# Patient Record
Sex: FEMALE | Race: WHITE | Employment: OTHER
[De-identification: names, ages, dates, MRNs, and addresses within clinical notes are randomized per-mention and may not be internally consistent; named-entity substitution may affect disease eponyms.]

---

## 2017-01-03 ENCOUNTER — CARE COORDINATION (OUTPATIENT)
Dept: CARE COORDINATION | Facility: CLINIC | Age: 47
End: 2017-01-03

## 2017-01-04 ENCOUNTER — TELEPHONE (OUTPATIENT)
Dept: FAMILY MEDICINE CLINIC | Facility: CLINIC | Age: 47
End: 2017-01-04

## 2017-01-05 ENCOUNTER — OFFICE VISIT (OUTPATIENT)
Dept: FAMILY MEDICINE CLINIC | Facility: CLINIC | Age: 47
End: 2017-01-05

## 2017-01-05 VITALS
DIASTOLIC BLOOD PRESSURE: 70 MMHG | RESPIRATION RATE: 18 BRPM | BODY MASS INDEX: 22.72 KG/M2 | WEIGHT: 105 LBS | SYSTOLIC BLOOD PRESSURE: 130 MMHG | HEART RATE: 80 BPM

## 2017-01-05 DIAGNOSIS — I10 ESSENTIAL HYPERTENSION: Primary | ICD-10-CM

## 2017-01-05 DIAGNOSIS — E78.5 HYPERLIPIDEMIA, UNSPECIFIED HYPERLIPIDEMIA TYPE: ICD-10-CM

## 2017-01-05 DIAGNOSIS — M79.7 FIBROMYALGIA: ICD-10-CM

## 2017-01-05 PROBLEM — F14.20 COCAINE ADDICTION (HCC): Status: ACTIVE | Noted: 2017-01-05

## 2017-01-05 PROCEDURE — 99214 OFFICE O/P EST MOD 30 MIN: CPT | Performed by: FAMILY MEDICINE

## 2017-01-05 RX ORDER — TIZANIDINE 4 MG/1
TABLET ORAL
Qty: 60 TABLET | Refills: 0 | Status: SHIPPED | OUTPATIENT
Start: 2017-01-05 | End: 2017-02-06 | Stop reason: SDUPTHER

## 2017-01-05 RX ORDER — ATENOLOL 25 MG/1
25 TABLET ORAL DAILY
Qty: 90 TABLET | Refills: 1 | Status: SHIPPED | OUTPATIENT
Start: 2017-01-05 | End: 2017-02-06 | Stop reason: ALTCHOICE

## 2017-01-05 RX ORDER — CLONIDINE HYDROCHLORIDE 0.1 MG/1
TABLET ORAL
Qty: 60 TABLET | Refills: 5 | Status: SHIPPED | OUTPATIENT
Start: 2017-01-05 | End: 2017-02-06 | Stop reason: ALTCHOICE

## 2017-01-05 ASSESSMENT — ENCOUNTER SYMPTOMS
BLOOD IN STOOL: 0
CHEST TIGHTNESS: 0
SHORTNESS OF BREATH: 0
ABDOMINAL PAIN: 0

## 2017-01-06 ENCOUNTER — TELEPHONE (OUTPATIENT)
Dept: GASTROENTEROLOGY | Facility: CLINIC | Age: 47
End: 2017-01-06

## 2017-01-12 ENCOUNTER — TELEPHONE (OUTPATIENT)
Dept: GASTROENTEROLOGY | Facility: CLINIC | Age: 47
End: 2017-01-12

## 2017-01-19 ENCOUNTER — TELEPHONE (OUTPATIENT)
Dept: GASTROENTEROLOGY | Facility: CLINIC | Age: 47
End: 2017-01-19

## 2017-02-06 RX ORDER — TIZANIDINE 4 MG/1
TABLET ORAL
Qty: 120 TABLET | Refills: 0 | Status: SHIPPED | OUTPATIENT
Start: 2017-02-06 | End: 2020-02-14

## 2017-02-06 RX ORDER — ESCITALOPRAM OXALATE 20 MG/1
20 TABLET ORAL DAILY
Qty: 90 TABLET | Refills: 0 | Status: SHIPPED | OUTPATIENT
Start: 2017-02-06 | End: 2018-12-26

## 2017-02-06 RX ORDER — TOPIRAMATE 25 MG/1
25 TABLET ORAL 2 TIMES DAILY
COMMUNITY
End: 2018-12-26

## 2017-02-06 RX ORDER — ARIPIPRAZOLE 5 MG/1
5 TABLET ORAL DAILY
Status: ON HOLD | COMMUNITY

## 2017-02-27 ENCOUNTER — TELEPHONE (OUTPATIENT)
Dept: FAMILY MEDICINE CLINIC | Facility: CLINIC | Age: 47
End: 2017-02-27

## 2017-02-28 RX ORDER — AMLODIPINE BESYLATE 10 MG/1
10 TABLET ORAL DAILY
Qty: 30 TABLET | Refills: 0 | Status: SHIPPED | OUTPATIENT
Start: 2017-02-28 | End: 2018-12-26

## 2017-02-28 RX ORDER — TRAZODONE HYDROCHLORIDE 50 MG/1
50 TABLET ORAL NIGHTLY PRN
Qty: 30 TABLET | Refills: 0 | Status: SHIPPED | OUTPATIENT
Start: 2017-02-28 | End: 2019-04-08 | Stop reason: SDUPTHER

## 2017-02-28 RX ORDER — DIVALPROEX SODIUM 500 MG/1
1000 TABLET, DELAYED RELEASE ORAL 2 TIMES DAILY
Qty: 120 TABLET | Refills: 0 | Status: ON HOLD | OUTPATIENT
Start: 2017-02-28 | End: 2022-10-11

## 2017-05-17 ENCOUNTER — TELEPHONE (OUTPATIENT)
Dept: UROLOGY | Age: 47
End: 2017-05-17

## 2018-12-26 ENCOUNTER — OFFICE VISIT (OUTPATIENT)
Dept: FAMILY MEDICINE CLINIC | Age: 48
End: 2018-12-26
Payer: MEDICARE

## 2018-12-26 ENCOUNTER — HOSPITAL ENCOUNTER (OUTPATIENT)
Age: 48
Setting detail: SPECIMEN
Discharge: HOME | End: 2018-12-26
Payer: MEDICARE

## 2018-12-26 VITALS
DIASTOLIC BLOOD PRESSURE: 68 MMHG | TEMPERATURE: 97.4 F | SYSTOLIC BLOOD PRESSURE: 110 MMHG | HEIGHT: 58 IN | RESPIRATION RATE: 16 BRPM | HEART RATE: 60 BPM | WEIGHT: 95 LBS | BODY MASS INDEX: 19.94 KG/M2

## 2018-12-26 DIAGNOSIS — F14.21 COCAINE DEPENDENCE IN REMISSION (HCC): ICD-10-CM

## 2018-12-26 DIAGNOSIS — N39.0 RECURRENT UTI: ICD-10-CM

## 2018-12-26 DIAGNOSIS — G40.909 SEIZURE DISORDER (HCC): ICD-10-CM

## 2018-12-26 DIAGNOSIS — M79.7 FIBROMYALGIA: ICD-10-CM

## 2018-12-26 DIAGNOSIS — M25.512 CHRONIC LEFT SHOULDER PAIN: ICD-10-CM

## 2018-12-26 DIAGNOSIS — G89.29 CHRONIC LEFT SHOULDER PAIN: ICD-10-CM

## 2018-12-26 DIAGNOSIS — F41.9 ANXIETY: ICD-10-CM

## 2018-12-26 DIAGNOSIS — L02.91 ABSCESS: Primary | ICD-10-CM

## 2018-12-26 DIAGNOSIS — F31.60 BIPOLAR DISORDER, MIXED (HCC): ICD-10-CM

## 2018-12-26 DIAGNOSIS — A49.02 MRSA INFECTION: ICD-10-CM

## 2018-12-26 LAB
APPEARANCE FLUID: CLEAR
BILIRUBIN, POC: NEGATIVE
BLOOD URINE, POC: NEGATIVE
CLARITY, POC: CLEAR
COLOR, POC: YELLOW
GLUCOSE URINE, POC: NEGATIVE
KETONES, POC: NEGATIVE
LEUKOCYTE EST, POC: NEGATIVE
NITRITE, POC: NEGATIVE
PH, POC: 6
PROTEIN, POC: NEGATIVE
SPECIFIC GRAVITY, POC: 1.02
UROBILINOGEN, POC: NEGATIVE

## 2018-12-26 PROCEDURE — G0444 DEPRESSION SCREEN ANNUAL: HCPCS | Performed by: NURSE PRACTITIONER

## 2018-12-26 PROCEDURE — 81002 URINALYSIS NONAUTO W/O SCOPE: CPT | Performed by: NURSE PRACTITIONER

## 2018-12-26 PROCEDURE — G8420 CALC BMI NORM PARAMETERS: HCPCS | Performed by: NURSE PRACTITIONER

## 2018-12-26 PROCEDURE — 4004F PT TOBACCO SCREEN RCVD TLK: CPT | Performed by: NURSE PRACTITIONER

## 2018-12-26 PROCEDURE — G8484 FLU IMMUNIZE NO ADMIN: HCPCS | Performed by: NURSE PRACTITIONER

## 2018-12-26 PROCEDURE — 3017F COLORECTAL CA SCREEN DOC REV: CPT | Performed by: NURSE PRACTITIONER

## 2018-12-26 PROCEDURE — G8427 DOCREV CUR MEDS BY ELIG CLIN: HCPCS | Performed by: NURSE PRACTITIONER

## 2018-12-26 PROCEDURE — 99215 OFFICE O/P EST HI 40 MIN: CPT | Performed by: NURSE PRACTITIONER

## 2018-12-26 RX ORDER — TERBINAFINE HYDROCHLORIDE 250 MG/1
250 TABLET ORAL DAILY
COMMUNITY
End: 2020-02-14

## 2018-12-26 RX ORDER — CLONAZEPAM 0.5 MG/1
0.5 TABLET ORAL 2 TIMES DAILY PRN
COMMUNITY
End: 2020-02-14

## 2018-12-26 RX ORDER — AMLODIPINE BESYLATE 5 MG/1
10 TABLET ORAL DAILY
COMMUNITY
End: 2020-03-17

## 2018-12-26 RX ORDER — DULOXETIN HYDROCHLORIDE 30 MG/1
30 CAPSULE, DELAYED RELEASE ORAL DAILY
COMMUNITY
End: 2019-04-08 | Stop reason: SDUPTHER

## 2018-12-26 RX ORDER — TOPIRAMATE 50 MG/1
100 TABLET, FILM COATED ORAL 2 TIMES DAILY
Status: ON HOLD | COMMUNITY
End: 2022-01-31 | Stop reason: HOSPADM

## 2018-12-26 ASSESSMENT — PATIENT HEALTH QUESTIONNAIRE - PHQ9
9. THOUGHTS THAT YOU WOULD BE BETTER OFF DEAD, OR OF HURTING YOURSELF: 1
6. FEELING BAD ABOUT YOURSELF - OR THAT YOU ARE A FAILURE OR HAVE LET YOURSELF OR YOUR FAMILY DOWN: 3
SUM OF ALL RESPONSES TO PHQ QUESTIONS 1-9: 21
SUM OF ALL RESPONSES TO PHQ QUESTIONS 1-9: 21
2. FEELING DOWN, DEPRESSED OR HOPELESS: 3
10. IF YOU CHECKED OFF ANY PROBLEMS, HOW DIFFICULT HAVE THESE PROBLEMS MADE IT FOR YOU TO DO YOUR WORK, TAKE CARE OF THINGS AT HOME, OR GET ALONG WITH OTHER PEOPLE: 2
3. TROUBLE FALLING OR STAYING ASLEEP: 3
1. LITTLE INTEREST OR PLEASURE IN DOING THINGS: 3
7. TROUBLE CONCENTRATING ON THINGS, SUCH AS READING THE NEWSPAPER OR WATCHING TELEVISION: 3
SUM OF ALL RESPONSES TO PHQ9 QUESTIONS 1 & 2: 6
4. FEELING TIRED OR HAVING LITTLE ENERGY: 3
5. POOR APPETITE OR OVEREATING: 0
8. MOVING OR SPEAKING SO SLOWLY THAT OTHER PEOPLE COULD HAVE NOTICED. OR THE OPPOSITE, BEING SO FIGETY OR RESTLESS THAT YOU HAVE BEEN MOVING AROUND A LOT MORE THAN USUAL: 2

## 2018-12-26 ASSESSMENT — ENCOUNTER SYMPTOMS
CHEST TIGHTNESS: 0
ABDOMINAL PAIN: 0
SHORTNESS OF BREATH: 0
BLOOD IN STOOL: 0

## 2018-12-27 LAB
CULTURE: NORMAL
Lab: NORMAL
SPECIMEN DESCRIPTION: NORMAL
STATUS: NORMAL

## 2019-01-03 ENCOUNTER — TELEPHONE (OUTPATIENT)
Dept: FAMILY MEDICINE CLINIC | Age: 49
End: 2019-01-03

## 2019-01-03 RX ORDER — IBUPROFEN 800 MG/1
800 TABLET ORAL EVERY 8 HOURS PRN
Qty: 60 TABLET | Refills: 0 | Status: SHIPPED | OUTPATIENT
Start: 2019-01-03 | End: 2021-09-07 | Stop reason: ALTCHOICE

## 2019-01-07 ENCOUNTER — TELEPHONE (OUTPATIENT)
Dept: FAMILY MEDICINE CLINIC | Age: 49
End: 2019-01-07

## 2019-01-07 RX ORDER — VARENICLINE TARTRATE 25 MG
KIT ORAL
Qty: 53 EACH | Refills: 0 | Status: SHIPPED | OUTPATIENT
Start: 2019-01-07 | End: 2020-02-14

## 2019-04-08 ENCOUNTER — OFFICE VISIT (OUTPATIENT)
Dept: FAMILY MEDICINE CLINIC | Age: 49
End: 2019-04-08
Payer: COMMERCIAL

## 2019-04-08 VITALS
HEART RATE: 60 BPM | TEMPERATURE: 97.9 F | SYSTOLIC BLOOD PRESSURE: 90 MMHG | DIASTOLIC BLOOD PRESSURE: 60 MMHG | RESPIRATION RATE: 14 BRPM | WEIGHT: 106 LBS | BODY MASS INDEX: 22.15 KG/M2

## 2019-04-08 DIAGNOSIS — G40.909 SEIZURE DISORDER (HCC): Primary | ICD-10-CM

## 2019-04-08 DIAGNOSIS — F14.29 COCAINE DEPENDENCE WITH COCAINE-INDUCED DISORDER (HCC): ICD-10-CM

## 2019-04-08 DIAGNOSIS — F31.60 BIPOLAR DISORDER, MIXED (HCC): ICD-10-CM

## 2019-04-08 DIAGNOSIS — G89.4 CHRONIC PAIN SYNDROME: ICD-10-CM

## 2019-04-08 DIAGNOSIS — G47.00 INSOMNIA, UNSPECIFIED TYPE: ICD-10-CM

## 2019-04-08 DIAGNOSIS — Z12.39 SCREENING FOR BREAST CANCER: ICD-10-CM

## 2019-04-08 PROCEDURE — 99214 OFFICE O/P EST MOD 30 MIN: CPT | Performed by: NURSE PRACTITIONER

## 2019-04-08 RX ORDER — ALBUTEROL SULFATE 90 UG/1
2 AEROSOL, METERED RESPIRATORY (INHALATION) EVERY 6 HOURS PRN
Qty: 1 INHALER | Refills: 3 | Status: ON HOLD | OUTPATIENT
Start: 2019-04-08 | End: 2022-10-11

## 2019-04-08 RX ORDER — DULOXETIN HYDROCHLORIDE 30 MG/1
30 CAPSULE, DELAYED RELEASE ORAL DAILY
Qty: 30 CAPSULE | Refills: 3 | Status: ON HOLD | OUTPATIENT
Start: 2019-04-08

## 2019-04-08 RX ORDER — HYDROCODONE BITARTRATE AND ACETAMINOPHEN 5; 325 MG/1; MG/1
1 TABLET ORAL EVERY 6 HOURS PRN
COMMUNITY
End: 2019-04-08 | Stop reason: ALTCHOICE

## 2019-04-08 RX ORDER — TRAZODONE HYDROCHLORIDE 50 MG/1
50 TABLET ORAL NIGHTLY PRN
Qty: 30 TABLET | Refills: 3 | Status: SHIPPED | OUTPATIENT
Start: 2019-04-08 | End: 2022-08-09

## 2019-04-08 ASSESSMENT — ENCOUNTER SYMPTOMS
NAUSEA: 0
ABDOMINAL PAIN: 0
WHEEZING: 0
VOMITING: 0
SHORTNESS OF BREATH: 0

## 2019-04-08 NOTE — PROGRESS NOTES
but hasn't seen them for a while. States she has pain everywhere secondary to fibromyalgia. Was prescribed cymbalta but hasn't filled secondary to the cost.   Hx of cocaine addiction and has been free for 2 years. Review of Systems   Constitutional: Negative for chills and fever. Eyes: Negative for visual disturbance. Respiratory: Negative for shortness of breath and wheezing. Cardiovascular: Negative for chest pain and leg swelling. Gastrointestinal: Negative for abdominal pain, nausea and vomiting. Musculoskeletal: Positive for myalgias. Neurological: Positive for seizures. Negative for dizziness, weakness and numbness. Psychiatric/Behavioral: Positive for dysphoric mood and sleep disturbance. Negative for suicidal ideas. Objective:   Physical Exam   Constitutional: She appears well-nourished. No distress. HENT:   Nose: Nose normal.   Mouth/Throat: Oropharynx is clear and moist.   Eyes: Conjunctivae are normal.   Neck: Neck supple. Cardiovascular: Normal rate, regular rhythm and normal heart sounds. Pulmonary/Chest: Effort normal and breath sounds normal. No respiratory distress. Abdominal: Soft. There is no tenderness. Musculoskeletal: Normal range of motion. Lymphadenopathy:     She has no cervical adenopathy. Neurological: She is alert. No cranial nerve deficit. Skin: Skin is warm and dry. No rash noted. Psychiatric: She has a normal mood and affect. Her behavior is normal.   Nursing note and vitals reviewed. Assessment:      1. Seizure disorder ([address]    2. Insomnia, unspecified type    3. Chronic pain syndrome    4. Bipolar disorder, mixed ([address]    5. Cocaine dependence with cocaine-induced disorder ([address]    6.  Screening for breast cancer            Plan:      BP Readings from Last 3 Encounters:   04/08/19 90/60   12/26/18 110/68   01/05/17 130/70     BP 90/60 (Site: Left Upper Arm, Position: Sitting, Cuff Size: Medium Adult)   Pulse 60   Temp 97.9 °F (36.6 °C) (Oral)   Resp 14   Wt 106 lb (48.1 kg)   BMI 22.15 kg/m²   Lab Results   Component Value Date    WBC 6.7 12/29/2016    HGB 11.0 (L) 12/29/2016    HCT 34.7 (L) 12/29/2016     12/29/2016    CHOL 183 07/06/2016    TRIG 74 07/06/2016    HDL 65 07/06/2016    ALT 6 12/29/2016    AST 11 12/29/2016     12/29/2016    K 3.6 (L) 12/29/2016     12/29/2016    CREATININE 0.48 (L) 12/29/2016    BUN 5 (L) 12/29/2016    CO2 25 12/29/2016    TSH 0.63 07/22/2016    INR 1.0 07/22/2016    LABA1C 5.3 10/12/2016     Lab Results   Component Value Date    CALCIUM 9.0 12/29/2016    PHOS 2.7 09/12/2014     Lab Results   Component Value Date    LDLCALC 103 07/06/2016         1. Seizure disorder ([facility] [address]  - stable. Cont current therapy  - follow up with Dr. Elena Cast as scheduled     2. Insomnia, unspecified type  - resume trazodone   - traZODone (DESYREL) 50 MG tablet; Take 1 tablet by mouth nightly as needed for Sleep  Dispense: 30 tablet; Refill: 3  3. Chronic pain syndrome  - resume cymbalta   - DULoxetine (CYMBALTA) 30 MG extended release capsule; Take 1 capsule by mouth daily  Dispense: 30 capsule; Refill: 3    4. Bipolar disorder, mixed ([facility] [address]  - cont current therapy  - follow up with Meri as scheduled     5. Cocaine dependence with cocaine-induced disorder (HCC)  - Urine Drug Screen; Future    6. Screening for breast cancer  - ALBERT DIGITAL SCREEN W OR WO CAD BILATERAL;  Future        Requested Prescriptions     Signed Prescriptions Disp Refills    traZODone (DESYREL) 50 MG tablet 30 tablet 3     Sig: Take 1 tablet by mouth nightly as needed for Sleep    DULoxetine (CYMBALTA) 30 MG extended release capsule 30 capsule 3     Sig: Take 1 capsule by mouth daily    albuterol sulfate HFA (PROVENTIL HFA) 108 (90 Base) MCG/ACT inhaler 1 Inhaler 3     Sig: Inhale 2 puffs into the lungs every 6 hours as needed for Wheezing       Medications Discontinued During This Encounter   Medication Reason    traZODone (DESYREL) 50 MG tablet REORDER    DULoxetine (CYMBALTA) 30 MG extended release capsule REORDER    HYDROcodone-acetaminophen (NORCO) 5-325 MG per tablet Therapy completed         Discussed use, benefit, and side effects of prescribed medications. Barriers to medication compliance addressed. All patient questions answered. Pt voiced understanding. Return in about 4 months (around 8/8/2019) for follow up seizures, insomia .             Rita Greer, KELLY - CNP

## 2019-04-09 ENCOUNTER — TELEPHONE (OUTPATIENT)
Dept: GASTROENTEROLOGY | Age: 49
End: 2019-04-09

## 2019-04-16 ENCOUNTER — OFFICE VISIT (OUTPATIENT)
Dept: ORTHOPEDIC SURGERY | Age: 49
End: 2019-04-16
Payer: COMMERCIAL

## 2019-04-16 DIAGNOSIS — M54.2 NECK PAIN: ICD-10-CM

## 2019-04-16 DIAGNOSIS — M25.512 ACUTE PAIN OF LEFT SHOULDER: Primary | ICD-10-CM

## 2019-04-16 DIAGNOSIS — M19.019 SHOULDER ARTHRITIS: ICD-10-CM

## 2019-04-16 PROCEDURE — 20610 DRAIN/INJ JOINT/BURSA W/O US: CPT | Performed by: ORTHOPAEDIC SURGERY

## 2019-04-16 PROCEDURE — 99203 OFFICE O/P NEW LOW 30 MIN: CPT | Performed by: ORTHOPAEDIC SURGERY

## 2019-04-16 RX ORDER — LIDOCAINE HYDROCHLORIDE 10 MG/ML
2 INJECTION, SOLUTION EPIDURAL; INFILTRATION; INTRACAUDAL; PERINEURAL ONCE
Status: COMPLETED | OUTPATIENT
Start: 2019-04-16 | End: 2019-04-16

## 2019-04-16 RX ORDER — BUPIVACAINE HYDROCHLORIDE 5 MG/ML
2 INJECTION, SOLUTION PERINEURAL ONCE
Status: COMPLETED | OUTPATIENT
Start: 2019-04-16 | End: 2019-04-16

## 2019-04-16 RX ORDER — BETAMETHASONE SODIUM PHOSPHATE AND BETAMETHASONE ACETATE 3; 3 MG/ML; MG/ML
12 INJECTION, SUSPENSION INTRA-ARTICULAR; INTRALESIONAL; INTRAMUSCULAR; SOFT TISSUE ONCE
Status: COMPLETED | OUTPATIENT
Start: 2019-04-16 | End: 2019-04-16

## 2019-04-16 RX ADMIN — BETAMETHASONE SODIUM PHOSPHATE AND BETAMETHASONE ACETATE 12 MG: 3; 3 INJECTION, SUSPENSION INTRA-ARTICULAR; INTRALESIONAL; INTRAMUSCULAR; SOFT TISSUE at 14:35

## 2019-04-16 RX ADMIN — LIDOCAINE HYDROCHLORIDE 2 ML: 10 INJECTION, SOLUTION EPIDURAL; INFILTRATION; INTRACAUDAL; PERINEURAL at 14:36

## 2019-04-16 RX ADMIN — BUPIVACAINE HYDROCHLORIDE 10 MG: 5 INJECTION, SOLUTION PERINEURAL at 14:36

## 2019-04-22 ENCOUNTER — OFFICE VISIT (OUTPATIENT)
Dept: GASTROENTEROLOGY | Age: 49
End: 2019-04-22
Payer: COMMERCIAL

## 2019-04-22 VITALS
BODY MASS INDEX: 21.32 KG/M2 | HEART RATE: 88 BPM | WEIGHT: 102 LBS | SYSTOLIC BLOOD PRESSURE: 118 MMHG | DIASTOLIC BLOOD PRESSURE: 89 MMHG

## 2019-04-22 DIAGNOSIS — R74.8 ABNORMAL SERUM LEVEL OF LIPASE: Primary | ICD-10-CM

## 2019-04-22 DIAGNOSIS — K92.1 BLOOD IN STOOL: ICD-10-CM

## 2019-04-22 DIAGNOSIS — R10.13 ABDOMINAL PAIN, EPIGASTRIC: ICD-10-CM

## 2019-04-22 PROCEDURE — 99215 OFFICE O/P EST HI 40 MIN: CPT | Performed by: INTERNAL MEDICINE

## 2019-04-22 ASSESSMENT — ENCOUNTER SYMPTOMS
RECTAL PAIN: 1
TROUBLE SWALLOWING: 1
ABDOMINAL PAIN: 1
BLOOD IN STOOL: 1
VOMITING: 1
ABDOMINAL DISTENTION: 1
EYE PAIN: 0
CONSTIPATION: 1
CHEST TIGHTNESS: 1
SHORTNESS OF BREATH: 1
NAUSEA: 1
WHEEZING: 1
ANAL BLEEDING: 1
BACK PAIN: 1
DIARRHEA: 1
SINUS PAIN: 0
EYE REDNESS: 0
SINUS PRESSURE: 0

## 2019-04-22 NOTE — PROGRESS NOTES
Subjective:      Patient ID: Brennan Morrell is a 46 y.o. female. HPI    Dr. Charmaine Lr MD our mutual patient Brennan Morrell was seen  for No diagnosis found. .      Patient seen with the symptoms of upper abdominal pain. The pain is across the upper abdomen, crampy, sharp pain. Patient had constant pain. At that time she did have nausea and emesis. Given the intensity of the pain, patient visited emergency department. She had a CT scan done which revealed minimal bile duct dilation, slightly distended small bowel loops in the left lower quadrant. Her labs were normal except lipase was elevated up to about 268. Liver tests were within normal limits. Patient was managed conservatively. At present she feels better. Still has mild discomfort in the upper abdomen. No nausea. Tolerating diet well. She does have chronic back pain. No prior history of pancreatitis. No history of ulcer disease. She had cholecystectomy done 30 years ago. In 2006 patient had Go-en-Y gastric bypass surgery for obesity. She has abdominal bloating. Has intermittent loose bowels with bloody mucus. On a rare occasion she does have constipation. She has a fair appetite. No dysphagia. No prior history of documented ulcer disease. Denies taking NSAIDs. No history of anticoagulation therapy. Past Medical, Family, and Social History reviewed and does contribute to the patient presenting condition. patient\"s PMH/PSH,SH,PSYCH hx, MEDs, ALLERGIES, and ROS was all reviewed and updated ion the appropriate sections        Review of Systems   Constitutional: Positive for fatigue. Negative for appetite change and unexpected weight change. HENT: Positive for trouble swallowing. Negative for sinus pressure and sinus pain. Eyes: Positive for visual disturbance (glasses). Negative for pain (dry/scratchy eyes) and redness. Respiratory: Positive for chest tightness (COPD), shortness of breath and wheezing. Cardiovascular: Positive for palpitations and leg swelling. Negative for chest pain. Gastrointestinal: Positive for abdominal distention, abdominal pain, anal bleeding, blood in stool, constipation, diarrhea, nausea, rectal pain and vomiting. Endocrine: Negative for cold intolerance and heat intolerance. Genitourinary: Positive for difficulty urinating and urgency. Negative for frequency. Musculoskeletal: Positive for arthralgias, back pain and neck pain. Skin: Negative. Allergic/Immunologic: Negative for environmental allergies and food allergies. Neurological: Positive for dizziness, weakness and headaches (migraines). Hematological: Bruises/bleeds easily. Psychiatric/Behavioral: Negative for agitation and sleep disturbance. The patient is not nervous/anxious. Objective:   Physical Exam   Constitutional: She is oriented to person, place, and time. She appears well-developed and well-nourished. HENT:   Head: Normocephalic and atraumatic. No oral lesions   Eyes: Pupils are equal, round, and reactive to light. Conjunctivae are normal. No scleral icterus. Neck: Normal range of motion. Neck supple. No hepatojugular reflux and no JVD present. No tracheal deviation present. No thyromegaly present. Cardiovascular: Normal rate, regular rhythm, normal heart sounds and intact distal pulses. Patient is thinly built. Pulmonary/Chest: Effort normal and breath sounds normal. No respiratory distress. She has no wheezes. She has no rales. Abdominal: Soft. Bowel sounds are normal. She exhibits no distension, no ascites and no mass. There is no hepatomegaly. There is no tenderness. There is no rebound. No hernia. Musculoskeletal: She exhibits no edema or tenderness. No joint swelling   Lymphadenopathy:     She has no cervical adenopathy. Neurological: She is alert and oriented to person, place, and time. No cranial nerve deficit. Skin: Skin is warm.  No bruising, no ecchymosis and no rash noted. No erythema. Psychiatric: Thought content normal.   Nursing note and vitals reviewed. Assessment:       Diagnosis Orders   1. Abnormal serum level of lipase  Lipase    Hepatic Function Panel   2. Abdominal pain, epigastric  EGD   3. Blood in stool  COLONOSCOPY W/ OR W/O BIOPSY           Plan: At present patient appears stable. Etiology of her markedly elevated lipase levels not clear. Also biliary ducts appears to be mildly dilated. She has Go-en-Y gastric bypass surgery. She has upper abdominal pain. Need to rule out ulcer disease etc.  Also given that she is having intermittent bloody mucus with bowel movements need to evaluate colonic pathology. The Endoscopic procedure was explained to the patient in detail  The prep and NPO were explained  All the Risks, Benefits, and Alternatives were explained  Risk of Bleeding, Perforation and Cardio Respiratory risks were explained  her questions were answered  The procedure has been scheduled with the  in the office  Patient was asked to give us a call for any questions  The patient has verbalized understanding and agreement to this plan.

## 2019-04-26 RX ORDER — POLYETHYLENE GLYCOL 3350 17 G/17G
POWDER, FOR SOLUTION ORAL
Qty: 255 G | Refills: 0 | Status: SHIPPED | OUTPATIENT
Start: 2019-04-26 | End: 2019-06-20 | Stop reason: ALTCHOICE

## 2019-05-14 ENCOUNTER — OFFICE VISIT (OUTPATIENT)
Dept: ORTHOPEDIC SURGERY | Age: 49
End: 2019-05-14
Payer: COMMERCIAL

## 2019-05-14 VITALS — HEIGHT: 59 IN | WEIGHT: 102 LBS | BODY MASS INDEX: 20.56 KG/M2

## 2019-05-14 DIAGNOSIS — M19.012 OSTEOARTHRITIS OF LEFT GLENOHUMERAL JOINT: Primary | ICD-10-CM

## 2019-05-14 PROCEDURE — 99214 OFFICE O/P EST MOD 30 MIN: CPT | Performed by: ORTHOPAEDIC SURGERY

## 2019-05-14 NOTE — PROGRESS NOTES
Orthopedic Shoulder Encounter Note     Chief complaint: Left shoulder pain    HPI: Pan Fall is a 46 y.o.  right-hand dominant female who presents for evaluation of her left shoulder. She indicates that she's had pain on and off in the shoulder for the past 2-3 years but over the course of the past 4-5 months she has had progressively worsening pain and it is becoming constant. The pain diffusely involves the shoulder and she states that she has significant pain attempting to lift her arm and she can't lift anything up with this arm. She has painful popping and grinding sensations in the shoulder with movement. It is primarily localized to the lateral and anterior aspect of the shoulder and occasionally radiates up her neck and down her arm. She does report some weakness and stiffness as well as positional nighttime pain. Of note she has a history of epilepsy and states that the last time she had a seizure was in December 2018. Previous treatment:    NSAIDs: States that she is unable to take due to history of gastric bypass surgery    Physical Therapy:  She indicates that she is unable to do therapy due to not being able to drive    Injections:  left shoulder subacromial cortisone injection on 4/16/19 by Dr. MCALLISTER [facility] this provided pain relief for 1-2 days. Surgeries:  history of bilateral shoulder arthroscopic impingement surgeries performed 15-20 years ago here in town. She cannot recall who performed her surgeries.     Review of Systems:     Constitution: no fever or chills   Pain level: 10/10  Musculoskeletal: As noted in the HPI   Neurologic: no neurologic symptoms    Past Medical History  Jaime Jasmine  has a past medical history of Anemia, Arthritis, Asthma, Bipolar disorder, mixed ([address] Cocaine abuse ([address] COPD (chronic obstructive pulmonary disease) ([facility] [address] Degeneration of cervical intervertebral disc, Depression with anxiety, Depression with anxiety, Fibromyalgia, GERD (gastroesophageal reflux disease), History of migraine headaches, History of renal calculi, History of seizure disorder, Hoarseness of voice, HTN (hypertension), Hyperlipidemia, IGT (impaired glucose tolerance), Kidney stones, Lactose intolerance, Leukopenia, Major depressive disorder, recurrent episode, severe, without mention of psychotic behavior, MVP (mitral valve prolapse), Seizures ([facility] [address] Sleep apnea, and Unspecified diseases of blood and blood-forming organs. Past Surgical History  Caitlyn Winter  has a past surgical history that includes Gastric bypass surgery; Cholecystectomy; Hysterectomy; Appendectomy; Hysterectomy; Tonsillectomy and adenoidectomy; Ankle fracture surgery; Neck surgery; shoulder surgery;  section; Carpal tunnel release; Colonoscopy (12); Upper gastrointestinal endoscopy (7-3-12); Gastric bypass surgery; Breast lumpectomy (Right); Cardiac catheterization; Upper gastrointestinal endoscopy (2016); and Colonoscopy (2016). Current Medications  Current Outpatient Medications   Medication Sig Dispense Refill    polyethylene glycol (MIRALAX) powder Use as directed by following your bowel prep instructions given by physician office 255 g 0    bisacodyl (BISACODYL) 5 MG EC tablet TAKE 4 TABS THE DAY BEFORE COLONOSCOPY AS DIRECTED ON BOWEL PREP INSTRUCTIONS GIVEN BY PHYSICIAN OFFICE 4 tablet 0    traZODone (DESYREL) 50 MG tablet Take 1 tablet by mouth nightly as needed for Sleep 30 tablet 3    DULoxetine (CYMBALTA) 30 MG extended release capsule Take 1 capsule by mouth daily 30 capsule 3    albuterol sulfate HFA (PROVENTIL HFA) 108 (90 Base) MCG/ACT inhaler Inhale 2 puffs into the lungs every 6 hours as needed for Wheezing 1 Inhaler 3    varenicline (CHANTIX STARTING MONTH NATHANIEL) 0.5 MG X 11 & 1 MG X 42 tablet Take by mouth.  53 each 0    ibuprofen (ADVIL;MOTRIN) 800 MG tablet Take 1 tablet by mouth every 8 hours as needed for Pain 60 tablet 0    terbinafine (LAMISIL) 250 MG tablet Take 250 mg by mouth daily      clonazePAM (KLONOPIN) 0.5 MG tablet Take 0.5 mg by mouth 2 times daily as needed. Yesica Stuart topiramate (TOPAMAX) 50 MG tablet Take 50 mg by mouth 2 times daily      aspirin 325 MG EC tablet Take 325 mg by mouth daily      amLODIPine (NORVASC) 5 MG tablet Take 5 mg by mouth daily      divalproex (DEPAKOTE) 500 MG DR tablet Take 2 tablets by mouth 2 times daily Indications: Level 41 as of 7/22/16 (Pt takes two 500 mg DR tabs = 1,000mg PO BID) 120 tablet 0    ARIPiprazole (ABILIFY) 5 MG tablet Take 5 mg by mouth daily      tiZANidine (ZANAFLEX) 4 MG tablet TAKE 1 TABLET BY MOUTH THREE TIMES DAILY AS NEEDED FOR MUSCLE SPAMS 120 tablet 0    Multiple Vitamin (MULTIVITAMIN PO) Take 1 tablet by mouth daily       nitroGLYCERIN (NITROSTAT) 0.4 MG SL tablet Place 0.4 mg under the tongue every 5 minutes as needed. No current facility-administered medications for this visit. Allergies  Allergies have been reviewed. Olamide Moreno is allergic to bactrim and codeine. Social History  Olamide Moreno  reports that she has been smoking cigarettes. She has a 16.50 pack-year smoking history. She has never used smokeless tobacco. She reports that she has current or past drug history. Drug: Cocaine. She reports that she does not drink alcohol. Family History  Anu's family history includes Alcohol Abuse in her brother; COPD in her father; Cancer in an other family member; Coronary Art Dis in her father; Depression in her brother; Diabetes in her mother.      Physical Exam:     Ht 4' 11\" (1.499 m)   Wt 102 lb (46.3 kg)   BMI 20.60 kg/m²    General Appearance: alert, well appearing, and in no distress  Mental Status: alert, oriented to person, place, and time  Gait: normal    Shoulder:    Skin: warm and dry, no rash or erythema; no swelling or obvious muscular atrophy  Vasculature: 2+ radial pulses bilaterally  Neuro: Sensation grossly intact to light touch diffusely  Tenderness:  tender to palpation over the

## 2019-05-15 ENCOUNTER — TELEPHONE (OUTPATIENT)
Dept: GASTROENTEROLOGY | Age: 49
End: 2019-05-15

## 2019-05-15 DIAGNOSIS — Z12.39 SCREENING FOR BREAST CANCER: ICD-10-CM

## 2019-05-15 NOTE — TELEPHONE ENCOUNTER
This writer attempted to return patient's call regarding labs. Message left with contact information, or she could speak with her pcp, if he is available at this time, as Dr. Ying Méndez is not.

## 2019-05-17 ENCOUNTER — TELEPHONE (OUTPATIENT)
Dept: GASTROENTEROLOGY | Age: 49
End: 2019-05-17

## 2019-05-17 NOTE — TELEPHONE ENCOUNTER
received a voicemail from the patient stating that she needs to know if Dr. Pedro Pablo Verdin had reviewed her labs as she is scheduled for an EGD on Monday, but he wanted to recheck her lipase before she proceeded with the procedure.  discussed the patients lab results with Dr. Pedro Pablo Verdin who states the patients lipase has returned to normal levels so she is okay to have her procedure done.  contacted the patient back and informed her that she is alright to have the EGD done. Patient verbalized understanding and thanked zenyr for calling back.

## 2019-05-22 DIAGNOSIS — M19.012 OSTEOARTHRITIS OF LEFT GLENOHUMERAL JOINT: Primary | ICD-10-CM

## 2019-06-10 ENCOUNTER — TELEPHONE (OUTPATIENT)
Dept: FAMILY MEDICINE CLINIC | Age: 49
End: 2019-06-10

## 2019-06-10 RX ORDER — FLUCONAZOLE 150 MG/1
150 TABLET ORAL ONCE
Qty: 1 TABLET | Refills: 0 | Status: SHIPPED | OUTPATIENT
Start: 2019-06-10 | End: 2019-06-10

## 2019-06-10 NOTE — TELEPHONE ENCOUNTER
patient stated that she has been on a lot of antibitics due to having surgery and is requesting diflucan be sent to [facility]

## 2019-06-19 ENCOUNTER — TELEPHONE (OUTPATIENT)
Dept: GASTROENTEROLOGY | Age: 49
End: 2019-06-19

## 2019-06-19 NOTE — TELEPHONE ENCOUNTER
Writer octaviom to verify appt and to also ask if the patient would like seen sooner at 1:15 or 2:45

## 2019-06-20 ENCOUNTER — OFFICE VISIT (OUTPATIENT)
Dept: GASTROENTEROLOGY | Age: 49
End: 2019-06-20
Payer: COMMERCIAL

## 2019-06-20 VITALS
SYSTOLIC BLOOD PRESSURE: 173 MMHG | HEART RATE: 83 BPM | DIASTOLIC BLOOD PRESSURE: 104 MMHG | WEIGHT: 107.4 LBS | BODY MASS INDEX: 21.69 KG/M2

## 2019-06-20 DIAGNOSIS — R74.8 ABNORMAL SERUM LEVEL OF LIPASE: ICD-10-CM

## 2019-06-20 DIAGNOSIS — R10.13 ABDOMINAL PAIN, EPIGASTRIC: ICD-10-CM

## 2019-06-20 DIAGNOSIS — K92.1 BLOOD IN STOOL: Primary | ICD-10-CM

## 2019-06-20 PROCEDURE — 99213 OFFICE O/P EST LOW 20 MIN: CPT | Performed by: INTERNAL MEDICINE

## 2019-06-20 ASSESSMENT — ENCOUNTER SYMPTOMS
NAUSEA: 0
VOMITING: 0
COUGH: 1
ABDOMINAL PAIN: 0
ANAL BLEEDING: 0
BLOOD IN STOOL: 1
CHOKING: 0
VOICE CHANGE: 0
SINUS PRESSURE: 0
ALLERGIC/IMMUNOLOGIC NEGATIVE: 1
DIARRHEA: 0
ABDOMINAL DISTENTION: 0
RECTAL PAIN: 0
BACK PAIN: 1
CONSTIPATION: 1
SORE THROAT: 0
WHEEZING: 0
TROUBLE SWALLOWING: 1

## 2019-06-20 NOTE — PROGRESS NOTES
Subjective:      Patient ID: Shaggy Delong is a 46 y.o. female. Dr. Diane Nelson MD our mutual patient Shaggy Delong was seen  for   1. Blood in stool    2. Abnormal serum level of lipase    3. Abdominal pain, epigastric     . Patient seen for follow-up of abdominal pain and intermittent hematochezia. Recently patient had a EGD done and was found to have small gastric pouch. No esophagitis or gastritis seen. Colonoscopy revealed significant spasms in the sigmoid colon. Also there was a questionable nonspecific inflammation seen in the rectosigmoid area and felt to be nonspecific, may be related to colon preparation. Random biopsies from the colon did not reveal microscopic colitis. On further discussion patient states that she has mild constipation. Does not strain her bowel movements. Has intermittent hematochezia, small amount, usually on the toilet tissue. Past Medical, Family, and Social History reviewed and does contribute to the patient presenting condition. patient\"s PMH/PSH,SH,PSYCH hx, MEDs, ALLERGIES, and ROS was all reviewed and updated ion the appropriate sections      HPI    Review of Systems   Constitutional: Positive for fatigue. Negative for appetite change and unexpected weight change. HENT: Positive for trouble swallowing. Negative for dental problem, postnasal drip, sinus pressure, sore throat and voice change. Eyes: Positive for visual disturbance (glasses). Respiratory: Positive for cough. Negative for choking and wheezing. Cardiovascular: Negative. Negative for chest pain, palpitations and leg swelling. Gastrointestinal: Positive for blood in stool and constipation. Negative for abdominal distention, abdominal pain, anal bleeding, diarrhea, nausea, rectal pain and vomiting. Genitourinary: Negative. Negative for difficulty urinating. Musculoskeletal: Positive for arthralgias and back pain. Negative for gait problem and myalgias. Allergic/Immunologic: Negative. Negative for environmental allergies and food allergies. Neurological: Negative. Negative for dizziness, weakness, light-headedness, numbness and headaches. Hematological: Bruises/bleeds easily. Psychiatric/Behavioral: Positive for sleep disturbance. The patient is nervous/anxious. Objective:   Physical Exam   Constitutional: She is oriented to person, place, and time. She appears well-developed and well-nourished. HENT:   Head: Normocephalic and atraumatic. No oral lesions   Eyes: Pupils are equal, round, and reactive to light. Conjunctivae are normal. No scleral icterus. Neck: Normal range of motion. Neck supple. No hepatojugular reflux and no JVD present. No tracheal deviation present. No thyromegaly present. Cardiovascular: Normal rate, regular rhythm, normal heart sounds and intact distal pulses. Pulmonary/Chest: Effort normal and breath sounds normal. No respiratory distress. She has no wheezes. She has no rales. Abdominal: Soft. Bowel sounds are normal. She exhibits no distension, no ascites and no mass. There is no hepatomegaly. There is no tenderness. There is no rebound. No hernia. Genitourinary: Rectal exam shows guaiac negative stool. Musculoskeletal: She exhibits no edema or tenderness. No joint swelling   Lymphadenopathy:     She has no cervical adenopathy. Neurological: She is alert and oriented to person, place, and time. No cranial nerve deficit. Skin: Skin is warm. No bruising, no ecchymosis and no rash noted. No erythema. Psychiatric: Thought content normal.   Nursing note and vitals reviewed. Assessment:       Diagnosis Orders   1. Blood in stool     2. Abnormal serum level of lipase     3. Abdominal pain, epigastric             Plan: At present her examination nonspecific. Her stool is negative for occult blood.     Discussed with the patient regarding EGD, colonoscopy procedure findings, histology results from random biopsies from the colon. Patient is reassured. Hematochezia appears to be an anorectal source. Advised precautions to avoid constipation. Also advised intermittent sitz baths. If the symptoms persist to contact me. Otherwise advised to see family physician for follow-up. Also advised to frequent small feedings. I have reviewed and agree with the ROS entered by the MA.

## 2019-07-08 LAB
6-ACETYLMORPHINE, UR: NORMAL
AMPHETAMINE SCREEN, URINE: NEGATIVE
BARBITURATE SCREEN, URINE: NEGATIVE
BENZODIAZEPINE SCREEN, URINE: NEGATIVE
CANNABINOID SCREEN URINE: NEGATIVE
COCAINE METABOLITE, URINE: NEGATIVE
CREATININE URINE: NORMAL MG/DL
EDDP, URINE: NORMAL
ETHANOL URINE: NORMAL
MDMA URINE: NORMAL
METHADONE SCREEN, URINE: NEGATIVE
METHAMPHETAMINE, URINE: NORMAL
OPIATES, URINE: NEGATIVE
OXYCODONE: NEGATIVE
PCP: NORMAL
PH, URINE: NORMAL
PHENCYCLIDINE, URINE: NEGATIVE
PROPOXYPHENE, URINE: NORMAL
TRICYCLIC ANTIDEPRESSANTS, UR: NORMAL

## 2019-07-10 DIAGNOSIS — F14.29 COCAINE DEPENDENCE WITH COCAINE-INDUCED DISORDER (HCC): ICD-10-CM

## 2019-11-05 ENCOUNTER — TELEPHONE (OUTPATIENT)
Dept: FAMILY MEDICINE CLINIC | Age: 49
End: 2019-11-05

## 2019-11-05 DIAGNOSIS — R30.0 DYSURIA: Primary | ICD-10-CM

## 2019-11-05 RX ORDER — NITROFURANTOIN 25; 75 MG/1; MG/1
100 CAPSULE ORAL 2 TIMES DAILY
Qty: 14 CAPSULE | Refills: 0 | Status: SHIPPED | OUTPATIENT
Start: 2019-11-05 | End: 2019-11-12

## 2020-02-13 PROBLEM — G43.111 INTRACTABLE MIGRAINE WITH AURA WITH STATUS MIGRAINOSUS: Status: ACTIVE | Noted: 2019-03-20

## 2020-02-13 PROBLEM — N20.0 KIDNEY STONES: Status: ACTIVE | Noted: 2020-02-13

## 2020-02-13 PROBLEM — S39.92XA INJURY OF BACK: Status: ACTIVE | Noted: 2020-02-13

## 2020-02-13 PROBLEM — R94.31 ABNORMAL EKG: Status: ACTIVE | Noted: 2019-10-22

## 2020-02-13 PROBLEM — T07.XXXA FRACTURES: Status: ACTIVE | Noted: 2020-02-13

## 2020-02-13 PROBLEM — I65.29 CAROTID ARTERY STENOSIS: Status: ACTIVE | Noted: 2020-02-13

## 2020-02-13 PROBLEM — Z72.0 TOBACCO ABUSE: Status: ACTIVE | Noted: 2019-04-09

## 2020-02-13 PROBLEM — Z87.898 HISTORY OF CRACK COCAINE USE: Status: ACTIVE | Noted: 2019-10-22

## 2020-02-13 PROBLEM — Z87.440 HISTORY OF UTI: Status: ACTIVE | Noted: 2020-02-13

## 2020-02-13 PROBLEM — F41.9 ANXIETY: Status: ACTIVE | Noted: 2020-02-13

## 2020-02-13 PROBLEM — I07.1 MILD TRICUSPID REGURGITATION: Status: ACTIVE | Noted: 2019-04-09

## 2020-02-13 PROBLEM — G43.719 INTRACTABLE CHRONIC MIGRAINE WITHOUT AURA AND WITHOUT STATUS MIGRAINOSUS: Status: ACTIVE | Noted: 2019-06-26

## 2020-02-13 PROBLEM — F43.9 STRESS AT HOME: Status: ACTIVE | Noted: 2019-03-20

## 2020-02-13 PROBLEM — R06.09 DOE (DYSPNEA ON EXERTION): Status: ACTIVE | Noted: 2020-02-13

## 2020-02-13 PROBLEM — R42 DIZZINESS: Status: ACTIVE | Noted: 2019-06-26

## 2020-02-13 PROBLEM — H54.7 VISUAL IMPAIRMENT: Status: ACTIVE | Noted: 2020-02-13

## 2020-02-13 PROBLEM — Z98.84 H/O GASTRIC BYPASS: Status: ACTIVE | Noted: 2020-02-13

## 2020-02-13 PROBLEM — R53.83 FATIGUE: Status: ACTIVE | Noted: 2019-03-20

## 2020-02-13 PROBLEM — I27.20 MILD PULMONARY HYPERTENSION (HCC): Status: ACTIVE | Noted: 2019-04-09

## 2020-02-13 PROBLEM — R41.3 MEMORY LOSS: Status: ACTIVE | Noted: 2020-02-13

## 2020-02-13 PROBLEM — R56.9 SEIZURE-LIKE ACTIVITY (HCC): Status: ACTIVE | Noted: 2019-06-26

## 2020-02-13 PROBLEM — D64.9 ANEMIA: Status: ACTIVE | Noted: 2020-02-13

## 2020-02-13 PROBLEM — S03.00XA TMJ (DISLOCATION OF TEMPOROMANDIBULAR JOINT): Status: ACTIVE | Noted: 2019-10-24

## 2020-02-13 PROBLEM — M19.012 PRIMARY OSTEOARTHRITIS OF LEFT SHOULDER: Status: ACTIVE | Noted: 2019-05-23

## 2020-03-04 ENCOUNTER — CARE COORDINATION (OUTPATIENT)
Dept: CARE COORDINATION | Age: 50
End: 2020-03-04

## 2021-02-07 DIAGNOSIS — R10.9 RIGHT FLANK PAIN: ICD-10-CM

## 2021-03-15 ENCOUNTER — IMMUNIZATION (OUTPATIENT)
Dept: PRIMARY CARE CLINIC | Age: 51
End: 2021-03-15
Payer: MEDICARE

## 2021-03-15 PROCEDURE — 0001A COVID-19, PFIZER VACCINE 30MCG/0.3ML DOSE: CPT | Performed by: INTERNAL MEDICINE

## 2021-03-15 PROCEDURE — 91300 COVID-19, PFIZER VACCINE 30MCG/0.3ML DOSE: CPT | Performed by: INTERNAL MEDICINE

## 2021-04-05 ENCOUNTER — IMMUNIZATION (OUTPATIENT)
Dept: PRIMARY CARE CLINIC | Age: 51
End: 2021-04-05
Payer: MEDICARE

## 2021-04-05 PROCEDURE — 91300 COVID-19, PFIZER VACCINE 30MCG/0.3ML DOSE: CPT | Performed by: INTERNAL MEDICINE

## 2021-04-05 PROCEDURE — 0002A COVID-19, PFIZER VACCINE 30MCG/0.3ML DOSE: CPT | Performed by: INTERNAL MEDICINE

## 2022-01-28 ENCOUNTER — ANESTHESIA EVENT (OUTPATIENT)
Dept: OPERATING ROOM | Age: 52
DRG: 337 | End: 2022-01-28
Payer: COMMERCIAL

## 2022-01-28 ENCOUNTER — HOSPITAL ENCOUNTER (INPATIENT)
Age: 52
LOS: 4 days | Discharge: HOME | DRG: 337 | End: 2022-02-02
Attending: EMERGENCY MEDICINE | Admitting: SURGERY
Payer: COMMERCIAL

## 2022-01-28 ENCOUNTER — HOSPITAL ENCOUNTER (EMERGENCY)
Age: 52
Discharge: ANOTHER HOSPITAL | End: 2022-01-28
Attending: EMERGENCY MEDICINE
Payer: COMMERCIAL

## 2022-01-28 ENCOUNTER — APPOINTMENT (OUTPATIENT)
Dept: CT IMAGING | Age: 52
End: 2022-01-28
Payer: COMMERCIAL

## 2022-01-28 VITALS
DIASTOLIC BLOOD PRESSURE: 69 MMHG | OXYGEN SATURATION: 97 % | TEMPERATURE: 98.4 F | HEART RATE: 64 BPM | SYSTOLIC BLOOD PRESSURE: 131 MMHG | RESPIRATION RATE: 15 BRPM

## 2022-01-28 DIAGNOSIS — K45.8 INTERNAL HERNIA: Primary | ICD-10-CM

## 2022-01-28 DIAGNOSIS — R10.9 ABDOMINAL PAIN, UNSPECIFIED ABDOMINAL LOCATION: Primary | ICD-10-CM

## 2022-01-28 LAB
ABSOLUTE EOS #: 0 K/UL (ref 0–0.4)
ABSOLUTE IMMATURE GRANULOCYTE: ABNORMAL K/UL (ref 0–0.3)
ABSOLUTE LYMPH #: 0.85 K/UL (ref 1–4.8)
ABSOLUTE MONO #: 0.51 K/UL (ref 0.1–1.3)
ALBUMIN SERPL-MCNC: 3.6 G/DL (ref 3.5–5.2)
ALBUMIN/GLOBULIN RATIO: ABNORMAL (ref 1–2.5)
ALP BLD-CCNC: 90 U/L (ref 35–104)
ALT SERPL-CCNC: 11 U/L (ref 5–33)
ANION GAP SERPL CALCULATED.3IONS-SCNC: 10 MMOL/L (ref 9–17)
AST SERPL-CCNC: 26 U/L
BASOPHILS # BLD: 1 % (ref 0–2)
BASOPHILS ABSOLUTE: 0.09 K/UL (ref 0–0.2)
BILIRUB SERPL-MCNC: 0.23 MG/DL (ref 0.3–1.2)
BUN BLDV-MCNC: 16 MG/DL (ref 6–20)
BUN/CREAT BLD: ABNORMAL (ref 9–20)
CALCIUM SERPL-MCNC: 8.1 MG/DL (ref 8.6–10.4)
CHLORIDE BLD-SCNC: 107 MMOL/L (ref 98–107)
CO2: 23 MMOL/L (ref 20–31)
CREAT SERPL-MCNC: 0.7 MG/DL (ref 0.5–0.9)
DIFFERENTIAL TYPE: ABNORMAL
EOSINOPHILS RELATIVE PERCENT: 0 % (ref 0–4)
GFR AFRICAN AMERICAN: >60 ML/MIN
GFR NON-AFRICAN AMERICAN: >60 ML/MIN
GFR SERPL CREATININE-BSD FRML MDRD: ABNORMAL ML/MIN/{1.73_M2}
GFR SERPL CREATININE-BSD FRML MDRD: ABNORMAL ML/MIN/{1.73_M2}
GLUCOSE BLD-MCNC: 96 MG/DL (ref 70–99)
HCG QUALITATIVE: NEGATIVE
HCT VFR BLD CALC: 30.8 % (ref 36–46)
HEMOGLOBIN: 9.6 G/DL (ref 12–16)
IMMATURE GRANULOCYTES: ABNORMAL %
LACTIC ACID, SEPSIS WHOLE BLOOD: NORMAL MMOL/L (ref 0.5–1.9)
LACTIC ACID, SEPSIS: 0.6 MMOL/L (ref 0.5–1.9)
LIPASE: 21 U/L (ref 13–60)
LYMPHOCYTES # BLD: 10 % (ref 24–44)
MCH RBC QN AUTO: 22.5 PG (ref 26–34)
MCHC RBC AUTO-ENTMCNC: 31 G/DL (ref 31–37)
MCV RBC AUTO: 72.7 FL (ref 80–100)
MONOCYTES # BLD: 6 % (ref 1–7)
MORPHOLOGY: ABNORMAL
MORPHOLOGY: ABNORMAL
NRBC AUTOMATED: ABNORMAL PER 100 WBC
PDW BLD-RTO: 18 % (ref 11.5–14.9)
PLATELET # BLD: 200 K/UL (ref 150–450)
PLATELET ESTIMATE: ABNORMAL
PMV BLD AUTO: 8.7 FL (ref 6–12)
POTASSIUM SERPL-SCNC: 3.3 MMOL/L (ref 3.7–5.3)
RBC # BLD: 4.24 M/UL (ref 4–5.2)
RBC # BLD: ABNORMAL 10*6/UL
SARS-COV-2, RAPID: NOT DETECTED
SEG NEUTROPHILS: 83 % (ref 36–66)
SEGMENTED NEUTROPHILS ABSOLUTE COUNT: 7.05 K/UL (ref 1.3–9.1)
SODIUM BLD-SCNC: 140 MMOL/L (ref 135–144)
SPECIMEN DESCRIPTION: NORMAL
TOTAL PROTEIN: 5.9 G/DL (ref 6.4–8.3)
WBC # BLD: 8.5 K/UL (ref 3.5–11)
WBC # BLD: ABNORMAL 10*3/UL

## 2022-01-28 PROCEDURE — 36415 COLL VENOUS BLD VENIPUNCTURE: CPT

## 2022-01-28 PROCEDURE — 6360000004 HC RX CONTRAST MEDICATION: Performed by: EMERGENCY MEDICINE

## 2022-01-28 PROCEDURE — 99285 EMERGENCY DEPT VISIT HI MDM: CPT

## 2022-01-28 PROCEDURE — 2580000003 HC RX 258: Performed by: EMERGENCY MEDICINE

## 2022-01-28 PROCEDURE — 93005 ELECTROCARDIOGRAM TRACING: CPT | Performed by: EMERGENCY MEDICINE

## 2022-01-28 PROCEDURE — 84703 CHORIONIC GONADOTROPIN ASSAY: CPT

## 2022-01-28 PROCEDURE — 83690 ASSAY OF LIPASE: CPT

## 2022-01-28 PROCEDURE — 74177 CT ABD & PELVIS W/CONTRAST: CPT

## 2022-01-28 PROCEDURE — 6360000002 HC RX W HCPCS: Performed by: EMERGENCY MEDICINE

## 2022-01-28 PROCEDURE — 96372 THER/PROPH/DIAG INJ SC/IM: CPT

## 2022-01-28 PROCEDURE — 83605 ASSAY OF LACTIC ACID: CPT

## 2022-01-28 PROCEDURE — 85025 COMPLETE CBC W/AUTO DIFF WBC: CPT

## 2022-01-28 PROCEDURE — 87635 SARS-COV-2 COVID-19 AMP PRB: CPT

## 2022-01-28 PROCEDURE — 80053 COMPREHEN METABOLIC PANEL: CPT

## 2022-01-28 RX ORDER — DIPHENHYDRAMINE HYDROCHLORIDE 50 MG/ML
25 INJECTION INTRAMUSCULAR; INTRAVENOUS ONCE
Status: COMPLETED | OUTPATIENT
Start: 2022-01-28 | End: 2022-01-28

## 2022-01-28 RX ORDER — 0.9 % SODIUM CHLORIDE 0.9 %
80 INTRAVENOUS SOLUTION INTRAVENOUS ONCE
Status: COMPLETED | OUTPATIENT
Start: 2022-01-28 | End: 2022-01-28

## 2022-01-28 RX ORDER — 0.9 % SODIUM CHLORIDE 0.9 %
1000 INTRAVENOUS SOLUTION INTRAVENOUS ONCE
Status: COMPLETED | OUTPATIENT
Start: 2022-01-28 | End: 2022-01-28

## 2022-01-28 RX ORDER — SODIUM CHLORIDE 0.9 % (FLUSH) 0.9 %
10 SYRINGE (ML) INJECTION PRN
Status: DISCONTINUED | OUTPATIENT
Start: 2022-01-28 | End: 2022-01-28 | Stop reason: HOSPADM

## 2022-01-28 RX ORDER — HALOPERIDOL 5 MG/ML
5 INJECTION INTRAMUSCULAR ONCE
Status: COMPLETED | OUTPATIENT
Start: 2022-01-28 | End: 2022-01-28

## 2022-01-28 RX ADMIN — DIPHENHYDRAMINE HYDROCHLORIDE 25 MG: 50 INJECTION, SOLUTION INTRAMUSCULAR; INTRAVENOUS at 14:39

## 2022-01-28 RX ADMIN — IOPAMIDOL 75 ML: 755 INJECTION, SOLUTION INTRAVENOUS at 19:06

## 2022-01-28 RX ADMIN — HALOPERIDOL 5 MG: 5 INJECTION INTRAMUSCULAR at 14:39

## 2022-01-28 RX ADMIN — SODIUM CHLORIDE 80 ML: 9 INJECTION, SOLUTION INTRAVENOUS at 19:10

## 2022-01-28 RX ADMIN — SODIUM CHLORIDE, PRESERVATIVE FREE 10 ML: 5 INJECTION INTRAVENOUS at 19:06

## 2022-01-28 RX ADMIN — SODIUM CHLORIDE 1000 ML: 9 INJECTION, SOLUTION INTRAVENOUS at 15:07

## 2022-01-28 ASSESSMENT — ENCOUNTER SYMPTOMS
CHEST TIGHTNESS: 0
ABDOMINAL PAIN: 1
CONSTIPATION: 0
NAUSEA: 0
SORE THROAT: 0
SHORTNESS OF BREATH: 0
EYE REDNESS: 0
DIARRHEA: 0
EYE PAIN: 0
BACK PAIN: 0
COUGH: 0
VOMITING: 0

## 2022-01-28 NOTE — ED NOTES
Patient to emergency department with complaints of diffuse abdominal pain. Pt arrives with family laying sideways in the wheelchair. Pt states she had an xray 3 weeks ago and it showed stool in the colon. Pt states she now is having a clean/mucus stool. Pt appears over reactive and hyper sensitive to surroundings.  Responding approprietly once pts attention was gained     Russell Ruiz RN  01/28/22 [id number]

## 2022-01-28 NOTE — ED PROVIDER NOTES
[address]  eMERGENCY dEPARTMENT eNCOUnter    Pt Name: Jose J Howell  MRN: [id number]  [place] 1970  Date of evaluation: 1/28/22  CHIEF COMPLAINT       Chief Complaint   Patient presents with    Abdominal Pain     HISTORY OF PRESENT ILLNESS   HPI  Patient presenting with acute abdominal pain which she says started immediately before arrival to the hospital. She is very agitated on arrival and has difficulty providing detailed history. She says her whole belly hurts. Nausea but no vomiting. No fever. States has normal UOP today. Has had BMs which are only \"clear mucus\" for the last 2 weeks. She does endorse crack cocaine use. Reports she has had a gastric bypass surgery and hysterectomy, cannot tell me when or where the surgeries were performed. REVIEW OF SYSTEMS     Review of Systems   Constitutional: Negative for chills and fever. HENT: Negative for congestion, ear pain and sore throat. Eyes: Negative for pain, redness and visual disturbance. Respiratory: Negative for cough, chest tightness and shortness of breath. Cardiovascular: Negative for chest pain and palpitations. Gastrointestinal: Positive for abdominal pain. Negative for constipation, diarrhea, nausea and vomiting. Genitourinary: Negative for dysuria and vaginal discharge. Musculoskeletal: Negative for back pain and neck pain. Skin: Negative for rash and wound. Neurological: Negative for seizures, syncope and headaches.      PASTMEDICAL HISTORY     Past Medical History:   Diagnosis Date    Anemia     Arthritis     Asthma     Bipolar disorder, mixed ([address]     Carotid artery stenosis 2/13/2020    Cocaine abuse ([facility] [address] 11/4/2014    COPD (chronic obstructive pulmonary disease) (HCC)     Degeneration of cervical intervertebral disc     Depression with anxiety     Depression with anxiety     Fibromyalgia 1/5/2017    GERD (gastroesophageal reflux disease)     History of migraine headaches 6/10/2014    History of renal calculi 6/10/2014    History of seizure disorder 6/10/2014    Hoarseness of voice     HTN (hypertension) 6/10/2014    Hyperlipidemia 6/10/2014    IGT (impaired glucose tolerance) 6/10/2014    Kidney stones     Lactose intolerance 6/10/2014    Leukopenia 6/10/2014    Major depressive disorder, recurrent episode, severe, without mention of psychotic behavior 2014    MVP (mitral valve prolapse)     Seizures (HCC)     Sleep apnea 6/10/2014    Unspecified diseases of blood and blood-forming organs      SURGICAL HISTORY       Past Surgical History:   Procedure Laterality Date    ANKLE FRACTURE SURGERY      recontruction surgery    APPENDECTOMY      BREAST LUMPECTOMY Right     CARDIAC CATHETERIZATION      CARPAL TUNNEL RELEASE      x2     SECTION      CHOLECYSTECTOMY      COLONOSCOPY  12    COLONOSCOPY  2016    severe spasms    COLONOSCOPY  2019    DR GOLDY MCINTOSH    GASTRIC BYPASS SURGERY      GASTRIC BYPASS SURGERY      HYSTERECTOMY      HYSTERECTOMY      NECK SURGERY      OTHER SURGICAL HISTORY      APPLICATION OF ARCH BARS,SIMPLE EXTRACTION OF #15 AND RT TMJ JOINT REPLACEMENT    SHOULDER ARTHROSCOPY Left 2019    DR ROBERT GREEN    SHOULDER SURGERY      TONSILLECTOMY AND ADENOIDECTOMY      UPPER GASTROINTESTINAL ENDOSCOPY  7-3-12    egd    UPPER GASTROINTESTINAL ENDOSCOPY  2016    status post gastrectomy with a small remnant of gastric pouch.     UPPER GASTROINTESTINAL ENDOSCOPY  2019    DR Alice Che     CURRENT MEDICATIONS       Discharge Medication List as of 2022 10:05 PM      CONTINUE these medications which have NOT CHANGED    Details   tiZANidine (ZANAFLEX) 4 MG tablet Take 1 tablet by mouth every 8 hours as needed (back pain), Disp-30 tablet, R-0Maximum Refills ReachedNormal      atenolol (TENORMIN) 25 MG tablet TAKE 1 TABLET BY MOUTH DAILY, Disp-28 tablet, R-12Refill Too SoonNormal      FEROSUL 325 (65 Fe) MG tablet TAKE 1 TABLET BY MOUTH 2 TIMES DAILY, Disp-56 tablet, R-11Prescription ExpiredNormal      gabapentin (NEURONTIN) 300 MG capsule Take 1 capsule by mouth 3 times daily for 30 days. Intended supply: 30 days, Disp-90 capsule, R-11Normal      buPROPion (WELLBUTRIN XL) 150 MG extended release tablet TAKE 1 TABLET BY MOUTH EVERY MORNING, Disp-28 tablet, R-12For  Dispill bubble packNormal      hydrOXYzine (VISTARIL) 25 MG capsule Historical Med      silver sulfADIAZINE (SILVADENE) 1 % cream Apply topically daily. , Disp-50 g, R-2, Normal      !! topiramate (TOPAMAX) 100 MG tablet Take 100 mg by mouth 2 times dailyHistorical Med      atorvastatin (LIPITOR) 40 MG tablet Take 40 mg by mouth dailyHistorical Med      metoprolol tartrate (LOPRESSOR) 25 MG tablet Take 25 mg by mouth dailyHistorical Med      traZODone (DESYREL) 50 MG tablet Take 1 tablet by mouth nightly as needed for Sleep, Disp-30 tablet, R-3Normal      DULoxetine (CYMBALTA) 30 MG extended release capsule Take 1 capsule by mouth daily, Disp-30 capsule, R-3Normal      albuterol sulfate HFA (PROVENTIL HFA) 108 (90 Base) MCG/ACT inhaler Inhale 2 puffs into the lungs every 6 hours as needed for Wheezing, Disp-1 Inhaler, R-3Normal      !! topiramate (TOPAMAX) 50 MG tablet Take 100 mg by mouth 2 times daily Historical Med      divalproex (DEPAKOTE) 500 MG DR tablet Take 2 tablets by mouth 2 times daily Indications: Level 41 as of 16 (Pt takes two 500 mg DR tabs = 1,000mg PO BID), Disp-120 tablet, R-0Normal      ARIPiprazole (ABILIFY) 5 MG tablet Take 5 mg by mouth daily       ! ! - Potential duplicate medications found. Please discuss with provider. ALLERGIES     is allergic to aspirin, bactrim, and codeine. FAMILY HISTORY     She indicated that her mother is . She indicated that her father is alive. She indicated that her brother is alive. She indicated that her maternal grandmother is alive.  She indicated that her maternal grandfather is . She indicated that her paternal grandmother is . She indicated that her paternal grandfather is . She indicated that the status of her other is unknown. SOCIALHISTORY      reports that she has been smoking cigarettes. She has a 16.50 pack-year smoking history. She has never used smokeless tobacco. She reports current alcohol use. She reports previous drug use. PHYSICAL EXAM     INITIAL VITALS: /69   Pulse 64   Temp 98.4 °F (36.9 °C)   Resp 15   SpO2 97%    Physical Exam  Vitals and nursing note reviewed. Constitutional:       Appearance: She is well-developed. Comments: Patient yelling loudly and rolling around the bed in her underwear. HENT:      Head: Normocephalic and atraumatic. Right Ear: External ear normal.      Left Ear: External ear normal.   Eyes:      General:         Left eye: No discharge. Conjunctiva/sclera: Conjunctivae normal.      Pupils: Pupils are equal, round, and reactive to light. Neck:      Vascular: No JVD. Trachea: No tracheal deviation. Cardiovascular:      Rate and Rhythm: Normal rate and regular rhythm. Heart sounds: Normal heart sounds. Pulmonary:      Effort: Pulmonary effort is normal. No respiratory distress. Breath sounds: Normal breath sounds. No stridor. Abdominal:      Comments: Abdomen soft, patient screaming with palpation. Musculoskeletal:         General: No tenderness or deformity. Normal range of motion. Cervical back: Normal range of motion and neck supple. Skin:     General: Skin is warm and dry. Neurological:      Mental Status: She is alert and oriented to person, place, and time. Cranial Nerves: No cranial nerve deficit. Coordination: Coordination normal.         MEDICAL DECISION MAKING:   Labs reviewed and unremarkable. Lactate acid wnl. CT abdomen concerning for internal hernia.  Patient re-examined, sleeping soundly after haldol, she awakens to loud verbal stimuli, her abodmen is still soft but tender across lower abdomen. Vitals remain stable. Discussed with Dr Lia Apgar on call for gen surgery, given her history of gastric bypass he recommends transfer to [facility]. Accepted for ED to ED transfer to [facility] by Dr Lyric Nielsen. Discussed with Dr Mehdi Millan on call for bariatric surgery. Transferred in stable condition. Procedures    DIAGNOSTIC RESULTS   EKG: All EKG's are interpreted by the Emergency Department Physician who either signs or Co-signs this chart inthe absence of a cardiologist.      RADIOLOGY:All plain film, CT, MRI, and formal ultrasound images (except ED bedside ultrasound) are read by the radiologist, see reports below, unless otherwise noted in MDM or here. CT ABDOMEN PELVIS W IV CONTRAST Additional Contrast? None   Final Result   Mesenteric congestion with findings suspicious for displaced bowel loops   within the right lower quadrant. Slight twisting of mesenteric vessels   identified as well. .  Findings worrisome for internal hernia. Surgical   consultation recommended. LABS: All lab results were reviewed by myself, and all abnormals are listed below.   Labs Reviewed   CBC WITH AUTO DIFFERENTIAL - Abnormal; Notable for the following components:       Result Value    Hemoglobin 9.6 (*)     Hematocrit 30.8 (*)     MCV 72.7 (*)     MCH 22.5 (*)     RDW 18.0 (*)     Seg Neutrophils 83 (*)     Lymphocytes 10 (*)     Absolute Lymph # 0.85 (*)     All other components within normal limits   COMPREHENSIVE METABOLIC PANEL - Abnormal; Notable for the following components:    Calcium 8.1 (*)     Potassium 3.3 (*)     Total Bilirubin 0.23 (*)     Total Protein 5.9 (*)     All other components within normal limits   HCG, SERUM, QUALITATIVE   LACTATE, SEPSIS   LIPASE   URINALYSIS     EMERGENCY DEPARTMENT COURSE:   Vitals:    Vitals:    22 1836 22 1951 22 2046 22 2153   BP: (!) 141/80 136/78 137/81 131/69   Pulse: 85 69 70 64   Resp: 15 16 13 15   Temp:       SpO2: 97% 96% 97% 97%       The patient was given the following medications while in the emergency department:  Orders Placed This Encounter   Medications    haloperidol lactate (HALDOL) injection 5 mg    0.9 % sodium chloride bolus    diphenhydrAMINE (BENADRYL) injection 25 mg    0.9 % sodium chloride bolus    sodium chloride flush 0.9 % injection 10 mL    iopamidol (ISOVUE-370) 76 % injection 75 mL     CONSULTS:  IP CONSULT TO GENERAL SURGERY    FINAL IMPRESSION      1. Abdominal pain, unspecified abdominal location          DISPOSITION/PLAN   DISPOSITION Decision To Transfer 01/28/2022 08:26:21 PM      PATIENT REFERRED TO:  No follow-up provider specified.   DISCHARGE MEDICATIONS:  Discharge Medication List as of 1/28/2022 10:05 PM        Girma Garcia MD  AttendingEmergency Physician                       Anabela Barkley MD  01/28/22 6240

## 2022-01-29 ENCOUNTER — ANESTHESIA (OUTPATIENT)
Dept: OPERATING ROOM | Age: 52
DRG: 337 | End: 2022-01-29
Payer: COMMERCIAL

## 2022-01-29 VITALS — SYSTOLIC BLOOD PRESSURE: 199 MMHG | TEMPERATURE: 97.3 F | OXYGEN SATURATION: 94 % | DIASTOLIC BLOOD PRESSURE: 90 MMHG

## 2022-01-29 PROBLEM — K45.8 INTERNAL HERNIA: Status: ACTIVE | Noted: 2022-01-29

## 2022-01-29 LAB
ABSOLUTE EOS #: <0.03 K/UL (ref 0–0.44)
ABSOLUTE IMMATURE GRANULOCYTE: 0.14 K/UL (ref 0–0.3)
ABSOLUTE LYMPH #: 0.89 K/UL (ref 1.1–3.7)
ABSOLUTE MONO #: 0.75 K/UL (ref 0.1–1.2)
AMPHETAMINE SCREEN URINE: POSITIVE
ANION GAP SERPL CALCULATED.3IONS-SCNC: 13 MMOL/L (ref 9–17)
BARBITURATE SCREEN URINE: NEGATIVE
BASOPHILS # BLD: 0 % (ref 0–2)
BASOPHILS ABSOLUTE: <0.03 K/UL (ref 0–0.2)
BENZODIAZEPINE SCREEN, URINE: NEGATIVE
BUN BLDV-MCNC: 11 MG/DL (ref 6–20)
BUN/CREAT BLD: ABNORMAL (ref 9–20)
BUPRENORPHINE URINE: ABNORMAL
CALCIUM SERPL-MCNC: 8.5 MG/DL (ref 8.6–10.4)
CANNABINOID SCREEN URINE: NEGATIVE
CHLORIDE BLD-SCNC: 103 MMOL/L (ref 98–107)
CO2: 20 MMOL/L (ref 20–31)
COCAINE METABOLITE, URINE: POSITIVE
CREAT SERPL-MCNC: 0.63 MG/DL (ref 0.5–0.9)
DIFFERENTIAL TYPE: ABNORMAL
EKG ATRIAL RATE: 69 BPM
EKG P AXIS: 51 DEGREES
EKG P-R INTERVAL: 152 MS
EKG Q-T INTERVAL: 486 MS
EKG QRS DURATION: 100 MS
EKG QTC CALCULATION (BAZETT): 520 MS
EKG R AXIS: 66 DEGREES
EKG T AXIS: 50 DEGREES
EKG VENTRICULAR RATE: 69 BPM
EOSINOPHILS RELATIVE PERCENT: 0 % (ref 1–4)
FERRITIN: 10 UG/L (ref 13–150)
FOLATE: 16.5 NG/ML
GFR AFRICAN AMERICAN: >60 ML/MIN
GFR NON-AFRICAN AMERICAN: >60 ML/MIN
GFR SERPL CREATININE-BSD FRML MDRD: ABNORMAL ML/MIN/{1.73_M2}
GFR SERPL CREATININE-BSD FRML MDRD: ABNORMAL ML/MIN/{1.73_M2}
GLUCOSE BLD-MCNC: 185 MG/DL (ref 70–99)
HCT VFR BLD CALC: 35.8 % (ref 36.3–47.1)
HEMOGLOBIN: 10.6 G/DL (ref 11.9–15.1)
IMMATURE GRANULOCYTES: 1 %
IRON SATURATION: 5 % (ref 20–55)
IRON: 16 UG/DL (ref 37–145)
LYMPHOCYTES # BLD: 5 % (ref 24–43)
MAGNESIUM: 1.8 MG/DL (ref 1.6–2.6)
MCH RBC QN AUTO: 22.7 PG (ref 25.2–33.5)
MCHC RBC AUTO-ENTMCNC: 29.6 G/DL (ref 28.4–34.8)
MCV RBC AUTO: 76.8 FL (ref 82.6–102.9)
MDMA URINE: ABNORMAL
METHADONE SCREEN, URINE: NEGATIVE
METHAMPHETAMINE, URINE: ABNORMAL
MONOCYTES # BLD: 4 % (ref 3–12)
NRBC AUTOMATED: 0 PER 100 WBC
OPIATES, URINE: NEGATIVE
OXYCODONE SCREEN URINE: NEGATIVE
PDW BLD-RTO: 17.5 % (ref 11.8–14.4)
PHENCYCLIDINE, URINE: NEGATIVE
PHOSPHORUS: 4.1 MG/DL (ref 2.6–4.5)
PLATELET # BLD: 240 K/UL (ref 138–453)
PLATELET ESTIMATE: ABNORMAL
PMV BLD AUTO: 12 FL (ref 8.1–13.5)
POTASSIUM SERPL-SCNC: 3.7 MMOL/L (ref 3.7–5.3)
PROPOXYPHENE, URINE: ABNORMAL
RBC # BLD: 4.66 M/UL (ref 3.95–5.11)
RBC # BLD: ABNORMAL 10*6/UL
SEG NEUTROPHILS: 90 % (ref 36–65)
SEGMENTED NEUTROPHILS ABSOLUTE COUNT: 15.98 K/UL (ref 1.5–8.1)
SODIUM BLD-SCNC: 136 MMOL/L (ref 135–144)
TEST INFORMATION: ABNORMAL
TOTAL IRON BINDING CAPACITY: 352 UG/DL (ref 250–450)
TRANSFERRIN: 328 MG/DL (ref 200–360)
TRICYCLIC ANTIDEPRESSANTS, UR: ABNORMAL
TSH SERPL DL<=0.05 MIU/L-ACNC: 1.37 MIU/L (ref 0.3–5)
UNSATURATED IRON BINDING CAPACITY: 336 UG/DL (ref 112–347)
VITAMIN B-12: 311 PG/ML (ref 232–1245)
WBC # BLD: 17.8 K/UL (ref 3.5–11.3)
WBC # BLD: ABNORMAL 10*3/UL

## 2022-01-29 PROCEDURE — 6370000000 HC RX 637 (ALT 250 FOR IP): Performed by: STUDENT IN AN ORGANIZED HEALTH CARE EDUCATION/TRAINING PROGRAM

## 2022-01-29 PROCEDURE — 0WJG0ZZ INSPECTION OF PERITONEAL CAVITY, OPEN APPROACH: ICD-10-PCS | Performed by: SURGERY

## 2022-01-29 PROCEDURE — 51701 INSERT BLADDER CATHETER: CPT

## 2022-01-29 PROCEDURE — 36415 COLL VENOUS BLD VENIPUNCTURE: CPT

## 2022-01-29 PROCEDURE — 82728 ASSAY OF FERRITIN: CPT

## 2022-01-29 PROCEDURE — 3600000004 HC SURGERY LEVEL 4 BASE: Performed by: SURGERY

## 2022-01-29 PROCEDURE — A4217 STERILE WATER/SALINE, 500 ML: HCPCS | Performed by: SURGERY

## 2022-01-29 PROCEDURE — 3700000000 HC ANESTHESIA ATTENDED CARE: Performed by: SURGERY

## 2022-01-29 PROCEDURE — 82607 VITAMIN B-12: CPT

## 2022-01-29 PROCEDURE — 2500000003 HC RX 250 WO HCPCS: Performed by: SURGERY

## 2022-01-29 PROCEDURE — 2580000003 HC RX 258: Performed by: NURSE ANESTHETIST, CERTIFIED REGISTERED

## 2022-01-29 PROCEDURE — 84443 ASSAY THYROID STIM HORMONE: CPT

## 2022-01-29 PROCEDURE — 83036 HEMOGLOBIN GLYCOSYLATED A1C: CPT

## 2022-01-29 PROCEDURE — 94761 N-INVAS EAR/PLS OXIMETRY MLT: CPT

## 2022-01-29 PROCEDURE — 80307 DRUG TEST PRSMV CHEM ANLYZR: CPT

## 2022-01-29 PROCEDURE — 2700000000 HC OXYGEN THERAPY PER DAY

## 2022-01-29 PROCEDURE — 0DNU4ZZ RELEASE OMENTUM, PERCUTANEOUS ENDOSCOPIC APPROACH: ICD-10-PCS | Performed by: SURGERY

## 2022-01-29 PROCEDURE — 2500000003 HC RX 250 WO HCPCS: Performed by: NURSE ANESTHETIST, CERTIFIED REGISTERED

## 2022-01-29 PROCEDURE — 80048 BASIC METABOLIC PNL TOTAL CA: CPT

## 2022-01-29 PROCEDURE — 84466 ASSAY OF TRANSFERRIN: CPT

## 2022-01-29 PROCEDURE — 99222 1ST HOSP IP/OBS MODERATE 55: CPT | Performed by: INTERNAL MEDICINE

## 2022-01-29 PROCEDURE — 85025 COMPLETE CBC W/AUTO DIFF WBC: CPT

## 2022-01-29 PROCEDURE — 1200000000 HC SEMI PRIVATE

## 2022-01-29 PROCEDURE — 83550 IRON BINDING TEST: CPT

## 2022-01-29 PROCEDURE — 6360000002 HC RX W HCPCS: Performed by: STUDENT IN AN ORGANIZED HEALTH CARE EDUCATION/TRAINING PROGRAM

## 2022-01-29 PROCEDURE — 2580000003 HC RX 258: Performed by: SURGERY

## 2022-01-29 PROCEDURE — 2709999900 HC NON-CHARGEABLE SUPPLY: Performed by: SURGERY

## 2022-01-29 PROCEDURE — 3600000014 HC SURGERY LEVEL 4 ADDTL 15MIN: Performed by: SURGERY

## 2022-01-29 PROCEDURE — 83735 ASSAY OF MAGNESIUM: CPT

## 2022-01-29 PROCEDURE — 51798 US URINE CAPACITY MEASURE: CPT

## 2022-01-29 PROCEDURE — 6360000002 HC RX W HCPCS: Performed by: ANESTHESIOLOGY

## 2022-01-29 PROCEDURE — 3700000001 HC ADD 15 MINUTES (ANESTHESIA): Performed by: SURGERY

## 2022-01-29 PROCEDURE — 82746 ASSAY OF FOLIC ACID SERUM: CPT

## 2022-01-29 PROCEDURE — 7100000001 HC PACU RECOVERY - ADDTL 15 MIN: Performed by: SURGERY

## 2022-01-29 PROCEDURE — 84100 ASSAY OF PHOSPHORUS: CPT

## 2022-01-29 PROCEDURE — 6360000002 HC RX W HCPCS: Performed by: NURSE ANESTHETIST, CERTIFIED REGISTERED

## 2022-01-29 PROCEDURE — 83540 ASSAY OF IRON: CPT

## 2022-01-29 PROCEDURE — 2580000003 HC RX 258: Performed by: STUDENT IN AN ORGANIZED HEALTH CARE EDUCATION/TRAINING PROGRAM

## 2022-01-29 PROCEDURE — 7100000000 HC PACU RECOVERY - FIRST 15 MIN: Performed by: SURGERY

## 2022-01-29 PROCEDURE — 2500000003 HC RX 250 WO HCPCS

## 2022-01-29 PROCEDURE — 0DN84ZZ RELEASE SMALL INTESTINE, PERCUTANEOUS ENDOSCOPIC APPROACH: ICD-10-PCS | Performed by: SURGERY

## 2022-01-29 PROCEDURE — 93010 ELECTROCARDIOGRAM REPORT: CPT | Performed by: INTERNAL MEDICINE

## 2022-01-29 RX ORDER — FENTANYL CITRATE 50 UG/ML
25 INJECTION, SOLUTION INTRAMUSCULAR; INTRAVENOUS EVERY 5 MIN PRN
Status: DISCONTINUED | OUTPATIENT
Start: 2022-01-29 | End: 2022-01-29 | Stop reason: HOSPADM

## 2022-01-29 RX ORDER — MAGNESIUM SULFATE 1 G/100ML
1000 INJECTION INTRAVENOUS ONCE
Status: COMPLETED | OUTPATIENT
Start: 2022-01-29 | End: 2022-01-29

## 2022-01-29 RX ORDER — LIDOCAINE HYDROCHLORIDE 10 MG/ML
INJECTION, SOLUTION EPIDURAL; INFILTRATION; INTRACAUDAL; PERINEURAL PRN
Status: DISCONTINUED | OUTPATIENT
Start: 2022-01-29 | End: 2022-01-29 | Stop reason: SDUPTHER

## 2022-01-29 RX ORDER — POLYETHYLENE GLYCOL 3350 17 G/17G
17 POWDER, FOR SOLUTION ORAL DAILY
Status: DISCONTINUED | OUTPATIENT
Start: 2022-01-29 | End: 2022-02-02 | Stop reason: HOSPADM

## 2022-01-29 RX ORDER — TOPIRAMATE 100 MG/1
100 TABLET, FILM COATED ORAL 2 TIMES DAILY
Status: DISCONTINUED | OUTPATIENT
Start: 2022-01-29 | End: 2022-02-02 | Stop reason: HOSPADM

## 2022-01-29 RX ORDER — PHENYLEPHRINE HCL IN 0.9% NACL 1 MG/10 ML
SYRINGE (ML) INTRAVENOUS PRN
Status: DISCONTINUED | OUTPATIENT
Start: 2022-01-29 | End: 2022-01-29 | Stop reason: SDUPTHER

## 2022-01-29 RX ORDER — ONDANSETRON 2 MG/ML
INJECTION INTRAMUSCULAR; INTRAVENOUS PRN
Status: DISCONTINUED | OUTPATIENT
Start: 2022-01-29 | End: 2022-01-29 | Stop reason: SDUPTHER

## 2022-01-29 RX ORDER — SENNA PLUS 8.6 MG/1
1 TABLET ORAL DAILY PRN
Status: DISCONTINUED | OUTPATIENT
Start: 2022-01-29 | End: 2022-02-02 | Stop reason: HOSPADM

## 2022-01-29 RX ORDER — SODIUM CHLORIDE 0.9 % (FLUSH) 0.9 %
10 SYRINGE (ML) INJECTION PRN
Status: DISCONTINUED | OUTPATIENT
Start: 2022-01-29 | End: 2022-02-02 | Stop reason: HOSPADM

## 2022-01-29 RX ORDER — SODIUM CHLORIDE 0.9 % (FLUSH) 0.9 %
10 SYRINGE (ML) INJECTION EVERY 12 HOURS SCHEDULED
Status: DISCONTINUED | OUTPATIENT
Start: 2022-01-29 | End: 2022-02-02 | Stop reason: HOSPADM

## 2022-01-29 RX ORDER — GLYCOPYRROLATE 1 MG/5 ML
SYRINGE (ML) INTRAVENOUS PRN
Status: DISCONTINUED | OUTPATIENT
Start: 2022-01-29 | End: 2022-01-29 | Stop reason: SDUPTHER

## 2022-01-29 RX ORDER — LABETALOL HYDROCHLORIDE 5 MG/ML
INJECTION, SOLUTION INTRAVENOUS
Status: COMPLETED
Start: 2022-01-29 | End: 2022-01-29

## 2022-01-29 RX ORDER — LABETALOL HYDROCHLORIDE 5 MG/ML
10 INJECTION, SOLUTION INTRAVENOUS ONCE
Status: COMPLETED | OUTPATIENT
Start: 2022-01-29 | End: 2022-01-29

## 2022-01-29 RX ORDER — METHOCARBAMOL 750 MG/1
750 TABLET, FILM COATED ORAL 4 TIMES DAILY
Status: DISCONTINUED | OUTPATIENT
Start: 2022-01-29 | End: 2022-02-02 | Stop reason: HOSPADM

## 2022-01-29 RX ORDER — ONDANSETRON 2 MG/ML
4 INJECTION INTRAMUSCULAR; INTRAVENOUS EVERY 6 HOURS PRN
Status: DISCONTINUED | OUTPATIENT
Start: 2022-01-29 | End: 2022-02-02 | Stop reason: HOSPADM

## 2022-01-29 RX ORDER — POTASSIUM CHLORIDE 7.45 MG/ML
40 INJECTION INTRAVENOUS ONCE
Status: COMPLETED | OUTPATIENT
Start: 2022-01-29 | End: 2022-01-29

## 2022-01-29 RX ORDER — NEOSTIGMINE METHYLSULFATE 5 MG/5 ML
SYRINGE (ML) INTRAVENOUS PRN
Status: DISCONTINUED | OUTPATIENT
Start: 2022-01-29 | End: 2022-01-29 | Stop reason: SDUPTHER

## 2022-01-29 RX ORDER — AMLODIPINE BESYLATE 10 MG/1
10 TABLET ORAL DAILY
Status: DISCONTINUED | OUTPATIENT
Start: 2022-01-29 | End: 2022-02-02 | Stop reason: HOSPADM

## 2022-01-29 RX ORDER — MAGNESIUM HYDROXIDE 1200 MG/15ML
LIQUID ORAL CONTINUOUS PRN
Status: COMPLETED | OUTPATIENT
Start: 2022-01-29 | End: 2022-01-29

## 2022-01-29 RX ORDER — GABAPENTIN 300 MG/1
300 CAPSULE ORAL 3 TIMES DAILY
Status: DISCONTINUED | OUTPATIENT
Start: 2022-01-29 | End: 2022-02-02 | Stop reason: HOSPADM

## 2022-01-29 RX ORDER — ONDANSETRON 4 MG/1
4 TABLET, ORALLY DISINTEGRATING ORAL EVERY 8 HOURS PRN
Status: DISCONTINUED | OUTPATIENT
Start: 2022-01-29 | End: 2022-02-02 | Stop reason: HOSPADM

## 2022-01-29 RX ORDER — ATENOLOL 25 MG/1
25 TABLET ORAL DAILY
Status: DISCONTINUED | OUTPATIENT
Start: 2022-01-29 | End: 2022-01-30

## 2022-01-29 RX ORDER — TIZANIDINE 4 MG/1
4 TABLET ORAL EVERY 8 HOURS PRN
Status: DISCONTINUED | OUTPATIENT
Start: 2022-01-29 | End: 2022-01-29

## 2022-01-29 RX ORDER — OXYCODONE HYDROCHLORIDE 5 MG/1
5 TABLET ORAL EVERY 6 HOURS PRN
Status: DISCONTINUED | OUTPATIENT
Start: 2022-01-29 | End: 2022-01-31

## 2022-01-29 RX ORDER — DEXAMETHASONE SODIUM PHOSPHATE 10 MG/ML
INJECTION INTRAMUSCULAR; INTRAVENOUS PRN
Status: DISCONTINUED | OUTPATIENT
Start: 2022-01-29 | End: 2022-01-29 | Stop reason: SDUPTHER

## 2022-01-29 RX ORDER — PROPOFOL 10 MG/ML
INJECTION, EMULSION INTRAVENOUS PRN
Status: DISCONTINUED | OUTPATIENT
Start: 2022-01-29 | End: 2022-01-29 | Stop reason: SDUPTHER

## 2022-01-29 RX ORDER — MAGNESIUM SULFATE IN WATER 40 MG/ML
2000 INJECTION, SOLUTION INTRAVENOUS PRN
Status: DISCONTINUED | OUTPATIENT
Start: 2022-01-29 | End: 2022-02-02 | Stop reason: HOSPADM

## 2022-01-29 RX ORDER — ACETAMINOPHEN 500 MG
1000 TABLET ORAL EVERY 8 HOURS SCHEDULED
Status: DISCONTINUED | OUTPATIENT
Start: 2022-01-29 | End: 2022-02-02 | Stop reason: HOSPADM

## 2022-01-29 RX ORDER — ROCURONIUM BROMIDE 10 MG/ML
INJECTION, SOLUTION INTRAVENOUS PRN
Status: DISCONTINUED | OUTPATIENT
Start: 2022-01-29 | End: 2022-01-29 | Stop reason: SDUPTHER

## 2022-01-29 RX ORDER — FENTANYL CITRATE 50 UG/ML
50 INJECTION, SOLUTION INTRAMUSCULAR; INTRAVENOUS EVERY 5 MIN PRN
Status: DISCONTINUED | OUTPATIENT
Start: 2022-01-29 | End: 2022-01-29 | Stop reason: HOSPADM

## 2022-01-29 RX ORDER — MEPERIDINE HYDROCHLORIDE 50 MG/ML
12.5 INJECTION INTRAMUSCULAR; INTRAVENOUS; SUBCUTANEOUS EVERY 5 MIN PRN
Status: DISCONTINUED | OUTPATIENT
Start: 2022-01-29 | End: 2022-01-29 | Stop reason: HOSPADM

## 2022-01-29 RX ORDER — CEFAZOLIN SODIUM 1 G/3ML
INJECTION, POWDER, FOR SOLUTION INTRAMUSCULAR; INTRAVENOUS PRN
Status: DISCONTINUED | OUTPATIENT
Start: 2022-01-29 | End: 2022-01-29 | Stop reason: SDUPTHER

## 2022-01-29 RX ORDER — METHOCARBAMOL 500 MG/1
500 TABLET, FILM COATED ORAL 4 TIMES DAILY
Status: DISCONTINUED | OUTPATIENT
Start: 2022-01-29 | End: 2022-01-29

## 2022-01-29 RX ORDER — TRAZODONE HYDROCHLORIDE 50 MG/1
50 TABLET ORAL NIGHTLY PRN
Status: DISCONTINUED | OUTPATIENT
Start: 2022-01-29 | End: 2022-02-02 | Stop reason: HOSPADM

## 2022-01-29 RX ORDER — IPRATROPIUM BROMIDE AND ALBUTEROL SULFATE 2.5; .5 MG/3ML; MG/3ML
1 SOLUTION RESPIRATORY (INHALATION) EVERY 4 HOURS PRN
Status: DISCONTINUED | OUTPATIENT
Start: 2022-01-29 | End: 2022-02-02 | Stop reason: HOSPADM

## 2022-01-29 RX ORDER — ARIPIPRAZOLE 5 MG/1
5 TABLET ORAL DAILY
Status: DISCONTINUED | OUTPATIENT
Start: 2022-01-29 | End: 2022-02-02 | Stop reason: HOSPADM

## 2022-01-29 RX ORDER — HYDRALAZINE HYDROCHLORIDE 20 MG/ML
10 INJECTION INTRAMUSCULAR; INTRAVENOUS EVERY 4 HOURS PRN
Status: DISCONTINUED | OUTPATIENT
Start: 2022-01-29 | End: 2022-02-02 | Stop reason: HOSPADM

## 2022-01-29 RX ORDER — BUPIVACAINE HYDROCHLORIDE 5 MG/ML
INJECTION, SOLUTION PERINEURAL PRN
Status: DISCONTINUED | OUTPATIENT
Start: 2022-01-29 | End: 2022-01-29 | Stop reason: ALTCHOICE

## 2022-01-29 RX ORDER — FENTANYL CITRATE 50 UG/ML
50 INJECTION, SOLUTION INTRAMUSCULAR; INTRAVENOUS
Status: DISCONTINUED | OUTPATIENT
Start: 2022-01-29 | End: 2022-01-31

## 2022-01-29 RX ORDER — DULOXETIN HYDROCHLORIDE 30 MG/1
60 CAPSULE, DELAYED RELEASE ORAL DAILY
Status: DISCONTINUED | OUTPATIENT
Start: 2022-01-29 | End: 2022-02-02 | Stop reason: HOSPADM

## 2022-01-29 RX ORDER — SODIUM CHLORIDE, SODIUM LACTATE, POTASSIUM CHLORIDE, CALCIUM CHLORIDE 600; 310; 30; 20 MG/100ML; MG/100ML; MG/100ML; MG/100ML
INJECTION, SOLUTION INTRAVENOUS CONTINUOUS PRN
Status: DISCONTINUED | OUTPATIENT
Start: 2022-01-29 | End: 2022-01-29 | Stop reason: SDUPTHER

## 2022-01-29 RX ORDER — BUPROPION HYDROCHLORIDE 150 MG/1
150 TABLET ORAL EVERY MORNING
Status: DISCONTINUED | OUTPATIENT
Start: 2022-01-29 | End: 2022-02-02 | Stop reason: HOSPADM

## 2022-01-29 RX ORDER — POTASSIUM CHLORIDE 7.45 MG/ML
10 INJECTION INTRAVENOUS PRN
Status: DISCONTINUED | OUTPATIENT
Start: 2022-01-29 | End: 2022-02-02 | Stop reason: HOSPADM

## 2022-01-29 RX ORDER — DEXTROSE AND SODIUM CHLORIDE 5; .45 G/100ML; G/100ML
INJECTION, SOLUTION INTRAVENOUS CONTINUOUS
Status: DISCONTINUED | OUTPATIENT
Start: 2022-01-29 | End: 2022-01-30

## 2022-01-29 RX ORDER — SODIUM CHLORIDE 9 MG/ML
25 INJECTION, SOLUTION INTRAVENOUS PRN
Status: DISCONTINUED | OUTPATIENT
Start: 2022-01-29 | End: 2022-02-02 | Stop reason: HOSPADM

## 2022-01-29 RX ORDER — FENTANYL CITRATE 50 UG/ML
INJECTION, SOLUTION INTRAMUSCULAR; INTRAVENOUS PRN
Status: DISCONTINUED | OUTPATIENT
Start: 2022-01-29 | End: 2022-01-29 | Stop reason: SDUPTHER

## 2022-01-29 RX ORDER — LABETALOL HYDROCHLORIDE 5 MG/ML
INJECTION, SOLUTION INTRAVENOUS PRN
Status: DISCONTINUED | OUTPATIENT
Start: 2022-01-29 | End: 2022-01-29 | Stop reason: SDUPTHER

## 2022-01-29 RX ADMIN — ROCURONIUM BROMIDE 10 MG: 10 INJECTION INTRAVENOUS at 01:22

## 2022-01-29 RX ADMIN — Medication 20 MG: at 02:31

## 2022-01-29 RX ADMIN — BUPROPION HYDROCHLORIDE 150 MG: 150 TABLET, EXTENDED RELEASE ORAL at 11:19

## 2022-01-29 RX ADMIN — Medication 200 MCG: at 00:53

## 2022-01-29 RX ADMIN — ARIPIPRAZOLE 5 MG: 5 TABLET ORAL at 11:19

## 2022-01-29 RX ADMIN — FENTANYL CITRATE 100 MCG: 50 INJECTION, SOLUTION INTRAMUSCULAR; INTRAVENOUS at 00:36

## 2022-01-29 RX ADMIN — METHOCARBAMOL TABLETS 750 MG: 750 TABLET, COATED ORAL at 20:54

## 2022-01-29 RX ADMIN — FENTANYL CITRATE 50 MCG: 50 INJECTION, SOLUTION INTRAMUSCULAR; INTRAVENOUS at 07:28

## 2022-01-29 RX ADMIN — LIDOCAINE HYDROCHLORIDE 5 MG: 10 INJECTION, SOLUTION EPIDURAL; INFILTRATION; INTRACAUDAL; PERINEURAL at 00:36

## 2022-01-29 RX ADMIN — ATENOLOL 25 MG: 25 TABLET ORAL at 09:44

## 2022-01-29 RX ADMIN — Medication 50 MCG: at 01:34

## 2022-01-29 RX ADMIN — Medication 0.6 MG: at 01:58

## 2022-01-29 RX ADMIN — AMLODIPINE BESYLATE 10 MG: 10 TABLET ORAL at 13:07

## 2022-01-29 RX ADMIN — MAGNESIUM SULFATE HEPTAHYDRATE 1000 MG: 1 INJECTION, SOLUTION INTRAVENOUS at 13:42

## 2022-01-29 RX ADMIN — SODIUM CHLORIDE, POTASSIUM CHLORIDE, SODIUM LACTATE AND CALCIUM CHLORIDE: 600; 310; 30; 20 INJECTION, SOLUTION INTRAVENOUS at 00:30

## 2022-01-29 RX ADMIN — MAGNESIUM SULFATE HEPTAHYDRATE 1000 MG: 1 INJECTION, SOLUTION INTRAVENOUS at 15:06

## 2022-01-29 RX ADMIN — HYDRALAZINE HYDROCHLORIDE 10 MG: 20 INJECTION INTRAMUSCULAR; INTRAVENOUS at 13:07

## 2022-01-29 RX ADMIN — GABAPENTIN 300 MG: 300 CAPSULE ORAL at 20:54

## 2022-01-29 RX ADMIN — FENTANYL CITRATE 100 MCG: 50 INJECTION, SOLUTION INTRAMUSCULAR; INTRAVENOUS at 02:03

## 2022-01-29 RX ADMIN — ENOXAPARIN SODIUM 30 MG: 100 INJECTION SUBCUTANEOUS at 09:43

## 2022-01-29 RX ADMIN — ENOXAPARIN SODIUM 30 MG: 100 INJECTION SUBCUTANEOUS at 20:52

## 2022-01-29 RX ADMIN — LABETALOL HYDROCHLORIDE 10 MG: 5 INJECTION, SOLUTION INTRAVENOUS at 03:00

## 2022-01-29 RX ADMIN — ONDANSETRON 4 MG: 2 INJECTION INTRAMUSCULAR; INTRAVENOUS at 01:48

## 2022-01-29 RX ADMIN — DULOXETINE HYDROCHLORIDE 60 MG: 30 CAPSULE, DELAYED RELEASE ORAL at 11:19

## 2022-01-29 RX ADMIN — Medication 10 MG: at 03:00

## 2022-01-29 RX ADMIN — TOPIRAMATE 100 MG: 100 TABLET, FILM COATED ORAL at 13:07

## 2022-01-29 RX ADMIN — POLYETHYLENE GLYCOL 3350 17 G: 17 POWDER, FOR SOLUTION ORAL at 20:54

## 2022-01-29 RX ADMIN — POTASSIUM CHLORIDE 10 MEQ: 7.46 INJECTION, SOLUTION INTRAVENOUS at 14:45

## 2022-01-29 RX ADMIN — ACETAMINOPHEN 1000 MG: 500 TABLET ORAL at 21:05

## 2022-01-29 RX ADMIN — FENTANYL CITRATE 50 MCG: 50 INJECTION INTRAMUSCULAR; INTRAVENOUS at 02:48

## 2022-01-29 RX ADMIN — Medication 50 MCG: at 01:37

## 2022-01-29 RX ADMIN — FENTANYL CITRATE 100 MCG: 50 INJECTION, SOLUTION INTRAMUSCULAR; INTRAVENOUS at 02:24

## 2022-01-29 RX ADMIN — DEXAMETHASONE SODIUM PHOSPHATE 10 MG: 10 INJECTION INTRAMUSCULAR; INTRAVENOUS at 00:50

## 2022-01-29 RX ADMIN — Medication 100 MCG: at 01:43

## 2022-01-29 RX ADMIN — PROPOFOL 150 MG: 10 INJECTION, EMULSION INTRAVENOUS at 00:36

## 2022-01-29 RX ADMIN — ACETAMINOPHEN 1000 MG: 500 TABLET ORAL at 14:45

## 2022-01-29 RX ADMIN — TOPIRAMATE 100 MG: 100 TABLET, FILM COATED ORAL at 20:54

## 2022-01-29 RX ADMIN — FENTANYL CITRATE 50 MCG: 50 INJECTION INTRAMUSCULAR; INTRAVENOUS at 02:43

## 2022-01-29 RX ADMIN — Medication 3 MG: at 01:58

## 2022-01-29 RX ADMIN — CEFAZOLIN 2000 MG: 330 INJECTION, POWDER, FOR SOLUTION INTRAMUSCULAR; INTRAVENOUS at 00:46

## 2022-01-29 RX ADMIN — DEXTROSE AND SODIUM CHLORIDE: 5; 450 INJECTION, SOLUTION INTRAVENOUS at 03:49

## 2022-01-29 RX ADMIN — ROCURONIUM BROMIDE 40 MG: 10 INJECTION INTRAVENOUS at 00:36

## 2022-01-29 ASSESSMENT — PULMONARY FUNCTION TESTS
PIF_VALUE: 7
PIF_VALUE: 13
PIF_VALUE: 0
PIF_VALUE: 13
PIF_VALUE: 17
PIF_VALUE: 17
PIF_VALUE: 1
PIF_VALUE: 15
PIF_VALUE: 13
PIF_VALUE: 23
PIF_VALUE: 28
PIF_VALUE: 29
PIF_VALUE: 16
PIF_VALUE: 27
PIF_VALUE: 29
PIF_VALUE: 6
PIF_VALUE: 16
PIF_VALUE: 13
PIF_VALUE: 17
PIF_VALUE: 17
PIF_VALUE: 36
PIF_VALUE: 28
PIF_VALUE: 16
PIF_VALUE: 21
PIF_VALUE: 24
PIF_VALUE: 28
PIF_VALUE: 13
PIF_VALUE: 15
PIF_VALUE: 27
PIF_VALUE: 17
PIF_VALUE: 16
PIF_VALUE: 16
PIF_VALUE: 28
PIF_VALUE: 26
PIF_VALUE: 17
PIF_VALUE: 12
PIF_VALUE: 28
PIF_VALUE: 16
PIF_VALUE: 17
PIF_VALUE: 17
PIF_VALUE: 30
PIF_VALUE: 17
PIF_VALUE: 13
PIF_VALUE: 15
PIF_VALUE: 26
PIF_VALUE: 23
PIF_VALUE: 26
PIF_VALUE: 26
PIF_VALUE: 24
PIF_VALUE: 28
PIF_VALUE: 18
PIF_VALUE: 15
PIF_VALUE: 29
PIF_VALUE: -8
PIF_VALUE: 15
PIF_VALUE: 23
PIF_VALUE: 28
PIF_VALUE: 13
PIF_VALUE: 25
PIF_VALUE: 12
PIF_VALUE: 29
PIF_VALUE: 27
PIF_VALUE: 29
PIF_VALUE: 16
PIF_VALUE: 13
PIF_VALUE: 16
PIF_VALUE: 17
PIF_VALUE: 1
PIF_VALUE: 28
PIF_VALUE: 16
PIF_VALUE: 29
PIF_VALUE: 15
PIF_VALUE: 29
PIF_VALUE: 16
PIF_VALUE: 17
PIF_VALUE: 16
PIF_VALUE: 16
PIF_VALUE: 23
PIF_VALUE: 17
PIF_VALUE: 27
PIF_VALUE: 6
PIF_VALUE: 11
PIF_VALUE: 14
PIF_VALUE: 29
PIF_VALUE: 12
PIF_VALUE: 13
PIF_VALUE: 21
PIF_VALUE: 13
PIF_VALUE: 13
PIF_VALUE: 24
PIF_VALUE: 13
PIF_VALUE: 18
PIF_VALUE: 16
PIF_VALUE: 16
PIF_VALUE: 27
PIF_VALUE: 17

## 2022-01-29 ASSESSMENT — PAIN SCALES - WONG BAKER
WONGBAKER_NUMERICALRESPONSE: 0
WONGBAKER_NUMERICALRESPONSE: 2
WONGBAKER_NUMERICALRESPONSE: 0
WONGBAKER_NUMERICALRESPONSE: 0
WONGBAKER_NUMERICALRESPONSE: 2
WONGBAKER_NUMERICALRESPONSE: 2

## 2022-01-29 ASSESSMENT — ENCOUNTER SYMPTOMS
NAUSEA: 1
BACK PAIN: 0
APNEA: 0
NAUSEA: 0
DIARRHEA: 0
SORE THROAT: 0
SHORTNESS OF BREATH: 0
BLOOD IN STOOL: 0
SINUS PRESSURE: 0
DIARRHEA: 1
TROUBLE SWALLOWING: 0
CONSTIPATION: 0
SHORTNESS OF BREATH: 1
VOMITING: 0
COLOR CHANGE: 0
CHEST TIGHTNESS: 0
COUGH: 0
ABDOMINAL DISTENTION: 0
WHEEZING: 0
SINUS PAIN: 0
ABDOMINAL PAIN: 1

## 2022-01-29 ASSESSMENT — PAIN SCALES - GENERAL
PAINLEVEL_OUTOF10: 9
PAINLEVEL_OUTOF10: 8
PAINLEVEL_OUTOF10: 10
PAINLEVEL_OUTOF10: 7
PAINLEVEL_OUTOF10: 10
PAINLEVEL_OUTOF10: 9
PAINLEVEL_OUTOF10: 5

## 2022-01-29 ASSESSMENT — PAIN DESCRIPTION - PROGRESSION
CLINICAL_PROGRESSION: NOT CHANGED
CLINICAL_PROGRESSION: GRADUALLY WORSENING

## 2022-01-29 ASSESSMENT — PAIN DESCRIPTION - LOCATION
LOCATION: ABDOMEN

## 2022-01-29 ASSESSMENT — COPD QUESTIONNAIRES: CAT_SEVERITY: NO INTERVAL CHANGE

## 2022-01-29 ASSESSMENT — PAIN DESCRIPTION - ORIENTATION
ORIENTATION: MID

## 2022-01-29 ASSESSMENT — PAIN - FUNCTIONAL ASSESSMENT
PAIN_FUNCTIONAL_ASSESSMENT: INTOLERABLE, UNABLE TO DO ANY ACTIVE OR PASSIVE ACTIVITIES
PAIN_FUNCTIONAL_ASSESSMENT: PREVENTS OR INTERFERES SOME ACTIVE ACTIVITIES AND ADLS

## 2022-01-29 ASSESSMENT — PAIN DESCRIPTION - PAIN TYPE
TYPE: SURGICAL PAIN

## 2022-01-29 ASSESSMENT — PAIN DESCRIPTION - FREQUENCY
FREQUENCY: CONTINUOUS

## 2022-01-29 ASSESSMENT — PAIN DESCRIPTION - ONSET
ONSET: ON-GOING
ONSET: ON-GOING

## 2022-01-29 ASSESSMENT — PAIN DESCRIPTION - DESCRIPTORS
DESCRIPTORS: ACHING;DULL
DESCRIPTORS: SHARP;SHOOTING
DESCRIPTORS: SHARP

## 2022-01-29 NOTE — PROGRESS NOTES
Bariatric Surgery Progress Note    PATIENT NAME: Surjit Colon DATE: 1/29/2022, 6:40 AM  Chief Complaint   Patient presents with    Abdominal Pain     SUBJECTIVE:    Pt seen and examined. No acute issues since out of OR. Denies any f/c, n/v, sob or chest pain. No flatus yet. +Abd pain but pt has been able to sleep a little.      OBJECTIVE:   Vitals:  BP (!) 186/85   Pulse 60   Temp 98.9 °F (37.2 °C) (Oral)   Resp 14   Ht 4' 11\" (1.499 m)   Wt 119 lb 11.2 oz (54.3 kg)   SpO2 100%   BMI 24.18 kg/m²      INTAKE/OUTPUT:      Intake/Output Summary (Last 24 hours) at 1/29/2022 0640  Last data filed at 1/29/2022 0216  Gross per 24 hour   Intake 1000 ml   Output 220 ml   Net 780 ml               General: AOx3, NAD  Lungs: Symmetrical chest rise bilaterally, no audible wheezes or rhonchi  Heart: S1S2  Abdomen: Soft, ND, appropriately tender, non peritoneal, no rebound, midline incision with mild strike through otherwise c/d/i    Extremity: moves all extremities x4, No edema    Data Review:  CBC:   Recent Labs     01/28/22 1811   WBC 8.5   HGB 9.6*        BMP:    Recent Labs     01/28/22 1811      K 3.3*      CO2 23   BUN 16   CREATININE 0.70   GLUCOSE 96     Hepatic:   Recent Labs     01/28/22 1811   AST 26   ALT 11   ALKPHOS 90   BILITOT 0.23*     Coagulation:   Recent Labs     01/28/22 1811   PROT 5.9*       ASSESSMENT   Patient Active Problem List   Diagnosis    GERD (gastroesophageal reflux disease)    Hoarseness of voice    MVP (mitral valve prolapse)    Depression with anxiety    Lymphocytic colitis    History of migraine headaches    Hyperlipidemia    Sleep apnea    IGT (impaired glucose tolerance)    History of renal calculi    Lactose intolerance    History of seizure disorder    Leukopenia    Bipolar disorder, mixed (HCC)    Illicit drug use    Postlaminectomy syndrome, cervical region    Degeneration of cervical intervertebral disc    Encounter for long-term (current) use of other medications    Fracture of mandible ([facility])    Chest pain    E-coli UTI    Bradycardia    Cellulitis    Hypokalemia    Essential hypertension    Elevated lipase    Right lower quadrant abdominal pain    Nausea    Diarrhea    Seizure disorder (HCC)    Polysubstance abuse (HCC)    Fibromyalgia    Cocaine addiction ([facility])    Shoulder arthritis    Osteoarthritis of left glenohumeral joint    Abnormal EKG    Anemia    Anxiety    Carotid artery stenosis    COPD (chronic obstructive pulmonary disease) (HCC)    Dental disease    HUTCHINSON (dyspnea on exertion)    Fatigue    Fractures    H/O gastric bypass    History of crack cocaine use    History of UTI    Injury of back    Intractable chronic migraine without aura and without status migrainosus    Intractable migraine with aura with status migrainosus    Kidney stones    Dizziness    Memory loss    Mild pulmonary hypertension ([facility])    Mild tricuspid regurgitation    Primary osteoarthritis of left shoulder    Seizure-like activity ([facility])    Stress at home    TMJ (dislocation of temporomandibular joint)    Tobacco abuse    Visual impairment    Internal hernia     47 yo F s/p diagnostic lap converted to ex lap for ANNIE and reduction of an internal hernia     PLAN  1. Cont supportive care   2. Will discuss starting clears, cont abd binder, ok to shower   3. Encourage ambulation, SCDs and chemical DVT Ppx  4.  Cont aggressive IS use and deep breathing     Thank you,    Electronically signed by Jae Bacon DO  on 1/29/2022 at 6:40 AM

## 2022-01-29 NOTE — ED NOTES
Pt responds to assertive verbal stimulus.   Pt's  allowed to come back to pt room     Denise Brady RN  01/28/22 6423

## 2022-01-29 NOTE — ED NOTES
45 yo F, presenting with abdominal pain. Remote history of gastric bypass surgery, unable to determine when or who performed and patient is limited in history due to intoxication. Multiple bowel loops rotated that appears consistent with an internal hernia. White count negative lactic normal. Bariatrics being notified at this time. Ground.     Accepted by Abram Rodriguez RN  01/29/22 0025

## 2022-01-29 NOTE — ED PROVIDER NOTES
[address]  ED  eMERGENCY dEPARTMENT eNCOUnter   Attending Attestation     Pt Name: Juan Willard  MRN: [id number]  [place] 1970  Date of evaluation: 1/28/22       Juan Willard is a 46 y.o. female who presents with Abdominal Pain      History: Patient presents with abdominal pain, concern for internal hernia. Patient has history of gastric bypass. Patient sent here for surgical evaluation. Exam: Heart rate and rhythm are mildly bradycardic. Patient is awake and alert to voice. Patient is somnolent. Abdomen is soft nontender on my exam.      Plan for surgical consultation. We will follow the patient closely. Patient becomes more somnolent will act appropriately including intubation if necessary and [facility] suggests that route. I performed a history and physical examination of the patient and discussed management with the resident. I reviewed the residents note and agree with the documented findings and plan of care. Any areas of disagreement are noted on the chart. I was personally present for the key portions of any procedures. I have documented in the chart those procedures where I was not present during the key portions. I have personally reviewed all images and agree with the resident's interpretation. I have reviewed the emergency nurses triage note. I agree with the chief complaint, past medical history, past surgical history, allergies, medications, social and family history as documented unless otherwise noted below. Documentation of the HPI, Physical Exam and Medical Decision Making performed by medical students or scribes is based on my personal performance of the HPI, PE and MDM. For Phys Assistant/ Nurse Practitioner cases/documentation I have had a face to face evaluation of this patient and have completed at least one if not all key elements of the E/M (history, physical exam, and MDM). Additional findings are as noted.     For APC cases I have personally evaluated and

## 2022-01-29 NOTE — CONSULTS
[facility]     Department of Internal Medicine - Staff Internal Medicine Teaching Service          ADMISSION NOTE/HISTORY AND PHYSICAL EXAMINATION   Date: 1/29/2022  Patient Name: Juan Willard  Date of admission: 1/28/2022 10:30 PM  YOB: 1970  PCP: Lucia Lopez MD  History Obtained From:  patient, electronic medical record    Consult Reason:     Consult Reason: Blood pressure control and management of other comorbidities    HISTORY OF PRESENTING ILLNESS     The patient is a pleasant 46 y.o. female is transferred from [facility] presented with acute abdomen. Had history of gastric bypass, cholecystectomy, hysterectomy and appendectomy. CT abdomen showed findings concerning for internal hernia. Patient had open exploratory laparotomy with lysis of adhesion and reduction of internal hernia. Patient is postop day 1. PMH of COPD/asthma, hyperlipidemia, hypertension, polysubstance abuse, major depression/bipolar/fibromyalgia and seizure disorder. Hemoglobin A1c: 5.3 10/2016, creatinine <1 baseline, echo LVEF >55% 2/2016. Internal medicine is consulted for medical management of hypertension and other comorbidities. On evaluation patient is afebrile, AAOx4, HR 64 bpm, /82, SPO2 95% on room air. Chest is clear bilateral to auscultation. Abdomen is soft, nondistended. No pedal edema. Pertinent labs are , U tox positive for amphetamine, cocaine. WBC 17.8, Hb 10.6, serum ferritin 10, iron 16, iron saturation 5. Review of Systems   Respiratory: Negative for apnea, cough, chest tightness, shortness of breath and wheezing. Cardiovascular: Negative for chest pain, palpitations and leg swelling. Gastrointestinal: Positive for abdominal pain. Negative for abdominal distention, blood in stool, diarrhea, nausea and vomiting. Genitourinary: Negative for difficulty urinating, dysuria, flank pain, frequency, pelvic pain and urgency. Musculoskeletal: Negative for back pain, joint swelling and neck pain. Skin: Negative for color change, pallor and rash. Neurological: Negative for dizziness, tremors, seizures, weakness and numbness. Psychiatric/Behavioral: Negative for agitation, confusion and hallucinations. The patient is not hyperactive.       PAST MEDICAL HISTORY     Past Medical History:   Diagnosis Date    Anemia     Arthritis     Asthma     Bipolar disorder, mixed [facility] [address]     Carotid artery stenosis 2020    Cocaine abuse ([facility] [address] 2014    COPD (chronic obstructive pulmonary disease) (HCC)     Degeneration of cervical intervertebral disc     Depression with anxiety     Depression with anxiety     Fibromyalgia 2017    GERD (gastroesophageal reflux disease)     History of migraine headaches 6/10/2014    History of renal calculi 6/10/2014    History of seizure disorder 6/10/2014    Hoarseness of voice     HTN (hypertension) 6/10/2014    Hyperlipidemia 6/10/2014    IGT (impaired glucose tolerance) 6/10/2014    Kidney stones     Lactose intolerance 6/10/2014    Leukopenia 6/10/2014    Major depressive disorder, recurrent episode, severe, without mention of psychotic behavior 2014    MVP (mitral valve prolapse)     Seizures ([facility]     Sleep apnea 6/10/2014    Unspecified diseases of blood and blood-forming organs        PAST SURGICAL HISTORY     Past Surgical History:   Procedure Laterality Date    ANKLE FRACTURE SURGERY      recontruction surgery    APPENDECTOMY      BREAST LUMPECTOMY Right     CARDIAC CATHETERIZATION      CARPAL TUNNEL RELEASE      x2     SECTION      CHOLECYSTECTOMY      COLONOSCOPY  12    COLONOSCOPY  2016    severe spasms    COLONOSCOPY  2019    DR GOLDY MCINTOSH    GASTRIC BYPASS SURGERY      GASTRIC BYPASS SURGERY      HYSTERECTOMY      HYSTERECTOMY      NECK SURGERY      OTHER SURGICAL HISTORY      APPLICATION OF ARCH BARS,SIMPLE EXTRACTION OF #15 AND RT TMJ JOINT REPLACEMENT    SHOULDER ARTHROSCOPY Left 06/05/2019    DR ROBERT GREEN    SHOULDER SURGERY      TONSILLECTOMY AND ADENOIDECTOMY      UPPER GASTROINTESTINAL ENDOSCOPY  7-3-12    egd    UPPER GASTROINTESTINAL ENDOSCOPY  12/19/2016    status post gastrectomy with a small remnant of gastric pouch.  UPPER GASTROINTESTINAL ENDOSCOPY  05/2019    DR GOLDY MCINTOSH       ALLERGIES     Aspirin, Bactrim, and Codeine    MEDICATIONS PRIOR TO ADMISSION     Prior to Admission medications    Medication Sig Start Date End Date Taking? Authorizing Provider   tiZANidine (ZANAFLEX) 4 MG tablet Take 1 tablet by mouth every 8 hours as needed (back pain) 9/7/21   Emma Hatch MD   atenolol (TENORMIN) 25 MG tablet TAKE 1 TABLET BY MOUTH DAILY 7/16/21   Emma Hatch MD   FEROSUL 325 (65 Fe) MG tablet TAKE 1 TABLET BY MOUTH 2 TIMES DAILY 6/18/21   Emma Hatch MD   gabapentin (NEURONTIN) 300 MG capsule Take 1 capsule by mouth 3 times daily for 30 days. Intended supply: 30 days 4/29/21 5/29/21  Emma Hatch MD   buPROPion (WELLBUTRIN XL) 150 MG extended release tablet TAKE 1 TABLET BY MOUTH EVERY MORNING 12/22/20   Emma Hatch MD   hydrOXYzine (VISTARIL) 25 MG capsule  9/15/20   Historical Provider, MD   silver sulfADIAZINE (SILVADENE) 1 % cream Apply topically daily.   Patient not taking: Reported on 9/7/2021 9/21/20   Emma Hatch MD   topiramate (TOPAMAX) 100 MG tablet Take 100 mg by mouth 2 times daily 6/3/20   Historical Provider, MD   atorvastatin (LIPITOR) 40 MG tablet Take 40 mg by mouth daily    Historical Provider, MD   metoprolol tartrate (LOPRESSOR) 25 MG tablet Take 25 mg by mouth daily    Historical Provider, MD   traZODone (DESYREL) 50 MG tablet Take 1 tablet by mouth nightly as needed for Sleep 4/8/19   KELLY Bernstein CNP   DULoxetine (CYMBALTA) 30 MG extended release capsule Take 1 capsule by mouth daily  Patient taking differently: Take 60 mg by mouth daily  19   Seltherese Cedilloa, APRN - CNP   albuterol sulfate HFA (PROVENTIL HFA) 108 (90 Base) MCG/ACT inhaler Inhale 2 puffs into the lungs every 6 hours as needed for Wheezing 19   Edaeta Rotunda APRN - CNP   topiramate (TOPAMAX) 50 MG tablet Take 100 mg by mouth 2 times daily     Historical Provider, MD   divalproex (DEPAKOTE) 500 MG DR tablet Take 2 tablets by mouth 2 times daily Indications: Level 41 as of 16 (Pt takes two 500 mg DR tabs = 1,000mg PO BID) 17   Riddhi Weathers MD   ARIPiprazole (ABILIFY) 5 MG tablet Take 5 mg by mouth daily    Historical Provider, MD       SOCIAL HISTORY     Tobacco: Current day smoker 1/2 PPD for 30+ years. Alcohol: Yes alcohol use socially  Illicits: Amphetamine, cocaine abuser  Occupation:     FAMILY HISTORY     Family History   Problem Relation Age of Onset    Diabetes Mother     Cancer Mother     Coronary Art Dis Father     COPD Father     Depression Brother     Alcohol Abuse Brother     Cancer Other         lung and skin    Diabetes Maternal Grandmother     Cancer Paternal Grandmother        PHYSICAL EXAM     Vitals: BP (!) 174/89   Pulse 60   Temp 97.8 °F (36.6 °C) (Oral)   Resp 14   Ht 4' 11\" (1.499 m)   Wt 119 lb 11.2 oz (54.3 kg)   SpO2 96%   BMI 24.18 kg/m²   Tmax: Temp (24hrs), Av.6 °F (36.4 °C), Min:97.2 °F (36.2 °C), Max:98.9 °F (37.2 °C)    Last Body weight:   Wt Readings from Last 3 Encounters:   22 119 lb 11.2 oz (54.3 kg)   21 106 lb 4 oz (48.2 kg)   21 113 lb 3 oz (51.3 kg)     Body Mass Index : Body mass index is 24.18 kg/m². PHYSICAL EXAMINATION:  Constitutional: This is a well developed, well nourished, 18.5-24.9 - Normal 46y.o. year old female who is alert, oriented, cooperative and in no apparent distress. Head:normocephalic and atraumatic. EENT:  PERRLA. No conjunctival injections. Septum was midline,  Neck: Supple without thyromegaly. No elevated JVP.  Trachea was midline. Respiratory: Chest was symmetrical without dullness to percussion. Breath sounds bilaterally were clear to auscultation. There were no wheezes, rhonchi or rales. There is no intercostal retraction or use of accessory muscles. No egophony noted. Cardiovascular: Regular without murmur, clicks, gallops or rubs. Abdomen: Slightly rounded and soft without organomegaly. POD#1. Wound is healing well. No discharge. Bowel sounds audible. Musculoskeletal: Normal curvature of the spine. No gross muscle weakness. Extremities:  No lower extremity edema, ulcerations, tenderness, varicosities or erythema. Muscle size, tone and strength are normal.  No involuntary movements are noted. Skin:  Warm and dry. Good color, turgor and pigmentation. No lesions or scars.   No cyanosis or clubbing  Neurological/Psychiatric: The patient's general behavior, level of consciousness, thought content and emotional status is normal.          INVESTIGATIONS     Laboratory Testing:     Recent Results (from the past 24 hour(s))   EKG 12 Lead    Collection Time: 01/28/22  2:51 PM   Result Value Ref Range    Ventricular Rate 69 BPM    Atrial Rate 69 BPM    P-R Interval 152 ms    QRS Duration 100 ms    Q-T Interval 486 ms    QTc Calculation (Bazett) 520 ms    P Axis 51 degrees    R Axis 66 degrees    T Axis 50 degrees   CBC Auto Differential    Collection Time: 01/28/22  6:11 PM   Result Value Ref Range    WBC 8.5 3.5 - 11.0 k/uL    RBC 4.24 4.0 - 5.2 m/uL    Hemoglobin 9.6 (L) 12.0 - 16.0 g/dL    Hematocrit 30.8 (L) 36 - 46 %    MCV 72.7 (L) 80 - 100 fL    MCH 22.5 (L) 26 - 34 pg    MCHC 31.0 31 - 37 g/dL    RDW 18.0 (H) 11.5 - 14.9 %    Platelets 534 870 - 278 k/uL    MPV 8.7 6.0 - 12.0 fL    NRBC Automated NOT REPORTED per 100 WBC    Differential Type NOT REPORTED     Immature Granulocytes NOT REPORTED 0 %    Absolute Immature Granulocyte NOT REPORTED 0.00 - 0.30 k/uL    WBC Morphology NOT REPORTED     RBC Morphology NOT REPORTED     Platelet Estimate NOT REPORTED     Seg Neutrophils 83 (H) 36 - 66 %    Lymphocytes 10 (L) 24 - 44 %    Monocytes 6 1 - 7 %    Eosinophils % 0 0 - 4 %    Basophils 1 0 - 2 %    Segs Absolute 7.05 1.3 - 9.1 k/uL    Absolute Lymph # 0.85 (L) 1.0 - 4.8 k/uL    Absolute Mono # 0.51 0.1 - 1.3 k/uL    Absolute Eos # 0.00 0.0 - 0.4 k/uL    Basophils Absolute 0.09 0.0 - 0.2 k/uL    Morphology ANISOCYTOSIS PRESENT     Morphology HYPOCHROMIA PRESENT    Comprehensive Metabolic Panel    Collection Time: 01/28/22  6:11 PM   Result Value Ref Range    Glucose 96 70 - 99 mg/dL    BUN 16 6 - 20 mg/dL    CREATININE 0.70 0.50 - 0.90 mg/dL    Bun/Cre Ratio NOT REPORTED 9 - 20    Calcium 8.1 (L) 8.6 - 10.4 mg/dL    Sodium 140 135 - 144 mmol/L    Potassium 3.3 (L) 3.7 - 5.3 mmol/L    Chloride 107 98 - 107 mmol/L    CO2 23 20 - 31 mmol/L    Anion Gap 10 9 - 17 mmol/L    Alkaline Phosphatase 90 35 - 104 U/L    ALT 11 5 - 33 U/L    AST 26 <32 U/L    Total Bilirubin 0.23 (L) 0.3 - 1.2 mg/dL    Total Protein 5.9 (L) 6.4 - 8.3 g/dL    Albumin 3.6 3.5 - 5.2 g/dL    Albumin/Globulin Ratio NOT REPORTED 1.0 - 2.5    GFR Non-African American >60 >60 mL/min    GFR African American >60 >60 mL/min    GFR Comment          GFR Staging NOT REPORTED    HCG Qualitative, Serum    Collection Time: 01/28/22  6:11 PM   Result Value Ref Range    hCG Qual NEGATIVE NEGATIVE   Lactate, Sepsis    Collection Time: 01/28/22  6:11 PM   Result Value Ref Range    Lactic Acid, Sepsis 0.6 0.5 - 1.9 mmol/L    Lactic Acid, Sepsis, Whole Blood NOT REPORTED 0.5 - 1.9 mmol/L   Lipase    Collection Time: 01/28/22  6:11 PM   Result Value Ref Range    Lipase 21 13 - 60 U/L   COVID-19, Rapid    Collection Time: 01/28/22 11:31 PM    Specimen: Nasopharyngeal Swab   Result Value Ref Range    Specimen Description . NASOPHARYNGEAL SWAB     SARS-CoV-2, Rapid Not Detected Not Detected       Imaging:   CT ABDOMEN PELVIS W IV CONTRAST Additional Contrast? None    Result Date: 1/28/2022  Mesenteric congestion with findings suspicious for displaced bowel loops within the right lower quadrant. Slight twisting of mesenteric vessels identified as well. .  Findings worrisome for internal hernia. Surgical consultation recommended. ASSESSMENT & PLAN     Principal Problem:    Primary hypertension  Active Problems:    GERD (gastroesophageal reflux disease)    Depression with anxiety    Hyperlipidemia    Internal hernia  Resolved Problems:    * No resolved hospital problems. *    1. Internal hernia. S/p exploratory laparotomy. POD 1. General surgery following  2. Hypertension. /81. Continue on amlodipine 10 daily, atenolol 25 daily, hydralazine 10 q4 prn. Monitor blood pressure  3. Seizure disorder. Continue on Topamax 100 twice daily  4. Migraine. Continue on Topamax  5. Depression/ fibromyalgia/bipolar disorder. Continue on Abilify, bupropion, Cymbalta, trazodone. 6. COPD/asthma. Currently not in exacerbation. Continue on DuoNeb nebulization. Monitor SPO2    GI prophylaxis: Lovenox  DVT prophylaxis: None    PT/OT/SW: Following  Discharge planning: CM to assist with    Tracy Martinez MD  Internal Medicine Resident, PGY-1  [facility]; [place], [place]  1/29/2022, 11:53 AM      Attestation and add on       I have discussed the care of Washington Benítez , including pertinent history and exam findings,      1/29/22    with the resident. I have seen and examined the patient and the key elements of all parts of the encounter have been performed by me . I agree with the assessment, plan and orders as documented by the resident. Principal Problem:    Primary hypertension  Active Problems:    GERD (gastroesophageal reflux disease)    Depression with anxiety    Hyperlipidemia    Internal hernia  Resolved Problems:    * No resolved hospital problems. *         ---- ;     MD KUMAR Melara [address], [address] [place] [address].    Phone [phone]   Fax: [phone]  Answering Service: [phone]

## 2022-01-29 NOTE — ED PROVIDER NOTES
FACULTY SIGN-OUT  ADDENDUM     Care of this patient was assumed from previous attending physician. The patient's initial evaluation and plan have been discussed with the prior provider who initially evaluated the patient. Attestation  I was available and discussed any additional care issues that arose and coordinated the management plans with the resident(s) caring for the patient during my duty period. Any areas of disagreement with resident's documentation of care or procedures are noted on the chart. I was personally present for the key portions of any/all procedures, during my duty period. I have documented in the chart those procedures where I was not present during the key portions. ED COURSE      The patient was given the following medications:  No orders of the defined types were placed in this encounter. RECENT VITALS:   Temp: 98.8 °F (37.1 °C), Pulse: 57, Resp: 16, BP: (!) 152/81    MEDICAL DECISION MAKING        Rina Gutierrez is a 46 y.o. female who presents to the Emergency Department with complaints of abd pain. Hx gastric bypass. Bariatric surgery consult and admit.       Abby Richards MD  Attending Emergency Physician    (Please note that portions of this note were completed with a voice recognition program.  Efforts were made to edit the dictations but occasionally words are mis-transcribed.)          Abby Richards MD  01/28/22 0069

## 2022-01-29 NOTE — CARE COORDINATION
Case Management Initial Discharge Plan  Filomena Patel,             Met with:father to discuss discharge plans. Information verified: address, contacts, phone number, , insurance Yes  Insurance Provider: [facility]    Emergency Contact/Next of Kin name & number: Adam Wooten [phone]  Who are involved in patient's support system? family    PCP: Johnny Alexis MD  Date of last visit: 1 month ago      Discharge Planning    Living Arrangements:  CAREN Bolden has 1 stories  3 stairs to climb to get into front door, 0 stairs to climb to reach second floor  Location of bedroom/bathroom in home Main floor    Patient able to perform ADL's:Independent    Current Services (outpatient & in home) N/A  DME equipment: N/A  DME provider:     Is patient receiving oral anticoagulation therapy? No    If indicated:   Physician managing anticoagulation treatment: N/A  Where does patient obtain lab work for ATC treatment? Potential Assistance Needed:       Patient agreeable to home care: No  [place] of choice provided:  n/a    Prior SNF/Rehab Placement and Facility: N/A  Agreeable to SNF/Rehab: No  [place] of choice provided: n/a     Evaluation: no    Expected Discharge date:       Patient expects to be discharged to: If home: is the family and/or caregiver wiling & able to provide support at home? yes  Who will be providing this support? family    Follow Up Appointment: Best Day/ Time:      Transportation provider: Father  Transportation arrangements needed for discharge: No Father will transport home    Readmission Risk              Risk of Unplanned Readmission:  27             Does patient have a readmission risk score greater than 14?: Yes  If yes, follow-up appointment must be made within 7 days of discharge.      Goals of Care:       Educated Pt and father on transitional options, provided freedom of choice and are agreeable with plan      Discharge Plan: Pt will discharge to her father's home at [place], [place], [address] [place] [address] until she is feeling better. Pt denies the need for home care.           Electronically signed by JOSE Vasques on 1/29/22 at 1:20 PM EST

## 2022-01-29 NOTE — PROGRESS NOTES
Maris Carl DO    notified, of blood pressure and heart rate. 176/92, 58 HR. No new orders at this time. Will continue to monitor.

## 2022-01-29 NOTE — ED PROVIDER NOTES
[facility]  ED  Emergency Department Encounter  Emergency Medicine Resident     Pt Name: Washington Benítez  MRN: [id number]  [place] 1970  Date of evaluation: 1/28/22  PCP:  Duc Ewing MD    CHIEF COMPLAINT       Chief Complaint   Patient presents with    Abdominal Pain       HISTORY OFPRESENT ILLNESS  (Location/Symptom, Timing/Onset, Context/Setting, Quality, Duration, Modifying Factors,Severity.)      Washington Benítez is a 46 y. o.yo female who presents as a transfer from [facility]. Patient presented to the emergency department with acute abdominal pain which apparently started before arrival.  Patient was very agitated and there was difficulty with her providing history. Patient states she was nauseated no vomiting. Patient did admit to crack cocaine use that day. Patient states she had a history of gastric bypass surgery at [facility] performed by Dr. Rosalba Cardona. Work-up at [facility] was concerning for internal hernia. Labs were unremarkable including a lactic acid. Patient was given Haldol and Benadryl at outside facility. They discussed with surgery at [facility] and the decision was made to transfer patient here due to a history of gastric bypass. Discussion was had with Dr. Ilene Phillips prior to transfer. On arrival here patient is drowsy, she does awaken to deep stimulation, answers to questions yes no and moans.     PAST MEDICAL / SURGICAL / SOCIAL / FAMILY HISTORY      has a past medical history of Anemia, Arthritis, Asthma, Bipolar disorder, mixed ([address] Carotid artery stenosis, Cocaine abuse ([address] COPD (chronic obstructive pulmonary disease) ([address] Degeneration of cervical intervertebral disc, Depression with anxiety, Depression with anxiety, Fibromyalgia, GERD (gastroesophageal reflux disease), History of migraine headaches, History of renal calculi, History of seizure disorder, Hoarseness of voice, HTN (hypertension), Hyperlipidemia, IGT (impaired glucose tolerance), Kidney stones, Lactose intolerance, Leukopenia, Major depressive disorder, recurrent episode, severe, without mention of psychotic behavior, MVP (mitral valve prolapse), Seizures ([facility] Sleep apnea, and Unspecified diseases of blood and blood-forming organs. has a past surgical history that includes Gastric bypass surgery; Cholecystectomy; Hysterectomy; Appendectomy; Hysterectomy; Tonsillectomy and adenoidectomy; Ankle fracture surgery; Neck surgery; shoulder surgery;  section; Carpal tunnel release; Colonoscopy (12); Upper gastrointestinal endoscopy (7-3-12); Gastric bypass surgery; Breast lumpectomy (Right); Cardiac catheterization; Upper gastrointestinal endoscopy (2016); Colonoscopy (2016); Upper gastrointestinal endoscopy (2019); Colonoscopy (2019); Shoulder arthroscopy (Left, 2019); and other surgical history (10/24/2019). Social History     Socioeconomic History    Marital status:      Spouse name: Not on file    Number of children: Not on file    Years of education: Not on file    Highest education level: Not on file   Occupational History    Occupation: disability   Tobacco Use    Smoking status: Current Every Day Smoker     Packs/day: 0.50     Years: 33.00     Pack years: 16.50     Types: Cigarettes    Smokeless tobacco: Never Used    Tobacco comment: quit   started again      Substance and Sexual Activity    Alcohol use: Yes     Comment: occasional    Drug use: Not Currently    Sexual activity: Yes   Other Topics Concern    Not on file   Social History Narrative    Not on file     Social Determinants of Health     Financial Resource Strain:     Difficulty of Paying Living Expenses: Not on file   Food Insecurity:     Worried About Running Out of Food in the Last Year: Not on file    Lola of Food in the Last Year: Not on file   Transportation Needs:     Lack of Transportation (Medical):  Not on file    Lack of Transportation (Non-Medical): Not on file   Physical Activity:     Days of Exercise per Week: Not on file    Minutes of Exercise per Session: Not on file   Stress:     Feeling of Stress : Not on file   Social Connections:     Frequency of Communication with Friends and Family: Not on file    Frequency of Social Gatherings with Friends and Family: Not on file    Attends Evangelical Services: Not on file    Active Member of [address] [place] [address] [employer] or Organizations: Not on file    Attends Club or Organization Meetings: Not on file    Marital Status: Not on file   Intimate Partner Violence:     Fear of Current or Ex-Partner: Not on file    Emotionally Abused: Not on file    Physically Abused: Not on file    Sexually Abused: Not on file   Housing Stability:     Unable to Pay for Housing in the Last Year: Not on file    Number of [place] in the Last Year: Not on file    Unstable Housing in the Last Year: Not on file       Family History   Problem Relation Age of Onset    Diabetes Mother     Cancer Mother     Coronary Art Dis Father     COPD Father     Depression Brother     Alcohol Abuse Brother     Cancer Other         lung and skin    Diabetes Maternal Grandmother     Cancer Paternal Grandmother         Allergies:  Aspirin, Bactrim, and Codeine    Home Medications:  Prior to Admission medications    Medication Sig Start Date End Date Taking? Authorizing Provider   tiZANidine (ZANAFLEX) 4 MG tablet Take 1 tablet by mouth every 8 hours as needed (back pain) 9/7/21   Araceli Kennedy MD   atenolol (TENORMIN) 25 MG tablet TAKE 1 TABLET BY MOUTH DAILY 7/16/21   Araceli Kennedy MD   FEROSUL 325 (65 Fe) MG tablet TAKE 1 TABLET BY MOUTH 2 TIMES DAILY 6/18/21   Araceli Kennedy MD   gabapentin (NEURONTIN) 300 MG capsule Take 1 capsule by mouth 3 times daily for 30 days.  Intended supply: 30 days 4/29/21 5/29/21  Araceli Kennedy MD   buPROPion (WELLBUTRIN XL) 150 MG extended release tablet TAKE 1 TABLET BY MOUTH EVERY MORNING 12/22/20   Johnny Alexis MD   hydrOXYzine (VISTARIL) 25 MG capsule  9/15/20   Historical Provider, MD   silver sulfADIAZINE (SILVADENE) 1 % cream Apply topically daily. Patient not taking: Reported on 9/7/2021 9/21/20   Johnny Alexis MD   topiramate (TOPAMAX) 100 MG tablet Take 100 mg by mouth 2 times daily 6/3/20   Historical Provider, MD   atorvastatin (LIPITOR) 40 MG tablet Take 40 mg by mouth daily    Historical Provider, MD   metoprolol tartrate (LOPRESSOR) 25 MG tablet Take 25 mg by mouth daily    Historical Provider, MD   traZODone (DESYREL) 50 MG tablet Take 1 tablet by mouth nightly as needed for Sleep 4/8/19   Rosevelt Cowden, APRN - CNP   DULoxetine (CYMBALTA) 30 MG extended release capsule Take 1 capsule by mouth daily  Patient taking differently: Take 60 mg by mouth daily  4/8/19   Rosevelt Cowden, APRN - CNP   albuterol sulfate HFA (PROVENTIL HFA) 108 (90 Base) MCG/ACT inhaler Inhale 2 puffs into the lungs every 6 hours as needed for Wheezing 4/8/19   Rosevelt Cowden, APRN - CNP   topiramate (TOPAMAX) 50 MG tablet Take 100 mg by mouth 2 times daily     Historical Provider, MD   divalproex (DEPAKOTE) 500 MG DR tablet Take 2 tablets by mouth 2 times daily Indications: Level 41 as of 7/22/16 (Pt takes two 500 mg DR tabs = 1,000mg PO BID) 2/28/17   Terry Robin MD   ARIPiprazole (ABILIFY) 5 MG tablet Take 5 mg by mouth daily    Historical Provider, MD       REVIEW OFSYSTEMS    (2-9 systems for level 4, 10 or more for level 5)      Review of Systems   Constitutional: Negative for chills, diaphoresis, fatigue and fever. HENT: Negative for congestion, sinus pressure, sinus pain, sore throat and trouble swallowing. Respiratory: Negative for cough, chest tightness and shortness of breath. Cardiovascular: Negative for chest pain, palpitations and leg swelling. Gastrointestinal: Positive for abdominal pain, diarrhea and nausea. Negative for constipation and vomiting. Genitourinary: Negative for difficulty urinating, dysuria, flank pain and urgency. Musculoskeletal: Negative for back pain, neck pain and neck stiffness. Skin: Negative for color change, pallor and rash. Neurological: Negative for dizziness, tremors, speech difficulty, weakness, light-headedness and headaches. PHYSICAL EXAM   (up to 7 for level 4, 8 or more forlevel 5)      INITIAL VITALS:   ED Triage Vitals [01/28/22 2235]   BP Temp Temp Source Pulse Resp SpO2 Height Weight   (!) 152/81 98.8 °F (37.1 °C) Oral 57 16 97 % 4' 11\" (1.499 m) 106 lb (48.1 kg)       Physical Exam  Constitutional:       General: She is not in acute distress. Appearance: She is not ill-appearing. HENT:      Head: Normocephalic and atraumatic. Right Ear: External ear normal.      Left Ear: External ear normal.      Nose: Nose normal. No rhinorrhea. Eyes:      Extraocular Movements: Extraocular movements intact. Pupils: Pupils are equal, round, and reactive to light. Cardiovascular:      Rate and Rhythm: Normal rate and regular rhythm. Pulses: Normal pulses. Heart sounds: No murmur heard. Pulmonary:      Effort: Pulmonary effort is normal.      Breath sounds: Normal breath sounds. Abdominal:      General: Abdomen is flat. Bowel sounds are normal. There is no distension. Palpations: Abdomen is soft. There is no fluid wave. Tenderness: There is generalized abdominal tenderness. Musculoskeletal:         General: No swelling. Normal range of motion. Cervical back: Normal range of motion and neck supple. Skin:     General: Skin is warm and dry. Neurological:      General: No focal deficit present.       Comments: Drowsy, awakens to deep stimulation         DIFFERENTIAL  DIAGNOSIS     PLAN (LABS / IMAGING / EKG):  Orders Placed This Encounter   Procedures    COVID-19, Rapid    Inpatient consult to General Surgery       MEDICATIONS ORDERED:  No orders of the defined types were placed in this encounter. DDX: Internal hernia, abdominal pain    Initial MDM/Plan: 46 y.o. female who presents with concerns for internal hernia. Patient was a transfer from [facility]. Labs within normal limits bariatric surgery already consulted. No need for repeat labs at this time. Patient is drowsy as she received Haldol and Benadryl at outside facility. DIAGNOSTIC RESULTS / EMERGENCYDEPARTMENT COURSE / MDM     LABS:  Labs Reviewed   COVID-19, RAPID         RADIOLOGY:  CT ABDOMEN PELVIS W IV CONTRAST Additional Contrast? None    Result Date: 1/28/2022  EXAMINATION: CT OF THE ABDOMEN AND PELVIS WITH CONTRAST 1/28/2022 7:01 pm TECHNIQUE: CT of the abdomen and pelvis was performed with the administration of intravenous contrast. Multiplanar reformatted images are provided for review. Dose modulation, iterative reconstruction, and/or weight based adjustment of the mA/kV was utilized to reduce the radiation dose to as low as reasonably achievable. COMPARISON: CT abdomen pelvis 1 11/01/2016 HISTORY: ORDERING SYSTEM PROVIDED HISTORY: abdominal pain TECHNOLOGIST PROVIDED HISTORY: abdominal pain Decision Support Exception - unselect if not a suspected or confirmed emergency medical condition->Emergency Medical Condition (MA) Reason for Exam: abdominal pain Relevant Medical/Surgical History: surgeries-appendectomy, cholecystectomy, hysterectomy, gastric bypass FINDINGS: Lower Chest: Lung bases are grossly clear. Heart is mildly prominent size. Organs: Liver, spleen, adrenal glands, kidneys, pancreas demonstrate acute abnormality. GI/Bowel: Posterior changes status post gastric bypass surgery identified. There is mesenteric congestion. There is slight twisting of the mesentery identified worrisome for internal hernia. .  There are displaced loops small bowel identified within left lower quadrant with slight medialized portions loops of colon noted within lower quadrant. Pelvis: Bladder unremarkable. Hysterectomy. No suspicious adnexal mass. Peritoneum/Retroperitoneum: Mesenteric congestion identified new from prior exam.  Aortic vascular calcifications. Bones/Soft Tissues: No suspicious osseous lesion. Degenerate changes of the lumbar spine and hips. Mesenteric congestion with findings suspicious for displaced bowel loops within the right lower quadrant. Slight twisting of mesenteric vessels identified as well. .  Findings worrisome for internal hernia. Surgical consultation recommended. EKG      All EKG's are interpreted by the Emergency Department Physicianwho either signs or Co-signs this chart in the absence of a cardiologist.    EMERGENCY DEPARTMENT COURSE:  ED Course as of 01/29/22 0030   Fri Jan 28, 2022   2242 Patient seen on arrival.  Patient reportedly received Haldol and Benadryl at 2 PM today due to pain at outside facility. Patient has very drowsy she does moan and will say yes no to questions otherwise difficult to examine [CD]   2250 Dr. Jarad Lewis with bariatrics called requesting that his residency was patient. General surgery consult has been placed at that time. Plan for admission [CD]   Sat Jan 29, 2022   0029 Apparently patient was taken to the OR with bariatric surgery. [CD]      ED Course User Index  [CD] Ye Packer DO          PROCEDURES:  None    CONSULTS:  IP CONSULT TO GENERAL SURGERY    CRITICAL CARE:  Please see attending documentation    FINAL IMPRESSION      1. Internal hernia          DISPOSITION / PLAN     DISPOSITION Decision To Admit 01/28/2022 11:33:25 PM      PATIENT REFERRED TO:  No follow-up provider specified.     DISCHARGE MEDICATIONS:  New Prescriptions    No medications on file       Ye Packer DO  Emergency Medicine Resident    (Please note that portions of this note were completed with a voice recognition program.Efforts were made to edit the dictations but occasionally words are mis-transcribed.)        Ye Packer DO  Resident  01/29/22 0030

## 2022-01-29 NOTE — ED NOTES
Pt  and consulted physician at bedside. COVID swab obtained and sent to lab. Pt responded well to light tactile stimulus.      Jennifer Dillon RN  01/28/22 4197

## 2022-01-29 NOTE — ANESTHESIA POSTPROCEDURE EVALUATION
Department of Anesthesiology  Postprocedure Note    Patient: Washington Benítez  MRN: [id number]  YOB: 1970  Date of evaluation: 1/29/2022  Time:  2:34 AM     Procedure Summary     Date: 01/29/22 Room / Location: [address]    Anesthesia Start: 0030 Anesthesia Stop: 1363    Procedure: LAPAROSCOPY EXPLORATORY CONVERTED TO OPEN EXPLORATORY LAPAROTOMY, LYSIS OF ADHESIONS, REDUCTION OF INTERNAL HERNIA (N/A ) Diagnosis: (INTERNAL HERNIA - BOWEL)    Surgeons: Lavern Salazar DO Responsible Provider: Meghan Downey MD    Anesthesia Type: general ASA Status: 3 - Emergent          Anesthesia Type: general    Abdi Phase I:      Abdi Phase II:      Last vitals: Reviewed and per EMR flowsheets.        Anesthesia Post Evaluation    Patient location during evaluation: PACU  Patient participation: complete - patient participated  Level of consciousness: awake and alert  Airway patency: patent  Nausea & Vomiting: no nausea and no vomiting  Complications: no  Cardiovascular status: hemodynamically stable  Respiratory status: acceptable  Hydration status: stable

## 2022-01-29 NOTE — CONSULTS
GENERAL SURGERY  CONSULT NOTE    PATIENT: Israel Denise           : 1970  MRN: [id number]  ADMISSION DATE: 2022 10:30 PM   TODAY'S DATE: 22   LOS: 0     ATTENDING PHYSICIAN: Jeanette Huber MD  CONSULTING PHYSICIAN: Piotr Gomez DO    REASON FOR CONSULT:  Internal hernia    HISTORY OF PRESENT ILLNESS:  Patient is a 24-year-old female who presented to [facility] today with severe abdominal pain. She has a history of gastric bypass performed by Dr. Bernestine Mohs in . Patient is accompanied in the emergency department by her father. He states that over the past few days, she has had some intermittent abdominal pain but today after lunch, it became severe. Per chart review, she also has a history of crack cocaine use and epilepsy noncompliant on her Depakote. She also has a history of depression, asthma, COPD, fibromyalgia, hypertension, and sleep apnea. She has a surgical history of gastric bypass, cholecystectomy, hysterectomy, and appendectomy. When she arrived at [facility], she was screaming in pain and received Haldol and Benadryl. She is somnolent on my evaluation. She is afebrile, hemodynamically normal, and normal sinus rhythm. At [facility], she had a CT with IV contrast performed that showed mesenteric congestion with findings suspicious for displaced bowel loops within the right lower quadrant and slight twisting of mesenteric vessels concerning for internal hernia. White count 8.5. Lactate was 0.6.  K was 3.3.     PAST MEDICAL HISTORY:        Diagnosis Date    Anemia     Arthritis     Asthma     Bipolar disorder, mixed ([address]     Carotid artery stenosis 2020    Cocaine abuse ([facility] 2014    COPD (chronic obstructive pulmonary disease) ([facility])     Degeneration of cervical intervertebral disc     Depression with anxiety     Depression with anxiety     Fibromyalgia 2017    GERD (gastroesophageal reflux disease)     History of migraine headaches 6/10/2014    History of renal calculi 6/10/2014    History of seizure disorder 6/10/2014    Hoarseness of voice     HTN (hypertension) 6/10/2014    Hyperlipidemia 6/10/2014    IGT (impaired glucose tolerance) 6/10/2014    Kidney stones     Lactose intolerance 6/10/2014    Leukopenia 6/10/2014    Major depressive disorder, recurrent episode, severe, without mention of psychotic behavior 2014    MVP (mitral valve prolapse)     Seizures (HCC)     Sleep apnea 6/10/2014    Unspecified diseases of blood and blood-forming organs        PAST SURGICAL HISTORY:        Procedure Laterality Date    ANKLE FRACTURE SURGERY      recontruction surgery    APPENDECTOMY      BREAST LUMPECTOMY Right     CARDIAC CATHETERIZATION      CARPAL TUNNEL RELEASE      x2     SECTION      CHOLECYSTECTOMY      COLONOSCOPY  12    COLONOSCOPY  2016    severe spasms    COLONOSCOPY  2019    DR GOLDY MCINTOSH    GASTRIC BYPASS SURGERY      GASTRIC BYPASS SURGERY      HYSTERECTOMY      HYSTERECTOMY      NECK SURGERY      OTHER SURGICAL HISTORY      APPLICATION OF ARCH BARS,SIMPLE EXTRACTION OF #15 AND RT TMJ JOINT REPLACEMENT    SHOULDER ARTHROSCOPY Left 2019    DR ROBERT GREEN    SHOULDER SURGERY      TONSILLECTOMY AND ADENOIDECTOMY      UPPER GASTROINTESTINAL ENDOSCOPY  7-3-12    egd    UPPER GASTROINTESTINAL ENDOSCOPY  2016    status post gastrectomy with a small remnant of gastric pouch.     UPPER GASTROINTESTINAL ENDOSCOPY  2019    DR Doll Malian       ALLERGIES:    Aspirin, Bactrim, and Codeine    SOCIAL HISTORY   Social History     Tobacco Use    Smoking status: Current Every Day Smoker     Packs/day: 0.50     Years: 33.00     Pack years: 16.50     Types: Cigarettes    Smokeless tobacco: Never Used    Tobacco comment: quit   started again      Substance Use Topics    Alcohol use: Yes     Comment: occasional    Drug use: Not Currently        FAMILY HISTORY:       Problem Relation Age of Onset    Diabetes Mother     Cancer Mother     Coronary Art Dis Father     COPD Father     Depression Brother     Alcohol Abuse Brother     Cancer Other         lung and skin    Diabetes Maternal Grandmother     Cancer Paternal Grandmother        MEDICATIONS PRIOR TO ADMISSION:   Not in a hospital admission. REVIEW OF SYSTEMS:    CONSTITUTIONAL: Denies weight loss, fever and chills  HEENT: Denies changes in vision and hearing  RESP: Denies SOB and cough  CV: Denies palpitations and CP  GI: + abdominal pain, +nausea, denies vomiting or diarrhea  : Denies dysuria and urinary frequency  MSK: Denies myalgia and joint pain  SKIN: Denies rash and pruritus  NEURO: Denies headache and syncope  PSYCH: Denies recent changes in mood. Denies anxiety and depression    PHYSICAL EXAM:    Temp  Av.6 °F (37 °C)  Min: 98.4 °F (36.9 °C)  Max: 98.8 °F (13.5 °C)  Systolic (67TAC), MABEL:508 , Min:131 , TSQ:858   Diastolic (55DRP), NQO:86, Min:69, Max:95  Pulse  Av.9  Min: 53  Max: 85  Resp  Av  Min: 13  Max: 18  SpO2: 97 % SpO2  Av.4 %  Min: 88 %  Max: 100 %     General: No acute distress. A&Ox3. HEENT:  NC/AT. Conjunctiva moist without icterus. Ears are symmetric. Nares are patent. Oral mucus membranes are moist.  Neck:  Supple. No LAD. Cardiovascular:  Regular rate and rhythm. Warm, well perfused. Respiratory:  Equal chest rise bilaterally. No wheezes, stridor, or increased work of breathing. Abdomen:  Soft, tender to deep palpation, nondistended. Neuro: Motor and sensory grossly intact. Extremities:  Warm, dry, and well perfused. Limbs without apparent deformity. Skin:  No rashes or lesions.     LABS:  Recent Labs     22  181   WBC 8.5   HGB 9.6*        Recent Labs     22  181      K 3.3*      CO2 23   BUN 16   CREATININE 0.70   GLUCOSE 96     Recent Labs     22  181   AST 26   ALT 11   ALKPHOS 90   BILITOT 0.23* Recent Labs     01/28/22 1811   PROT 5.9*       IMAGING:  CT ABDOMEN PELVIS W IV CONTRAST Additional Contrast? None    Result Date: 1/28/2022  Mesenteric congestion with findings suspicious for displaced bowel loops within the right lower quadrant. Slight twisting of mesenteric vessels identified as well. .  Findings worrisome for internal hernia. Surgical consultation recommended.        ASSESSMENT   Patient Active Problem List   Diagnosis    GERD (gastroesophageal reflux disease)    Hoarseness of voice    MVP (mitral valve prolapse)    Depression with anxiety    Lymphocytic colitis    History of migraine headaches    Hyperlipidemia    Sleep apnea    IGT (impaired glucose tolerance)    History of renal calculi    Lactose intolerance    History of seizure disorder    Leukopenia    Bipolar disorder, mixed ([address]    Illicit drug use    Postlaminectomy syndrome, cervical region    Degeneration of cervical intervertebral disc    Encounter for long-term (current) use of other medications    Fracture of mandible ([address]    Chest pain    E-coli UTI    Bradycardia    Cellulitis    Hypokalemia    Essential hypertension    Elevated lipase    Right lower quadrant abdominal pain    Nausea    Diarrhea    Seizure disorder (HCC)    Polysubstance abuse ([address]    Fibromyalgia    Cocaine addiction ([address]    Shoulder arthritis    Osteoarthritis of left glenohumeral joint    Abnormal EKG    Anemia    Anxiety    Carotid artery stenosis    COPD (chronic obstructive pulmonary disease) ([address]    Dental disease    HUTCHINSON (dyspnea on exertion)    Fatigue    Fractures    H/O gastric bypass    History of crack cocaine use    History of UTI    Injury of back    Intractable chronic migraine without aura and without status migrainosus    Intractable migraine with aura with status migrainosus    Kidney stones    Dizziness    Memory loss    Mild pulmonary hypertension (HCC)    Mild tricuspid regurgitation    Primary osteoarthritis of left shoulder    Seizure-like activity (HCC)    Stress at home    TMJ (dislocation of temporomandibular joint)    Tobacco abuse    Visual impairment       46year old female with history of gastric bypass presenting with internal hernia. PLAN  -Patient seen and evaluated. History, physical exam, laboratory and radiographic findings reviewed and discussed with on-call attending.  -To OR for diagnostic laparoscopy, possible bowel resection, possible open exploratory laparotomy.  -Risks, benefits, and alternatives discussed with patient and father at bedside and they agree with treatment plan.     Chace Luu MD  General Surgery PGY4  Available via [email or web address]

## 2022-01-29 NOTE — ED TRIAGE NOTES
Pt transferred from [facility] to [facility] with concern on CT scan for issues and had arrived due to abdominal pain.   Pt stated that Dr Luis Mccrary did her gastric bypass at [facility]

## 2022-01-29 NOTE — ANESTHESIA PRE PROCEDURE
Department of Anesthesiology  Preprocedure Note       Name:  Keila Dick   Age:  46 y.o.  :  1970                                          MRN:  [id number]         Date:  2022      Surgeon: Rema Rice):  Marry Lundborg, DO    Procedure: Procedure(s):  LAPAROSCOPY EXPLORATORY, REPAIR OF INTERNAL HERNIA, POSSIBLE BOWEL RESECTION, POSSIBLE OPEN    Medications prior to admission:   Prior to Admission medications    Medication Sig Start Date End Date Taking? Authorizing Provider   tiZANidine (ZANAFLEX) 4 MG tablet Take 1 tablet by mouth every 8 hours as needed (back pain) 21   Araceli Kennedy MD   atenolol (TENORMIN) 25 MG tablet TAKE 1 TABLET BY MOUTH DAILY 21   Araceli Kennedy MD   FEROSUL 325 (65 Fe) MG tablet TAKE 1 TABLET BY MOUTH 2 TIMES DAILY 21   Araceli Kennedy MD   gabapentin (NEURONTIN) 300 MG capsule Take 1 capsule by mouth 3 times daily for 30 days. Intended supply: 30 days 21  Araceli Kennedy MD   buPROPion (WELLBUTRIN XL) 150 MG extended release tablet TAKE 1 TABLET BY MOUTH EVERY MORNING 20   Araceli Kennedy MD   hydrOXYzine (VISTARIL) 25 MG capsule  9/15/20   Historical Provider, MD   silver sulfADIAZINE (SILVADENE) 1 % cream Apply topically daily.   Patient not taking: Reported on 2021   Araceli Kennedy MD   topiramate (TOPAMAX) 100 MG tablet Take 100 mg by mouth 2 times daily 6/3/20   Historical Provider, MD   atorvastatin (LIPITOR) 40 MG tablet Take 40 mg by mouth daily    Historical Provider, MD   metoprolol tartrate (LOPRESSOR) 25 MG tablet Take 25 mg by mouth daily    Historical Provider, MD   traZODone (DESYREL) 50 MG tablet Take 1 tablet by mouth nightly as needed for Sleep 19   KELLY Caballero CNP   DULoxetine (CYMBALTA) 30 MG extended release capsule Take 1 capsule by mouth daily  Patient taking differently: Take 60 mg by mouth daily  19 KELLY Crow CNP   albuterol sulfate HFA (PROVENTIL HFA) 108 (90 Base) MCG/ACT inhaler Inhale 2 puffs into the lungs every 6 hours as needed for Wheezing 4/8/19   KELLY Martinez - CNP   topiramate (TOPAMAX) 50 MG tablet Take 100 mg by mouth 2 times daily     Historical Provider, MD   divalproex (DEPAKOTE) 500 MG DR tablet Take 2 tablets by mouth 2 times daily Indications: Level 41 as of 7/22/16 (Pt takes two 500 mg DR tabs = 1,000mg PO BID) 2/28/17   Eilene Nissen, MD   ARIPiprazole (ABILIFY) 5 MG tablet Take 5 mg by mouth daily    Historical Provider, MD       Current medications:    No current facility-administered medications for this encounter. Current Outpatient Medications   Medication Sig Dispense Refill    tiZANidine (ZANAFLEX) 4 MG tablet Take 1 tablet by mouth every 8 hours as needed (back pain) 30 tablet 0    atenolol (TENORMIN) 25 MG tablet TAKE 1 TABLET BY MOUTH DAILY 28 tablet 12    FEROSUL 325 (65 Fe) MG tablet TAKE 1 TABLET BY MOUTH 2 TIMES DAILY 56 tablet 11    gabapentin (NEURONTIN) 300 MG capsule Take 1 capsule by mouth 3 times daily for 30 days. Intended supply: 30 days 90 capsule 11    buPROPion (WELLBUTRIN XL) 150 MG extended release tablet TAKE 1 TABLET BY MOUTH EVERY MORNING 28 tablet 12    hydrOXYzine (VISTARIL) 25 MG capsule       silver sulfADIAZINE (SILVADENE) 1 % cream Apply topically daily.  (Patient not taking: Reported on 9/7/2021) 50 g 2    topiramate (TOPAMAX) 100 MG tablet Take 100 mg by mouth 2 times daily      atorvastatin (LIPITOR) 40 MG tablet Take 40 mg by mouth daily      metoprolol tartrate (LOPRESSOR) 25 MG tablet Take 25 mg by mouth daily      traZODone (DESYREL) 50 MG tablet Take 1 tablet by mouth nightly as needed for Sleep 30 tablet 3    DULoxetine (CYMBALTA) 30 MG extended release capsule Take 1 capsule by mouth daily (Patient taking differently: Take 60 mg by mouth daily ) 30 capsule 3    albuterol sulfate HFA (PROVENTIL HFA) 108 (90 Base) MCG/ACT inhaler Inhale 2 puffs into the lungs every 6 hours as needed for Wheezing 1 Inhaler 3    topiramate (TOPAMAX) 50 MG tablet Take 100 mg by mouth 2 times daily       divalproex (DEPAKOTE) 500 MG DR tablet Take 2 tablets by mouth 2 times daily Indications: Level 41 as of 7/22/16 (Pt takes two 500 mg DR tabs = 1,000mg PO BID) 120 tablet 0    ARIPiprazole (ABILIFY) 5 MG tablet Take 5 mg by mouth daily         Allergies:     Allergies   Allergen Reactions    Aspirin      Gastric bypass    Bactrim Other (See Comments)     Severe chills    Codeine Other (See Comments)     Other reaction(s): pain  Caused chest pain ( was in the Tylenol 3# )  Chest pain       Problem List:    Patient Active Problem List   Diagnosis Code    GERD (gastroesophageal reflux disease) K21.9    Hoarseness of voice R49.0    MVP (mitral valve prolapse) I34.1    Depression with anxiety F41.8    Lymphocytic colitis K52.832    History of migraine headaches Z86.69    Hyperlipidemia E78.5    Sleep apnea G47.30    IGT (impaired glucose tolerance) R73.02    History of renal calculi Z87.442    Lactose intolerance E73.9    History of seizure disorder Z86.69    Leukopenia D72.819    Bipolar disorder, mixed ([facility]) Q41.41    Illicit drug use S68.03    Postlaminectomy syndrome, cervical region M96.1    Degeneration of cervical intervertebral disc M50.30    Encounter for long-term (current) use of other medications Z79.899    Fracture of mandible ([facility] S02.609A    Chest pain R07.9    E-coli UTI N39.0, B96.20    Bradycardia R00.1    Cellulitis L03.90    Hypokalemia E87.6    Essential hypertension I10    Elevated lipase R74.8    Right lower quadrant abdominal pain R10.31    Nausea R11.0    Diarrhea R19.7    Seizure disorder ([facility]) G40.909    Polysubstance abuse ([facility]) F19.10    Fibromyalgia M79.7    Cocaine addiction ([facility]) F14.20    Shoulder arthritis M19.019    Osteoarthritis of left glenohumeral joint M19.012    Abnormal EKG R94.31    Anemia D64.9    Anxiety F41.9    Carotid artery stenosis I65.29    COPD (chronic obstructive pulmonary disease) ([facility]) J44.9    Dental disease K08.9    HUTCHINSON (dyspnea on exertion) R06.00    Fatigue R53.83    Fractures T07. Deandremarlen Haley H/O gastric bypass Z98.84    History of crack cocaine use Z87.898    History of UTI Z87.440    Injury of back S39. 92XA    Intractable chronic migraine without aura and without status migrainosus G43.719    Intractable migraine with aura with status migrainosus G43. 111    Kidney stones N20.0    Dizziness R42    Memory loss R41.3    Mild pulmonary hypertension ([facility]) I27.20    Mild tricuspid regurgitation I07.1    Primary osteoarthritis of left shoulder M19.012    Seizure-like activity ([facility]) R56.9    Stress at home F43.9    TMJ (dislocation of temporomandibular joint) S03. 00XA    Tobacco abuse Z72.0    Visual impairment H54.7       Past Medical History:        Diagnosis Date    Anemia     Arthritis     Asthma     Bipolar disorder, mixed ([address]     Carotid artery stenosis 2/13/2020    Cocaine abuse ([facility] [address] 11/4/2014    COPD (chronic obstructive pulmonary disease) ([facility])     Degeneration of cervical intervertebral disc     Depression with anxiety     Depression with anxiety     Fibromyalgia 1/5/2017    GERD (gastroesophageal reflux disease)     History of migraine headaches 6/10/2014    History of renal calculi 6/10/2014    History of seizure disorder 6/10/2014    Hoarseness of voice     HTN (hypertension) 6/10/2014    Hyperlipidemia 6/10/2014    IGT (impaired glucose tolerance) 6/10/2014    Kidney stones     Lactose intolerance 6/10/2014    Leukopenia 6/10/2014    Major depressive disorder, recurrent episode, severe, without mention of psychotic behavior 11/6/2014    MVP (mitral valve prolapse)     Seizures ([address]     Sleep apnea 6/10/2014    Unspecified diseases of blood and blood-forming organs        Past Surgical History:        Procedure Laterality Date    ANKLE FRACTURE SURGERY recontruction surgery    APPENDECTOMY      BREAST LUMPECTOMY Right     CARDIAC CATHETERIZATION      CARPAL TUNNEL RELEASE      x2     SECTION      CHOLECYSTECTOMY      COLONOSCOPY  12    COLONOSCOPY  2016    severe spasms    COLONOSCOPY  2019    DR GOLDY MCINTOSH    GASTRIC BYPASS SURGERY      GASTRIC BYPASS SURGERY      HYSTERECTOMY      HYSTERECTOMY      NECK SURGERY      OTHER SURGICAL HISTORY  49/10/2790    APPLICATION OF ARCH BARS,SIMPLE EXTRACTION OF #15 AND RT TMJ JOINT REPLACEMENT    SHOULDER ARTHROSCOPY Left 2019    DR ROBERT GREEN    SHOULDER SURGERY      TONSILLECTOMY AND ADENOIDECTOMY      UPPER GASTROINTESTINAL ENDOSCOPY  7-3-12    egd    UPPER GASTROINTESTINAL ENDOSCOPY  2016    status post gastrectomy with a small remnant of gastric pouch.     UPPER GASTROINTESTINAL ENDOSCOPY  2019    DR Kate Tobin       Social History:    Social History     Tobacco Use    Smoking status: Current Every Day Smoker     Packs/day: 0.50     Years: 33.00     Pack years: 16.50     Types: Cigarettes    Smokeless tobacco: Never Used    Tobacco comment: quit   started again      Substance Use Topics    Alcohol use: Yes     Comment: occasional                                Ready to quit: Not Answered  Counseling given: Not Answered  Comment: quit   started again         Vital Signs (Current):   Vitals:    22 2235   BP: (!) 152/81   Pulse: 57   Resp: 16   Temp: 98.8 °F (37.1 °C)   TempSrc: Oral   SpO2: 97%   Weight: 106 lb (48.1 kg)   Height: 4' 11\" (1.499 m)                                              BP Readings from Last 3 Encounters:   22 (!) 152/81   22 131/69   21 138/82       NPO Status:                                                                                 BMI:   Wt Readings from Last 3 Encounters:   22 106 lb (48.1 kg)   21 106 lb 4 oz (48.2 kg)   21 113 lb 3 oz (51.3 kg)     Body mass index is 21.41 kg/m². CBC:   Lab Results   Component Value Date    WBC 8.5 01/28/2022    RBC 4.24 01/28/2022    HGB 9.6 01/28/2022    HCT 30.8 01/28/2022    MCV 72.7 01/28/2022    RDW 18.0 01/28/2022     01/28/2022       CMP:   Lab Results   Component Value Date     01/28/2022    K 3.3 01/28/2022     01/28/2022    CO2 23 01/28/2022    BUN 16 01/28/2022    CREATININE 0.70 01/28/2022    GFRAA >60 01/28/2022    LABGLOM >60 01/28/2022    GLUCOSE 96 01/28/2022    PROT 5.9 01/28/2022    CALCIUM 8.1 01/28/2022    BILITOT 0.23 01/28/2022    ALKPHOS 90 01/28/2022    AST 26 01/28/2022    ALT 11 01/28/2022       POC Tests: No results for input(s): POCGLU, POCNA, POCK, POCCL, POCBUN, POCHEMO, POCHCT in the last 72 hours.     Coags:   Lab Results   Component Value Date    PROTIME 10.3 07/22/2016    INR 1.0 07/22/2016    APTT 25.6 07/22/2016       HCG (If Applicable):   Lab Results   Component Value Date    PREGTESTUR NEGATIVE 12/22/2012        ABGs:   Lab Results   Component Value Date    PHART 7.414 12/28/2016    PO2ART 104.0 12/28/2016    XVJ4REM 40.1 12/28/2016    SHJ0HVA 25.6 12/28/2016    X5MHWPAN 96.4 12/28/2016        Type & Screen (If Applicable):  No results found for: LABABO, LABRH    Drug/Infectious Status (If Applicable):  No results found for: HIV, HEPCAB    COVID-19 Screening (If Applicable):   Lab Results   Component Value Date    COVID19 Not Detected 01/28/2022           Anesthesia Evaluation  Patient summary reviewed no history of anesthetic complications:   Airway: Mallampati: II  TM distance: >3 FB   Neck ROM: full  Mouth opening: > = 3 FB Dental:          Pulmonary:normal exam    (+) COPD: no interval change,  shortness of breath: no interval change,  sleep apnea:  asthma: seasonal asthma,                            Cardiovascular:    (+) hypertension: no interval change,       ECG reviewed  Rhythm: regular  Rate: normal                    Neuro/Psych:   (+) neuromuscular disease:, headaches: migraine headaches, psychiatric history:depression/anxiety             GI/Hepatic/Renal:   (+) GERD:,           Endo/Other: Negative Endo/Other ROS   (+) blood dyscrasia: anemia:., .                 Abdominal:             Vascular: negative vascular ROS. Other Findings:             Anesthesia Plan      general     ASA 3 - emergent       Induction: intravenous. Anesthetic plan and risks discussed with patient. Use of blood products discussed with patient whom consented to blood products. Plan discussed with CRNA.                   Matt Grajeda MD   1/29/2022

## 2022-01-29 NOTE — BRIEF OP NOTE
Brief Postoperative Note      Patient: Rhina Yu  YOB: 1970  MRN: [id number]    Date of Procedure: 1/29/2022    Pre-Op Diagnosis: INTERNAL HERNIA - BOWEL    Post-Op Diagnosis: Same       Procedure(s):  LAPAROSCOPY EXPLORATORY CONVERTED TO OPEN EXPLORATORY LAPAROTOMY, LYSIS OF ADHESIONS, REDUCTION OF INTERNAL HERNIA    Surgeon(s):  Tan Knight DO    Assistant:  * No surgical staff found *    Anesthesia: General    Estimated Blood Loss (mL): Minimal    Complications: None    Specimens:   * No specimens in log *    Implants:  * No implants in log *      Drains: * No LDAs found *    Findings: see note    Electronically signed by Tan Knight DO on 1/29/2022 at 2:07 AM

## 2022-01-30 LAB
ANION GAP SERPL CALCULATED.3IONS-SCNC: 10 MMOL/L (ref 9–17)
BUN BLDV-MCNC: 7 MG/DL (ref 6–20)
BUN/CREAT BLD: ABNORMAL (ref 9–20)
CALCIUM SERPL-MCNC: 8.5 MG/DL (ref 8.6–10.4)
CHLORIDE BLD-SCNC: 110 MMOL/L (ref 98–107)
CHOLESTEROL/HDL RATIO: 4
CHOLESTEROL: 156 MG/DL
CO2: 19 MMOL/L (ref 20–31)
CREAT SERPL-MCNC: 0.47 MG/DL (ref 0.5–0.9)
GFR AFRICAN AMERICAN: >60 ML/MIN
GFR NON-AFRICAN AMERICAN: >60 ML/MIN
GFR SERPL CREATININE-BSD FRML MDRD: ABNORMAL ML/MIN/{1.73_M2}
GFR SERPL CREATININE-BSD FRML MDRD: ABNORMAL ML/MIN/{1.73_M2}
GLUCOSE BLD-MCNC: 114 MG/DL (ref 70–99)
HCT VFR BLD CALC: 35 % (ref 36.3–47.1)
HDLC SERPL-MCNC: 39 MG/DL
HEMOGLOBIN: 10.4 G/DL (ref 11.9–15.1)
LDL CHOLESTEROL: 101 MG/DL (ref 0–130)
MAGNESIUM: 2.3 MG/DL (ref 1.6–2.6)
MCH RBC QN AUTO: 23 PG (ref 25.2–33.5)
MCHC RBC AUTO-ENTMCNC: 29.7 G/DL (ref 28.4–34.8)
MCV RBC AUTO: 77.3 FL (ref 82.6–102.9)
NRBC AUTOMATED: 0 PER 100 WBC
PDW BLD-RTO: 18.1 % (ref 11.8–14.4)
PLATELET # BLD: 238 K/UL (ref 138–453)
PMV BLD AUTO: 11.4 FL (ref 8.1–13.5)
POTASSIUM SERPL-SCNC: 3.3 MMOL/L (ref 3.7–5.3)
RBC # BLD: 4.53 M/UL (ref 3.95–5.11)
SODIUM BLD-SCNC: 139 MMOL/L (ref 135–144)
TRIGL SERPL-MCNC: 78 MG/DL
VLDLC SERPL CALC-MCNC: ABNORMAL MG/DL (ref 1–30)
WBC # BLD: 8.9 K/UL (ref 3.5–11.3)

## 2022-01-30 PROCEDURE — 6360000002 HC RX W HCPCS: Performed by: STUDENT IN AN ORGANIZED HEALTH CARE EDUCATION/TRAINING PROGRAM

## 2022-01-30 PROCEDURE — 6370000000 HC RX 637 (ALT 250 FOR IP): Performed by: STUDENT IN AN ORGANIZED HEALTH CARE EDUCATION/TRAINING PROGRAM

## 2022-01-30 PROCEDURE — 83735 ASSAY OF MAGNESIUM: CPT

## 2022-01-30 PROCEDURE — 85027 COMPLETE CBC AUTOMATED: CPT

## 2022-01-30 PROCEDURE — 99232 SBSQ HOSP IP/OBS MODERATE 35: CPT | Performed by: INTERNAL MEDICINE

## 2022-01-30 PROCEDURE — 2580000003 HC RX 258: Performed by: STUDENT IN AN ORGANIZED HEALTH CARE EDUCATION/TRAINING PROGRAM

## 2022-01-30 PROCEDURE — 36415 COLL VENOUS BLD VENIPUNCTURE: CPT

## 2022-01-30 PROCEDURE — 51798 US URINE CAPACITY MEASURE: CPT

## 2022-01-30 PROCEDURE — 6370000000 HC RX 637 (ALT 250 FOR IP)

## 2022-01-30 PROCEDURE — 80048 BASIC METABOLIC PNL TOTAL CA: CPT

## 2022-01-30 PROCEDURE — 1200000000 HC SEMI PRIVATE

## 2022-01-30 PROCEDURE — 80061 LIPID PANEL: CPT

## 2022-01-30 RX ORDER — LABETALOL HYDROCHLORIDE 5 MG/ML
20 INJECTION, SOLUTION INTRAVENOUS
Status: DISCONTINUED | OUTPATIENT
Start: 2022-01-30 | End: 2022-01-30

## 2022-01-30 RX ORDER — LISINOPRIL 10 MG/1
10 TABLET ORAL DAILY
Status: DISCONTINUED | OUTPATIENT
Start: 2022-01-30 | End: 2022-01-30

## 2022-01-30 RX ORDER — CARVEDILOL 12.5 MG/1
12.5 TABLET ORAL 2 TIMES DAILY WITH MEALS
Status: DISCONTINUED | OUTPATIENT
Start: 2022-01-30 | End: 2022-01-31

## 2022-01-30 RX ORDER — LISINOPRIL 20 MG/1
20 TABLET ORAL DAILY
Status: DISCONTINUED | OUTPATIENT
Start: 2022-01-30 | End: 2022-02-02 | Stop reason: HOSPADM

## 2022-01-30 RX ORDER — POTASSIUM CHLORIDE 20 MEQ/1
20 TABLET, EXTENDED RELEASE ORAL ONCE
Status: COMPLETED | OUTPATIENT
Start: 2022-01-30 | End: 2022-01-30

## 2022-01-30 RX ADMIN — POLYETHYLENE GLYCOL 3350 17 G: 17 POWDER, FOR SOLUTION ORAL at 08:51

## 2022-01-30 RX ADMIN — ENOXAPARIN SODIUM 30 MG: 100 INJECTION SUBCUTANEOUS at 08:51

## 2022-01-30 RX ADMIN — ACETAMINOPHEN 1000 MG: 500 TABLET ORAL at 05:47

## 2022-01-30 RX ADMIN — AMLODIPINE BESYLATE 10 MG: 10 TABLET ORAL at 08:50

## 2022-01-30 RX ADMIN — CARVEDILOL 12.5 MG: 12.5 TABLET, FILM COATED ORAL at 08:50

## 2022-01-30 RX ADMIN — CARVEDILOL 12.5 MG: 12.5 TABLET, FILM COATED ORAL at 16:31

## 2022-01-30 RX ADMIN — TOPIRAMATE 100 MG: 100 TABLET, FILM COATED ORAL at 08:50

## 2022-01-30 RX ADMIN — ACETAMINOPHEN 1000 MG: 500 TABLET ORAL at 21:43

## 2022-01-30 RX ADMIN — ENOXAPARIN SODIUM 30 MG: 100 INJECTION SUBCUTANEOUS at 21:42

## 2022-01-30 RX ADMIN — LISINOPRIL 20 MG: 20 TABLET ORAL at 08:50

## 2022-01-30 RX ADMIN — TOPIRAMATE 100 MG: 100 TABLET, FILM COATED ORAL at 21:42

## 2022-01-30 RX ADMIN — POTASSIUM CHLORIDE 10 MEQ: 7.46 INJECTION, SOLUTION INTRAVENOUS at 08:50

## 2022-01-30 RX ADMIN — GABAPENTIN 300 MG: 300 CAPSULE ORAL at 08:51

## 2022-01-30 RX ADMIN — DEXTROSE AND SODIUM CHLORIDE: 5; 450 INJECTION, SOLUTION INTRAVENOUS at 03:33

## 2022-01-30 RX ADMIN — HYDRALAZINE HYDROCHLORIDE 10 MG: 20 INJECTION INTRAMUSCULAR; INTRAVENOUS at 06:21

## 2022-01-30 RX ADMIN — GABAPENTIN 300 MG: 300 CAPSULE ORAL at 21:42

## 2022-01-30 RX ADMIN — BUPROPION HYDROCHLORIDE 150 MG: 150 TABLET, EXTENDED RELEASE ORAL at 08:50

## 2022-01-30 RX ADMIN — METHOCARBAMOL TABLETS 750 MG: 750 TABLET, COATED ORAL at 21:42

## 2022-01-30 RX ADMIN — METHOCARBAMOL TABLETS 750 MG: 750 TABLET, COATED ORAL at 16:31

## 2022-01-30 RX ADMIN — OXYCODONE HYDROCHLORIDE 5 MG: 5 TABLET ORAL at 06:24

## 2022-01-30 RX ADMIN — DULOXETINE HYDROCHLORIDE 60 MG: 30 CAPSULE, DELAYED RELEASE ORAL at 08:51

## 2022-01-30 RX ADMIN — POTASSIUM CHLORIDE 20 MEQ: 1500 TABLET, EXTENDED RELEASE ORAL at 10:57

## 2022-01-30 RX ADMIN — ARIPIPRAZOLE 5 MG: 5 TABLET ORAL at 08:51

## 2022-01-30 RX ADMIN — SODIUM CHLORIDE, PRESERVATIVE FREE 10 ML: 5 INJECTION INTRAVENOUS at 21:50

## 2022-01-30 RX ADMIN — POTASSIUM BICARBONATE 40 MEQ: 782 TABLET, EFFERVESCENT ORAL at 09:59

## 2022-01-30 ASSESSMENT — PAIN DESCRIPTION - DESCRIPTORS
DESCRIPTORS: ACHING;DULL
DESCRIPTORS: SHARP;SHOOTING

## 2022-01-30 ASSESSMENT — PAIN DESCRIPTION - LOCATION
LOCATION: ABDOMEN

## 2022-01-30 ASSESSMENT — PAIN DESCRIPTION - PROGRESSION
CLINICAL_PROGRESSION: GRADUALLY WORSENING
CLINICAL_PROGRESSION: GRADUALLY WORSENING

## 2022-01-30 ASSESSMENT — PAIN DESCRIPTION - ONSET
ONSET: ON-GOING
ONSET: ON-GOING

## 2022-01-30 ASSESSMENT — PAIN SCALES - GENERAL
PAINLEVEL_OUTOF10: 8
PAINLEVEL_OUTOF10: 0
PAINLEVEL_OUTOF10: 8
PAINLEVEL_OUTOF10: 6
PAINLEVEL_OUTOF10: 4

## 2022-01-30 ASSESSMENT — PAIN SCALES - WONG BAKER
WONGBAKER_NUMERICALRESPONSE: 2
WONGBAKER_NUMERICALRESPONSE: 2
WONGBAKER_NUMERICALRESPONSE: 0

## 2022-01-30 ASSESSMENT — PAIN - FUNCTIONAL ASSESSMENT
PAIN_FUNCTIONAL_ASSESSMENT: PREVENTS OR INTERFERES SOME ACTIVE ACTIVITIES AND ADLS
PAIN_FUNCTIONAL_ASSESSMENT: PREVENTS OR INTERFERES SOME ACTIVE ACTIVITIES AND ADLS

## 2022-01-30 ASSESSMENT — PAIN DESCRIPTION - FREQUENCY
FREQUENCY: CONTINUOUS
FREQUENCY: CONTINUOUS

## 2022-01-30 ASSESSMENT — PAIN DESCRIPTION - PAIN TYPE
TYPE: ACUTE PAIN;SURGICAL PAIN
TYPE: SURGICAL PAIN
TYPE: ACUTE PAIN;SURGICAL PAIN

## 2022-01-30 ASSESSMENT — PAIN DESCRIPTION - ORIENTATION
ORIENTATION: MID
ORIENTATION: MID

## 2022-01-30 NOTE — PROGRESS NOTES
Paged overnight to evaluate patient as she was continuing to be lethargic and falling asleep during conversations with RN. Patient noted to have mild right sided facial droop during evaluation, but no signs of unilateral weakness on stroke exam. Patient states she had TMJ surgery and the right side of her mouth doesn't move well. Patient oriented x4 and able to participate in all aspects of exam. Patient states she has never had a stroke in the past and only complains of midline abdominal pain consistent with operative site. Patient noted to have cocaine and amphetamine metabolites on UDS, states she is a near daily cocaine user, and has experienced withdrawal in the past.     Patient also noted to be retaining urine on PVR checks. Patient able to be assisted to toilet but voiding between 175-225ml at a time, noted to have >600ml on PVR, plan for straight cath tonight with continued attempts in the morning to obtain better bladder emptying on voiding.

## 2022-01-30 NOTE — PROGRESS NOTES
[facility]  Internal Medicine Teaching Residency Program  Inpatient Daily Progress Note  ______________________________________________________________________________    Patient: Denver Benz  YOB: 1970   JOM:[id number]    Acct: [de-identified]     Room: [address] [place] [address]  Admit date: 2022  Today's date: 22  Number of days in the hospital: 1    SUBJECTIVE   Admitting Diagnosis: Primary hypertension  CC: Abdominal pain  Pt examined at bedside. Chart & results reviewed. Afebrile  Saturating on room air  Alert and oriented  No focal neurologic deficit  BP on the higher side 174/81  Urine drug screen positive for amphetamine and cocaine    ROS:  Constitutional:  negative for chills, fevers, sweats  Respiratory:  negative for cough, dyspnea on exertion, hemoptysis, shortness of breath, wheezing  Cardiovascular:  negative for chest pain, chest pressure/discomfort, lower extremity edema, palpitations  Gastrointestinal: Positive for abdominal pain, constipation, negative for diarrhea, nausea, vomiting  Neurological:  negative for dizziness, headache  BRIEF HISTORY     73-year-old female presented with acute abdomen, found internal hernia. Underwent exploratory laparotomy, postop day 2. Internal medicine is consulted for medical management. Has past history of hyperlipidemia, hypertension, polysubstance abuse, COPD/asthma, major depression/bipolar/fibromyalgia and seizure disorder. U tox positive for amphetamine and cocaine. WBC 17.8. We will start the patient on amlodipine, hydralazine as needed, lisinopril. Continue on Topamax and Abilify Cymbalta and trazodone. Continue on DuoNeb. We will monitor BP and mental status.     OBJECTIVE     Vital Signs:  BP (!) 160/90   Pulse 64   Temp 97.9 °F (36.6 °C) (Oral)   Resp 16   Ht 4' 11\" (1.499 m)   Wt 125 lb 6.4 oz (56.9 kg)   SpO2 95%   BMI 25.33 kg/m²     Temp (24hrs), Av °F (36.7 °C), Min:97.7 °F (36.5 °C), Max:98.9 °F (37.2 °C)    In: 2190   Out: 3343 [Urine:2648]    Physical Exam:  Constitutional: alert, oriented, cooperative and in no apparent distress. Head:normocephalic and atraumatic. Respiratory: Chest was symmetrical without dullness to percussion. Breath sounds bilaterally were clear to auscultation. There were no wheezes, rhonchi or rales. There is no intercostal retraction or use of accessory muscles. No egophony noted. Cardiovascular: Regular without murmur, clicks, gallops or rubs. Abdomen: Slightly rounded and soft without organomegaly. Postop day 2. Wound is healing well, no discharge/purulence. musculoskeletal: Normal curvature of the spine. No gross muscle weakness. Extremities:  No lower extremity edema, ulcerations, tenderness, varicosities or erythema. Muscle size, tone and strength are normal.  No involuntary movements are noted. Skin:  Warm and dry. Good color, turgor and pigmentation. No lesions or scars.   No cyanosis or clubbing  Neurological/Psychiatric: The patient's general behavior, level of consciousness, thought content and emotional status is normal.        Medications:  Scheduled Medications:    sodium chloride flush  10 mL IntraVENous 2 times per day    methocarbamol  750 mg Oral 4x Daily    enoxaparin  30 mg SubCUTAneous BID    acetaminophen  1,000 mg Oral 3 times per day    gabapentin  300 mg Oral TID    polyethylene glycol  17 g Oral Daily    ARIPiprazole  5 mg Oral Daily    atenolol  25 mg Oral Daily    buPROPion  150 mg Oral QAM    DULoxetine  60 mg Oral Daily    topiramate  100 mg Oral BID    amLODIPine  10 mg Oral Daily     Continuous Infusions:    dextrose 5 % and 0.45 % NaCl 125 mL/hr at 01/30/22 0333    sodium chloride       PRN Medicationssodium chloride flush, 10 mL, PRN  sodium chloride, 25 mL, PRN  ondansetron, 4 mg, Q8H PRN   Or  ondansetron, 4 mg, Q6H PRN  potassium chloride, 10 mEq, PRN  magnesium sulfate, 2,000 mg, PRN  fentanNYL, 50 mcg, Q2H PRN  oxyCODONE, 5 mg, Q6H PRN  senna, 1 tablet, Daily PRN  traZODone, 50 mg, Nightly PRN  hydrALAZINE, 10 mg, Q4H PRN  ipratropium-albuterol, 1 ampule, Q4H PRN        Diagnostic Labs:  CBC:   Recent Labs     01/28/22  1811 01/29/22  1238   WBC 8.5 17.8*   RBC 4.24 4.66   HGB 9.6* 10.6*   HCT 30.8* 35.8*   MCV 72.7* 76.8*   RDW 18.0* 17.5*    240     BMP:   Recent Labs     01/28/22 1811 01/29/22  1238    136   K 3.3* 3.7    103   CO2 23 20   PHOS  --  4.1   BUN 16 11   CREATININE 0.70 0.63     BNP: No results for input(s): BNP in the last 72 hours. PT/INR: No results for input(s): PROTIME, INR in the last 72 hours. APTT: No results for input(s): APTT in the last 72 hours. CARDIAC ENZYMES: No results for input(s): CKMB, CKMBINDEX, TROPONINI in the last 72 hours. Invalid input(s): CKTOTAL;3  FASTING LIPID PANEL:  Lab Results   Component Value Date    CHOL 218 09/28/2020    HDL 42 09/28/2020    TRIG 95 09/28/2020     LIVER PROFILE:   Recent Labs     01/28/22 1811   AST 26   ALT 11   BILITOT 0.23*   ALKPHOS 90      MICROBIOLOGY:   Lab Results   Component Value Date/Time    CULTURE NO SIGNIFICANT GROWTH 12/26/2018 07:03 PM       Imaging:    CT ABDOMEN PELVIS W IV CONTRAST Additional Contrast? None    Result Date: 1/28/2022  Mesenteric congestion with findings suspicious for displaced bowel loops within the right lower quadrant. Slight twisting of mesenteric vessels identified as well. .  Findings worrisome for internal hernia. Surgical consultation recommended. ASSESSMENT & PLAN   Principal Problem:    Primary hypertension  Active Problems:    GERD (gastroesophageal reflux disease)    Depression with anxiety    Hyperlipidemia    Internal hernia  Resolved Problems:    * No resolved hospital problems. *      1. Internal hernia. S/p exploratory laparotomy. POD 2. General surgery following  2. Hypertension. /75.   Continue on amlodipine 10 daily, hydralazine 10 q4 prn, lisinopril. Monitor blood pressure  3. Seizure disorder. Continue on Topamax 100 twice daily  4. Migraine. Continue on Topamax  5. Depression/ fibromyalgia/bipolar disorder. Continue on Abilify, bupropion, Cymbalta, trazodone. 6. COPD/asthma. Currently not in exacerbation. Continue on DuoNeb nebulization. Monitor SPO2  7. Hypokalemia. Replace potassium     GI prophylaxis: Lovenox  DVT prophylaxis: None     PT/OT/SW: Following  Discharge planning: CM to assist with    Caitlin Winter MD  Internal Medicine Resident, PGY-1  [facility]; [place], [place]  1/30/2022, 5:25 AM      Attestation and add on       I have discussed the care of Denver Benz , including pertinent history and exam findings,      1/30/22    with the resident. I have seen and examined the patient and the key elements of all parts of the encounter have been performed by me . I agree with the assessment, plan and orders as documented by the resident. Principal Problem:    Primary hypertension  Active Problems:    GERD (gastroesophageal reflux disease)    Depression with anxiety    Hyperlipidemia    Internal hernia  Resolved Problems:    * No resolved hospital problems. *         ---- ;     Monserrat Pompa MD      [address], [address] [place] [address].    Phone [phone]   Fax: [phone]  Answering Service: [phone]

## 2022-01-31 LAB
ANION GAP SERPL CALCULATED.3IONS-SCNC: 11 MMOL/L (ref 9–17)
BUN BLDV-MCNC: 10 MG/DL (ref 6–20)
BUN/CREAT BLD: ABNORMAL (ref 9–20)
CALCIUM SERPL-MCNC: 9.1 MG/DL (ref 8.6–10.4)
CHLORIDE BLD-SCNC: 109 MMOL/L (ref 98–107)
CO2: 17 MMOL/L (ref 20–31)
CREAT SERPL-MCNC: 0.52 MG/DL (ref 0.5–0.9)
ESTIMATED AVERAGE GLUCOSE: 128 MG/DL
GFR AFRICAN AMERICAN: >60 ML/MIN
GFR NON-AFRICAN AMERICAN: >60 ML/MIN
GFR SERPL CREATININE-BSD FRML MDRD: ABNORMAL ML/MIN/{1.73_M2}
GFR SERPL CREATININE-BSD FRML MDRD: ABNORMAL ML/MIN/{1.73_M2}
GLUCOSE BLD-MCNC: 103 MG/DL (ref 70–99)
HBA1C MFR BLD: 6.1 % (ref 4–6)
POTASSIUM SERPL-SCNC: 4.2 MMOL/L (ref 3.7–5.3)
SODIUM BLD-SCNC: 137 MMOL/L (ref 135–144)

## 2022-01-31 PROCEDURE — 6370000000 HC RX 637 (ALT 250 FOR IP): Performed by: STUDENT IN AN ORGANIZED HEALTH CARE EDUCATION/TRAINING PROGRAM

## 2022-01-31 PROCEDURE — 80048 BASIC METABOLIC PNL TOTAL CA: CPT

## 2022-01-31 PROCEDURE — 2580000003 HC RX 258: Performed by: STUDENT IN AN ORGANIZED HEALTH CARE EDUCATION/TRAINING PROGRAM

## 2022-01-31 PROCEDURE — 6360000002 HC RX W HCPCS: Performed by: STUDENT IN AN ORGANIZED HEALTH CARE EDUCATION/TRAINING PROGRAM

## 2022-01-31 PROCEDURE — 99232 SBSQ HOSP IP/OBS MODERATE 35: CPT | Performed by: INTERNAL MEDICINE

## 2022-01-31 PROCEDURE — 1200000000 HC SEMI PRIVATE

## 2022-01-31 PROCEDURE — 36415 COLL VENOUS BLD VENIPUNCTURE: CPT

## 2022-01-31 PROCEDURE — 6370000000 HC RX 637 (ALT 250 FOR IP)

## 2022-01-31 RX ORDER — OXYCODONE HYDROCHLORIDE 5 MG/1
5 TABLET ORAL EVERY 12 HOURS PRN
Status: DISCONTINUED | OUTPATIENT
Start: 2022-01-31 | End: 2022-02-02 | Stop reason: HOSPADM

## 2022-01-31 RX ORDER — CARVEDILOL 25 MG/1
25 TABLET ORAL 2 TIMES DAILY WITH MEALS
Status: DISCONTINUED | OUTPATIENT
Start: 2022-01-31 | End: 2022-02-02 | Stop reason: HOSPADM

## 2022-01-31 RX ORDER — CARVEDILOL 25 MG/1
25 TABLET ORAL 2 TIMES DAILY WITH MEALS
Qty: 60 TABLET | Refills: 0 | Status: SHIPPED | OUTPATIENT
Start: 2022-02-01 | End: 2022-08-09 | Stop reason: SDUPTHER

## 2022-01-31 RX ORDER — OXYCODONE HYDROCHLORIDE 5 MG/1
5 TABLET ORAL EVERY 12 HOURS PRN
Qty: 12 TABLET | Refills: 0 | Status: SHIPPED | OUTPATIENT
Start: 2022-01-31 | End: 2022-02-03

## 2022-01-31 RX ORDER — POLYETHYLENE GLYCOL 3350 17 G/17G
17 POWDER, FOR SOLUTION ORAL DAILY
Qty: 527 G | Refills: 1 | Status: SHIPPED | OUTPATIENT
Start: 2022-02-01 | End: 2022-03-03

## 2022-01-31 RX ORDER — AMLODIPINE BESYLATE 10 MG/1
10 TABLET ORAL DAILY
Qty: 30 TABLET | Refills: 0 | Status: ON HOLD | OUTPATIENT
Start: 2022-02-01 | End: 2022-10-11

## 2022-01-31 RX ORDER — LISINOPRIL 20 MG/1
20 TABLET ORAL DAILY
Qty: 30 TABLET | Refills: 0 | Status: SHIPPED | OUTPATIENT
Start: 2022-02-01 | End: 2022-08-09 | Stop reason: SDUPTHER

## 2022-01-31 RX ORDER — METHOCARBAMOL 750 MG/1
750 TABLET, FILM COATED ORAL 4 TIMES DAILY
Qty: 40 TABLET | Refills: 0 | Status: SHIPPED | OUTPATIENT
Start: 2022-01-31 | End: 2022-02-10

## 2022-01-31 RX ADMIN — AMLODIPINE BESYLATE 10 MG: 10 TABLET ORAL at 08:18

## 2022-01-31 RX ADMIN — CARVEDILOL 25 MG: 25 TABLET, FILM COATED ORAL at 08:18

## 2022-01-31 RX ADMIN — GABAPENTIN 300 MG: 300 CAPSULE ORAL at 08:18

## 2022-01-31 RX ADMIN — ARIPIPRAZOLE 5 MG: 5 TABLET ORAL at 08:18

## 2022-01-31 RX ADMIN — ACETAMINOPHEN 1000 MG: 500 TABLET ORAL at 21:05

## 2022-01-31 RX ADMIN — ENOXAPARIN SODIUM 30 MG: 100 INJECTION SUBCUTANEOUS at 08:19

## 2022-01-31 RX ADMIN — ACETAMINOPHEN 1000 MG: 500 TABLET ORAL at 05:52

## 2022-01-31 RX ADMIN — METHOCARBAMOL TABLETS 750 MG: 750 TABLET, COATED ORAL at 08:18

## 2022-01-31 RX ADMIN — METHOCARBAMOL TABLETS 750 MG: 750 TABLET, COATED ORAL at 21:04

## 2022-01-31 RX ADMIN — SODIUM CHLORIDE, PRESERVATIVE FREE 10 ML: 5 INJECTION INTRAVENOUS at 08:19

## 2022-01-31 RX ADMIN — GABAPENTIN 300 MG: 300 CAPSULE ORAL at 21:04

## 2022-01-31 RX ADMIN — ACETAMINOPHEN 1000 MG: 500 TABLET ORAL at 14:26

## 2022-01-31 RX ADMIN — ENOXAPARIN SODIUM 30 MG: 100 INJECTION SUBCUTANEOUS at 21:04

## 2022-01-31 RX ADMIN — LISINOPRIL 20 MG: 20 TABLET ORAL at 08:19

## 2022-01-31 RX ADMIN — OXYCODONE HYDROCHLORIDE 5 MG: 5 TABLET ORAL at 04:53

## 2022-01-31 RX ADMIN — POLYETHYLENE GLYCOL 3350 17 G: 17 POWDER, FOR SOLUTION ORAL at 08:18

## 2022-01-31 RX ADMIN — TOPIRAMATE 100 MG: 100 TABLET, FILM COATED ORAL at 08:18

## 2022-01-31 RX ADMIN — DULOXETINE HYDROCHLORIDE 60 MG: 30 CAPSULE, DELAYED RELEASE ORAL at 08:18

## 2022-01-31 RX ADMIN — IRON SUCROSE 200 MG: 20 INJECTION, SOLUTION INTRAVENOUS at 10:39

## 2022-01-31 RX ADMIN — TOPIRAMATE 100 MG: 100 TABLET, FILM COATED ORAL at 21:04

## 2022-01-31 RX ADMIN — CARVEDILOL 25 MG: 25 TABLET, FILM COATED ORAL at 17:18

## 2022-01-31 RX ADMIN — SODIUM CHLORIDE, PRESERVATIVE FREE 10 ML: 5 INJECTION INTRAVENOUS at 20:26

## 2022-01-31 RX ADMIN — POTASSIUM BICARBONATE 40 MEQ: 782 TABLET, EFFERVESCENT ORAL at 08:18

## 2022-01-31 RX ADMIN — HYDRALAZINE HYDROCHLORIDE 10 MG: 20 INJECTION INTRAMUSCULAR; INTRAVENOUS at 04:47

## 2022-01-31 RX ADMIN — BUPROPION HYDROCHLORIDE 150 MG: 150 TABLET, EXTENDED RELEASE ORAL at 08:18

## 2022-01-31 ASSESSMENT — PAIN SCALES - GENERAL
PAINLEVEL_OUTOF10: 6
PAINLEVEL_OUTOF10: 2
PAINLEVEL_OUTOF10: 4
PAINLEVEL_OUTOF10: 7
PAINLEVEL_OUTOF10: 6
PAINLEVEL_OUTOF10: 8

## 2022-01-31 ASSESSMENT — PAIN DESCRIPTION - ORIENTATION
ORIENTATION: MID
ORIENTATION: MID

## 2022-01-31 ASSESSMENT — PAIN - FUNCTIONAL ASSESSMENT
PAIN_FUNCTIONAL_ASSESSMENT: ACTIVITIES ARE NOT PREVENTED
PAIN_FUNCTIONAL_ASSESSMENT: PREVENTS OR INTERFERES SOME ACTIVE ACTIVITIES AND ADLS

## 2022-01-31 ASSESSMENT — PAIN DESCRIPTION - PROGRESSION
CLINICAL_PROGRESSION: GRADUALLY WORSENING
CLINICAL_PROGRESSION: GRADUALLY IMPROVING

## 2022-01-31 ASSESSMENT — PAIN DESCRIPTION - PAIN TYPE
TYPE: ACUTE PAIN;SURGICAL PAIN

## 2022-01-31 ASSESSMENT — PAIN DESCRIPTION - DESCRIPTORS
DESCRIPTORS: SHARP;STABBING
DESCRIPTORS: ACHING

## 2022-01-31 ASSESSMENT — PAIN DESCRIPTION - ONSET
ONSET: ON-GOING
ONSET: ON-GOING

## 2022-01-31 ASSESSMENT — PAIN DESCRIPTION - FREQUENCY
FREQUENCY: CONTINUOUS
FREQUENCY: CONTINUOUS

## 2022-01-31 ASSESSMENT — PAIN DESCRIPTION - LOCATION
LOCATION: ABDOMEN

## 2022-01-31 ASSESSMENT — PAIN SCALES - WONG BAKER
WONGBAKER_NUMERICALRESPONSE: 0
WONGBAKER_NUMERICALRESPONSE: 0
WONGBAKER_NUMERICALRESPONSE: 2

## 2022-01-31 NOTE — PROGRESS NOTES
Bariatric Surgery:  Daily Progress Note          PATIENT NAME: Rhina Yu     TODAY'S DATE: 1/31/2022, 8:03 AM    SUBJECTIVE:     Pt seen and examined at bedside. Received PRN hydralazine for SBP 170s. Otherwise afebrile and normal vitals. Tolerating liquids. Passing some flatus but no BM yet. Denies n/v. Sleepy but reportedly at baseline per family. OBJECTIVE:   VITALS:  BP (!) 151/96   Pulse 71   Temp 98.4 °F (36.9 °C) (Oral)   Resp 14   Ht 4' 11\" (1.499 m)   Wt 113 lb 11.2 oz (51.6 kg)   SpO2 96%   BMI 22.96 kg/m²      INTAKE/OUTPUT:      Intake/Output Summary (Last 24 hours) at 1/31/2022 8274  Last data filed at 1/31/2022 8645  Gross per 24 hour   Intake 480 ml   Output 2525 ml   Net -2045 ml       PHYSICAL EXAM:  General Appearance: in no acute distress  HEENT:  Normocephalic, atraumatic, mucus membranes moist   Heart: Regular rate and rhythm  Lungs: normal effort with symmetric rise and fall of chest wall  Abdomen: softly distended, minimally tender to palpation without guarding or rebound tenderness; incisions clean, dry and intact with staples  Extremities: No cyanosis, pitting edema, rashes noted. Skin: Skin color, texture, turgor normal. No rashes or lesions.       Data:  CBC:   Recent Labs     01/28/22  1811 01/29/22  1238 01/30/22  0652   WBC 8.5 17.8* 8.9   HGB 9.6* 10.6* 10.4*    240 238     Chemistry:   Recent Labs     01/28/22  1811 01/29/22  1238 01/30/22  0652    136 139   K 3.3* 3.7 3.3*    103 110*   CO2 23 20 19*   GLUCOSE 96 185* 114*   BUN 16 11 7   CREATININE 0.70 0.63 0.47*   MG  --  1.8 2.3   ANIONGAP 10 13 10   LABGLOM >60 >60 >60   GFRAA >60 >60 >60   CALCIUM 8.1* 8.5* 8.5*   PHOS  --  4.1  --      Hepatic:   Recent Labs     01/28/22  1811   AST 26   ALT 11   ALKPHOS 90   BILITOT 0.23*     Coagulation:   Recent Labs     01/28/22  1811   PROT 5.9*         Radiology Review:    No new imaging to review    ASSESSMENT:  Active Hospital Problems Diagnosis Date Noted    Internal hernia [K45.8] 01/29/2022    Primary hypertension [I10] 02/29/2016    Hyperlipidemia [E78.5] 06/10/2014    GERD (gastroesophageal reflux disease) [K21.9]     Depression with anxiety [F41.8]        1. 46 y.o. female POD# 2 s/p exploratory laparoscopy converted to open laparotomy, lysis of adhesions, reduction of internal hernia      Plan:  1. Low fiber diet  2. Tylenol, robaxin; conrad q12hr prn; d/c fentanyl  3. Miralax daily  4. Monitor I/Os  5. DVT prophylaxis: lovenox BID  6. HTN  1. Appreciate medicine recs for blood pressure control  2. Currently on amlodipine 10mg daily, Coreg 25mg BID, lisinopril 20mg daily  7. Activity:  Continue to encourage ambulation/activity w/ PT/OT  8. Continue to encourage incentive spirometry  9. Home meds restarted  10.  Dispo: advancement of diet, await bowel function    Electronically signed by Jona Valenzuela DO  on 1/31/2022 at 8:03 AM

## 2022-01-31 NOTE — PROGRESS NOTES
[facility]  Internal Medicine Teaching Residency Program  Inpatient Daily Progress Note  ______________________________________________________________________________    Patient: Madeleine Watkins  YOB: 1970   [facility]:[id number]    Acct: [de-identified]     Room: [address] [place] [address]  Admit date: 2022  Today's date: 22  Number of days in the hospital: 2    SUBJECTIVE   Admitting Diagnosis: Primary hypertension  CC: Abdominal pain  Pt examined at bedside. Chart & results reviewed. Afebrile  Saturating on room air  Alert and oriented  No focal neurologic deficit  /86, continue to monitor  Urine drug screen positive for amphetamine and cocaine    ROS:  Constitutional:  negative for chills, fevers, sweats  Respiratory:  negative for cough, dyspnea on exertion, hemoptysis, shortness of breath, wheezing  Cardiovascular:  negative for chest pain, chest pressure/discomfort, lower extremity edema, palpitations  Gastrointestinal: Positive for abdominal pain, constipation, negative for diarrhea, nausea, vomiting  Neurological:  negative for dizziness, headache  BRIEF HISTORY     78-year-old female presented with acute abdomen, found internal hernia. Underwent exploratory laparotomy, postop day 2. Internal medicine is consulted for medical management. Has past history of hyperlipidemia, hypertension, polysubstance abuse, COPD/asthma, major depression/bipolar/fibromyalgia and seizure disorder. U tox positive for amphetamine and cocaine. WBC 17.8. We will start the patient on amlodipine, hydralazine as needed, lisinopril. Continue on Topamax and Abilify Cymbalta and trazodone. Continue on DuoNeb. We will monitor BP and mental status.     OBJECTIVE     Vital Signs:  BP (!) 155/84   Pulse 76   Temp 97.4 °F (36.3 °C) (Oral)   Resp 16   Ht 4' 11\" (1.499 m)   Wt 113 lb 11.2 oz (51.6 kg)   SpO2 97%   BMI 22.96 kg/m²     Temp (24hrs), Av.8 °F (36.6 °C), Min:97.4 °F (36.3 °C), Max:98.4 °F (36.9 °C)    In: 480   Out: 2525 [Urine:2525]    Physical Exam:  Constitutional: alert, oriented, cooperative and in no apparent distress. Head:normocephalic and atraumatic. Respiratory: Chest was symmetrical without dullness to percussion. Breath sounds bilaterally were clear to auscultation. There were no wheezes, rhonchi or rales. There is no intercostal retraction or use of accessory muscles. No egophony noted. Cardiovascular: Regular without murmur, clicks, gallops or rubs. Abdomen: Slightly rounded and soft without organomegaly. Postop day 2. Wound is healing well, no discharge/purulence. musculoskeletal: Normal curvature of the spine. No gross muscle weakness. Extremities:  No lower extremity edema, ulcerations, tenderness, varicosities or erythema. Muscle size, tone and strength are normal.  No involuntary movements are noted. Skin:  Warm and dry. Good color, turgor and pigmentation. No lesions or scars.   No cyanosis or clubbing  Neurological/Psychiatric: The patient's general behavior, level of consciousness, thought content and emotional status is normal.        Medications:  Scheduled Medications:    lisinopril  20 mg Oral Daily    carvedilol  12.5 mg Oral BID WC    potassium bicarb-citric acid  40 mEq Oral Daily    sodium chloride flush  10 mL IntraVENous 2 times per day    methocarbamol  750 mg Oral 4x Daily    enoxaparin  30 mg SubCUTAneous BID    acetaminophen  1,000 mg Oral 3 times per day    gabapentin  300 mg Oral TID    polyethylene glycol  17 g Oral Daily    ARIPiprazole  5 mg Oral Daily    buPROPion  150 mg Oral QAM    DULoxetine  60 mg Oral Daily    topiramate  100 mg Oral BID    amLODIPine  10 mg Oral Daily     Continuous Infusions:    sodium chloride       PRN Medicationssodium chloride flush, 10 mL, PRN  sodium chloride, 25 mL, PRN  ondansetron, 4 mg, Q8H PRN   Or  ondansetron, 4 mg, Q6H PRN  potassium chloride, 10 mEq, PRN  magnesium sulfate, 2,000 mg, PRN  fentanNYL, 50 mcg, Q2H PRN  oxyCODONE, 5 mg, Q6H PRN  senna, 1 tablet, Daily PRN  traZODone, 50 mg, Nightly PRN  hydrALAZINE, 10 mg, Q4H PRN  ipratropium-albuterol, 1 ampule, Q4H PRN        Diagnostic Labs:  CBC:   Recent Labs     01/28/22  1811 01/29/22  1238 01/30/22  0652   WBC 8.5 17.8* 8.9   RBC 4.24 4.66 4.53   HGB 9.6* 10.6* 10.4*   HCT 30.8* 35.8* 35.0*   MCV 72.7* 76.8* 77.3*   RDW 18.0* 17.5* 18.1*    240 238     BMP:   Recent Labs     01/28/22 1811 01/29/22  1238 01/30/22  0652    136 139   K 3.3* 3.7 3.3*    103 110*   CO2 23 20 19*   PHOS  --  4.1  --    BUN 16 11 7   CREATININE 0.70 0.63 0.47*     BNP: No results for input(s): BNP in the last 72 hours. PT/INR: No results for input(s): PROTIME, INR in the last 72 hours. APTT: No results for input(s): APTT in the last 72 hours. CARDIAC ENZYMES: No results for input(s): CKMB, CKMBINDEX, TROPONINI in the last 72 hours. Invalid input(s): CKTOTAL;3  FASTING LIPID PANEL:  Lab Results   Component Value Date    CHOL 156 01/30/2022    HDL 39 (L) 01/30/2022    TRIG 78 01/30/2022     LIVER PROFILE:   Recent Labs     01/28/22 1811   AST 26   ALT 11   BILITOT 0.23*   ALKPHOS 90      MICROBIOLOGY:   Lab Results   Component Value Date/Time    CULTURE NO SIGNIFICANT GROWTH 12/26/2018 07:03 PM       Imaging:    CT ABDOMEN PELVIS W IV CONTRAST Additional Contrast? None    Result Date: 1/28/2022  Mesenteric congestion with findings suspicious for displaced bowel loops within the right lower quadrant. Slight twisting of mesenteric vessels identified as well. .  Findings worrisome for internal hernia. Surgical consultation recommended. ASSESSMENT & PLAN   Principal Problem:    Primary hypertension  Active Problems:    GERD (gastroesophageal reflux disease)    Depression with anxiety    Hyperlipidemia    Internal hernia  Resolved Problems:    * No resolved hospital problems. *    1. Internal hernia. S/p exploratory laparotomy. POD 3. General surgery following  2. Hypertension. Continue on amlodipine 10 daily, hydralazine 10 q4 prn, lisinopril. Monitor blood pressure  3. Seizure disorder. Continue on Topamax 100 twice daily  4. Migraine. Continue on Topamax  5. Depression/ fibromyalgia/bipolar disorder. Continue on Abilify, bupropion, Cymbalta, trazodone. 6. COPD/asthma. Currently not in exacerbation. Continue on DuoNeb nebulization. Monitor SPO2  7. Hypokalemia. Replace potassium     GI prophylaxis: Lovenox  DVT prophylaxis: None     PT/OT/SW: Following  Discharge planning: CM to assist with    Kevan Edwards MD  Internal Medicine Resident, PGY-1  [facility]; [place], [place]  1/31/2022, 7:18 AM      Attending Physician Statement  I have discussed the care of Joseph Cameron and I have examined the patient myselft and taken ros and hpi , including pertinent history and exam findings,  with the resident. I have reviewed the key elements of all parts of the encounter with the resident. I agree with the assessment, plan and orders as documented by the resident.       Electronically signed by Mich Benitez MD

## 2022-02-01 ENCOUNTER — APPOINTMENT (OUTPATIENT)
Dept: GENERAL RADIOLOGY | Age: 52
DRG: 337 | End: 2022-02-01
Payer: COMMERCIAL

## 2022-02-01 PROBLEM — E61.1 IRON DEFICIENCY: Status: ACTIVE | Noted: 2022-02-01

## 2022-02-01 PROBLEM — E55.9 VITAMIN D DEFICIENCY: Status: ACTIVE | Noted: 2022-02-01

## 2022-02-01 LAB — VITAMIN D 25-HYDROXY: <5 NG/ML (ref 30–100)

## 2022-02-01 PROCEDURE — 6370000000 HC RX 637 (ALT 250 FOR IP)

## 2022-02-01 PROCEDURE — 6360000002 HC RX W HCPCS: Performed by: STUDENT IN AN ORGANIZED HEALTH CARE EDUCATION/TRAINING PROGRAM

## 2022-02-01 PROCEDURE — 99232 SBSQ HOSP IP/OBS MODERATE 35: CPT | Performed by: INTERNAL MEDICINE

## 2022-02-01 PROCEDURE — 6370000000 HC RX 637 (ALT 250 FOR IP): Performed by: STUDENT IN AN ORGANIZED HEALTH CARE EDUCATION/TRAINING PROGRAM

## 2022-02-01 PROCEDURE — 74018 RADEX ABDOMEN 1 VIEW: CPT

## 2022-02-01 PROCEDURE — 82306 VITAMIN D 25 HYDROXY: CPT

## 2022-02-01 PROCEDURE — 1200000000 HC SEMI PRIVATE

## 2022-02-01 PROCEDURE — 2580000003 HC RX 258: Performed by: STUDENT IN AN ORGANIZED HEALTH CARE EDUCATION/TRAINING PROGRAM

## 2022-02-01 PROCEDURE — 6370000000 HC RX 637 (ALT 250 FOR IP): Performed by: INTERNAL MEDICINE

## 2022-02-01 PROCEDURE — 36415 COLL VENOUS BLD VENIPUNCTURE: CPT

## 2022-02-01 RX ORDER — ERGOCALCIFEROL 1.25 MG/1
50000 CAPSULE ORAL WEEKLY
Status: DISCONTINUED | OUTPATIENT
Start: 2022-02-01 | End: 2022-02-02 | Stop reason: HOSPADM

## 2022-02-01 RX ADMIN — METHOCARBAMOL TABLETS 750 MG: 750 TABLET, COATED ORAL at 18:56

## 2022-02-01 RX ADMIN — TOPIRAMATE 100 MG: 100 TABLET, FILM COATED ORAL at 11:45

## 2022-02-01 RX ADMIN — ACETAMINOPHEN 1000 MG: 500 TABLET ORAL at 21:23

## 2022-02-01 RX ADMIN — ENOXAPARIN SODIUM 30 MG: 100 INJECTION SUBCUTANEOUS at 11:46

## 2022-02-01 RX ADMIN — LISINOPRIL 20 MG: 20 TABLET ORAL at 11:46

## 2022-02-01 RX ADMIN — IRON SUCROSE 200 MG: 20 INJECTION, SOLUTION INTRAVENOUS at 11:45

## 2022-02-01 RX ADMIN — METHOCARBAMOL TABLETS 750 MG: 750 TABLET, COATED ORAL at 11:48

## 2022-02-01 RX ADMIN — SODIUM CHLORIDE, PRESERVATIVE FREE 10 ML: 5 INJECTION INTRAVENOUS at 21:26

## 2022-02-01 RX ADMIN — BUPROPION HYDROCHLORIDE 150 MG: 150 TABLET, EXTENDED RELEASE ORAL at 11:45

## 2022-02-01 RX ADMIN — ERGOCALCIFEROL 50000 UNITS: 1.25 CAPSULE ORAL at 18:56

## 2022-02-01 RX ADMIN — ENOXAPARIN SODIUM 30 MG: 100 INJECTION SUBCUTANEOUS at 21:24

## 2022-02-01 RX ADMIN — METHOCARBAMOL TABLETS 750 MG: 750 TABLET, COATED ORAL at 21:24

## 2022-02-01 RX ADMIN — CARVEDILOL 25 MG: 25 TABLET, FILM COATED ORAL at 18:55

## 2022-02-01 RX ADMIN — MAGNESIUM HYDROXIDE 30 ML: 400 SUSPENSION ORAL at 11:50

## 2022-02-01 RX ADMIN — ACETAMINOPHEN 1000 MG: 500 TABLET ORAL at 05:53

## 2022-02-01 RX ADMIN — GABAPENTIN 300 MG: 300 CAPSULE ORAL at 21:24

## 2022-02-01 RX ADMIN — CARVEDILOL 25 MG: 25 TABLET, FILM COATED ORAL at 11:44

## 2022-02-01 RX ADMIN — POLYETHYLENE GLYCOL 3350 17 G: 17 POWDER, FOR SOLUTION ORAL at 11:44

## 2022-02-01 RX ADMIN — ARIPIPRAZOLE 5 MG: 5 TABLET ORAL at 11:46

## 2022-02-01 RX ADMIN — DULOXETINE HYDROCHLORIDE 60 MG: 30 CAPSULE, DELAYED RELEASE ORAL at 11:45

## 2022-02-01 RX ADMIN — AMLODIPINE BESYLATE 10 MG: 10 TABLET ORAL at 11:45

## 2022-02-01 RX ADMIN — SENNOSIDES 8.6 MG: 8.6 TABLET, COATED ORAL at 11:44

## 2022-02-01 RX ADMIN — TOPIRAMATE 100 MG: 100 TABLET, FILM COATED ORAL at 21:24

## 2022-02-01 RX ADMIN — GABAPENTIN 300 MG: 300 CAPSULE ORAL at 11:45

## 2022-02-01 ASSESSMENT — PAIN SCALES - GENERAL
PAINLEVEL_OUTOF10: 7

## 2022-02-01 NOTE — OP NOTE
Operative Note      Patient: Bam Siu  YOB: 1970  MRN: [id number]    Date of Procedure: 1/29/2022    Pre-Op Diagnosis: INTERNAL HERNIA history of gastric bypass    Post-Op Diagnosis: Same and adhesions       Procedure(s):  LAPAROSCOPY EXPLORATORY CONVERTED TO OPEN EXPLORATORY LAPAROTOMY, LYSIS OF ADHESIONS, REDUCTION OF INTERNAL HERNIA    Surgeon(s):  Antelmo García DO    Assistant:   * No surgical staff found *    Anesthesia: General    Estimated Blood Loss (mL): Minimal    Complications: None    Specimens:   * No specimens in log *    Implants:  * No implants in log *      Drains: * No LDAs found *    Findings:   Adhesive band in left abdomen from the omentum  No defect at alexander's space or jejunojejunostomy  Apparent antecolic antegastric bypass    Detailed Description of Procedure:     Emergent consent obtained from the patient's father. Patient had been sed[facility] in the [facility] ER. Risks including but not limited to bleeding, infection, further surgery, damage to surrounding structure, bowel resection, need for revision of gastric bypass. Given findings of prior gastric bypass and possible internal hernia the patient's father consented to the procedure. The patient taken to the operative suite and prepared and draped in normal sterile fashion. Time out called and confirmed. Spence placed, SCD's placed and Ancef given. Incision made at Winter's point and using an optical trocar the peritoneal cavity was accessed. Pneumoperitoneum established without event, underlying structures without evidence of injury. A supraumbilical 12 mm port, and two lateral right upper and lower 5 mm ports placed under visualization. The common channel was white and there was chylous ascites throughout the left upper quadrant suggestive of internal hernia. The aretha limb ran down to the jejunojejunostomy (Ferguson Patella). There was no apparent Alexander's defect.   The biliopancreatic limb ran back to the ligament of treitz. The common channel was wrapped around itself and an adhesive band in the left upper quadrant. Once we took down the band there was some release of the common channel. I could then run this down to the ileocecal valve. The limb white with signs of chronic lymphatic obstruction, but no sign of ischemia. I could not be sure we had relieved all obstruction, so I made a small midline laparotomy. I ran the aretha limb from the gastrojejunostomy down to the [address] [facility]. The biliopancreatic limb down to the JJ. Then the common channel to the cecum. There was no mesenteric defect. The mesentery as a whole was extremely long. The cecum and transverse colon ran down to the sigmoid. No apparent mesenteric defects. Given obstruction resolved and anatomy appearing to be antecolic, antegastric we then closed. The fascia closed with two 0 PDS sutures. The fascia at the left upper quadrant port site closed with an 0 vicryl sutures under laparoscopic visualization after reinsufflation. Midline fascia closed and visualized under laparoscopic visualization. The 5 mm ports removed and pneumoperitoneum released. Incisions closed with staples. Sterile bandages applied. Sponge, instrument and needle counts reported to me as correct. The patient tolerated the procedure well. Father updated.       Electronically signed by Jose Steele DO on 2/1/2022 at 7:10 AM

## 2022-02-01 NOTE — PROGRESS NOTES
Bariatric Surgery:  Daily Progress Note          PATIENT NAME: Elba Ervin     TODAY'S DATE: 2/1/2022, 6:09 AM    SUBJECTIVE:     Pt seen and examined at bedside, no overnight events. Patient is blood pressure now well controlled on current therapy. Tolerating low fiber diet. Passing some flatus but no BM yet. Denies n/v. I-S 1500. Afebrile, VSS    OBJECTIVE:   VITALS:  /82   Pulse 65   Temp 99.4 °F (37.4 °C) (Oral)   Resp 16   Ht 4' 11\" (1.499 m)   Wt 113 lb 11.2 oz (51.6 kg)   SpO2 93%   BMI 22.96 kg/m²      INTAKE/OUTPUT:      Intake/Output Summary (Last 24 hours) at 2/1/2022 1959  Last data filed at 2/1/2022 0303  Gross per 24 hour   Intake --   Output 1075 ml   Net -1075 ml       PHYSICAL EXAM:  General Appearance: in no acute distress  HEENT:  Normocephalic, atraumatic, mucus membranes moist   Heart: Regular rate and rhythm  Lungs: normal effort with symmetric rise and fall of chest wall, unlabored breathing on RA  Abdomen: softly distended, minimally tender to palpation without guarding or rebound tenderness; incisions clean, dry and intact with staples, without drainage, discharge, or erythema  Extremities: No cyanosis, pitting edema, rashes noted. Skin: Skin color, texture, turgor normal. No rashes or lesions. Data:  CBC:   Recent Labs     01/29/22  1238 01/30/22  0652   WBC 17.8* 8.9   HGB 10.6* 10.4*    238     Chemistry:   Recent Labs     01/29/22  1238 01/30/22  0652 01/31/22  0848    139 137   K 3.7 3.3* 4.2    110* 109*   CO2 20 19* 17*   GLUCOSE 185* 114* 103*   BUN 11 7 10   CREATININE 0.63 0.47* 0.52   MG 1.8 2.3  --    ANIONGAP 13 10 11   LABGLOM >60 >60 >60   GFRAA >60 >60 >60   CALCIUM 8.5* 8.5* 9.1   PHOS 4.1  --   --      Hepatic:   No results for input(s): AST, ALT, ALB, ALKPHOS, BILITOT, BILIDIR in the last 72 hours. Coagulation:   No results for input(s): APTT, PROT, INR in the last 72 hours.       Radiology Review:    No results found.      ASSESSMENT:  Active Hospital Problems    Diagnosis Date Noted    Internal hernia [K45.8] 01/29/2022    Primary hypertension [I10] 02/29/2016    Hyperlipidemia [E78.5] 06/10/2014    GERD (gastroesophageal reflux disease) [K21.9]     Depression with anxiety [F41.8]        1. 46 y.o. female POD# 3 s/p exploratory laparoscopy converted to open laparotomy, lysis of adhesions, reduction of internal hernia      Plan:  1. Low fiber diet, tolerating  2. Tylenol, robaxin, Neurontin, conrad q12hr prn  3. Miralax daily  4. Monitor I/Os  5. DVT prophylaxis: lovenox BID  6. HTN  1. [facility] medicine recs for blood pressure control. Well-controlled over the last day. 2. Currently controlled on amlodipine 10mg daily, Coreg 25mg BID, lisinopril 20mg daily  7. Activity:  Continue to encourage ambulation/activity w/ PT/OT  8. Continue to encourage incentive spirometry, 1500 today  9. Home meds restarted  10.  Dispo: await bowel function      Electronically signed by Home Sebastian DO  on 2/1/2022 at 6:09 AM

## 2022-02-02 ENCOUNTER — APPOINTMENT (OUTPATIENT)
Dept: GENERAL RADIOLOGY | Age: 52
DRG: 337 | End: 2022-02-02
Payer: COMMERCIAL

## 2022-02-02 VITALS
BODY MASS INDEX: 22.92 KG/M2 | HEIGHT: 59 IN | WEIGHT: 113.7 LBS | RESPIRATION RATE: 16 BRPM | DIASTOLIC BLOOD PRESSURE: 63 MMHG | HEART RATE: 63 BPM | TEMPERATURE: 98.3 F | SYSTOLIC BLOOD PRESSURE: 112 MMHG | OXYGEN SATURATION: 100 %

## 2022-02-02 PROCEDURE — 6370000000 HC RX 637 (ALT 250 FOR IP): Performed by: STUDENT IN AN ORGANIZED HEALTH CARE EDUCATION/TRAINING PROGRAM

## 2022-02-02 PROCEDURE — 94664 DEMO&/EVAL PT USE INHALER: CPT

## 2022-02-02 PROCEDURE — 99223 1ST HOSP IP/OBS HIGH 75: CPT | Performed by: SURGERY

## 2022-02-02 PROCEDURE — 94761 N-INVAS EAR/PLS OXIMETRY MLT: CPT

## 2022-02-02 PROCEDURE — 6360000002 HC RX W HCPCS: Performed by: STUDENT IN AN ORGANIZED HEALTH CARE EDUCATION/TRAINING PROGRAM

## 2022-02-02 PROCEDURE — 44180 LAP ENTEROLYSIS: CPT | Performed by: SURGERY

## 2022-02-02 PROCEDURE — 2580000003 HC RX 258: Performed by: STUDENT IN AN ORGANIZED HEALTH CARE EDUCATION/TRAINING PROGRAM

## 2022-02-02 PROCEDURE — 6370000000 HC RX 637 (ALT 250 FOR IP)

## 2022-02-02 PROCEDURE — 6360000004 HC RX CONTRAST MEDICATION: Performed by: SURGERY

## 2022-02-02 PROCEDURE — 74250 X-RAY XM SM INT 1CNTRST STD: CPT

## 2022-02-02 RX ADMIN — IOHEXOL 300 ML: 240 INJECTION, SOLUTION INTRATHECAL; INTRAVASCULAR; INTRAVENOUS; ORAL at 10:44

## 2022-02-02 RX ADMIN — DULOXETINE HYDROCHLORIDE 60 MG: 30 CAPSULE, DELAYED RELEASE ORAL at 08:35

## 2022-02-02 RX ADMIN — AMLODIPINE BESYLATE 10 MG: 10 TABLET ORAL at 08:35

## 2022-02-02 RX ADMIN — GABAPENTIN 300 MG: 300 CAPSULE ORAL at 08:35

## 2022-02-02 RX ADMIN — SENNOSIDES 8.6 MG: 8.6 TABLET, COATED ORAL at 08:35

## 2022-02-02 RX ADMIN — CARVEDILOL 25 MG: 25 TABLET, FILM COATED ORAL at 08:35

## 2022-02-02 RX ADMIN — METHOCARBAMOL TABLETS 750 MG: 750 TABLET, COATED ORAL at 08:35

## 2022-02-02 RX ADMIN — LISINOPRIL 20 MG: 20 TABLET ORAL at 08:35

## 2022-02-02 RX ADMIN — IRON SUCROSE 200 MG: 20 INJECTION, SOLUTION INTRAVENOUS at 08:36

## 2022-02-02 RX ADMIN — TOPIRAMATE 100 MG: 100 TABLET, FILM COATED ORAL at 08:35

## 2022-02-02 RX ADMIN — POLYETHYLENE GLYCOL 3350 17 G: 17 POWDER, FOR SOLUTION ORAL at 08:35

## 2022-02-02 RX ADMIN — BUPROPION HYDROCHLORIDE 150 MG: 150 TABLET, EXTENDED RELEASE ORAL at 08:34

## 2022-02-02 RX ADMIN — SODIUM CHLORIDE, PRESERVATIVE FREE 10 ML: 5 INJECTION INTRAVENOUS at 08:57

## 2022-02-02 RX ADMIN — ENOXAPARIN SODIUM 30 MG: 100 INJECTION SUBCUTANEOUS at 08:58

## 2022-02-02 RX ADMIN — ARIPIPRAZOLE 5 MG: 5 TABLET ORAL at 08:35

## 2022-02-02 RX ADMIN — MAGNESIUM HYDROXIDE 30 ML: 400 SUSPENSION ORAL at 08:57

## 2022-02-02 RX ADMIN — ACETAMINOPHEN 1000 MG: 500 TABLET ORAL at 05:46

## 2022-02-02 ASSESSMENT — PAIN SCALES - GENERAL
PAINLEVEL_OUTOF10: 8
PAINLEVEL_OUTOF10: 7

## 2022-02-02 NOTE — PROGRESS NOTES
Bariatric Surgery:  Daily Progress Note          PATIENT NAME: Macarena Bhakta   TODAY'S DATE: 2/2/2022, 6:42 AM    SUBJECTIVE:     Pt seen and examined at bedside, no overnight events. Tolerating low fiber diet. Passing some flatus but no BM yet. Denies n/v. Afebrile, VSS    OBJECTIVE:   VITALS:  /73   Pulse 84   Temp 98.5 °F (36.9 °C) (Oral)   Resp 18   Ht 4' 11\" (1.499 m)   Wt 113 lb 11.2 oz (51.6 kg)   SpO2 100%   BMI 22.96 kg/m²      INTAKE/OUTPUT:      Intake/Output Summary (Last 24 hours) at 2/2/2022 4514  Last data filed at 2/1/2022 1037  Gross per 24 hour   Intake --   Output 350 ml   Net -350 ml     PHYSICAL EXAM:  General Appearance: in no acute distress  HEENT:  Normocephalic, atraumatic, mucus membranes moist   Heart: Regular rate and rhythm  Lungs: normal effort with symmetric rise and fall of chest wall, unlabored breathing on RA  Abdomen: softly distended, minimally tender to palpation without guarding or rebound tenderness; incisions clean, dry and intact with staples, without drainage, discharge, or erythema  Extremities: No cyanosis, pitting edema, rashes noted. Skin: Skin color, texture, turgor normal. No rashes or lesions. Data:  CBC:   Recent Labs     01/30/22  0652   WBC 8.9   HGB 10.4*        Chemistry:   Recent Labs     01/30/22  0652 01/31/22  0848    137   K 3.3* 4.2   * 109*   CO2 19* 17*   GLUCOSE 114* 103*   BUN 7 10   CREATININE 0.47* 0.52   MG 2.3  --    ANIONGAP 10 11   LABGLOM >60 >60   GFRAA >60 >60   CALCIUM 8.5* 9.1     Hepatic:   No results for input(s): AST, ALT, ALB, ALKPHOS, BILITOT, BILIDIR in the last 72 hours. Coagulation:   No results for input(s): APTT, PROT, INR in the last 72 hours. Radiology Review:    No results found.     ASSESSMENT:  Active Hospital Problems    Diagnosis Date Noted    Vitamin D deficiency [E55.9] 02/01/2022    Iron deficiency [E61.1] 02/01/2022    Internal hernia [K45.8] 01/29/2022    Primary hypertension [I10] 02/29/2016    Hyperlipidemia [E78.5] 06/10/2014    GERD (gastroesophageal reflux disease) [K21.9]     Depression with anxiety [F41.8]      1. 46 y.o. female  S/p 1/29/22 exploratory laparoscopy converted to open laparotomy, lysis of adhesions, reduction of internal hernia    Plan:  1. Low fiber diet, tolerating, Miralax daily  2. Tylenol, robaxin, Neurontin, conrad q12hr prn  3. Monitor I/Os  4. DVT prophylaxis: lovenox BID  5. HTN  1. Appreciate medicine recs for blood pressure control. 2. Currently controlled on amlodipine 10mg daily, Coreg 25mg BID, lisinopril 20mg daily  6. Activity:  Continue to encourage ambulation/activity w/ PT/OT  7. Continue to encourage incentive spirometry  8. Home meds restarted  9.  Dispo: await bowel function, then plan for d/c     Electronically signed by Balbina King DO  on 2/2/2022 at 6:42 AM

## 2022-02-02 NOTE — PROGRESS NOTES
Notified Dr. Rosendo Panchal- Patient had bowel movement after coming back from small bowel follow through- 200 mL brown/ green liquid. Writer will continue to monitor.

## 2022-02-02 NOTE — CARE COORDINATION
Discharge [facility] Case Management Department  Written by: Farooq Wylie RN    Patient Name: Washington Benítez  Attending Provider: No att. providers found  Admit Date: 2022 10:30 PM  MRN: [id number]  Account: [de-identified]                     : 1970  Discharge Date: 2022      Disposition: home    Farooq Wylie RN

## 2022-02-03 ENCOUNTER — CARE COORDINATION (OUTPATIENT)
Dept: CASE MANAGEMENT | Age: 52
End: 2022-02-03

## 2022-02-03 NOTE — CARE COORDINATION
[facility] Transitions Initial Follow Up Call    Call within 2 business days of discharge: Yes    Patient: Ephraim Lopes Patient : 1970   MRN: [id number]  Reason for Admission: nternal hernia  Discharge Date: 22 RARS: Readmission Risk Score: 11.9 ( )      Last Discharge [facility]       Complaint Diagnosis Description Type Department Provider    22 Abdominal Pain Internal hernia ED to Hosp-Admission (Discharged) (ADMIT) [facility]  Jie Thompson DO; Shannan Ayala... 22 Abdominal Pain Abdominal pain, unspecified abdominal location ED (TRANSFER) [facility] ED Kevin Rahman MD      First attempt at initial 24 hour CTN call     Spoke with:  Called to speak with patient for initial  transition of care. Left HIPPA compliant voice message with contact information [phone] for a call  Back with an update.       Facility:[facility]    Non-face-to-face services provided:  Obtained and reviewed discharge summary and/or continuity of care documents    Care Transitions 24 Hour Call    Care Transitions Interventions         Follow Up  Future Appointments   Date Time Provider Mor Murphy   2/10/2022  1:15 PM Jie Thompson DO bariatric mckeon Beena Alexandra LPN

## 2022-02-03 NOTE — DISCHARGE SUMMARY
Surgery Discharge Summary     Patient Identification  Madeleine Watkins is a 46 y.o. female.   :  1970  Admit Date:  2022    Discharge date:   2022  1:04 PM                                   Disposition: home    Discharge Diagnoses:   Patient Active Problem List   Diagnosis    GERD (gastroesophageal reflux disease)    Hoarseness of voice    MVP (mitral valve prolapse)    Depression with anxiety    Lymphocytic colitis    History of migraine headaches    Hyperlipidemia    Sleep apnea    IGT (impaired glucose tolerance)    History of renal calculi    Lactose intolerance    History of seizure disorder    Leukopenia    Bipolar disorder, mixed ([address]    Illicit drug use    Postlaminectomy syndrome, cervical region    Degeneration of cervical intervertebral disc    Encounter for long-term (current) use of other medications    Fracture of mandible ([address]    Chest pain    E-coli UTI    Bradycardia    Cellulitis    Hypokalemia    Primary hypertension    Elevated lipase    Right lower quadrant abdominal pain    Nausea    Diarrhea    Seizure disorder (HCC)    Polysubstance abuse ([address]    Fibromyalgia    Cocaine addiction ([address]    Shoulder arthritis    Osteoarthritis of left glenohumeral joint    Abnormal EKG    Anemia    Anxiety    Carotid artery stenosis    COPD (chronic obstructive pulmonary disease) ([address]    Dental disease    HUTCHINSON (dyspnea on exertion)    Fatigue    Fractures    H/O gastric bypass    History of crack cocaine use    History of UTI    Injury of back    Intractable chronic migraine without aura and without status migrainosus    Intractable migraine with aura with status migrainosus    Kidney stones    Dizziness    Memory loss    Mild pulmonary hypertension (HCC)    Mild tricuspid regurgitation    Primary osteoarthritis of left shoulder    Seizure-like activity ([address]    Stress at home    TMJ (dislocation of temporomandibular joint)    Tobacco abuse    Visual impairment    Internal hernia    Vitamin D deficiency    Iron deficiency       Condition on discharge: stable    Consults:      Surgery: LAPAROSCOPY EXPLORATORY CONVERTED TO OPEN EXPLORATORY LAPAROTOMY, LYSIS OF ADHESIONS, REDUCTION OF INTERNAL HERNIA    Patient Instructions: Activity: no heavy lifting, pushing, pulling for 6 weeks, no driving for 2 weeks or while on analgesics  Diet: As tolerated  Follow-up with Dr Han Cordero in 7-10 days. See pre-printed instructions in chart and given to patient upon discharge. Discharge Medications:        Medication List      START taking these medications    amLODIPine 10 MG tablet  Commonly known as: NORVASC  Take 1 tablet by mouth daily     carvedilol 25 MG tablet  Commonly known as: COREG  Take 1 tablet by mouth 2 times daily (with meals)     lisinopril 20 MG tablet  Commonly known as: PRINIVIL;ZESTRIL  Take 1 tablet by mouth daily     methocarbamol 750 MG tablet  Commonly known as: ROBAXIN  Take 1 tablet by mouth 4 times daily for 10 days     oxyCODONE 5 MG immediate release tablet  Commonly known as: ROXICODONE  Take 1 tablet by mouth every 12 hours as needed for Pain for up to 3 days. polyethylene glycol 17 g packet  Commonly known as: GLYCOLAX  Take 17 g by mouth daily        CHANGE how you take these medications    DULoxetine 30 MG extended release capsule  Commonly known as: CYMBALTA  Take 1 capsule by mouth daily  What changed: how much to take     topiramate 100 MG tablet  Commonly known as: TOPAMAX  What changed: Another medication with the same name was removed. Continue taking this medication, and follow the directions you see here.         CONTINUE taking these medications    albuterol sulfate  (90 Base) MCG/ACT inhaler  Commonly known as: Proventil HFA  Inhale 2 puffs into the lungs every 6 hours as needed for Wheezing     ARIPiprazole 5 MG tablet  Commonly known as: ABILIFY     atorvastatin 40 MG tablet  Commonly known as: LIPITOR     buPROPion 150 MG extended release tablet  Commonly known as: WELLBUTRIN XL  TAKE 1 TABLET BY MOUTH EVERY MORNING     divalproex 500 MG DR tablet  Commonly known as: DEPAKOTE  Take 2 tablets by mouth 2 times daily Indications: Level 41 as of 7/22/16 (Pt takes two 500 mg DR tabs = 1,000mg PO BID)     FeroSul 325 (65 Fe) MG tablet  Generic drug: ferrous sulfate  TAKE 1 TABLET BY MOUTH 2 TIMES DAILY     gabapentin 300 MG capsule  Commonly known as: NEURONTIN  Take 1 capsule by mouth 3 times daily for 30 days. Intended supply: 30 days     hydrOXYzine 25 MG capsule  Commonly known as: VISTARIL     traZODone 50 MG tablet  Commonly known as: DESYREL  Take 1 tablet by mouth nightly as needed for Sleep        STOP taking these medications    atenolol 25 MG tablet  Commonly known as: TENORMIN     metoprolol tartrate 25 MG tablet  Commonly known as: LOPRESSOR     silver sulfADIAZINE 1 % cream  Commonly known as: SILVADENE     tiZANidine 4 MG tablet  Commonly known as: May Levels           Where to Get Your Medications      These medications were sent to [address]    Phone: [phone]   · amLODIPine 10 MG tablet  · carvedilol 25 MG tablet  · lisinopril 20 MG tablet  · methocarbamol 750 MG tablet  · oxyCODONE 5 MG immediate release tablet  · polyethylene glycol 17 g packet          HPI and Hospital Course:   46 y.o. female presented on 1/28/2022 with abd pain and CT concerning for internal hernia. Underwent on 1/29 a LAPAROSCOPY EXPLORATORY CONVERTED TO OPEN EXPLORATORY LAPAROTOMY, LYSIS OF ADHESIONS, REDUCTION OF INTERNAL HERNIA     Hospital course was unremarkable. On day of discharge pt was tolerating regular diet, pain controlled with oral medications and ambulating without difficulty.       Electronically signed by Jay Eng DO on 2/3/2022 at 8:45 AM

## 2022-02-04 ENCOUNTER — CARE COORDINATION (OUTPATIENT)
Dept: CASE MANAGEMENT | Age: 52
End: 2022-02-04

## 2022-02-04 DIAGNOSIS — K45.8 INTERNAL HERNIA: Primary | ICD-10-CM

## 2022-02-04 PROCEDURE — 1111F DSCHRG MED/CURRENT MED MERGE: CPT | Performed by: INTERNAL MEDICINE

## 2022-02-04 NOTE — CARE COORDINATION
[facility] Transitions Initial Follow Up Call    Call within 2 business days of discharge: Yes    Patient: Jose Watson Patient : 1970   MRN: [id number]  Reason for Admission: internal hernia  Discharge Date: 22 RARS: Readmission Risk Score: 11.9 ( )      Last Discharge [facility]       Complaint Diagnosis Description Type Department Provider    22 Abdominal Pain Internal hernia ED to Hosp-Admission (Discharged) (ADMIT) [facility] Tacho Canchola DO; Tanya Key... 22 Abdominal Pain Abdominal pain, unspecified abdominal location ED (TRANSFER) [facility] ED Zack Sheppard MD           Spoke with: Supa Odom she states she is doing \"pretty good\" she is very sore, incisions are clean dry and intact no swelling some bruising noted. Denies chest pain, SOB, dizziness, nausea. Is eating and drinking well has some incontinence of both bowel and bladder. Medications reviewed and taking as directed. 1111 f complete. Denies concerns    Facility: [facility]    Non-face-to-face services provided:  Obtained and reviewed discharge summary and/or continuity of care documents  Transitions of Care Initial Call    Was this an external facility discharge? No     Challenges to be reviewed by the provider   Additional needs identified to be addressed with provider: No  none             Method of communication with provider : none      Advance Care Planning:   Does patient have an Advance Directive: reviewed and current, reviewed and needs to be updated, not on file; education provided, decision maker updated and referral to internal ACP facilitator. Was this a readmission? No  Patient stated reason for admission: abdominal pain  Patients top risk factors for readmission: ineffective coping  lack of knowledge about disease  medication management    Care Transition Nurse (CTN) contacted the patient by telephone to perform post hospital discharge assessment. Verified name and  with patient as identifiers. Provided introduction to self, and explanation of the CTN role. CTN reviewed discharge instructions, medical action plan and red flags with patient who verbalized understanding. Patient given an opportunity to ask questions and does not have any further questions or concerns at this time. Were discharge instructions available to patient? Yes. Reviewed appropriate site of care based on symptoms and resources available to patient including: PCP and Specialist. The patient agrees to contact the PCP office for questions related to their healthcare. Medication reconciliation was performed with patient, who verbalizes understanding of administration of home medications. Advised obtaining a 90-day supply of all daily and as-needed medications. Covid Risk Education     Educated patient about risk for severe COVID-19 due to risk factors according to CDC guidelines. LPN CC reviewed discharge instructions, medical action plan and red flag symptoms with the patient who verbalized understanding. Discussed COVID vaccination status: Yes. Education provided on COVID-19 vaccination as appropriate. Discussed exposure protocols and quarantine with CDC Guidelines. Patient was given an opportunity to verbalize any questions and concerns and agrees to contact LPN CC or health care provider for questions related to their healthcare. Reviewed and educated patient on any new and changed medications related to discharge diagnosis. Was patient discharged with a pulse oximeter? No Discussed and confirmed pulse oximeter discharge instructions and when to notify provider or seek emergency care. LPN CC provided contact information. Plan for follow-up call in 5-7 days based on severity of symptoms and risk factors.   Plan for next call: symptom management-incision care pain      Care Transitions 24 Hour Call    Do you have any ongoing symptoms?: Yes  Patient-reported symptoms: Abdominal Pain  Interventions for patient-reported symptoms: Other  Do you have a copy of your discharge instructions?: Yes  Do you have all of your prescriptions and are they filled?: Yes  Have you been contacted by a [address]?: No  Have you scheduled your follow up appointment?: Yes  How are you going to get to your appointment?: Car - family or friend to transport  Were you discharged with any Home Care or Post Acute Services: No  Do you feel like you have everything you need to keep you well at home?: Yes  Care Transitions Interventions         Follow Up  Future Appointments   Date Time Provider Mor Murphy   2/10/2022  1:15 PM Tacho Canchola DO bariatric mckeon Bong Teresa LPN

## 2022-02-07 NOTE — PROGRESS NOTES
Physician Progress Note      Ashish ABDUL #:                  [id number]  :                       1970  ADMIT DATE:       2022 10:30 PM  [address] DATE:        2022 1:04 PM  RESPONDING  PROVIDER #:        Suraj RUCKER DO          QUERY TEXT:    Pt admitted with hernia. Pt noted to have lysis of adhesions. If possible,   please document the relationship, if any, between hernia obstruction and   adhesions. The medical record reflects the following:  Risk Factors: Hx gastric bypass at [facility]. Tx from [facility] with UDS +   amphetamines and cocaine  Clinical Indicators: CT abdomen showing mesenteric congestion suspicious   displaced vowel loops worrisome for internal hernia. Per Op Note: The common   channel was white and there was chylous ascites throughout the left upper   quadrant suggestive of internal hernia. The aretha limb ran down to the   jejunojejunostomy (Ladarius Adame). There was no apparent Whitley's defect. Treatment: Exp lap converted to open, lysis of adhesions, and reduction of   internal hernia    Thank-you,  Luis Marina RN, [email or web address]  Options provided:  -- Hernia obstruction  due to adhesions  -- Hernia obstruction unrelated to adhesions, .  -- Other - I will add my own diagnosis  -- Disagree - Not applicable / Not valid  -- Disagree - Clinically unable to determine / Unknown  -- Refer to Clinical Documentation Reviewer    PROVIDER RESPONSE TEXT:    This patient has a hernia obstruction due to adhesions.     Query created by: Matt Leonard on 2022 11:40 AM      Electronically signed by:  Kelsie Fletcher DO 2022 7:07 AM

## 2022-02-09 ENCOUNTER — TELEPHONE (OUTPATIENT)
Dept: BARIATRICS/WEIGHT MGMT | Age: 52
End: 2022-02-09

## 2022-02-09 NOTE — TELEPHONE ENCOUNTER
Patient has appointment on 2/10/22       Next Visit Date:  2/10/2022    Patient Surgery Date  1/29/22    Type of  Surgery  LAPAROSCOPY EXPLORATORY CONVERTED TO OPEN EXPLORATORY LAPAROTOMY, LYSIS OF ADHESIONS, REDUCTION OF INTERNAL HERNIA       Patient calls complaining of  Redness at surgical site slightly open    Onset: 2 day(s) ago  Timing: constant, intermittent  Severity: Mild    Progression: stable    Associated Symptoms: no other symptoms - patient states she has had to do CPR on 3 people? Any allergy  To medications    [facility] [address] [facility]    Patient advised:   If  Symptoms worsen seek treatment at  Emergency Room. You will receive a call back from the office within 24-48 hours.     Is it ok to leave a message if we call back yes

## 2022-02-10 ENCOUNTER — CARE COORDINATION (OUTPATIENT)
Dept: CASE MANAGEMENT | Age: 52
End: 2022-02-10

## 2022-02-10 ENCOUNTER — OFFICE VISIT (OUTPATIENT)
Dept: BARIATRICS/WEIGHT MGMT | Age: 52
End: 2022-02-10

## 2022-02-10 VITALS
WEIGHT: 113 LBS | BODY MASS INDEX: 22.78 KG/M2 | HEART RATE: 90 BPM | SYSTOLIC BLOOD PRESSURE: 130 MMHG | RESPIRATION RATE: 20 BRPM | HEIGHT: 59 IN | DIASTOLIC BLOOD PRESSURE: 84 MMHG | TEMPERATURE: 97.2 F

## 2022-02-10 DIAGNOSIS — K45.8 INTERNAL HERNIA: Primary | ICD-10-CM

## 2022-02-10 PROCEDURE — 99024 POSTOP FOLLOW-UP VISIT: CPT | Performed by: SURGERY

## 2022-02-10 RX ORDER — SUCRALFATE 1 G/1
1 TABLET ORAL 4 TIMES DAILY
Qty: 120 TABLET | Refills: 3 | Status: SHIPPED | OUTPATIENT
Start: 2022-02-10 | End: 2022-08-09

## 2022-02-10 RX ORDER — CYCLOBENZAPRINE HCL 10 MG
10 TABLET ORAL 3 TIMES DAILY PRN
Qty: 21 TABLET | Refills: 0 | Status: SHIPPED | OUTPATIENT
Start: 2022-02-10 | End: 2022-02-20

## 2022-02-10 RX ORDER — PANTOPRAZOLE SODIUM 40 MG/1
40 TABLET, DELAYED RELEASE ORAL DAILY
Qty: 30 TABLET | Refills: 3 | Status: SHIPPED | OUTPATIENT
Start: 2022-02-10 | End: 2022-08-09

## 2022-02-10 NOTE — CARE COORDINATION
[facility] Transitions Follow Up Call    2/10/2022    Patient: José Manuel Stone  Patient : 1970   MRN: [id number]  Reason for Admission: internal hernia  Discharge Date: 22 RARS: Readmission Risk Score: 11.9 ( )         Spoke with: Spoke briefly to Anantleyda Heard she stated she feels fine she is at the dr getting her staples out she asked CTN to call her back next week. Care Transitions Subsequent and Final Call    Subsequent and Final Calls  Care Transitions Interventions  Other Interventions:            Follow Up  Future Appointments   Date Time Provider Mor Murphy   2/15/2022  3:10 PM Billy Walsh MD  Yuri Porras [place], [place]

## 2022-02-13 NOTE — PROGRESS NOTES
[address] [facility] MIN INVASIVE BARIATRIC SURG  [address] [place] [address] [place]  [address]  [place] [id number]  Dept: [phone]    2/10/2022    CC: 1 week post internal hernia repair    History:  46year old female 1 week post internal hernia repair. Tolerating diet. No nausea, vomiting, fevers/chills. Had a BM.   Pain resolved    Review of Systems:  General  Negative for: [] Weight Change   [x] Fatigue   [x] Fevers & Chills    [] Appetite change [] Other:    Positive for: [x] Weight Change   [] Fatigue   [] Fevers & Chills    [] Appetite change [] Other:   Cardiac  Negative for: [x] Chest Pain   [x] Difficulty Breathing   [] Leg Cramps [x] Edema of Lower Extremeties    [] Left   [] Right      Positive for: [] Chest Pain   [] Difficulty Breathing   [] Leg Cramps [] Edema of Lower Extremeties    [] Left   [] Right   Pulmonary  Negative for: [x] Shortness of Breath [] Wheeze [x] Cough  [] Calf Pain     Positive for: [] Shortness of Breath [] Wheeze [] Cough  [] Calf Pain   Gastro-Intestinal Negative for: [] Heartburn   [] Reflux   [] Dysphagia   [x] Melena   [x] BRBPR  [x] Vomiting   [x] Abdominal Pain   [x] Diarrhea     [x] Constipation  [] Other:     Positive for: [] Heartburn   [] Reflux   [] Dysphagia   [] Melena   [] BRBPR  [] Vomiting   [] Abdominal Pain   [] Diarrhea     [] Constipation  [] Other:    Muskuloskeletal Negative for: [] Joint pain   [] Back pain   [] Knee Pain   [x] Muscle weakness [x] Hernia   [] Edema [] Other:     Positive for: [] Joint pain   [] Back pain   [] Knee Pain   [] Muscle weakness [] Hernia   [] Edema [] Other:    Neurologic Negative for: [x] Syncope   [x] Insomnia   [] Being treated for depression  [] Other:     Positive for: [] Syncope   [] Insomnia   [] Being treated for depression  [] Other:    Skin Negative for: [] Wound:   [] Open   [] Draining   [] Incisional     [x] Rash   [x] Hair Loss  [] Other:     Positive for: [] Wound:   [] Open   [] Draining    [] Incisional  [] Rash   [] Hair Loss  [] Other:        Physical Exam:  /84 (Site: Right Upper Arm, Position: Sitting, Cuff Size: Medium Adult)   Pulse 90   Temp 97.2 °F (36.2 °C) (Temporal)   Resp 20   Ht 4' 11\" (1.499 m)   Wt 113 lb (51.3 kg)   BMI 22.82 kg/m²     Constitutional:  Vital signs are normal. The patient appears well-developed and well-nourished. HEENT:   Head: Normocephalic. Atraumatic  Eyes: pupils are equal and reactive. No scleral icterus is present. Neck: No mass and no thyromegaly present. Cardiovascular: Normal rate, regular rhythm, S1 normal and S2 normal.  Radial pulses present   Pulmonary/Chest: Effort normal and breath sounds normal. No retractions  Abdominal: Soft. Normal appearance. There is no organomegaly. No tenderness. There is no rigidity, no rebound, no guarding and no Tukr's sign. Musculoskeletal:        Right lower leg: Normal. No tenderness and no edema. Left lower leg: Normal. No tenderness and no edema. Incisions CDI  Neurological: The patient is alert and oriented. Moving all 4 extremities, sensation grossly intact bilateral  Skin: Skin is warm, dry and intact. Psychiatric: The patient has a normal mood and affect.  Speech is normal and behavior is normal. Judgment and thought content normal. Cognition and memory are normal.     Assessment:  1 week post repair of internal hernia  No signs of infection, redness patient was concerned about appears to be more allergic related    Plan:  No lifting over 20 lbs for 1 month  Regular diet  Pathology reviewed with patient  Ambulation  Follow up PRN  No signs of infection, seems to have allergic rash across abdomen, will add Cortisone cream  No nicotine, discussed with patient  Every other staple removed

## 2022-02-16 ENCOUNTER — CARE COORDINATION (OUTPATIENT)
Dept: CASE MANAGEMENT | Age: 52
End: 2022-02-16

## 2022-02-16 NOTE — CARE COORDINATION
[facility] Transitions Follow Up Call    2022    Patient: Madeleine Watkins  Patient : 1970   MRN: [id number]  Reason for Admission:  internal hernia  Discharge Date: 22 RARS: Readmission Risk Score: 11.9 ( )         Spoke with: Called to speak with patient for update with transition of care. Left HIPPA compliant voice message with contact information [phone] for a call  Back with an update. Care Transitions Subsequent and Final Call    Subsequent and Final Calls  Care Transitions Interventions  Other Interventions:            Follow Up  Future Appointments   Date Time Provider Mor Murphy   2022  4:00 PM DO noe Carmona, LPN

## 2022-02-18 ENCOUNTER — TELEPHONE (OUTPATIENT)
Dept: BARIATRICS/WEIGHT MGMT | Age: 52
End: 2022-02-18

## 2022-02-18 NOTE — TELEPHONE ENCOUNTER
Next Visit Date:  Visit date not found    Patient Surgery Date  1-29-22    Type of  Surgery  Exploratory lap     Patient no showed appointment today and stated she took out the staples herself.

## 2022-02-20 NOTE — TELEPHONE ENCOUNTER
Noted    Needs to follow up with [facility] for long term care as they completed her surgery

## 2022-02-23 ENCOUNTER — CARE COORDINATION (OUTPATIENT)
Dept: CASE MANAGEMENT | Age: 52
End: 2022-02-23

## 2022-02-23 NOTE — CARE COORDINATION
[facility] Transitions Follow Up Call    2022    Patient: Delano Jones  Patient : 1970   MRN: [id number]  Reason for Admission:  internal hernia  Discharge Date: 22 RARS: Readmission Risk Score: 11.9 ( )         Spoke with: Called to speak with patient for update with transition of care. Left HIPPA compliant voice message with contact information [phone] for a call  Back with an update. Care Transitions Subsequent and Final Call    Subsequent and Final Calls  Care Transitions Interventions  Other Interventions: Follow Up  No future appointments.     Bozena Savage LPN

## 2022-03-18 ENCOUNTER — TELEPHONE (OUTPATIENT)
Dept: BARIATRICS/WEIGHT MGMT | Age: 52
End: 2022-03-18

## 2022-03-18 NOTE — TELEPHONE ENCOUNTER
Next Visit Date:  Visit date not found    Patient Surgery Date: 1/29/22     Type of  Surgery : Exploratory lap     Patient calls complaining of  Pain in the right side of abdomen. Onset: 1 week(s) ago  Timing: constant, intermittent  Severity: Mild    Progression: pain comes and goes    Associated Symptoms: none      Patient advised:   If  Symptoms worsen seek treatment at  Emergency Room. You will receive a call back from the office within 24-48 hours.

## 2022-03-21 NOTE — TELEPHONE ENCOUNTER
Per Dr Kenny Stoddard request, advised patient to go to [facility]'s emergency room

## 2022-03-21 NOTE — TELEPHONE ENCOUNTER
Not familiar with this patient. Chart review performed. Hx bariatric surgery with Dr. Karime Merida at [facility]. Presented to [place] [facility] for abdominal pain and transferred to [address]. Had LAPAROSCOPY EXPLORATORY CONVERTED TO OPEN EXPLORATORY LAPAROTOMY, LYSIS OF ADHESIONS, REDUCTION OF INTERNAL HERNIA done 1/29/22 by Dr Tommy Jett. Last seen by surgeon here in clinic for post op visit 2/13/22 and doing well. Was no show for appt 2/18/22 and apparently removed her own sutures. Subsequent note per surgeon, advises f/u with [facility] for long term bariatric care since she had surgery there. Advise patient to go to ER if pain severe, otherwise call her PCP or bariatric surgeon at [facility] for problem-focused appt. Defer to Dr Tommy Jett for any other recommendations.

## 2022-03-29 ENCOUNTER — APPOINTMENT (OUTPATIENT)
Dept: CT IMAGING | Age: 52
End: 2022-03-29
Payer: COMMERCIAL

## 2022-03-29 ENCOUNTER — HOSPITAL ENCOUNTER (EMERGENCY)
Age: 52
Discharge: HOME | End: 2022-03-29
Attending: EMERGENCY MEDICINE
Payer: COMMERCIAL

## 2022-03-29 VITALS
HEIGHT: 59 IN | OXYGEN SATURATION: 96 % | TEMPERATURE: 98.3 F | RESPIRATION RATE: 17 BRPM | SYSTOLIC BLOOD PRESSURE: 143 MMHG | DIASTOLIC BLOOD PRESSURE: 85 MMHG | HEART RATE: 71 BPM | WEIGHT: 105 LBS | BODY MASS INDEX: 21.17 KG/M2

## 2022-03-29 DIAGNOSIS — Z79.899 SEIZURE SECONDARY TO SUBTHERAPEUTIC ANTICONVULSANT MEDICATION (HCC): Primary | ICD-10-CM

## 2022-03-29 DIAGNOSIS — R56.9 SEIZURE SECONDARY TO SUBTHERAPEUTIC ANTICONVULSANT MEDICATION (HCC): Primary | ICD-10-CM

## 2022-03-29 LAB
ABSOLUTE EOS #: 0.1 K/UL (ref 0–0.4)
ABSOLUTE LYMPH #: 1.08 K/UL (ref 1–4.8)
ABSOLUTE MONO #: 0.59 K/UL (ref 0.1–1.3)
ANION GAP SERPL CALCULATED.3IONS-SCNC: 12 MMOL/L (ref 9–17)
BASOPHILS # BLD: 0 % (ref 0–2)
BASOPHILS ABSOLUTE: 0 K/UL (ref 0–0.2)
BUN BLDV-MCNC: 16 MG/DL (ref 6–20)
CALCIUM SERPL-MCNC: 9 MG/DL (ref 8.6–10.4)
CHLORIDE BLD-SCNC: 101 MMOL/L (ref 98–107)
CO2: 23 MMOL/L (ref 20–31)
CREAT SERPL-MCNC: 0.67 MG/DL (ref 0.5–0.9)
EOSINOPHILS RELATIVE PERCENT: 1 % (ref 0–4)
GFR AFRICAN AMERICAN: >60 ML/MIN
GFR NON-AFRICAN AMERICAN: >60 ML/MIN
GFR SERPL CREATININE-BSD FRML MDRD: ABNORMAL ML/MIN/{1.73_M2}
GLUCOSE BLD-MCNC: 77 MG/DL (ref 70–99)
HCT VFR BLD CALC: 41.8 % (ref 36–46)
HEMOGLOBIN: 13.8 G/DL (ref 12–16)
LYMPHOCYTES # BLD: 11 % (ref 24–44)
MAGNESIUM: 2 MG/DL (ref 1.6–2.6)
MCH RBC QN AUTO: 26.5 PG (ref 26–34)
MCHC RBC AUTO-ENTMCNC: 33.1 G/DL (ref 31–37)
MCV RBC AUTO: 80 FL (ref 80–100)
MONOCYTES # BLD: 6 % (ref 1–7)
MORPHOLOGY: ABNORMAL
PDW BLD-RTO: 22.1 % (ref 11.5–14.9)
PLATELET # BLD: 183 K/UL (ref 150–450)
PMV BLD AUTO: 9.1 FL (ref 6–12)
POTASSIUM SERPL-SCNC: 3.4 MMOL/L (ref 3.7–5.3)
RBC # BLD: 5.22 M/UL (ref 4–5.2)
SEG NEUTROPHILS: 82 % (ref 36–66)
SEGMENTED NEUTROPHILS ABSOLUTE COUNT: 8.03 K/UL (ref 1.3–9.1)
SODIUM BLD-SCNC: 136 MMOL/L (ref 135–144)
TROPONIN, HIGH SENSITIVITY: 7 NG/L (ref 0–14)
VALPROIC ACID LEVEL: <3 UG/ML (ref 50–125)
VALPROIC DATE LAST DOSE: ABNORMAL
VALPROIC DOSE AMOUNT: ABNORMAL
VALPROIC TIME LAST DOSE: ABNORMAL
WBC # BLD: 9.8 K/UL (ref 3.5–11)

## 2022-03-29 PROCEDURE — 72125 CT NECK SPINE W/O DYE: CPT

## 2022-03-29 PROCEDURE — 99285 EMERGENCY DEPT VISIT HI MDM: CPT

## 2022-03-29 PROCEDURE — 36415 COLL VENOUS BLD VENIPUNCTURE: CPT

## 2022-03-29 PROCEDURE — 6370000000 HC RX 637 (ALT 250 FOR IP): Performed by: STUDENT IN AN ORGANIZED HEALTH CARE EDUCATION/TRAINING PROGRAM

## 2022-03-29 PROCEDURE — 84484 ASSAY OF TROPONIN QUANT: CPT

## 2022-03-29 PROCEDURE — 83735 ASSAY OF MAGNESIUM: CPT

## 2022-03-29 PROCEDURE — 6370000000 HC RX 637 (ALT 250 FOR IP): Performed by: EMERGENCY MEDICINE

## 2022-03-29 PROCEDURE — 80164 ASSAY DIPROPYLACETIC ACD TOT: CPT

## 2022-03-29 PROCEDURE — 93005 ELECTROCARDIOGRAM TRACING: CPT | Performed by: STUDENT IN AN ORGANIZED HEALTH CARE EDUCATION/TRAINING PROGRAM

## 2022-03-29 PROCEDURE — 85025 COMPLETE CBC W/AUTO DIFF WBC: CPT

## 2022-03-29 PROCEDURE — 80048 BASIC METABOLIC PNL TOTAL CA: CPT

## 2022-03-29 PROCEDURE — 70450 CT HEAD/BRAIN W/O DYE: CPT

## 2022-03-29 RX ORDER — DIVALPROEX SODIUM 250 MG/1
500 TABLET, DELAYED RELEASE ORAL ONCE
Status: COMPLETED | OUTPATIENT
Start: 2022-03-29 | End: 2022-03-29

## 2022-03-29 RX ORDER — POTASSIUM CHLORIDE 20 MEQ/1
40 TABLET, EXTENDED RELEASE ORAL ONCE
Status: COMPLETED | OUTPATIENT
Start: 2022-03-29 | End: 2022-03-29

## 2022-03-29 RX ORDER — ACETAMINOPHEN 325 MG/1
650 TABLET ORAL ONCE
Status: COMPLETED | OUTPATIENT
Start: 2022-03-29 | End: 2022-03-29

## 2022-03-29 RX ORDER — CARVEDILOL 12.5 MG/1
25 TABLET ORAL ONCE
Status: COMPLETED | OUTPATIENT
Start: 2022-03-29 | End: 2022-03-29

## 2022-03-29 RX ADMIN — POTASSIUM CHLORIDE 40 MEQ: 20 TABLET, EXTENDED RELEASE ORAL at 22:36

## 2022-03-29 RX ADMIN — DIVALPROEX SODIUM 500 MG: 250 TABLET, DELAYED RELEASE ORAL at 21:43

## 2022-03-29 RX ADMIN — CARVEDILOL 25 MG: 12.5 TABLET, FILM COATED ORAL at 22:32

## 2022-03-29 RX ADMIN — ACETAMINOPHEN 650 MG: 325 TABLET ORAL at 21:43

## 2022-03-29 ASSESSMENT — PAIN DESCRIPTION - LOCATION: LOCATION: HEAD

## 2022-03-29 ASSESSMENT — ENCOUNTER SYMPTOMS
SHORTNESS OF BREATH: 0
NAUSEA: 0
BACK PAIN: 0
COLOR CHANGE: 0
CHEST TIGHTNESS: 0
VOMITING: 0
SINUS PRESSURE: 0
COUGH: 0
TROUBLE SWALLOWING: 0
CONSTIPATION: 0
DIARRHEA: 0
PHOTOPHOBIA: 0
SINUS PAIN: 0
ABDOMINAL PAIN: 0
SORE THROAT: 0
EYE REDNESS: 0

## 2022-03-29 ASSESSMENT — PAIN DESCRIPTION - PAIN TYPE: TYPE: ACUTE PAIN

## 2022-03-29 ASSESSMENT — PAIN SCALES - GENERAL: PAINLEVEL_OUTOF10: 9

## 2022-03-29 ASSESSMENT — PAIN DESCRIPTION - ORIENTATION: ORIENTATION: POSTERIOR

## 2022-03-30 LAB
EKG ATRIAL RATE: 70 BPM
EKG P AXIS: 39 DEGREES
EKG P-R INTERVAL: 140 MS
EKG Q-T INTERVAL: 460 MS
EKG QRS DURATION: 94 MS
EKG QTC CALCULATION (BAZETT): 496 MS
EKG R AXIS: 59 DEGREES
EKG T AXIS: 56 DEGREES
EKG VENTRICULAR RATE: 70 BPM

## 2022-03-30 PROCEDURE — 93010 ELECTROCARDIOGRAM REPORT: CPT | Performed by: INTERNAL MEDICINE

## 2022-03-30 NOTE — ED PROVIDER NOTES
[facility] ED  Emergency Department Encounter  Emergency Medicine Resident     Pt Name: Macarena Bhakta  MRN: [id number]  [place] 1970  Date of evaluation: 3/29/22  PCP:  Tai St MD    CHIEF COMPLAINT       Chief Complaint   Patient presents with    Seizures       H[facility]ORY [facility]  (Location/Symptom, Timing/Onset, Context/Setting, Quality, Duration, Modifying Factors,Severity.)      Macarena Bhakta is a 46 y. o.yo female who presents with EMS after witnessed seizure. Patient is supposed to be on Depakote for known seizure disorder and states that she not been taking her Depakote for the past few weeks. Patient states that she admits to cocaine use 2 days ago. Patient states she was in line of her shoe store which is the last thing she remembers. Per EMS there was a witnessed seizure that lasted less than 1 minute, patient did fall to the ground and hit her head. Patient remembers being picked up by EMS and is able to tell me that she is at [facility]. Patient denies any drug or alcohol use today, states she last used anything 2 days ago. Patient states that she has been not taking her medications due to lack of follow-up. She also admits to not taking her blood pressure medication.     PAST MEDICAL / SURGICAL / SOCIAL / FAMILY HISTORY      has a past medical history of Anemia, Arthritis, Asthma, Bipolar disorder, mixed ([address] Carotid artery stenosis, Cocaine abuse ([address] COPD (chronic obstructive pulmonary disease) ([address] Degeneration of cervical intervertebral disc, Depression with anxiety, Depression with anxiety, Fibromyalgia, GERD (gastroesophageal reflux disease), History of migraine headaches, History of renal calculi, History of seizure disorder, Hoarseness of voice, HTN (hypertension), Hyperlipidemia, IGT (impaired glucose tolerance), Kidney stones, Lactose intolerance, Leukopenia, Major depressive disorder, recurrent episode, severe, without mention of psychotic behavior, MVP (mitral valve prolapse), Seizures ([facility] [address] Sleep apnea, and Unspecified diseases of blood and blood-forming organs. has a past surgical history that includes Gastric bypass surgery; Cholecystectomy; Hysterectomy; Appendectomy; Hysterectomy; Tonsillectomy and adenoidectomy; Ankle fracture surgery; Neck surgery; shoulder surgery;  section; Carpal tunnel release; Colonoscopy (12); Upper gastrointestinal endoscopy (7-3-12); Gastric bypass surgery; Breast lumpectomy (Right); Cardiac catheterization; Upper gastrointestinal endoscopy (2016); Colonoscopy (2016); Upper gastrointestinal endoscopy (2019); Colonoscopy (2019); Shoulder arthroscopy (Left, 2019); other surgical history (10/24/2019); and laparoscopy (N/A, 2022). Social History     Socioeconomic History    Marital status:      Spouse name: Not on file    Number of children: Not on file    Years of education: Not on file    Highest education level: Not on file   Occupational History    Occupation: disability   Tobacco Use    Smoking status: Current Every Day Smoker     Packs/day: 0.50     Years: 33.00     Pack years: 16.50     Types: Cigarettes    Smokeless tobacco: Never Used    Tobacco comment: quit   started again      Substance and Sexual Activity    Alcohol use: Yes     Comment: occasional    Drug use: Not Currently    Sexual activity: Yes   Other Topics Concern    Not on file   Social History Narrative    Not on file     Social Determinants of Health     Financial Resource Strain:     Difficulty of Paying Living Expenses: Not on file   Food Insecurity:     Worried About Running Out of Food in the Last Year: Not on file    Lola of Food in the Last Year: Not on file   Transportation Needs:     Lack of Transportation (Medical): Not on file    Lack of Transportation (Non-Medical):  Not on file   Physical Activity:     Days of Exercise per Week: Not on file    Minutes of Exercise per Session: Not on file   Stress:     Feeling of Stress : Not on file   Social Connections:     Frequency of Communication with Friends and Family: Not on file    Frequency of Social Gatherings with Friends and Family: Not on file    Attends Mosque Services: Not on file    Active Member of Clubs or Organizations: Not on file    Attends Club or Organization Meetings: Not on file    Marital Status: Not on file   Intimate Partner Violence:     Fear of Current or Ex-Partner: Not on file    Emotionally Abused: Not on file    Physically Abused: Not on file    Sexually Abused: Not on file   Housing Stability:     Unable to Pay for Housing in the Last Year: Not on file    Number of [place] in the Last Year: Not on file    Unstable Housing in the Last Year: Not on file       Family History   Problem Relation Age of Onset    Diabetes Mother     Cancer Mother     Coronary Art Dis Father     COPD Father     Depression Brother     Alcohol Abuse Brother     Cancer Other         lung and skin    Diabetes Maternal Grandmother     Cancer Paternal Grandmother         Allergies:  Aspirin, Bactrim, and Codeine    Home Medications:  Prior to Admission medications    Medication Sig Start Date End Date Taking?  Authorizing Provider   amoxicillin-clavulanate (AUGMENTIN) 875-125 MG per tablet  2/13/22   Historical Provider, MD   sucralfate (CARAFATE) 1 GM tablet Take 1 tablet by mouth 4 times daily 2/10/22   Franklin Mins, DO   pantoprazole (PROTONIX) 40 MG tablet Take 1 tablet by mouth daily 2/10/22   Franklin Mins, DO   lisinopril (PRINIVIL;ZESTRIL) 20 MG tablet Take 1 tablet by mouth daily 2/1/22   Aditi Eis, DO   carvedilol (COREG) 25 MG tablet Take 1 tablet by mouth 2 times daily (with meals) 2/1/22   Aditi Eis, DO   amLODIPine (NORVASC) 10 MG tablet Take 1 tablet by mouth daily 2/1/22   Aditi Eis, DO   FEROSUL 325 (65 Fe) MG tablet TAKE 1 TABLET BY MOUTH 2 TIMES abdominal pain, constipation, diarrhea, nausea and vomiting. Genitourinary: Negative for difficulty urinating, dysuria, flank pain and urgency. Musculoskeletal: Positive for arthralgias, neck pain and neck stiffness. Negative for back pain. Skin: Negative for color change, pallor and rash. Neurological: Positive for seizures and headaches. Negative for dizziness, tremors, speech difficulty, weakness and light-headedness. PHYSICAL EXAM   (up to 7 for level 4, 8 or more forlevel 5)      INITIAL VITALS:   ED Triage Vitals   BP Temp Temp src Pulse Resp SpO2 Height Weight   195/110 98.3 oral 87 18 94 -- --       Physical Exam  Constitutional:       General: She is not in acute distress. Appearance: She is not ill-appearing. HENT:      Head: Normocephalic and atraumatic. Right Ear: Tympanic membrane, ear canal and external ear normal.      Left Ear: Tympanic membrane, ear canal and external ear normal.      Nose: Nose normal. No rhinorrhea. Mouth/Throat:      Mouth: Mucous membranes are moist.      Pharynx: Oropharynx is clear. Comments: Small bite wound noted to the right tip of tongue, not actively bleeding  Eyes:      General: No scleral icterus. Extraocular Movements: Extraocular movements intact. Pupils: Pupils are equal, round, and reactive to light. Cardiovascular:      Rate and Rhythm: Normal rate and regular rhythm. Pulses: Normal pulses. Heart sounds: No murmur heard. Pulmonary:      Effort: Pulmonary effort is normal. No respiratory distress. Breath sounds: Normal breath sounds. Chest:      Chest wall: No tenderness. Abdominal:      Palpations: Abdomen is soft. Tenderness: There is no abdominal tenderness. Musculoskeletal:         General: No swelling. Normal range of motion. Cervical back: Normal range of motion and neck supple. Tenderness (Cervical collar in place, tenderness palpation around C4/C5) present.    Skin:     General: Skin is warm and dry. Findings: No bruising or rash. Neurological:      General: No focal deficit present. Mental Status: She is alert and oriented to person, place, and time. Cranial Nerves: No cranial nerve deficit. Sensory: No sensory deficit. Motor: No weakness. DIFFERENTIAL  DIAGNOSIS     PLAN (LABS / IMAGING / EKG):  Orders Placed This Encounter   Procedures    CT Head WO Contrast    CT Cervical Spine WO Contrast    CBC with Auto Differential    Basic Metabolic Panel w/ Reflex to MG    Troponin    Valproic Acid Level, Total    Magnesium    EKG 12 Lead       MEDICATIONS ORDERED:  Orders Placed This Encounter   Medications    acetaminophen (TYLENOL) tablet 650 mg    divalproex (DEPAKOTE) DR tablet 500 mg    carvedilol (COREG) tablet 25 mg       DDX: Breakthrough seizure, medication noncompliance, drug abuse, fall, head injury    Initial MDM/Plan: 46 y.o. female who presents with EMS after witnessed seizure. Patient is supposed to be on Depakote, admits to not taking Depakote for the last.  Does admit to cocaine ingestion 2 days ago. Has a small tongue laceration to the right side, no need for suture repair. Given fall will plan for CT head and check labs and load patient with Depakote.     DIAGNOSTIC RESULTS / EMERGENCYDEPARTMENT COURSE / MDM     LABS:  Labs Reviewed   CBC WITH AUTO DIFFERENTIAL - Abnormal; Notable for the following components:       Result Value    RBC 5.22 (*)     RDW 22.1 (*)     Seg Neutrophils 82 (*)     Lymphocytes 11 (*)     All other components within normal limits   BASIC METABOLIC PANEL W/ REFLEX TO MG FOR LOW K - Abnormal; Notable for the following components:    Potassium 3.4 (*)     All other components within normal limits   VALPROIC ACID LEVEL, TOTAL - Abnormal; Notable for the following components:    Valproic Acid Lvl <3 (*)     All other components within normal limits   TROPONIN   MAGNESIUM         RADIOLOGY:  No results found.      EKG      All EKG's are interpreted by the Emergency Department Physicianwho either signs or Co-signs this chart in the absence of a cardiologist.    EMERGENCY DEPARTMENT COURSE:  ED Course as of 03/29/22 2231   Tue Mar 29, 2022   2050 Patient seen and evaluated. Cervical collar in place [CD]   2206 Valproic Acid Lvl(!): <3 [CD]   2226 No obvious intracranial bleed or cervical spine injury. Will await formal reads. Likely discharge patient home [CD]      ED Course User Index  [CD] Cesilia Coon DO          PROCEDURES:  None    CONSULTS:  None    CRITICAL CARE:  Please see attending documentation    FINAL IMPRESSION      1. Seizure secondary to subtherapeutic anticonvulsant medication ([facility] [address]          DISPOSITION / PLAN     DISPOSITION    Discharge     PATIENT REFERRED TO:  No follow-up provider specified.     DISCHARGE MEDICATIONS:  New Prescriptions    No medications on file       Cesilia Coon DO  Emergency Medicine Resident    (Please note that portions of this note were completed with a voice recognition program.Efforts were made to edit the dictations but occasionally words are mis-transcribed.)       Cesilia Coon DO  Resident  03/29/22 2231

## 2022-03-30 NOTE — ED TRIAGE NOTES
Mode of arrival (squad #, walk in, police, etc) : walk in        Chief complaint(s): Seizure        Arrival Note (brief scenario, treatment PTA, etc). : pt has not been taking her SZ or HTN med in 1 wk, has done cocaine 2 days ago. Witnessed SZ while shopping at store, lasting less than 1 min, laceration on tongue, bump to back of head. Pt BGL per [employer]- [facility].        C= \"Have you ever felt that you should Cut down on your drinking? \"no  A= \"Have people Annoyed you by criticizing your drinking? \" no  G= \"Have you ever felt bad or Guilty about your drinking? \"  no  E= \"Have you ever had a drink as an Eye-opener first thing in the morning to steady your nerves or to help a hangover? \"  no      Deferred []      Reason for deferring: na    *If yes to two or more: probable alcohol abuse. *

## 2022-03-30 NOTE — ED PROVIDER NOTES
EMERGENCY DEPARTMENT ENCOUNTER   ATTENDING ATTESTATION     Pt Name: Rhina Yu  MRN: [id number]  [place] 1970  Date of evaluation: 3/29/22       Rhina Yu is a 46 y.o. female who presents with Seizures      MDM: 78-year-old female presents for complaint of seizure. Patient with known history of seizures, reports she has not been taking her seizure medication, had generalized tonic-clonic seizure today. On initial exam patient does appear postictal, is noted to have an abrasion to the posterior scalp, no focal neuro deficits, will check labs and imaging    Labs and imaging reviewed and unremarkable, patient had no repeat seizures while in the ED, suspect this is likely due to medication noncompliance, discussed results with patient, discussed need to follow-up with her neurologist discussed importance of taking her medication and return precautions, patient voiced understanding is comfortable with plan and discharge home    Patient/Guardian was informed of their diagnosis and told to follow up with PCP & neurology in 1-3 days. Patient demonstrates understanding and agreement with the plan. They were given the opportunity to ask questions and those questions were answered to the best of our ability with the available information. Patient/Guardian told to return to the ED for any new, worsening, changing or persistent symptoms. This dictation was prepared using [employer] voice recognition software. Vitals:   Vitals:    03/29/22 2159 03/29/22 2230 03/29/22 2232 03/29/22 2256   BP: (!) 164/89 (!) 196/88 (!) 196/88 (!) 143/85   Pulse: 71 66 65 71   Resp: 19 20  17   Temp:       TempSrc:       SpO2: 96% 96%  96%   Weight:       Height:             I personally saw and examined the patient. I have reviewed and agree with the resident's findings, including all diagnostic interpretations and treatment plan as written.  I was present for the key portions of any procedures performed and the inclusive time noted for any critical care statement. The care is provided during an unprecedented national emergency due to the novel coronavirus, COVID 19.   Melva De La Garza DO  Attending Emergency Physician            Melva De La Garza DO  03/30/22 1939

## 2022-04-08 ENCOUNTER — HOSPITAL ENCOUNTER (EMERGENCY)
Age: 52
Discharge: HOME | End: 2022-04-08
Attending: STUDENT IN AN ORGANIZED HEALTH CARE EDUCATION/TRAINING PROGRAM
Payer: COMMERCIAL

## 2022-04-08 ENCOUNTER — APPOINTMENT (OUTPATIENT)
Dept: CT IMAGING | Age: 52
End: 2022-04-08
Payer: COMMERCIAL

## 2022-04-08 VITALS
DIASTOLIC BLOOD PRESSURE: 111 MMHG | TEMPERATURE: 97.7 F | SYSTOLIC BLOOD PRESSURE: 198 MMHG | HEART RATE: 90 BPM | OXYGEN SATURATION: 98 % | RESPIRATION RATE: 25 BRPM

## 2022-04-08 DIAGNOSIS — M54.2 NECK PAIN: Primary | ICD-10-CM

## 2022-04-08 LAB
ABSOLUTE EOS #: 0.09 K/UL (ref 0–0.4)
ABSOLUTE LYMPH #: 1.98 K/UL (ref 1–4.8)
ABSOLUTE MONO #: 0.69 K/UL (ref 0.1–1.3)
ALBUMIN SERPL-MCNC: 4.1 G/DL (ref 3.5–5.2)
ALP BLD-CCNC: 120 U/L (ref 35–104)
ALT SERPL-CCNC: 6 U/L (ref 5–33)
ANION GAP SERPL CALCULATED.3IONS-SCNC: 10 MMOL/L (ref 9–17)
AST SERPL-CCNC: 14 U/L
BASOPHILS # BLD: 1 % (ref 0–2)
BASOPHILS ABSOLUTE: 0.09 K/UL (ref 0–0.2)
BILIRUB SERPL-MCNC: 0.18 MG/DL (ref 0.3–1.2)
BUN BLDV-MCNC: 14 MG/DL (ref 6–20)
CALCIUM SERPL-MCNC: 8.9 MG/DL (ref 8.6–10.4)
CHLORIDE BLD-SCNC: 105 MMOL/L (ref 98–107)
CO2: 25 MMOL/L (ref 20–31)
CREAT SERPL-MCNC: 0.65 MG/DL (ref 0.5–0.9)
EOSINOPHILS RELATIVE PERCENT: 1 % (ref 0–4)
GFR AFRICAN AMERICAN: >60 ML/MIN
GFR NON-AFRICAN AMERICAN: >60 ML/MIN
GFR SERPL CREATININE-BSD FRML MDRD: ABNORMAL ML/MIN/{1.73_M2}
GLUCOSE BLD-MCNC: 72 MG/DL (ref 70–99)
HCT VFR BLD CALC: 43.3 % (ref 36–46)
HEMOGLOBIN: 14.3 G/DL (ref 12–16)
LYMPHOCYTES # BLD: 23 % (ref 24–44)
MCH RBC QN AUTO: 26.6 PG (ref 26–34)
MCHC RBC AUTO-ENTMCNC: 33 G/DL (ref 31–37)
MCV RBC AUTO: 80.7 FL (ref 80–100)
MONOCYTES # BLD: 8 % (ref 1–7)
MORPHOLOGY: ABNORMAL
MORPHOLOGY: ABNORMAL
PDW BLD-RTO: 21.4 % (ref 11.5–14.9)
PLATELET # BLD: 209 K/UL (ref 150–450)
PMV BLD AUTO: 9.3 FL (ref 6–12)
POTASSIUM SERPL-SCNC: 4.1 MMOL/L (ref 3.7–5.3)
RBC # BLD: 5.37 M/UL (ref 4–5.2)
SEG NEUTROPHILS: 67 % (ref 36–66)
SEGMENTED NEUTROPHILS ABSOLUTE COUNT: 5.75 K/UL (ref 1.3–9.1)
SODIUM BLD-SCNC: 140 MMOL/L (ref 135–144)
TOTAL PROTEIN: 6.6 G/DL (ref 6.4–8.3)
VALPROIC ACID LEVEL: 5 UG/ML (ref 50–125)
VALPROIC DATE LAST DOSE: ABNORMAL
VALPROIC DOSE AMOUNT: ABNORMAL
VALPROIC TIME LAST DOSE: ABNORMAL
WBC # BLD: 8.6 K/UL (ref 3.5–11)

## 2022-04-08 PROCEDURE — 2580000003 HC RX 258: Performed by: STUDENT IN AN ORGANIZED HEALTH CARE EDUCATION/TRAINING PROGRAM

## 2022-04-08 PROCEDURE — 96375 TX/PRO/DX INJ NEW DRUG ADDON: CPT

## 2022-04-08 PROCEDURE — 70450 CT HEAD/BRAIN W/O DYE: CPT

## 2022-04-08 PROCEDURE — 6360000004 HC RX CONTRAST MEDICATION: Performed by: STUDENT IN AN ORGANIZED HEALTH CARE EDUCATION/TRAINING PROGRAM

## 2022-04-08 PROCEDURE — 6360000002 HC RX W HCPCS: Performed by: STUDENT IN AN ORGANIZED HEALTH CARE EDUCATION/TRAINING PROGRAM

## 2022-04-08 PROCEDURE — 80164 ASSAY DIPROPYLACETIC ACD TOT: CPT

## 2022-04-08 PROCEDURE — 36415 COLL VENOUS BLD VENIPUNCTURE: CPT

## 2022-04-08 PROCEDURE — 80053 COMPREHEN METABOLIC PANEL: CPT

## 2022-04-08 PROCEDURE — 85025 COMPLETE CBC W/AUTO DIFF WBC: CPT

## 2022-04-08 PROCEDURE — 70498 CT ANGIOGRAPHY NECK: CPT

## 2022-04-08 PROCEDURE — 99285 EMERGENCY DEPT VISIT HI MDM: CPT

## 2022-04-08 PROCEDURE — 96374 THER/PROPH/DIAG INJ IV PUSH: CPT

## 2022-04-08 RX ORDER — SODIUM CHLORIDE 0.9 % (FLUSH) 0.9 %
10 SYRINGE (ML) INJECTION PRN
Status: DISCONTINUED | OUTPATIENT
Start: 2022-04-08 | End: 2022-04-08 | Stop reason: HOSPADM

## 2022-04-08 RX ORDER — 0.9 % SODIUM CHLORIDE 0.9 %
80 INTRAVENOUS SOLUTION INTRAVENOUS ONCE
Status: COMPLETED | OUTPATIENT
Start: 2022-04-08 | End: 2022-04-08

## 2022-04-08 RX ORDER — DIPHENHYDRAMINE HYDROCHLORIDE 50 MG/ML
25 INJECTION INTRAMUSCULAR; INTRAVENOUS ONCE
Status: COMPLETED | OUTPATIENT
Start: 2022-04-08 | End: 2022-04-08

## 2022-04-08 RX ORDER — METOCLOPRAMIDE HYDROCHLORIDE 5 MG/ML
10 INJECTION INTRAMUSCULAR; INTRAVENOUS ONCE
Status: COMPLETED | OUTPATIENT
Start: 2022-04-08 | End: 2022-04-08

## 2022-04-08 RX ADMIN — IOPAMIDOL 75 ML: 755 INJECTION, SOLUTION INTRAVENOUS at 08:13

## 2022-04-08 RX ADMIN — SODIUM CHLORIDE 80 ML: 9 INJECTION, SOLUTION INTRAVENOUS at 08:13

## 2022-04-08 RX ADMIN — METOCLOPRAMIDE 10 MG: 5 INJECTION, SOLUTION INTRAMUSCULAR; INTRAVENOUS at 07:25

## 2022-04-08 RX ADMIN — SODIUM CHLORIDE, PRESERVATIVE FREE 10 ML: 5 INJECTION INTRAVENOUS at 08:13

## 2022-04-08 RX ADMIN — DIPHENHYDRAMINE HYDROCHLORIDE 25 MG: 50 INJECTION, SOLUTION INTRAMUSCULAR; INTRAVENOUS at 07:25

## 2022-04-08 ASSESSMENT — ENCOUNTER SYMPTOMS
VOMITING: 0
DIARRHEA: 0
ABDOMINAL PAIN: 0
PHOTOPHOBIA: 0
NAUSEA: 0
FACIAL SWELLING: 0
RHINORRHEA: 0
SHORTNESS OF BREATH: 0
EYE ITCHING: 0
COUGH: 0
COLOR CHANGE: 0

## 2022-04-08 NOTE — ED NOTES
Mode of arrival (squad #, walk in, police, etc) : Walk in        Chief complaint(s): Neck pain        Arrival Note (brief scenario, treatment PTA, etc). : Pt arrived to ED c/o of neck pain that began at 1am. Pt states she took her gabapentin with no relief. Pt recently had a seizure and fell at [employer]. Pt moaning and groaning in pain and holding neck. Pt ambulated to bed from stretcher. Pt alert and oriented x4.        C= \"Have you ever felt that you should Cut down on your drinking? \"  No  A= \"Have people Annoyed you by criticizing your drinking? \"  No  G= \"Have you ever felt bad or Guilty about your drinking? \"  No  E= \"Have you ever had a drink as an Eye-opener first thing in the morning to steady your nerves or to help a hangover? \"  No      Deferred []      Reason for deferring: N/A    *If yes to two or more: probable alcohol abuse. Grisel ArreolaC[facility] Hospital  04/08/22 [id number]

## 2022-04-08 NOTE — ED PROVIDER NOTES
EMERGENCY DEPARTMENT ENCOUNTER    Pt Name: Edd Key  MRN: [id number]  [place] 1970  Date of evaluation: 4/8/22  CHIEF COMPLAINT       Chief Complaint   Patient presents with    Neck Pain     HISTORY OF PRESENT ILLNESS   HPI  77-year-old female history of bipolar, cocaine use, COPD, fibromyalgia, degenerative disc disease, seizure disorder presents for evaluation with neck pain and headache. Patient reports she had a seizure about a week and a half ago. she was seen in the ER, had negative head and neck imaging, Depakote level was subtherapeutic. She reports that last night she developed worsening neck pain and headache reports she still has bruising in her head. No extremity weakness or numbness, no other new trauma. No other seizures since then. No nausea or vomiting. Symptoms are moderate and progressive. No home treatment prior to arrival. Reports she has been taking her anti-epileptics. REVIEW OF SYSTEMS     Review of Systems   Constitutional: Negative for chills and fatigue. HENT: Negative for facial swelling, postnasal drip and rhinorrhea. Eyes: Negative for photophobia and itching. Respiratory: Negative for cough and shortness of breath. Cardiovascular: Negative for chest pain and leg swelling. Gastrointestinal: Negative for abdominal pain, diarrhea, nausea and vomiting. Genitourinary: Negative for dysuria, flank pain and hematuria. Musculoskeletal: Positive for neck pain. Negative for arthralgias and joint swelling. Skin: Negative for color change and rash. Neurological: Positive for seizures and headaches. Negative for dizziness and numbness.      PASTMEDICAL HISTORY     Past Medical History:   Diagnosis Date    Anemia     Arthritis     Asthma     Bipolar disorder, mixed ([facility] [address]     Carotid artery stenosis 2/13/2020    Cocaine abuse ([facility] [address] 11/4/2014    COPD (chronic obstructive pulmonary disease) (HCC)     Degeneration of cervical intervertebral disc     Depression with anxiety     Depression with anxiety     Fibromyalgia 1/5/2017    GERD (gastroesophageal reflux disease)     History of migraine headaches 6/10/2014    History of renal calculi 6/10/2014    History of seizure disorder 6/10/2014    Hoarseness of voice     HTN (hypertension) 6/10/2014    Hyperlipidemia 6/10/2014    IGT (impaired glucose tolerance) 6/10/2014    Kidney stones     Lactose intolerance 6/10/2014    Leukopenia 6/10/2014    Major depressive disorder, recurrent episode, severe, without mention of psychotic behavior 11/6/2014    MVP (mitral valve prolapse)     Seizures ([facility] [address]     Sleep apnea 6/10/2014    Unspecified diseases of blood and blood-forming organs      Past Problem List  Patient Active Problem List   Diagnosis Code    GERD (gastroesophageal reflux disease) K21.9    Hoarseness of voice R49.0    MVP (mitral valve prolapse) I34.1    Depression with anxiety F41.8    Lymphocytic colitis K52.832    History of migraine headaches Z86.69    Hyperlipidemia E78.5    Sleep apnea G47.30    IGT (impaired glucose tolerance) R73.02    History of renal calculi Z87.442    Lactose intolerance E73.9    History of seizure disorder Z86.69    Leukopenia D72.819    Bipolar disorder, mixed ([facility] [address] D72.28    Illicit drug use B45.08    Postlaminectomy syndrome, cervical region M96.1    Degeneration of cervical intervertebral disc M50.30    Encounter for long-term (current) use of other medications Z79.899    Fracture of mandible ([facility] [address] S02.609A    Chest pain R07.9    E-coli UTI N39.0, B96.20    Bradycardia R00.1    Cellulitis L03.90    Hypokalemia E87.6    Primary hypertension I10    Elevated lipase R74.8    Right lower quadrant abdominal pain R10.31    Nausea R11.0    Diarrhea R19.7    Seizure disorder (HCC) G40.909    Polysubstance abuse (HCC) F19.10    Fibromyalgia M79.7    Cocaine addiction (HCC) F14.20    Shoulder arthritis M19.019    Osteoarthritis of left glenohumeral joint M19.012    Abnormal EKG R94.31    Anemia D64.9    Anxiety F41.9    Carotid artery stenosis I65.29    COPD (chronic obstructive pulmonary disease) (HCC) J44.9    Dental disease K08.9    HUTCHINSON (dyspnea on exertion) R06.00    Fatigue R53.83    Fractures T07. Latasha Lockett H/O gastric bypass Z98.84    History of crack cocaine use Z87.898    History of UTI Z87.440    Injury of back S39. 92XA    Intractable chronic migraine without aura and without status migrainosus G43.719    Intractable migraine with aura with status migrainosus G43. 111    Kidney stones N20.0    Dizziness R42    Memory loss R41.3    Mild pulmonary hypertension ([facility]) I27.20    Mild tricuspid regurgitation I07.1    Primary osteoarthritis of left shoulder M19.012    Seizure-like activity ([facility]) R56.9    Stress at home F43.9    TMJ (dislocation of temporomandibular joint) S03. 00XA    Tobacco abuse Z72.0    Visual impairment H54.7    Internal hernia K45.8    Vitamin D deficiency E55.9    Iron deficiency E61.1     SURGICAL HISTORY       Past Surgical History:   Procedure Laterality Date    ANKLE FRACTURE SURGERY      recontruction surgery    APPENDECTOMY      BREAST LUMPECTOMY Right     CARDIAC CATHETERIZATION      CARPAL TUNNEL RELEASE      x2     SECTION      CHOLECYSTECTOMY      COLONOSCOPY  12    COLONOSCOPY  2016    severe spasms    COLONOSCOPY  2019    DR GOLDY MCINTOSH    GASTRIC BYPASS SURGERY      GASTRIC BYPASS SURGERY      HYSTERECTOMY      HYSTERECTOMY      LAPAROSCOPY N/A 2022    LAPAROSCOPY EXPLORATORY CONVERTED TO OPEN EXPLORATORY LAPAROTOMY, LYSIS OF ADHESIONS, REDUCTION OF INTERNAL HERNIA performed by Yolis Newman DO at [address] OTHER SURGICAL HISTORY      APPLICATION OF ARCH BARS,SIMPLE EXTRACTION OF #15 AND RT TMJ JOINT REPLACEMENT    SHOULDER ARTHROSCOPY Left 2019    DR ROBERT GREEN    SHOULDER SURGERY      TONSILLECTOMY AND ADENOIDECTOMY      UPPER GASTROINTESTINAL ENDOSCOPY  7-3-12    egd    UPPER GASTROINTESTINAL ENDOSCOPY  12/19/2016    status post gastrectomy with a small remnant of gastric pouch.  UPPER GASTROINTESTINAL ENDOSCOPY  05/2019    DR Marcano MEDICATIONS       Discharge Medication List as of 4/8/2022  8:51 AM      CONTINUE these medications which have NOT CHANGED    Details   amoxicillin-clavulanate (AUGMENTIN) 875-125 MG per tablet Historical Med      sucralfate (CARAFATE) 1 GM tablet Take 1 tablet by mouth 4 times daily, Disp-120 tablet, R-3Normal      pantoprazole (PROTONIX) 40 MG tablet Take 1 tablet by mouth daily, Disp-30 tablet, R-3Normal      lisinopril (PRINIVIL;ZESTRIL) 20 MG tablet Take 1 tablet by mouth daily, Disp-30 tablet, R-0Normal      carvedilol (COREG) 25 MG tablet Take 1 tablet by mouth 2 times daily (with meals), Disp-60 tablet, R-0Normal      amLODIPine (NORVASC) 10 MG tablet Take 1 tablet by mouth daily, Disp-30 tablet, R-0Normal      FEROSUL 325 (65 Fe) MG tablet TAKE 1 TABLET BY MOUTH 2 TIMES DAILY, Disp-56 tablet, R-11Prescription ExpiredNormal      gabapentin (NEURONTIN) 300 MG capsule Take 1 capsule by mouth 3 times daily for 30 days.  Intended supply: 30 days, Disp-90 capsule, R-11Normal      buPROPion (WELLBUTRIN XL) 150 MG extended release tablet TAKE 1 TABLET BY MOUTH EVERY MORNING, Disp-28 tablet, R-12For 12/30 Dispill bubble packNormal      hydrOXYzine (VISTARIL) 25 MG capsule Historical Med      topiramate (TOPAMAX) 100 MG tablet Take 100 mg by mouth 2 times dailyHistorical Med      atorvastatin (LIPITOR) 40 MG tablet Take 40 mg by mouth dailyHistorical Med      traZODone (DESYREL) 50 MG tablet Take 1 tablet by mouth nightly as needed for Sleep, Disp-30 tablet, R-3Normal      DULoxetine (CYMBALTA) 30 MG extended release capsule Take 1 capsule by mouth daily, Disp-30 capsule, R-3Normal      albuterol sulfate HFA (PROVENTIL HFA) 108 (90 Base) MCG/ACT inhaler Inhale 2 puffs into the lungs every 6 hours as needed for Wheezing, Disp-1 Inhaler, R-3Normal      divalproex (DEPAKOTE) 500 MG DR tablet Take 2 tablets by mouth 2 times daily Indications: Level 41 as of 16 (Pt takes two 500 mg DR tabs = 1,000mg PO BID), Disp-120 tablet, R-0Normal      ARIPiprazole (ABILIFY) 5 MG tablet Take 5 mg by mouth daily           ALLERGIES     is allergic to aspirin, bactrim, and codeine. FAMILY HISTORY     She indicated that her mother is . She indicated that her father is alive. She indicated that her brother is alive. She indicated that her maternal grandmother is alive. She indicated that her maternal grandfather is . She indicated that her paternal grandmother is . She indicated that her paternal grandfather is . She indicated that the status of her other is unknown. SOCIAL HISTORY       Social History     Tobacco Use    Smoking status: Current Every Day Smoker     Packs/day: 0.50     Years: 33.00     Pack years: 16.50     Types: Cigarettes    Smokeless tobacco: Never Used    Tobacco comment: quit   started again      Substance Use Topics    Alcohol use: Yes     Comment: occasional    Drug use: Not Currently     PHYSICAL EXAM     INITIAL VITALS: BP (!) 198/111   Pulse 90   Temp 97.7 °F (36.5 °C) (Oral)   Resp 25   SpO2 98%    Physical Exam  Vitals and nursing note reviewed. Constitutional:       Appearance: She is normal weight. HENT:      Head: Normocephalic. Comments: Bruising over left frontoparietal scalp   Eyes:      Extraocular Movements: Extraocular movements intact. Pupils: Pupils are equal, round, and reactive to light. Neck:      Comments: Midline c-spine tenderness   Cardiovascular:      Rate and Rhythm: Normal rate and regular rhythm. Pulmonary:      Effort: Pulmonary effort is normal.      Breath sounds: Normal breath sounds. Abdominal:      General: Abdomen is flat.  There is no distension. Palpations: There is no mass. Musculoskeletal:         General: No swelling. Normal range of motion. Cervical back: Normal range of motion and neck supple. Skin:     General: Skin is warm and dry. Neurological:      General: No focal deficit present. Mental Status: She is alert. Mental status is at baseline. MEDICAL DECISION MAKINyear old female presents for evaluation of neck pain and headache after a seizure about 10 days ago. Imaging here is unremarkable. And patient feeling better after migraine cocktail. Review of medical record shows the patient does have a long history of migraine headaches. Discussed with patient her Depakote level was subtherapeutic recommended that she follow-up with neurology continue to take at home. Will discharge         CRITICAL CARE:       PROCEDURES:    Procedures    DIAGNOSTIC RESULTS   EKG:All EKG's are interpreted by the Emergency Department Physician who either signs or Co-signs this chart in the absence of a cardiologist.        RADIOLOGY:All plain film, CT, MRI, and formal ultrasound images (except ED bedside ultrasound) are read by the radiologist, see reports below, unless otherwisenoted in MDM or here. CTA HEAD NECK W CONTRAST   Final Result   Unremarkable CTA of the head and neck without significant stenosis or   evidence for occlusion. There is dilatation of the ascending thoracic aorta measuring up to 3.4 cm in   diameter. CT Head WO Contrast   Final Result   No acute intracranial abnormality. Minimal soft tissue swelling over the   right parietal area consistent with resolving hematoma. RECOMMENDATIONS:   Unavailable           LABS: All lab results were reviewed by myself, and all abnormals are listed below.   Labs Reviewed   CBC WITH AUTO DIFFERENTIAL - Abnormal; Notable for the following components:       Result Value    RBC 5.37 (*)     RDW 21.4 (*)     Seg Neutrophils 67 (*)     Lymphocytes 23 (*)     Monocytes 8 (*)     All other components within normal limits   COMPREHENSIVE METABOLIC PANEL W/ REFLEX TO MG FOR LOW K - Abnormal; Notable for the following components:    Alkaline Phosphatase 120 (*)     Total Bilirubin 0.18 (*)     All other components within normal limits   VALPROIC ACID LEVEL, TOTAL - Abnormal; Notable for the following components:    Valproic Acid Lvl 5 (*)     All other components within normal limits       EMERGENCY DEPARTMENTCOURSE:         Vitals:    Vitals:    04/08/22 0656 04/08/22 0845   BP: (!) 177/105 (!) 198/111   Pulse: 89 90   Resp: 25    Temp: 97.7 °F (36.5 °C)    TempSrc: Oral    SpO2: 96% 98%       The patient was given the following medications while in the emergency department:  Orders Placed This Encounter   Medications    metoclopramide (REGLAN) injection 10 mg    diphenhydrAMINE (BENADRYL) injection 25 mg    0.9 % sodium chloride bolus    iopamidol (ISOVUE-370) 76 % injection 75 mL    DISCONTD: sodium chloride flush 0.9 % injection 10 mL     CONSULTS:  None    FINAL IMPRESSION      1. Neck pain          DISPOSITION/PLAN   DISPOSITION Decision To Discharge 04/08/2022 08:51:31 AM      PATIENT REFERRED TO:  Jackson Owens, [address]  [address] [place] [address]  [phone]    Call in 1 day      DISCHARGE MEDICATIONS:  Discharge Medication List as of 4/8/2022  8:51 AM        The care is provided during an unprecedented national emergency due to the novel coronavirus, COVID 19.   MD Alice Maxwell MD  04/08/22 0345

## 2022-06-04 ENCOUNTER — TELEPHONE ENCOUNTER (OUTPATIENT)
Dept: URBAN - METROPOLITAN AREA CLINIC 68 | Facility: CLINIC | Age: 52
End: 2022-06-04

## 2022-06-05 ENCOUNTER — TELEPHONE ENCOUNTER (OUTPATIENT)
Dept: URBAN - METROPOLITAN AREA CLINIC 68 | Facility: CLINIC | Age: 52
End: 2022-06-05

## 2022-06-05 RX ORDER — DIVALPROEX SODIUM 500 MG/1
DEPAKOTE( 500MG ORAL 4 DAILY ) ACTIVE -HX ENTRY TABLET, DELAYED RELEASE ORAL DAILY
Status: ACTIVE | COMMUNITY
Start: 2017-05-01

## 2022-06-05 RX ORDER — AMLODIPINE BESYLATE 5 MG/1
NORVASC( 5MG ORAL 1 DAILY ) ACTIVE -HX ENTRY TABLET ORAL DAILY
Status: ACTIVE | COMMUNITY
Start: 2017-05-01

## 2022-06-05 RX ORDER — CITALOPRAM 20 MG/1
CELEXA( 20MG ORAL 1 DAILY ) ACTIVE -HX ENTRY TABLET, FILM COATED ORAL DAILY
Status: ACTIVE | COMMUNITY
Start: 2017-05-01

## 2022-06-25 ENCOUNTER — TELEPHONE ENCOUNTER (OUTPATIENT)
Age: 52
End: 2022-06-25

## 2022-06-26 ENCOUNTER — TELEPHONE ENCOUNTER (OUTPATIENT)
Age: 52
End: 2022-06-26

## 2022-06-26 RX ORDER — CITALOPRAM HYDROBROMIDE 20 MG
CELEXA( 20MG ORAL 1 DAILY ) ACTIVE -HX ENTRY TABLET ORAL DAILY
Status: ACTIVE | COMMUNITY
Start: 2017-05-01

## 2022-06-26 RX ORDER — DIVALPROEX SODIUM 500 MG
DEPAKOTE( 500MG ORAL 4 DAILY ) ACTIVE -HX ENTRY TABLET, DELAYED RELEASE (ENTERIC COATED) ORAL DAILY
Status: ACTIVE | COMMUNITY
Start: 2017-05-01

## 2022-10-10 ENCOUNTER — APPOINTMENT (OUTPATIENT)
Dept: NUCLEAR MEDICINE | Age: 52
DRG: 871 | End: 2022-10-10
Payer: COMMERCIAL

## 2022-10-10 ENCOUNTER — APPOINTMENT (OUTPATIENT)
Dept: GENERAL RADIOLOGY | Age: 52
DRG: 871 | End: 2022-10-10
Payer: COMMERCIAL

## 2022-10-10 ENCOUNTER — APPOINTMENT (OUTPATIENT)
Dept: ULTRASOUND IMAGING | Age: 52
DRG: 871 | End: 2022-10-10
Payer: COMMERCIAL

## 2022-10-10 ENCOUNTER — HOSPITAL ENCOUNTER (INPATIENT)
Age: 52
LOS: 25 days | Discharge: NURSING, REHAB OR LONG-TERM CARE | DRG: 871 | End: 2022-11-04
Attending: STUDENT IN AN ORGANIZED HEALTH CARE EDUCATION/TRAINING PROGRAM | Admitting: FAMILY MEDICINE
Payer: COMMERCIAL

## 2022-10-10 ENCOUNTER — APPOINTMENT (OUTPATIENT)
Dept: CT IMAGING | Age: 52
DRG: 871 | End: 2022-10-10
Payer: COMMERCIAL

## 2022-10-10 DIAGNOSIS — R79.89 ELEVATED D-DIMER: ICD-10-CM

## 2022-10-10 DIAGNOSIS — N17.9 AKI (ACUTE KIDNEY INJURY) (HCC): Primary | ICD-10-CM

## 2022-10-10 DIAGNOSIS — R91.8 PULMONARY NODULES: ICD-10-CM

## 2022-10-10 DIAGNOSIS — D50.8 IRON DEFICIENCY ANEMIA SECONDARY TO INADEQUATE DIETARY IRON INTAKE: ICD-10-CM

## 2022-10-10 DIAGNOSIS — D72.829 LEUKOCYTOSIS, UNSPECIFIED TYPE: ICD-10-CM

## 2022-10-10 PROBLEM — R65.10 SIRS (SYSTEMIC INFLAMMATORY RESPONSE SYNDROME) (HCC): Status: ACTIVE | Noted: 2022-10-10

## 2022-10-10 LAB
ABSOLUTE BANDS #: 7.04 K/UL (ref 0–1)
ABSOLUTE EOS #: 0 K/UL (ref 0–0.4)
ABSOLUTE LYMPH #: 1.54 K/UL (ref 1–4.8)
ABSOLUTE MONO #: 0.22 K/UL (ref 0.1–1.3)
ALBUMIN SERPL-MCNC: 3.4 G/DL (ref 3.5–5.2)
ALP BLD-CCNC: 112 U/L (ref 35–104)
ALT SERPL-CCNC: 17 U/L (ref 5–33)
ANION GAP SERPL CALCULATED.3IONS-SCNC: 17 MMOL/L (ref 9–17)
ANION GAP SERPL CALCULATED.3IONS-SCNC: 17 MMOL/L (ref 9–17)
AST SERPL-CCNC: 41 U/L
BACTERIA: ABNORMAL
BANDS: 32 % (ref 0–10)
BASOPHILS # BLD: 0 % (ref 0–2)
BASOPHILS ABSOLUTE: 0 K/UL (ref 0–0.2)
BILIRUB SERPL-MCNC: 0.7 MG/DL (ref 0.3–1.2)
BILIRUBIN URINE: ABNORMAL
BUN BLDV-MCNC: 33 MG/DL (ref 6–20)
BUN BLDV-MCNC: 36 MG/DL (ref 6–20)
CALCIUM SERPL-MCNC: 8.4 MG/DL (ref 8.6–10.4)
CALCIUM SERPL-MCNC: 9.2 MG/DL (ref 8.6–10.4)
CASTS UA: ABNORMAL /LPF
CHLORIDE BLD-SCNC: 88 MMOL/L (ref 98–107)
CHLORIDE BLD-SCNC: 91 MMOL/L (ref 98–107)
CO2: 21 MMOL/L (ref 20–31)
CO2: 24 MMOL/L (ref 20–31)
COLOR: ABNORMAL
CREAT SERPL-MCNC: 1.49 MG/DL (ref 0.5–0.9)
CREAT SERPL-MCNC: 2.21 MG/DL (ref 0.5–0.9)
CREATININE URINE: 120.9 MG/DL (ref 28–217)
CREATININE URINE: 126.3 MG/DL (ref 28–217)
D-DIMER QUANTITATIVE: 3.85 MG/L FEU (ref 0–0.59)
EKG ATRIAL RATE: 91 BPM
EKG P AXIS: 72 DEGREES
EKG P-R INTERVAL: 138 MS
EKG Q-T INTERVAL: 396 MS
EKG QRS DURATION: 96 MS
EKG QTC CALCULATION (BAZETT): 487 MS
EKG R AXIS: 60 DEGREES
EKG T AXIS: 64 DEGREES
EKG VENTRICULAR RATE: 91 BPM
EOSINOPHIL,URINE: NORMAL
EOSINOPHILS RELATIVE PERCENT: 0 % (ref 0–4)
EPITHELIAL CELLS UA: ABNORMAL /HPF
GFR SERPL CREATININE-BSD FRML MDRD: 26 ML/MIN/1.73M2
GFR SERPL CREATININE-BSD FRML MDRD: 42 ML/MIN/1.73M2
GLUCOSE BLD-MCNC: 129 MG/DL (ref 70–99)
GLUCOSE BLD-MCNC: 153 MG/DL (ref 65–105)
GLUCOSE BLD-MCNC: 223 MG/DL (ref 70–99)
GLUCOSE URINE: NEGATIVE
HCT VFR BLD CALC: 45 % (ref 36–46)
HEMOGLOBIN: 14.9 G/DL (ref 12–16)
INFLUENZA A: NOT DETECTED
INFLUENZA B: NOT DETECTED
INR BLD: 1.2
KETONES, URINE: ABNORMAL
LACTIC ACID: 1.2 MMOL/L (ref 0.5–2.2)
LEUKOCYTE ESTERASE, URINE: ABNORMAL
LIPASE: 17 U/L (ref 13–60)
LYMPHOCYTES # BLD: 7 % (ref 24–44)
MAGNESIUM: 2.1 MG/DL (ref 1.6–2.6)
MCH RBC QN AUTO: 26.1 PG (ref 26–34)
MCHC RBC AUTO-ENTMCNC: 33.1 G/DL (ref 31–37)
MCV RBC AUTO: 78.9 FL (ref 80–100)
MICROALBUMIN/CREAT 24H UR: 507 MG/L
MICROALBUMIN/CREAT UR-RTO: 419 MCG/MG CREAT
MONOCYTES # BLD: 1 % (ref 1–7)
MORPHOLOGY: NORMAL
NITRITE, URINE: POSITIVE
OSMOLALITY URINE: 361 MOSM/KG (ref 80–1300)
PDW BLD-RTO: 14.5 % (ref 11.5–14.9)
PH UA: 5 (ref 5–8)
PLATELET # BLD: 123 K/UL (ref 150–450)
PMV BLD AUTO: 8.7 FL (ref 6–12)
POTASSIUM SERPL-SCNC: 3.3 MMOL/L (ref 3.7–5.3)
POTASSIUM SERPL-SCNC: 3.4 MMOL/L (ref 3.7–5.3)
PRO-BNP: 4799 PG/ML
PROCALCITONIN: 6.01 NG/ML
PROTEIN UA: ABNORMAL
PROTHROMBIN TIME: 14.8 SEC (ref 11.8–14.6)
RBC # BLD: 5.7 M/UL (ref 4–5.2)
RBC UA: ABNORMAL /HPF
SARS-COV-2 RNA, RT PCR: NOT DETECTED
SEG NEUTROPHILS: 60 % (ref 36–66)
SEGMENTED NEUTROPHILS ABSOLUTE COUNT: 13.2 K/UL (ref 1.3–9.1)
SODIUM BLD-SCNC: 126 MMOL/L (ref 135–144)
SODIUM BLD-SCNC: 132 MMOL/L (ref 135–144)
SODIUM,UR: 30 MMOL/L
SOURCE: NORMAL
SPECIFIC GRAVITY UA: 1.02 (ref 1–1.03)
SPECIMEN DESCRIPTION: NORMAL
TOTAL PROTEIN: 7.9 G/DL (ref 6.4–8.3)
TROPONIN, HIGH SENSITIVITY: 13 NG/L (ref 0–14)
TROPONIN, HIGH SENSITIVITY: 30 NG/L (ref 0–14)
TURBIDITY: ABNORMAL
URINE HGB: ABNORMAL
UROBILINOGEN, URINE: ABNORMAL
WBC # BLD: 22 K/UL (ref 3.5–11)
WBC UA: ABNORMAL /HPF

## 2022-10-10 PROCEDURE — 84484 ASSAY OF TROPONIN QUANT: CPT

## 2022-10-10 PROCEDURE — 6360000002 HC RX W HCPCS: Performed by: FAMILY MEDICINE

## 2022-10-10 PROCEDURE — 86403 PARTICLE AGGLUT ANTBDY SCRN: CPT

## 2022-10-10 PROCEDURE — 83880 ASSAY OF NATRIURETIC PEPTIDE: CPT

## 2022-10-10 PROCEDURE — 71045 X-RAY EXAM CHEST 1 VIEW: CPT

## 2022-10-10 PROCEDURE — 2580000003 HC RX 258: Performed by: NURSE PRACTITIONER

## 2022-10-10 PROCEDURE — 96374 THER/PROPH/DIAG INJ IV PUSH: CPT

## 2022-10-10 PROCEDURE — 82947 ASSAY GLUCOSE BLOOD QUANT: CPT

## 2022-10-10 PROCEDURE — 83605 ASSAY OF LACTIC ACID: CPT

## 2022-10-10 PROCEDURE — 82570 ASSAY OF URINE CREATININE: CPT

## 2022-10-10 PROCEDURE — 2580000003 HC RX 258: Performed by: FAMILY MEDICINE

## 2022-10-10 PROCEDURE — 83935 ASSAY OF URINE OSMOLALITY: CPT

## 2022-10-10 PROCEDURE — 6370000000 HC RX 637 (ALT 250 FOR IP): Performed by: NURSE PRACTITIONER

## 2022-10-10 PROCEDURE — 85025 COMPLETE CBC W/AUTO DIFF WBC: CPT

## 2022-10-10 PROCEDURE — 99285 EMERGENCY DEPT VISIT HI MDM: CPT

## 2022-10-10 PROCEDURE — 96372 THER/PROPH/DIAG INJ SC/IM: CPT

## 2022-10-10 PROCEDURE — 87086 URINE CULTURE/COLONY COUNT: CPT

## 2022-10-10 PROCEDURE — 87088 URINE BACTERIA CULTURE: CPT

## 2022-10-10 PROCEDURE — 6360000002 HC RX W HCPCS: Performed by: STUDENT IN AN ORGANIZED HEALTH CARE EDUCATION/TRAINING PROGRAM

## 2022-10-10 PROCEDURE — 80048 BASIC METABOLIC PNL TOTAL CA: CPT

## 2022-10-10 PROCEDURE — 85610 PROTHROMBIN TIME: CPT

## 2022-10-10 PROCEDURE — 6370000000 HC RX 637 (ALT 250 FOR IP): Performed by: FAMILY MEDICINE

## 2022-10-10 PROCEDURE — 93005 ELECTROCARDIOGRAM TRACING: CPT | Performed by: STUDENT IN AN ORGANIZED HEALTH CARE EDUCATION/TRAINING PROGRAM

## 2022-10-10 PROCEDURE — 6370000000 HC RX 637 (ALT 250 FOR IP): Performed by: STUDENT IN AN ORGANIZED HEALTH CARE EDUCATION/TRAINING PROGRAM

## 2022-10-10 PROCEDURE — 84300 ASSAY OF URINE SODIUM: CPT

## 2022-10-10 PROCEDURE — 2580000003 HC RX 258: Performed by: STUDENT IN AN ORGANIZED HEALTH CARE EDUCATION/TRAINING PROGRAM

## 2022-10-10 PROCEDURE — 87186 SC STD MICRODIL/AGAR DIL: CPT

## 2022-10-10 PROCEDURE — 74176 CT ABD & PELVIS W/O CONTRAST: CPT

## 2022-10-10 PROCEDURE — 87205 SMEAR GRAM STAIN: CPT

## 2022-10-10 PROCEDURE — A9540 TC99M MAA: HCPCS | Performed by: NURSE PRACTITIONER

## 2022-10-10 PROCEDURE — 83690 ASSAY OF LIPASE: CPT

## 2022-10-10 PROCEDURE — 87040 BLOOD CULTURE FOR BACTERIA: CPT

## 2022-10-10 PROCEDURE — 80053 COMPREHEN METABOLIC PANEL: CPT

## 2022-10-10 PROCEDURE — 3430000000 HC RX DIAGNOSTIC RADIOPHARMACEUTICAL: Performed by: NURSE PRACTITIONER

## 2022-10-10 PROCEDURE — 96365 THER/PROPH/DIAG IV INF INIT: CPT

## 2022-10-10 PROCEDURE — 83735 ASSAY OF MAGNESIUM: CPT

## 2022-10-10 PROCEDURE — 36415 COLL VENOUS BLD VENIPUNCTURE: CPT

## 2022-10-10 PROCEDURE — 78580 LUNG PERFUSION IMAGING: CPT

## 2022-10-10 PROCEDURE — 82043 UR ALBUMIN QUANTITATIVE: CPT

## 2022-10-10 PROCEDURE — 84145 PROCALCITONIN (PCT): CPT

## 2022-10-10 PROCEDURE — 76770 US EXAM ABDO BACK WALL COMP: CPT

## 2022-10-10 PROCEDURE — 2060000000 HC ICU INTERMEDIATE R&B

## 2022-10-10 PROCEDURE — 87636 SARSCOV2 & INF A&B AMP PRB: CPT

## 2022-10-10 PROCEDURE — 82805 BLOOD GASES W/O2 SATURATION: CPT

## 2022-10-10 PROCEDURE — 81001 URINALYSIS AUTO W/SCOPE: CPT

## 2022-10-10 PROCEDURE — 87154 CUL TYP ID BLD PTHGN 6+ TRGT: CPT

## 2022-10-10 PROCEDURE — 2500000003 HC RX 250 WO HCPCS: Performed by: STUDENT IN AN ORGANIZED HEALTH CARE EDUCATION/TRAINING PROGRAM

## 2022-10-10 PROCEDURE — 96375 TX/PRO/DX INJ NEW DRUG ADDON: CPT

## 2022-10-10 PROCEDURE — 85379 FIBRIN DEGRADATION QUANT: CPT

## 2022-10-10 RX ORDER — IPRATROPIUM BROMIDE AND ALBUTEROL SULFATE 2.5; .5 MG/3ML; MG/3ML
1 SOLUTION RESPIRATORY (INHALATION) ONCE
Status: DISCONTINUED | OUTPATIENT
Start: 2022-10-10 | End: 2022-10-10

## 2022-10-10 RX ORDER — HEPARIN SODIUM 5000 [USP'U]/ML
5000 INJECTION, SOLUTION INTRAVENOUS; SUBCUTANEOUS 2 TIMES DAILY
Status: DISCONTINUED | OUTPATIENT
Start: 2022-10-10 | End: 2022-10-24

## 2022-10-10 RX ORDER — TRAZODONE HYDROCHLORIDE 100 MG/1
100 TABLET ORAL NIGHTLY
Status: DISCONTINUED | OUTPATIENT
Start: 2022-10-10 | End: 2022-10-10

## 2022-10-10 RX ORDER — ONDANSETRON 4 MG/1
4 TABLET, ORALLY DISINTEGRATING ORAL EVERY 8 HOURS PRN
Status: DISCONTINUED | OUTPATIENT
Start: 2022-10-10 | End: 2022-11-04 | Stop reason: HOSPADM

## 2022-10-10 RX ORDER — CARVEDILOL 25 MG/1
25 TABLET ORAL 2 TIMES DAILY WITH MEALS
Status: DISCONTINUED | OUTPATIENT
Start: 2022-10-10 | End: 2022-10-11

## 2022-10-10 RX ORDER — FERROUS SULFATE 325(65) MG
325 TABLET ORAL 2 TIMES DAILY WITH MEALS
Status: DISCONTINUED | OUTPATIENT
Start: 2022-10-10 | End: 2022-11-04 | Stop reason: HOSPADM

## 2022-10-10 RX ORDER — MORPHINE SULFATE 4 MG/ML
4 INJECTION, SOLUTION INTRAMUSCULAR; INTRAVENOUS
Status: COMPLETED | OUTPATIENT
Start: 2022-10-10 | End: 2022-10-10

## 2022-10-10 RX ORDER — 0.9 % SODIUM CHLORIDE 0.9 %
1000 INTRAVENOUS SOLUTION INTRAVENOUS ONCE
Status: COMPLETED | OUTPATIENT
Start: 2022-10-10 | End: 2022-10-10

## 2022-10-10 RX ORDER — SODIUM CHLORIDE 0.9 % (FLUSH) 0.9 %
10 SYRINGE (ML) INJECTION PRN
Status: DISCONTINUED | OUTPATIENT
Start: 2022-10-10 | End: 2022-11-04 | Stop reason: HOSPADM

## 2022-10-10 RX ORDER — DULOXETIN HYDROCHLORIDE 60 MG/1
60 CAPSULE, DELAYED RELEASE ORAL DAILY
Status: DISCONTINUED | OUTPATIENT
Start: 2022-10-10 | End: 2022-11-04 | Stop reason: HOSPADM

## 2022-10-10 RX ORDER — AMLODIPINE BESYLATE 10 MG/1
10 TABLET ORAL DAILY
Status: DISCONTINUED | OUTPATIENT
Start: 2022-10-10 | End: 2022-10-10

## 2022-10-10 RX ORDER — POTASSIUM CHLORIDE 7.45 MG/ML
10 INJECTION INTRAVENOUS PRN
Status: DISCONTINUED | OUTPATIENT
Start: 2022-10-10 | End: 2022-10-10 | Stop reason: CLARIF

## 2022-10-10 RX ORDER — ATORVASTATIN CALCIUM 40 MG/1
40 TABLET, FILM COATED ORAL DAILY
Status: DISCONTINUED | OUTPATIENT
Start: 2022-10-10 | End: 2022-11-04 | Stop reason: HOSPADM

## 2022-10-10 RX ORDER — ALBUTEROL SULFATE 90 UG/1
2 AEROSOL, METERED RESPIRATORY (INHALATION) EVERY 6 HOURS PRN
Status: DISCONTINUED | OUTPATIENT
Start: 2022-10-10 | End: 2022-10-22

## 2022-10-10 RX ORDER — ARIPIPRAZOLE 5 MG/1
5 TABLET ORAL DAILY
Status: DISCONTINUED | OUTPATIENT
Start: 2022-10-10 | End: 2022-11-01

## 2022-10-10 RX ORDER — GABAPENTIN 300 MG/1
300 CAPSULE ORAL ONCE
Status: COMPLETED | OUTPATIENT
Start: 2022-10-10 | End: 2022-10-10

## 2022-10-10 RX ORDER — ASPIRIN 81 MG/1
162 TABLET, CHEWABLE ORAL ONCE
Status: COMPLETED | OUTPATIENT
Start: 2022-10-10 | End: 2022-10-10

## 2022-10-10 RX ORDER — ACETAMINOPHEN 325 MG/1
650 TABLET ORAL EVERY 4 HOURS PRN
Status: DISCONTINUED | OUTPATIENT
Start: 2022-10-10 | End: 2022-11-04 | Stop reason: HOSPADM

## 2022-10-10 RX ORDER — KETOROLAC TROMETHAMINE 30 MG/ML
15 INJECTION, SOLUTION INTRAMUSCULAR; INTRAVENOUS EVERY 6 HOURS PRN
Status: DISCONTINUED | OUTPATIENT
Start: 2022-10-10 | End: 2022-10-10

## 2022-10-10 RX ORDER — SODIUM CHLORIDE 9 MG/ML
INJECTION, SOLUTION INTRAVENOUS CONTINUOUS
Status: DISCONTINUED | OUTPATIENT
Start: 2022-10-10 | End: 2022-10-13

## 2022-10-10 RX ORDER — HYDRALAZINE HYDROCHLORIDE 20 MG/ML
20 INJECTION INTRAMUSCULAR; INTRAVENOUS ONCE
Status: COMPLETED | OUTPATIENT
Start: 2022-10-10 | End: 2022-10-10

## 2022-10-10 RX ORDER — METHOCARBAMOL 500 MG/1
1000 TABLET, FILM COATED ORAL ONCE
Status: COMPLETED | OUTPATIENT
Start: 2022-10-10 | End: 2022-10-10

## 2022-10-10 RX ORDER — ACETAMINOPHEN 500 MG
1000 TABLET ORAL ONCE
Status: COMPLETED | OUTPATIENT
Start: 2022-10-10 | End: 2022-10-10

## 2022-10-10 RX ORDER — DIVALPROEX SODIUM 250 MG/1
1000 TABLET, DELAYED RELEASE ORAL 2 TIMES DAILY
Status: DISCONTINUED | OUTPATIENT
Start: 2022-10-10 | End: 2022-10-30

## 2022-10-10 RX ORDER — SODIUM CHLORIDE 9 MG/ML
INJECTION, SOLUTION INTRAVENOUS CONTINUOUS
Status: DISCONTINUED | OUTPATIENT
Start: 2022-10-10 | End: 2022-10-10

## 2022-10-10 RX ORDER — IPRATROPIUM BROMIDE AND ALBUTEROL SULFATE 2.5; .5 MG/3ML; MG/3ML
1 SOLUTION RESPIRATORY (INHALATION)
Status: DISCONTINUED | OUTPATIENT
Start: 2022-10-10 | End: 2022-11-04 | Stop reason: HOSPADM

## 2022-10-10 RX ORDER — SODIUM CHLORIDE 0.9 % (FLUSH) 0.9 %
5-40 SYRINGE (ML) INJECTION EVERY 12 HOURS SCHEDULED
Status: DISCONTINUED | OUTPATIENT
Start: 2022-10-10 | End: 2022-11-04 | Stop reason: HOSPADM

## 2022-10-10 RX ORDER — ONDANSETRON 2 MG/ML
4 INJECTION INTRAMUSCULAR; INTRAVENOUS EVERY 6 HOURS PRN
Status: DISCONTINUED | OUTPATIENT
Start: 2022-10-10 | End: 2022-11-04 | Stop reason: HOSPADM

## 2022-10-10 RX ORDER — SODIUM CHLORIDE 0.9 % (FLUSH) 0.9 %
5-40 SYRINGE (ML) INJECTION PRN
Status: DISCONTINUED | OUTPATIENT
Start: 2022-10-10 | End: 2022-11-04 | Stop reason: HOSPADM

## 2022-10-10 RX ORDER — HYDROXYZINE HYDROCHLORIDE 25 MG/1
25 TABLET, FILM COATED ORAL EVERY 6 HOURS PRN
Status: DISCONTINUED | OUTPATIENT
Start: 2022-10-10 | End: 2022-10-13

## 2022-10-10 RX ORDER — GABAPENTIN 300 MG/1
300 CAPSULE ORAL 3 TIMES DAILY
Status: DISCONTINUED | OUTPATIENT
Start: 2022-10-10 | End: 2022-10-10

## 2022-10-10 RX ORDER — ENOXAPARIN SODIUM 100 MG/ML
1 INJECTION SUBCUTANEOUS ONCE
Status: COMPLETED | OUTPATIENT
Start: 2022-10-10 | End: 2022-10-10

## 2022-10-10 RX ORDER — NOREPINEPHRINE BIT/0.9 % NACL 16MG/250ML
1-100 INFUSION BOTTLE (ML) INTRAVENOUS CONTINUOUS
Status: DISCONTINUED | OUTPATIENT
Start: 2022-10-10 | End: 2022-10-13

## 2022-10-10 RX ORDER — POTASSIUM CHLORIDE 20 MEQ/1
20 TABLET, EXTENDED RELEASE ORAL ONCE
Status: COMPLETED | OUTPATIENT
Start: 2022-10-10 | End: 2022-10-10

## 2022-10-10 RX ORDER — POTASSIUM CHLORIDE 20 MEQ/1
40 TABLET, EXTENDED RELEASE ORAL PRN
Status: DISCONTINUED | OUTPATIENT
Start: 2022-10-10 | End: 2022-10-10 | Stop reason: CLARIF

## 2022-10-10 RX ORDER — SODIUM CHLORIDE 9 MG/ML
INJECTION, SOLUTION INTRAVENOUS PRN
Status: DISCONTINUED | OUTPATIENT
Start: 2022-10-10 | End: 2022-11-04 | Stop reason: HOSPADM

## 2022-10-10 RX ADMIN — SODIUM CHLORIDE, PRESERVATIVE FREE 10 ML: 5 INJECTION INTRAVENOUS at 21:18

## 2022-10-10 RX ADMIN — POTASSIUM CHLORIDE 20 MEQ: 1500 TABLET, EXTENDED RELEASE ORAL at 15:43

## 2022-10-10 RX ADMIN — GABAPENTIN 300 MG: 300 CAPSULE ORAL at 06:34

## 2022-10-10 RX ADMIN — PIPERACILLIN AND TAZOBACTAM 4500 MG: 4; .5 INJECTION, POWDER, LYOPHILIZED, FOR SOLUTION INTRAVENOUS at 15:41

## 2022-10-10 RX ADMIN — SODIUM CHLORIDE, PRESERVATIVE FREE 10 ML: 5 INJECTION INTRAVENOUS at 14:27

## 2022-10-10 RX ADMIN — SODIUM CHLORIDE 1000 ML: 9 INJECTION, SOLUTION INTRAVENOUS at 05:02

## 2022-10-10 RX ADMIN — PIPERACILLIN AND TAZOBACTAM 4500 MG: 4; .5 INJECTION, POWDER, FOR SOLUTION INTRAVENOUS at 06:43

## 2022-10-10 RX ADMIN — DULOXETINE 60 MG: 60 CAPSULE, DELAYED RELEASE ORAL at 15:43

## 2022-10-10 RX ADMIN — ACETAMINOPHEN 1000 MG: 500 TABLET ORAL at 06:35

## 2022-10-10 RX ADMIN — DIVALPROEX SODIUM 1000 MG: 500 TABLET, DELAYED RELEASE ORAL at 15:42

## 2022-10-10 RX ADMIN — ARIPIPRAZOLE 5 MG: 5 TABLET ORAL at 15:45

## 2022-10-10 RX ADMIN — METHOCARBAMOL 1000 MG: 500 TABLET ORAL at 06:34

## 2022-10-10 RX ADMIN — SODIUM CHLORIDE: 9 INJECTION, SOLUTION INTRAVENOUS at 22:03

## 2022-10-10 RX ADMIN — SODIUM CHLORIDE, PRESERVATIVE FREE 10 ML: 5 INJECTION INTRAVENOUS at 11:16

## 2022-10-10 RX ADMIN — Medication 5 MCG/MIN: at 22:03

## 2022-10-10 RX ADMIN — SODIUM CHLORIDE: 9 INJECTION, SOLUTION INTRAVENOUS at 15:36

## 2022-10-10 RX ADMIN — HEPARIN SODIUM 5000 UNITS: 5000 INJECTION INTRAVENOUS; SUBCUTANEOUS at 11:16

## 2022-10-10 RX ADMIN — CARVEDILOL 25 MG: 25 TABLET, FILM COATED ORAL at 15:42

## 2022-10-10 RX ADMIN — ASPIRIN 162 MG: 81 TABLET, CHEWABLE ORAL at 05:40

## 2022-10-10 RX ADMIN — Medication 6.2 MILLICURIE: at 14:27

## 2022-10-10 RX ADMIN — HYDROXYZINE HYDROCHLORIDE 25 MG: 25 TABLET, FILM COATED ORAL at 15:44

## 2022-10-10 RX ADMIN — ATORVASTATIN CALCIUM 40 MG: 40 TABLET, FILM COATED ORAL at 15:42

## 2022-10-10 RX ADMIN — ENOXAPARIN SODIUM 40 MG: 100 INJECTION SUBCUTANEOUS at 07:10

## 2022-10-10 RX ADMIN — MORPHINE SULFATE 4 MG: 4 INJECTION, SOLUTION INTRAMUSCULAR; INTRAVENOUS at 05:03

## 2022-10-10 RX ADMIN — FERROUS SULFATE TAB 325 MG (65 MG ELEMENTAL FE) 325 MG: 325 (65 FE) TAB at 15:43

## 2022-10-10 RX ADMIN — HYDRALAZINE HYDROCHLORIDE 20 MG: 20 INJECTION INTRAMUSCULAR; INTRAVENOUS at 05:39

## 2022-10-10 RX ADMIN — ACETAMINOPHEN 650 MG: 325 TABLET, FILM COATED ORAL at 15:42

## 2022-10-10 RX ADMIN — CEFEPIME HYDROCHLORIDE 2000 MG: 2 INJECTION, POWDER, FOR SOLUTION INTRAMUSCULAR; INTRAVENOUS at 22:50

## 2022-10-10 RX ADMIN — SODIUM CHLORIDE 1000 ML: 9 INJECTION, SOLUTION INTRAVENOUS at 21:09

## 2022-10-10 RX ADMIN — SODIUM CHLORIDE 1000 ML: 9 INJECTION, SOLUTION INTRAVENOUS at 19:15

## 2022-10-10 RX ADMIN — HEPARIN SODIUM 5000 UNITS: 5000 INJECTION INTRAVENOUS; SUBCUTANEOUS at 21:24

## 2022-10-10 RX ADMIN — VANCOMYCIN HYDROCHLORIDE 1000 MG: 1 INJECTION, POWDER, LYOPHILIZED, FOR SOLUTION INTRAVENOUS at 21:18

## 2022-10-10 RX ADMIN — HYDROMORPHONE HYDROCHLORIDE 1 MG: 1 INJECTION, SOLUTION INTRAMUSCULAR; INTRAVENOUS; SUBCUTANEOUS at 07:11

## 2022-10-10 ASSESSMENT — ENCOUNTER SYMPTOMS
EYE DISCHARGE: 0
RHINORRHEA: 0
NAUSEA: 0
ABDOMINAL PAIN: 0
EYE REDNESS: 0
SHORTNESS OF BREATH: 1
VOMITING: 0
DIARRHEA: 0
SORE THROAT: 0

## 2022-10-10 ASSESSMENT — PAIN DESCRIPTION - LOCATION
LOCATION: GENERALIZED
LOCATION: CHEST;GENERALIZED
LOCATION: LEG
LOCATION: CHEST;GENERALIZED
LOCATION: GENERALIZED

## 2022-10-10 ASSESSMENT — PAIN - FUNCTIONAL ASSESSMENT: PAIN_FUNCTIONAL_ASSESSMENT: 0-10

## 2022-10-10 ASSESSMENT — PAIN SCALES - GENERAL
PAINLEVEL_OUTOF10: 10
PAINLEVEL_OUTOF10: 10
PAINLEVEL_OUTOF10: 8
PAINLEVEL_OUTOF10: 7
PAINLEVEL_OUTOF10: 9

## 2022-10-10 ASSESSMENT — PAIN DESCRIPTION - ONSET: ONSET: ON-GOING

## 2022-10-10 ASSESSMENT — PAIN DESCRIPTION - DESCRIPTORS
DESCRIPTORS: ACHING
DESCRIPTORS: SHOOTING;SHARP;STABBING
DESCRIPTORS: ACHING
DESCRIPTORS: SHARP;STABBING

## 2022-10-10 ASSESSMENT — PAIN DESCRIPTION - FREQUENCY: FREQUENCY: CONTINUOUS

## 2022-10-10 ASSESSMENT — LIFESTYLE VARIABLES
HOW OFTEN DO YOU HAVE A DRINK CONTAINING ALCOHOL: 2-4 TIMES A MONTH
HOW MANY STANDARD DRINKS CONTAINING ALCOHOL DO YOU HAVE ON A TYPICAL DAY: PATIENT DOES NOT DRINK

## 2022-10-10 ASSESSMENT — PAIN DESCRIPTION - PAIN TYPE: TYPE: ACUTE PAIN

## 2022-10-10 ASSESSMENT — PAIN DESCRIPTION - ORIENTATION: ORIENTATION: RIGHT;LEFT

## 2022-10-10 NOTE — PROGRESS NOTES
Patient remains extremely lethargi. When ambulating with 1 assist she is very unsteady on feet. In bed the bed alarm is on so that she isn't able to get up without staff being aware.

## 2022-10-10 NOTE — PROGRESS NOTES
Patient was with nurse; got medical update from staff;     10/10/22 1955   Encounter Summary   Encounter Overview/Reason  Crisis   Crisis   Type Rapid Response

## 2022-10-10 NOTE — CARE COORDINATION
CASE MANAGEMENT NOTE:    Admission Date:  10/10/2022 Tucker Olivera is a 46 y.o.  female    Admitted for : Pulmonary nodules [R91.8]  SIRS (systemic inflammatory response syndrome) ([facility] [address] [R65.10]  NICKI (acute kidney injury) ([facility] [address] [N17.9]  Elevated d-dimer [R79.89]  Leukocytosis, unspecified type [D72.829]    Met with:  Patient and Family    PCP:  Tanvi Ag                                Insurance:  Radha Whitehead       Is patient alert and oriented at time of discussion:  Yes    Current Residence/ Living Arrangements:  independently at home with Dtr Santo Orozco and CHELSI . Santo Orozco has been in [facility] for the last 45 days. Current Services PTA:  No    Does patient go to outpatient dialysis: No  If yes, location and chair time: NA  Who is their nephrologist? NA    Is patient agreeable to VNS: No    Freedom of choice provided:  No    List of [address] provided: No    VNS chosen:  No    DME:  none    Home Oxygen: No    Nebulizer: Yes    CPAP/BIPAP: No    Supplier: N/A    Potential Assistance Needed: No    SNF needed: No    Freedom of choice and list provided: No    Pharmacy:  [facility]       Is patient currently receiving oral anticoagulation therapy? No    Is the Patient an [facility]HAUD [facility] with Readmission Risk Score greater than 14%? No  If yes, pt needs a follow up appointment made within 7 days. Family Members/Caregivers that pt would like involved in their care:    Yes    If yes, list name here:  Father Guerrero    Transportation Provider:  Family             Discharge Plan:  10/10/22 Radha Whitehead From home in apartment with dtr and CHELSI (dtr has been in [facility] for 45 days)DME: nebulizer VNS: none Pt was COVID + one week ago at urgent care. New consult for pulm, cardio. NM lung scan, renal ultrasound, CT abd/pelvis and echo, IV zosyn. WBC 22.0 BUN 33 Creat 2.21 BNP 4799, trop 30/13. Pt admits to recent crack cocaine use 3 days ago. LSW notified for drug counseling and resources. Following for needs//JF                 Electronically signed by: Charlee Estrada RN on 10/10/2022 at 3:29 PM

## 2022-10-10 NOTE — ED NOTES
Urine specimen obtained via straight catheterization. Pt appeared tolerate well. Specimen sent.      Enrico Baron RN  10/10/22 8388

## 2022-10-10 NOTE — CONSULTS
Consult Note    Reason for Consult: NICKI    Requesting Physician:  Debra Mcgee MD    HISTORY OF PRESENT ILLNESS:    The patient is a 46 y.o. female who presents with weakness, generalized pain, SOB and abdominal pain. She tested positive for COVID 19 on 9/30. She states she slept 2 days straight last week. C/o fever, chills and foul smelling urine with right flank pain. She also admits to doing crack cocaine at least twice weekly and when ever she can afford it more frequently. She states she uses cocaine to help with pain. She drinks homemade wine on occasion and smokes cigarettes, but states recently she decreased tobacco use. Creatinine baseline 0.6 from earlier this year. Creatinine 2.1 on admission. With sodium 132, potassium 3.3, bicarb 24, chloride 91, BUN 33, magnesium 2.1, procalcitonin 6.01, proBNP 4799. High sensitivity troponin was initially 30 and 13 on redraw. Glucose 129, albumin 3.44. Given normal saline bolus. X-ray showed multiple nodular opacities projecting over the right lung measuring up to 1.5 cm further evaluation with CT chest recommended. No acute focal airspace consolidation or pleural effusions. Blood pressure trending 110-190s/60-120s. Medications include Coreg, lisinopril, trazodone, gabapentin, Norvasc, Vistaril. She is very drowsy, slurring words. She has hx of HTN. She states she only takes BP meds if she remembers, which is not often. Hx of HTN since age 23s. Ordered urine sodium, urine creatinine, urine eosinophils, urine osmolality. Follow-up urine culture. On iv Zosyn per primary. Strict I&O discussed with RN. She received 1 dose of Lovenox and is now on heparin 5000 units twice a day. In the ER, she received Dilaudid and morphine and dose of Neurontin. She is very drowsy. Hold Neurontin and trazodone for now. Blood pressure is improving. Holding lisinopril for now .  Will also hold norvasc, as pt does not take medications regularly and need to avoid hypotension. Continue Coreg. On regular diet. Will follow up urine studies and make further recommendations regarding fluid restriction. K 3.3.-replacement ordered. Mg level ok. Review Of Systems:   Constitutional: No fever, +chills, lethargy, weakness  HEENT:  No headache, nasal discharge or sore throat. Cardiac:  No chest pain, +dyspnea, no orthopnea or PND. Chest:              No cough, phlegm or wheezing. Abdomen:  No abdominal pain, +nausea, no vomiting or diarrhea. Neuro:              No gross focal weakness, numbness, abnormal movements or seizure like activity. Skin:   Scattered wounds  :   No hematuria. +frequency, foul smelling urine, right flank pain. Extremities:  No swelling or joint pains. Endocrine: No polyuria, polydypsia, or thyroid problems. Hematology:    No bleeding disorders, bruising or anemia. All other ROS is negative.     Past Medical History:   Diagnosis Date    Anemia     Arthritis     Asthma     Bipolar disorder, mixed ([address]     Carotid artery stenosis 2/13/2020    Cocaine abuse ([address] 11/4/2014    COPD (chronic obstructive pulmonary disease) (HCC)     Degeneration of cervical intervertebral disc     Depression with anxiety     Depression with anxiety     Fibromyalgia 1/5/2017    GERD (gastroesophageal reflux disease)     History of migraine headaches 6/10/2014    History of renal calculi 6/10/2014    History of seizure disorder 6/10/2014    Hoarseness of voice     HTN (hypertension) 6/10/2014    Hyperlipidemia 6/10/2014    IGT (impaired glucose tolerance) 6/10/2014    Kidney stones     Lactose intolerance 6/10/2014    Leukopenia 6/10/2014    Major depressive disorder, recurrent episode, severe, without mention of psychotic behavior 11/6/2014    MVP (mitral valve prolapse)     Seizures ([address]     Sleep apnea 6/10/2014    Unspecified diseases of blood and blood-forming organs        Past Surgical History:   Procedure Laterality Date    ANKLE FRACTURE SURGERY recontruction surgery    APPENDECTOMY      BREAST LUMPECTOMY Right     CARDIAC CATHETERIZATION      CARPAL TUNNEL RELEASE      x2     SECTION      CHOLECYSTECTOMY      COLONOSCOPY  12    COLONOSCOPY  2016    severe spasms    COLONOSCOPY  2019    DR GOLDY MCINTOSH    GASTRIC BYPASS SURGERY      GASTRIC BYPASS SURGERY      HYSTERECTOMY (CERVIX STATUS UNKNOWN)      HYSTERECTOMY (CERVIX STATUS UNKNOWN)      LAPAROSCOPY N/A 2022    LAPAROSCOPY EXPLORATORY CONVERTED TO OPEN EXPLORATORY LAPAROTOMY, LYSIS OF ADHESIONS, REDUCTION OF INTERNAL HERNIA performed by Jayro Cao DO at [address] [place] [address]      APPLICATION OF ARCH BARS,SIMPLE EXTRACTION OF #15 AND RT TMJ JOINT REPLACEMENT    SHOULDER ARTHROSCOPY Left 2019    DR ROBERT GREEN    SHOULDER SURGERY      TONSILLECTOMY AND ADENOIDECTOMY      UPPER GASTROINTESTINAL ENDOSCOPY  7-3-12    egd    UPPER GASTROINTESTINAL ENDOSCOPY  2016    status post gastrectomy with a small remnant of gastric pouch. UPPER GASTROINTESTINAL ENDOSCOPY  2019    DR Ramirez       Prior to Admission medications    Medication Sig Start Date End Date Taking?  Authorizing Provider   carvedilol (COREG) 25 MG tablet TAKE 1 TABLET BY MOUTH IN THE MORNING AND 1 IN THE EVENING WITH MEALS 22   Myla Casper MD   traZODone (DESYREL) 100 MG tablet TAKE 1 TABLET BY MOUTH AT BEDTIME 22   Historical Provider, MD   gabapentin (NEURONTIN) 300 MG capsule TAKE 1 CAPSULE BY MOUTH THREE TIMES DAILY 22  Myla Casper MD   amLODIPine (NORVASC) 10 MG tablet Take 1 tablet by mouth daily  Patient taking differently: Take 10 mg by mouth daily Takes when she remebers 22   Anisa Fish DO   FEROSUL 325 (65 Fe) MG tablet TAKE 1 TABLET BY MOUTH 2 TIMES DAILY  Patient not taking: No sig reported 21   Myla Casper MD   buPROPion (WELLBUTRIN XL) 150 MG extended release tablet TAKE 1 TABLET BY MOUTH EVERY MORNING 12/22/20   Lizbet Marquez MD   hydrOXYzine (VISTARIL) 25 MG capsule  9/15/20   Historical Provider, MD   atorvastatin (LIPITOR) 40 MG tablet Take 40 mg by mouth daily  Patient not taking: No sig reported    Historical Provider, MD   DULoxetine (CYMBALTA) 30 MG extended release capsule Take 1 capsule by mouth daily  Patient taking differently: Take 60 mg by mouth in the morning.  4/8/19   KELLY Calderon CNP   albuterol sulfate HFA (PROVENTIL HFA) 108 (90 Base) MCG/ACT inhaler Inhale 2 puffs into the lungs every 6 hours as needed for Wheezing 4/8/19   KELLY Calderon CNP   divalproex (DEPAKOTE) 500 MG DR tablet Take 2 tablets by mouth 2 times daily Indications: Level 41 as of 7/22/16 (Pt takes two 500 mg DR tabs = 1,000mg PO BID) 2/28/17   Debi Salvador MD   ARIPiprazole (ABILIFY) 5 MG tablet Take 5 mg by mouth daily    Historical Provider, MD       Scheduled Meds:   sodium chloride flush  5-40 mL IntraVENous 2 times per day    heparin (porcine)  5,000 Units SubCUTAneous BID    piperacillin-tazobactam  3,375 mg IntraVENous Q8H    ARIPiprazole  5 mg Oral Daily    atorvastatin  40 mg Oral Daily    carvedilol  25 mg Oral BID WC    divalproex  1,000 mg Oral BID    DULoxetine  60 mg Oral Daily    ferrous sulfate  325 mg Oral BID WC    gabapentin  300 mg Oral TID    traZODone  100 mg Oral Nightly    amLODIPine  10 mg Oral Daily     Continuous Infusions:   sodium chloride      sodium chloride       PRN Meds:sodium chloride flush, sodium chloride, acetaminophen, ondansetron **OR** ondansetron, potassium chloride **OR** potassium alternative oral replacement **OR** potassium chloride, albuterol sulfate HFA, hydrOXYzine pamoate    Allergies   Allergen Reactions    Aspirin      Gastric bypass    Bactrim Other (See Comments)     Severe chills    Codeine Other (See Comments)     Other reaction(s): pain  Caused chest pain ( was in the Tylenol 3# )  Chest pain       Social History Socioeconomic History    Marital status:      Spouse name: Not on file    Number of children: Not on file    Years of education: Not on file    Highest education level: Not on file   Occupational History    Occupation: disability   Tobacco Use    Smoking status: Every Day     Packs/day: 0.50     Years: 33.00     Pack years: 16.50     Types: Cigarettes    Smokeless tobacco: Never    Tobacco comments:     quit  2000 started again  1/13    Substance and Sexual Activity    Alcohol use: Yes     Comment: occasional    Drug use: Not Currently    Sexual activity: Yes   Other Topics Concern    Not on file   Social History Narrative    Not on file     Social Determinants of Health     Financial Resource Strain: Not on file   Food Insecurity: Not on file   Transportation Needs: Not on file   Physical Activity: Not on file   Stress: Not on file   Social Connections: Not on file   Intimate Partner Violence: Not on file   Housing Stability: Not on file       Family History   Problem Relation Age of Onset    Diabetes Mother     Cancer Mother     Coronary Art Dis Father     COPD Father     Depression Brother     Alcohol Abuse Brother     Cancer Other         lung and skin    Diabetes Maternal Grandmother     Cancer Paternal Grandmother          Physical Exam:  Vitals:    10/10/22 0700 10/10/22 0800 10/10/22 0945 10/10/22 1215   BP: 119/76 123/69 (!) 142/96 (!) 147/92   Pulse: 95 92 94 87   Resp: 19 15 18 18   Temp:   97.6 °F (36.4 °C)    TempSrc:   Oral    SpO2: 96% 92% 98% 98%   Weight: 89 lb (40.4 kg)      Height: 4' 11\" (1.499 m)        No intake/output data recorded. General:  Drowsy, not in distress. Appears to be older than stated age. HEENT: Atraumatic, normocephalic. Anicteric sclera. Pink and moist oral mucosa. Neck supple. No JVD. Chest: Bilateral air entry, clear to auscultation, no wheezing, rhonchi or rales. Cardiovascular: S1S2, no murmur, rub or gallop. No lower extremity edema.     Abdomen: Soft, Results   Component Value Date    CHOL 156 01/30/2022    HDL 39 (L) 01/30/2022     INR:   Lab Results   Component Value Date    INR 1.2 10/10/2022    INR 1.0 07/22/2016    INR 0.9 05/30/2016     PTH: No results found for: PTH  Phosphorus:    Lab Results   Component Value Date/Time    PHOS 4.1 01/29/2022 12:38 PM     Ionized Calcium: No results found for: IONCA  Magnesium:   Lab Results   Component Value Date/Time    MG 2.1 10/10/2022 04:55 AM     Albumin:   Lab Results   Component Value Date/Time    LABALBU 3.4 10/10/2022 04:55 AM     Last 3 CK, CKMB, Troponin: @LABRCNT(CKTOTAL:3,CKMB:3,TROPONINI:3)       URINE:)No results found for: Corean Hammer    Radiology:   Reviewed. Assessment/Plan: To follow. Avoid nephrotoxic drugs and IV contrast exposure. Thank you for the consultation. Please do not hesitate to contact us for any further questions/concerns. We will continue to follow along with you. Pt seen in collaboration with Dr. Gunner Arriaga. Electronically signed by KELLY Isaac CNP  on 10/10/2022 at 1:13 PM     Impression:  NICKI, appears to be hemodynamically related. Hyponatremia. Hypokalemia. Hypertension. UTI. History of cocaine use. Plan:  Continue IVF as ordered. On iv Zosyn. Potassium repleted. Agree with holding Lisinopril. On Coreg. Monitor BP.   Recheck chemistries later today and daily in AM.

## 2022-10-10 NOTE — PROGRESS NOTES
Dr Alessandro Murrieta Notified of results of Abd/Pelvis CT scan. He said to contact Dr. Tanvi Ag with results which I did, I also gave her VQ scan results.

## 2022-10-10 NOTE — CONSULTS
[address]                 [address] [facility], [address]                                  CONSULTATION    PATIENT NAME: Pastor Birmingham                    :        1970  MED REC NO:   [id number]                              ROOM:       2087  ACCOUNT NO:   [de-identified]                           ADMIT DATE: 10/10/2022  PROVIDER:     Eleni Rivera    CONSULT DATE:  10/10/2022    REASON FOR CONSULTATION:  Shortness of breath, wheezing, and body aches. HISTORY OF PRESENT ILLNESS:  The patient is a 57-year-old female. She  has a diagnosis of COPD. She had told me that she follows with Dr. Vaughn Farias. She is not on oxygen. She is a smoker but has not smoked  for apparently about 10 days. For the past three days, she was having problems with generalized body  aches, feeling poorly. She felt she was hot and cold. She mentions  some shortness of breath. She was seen in the ED. The evaluation  included BUN and creatinine of 33 over 2.21. Albumin of 3.4. White  count 22.0 with 32% bands. Her urinalysis was positive for moderate  bacteria, small leukocyte esterase, and positive nitrites. The patient  underwent blood cultures x2 negative to date. The patient is now on  Zosyn. Procalcitonin level at 6.01.  D-dimer elevated 3.85. VQ scan  has been ordered, results pending at this time. Chest x-ray reveals,  may be, some nodules on the right lung. No gross consolidation. Author Tolentino PAST MEDICAL HISTORY:   Notable for history of major depressive  disorder, history of hypertension, history of seizure disorder, history  of migraine headaches, depression, anxiety, and diagnosis of COPD. ALLERGIES:  She has allergies to three:  ASPIRIN, reportedly unclear  what happened. BACTRIM, severe chills. CODEINE, not specified. MEDICATIONS:  Proventil HFA as listed. Rest of the medications, it is  unclear what else she takes.     SOCIAL HISTORY:  She is a smoker but she has not had a cigarette since  10 days. REVIEW OF SYSTEMS:  As per HPI. Otherwise, systems reviewed negative. PHYSICAL EXAMINATION:  GENERAL:  The patient has a 49-year-old female, resting quietly. VITAL SIGNS:  Blood pressure 147/90. Pulse 87. Respirations 18. Temperature 98.6. O2 saturation 98% on room air. HEENT:  No supraclavicular lymph node enlargement. LUNGS:  Coarse. No crackles, rales, or wheezes. CARDIOVASCULAR:  Regular rhythm. ABDOMEN:  Soft. EXTREMITIES:  No edema. IMPRESSION:  1. Suspect COPD exacerbation. 2.  Lung nodules seen on right  3. Urinary tract infection. 4.  Elevated procalcitonin level, may be, because of acute kidney injury  plus infection. 5.  Acute kidney injury. This is actually improving. Initial  creatinine was 2.21 and now 1.49.  6.  Leukocytosis with bandemia. PLAN:  1. Continue with Zosyn. 2.  Await cultures. 3.  Continue with breathing treatments, which I did order. 4.  Await VQ scan results. 5.  DVT prophylaxis. 6.  We will follow the patient in-house. Thank you Dr. Opal Brar for the consult.         Roya Womack    D: 10/10/2022 16:58:52       T: 10/10/2022 17:04:10     [id number]  Job#: [id number]     Doc#: [id number]    CC:

## 2022-10-10 NOTE — PROGRESS NOTES
Patient was eating with her eyes closed as if she was still asleep. No difficulty swallowing the food.  Pt's father was at bedside

## 2022-10-10 NOTE — PROGRESS NOTES
Patient to 2087 per w/c from ED. Pt extremely drowsy, speech slurred, difficulty to keep patient alert. Alert to self, place, and was able to say who the president was. Patient stated she does crack cocaine as often as she can \"When I have the money. \" Admitted to doing it with her daughter and son-in-law or (daughters boyfriend)who she lives with. Patient extremely unsteady on feet.  Bed alarm on

## 2022-10-10 NOTE — ED PROVIDER NOTES
EMERGENCY DEPARTMENT ENCOUNTER    Pt Name: Kin Gamble  MRN: [id number]  [place] 1970  Date of evaluation: 10/10/22  CHIEF COMPLAINT       Chief Complaint   Patient presents with    Shortness of Breath    Positive For Covid-19     HISTORY OF PRESENT ILLNESS   64-year-old woman history of hypertension, COPD, fibromyalgia and chronic pain presenting with shortness of breath    Seems to be that her main complaint is pain    She states she has pain all over so she cannot breathe    Everywhere on her body. 10 out of 10. However she does states she tested positive for COVID about a week ago and has been very weak and sleeping frequently since    No nausea vomiting diarrhea    Eating and drinking                REVIEW OF SYSTEMS     Review of Systems   Constitutional:  Negative for chills and fever. HENT:  Negative for rhinorrhea and sore throat. Eyes:  Negative for discharge and redness. Respiratory:  Positive for shortness of breath. Cardiovascular:  Negative for chest pain. Gastrointestinal:  Negative for abdominal pain, diarrhea, nausea and vomiting. Genitourinary:  Negative for dysuria, frequency and urgency. Musculoskeletal:  Negative for arthralgias and myalgias. Skin:  Negative for rash. Neurological:  Negative for weakness and numbness. Psychiatric/Behavioral:  Negative for suicidal ideas. All other systems reviewed and are negative.   PASTMEDICAL HISTORY     Past Medical History:   Diagnosis Date    Anemia     Arthritis     Asthma     Bipolar disorder, mixed ([address]     Carotid artery stenosis 2/13/2020    Cocaine abuse ([address] 11/4/2014    COPD (chronic obstructive pulmonary disease) (HCC)     Degeneration of cervical intervertebral disc     Depression with anxiety     Depression with anxiety     Fibromyalgia 1/5/2017    GERD (gastroesophageal reflux disease)     History of migraine headaches 6/10/2014    History of renal calculi 6/10/2014    History of seizure disorder 6/10/2014 Hoarseness of voice     HTN (hypertension) 6/10/2014    Hyperlipidemia 6/10/2014    IGT (impaired glucose tolerance) 6/10/2014    Kidney stones     Lactose intolerance 6/10/2014    Leukopenia 6/10/2014    Major depressive disorder, recurrent episode, severe, without mention of psychotic behavior 11/6/2014    MVP (mitral valve prolapse)     Seizures ([address]     Sleep apnea 6/10/2014    Unspecified diseases of blood and blood-forming organs      Past Problem List  Patient Active Problem List   Diagnosis Code    GERD (gastroesophageal reflux disease) K21.9    Hoarseness of voice R49.0    MVP (mitral valve prolapse) I34.1    Depression with anxiety F41.8    Lymphocytic colitis K52.832    History of migraine headaches Z86.69    Hyperlipidemia E78.5    Sleep apnea G47.30    IGT (impaired glucose tolerance) R73.02    History of renal calculi Z87.442    Lactose intolerance E73.9    History of seizure disorder Z86.69    Leukopenia D72.819    Bipolar disorder, mixed ([address] G75.90    Illicit drug use H95.59    Postlaminectomy syndrome, cervical region M96.1    Degeneration of cervical intervertebral disc M50.30    Encounter for long-term (current) use of other medications Z79.899    Fracture of mandible ([address] S02.609A    Chest pain R07.9    E-coli UTI N39.0, B96.20    Bradycardia R00.1    Cellulitis L03.90    Hypokalemia E87.6    Primary hypertension I10    Elevated lipase R74.8    Right lower quadrant abdominal pain R10.31    Nausea R11.0    Diarrhea R19.7    Seizure disorder ([address] G40.909    Polysubstance abuse ([facility]) F19.10    Fibromyalgia M79.7    Cocaine addiction ([address] F14.20    Shoulder arthritis M19.019    Osteoarthritis of left glenohumeral joint M19.012    Abnormal EKG R94.31    Anemia D64.9    Anxiety F41.9    Carotid artery stenosis I65.29    COPD (chronic obstructive pulmonary disease) ([facility]) J44.9    Dental disease K08.9    HUTCHINSON (dyspnea on exertion) R06.09    Fatigue R53.83    Fractures T07. J Carlos Apo    H/O gastric bypass Z98.84    History of crack cocaine use Z87.898    History of UTI Z87.440    Injury of back S39. 92XA    Intractable chronic migraine without aura and without status migrainosus G43.719    Intractable migraine with aura with status migrainosus G43. 111    Kidney stones N20.0    Dizziness R42    Memory loss R41.3    Mild pulmonary hypertension (HCC) I27.20    Mild tricuspid regurgitation I07.1    Primary osteoarthritis of left shoulder M19.012    Seizure-like activity (HCC) R56.9    Stress at home F43.9    TMJ (dislocation of temporomandibular joint) S03. 00XA    Tobacco abuse Z72.0    Visual impairment H54.7    Internal hernia K45.8    Vitamin D deficiency E55.9    Iron deficiency E61.1    SIRS (systemic inflammatory response syndrome) ([address] R65.10     SURGICAL HISTORY       Past Surgical History:   Procedure Laterality Date    ANKLE FRACTURE SURGERY      recontruction surgery    APPENDECTOMY      BREAST LUMPECTOMY Right     CARDIAC CATHETERIZATION      CARPAL TUNNEL RELEASE      x2     SECTION      CHOLECYSTECTOMY      COLONOSCOPY  12    COLONOSCOPY  2016    severe spasms    COLONOSCOPY  2019    DR GOLDY MCINTOSH    GASTRIC BYPASS SURGERY      GASTRIC BYPASS SURGERY      HYSTERECTOMY (CERVIX STATUS UNKNOWN)      HYSTERECTOMY (CERVIX STATUS UNKNOWN)      LAPAROSCOPY N/A 2022    LAPAROSCOPY EXPLORATORY CONVERTED TO OPEN EXPLORATORY LAPAROTOMY, LYSIS OF ADHESIONS, REDUCTION OF INTERNAL HERNIA performed by Mag Warren DO at [address] [place] [address]  [phone]    APPLICATION OF ARCH BARS,SIMPLE EXTRACTION OF #15 AND RT TMJ JOINT REPLACEMENT    SHOULDER ARTHROSCOPY Left 2019    DR ROBERT GREEN    SHOULDER SURGERY      TONSILLECTOMY AND ADENOIDECTOMY      UPPER GASTROINTESTINAL ENDOSCOPY  7-3-12    egd    UPPER GASTROINTESTINAL ENDOSCOPY  2016    status post gastrectomy with a small remnant of gastric pouch.     UPPER GASTROINTESTINAL ENDOSCOPY 2019    DR Ed Cheney     CURRENT MEDICATIONS       Previous Medications    ALBUTEROL SULFATE HFA (PROVENTIL HFA) 108 (90 BASE) MCG/ACT INHALER    Inhale 2 puffs into the lungs every 6 hours as needed for Wheezing    AMLODIPINE (NORVASC) 10 MG TABLET    Take 1 tablet by mouth daily    ARIPIPRAZOLE (ABILIFY) 5 MG TABLET    Take 5 mg by mouth daily    ATORVASTATIN (LIPITOR) 40 MG TABLET    Take 40 mg by mouth daily    BUPROPION (WELLBUTRIN XL) 150 MG EXTENDED RELEASE TABLET    TAKE 1 TABLET BY MOUTH EVERY MORNING    CARVEDILOL (COREG) 25 MG TABLET    TAKE 1 TABLET BY MOUTH IN THE MORNING AND 1 IN THE EVENING WITH MEALS    DIVALPROEX (DEPAKOTE) 500 MG DR TABLET    Take 2 tablets by mouth 2 times daily Indications: Level 41 as of 16 (Pt takes two 500 mg DR tabs = 1,000mg PO BID)    DULOXETINE (CYMBALTA) 30 MG EXTENDED RELEASE CAPSULE    Take 1 capsule by mouth daily    FEROSUL 325 (65 FE) MG TABLET    TAKE 1 TABLET BY MOUTH 2 TIMES DAILY    GABAPENTIN (NEURONTIN) 300 MG CAPSULE    TAKE 1 CAPSULE BY MOUTH THREE TIMES DAILY    HYDROXYZINE (VISTARIL) 25 MG CAPSULE        LISINOPRIL (PRINIVIL;ZESTRIL) 20 MG TABLET    TAKE 1 TABLET BY MOUTH IN THE MORNING    TRAZODONE (DESYREL) 100 MG TABLET    TAKE 1 TABLET BY MOUTH AT BEDTIME     ALLERGIES     is allergic to aspirin, bactrim, and codeine. FAMILY HISTORY     She indicated that her mother is . She indicated that her father is alive. She indicated that her brother is alive. She indicated that her maternal grandmother is alive. She indicated that her maternal grandfather is . She indicated that her paternal grandmother is . She indicated that her paternal grandfather is . She indicated that the status of her other is unknown.      SOCIAL HISTORY       Social History     Tobacco Use    Smoking status: Every Day     Packs/day: 0.50     Years: 33.00     Pack years: 16.50     Types: Cigarettes    Smokeless tobacco: Never    Tobacco comments: quit   started again      Substance Use Topics    Alcohol use: Yes     Comment: occasional    Drug use: Not Currently     PHYSICAL EXAM     INITIAL VITALS: /76   Pulse 95   Temp 98.6 °F (37 °C) (Axillary)   Resp 19   Ht 4' 11\" (1.499 m)   Wt 89 lb (40.4 kg)   SpO2 96%   BMI 17.98 kg/m²    Physical Exam  Vitals and nursing note reviewed. Constitutional:       Appearance: Normal appearance. HENT:      Head: Normocephalic and atraumatic. Nose: Nose normal.      Mouth/Throat:      Mouth: Mucous membranes are moist.   Eyes:      Conjunctiva/sclera: Conjunctivae normal.      Pupils: Pupils are equal, round, and reactive to light. Cardiovascular:      Rate and Rhythm: Normal rate and regular rhythm. Pulses: Normal pulses. Heart sounds: Normal heart sounds. Pulmonary:      Effort: Pulmonary effort is normal.      Breath sounds: Normal breath sounds. Abdominal:      Palpations: Abdomen is soft. Tenderness: There is no abdominal tenderness. Musculoskeletal:         General: No swelling or deformity. Cervical back: Normal range of motion. Skin:     General: Skin is warm. Findings: No rash. Neurological:      General: No focal deficit present. Mental Status: She is alert and oriented to person, place, and time.    Psychiatric:         Mood and Affect: Mood normal.       MEDICAL DECISION MAKIN-year-old woman presenting with shortness of breath and pain all over    In the setting of a positive COVID test at another facility about a week ago    Differential COVID, flu, pneumonia, PE, fibromyalgia    Will obtain labs CT provide pain medication and reassess  ED Course as of 10/10/22 0736   Mon Oct 10, 2022   0602 CBC with Auto Differential(!):    WBC 22.0(!)   RBC 5.70(!)   Hemoglobin Quant 14.9   Hematocrit 45.0   MCV 78.9(!)   MCH 26.1   MCHC 33.1   RDW 14.5   Platelet Count 703(!)   MPV 8.7   Seg Neutrophils PENDING   Lymphocytes PENDING Monocytes PENDING   Eosinophils % PENDING   Basophils PENDING   Segs Absolute PENDING   Absolute Lymph # PENDING   Absolute Mono # PENDING   Absolute Eos # PENDING   Basophils Absolute PENDING  Leukocytosis to 22 otherwise unremarkable [THERESA]   0603 CMP(!):    Glucose, Random 129(!)   BUN,BUNPL 33(!)   Creatinine 2.21(!)   Est, Glom Filt Rate 26(!)   CALCIUM, SERUM, 931240 9.2   Sodium 132(!)   Potassium 3.3(!)   Chloride 91(!)   CO2 24   Anion Gap 17   Alk Phos 112(!)   ALT 17   AST 41(!)   Bilirubin 0.7   Total Protein 7.9   Albumin 3.4(!)  Acute kidney injury with creatinine 2.21 otherwise largely unremarkable [THERESA]   0603 Troponin(!):    Troponin, High Sensitivity 30(!)  Mild elevation we will trend no EKG changes [THERESA]   0603 Brain Natriuretic Peptide(!):    Pro-BNP 4,799(!)  Elevated [THERESA]   0603 COVID-19 & Influenza Combo:    Specimen Description . NASOPHARYNGEAL SWAB   SOURCE, [id number] . NASOPHARYNGEAL SWAB   SARS-CoV-2 RNA, RT PCR Not Detected   INFLUENZA A Not Detected   INFLUENZA B Not Detected  Normal [THERESA]   0603 D-Dimer, Quantitative(!):    D-Dimer, Quant 3.85(!)  Elevated cannot obtain CT pulmonary embolism due to low GFR we will give Lovenox and get V/Q study and patient [THERESA]   0603 Lactic Acid:    Lactic Acid 1.2  Negative no evidence of severe sepsis or septic shock [THERESA]   0709 Troponin:    Troponin, High Sensitivity 13  Negative [THERESA]   0717 XR CHEST PORTABLE  Right nodular lung opacities [THERESA]   0734 Discussed with Dr. Tanja Catalan who is on-call for Dr. Ivan Painting given report of the work-up and current treatment plan and accepts admission to [facility]    He will discuss with Dr. Ivan Painting    I placed admission orders under Dr. Vesna Wen name [THERESA]      ED Course User Index  [THERESA] Flori Lockhart MD       CRITICAL CARE:       PROCEDURES:    EKG 12 Lead    Date/Time: 10/10/2022 4:46 AM  Performed by: Flori Lockhart MD  Authorized by: Flori Lockhart MD     ECG reviewed by ED Physician in the absence of a cardiologist: yes    Rate:     ECG rate:  91    ECG rate assessment: normal    Rhythm:     Rhythm: sinus rhythm    Ectopy:     Ectopy: none    QRS:     QRS axis:  Normal    QRS intervals:  Normal    QRS conduction: normal    ST segments:     ST segments:  Normal  T waves:     T waves: normal    Q waves:     Abnormal Q-waves: not present      DIAGNOSTIC RESULTS   EKG:All EKG's are interpreted by the Emergency Department Physician who either signs or Co-signs this chart in the absence of a cardiologist.        RADIOLOGY:All plain film, CT, MRI, and formal ultrasound images (except ED bedside ultrasound) are read by the radiologist, see reports below, unless otherwisenoted in MDM or here. XR CHEST PORTABLE   Final Result   1. Multiple nodular opacities projecting over the right lung measuring up to   1.5 cm. Further evaluation with CT chest is recommended. 2. No acute focal airspace consolidation or pleural effusions. CT CHEST PULMONARY EMBOLISM W CONTRAST    (Results Pending)     LABS: All lab results were reviewed by myself, and all abnormals are listed below.   Labs Reviewed   CBC WITH AUTO DIFFERENTIAL - Abnormal; Notable for the following components:       Result Value    WBC 22.0 (*)     RBC 5.70 (*)     MCV 78.9 (*)     Platelets 699 (*)     Lymphocytes 7 (*)     Bands 32 (*)     Segs Absolute 13.20 (*)     Absolute Bands # 7.04 (*)     All other components within normal limits   COMPREHENSIVE METABOLIC PANEL - Abnormal; Notable for the following components:    Glucose 129 (*)     BUN 33 (*)     Creatinine 2.21 (*)     Est, Glom Filt Rate 26 (*)     Sodium 132 (*)     Potassium 3.3 (*)     Chloride 91 (*)     Alkaline Phosphatase 112 (*)     AST 41 (*)     Albumin 3.4 (*)     All other components within normal limits   PROTIME-INR - Abnormal; Notable for the following components:    Protime 14.8 (*)     All other components within normal limits   TROPONIN - Abnormal; Notable for the following components:    Troponin, High Sensitivity 30 (*)     All other components within normal limits   BRAIN NATRIURETIC PEPTIDE - Abnormal; Notable for the following components:    Pro-BNP 4,799 (*)     All other components within normal limits   D-DIMER, QUANTITATIVE - Abnormal; Notable for the following components:    D-Dimer, Quant 3.85 (*)     All other components within normal limits   PROCALCITONIN - Abnormal; Notable for the following components:    Procalcitonin 6.01 (*)     All other components within normal limits   COVID-19 & INFLUENZA COMBO   CULTURE, BLOOD 1   CULTURE, BLOOD 2   LIPASE   MAGNESIUM   LACTIC ACID   TROPONIN   URINALYSIS WITH REFLEX TO CULTURE       EMERGENCY DEPARTMENTCOURSE:     46year-old presenting with generalized malaise not eating or drinking coughing short of breath    Work-up shows NICKI, leukocytosis unclear source of infection but with SIRS criteria, elevated D-dimer but unable to obtain CT PE due to the low GFR    Patient was given IV fluids, broad-spectrum antibiotics, therapeutic Lovenox    Admitted for further work-up including cultures, VQ scan    Vitals:    Vitals:    10/10/22 0500 10/10/22 0515 10/10/22 0647 10/10/22 0700   BP: (!) 177/123 (!) 192/110  119/76   Pulse: 91 93  95   Resp: 25 25  19   Temp:       TempSrc:       SpO2: 96% 100%  96%   Weight:   89 lb (40.4 kg) 89 lb (40.4 kg)   Height:    4' 11\" (1.499 m)       The patient was given the following medications while in the emergency department:  Orders Placed This Encounter   Medications    0.9 % sodium chloride bolus    ipratropium-albuterol (DUONEB) nebulizer solution 1 ampule     Order Specific Question:   Initiate RT Bronchodilator Protocol     Answer:   Yes - ED protocol    morphine sulfate (PF) injection 4 mg    aspirin chewable tablet 162 mg    hydrALAZINE (APRESOLINE) injection 20 mg    piperacillin-tazobactam (ZOSYN) 4,500 mg in dextrose 5 % 100 mL IVPB (mini-bag)     Order Specific Question:   Antimicrobial Indications     Answer:   Pneumonia (CAP)    acetaminophen (TYLENOL) tablet 1,000 mg    gabapentin (NEURONTIN) capsule 300 mg    methocarbamol (ROBAXIN) tablet 1,000 mg    HYDROmorphone (DILAUDID) injection 1 mg    enoxaparin (LOVENOX) injection 40 mg     Order Specific Question:   Indication of Use     Answer:   Treatment-DVT/PE    sodium chloride flush 0.9 % injection 5-40 mL    sodium chloride flush 0.9 % injection 5-40 mL    0.9 % sodium chloride infusion    heparin (porcine) injection 5,000 Units    acetaminophen (TYLENOL) tablet 650 mg    OR Linked Order Group     ondansetron (ZOFRAN-ODT) disintegrating tablet 4 mg     ondansetron (ZOFRAN) injection 4 mg     CONSULTS:  IP CONSULT TO INTERNAL MEDICINE    FINAL IMPRESSION      1. NICKI (acute kidney injury) ([facility]    2. Leukocytosis, unspecified type    3. Elevated d-dimer    4. Pulmonary nodules          DISPOSITION/PLAN   DISPOSITION Admitted 10/10/2022 07:33:43 AM      PATIENT REFERRED TO:  No follow-up provider specified. DISCHARGE MEDICATIONS:  New Prescriptions    No medications on file     The care is provided during an unprecedented national emergency due to the novel coronavirus, COVID 19.   Rafe Hatchet, MD Rafe Hatchet, MD  10/10/22 0892

## 2022-10-10 NOTE — PROGRESS NOTES
Pt arrived to ICU room 2011 after rapid response. Vitals obtained, pt on venturi mask stating 95%, 1L NS bolus started, pt difficult to arouse at this time.

## 2022-10-10 NOTE — PROGRESS NOTES
Yves LOMAX came to this RN and said patients SB/P was in the 60's and that patient wasn't very alert. This RN found patient in a respirations shallow, slow, pale, cold diaphoretic, sternal rub to get patient to respond. Speech very slurred. Patient would arouse for a few seconds then drift off again. B/P cuff changed to smaller cuff, B/P remained low, O2 Sat dropped to 77% on room air.

## 2022-10-11 ENCOUNTER — APPOINTMENT (OUTPATIENT)
Dept: GENERAL RADIOLOGY | Age: 52
DRG: 871 | End: 2022-10-11
Payer: COMMERCIAL

## 2022-10-11 ENCOUNTER — APPOINTMENT (OUTPATIENT)
Dept: VASCULAR LAB | Age: 52
DRG: 871 | End: 2022-10-11
Payer: COMMERCIAL

## 2022-10-11 ENCOUNTER — APPOINTMENT (OUTPATIENT)
Dept: NON INVASIVE DIAGNOSTICS | Age: 52
DRG: 871 | End: 2022-10-11
Payer: COMMERCIAL

## 2022-10-11 PROBLEM — E43 SEVERE MALNUTRITION (HCC): Chronic | Status: ACTIVE | Noted: 2022-10-11

## 2022-10-11 PROBLEM — N17.9 ACUTE RENAL INJURY DUE TO HYPOVOLEMIA (HCC): Status: ACTIVE | Noted: 2022-10-11

## 2022-10-11 PROBLEM — E86.1 ACUTE RENAL INJURY DUE TO HYPOVOLEMIA (HCC): Status: ACTIVE | Noted: 2022-10-11

## 2022-10-11 LAB
ABSOLUTE BANDS #: 4.88 K/UL (ref 0–1)
ABSOLUTE EOS #: 0 K/UL (ref 0–0.4)
ABSOLUTE LYMPH #: 0.42 K/UL (ref 1–4.8)
ABSOLUTE MONO #: 0.42 K/UL (ref 0.1–1.3)
ALLEN TEST: ABNORMAL
AMPHETAMINE SCREEN URINE: NEGATIVE
ANION GAP SERPL CALCULATED.3IONS-SCNC: 13 MMOL/L (ref 9–17)
ANION GAP SERPL CALCULATED.3IONS-SCNC: 14 MMOL/L (ref 9–17)
BANDS: 23 % (ref 0–10)
BARBITURATE SCREEN URINE: NEGATIVE
BASOPHILS # BLD: 0 % (ref 0–2)
BASOPHILS ABSOLUTE: 0 K/UL (ref 0–0.2)
BENZODIAZEPINE SCREEN, URINE: NEGATIVE
BUN BLDV-MCNC: 26 MG/DL (ref 6–20)
BUN BLDV-MCNC: 29 MG/DL (ref 6–20)
BUN BLDV-MCNC: 34 MG/DL (ref 6–20)
BUN BLDV-MCNC: 38 MG/DL (ref 6–20)
CALCIUM SERPL-MCNC: 7.2 MG/DL (ref 8.6–10.4)
CALCIUM SERPL-MCNC: 7.5 MG/DL (ref 8.6–10.4)
CALCIUM SERPL-MCNC: 8.4 MG/DL (ref 8.6–10.4)
CALCIUM SERPL-MCNC: 8.6 MG/DL (ref 8.6–10.4)
CANNABINOID SCREEN URINE: NEGATIVE
CARBOXYHEMOGLOBIN: <1 % (ref 0–5)
CHLORIDE BLD-SCNC: 103 MMOL/L (ref 98–107)
CHLORIDE BLD-SCNC: 103 MMOL/L (ref 98–107)
CHLORIDE BLD-SCNC: 105 MMOL/L (ref 98–107)
CHLORIDE BLD-SCNC: 99 MMOL/L (ref 98–107)
CO2: 17 MMOL/L (ref 20–31)
CO2: 18 MMOL/L (ref 20–31)
CO2: 18 MMOL/L (ref 20–31)
CO2: 19 MMOL/L (ref 20–31)
COCAINE METABOLITE, URINE: POSITIVE
CREAT SERPL-MCNC: 0.77 MG/DL (ref 0.5–0.9)
CREAT SERPL-MCNC: 0.95 MG/DL (ref 0.5–0.9)
CREAT SERPL-MCNC: 1.06 MG/DL (ref 0.5–0.9)
CREAT SERPL-MCNC: 1.53 MG/DL (ref 0.5–0.9)
CULTURE: ABNORMAL
EOSINOPHILS RELATIVE PERCENT: 0 % (ref 0–4)
FENTANYL URINE: NEGATIVE
FIO2: 50
GFR SERPL CREATININE-BSD FRML MDRD: 40 ML/MIN/1.73M2
GFR SERPL CREATININE-BSD FRML MDRD: >60 ML/MIN/1.73M2
GLUCOSE BLD-MCNC: 101 MG/DL (ref 70–99)
GLUCOSE BLD-MCNC: 115 MG/DL (ref 70–99)
GLUCOSE BLD-MCNC: 123 MG/DL (ref 70–99)
GLUCOSE BLD-MCNC: 87 MG/DL (ref 70–99)
HCO3 ARTERIAL: 21.7 MMOL/L (ref 22–26)
HCT VFR BLD CALC: 33.6 % (ref 36–46)
HEMOGLOBIN: 11.1 G/DL (ref 12–16)
LV EF: 58 %
LVEF MODALITY: NORMAL
LYMPHOCYTES # BLD: 2 % (ref 24–44)
MAGNESIUM: 1.8 MG/DL (ref 1.6–2.6)
MCH RBC QN AUTO: 26.1 PG (ref 26–34)
MCHC RBC AUTO-ENTMCNC: 33.1 G/DL (ref 31–37)
MCV RBC AUTO: 78.8 FL (ref 80–100)
METAMYELOCYTES ABSOLUTE COUNT: 0.42 K/UL
METAMYELOCYTES: 2 %
METHADONE SCREEN, URINE: NEGATIVE
METHEMOGLOBIN: 1.3 % (ref 0–1.9)
MONOCYTES # BLD: 2 % (ref 1–7)
MORPHOLOGY: ABNORMAL
NEGATIVE BASE EXCESS, ART: 3.3 MMOL/L (ref 0–2)
O2 DEVICE/FLOW/%: ABNORMAL
O2 SAT, ARTERIAL: 94.3 % (ref 95–98)
OPIATES, URINE: POSITIVE
OXYCODONE SCREEN URINE: NEGATIVE
PATHOLOGIST: NORMAL
PATIENT TEMP: 37
PCO2 ARTERIAL: 36.4 MMHG (ref 35–45)
PDW BLD-RTO: 15.2 % (ref 11.5–14.9)
PH ARTERIAL: 7.38 (ref 7.35–7.45)
PHENCYCLIDINE, URINE: NEGATIVE
PHOSPHORUS: 3.3 MG/DL (ref 2.6–4.5)
PLATELET # BLD: 91 K/UL (ref 150–450)
PMV BLD AUTO: 8.8 FL (ref 6–12)
PO2 ARTERIAL: 90.4 MMHG (ref 80–100)
POTASSIUM SERPL-SCNC: 3 MMOL/L (ref 3.7–5.3)
POTASSIUM SERPL-SCNC: 3.4 MMOL/L (ref 3.7–5.3)
POTASSIUM SERPL-SCNC: 3.4 MMOL/L (ref 3.7–5.3)
POTASSIUM SERPL-SCNC: 3.5 MMOL/L (ref 3.7–5.3)
PT. POSITION: ABNORMAL
RBC # BLD: 4.26 M/UL (ref 4–5.2)
SAMPLE SITE: ABNORMAL
SEG NEUTROPHILS: 71 % (ref 36–66)
SEGMENTED NEUTROPHILS ABSOLUTE COUNT: 15.06 K/UL (ref 1.3–9.1)
SERUM IFX INTERP: NORMAL
SODIUM BLD-SCNC: 132 MMOL/L (ref 135–144)
SODIUM BLD-SCNC: 134 MMOL/L (ref 135–144)
SODIUM BLD-SCNC: 135 MMOL/L (ref 135–144)
SODIUM BLD-SCNC: 136 MMOL/L (ref 135–144)
SPECIMEN DESCRIPTION: ABNORMAL
TEST INFORMATION: ABNORMAL
TEXT FOR RESPIRATORY: ABNORMAL
WBC # BLD: 21.2 K/UL (ref 3.5–11)

## 2022-10-11 PROCEDURE — 02HV33Z INSERTION OF INFUSION DEVICE INTO SUPERIOR VENA CAVA, PERCUTANEOUS APPROACH: ICD-10-PCS | Performed by: SURGERY

## 2022-10-11 PROCEDURE — 2580000003 HC RX 258: Performed by: STUDENT IN AN ORGANIZED HEALTH CARE EDUCATION/TRAINING PROGRAM

## 2022-10-11 PROCEDURE — 36600 WITHDRAWAL OF ARTERIAL BLOOD: CPT

## 2022-10-11 PROCEDURE — 6370000000 HC RX 637 (ALT 250 FOR IP): Performed by: INTERNAL MEDICINE

## 2022-10-11 PROCEDURE — 82306 VITAMIN D 25 HYDROXY: CPT

## 2022-10-11 PROCEDURE — 84100 ASSAY OF PHOSPHORUS: CPT

## 2022-10-11 PROCEDURE — 2580000003 HC RX 258: Performed by: INTERNAL MEDICINE

## 2022-10-11 PROCEDURE — 80048 BASIC METABOLIC PNL TOTAL CA: CPT

## 2022-10-11 PROCEDURE — 80307 DRUG TEST PRSMV CHEM ANLYZR: CPT

## 2022-10-11 PROCEDURE — 99223 1ST HOSP IP/OBS HIGH 75: CPT | Performed by: FAMILY MEDICINE

## 2022-10-11 PROCEDURE — 86334 IMMUNOFIX E-PHORESIS SERUM: CPT

## 2022-10-11 PROCEDURE — 93970 EXTREMITY STUDY: CPT

## 2022-10-11 PROCEDURE — 36415 COLL VENOUS BLD VENIPUNCTURE: CPT

## 2022-10-11 PROCEDURE — 6370000000 HC RX 637 (ALT 250 FOR IP): Performed by: FAMILY MEDICINE

## 2022-10-11 PROCEDURE — 83735 ASSAY OF MAGNESIUM: CPT

## 2022-10-11 PROCEDURE — 85025 COMPLETE CBC W/AUTO DIFF WBC: CPT

## 2022-10-11 PROCEDURE — 2060000000 HC ICU INTERMEDIATE R&B

## 2022-10-11 PROCEDURE — 94640 AIRWAY INHALATION TREATMENT: CPT

## 2022-10-11 PROCEDURE — 82805 BLOOD GASES W/O2 SATURATION: CPT

## 2022-10-11 PROCEDURE — 6360000002 HC RX W HCPCS: Performed by: FAMILY MEDICINE

## 2022-10-11 PROCEDURE — 93306 TTE W/DOPPLER COMPLETE: CPT

## 2022-10-11 PROCEDURE — 6360000002 HC RX W HCPCS: Performed by: INTERNAL MEDICINE

## 2022-10-11 PROCEDURE — 94761 N-INVAS EAR/PLS OXIMETRY MLT: CPT

## 2022-10-11 PROCEDURE — 2580000003 HC RX 258: Performed by: FAMILY MEDICINE

## 2022-10-11 PROCEDURE — 71045 X-RAY EXAM CHEST 1 VIEW: CPT

## 2022-10-11 PROCEDURE — 51798 US URINE CAPACITY MEASURE: CPT

## 2022-10-11 RX ORDER — LIDOCAINE HYDROCHLORIDE 10 MG/ML
INJECTION, SOLUTION INFILTRATION; PERINEURAL
Status: DISCONTINUED
Start: 2022-10-11 | End: 2022-10-11 | Stop reason: WASHOUT

## 2022-10-11 RX ORDER — POTASSIUM CHLORIDE 7.45 MG/ML
10 INJECTION INTRAVENOUS
Status: COMPLETED | OUTPATIENT
Start: 2022-10-11 | End: 2022-10-11

## 2022-10-11 RX ORDER — 0.9 % SODIUM CHLORIDE 0.9 %
1000 INTRAVENOUS SOLUTION INTRAVENOUS ONCE
Status: COMPLETED | OUTPATIENT
Start: 2022-10-11 | End: 2022-10-11

## 2022-10-11 RX ORDER — CARVEDILOL 3.12 MG/1
3.12 TABLET ORAL 2 TIMES DAILY WITH MEALS
Status: DISCONTINUED | OUTPATIENT
Start: 2022-10-11 | End: 2022-10-19

## 2022-10-11 RX ORDER — POTASSIUM CHLORIDE 20 MEQ/1
40 TABLET, EXTENDED RELEASE ORAL ONCE
Status: COMPLETED | OUTPATIENT
Start: 2022-10-12 | End: 2022-10-12

## 2022-10-11 RX ORDER — LISINOPRIL 20 MG/1
20 TABLET ORAL DAILY
Status: ON HOLD | COMMUNITY
End: 2022-11-04 | Stop reason: HOSPADM

## 2022-10-11 RX ADMIN — CEFEPIME 1000 MG: 1 INJECTION, POWDER, FOR SOLUTION INTRAMUSCULAR; INTRAVENOUS at 23:54

## 2022-10-11 RX ADMIN — IPRATROPIUM BROMIDE AND ALBUTEROL SULFATE 1 AMPULE: 2.5; .5 SOLUTION RESPIRATORY (INHALATION) at 15:19

## 2022-10-11 RX ADMIN — IPRATROPIUM BROMIDE AND ALBUTEROL SULFATE 1 AMPULE: 2.5; .5 SOLUTION RESPIRATORY (INHALATION) at 10:57

## 2022-10-11 RX ADMIN — POTASSIUM CHLORIDE 10 MEQ: 7.46 INJECTION, SOLUTION INTRAVENOUS at 12:55

## 2022-10-11 RX ADMIN — VANCOMYCIN HYDROCHLORIDE 750 MG: 750 INJECTION, POWDER, LYOPHILIZED, FOR SOLUTION INTRAVENOUS at 21:10

## 2022-10-11 RX ADMIN — CALCIUM GLUCONATE 2000 MG: 98 INJECTION, SOLUTION INTRAVENOUS at 12:57

## 2022-10-11 RX ADMIN — SODIUM CHLORIDE: 9 INJECTION, SOLUTION INTRAVENOUS at 06:35

## 2022-10-11 RX ADMIN — ATORVASTATIN CALCIUM 40 MG: 40 TABLET, FILM COATED ORAL at 21:14

## 2022-10-11 RX ADMIN — SODIUM CHLORIDE, PRESERVATIVE FREE 10 ML: 5 INJECTION INTRAVENOUS at 21:14

## 2022-10-11 RX ADMIN — HEPARIN SODIUM 5000 UNITS: 5000 INJECTION INTRAVENOUS; SUBCUTANEOUS at 09:09

## 2022-10-11 RX ADMIN — SODIUM CHLORIDE, PRESERVATIVE FREE 10 ML: 5 INJECTION INTRAVENOUS at 09:09

## 2022-10-11 RX ADMIN — SODIUM CHLORIDE 1000 ML: 9 INJECTION, SOLUTION INTRAVENOUS at 10:14

## 2022-10-11 RX ADMIN — IPRATROPIUM BROMIDE AND ALBUTEROL SULFATE 1 AMPULE: 2.5; .5 SOLUTION RESPIRATORY (INHALATION) at 07:23

## 2022-10-11 RX ADMIN — SODIUM CHLORIDE: 9 INJECTION, SOLUTION INTRAVENOUS at 11:31

## 2022-10-11 RX ADMIN — POTASSIUM CHLORIDE 10 MEQ: 7.46 INJECTION, SOLUTION INTRAVENOUS at 11:35

## 2022-10-11 RX ADMIN — CARVEDILOL 3.12 MG: 3.12 TABLET, FILM COATED ORAL at 16:32

## 2022-10-11 RX ADMIN — HEPARIN SODIUM 5000 UNITS: 5000 INJECTION INTRAVENOUS; SUBCUTANEOUS at 21:13

## 2022-10-11 RX ADMIN — FERROUS SULFATE TAB 325 MG (65 MG ELEMENTAL FE) 325 MG: 325 (65 FE) TAB at 16:32

## 2022-10-11 RX ADMIN — DIVALPROEX SODIUM 1000 MG: 500 TABLET, DELAYED RELEASE ORAL at 21:13

## 2022-10-11 RX ADMIN — IPRATROPIUM BROMIDE AND ALBUTEROL SULFATE 1 AMPULE: 2.5; .5 SOLUTION RESPIRATORY (INHALATION) at 19:35

## 2022-10-11 ASSESSMENT — PAIN SCALES - GENERAL
PAINLEVEL_OUTOF10: 0

## 2022-10-11 NOTE — PROGRESS NOTES
Consult Note    Reason for Consult: NICKI    Requesting Physician:  Corona Reid MD    INTERVAL HISTORY  Patient seen and examined, not interactive. Patient father at bedside  Creatinine improved to 1.06.  CO2 17.   Ca 7.5, Replaced with Ca gluconate 2g IV   K 3.4, replaced with KCL 20 meq IV  Albumin 3.4 on 10/10. Immunofixation pending. Off levophed. On 0.9NS at 50 ml/hr. 2D Echo shed EF 55-60%. No UOP documented, external catheter canister has dark concentrated urine about 1/4th full. HISTORY OF PRESENT ILLNESS:    The patient is a 46 y.o. female who presents with weakness, generalized pain, SOB and abdominal pain. She tested positive for COVID 19 on 9/30. She states she slept 2 days straight last week. C/o fever, chills and foul smelling urine with right flank pain. She also admits to doing crack cocaine at least twice weekly and when ever she can afford it more frequently. She states she uses cocaine to help with pain. She drinks homemade wine on occasion and smokes cigarettes, but states recently she decreased tobacco use. Creatinine baseline 0.6 from earlier this year. Creatinine 2.1 on admission. With sodium 132, potassium 3.3, bicarb 24, chloride 91, BUN 33, magnesium 2.1, procalcitonin 6.01, proBNP 4799. High sensitivity troponin was initially 30 and 13 on redraw. Glucose 129, albumin 3.44. Given normal saline bolus. X-ray showed multiple nodular opacities projecting over the right lung measuring up to 1.5 cm further evaluation with CT chest recommended. No acute focal airspace consolidation or pleural effusions. Blood pressure trending 110-190s/60-120s. Medications include Coreg, lisinopril, trazodone, gabapentin, Norvasc, Vistaril. She is very drowsy, slurring words. She has hx of HTN. She states she only takes BP meds if she remembers, which is not often. Hx of HTN since age 23s. Ordered urine sodium, urine creatinine, urine eosinophils, urine osmolality.   Follow-up urine culture. On iv Zosyn per primary. Strict I&O discussed with RN. She received 1 dose of Lovenox and is now on heparin 5000 units twice a day. In the ER, she received Dilaudid and morphine and dose of Neurontin. She is very drowsy. Hold Neurontin and trazodone for now. Blood pressure is improving. Holding lisinopril for now . Will also hold norvasc, as pt does not take medications regularly and need to avoid hypotension. Continue Coreg. On regular diet. Will follow up urine studies and make further recommendations regarding fluid restriction. K 3.3.-replacement ordered. Mg level ok.     Review Of Systems:   Unable to obtain, patient is lethargic and not interactive    Past Medical History:   Diagnosis Date    Anemia     Arthritis     Asthma     Bipolar disorder, mixed ([address]     Carotid artery stenosis 2020    Cocaine abuse ([address] 2014    COPD (chronic obstructive pulmonary disease) (HCC)     Degeneration of cervical intervertebral disc     Depression with anxiety     Depression with anxiety     Fibromyalgia 2017    GERD (gastroesophageal reflux disease)     History of migraine headaches 6/10/2014    History of renal calculi 6/10/2014    History of seizure disorder 6/10/2014    Hoarseness of voice     HTN (hypertension) 6/10/2014    Hyperlipidemia 6/10/2014    IGT (impaired glucose tolerance) 6/10/2014    Kidney stones     Lactose intolerance 6/10/2014    Leukopenia 6/10/2014    Major depressive disorder, recurrent episode, severe, without mention of psychotic behavior 2014    MVP (mitral valve prolapse)     Seizures ([address]     Sleep apnea 6/10/2014    Unspecified diseases of blood and blood-forming organs        Past Surgical History:   Procedure Laterality Date    ANKLE FRACTURE SURGERY      recontruction surgery    APPENDECTOMY      BREAST LUMPECTOMY Right     CARDIAC CATHETERIZATION      CARPAL TUNNEL RELEASE      x2     SECTION      CHOLECYSTECTOMY      COLONOSCOPY 07/03/12    COLONOSCOPY  12/19/2016    severe spasms    COLONOSCOPY  05/20/2019    DR GOLDY MCINTOSH    GASTRIC BYPASS SURGERY      GASTRIC BYPASS SURGERY      HYSTERECTOMY (CERVIX STATUS UNKNOWN)      HYSTERECTOMY (CERVIX STATUS UNKNOWN)      LAPAROSCOPY N/A 1/29/2022    LAPAROSCOPY EXPLORATORY CONVERTED TO OPEN EXPLORATORY LAPAROTOMY, LYSIS OF ADHESIONS, REDUCTION OF INTERNAL HERNIA performed by Jackie Barnard DO at [address] [place] [address]  94/51/2457    APPLICATION OF ARCH BARS,SIMPLE EXTRACTION OF #15 AND RT TMJ JOINT REPLACEMENT    SHOULDER ARTHROSCOPY Left 06/05/2019    DR ROBERT GREEN    SHOULDER SURGERY      TONSILLECTOMY AND ADENOIDECTOMY      UPPER GASTROINTESTINAL ENDOSCOPY  7-3-12    egd    UPPER GASTROINTESTINAL ENDOSCOPY  12/19/2016    status post gastrectomy with a small remnant of gastric pouch. UPPER GASTROINTESTINAL ENDOSCOPY  05/2019    DR Gabino Parrish       Prior to Admission medications    Medication Sig Start Date End Date Taking?  Authorizing Provider   carvedilol (COREG) 25 MG tablet TAKE 1 TABLET BY MOUTH IN THE MORNING AND 1 IN THE EVENING WITH MEALS 9/19/22   Shruti Briscoe MD   traZODone (DESYREL) 100 MG tablet TAKE 1 TABLET BY MOUTH AT BEDTIME 6/13/22   Historical Provider, MD   gabapentin (NEURONTIN) 300 MG capsule TAKE 1 CAPSULE BY MOUTH THREE TIMES DAILY 5/24/22 8/9/22  Shruti Briscoe MD   amLODIPine (NORVASC) 10 MG tablet Take 1 tablet by mouth daily  Patient taking differently: Take 10 mg by mouth daily Takes when she remebers 2/1/22   Delmy Noel DO   FEROSUL 325 (65 Fe) MG tablet TAKE 1 TABLET BY MOUTH 2 TIMES DAILY  Patient not taking: No sig reported 6/18/21   Shruti Briscoe MD   buPROPion (WELLBUTRIN XL) 150 MG extended release tablet TAKE 1 TABLET BY MOUTH EVERY MORNING 12/22/20   Shruti Briscoe MD   hydrOXYzine (VISTARIL) 25 MG capsule  9/15/20   Historical Provider, MD   atorvastatin (LIPITOR) 40 MG tablet Take 40 mg by mouth daily  Patient not taking: No sig reported    Historical Provider, MD   DULoxetine (CYMBALTA) 30 MG extended release capsule Take 1 capsule by mouth daily  Patient taking differently: Take 60 mg by mouth in the morning.  4/8/19   KELLY Dale CNP   albuterol sulfate HFA (PROVENTIL HFA) 108 (90 Base) MCG/ACT inhaler Inhale 2 puffs into the lungs every 6 hours as needed for Wheezing 4/8/19   KELLY Dale CNP   divalproex (DEPAKOTE) 500 MG DR tablet Take 2 tablets by mouth 2 times daily Indications: Level 41 as of 7/22/16 (Pt takes two 500 mg DR tabs = 1,000mg PO BID) 2/28/17   Catie Bullock MD   ARIPiprazole (ABILIFY) 5 MG tablet Take 5 mg by mouth daily    Historical Provider, MD       Scheduled Meds:   calcium gluconate  2,000 mg IntraVENous Once    sodium chloride flush  5-40 mL IntraVENous 2 times per day    heparin (porcine)  5,000 Units SubCUTAneous BID    ARIPiprazole  5 mg Oral Daily    atorvastatin  40 mg Oral Daily    carvedilol  25 mg Oral BID WC    divalproex  1,000 mg Oral BID    DULoxetine  60 mg Oral Daily    ferrous sulfate  325 mg Oral BID     ipratropium-albuterol  1 ampule Inhalation Q4H WA    cefepime  1,000 mg IntraVENous Q24H    vancomycin (VANCOCIN) intermittent dosing (placeholder)   Other RX Placeholder    vancomycin  750 mg IntraVENous Q24H     Continuous Infusions:   sodium chloride      sodium chloride 100 mL/hr at 10/11/22 1131    norepinephrine Stopped (10/11/22 0801)     PRN Meds:sodium chloride flush, sodium chloride, acetaminophen, ondansetron **OR** ondansetron, albuterol sulfate HFA, hydrOXYzine HCl, sodium chloride flush, perflutren lipid microspheres    Allergies   Allergen Reactions    Aspirin      Gastric bypass    Bactrim Other (See Comments)     Severe chills    Codeine Other (See Comments)     Other reaction(s): pain  Caused chest pain ( was in the Tylenol 3# )  Chest pain       Social History     Socioeconomic History    Marital status:  Spouse name: Not on file    Number of children: Not on file    Years of education: Not on file    Highest education level: Not on file   Occupational History    Occupation: disability   Tobacco Use    Smoking status: Every Day     Packs/day: 0.50     Years: 33.00     Pack years: 16.50     Types: Cigarettes    Smokeless tobacco: Never    Tobacco comments:     quit  2000 started again  1/13    Substance and Sexual Activity    Alcohol use: Yes     Comment: occasional    Drug use: Not Currently    Sexual activity: Yes   Other Topics Concern    Not on file   Social History Narrative    Not on file     Social Determinants of Health     Financial Resource Strain: Not on file   Food Insecurity: Not on file   Transportation Needs: Not on file   Physical Activity: Not on file   Stress: Not on file   Social Connections: Not on file   Intimate Partner Violence: Not on file   Housing Stability: Not on file       Family History   Problem Relation Age of Onset    Diabetes Mother     Cancer Mother     Coronary Art Dis Father     COPD Father     Depression Brother     Alcohol Abuse Brother     Cancer Other         lung and skin    Diabetes Maternal Grandmother     Cancer Paternal Grandmother          Physical Exam:  Vitals:    10/11/22 1030 10/11/22 1058 10/11/22 1209 10/11/22 1245   BP: (!) 104/56   109/73   Pulse: 78 75  86   Resp: 24 22  23   Temp:    97.7 °F (36.5 °C)   TempSrc:    Oral   SpO2: 93% 94%  93%   Weight:       Height:   4' 11\" (1.499 m)      I/O last 3 completed shifts: In: 1480 [P.O.:480; IV Piggyback:1000]  Out: -     General:  lethargic, not in distress. Appears to be older than stated age. HEENT: Atraumatic, normocephalic. Anicteric sclera. Pink and moist oral mucosa. Neck supple. No JVD. Chest: Bilateral air entry, clear to auscultation, no wheezing, rhonchi or rales. Cardiovascular: S1S2, no murmur, rub or gallop. No lower extremity edema. Abdomen: soft Active bowel sounds.   Musculoskeletal:  No cyanosis or clubbing. Integumentary: Pink, warm and dry. +wounds to BLE. No s/s of infection.  Skin turgor normal.  CNS: ISAAK    Data:    CBC:   Lab Results   Component Value Date    WBC 21.2 (H) 10/11/2022    HGB 11.1 (L) 10/11/2022    HCT 33.6 (L) 10/11/2022    MCV 78.8 (L) 10/11/2022    PLT 91 (L) 10/11/2022     BMP:    Lab Results   Component Value Date     10/11/2022     (L) 10/11/2022     (L) 10/10/2022    K 3.4 (L) 10/11/2022    K 3.4 (L) 10/11/2022    K 3.4 (L) 10/10/2022     10/11/2022    CL 99 10/11/2022    CL 88 (L) 10/10/2022    CO2 17 (L) 10/11/2022    CO2 19 (L) 10/11/2022    CO2 21 10/10/2022    BUN 34 (H) 10/11/2022    BUN 38 (H) 10/11/2022    BUN 36 (H) 10/10/2022    CREATININE 1.06 (H) 10/11/2022    CREATININE 1.53 (H) 10/11/2022    CREATININE 1.49 (H) 10/10/2022    GLUCOSE 87 10/11/2022    GLUCOSE 115 (H) 10/11/2022    GLUCOSE 223 (H) 10/10/2022     CMP:   Lab Results   Component Value Date/Time     10/11/2022 11:25 AM    K 3.4 10/11/2022 11:25 AM     10/11/2022 11:25 AM    CO2 17 10/11/2022 11:25 AM    BUN 34 10/11/2022 11:25 AM    CREATININE 1.06 10/11/2022 11:25 AM    GLUCOSE 87 10/11/2022 11:25 AM    CALCIUM 7.2 10/11/2022 11:25 AM    PROT 7.9 10/10/2022 04:55 AM    LABALBU 3.4 10/10/2022 04:55 AM    BILITOT 0.7 10/10/2022 04:55 AM    ALKPHOS 112 10/10/2022 04:55 AM    AST 41 10/10/2022 04:55 AM    ALT 17 10/10/2022 04:55 AM      Hepatic:   Lab Results   Component Value Date    AST 41 (H) 10/10/2022    AST 14 04/08/2022    AST 26 01/28/2022    ALT 17 10/10/2022    ALT 6 04/08/2022    ALT 11 01/28/2022    BILITOT 0.7 10/10/2022    BILITOT 0.18 (L) 04/08/2022    BILITOT 0.23 (L) 01/28/2022    ALKPHOS 112 (H) 10/10/2022    ALKPHOS 120 (H) 04/08/2022    ALKPHOS 90 01/28/2022     BNP: No results found for: BNP  Lipids:   Lab Results   Component Value Date    CHOL 156 01/30/2022    HDL 39 (L) 01/30/2022     INR:   Lab Results   Component Value Date    INR 1.2 10/10/2022    INR 1.0 07/22/2016    INR 0.9 05/30/2016     PTH: No results found for: PTH  Phosphorus:    Lab Results   Component Value Date/Time    PHOS 3.3 10/11/2022 04:23 AM     Ionized Calcium: No results found for: IONCA  Magnesium:   Lab Results   Component Value Date/Time    MG 1.8 10/11/2022 04:23 AM     Albumin:   Lab Results   Component Value Date/Time    LABALBU 3.4 10/10/2022 04:55 AM     Last 3 CK, CKMB, Troponin: @LABRCNT(CKTOTAL:3,CKMB:3,TROPONINI:3)       URINE:)No results found for: Priya Sadie    Radiology:   Reviewed. Assessment and Plan to follow. Pt seen in collaboration with Dr. Blair Osuna. Electronically signed by KELLY Tai CNP  on 10/11/2022 at 2:40 PM     Impression:  NICKI, appears to be hemodynamically related, Cr is improving. Hyponatremia, noted to be on Depakote. Hypokalemia. Hypocalcemia. Hypertension. Gram positive cocci sepsis with septic embolization. UTI with E coli. History of cocaine use. Plan: Will decrease NS to 50 ml/hr. On iv Cefepime and Vancomycin. Agree with holding Lisinopril. Will decrease Coreg dose to 3.125 mg BID with holding parameters. Monitor BP. Potassium and calcium were repleted. Checking vitamin D level. Recheck chemistries every 6 hours. Spoke to her father. Electronically signed by Alberto Flowers MD on 10/11/2022 at 3:20 PM  [facility].   Ph: [phone]

## 2022-10-11 NOTE — OP NOTE
Operative Note      Patient: Joan Torre  YOB: 1970  MRN: [id number]                Preoperative diagnosis: COPD exacerbation. UTI. Acute kidney injury. Hypotension need for pressors. Need for central venous access. Postoperative diagnosis: Same    Procedure: Right internal jugular vein triple-lumen central line insertion under ultrasound guidance    Surgeon: Dr. Soren Kothari    Asst.: None    Anesthesia: Local    Preparation: Chloraprep    EBL: Less than 10 mL    Specimen: None    Procedure: Procedure was performed at the bedside in the ICU. Right side of the neck was prepped and draped in usual sterile fashion. Timeout was done. Right internal jugular vein was accessed under ultrasound guidance using Seldinger technique without any difficulty. Guidewire was introduced. Dilator was placed over the guidewire. Dilator was removed. Triple-lumen catheter was inserted. Guidewire was removed. Catheter was secured. Ports were aspirated and flushed using saline solution without any difficulty. Clean dressing was applied. Patient tolerated procedure well. -  Recommendations: Stat portable chest x-ray.

## 2022-10-11 NOTE — PROGRESS NOTES
Vancomycin Dosing by Pharmacy - Initial Note   TODAY'S DATE:  10/10/2022  Patient name, age:  Juliane Olmos, 46 y.o. Indication: Sepsis, MRSA confirmed  Additional antimicrobials:  cefepime    Allergies:  Aspirin, Bactrim, and Codeine   Actual Weight:    Wt Readings from Last 1 Encounters:   10/10/22 89 lb (40.4 kg)     Labs/Ancillary Data  Estimated Creatinine Clearance: 28 mL/min (A) (based on SCr of 1.49 mg/dL (H)). Recent Labs     10/10/22  0455 10/10/22  1547   CREATININE 2.21* 1.49*   BUN 33* 36*   WBC 22.0*  --      Procalcitonin   Date Value Ref Range Status   10/10/2022 6.01 (H) <0.09 ng/mL Final     Comment:           Suspected Sepsis:  <0.50 ng/mL     Low likelihood of sepsis. 0.50-2.00 ng/mL     Increased likelihood of sepsis. Antibiotics encouraged. >2.00 ng/mL     High risk of sepsis/shock. Antibiotics strongly encouraged. Suspected Lower Resp Tract Infections:  <0.24 ng/mL     Low likelihood of bacterial infection. >0.24 ng/mL     Increased likelihood of bacterial infection. Antibiotics encouraged. With successful antibiotic therapy, PCT levels should decrease rapidly. (Half-life of 24 to   36 hours.)        Procalcitonin values from samples collected within the first 6 hours of systemic infection   may still be low. Retesting may be indicated. Values from day 1 and day 4 can be entered into the Change in Procalcitonin Calculator   (www.[employer]-pct-calculator. com) to determine the patient's Mortality Risk Prognosis        In healthy neonates, plasma Procalcitonin (PCT) concentrations increase gradually after   birth, reaching peak values at about 24 hours of age then decrease to normal values below   0.5 ng/mL by 48-72 hours of age.          Intake/Output Summary (Last 24 hours) at 10/10/2022 2051  Last data filed at 10/10/2022 1322  Gross per 24 hour   Intake 1480 ml   Output --   Net 1480 ml     Temp: 98.6 F    Recent vancomycin administrations        No vancomycin IV orders with administrations found. Orders not given:            vancomycin 1000 mg IVPB in 250 mL D5W addavial    vancomycin (VANCOCIN) intermittent dosing (placeholder)    vancomycin (VANCOCIN) 750 mg in dextrose 5 % 250 mL IVPB                  Culture Date / Source  /  Results  See micro 2 of 2 blood cultures with MRSA    MRSA Nasal Swab: N/A. Non-respiratory infection. Zhane Downey PLAN   Initial loading dose of 25mg/kg (max of 2,500 mg) = 1000  mg  x 1, then  vancomycin 750 mg IV every 24 hours. Ensured BUN/sCr ordered at baseline and every 48 hours x at least 3 levels, then at least weekly. Vancomycin level ordered for 10/12 @ 0600. Vancomycin Target Concentration Parameters  Treatment  Population Target AUC/GABBI Target Trough   Invasive MRSA Infection (bacteremia, pneumonia, meningitis, endocarditis, osteomyelitis)  Sepsis (undifferentiated) 400-600 N/A   Infection due to non-MRSA pathogen  Empiric treatment of non-invasive MRSA infection  (SSTI, UTI) <500 10-15 mg/L   CrCl < 29 mL/min  Rapidly fluctuating serum creatinine   NICKI N/A < 15 mg/L     Renal replacement therapy is dosed by levels, per hospital protocol. Abbreviations  * Pauc: probability that AUC is >400 (efficacy); Pconc: probability that Ctrough is above 20 ?g/mL (toxicity); Tox: Probability of nephrotoxicity, based on Lodise et al. Clin Infect Dis 2009. Loading dose: 1000 mg at 21:00 10/10/2022. Regimen: 750 mg IV every 24 hours. Start time: 20:49 on 10/10/2022  Exposure target: AUC24 (range)400-600 mg/L.hr   AUC24,ss: 493 mg/L.hr  Probability of AUC24 > 400: 75 %  Ctrough,ss: 15.4 mg/L  Probability of Ctrough,ss > 20: 23 %  Probability of nephrotoxicity (Lodise DEVON 2009): 11 %    Thank you for the consult. Pharmacy will continue to follow. Avis Pott Rise Duverney. Ph.  10/10/2022  8:53 PM

## 2022-10-11 NOTE — PROGRESS NOTES
Vancomycin Dosing by Pharmacy - Daily Note   Vancomycin Therapy Day:  2  Indication: bacteremia    Allergies:  Aspirin, Bactrim, and Codeine   Actual Weight:    Wt Readings from Last 1 Encounters:   10/10/22 89 lb (40.4 kg)       Labs/Ancillary Data  Estimated Creatinine Clearance: 27 mL/min (A) (based on SCr of 1.53 mg/dL (H)). Recent Labs     10/10/22  0455 10/10/22  1547 10/11/22  0423   CREATININE 2.21* 1.49* 1.53*   BUN 33* 36* 38*   WBC 22.0*  --  21.2*     Procalcitonin   Date Value Ref Range Status   10/10/2022 6.01 (H) <0.09 ng/mL Final     Comment:           Suspected Sepsis:  <0.50 ng/mL     Low likelihood of sepsis. 0.50-2.00 ng/mL     Increased likelihood of sepsis. Antibiotics encouraged. >2.00 ng/mL     High risk of sepsis/shock. Antibiotics strongly encouraged. Suspected Lower Resp Tract Infections:  <0.24 ng/mL     Low likelihood of bacterial infection. >0.24 ng/mL     Increased likelihood of bacterial infection. Antibiotics encouraged. With successful antibiotic therapy, PCT levels should decrease rapidly. (Half-life of 24 to   36 hours.)        Procalcitonin values from samples collected within the first 6 hours of systemic infection   may still be low. Retesting may be indicated. Values from day 1 and day 4 can be entered into the Change in Procalcitonin Calculator   (www.[employer]Taps-pct-calculator. FirstString) to determine the patient's Mortality Risk Prognosis        In healthy neonates, plasma Procalcitonin (PCT) concentrations increase gradually after   birth, reaching peak values at about 24 hours of age then decrease to normal values below   0.5 ng/mL by 48-72 hours of age.          Intake/Output Summary (Last 24 hours) at 10/11/2022 4165  Last data filed at 10/10/2022 1322  Gross per 24 hour   Intake 1480 ml   Output --   Net 1480 ml     Temp: 97.5    Culture Date / Source  /  Results  See micro  Recent vancomycin administrations                     vancomycin 1000 mg IVPB in 250 mL D5W addavial (mg) 1,000 mg New Bag 10/10/22 2118                    Vancomycin Concentrations:   TROUGH:  No results for input(s): VANCOTROUGH in the last 72 hours. RANDOM:  No results for input(s): VANCORANDOM in the last 72 hours. MRSA Nasal Swab: N/A. Non-respiratory infection. Alan Steel PLAN     Continue current dose of 750 mg q24h IV  Ensured BUN/sCr ordered at baseline and every 48 hours x at least 3 levels, then at least weekly. Repeat vancomycin concentration ordered for 10/12 @ 0600   Pharmacy will continue to monitor patient and adjust therapy as indicated      Vancomycin Target Concentration Parameters  Treatment  Population Target AUC/GABBI Target Trough   Invasive MRSA Infection (bacteremia, pneumonia, meningitis, endocarditis, osteomyelitis)  Sepsis (undifferentiated) 400-600 N/A   Infection due to non-MRSA pathogen  Empiric treatment of non-invasive MRSA infection  (SSTI, UTI) <500 10-15 mg/L   CrCl < 29 mL/min  Rapidly fluctuating serum creatinine   NICKI N/A < 15 mg/L     Renal replacement therapy is dosed by levels, per hospital protocol. Abbreviations  * Pauc: probability that AUC is >400 (efficacy); Pconc: probability that Ctrough is above 20 ?g/mL (toxicity); Tox: Probability of nephrotoxicity, based on Lodise et al. Clin Infect Dis 2009. Loading dose: 1000 mg at 21:00 10/10/2022. Regimen: 750 mg IV every 24 hours. Start time: 09:18 on 10/11/2022  Exposure target: AUC24 (range)400-600 mg/L.hr   AUC24,ss: 511 mg/L.hr  Probability of AUC24 > 400: 78 %  Ctrough,ss: 16.1 mg/L  Probability of Ctrough,ss > 20: 27 %  Probability of nephrotoxicity (Lodise DEVON 2009): 11 %    Thank you for the consult. Pharmacy will continue to follow.     Zohreh KnightD, BCSCP  10/11/2022   6:53 AM

## 2022-10-11 NOTE — PROGRESS NOTES
Pulmonary Progress Note  [facility]      Patient - Fredrick Stiles,  Age - 46 y.o.    - 1970      Room Number -    MRN -  [id number]   [facility]  Date of Admission -  10/10/2022  4:37 AM    Dionicia Phoenix, MD  Primary Care Physician - Raman Sol MD     SUBJECTIVE   Patient able to speak without difficulty. Patient was undergoing a 2D echo. Her breathing appears to be stable on no oxygen. Systolic blood pressure in the 90s. She was on Levophed earlier today but that was discontinued. The order is still present. OBJECTIVE   VITALS    height is 4' 11\" (1.499 m) and weight is 89 lb (40.4 kg). Her oral temperature is 97.7 °F (36.5 °C). Her blood pressure is 115/66 and her pulse is 74. Her respiration is 25 and oxygen saturation is 94%. Body mass index is 17.98 kg/m². Temperature Range: Temp: 97.7 °F (36.5 °C) Temp  Av.2 °F (36.2 °C)  Min: 96.7 °F (35.9 °C)  Max: 97.7 °F (36.5 °C)  BP Range:  Systolic (34RSK), UPT:57 , Min:63 , HGV:824     Diastolic (93FAD), AQO:75, Min:44, Max:92    Pulse Range: Pulse  Av.6  Min: 60  Max: 87  Respiration Range: Resp  Av.1  Min: 12  Max: 28  Current Pulse Ox[de-identified]  SpO2: 94 %  24HR Pulse Ox Range:  SpO2  Av.8 %  Min: 81 %  Max: 100 %  Oxygen Amount and Delivery: O2 Flow Rate (L/min): 6 L/min    Wt Readings from Last 3 Encounters:   10/10/22 89 lb (40.4 kg)   22 96 lb 8 oz (43.8 kg)   22 105 lb (47.6 kg)       I/O (24 Hours)    Intake/Output Summary (Last 24 hours) at 10/11/2022 0949  Last data filed at 10/10/2022 1322  Gross per 24 hour   Intake 480 ml   Output --   Net 480 ml       EXAM     General Appearance  Awake, alert, oriented, in no acute distress  HEENT - normocephalic, atraumatic. Neck - Supple,  trachea midline   Lungs -clear anterior laterally  Heart Exam:PMI normal. No lifts, heaves, or thrills. RRR.  No murmurs, clicks, gallops, or rubs  Abdomen Exam: Abdomen soft, non-tender.  BS normal.  Extremity Exam: No signs of cyanosis    MEDS      sodium chloride  1,000 mL IntraVENous Once    sodium chloride flush  5-40 mL IntraVENous 2 times per day    heparin (porcine)  5,000 Units SubCUTAneous BID    ARIPiprazole  5 mg Oral Daily    atorvastatin  40 mg Oral Daily    carvedilol  25 mg Oral BID WC    divalproex  1,000 mg Oral BID    DULoxetine  60 mg Oral Daily    ferrous sulfate  325 mg Oral BID WC    ipratropium-albuterol  1 ampule Inhalation Q4H WA    cefepime  1,000 mg IntraVENous Q24H    vancomycin (VANCOCIN) intermittent dosing (placeholder)   Other RX Placeholder    vancomycin  750 mg IntraVENous Q24H      sodium chloride      sodium chloride 100 mL/hr at 10/11/22 0635    norepinephrine Stopped (10/11/22 0801)     sodium chloride flush, sodium chloride, acetaminophen, ondansetron **OR** ondansetron, albuterol sulfate HFA, hydrOXYzine HCl, sodium chloride flush, perflutren lipid microspheres    LABS   CBC   Recent Labs     10/11/22  0423   WBC 21.2*   HGB 11.1*   HCT 33.6*   MCV 78.8*   PLT 91*     BMP:   Lab Results   Component Value Date/Time     10/11/2022 04:23 AM    K 3.4 10/11/2022 04:23 AM    CL 99 10/11/2022 04:23 AM    CO2 19 10/11/2022 04:23 AM    BUN 38 10/11/2022 04:23 AM    LABALBU 3.4 10/10/2022 04:55 AM    CREATININE 1.53 10/11/2022 04:23 AM    CALCIUM 7.5 10/11/2022 04:23 AM    GFRAA >60 04/08/2022 07:17 AM    LABGLOM 40 10/11/2022 04:23 AM     ABGs:  Lab Results   Component Value Date/Time    PHART 7.385 10/10/2022 07:00 PM    PO2ART 90.4 10/10/2022 07:00 PM    ASE4BQP 36.4 10/10/2022 07:00 PM      Lab Results   Component Value Date/Time    MODE PRVC 12/28/2016 04:36 AM     Ionized Calcium:  No results found for: IONCA  Magnesium:    Lab Results   Component Value Date/Time    MG 1.8 10/11/2022 04:23 AM     Phosphorus:    Lab Results   Component Value Date/Time    PHOS 3.3 10/11/2022 04:23 AM        LIVER PROFILE Recent Labs     10/10/22  0455   AST 41*   ALT 17   LIPASE 17   BILITOT 0.7   ALKPHOS 112*     INR   Recent Labs     10/10/22  0455   INR 1.2     PTT   Lab Results   Component Value Date    APTT 25.6 07/22/2016         RADIOLOGY     (See actual reports for details)    ASSESSMENT/PLAN     Patient Active Problem List   Diagnosis    GERD (gastroesophageal reflux disease)    Hoarseness of voice    MVP (mitral valve prolapse)    Depression with anxiety    Lymphocytic colitis    History of migraine headaches    Hyperlipidemia    Sleep apnea    IGT (impaired glucose tolerance)    History of renal calculi    Lactose intolerance    History of seizure disorder    Leukopenia    Bipolar disorder, mixed ([address]    Illicit drug use    Postlaminectomy syndrome, cervical region    Degeneration of cervical intervertebral disc    Encounter for long-term (current) use of other medications    Bradycardia    Cellulitis    Hypokalemia    Primary hypertension    Elevated lipase    Right lower quadrant abdominal pain    Nausea    Diarrhea    Seizure disorder (HCC)    Polysubstance abuse (HCC)    Fibromyalgia    Cocaine addiction (HCC)    Shoulder arthritis    Osteoarthritis of left glenohumeral joint    Abnormal EKG    Anemia    Anxiety    Carotid artery stenosis    COPD (chronic obstructive pulmonary disease) ([facility] [address]    Dental disease    HUTCHINSON (dyspnea on exertion)    Fatigue    Fractures    H/O gastric bypass    History of crack cocaine use    History of UTI    Injury of back    Intractable chronic migraine without aura and without status migrainosus    Intractable migraine with aura with status migrainosus    Kidney stones    Dizziness    Memory loss    Mild pulmonary hypertension (HCC)    Mild tricuspid regurgitation    Primary osteoarthritis of left shoulder    Seizure-like activity (HCC)    Stress at home    TMJ (dislocation of temporomandibular joint)    Tobacco abuse    Visual impairment    Internal hernia    Vitamin D deficiency Iron deficiency    SIRS (systemic inflammatory response syndrome) (HCC)    NICKI (acute kidney injury) ([facility] [address]     #1 shock likely septic  #2 MRSA bacteremia with septic emboli  #3 urinary tract infection E. coli  #4 confusion, patient able to answer questions however  #5 COPD  #6 acute kidney injury likely secondary to dehydration from infection  #7 history of cocaine use. #8 elevated procalcitonin level likely from the bacteremia  Continue with breathing treatments   vancomycin pharmacy to dose  On cefepime for the urinary tract infection  2D echo results pending  On DVT prophylaxis  Elevated D-dimer, will check Doppler ultrasounds of lower extremities  Cardiology evaluation underway    [facility] (Parent)   [phone], patient's daughter updated at bedside. He voiced understanding of the patient's clinical status and appreciate the update. Fluid bolus to improve blood pressure, still needing Levophed.     Cc time 30 min     Electronically signed by Natali Monsivais MD on 10/11/2022 at 9:49 AM

## 2022-10-11 NOTE — PROGRESS NOTES
Dr Rajan Esqueda consulted for line placement. Dr Tolentino Number to place the line in the morning.

## 2022-10-11 NOTE — PROGRESS NOTES
Pharmacy Medication History Note      List of current medications patient is taking is complete. Source of information: Lissy LOZOYA [facility] [place]    Changes made to medication list:  Medications removed (include reason, ex. therapy complete or physician discontinued, noncompliance):  Divalproex DR 500mg   Amlodipine 10mg   Albuterol HFA 108mcg/act  Atorvastatin 40mg   Bupropion 150mg   Ferosul 325mg    Medications added/doses adjusted: Added Lisinopril 20mg back to medication list per [facility] records         Current Home Medication List at Time of Admission:  Prior to Admission medications    Medication Sig Start Date End Date Taking? Authorizing Provider   lisinopril (PRINIVIL;ZESTRIL) 20 MG tablet Take 20 mg by mouth daily   Yes Historical Provider, MD   carvedilol (COREG) 25 MG tablet TAKE 1 TABLET BY MOUTH IN THE MORNING AND 1 IN THE EVENING WITH MEALS 9/19/22   Rg Cunningham MD   traZODone (DESYREL) 100 MG tablet TAKE 1 TABLET BY MOUTH AT BEDTIME 6/13/22   Historical Provider, MD   gabapentin (NEURONTIN) 300 MG capsule TAKE 1 CAPSULE BY MOUTH THREE TIMES DAILY 5/24/22 8/9/22  Rg Cunningham MD   hydrOXYzine (VISTARIL) 25 MG capsule  9/15/20   Historical Provider, MD   DULoxetine (CYMBALTA) 30 MG extended release capsule Take 1 capsule by mouth daily  Patient taking differently: Take 60 mg by mouth in the morning. 4/8/19   KELLY Yi - CNP   ARIPiprazole (ABILIFY) 5 MG tablet Take 5 mg by mouth daily    Historical Provider, MD         Please let me know if you have any questions about this encounter. Thank you!     Electronically signed by Татьяна Chong on 10/11/2022 at 3:40 PM

## 2022-10-11 NOTE — H&P
CARPAL TUNNEL RELEASE      x2     SECTION      CHOLECYSTECTOMY      COLONOSCOPY  12    COLONOSCOPY  2016    severe spasms    COLONOSCOPY  2019    DR GOLDY MCINTOSH    GASTRIC BYPASS SURGERY      GASTRIC BYPASS SURGERY      HYSTERECTOMY (CERVIX STATUS UNKNOWN)      HYSTERECTOMY (CERVIX STATUS UNKNOWN)      LAPAROSCOPY N/A 2022    LAPAROSCOPY EXPLORATORY CONVERTED TO OPEN EXPLORATORY LAPAROTOMY, LYSIS OF ADHESIONS, REDUCTION OF INTERNAL HERNIA [facility]      APPLICATION OF ARCH BARS,SIMPLE EXTRACTION OF #15 AND RT TMJ JOINT REPLACEMENT    SHOULDER ARTHROSCOPY Left 2019    DR ROBERT GREEN    SHOULDER SURGERY      TONSILLECTOMY AND ADENOIDECTOMY      UPPER GASTROINTESTINAL ENDOSCOPY  7-3-12    egd    UPPER GASTROINTESTINAL ENDOSCOPY  2016    status post gastrectomy with a small remnant of gastric pouch. UPPER GASTROINTESTINAL ENDOSCOPY  2019    DR Gabino Parrish       Medications Prior to Admission:    Prior to Admission medications    Medication Sig Start Date End Date Taking?  Authorizing Provider   carvedilol (COREG) 25 MG tablet TAKE 1 TABLET BY MOUTH IN THE MORNING AND 1 IN THE EVENING WITH MEALS 22   Shruti Briscoe MD   traZODone (DESYREL) 100 MG tablet TAKE 1 TABLET BY MOUTH AT BEDTIME 22   Historical Provider, MD   gabapentin (NEURONTIN) 300 MG capsule TAKE 1 CAPSULE BY MOUTH THREE TIMES DAILY 22  Shruti Briscoe MD   amLODIPine (NORVASC) 10 MG tablet Take 1 tablet by mouth daily  Patient taking differently: Take 10 mg by mouth daily Takes when she remebers 22   Delmy Noel DO   FEROSUL 325 (65 Fe) MG tablet TAKE 1 TABLET BY MOUTH 2 TIMES DAILY  Patient not taking: No sig reported 21   Shruti Briscoe MD   buPROPion (WELLBUTRIN XL) 150 MG extended release tablet TAKE 1 TABLET BY MOUTH EVERY MORNING 20   Shruti Briscoe MD or guarding and positive bowel sounds all four quadrants. CXR:  I have reviewed the CXR with the following interpretation: 10/11  1. Interval placement of right IJ central line terminating at cavoatrial   junction. No pneumothorax. 2. Patchy peripheral somewhat nodular appearing airspace opacities. This can   be further evaluated with CT. 3. Minor subsegmental atelectasis left lung base. V/Q scan:   Indeterminate due to perfusion defects that likely correspond to the nodular   opacities seen at the lung bases on the CT scan 10/10/2022. CT abd/pelvis without contrast:   Lack of contrast administration significantly decreases sensitivity of the   study for evaluation of parenchymal lesions and  vascular pathologies. Extensive pulmonary nodules with cavitation and focal consolidative changes   throughout the visualized bilateral lower lobes are highly concerning for   septic emboli within the lungs. For further evaluation contrast enhanced   chest CT can be obtained. There is fluid density adjacent to the inferior pole of right kidney, likely   extrarenal pelvis. For further evaluation post-contrast images of the   abdomen is recommended. Status post gastric bypass. Bilateral 2-4 mm nonobstructing kidney stones are visualized. .     Renal US: Limited, unremarkable renal ultrasound. No hydronephrosis.     EKG:  I have reviewed the EKG with the following interpretation: NSR    CBC   Recent Labs     10/10/22  0455 10/11/22  0423   WBC 22.0* 21.2*   HGB 14.9 11.1*   HCT 45.0 33.6*   * 91*      RENAL  Recent Labs     10/10/22  0455 10/10/22  1547 10/11/22  0423   * 126* 132*   K 3.3* 3.4* 3.4*   CL 91* 88* 99   CO2 24 21 19*   PHOS  --   --  3.3   BUN 33* 36* 38*   CREATININE 2.21* 1.49* 1.53*     LFT'S  Recent Labs     10/10/22  0455   AST 41*   ALT 17   BILITOT 0.7   ALKPHOS 112*     COAG  Recent Labs     10/10/22  0455   INR 1.2     CARDIAC ENZYMES  No results for input(s): CKTOTAL, CKMB, CKMBINDEX, TROPONINI in the last 72 hours. U/A:    Lab Results   Component Value Date/Time    NITRITE neg 08/09/2022 01:35 PM    COLORU Dark Yellow 10/10/2022 08:26 AM    WBCUA 10 TO 20 10/10/2022 08:26 AM    RBCUA 0 TO 2 10/10/2022 08:26 AM    MUCUS 1+ 02/05/2016 05:24 AM    BACTERIA MODERATE 10/10/2022 08:26 AM    CLARITYU clear 08/09/2022 01:35 PM    SPECGRAV 1.020 10/10/2022 08:26 AM    LEUKOCYTESUR SMALL 10/10/2022 08:26 AM    BLOODU neg 08/09/2022 01:35 PM    GLUCOSEU NEGATIVE 10/10/2022 08:26 AM    AMORPHOUS NOT REPORTED 02/05/2016 05:24 AM       ABG    Lab Results   Component Value Date/Time    IQW8TFZ 21.7 10/10/2022 07:00 PM    E2KLEZGU 94.3 10/10/2022 07:00 PM    PHART 7.385 10/10/2022 07:00 PM    MTL5WUK 36.4 10/10/2022 07:00 PM    PO2ART 90.4 10/10/2022 07:00 PM           Active Hospital Problems    Diagnosis Date Noted    Acute renal injury due to hypovolemia ([facility] [address] [N17.9, E86.1] 10/11/2022     Priority: Medium    SIRS (systemic inflammatory response syndrome) ([facility] [address] [R65.10] 10/10/2022     Priority: Medium    H/O gastric bypass [Z98.84] 02/13/2020    COPD (chronic obstructive pulmonary disease) ([facility] [address] [J44.9] 10/25/2016    Bipolar disorder, mixed ([facility] [address] [F31.60] 11/06/2014         ASSESSMENT/PLAN:    SIRS with nodules/infection in lungs - abx and management per pulmonology. BPs improved. Place chamberlain for accurate I/O in critically ill patient with urinary retention. AE COPD    NICKI d/t hypovolemia - improving. Management per nephrology    Substance abuse history - urine tox ordered, not done d/t poor urine output        DVT Prophylaxis: heparin  Diet: ADULT DIET;  Regular; 1000 ml  Code Status: Full Code      Dispo - admitted      Ivy Lynch MD, FAAFP  10/11/2022, 8:34 AM

## 2022-10-11 NOTE — PROGRESS NOTES
RN spoke to Dr. Luis Renner in regards to patients electrolytes.      Orders received for:    20 meq IV pot once   2 gram Calcium gluconate IV once   Add vitamin D 25 to morning labs   Modify fluid restriction to 1500 ml      Call back with most recent BMP results

## 2022-10-11 NOTE — CARE COORDINATION
ONGOING DISCHARGE PLAN:    Patient is drowsy , Father Guerrero at bedside. Pt was a rapid response overnight and transferred to ICU. Spoke with patient and father yesterday regarding discharge plan and patient confirms that plan is still to return home to her house and father Guerrero stated patient could come to his house if needed. Pt declined VNS , but will reevaluate once more awake. Possible septic emboli, to get echo and ADRIANNE if echo is negative. IV cefepime and vanco     , K+3.4 BUN 38, creat 1.53 Ca 7.5 WBC 21.2 HGB 11.1 HCT 33.6    Will continue to follow for additional discharge needs.     Electronically signed by Rashid Mendiola RN on 10/11/2022 at 10:36 AM

## 2022-10-11 NOTE — CARE COORDINATION
SW attempted to provide patient with alcohol/drug resources but patient was lethargic. Father present at bedside. SW attempted to wake patient up to engage in conversation, but patient was lethargic. SW will attempt again.  Electronically signed by JOSE Celestin on 10/11/2022 at 2:28 PM

## 2022-10-11 NOTE — CONSULTS
history (10/24/2019); and laparoscopy (N/A, 1/29/2022). Medications  Prior to Admission medications    Medication Sig Start Date End Date Taking? Authorizing Provider   carvedilol (COREG) 25 MG tablet TAKE 1 TABLET BY MOUTH IN THE MORNING AND 1 IN THE EVENING WITH MEALS 9/19/22   Tiffany Torres MD   traZODone (DESYREL) 100 MG tablet TAKE 1 TABLET BY MOUTH AT BEDTIME 6/13/22   Historical Provider, MD   gabapentin (NEURONTIN) 300 MG capsule TAKE 1 CAPSULE BY MOUTH THREE TIMES DAILY 5/24/22 8/9/22  Tiffany Torres MD   amLODIPine (NORVASC) 10 MG tablet Take 1 tablet by mouth daily  Patient taking differently: Take 10 mg by mouth daily Takes when she remebers 2/1/22   Elizabeth Mcfarland DO   FEROSUL 325 (65 Fe) MG tablet TAKE 1 TABLET BY MOUTH 2 TIMES DAILY  Patient not taking: No sig reported 6/18/21   Tiffany Torres MD   buPROPion (WELLBUTRIN XL) 150 MG extended release tablet TAKE 1 TABLET BY MOUTH EVERY MORNING 12/22/20   Tiffany Torres MD   hydrOXYzine (VISTARIL) 25 MG capsule  9/15/20   Historical Provider, MD   atorvastatin (LIPITOR) 40 MG tablet Take 40 mg by mouth daily  Patient not taking: No sig reported    Historical Provider, MD   DULoxetine (CYMBALTA) 30 MG extended release capsule Take 1 capsule by mouth daily  Patient taking differently: Take 60 mg by mouth in the morning. 4/8/19   KELLY Barron CNP   albuterol sulfate HFA (PROVENTIL HFA) 108 (90 Base) MCG/ACT inhaler Inhale 2 puffs into the lungs every 6 hours as needed for Wheezing 4/8/19   KELLY Barron CNP   divalproex (DEPAKOTE) 500 MG DR tablet Take 2 tablets by mouth 2 times daily Indications: Level 41 as of 7/22/16 (Pt takes two 500 mg DR tabs = 1,000mg PO BID) 2/28/17   Demario Godoy MD   ARIPiprazole (ABILIFY) 5 MG tablet Take 5 mg by mouth daily    Historical Provider, MD     Allergies  is allergic to aspirin, bactrim, and codeine.     Family History  family history includes Alcohol Abuse in her brother; COPD in her father; Cancer in her mother, paternal grandmother, and another family member; Coronary Art Dis in her father; Depression in her brother; Diabetes in her maternal grandmother and mother. Social History   reports that she has been smoking cigarettes. She has a 16.50 pack-year smoking history. She has never used smokeless tobacco. She reports current alcohol use. She reports that she does not currently use drugs. Review of Systems:   Patient is lethargic unable to obtain detailed review of system. Please refer to chart. OBJECTIVE:   VITALS:  height is 4' 11\" (1.499 m) and weight is 89 lb (40.4 kg). Her axillary temperature is 97.5 °F (36.4 °C). Her blood pressure is 121/65 and her pulse is 73. Her respiration is 20 and oxygen saturation is 94%. CONSTITUTIONAL: Lethargic. Resting otherwise comfortably. SKIN: Skin color, texture, turgor normal. No rashes or lesions. LYMPH: no cervical nodes, no inguinal nodes  HEENT: Head is normocephalic, atraumatic. EOMI, PERRLA  NECK: Supple, symmetrical, trachea midline, no adenopathy, thyroid symmetric, not enlarged and no tenderness, skin normal and neck scars present  CHEST/LUNGS: chest symmetric with normal A/P diameter, normal respiratory rate and rhythm, lungs clear to auscultation without wheezes, rales or rhonchi. No accessory muscle use. Scars None   CARDIOVASCULAR: Heart regular rate and rhythm Normal S1 and S2. . Carotid and femoral pulses 2+/4 and equal bilaterally  ABDOMEN: Soft nondistended nontender   RECTAL: deferred, not clinically indicated  NEUROLOGIC: Deferred  EXTREMITIES: no cyanosis, no clubbing, and no edema    LABS:   CBC with Differential:    Lab Results   Component Value Date/Time    WBC 21.2 10/11/2022 04:23 AM    RBC 4.26 10/11/2022 04:23 AM    HGB 11.1 10/11/2022 04:23 AM    HCT 33.6 10/11/2022 04:23 AM    PLT 91 10/11/2022 04:23 AM    MCV 78.8 10/11/2022 04:23 AM    MCH 26.1 10/11/2022 04:23 AM    MCHC 33.1 10/11/2022 04:23 AM    RDW 15.2 10/11/2022 04:23 AM    METASPCT 2 10/11/2022 04:23 AM    LYMPHOPCT 2 10/11/2022 04:23 AM    LYMPHOPCT 16.4 07/06/2016 12:00 AM    MONOPCT 2 10/11/2022 04:23 AM    MONOPCT 11.1 07/06/2016 12:00 AM    EOSPCT 0.2 07/06/2016 12:00 AM    BASOPCT 0 10/11/2022 04:23 AM    BASOPCT 0.2 07/06/2016 12:00 AM    MONOSABS 0.42 10/11/2022 04:23 AM    MONOSABS 0.6 07/06/2016 12:00 AM    LYMPHSABS 0.42 10/11/2022 04:23 AM    LYMPHSABS 1.0 07/06/2016 12:00 AM    EOSABS 0.00 10/11/2022 04:23 AM    EOSABS 0.0 07/06/2016 12:00 AM    BASOSABS 0.00 10/11/2022 04:23 AM    DIFFTYPE NOT REPORTED 01/29/2022 12:38 PM     BMP:    Lab Results   Component Value Date/Time     10/11/2022 04:23 AM    K 3.4 10/11/2022 04:23 AM    CL 99 10/11/2022 04:23 AM    CO2 19 10/11/2022 04:23 AM    BUN 38 10/11/2022 04:23 AM    LABALBU 3.4 10/10/2022 04:55 AM    CREATININE 1.53 10/11/2022 04:23 AM    CALCIUM 7.5 10/11/2022 04:23 AM    GFRAA >60 04/08/2022 07:17 AM    LABGLOM 40 10/11/2022 04:23 AM    GLUCOSE 115 10/11/2022 04:23 AM     Hepatic Function Panel:    Lab Results   Component Value Date/Time    ALKPHOS 112 10/10/2022 04:55 AM    ALT 17 10/10/2022 04:55 AM    AST 41 10/10/2022 04:55 AM    PROT 7.9 10/10/2022 04:55 AM    BILITOT 0.7 10/10/2022 04:55 AM    BILIDIR <0.08 12/29/2016 06:40 AM    IBILI CANNOT BE CALCULATED 12/29/2016 06:40 AM    LABALBU 3.4 10/10/2022 04:55 AM     Calcium:    Lab Results   Component Value Date/Time    CALCIUM 7.5 10/11/2022 04:23 AM     Magnesium:    Lab Results   Component Value Date/Time    MG 1.8 10/11/2022 04:23 AM     Phosphorus:    Lab Results   Component Value Date/Time    PHOS 3.3 10/11/2022 04:23 AM     PT/INR:    Lab Results   Component Value Date/Time    PROTIME 14.8 10/10/2022 04:55 AM    INR 1.2 10/10/2022 04:55 AM     ABG:    Lab Results   Component Value Date/Time    PHART 7.385 10/10/2022 07:00 PM    YUX2THP 36.4 10/10/2022 07:00 PM    PO2ART 90.4 10/10/2022 07:00 PM    CEW7FLF 21.7 10/10/2022 07:00 PM    O0PERVBS 94.3 10/10/2022 07:00 PM     Urine Culture:  No components found for: CURINE  Blood Culture:  No components found for: CBLOOD, CFUNGUSBL  Stool Culture:  No components found for: CSTOOL    RADIOLOGY:   I have personally reviewed the following films:  CT ABDOMEN PELVIS WO CONTRAST Additional Contrast? None    Result Date: 10/10/2022  EXAMINATION: CT OF THE ABDOMEN AND PELVIS WITHOUT CONTRAST 10/10/2022 2:37 pm TECHNIQUE: CT of the abdomen and pelvis was performed without the administration of intravenous contrast. Multiplanar reformatted images are provided for review. Automated exposure control, iterative reconstruction, and/or weight based adjustment of the mA/kV was utilized to reduce the radiation dose to as low as reasonably achievable. COMPARISON: 01/28/2022 HISTORY: ORDERING SYSTEM PROVIDED HISTORY: abdominal pain TECHNOLOGIST PROVIDED HISTORY: abdominal pain Is the patient pregnant?->No Reason for Exam: abd pain FINDINGS: LOWER CHEST: Extensive pulmonary in nodules with cavitation and focal consolidative changes throughout the visualized bilateral lower lobes are highly concerning for septic emboli within the lungs. KIDNEYS AND URINARY TRACT: Bilateral 2-4 mm nonobstructing kidney stones are visualized. . There is fluid density adjacent to the inferior pole of right kidney measuring 34 x 14 mm. This likely represents extrarenal pelvis. There is no evidence for hydronephrosis. The ureters are of normal course and caliber. ORGANS: Visualized portions of the liver, spleen, pancreas, gallbladder, and adrenal glands demonstrate no acute abnormality. GI/BOWEL: No bowel obstruction. No evidence of acute appendicitis. Status post gastric bypass. PELVIS: The bladder and pelvic organs are unremarkable. PERITONEUM/RETROPERITONEUM: No free air or free fluid is noted. No pathologically enlarged lymphadenopathy. The vasculature do not demonstrate acute abnormality.  BONES/SOFT TISSUES: The osseous structures demonstrate no acute abnormality. Lack of contrast administration significantly decreases sensitivity of the study for evaluation of parenchymal lesions and  vascular pathologies. Extensive pulmonary nodules with cavitation and focal consolidative changes throughout the visualized bilateral lower lobes are highly concerning for septic emboli within the lungs. For further evaluation contrast enhanced chest CT can be obtained. There is fluid density adjacent to the inferior pole of right kidney, likely extrarenal pelvis. For further evaluation post-contrast images of the abdomen is recommended. Status post gastric bypass. Bilateral 2-4 mm nonobstructing kidney stones are visualized. . The findings were sent to the Radiology Results [id number] at 5:11 pm on 10/10/2022 to be communicated to a licensed caregiver. NM LUNG SCAN PERFUSION ONLY    Result Date: 10/10/2022  EXAMINATION: NUCLEAR MEDICINE PERFUSION SCAN. 10/10/2022 TECHNIQUE: Ventilation not performed as part of COVID-19 safety precautions. 6.9 millicuries of Tc 73S MAA was administered intravenously prior to planar imaging of the lungs in multiple projections. COMPARISON: Chest radiograph 10/10/2022. CT scan of the abdomen pelvis 10/10/2022 HISTORY: ORDERING SYSTEM PROVIDED HISTORY: elevated D-Dimer TECHNOLOGIST PROVIDED HISTORY: elevated D-Dimer Is the patient pregnant?->No Reason for Exam: elevated D-Dimer Additional signs and symptoms: Covid positive, SOB, CP, COPD, D.dimer 3.85 FINDINGS: PERFUSION: There are small perfusion defects, particularly near the lung bases. CHEST RADIOGRAPH: Nodular opacities are seen in the lungs bilaterally. These are seen to better advantage at the lung bases on the CT scan, likely accounting for the perfusion defects. Indeterminate due to perfusion defects that likely correspond to the nodular opacities seen at the lung bases on the CT scan 10/10/2022.      US RENAL COMPLETE    Result Date: 10/10/2022  EXAMINATION: RETROPERITONEAL ULTRASOUND OF THE KIDNEYS AND URINARY BLADDER 10/10/2022 COMPARISON: CT abdomen pelvis from 10/10/2022 HISTORY: ORDERING SYSTEM PROVIDED HISTORY: increased creatinine TECHNOLOGIST PROVIDED HISTORY: increased creatinine 28-year-old female with increased creatinine FINDINGS: Kidneys: Exam is limited/suboptimal due to bowel gas. Right kidney measures 10.6 x 6.1 x 5.7 cm. Right renal cortical thickness measures 1.5 cm. Left kidney measures 8.9 x 4.5 x 4.7 cm. Left renal cortical thickness measures 1.8 cm. No hydronephrosis or perinephric fluid. No echogenic foci with posterior acoustic shadowing to suggest renal calculi. Gross preservation of the bilateral corticomedullary differentiation. Bladder: Not imaged. Limited, unremarkable renal ultrasound. No hydronephrosis. RECOMMENDATIONS: Unavailable     XR CHEST PORTABLE    Result Date: 10/10/2022  EXAMINATION: ONE XRAY VIEW OF THE CHEST 10/10/2022 6:14 am COMPARISON: 12/28/2016 HISTORY: ORDERING SYSTEM PROVIDED HISTORY: shortness of breath TECHNOLOGIST PROVIDED HISTORY: shortness of breath Reason for Exam: dyspnea, middle chest pains 28-year-old female with shortness of breath, dyspnea, mid chest pain FINDINGS: AP upright portable view of the chest. Left shoulder arthroplasty hardware. Trachea midline. No pneumothorax. No free air. Cardiac and mediastinal contours unchanged and within normal limits. Atherosclerotic calcification of the aortic knob. Multiple nodular opacities projecting over the right lung measuring up to 1.5 cm. No acute focal airspace consolidation or pleural effusions. Prior cholecystectomy. Spinal fusion hardware at the lower cervical spine. 1. Multiple nodular opacities projecting over the right lung measuring up to 1.5 cm. Further evaluation with CT chest is recommended. 2. No acute focal airspace consolidation or pleural effusions. IMPRESSION:   COPD exacerbation. UTI.   Acute kidney injury. Hypotension need for pressors. Need for central venous access. does not have any pertinent problems on file. PLAN:   I will proceed with central line placement at the bedside as requested. Thank you for this interesting consult and for allowing us to participate in the care of this patient. If you have any questions please don't hesitate to call.           Electronically signed by Tyree Santoro MD  on 10/11/2022 at 6:37 AM  Severino Son

## 2022-10-11 NOTE — PLAN OF CARE
Problem: Discharge Planning  Goal: Discharge to home or other facility with appropriate resources  Outcome: Progressing  Flowsheets  Taken 10/11/2022 1630  Discharge to home or other facility with appropriate resources: Refer to discharge planning if patient needs post-hospital services based on physician order or complex needs related to functional status, cognitive ability or social support system  Taken 10/11/2022 1245  Discharge to home or other facility with appropriate resources: Refer to discharge planning if patient needs post-hospital services based on physician order or complex needs related to functional status, cognitive ability or social support system  Taken 10/11/2022 0930  Discharge to home or other facility with appropriate resources: Refer to discharge planning if patient needs post-hospital services based on physician order or complex needs related to functional status, cognitive ability or social support system     Problem: Pain  Goal: Verbalizes/displays adequate comfort level or baseline comfort level  Outcome: Progressing  Flowsheets  Taken 10/11/2022 1630  Verbalizes/displays adequate comfort level or baseline comfort level:   Encourage patient to monitor pain and request assistance   Assess pain using appropriate pain scale   Administer analgesics based on type and severity of pain and evaluate response  Taken 10/11/2022 1245  Verbalizes/displays adequate comfort level or baseline comfort level:   Encourage patient to monitor pain and request assistance   Assess pain using appropriate pain scale  Taken 10/11/2022 0745  Verbalizes/displays adequate comfort level or baseline comfort level:   Assess pain using appropriate pain scale   Administer analgesics based on type and severity of pain and evaluate response   Encourage patient to monitor pain and request assistance

## 2022-10-11 NOTE — CONSULTS
[facility]    Consult                        Today's Date: 10/11/2022  Patient Name: Bassam Varela  Date of admission: 10/10/2022  4:37 AM  Patient's age: 46 y. o., 1970  Admission Dx: Pulmonary nodules [R91.8]  SIRS (systemic inflammatory response syndrome) (HCC) [R65.10]  NICKI (acute kidney injury) ([address] [N17.9]  Elevated d-dimer [R79.89]  Leukocytosis, unspecified type [C83.390]    Reason for Consult:  Septic embolic. Requesting Physician: Barbara Pepe MD    CHIEF COMPLAINT:  SOB. History Obtained From:  electronic medical record    HISTORY OF PRESENT ILLNESS:      The patient is a 46 y.o.  female who is admitted to the hospital for SOB. The patient is difficult historian. History taken from Dad, had recent Covid infection. Presented with lethargy, sleepy and breath. No chest pain no dizziness no lightheadedness no syncope. Past Medical History:   has a past medical history of Anemia, Arthritis, Asthma, Bipolar disorder, mixed ([address] Carotid artery stenosis, Cocaine abuse ([address] COPD (chronic obstructive pulmonary disease) ([address] Degeneration of cervical intervertebral disc, Depression with anxiety, Depression with anxiety, Fibromyalgia, GERD (gastroesophageal reflux disease), History of migraine headaches, History of renal calculi, History of seizure disorder, Hoarseness of voice, HTN (hypertension), Hyperlipidemia, IGT (impaired glucose tolerance), Kidney stones, Lactose intolerance, Leukopenia, Major depressive disorder, recurrent episode, severe, without mention of psychotic behavior, MVP (mitral valve prolapse), Seizures ([address] Sleep apnea, and Unspecified diseases of blood and blood-forming organs. Past Surgical History:   has a past surgical history that includes Gastric bypass surgery; Cholecystectomy; Hysterectomy; Appendectomy; Hysterectomy; Tonsillectomy and adenoidectomy; Ankle fracture surgery; Neck surgery; shoulder surgery;   section; Carpal tunnel release; Colonoscopy (07/03/12); Upper gastrointestinal endoscopy (7-3-12); Gastric bypass surgery; Breast lumpectomy (Right); Cardiac catheterization; Upper gastrointestinal endoscopy (12/19/2016); Colonoscopy (12/19/2016); Upper gastrointestinal endoscopy (05/2019); Colonoscopy (05/20/2019); Shoulder arthroscopy (Left, 06/05/2019); other surgical history (10/24/2019); and laparoscopy (N/A, 1/29/2022). Home Medications:    Prior to Admission medications    Medication Sig Start Date End Date Taking? Authorizing Provider   carvedilol (COREG) 25 MG tablet TAKE 1 TABLET BY MOUTH IN THE MORNING AND 1 IN THE EVENING WITH MEALS 9/19/22   Jennifer Leung MD   traZODone (DESYREL) 100 MG tablet TAKE 1 TABLET BY MOUTH AT BEDTIME 6/13/22   Historical Provider, MD   gabapentin (NEURONTIN) 300 MG capsule TAKE 1 CAPSULE BY MOUTH THREE TIMES DAILY 5/24/22 8/9/22  Jennifer Leung MD   amLODIPine (NORVASC) 10 MG tablet Take 1 tablet by mouth daily  Patient taking differently: Take 10 mg by mouth daily Takes when she remebers 2/1/22   DO MUKUND GrimesOSUL 325 (65 Fe) MG tablet TAKE 1 TABLET BY MOUTH 2 TIMES DAILY  Patient not taking: No sig reported 6/18/21   Jennifer Leung MD   buPROPion (WELLBUTRIN XL) 150 MG extended release tablet TAKE 1 TABLET BY MOUTH EVERY MORNING 12/22/20   Jennifer Leung MD   hydrOXYzine (VISTARIL) 25 MG capsule  9/15/20   Historical Provider, MD   atorvastatin (LIPITOR) 40 MG tablet Take 40 mg by mouth daily  Patient not taking: No sig reported    Historical Provider, MD   DULoxetine (CYMBALTA) 30 MG extended release capsule Take 1 capsule by mouth daily  Patient taking differently: Take 60 mg by mouth in the morning.  4/8/19   KELLY Watlon CNP   albuterol sulfate HFA (PROVENTIL HFA) 108 (90 Base) MCG/ACT inhaler Inhale 2 puffs into the lungs every 6 hours as needed for Wheezing 4/8/19   KELLY Walton CNP   divalproex (DEPAKOTE) 500 MG DR tablet Take 2 tablets by mouth 2 times daily Indications: Level 41 as of 7/22/16 (Pt takes two 500 mg DR tabs = 1,000mg PO BID) 2/28/17   Darin Malin MD   ARIPiprazole (ABILIFY) 5 MG tablet Take 5 mg by mouth daily    Historical Provider, MD       Allergies:  Aspirin, Bactrim, and Codeine    Social History:   reports that she has been smoking cigarettes. She has a 16.50 pack-year smoking history. She has never used smokeless tobacco. She reports current alcohol use. She reports that she does not currently use drugs. Family History: family history includes Alcohol Abuse in her brother; COPD in her father; Cancer in her mother, paternal grandmother, and another family member; Coronary Art Dis in her father; Depression in her brother; Diabetes in her maternal grandmother and mother. No h/o sudden cardiac death. No for premature CAD    REVIEW OF SYSTEMS:    Constitutional: there has been no unanticipated weight loss. There's been Yes change in energy level, Yes change in activity level. Eyes: No visual changes or diplopia. No scleral icterus. ENT: No Headaches, hearing loss or vertigo. No mouth sores or sore throat. Cardiovascular: No chest pain, Yes dyspnea on exertion, No palpitations or No loss of consciousness. No cough, hemoptysis, No pleuritic pain, or phlebitis. Respiratory: No cough or wheezing, no sputum production. No hematemesis. Gastrointestinal: No abdominal pain, appetite loss, blood in stools. No change in bowel or bladder habits. Genitourinary: No dysuria, trouble voiding, or hematuria. Musculoskeletal:  No gait disturbance, Yes weakness or joint complaints. Integumentary: No rash or pruritis. Neurological: No headache, diplopia, change in muscle strength, numbness or tingling. No change in gait, balance, coordination, mood, affect, memory, mentation, behavior. Psychiatric: No anxiety, or depression. Endocrine: No temperature intolerance. No excessive thirst, fluid intake, or urination.  No tremor. Hematologic/Lymphatic: No abnormal bruising or bleeding, blood clots or swollen lymph nodes. Allergic/Immunologic: No nasal congestion or hives. PHYSICAL EXAM:      /66   Pulse 74   Temp 97.7 °F (36.5 °C) (Oral)   Resp 25   Ht 4' 11\" (1.499 m)   Wt 89 lb (40.4 kg)   SpO2 94%   BMI 17.98 kg/m²    Constitutional and General Appearance: alert, cooperative, no distress, and appears stated age  HEENT: PERRL, no cervical lymphadenopathy. No masses palpable. Normal oral mucosa  Respiratory:  Normal excursion and expansion without use of accessory muscles  Resp Auscultation: Good respiratory effort. No for increased work of breathing. On auscultation: clear to auscultation bilaterally  Cardiovascular: The apical impulse is not displaced  Heart tones are crisp and normal. regular S1 and S2.  Jugular venous pulsation Normal  The carotid upstroke is normal in amplitude and contour without delay or bruit  Peripheral pulses are symmetrical and full  Abdomen:  No masses or tenderness  Bowel sounds present  Extremities:   No Cyanosis or Clubbing   Lower extremity edema: No  Skin: Warm and dry  Neurological:  Alert and oriented. Moves all extremities well  No abnormalities of mood, affect, memory, mentation, or behavior are noted    DATA:    Diagnostics:    EKG: Normal sinus rhythm EKG with nonspecific ST changes. CBC:   Recent Labs     10/10/22  0455 10/11/22  0423   WBC 22.0* 21.2*   HGB 14.9 11.1*   HCT 45.0 33.6*   * 91*     BMP:   Recent Labs     10/10/22  1547 10/11/22  0423   * 132*   K 3.4* 3.4*   CO2 21 19*   BUN 36* 38*   CREATININE 1.49* 1.53*   LABGLOM 42* 40*   GLUCOSE 223* 115*     BNP: No results for input(s): BNP in the last 72 hours. PT/INR:   Recent Labs     10/10/22  0455   PROTIME 14.8*   INR 1.2     APTT:No results for input(s): APTT in the last 72 hours. CARDIAC ENZYMES:No results for input(s): CKTOTAL, CKMB, CKMBINDEX, TROPONINI in the last 72 hours.   FASTING LIPID PANEL:  Lab Results   Component Value Date/Time    HDL 39 01/30/2022 06:52 AM    LDLCALC 157 09/28/2020 12:00 AM    TRIG 78 01/30/2022 06:52 AM     LIVER PROFILE:  Recent Labs     10/10/22  0455   AST 41*   ALT 17   LABALBU 3.4*       IMPRESSION/RECOMMENDATIONS:  1. Sepsis with septic shock: Continue antibiotic per primary service. IV hydration. Consider Levophed for hemodynamic support if needed. 2.  Acute kidney injury. Nephrology on board IV hydration. 3.  Elevated troponins. Most likely type II. Will observe for now. 4.  Possible septic emboli to the lungs. Several history of drug abuse. We will obtain an echocardiogram.  If the echo is negative she might benefit from ADRIANNE. Discussed with patient and Nurse.     Dana Mullins MD, MD  [facility]           [phone]

## 2022-10-11 NOTE — PLAN OF CARE
Problem: Discharge Planning  Goal: Discharge to home or other facility with appropriate resources  Outcome: Progressing  Flowsheets (Taken 10/11/2022 0254)  Discharge to home or other facility with appropriate resources: Identify barriers to discharge with patient and caregiver     Problem: Pain  Goal: Verbalizes/displays adequate comfort level or baseline comfort level  Outcome: Progressing  Flowsheets (Taken 10/11/2022 0254)  Verbalizes/displays adequate comfort level or baseline comfort level:   Encourage patient to monitor pain and request assistance   Assess pain using appropriate pain scale   Administer analgesics based on type and severity of pain and evaluate response   Consider cultural and social influences on pain and pain management   Implement non-pharmacological measures as appropriate and evaluate response   Notify Licensed Independent Practitioner if interventions unsuccessful or patient reports new pain     Problem: Skin/Tissue Integrity  Goal: Absence of new skin breakdown  Description: 1. Monitor for areas of redness and/or skin breakdown  2. Assess vascular access sites hourly  3. Every 4-6 hours minimum:  Change oxygen saturation probe site  4. Every 4-6 hours:  If on nasal continuous positive airway pressure, respiratory therapy assess nares and determine need for appliance change or resting period.   Outcome: Progressing

## 2022-10-11 NOTE — PROGRESS NOTES
Comprehensive Nutrition Assessment    Type and Reason for Visit:  Positive Nutrition Screen (weight loss, poor appetite poor intake)    Nutrition Recommendations/Plan:   Continue current diet  Start Ensure Enlive 3x/day     Malnutrition Assessment:  Malnutrition Status:  Severe malnutrition (10/11/22 1232)    Context:  Acute Illness     Findings of the 6 clinical characteristics of malnutrition:  Energy Intake:  50% or less of estimated energy requirements for 5 or more days  Weight Loss:  Greater than 7.5% over 3 months     Body Fat Loss: Moderate body fat loss Orbital, Triceps, Fat Overlying Ribs   Muscle Mass Loss: Moderate muscle mass loss Temples (temporalis), Clavicles (pectoralis & deltoids)  Fluid Accumulation:  No significant fluid accumulation     Strength:  Not Performed    Nutrition Assessment:    Patient admitted with weakness, generalized pain, SOB, NICKI, and abdominal pain. She tested positive for COVID 19 on 9/30. Has a history of Bipolar, COPD, gastric bipass, and polysubstance abuse. Noted low potassium, sodium, and calcium. Nephrology on board and monitoring. Received potassium and calcium replacement. Nephrology increased Fluid restriction from 1000 ml to 1500 ml. Per nephrology note NICKI likely related to hypovolemia. Appetite poor, noted 24 lbs (21%) weight loss in last 9 months. Will continue current diet and start high calorie high protein supplements with each meal.    Nutrition Related Findings:    No edema. Bowel sounds active Wound Type: Pressure Injury (refer to nursing flowsheet)       Current Nutrition Intake & Therapies:    Average Meal Intake: 0%  Average Supplements Intake: None Ordered  ADULT DIET; Regular; 1500 ml    Anthropometric Measures:  Height: 4' 11\" (149.9 cm)  Ideal Body Weight (IBW): 95 lbs (43 kg)    Admission Body Weight: 89 lb (40.4 kg)  Current Body Weight: 89 lb (40.4 kg), 93.7 % IBW.  Weight Source: Standing Scale  Current BMI (kg/m2): 18  Usual Body Weight: 113 lb (51.3 kg) (per past records)  % Weight Change (Calculated): -21.2                    BMI Categories: Underweight (BMI less than 18.5)    Estimated Daily Nutrient Needs:  Energy Requirements Based On: Kcal/kg  Weight Used for Energy Requirements: Admission  Energy (kcal/day): 1414 kcal based on 35 kcal/kg admission  Weight Used for Protein Requirements: Admission  Protein (g/day): 50 gm based on 1.2 gm/kg admission  Method Used for Fluid Requirements: 1 ml/kcal    Nutrition Diagnosis:   Severe malnutrition related to impaired respiratory function, psychological cause or life stress as evidenced by intake 0-25%, poor intake prior to admission, BMI, weight loss, severe loss of subcutaneous fat, severe muscle loss    Nutrition Interventions:   Food and/or Nutrient Delivery: Continue Current Diet, Start Oral Nutrition Supplement  Nutrition Education/Counseling: Education not indicated  Coordination of Nutrition Care: Continue to monitor while inpatient       Goals:     Goals: Meet at least 75% of estimated needs, PO intake 75% or greater       Nutrition Monitoring and Evaluation:   Behavioral-Environmental Outcomes: None Identified  Food/Nutrient Intake Outcomes: Food and Nutrient Intake, Supplement Intake  Physical Signs/Symptoms Outcomes: Biochemical Data, Skin, Weight, GI Status    Discharge Planning:     Too soon to determine       Some areas of assessment may be incomplete due to COVID-19 precautions    Sabra Knapp RD, LD  Office phone [phone]

## 2022-10-11 NOTE — PROGRESS NOTES
Patient was sleeping with dad Guerrero at bedside. He provided medical update and was obviously quite concerned for his daughter. He expressed relief at small signs of improvement today. Writer provided listening presence and a blessing. 10/11/22 1908   Encounter Summary   Encounter Overview/Reason  Spiritual/Emotional Needs   Service Provided For: Patient and family together   Referral/Consult From: [facility]   Last Encounter  10/11/22   Complexity of Encounter Moderate   Spiritual/Emotional needs   Type Spiritual Support;Emotional Distress   Assessment/Intervention/Outcome   Assessment Calm   Intervention Active listening;Discussed illness injury and its impact; Explored/Affirmed feelings, thoughts, concerns;Explored Coping Skills/Resources;Prayer (assurance of)/[place];Sustaining Presence/Ministry of presence   Outcome Engaged in conversation;Expressed feelings, needs, and concerns;Expressed Gratitude;Encouraged;Receptive

## 2022-10-12 ENCOUNTER — APPOINTMENT (OUTPATIENT)
Dept: GENERAL RADIOLOGY | Age: 52
DRG: 871 | End: 2022-10-12
Payer: COMMERCIAL

## 2022-10-12 ENCOUNTER — ANESTHESIA EVENT (OUTPATIENT)
Dept: OPERATING ROOM | Age: 52
End: 2022-10-12

## 2022-10-12 LAB
ABSOLUTE BANDS #: 2.36 K/UL (ref 0–1)
ABSOLUTE EOS #: 0 K/UL (ref 0–0.4)
ABSOLUTE LYMPH #: 0.94 K/UL (ref 1–4.8)
ABSOLUTE MONO #: 0.31 K/UL (ref 0.1–1.3)
ANION GAP SERPL CALCULATED.3IONS-SCNC: 11 MMOL/L (ref 9–17)
ANION GAP SERPL CALCULATED.3IONS-SCNC: 15 MMOL/L (ref 9–17)
BANDS: 15 % (ref 0–10)
BASOPHILS # BLD: 0 % (ref 0–2)
BASOPHILS ABSOLUTE: 0 K/UL (ref 0–0.2)
BUN BLDV-MCNC: 19 MG/DL (ref 6–20)
BUN BLDV-MCNC: 22 MG/DL (ref 6–20)
CALCIUM SERPL-MCNC: 8.4 MG/DL (ref 8.6–10.4)
CALCIUM SERPL-MCNC: 8.6 MG/DL (ref 8.6–10.4)
CHLORIDE BLD-SCNC: 104 MMOL/L (ref 98–107)
CHLORIDE BLD-SCNC: 105 MMOL/L (ref 98–107)
CO2: 16 MMOL/L (ref 20–31)
CO2: 19 MMOL/L (ref 20–31)
CREAT SERPL-MCNC: 0.6 MG/DL (ref 0.5–0.9)
CREAT SERPL-MCNC: 0.67 MG/DL (ref 0.5–0.9)
CULTURE: ABNORMAL
EOSINOPHILS RELATIVE PERCENT: 0 % (ref 0–4)
GFR SERPL CREATININE-BSD FRML MDRD: >60 ML/MIN/1.73M2
GFR SERPL CREATININE-BSD FRML MDRD: >60 ML/MIN/1.73M2
GLUCOSE BLD-MCNC: 81 MG/DL (ref 70–99)
GLUCOSE BLD-MCNC: 85 MG/DL (ref 70–99)
HCT VFR BLD CALC: 32.9 % (ref 36–46)
HEMOGLOBIN: 10.6 G/DL (ref 12–16)
LYMPHOCYTES # BLD: 6 % (ref 24–44)
MAGNESIUM: 2.1 MG/DL (ref 1.6–2.6)
MCH RBC QN AUTO: 25.4 PG (ref 26–34)
MCHC RBC AUTO-ENTMCNC: 32.1 G/DL (ref 31–37)
MCV RBC AUTO: 79.1 FL (ref 80–100)
MONOCYTES # BLD: 2 % (ref 1–7)
MORPHOLOGY: ABNORMAL
MYELOCYTES ABSOLUTE COUNT: 0.16 K/UL
MYELOCYTES: 1 %
PDW BLD-RTO: 15.2 % (ref 11.5–14.9)
PHOSPHORUS: 2 MG/DL (ref 2.6–4.5)
PLATELET # BLD: 103 K/UL (ref 150–450)
PMV BLD AUTO: 9.8 FL (ref 6–12)
POTASSIUM SERPL-SCNC: 3.5 MMOL/L (ref 3.7–5.3)
POTASSIUM SERPL-SCNC: 3.9 MMOL/L (ref 3.7–5.3)
RBC # BLD: 4.16 M/UL (ref 4–5.2)
REASON FOR REJECTION: NORMAL
SEG NEUTROPHILS: 76 % (ref 36–66)
SEGMENTED NEUTROPHILS ABSOLUTE COUNT: 11.93 K/UL (ref 1.3–9.1)
SODIUM BLD-SCNC: 135 MMOL/L (ref 135–144)
SODIUM BLD-SCNC: 135 MMOL/L (ref 135–144)
SPECIMEN DESCRIPTION: ABNORMAL
SPECIMEN DESCRIPTION: ABNORMAL
VANCOMYCIN RANDOM DATE LAST DOSE: NORMAL
VANCOMYCIN RANDOM DOSE AMOUNT: NORMAL
VANCOMYCIN RANDOM TIME LAST DOSE: 2110
VANCOMYCIN RANDOM: 13.4 UG/ML
VITAMIN D 25-HYDROXY: <6 NG/ML
VITAMIN D 25-HYDROXY: <6 NG/ML
WBC # BLD: 15.7 K/UL (ref 3.5–11)
ZZ NTE CLEAN UP: ORDERED TEST: NORMAL
ZZ NTE WITH NAME CLEAN UP: SPECIMEN SOURCE: NORMAL

## 2022-10-12 PROCEDURE — 2700000000 HC OXYGEN THERAPY PER DAY

## 2022-10-12 PROCEDURE — 6370000000 HC RX 637 (ALT 250 FOR IP): Performed by: INTERNAL MEDICINE

## 2022-10-12 PROCEDURE — 2580000003 HC RX 258: Performed by: FAMILY MEDICINE

## 2022-10-12 PROCEDURE — 99233 SBSQ HOSP IP/OBS HIGH 50: CPT | Performed by: FAMILY MEDICINE

## 2022-10-12 PROCEDURE — 74018 RADEX ABDOMEN 1 VIEW: CPT

## 2022-10-12 PROCEDURE — 2580000003 HC RX 258: Performed by: INTERNAL MEDICINE

## 2022-10-12 PROCEDURE — 80048 BASIC METABOLIC PNL TOTAL CA: CPT

## 2022-10-12 PROCEDURE — 83735 ASSAY OF MAGNESIUM: CPT

## 2022-10-12 PROCEDURE — 94761 N-INVAS EAR/PLS OXIMETRY MLT: CPT

## 2022-10-12 PROCEDURE — 2500000003 HC RX 250 WO HCPCS: Performed by: SPECIALIST

## 2022-10-12 PROCEDURE — 2580000003 HC RX 258: Performed by: SPECIALIST

## 2022-10-12 PROCEDURE — 80202 ASSAY OF VANCOMYCIN: CPT

## 2022-10-12 PROCEDURE — 6370000000 HC RX 637 (ALT 250 FOR IP): Performed by: FAMILY MEDICINE

## 2022-10-12 PROCEDURE — 6370000000 HC RX 637 (ALT 250 FOR IP): Performed by: NURSE PRACTITIONER

## 2022-10-12 PROCEDURE — 6370000000 HC RX 637 (ALT 250 FOR IP): Performed by: SPECIALIST

## 2022-10-12 PROCEDURE — 36415 COLL VENOUS BLD VENIPUNCTURE: CPT

## 2022-10-12 PROCEDURE — 72100 X-RAY EXAM L-S SPINE 2/3 VWS: CPT

## 2022-10-12 PROCEDURE — 94640 AIRWAY INHALATION TREATMENT: CPT

## 2022-10-12 PROCEDURE — 2060000000 HC ICU INTERMEDIATE R&B

## 2022-10-12 PROCEDURE — 82306 VITAMIN D 25 HYDROXY: CPT

## 2022-10-12 PROCEDURE — 85025 COMPLETE CBC W/AUTO DIFF WBC: CPT

## 2022-10-12 PROCEDURE — 84100 ASSAY OF PHOSPHORUS: CPT

## 2022-10-12 PROCEDURE — 6360000002 HC RX W HCPCS: Performed by: FAMILY MEDICINE

## 2022-10-12 RX ORDER — MORPHINE SULFATE 2 MG/ML
2 INJECTION, SOLUTION INTRAMUSCULAR; INTRAVENOUS
Status: DISCONTINUED | OUTPATIENT
Start: 2022-10-12 | End: 2022-10-13

## 2022-10-12 RX ORDER — VITAMIN B COMPLEX
2000 TABLET ORAL DAILY
Status: DISCONTINUED | OUTPATIENT
Start: 2022-10-12 | End: 2022-11-04 | Stop reason: HOSPADM

## 2022-10-12 RX ORDER — HYDRALAZINE HYDROCHLORIDE 20 MG/ML
10 INJECTION INTRAMUSCULAR; INTRAVENOUS EVERY 6 HOURS PRN
Status: DISCONTINUED | OUTPATIENT
Start: 2022-10-12 | End: 2022-11-04 | Stop reason: HOSPADM

## 2022-10-12 RX ADMIN — POTASSIUM CHLORIDE 40 MEQ: 1500 TABLET, EXTENDED RELEASE ORAL at 00:39

## 2022-10-12 RX ADMIN — HEPARIN SODIUM 5000 UNITS: 5000 INJECTION INTRAVENOUS; SUBCUTANEOUS at 20:38

## 2022-10-12 RX ADMIN — ARIPIPRAZOLE 5 MG: 5 TABLET ORAL at 10:21

## 2022-10-12 RX ADMIN — ACETAMINOPHEN 650 MG: 325 TABLET, FILM COATED ORAL at 04:12

## 2022-10-12 RX ADMIN — CEFEPIME HYDROCHLORIDE 2000 MG: 2 INJECTION, POWDER, FOR SOLUTION INTRAMUSCULAR; INTRAVENOUS at 10:23

## 2022-10-12 RX ADMIN — DULOXETINE 60 MG: 60 CAPSULE, DELAYED RELEASE ORAL at 08:31

## 2022-10-12 RX ADMIN — VANCOMYCIN HYDROCHLORIDE 1250 MG: 1.25 INJECTION, POWDER, LYOPHILIZED, FOR SOLUTION INTRAVENOUS at 08:34

## 2022-10-12 RX ADMIN — SODIUM CHLORIDE: 9 INJECTION, SOLUTION INTRAVENOUS at 04:16

## 2022-10-12 RX ADMIN — IPRATROPIUM BROMIDE AND ALBUTEROL SULFATE 1 AMPULE: 2.5; .5 SOLUTION RESPIRATORY (INHALATION) at 07:20

## 2022-10-12 RX ADMIN — DIVALPROEX SODIUM 1000 MG: 500 TABLET, DELAYED RELEASE ORAL at 20:38

## 2022-10-12 RX ADMIN — IPRATROPIUM BROMIDE AND ALBUTEROL SULFATE 1 AMPULE: 2.5; .5 SOLUTION RESPIRATORY (INHALATION) at 19:33

## 2022-10-12 RX ADMIN — CARVEDILOL 3.12 MG: 3.12 TABLET, FILM COATED ORAL at 08:31

## 2022-10-12 RX ADMIN — FERROUS SULFATE TAB 325 MG (65 MG ELEMENTAL FE) 325 MG: 325 (65 FE) TAB at 16:48

## 2022-10-12 RX ADMIN — CEFEPIME HYDROCHLORIDE 2000 MG: 2 INJECTION, POWDER, FOR SOLUTION INTRAMUSCULAR; INTRAVENOUS at 20:39

## 2022-10-12 RX ADMIN — POTASSIUM PHOSPHATE, MONOBASIC AND POTASSIUM PHOSPHATE, DIBASIC 10 MMOL: 224; 236 INJECTION, SOLUTION, CONCENTRATE INTRAVENOUS at 06:57

## 2022-10-12 RX ADMIN — IPRATROPIUM BROMIDE AND ALBUTEROL SULFATE 1 AMPULE: 2.5; .5 SOLUTION RESPIRATORY (INHALATION) at 15:03

## 2022-10-12 RX ADMIN — MORPHINE SULFATE 2 MG: 2 INJECTION, SOLUTION INTRAMUSCULAR; INTRAVENOUS at 15:00

## 2022-10-12 RX ADMIN — ATORVASTATIN CALCIUM 40 MG: 40 TABLET, FILM COATED ORAL at 20:38

## 2022-10-12 RX ADMIN — DIVALPROEX SODIUM 1000 MG: 500 TABLET, DELAYED RELEASE ORAL at 08:31

## 2022-10-12 RX ADMIN — FERROUS SULFATE TAB 325 MG (65 MG ELEMENTAL FE) 325 MG: 325 (65 FE) TAB at 08:31

## 2022-10-12 RX ADMIN — CARVEDILOL 3.12 MG: 3.12 TABLET, FILM COATED ORAL at 16:48

## 2022-10-12 RX ADMIN — IPRATROPIUM BROMIDE AND ALBUTEROL SULFATE 1 AMPULE: 2.5; .5 SOLUTION RESPIRATORY (INHALATION) at 10:54

## 2022-10-12 RX ADMIN — Medication 2000 UNITS: at 16:48

## 2022-10-12 RX ADMIN — SODIUM CHLORIDE, PRESERVATIVE FREE 10 ML: 5 INJECTION INTRAVENOUS at 20:39

## 2022-10-12 ASSESSMENT — PAIN - FUNCTIONAL ASSESSMENT: PAIN_FUNCTIONAL_ASSESSMENT: PREVENTS OR INTERFERES SOME ACTIVE ACTIVITIES AND ADLS

## 2022-10-12 ASSESSMENT — LIFESTYLE VARIABLES: SMOKING_STATUS: 1

## 2022-10-12 ASSESSMENT — PAIN DESCRIPTION - DESCRIPTORS: DESCRIPTORS: ACHING;NAGGING

## 2022-10-12 ASSESSMENT — PAIN SCALES - GENERAL
PAINLEVEL_OUTOF10: 6
PAINLEVEL_OUTOF10: 10
PAINLEVEL_OUTOF10: 0
PAINLEVEL_OUTOF10: 1
PAINLEVEL_OUTOF10: 0

## 2022-10-12 ASSESSMENT — PAIN DESCRIPTION - LOCATION
LOCATION: ABDOMEN;BACK
LOCATION: CHEST;BACK

## 2022-10-12 ASSESSMENT — ENCOUNTER SYMPTOMS: SHORTNESS OF BREATH: 1

## 2022-10-12 NOTE — PROGRESS NOTES
Vancomycin Dosing by Pharmacy - Daily Note   Vancomycin Therapy Day:  3  Indication: bacteremia    Allergies:  Aspirin, Bactrim, and Codeine   Actual Weight:    Wt Readings from Last 1 Encounters:   10/10/22 89 lb (40.4 kg)       Labs/Ancillary Data  Estimated Creatinine Clearance: 62 mL/min (based on SCr of 0.67 mg/dL). Recent Labs     10/10/22  0455 10/10/22  1547 10/11/22  0423 10/11/22  1125 10/11/22  1609 10/11/22  2150 10/12/22  0418   CREATININE 2.21*   < > 1.53*   < > 0.95* 0.77 0.67   BUN 33*   < > 38*   < > 29* 26* 22*   WBC 22.0*  --  21.2*  --   --   --  15.7*    < > = values in this interval not displayed. Procalcitonin   Date Value Ref Range Status   10/10/2022 6.01 (H) <0.09 ng/mL Final     Comment:           Suspected Sepsis:  <0.50 ng/mL     Low likelihood of sepsis. 0.50-2.00 ng/mL     Increased likelihood of sepsis. Antibiotics encouraged. >2.00 ng/mL     High risk of sepsis/shock. Antibiotics strongly encouraged. Suspected Lower Resp Tract Infections:  <0.24 ng/mL     Low likelihood of bacterial infection. >0.24 ng/mL     Increased likelihood of bacterial infection. Antibiotics encouraged. With successful antibiotic therapy, PCT levels should decrease rapidly. (Half-life of 24 to   36 hours.)        Procalcitonin values from samples collected within the first 6 hours of systemic infection   may still be low. Retesting may be indicated. Values from day 1 and day 4 can be entered into the Change in Procalcitonin Calculator   (www.[employer]-pct-[email or web address] com) to determine the patient's Mortality Risk Prognosis        In healthy neonates, plasma Procalcitonin (PCT) concentrations increase gradually after   birth, reaching peak values at about 24 hours of age then decrease to normal values below   0.5 ng/mL by 48-72 hours of age.          Intake/Output Summary (Last 24 hours) at 10/12/2022 0710  Last data filed at 10/11/2022 1803  Gross per 24 hour   Intake 3717.61 ml   Output 410 ml   Net 3307.61 ml     Temp: 98.1    Culture Date / Source  /  Results  See micro  Recent vancomycin administrations                     vancomycin (VANCOCIN) 750 mg in dextrose 5 % 250 mL IVPB (mg) 750 mg New Bag 10/11/22 2110    vancomycin 1000 mg IVPB in 250 mL D5W addavial (mg) 1,000 mg New Bag 10/10/22 2118                    Vancomycin Concentrations:   TROUGH:  No results for input(s): VANCOTROUGH in the last 72 hours. RANDOM:    Recent Labs     10/12/22  0418   VANCORANDOM 13.4       MRSA Nasal Swab: N/A. Non-respiratory infection. Loreatha Press PLAN     Increase dose to 1250 mg q24h IV  Ensured BUN/sCr ordered at baseline and every 48 hours x at least 3 levels, then at least weekly. Repeat vancomycin concentration ordered for 10/14 @ 0600   Pharmacy will continue to monitor patient and adjust therapy as indicated      Vancomycin Target Concentration Parameters  Treatment  Population Target AUC/GABBI Target Trough   Invasive MRSA Infection (bacteremia, pneumonia, meningitis, endocarditis, osteomyelitis)  Sepsis (undifferentiated) 400-600 N/A   Infection due to non-MRSA pathogen  Empiric treatment of non-invasive MRSA infection  (SSTI, UTI) <500 10-15 mg/L   CrCl < 29 mL/min  Rapidly fluctuating serum creatinine   NICIK N/A < 15 mg/L     Renal replacement therapy is dosed by levels, per hospital protocol. Abbreviations  * Pauc: probability that AUC is >400 (efficacy); Pconc: probability that Ctrough is above 20 ?g/mL (toxicity); Tox: Probability of nephrotoxicity, based on Lodise et al. Clin Infect Dis 2009. Regimen: 1250 mg IV every 24 hours. Exposure target: AUC24 (range)400-600 mg/L.hr   AUC24,ss: 453 mg/L.hr  Probability of AUC24 > 400: 72 %  Ctrough,ss: 11.2 mg/L  Probability of Ctrough,ss > 20: 4 %  Probability of nephrotoxicity (Lodise DEVON 2009): 7 %    Thank you for the consult. Pharmacy will continue to follow.     Barbara Abarca, PharmD, BCSCP  10/12/2022   7:12 AM

## 2022-10-12 NOTE — ANESTHESIA PRE PROCEDURE
Department of Anesthesiology  Preprocedure Note       Name:  Kin Gamble   Age:  46 y.o.  :  1970                                          MRN:  [id number]         Date:  10/12/2022      Surgeon: Stephanie Godoy): Alba Sears MD    Procedure: Procedure(s):  TRANSESOPHAGEAL ECHOCARDIOGRAM    Medications prior to admission:   Prior to Admission medications    Medication Sig Start Date End Date Taking? Authorizing Provider   lisinopril (PRINIVIL;ZESTRIL) 20 MG tablet Take 20 mg by mouth daily   Yes Historical Provider, MD   carvedilol (COREG) 25 MG tablet TAKE 1 TABLET BY MOUTH IN THE MORNING AND 1 IN THE EVENING WITH MEALS 22   Ivan To MD   traZODone (DESYREL) 100 MG tablet TAKE 1 TABLET BY MOUTH AT BEDTIME 22   Historical Provider, MD   gabapentin (NEURONTIN) 300 MG capsule TAKE 1 CAPSULE BY MOUTH THREE TIMES DAILY 22  Ivan To MD   hydrOXYzine (VISTARIL) 25 MG capsule  9/15/20   Historical Provider, MD   DULoxetine (CYMBALTA) 30 MG extended release capsule Take 1 capsule by mouth daily  Patient taking differently: Take 60 mg by mouth in the morning.  19   KELLY Glover - CNP   ARIPiprazole (ABILIFY) 5 MG tablet Take 5 mg by mouth daily    Historical Provider, MD       Current medications:    Current Facility-Administered Medications   Medication Dose Route Frequency Provider Last Rate Last Admin    vancomycin (VANCOCIN) 1,250 mg in dextrose 5 % 250 mL IVPB (ADDAVIAL)  1,250 mg IntraVENous Q24H Ivan To MD   Stopped at 10/12/22 1008    cefepime (MAXIPIME) 2,000 mg in sodium chloride 0.9 % 50 mL IVPB mini-bag  2,000 mg IntraVENous Q12H Ivan To MD   Stopped at 10/12/22 1423    Vitamin D (CHOLECALCIFEROL) tablet 2,000 Units  2,000 Units Oral Daily KELLY Sawyer - CNP        morphine (PF) injection 2 mg  2 mg IntraVENous Q2H PRN Ivan To MD   2 mg at 10/12/22 1500    hydrALAZINE (APRESOLINE) injection 10 mg  10 mg IntraVENous Q6H PRN Oz Mejias APRN - CNP        carvedilol (COREG) tablet 3.125 mg  3.125 mg Oral BID  Nura Headley MD   3.125 mg at 10/12/22 0831    sodium chloride flush 0.9 % injection 5-40 mL  5-40 mL IntraVENous 2 times per day Myla Casper MD   10 mL at 10/11/22 2114    sodium chloride flush 0.9 % injection 5-40 mL  5-40 mL IntraVENous PRN Myla Casper MD        0.9 % sodium chloride infusion   IntraVENous PRN Myla Casper MD        heparin (porcine) injection 5,000 Units  5,000 Units SubCUTAneous BID Myla Casper MD   5,000 Units at 10/11/22 2113    acetaminophen (TYLENOL) tablet 650 mg  650 mg Oral Q4H PRN Myla Casper MD   650 mg at 10/12/22 0412    ondansetron (ZOFRAN-ODT) disintegrating tablet 4 mg  4 mg Oral Q8H PRN Myla Casper MD        Or    ondansetron [facility]) injection 4 mg  4 mg IntraVENous Q6H PRN Myla Casper MD        albuterol sulfate HFA (PROVENTIL;VENTOLIN;PROAIR) 108 (90 Base) MCG/ACT inhaler 2 puff  2 puff Inhalation Q6H PRN Myla Casper MD        ARIPiprazole (ABILIFY) tablet 5 mg  5 mg Oral Daily Myla Casper MD   5 mg at 10/12/22 1021    atorvastatin (LIPITOR) tablet 40 mg  40 mg Oral Daily Myla Casper MD   40 mg at 10/11/22 2114    divalproex (DEPAKOTE) DR tablet 1,000 mg  1,000 mg Oral BID Myla Casper MD   1,000 mg at 10/12/22 0831    DULoxetine (CYMBALTA) extended release capsule 60 mg  60 mg Oral Daily Myla Casper MD   60 mg at 10/12/22 0831    ferrous sulfate (IRON 325) tablet 325 mg  325 mg Oral BID  Myla Casper MD   325 mg at 10/12/22 0831    hydrOXYzine HCl (ATARAX) tablet 25 mg  25 mg Oral Q6H PRN Myla Casper MD   25 mg at 10/10/22 1544    sodium chloride flush 0.9 % injection 10 mL  10 mL IntraVENous PRN KELLY Jalloh - CNP   10 mL at 10/10/22 1427    ipratropium-albuterol (DUONEB) nebulizer solution 1 ampule  1 ampule Inhalation Q4H [address]   1 ampule at 10/12/22 1503    perflutren lipid microspheres (DEFINITY) injection 1.65 mg  1.5 mL IntraVENous ONCE PRN Ariane Rodriges MD        vancomycin [facility]) intermittent dosing (placeholder)   Other RX Placeholder Helena Perez MD        0.9 % sodium chloride infusion   IntraVENous Continuous Kimberlee Hall MD 50 mL/hr at 10/12/22 0416 New Bag at 10/12/22 0416    norepinephrine (LEVOPHED) 16 mg in sodium chloride 0.9 % 250 mL infusion  1-100 mcg/min IntraVENous Continuous Nany Rolon MD   Stopped at 10/11/22 0801       Allergies:     Allergies   Allergen Reactions    Aspirin      Gastric bypass    Bactrim Other (See Comments)     Severe chills    Codeine Other (See Comments)     Other reaction(s): pain  Caused chest pain ( was in the Tylenol 3# )  Chest pain       Problem List:    Patient Active Problem List   Diagnosis Code    GERD (gastroesophageal reflux disease) K21.9    Hoarseness of voice R49.0    MVP (mitral valve prolapse) I34.1    Depression with anxiety F41.8    Lymphocytic colitis K52.832    History of migraine headaches Z86.69    Hyperlipidemia E78.5    Sleep apnea G47.30    IGT (impaired glucose tolerance) R73.02    History of renal calculi Z87.442    Lactose intolerance E73.9    History of seizure disorder Z86.69    Leukopenia D72.819    Bipolar disorder, mixed ([facility]) Q10.87    Illicit drug use A01.78    Postlaminectomy syndrome, cervical region M96.1    Degeneration of cervical intervertebral disc M50.30    Encounter for long-term (current) use of other medications Z79.899    Bradycardia R00.1    Cellulitis L03.90    Hypokalemia E87.6    Primary hypertension I10    Elevated lipase R74.8    Right lower quadrant abdominal pain R10.31    Nausea R11.0    Diarrhea R19.7    Seizure disorder ([facility]) G40.909    Polysubstance abuse ([facility]) F19.10    Fibromyalgia M79.7    Cocaine addiction ([facility]) F14.20    Shoulder arthritis M19.019    Osteoarthritis of left glenohumeral joint M19.012    Abnormal EKG R94.31    Anemia D64.9    Anxiety F41.9    Carotid artery stenosis I65.29    COPD (chronic obstructive pulmonary disease) ([facility]) J44.9    Dental disease K08.9    HUTCHINSON (dyspnea on exertion) R06.09    Fatigue R53.83    Fractures T07. Elizabeth Monks H/O gastric bypass Z98.84    History of crack cocaine use Z87.898    History of UTI Z87.440    Injury of back S39. 92XA    Intractable chronic migraine without aura and without status migrainosus G43.719    Intractable migraine with aura with status migrainosus G43. 111    Kidney stones N20.0    Dizziness R42    Memory loss R41.3    Mild pulmonary hypertension ([facility]) I27.20    Mild tricuspid regurgitation I07.1    Primary osteoarthritis of left shoulder M19.012    Seizure-like activity ([facility]) R56.9    Stress at home F43.9    TMJ (dislocation of temporomandibular joint) S03. 00XA    Tobacco abuse Z72.0    Visual impairment H54.7    Internal hernia K45.8    Vitamin D deficiency E55.9    Iron deficiency E61.1    SIRS (systemic inflammatory response syndrome) ([facility]) R65.10    NICKI (acute kidney injury) ([facility] [address] N17.9    Severe malnutrition ([facility]) E43       Past Medical History:        Diagnosis Date    Anemia     Arthritis     Asthma     Bipolar disorder, mixed ([facility] [address]     Carotid artery stenosis 2/13/2020    Cocaine abuse ([facility] [address] 11/4/2014    COPD (chronic obstructive pulmonary disease) ([facility])     Degeneration of cervical intervertebral disc     Depression with anxiety     Depression with anxiety     Fibromyalgia 1/5/2017    GERD (gastroesophageal reflux disease)     History of migraine headaches 6/10/2014    History of renal calculi 6/10/2014    History of seizure disorder 6/10/2014    Hoarseness of voice     HTN (hypertension) 6/10/2014    Hyperlipidemia 6/10/2014    IGT (impaired glucose tolerance) 6/10/2014    Kidney stones     Lactose intolerance 6/10/2014    Leukopenia 6/10/2014    Major depressive disorder, recurrent episode, severe, without mention of psychotic behavior 2014    MVP (mitral valve prolapse)     Seizures ([facility] [address]     Sleep apnea 6/10/2014    Unspecified diseases of blood and blood-forming organs        Past Surgical History:        Procedure Laterality Date    ANKLE FRACTURE SURGERY      recontruction surgery    APPENDECTOMY      BREAST LUMPECTOMY Right     CARDIAC CATHETERIZATION      CARPAL TUNNEL RELEASE      x2     SECTION      CHOLECYSTECTOMY      COLONOSCOPY  12    COLONOSCOPY  2016    severe spasms    COLONOSCOPY  2019    DR GOLDY MCINTOSH    GASTRIC BYPASS SURGERY      GASTRIC BYPASS SURGERY      HYSTERECTOMY (CERVIX STATUS UNKNOWN)      HYSTERECTOMY (CERVIX STATUS UNKNOWN)      LAPAROSCOPY N/A 2022    LAPAROSCOPY EXPLORATORY CONVERTED TO OPEN EXPLORATORY LAPAROTOMY, LYSIS OF ADHESIONS, REDUCTION OF INTERNAL HERNIA performed by Milena Vasquez DO at [facility] [address] OTHER SURGICAL HISTORY      APPLICATION OF ARCH BARS,SIMPLE EXTRACTION OF #15 AND RT TMJ JOINT REPLACEMENT    SH[facility]ER [facility]ROSCOPY Left 2019    [facility]    SHOULDER SURGERY      TONSILLECTOMY AND ADENOIDECTOMY      UPPER GASTROINTESTINAL ENDOSCOPY  7-3-12    egd    UPPER GASTROINTESTINAL ENDOSCOPY  2016    status post gastrectomy with a small remnant of gastric pouch.     UPPER GASTROINTESTINAL ENDOSCOPY  2019    DR Justino Nuñez       Social History:    Social History     Tobacco Use    Smoking status: Every Day     Packs/day: 0.50     Years: 33.00     Pack years: 16.50     Types: Cigarettes    Smokeless tobacco: Never    Tobacco comments:     quit   started again      Substance Use Topics    Alcohol use: Yes     Comment: occasional                                Ready to quit: Not Answered  Counseling given: Not Answered  Tobacco comments: quit   started again   36.4 10/10/2022 07:00 PM    [id number] 21.7 10/10/2022 07:00 PM    [id number] 94.3 10/10/2022 07:00 PM        Type & Screen (If Applicable):  No results found for: LABABO, LABRH    Drug/Infectious Status (If Applicable):  No results found for: HIV, HEPCAB    COVID-19 Screening (If Applicable):   Lab Results   Component Value Date/Time    COVID19 Not Detected 10/10/2022 04:55 AM           Anesthesia Evaluation  Patient summary reviewed and Nursing notes reviewed no history of anesthetic complications:   Airway: Mallampati: II  TM distance: >3 FB   Neck ROM: limited  Mouth opening: > = 3 FB   Dental:    (+) poor dentition and partials  Comment: Many missing teeth    Pulmonary:   (+) COPD:  shortness of breath: no interval change,  sleep apnea:  rhonchi asthma: current smoker                          ROS comment: Septic pulmonary emboli  Covid 19 positive 9/30/22   Cardiovascular:    (+) hypertension:, valvular problems/murmurs: MVP, hyperlipidemia        Rhythm: regular  Rate: normal                 ROS comment: Elevated troponins     Neuro/Psych:   (+) seizures:, neuromuscular disease:, headaches: migraine headaches, psychiatric history:depression/anxiety             GI/Hepatic/Renal:   (+) GERD:, renal disease (NICKI resolving):,          ROS comment: Hx gastric bypass  Malnutrition. Endo/Other:    (+) : arthritis:., electrolyte abnormalities (K 3.5), .                  ROS comment: Sepsis  Polysubstance abuse - positive cocaine, opiates Abdominal:             Vascular: Other Findings:           Anesthesia Plan      MAC     ASA 4     (Patient was obtunded, history obtained from patient's father and chart, will require consent from parent)  Induction: intravenous. MIPS: Prophylactic antiemetics administered. Anesthetic plan and risks discussed with patient and father. Plan discussed with CRNA.                     Adrián Martino MD   10/12/2022

## 2022-10-12 NOTE — PLAN OF CARE
Problem: Discharge Planning  Goal: Discharge to home or other facility with appropriate resources  10/12/2022 1056 by Dickson Tidwell RN  Outcome: Progressing  Flowsheets (Taken 10/12/2022 0800)  Discharge to home or other facility with appropriate resources: Identify barriers to discharge with patient and caregiver  10/12/2022 0435 by Pierre Sibley RN  Outcome: Progressing     Problem: Pain  Goal: Verbalizes/displays adequate comfort level or baseline comfort level  10/12/2022 1056 by Dickson Tidwell RN  Outcome: Progressing  10/12/2022 0435 by Pierre Sibley RN  Outcome: Progressing     Problem: Skin/Tissue Integrity  Goal: Absence of new skin breakdown  Description: 1. Monitor for areas of redness and/or skin breakdown  2. Assess vascular access sites hourly  3. Every 4-6 hours minimum:  Change oxygen saturation probe site  4. Every 4-6 hours:  If on nasal continuous positive airway pressure, respiratory therapy assess nares and determine need for appliance change or resting period.   10/12/2022 1056 by Dickson Tidwell RN  Outcome: Progressing  10/12/2022 0435 by Pierre Sibley RN  Outcome: Progressing     Problem: Safety - Adult  Goal: Free from fall injury  10/12/2022 1056 by Dickson Tidwell RN  Outcome: Progressing  10/12/2022 0435 by Pierre Sibley RN  Outcome: Progressing     Problem: ABCDS Injury Assessment  Goal: Absence of physical injury  10/12/2022 1056 by Dickson Tidwell RN  Outcome: Progressing  10/12/2022 0435 by Pierre Sibley RN  Outcome: Progressing     Problem: Nutrition Deficit:  Goal: Optimize nutritional status  10/12/2022 1056 by Dickson Tidwell RN  Outcome: Progressing  10/12/2022 0435 by Pierre Sibley RN  Outcome: Progressing

## 2022-10-12 NOTE — PROGRESS NOTES
ADRIANNE ordered for this patient. Scheduled with surgery for tomorrow 10/13/22 at approximately 1500. T[facility] office, ICU aware. Any questions, please call [phone].     Electronically signed by Martinez Mcclelland on 10/12/2022 at 3:09 PM

## 2022-10-12 NOTE — PROGRESS NOTES
Progress Note    Reason for Consult: NICKI    Requesting Physician:  Jimmy Looney MD    INTERVAL HISTORY:  Creatinine improved to 0.6  CO2 19.   K 3.5 replaced with KCL 40mEq po. Albumin 3.4 on 10/10. Phos 2-received kphos 10 mmol today. Immunofixation pending. Vit D level <6. Off levophed. On 0.9NS at 50 ml/hr. 2D Echo shed EF 55-60%. Urine output incomplete. She moves around in the bed when I speak to her, but does not answer any questions. HISTORY OF PRESENT ILLNESS:    The patient is a 46 y.o. female who presents with weakness, generalized pain, SOB and abdominal pain. She tested positive for COVID 19 on 9/30. She states she slept 2 days straight last week. C/o fever, chills and foul smelling urine with right flank pain. She also admits to doing crack cocaine at least twice weekly and when ever she can afford it more frequently. She states she uses cocaine to help with pain. She drinks homemade wine on occasion and smokes cigarettes, but states recently she decreased tobacco use. Creatinine baseline 0.6 from earlier this year. Creatinine 2.1 on admission. With sodium 132, potassium 3.3, bicarb 24, chloride 91, BUN 33, magnesium 2.1, procalcitonin 6.01, proBNP 4799. High sensitivity troponin was initially 30 and 13 on redraw. Glucose 129, albumin 3.44. Given normal saline bolus. X-ray showed multiple nodular opacities projecting over the right lung measuring up to 1.5 cm further evaluation with CT chest recommended. No acute focal airspace consolidation or pleural effusions. Blood pressure trending 110-190s/60-120s. Medications include Coreg, lisinopril, trazodone, gabapentin, Norvasc, Vistaril. She is very drowsy, slurring words. She has hx of HTN. She states she only takes BP meds if she remembers, which is not often. Hx of HTN since age 23s. Ordered urine sodium, urine creatinine, urine eosinophils, urine osmolality. Follow-up urine culture. On iv Zosyn per primary. Strict I&O discussed with RN. She received 1 dose of Lovenox and is now on heparin 5000 units twice a day. In the ER, she received Dilaudid and morphine and dose of Neurontin. She is very drowsy. Hold Neurontin and trazodone for now. Blood pressure is improving. Holding lisinopril for now . Will also hold norvasc, as pt does not take medications regularly and need to avoid hypotension. Continue Coreg. On regular diet. Will follow up urine studies and make further recommendations regarding fluid restriction. K 3.3.-replacement ordered. Mg level ok.     Review Of Systems:   Unable to obtain, patient is lethargic and not interactive    Past Medical History:   Diagnosis Date    Anemia     Arthritis     Asthma     Bipolar disorder, mixed ([address]     Carotid artery stenosis 2020    Cocaine abuse ([address] 2014    COPD (chronic obstructive pulmonary disease) (HCC)     Degeneration of cervical intervertebral disc     Depression with anxiety     Depression with anxiety     Fibromyalgia 2017    GERD (gastroesophageal reflux disease)     History of migraine headaches 6/10/2014    History of renal calculi 6/10/2014    History of seizure disorder 6/10/2014    Hoarseness of voice     HTN (hypertension) 6/10/2014    Hyperlipidemia 6/10/2014    IGT (impaired glucose tolerance) 6/10/2014    Kidney stones     Lactose intolerance 6/10/2014    Leukopenia 6/10/2014    Major depressive disorder, recurrent episode, severe, without mention of psychotic behavior 2014    MVP (mitral valve prolapse)     Seizures ([address]     Sleep apnea 6/10/2014    Unspecified diseases of blood and blood-forming organs        Past Surgical History:   Procedure Laterality Date    ANKLE FRACTURE SURGERY      recontruction surgery    APPENDECTOMY      BREAST LUMPECTOMY Right     CARDIAC CATHETERIZATION      CARPAL TUNNEL RELEASE      x2     SECTION      CHOLECYSTECTOMY      COLONOSCOPY  10    COLONOSCOPY  2016    severe spasms    COLONOSCOPY  05/20/2019    DR Adele Graham    GASTRIC BYPASS SURGERY      GASTRIC BYPASS SURGERY      HYSTERECTOMY (CERVIX STATUS UNKNOWN)      HYSTERECTOMY (CERVIX STATUS UNKNOWN)      LAPAROSCOPY N/A 1/29/2022    LAPAROSCOPY EXPLORATORY CONVERTED TO OPEN EXPLORATORY LAPAROTOMY, LYSIS OF ADHESIONS, REDUCTION OF INTERNAL HERNIA performed by Mil Eden DO at [address] [place] [address]  15/69/1888    APPLICATION OF ARCH BARS,SIMPLE EXTRACTION OF #15 AND RT TMJ JOINT REPLACEMENT    SHOULDER ARTHROSCOPY Left 06/05/2019    DR ROBERT GREEN    SHOULDER SURGERY      TONSILLECTOMY AND ADENOIDECTOMY      UPPER GASTROINTESTINAL ENDOSCOPY  7-3-12    egd    UPPER GASTROINTESTINAL ENDOSCOPY  12/19/2016    status post gastrectomy with a small remnant of gastric pouch. UPPER GASTROINTESTINAL ENDOSCOPY  05/2019    DR Adele Graham       Prior to Admission medications    Medication Sig Start Date End Date Taking? Authorizing Provider   lisinopril (PRINIVIL;ZESTRIL) 20 MG tablet Take 20 mg by mouth daily   Yes Historical Provider, MD   carvedilol (COREG) 25 MG tablet TAKE 1 TABLET BY MOUTH IN THE MORNING AND 1 IN THE EVENING WITH MEALS 9/19/22   Elena Deleon MD   traZODone (DESYREL) 100 MG tablet TAKE 1 TABLET BY MOUTH AT BEDTIME 6/13/22   Historical Provider, MD   gabapentin (NEURONTIN) 300 MG capsule TAKE 1 CAPSULE BY MOUTH THREE TIMES DAILY 5/24/22 8/9/22  Elena Deleon MD   hydrOXYzine (VISTARIL) 25 MG capsule  9/15/20   Historical Provider, MD   DULoxetine (CYMBALTA) 30 MG extended release capsule Take 1 capsule by mouth daily  Patient taking differently: Take 60 mg by mouth in the morning.  4/8/19   KELLY Doherty - CNP   ARIPiprazole (ABILIFY) 5 MG tablet Take 5 mg by mouth daily    Historical Provider, MD       Scheduled Meds:   vancomycin  1,250 mg IntraVENous Q24H    cefepime  2,000 mg IntraVENous Q12H    carvedilol  3.125 mg Oral BID WC    sodium chloride flush  5-40 mL IntraVENous 2 times per day    heparin (porcine)  5,000 Units SubCUTAneous BID    ARIPiprazole  5 mg Oral Daily    atorvastatin  40 mg Oral Daily    divalproex  1,000 mg Oral BID    DULoxetine  60 mg Oral Daily    ferrous sulfate  325 mg Oral BID WC    ipratropium-albuterol  1 ampule Inhalation Q4H WA    vancomycin (VANCOCIN) intermittent dosing (placeholder)   Other RX Placeholder     Continuous Infusions:   sodium chloride      sodium chloride 50 mL/hr at 10/12/22 0416    norepinephrine Stopped (10/11/22 0801)     PRN Meds:sodium chloride flush, sodium chloride, acetaminophen, ondansetron **OR** ondansetron, albuterol sulfate HFA, hydrOXYzine HCl, sodium chloride flush, perflutren lipid microspheres    Allergies   Allergen Reactions    Aspirin      Gastric bypass    Bactrim Other (See Comments)     Severe chills    Codeine Other (See Comments)     Other reaction(s): pain  Caused chest pain ( was in the Tylenol 3# )  Chest pain       Social History     Socioeconomic History    Marital status:      Spouse name: Not on file    Number of children: Not on file    Years of education: Not on file    Highest education level: Not on file   Occupational History    Occupation: disability   Tobacco Use    Smoking status: Every Day     Packs/day: 0.50     Years: 33.00     Pack years: 16.50     Types: Cigarettes    Smokeless tobacco: Never    Tobacco comments:     quit  2000 started again  1/13    Substance and Sexual Activity    Alcohol use: Yes     Comment: occasional    Drug use: Not Currently    Sexual activity: Yes   Other Topics Concern    Not on file   Social History Narrative    Not on file     Social Determinants of Health     Financial Resource Strain: Not on file   Food Insecurity: Not on file   Transportation Needs: Not on file   Physical Activity: Not on file   Stress: Not on file   Social Connections: Not on file   Intimate Partner Violence: Not on file   Housing Stability: Not on file       Family History   Problem Relation Age of Onset    Diabetes Mother     Cancer Mother     Coronary Art Dis Father     COPD Father     Depression Brother     Alcohol Abuse Brother     Cancer Other         lung and skin    Diabetes Maternal Grandmother     Cancer Paternal Grandmother          Physical Exam:  Vitals:    10/12/22 1000 10/12/22 1054 10/12/22 1100 10/12/22 1200   BP: 124/67  (!) 173/94 (!) 153/78   Pulse: 68 76 72 76   Resp: 22 27 25 26   Temp:    98 °F (36.7 °C)   TempSrc:    Oral   SpO2: 93% 93% 96% 93%   Weight:       Height:         I/O last 3 completed shifts: In: 3717.6 [I.V.:2296.4; IV Piggyback:1421.3]  Out: 410 [Urine:410]    General:  lethargic, not in distress. Appears to be older than stated age. HEENT: Atraumatic, normocephalic. Anicteric sclera. Pink and moist oral mucosa. Neck supple. No JVD. Chest: Bilateral air entry, clear to auscultation, no wheezing, rhonchi or rales. Cardiovascular: S1S2, no murmur, rub or gallop. No lower extremity edema. Abdomen: soft Active bowel sounds. Musculoskeletal:  No cyanosis or clubbing. Integumentary: Pink, warm and dry. +wounds to BLE. No s/s of infection.  Skin turgor normal.  CNS: ISAAK    Data:    CBC:   Lab Results   Component Value Date    WBC 15.7 (H) 10/12/2022    HGB 10.6 (L) 10/12/2022    HCT 32.9 (L) 10/12/2022    MCV 79.1 (L) 10/12/2022     (L) 10/12/2022     BMP:    Lab Results   Component Value Date     10/12/2022     10/12/2022     (L) 10/11/2022    K 3.5 (L) 10/12/2022    K 3.9 10/12/2022    K 3.0 (L) 10/11/2022     10/12/2022     10/12/2022     10/11/2022    CO2 19 (L) 10/12/2022    CO2 16 (L) 10/12/2022    CO2 18 (L) 10/11/2022    BUN 19 10/12/2022    BUN 22 (H) 10/12/2022    BUN 26 (H) 10/11/2022    CREATININE 0.60 10/12/2022    CREATININE 0.67 10/12/2022    CREATININE 0.77 10/11/2022    GLUCOSE 85 10/12/2022    GLUCOSE 81 10/12/2022    GLUCOSE 123 (H) 10/11/2022     CMP:   Lab Results Component Value Date/Time     10/12/2022 01:34 PM    K 3.5 10/12/2022 01:34 PM     10/12/2022 01:34 PM    CO2 19 10/12/2022 01:34 PM    BUN 19 10/12/2022 01:34 PM    CREATININE 0.60 10/12/2022 01:34 PM    GLUCOSE 85 10/12/2022 01:34 PM    CALCIUM 8.4 10/12/2022 01:34 PM    PROT 7.9 10/10/2022 04:55 AM    LABALBU 3.4 10/10/2022 04:55 AM    BILITOT 0.7 10/10/2022 04:55 AM    ALKPHOS 112 10/10/2022 04:55 AM    AST 41 10/10/2022 04:55 AM    ALT 17 10/10/2022 04:55 AM      Hepatic:   Lab Results   Component Value Date    AST 41 (H) 10/10/2022    AST 14 04/08/2022    AST 26 01/28/2022    ALT 17 10/10/2022    ALT 6 04/08/2022    ALT 11 01/28/2022    BILITOT 0.7 10/10/2022    BILITOT 0.18 (L) 04/08/2022    BILITOT 0.23 (L) 01/28/2022    ALKPHOS 112 (H) 10/10/2022    ALKPHOS 120 (H) 04/08/2022    ALKPHOS 90 01/28/2022     BNP: No results found for: BNP  Lipids:   Lab Results   Component Value Date    CHOL 156 01/30/2022    HDL 39 (L) 01/30/2022     INR:   Lab Results   Component Value Date    INR 1.2 10/10/2022    INR 1.0 07/22/2016    INR 0.9 05/30/2016     PTH: No results found for: PTH  Phosphorus:    Lab Results   Component Value Date/Time    PHOS 2.0 10/12/2022 04:18 AM     Ionized Calcium: No results found for: IONCA  Magnesium:   Lab Results   Component Value Date/Time    MG 2.1 10/12/2022 04:18 AM     Albumin:   Lab Results   Component Value Date/Time    LABALBU 3.4 10/10/2022 04:55 AM     Last 3 CK, CKMB, Troponin: @LABRCNT(CKTOTAL:3,CKMB:3,TROPONINI:3)       URINE:)No results found for: Lela Gore    Radiology:   Reviewed. Assessment and Plan to follow. Pt seen in collaboration with Dr. Ennis Essex.      Electronically signed by KELLY Perez CNP  on 10/12/2022 at 2:22 PM       Physician addendum:  I have personally examined the patient, reviewed documentation and labs  Creatinine and sodium levels improved to normal range  No new complaints  Had some pain medications earlier and was very sleepy  Has low oral intake    Physical Exam:  Blood pressure (!) 153/78, pulse 72, temperature 98 °F (36.7 °C), temperature source Oral, resp. rate 28, height 4' 11\" (1.499 m), weight 89 lb (40.4 kg), SpO2 92 %. In: 3717.6 [I.V.:2296.4]  Out: 410   In: 3717.6   Out: 410 [Urine:410]      General:  Awake, alert, not in distress. Appears to be stated age. HEENT: Atraumatic, normocephalic. Anicteric sclera. Pink and moist oral mucosa. Neck supple. No JVD. Chest: Bilateral air entry, clear to auscultation, no wheezing, rhonchi or rales. Cardiovascular: RRR, S1S2, no murmur, rub or gallop. No lower extremity edema. Abdomen: Soft, non tender to palpation. Musculoskeletal: No cyanosis or clubbing. Integumentary: Pink, warm and dry. Free from rash or lesions. CNS: Oriented to person, place and time. Speech clear. Face symmetrical. No tremor. Psych: Answering questions appropriately, appropriate mood and affect    Data:  CBC:   Lab Results   Component Value Date    WBC 15.7 (H) 10/12/2022    HGB 10.6 (L) 10/12/2022    HCT 32.9 (L) 10/12/2022    MCV 79.1 (L) 10/12/2022     (L) 10/12/2022     BMP:    Lab Results   Component Value Date     10/12/2022    K 3.5 (L) 10/12/2022     10/12/2022    CO2 19 (L) 10/12/2022    BUN 19 10/12/2022    CREATININE 0.60 10/12/2022    GLUCOSE 85 10/12/2022        Impression:  NICKI, appears to be hemodynamically related, Cr is improving. Hyponatremia, noted to be on Depakote. Vitamin D deficiency  Hypokalemia. Hypocalcemia. Hypertension. Gram positive cocci sepsis with septic embolization. UTI with E coli. History of cocaine use. Plan:  Continue NS to 50 ml/hr. On iv Cefepime and Vancomycin. Continue holding Lisinopril. Coreg dose was decreased to  3.125 mg BID with holding parameters. Monitor BP. Potassium and calcium were repleted.   Vitamin D level was below assay limits, will start vitamin D replacement   Labs daily  Spoke to her father. Follow up labs ordered for AM.     Please do not hesitate to call with questions.       Electronically signed by Yoko Velazquez MD  on 10/12/2022 at 4:10 PM

## 2022-10-12 NOTE — PROGRESS NOTES
Pulmonary Progress Note  [facility]      Patient - Mary Molina,  Age - 46 y.o.    - 1970      Room Number -    MRN -  [id number]   Acct # - [de-identified]  Date of Admission -  10/10/2022  4:37 AM    Shelby Padilla MD  Primary Care Physician - Lizbet Marquez MD     SUBJECTIVE   Patient appears to be about the same. No distress. Looks tired. Can  communicate    OBJECTIVE   VITALS    height is 4' 11\" (1.499 m) and weight is 89 lb (40.4 kg). Her oral temperature is 98 °F (36.7 °C). Her blood pressure is 153/78 (abnormal) and her pulse is 76. Her respiration is 26 and oxygen saturation is 93%. Body mass index is 17.98 kg/m². Temperature Range: Temp: 98 °F (36.7 °C) Temp  Av.1 °F (36.7 °C)  Min: 97.9 °F (36.6 °C)  Max: 98.3 °F (36.8 °C)  BP Range:  Systolic (21XXB), QZZ:769 , Min:100 , EHD:854     Diastolic (51FFP), YRR:07, Min:54, Max:94    Pulse Range: Pulse  Av.2  Min: 68  Max: 93  Respiration Range: Resp  Av.1  Min: 20  Max: 31  Current Pulse Ox[de-identified]  SpO2: 93 %  24HR Pulse Ox Range:  SpO2  Av.1 %  Min: 87 %  Max: 96 %  Oxygen Amount and Delivery: O2 Flow Rate (L/min): 2 L/min    Wt Readings from Last 3 Encounters:   10/10/22 89 lb (40.4 kg)   22 96 lb 8 oz (43.8 kg)   22 105 lb (47.6 kg)       I/O (24 Hours)    Intake/Output Summary (Last 24 hours) at 10/12/2022 1317  Last data filed at 10/11/2022 1803  Gross per 24 hour   Intake 3717.61 ml   Output 410 ml   Net 3307.61 ml       EXAM     General Appearance  Awake, alert, oriented, in no acute distress  HEENT - normocephalic, atraumatic. Neck - Supple,  trachea midline   Lungs -coarse breath sounds no crackles rales or wheezes  Heart Exam:PMI normal. No lifts, heaves, or thrills. RRR. No murmurs, clicks, gallops, or rubs  Abdomen Exam: Abdomen soft, non-tender  Extremity Exam: No signs of cyanosis.     MEDS      vancomycin  1,250 mg IntraVENous Q24H    cefepime  2,000 mg IntraVENous Q12H    carvedilol  3.125 mg Oral BID     sodium chloride flush  5-40 mL IntraVENous 2 times per day    heparin (porcine)  5,000 Units SubCUTAneous BID    ARIPiprazole  5 mg Oral Daily    atorvastatin  40 mg Oral Daily    divalproex  1,000 mg Oral BID    DULoxetine  60 mg Oral Daily    ferrous sulfate  325 mg Oral BID     ipratropium-albuterol  1 ampule Inhalation Q4H WA    vancomycin (VANCOCIN) intermittent dosing (placeholder)   Other RX Placeholder      sodium chloride      sodium chloride 50 mL/hr at 10/12/22 0416    norepinephrine Stopped (10/11/22 0801)     sodium chloride flush, sodium chloride, acetaminophen, ondansetron **OR** ondansetron, albuterol sulfate HFA, hydrOXYzine HCl, sodium chloride flush, perflutren lipid microspheres    LABS   CBC   Recent Labs     10/12/22  0418   WBC 15.7*   HGB 10.6*   HCT 32.9*   MCV 79.1*   *     BMP:   Lab Results   Component Value Date/Time     10/12/2022 04:18 AM    K 3.9 10/12/2022 04:18 AM     10/12/2022 04:18 AM    CO2 16 10/12/2022 04:18 AM    BUN 22 10/12/2022 04:18 AM    LABALBU 3.4 10/10/2022 04:55 AM    CREATININE 0.67 10/12/2022 04:18 AM    CALCIUM 8.6 10/12/2022 04:18 AM    GFRAA >60 04/08/2022 07:17 AM    LABGLOM >60 10/12/2022 04:18 AM     ABGs:  Lab Results   Component Value Date/Time    PHART 7.385 10/10/2022 07:00 PM    PO2ART 90.4 10/10/2022 07:00 PM    LHY3AJJ 36.4 10/10/2022 07:00 PM      Lab Results   Component Value Date/Time    MODE [facility] 12/28/2016 04:36 AM     Ionized Calcium:  No results found for: IONCA  Magnesium:    Lab Results   Component Value Date/Time    MG 2.1 10/12/2022 04:18 AM     Phosphorus:    Lab Results   Component Value Date/Time    PHOS 2.0 10/12/2022 04:18 AM        LIVER PROFILE   Recent Labs     10/10/22  0455   AST 41*   ALT 17   LIPASE 17   BILITOT 0.7   ALKPHOS 112*     INR   Recent Labs     10/10/22  0455   INR 1.2     PTT   Lab Results Component Value Date    APTT 25.6 07/22/2016         RADIOLOGY     (See actual reports for details)    ASSESSMENT/PLAN     Patient Active Problem List   Diagnosis    GERD (gastroesophageal reflux disease)    Hoarseness of voice    MVP (mitral valve prolapse)    Depression with anxiety    Lymphocytic colitis    History of migraine headaches    Hyperlipidemia    Sleep apnea    IGT (impaired glucose tolerance)    History of renal calculi    Lactose intolerance    History of seizure disorder    Leukopenia    Bipolar disorder, mixed ([address]    Illicit drug use    Postlaminectomy syndrome, cervical region    Degeneration of cervical intervertebral disc    Encounter for long-term (current) use of other medications    Bradycardia    Cellulitis    Hypokalemia    Primary hypertension    Elevated lipase    Right lower quadrant abdominal pain    Nausea    Diarrhea    Seizure disorder (HCC)    Polysubstance abuse (HCC)    Fibromyalgia    Cocaine addiction (HCC)    Shoulder arthritis    Osteoarthritis of left glenohumeral joint    Abnormal EKG    Anemia    Anxiety    Carotid artery stenosis    COPD (chronic obstructive pulmonary disease) ([address]    Dental disease    HUTCHINSON (dyspnea on exertion)    Fatigue    Fractures    H/O gastric bypass    History of crack cocaine use    History of UTI    Injury of back    Intractable chronic migraine without aura and without status migrainosus    Intractable migraine with aura with status migrainosus    Kidney stones    Dizziness    Memory loss    Mild pulmonary hypertension (HCC)    Mild tricuspid regurgitation    Primary osteoarthritis of left shoulder    Seizure-like activity (HCC)    Stress at home    TMJ (dislocation of temporomandibular joint)    Tobacco abuse    Visual impairment    Internal hernia    Vitamin D deficiency    Iron deficiency    SIRS (systemic inflammatory response syndrome) (HCC)    NICKI (acute kidney injury) ([address]    Severe malnutrition (HCC)     IMPRESSION:  1.   Suspect COPD exacerbation. 2 concern for septic emboli on CT scan  3.  E. coli urinary tract infection  4. Elevated procalcitonin level, may be, because of acute kidney injury  plus infection. 5.  Acute kidney injury. This is actually resolving. Initial  creatinine was 2.21 and now 0.67  6. Leukocytosis with bandemia. 7. MRSA bacteremia. 2 of 2 blood cultures October 10  8. Opiates and cocaine and tox cream.  Patient's creatinine now normal.  Gap still elevated at 15    On cefepime  On vancomycin. On DVT prophylaxis with Lovenox  2D echo performed yesterday. No mention of any valvular vegetations appreciated on the report. EF 55 to 60% with RVSP 19 mmHg    Updated patient's father to the best of my ability.   He voiced appreciation of the information    Electronically signed by Mary Meade MD on 10/12/2022 at 1:17 PM

## 2022-10-12 NOTE — PLAN OF CARE
Problem: Discharge Planning  Goal: Discharge to home or other facility with appropriate resources  10/12/2022 0435 by Nila Garcia RN  Outcome: Progressing  10/11/2022 1727 by Rogelio Sarabia RN  Outcome: Progressing  Flowsheets  Taken 10/11/2022 1630  Discharge to home or other facility with appropriate resources: Refer to discharge planning if patient needs post-hospital services based on physician order or complex needs related to functional status, cognitive ability or social support system  Taken 10/11/2022 1245  Discharge to home or other facility with appropriate resources: Refer to discharge planning if patient needs post-hospital services based on physician order or complex needs related to functional status, cognitive ability or social support system  Taken 10/11/2022 0930  Discharge to home or other facility with appropriate resources: Refer to discharge planning if patient needs post-hospital services based on physician order or complex needs related to functional status, cognitive ability or social support system     Problem: Pain  Goal: Verbalizes/displays adequate comfort level or baseline comfort level  10/12/2022 0435 by Nila Garcia RN  Outcome: Progressing  10/11/2022 1727 by Rogelio Sarabia RN  Outcome: Progressing  Flowsheets  Taken 10/11/2022 1630  Verbalizes/displays adequate comfort level or baseline comfort level:   Encourage patient to monitor pain and request assistance   Assess pain using appropriate pain scale   Administer analgesics based on type and severity of pain and evaluate response  Taken 10/11/2022 1245  Verbalizes/displays adequate comfort level or baseline comfort level:   Encourage patient to monitor pain and request assistance   Assess pain using appropriate pain scale  Taken 10/11/2022 0745  Verbalizes/displays adequate comfort level or baseline comfort level:   Assess pain using appropriate pain scale   Administer analgesics based on type and severity of pain and evaluate response   Encourage patient to monitor pain and request assistance     Problem: Skin/Tissue Integrity  Goal: Absence of new skin breakdown  Description: 1. Monitor for areas of redness and/or skin breakdown  2. Assess vascular access sites hourly  3. Every 4-6 hours minimum:  Change oxygen saturation probe site  4. Every 4-6 hours:  If on nasal continuous positive airway pressure, respiratory therapy assess nares and determine need for appliance change or resting period.   10/12/2022 0435 by Pierre Sibley RN  Outcome: Progressing  10/11/2022 1727 by Ramses Spring RN  Outcome: Progressing     Problem: Safety - Adult  Goal: Free from fall injury  10/12/2022 0435 by Pierre Sibley RN  Outcome: Progressing  10/11/2022 1727 by Ramses Spring RN  Outcome: Progressing

## 2022-10-12 NOTE — PROGRESS NOTES
SC visit with patient's father, Guerrero, while patient having medical testing; Guerrero talked about patient's medical issues and her drug addiction; discussed with Guerrero the support he has and the death of his wife six years ago; patient came back to her room after medical testing; prayer welcomed; listening presence and support;     10/12/22 1617   Encounter Summary   Encounter Overview/Reason  Spiritual/Emotional Needs   Service Provided For: Patient and family together   Referral/Consult From: [address] Parent   Last Encounter  10/12/22   Complexity of Encounter Moderate   Begin Time 1400   End Time  1430   Total Time Calculated 30 min   Spiritual/Emotional needs   Type Spiritual Support   Assessment/Intervention/Outcome   Assessment Coping;Powerlessness; Sad   Intervention Active listening;Discussed belief system/Mormon practices/cele;Discussed illness injury and its impact; Explored/Affirmed feelings, thoughts, concerns;Prayer (assurance of)/[place];Sustaining Presence/Ministry of presence   Outcome Comfort;Coping;Engaged in conversation;Expressed feelings, needs, and concerns;Expressed Gratitude;Receptive

## 2022-10-12 NOTE — PROGRESS NOTES
Physician Progress Note      PATIENT:               Raquel Graff  CSN #:                  [id number]  :                       1970  ADMIT DATE:       10/10/2022 4:37 AM  [address] DATE:  RESPONDING  PROVIDER #:        Bill Pak MD          QUERY TEXT:    Pt admitted for increase SOB and fatigue. Dietary consulted due to a positive   nutrition screen. Malnutrition assessment indicates findings of Severe   malnutrition. If possible, please document in progress notes and discharge   summary:    The medical record reflects the following:  -Risk Factors: history of Bipolar, COPD, gastric bypass, and polysubstance   abuse.  -Clinical Indicators: Per dietary consult on 10/11 \" Severe malnutrition   related to impaired respiratory function, psychological cause or life stress   as evidenced by intake 0-25%, poor intake prior to admission, BMI, weight   loss, severe loss of subcutaneous fat, severe muscle loss. \"  -Treatment: Dietary consult, Continue to monitor while inpatient, Start Oral   Nutrition Supplement  Options provided:  -- Severe malnutrition  -- Severe malnutrition ruled out  -- Other - I will add my own diagnosis  -- Disagree - Not applicable / Not valid  -- Disagree - Clinically unable to determine / Unknown  -- Refer to Clinical Documentation Reviewer    PROVIDER RESPONSE TEXT:    This patient has Severe malnutrition. Query created by: Rayo Juarez on 10/11/2022 2:18 PM      QUERY TEXT:    Pt admitted for worsening SOB and NICKI. Pulmonary consult obtained for   management. Noted documentation in progress note 10/11 of Septic Shock, MRSA   bacteremia with septic emboli and E. Coli UTI. Pt has leukocytosis of 22 w/ 32%   bands and vital signs show tachycardia, hypotension and hypothermia w/ body   temp documented at 96.7. After review, please document if Septic Shock has   been confirmed as POA or ruled out after study.     The medical record reflects the following:  - Risk Factors: Polysubstance abuse, Noncompliance, COPD, recent covid   infection  - Clinical Indicators: Presented w/ SOB, placed on BiPAP, IVFs for hypotension   and initiation of Norepi gtt. Onset of confusion and rambling speech. Concern   for Septic Emboli, Septic shock noted by pulmonary and cardiology consultant. Currently being treated for NICKI. WBC 22, Bands 32%, Procal 6.01, DDimer 3.85;   Blood Culture w/ gram positive cocci in clusters showing Staph Aureus growth;   E.Coli on Urine Culture; Temp 96.7; HR < 90; BP 63/48, 69/45, 75/49, 72/46;   GCS 13 and 14.  - Treatment: Vanco, IVFs, Levophed, Bipap, Pulmonary, Cardiology consults  Options provided:  -- Septic Shock present on admission  -- Septic shock has been ruled out  -- Other - I will add my own diagnosis  -- Disagree - Not applicable / Not valid  -- Disagree - Clinically unable to determine / Unknown  -- Refer to Clinical Documentation Reviewer    PROVIDER RESPONSE TEXT:    This patient has Septic Shock that was present at the time of admission. Query created by: Severiano Christianson on 10/11/2022 2:39 PM      QUERY TEXT:    Pt admitted for evaluation of worsening SOB and NICKI and found to have MRSA   bacteremia and E. Coli UTI. Per ER documentation, the patient was alert and   oriented on presentation. Since, the patient has become more confused,   disoriented, lethargic and not answering questions. GCS 13-14. If possible,   please document if this patients AMS can further be specified as any of the   following: The medical record reflects the following:  - Risk Factors: polysubstance drug abuse, noncompliance, copd, recent COVID 19   infection  - Clinical Indicators: Patient admitted for worsening SOB, CT scan suggestive   of septic emboli. Pt admits to polysubstance drug abuse. Overnight patient   became disoriented GCS of 13-15, RNs documents SpO2 desaturations into the 70s   with hypotension.  Pt had decreased responsiveness and shallow breathing,   transferred to the ICU and placed on BIPAP. Pt continues to be A/O x2. Hemodynamic support maintained with fluid resuscitation, Levophed and   treatment of bacteremia. Blood Cultures 2 of 2 positive for MRSA and Urine   culture positive for E.Coli.  - Treatment: Bipap/supplemental O2, IVFs, Levophed, Antibiotics, Pulmonary and   cardiology consults  Options provided:  -- Metabolic encephalopathy due to UTI, NICKI and Bacteremia  -- Toxic encephalopathy due to polysubstance drug abuse and impaired gas   exchange  -- Other - I will add my own diagnosis  -- Disagree - Not applicable / Not valid  -- Disagree - Clinically unable to determine / Unknown  -- Refer to Clinical Documentation Reviewer    PROVIDER RESPONSE TEXT:    This patient has metabolic encephalopathy due to UTI, NICKI and Bacteremia. Query created by: Jacobo Toney on 10/11/2022 3:18 PM      Electronically signed by:   Mayte Willett MD 10/12/2022 10:37 AM

## 2022-10-12 NOTE — PROGRESS NOTES
Progress Note  Date:10/12/2022       Room:  Patient Name:Anu Bourne     YOB: 1970     Age:51 y.o. Subjective    Subjective:  Symptoms:  Stable. (Remains lethargic, but more interactive than yesterday. Father present in room. ). Activity level: Impaired due to weakness. Pain:  She complains of pain that is moderate. (Abdominal pain). Review of Systems   Constitutional:  Negative for fever. Objective         Vitals Last 24 Hours:  TEMPERATURE:  Temp  Av °F (36.7 °C)  Min: 97.7 °F (36.5 °C)  Max: 98.3 °F (36.8 °C)  RESPIRATIONS RANGE: Resp  Av.8  Min: 20  Max: 31  PULSE OXIMETRY RANGE: SpO2  Av %  Min: 87 %  Max: 96 %  PULSE RANGE: Pulse  Av.7  Min: 75  Max: 93  BLOOD PRESSURE RANGE: Systolic (91BFR), MTL:296 , Min:100 , QTC:457   ; Diastolic (78KLB), SMA:62, Min:54, Max:94    I/O (24Hr): Intake/Output Summary (Last 24 hours) at 10/12/2022 1038  Last data filed at 10/11/2022 1803  Gross per 24 hour   Intake 3717.61 ml   Output 410 ml   Net 3307.61 ml     Objective:  General Appearance:  Comfortable. Vital signs: (most recent): Blood pressure 116/64, pulse 78, temperature 98.1 °F (36.7 °C), temperature source Oral, resp. rate 24, height 4' 11\" (1.499 m), weight 89 lb (40.4 kg), SpO2 90 %. Vital signs are normal.  No fever. Output: Producing urine and no stool output. Lungs: There are decreased breath sounds. Heart: Normal rate. Regular rhythm.   S1 normal and S2 normal.    Labs/Imaging/Diagnostics    Labs:  CBC:  Recent Labs     10/10/22  0455 10/11/22  0423 10/12/22  0418   WBC 22.0* 21.2* 15.7*   RBC 5.70* 4.26 4.16   HGB 14.9 11.1* 10.6*   HCT 45.0 33.6* 32.9*   MCV 78.9* 78.8* 79.1*   RDW 14.5 15.2* 15.2*   * 91* 103*     CHEMISTRIES:  Recent Labs     10/10/22  0455 10/10/22  1547 10/11/22  0423 10/11/22  1125 10/11/22  1609 10/11/22  2150 10/12/22  0418   *   < > 132*   < > 135 134* 135   K 3.3*   < > 3.4*   < > 3.5* 3. 0* 3.9   CL 91*   < > 99   < > 103 103 104   CO2 24   < > 19*   < > 18* 18* 16*   BUN 33*   < > 38*   < > 29* 26* 22*   CREATININE 2.21*   < > 1.53*   < > 0.95* 0.77 0.67   GLUCOSE 129*   < > 115*   < > 101* 123* 81   PHOS  --   --  3.3  --   --   --  2.0*   MG 2.1  --  1.8  --   --   --  2.1    < > = values in this interval not displayed. PT/INR:  Recent Labs     10/10/22  0455   PROTIME 14.8*   INR 1.2     APTT:No results for input(s): APTT in the last 72 hours. LIVER PROFILE:  Recent Labs     10/10/22  0455   AST 41*   ALT 17   BILITOT 0.7   ALKPHOS 112*       Imaging Last 24 Hours:  CT ABDOMEN PELVIS WO CONTRAST Additional Contrast? None    Result Date: 10/10/2022  EXAMINATION: CT OF THE ABDOMEN AND PELVIS WITHOUT CONTRAST 10/10/2022 2:37 pm TECHNIQUE: CT of the abdomen and pelvis was performed without the administration of intravenous contrast. Multiplanar reformatted images are provided for review. Automated exposure control, iterative reconstruction, and/or weight based adjustment of the mA/kV was utilized to reduce the radiation dose to as low as reasonably achievable. COMPARISON: 01/28/2022 HISTORY: ORDERING SYSTEM PROVIDED HISTORY: abdominal pain TECHNOLOGIST PROVIDED HISTORY: abdominal pain Is the patient pregnant?->No Reason for Exam: abd pain FINDINGS: LOWER CHEST: Extensive pulmonary in nodules with cavitation and focal consolidative changes throughout the visualized bilateral lower lobes are highly concerning for septic emboli within the lungs. KIDNEYS AND URINARY TRACT: Bilateral 2-4 mm nonobstructing kidney stones are visualized. . There is fluid density adjacent to the inferior pole of right kidney measuring 34 x 14 mm. This likely represents extrarenal pelvis. There is no evidence for hydronephrosis. The ureters are of normal course and caliber. ORGANS: Visualized portions of the liver, spleen, pancreas, gallbladder, and adrenal glands demonstrate no acute abnormality.  GI/BOWEL: No bowel obstruction. No evidence of acute appendicitis. Status post gastric bypass. PELVIS: The bladder and pelvic organs are unremarkable. PERITONEUM/RETROPERITONEUM: No free air or free fluid is noted. No pathologically enlarged lymphadenopathy. The vasculature do not demonstrate acute abnormality. BONES/SOFT TISSUES: The osseous structures demonstrate no acute abnormality. Lack of contrast administration significantly decreases sensitivity of the study for evaluation of parenchymal lesions and  vascular pathologies. Extensive pulmonary nodules with cavitation and focal consolidative changes throughout the visualized bilateral lower lobes are highly concerning for septic emboli within the lungs. For further evaluation contrast enhanced chest CT can be obtained. There is fluid density adjacent to the inferior pole of right kidney, likely extrarenal pelvis. For further evaluation post-contrast images of the abdomen is recommended. Status post gastric bypass. Bilateral 2-4 mm nonobstructing kidney stones are visualized. . The findings were sent to the Radiology Results [address] at 5:11 pm on 10/10/2022 to be communicated to a licensed caregiver. NM LUNG SCAN PERFUSION ONLY    Result Date: 10/10/2022  EXAMINATION: NUCLEAR MEDICINE PERFUSION SCAN. 10/10/2022 TECHNIQUE: Ventilation not performed as part of COVID-19 safety precautions. 6.9 millicuries of Tc 94Y MAA was administered intravenously prior to planar imaging of the lungs in multiple projections. COMPARISON: Chest radiograph 10/10/2022. CT scan of the abdomen pelvis 10/10/2022 HISTORY: ORDERING SYSTEM PROVIDED HISTORY: elevated D-Dimer TECHNOLOGIST PROVIDED HISTORY: elevated D-Dimer Is the patient pregnant?->No Reason for Exam: elevated D-Dimer Additional signs and symptoms: Covid positive, SOB, CP, COPD, D.dimer 3.85 FINDINGS: PERFUSION: There are small perfusion defects, particularly near the lung bases.  CHEST RADIOGRAPH: Nodular opacities are seen in the lungs bilaterally. These are seen to better advantage at the lung bases on the CT scan, likely accounting for the perfusion defects. Indeterminate due to perfusion defects that likely correspond to the nodular opacities seen at the lung bases on the CT scan 10/10/2022. US RENAL COMPLETE    Result Date: 10/10/2022  EXAMINATION: RETROPERITONEAL ULTRASOUND OF THE KIDNEYS AND URINARY BLADDER 10/10/2022 COMPARISON: CT abdomen pelvis from 10/10/2022 HISTORY: ORDERING SYSTEM PROVIDED HISTORY: increased creatinine TECHNOLOGIST PROVIDED HISTORY: increased creatinine 57-year-old female with increased creatinine FINDINGS: Kidneys: Exam is limited/suboptimal due to bowel gas. Right kidney measures 10.6 x 6.1 x 5.7 cm. Right renal cortical thickness measures 1.5 cm. Left kidney measures 8.9 x 4.5 x 4.7 cm. Left renal cortical thickness measures 1.8 cm. No hydronephrosis or perinephric fluid. No echogenic foci with posterior acoustic shadowing to suggest renal calculi. Gross preservation of the bilateral corticomedullary differentiation. Bladder: Not imaged. Limited, unremarkable renal ultrasound. No hydronephrosis. RECOMMENDATIONS: Unavailable     XR CHEST PORTABLE    Result Date: 10/11/2022  EXAMINATION: ONE XRAY VIEW OF THE CHEST 10/11/2022 6:35 am COMPARISON: 10/10/2022 HISTORY: ORDERING SYSTEM PROVIDED HISTORY: line placement TECHNOLOGIST PROVIDED HISTORY: line placement Reason for Exam: R sided IJ central line placement FINDINGS: Right IJ central line terminating at cavoatrial junction new from prior. Normal cardiomediastinal silhouette. Small patchy opacities left lung base reflecting subsegmental atelectasis. Scattered patchy opacities with a somewhat nodular appearance noted in the periphery of the lungs more pronounced on the right. This can be further assessed with CT. No change from prior. No pleural effusion or pneumothorax.   Left shoulder prosthesis and cervical spine fusion noted.     1. Interval placement of right IJ central line terminating at cavoatrial junction. No pneumothorax. 2. Patchy peripheral somewhat nodular appearing airspace opacities. This can be further evaluated with CT. 3. Minor subsegmental atelectasis left lung base. VL Lower Extremity Bilateral Venous Duplex    Result Date: 10/11/2022    [facility]  Vascular Lower Extremities DVT Study Procedure   Patient Name   Lavonia Gottron     Date of Study           10/11/2022                 DAINA RAY   Date of Birth  1970  Gender                  Female   Age            46 year(s)  Race                       Room Number    2011        Height:                 59.06 inch, 150 cm   Corporate ID # [id number]    Weight:                 89 pounds, 40.4 kg   Patient Acct # [de-identified]   BSA:        1.31 m^2    BMI:      17.94 kg/m^2   MR #           [id number]      Sonographer             Gabo Yoder RVT   Accession #    [id number]  Interpreting Physician  Demetrius Bueno   Referring                  Referring Physician     Subhash Barcenas  Nurse  Practitioner  Procedure Type of Study:   Veins: Lower Extremities DVT Study, Venous Scan Lower Bilateral.  Indications for Study:Elevated D-Dimer. Patient Status: In Patient. Technical Quality:Adequate visualization. Conclusions   Summary   Simultaneous real time imaging utilizing B-Mode, color doppler and  spectral waveform analysis was performed on the bilateral lower  extremities for venous examination of the deep and superficial systems. Findings are:   Right:  No evidence of deep or superficial venous thrombosis. Left:  No evidence of deep or superficial venous thrombosis.    Signature   ----------------------------------------------------------------  Electronically signed by Gabo Yoder RVT(Sonographer) on  10/11/2022 02:01 PM  ---------------------------------------------------------------- ----------------------------------------------------------------  Electronically signed by Noe Rock(Interpreting  physician) on 10/11/2022 04:12 PM  ----------------------------------------------------------------  Findings:   Right Impression:                    Left Impression:  The common femoral, femoral,         The common femoral, femoral,  popliteal and tibial veins           popliteal and tibial veins  demonstrate normal compressibility   demonstrate normal compressibility  and augmentation. and augmentation. Normal compressibility of the great  Normal compressibility of the great  saphenous vein. saphenous vein. Normal compressibility of the small  Normal compressibility of the small  saphenous vein. saphenous vein. Velocities are measured in cm/s ; Diameters are measured in cm Right Lower Extremities DVT Study Measurements Right 2D Measurements +------------------------------------+----------+---------------+----------+ ! Location                            ! Visualized! Compressibility! Thrombosis! +------------------------------------+----------+---------------+----------+ ! Common Femoral                      !Yes       ! Yes            ! None      ! +------------------------------------+----------+---------------+----------+ ! Prox Femoral                        !Yes       ! Yes            ! None      ! +------------------------------------+----------+---------------+----------+ ! Mid Femoral                         !Yes       ! Yes            ! None      ! +------------------------------------+----------+---------------+----------+ ! Dist Femoral                        !Yes       ! Yes            ! None      ! +------------------------------------+----------+---------------+----------+ ! Deep Femoral                        !Yes       ! Yes            ! None      ! +------------------------------------+----------+---------------+----------+ !Popliteal                           !Yes       ! Yes            ! None      ! +------------------------------------+----------+---------------+----------+ ! Sapheno Femoral Junction            ! Yes       ! Yes            ! None      ! +------------------------------------+----------+---------------+----------+ ! PTV                                 ! Yes       ! Yes            ! None      ! +------------------------------------+----------+---------------+----------+ ! Peroneal                            !Yes       ! Yes            ! None      ! +------------------------------------+----------+---------------+----------+ ! Gastroc                             ! Yes       ! Yes            ! None      ! +------------------------------------+----------+---------------+----------+ ! GSV Thigh                           ! Yes       ! Yes            ! None      ! +------------------------------------+----------+---------------+----------+ ! GSV Knee                            ! Yes       ! Yes            ! None      ! +------------------------------------+----------+---------------+----------+ ! GSV Ankle                           ! Yes       ! Yes            ! None      ! +------------------------------------+----------+---------------+----------+ ! SSV                                 ! Yes       ! Yes            ! None      ! +------------------------------------+----------+---------------+----------+ Left Lower Extremities DVT Study Measurements Left 2D Measurements +------------------------------------+----------+---------------+----------+ ! Location                            ! Visualized! Compressibility! Thrombosis! +------------------------------------+----------+---------------+----------+ ! Common Femoral                      !Yes       ! Yes            ! None      ! +------------------------------------+----------+---------------+----------+ ! Prox Femoral                        !Yes       ! Yes            ! None      ! +------------------------------------+----------+---------------+----------+ ! Mid Femoral                         !Yes       ! Yes            ! None      ! +------------------------------------+----------+---------------+----------+ ! Dist Femoral                        !Yes       ! Yes            ! None      ! +------------------------------------+----------+---------------+----------+ ! Deep Femoral                        !Yes       ! Yes            ! None      ! +------------------------------------+----------+---------------+----------+ ! Popliteal                           !Yes       ! Yes            ! None      ! +------------------------------------+----------+---------------+----------+ ! Sapheno Femoral Junction            ! Yes       ! Yes            ! None      ! +------------------------------------+----------+---------------+----------+ ! PTV                                 ! Yes       ! Yes            ! None      ! +------------------------------------+----------+---------------+----------+ ! Peroneal                            !Yes       ! Yes            ! None      ! +------------------------------------+----------+---------------+----------+ ! Gastroc                             ! Yes       ! Yes            ! None      ! +------------------------------------+----------+---------------+----------+ ! GSV Thigh                           ! Yes       ! Yes            ! None      ! +------------------------------------+----------+---------------+----------+ ! GSV Knee                            ! Yes       ! Yes            ! None      ! +------------------------------------+----------+---------------+----------+ ! GSV Ankle                           ! Yes       ! Yes            ! None      ! +------------------------------------+----------+---------------+----------+ ! SSV                                 ! Yes       ! Yes            ! None      ! +------------------------------------+----------+---------------+----------+    Assessment//Plan Hospital Problems             Last Modified POA    * (Principal) SIRS (systemic inflammatory response syndrome) ([address] 10/10/2022 Yes    NICKI (acute kidney injury) ([address] 10/11/2022 Yes    Severe malnutrition ([address] (Chronic) 10/11/2022 Yes    Bipolar disorder, mixed ([address] 10/11/2022 Yes    COPD (chronic obstructive pulmonary disease) ([address] 10/11/2022 Yes    H/O gastric bypass 10/11/2022 Yes    Overview Signed 2/13/2020  7:19 PM by Natalie Ochoa MD     enlarged  pancreas, here for surgical work-up for EGD          Assessment & Plan    Sepsis with septic shock with nodules/infection in lungs - abx and management per pulmonology. BPs improved. AE COPD     NICKI d/t hypovolemia - creatinine normal. Management per nephrology.      Polysubstance abuse with positive cocaine in tox screen    Severe malnutrition - chronic    Electronically signed by Natalie Ochoa MD on 10/12/22 at 10:38 AM EDT

## 2022-10-12 NOTE — CARE COORDINATION
ONGOING DISCHARGE PLAN:    Patient is more alert today but still drowsy. Father at bedside. Spoke with patient regarding discharge plan and patient confirms that plan is still to go home at discharge. Father stated that the patient could come to his house at discharge if needed. IV cefepime and  Vanco , IVF. Off levophed gtt. KUB and Lumbar spine xray ordered. WBC 15.7 HGB 10.6 HCT 32.9     Will continue to follow for additional discharge needs.     Electronically signed by Monico Hedrick RN on 10/12/2022 at 11:31 AM

## 2022-10-12 NOTE — PROGRESS NOTES
[facility]  Progress Note                   Date:   10/12/2022  Patient name: Kameron Chacon  Date of admission:  10/10/2022  4:37 AM  MRN:   [id number]  YOB: 1970  PCP: Tiffany Torres MD    Reason for Admission: Pulmonary nodules [R91.8]  SIRS (systemic inflammatory response syndrome) ([facility] [address] [R65.10]  NICKI (acute kidney injury) ([facility] [address] [N17.9]  Elevated d-dimer [R79.89]  Leukocytosis, unspecified type [D72.829]    Subjective:       Clinical Changes /Abnormalities:Patient seen and examined. Denies chest pain or shortness of breath. Tele/vitals/labs reviewed . Discussed case with RN. Review of Systems    Medications:   Scheduled Meds:   vancomycin  1,250 mg IntraVENous Q24H    cefepime  2,000 mg IntraVENous Q12H    carvedilol  3.125 mg Oral BID WC    sodium chloride flush  5-40 mL IntraVENous 2 times per day    heparin (porcine)  5,000 Units SubCUTAneous BID    ARIPiprazole  5 mg Oral Daily    atorvastatin  40 mg Oral Daily    divalproex  1,000 mg Oral BID    DULoxetine  60 mg Oral Daily    ferrous sulfate  325 mg Oral BID WC    ipratropium-albuterol  1 ampule Inhalation Q4H WA    vancomycin (VANCOCIN) intermittent dosing (placeholder)   Other RX Placeholder     Continuous Infusions:   sodium chloride      sodium chloride 50 mL/hr at 10/12/22 0416    norepinephrine Stopped (10/11/22 0801)     CBC:   Recent Labs     10/10/22  0455 10/11/22  0423 10/12/22  0418   WBC 22.0* 21.2* 15.7*   HGB 14.9 11.1* 10.6*   * 91* 103*     BMP:    Recent Labs     10/11/22  1609 10/11/22  2150 10/12/22  0418    134* 135   K 3.5* 3.0* 3.9    103 104   CO2 18* 18* 16*   BUN 29* 26* 22*   CREATININE 0.95* 0.77 0.67   GLUCOSE 101* 123* 81     Hepatic:  Recent Labs     10/10/22  0455   AST 41*   ALT 17   BILITOT 0.7   ALKPHOS 112*     Troponin:   Recent Labs     10/10/22  0455 10/10/22  0635   TROPHS 30* 13     BNP: No results for input(s): BNP in the last 72 hours.   Lipids: No results for input(s): CHOL, HDL in the last 72 hours. Invalid input(s): LDLCALCU  INR:   Recent Labs     10/10/22  0455   INR 1.2          DATA:    Diagnostics:    EKG: Normal sinus rhythm EKG with nonspecific ST changes. ECHO Summary  Global left ventricular systolic function is normal. Estimated ejection  fraction is 55-60%. No obvious wall motion abnormality seen. Left and right atrium is normal in size. No significant valvular regurgitation or stenosis seen. Estimated right ventricular systolic pressure is 19 mmHg. No pericardial effusion seen. Normal diastolic filling. Signature  ----------------------------------------------------------------------------   Electronically signed by Ronan Waldron(Sonographer) on 10/11/2022 09:58   AM  ----------------------------------------------------------------------------     ----------------------------------------------------------------------------   Electronically signed by Chang Houston(Interpreting physician) on   10/11/2022 10:47 AM  Objective:   Vitals: BP (!) 153/78   Pulse 76   Temp 98 °F (36.7 °C) (Oral)   Resp 26   Ht 4' 11\" (1.499 m)   Wt 89 lb (40.4 kg)   SpO2 93%   BMI 17.98 kg/m²   General appearance: alert and cooperative with exam  HEENT: Head: Normocephalic, no lesions, without obvious abnormality. Neck:no JVD, trachea midline, no adenopathy  Lungs: Clear to auscultation  Heart: Regular rate and rhythm, s1/s2 auscultated, no murmurs  Abdomen: soft, non-tender, bowel sounds active  Extremities: no edema  Neurologic: not done        Assessment / Acute Cardiac Problems:   Elevated troponins.   Most likely type II  Sepsis with septic shock  NICKI  Possible septic emboli to the lungs   history of drug abuse (positive cocaine)      Patient Active Problem List:     GERD (gastroesophageal reflux disease)     Hoarseness of voice     MVP (mitral valve prolapse)     Depression with anxiety     Lymphocytic colitis     History of migraine headaches     Hyperlipidemia     Sleep apnea     IGT (impaired glucose tolerance)     History of renal calculi     Lactose intolerance     History of seizure disorder     Leukopenia     Bipolar disorder, mixed (HCC)     Illicit drug use     Postlaminectomy syndrome, cervical region     Degeneration of cervical intervertebral disc     Encounter for long-term (current) use of other medications     Bradycardia     Cellulitis     Hypokalemia     Primary hypertension     Elevated lipase     Right lower quadrant abdominal pain     Nausea     Diarrhea     Seizure disorder (HCC)     Polysubstance abuse (HCC)     Fibromyalgia     Cocaine addiction ([facility])     Shoulder arthritis     Osteoarthritis of left glenohumeral joint     Abnormal EKG     Anemia     Anxiety     Carotid artery stenosis     COPD (chronic obstructive pulmonary disease) ([facility])     Dental disease     HUTCHINSON (dyspnea on exertion)     Fatigue     Fractures     H/O gastric bypass     History of crack cocaine use     History of UTI     Injury of back     Intractable chronic migraine without aura and without status migrainosus     Intractable migraine with aura with status migrainosus     Kidney stones     Dizziness     Memory loss     Mild pulmonary hypertension ([facility])     Mild tricuspid regurgitation     Primary osteoarthritis of left shoulder     Seizure-like activity ([facility])     Stress at home     TMJ (dislocation of temporomandibular joint)     Tobacco abuse     Visual impairment     Internal hernia     Vitamin D deficiency     Iron deficiency     SIRS (systemic inflammatory response syndrome) ([facility])     NICKI (acute kidney injury) ([address]     Severe malnutrition ([facility])      Plan of Treatment:   Cardiac stable-sinus rhythm on the monitor no acute cardiac issues  Echo reviewed as above  History of drug abuse with possible septic emboli to the lung-we will order ADRIANNE  in am to evaluate vegetation versus embolic event-risk and benefit of the procedure were explained to patient and father who are agreeable to proceed  Electronically signed by Tilda Paget, APRN - NP on 10/12/2022 at [address] [place] [address].  [phone]

## 2022-10-13 ENCOUNTER — ANESTHESIA (OUTPATIENT)
Dept: OPERATING ROOM | Age: 52
End: 2022-10-13

## 2022-10-13 ENCOUNTER — APPOINTMENT (OUTPATIENT)
Dept: MRI IMAGING | Age: 52
DRG: 871 | End: 2022-10-13
Payer: COMMERCIAL

## 2022-10-13 ENCOUNTER — APPOINTMENT (OUTPATIENT)
Dept: NON INVASIVE DIAGNOSTICS | Age: 52
DRG: 871 | End: 2022-10-13
Payer: COMMERCIAL

## 2022-10-13 LAB
ABSOLUTE BANDS #: 1.13 K/UL (ref 0–1)
ABSOLUTE EOS #: 0 K/UL (ref 0–0.4)
ABSOLUTE LYMPH #: 1.04 K/UL (ref 1–4.8)
ABSOLUTE MONO #: 0.26 K/UL (ref 0.1–1.3)
AMPHETAMINE SCREEN URINE: NEGATIVE
ANION GAP SERPL CALCULATED.3IONS-SCNC: 11 MMOL/L (ref 9–17)
BANDS: 13 % (ref 0–10)
BARBITURATE SCREEN URINE: NEGATIVE
BASOPHILS # BLD: 0 % (ref 0–2)
BASOPHILS ABSOLUTE: 0 K/UL (ref 0–0.2)
BENZODIAZEPINE SCREEN, URINE: NEGATIVE
BUN BLDV-MCNC: 18 MG/DL (ref 6–20)
CALCIUM SERPL-MCNC: 8.1 MG/DL (ref 8.6–10.4)
CANNABINOID SCREEN URINE: NEGATIVE
CHLORIDE BLD-SCNC: 106 MMOL/L (ref 98–107)
CO2: 20 MMOL/L (ref 20–31)
COCAINE METABOLITE, URINE: POSITIVE
CREAT SERPL-MCNC: 0.52 MG/DL (ref 0.5–0.9)
EOSINOPHILS RELATIVE PERCENT: 0 % (ref 0–4)
FENTANYL URINE: NEGATIVE
GFR SERPL CREATININE-BSD FRML MDRD: >60 ML/MIN/1.73M2
GLUCOSE BLD-MCNC: 128 MG/DL (ref 65–105)
GLUCOSE BLD-MCNC: 69 MG/DL (ref 70–99)
GLUCOSE BLD-MCNC: 87 MG/DL (ref 65–105)
GLUCOSE BLD-MCNC: 87 MG/DL (ref 65–105)
HCT VFR BLD CALC: 31.7 % (ref 36–46)
HEMOGLOBIN: 9.9 G/DL (ref 12–16)
LYMPHOCYTES # BLD: 12 % (ref 24–44)
MAGNESIUM: 1.9 MG/DL (ref 1.6–2.6)
MCH RBC QN AUTO: 25.5 PG (ref 26–34)
MCHC RBC AUTO-ENTMCNC: 31.4 G/DL (ref 31–37)
MCV RBC AUTO: 81.3 FL (ref 80–100)
METHADONE SCREEN, URINE: NEGATIVE
MONOCYTES # BLD: 3 % (ref 1–7)
MORPHOLOGY: ABNORMAL
OPIATES, URINE: POSITIVE
OXYCODONE SCREEN URINE: NEGATIVE
PDW BLD-RTO: 15.5 % (ref 11.5–14.9)
PHENCYCLIDINE, URINE: NEGATIVE
PHOSPHORUS: 2.2 MG/DL (ref 2.6–4.5)
PLATELET # BLD: 114 K/UL (ref 150–450)
PMV BLD AUTO: 9.9 FL (ref 6–12)
POTASSIUM SERPL-SCNC: 3.1 MMOL/L (ref 3.7–5.3)
RBC # BLD: 3.9 M/UL (ref 4–5.2)
SEG NEUTROPHILS: 72 % (ref 36–66)
SEGMENTED NEUTROPHILS ABSOLUTE COUNT: 6.27 K/UL (ref 1.3–9.1)
SODIUM BLD-SCNC: 137 MMOL/L (ref 135–144)
TEST INFORMATION: ABNORMAL
VANCOMYCIN RANDOM DATE LAST DOSE: NORMAL
VANCOMYCIN RANDOM DOSE AMOUNT: 1250
VANCOMYCIN RANDOM TIME LAST DOSE: 1126
VANCOMYCIN RANDOM: 13.8 UG/ML
WBC # BLD: 8.7 K/UL (ref 3.5–11)

## 2022-10-13 PROCEDURE — 36415 COLL VENOUS BLD VENIPUNCTURE: CPT

## 2022-10-13 PROCEDURE — 94640 AIRWAY INHALATION TREATMENT: CPT

## 2022-10-13 PROCEDURE — 2700000000 HC OXYGEN THERAPY PER DAY

## 2022-10-13 PROCEDURE — 85025 COMPLETE CBC W/AUTO DIFF WBC: CPT

## 2022-10-13 PROCEDURE — 82947 ASSAY GLUCOSE BLOOD QUANT: CPT

## 2022-10-13 PROCEDURE — 6370000000 HC RX 637 (ALT 250 FOR IP): Performed by: FAMILY MEDICINE

## 2022-10-13 PROCEDURE — 2580000003 HC RX 258: Performed by: FAMILY MEDICINE

## 2022-10-13 PROCEDURE — 83735 ASSAY OF MAGNESIUM: CPT

## 2022-10-13 PROCEDURE — 6370000000 HC RX 637 (ALT 250 FOR IP): Performed by: INTERNAL MEDICINE

## 2022-10-13 PROCEDURE — 80048 BASIC METABOLIC PNL TOTAL CA: CPT

## 2022-10-13 PROCEDURE — 6360000002 HC RX W HCPCS: Performed by: INTERNAL MEDICINE

## 2022-10-13 PROCEDURE — 2580000003 HC RX 258: Performed by: INTERNAL MEDICINE

## 2022-10-13 PROCEDURE — 99221 1ST HOSP IP/OBS SF/LOW 40: CPT | Performed by: PSYCHIATRY & NEUROLOGY

## 2022-10-13 PROCEDURE — 80307 DRUG TEST PRSMV CHEM ANLYZR: CPT

## 2022-10-13 PROCEDURE — 2000000000 HC ICU R&B

## 2022-10-13 PROCEDURE — 84100 ASSAY OF PHOSPHORUS: CPT

## 2022-10-13 PROCEDURE — 6360000002 HC RX W HCPCS: Performed by: FAMILY MEDICINE

## 2022-10-13 PROCEDURE — 94761 N-INVAS EAR/PLS OXIMETRY MLT: CPT

## 2022-10-13 PROCEDURE — 80202 ASSAY OF VANCOMYCIN: CPT

## 2022-10-13 PROCEDURE — 2500000003 HC RX 250 WO HCPCS: Performed by: INTERNAL MEDICINE

## 2022-10-13 PROCEDURE — 6370000000 HC RX 637 (ALT 250 FOR IP): Performed by: NURSE PRACTITIONER

## 2022-10-13 PROCEDURE — 2709999900 HC NON-CHARGEABLE SUPPLY: Performed by: INTERNAL MEDICINE

## 2022-10-13 RX ORDER — DEXTROSE AND SODIUM CHLORIDE 5; .9 G/100ML; G/100ML
INJECTION, SOLUTION INTRAVENOUS CONTINUOUS
Status: DISCONTINUED | OUTPATIENT
Start: 2022-10-13 | End: 2022-10-14

## 2022-10-13 RX ORDER — POTASSIUM CHLORIDE 7.45 MG/ML
10 INJECTION INTRAVENOUS PRN
Status: DISCONTINUED | OUTPATIENT
Start: 2022-10-13 | End: 2022-11-04 | Stop reason: HOSPADM

## 2022-10-13 RX ORDER — POTASSIUM CHLORIDE 20 MEQ/1
40 TABLET, EXTENDED RELEASE ORAL PRN
Status: DISCONTINUED | OUTPATIENT
Start: 2022-10-13 | End: 2022-11-04 | Stop reason: HOSPADM

## 2022-10-13 RX ADMIN — VANCOMYCIN HYDROCHLORIDE 1250 MG: 1.25 INJECTION, POWDER, LYOPHILIZED, FOR SOLUTION INTRAVENOUS at 11:26

## 2022-10-13 RX ADMIN — CARVEDILOL 3.12 MG: 3.12 TABLET, FILM COATED ORAL at 11:05

## 2022-10-13 RX ADMIN — FERROUS SULFATE TAB 325 MG (65 MG ELEMENTAL FE) 325 MG: 325 (65 FE) TAB at 20:19

## 2022-10-13 RX ADMIN — SODIUM CHLORIDE, PRESERVATIVE FREE 10 ML: 5 INJECTION INTRAVENOUS at 20:32

## 2022-10-13 RX ADMIN — HEPARIN SODIUM 5000 UNITS: 5000 INJECTION INTRAVENOUS; SUBCUTANEOUS at 20:20

## 2022-10-13 RX ADMIN — IPRATROPIUM BROMIDE AND ALBUTEROL SULFATE 1 AMPULE: 2.5; .5 SOLUTION RESPIRATORY (INHALATION) at 10:39

## 2022-10-13 RX ADMIN — DULOXETINE 60 MG: 60 CAPSULE, DELAYED RELEASE ORAL at 11:05

## 2022-10-13 RX ADMIN — POTASSIUM PHOSPHATE, MONOBASIC AND POTASSIUM PHOSPHATE, DIBASIC 20 MMOL: 224; 236 INJECTION, SOLUTION, CONCENTRATE INTRAVENOUS at 14:21

## 2022-10-13 RX ADMIN — CEFEPIME HYDROCHLORIDE 2000 MG: 2 INJECTION, POWDER, FOR SOLUTION INTRAMUSCULAR; INTRAVENOUS at 22:31

## 2022-10-13 RX ADMIN — SODIUM CHLORIDE, PRESERVATIVE FREE 10 ML: 5 INJECTION INTRAVENOUS at 11:31

## 2022-10-13 RX ADMIN — DEXTROSE AND SODIUM CHLORIDE: 5; 900 INJECTION, SOLUTION INTRAVENOUS at 11:17

## 2022-10-13 RX ADMIN — CEFEPIME HYDROCHLORIDE 2000 MG: 2 INJECTION, POWDER, FOR SOLUTION INTRAMUSCULAR; INTRAVENOUS at 11:21

## 2022-10-13 RX ADMIN — ARIPIPRAZOLE 5 MG: 5 TABLET ORAL at 11:09

## 2022-10-13 RX ADMIN — POTASSIUM CHLORIDE 40 MEQ: 1500 TABLET, EXTENDED RELEASE ORAL at 11:14

## 2022-10-13 RX ADMIN — DIVALPROEX SODIUM 1000 MG: 500 TABLET, DELAYED RELEASE ORAL at 20:19

## 2022-10-13 RX ADMIN — DIVALPROEX SODIUM 1000 MG: 500 TABLET, DELAYED RELEASE ORAL at 11:05

## 2022-10-13 RX ADMIN — FERROUS SULFATE TAB 325 MG (65 MG ELEMENTAL FE) 325 MG: 325 (65 FE) TAB at 11:06

## 2022-10-13 RX ADMIN — CARVEDILOL 3.12 MG: 3.12 TABLET, FILM COATED ORAL at 20:19

## 2022-10-13 RX ADMIN — ATORVASTATIN CALCIUM 40 MG: 40 TABLET, FILM COATED ORAL at 20:19

## 2022-10-13 RX ADMIN — HEPARIN SODIUM 5000 UNITS: 5000 INJECTION INTRAVENOUS; SUBCUTANEOUS at 11:29

## 2022-10-13 RX ADMIN — Medication 2000 UNITS: at 11:04

## 2022-10-13 RX ADMIN — IPRATROPIUM BROMIDE AND ALBUTEROL SULFATE 1 AMPULE: 2.5; .5 SOLUTION RESPIRATORY (INHALATION) at 07:23

## 2022-10-13 RX ADMIN — IPRATROPIUM BROMIDE AND ALBUTEROL SULFATE 1 AMPULE: 2.5; .5 SOLUTION RESPIRATORY (INHALATION) at 19:09

## 2022-10-13 ASSESSMENT — PAIN SCALES - GENERAL
PAINLEVEL_OUTOF10: 0

## 2022-10-13 ASSESSMENT — PAIN - FUNCTIONAL ASSESSMENT: PAIN_FUNCTIONAL_ASSESSMENT: ADULT NONVERBAL PAIN SCALE (NPVS)

## 2022-10-13 NOTE — PROGRESS NOTES
Pulmonary Progress Note  [facility]      Patient - Valda Epley,  Age - 46 y.o.    - 1970      Room Number -    MRN -  [id number]   Acct # - [de-identified]  Date of Admission -  10/10/2022  4:37 AM  Shan Kingston MD  Primary Care Physician - Truman Mendieta MD     SUBJECTIVE   Patient appears to be resting quietly. She wakes up and answers questions. Father at bedside. OBJECTIVE   VITALS    height is 4' 11\" (1.499 m) and weight is 109 lb 12.6 oz (49.8 kg). Her oral temperature is 97.6 °F (36.4 °C). Her blood pressure is 143/67 (abnormal) and her pulse is 83. Her respiration is 18 and oxygen saturation is 92%. Body mass index is 22.17 kg/m². Temperature Range: Temp: 97.6 °F (36.4 °C) Temp  Av.9 °F (36.6 °C)  Min: 97.6 °F (36.4 °C)  Max: 98.1 °F (36.7 °C)  BP Range:  Systolic (65PVH), EST:581 , Min:112 , HYV:310     Diastolic (11SRJ), JMP:55, Min:51, Max:112    Pulse Range: Pulse  Av.9  Min: 66  Max: 90  Respiration Range: Resp  Av.1  Min: 18  Max: 28  Current Pulse Ox[de-identified]  SpO2: 92 %  24HR Pulse Ox Range:  SpO2  Av.1 %  Min: 90 %  Max: 96 %  Oxygen Amount and Delivery: O2 Flow Rate (L/min): 2 L/min    Wt Readings from Last 3 Encounters:   10/13/22 109 lb 12.6 oz (49.8 kg)   22 96 lb 8 oz (43.8 kg)   22 105 lb (47.6 kg)       I/O (24 Hours)    Intake/Output Summary (Last 24 hours) at 10/13/2022 1035  Last data filed at 10/13/2022 0514  Gross per 24 hour   Intake 2287.12 ml   Output 900 ml   Net 1387.12 ml       EXAM     General Appearance  Awake, alert, oriented, in no acute distress  HEENT - normocephalic, atraumatic. Neck - Supple,  trachea midline   Lungs -coarse breath sounds no crackles rales or wheezes. Heart Exam:PMI normal. No lifts, heaves, or thrills. RRR. No murmurs, clicks, gallops, or rubs  Abdomen Exam: Abdomen soft, non-tender.  BS normal  Extremity Exam: No signs of cyanosis    MEDS      vancomycin  1,250 mg IntraVENous Q24H    cefepime  2,000 mg IntraVENous Q12H    Vitamin D  2,000 Units Oral Daily    carvedilol  3.125 mg Oral BID WC    sodium chloride flush  5-40 mL IntraVENous 2 times per day    heparin (porcine)  5,000 Units SubCUTAneous BID    ARIPiprazole  5 mg Oral Daily    atorvastatin  40 mg Oral Daily    divalproex  1,000 mg Oral BID    DULoxetine  60 mg Oral Daily    ferrous sulfate  325 mg Oral BID WC    ipratropium-albuterol  1 ampule Inhalation Q4H WA    vancomycin (VANCOCIN) intermittent dosing (placeholder)   Other RX Placeholder      sodium chloride      sodium chloride 50 mL/hr at 10/13/22 0514     potassium chloride **OR** potassium alternative oral replacement **OR** potassium chloride, hydrALAZINE, sodium chloride flush, sodium chloride, acetaminophen, ondansetron **OR** ondansetron, albuterol sulfate HFA, sodium chloride flush, perflutren lipid microspheres    LABS   CBC   Recent Labs     10/13/22  0357   WBC 8.7   HGB 9.9*   HCT 31.7*   MCV 81.3   *     BMP:   Lab Results   Component Value Date/Time     10/13/2022 03:57 AM    K 3.1 10/13/2022 03:57 AM     10/13/2022 03:57 AM    CO2 20 10/13/2022 03:57 AM    BUN 18 10/13/2022 03:57 AM    LABALBU 3.4 10/10/2022 04:55 AM    CREATININE 0.52 10/13/2022 03:57 AM    CALCIUM 8.1 10/13/2022 03:57 AM    GFRAA >60 04/08/2022 07:17 AM    LABGLOM >60 10/13/2022 03:57 AM     ABGs:  Lab Results   Component Value Date/Time    PHART 7.385 10/10/2022 07:00 PM    PO2ART 90.4 10/10/2022 07:00 PM    FRN2YTL 36.4 10/10/2022 07:00 PM      Lab Results   Component Value Date/Time    MODE PRVC 12/28/2016 04:36 AM     Ionized Calcium:  No results found for: IONCA  Magnesium:    Lab Results   Component Value Date/Time    MG 1.9 10/13/2022 03:57 AM     Phosphorus:    Lab Results   Component Value Date/Time    PHOS 2.2 10/13/2022 03:57 AM        LIVER PROFILE No results for input(s): AST, ALT, LIPASE, BILIDIR, BILITOT, ALKPHOS in the last 72 hours. Invalid input(s): AMYLASE,  ALB  INR No results for input(s): INR in the last 72 hours.   PTT   Lab Results   Component Value Date    APTT 25.6 07/22/2016         RADIOLOGY     (See actual reports for details)    ASSESSMENT/PLAN     Patient Active Problem List   Diagnosis    GERD (gastroesophageal reflux disease)    Hoarseness of voice    MVP (mitral valve prolapse)    Depression with anxiety    Lymphocytic colitis    History of migraine headaches    Hyperlipidemia    Sleep apnea    IGT (impaired glucose tolerance)    History of renal calculi    Lactose intolerance    History of seizure disorder    Leukopenia    Bipolar disorder, mixed ([facility] [address]    Illicit drug use    Postlaminectomy syndrome, cervical region    Degeneration of cervical intervertebral disc    Encounter for long-term (current) use of other medications    Bradycardia    Cellulitis    Hypokalemia    Primary hypertension    Elevated lipase    Right lower quadrant abdominal pain    Nausea    Diarrhea    Seizure disorder (HCC)    Polysubstance abuse (HCC)    Fibromyalgia    Cocaine addiction (HCC)    Shoulder arthritis    Osteoarthritis of left glenohumeral joint    Abnormal EKG    Anemia    Anxiety    Carotid artery stenosis    COPD (chronic obstructive pulmonary disease) ([facility] [address]    Dental disease    HUTCHINSON (dyspnea on exertion)    Fatigue    Fractures    H/O gastric bypass    History of crack cocaine use    History of UTI    Injury of back    Intractable chronic migraine without aura and without status migrainosus    Intractable migraine with aura with status migrainosus    Kidney stones    Dizziness    Memory loss    Mild pulmonary hypertension (HCC)    Mild tricuspid regurgitation    Primary osteoarthritis of left shoulder    Seizure-like activity (HCC)    Stress at home    TMJ (dislocation of temporomandibular joint)    Tobacco abuse    Visual impairment    Internal hernia    Vitamin D deficiency    Iron

## 2022-10-13 NOTE — CONSULTS
[facility]   IN-PATIENT SERVICE      NEUROLOGY CONSULT  NOTE            Date:   10/13/2022  Patient name:  Jose Ham  Date of admission:  10/10/2022  YOB: 1970      Chief Complaint:     Chief Complaint   Patient presents with    Shortness of Breath    Positive For Covid-19       Reason for Consult:      Encephalopathy altered mental state    History of Present Illness: The patient is a 46 y.o. female who presents with Shortness of Breath and Positive For Covid-19  . The patient was seen and examined and the chart was reviewed. This patient is unable to provide information on her own behalf. Her father is present in the room at time of evaluation. This patient was admitted on 10/10/2022 through the ED and then transferred to ICU on 10/13/2022. ED note indicates that this patient had hypertension COPD fibromyalgia and chronic pain presenting with shortness of breath. She indicated diffuse pain and difficulty breathing. She was that her pain is 10/10. She had tested positive for COVID about 1 week prior to presentation. She had become very weak and was sleeping frequently since. She tested positive for D-dimer at 3.85. COVID PCR was nondetected. Influenza A/B were nondetected. Patient did have a white count of 22,000 on 10/10/2022. On 10/11/2022 patient's urine was positive for cocaine and opiates. Her father indicates that patient has addiction to crack cocaine and had been doing it just prior to admission. During course admission patient apparently had an episode of hypotension and difficulty breathing which has resulted in Admission to the ICU. Patient has been lethargic and sleeping almost continuously for the past 3 days. She was admitted with SIRS. CT of the abdomen demonstrates the patient has had a gastric bypass but there was also concern about septic emboli to the bases of the lungs. An attempt at ADRIANNE was made today.     During the course of this hospitalization no particular focal neurologic symptoms have been identified. No seizure activity has been identified. Patient's past medical history is extensive. Review of problems has multiple issues identified.     Past Medical History:     Past Medical History:   Diagnosis Date    Anemia     Arthritis     Asthma     Bipolar disorder, mixed ([address]     Carotid artery stenosis 2020    Cocaine abuse ([address] 2014    COPD (chronic obstructive pulmonary disease) (HCC)     Degeneration of cervical intervertebral disc     Depression with anxiety     Depression with anxiety     Fibromyalgia 2017    GERD (gastroesophageal reflux disease)     History of migraine headaches 6/10/2014    History of renal calculi 6/10/2014    History of seizure disorder 6/10/2014    Hoarseness of voice     HTN (hypertension) 6/10/2014    Hyperlipidemia 6/10/2014    IGT (impaired glucose tolerance) 6/10/2014    Kidney stones     Lactose intolerance 6/10/2014    Leukopenia 6/10/2014    Major depressive disorder, recurrent episode, severe, without mention of psychotic behavior 2014    MVP (mitral valve prolapse)     Seizures ([facility] [address]     Sleep apnea 6/10/2014    Unspecified diseases of blood and blood-forming organs         Past Surgical History:     Past Surgical History:   Procedure Laterality Date    ANKLE FRACTURE SURGERY      recontruction surgery    APPENDECTOMY      BREAST LUMPECTOMY Right     CARDIAC CATHETERIZATION      CARPAL TUNNEL RELEASE      x2     SECTION      CHOLECYSTECTOMY      COLONOSCOPY  12    COLONOSCOPY  2016    severe spasms    COLONOSCOPY  2019    DR GOLDY MCINTOSH    GASTRIC BYPASS SURGERY      GASTRIC BYPASS SURGERY      HYSTERECTOMY (CERVIX STATUS UNKNOWN)      HYSTERECTOMY (CERVIX STATUS UNKNOWN)      LAPAROSCOPY N/A 2022    LAPAROSCOPY EXPLORATORY CONVERTED TO OPEN EXPLORATORY LAPAROTOMY, LYSIS OF ADHESIONS, REDUCTION OF INTERNAL HERNIA performed by Romulo Sen, DO at [address] [place] [address]  88/98/0675    APPLICATION OF ARCH BARS,SIMPLE EXTRACTION OF #15 AND RT TMJ JOINT REPLACEMENT    SHOULDER ARTHROSCOPY Left 06/05/2019    DR ROBERT GREEN    SHOULDER SURGERY      TONSILLECTOMY AND ADENOIDECTOMY      UPPER GASTROINTESTINAL ENDOSCOPY  7-3-12    egd    UPPER GASTROINTESTINAL ENDOSCOPY  12/19/2016    status post gastrectomy with a small remnant of gastric pouch. UPPER GASTROINTESTINAL ENDOSCOPY  05/2019    DR Karlee Marquis        Medications Prior to Admission:     Prior to Admission medications    Medication Sig Start Date End Date Taking? Authorizing Provider   lisinopril (PRINIVIL;ZESTRIL) 20 MG tablet Take 20 mg by mouth daily   Yes Historical Provider, MD   carvedilol (COREG) 25 MG tablet TAKE 1 TABLET BY MOUTH IN THE MORNING AND 1 IN THE EVENING WITH MEALS 9/19/22   Sangeetha Gustafson MD   traZODone (DESYREL) 100 MG tablet TAKE 1 TABLET BY MOUTH AT BEDTIME 6/13/22   Historical Provider, MD   gabapentin (NEURONTIN) 300 MG capsule TAKE 1 CAPSULE BY MOUTH THREE TIMES DAILY 5/24/22 8/9/22  Sangeetha Gustafson MD   hydrOXYzine (VISTARIL) 25 MG capsule  9/15/20   Historical Provider, MD   DULoxetine (CYMBALTA) 30 MG extended release capsule Take 1 capsule by mouth daily  Patient taking differently: Take 60 mg by mouth in the morning. 4/8/19   KELLY Abraham - CNP   ARIPiprazole (ABILIFY) 5 MG tablet Take 5 mg by mouth daily    Historical Provider, MD        Allergies:     Aspirin, Bactrim, and Codeine    Social History:     Tobacco:    reports that she has been smoking cigarettes. She has a 16.50 pack-year smoking history. She has never used smokeless tobacco.  Alcohol:      reports current alcohol use. Drug Use:  reports that she does not currently use drugs.     Family History:     Family History   Problem Relation Age of Onset    Diabetes Mother     Cancer Mother     Coronary Art Dis Father     COPD Father     Depression Brother     Alcohol Abuse Brother     Cancer Other         lung and skin    Diabetes Maternal Grandmother     Cancer Paternal Grandmother        Review of Systems:     Patient is unable to provide information on her own behalf. Physical Exam:   BP (!) 144/73   Pulse 79   Temp 97.5 °F (36.4 °C) (Infrared)   Resp 20   Ht 4' 11\" (1.499 m)   Wt 109 lb 12.6 oz (49.8 kg)   SpO2 94%   BMI 22.17 kg/m²   Temp (24hrs), Av.8 °F (36.6 °C), Min:97.5 °F (36.4 °C), Max:98.3 °F (36.8 °C)      General examination:      General Appearance: Stuporous  HEENT: Normocephalic, atraumatic, no raccoon's eyes or kirk sign  Neck: No obvious limitation range of motion although she has a prior history of cervical surgery failed laminectomy syndrome  Lungs: Respirations sound as if she has rhonchi or mucus within the trachea  Cardiovascular: normal rate, regular rhythm  Abdomen: Nondistended  Skin: No gross lesions, rashes, bruising or bleeding on exposed skin area no evidence of peripheral septic emboli  Extremities:  peripheral pulses palpable, no cyanosis, clubbing or edema  Psych: Stuporous      Neurological examination:    Mental status   Mildly restless. Turning from side to side. With shaking stimulation she may say \"huh\"   Cranial nerves   II - Pupil 5mm  III, IV, VI - extraocular muscles intact  V - normal facial sensation resist exam                                                              VII - normal facial symmetry                                                             VIII - intact hearing                                                                             IX, X -minimal phonation at this time                                             XI -does turn head from side to side                                                   XII -could not examine     Motor function  Appears to be moving all 4 extremities           Sensory function Intact to touch, vibration throughout     Cerebellar Relatively normal tone.   No rigidity no cogwheeling no tremor     Reflex function 1/4 symmetric throughout . Downgoing plantar response bilaterally. Gait                  Not testable             Diagnostics:      Laboratory Testing:  CBC:   Recent Labs     10/11/22  0423 10/12/22  0418 10/13/22  0357   WBC 21.2* 15.7* 8.7   HGB 11.1* 10.6* 9.9*   PLT 91* 103* 114*     BMP:    Recent Labs     10/12/22  0418 10/12/22  1334 10/13/22  0357    135 137   K 3.9 3.5* 3.1*    105 106   CO2 16* 19* 20   BUN 22* 19 18   CREATININE 0.67 0.60 0.52   GLUCOSE 81 85 69*         Lab Results   Component Value Date    CHOL 156 01/30/2022    LDLCHOLESTEROL 101 01/30/2022    HDL 39 (L) 01/30/2022    TRIG 78 01/30/2022    ALT 17 10/10/2022    AST 41 (H) 10/10/2022    TSH 1.37 01/29/2022    INR 1.2 10/10/2022    LABA1C 6.1 (H) 01/29/2022    LABMICR 419 (H) 10/10/2022    [id number] 311 01/29/2022       Lab Results   Component Value Date    VALPROATE 5 (L) 04/08/2022       I personally reviewed all of the above medications, clinical laboratory, imaging and other diagnostic tests. Impression:      Stuporous. Evidence of encephalopathy. Concern for pulmonary emboli noted. Attempted ADRIANNE failed  Recent crack cocaine use  Recent COVID test positive now negative    Plan:     MRI of brain without contrast  Neurology will follow       Thank you for this very interesting consultation.       Electronically signed by Calvin Olmedo MD on 10/13/2022 at 4:06 PM      Calvin Olmedo MD  [facility]  Neurology

## 2022-10-13 NOTE — PROGRESS NOTES
Dr. James Biggs here and attempts to talk with pt, remains ubtunded, he will talk with Dr. Ashly Lobo

## 2022-10-13 NOTE — PLAN OF CARE
Problem: Discharge Planning  Goal: Discharge to home or other facility with appropriate resources  10/12/2022 1056 by Lorna Bloch, RN  Outcome: Progressing  Flowsheets (Taken 10/12/2022 0800)  Discharge to home or other facility with appropriate resources: Identify barriers to discharge with patient and caregiver     Problem: Pain  Goal: Verbalizes/displays adequate comfort level or baseline comfort level  10/13/2022 0030 by Saul Miller RN  Outcome: Progressing  Flowsheets (Taken 10/12/2022 2003)  Verbalizes/displays adequate comfort level or baseline comfort level:   Encourage patient to monitor pain and request assistance   Assess pain using appropriate pain scale   Administer analgesics based on type and severity of pain and evaluate response   Implement non-pharmacological measures as appropriate and evaluate response  10/12/2022 1056 by Lorna Bloch, RN  Outcome: Progressing     Problem: Skin/Tissue Integrity  Goal: Absence of new skin breakdown  Description: 1. Monitor for areas of redness and/or skin breakdown  2. Assess vascular access sites hourly  3. Every 4-6 hours minimum:  Change oxygen saturation probe site  4. Every 4-6 hours:  If on nasal continuous positive airway pressure, respiratory therapy assess nares and determine need for appliance change or resting period.   10/13/2022 0030 by Saul Miller RN  Outcome: Progressing  10/12/2022 1056 by Lorna Bloch, RN  Outcome: Progressing     Problem: Safety - Adult  Goal: Free from fall injury  10/13/2022 0030 by Saul Miller RN  Outcome: Progressing  10/12/2022 1056 by Lorna Bloch, RN  Outcome: Progressing     Problem: ABCDS Injury Assessment  Goal: Absence of physical injury  10/13/2022 0030 by Saul Miller RN  Outcome: Progressing  10/12/2022 1056 by Lorna Bloch, RN  Outcome: Progressing     Problem: Nutrition Deficit:  Goal: Optimize nutritional status  10/13/2022 0030 by Franck Seay RN  Outcome: Progressing  10/12/2022 1056 by Dickson Tidwell RN  Outcome: Progressing

## 2022-10-13 NOTE — PLAN OF CARE
Problem: Discharge Planning  Goal: Discharge to home or other facility with appropriate resources  Outcome: Progressing  Flowsheets  Taken 10/13/2022 1600  Discharge to home or other facility with appropriate resources: Identify barriers to discharge with patient and caregiver  Taken 10/13/2022 1200  Discharge to home or other facility with appropriate resources: Identify barriers to discharge with patient and caregiver  Taken 10/13/2022 0800  Discharge to home or other facility with appropriate resources: Identify barriers to discharge with patient and caregiver     Problem: Pain  Goal: Verbalizes/displays adequate comfort level or baseline comfort level  Outcome: Progressing  Flowsheets  Taken 10/13/2022 1600  Verbalizes/displays adequate comfort level or baseline comfort level:   Encourage patient to monitor pain and request assistance   Assess pain using appropriate pain scale  Taken 10/13/2022 1200  Verbalizes/displays adequate comfort level or baseline comfort level:   Encourage patient to monitor pain and request assistance   Assess pain using appropriate pain scale  Taken 10/13/2022 0800  Verbalizes/displays adequate comfort level or baseline comfort level:   Encourage patient to monitor pain and request assistance   Assess pain using appropriate pain scale     Problem: Skin/Tissue Integrity  Goal: Absence of new skin breakdown  Description: 1. Monitor for areas of redness and/or skin breakdown  2. Assess vascular access sites hourly  3. Every 4-6 hours minimum:  Change oxygen saturation probe site  4. Every 4-6 hours:  If on nasal continuous positive airway pressure, respiratory therapy assess nares and determine need for appliance change or resting period.   Outcome: Progressing     Problem: Safety - Adult  Goal: Free from fall injury  Outcome: Progressing  Flowsheets (Taken 10/13/2022 0800)  Free From Fall Injury: Instruct family/caregiver on patient safety     Problem: ABCDS Injury Assessment  Goal: Absence of physical injury  Outcome: Progressing  Flowsheets (Taken 10/13/2022 0800)  Absence of Physical Injury: Implement safety measures based on patient assessment     Problem: Nutrition Deficit:  Goal: Optimize nutritional status  Outcome: Progressing

## 2022-10-13 NOTE — PROGRESS NOTES
Pt's father came into room to visit. Father updated at bedside per writer. Father and pt had appropriate conversation about pt's brother.

## 2022-10-13 NOTE — PROGRESS NOTES
10/13/22 1304   Encounter Summary   Encounter Overview/Reason  Spiritual/Emotional Needs   Service Provided For: Patient   Referral/Consult From: Rounding   Complexity of Encounter Low   Spiritual/Emotional needs   Type Spiritual Support   Assessment/Intervention/Outcome   Assessment Unable to assess  (sleeping)   Intervention Prayer (assurance of)/[place]

## 2022-10-13 NOTE — PROGRESS NOTES
[facility]  Progress Note                   Date:   10/13/2022  Patient name: Armando Gaspar  Date of admission:  10/10/2022  4:37 AM  MRN:   [id number]  YOB: 1970  PCP: Artem Lama MD    Reason for Admission: Pulmonary nodules [R91.8]  SIRS (systemic inflammatory response syndrome) ([facility] [R65.10]  NICKI (acute kidney injury) ([facility] [N17.9]  Elevated d-dimer [R79.89]  Leukocytosis, unspecified type [D72.829]    Subjective:       Clinical Changes /Abnormalities:Patient seen and examined in room with dad. Lethargic but responsive and appropriate. Denies chest pain or shortness of breath. Tele/vitals/labs reviewed . Discussed case with RN. Plans for ADRIANNE today at 4pm    Review of Systems    Medications:   Scheduled Meds:   vancomycin  1,250 mg IntraVENous Q24H    cefepime  2,000 mg IntraVENous Q12H    Vitamin D  2,000 Units Oral Daily    carvedilol  3.125 mg Oral BID WC    sodium chloride flush  5-40 mL IntraVENous 2 times per day    heparin (porcine)  5,000 Units SubCUTAneous BID    ARIPiprazole  5 mg Oral Daily    atorvastatin  40 mg Oral Daily    divalproex  1,000 mg Oral BID    DULoxetine  60 mg Oral Daily    ferrous sulfate  325 mg Oral BID WC    ipratropium-albuterol  1 ampule Inhalation Q4H WA    vancomycin (VANCOCIN) intermittent dosing (placeholder)   Other RX Placeholder     Continuous Infusions:   dextrose 5 % and 0.9 % NaCl 50 mL/hr at 10/13/22 1117    sodium chloride       CBC:   Recent Labs     10/11/22  0423 10/12/22  0418 10/13/22  0357   WBC 21.2* 15.7* 8.7   HGB 11.1* 10.6* 9.9*   PLT 91* 103* 114*       BMP:    Recent Labs     10/12/22  0418 10/12/22  1334 10/13/22  0357    135 137   K 3.9 3.5* 3.1*    105 106   CO2 16* 19* 20   BUN 22* 19 18   CREATININE 0.67 0.60 0.52   GLUCOSE 81 85 69*       Hepatic:  No results for input(s): AST, ALT, ALB, BILITOT, ALKPHOS in the last 72 hours.     Troponin:   No results for input(s): TROPHS in the last 72 hours.    BNP: No results for input(s): BNP in the last 72 hours. Lipids: No results for input(s): CHOL, HDL in the last 72 hours. Invalid input(s): LDLCALCU  INR:   No results for input(s): INR in the last 72 hours. DATA:    Diagnostics:    EKG: Normal sinus rhythm EKG with nonspecific ST changes. ECHO Summary  Global left ventricular systolic function is normal. Estimated ejection  fraction is 55-60%. No obvious wall motion abnormality seen. Left and right atrium is normal in size. No significant valvular regurgitation or stenosis seen. Estimated right ventricular systolic pressure is 19 mmHg. No pericardial effusion seen. Normal diastolic filling. Signature  ----------------------------------------------------------------------------   Electronically signed by Dennise Waldron(Sonographer) on 10/11/2022 09:58   AM  ----------------------------------------------------------------------------     ----------------------------------------------------------------------------   Electronically signed by Chang Houston(Interpreting physician) on   10/11/2022 10:47 AM  Objective:   Vitals: /76   Pulse 69   Temp 97.5 °F (36.4 °C) (Oral)   Resp 26   Ht 4' 11\" (1.499 m)   Wt 109 lb 12.6 oz (49.8 kg)   SpO2 94%   BMI 22.17 kg/m²   General appearance: lethargic but responsive and cooperative with exam  HEENT: Head: Normocephalic, no lesions, without obvious abnormality. Neck:no JVD, trachea midline, no adenopathy  Lungs: Course to auscultation throughout. Rhonchi noted. No rales. NC without distress  Heart: Regular rate and rhythm, s1/s2 auscultated, no murmurs, SR  Abdomen: soft, non-tender, bowel sounds active  Extremities: no edema  Neurologic: not done        Assessment / Acute Cardiac Problems:   Elevated troponins.   Most likely type II  Sepsis with septic shock  NICKI  Possible septic emboli to the lungs   history of drug abuse (positive cocaine)      Patient Active Problem List: GERD (gastroesophageal reflux disease)     Hoarseness of voice     MVP (mitral valve prolapse)     Depression with anxiety     Lymphocytic colitis     History of migraine headaches     Hyperlipidemia     Sleep apnea     IGT (impaired glucose tolerance)     History of renal calculi     Lactose intolerance     History of seizure disorder     Leukopenia     Bipolar disorder, mixed (HCC)     Illicit drug use     Postlaminectomy syndrome, cervical region     Degeneration of cervical intervertebral disc     Encounter for long-term (current) use of other medications     Bradycardia     Cellulitis     Hypokalemia     Primary hypertension     Elevated lipase     Right lower quadrant abdominal pain     Nausea     Diarrhea     Seizure disorder (HCC)     Polysubstance abuse (HCC)     Fibromyalgia     Cocaine addiction (HCC)     Shoulder arthritis     Osteoarthritis of left glenohumeral joint     Abnormal EKG     Anemia     Anxiety     Carotid artery stenosis     COPD (chronic obstructive pulmonary disease) ([address]     Dental disease     HUTCHINSON (dyspnea on exertion)     Fatigue     Fractures     H/O gastric bypass     History of crack cocaine use     History of UTI     Injury of back     Intractable chronic migraine without aura and without status migrainosus     Intractable migraine with aura with status migrainosus     Kidney stones     Dizziness     Memory loss     Mild pulmonary hypertension (HCC)     Mild tricuspid regurgitation     Primary osteoarthritis of left shoulder     Seizure-like activity (HCC)     Stress at home     TMJ (dislocation of temporomandibular joint)     Tobacco abuse     Visual impairment     Internal hernia     Vitamin D deficiency     Iron deficiency     SIRS (systemic inflammatory response syndrome) (HCC)     NICKI (acute kidney injury) ([address]     Severe malnutrition ([address]      Plan of Treatment:   Stable. No new CV issues/cocnerns. NPO for ADRIANNE today. Pt. Agreeable to proceed.  Discussed with patient (asked her to repeat our conversation to me/what procedure was and she was able) and father at bedside.     Electronically signed by KELLY Mathur CNP on 10/13/2022 at 12:13 PM  [id number] [place] [address] Rd.  [phone]

## 2022-10-13 NOTE — PROGRESS NOTES
Progress Note    Reason for Consult: NICKI    Requesting Physician:  Rg Cunningham MD    INTERVAL HISTORY:  Creatinine improved to 0.52  Potassium is low at 3.1  Phosphorus was borderline low at 2.2  Very sleepy and drowsy    HISTORY OF PRESENT ILLNESS:    The patient is a 46 y.o. female who presents with weakness, generalized pain, SOB and abdominal pain. She tested positive for COVID 19 on 9/30. She states she slept 2 days straight last week. C/o fever, chills and foul smelling urine with right flank pain. She also admits to doing crack cocaine at least twice weekly and when ever she can afford it more frequently. She states she uses cocaine to help with pain. She drinks homemade wine on occasion and smokes cigarettes, but states recently she decreased tobacco use. Creatinine baseline 0.6 from earlier this year. Creatinine 2.1 on admission. With sodium 132, potassium 3.3, bicarb 24, chloride 91, BUN 33, magnesium 2.1, procalcitonin 6.01, proBNP 4799. High sensitivity troponin was initially 30 and 13 on redraw. Glucose 129, albumin 3.44. Given normal saline bolus. X-ray showed multiple nodular opacities projecting over the right lung measuring up to 1.5 cm further evaluation with CT chest recommended. No acute focal airspace consolidation or pleural effusions. Blood pressure trending 110-190s/60-120s. Medications include Coreg, lisinopril, trazodone, gabapentin, Norvasc, Vistaril. She is very drowsy, slurring words. She has hx of HTN. She states she only takes BP meds if she remembers, which is not often. Hx of HTN since age 23s. Ordered urine sodium, urine creatinine, urine eosinophils, urine osmolality. Follow-up urine culture. On iv Zosyn per primary. Strict I&O discussed with RN. She received 1 dose of Lovenox and is now on heparin 5000 units twice a day. In the ER, she received Dilaudid and morphine and dose of Neurontin. She is very drowsy. Hold Neurontin and trazodone for now. EXPLORATORY CONVERTED TO OPEN EXPLORATORY LAPAROTOMY, LYSIS OF ADHESIONS, REDUCTION OF INTERNAL HERNIA performed by Elicia Gutierrez DO at [address] [place] [address]  02/76/0999    APPLICATION OF ARCH BARS,SIMPLE EXTRACTION OF #15 AND RT TMJ JOINT REPLACEMENT    SHOULDER ARTHROSCOPY Left 06/05/2019    DR ROBERT GREEN    SHOULDER SURGERY      TONSILLECTOMY AND ADENOIDECTOMY      UPPER GASTROINTESTINAL ENDOSCOPY  7-3-12    egd    UPPER GASTROINTESTINAL ENDOSCOPY  12/19/2016    status post gastrectomy with a small remnant of gastric pouch. UPPER GASTROINTESTINAL ENDOSCOPY  05/2019    DR Shon Cox       Prior to Admission medications    Medication Sig Start Date End Date Taking? Authorizing Provider   lisinopril (PRINIVIL;ZESTRIL) 20 MG tablet Take 20 mg by mouth daily   Yes Historical Provider, MD   carvedilol (COREG) 25 MG tablet TAKE 1 TABLET BY MOUTH IN THE MORNING AND 1 IN THE EVENING WITH MEALS 9/19/22   Barbara Pepe MD   traZODone (DESYREL) 100 MG tablet TAKE 1 TABLET BY MOUTH AT BEDTIME 6/13/22   Historical Provider, MD   gabapentin (NEURONTIN) 300 MG capsule TAKE 1 CAPSULE BY MOUTH THREE TIMES DAILY 5/24/22 8/9/22  Barbara Pepe MD   hydrOXYzine (VISTARIL) 25 MG capsule  9/15/20   Historical Provider, MD   DULoxetine (CYMBALTA) 30 MG extended release capsule Take 1 capsule by mouth daily  Patient taking differently: Take 60 mg by mouth in the morning.  4/8/19   Lucia Armstrong, APRN - CNP   ARIPiprazole (ABILIFY) 5 MG tablet Take 5 mg by mouth daily    Historical Provider, MD       Scheduled Meds:   vancomycin  1,250 mg IntraVENous Q24H    cefepime  2,000 mg IntraVENous Q12H    Vitamin D  2,000 Units Oral Daily    carvedilol  3.125 mg Oral BID WC    sodium chloride flush  5-40 mL IntraVENous 2 times per day    heparin (porcine)  5,000 Units SubCUTAneous BID    ARIPiprazole  5 mg Oral Daily    atorvastatin  40 mg Oral Daily    divalproex  1,000 mg Oral BID    DULoxetine 60 mg Oral Daily    ferrous sulfate  325 mg Oral BID     ipratropium-albuterol  1 ampule Inhalation Q4H WA    vancomycin (VANCOCIN) intermittent dosing (placeholder)   Other RX Placeholder     Continuous Infusions:   dextrose 5 % and 0.9 % NaCl 50 mL/hr at 10/13/22 1117    sodium chloride       PRN Meds:potassium chloride **OR** potassium alternative oral replacement **OR** potassium chloride, hydrALAZINE, sodium chloride flush, sodium chloride, acetaminophen, ondansetron **OR** ondansetron, albuterol sulfate HFA, sodium chloride flush, perflutren lipid microspheres         Physical Exam:  Vitals:    10/13/22 1115 10/13/22 1130 10/13/22 1145 10/13/22 1200   BP: (!) 159/89  (!) 145/76 136/76   Pulse: 72 73 71 69   Resp: 24 19 23 26   Temp:    97.5 °F (36.4 °C)   TempSrc:    Oral   SpO2: 94% 95% 94% 94%   Weight:       Height:         I/O last 3 completed shifts: In: 2287.1 [I.V.:1412.9; IV Piggyback:874.2]  Out: 900 [Urine:900]    General:  lethargic, not in distress. Appears to be older than stated age. HEENT: Atraumatic, normocephalic. Anicteric sclera. Pink and moist oral mucosa. Neck supple. No JVD. Chest: Bilateral air entry, clear to auscultation, no wheezing, rhonchi or rales. Cardiovascular: S1S2, no murmur, rub or gallop. No lower extremity edema. Abdomen: soft Active bowel sounds. Musculoskeletal:  No cyanosis or clubbing. Integumentary: Pink, warm and dry. +wounds to BLE. No s/s of infection.  Skin turgor normal.  CNS: ISAAK    Data:    CBC:   Lab Results   Component Value Date    WBC 8.7 10/13/2022    HGB 9.9 (L) 10/13/2022    HCT 31.7 (L) 10/13/2022    MCV 81.3 10/13/2022     (L) 10/13/2022     BMP:    Lab Results   Component Value Date     10/13/2022     10/12/2022     10/12/2022    K 3.1 (L) 10/13/2022    K 3.5 (L) 10/12/2022    K 3.9 10/12/2022     10/13/2022     10/12/2022     10/12/2022    CO2 20 10/13/2022    CO2 19 (L) 10/12/2022    CO2 16 (L) 10/12/2022    BUN 18 10/13/2022    BUN 19 10/12/2022    BUN 22 (H) 10/12/2022    CREATININE 0.52 10/13/2022    CREATININE 0.60 10/12/2022    CREATININE 0.67 10/12/2022    GLUCOSE 69 (L) 10/13/2022    GLUCOSE 85 10/12/2022    GLUCOSE 81 10/12/2022     CMP:   Lab Results   Component Value Date/Time     10/13/2022 03:57 AM    K 3.1 10/13/2022 03:57 AM     10/13/2022 03:57 AM    CO2 20 10/13/2022 03:57 AM    BUN 18 10/13/2022 03:57 AM    CREATININE 0.52 10/13/2022 03:57 AM    GLUCOSE 69 10/13/2022 03:57 AM    CALCIUM 8.1 10/13/2022 03:57 AM    PROT 7.9 10/10/2022 04:55 AM    LABALBU 3.4 10/10/2022 04:55 AM    BILITOT 0.7 10/10/2022 04:55 AM    ALKPHOS 112 10/10/2022 04:55 AM    AST 41 10/10/2022 04:55 AM    ALT 17 10/10/2022 04:55 AM      Hepatic:   Lab Results   Component Value Date    AST 41 (H) 10/10/2022    AST 14 04/08/2022    AST 26 01/28/2022    ALT 17 10/10/2022    ALT 6 04/08/2022    ALT 11 01/28/2022    BILITOT 0.7 10/10/2022    BILITOT 0.18 (L) 04/08/2022    BILITOT 0.23 (L) 01/28/2022    ALKPHOS 112 (H) 10/10/2022    ALKPHOS 120 (H) 04/08/2022    ALKPHOS 90 01/28/2022     BNP: No results found for: BNP  Lipids:   Lab Results   Component Value Date    CHOL 156 01/30/2022    HDL 39 (L) 01/30/2022     INR:   Lab Results   Component Value Date    INR 1.2 10/10/2022    INR 1.0 07/22/2016    INR 0.9 05/30/2016     PTH: No results found for: PTH  Phosphorus:    Lab Results   Component Value Date/Time    PHOS 2.2 10/13/2022 03:57 AM     Ionized Calcium: No results found for: IONCA  Magnesium:   Lab Results   Component Value Date/Time    MG 1.9 10/13/2022 03:57 AM     Albumin:   Lab Results   Component Value Date/Time    LABALBU 3.4 10/10/2022 04:55 AM     Last 3 CK, CKMB, Troponin: @LABRCNT(CKTOTAL:3,CKMB:3,TROPONINI:3)       URINE:)No results found for: NAUR PROTUR          Impression:  NICKI, appears to be hemodynamically related, Cr is improving. Hyponatremia, noted to be on Depakote.   Vitamin D deficiency  Hypokalemia. Hypo-phosphatemia  Hypocalcemia. Hypertension. Gram positive cocci sepsis with septic embolization. UTI with E coli. History of cocaine use. Plan:  Potassium was replaced   Replace phosphorus IV   continue NS to 50 ml/hr. On iv Cefepime and Vancomycin. Continue holding Lisinopril. Coreg dose was decreased to  3.125 mg BID with holding parameters. Monitor BP. Potassium and calcium were repleted. Vitamin D level was below assay limits, will start vitamin D replacement   Labs daily  Spoke to her father. Follow up labs ordered for AM.     Please do not hesitate to call with questions.       Electronically signed by Domitila Jackson MD  on 10/13/2022 at 12:47 PM

## 2022-10-13 NOTE — PROGRESS NOTES
Progress Note  Date:10/13/2022       Room:  Patient Name:Anu Bourne     YOB: 1970     Age:51 y.o. Subjective    Subjective:  Symptoms:  (Somnolent this morning. Taking her oxygen off overnight - sats 90% now without oxygen. ). Diet:  Poor intake. Activity level: Impaired due to weakness. Pain:  (No morphine overnight ). Review of Systems  Objective         Vitals Last 24 Hours:  TEMPERATURE:  Temp  Av.9 °F (36.6 °C)  Min: 97.6 °F (36.4 °C)  Max: 98.1 °F (36.7 °C)  RESPIRATIONS RANGE: Resp  Av.2  Min: 18  Max: 28  PULSE OXIMETRY RANGE: SpO2  Av.2 %  Min: 90 %  Max: 96 %  PULSE RANGE: Pulse  Av.6  Min: 66  Max: 90  BLOOD PRESSURE RANGE: Systolic (62TIP), JOSETTE:402 , Min:112 , GIP:293   ; Diastolic (59GQS), BOH:69, Min:51, Max:112    I/O (24Hr): Intake/Output Summary (Last 24 hours) at 10/13/2022 0819  Last data filed at 10/13/2022 0514  Gross per 24 hour   Intake 2287.12 ml   Output 900 ml   Net 1387.12 ml     Objective:  General Appearance:  Comfortable. Vital signs: (most recent): Blood pressure (!) 143/67, pulse 83, temperature 97.6 °F (36.4 °C), temperature source Oral, resp. rate 18, height 4' 11\" (1.499 m), weight 109 lb 12.6 oz (49.8 kg), SpO2 92 %. Vital signs are normal.    Lungs:  Normal effort and normal respiratory rate. There are decreased breath sounds. Heart: Normal rate. Regular rhythm.   S1 normal and S2 normal.    Labs/Imaging/Diagnostics    Labs:  CBC:  Recent Labs     10/11/22  0423 10/12/22  0418 10/13/22  0357   WBC 21.2* 15.7* 8.7   RBC 4.26 4.16 3.90*   HGB 11.1* 10.6* 9.9*   HCT 33.6* 32.9* 31.7*   MCV 78.8* 79.1* 81.3   RDW 15.2* 15.2* 15.5*   PLT 91* 103* 114*     CHEMISTRIES:  Recent Labs     10/11/22  0423 10/11/22  1125 10/12/22  0418 10/12/22  1334 10/13/22  0357   *   < > 135 135 137   K 3.4*   < > 3.9 3.5* 3.1*   CL 99   < > 104 105 106   CO2 19*   < > 16* 19* 20   BUN 38*   < > 22* 19 18 CREATININE 1.53*   < > 0.67 0.60 0.52   GLUCOSE 115*   < > 81 85 69*   PHOS 3.3  --  2.0*  --  2.2*   MG 1.8  --  2.1  --  1.9    < > = values in this interval not displayed. PT/INR:No results for input(s): PROTIME, INR in the last 72 hours. APTT:No results for input(s): APTT in the last 72 hours. LIVER PROFILE:No results for input(s): AST, ALT, BILIDIR, BILITOT, ALKPHOS in the last 72 hours. Imaging Last 24 Hours:  XR LUMBAR SPINE (2-3 VIEWS)    Result Date: 10/12/2022  EXAMINATION: XRAY VIEWS OF THE LUMBAR SPINE 10/12/2022 2:06 pm COMPARISON: None. HISTORY: ORDERING SYSTEM PROVIDED HISTORY: back pain TECHNOLOGIST PROVIDED HISTORY: back pain Reason for Exam: back pain FINDINGS: No compression fracture or subluxation. Disc spaces maintained. Pedicles are intact. Diffuse calcification nondilated abdominal aorta. Unremarkable lumbar spine examination. XR ABDOMEN (KUB) (SINGLE AP VIEW)    Result Date: 10/12/2022  EXAMINATION: ONE SUPINE XRAY VIEW(S) OF THE ABDOMEN 10/12/2022 2:06 pm COMPARISON: 02/01/2022 HISTORY: ORDERING SYSTEM PROVIDED HISTORY: abd pain TECHNOLOGIST PROVIDED HISTORY: abd pain Reason for Exam: abd pain FINDINGS: Mildly dilated colon and several small bowel loops. Residual stool in colon. No urinary tract calcifications. Osseous structures unremarkable. Findings consistent with mild diffuse ileus. No bowel obstruction or urinary tract calcifications.      VL Lower Extremity Bilateral Venous Duplex    Result Date: 10/11/2022    [facility]  Vascular Lower Extremities DVT Study Procedure   Patient Name   Namrata Encarnacion     Date of Study           10/11/2022                 DAINA RAY   Date of Birth  1970  Gender                  Female   Age            46 year(s)  Race                       Room Number    2011        Height:                 59.06 inch, 150 cm   Corporate ID # [id number]    Weight:                 89 pounds, 40.4 kg   Patient Acct # [de-identified] BSA:        1.31 m^2    BMI:      17.94 kg/m^2   MR #           [id number]      Sonographer             Suni Hair RVT   Accession #    [id number]  Interpreting Physician  Howard Thorne   Referring                  Referring Physician     Asiya Guerra  Nurse  Practitioner  Procedure Type of Study:   Veins: Lower Extremities DVT Study, Venous Scan Lower Bilateral.  Indications for Study:Elevated D-Dimer. Patient Status: In Patient. Technical Quality:Adequate visualization. Conclusions   Summary   Simultaneous real time imaging utilizing B-Mode, color doppler and  spectral waveform analysis was performed on the bilateral lower  extremities for venous examination of the deep and superficial systems. Findings are:   Right:  No evidence of deep or superficial venous thrombosis. Left:  No evidence of deep or superficial venous thrombosis. Signature   ----------------------------------------------------------------  Electronically signed by Suni Hair RVT(Sonographer) on  10/11/2022 02:01 PM  ----------------------------------------------------------------   ----------------------------------------------------------------  Electronically signed by Noe Gonzalez(Interpreting  physician) on 10/11/2022 04:12 PM  ----------------------------------------------------------------  Findings:   Right Impression:                    Left Impression:  The common femoral, femoral,         The common femoral, femoral,  popliteal and tibial veins           popliteal and tibial veins  demonstrate normal compressibility   demonstrate normal compressibility  and augmentation. and augmentation. Normal compressibility of the great  Normal compressibility of the great  saphenous vein. saphenous vein. Normal compressibility of the small  Normal compressibility of the small  saphenous vein. saphenous vein.   Velocities are measured in cm/s ; Diameters are measured in cm Right Lower Extremities DVT Study Measurements Right 2D Measurements +------------------------------------+----------+---------------+----------+ ! Location                            ! Visualized! Compressibility! Thrombosis! +------------------------------------+----------+---------------+----------+ ! Common Femoral                      !Yes       ! Yes            ! None      ! +------------------------------------+----------+---------------+----------+ ! Prox Femoral                        !Yes       ! Yes            ! None      ! +------------------------------------+----------+---------------+----------+ ! Mid Femoral                         !Yes       ! Yes            ! None      ! +------------------------------------+----------+---------------+----------+ ! Dist Femoral                        !Yes       ! Yes            ! None      ! +------------------------------------+----------+---------------+----------+ ! Deep Femoral                        !Yes       ! Yes            ! None      ! +------------------------------------+----------+---------------+----------+ ! Popliteal                           !Yes       ! Yes            ! None      ! +------------------------------------+----------+---------------+----------+ ! Sapheno Femoral Junction            ! Yes       ! Yes            ! None      ! +------------------------------------+----------+---------------+----------+ ! PTV                                 ! Yes       ! Yes            ! None      ! +------------------------------------+----------+---------------+----------+ ! Peroneal                            !Yes       ! Yes            ! None      ! +------------------------------------+----------+---------------+----------+ ! Gastroc                             ! Yes       ! Yes            ! None      ! +------------------------------------+----------+---------------+----------+ ! GSV Thigh                           ! Yes       ! Yes            ! None      ! +------------------------------------+----------+---------------+----------+ ! GSV Knee                            ! Yes       ! Yes            ! None      ! +------------------------------------+----------+---------------+----------+ ! GSV Ankle                           ! Yes       ! Yes            ! None      ! +------------------------------------+----------+---------------+----------+ ! SSV                                 ! Yes       ! Yes            ! None      ! +------------------------------------+----------+---------------+----------+ Left Lower Extremities DVT Study Measurements Left 2D Measurements +------------------------------------+----------+---------------+----------+ ! Location                            ! Visualized! Compressibility! Thrombosis! +------------------------------------+----------+---------------+----------+ ! Common Femoral                      !Yes       ! Yes            ! None      ! +------------------------------------+----------+---------------+----------+ ! Prox Femoral                        !Yes       ! Yes            ! None      ! +------------------------------------+----------+---------------+----------+ ! Mid Femoral                         !Yes       ! Yes            ! None      ! +------------------------------------+----------+---------------+----------+ ! Dist Femoral                        !Yes       ! Yes            ! None      ! +------------------------------------+----------+---------------+----------+ ! Deep Femoral                        !Yes       ! Yes            ! None      ! +------------------------------------+----------+---------------+----------+ ! Popliteal                           !Yes       ! Yes            ! None      ! +------------------------------------+----------+---------------+----------+ ! Sapheno Femoral Junction            ! Yes       ! Yes            ! None      ! +------------------------------------+----------+---------------+----------+ ! PTV !Yes       !Yes            ! None      ! +------------------------------------+----------+---------------+----------+ ! Peroneal                            !Yes       ! Yes            ! None      ! +------------------------------------+----------+---------------+----------+ ! Gastroc                             ! Yes       ! Yes            ! None      ! +------------------------------------+----------+---------------+----------+ ! GSV Thigh                           ! Yes       ! Yes            ! None      ! +------------------------------------+----------+---------------+----------+ ! GSV Knee                            ! Yes       ! Yes            ! None      ! +------------------------------------+----------+---------------+----------+ ! GSV Ankle                           ! Yes       ! Yes            ! None      ! +------------------------------------+----------+---------------+----------+ ! SSV                                 ! Yes       ! Yes            ! None      ! +------------------------------------+----------+---------------+----------+    Assessment//Plan           Hospital Problems             Last Modified POA    * (Principal) SIRS (systemic inflammatory response syndrome) ([facility] [address] 10/10/2022 Yes    NIKCI (acute kidney injury) ([facility] [address] 10/11/2022 Yes    Severe malnutrition ([address] (Chronic) 10/11/2022 Yes    Bipolar disorder, mixed ([facility] [address] 10/11/2022 Yes    COPD (chronic obstructive pulmonary disease) ([facility] [address] 10/11/2022 Yes    H/O gastric bypass 10/11/2022 Yes    Overview Signed 2/13/2020  7:19 PM by Tanya Hernandez MD     enlarged  pancreas, here for surgical work-up for EGD          Assessment & Plan  Altered mental status - likely d/t acute toxic metabolic encephalopathy. Reorder drug screen to r/o any new substances patient may have ingested. Neurology consult. D/c morphine and hydroxyzine. Hypokalemia - replace. Replacement of phosphate and calcium per nephrology.     ADRIANNE today      Electronically signed by Mary Coburn Berta Griffiths MD on 10/13/22 at 8:19 AM EDT

## 2022-10-13 NOTE — PROGRESS NOTES
Dr. Bart Manriquez talks with dad, procedure cancelled today until patient more arousable. Dary Morgan RN on floor made aware.  Transferred back to ICU 11 per bed

## 2022-10-13 NOTE — PLAN OF CARE
Please have patient fill out online MRI screening form. Once from is complete the patient will be scheduled for their test. If there are any questions please call [phone]. Thanks!

## 2022-10-13 NOTE — PROGRESS NOTES
Comprehensive Nutrition Assessment    Type and Reason for Visit:  Reassess    Nutrition Recommendations/Plan:   Pt needs supplements with all trays when advanced back to Regular diet. Malnutrition Assessment:  Malnutrition Status:  Severe malnutrition (10/11/22 1232)    Context:  Acute Illness     Findings of the 6 clinical characteristics of malnutrition:  Energy Intake:  50% or less of estimated energy requirements for 5 or more days  Weight Loss:  Greater than 7.5% over 3 months     Body Fat Loss: Moderate body fat loss Orbital, Triceps, Fat Overlying Ribs   Muscle Mass Loss: Moderate muscle mass loss Temples (temporalis), Clavicles (pectoralis & deltoids)  Fluid Accumulation:  No significant fluid accumulation     Strength:  Not Performed    Nutrition Assessment:    Intake has been inadequate. Pt is npo today for ADRIANNE. Nursing states pt needs coaching to eat but can feed herself. Nutrition Related Findings:    no edema, Labs/Meds: Reviewed Wound Type: Pressure Injury (refer to nursing flowsheet)       Current Nutrition Intake & Therapies:    Average Meal Intake: 0%  Average Supplements Intake: None Ordered  Diet NPO    Anthropometric Measures:  Height: 4' 11\" (149.9 cm)  Ideal Body Weight (IBW): 95 lbs (43 kg)    Admission Body Weight: 89 lb (40.4 kg)  Current Body Weight: 109 lb (49.4 kg), 93.7 % IBW.  Weight Source: Bed Scale  Current BMI (kg/m2): 22  Usual Body Weight: 113 lb (51.3 kg) (per past records)  % Weight Change (Calculated): -21.2                    BMI Categories: Underweight (BMI less than 18.5)    Estimated Daily Nutrient Needs:  Energy Requirements Based On: Kcal/kg  Weight Used for Energy Requirements: Admission  Energy (kcal/day): 1414 kcal based on 35 kcal/kg admission  Weight Used for Protein Requirements: Admission  Protein (g/day): 50 gm based on 1.2 gm/kg admission  Method Used for Fluid Requirements: 1 ml/kcal    Nutrition Diagnosis:   Severe malnutrition related to impaired respiratory function, psychological cause or life stress as evidenced by intake 0-25%, poor intake prior to admission, BMI, weight loss, severe loss of subcutaneous fat, severe muscle loss    Nutrition Interventions:   Food and/or Nutrient Delivery: Start Oral Diet  Nutrition Education/Counseling: Education not indicated  Coordination of Nutrition Care: Continue to monitor while inpatient       Goals:  Previous Goal Met: No Progress toward Goal(s)  Goals: Meet at least 75% of estimated needs, PO intake 75% or greater       Nutrition Monitoring and Evaluation:   Behavioral-Environmental Outcomes: None Identified  Food/Nutrient Intake Outcomes: Diet Advancement/Tolerance  Physical Signs/Symptoms Outcomes: Biochemical Data, GI Status, Chewing or Swallowing, Fluid Status or Edema, Skin, Weight    Discharge Planning:     Too soon to determine     Luisana Cooley, [address], [place]  Contact: [phone]

## 2022-10-13 NOTE — PROGRESS NOTES
Patient returned to ICU 11 put back on monitor. Patient continues to sleep but arousable then back to sleep.

## 2022-10-13 NOTE — CARE COORDINATION
DISCHARGE PLANNING NOTE:    Plan remains for patient to be discharged home. VNS has been declined. Per previous d/c planner, pt can stay with her father on d/c if needed. Active order for IV Cefepime and IV Vanco. ADRIANNE today. Neuro consulted. LSW to provide AOD resources to patient. Will continue to follow for additional discharge needs.     Electronically signed by Curtis Kapoor RN on 10/13/2022 at 11:03 AM

## 2022-10-13 NOTE — PROGRESS NOTES
The patient was brought for ADRIANNE. Was evaluated by me and by Dr. Stevenson Ayoub (Anaesthesiologist). Patient is obtunded and lethargic, will be high risk for sedation at present time. Discussed with her father, will cancel the procedure for now, continue antibiotics per primary service. Plan for ADRIANNE when patient is more stable.

## 2022-10-14 ENCOUNTER — APPOINTMENT (OUTPATIENT)
Dept: MRI IMAGING | Age: 52
DRG: 871 | End: 2022-10-14
Payer: COMMERCIAL

## 2022-10-14 LAB
ABSOLUTE BANDS #: 0.87 K/UL (ref 0–1)
ABSOLUTE EOS #: 0.06 K/UL (ref 0–0.4)
ABSOLUTE LYMPH #: 0.41 K/UL (ref 1–4.8)
ABSOLUTE MONO #: 0.17 K/UL (ref 0.1–1.3)
ALLEN TEST: ABNORMAL
ANION GAP SERPL CALCULATED.3IONS-SCNC: 11 MMOL/L (ref 9–17)
ANION GAP SERPL CALCULATED.3IONS-SCNC: 12 MMOL/L (ref 9–17)
BANDS: 15 % (ref 0–10)
BASOPHILS # BLD: 0 % (ref 0–2)
BASOPHILS ABSOLUTE: 0 K/UL (ref 0–0.2)
BUN BLDV-MCNC: 11 MG/DL (ref 6–20)
BUN BLDV-MCNC: 14 MG/DL (ref 6–20)
CALCIUM SERPL-MCNC: 7.5 MG/DL (ref 8.6–10.4)
CALCIUM SERPL-MCNC: 8 MG/DL (ref 8.6–10.4)
CARBOXYHEMOGLOBIN: <1 % (ref 0–5)
CHLORIDE BLD-SCNC: 105 MMOL/L (ref 98–107)
CHLORIDE BLD-SCNC: 110 MMOL/L (ref 98–107)
CO2: 20 MMOL/L (ref 20–31)
CO2: 22 MMOL/L (ref 20–31)
CREAT SERPL-MCNC: 0.43 MG/DL (ref 0.5–0.9)
CREAT SERPL-MCNC: 0.44 MG/DL (ref 0.5–0.9)
EOSINOPHILS RELATIVE PERCENT: 1 % (ref 0–4)
FIO2: ABNORMAL
GFR SERPL CREATININE-BSD FRML MDRD: >60 ML/MIN/1.73M2
GFR SERPL CREATININE-BSD FRML MDRD: >60 ML/MIN/1.73M2
GLUCOSE BLD-MCNC: 161 MG/DL (ref 70–99)
GLUCOSE BLD-MCNC: 95 MG/DL (ref 70–99)
HCO3 ARTERIAL: 23.8 MMOL/L (ref 22–26)
HCT VFR BLD CALC: 29.8 % (ref 36–46)
HEMOGLOBIN: 9.8 G/DL (ref 12–16)
LYMPHOCYTES # BLD: 7 % (ref 24–44)
MAGNESIUM: 1.9 MG/DL (ref 1.6–2.6)
MCH RBC QN AUTO: 26.2 PG (ref 26–34)
MCHC RBC AUTO-ENTMCNC: 32.7 G/DL (ref 31–37)
MCV RBC AUTO: 80.1 FL (ref 80–100)
METAMYELOCYTES ABSOLUTE COUNT: 0.06 K/UL
METAMYELOCYTES: 1 %
METHEMOGLOBIN: 1.4 % (ref 0–1.9)
MONOCYTES # BLD: 3 % (ref 1–7)
MORPHOLOGY: ABNORMAL
O2 DEVICE/FLOW/%: ABNORMAL
O2 SAT, ARTERIAL: 88.8 % (ref 95–98)
PATIENT TEMP: 37
PCO2 ARTERIAL: 32.4 MMHG (ref 35–45)
PDW BLD-RTO: 15.4 % (ref 11.5–14.9)
PH ARTERIAL: 7.47 (ref 7.35–7.45)
PHOSPHORUS: 2.5 MG/DL (ref 2.6–4.5)
PLATELET # BLD: 95 K/UL (ref 150–450)
PMV BLD AUTO: 8.8 FL (ref 6–12)
PO2 ARTERIAL: 58.2 MMHG (ref 80–100)
POSITIVE BASE EXCESS, ART: 0.3 MMOL/L (ref 0–2)
POTASSIUM SERPL-SCNC: 2.9 MMOL/L (ref 3.7–5.3)
POTASSIUM SERPL-SCNC: 3 MMOL/L (ref 3.7–5.3)
PT. POSITION: ABNORMAL
RBC # BLD: 3.73 M/UL (ref 4–5.2)
RESPIRATORY RATE: 20
SAMPLE SITE: ABNORMAL
SEG NEUTROPHILS: 73 % (ref 36–66)
SEGMENTED NEUTROPHILS ABSOLUTE COUNT: 4.23 K/UL (ref 1.3–9.1)
SODIUM BLD-SCNC: 137 MMOL/L (ref 135–144)
SODIUM BLD-SCNC: 143 MMOL/L (ref 135–144)
TEXT FOR RESPIRATORY: ABNORMAL
VALPROIC ACID LEVEL: 120 UG/ML (ref 50–125)
VALPROIC DATE LAST DOSE: NORMAL
VALPROIC DOSE AMOUNT: 1000
VALPROIC TIME LAST DOSE: 52
VANCOMYCIN RANDOM DATE LAST DOSE: NORMAL
VANCOMYCIN RANDOM DOSE AMOUNT: NORMAL
VANCOMYCIN RANDOM TIME LAST DOSE: 1429
VANCOMYCIN RANDOM: 21.9 UG/ML
WBC # BLD: 5.8 K/UL (ref 3.5–11)

## 2022-10-14 PROCEDURE — 80202 ASSAY OF VANCOMYCIN: CPT

## 2022-10-14 PROCEDURE — 2580000003 HC RX 258: Performed by: INTERNAL MEDICINE

## 2022-10-14 PROCEDURE — 84100 ASSAY OF PHOSPHORUS: CPT

## 2022-10-14 PROCEDURE — 6370000000 HC RX 637 (ALT 250 FOR IP): Performed by: INTERNAL MEDICINE

## 2022-10-14 PROCEDURE — 6360000002 HC RX W HCPCS: Performed by: FAMILY MEDICINE

## 2022-10-14 PROCEDURE — 36600 WITHDRAWAL OF ARTERIAL BLOOD: CPT

## 2022-10-14 PROCEDURE — 82805 BLOOD GASES W/O2 SATURATION: CPT

## 2022-10-14 PROCEDURE — 80164 ASSAY DIPROPYLACETIC ACD TOT: CPT

## 2022-10-14 PROCEDURE — 6360000002 HC RX W HCPCS: Performed by: INTERNAL MEDICINE

## 2022-10-14 PROCEDURE — 82947 ASSAY GLUCOSE BLOOD QUANT: CPT

## 2022-10-14 PROCEDURE — 83735 ASSAY OF MAGNESIUM: CPT

## 2022-10-14 PROCEDURE — 2580000003 HC RX 258: Performed by: FAMILY MEDICINE

## 2022-10-14 PROCEDURE — 94761 N-INVAS EAR/PLS OXIMETRY MLT: CPT

## 2022-10-14 PROCEDURE — 2500000003 HC RX 250 WO HCPCS: Performed by: INTERNAL MEDICINE

## 2022-10-14 PROCEDURE — 99231 SBSQ HOSP IP/OBS SF/LOW 25: CPT | Performed by: PSYCHIATRY & NEUROLOGY

## 2022-10-14 PROCEDURE — 99232 SBSQ HOSP IP/OBS MODERATE 35: CPT | Performed by: FAMILY MEDICINE

## 2022-10-14 PROCEDURE — 94640 AIRWAY INHALATION TREATMENT: CPT

## 2022-10-14 PROCEDURE — 85025 COMPLETE CBC W/AUTO DIFF WBC: CPT

## 2022-10-14 PROCEDURE — 80048 BASIC METABOLIC PNL TOTAL CA: CPT

## 2022-10-14 PROCEDURE — 70551 MRI BRAIN STEM W/O DYE: CPT

## 2022-10-14 PROCEDURE — 2060000000 HC ICU INTERMEDIATE R&B

## 2022-10-14 PROCEDURE — 2700000000 HC OXYGEN THERAPY PER DAY

## 2022-10-14 PROCEDURE — 36415 COLL VENOUS BLD VENIPUNCTURE: CPT

## 2022-10-14 RX ORDER — DEXTROSE MONOHYDRATE 50 MG/ML
INJECTION, SOLUTION INTRAVENOUS CONTINUOUS
Status: DISCONTINUED | OUTPATIENT
Start: 2022-10-14 | End: 2022-10-16

## 2022-10-14 RX ORDER — KETOROLAC TROMETHAMINE 30 MG/ML
15 INJECTION, SOLUTION INTRAMUSCULAR; INTRAVENOUS EVERY 6 HOURS PRN
Status: DISCONTINUED | OUTPATIENT
Start: 2022-10-14 | End: 2022-10-18

## 2022-10-14 RX ADMIN — CARVEDILOL 3.12 MG: 3.12 TABLET, FILM COATED ORAL at 17:13

## 2022-10-14 RX ADMIN — IPRATROPIUM BROMIDE AND ALBUTEROL SULFATE 1 AMPULE: 2.5; .5 SOLUTION RESPIRATORY (INHALATION) at 07:31

## 2022-10-14 RX ADMIN — CARVEDILOL 3.12 MG: 3.12 TABLET, FILM COATED ORAL at 08:15

## 2022-10-14 RX ADMIN — DIVALPROEX SODIUM 1000 MG: 500 TABLET, DELAYED RELEASE ORAL at 20:32

## 2022-10-14 RX ADMIN — CEFEPIME HYDROCHLORIDE 2000 MG: 2 INJECTION, POWDER, FOR SOLUTION INTRAMUSCULAR; INTRAVENOUS at 12:07

## 2022-10-14 RX ADMIN — KETOROLAC TROMETHAMINE 15 MG: 30 INJECTION, SOLUTION INTRAMUSCULAR; INTRAVENOUS at 21:25

## 2022-10-14 RX ADMIN — SODIUM CHLORIDE, PRESERVATIVE FREE 10 ML: 5 INJECTION INTRAVENOUS at 20:41

## 2022-10-14 RX ADMIN — IPRATROPIUM BROMIDE AND ALBUTEROL SULFATE 1 AMPULE: 2.5; .5 SOLUTION RESPIRATORY (INHALATION) at 19:30

## 2022-10-14 RX ADMIN — IPRATROPIUM BROMIDE AND ALBUTEROL SULFATE 1 AMPULE: 2.5; .5 SOLUTION RESPIRATORY (INHALATION) at 15:32

## 2022-10-14 RX ADMIN — Medication 2000 UNITS: at 08:16

## 2022-10-14 RX ADMIN — POTASSIUM BICARBONATE 40 MEQ: 782 TABLET, EFFERVESCENT ORAL at 20:31

## 2022-10-14 RX ADMIN — SODIUM CHLORIDE, PRESERVATIVE FREE 10 ML: 5 INJECTION INTRAVENOUS at 08:55

## 2022-10-14 RX ADMIN — ACETAMINOPHEN 650 MG: 325 TABLET, FILM COATED ORAL at 20:31

## 2022-10-14 RX ADMIN — POTASSIUM CHLORIDE: 2 INJECTION, SOLUTION, CONCENTRATE INTRAVENOUS at 07:48

## 2022-10-14 RX ADMIN — CEFEPIME HYDROCHLORIDE 2000 MG: 2 INJECTION, POWDER, FOR SOLUTION INTRAMUSCULAR; INTRAVENOUS at 21:13

## 2022-10-14 RX ADMIN — DEXTROSE MONOHYDRATE: 50 INJECTION, SOLUTION INTRAVENOUS at 12:25

## 2022-10-14 RX ADMIN — VANCOMYCIN HYDROCHLORIDE 1000 MG: 1 INJECTION, POWDER, LYOPHILIZED, FOR SOLUTION INTRAVENOUS at 23:39

## 2022-10-14 RX ADMIN — DULOXETINE 60 MG: 60 CAPSULE, DELAYED RELEASE ORAL at 08:16

## 2022-10-14 RX ADMIN — ARIPIPRAZOLE 5 MG: 5 TABLET ORAL at 12:04

## 2022-10-14 RX ADMIN — FERROUS SULFATE TAB 325 MG (65 MG ELEMENTAL FE) 325 MG: 325 (65 FE) TAB at 17:13

## 2022-10-14 RX ADMIN — VANCOMYCIN HYDROCHLORIDE 1000 MG: 1 INJECTION, POWDER, LYOPHILIZED, FOR SOLUTION INTRAVENOUS at 14:29

## 2022-10-14 RX ADMIN — DIVALPROEX SODIUM 1000 MG: 500 TABLET, DELAYED RELEASE ORAL at 08:15

## 2022-10-14 RX ADMIN — POTASSIUM CHLORIDE 10 MEQ: 7.46 INJECTION, SOLUTION INTRAVENOUS at 06:09

## 2022-10-14 RX ADMIN — ATORVASTATIN CALCIUM 40 MG: 40 TABLET, FILM COATED ORAL at 20:32

## 2022-10-14 RX ADMIN — VANCOMYCIN HYDROCHLORIDE 1000 MG: 1 INJECTION, POWDER, LYOPHILIZED, FOR SOLUTION INTRAVENOUS at 00:52

## 2022-10-14 RX ADMIN — SODIUM PHOSPHATE, MONOBASIC, MONOHYDRATE AND SODIUM PHOSPHATE, DIBASIC, ANHYDROUS 20 MMOL: 276; 142 INJECTION, SOLUTION INTRAVENOUS at 12:29

## 2022-10-14 RX ADMIN — FERROUS SULFATE TAB 325 MG (65 MG ELEMENTAL FE) 325 MG: 325 (65 FE) TAB at 08:15

## 2022-10-14 RX ADMIN — POTASSIUM CHLORIDE 10 MEQ: 7.46 INJECTION, SOLUTION INTRAVENOUS at 21:25

## 2022-10-14 RX ADMIN — HEPARIN SODIUM 5000 UNITS: 5000 INJECTION INTRAVENOUS; SUBCUTANEOUS at 08:48

## 2022-10-14 RX ADMIN — IPRATROPIUM BROMIDE AND ALBUTEROL SULFATE 1 AMPULE: 2.5; .5 SOLUTION RESPIRATORY (INHALATION) at 10:45

## 2022-10-14 RX ADMIN — POTASSIUM CHLORIDE 10 MEQ: 7.46 INJECTION, SOLUTION INTRAVENOUS at 22:29

## 2022-10-14 RX ADMIN — HEPARIN SODIUM 5000 UNITS: 5000 INJECTION INTRAVENOUS; SUBCUTANEOUS at 20:39

## 2022-10-14 ASSESSMENT — PAIN SCALES - GENERAL
PAINLEVEL_OUTOF10: 0
PAINLEVEL_OUTOF10: 6
PAINLEVEL_OUTOF10: 6
PAINLEVEL_OUTOF10: 0

## 2022-10-14 ASSESSMENT — PAIN DESCRIPTION - DESCRIPTORS
DESCRIPTORS: ACHING
DESCRIPTORS: ACHING

## 2022-10-14 ASSESSMENT — PAIN DESCRIPTION - LOCATION: LOCATION: GENERALIZED

## 2022-10-14 NOTE — PROGRESS NOTES
Progress Note    Reason for Consult: NICKI    Requesting Physician:  Tiffany Torres MD    INTERVAL HISTORY:  Creatinine improved to 0.43  Potassium is low at 3.0  Phosphorus was borderline low at 2.5  Very sleepy and drowsy    HISTORY OF PRESENT ILLNESS:    The patient is a 46 y.o. female who presents with weakness, generalized pain, SOB and abdominal pain. She tested positive for COVID 19 on 9/30. She states she slept 2 days straight last week. C/o fever, chills and foul smelling urine with right flank pain. She also admits to doing crack cocaine at least twice weekly and when ever she can afford it more frequently. She states she uses cocaine to help with pain. She drinks homemade wine on occasion and smokes cigarettes, but states recently she decreased tobacco use. Creatinine baseline 0.6 from earlier this year. Creatinine 2.1 on admission. With sodium 132, potassium 3.3, bicarb 24, chloride 91, BUN 33, magnesium 2.1, procalcitonin 6.01, proBNP 4799. High sensitivity troponin was initially 30 and 13 on redraw. Glucose 129, albumin 3.44. Given normal saline bolus. X-ray showed multiple nodular opacities projecting over the right lung measuring up to 1.5 cm further evaluation with CT chest recommended. No acute focal airspace consolidation or pleural effusions. Blood pressure trending 110-190s/60-120s. Medications include Coreg, lisinopril, trazodone, gabapentin, Norvasc, Vistaril. She is very drowsy, slurring words. She has hx of HTN. She states she only takes BP meds if she remembers, which is not often. Hx of HTN since age 23s. Ordered urine sodium, urine creatinine, urine eosinophils, urine osmolality. Follow-up urine culture. On iv Zosyn per primary. Strict I&O discussed with RN. She received 1 dose of Lovenox and is now on heparin 5000 units twice a day. In the ER, she received Dilaudid and morphine and dose of Neurontin. She is very drowsy. Hold Neurontin and trazodone for now. Blood pressure is improving. Holding lisinopril for now . Will also hold norvasc, as pt does not take medications regularly and need to avoid hypotension. Continue Coreg.       Review Of Systems:   Unable to obtain, patient is lethargic and not interactive    Past Medical History:   Diagnosis Date    Anemia     Arthritis     Asthma     Bipolar disorder, mixed ([address]     Carotid artery stenosis 2020    Cocaine abuse ([address] 2014    COPD (chronic obstructive pulmonary disease) ([address]     Degeneration of cervical intervertebral disc     Depression with anxiety     Depression with anxiety     Fibromyalgia 2017    GERD (gastroesophageal reflux disease)     History of migraine headaches 6/10/2014    History of renal calculi 6/10/2014    History of seizure disorder 6/10/2014    Hoarseness of voice     HTN (hypertension) 6/10/2014    Hyperlipidemia 6/10/2014    IGT (impaired glucose tolerance) 6/10/2014    Kidney stones     Lactose intolerance 6/10/2014    Leukopenia 6/10/2014    Major depressive disorder, recurrent episode, severe, without mention of psychotic behavior 2014    MVP (mitral valve prolapse)     Seizures ([address]     Sleep apnea 6/10/2014    Unspecified diseases of blood and blood-forming organs        Past Surgical History:   Procedure Laterality Date    ANKLE FRACTURE SURGERY      recontruction surgery    APPENDECTOMY      BREAST LUMPECTOMY Right     CARDIAC CATHETERIZATION      CARPAL TUNNEL RELEASE      x2     SECTION      CHOLECYSTECTOMY      COLONOSCOPY  12    COLONOSCOPY  2016    severe spasms    COLONOSCOPY  2019    DR GOLDY MCINTOSH    GASTRIC BYPASS SURGERY      GASTRIC BYPASS SURGERY      HYSTERECTOMY (CERVIX STATUS UNKNOWN)      HYSTERECTOMY (CERVIX STATUS UNKNOWN)      LAPAROSCOPY N/A 2022    LAPAROSCOPY EXPLORATORY CONVERTED TO OPEN EXPLORATORY LAPAROTOMY, LYSIS OF ADHESIONS, REDUCTION OF INTERNAL HERNIA performed by Elicia Gutierrez DO at [address] SURGERY      OTHER SURGICAL HISTORY  83/53/9625    APPLICATION OF ARCH BARS,SIMPLE EXTRACTION OF #15 AND RT TMJ JOINT REPLACEMENT    SHOULDER ARTHROSCOPY Left 06/05/2019    DR ROBERT GREEN    SHOULDER SURGERY      TONSILLECTOMY AND ADENOIDECTOMY      UPPER GASTROINTESTINAL ENDOSCOPY  7-3-12    egd    UPPER GASTROINTESTINAL ENDOSCOPY  12/19/2016    status post gastrectomy with a small remnant of gastric pouch. UPPER GASTROINTESTINAL ENDOSCOPY  05/2019    DR Anamaria Watson       Prior to Admission medications    Medication Sig Start Date End Date Taking? Authorizing Provider   lisinopril (PRINIVIL;ZESTRIL) 20 MG tablet Take 20 mg by mouth daily   Yes Historical Provider, MD   carvedilol (COREG) 25 MG tablet TAKE 1 TABLET BY MOUTH IN THE MORNING AND 1 IN THE EVENING WITH MEALS 9/19/22   Javan Guillaume MD   traZODone (DESYREL) 100 MG tablet TAKE 1 TABLET BY MOUTH AT BEDTIME 6/13/22   Historical Provider, MD   gabapentin (NEURONTIN) 300 MG capsule TAKE 1 CAPSULE BY MOUTH THREE TIMES DAILY 5/24/22 8/9/22  Javan Guillaume MD   hydrOXYzine (VISTARIL) 25 MG capsule  9/15/20   Historical Provider, MD   DULoxetine (CYMBALTA) 30 MG extended release capsule Take 1 capsule by mouth daily  Patient taking differently: Take 60 mg by mouth in the morning.  4/8/19   Hien Rivera, APRN - CNP   ARIPiprazole (ABILIFY) 5 MG tablet Take 5 mg by mouth daily    Historical Provider, MD       Scheduled Meds:   vancomycin  1,000 mg IntraVENous Q12H    cefepime  2,000 mg IntraVENous Q12H    Vitamin D  2,000 Units Oral Daily    carvedilol  3.125 mg Oral BID WC    sodium chloride flush  5-40 mL IntraVENous 2 times per day    heparin (porcine)  5,000 Units SubCUTAneous BID    ARIPiprazole  5 mg Oral Daily    atorvastatin  40 mg Oral Daily    divalproex  1,000 mg Oral BID    DULoxetine  60 mg Oral Daily    ferrous sulfate  325 mg Oral BID WC    ipratropium-albuterol  1 ampule Inhalation Q4H WA    vancomycin (VANCOCIN) intermittent dosing (placeholder)   Other RX Placeholder     Continuous Infusions:   dextrose 5 % and 0.9 % NaCl 50 mL/hr at 10/13/22 1117    sodium chloride       PRN Meds:potassium chloride **OR** potassium alternative oral replacement **OR** potassium chloride, hydrALAZINE, sodium chloride flush, sodium chloride, acetaminophen, ondansetron **OR** ondansetron, albuterol sulfate HFA, sodium chloride flush, perflutren lipid microspheres         Physical Exam:  Vitals:    10/14/22 0815 10/14/22 0830 10/14/22 0845 10/14/22 0900   BP:    134/67   Pulse: 68 68 67 69   Resp: 22 23 25 (!) 0   Temp:       TempSrc:       SpO2: 92% 94% 92% 91%   Weight:       Height:         I/O last 3 completed shifts: In: 2991.4 [I.V.:1715.3; IV Piggyback:1276.1]  Out: 6410 [[facility]:0646]    General:  lethargic, not in distress. Appears to be older than stated age. HEENT: Atraumatic, normocephalic. Anicteric sclera. Pink and moist oral mucosa. Neck supple. No JVD. Chest: Bilateral air entry, clear to auscultation, no wheezing, rhonchi or rales. Cardiovascular: S1S2, no murmur, rub or gallop. No lower extremity edema. Abdomen: soft Active bowel sounds. Musculoskeletal:  No cyanosis or clubbing. Integumentary: Pink, warm and dry. +wounds to BLE. No s/s of infection.  Skin turgor normal.  CNS: ISAAK    Data:    CBC:   Lab Results   Component Value Date    WBC 5.8 10/14/2022    HGB 9.8 (L) 10/14/2022    HCT 29.8 (L) 10/14/2022    MCV 80.1 10/14/2022    PLT 95 (L) 10/14/2022     BMP:    Lab Results   Component Value Date     10/14/2022     10/13/2022     10/12/2022    K 3.0 (L) 10/14/2022    K 3.1 (L) 10/13/2022    K 3.5 (L) 10/12/2022     (H) 10/14/2022     10/13/2022     10/12/2022    CO2 22 10/14/2022    CO2 20 10/13/2022    CO2 19 (L) 10/12/2022    BUN 14 10/14/2022    BUN 18 10/13/2022    BUN 19 10/12/2022    CREATININE 0.43 (L) 10/14/2022    CREATININE 0.52 10/13/2022    CREATININE 0.60 10/12/2022    GLUCOSE 95 10/14/2022    GLUCOSE 69 (L) 10/13/2022    GLUCOSE 85 10/12/2022     CMP:   Lab Results   Component Value Date/Time     10/14/2022 04:13 AM    K 3.0 10/14/2022 04:13 AM     10/14/2022 04:13 AM    CO2 22 10/14/2022 04:13 AM    BUN 14 10/14/2022 04:13 AM    CREATININE 0.43 10/14/2022 04:13 AM    GLUCOSE 95 10/14/2022 04:13 AM    CALCIUM 8.0 10/14/2022 04:13 AM    PROT 7.9 10/10/2022 04:55 AM    LABALBU 3.4 10/10/2022 04:55 AM    BILITOT 0.7 10/10/2022 04:55 AM    ALKPHOS 112 10/10/2022 04:55 AM    AST 41 10/10/2022 04:55 AM    ALT 17 10/10/2022 04:55 AM      Hepatic:   Lab Results   Component Value Date    AST 41 (H) 10/10/2022    AST 14 04/08/2022    AST 26 01/28/2022    ALT 17 10/10/2022    ALT 6 04/08/2022    ALT 11 01/28/2022    BILITOT 0.7 10/10/2022    BILITOT 0.18 (L) 04/08/2022    BILITOT 0.23 (L) 01/28/2022    ALKPHOS 112 (H) 10/10/2022    ALKPHOS 120 (H) 04/08/2022    ALKPHOS 90 01/28/2022     BNP: No results found for: BNP  Lipids:   Lab Results   Component Value Date    CHOL 156 01/30/2022    HDL 39 (L) 01/30/2022     INR:   Lab Results   Component Value Date    INR 1.2 10/10/2022    INR 1.0 07/22/2016    INR 0.9 05/30/2016     PTH: No results found for: PTH  Phosphorus:    Lab Results   Component Value Date/Time    PHOS 2.5 10/14/2022 04:13 AM     Ionized Calcium: No results found for: IONCA  Magnesium:   Lab Results   Component Value Date/Time    MG 1.9 10/14/2022 04:13 AM     Albumin:   Lab Results   Component Value Date/Time    LABALBU 3.4 10/10/2022 04:55 AM     Last 3 CK, CKMB, Troponin: @LABRCNT(CKTOTAL:3,CKMB:3,TROPONINI:3)       URINE:)No results found for: NAUR, PROTUR          Impression:  NICKI, appears to be hemodynamically related, Cr is improving. Mild hypernatremia. Hypokalemia. Hypophosphatemia  Hypocalcemia. Hypovitaminosis D. Hypertension. MRSA sepsis with septic embolization. UTI with E coli. History of cocaine use. Ileus. Bipolar disorder. Plan:   Will change IVF to D5W at 100 ml/hr. Potassium was replaced. Will replace phosphorus IV. On iv Cefepime and Vancomycin. Continue holding Lisinopril. Coreg dose was decreased to  3.125 mg BID with holding parameters. Monitor BP. Started on vitamin D replacement. Labs daily. Spoke to her brother. Follow up labs ordered for AM.     Please do not hesitate to call with questions.       Electronically signed by Mikel Baron MD  on 10/14/2022 at 11:29 AM

## 2022-10-14 NOTE — PROGRESS NOTES
Vancomycin Dosing by Pharmacy - Daily Note   Vancomycin Therapy Day:  4  Indication: bacteremia    Allergies:  Aspirin, Bactrim, and Codeine   Actual Weight:    Wt Readings from Last 1 Encounters:   10/13/22 109 lb 12.6 oz (49.8 kg)       Labs/Ancillary Data  Estimated Creatinine Clearance: 96 mL/min (based on SCr of 0.52 mg/dL). Recent Labs     10/11/22  0423 10/11/22  1125 10/12/22  0418 10/12/22  1334 10/13/22  0357   CREATININE 1.53*   < > 0.67 0.60 0.52   BUN 38*   < > 22* 19 18   WBC 21.2*  --  15.7*  --  8.7    < > = values in this interval not displayed. Procalcitonin   Date Value Ref Range Status   10/10/2022 6.01 (H) <0.09 ng/mL Final     Comment:           Suspected Sepsis:  <0.50 ng/mL     Low likelihood of sepsis. 0.50-2.00 ng/mL     Increased likelihood of sepsis. Antibiotics encouraged. >2.00 ng/mL     High risk of sepsis/shock. Antibiotics strongly encouraged. Suspected Lower Resp Tract Infections:  <0.24 ng/mL     Low likelihood of bacterial infection. >0.24 ng/mL     Increased likelihood of bacterial infection. Antibiotics encouraged. With successful antibiotic therapy, PCT levels should decrease rapidly. (Half-life of 24 to   36 hours.)        Procalcitonin values from samples collected within the first 6 hours of systemic infection   may still be low. Retesting may be indicated. Values from day 1 and day 4 can be entered into the Change in Procalcitonin Calculator   ([email or web address]-[email or web address]) to determine the patient's Mortality Risk Prognosis        In healthy neonates, plasma Procalcitonin (PCT) concentrations increase gradually after   birth, reaching peak values at about 24 hours of age then decrease to normal values below   0.5 ng/mL by 48-72 hours of age.          Intake/Output Summary (Last 24 hours) at 10/13/2022 2357  Last data filed at 10/13/2022 2033  Gross per 24 hour   Intake 2287.12 ml   Output 1025 ml   Net 1262.12 ml     Temp: 98.2    Culture Date / Source  /  Results  See micro reports  Recent vancomycin administrations                     vancomycin (VANCOCIN) 1,250 mg in dextrose 5 % 250 mL IVPB (ADDAVIAL) (mg) 1,250 mg New Bag 10/13/22 1126     1,250 mg New Bag 10/12/22 0834    vancomycin (VANCOCIN) 750 mg in dextrose 5 % 250 mL IVPB (mg) 750 mg New Bag 10/11/22 2110                    Vancomycin Concentrations:   TROUGH:  No results for input(s): VANCOTROUGH in the last 72 hours. RANDOM:    Recent Labs     10/12/22  0418 10/13/22  2313   VANCORANDOM 13.4 13.8       MRSA Nasal Swab: N/A. Non-respiratory infection. Mena Anaya PLAN     Increase dose to 1000 mg q12h IV  Ensured BUN/sCr ordered at baseline and every 48 hours x at least 3 levels, then at least weekly. Repeat vancomycin concentration ordered for 10/14/22 @ 1800   Pharmacy will continue to monitor patient and adjust therapy as indicated      Vancomycin Target Concentration Parameters  Treatment  Population Target AUC/GABBI Target Trough   Invasive MRSA Infection (bacteremia, pneumonia, meningitis, endocarditis, osteomyelitis)  Sepsis (undifferentiated) 400-600 N/A   Infection due to non-MRSA pathogen  Empiric treatment of non-invasive MRSA infection  (SSTI, UTI) <500 10-15 mg/L   CrCl < 29 mL/min  Rapidly fluctuating serum creatinine   NICKI N/A < 15 mg/L     Renal replacement therapy is dosed by levels, per hospital protocol. Abbreviations  * Pauc: probability that AUC is >400 (efficacy); Pconc: probability that Ctrough is above 20 ?g/mL (toxicity); Tox: Probability of nephrotoxicity, based on Iva et al. Clin Infect Dis 2009. Loading dose: N/A  Regimen: 1000 mg IV every 12 hours. Start time: 11:26 on 10/14/2022  Exposure target: AUC24 (range)400-600 mg/L.hr   AUC24,ss: 530 mg/L.hr  Probability of AUC24 > 400: 96 %  Ctrough,ss: 15.2 mg/L  Probability of Ctrough,ss > 20: 12 %  Probability of nephrotoxicity (Lodise DEVON 2009): 10 %      Thank you for the consult. Pharmacy will continue to follow.

## 2022-10-14 NOTE — CARE COORDINATION
ONGOING DISCHARGE PLAN:    Patient is not alert to answer questions. Spoke with patient's Dad, Jan 1852 [facility] discharge plan and he confirms that plan is still undecided at this time. Pt. Is from Home w/ Daughter & CHELSI. Guerrero, states, that if pt. Needs, she can stay with him when DC. Sating 97% on 1LNC. Per Nursing, was not awake to do ADRIANNE, was able to do MRI Brain. Pt has HX of Crack Cocaine Use. Cardio/Neuro following. Remains on IV Cefepime/Vanco.    Will continue to follow for additional discharge needs.     Electronically signed by Valentino Oconnor RN on 10/14/2022 at 4:30 PM

## 2022-10-14 NOTE — PROGRESS NOTES
[facility] Neurology   IN-PATIENT SERVICE      NEUROLOGY PROGRESS  NOTE            Date:   10/14/2022  Patient name:  Otilia Conrad  Date of admission:  10/10/2022  YOB: 1970      Interval History:     Since yesterday patient has had an MRI performed. MRI results are pending. Patient remains unable to awaken fully and answer questions. She does not follow simple commands. She is able to swallow pills. Her overall performance is perhaps the same as yesterday. I cannot tell if she is slightly improved. History of Present Illness: The patient is a 46 y.o. female who presents with Shortness of Breath and Positive For Covid-19  . The patient was seen and examined and the chart was reviewed. This patient was admitted on 10/10/2022 and transferred to the ICU on 10/13/2022. ED admitting note indicates shortness of breath and difficulty breathing. Patient had a positive test for COVID 1 week prior to presentation. There is also indication patient been doing crack cocaine immediate prior to admission. Urine was positive for cocaine and opiates. During the course of this hospitalization patient had an episode of becoming unresponsive lethargic hypotensive. Patient was transferred to the ICU for further management. Patient is currently being evaluated for possible endocarditis. Multiple abnormalities noted bases of the lungs on CT of the abdomen suggestive of possible septic emboli. Neurology was consulted because patient remained obtunded.     Past Medical History:     Past Medical History:   Diagnosis Date    Anemia     Arthritis     Asthma     Bipolar disorder, mixed ([address]     Carotid artery stenosis 2/13/2020    C[facility] 11/4/2014    COPD (chronic obstructive pulmonary disease) (HCC)     Degeneration of cervical intervertebral disc     Depression with anxiety     Depression with anxiety     Fibromyalgia 1/5/2017    GERD (gastroesophageal reflux disease) History of migraine headaches 6/10/2014    History of renal calculi 6/10/2014    History of seizure disorder 6/10/2014    Hoarseness of voice     HTN (hypertension) 6/10/2014    Hyperlipidemia 6/10/2014    IGT (impaired glucose tolerance) 6/10/2014    Kidney stones     Lactose intolerance 6/10/2014    Leukopenia 6/10/2014    Major depressive disorder, recurrent episode, severe, without mention of psychotic behavior 2014    MVP (mitral valve prolapse)     Seizures (HCC)     Sleep apnea 6/10/2014    Unspecified diseases of blood and blood-forming organs         Past Surgical History:     Past Surgical History:   Procedure Laterality Date    ANKLE FRACTURE SURGERY      recontruction surgery    APPENDECTOMY      BREAST LUMPECTOMY Right     CARDIAC CATHETERIZATION      CARPAL TUNNEL RELEASE      x2     SECTION      CHOLECYSTECTOMY      COLONOSCOPY  12    COLONOSCOPY  2016    severe spasms    COLONOSCOPY  2019    DR GOLDY MCINTOSH    GASTRIC BYPASS SURGERY      GASTRIC BYPASS SURGERY      HYSTERECTOMY (CERVIX STATUS UNKNOWN)      HYSTERECTOMY (CERVIX STATUS UNKNOWN)      LAPAROSCOPY N/A 2022    LAPAROSCOPY EXPLORATORY CONVERTED TO OPEN EXPLORATORY LAPAROTOMY, LYSIS OF ADHESIONS, REDUCTION OF INTERNAL HERNIA performed by Tessa Owens, DO at [address] [place] [address]      APPLICATION OF ARCH BARS,SIMPLE EXTRACTION OF #15 AND RT TMJ JOINT REPLACEMENT    SHOULDER ARTHROSCOPY Left 2019    DR ROBERT GREEN    SHOULDER SURGERY      TONSILLECTOMY AND ADENOIDECTOMY      UPPER GASTROINTESTINAL ENDOSCOPY  7-3-12    egd    UPPER GASTROINTESTINAL ENDOSCOPY  2016    status post gastrectomy with a small remnant of gastric pouch.     UPPER GASTROINTESTINAL ENDOSCOPY  2019    DR Rey Hill        Medications during admission:      vancomycin  1,000 mg IntraVENous Q12H    cefepime  2,000 mg IntraVENous Q12H    Vitamin D  2,000 Units Oral Daily carvedilol  3.125 mg Oral BID     sodium chloride flush  5-40 mL IntraVENous 2 times per day    heparin (porcine)  5,000 Units SubCUTAneous BID    ARIPiprazole  5 mg Oral Daily    atorvastatin  40 mg Oral Daily    divalproex  1,000 mg Oral BID    DULoxetine  60 mg Oral Daily    ferrous sulfate  325 mg Oral BID     ipratropium-albuterol  1 ampule Inhalation Q4H WA    vancomycin (VANCOCIN) intermittent dosing (placeholder)   Other RX Placeholder         Physical Exam:   /67   Pulse 69   Temp 98.4 °F (36.9 °C) (Oral)   Resp (!) 0   Ht 4' 11\" (1.499 m)   Wt 98 lb 1.7 oz (44.5 kg)   SpO2 91%   BMI 19.81 kg/m²   Temp (24hrs), Av °F (36.7 °C), Min:97.5 °F (36.4 °C), Max:98.4 °F (36.9 °C)      Neurological examination:    Mental status   Somnolent. Incomplete arousable with verbal stimuli and shaking. Does not follow commands.      Cranial nerves   II -  pupils reactive  III, IV, VI - extraocular muscles intact  V - normal facial response to stimuli                                                              VII - normal facial symmetry                                                             VIII - intact hearing                                                                             IX, X -unable to assess                                              XI -can turn head from side to side                                                       XII -unable to assess     Motor function  Does move all 4 extremities to stimuli              Sensory function Does perceive noxious stimuli all 4 extremities     Cerebellar Relative decrease in tone     Reflex function Suppressed   Gait                  Unable to test             Diagnostics:      Laboratory Testing:  CBC:   Recent Labs     10/12/22  0418 10/13/22  0357 10/14/22  0413   WBC 15.7* 8.7 5.8   HGB 10.6* 9.9* 9.8*   * 114* 95*     BMP:    Recent Labs     10/12/22  1334 10/13/22  0357 10/14/22  0413    137 143   K 3.5* 3.1* 3.0*   CL 105 106 110*   CO2 19* 20 22   BUN 19 18 14   CREATININE 0.60 0.52 0.43*   GLUCOSE 85 69* 95         Lab Results   Component Value Date    CHOL 156 01/30/2022    LDLCHOLESTEROL 101 01/30/2022    HDL 39 (L) 01/30/2022    TRIG 78 01/30/2022    ALT 17 10/10/2022    AST 41 (H) 10/10/2022    TSH 1.37 01/29/2022    INR 1.2 10/10/2022    LABA1C 6.1 (H) 01/29/2022    LABMICR 419 (H) 10/10/2022    [id number] 311 01/29/2022       Lab Results   Component Value Date    VALPROATE 5 (L) 04/08/2022     I did review medications. Patient is receiving Depakote. Impression:      Acute encephalopathy. Differential diagnosis includes toxic, metabolic or nonspecific.     Plan:     Follow-up on MRI results  Check Depakote level  Neurology will follow      Electronically signed by Ana Paula Bragg MD on 10/14/2022 at 10:40 AM      Ana Paula Bragg MD  [facility]  Neurology

## 2022-10-14 NOTE — PROGRESS NOTES
Progress Note  Date:10/14/2022       Room:  Patient Name:Anu Bourne     YOB: 1970     Age:51 y.o. Subjective    Subjective:  Symptoms:  (Remains somnolent. Took pills yesterday. MRI brain scheduled for today. ). Diet:  Poor intake. Activity level: Impaired due to weakness. Pain:  She reports no pain. Review of Systems  Objective         Vitals Last 24 Hours:  TEMPERATURE:  Temp  Av.9 °F (36.6 °C)  Min: 97.5 °F (36.4 °C)  Max: 98.3 °F (36.8 °C)  RESPIRATIONS RANGE: Resp  Av.7  Min: 16  Max: 30  PULSE OXIMETRY RANGE: SpO2  Av.8 %  Min: 92 %  Max: 100 %  PULSE RANGE: Pulse  Av.4  Min: 65  Max: 82  BLOOD PRESSURE RANGE: Systolic (41MZG), YBF:377 , Min:100 , NVM:542   ; Diastolic (84LMG), XDA:17, Min:43, Max:91    I/O (24Hr): Intake/Output Summary (Last 24 hours) at 10/14/2022 0837  Last data filed at 10/14/2022 0641  Gross per 24 hour   Intake 704.28 ml   Output 475 ml   Net 229.28 ml     Objective:  General Appearance:  Comfortable. Vital signs: (most recent): Blood pressure 122/61, pulse 67, temperature 98.3 °F (36.8 °C), resp. rate 27, height 4' 11\" (1.499 m), weight 98 lb 1.7 oz (44.5 kg), SpO2 100 %. Vital signs are normal.    Output: Producing urine. Lungs:  Normal effort and normal respiratory rate. There are decreased breath sounds. Heart: Normal rate. Regular rhythm.   S1 normal and S2 normal.    Labs/Imaging/Diagnostics    Labs:  CBC:  Recent Labs     10/12/22  0418 10/13/22  0357 10/14/22  0413   WBC 15.7* 8.7 5.8   RBC 4.16 3.90* 3.73*   HGB 10.6* 9.9* 9.8*   HCT 32.9* 31.7* 29.8*   MCV 79.1* 81.3 80.1   RDW 15.2* 15.5* 15.4*   * 114* 95*     CHEMISTRIES:  Recent Labs     10/12/22  0418 10/12/22  1334 10/13/22  0357 10/14/22  0413    135 137 143   K 3.9 3.5* 3.1* 3.0*    105 106 110*   CO2 16* 19* 20 22   BUN 22* 19 18 14   CREATININE 0.67 0.60 0.52 0.43*   GLUCOSE 81 85 69* 95   PHOS 2.0*  --  2.2* 2.5* MG 2.1  --  1.9 1.9     PT/INR:No results for input(s): PROTIME, INR in the last 72 hours. APTT:No results for input(s): APTT in the last 72 hours. LIVER PROFILE:No results for input(s): AST, ALT, BILIDIR, BILITOT, ALKPHOS in the last 72 hours. Imaging Last 24 Hours:  XR LUMBAR SPINE (2-3 VIEWS)    Result Date: 10/12/2022  EXAMINATION: XRAY VIEWS OF THE LUMBAR SPINE 10/12/2022 2:06 pm COMPARISON: None. HISTORY: ORDERING SYSTEM PROVIDED HISTORY: back pain TECHNOLOGIST PROVIDED HISTORY: back pain Reason for Exam: back pain FINDINGS: No compression fracture or subluxation. Disc spaces maintained. Pedicles are intact. Diffuse calcification nondilated abdominal aorta. Unremarkable lumbar spine examination. XR ABDOMEN (KUB) (SINGLE AP VIEW)    Result Date: 10/12/2022  EXAMINATION: ONE SUPINE XRAY VIEW(S) OF THE ABDOMEN 10/12/2022 2:06 pm COMPARISON: 02/01/2022 HISTORY: ORDERING SYSTEM PROVIDED HISTORY: abd pain TECHNOLOGIST PROVIDED HISTORY: abd pain Reason for Exam: abd pain FINDINGS: Mildly dilated colon and several small bowel loops. Residual stool in colon. No urinary tract calcifications. Osseous structures unremarkable. Findings consistent with mild diffuse ileus. No bowel obstruction or urinary tract calcifications. Assessment//Plan           Hospital Problems             Last Modified POA    * (Principal) SIRS (systemic inflammatory response syndrome) ([address] 10/10/2022 Yes    NICKI (acute kidney injury) ([address] 10/11/2022 Yes    Severe malnutrition ([address] (Chronic) 10/11/2022 Yes    Bipolar disorder, mixed ([address] 10/11/2022 Yes    COPD (chronic obstructive pulmonary disease) ([address] 10/11/2022 Yes    H/O gastric bypass 10/11/2022 Yes    Overview Signed 2/13/2020  7:19 PM by Artem Lama MD     enlarged  pancreas, here for surgical work-up for EGD          Assessment & Plan    Sepsis with COPD exacerbation, possible septic emboli in chest - ADRIANNE not performed yesterday d/t lethargy. AMS - neurology following, MRI today      Electronically signed by Helena Perez MD on 10/14/22 at 8:37 AM EDT

## 2022-10-14 NOTE — PROGRESS NOTES
SC visit with patient's father, Guerrero; patient nonresponsive; Guerrero anxious and worried as he waits on medical update; welcomed prayer; visit ended abruptly due to writer having to respond to hospital emergency;      10/14/22 2834   Encounter Summary   Encounter Overview/Reason  Spiritual/Emotional Needs   Service Provided For: Patient and family together   Referral/Consult From: [address] [place] [address] Parent; Family members   Last Encounter  10/14/22   Complexity of Encounter Moderate   Spiritual/Emotional needs   Type Spiritual Support   Grief, Loss, and Adjustments   Type Adjustment to illness   Assessment/Intervention/Outcome   Assessment Anxious; Coping; Hopeful;Powerlessness   Intervention Active listening;Discussed illness injury and its impact; Explored/Affirmed feelings, thoughts, concerns;Prayer (assurance of)/[place];Sustaining Presence/Ministry of presence   Outcome Coping;Engaged in conversation;Expressed feelings, needs, and concerns;Expressed Gratitude;Receptive

## 2022-10-14 NOTE — PROGRESS NOTES
Pulmonary Progress Note  [facility]      Patient - Richard Archibald,  Age - 46 y.o.    - 1970      Room Number -    N -  [id number]   Acct # - [de-identified]  Date of Admission -  10/10/2022  4:37 AM    Kym Deras MD  Primary Care Physician - Jess Sullivan MD     SUBJECTIVE   Patient sluggishly but does respond to her name. Opens her eyes. When I asked her if she doing okay, she did nod her head. No accessory muscle use no abdominal breathing    OBJECTIVE   VITALS    height is 4' 11\" (1.499 m) and weight is 98 lb 1.7 oz (44.5 kg). Her oral temperature is 98.4 °F (36.9 °C). Her blood pressure is 134/67 and her pulse is 69. Her respiration is 0 (abnormal) and oxygen saturation is 91%. Body mass index is 19.81 kg/m². Temperature Range: Temp: 98.4 °F (36.9 °C) Temp  Av °F (36.7 °C)  Min: 97.5 °F (36.4 °C)  Max: 98.4 °F (36.9 °C)  BP Range:  Systolic (69VJG), KLW:610 , Min:100 , MALATHI:710     Diastolic (86XBF), ROSEMARY:06, Min:43, Max:91    Pulse Range: Pulse  Av.9  Min: 65  Max: 82  Respiration Range: Resp  Av.1  Min: 0  Max: 30  Current Pulse Ox[de-identified]  SpO2: 91 %  24HR Pulse Ox Range:  SpO2  Av.2 %  Min: 87 %  Max: 100 %  Oxygen Amount and Delivery: O2 Flow Rate (L/min): 1 L/min    Wt Readings from Last 3 Encounters:   10/14/22 98 lb 1.7 oz (44.5 kg)   22 96 lb 8 oz (43.8 kg)   22 105 lb (47.6 kg)       I/O (24 Hours)    Intake/Output Summary (Last 24 hours) at 10/14/2022 1150  Last data filed at 10/14/2022 0641  Gross per 24 hour   Intake 704.28 ml   Output 475 ml   Net 229.28 ml       EXAM     General Appearance  Awake, alert, oriented, in no acute distress  HEENT - normocephalic, atraumatic. Neck - Supple,  trachea midline   Lungs -coarse breath sounds no crackles rales or wheeze  Heart Exam:PMI normal. No lifts, heaves, or thrills. RRR.  No murmurs, clicks, gallops, or 10/14/2022 04:13 AM        LIVER PROFILE No results for input(s): AST, ALT, LIPASE, BILIDIR, BILITOT, ALKPHOS in the last 72 hours. Invalid input(s): AMYLASE,  ALB  INR No results for input(s): INR in the last 72 hours.   PTT   Lab Results   Component Value Date    APTT 25.6 07/22/2016         RADIOLOGY     (See actual reports for details)    ASSESSMENT/PLAN     Patient Active Problem List   Diagnosis    GERD (gastroesophageal reflux disease)    Hoarseness of voice    MVP (mitral valve prolapse)    Depression with anxiety    Lymphocytic colitis    History of migraine headaches    Hyperlipidemia    Sleep apnea    IGT (impaired glucose tolerance)    History of renal calculi    Lactose intolerance    History of seizure disorder    Leukopenia    Bipolar disorder, mixed ([address]    Illicit drug use    Postlaminectomy syndrome, cervical region    Degeneration of cervical intervertebral disc    Encounter for long-term (current) use of other medications    Bradycardia    Cellulitis    Hypokalemia    Primary hypertension    Elevated lipase    Right lower quadrant abdominal pain    Nausea    Diarrhea    Acute encephalopathy    Polysubstance abuse (HCC)    Fibromyalgia    Cocaine addiction (HCC)    Shoulder arthritis    Osteoarthritis of left glenohumeral joint    Abnormal EKG    Anemia    Anxiety    Carotid artery stenosis    COPD (chronic obstructive pulmonary disease) ([address]    Dental disease    HUTCHINSON (dyspnea on exertion)    Fatigue    Fractures    H/O gastric bypass    History of crack cocaine use    History of UTI    Injury of back    Intractable chronic migraine without aura and without status migrainosus    Intractable migraine with aura with status migrainosus    Kidney stones    Dizziness    Memory loss    Mild pulmonary hypertension (HCC)    Mild tricuspid regurgitation    Primary osteoarthritis of left shoulder    Seizure-like activity (HCC)    Stress at home    TMJ (dislocation of temporomandibular joint)    Tobacco abuse    Visual impairment    Internal hernia    Vitamin D deficiency    Iron deficiency    SIRS (systemic inflammatory response syndrome) ([facility]    NICKI (acute kidney injury) ([facility]    Severe malnutrition (HCC)     IMPRESSION:  1. Suspect COPD exacerbation. This has resolved. .  2   septic emboli septic emboli on abdomen CT scan  3.  E. coli urinary tract infection  4. Elevated procalcitonin level, may be, because of acute kidney injury  plus infection. 5.  Acute kidney injury -resolved. .  Initial  creatinine was 2.21 and now 0.52 anion gap is now normal.  6.  Leukocytosis with bandemia. 7. MRSA bacteremia. 2 of 2 blood cultures October 10  8. Opiates and cocaine in  tox cream.  9.  Full code. Currently on Maxipime and vancomycin  ID following. MRI of the brain ordered results pending. Neurology following. On DuoNeb treatments.   On heparin subcu DVT prophylaxis    Following with other services      Electronically signed by Primitivo Martinez MD on 10/14/2022 at 11:50 AM

## 2022-10-15 PROBLEM — D72.829 LEUKOCYTOSIS: Status: ACTIVE | Noted: 2022-10-15

## 2022-10-15 PROBLEM — R91.8 PULMONARY NODULES: Status: ACTIVE | Noted: 2022-10-15

## 2022-10-15 PROBLEM — R79.89 ELEVATED D-DIMER: Status: ACTIVE | Noted: 2022-10-15

## 2022-10-15 LAB
ABSOLUTE EOS #: 0.1 K/UL (ref 0–0.4)
ABSOLUTE LYMPH #: 0.5 K/UL (ref 1–4.8)
ABSOLUTE MONO #: 0.3 K/UL (ref 0.1–1.3)
ALLEN TEST: ABNORMAL
ANION GAP SERPL CALCULATED.3IONS-SCNC: 9 MMOL/L (ref 9–17)
BASOPHILS # BLD: 0 % (ref 0–2)
BASOPHILS ABSOLUTE: 0 K/UL (ref 0–0.2)
BUN BLDV-MCNC: 9 MG/DL (ref 6–20)
CALCIUM SERPL-MCNC: 7.6 MG/DL (ref 8.6–10.4)
CARBOXYHEMOGLOBIN: <1 % (ref 0–5)
CHLORIDE BLD-SCNC: 106 MMOL/L (ref 98–107)
CO2: 23 MMOL/L (ref 20–31)
CREAT SERPL-MCNC: 0.41 MG/DL (ref 0.5–0.9)
EOSINOPHILS RELATIVE PERCENT: 1 % (ref 0–4)
FIO2: 28
GFR SERPL CREATININE-BSD FRML MDRD: >60 ML/MIN/1.73M2
GLUCOSE BLD-MCNC: 113 MG/DL (ref 65–105)
GLUCOSE BLD-MCNC: 97 MG/DL (ref 70–99)
HCO3 ARTERIAL: 25.6 MMOL/L (ref 22–26)
HCT VFR BLD CALC: 27.6 % (ref 36–46)
HEMOGLOBIN: 9.1 G/DL (ref 12–16)
LYMPHOCYTES # BLD: 11 % (ref 24–44)
MAGNESIUM: 1.8 MG/DL (ref 1.6–2.6)
MCH RBC QN AUTO: 26.3 PG (ref 26–34)
MCHC RBC AUTO-ENTMCNC: 32.9 G/DL (ref 31–37)
MCV RBC AUTO: 79.7 FL (ref 80–100)
METHEMOGLOBIN: 1.2 % (ref 0–1.9)
MONOCYTES # BLD: 7 % (ref 1–7)
O2 DEVICE/FLOW/%: ABNORMAL
O2 SAT, ARTERIAL: 90.5 % (ref 95–98)
PATIENT TEMP: 37
PCO2 ARTERIAL: 35.4 MMHG (ref 35–45)
PDW BLD-RTO: 15.8 % (ref 11.5–14.9)
PH ARTERIAL: 7.47 (ref 7.35–7.45)
PHOSPHORUS: 2.7 MG/DL (ref 2.6–4.5)
PLATELET # BLD: 80 K/UL (ref 150–450)
PMV BLD AUTO: 9 FL (ref 6–12)
PO2 ARTERIAL: 66.5 MMHG (ref 80–100)
POSITIVE BASE EXCESS, ART: 1.8 MMOL/L (ref 0–2)
POTASSIUM SERPL-SCNC: 3.7 MMOL/L (ref 3.7–5.3)
PT. POSITION: ABNORMAL
RBC # BLD: 3.47 M/UL (ref 4–5.2)
RESPIRATORY RATE: 25
SAMPLE SITE: ABNORMAL
SEG NEUTROPHILS: 81 % (ref 36–66)
SEGMENTED NEUTROPHILS ABSOLUTE COUNT: 3.7 K/UL (ref 1.3–9.1)
SODIUM BLD-SCNC: 138 MMOL/L (ref 135–144)
TEXT FOR RESPIRATORY: ABNORMAL
WBC # BLD: 4.6 K/UL (ref 3.5–11)

## 2022-10-15 PROCEDURE — 82805 BLOOD GASES W/O2 SATURATION: CPT

## 2022-10-15 PROCEDURE — 6360000002 HC RX W HCPCS: Performed by: INTERNAL MEDICINE

## 2022-10-15 PROCEDURE — 36600 WITHDRAWAL OF ARTERIAL BLOOD: CPT

## 2022-10-15 PROCEDURE — 94761 N-INVAS EAR/PLS OXIMETRY MLT: CPT

## 2022-10-15 PROCEDURE — 36415 COLL VENOUS BLD VENIPUNCTURE: CPT

## 2022-10-15 PROCEDURE — 6370000000 HC RX 637 (ALT 250 FOR IP): Performed by: INTERNAL MEDICINE

## 2022-10-15 PROCEDURE — 2580000003 HC RX 258: Performed by: INTERNAL MEDICINE

## 2022-10-15 PROCEDURE — 94640 AIRWAY INHALATION TREATMENT: CPT

## 2022-10-15 PROCEDURE — 2700000000 HC OXYGEN THERAPY PER DAY

## 2022-10-15 PROCEDURE — 2060000000 HC ICU INTERMEDIATE R&B

## 2022-10-15 PROCEDURE — 83735 ASSAY OF MAGNESIUM: CPT

## 2022-10-15 PROCEDURE — 99231 SBSQ HOSP IP/OBS SF/LOW 25: CPT | Performed by: PSYCHIATRY & NEUROLOGY

## 2022-10-15 PROCEDURE — 84100 ASSAY OF PHOSPHORUS: CPT

## 2022-10-15 PROCEDURE — 99232 SBSQ HOSP IP/OBS MODERATE 35: CPT | Performed by: FAMILY MEDICINE

## 2022-10-15 PROCEDURE — 85025 COMPLETE CBC W/AUTO DIFF WBC: CPT

## 2022-10-15 PROCEDURE — 80048 BASIC METABOLIC PNL TOTAL CA: CPT

## 2022-10-15 RX ADMIN — DEXTROSE MONOHYDRATE: 50 INJECTION, SOLUTION INTRAVENOUS at 05:28

## 2022-10-15 RX ADMIN — CEFEPIME HYDROCHLORIDE 2000 MG: 2 INJECTION, POWDER, FOR SOLUTION INTRAMUSCULAR; INTRAVENOUS at 10:33

## 2022-10-15 RX ADMIN — HEPARIN SODIUM 5000 UNITS: 5000 INJECTION INTRAVENOUS; SUBCUTANEOUS at 08:35

## 2022-10-15 RX ADMIN — CARVEDILOL 3.12 MG: 3.12 TABLET, FILM COATED ORAL at 17:22

## 2022-10-15 RX ADMIN — FERROUS SULFATE TAB 325 MG (65 MG ELEMENTAL FE) 325 MG: 325 (65 FE) TAB at 08:34

## 2022-10-15 RX ADMIN — CARVEDILOL 3.12 MG: 3.12 TABLET, FILM COATED ORAL at 08:35

## 2022-10-15 RX ADMIN — FERROUS SULFATE TAB 325 MG (65 MG ELEMENTAL FE) 325 MG: 325 (65 FE) TAB at 17:21

## 2022-10-15 RX ADMIN — IPRATROPIUM BROMIDE AND ALBUTEROL SULFATE 1 AMPULE: 2.5; .5 SOLUTION RESPIRATORY (INHALATION) at 15:00

## 2022-10-15 RX ADMIN — DIVALPROEX SODIUM 1000 MG: 500 TABLET, DELAYED RELEASE ORAL at 08:34

## 2022-10-15 RX ADMIN — DULOXETINE 60 MG: 60 CAPSULE, DELAYED RELEASE ORAL at 08:35

## 2022-10-15 RX ADMIN — CEFEPIME HYDROCHLORIDE 2000 MG: 2 INJECTION, POWDER, FOR SOLUTION INTRAMUSCULAR; INTRAVENOUS at 22:17

## 2022-10-15 RX ADMIN — VANCOMYCIN HYDROCHLORIDE 1000 MG: 1 INJECTION, POWDER, LYOPHILIZED, FOR SOLUTION INTRAVENOUS at 14:13

## 2022-10-15 RX ADMIN — Medication 2000 UNITS: at 08:34

## 2022-10-15 RX ADMIN — IPRATROPIUM BROMIDE AND ALBUTEROL SULFATE 1 AMPULE: 2.5; .5 SOLUTION RESPIRATORY (INHALATION) at 10:49

## 2022-10-15 RX ADMIN — IPRATROPIUM BROMIDE AND ALBUTEROL SULFATE 1 AMPULE: 2.5; .5 SOLUTION RESPIRATORY (INHALATION) at 07:30

## 2022-10-15 RX ADMIN — IPRATROPIUM BROMIDE AND ALBUTEROL SULFATE 1 AMPULE: 2.5; .5 SOLUTION RESPIRATORY (INHALATION) at 20:00

## 2022-10-15 RX ADMIN — SODIUM CHLORIDE, PRESERVATIVE FREE 10 ML: 5 INJECTION INTRAVENOUS at 19:45

## 2022-10-15 NOTE — PROGRESS NOTES
Pulmonary Progress Note  [facility]      Patient - Nani Crawford,  Age - 46 y.o.    - 1970      Room Number -    MRN -  [id number]   Acct # - [de-identified]  Date of Admission -  10/10/2022  4:37 AM      Linda Guillaume MD  Primary Care Physician - Mariah Tafoya MD     SUBJECTIVE   Patient appears to be resting quietly. No issues at this time    OBJECTIVE   VITALS    height is 4' 11\" (1.499 m) and weight is 98 lb 1.7 oz (44.5 kg). Her axillary temperature is 97.2 °F (36.2 °C). Her blood pressure is 144/75 (abnormal) and her pulse is 75. Her respiration is 23 and oxygen saturation is 96%. Body mass index is 19.81 kg/m². Temperature Range: Temp: 97.2 °F (36.2 °C) Temp  Av °F (36.7 °C)  Min: 97.2 °F (36.2 °C)  Max: 98.4 °F (36.9 °C)  BP Range:  Systolic (89RSB), QGH:789 , Min:120 , KY:480     Diastolic (31DFW), PVB:66, Min:53, Max:75    Pulse Range: Pulse  Av  Min: 63  Max: 87  Respiration Range: Resp  Av.4  Min: 19  Max: 39  Current Pulse Ox[de-identified]  SpO2: 96 %  24HR Pulse Ox Range:  SpO2  Av.7 %  Min: 92 %  Max: 99 %  Oxygen Amount and Delivery: O2 Flow Rate (L/min): 2 L/min    Wt Readings from Last 3 Encounters:   10/14/22 98 lb 1.7 oz (44.5 kg)   22 96 lb 8 oz (43.8 kg)   22 105 lb (47.6 kg)       I/O (24 Hours)    Intake/Output Summary (Last 24 hours) at 10/15/2022 1533  Last data filed at 10/15/2022 0330  Gross per 24 hour   Intake 5275.03 ml   Output 1700 ml   Net 3575.03 ml       EXAM     General Appearance  Awake, alert, oriented, in no acute distress  HEENT - normocephalic, atraumatic. Neck - Supple,  trachea midline   Lungs -breath sounds no crackles or  Heart Exam:PMI normal. No lifts, heaves, or thrills. Abdomen Exam: Abdomen soft, non-tender. BS normal. No mass  Extremity Exam: No signs of cyanosis.     MEDS      vancomycin  1,000 mg IntraVENous Q12H    cefepime  2,000 mg IntraVENous Q12H    Vitamin D  2,000 Units Oral Daily    carvedilol  3.125 mg Oral BID WC    sodium chloride flush  5-40 mL IntraVENous 2 times per day    heparin (porcine)  5,000 Units SubCUTAneous BID    ARIPiprazole  5 mg Oral Daily    atorvastatin  40 mg Oral Daily    divalproex  1,000 mg Oral BID    DULoxetine  60 mg Oral Daily    ferrous sulfate  325 mg Oral BID     ipratropium-albuterol  1 ampule Inhalation Q4H WA    vancomycin (VANCOCIN) intermittent dosing (placeholder)   Other RX Placeholder      dextrose 100 mL/hr at 10/15/22 0528    sodium chloride       ketorolac, potassium chloride **OR** potassium alternative oral replacement **OR** potassium chloride, hydrALAZINE, sodium chloride flush, sodium chloride, acetaminophen, ondansetron **OR** ondansetron, albuterol sulfate HFA, sodium chloride flush, perflutren lipid microspheres    LABS   CBC   Recent Labs     10/15/22  0402   WBC 4.6   HGB 9.1*   HCT 27.6*   MCV 79.7*   PLT 80*     BMP:   Lab Results   Component Value Date/Time     10/15/2022 04:02 AM    K 3.7 10/15/2022 04:02 AM     10/15/2022 04:02 AM    CO2 23 10/15/2022 04:02 AM    BUN 9 10/15/2022 04:02 AM    LABALBU 3.4 10/10/2022 04:55 AM    CREATININE 0.41 10/15/2022 04:02 AM    CALCIUM 7.6 10/15/2022 04:02 AM    GFRAA >60 04/08/2022 07:17 AM    LABGLOM >60 10/15/2022 04:02 AM     ABGs:  Lab Results   Component Value Date/Time    PHART 7.467 10/15/2022 10:40 AM    PO2ART 66.5 10/15/2022 10:40 AM    HNX7CKW 35.4 10/15/2022 10:40 AM      Lab Results   Component Value Date/Time    MODE PRVC 12/28/2016 04:36 AM     Ionized Calcium:  No results found for: IONCA  Magnesium:    Lab Results   Component Value Date/Time    MG 1.8 10/15/2022 04:02 AM     Phosphorus:    Lab Results   Component Value Date/Time    PHOS 2.7 10/15/2022 04:02 AM        LIVER PROFILE No results for input(s): AST, ALT, LIPASE, BILIDIR, BILITOT, ALKPHOS in the last 72 hours. Invalid input(s):   AMYLASE,  ALB  INR No results for input(s): INR in the last 72 hours.   PTT   Lab Results   Component Value Date    APTT 25.6 07/22/2016         RADIOLOGY     (See actual reports for details)    ASSESSMENT/PLAN     Patient Active Problem List   Diagnosis    GERD (gastroesophageal reflux disease)    Hoarseness of voice    MVP (mitral valve prolapse)    Depression with anxiety    Lymphocytic colitis    History of migraine headaches    Hyperlipidemia    Sleep apnea    IGT (impaired glucose tolerance)    History of renal calculi    Lactose intolerance    History of seizure disorder    Leukopenia    Bipolar disorder, mixed ([facility]    Illicit drug use    Postlaminectomy syndrome, cervical region    Degeneration of cervical intervertebral disc    Encounter for long-term (current) use of other medications    Bradycardia    Cellulitis    Hypokalemia    Primary hypertension    Elevated lipase    Right lower quadrant abdominal pain    Nausea    Diarrhea    Acute encephalopathy    Polysubstance abuse (HCC)    Fibromyalgia    Cocaine addiction (HCC)    Shoulder arthritis    Osteoarthritis of left glenohumeral joint    Abnormal EKG    Anemia    Anxiety    Carotid artery stenosis    COPD (chronic obstructive pulmonary disease) ([facility]    Dental disease    HUTCHINSON (dyspnea on exertion)    Fatigue    Fractures    H/O gastric bypass    History of crack cocaine use    History of UTI    Injury of back    Intractable chronic migraine without aura and without status migrainosus    Intractable migraine with aura with status migrainosus    Kidney stones    Dizziness    Memory loss    Mild pulmonary hypertension (HCC)    Mild tricuspid regurgitation    Primary osteoarthritis of left shoulder    Seizure-like activity (HCC)    Stress at home    TMJ (dislocation of temporomandibular joint)    Tobacco abuse    Visual impairment    Internal hernia    Vitamin D deficiency    Iron deficiency    SIRS (systemic inflammatory response syndrome) (HCC)    NICKI (acute kidney injury) ([facility]    Severe malnutrition ([facility]     IMPRESSION:  1. Suspect COPD exacerbation. This has resolved. .  2   septic emboli septic emboli on abdomen CT scan  3.  E. coli urinary tract infection  4. Elevated procalcitonin level, may be, because of acute kidney injury  plus infection. 5.  Acute kidney injury -resolved. .  Initial  creatinine was 2.21 and now 0.52 anion gap is now normal.  6.  Leukocytosis with bandemia. 7. MRSA bacteremia. 2 of 2 blood cultures October 10  8. Opiates and cocaine in  tox cream.  9.  Full code. Currently on DuoNeb treatments  On Maxipime finishing the course.   On vancomycin MRSA bacteremia    Following with other services    Electronically signed by Primitivo Martinez MD on 10/15/2022 at 3:33 PM

## 2022-10-15 NOTE — PROGRESS NOTES
Pt found with legs over siderail, denies need to use bathroom, pt continues to c/o pain, assisted with positioning, message to Dr Dorian Prader re pt pain

## 2022-10-15 NOTE — PLAN OF CARE
Problem: Discharge Planning  Goal: Discharge to home or other facility with appropriate resources  10/15/2022 0355 by Rock Guillermo RN  Flowsheets  Taken 10/15/2022 0355 by Rock Guillermo RN  Discharge to home or other facility with appropriate resources:   Identify barriers to discharge with patient and caregiver   Identify discharge learning needs (meds, wound care, etc)  Taken 10/14/2022 1713 by Adrian Vásquez RN  Discharge to home or other facility with appropriate resources: Identify barriers to discharge with patient and caregiver  Note: Pt continues drowsy majority of time, confusion continues     Problem: Pain  Goal: Verbalizes/displays adequate comfort level or baseline comfort level  10/15/2022 0355 by Rock Guillermo RN  Outcome: Progressing  Flowsheets (Taken 10/15/2022 0355)  Verbalizes/displays adequate comfort level or baseline comfort level:   Encourage patient to monitor pain and request assistance   Assess pain using appropriate pain scale   Administer analgesics based on type and severity of pain and evaluate response  Note: Pt received toradol once this shift and she rests after dose given, no further grimacing noted     Problem: Safety - Adult  Goal: Free from fall injury  10/15/2022 0355 by Rock Guillermo RN  Flowsheets (Taken 10/15/2022 0355)  Free From Fall Injury: Instruct family/caregiver on patient safety  Note: Pt requires frequent monitoring due to confusion, trying to get out of bed at times, bed alarm on     Problem: ABCDS Injury Assessment  Goal: Absence of physical injury  10/15/2022 0355 by Rock Guillermo RN  Flowsheets (Taken 10/15/2022 0355)  Absence of Physical Injury: Implement safety measures based on patient assessment  Note: Safety maintained     Problem: ABCDS Injury Assessment  Goal: Absence of physical injury  10/15/2022 0355 by Rock Guillermo RN  Flowsheets (Taken 10/15/2022 0355)  Absence of Physical Injury: Implement safety measures based on patient assessment  Note: Safety maintained     Problem: Skin/Tissue Integrity  Goal: Absence of new skin breakdown  Description: 1. Monitor for areas of redness and/or skin breakdown  2. Assess vascular access sites hourly  3. Every 4-6 hours minimum:  Change oxygen saturation probe site  4. Every 4-6 hours:  If on nasal continuous positive airway pressure, respiratory therapy assess nares and determine need for appliance change or resting period.   10/15/2022 0355 by Joan Sunshine RN  Outcome: Adequate for Discharge

## 2022-10-15 NOTE — PROGRESS NOTES
Vancomycin Dosing by Pharmacy - Daily Note   Vancomycin Therapy Day:  5  Indication: bacteremia     Allergies:  Aspirin, Bactrim, and Codeine   Actual Weight:    Wt Readings from Last 1 Encounters:   10/14/22 98 lb 1.7 oz (44.5 kg)       Labs/Ancillary Data  Estimated Creatinine Clearance: 104 mL/min (A) (based on SCr of 0.44 mg/dL (L)). Recent Labs     10/12/22  0418 10/12/22  1334 10/13/22  0357 10/14/22  0413 10/14/22  1936   CREATININE 0.67   < > 0.52 0.43* 0.44*   BUN 22*   < > 18 14 11   WBC 15.7*  --  8.7 5.8  --     < > = values in this interval not displayed. Procalcitonin   Date Value Ref Range Status   10/10/2022 6.01 (H) <0.09 ng/mL Final     Comment:           Suspected Sepsis:  <0.50 ng/mL     Low likelihood of sepsis. 0.50-2.00 ng/mL     Increased likelihood of sepsis. Antibiotics encouraged. >2.00 ng/mL     High risk of sepsis/shock. Antibiotics strongly encouraged. Suspected Lower Resp Tract Infections:  <0.24 ng/mL     Low likelihood of bacterial infection. >0.24 ng/mL     Increased likelihood of bacterial infection. Antibiotics encouraged. With successful antibiotic therapy, PCT levels should decrease rapidly. (Half-life of 24 to   36 hours.)        Procalcitonin values from samples collected within the first 6 hours of systemic infection   may still be low. Retesting may be indicated. Values from day 1 and day 4 can be entered into the Change in Procalcitonin Calculator   (www.[employer]-pct-[email or web address] com) to determine the patient's Mortality Risk Prognosis        In healthy neonates, plasma Procalcitonin (PCT) concentrations increase gradually after   birth, reaching peak values at about 24 hours of age then decrease to normal values below   0.5 ng/mL by 48-72 hours of age.          Intake/Output Summary (Last 24 hours) at 10/14/2022 2017  Last data filed at 10/14/2022 1914  Gross per 24 hour   Intake 4381.28 ml   Output 1675 ml   Net 2706.28 ml     Temp: 98.3 F    Culture Date / Source  /  Results  See micro  Recent vancomycin administrations                     vancomycin 1000 mg IVPB in 250 mL D5W addavial (mg) 1,000 mg New Bag 10/14/22 1429     1,000 mg New Bag  0052    vancomycin (VANCOCIN) 1,250 mg in dextrose 5 % 250 mL IVPB (ADDAVIAL) (mg) 1,250 mg New Bag 10/13/22 1126     1,250 mg New Bag 10/12/22 0834    vancomycin (VANCOCIN) 750 mg in dextrose 5 % 250 mL IVPB (mg) 750 mg New Bag 10/11/22 2110                    Vancomycin Concentrations:   TROUGH:  No results for input(s): VANCOTROUGH in the last 72 hours. RANDOM:    Recent Labs     10/12/22  0418 10/13/22  2313 10/14/22  1936   VANCORANDOM 13.4 13.8 21.9       MRSA Nasal Swab: N/A. Non-respiratory infection. Xiomara Ruffing PLAN     Continue current dose of 1000 mg q12h IV  Ensured BUN/sCr ordered at baseline and every 48 hours x at least 3 levels, then at least weekly. Pharmacy will continue to monitor patient and adjust therapy as indicated      Vancomycin Target Concentration Parameters  Treatment  Population Target AUC/GABBI Target Trough   Invasive MRSA Infection (bacteremia, pneumonia, meningitis, endocarditis, osteomyelitis)  Sepsis (undifferentiated) 400-600 N/A   Infection due to non-MRSA pathogen  Empiric treatment of non-invasive MRSA infection  (SSTI, UTI) <500 10-15 mg/L   CrCl < 29 mL/min  Rapidly fluctuating serum creatinine   NICKI N/A < 15 mg/L     Renal replacement therapy is dosed by levels, per hospital protocol. Abbreviations  * Pauc: probability that AUC is >400 (efficacy); Pconc: probability that Ctrough is above 20 ?g/mL (toxicity); Tox: Probability of nephrotoxicity, based on Iva et al. Clin Infect Dis 2009. Loading dose: N/A  Regimen: 1000 mg IV every 12 hours.   Start time: 01:00 on 10/15/2022  Exposure target: AUC24 (range)400-600 mg/L.hr   AUC24,ss: 507 mg/L.hr  Probability of AUC24 > 400: 97 %  Ctrough,ss: 13.8 mg/L  Probability of Ctrough,ss > 20: 3 %  Probability of nephrotoxicity (Iva DEVON 2009): 9 %    Thank you for the consult. Pharmacy will continue to follow.   [address] [facility] [place]    10/14/2022   8:18 PM

## 2022-10-15 NOTE — PROGRESS NOTES
[facility] Neurology   IN-PATIENT SERVICE      NEUROLOGY PROGRESS  NOTE            Date:   10/15/2022  Patient name:  Winsome Sanabria  Date of admission:  10/10/2022  YOB: 1970      Interval History:     Patient has had fluctuating mental status. She appears to have episodes of drowsiness but also has periods of relative wakefulness and is able to sit up and eat food and then lapses back to somnolence. Patient has MRSA sepsis. This is currently being treated. MRI of the brain showed no acute lesions. History of Present Illness: The patient is a 46 y.o. female who presents with Shortness of Breath and Positive For Covid-19  . The patient was seen and examined and the chart was reviewed. Patient admitted to the hospital on 10/10/2022 through the ED and then transferred to the ICU on 10/13/2022. He had been admitted with hypertension COPD fibromyalgia and chronic pain presenting with shortness of breath. She had difficulty with breathing. She had had COVID 1 week prior to presentation. She then became very weak and had increased sleeping. Patient is also known to be a polysubstance abuser was noted to have had cocaine in her urine.     Past Medical History:     Past Medical History:   Diagnosis Date    Anemia     Arthritis     Asthma     Bipolar disorder, mixed ([address]     Carotid artery stenosis 2/13/2020    Cocaine abuse ([facility] [address] 11/4/2014    COPD (chronic obstructive pulmonary disease) (HCC)     Degeneration of cervical intervertebral disc     Depression with anxiety     Depression with anxiety     Fibromyalgia 1/5/2017    GERD (gastroesophageal reflux disease)     History of migraine headaches 6/10/2014    History of renal calculi 6/10/2014    History of seizure disorder 6/10/2014    Hoarseness of voice     HTN (hypertension) 6/10/2014    Hyperlipidemia 6/10/2014    IGT (impaired glucose tolerance) 6/10/2014    Kidney stones     Lactose intolerance 6/10/2014    Leukopenia 6/10/2014 Major depressive disorder, recurrent episode, severe, without mention of psychotic behavior 2014    MVP (mitral valve prolapse)     Seizures (HCC)     Sleep apnea 6/10/2014    Unspecified diseases of blood and blood-forming organs         Past Surgical History:     Past Surgical History:   Procedure Laterality Date    ANKLE FRACTURE SURGERY      recontruction surgery    APPENDECTOMY      BREAST LUMPECTOMY Right     CARDIAC CATHETERIZATION      CARPAL TUNNEL RELEASE      x2     SECTION      CHOLECYSTECTOMY      COLONOSCOPY  12    COLONOSCOPY  2016    severe spasms    COLONOSCOPY  2019    DR GOLDY MCINTOSH    GASTRIC BYPASS SURGERY      GASTRIC BYPASS SURGERY      HYSTERECTOMY (CERVIX STATUS UNKNOWN)      HYSTERECTOMY (CERVIX STATUS UNKNOWN)      LAPAROSCOPY N/A 2022    LAPAROSCOPY EXPLORATORY CONVERTED TO OPEN EXPLORATORY LAPAROTOMY, LYSIS OF ADHESIONS, REDUCTION OF INTERNAL HERNIA performed by Milena Vasquez DO at [facility] [address]      APPLICATION OF ARCH BARS,SIMPLE EXTRACTION OF #15 AND RT TMJ JOINT REPLACEMENT    SHOULDER ARTHROSCOPY Left 2019    DR ROBERT GREEN    SHOULDER SURGERY      TONSILLECTOMY AND ADENOIDECTOMY      UPPER GASTROINTESTINAL ENDOSCOPY  7-3-12    egd    UPPER GASTROINTESTINAL ENDOSCOPY  2016    status post gastrectomy with a small remnant of gastric pouch.     UPPER GASTROINTESTINAL ENDOSCOPY  2019    DR Justino Nuñez        Medications during admission:      vancomycin  1,000 mg IntraVENous Q12H    cefepime  2,000 mg IntraVENous Q12H    Vitamin D  2,000 Units Oral Daily    carvedilol  3.125 mg Oral BID WC    sodium chloride flush  5-40 mL IntraVENous 2 times per day    heparin (porcine)  5,000 Units SubCUTAneous BID    ARIPiprazole  5 mg Oral Daily    atorvastatin  40 mg Oral Daily    divalproex  1,000 mg Oral BID    DULoxetine  60 mg Oral Daily    ferrous sulfate  325 mg Oral BID WC ipratropium-albuterol  1 ampule Inhalation Q4H WA    vancomycin (VANCOCIN) intermittent dosing (placeholder)   Other RX Placeholder         Physical Exam:   BP (!) 144/75   Pulse 65   Temp 97.2 °F (36.2 °C) (Axillary)   Resp 22   Ht 4' 11\" (1.499 m)   Wt 98 lb 1.7 oz (44.5 kg)   SpO2 98%   BMI 19.81 kg/m²   Temp (24hrs), Av °F (36.7 °C), Min:97.2 °F (36.2 °C), Max:98.4 °F (36.9 °C)      Neurological examination:    Mental status   At the time I am seeing her she is drowsy but arouses to loud verbal and tactile stimulation. Can follow commands but does so slowly. Cranial nerves   II -briefly opened eyes for me but I could not examine pupils. III, IV, VI - extraocular muscles intact  V - normal facial sensation                                                               VII - normal facial symmetry                                                             VIII - intact hearing                                                                             IX, X -hypophonic                                           XI -can turn head side to side                                                      XII -not able to examine     Motor function  Patient was able to move all 4 extremities on command                   Sensory function Intact to touch throughout     Cerebellar No obvious tremors     Reflex function 0/4 symmetric throughout . Downgoing plantar response bilaterally.     Gait                  Not testable           Diagnostics:      Laboratory Testing:  CBC:   Recent Labs     10/13/22  0357 10/14/22  0413 10/15/22  0402   WBC 8.7 5.8 4.6   HGB 9.9* 9.8* 9.1*   * 95* 80*     BMP:    Recent Labs     10/14/22  0413 10/14/22  1936 10/15/22  0402    137 138   K 3.0* 2.9* 3.7   * 105 106   CO2 22 20 23   BUN 14 11 9   CREATININE 0.43* 0.44* 0.41*   GLUCOSE 95 161* 97         Lab Results   Component Value Date    CHOL 156 2022    LDLCHOLESTEROL 101 2022    HDL 39 (L) 01/30/2022    TRIG 78 01/30/2022    ALT 17 10/10/2022    AST 41 (H) 10/10/2022    TSH 1.37 01/29/2022    INR 1.2 10/10/2022    LABA1C 6.1 (H) 01/29/2022    LABMICR 419 (H) 10/10/2022    [id number] 311 01/29/2022       Lab Results   Component Value Date    VALPROATE 120 10/14/2022       Impression:      Metabolic encephalopathy. Fluctuating mental status  Valproate level was at 120; therapeutic range 50 to 125 mcg/mL    Plan:     Continue supportive care.   Neurology will follow        Electronically signed by Ana Paula Bragg MD on 10/15/2022 at 2:02 PM      [facility] [address]  Neurology

## 2022-10-15 NOTE — PROGRESS NOTES
F/U visit with patient and her father; father at bedside feeding patient; patient is minimally responsive and does not open her eyes; father provides medical update; listening presence and support;     10/14/22 4103   Encounter Summary   Encounter Overview/Reason  Spiritual/Emotional Needs   Service Provided For: Patient and family together   Referral/Consult From: Marko   Last Encounter  10/14/22   Complexity of Encounter Moderate   Spiritual/Emotional needs   Type Spiritual Support   Assessment/Intervention/Outcome   Assessment Anxious; Coping;Powerlessness   Intervention Discussed illness injury and its impact; Explored/Affirmed feelings, thoughts, concerns;Sustaining Presence/Ministry of presence   Outcome Coping;Engaged in conversation;Expressed feelings, needs, and concerns;Expressed Gratitude;Receptive

## 2022-10-15 NOTE — PROGRESS NOTES
Progress Note  Date:10/15/2022       Room:  Patient Name:Anu Bourne     YOB: 1970     Age:51 y.o. Specialists notes, labs & imaging reviewed. Subjective    Subjective:  Symptoms:  (Remains somnolent. Took pills yesterday. MRI brain negative for any significant changes. ). Diet:  Poor intake. Activity level: Impaired due to weakness. Pain:  She reports no pain. Review of Systems   Constitutional:  Positive for fatigue. Skin:  Positive for pallor. Objective         Vitals Last 24 Hours:  TEMPERATURE:  Temp  Av.2 °F (36.8 °C)  Min: 98 °F (36.7 °C)  Max: 98.4 °F (36.9 °C)  RESPIRATIONS RANGE: Resp  Av.2  Min: 0  Max: 39  PULSE OXIMETRY RANGE: SpO2  Av.6 %  Min: 88 %  Max: 100 %  PULSE RANGE: Pulse  Av.7  Min: 64  Max: 87  BLOOD PRESSURE RANGE: Systolic (81NHO), YVR:492 , Min:117 , FOM:955   ; Diastolic (94LOG), TRY:42, Min:53, Max:74    I/O (24Hr): Intake/Output Summary (Last 24 hours) at 10/15/2022 0823  Last data filed at 10/15/2022 0330  Gross per 24 hour   Intake 5275.03 ml   Output 1700 ml   Net 3575.03 ml       Objective:  General Appearance:  Comfortable. Vital signs: (most recent): Blood pressure (!) 143/63, pulse 73, temperature 97.5 °F (36.4 °C), temperature source Axillary, resp. rate 23, height 4' 11\" (1.499 m), weight 98 lb 1.7 oz (44.5 kg), SpO2 96 %. Vital signs are normal.    Output: Producing urine. Lungs:  Normal effort and normal respiratory rate. There are decreased breath sounds. Heart: Normal rate. Regular rhythm.   S1 normal and S2 normal.    Labs/Imaging/Diagnostics    Labs:  CBC:  Recent Labs     10/13/22  0357 10/14/22  0413 10/15/22  0402   WBC 8.7 5.8 4.6   RBC 3.90* 3.73* 3.47*   HGB 9.9* 9.8* 9.1*   HCT 31.7* 29.8* 27.6*   MCV 81.3 80.1 79.7*   RDW 15.5* 15.4* 15.8*   * 95* 80*       CHEMISTRIES:  Recent Labs     10/13/22  0357 10/14/22  0413 10/14/22  1936 10/15/22  0402    143 137 138   K 3. 1* 3.0* 2.9* 3.7    110* 105 106   CO2 20 22 20 23   BUN 18 14 11 9   CREATININE 0.52 0.43* 0.44* 0.41*   GLUCOSE 69* 95 161* 97   PHOS 2.2* 2.5*  --  2.7   MG 1.9 1.9  --  1.8       PT/INR:No results for input(s): PROTIME, INR in the last 72 hours. APTT:No results for input(s): APTT in the last 72 hours. LIVER PROFILE:No results for input(s): AST, ALT, BILIDIR, BILITOT, ALKPHOS in the last 72 hours. Imaging Last 24 Hours:  XR LUMBAR SPINE (2-3 VIEWS)    Result Date: 10/12/2022  EXAMINATION: XRAY VIEWS OF THE LUMBAR SPINE 10/12/2022 2:06 pm COMPARISON: None. HISTORY: ORDERING SYSTEM PROVIDED HISTORY: back pain TECHNOLOGIST PROVIDED HISTORY: back pain Reason for Exam: back pain FINDINGS: No compression fracture or subluxation. Disc spaces maintained. Pedicles are intact. Diffuse calcification nondilated abdominal aorta. Unremarkable lumbar spine examination. XR ABDOMEN (KUB) (SINGLE AP VIEW)    Result Date: 10/12/2022  EXAMINATION: ONE SUPINE XRAY VIEW(S) OF THE ABDOMEN 10/12/2022 2:06 pm COMPARISON: 02/01/2022 HISTORY: ORDERING SYSTEM PROVIDED HISTORY: abd pain TECHNOLOGIST PROVIDED HISTORY: abd pain Reason for Exam: abd pain FINDINGS: Mildly dilated colon and several small bowel loops. Residual stool in colon. No urinary tract calcifications. Osseous structures unremarkable. Findings consistent with mild diffuse ileus. No bowel obstruction or urinary tract calcifications.      Assessment//Plan           Hospital Problems             Last Modified POA    * (Principal) SIRS (systemic inflammatory response syndrome) ([address] 10/10/2022 Yes    NICKI (acute kidney injury) ([address] 10/11/2022 Yes    Severe malnutrition ([address] (Chronic) 10/11/2022 Yes    Bipolar disorder, mixed ([address] 10/11/2022 Yes    Acute encephalopathy 10/14/2022 Yes    COPD (chronic obstructive pulmonary disease) ([address] 10/11/2022 Yes    H/O gastric bypass 10/11/2022 Yes    Overview Signed 2/13/2020  7:19 PM by Sangeetha Gustafson, MD     enlarged  pancreas, here for surgical work-up for EGD        Assessment & Plan    Sepsis with COPD exacerbation, possible septic emboli in chest - ADRIANNE not performed yet d/t lethargy. AMS - neurology following, MRI negative, suspect patient purposely not responding at times. Continue current treatments. Complete orders per chart.       Electronically signed by Edita Cleaning MD on 10/15/2022 at 8:24 AM

## 2022-10-15 NOTE — PROGRESS NOTES
Vancomycin Dosing by Pharmacy - Daily Note   Vancomycin Therapy Day:  6  Indication: bacteremia    Allergies:  Aspirin, Bactrim, and Codeine   Actual Weight:    Wt Readings from Last 1 Encounters:   10/14/22 98 lb 1.7 oz (44.5 kg)       Labs/Ancillary Data  Estimated Creatinine Clearance: 111 mL/min (A) (based on SCr of 0.41 mg/dL (L)). Recent Labs     10/13/22  0357 10/14/22  0413 10/14/22  1936 10/15/22  0402   CREATININE 0.52 0.43* 0.44* 0.41*   BUN 18 14 11 9   WBC 8.7 5.8  --  4.6     Procalcitonin   Date Value Ref Range Status   10/10/2022 6.01 (H) <0.09 ng/mL Final     Comment:           Suspected Sepsis:  <0.50 ng/mL     Low likelihood of sepsis. 0.50-2.00 ng/mL     Increased likelihood of sepsis. Antibiotics encouraged. >2.00 ng/mL     High risk of sepsis/shock. Antibiotics strongly encouraged. Suspected Lower Resp Tract Infections:  <0.24 ng/mL     Low likelihood of bacterial infection. >0.24 ng/mL     Increased likelihood of bacterial infection. Antibiotics encouraged. With successful antibiotic therapy, PCT levels should decrease rapidly. (Half-life of 24 to   36 hours.)        Procalcitonin values from samples collected within the first 6 hours of systemic infection   may still be low. Retesting may be indicated. Values from day 1 and day 4 can be entered into the Change in Procalcitonin Calculator   ([email or web address]) to determine the patient's Mortality Risk Prognosis        In healthy neonates, plasma Procalcitonin (PCT) concentrations increase gradually after   birth, reaching peak values at about 24 hours of age then decrease to normal values below   0.5 ng/mL by 48-72 hours of age.          Intake/Output Summary (Last 24 hours) at 10/15/2022 2658  Last data filed at 10/15/2022 0330  Gross per 24 hour   Intake 5275.03 ml   Output 1700 ml   Net 3575.03 ml     Temp: 98.3 F    Culture Date / Source  /  Results  10/10 - urine - E Coli  10/10 - blood x2 - MRSA    Recent vancomycin administrations                     vancomycin 1000 mg IVPB in 250 mL D5W addavial (mg) 1,000 mg New Bag 10/14/22 2339     1,000 mg New Bag  1429     1,000 mg New Bag  0052    vancomycin (VANCOCIN) 1,250 mg in dextrose 5 % 250 mL IVPB (ADDAVIAL) (mg) 1,250 mg New Bag 10/13/22 1126     1,250 mg New Bag 10/12/22 0834                    Vancomycin Concentrations:   TROUGH:  No results for input(s): VANCOTROUGH in the last 72 hours. RANDOM:    Recent Labs     10/13/22  2313 10/14/22  1936   VANCORANDOM 13.8 21.9       MRSA Nasal Swab: N/A. Non-respiratory infection. Steven Brewer PLAN     Continue current dose of 1000 mg q12h IV  Ensured BUN/sCr ordered at baseline and every 48 hours x at least 3 levels, then at least weekly. Repeat vancomycin concentration ordered for TBD  Pharmacy will continue to monitor patient and adjust therapy as indicated      Vancomycin Target Concentration Parameters  Treatment  Population Target AUC/GABBI Target Trough   Invasive MRSA Infection (bacteremia, pneumonia, meningitis, endocarditis, osteomyelitis)  Sepsis (undifferentiated) 400-600 N/A   Infection due to non-MRSA pathogen  Empiric treatment of non-invasive MRSA infection  (SSTI, UTI) <500 10-15 mg/L   CrCl < 29 mL/min  Rapidly fluctuating serum creatinine   NICKI N/A < 15 mg/L     Renal replacement therapy is dosed by levels, per hospital protocol. Abbreviations  * Pauc: probability that AUC is >400 (efficacy); Pconc: probability that Ctrough is above 20 ?g/mL (toxicity); Tox: Probability of nephrotoxicity, based on Iva et al. Clin Infect Dis 2009. Loading dose: N/A  Regimen: 1000 mg IV every 12 hours. Start time: 10:39 on 10/17/2022  Exposure target: AUC24 (range)400-600 mg/L.hr   AUC24,ss: 476 mg/L.hr  Probability of AUC24 > 400: 92 %  Ctrough,ss: 12.7 mg/L  Probability of Ctrough,ss > 20: 1 %  Probability of nephrotoxicity (Iva DEVON 2009): 8 %      Thank you for the consult. Pharmacy will continue to follow.     Jennifer Ramila [facility]ets [facility],PharmD,  10/15/2022, 6:44 AM

## 2022-10-15 NOTE — PLAN OF CARE
Problem: Discharge Planning  Goal: Discharge to home or other facility with appropriate resources  10/15/2022 1138 by Kendrick Gonzalez RN  Outcome: Progressing  Flowsheets (Taken 10/15/2022 0800)  Discharge to home or other facility with appropriate resources: Identify barriers to discharge with patient and caregiver  10/15/2022 0355 by Tala Suggs RN  Flowsheets  Taken 10/15/2022 0355 by Tala Suggs RN  Discharge to home or other facility with appropriate resources:   Identify barriers to discharge with patient and caregiver   Identify discharge learning needs (meds, wound care, etc)  Taken 10/14/2022 1713 by Tila Che RN  Discharge to home or other facility with appropriate resources: Identify barriers to discharge with patient and caregiver  Note: Pt continues drowsy majority of time, confusion continues     Problem: Pain  Goal: Verbalizes/displays adequate comfort level or baseline comfort level  10/15/2022 1138 by Kendrick Gonzalez RN  Outcome: Progressing  10/15/2022 0355 by Tala Suggs RN  Outcome: Progressing  Flowsheets (Taken 10/15/2022 0355)  Verbalizes/displays adequate comfort level or baseline comfort level:   Encourage patient to monitor pain and request assistance   Assess pain using appropriate pain scale   Administer analgesics based on type and severity of pain and evaluate response  Note: Pt received toradol once this shift and she rests after dose given, no further grimacing noted     Problem: Skin/Tissue Integrity  Goal: Absence of new skin breakdown  Description: 1. Monitor for areas of redness and/or skin breakdown  2. Assess vascular access sites hourly  3. Every 4-6 hours minimum:  Change oxygen saturation probe site  4. Every 4-6 hours:  If on nasal continuous positive airway pressure, respiratory therapy assess nares and determine need for appliance change or resting period.   10/15/2022 1138 by Kendrick Gonzalez RN  Outcome: Progressing  10/15/2022 0355 by Andrew Murphy RN  Outcome: Adequate for Discharge     Problem: Safety - Adult  Goal: Free from fall injury  10/15/2022 1138 by Abi Gutierrez RN  Outcome: Progressing  10/15/2022 0355 by Andrew Murphy RN  Flowsheets (Taken 10/15/2022 0355)  Free From Fall Injury: Instruct family/caregiver on patient safety  Note: Pt requires frequent monitoring due to confusion, trying to get out of bed at times, bed alarm on     Problem: ABCDS Injury Assessment  Goal: Absence of physical injury  10/15/2022 1138 by Abi Gutierrez RN  Outcome: Progressing  10/15/2022 0355 by Andrew Murphy RN  Flowsheets (Taken 10/15/2022 0355)  Absence of Physical Injury: Implement safety measures based on patient assessment  Note: Safety maintained     Problem: Nutrition Deficit:  Goal: Optimize nutritional status  Outcome: Progressing

## 2022-10-15 NOTE — PROGRESS NOTES
Bed alarm alarming, pt found at foot of bed, moved to room 8 for closer monitoring, VSS, belongings with pt

## 2022-10-15 NOTE — PROGRESS NOTES
Pt found trying to get out of bed, assisted with positioning, pt c/o discomfort, medicated with tylenol, reminded of need to stay in bed, bed alarm on

## 2022-10-15 NOTE — PROGRESS NOTES
Progress Note    Reason for Consult: NICKI    Requesting Physician:  Radha Layton MD    INTERVAL HISTORY:  Creatinine ranging from 0.41 to 0.44  Potassium is 3.7  Phosphorus better at 2.7  Very sleepy and drowsy    HISTORY OF PRESENT ILLNESS:    The patient is a 46 y.o. female who presents with weakness, generalized pain, SOB and abdominal pain. She tested positive for COVID 19 on 9/30. She states she slept 2 days straight last week. C/o fever, chills and foul smelling urine with right flank pain. She also admits to doing crack cocaine at least twice weekly and when ever she can afford it more frequently. She states she uses cocaine to help with pain. She drinks homemade wine on occasion and smokes cigarettes, but states recently she decreased tobacco use. Creatinine baseline 0.6 from earlier this year. Creatinine 2.1 on admission. With sodium 132, potassium 3.3, bicarb 24, chloride 91, BUN 33, magnesium 2.1, procalcitonin 6.01, proBNP 4799. High sensitivity troponin was initially 30 and 13 on redraw. Glucose 129, albumin 3.44. Given normal saline bolus. X-ray showed multiple nodular opacities projecting over the right lung measuring up to 1.5 cm further evaluation with CT chest recommended. No acute focal airspace consolidation or pleural effusions. Blood pressure trending 110-190s/60-120s. Medications include Coreg, lisinopril, trazodone, gabapentin, Norvasc, Vistaril. She is very drowsy, slurring words. She has hx of HTN. She states she only takes BP meds if she remembers, which is not often. Hx of HTN since age 23s. Ordered urine sodium, urine creatinine, urine eosinophils, urine osmolality. Follow-up urine culture. On iv Zosyn per primary. Strict I&O discussed with RN. She received 1 dose of Lovenox and is now on heparin 5000 units twice a day. In the ER, she received Dilaudid and morphine and dose of Neurontin. She is very drowsy. Hold Neurontin and trazodone for now.   Blood pressure is improving. Holding lisinopril for now . Will also hold norvasc, as pt does not take medications regularly and need to avoid hypotension. Continue Coreg.       Review Of Systems:   Unable to obtain, patient is lethargic and not interactive    Past Medical History:   Diagnosis Date    Anemia     Arthritis     Asthma     Bipolar disorder, mixed ([address]     Carotid artery stenosis 2020    Cocaine abuse ([address] 2014    COPD (chronic obstructive pulmonary disease) ([address]     Degeneration of cervical intervertebral disc     Depression with anxiety     Depression with anxiety     Fibromyalgia 2017    GERD (gastroesophageal reflux disease)     History of migraine headaches 6/10/2014    History of renal calculi 6/10/2014    History of seizure disorder 6/10/2014    Hoarseness of voice     HTN (hypertension) 6/10/2014    Hyperlipidemia 6/10/2014    IGT (impaired glucose tolerance) 6/10/2014    Kidney stones     Lactose intolerance 6/10/2014    Leukopenia 6/10/2014    Major depressive disorder, recurrent episode, severe, without mention of psychotic behavior 2014    MVP (mitral valve prolapse)     Seizures ([address]     Sleep apnea 6/10/2014    Unspecified diseases of blood and blood-forming organs        Past Surgical History:   Procedure Laterality Date    ANKLE FRACTURE SURGERY      recontruction surgery    APPENDECTOMY      BREAST LUMPECTOMY Right     CARDIAC CATHETERIZATION      CARPAL TUNNEL RELEASE      x2     SECTION      CHOLECYSTECTOMY      COLONOSCOPY  12    COLONOSCOPY  2016    severe spasms    COLONOSCOPY  2019    DR GOLDY MCINTOSH    GASTRIC BYPASS SURGERY      GASTRIC BYPASS SURGERY      HYSTERECTOMY (CERVIX STATUS UNKNOWN)      HYSTERECTOMY (CERVIX STATUS UNKNOWN)      LAPAROSCOPY N/A 2022    LAPAROSCOPY EXPLORATORY CONVERTED TO OPEN EXPLORATORY LAPAROTOMY, LYSIS OF ADHESIONS, REDUCTION OF INTERNAL HERNIA performed by Munira Simpson DO at [address] SURGERY      OTHER SURGICAL HISTORY  33/96/5030    APPLICATION OF ARCH BARS,SIMPLE EXTRACTION OF #15 AND RT TMJ JOINT REPLACEMENT    SHOULDER ARTHROSCOPY Left 06/05/2019    DR ROBERT GREEN    SHOULDER SURGERY      TONSILLECTOMY AND ADENOIDECTOMY      UPPER GASTROINTESTINAL ENDOSCOPY  7-3-12    egd    UPPER GASTROINTESTINAL ENDOSCOPY  12/19/2016    status post gastrectomy with a small remnant of gastric pouch. UPPER GASTROINTESTINAL ENDOSCOPY  05/2019    DR Nazanin Shoemaker       Prior to Admission medications    Medication Sig Start Date End Date Taking? Authorizing Provider   lisinopril (PRINIVIL;ZESTRIL) 20 MG tablet Take 20 mg by mouth daily   Yes Historical Provider, MD   carvedilol (COREG) 25 MG tablet TAKE 1 TABLET BY MOUTH IN THE MORNING AND 1 IN THE EVENING WITH MEALS 9/19/22   Tanya Hernandez MD   traZODone (DESYREL) 100 MG tablet TAKE 1 TABLET BY MOUTH AT BEDTIME 6/13/22   Historical Provider, MD   gabapentin (NEURONTIN) 300 MG capsule TAKE 1 CAPSULE BY MOUTH THREE TIMES DAILY 5/24/22 8/9/22  Tanya Hernandez MD   hydrOXYzine (VISTARIL) 25 MG capsule  9/15/20   Historical Provider, MD   DULoxetine (CYMBALTA) 30 MG extended release capsule Take 1 capsule by mouth daily  Patient taking differently: Take 60 mg by mouth in the morning.  4/8/19   Dionicio Hunter APRN - CNP   ARIPiprazole (ABILIFY) 5 MG tablet Take 5 mg by mouth daily    Historical Provider, MD       Scheduled Meds:   vancomycin  1,000 mg IntraVENous Q12H    cefepime  2,000 mg IntraVENous Q12H    Vitamin D  2,000 Units Oral Daily    carvedilol  3.125 mg Oral BID WC    sodium chloride flush  5-40 mL IntraVENous 2 times per day    heparin (porcine)  5,000 Units SubCUTAneous BID    ARIPiprazole  5 mg Oral Daily    atorvastatin  40 mg Oral Daily    divalproex  1,000 mg Oral BID    DULoxetine  60 mg Oral Daily    ferrous sulfate  325 mg Oral BID WC    ipratropium-albuterol  1 ampule Inhalation Q4H WA    vancomycin (VANCOCIN) intermittent dosing (placeholder)   Other RX Placeholder     Continuous Infusions:   dextrose 100 mL/hr at 10/15/22 0528    sodium chloride       PRN Meds:ketorolac, potassium chloride **OR** potassium alternative oral replacement **OR** potassium chloride, hydrALAZINE, sodium chloride flush, sodium chloride, acetaminophen, ondansetron **OR** ondansetron, albuterol sulfate HFA, sodium chloride flush, perflutren lipid microspheres         Physical Exam:  Vitals:    10/15/22 1000 10/15/22 1047 10/15/22 1048 10/15/22 1501   BP: (!) 144/75      Pulse: 65 65 65 75   Resp:  22 22 23   Temp:       TempSrc:       SpO2: 94% 98% 98% 96%   Weight:       Height:         I/O last 3 completed shifts: In: 5979.3 [P.O.:120; I.V.:4957.5; IV Piggyback:901.8]  Out: 2175 [Urine:2175]    General:  lethargic, not in distress. Appears to be older than stated age. HEENT: Atraumatic, normocephalic. Anicteric sclera. Pink and moist oral mucosa. Neck supple. No JVD. Chest: Bilateral air entry, clear to auscultation, no wheezing, rhonchi or rales. Cardiovascular: S1S2, no murmur, rub or gallop. No lower extremity edema. Abdomen: soft Active bowel sounds. Musculoskeletal:  No cyanosis or clubbing. Integumentary: Pink, warm and dry. +wounds to BLE. No s/s of infection.  Skin turgor normal.  CNS: ISAAK    Data:    CBC:   Lab Results   Component Value Date    WBC 4.6 10/15/2022    HGB 9.1 (L) 10/15/2022    HCT 27.6 (L) 10/15/2022    MCV 79.7 (L) 10/15/2022    PLT 80 (L) 10/15/2022     BMP:    Lab Results   Component Value Date     10/15/2022     10/14/2022     10/14/2022    K 3.7 10/15/2022    K 2.9 (LL) 10/14/2022    K 3.0 (L) 10/14/2022     10/15/2022     10/14/2022     (H) 10/14/2022    CO2 23 10/15/2022    CO2 20 10/14/2022    CO2 22 10/14/2022    BUN 9 10/15/2022    BUN 11 10/14/2022    BUN 14 10/14/2022    CREATININE 0.41 (L) 10/15/2022    CREATININE 0.44 (L) 10/14/2022    CREATININE 0.43 (L) 10/14/2022    GLUCOSE 97 10/15/2022    GLUCOSE 161 (H) 10/14/2022    GLUCOSE 95 10/14/2022     CMP:   Lab Results   Component Value Date/Time     10/15/2022 04:02 AM    K 3.7 10/15/2022 04:02 AM     10/15/2022 04:02 AM    CO2 23 10/15/2022 04:02 AM    BUN 9 10/15/2022 04:02 AM    CREATININE 0.41 10/15/2022 04:02 AM    GLUCOSE 97 10/15/2022 04:02 AM    CALCIUM 7.6 10/15/2022 04:02 AM    PROT 7.9 10/10/2022 04:55 AM    LABALBU 3.4 10/10/2022 04:55 AM    BILITOT 0.7 10/10/2022 04:55 AM    ALKPHOS 112 10/10/2022 04:55 AM    AST 41 10/10/2022 04:55 AM    ALT 17 10/10/2022 04:55 AM      Hepatic:   Lab Results   Component Value Date    AST 41 (H) 10/10/2022    AST 14 04/08/2022    AST 26 01/28/2022    ALT 17 10/10/2022    ALT 6 04/08/2022    ALT 11 01/28/2022    BILITOT 0.7 10/10/2022    BILITOT 0.18 (L) 04/08/2022    BILITOT 0.23 (L) 01/28/2022    ALKPHOS 112 (H) 10/10/2022    ALKPHOS 120 (H) 04/08/2022    ALKPHOS 90 01/28/2022     BNP: No results found for: BNP  Lipids:   Lab Results   Component Value Date    CHOL 156 01/30/2022    HDL 39 (L) 01/30/2022     INR:   Lab Results   Component Value Date    INR 1.2 10/10/2022    INR 1.0 07/22/2016    INR 0.9 05/30/2016     PTH: No results found for: PTH  Phosphorus:    Lab Results   Component Value Date/Time    PHOS 2.7 10/15/2022 04:02 AM     Ionized Calcium: No results found for: IONCA  Magnesium:   Lab Results   Component Value Date/Time    MG 1.8 10/15/2022 04:02 AM     Albumin:   Lab Results   Component Value Date/Time    LABALBU 3.4 10/10/2022 04:55 AM     Last 3 CK, CKMB, Troponin: @LABRCNT(CKTOTAL:3,CKMB:3,TROPONINI:3)       URINE:)No results found for: McAlpin Bleacher     Radiology:   Reviewed. Impression:  NICKI, appears to be hemodynamically related, Cr is better. Mild hypernatremia, improved. Hypokalemia. Hypophosphatemia  Hypocalcemia. Hypovitaminosis D. Hypertension. MRSA sepsis with septic embolization. UTI with E coli. History of cocaine use. Ileus.   Bipolar disorder. Plan: Will decrease D5W rate to 40 ml/hr. On iv Vancomycin. Continue holding Lisinopril. Coreg dose was decreased to  3.125 mg BID with holding parameters. Monitor BP. Started on vitamin D replacement. Follow up labs ordered for AM.     Please do not hesitate to call with questions.       Electronically signed by Kimberlee Hall MD  on 10/15/2022 at 4:29 PM

## 2022-10-16 LAB
ABSOLUTE BANDS #: 0.56 K/UL (ref 0–1)
ABSOLUTE EOS #: 0 K/UL (ref 0–0.4)
ABSOLUTE LYMPH #: 0.35 K/UL (ref 1–4.8)
ABSOLUTE MONO #: 0.77 K/UL (ref 0.1–1.3)
ANION GAP SERPL CALCULATED.3IONS-SCNC: 7 MMOL/L (ref 9–17)
BANDS: 8 % (ref 0–10)
BASOPHILS # BLD: 0 % (ref 0–2)
BASOPHILS ABSOLUTE: 0 K/UL (ref 0–0.2)
BUN BLDV-MCNC: 7 MG/DL (ref 6–20)
CALCIUM SERPL-MCNC: 7.6 MG/DL (ref 8.6–10.4)
CHLORIDE BLD-SCNC: 104 MMOL/L (ref 98–107)
CO2: 25 MMOL/L (ref 20–31)
CREAT SERPL-MCNC: 0.44 MG/DL (ref 0.5–0.9)
EOSINOPHILS RELATIVE PERCENT: 0 % (ref 0–4)
GFR SERPL CREATININE-BSD FRML MDRD: >60 ML/MIN/1.73M2
GLUCOSE BLD-MCNC: 84 MG/DL (ref 70–99)
HCT VFR BLD CALC: 32.3 % (ref 36–46)
HEMOGLOBIN: 10.3 G/DL (ref 12–16)
LYMPHOCYTES # BLD: 5 % (ref 24–44)
MAGNESIUM: 1.8 MG/DL (ref 1.6–2.6)
MCH RBC QN AUTO: 25.7 PG (ref 26–34)
MCHC RBC AUTO-ENTMCNC: 31.9 G/DL (ref 31–37)
MCV RBC AUTO: 80.5 FL (ref 80–100)
METAMYELOCYTES ABSOLUTE COUNT: 0.07 K/UL
METAMYELOCYTES: 1 %
MONOCYTES # BLD: 11 % (ref 1–7)
MORPHOLOGY: ABNORMAL
MORPHOLOGY: ABNORMAL
MYELOCYTES ABSOLUTE COUNT: 0.14 K/UL
MYELOCYTES: 2 %
PDW BLD-RTO: 16.1 % (ref 11.5–14.9)
PHOSPHORUS: 2.9 MG/DL (ref 2.6–4.5)
PLATELET # BLD: 105 K/UL (ref 150–450)
PMV BLD AUTO: 8.5 FL (ref 6–12)
POTASSIUM SERPL-SCNC: 2.9 MMOL/L (ref 3.7–5.3)
POTASSIUM SERPL-SCNC: 3.9 MMOL/L (ref 3.7–5.3)
RBC # BLD: 4.01 M/UL (ref 4–5.2)
SEG NEUTROPHILS: 73 % (ref 36–66)
SEGMENTED NEUTROPHILS ABSOLUTE COUNT: 5.11 K/UL (ref 1.3–9.1)
SODIUM BLD-SCNC: 136 MMOL/L (ref 135–144)
WBC # BLD: 7 K/UL (ref 3.5–11)

## 2022-10-16 PROCEDURE — 6370000000 HC RX 637 (ALT 250 FOR IP): Performed by: INTERNAL MEDICINE

## 2022-10-16 PROCEDURE — 85025 COMPLETE CBC W/AUTO DIFF WBC: CPT

## 2022-10-16 PROCEDURE — 2060000000 HC ICU INTERMEDIATE R&B

## 2022-10-16 PROCEDURE — 80048 BASIC METABOLIC PNL TOTAL CA: CPT

## 2022-10-16 PROCEDURE — 94640 AIRWAY INHALATION TREATMENT: CPT

## 2022-10-16 PROCEDURE — 2580000003 HC RX 258: Performed by: INTERNAL MEDICINE

## 2022-10-16 PROCEDURE — 83735 ASSAY OF MAGNESIUM: CPT

## 2022-10-16 PROCEDURE — 6360000002 HC RX W HCPCS: Performed by: INTERNAL MEDICINE

## 2022-10-16 PROCEDURE — 84132 ASSAY OF SERUM POTASSIUM: CPT

## 2022-10-16 PROCEDURE — 84100 ASSAY OF PHOSPHORUS: CPT

## 2022-10-16 PROCEDURE — 99231 SBSQ HOSP IP/OBS SF/LOW 25: CPT | Performed by: PSYCHIATRY & NEUROLOGY

## 2022-10-16 PROCEDURE — 99232 SBSQ HOSP IP/OBS MODERATE 35: CPT | Performed by: FAMILY MEDICINE

## 2022-10-16 PROCEDURE — 94761 N-INVAS EAR/PLS OXIMETRY MLT: CPT

## 2022-10-16 PROCEDURE — 36415 COLL VENOUS BLD VENIPUNCTURE: CPT

## 2022-10-16 PROCEDURE — 2700000000 HC OXYGEN THERAPY PER DAY

## 2022-10-16 RX ADMIN — DEXTROSE MONOHYDRATE: 50 INJECTION, SOLUTION INTRAVENOUS at 00:48

## 2022-10-16 RX ADMIN — POTASSIUM CHLORIDE 10 MEQ: 7.46 INJECTION, SOLUTION INTRAVENOUS at 15:05

## 2022-10-16 RX ADMIN — HEPARIN SODIUM 5000 UNITS: 5000 INJECTION INTRAVENOUS; SUBCUTANEOUS at 20:31

## 2022-10-16 RX ADMIN — SODIUM CHLORIDE, PRESERVATIVE FREE 10 ML: 5 INJECTION INTRAVENOUS at 11:33

## 2022-10-16 RX ADMIN — POTASSIUM CHLORIDE 10 MEQ: 7.46 INJECTION, SOLUTION INTRAVENOUS at 12:57

## 2022-10-16 RX ADMIN — HYDRALAZINE HYDROCHLORIDE 10 MG: 20 INJECTION INTRAMUSCULAR; INTRAVENOUS at 11:05

## 2022-10-16 RX ADMIN — POTASSIUM CHLORIDE 10 MEQ: 7.46 INJECTION, SOLUTION INTRAVENOUS at 06:48

## 2022-10-16 RX ADMIN — POTASSIUM CHLORIDE 10 MEQ: 7.46 INJECTION, SOLUTION INTRAVENOUS at 11:00

## 2022-10-16 RX ADMIN — VANCOMYCIN HYDROCHLORIDE 1000 MG: 1 INJECTION, POWDER, LYOPHILIZED, FOR SOLUTION INTRAVENOUS at 00:50

## 2022-10-16 RX ADMIN — ATORVASTATIN CALCIUM 40 MG: 40 TABLET, FILM COATED ORAL at 20:31

## 2022-10-16 RX ADMIN — VANCOMYCIN HYDROCHLORIDE 1000 MG: 1 INJECTION, POWDER, LYOPHILIZED, FOR SOLUTION INTRAVENOUS at 13:38

## 2022-10-16 RX ADMIN — HEPARIN SODIUM 5000 UNITS: 5000 INJECTION INTRAVENOUS; SUBCUTANEOUS at 08:52

## 2022-10-16 RX ADMIN — POTASSIUM CHLORIDE 10 MEQ: 7.46 INJECTION, SOLUTION INTRAVENOUS at 09:57

## 2022-10-16 RX ADMIN — SODIUM CHLORIDE, PRESERVATIVE FREE 10 ML: 5 INJECTION INTRAVENOUS at 20:31

## 2022-10-16 RX ADMIN — POTASSIUM CHLORIDE 10 MEQ: 7.46 INJECTION, SOLUTION INTRAVENOUS at 08:52

## 2022-10-16 RX ADMIN — IPRATROPIUM BROMIDE AND ALBUTEROL SULFATE 1 AMPULE: 2.5; .5 SOLUTION RESPIRATORY (INHALATION) at 20:12

## 2022-10-16 RX ADMIN — IPRATROPIUM BROMIDE AND ALBUTEROL SULFATE 1 AMPULE: 2.5; .5 SOLUTION RESPIRATORY (INHALATION) at 14:56

## 2022-10-16 RX ADMIN — HYDRALAZINE HYDROCHLORIDE 10 MG: 20 INJECTION INTRAMUSCULAR; INTRAVENOUS at 23:49

## 2022-10-16 RX ADMIN — IPRATROPIUM BROMIDE AND ALBUTEROL SULFATE 1 AMPULE: 2.5; .5 SOLUTION RESPIRATORY (INHALATION) at 07:33

## 2022-10-16 RX ADMIN — DIVALPROEX SODIUM 1000 MG: 500 TABLET, DELAYED RELEASE ORAL at 20:30

## 2022-10-16 RX ADMIN — IPRATROPIUM BROMIDE AND ALBUTEROL SULFATE 1 AMPULE: 2.5; .5 SOLUTION RESPIRATORY (INHALATION) at 11:24

## 2022-10-16 NOTE — PROGRESS NOTES
Pt transferred to 2117 from ICU. Pt placed on telemetry and oriented to room. Pt is sleepy but easily aroused. Pt following commands and answering questions appropriately. No distress noted.

## 2022-10-16 NOTE — PROGRESS NOTES
Pulmonary Progress Note  [facility]      Patient - Armando Gaspar,  Age - 46 y.o.    - 1970      Room Number -    MRN -  [id number]   Acct # - [de-identified]  Date of Admission -  10/10/2022  4:37 AM    Consulting Xin Kimball MD  Primary Care Physician - Artem Lama MD     SUBJECTIVE   Patient's appears to be resting quietly. No accessory muscle use with her breathing no abdominal breathing. Blood pressure not in shock not low    OBJECTIVE   VITALS    height is 4' 11\" (1.499 m) and weight is 98 lb 1.7 oz (44.5 kg). Her oral temperature is 98.8 °F (37.1 °C). Her blood pressure is 146/69 (abnormal) and her pulse is 85. Her respiration is 24 and oxygen saturation is 94%. Body mass index is 19.81 kg/m². Temperature Range: Temp: 98.8 °F (37.1 °C) Temp  Av.1 °F (36.7 °C)  Min: 97.4 °F (36.3 °C)  Max: 98.8 °F (37.1 °C)  BP Range:  Systolic (08EZY), WYI:391 , Min:133 , HTT:015     Diastolic (87ELR), OXX:10, Min:43, Max:114    Pulse Range: Pulse  Av.5  Min: 72  Max: 99  Respiration Range: Resp  Av.8  Min: 19  Max: 35  Current Pulse Ox[de-identified]  SpO2: 94 %  24HR Pulse Ox Range:  SpO2  Av.8 %  Min: 91 %  Max: 97 %  Oxygen Amount and Delivery: O2 Flow Rate (L/min): 2 L/min    Wt Readings from Last 3 Encounters:   10/14/22 98 lb 1.7 oz (44.5 kg)   22 96 lb 8 oz (43.8 kg)   22 105 lb (47.6 kg)       I/O (24 Hours)    Intake/Output Summary (Last 24 hours) at 10/16/2022 1648  Last data filed at 10/16/2022 0526  Gross per 24 hour   Intake --   Output 350 ml   Net -350 ml       EXAM     General Appearance  Awake, alert, oriented, in no acute distress  HEENT - normocephalic, atraumatic. Neck - Supple,  trachea midline   Lungs -coarse breath sounds no crackles rales or wheeze  Heart Exam:PMI normal. No lifts, heaves, or thrills. RRR.  No murmurs, clicks, gallops, or rubs  Abdomen Exam: Abdomen soft, non-tender  Extremity Exam: No signs of cyanosis    MEDS      vancomycin  1,000 mg IntraVENous Q12H    Vitamin D  2,000 Units Oral Daily    carvedilol  3.125 mg Oral BID WC    sodium chloride flush  5-40 mL IntraVENous 2 times per day    heparin (porcine)  5,000 Units SubCUTAneous BID    ARIPiprazole  5 mg Oral Daily    atorvastatin  40 mg Oral Daily    divalproex  1,000 mg Oral BID    DULoxetine  60 mg Oral Daily    ferrous sulfate  325 mg Oral BID WC    ipratropium-albuterol  1 ampule Inhalation Q4H WA    vancomycin (VANCOCIN) intermittent dosing (placeholder)   Other RX Placeholder      sodium chloride       ketorolac, potassium chloride **OR** potassium alternative oral replacement **OR** potassium chloride, hydrALAZINE, sodium chloride flush, sodium chloride, acetaminophen, ondansetron **OR** ondansetron, albuterol sulfate HFA, sodium chloride flush, perflutren lipid microspheres    LABS   CBC   Recent Labs     10/16/22  0422   WBC 7.0   HGB 10.3*   HCT 32.3*   MCV 80.5   *     BMP:   Lab Results   Component Value Date/Time     10/16/2022 06:10 AM    K 2.9 10/16/2022 06:10 AM     10/16/2022 06:10 AM    CO2 25 10/16/2022 06:10 AM    BUN 7 10/16/2022 06:10 AM    LABALBU 3.4 10/10/2022 04:55 AM    CREATININE 0.44 10/16/2022 06:10 AM    CALCIUM 7.6 10/16/2022 06:10 AM    GFRAA >60 04/08/2022 07:17 AM    LABGLOM >60 10/16/2022 06:10 AM     ABGs:  Lab Results   Component Value Date/Time    PHART 7.467 10/15/2022 10:40 AM    PO2ART 66.5 10/15/2022 10:40 AM    UIM4KEZ 35.4 10/15/2022 10:40 AM      Lab Results   Component Value Date/Time    MODE PRVC 12/28/2016 04:36 AM     Ionized Calcium:  No results found for: IONCA  Magnesium:    Lab Results   Component Value Date/Time    MG 1.8 10/16/2022 04:22 AM     Phosphorus:    Lab Results   Component Value Date/Time    PHOS 2.9 10/16/2022 04:22 AM        LIVER PROFILE No results for input(s): AST, ALT, LIPASE, BILIDIR, BILITOT, ALKPHOS in the last 72 hours.    Invalid input(s): AMYLASE,  ALB  INR No results for input(s): INR in the last 72 hours.   PTT   Lab Results   Component Value Date    APTT 25.6 07/22/2016         RADIOLOGY     (See actual reports for details)    ASSESSMENT/PLAN     Patient Active Problem List   Diagnosis    GERD (gastroesophageal reflux disease)    Hoarseness of voice    MVP (mitral valve prolapse)    Depression with anxiety    Lymphocytic colitis    History of migraine headaches    Hyperlipidemia    Sleep apnea    IGT (impaired glucose tolerance)    History of renal calculi    Lactose intolerance    History of seizure disorder    Leukopenia    Bipolar disorder, mixed ([facility]    Illicit drug use    Postlaminectomy syndrome, cervical region    Degeneration of cervical intervertebral disc    Encounter for long-term (current) use of other medications    Bradycardia    Cellulitis    Hypokalemia    Primary hypertension    Elevated lipase    Right lower quadrant abdominal pain    Nausea    Diarrhea    Acute encephalopathy    Polysubstance abuse (HCC)    Fibromyalgia    Cocaine addiction (HCC)    Shoulder arthritis    Osteoarthritis of left glenohumeral joint    Abnormal EKG    Anemia    Anxiety    Carotid artery stenosis    COPD (chronic obstructive pulmonary disease) ([facility]    Dental disease    HUTCHINSON (dyspnea on exertion)    Fatigue    Fractures    H/O gastric bypass    History of crack cocaine use    History of UTI    Injury of back    Intractable chronic migraine without aura and without status migrainosus    Intractable migraine with aura with status migrainosus    Kidney stones    Dizziness    Memory loss    Mild pulmonary hypertension (HCC)    Mild tricuspid regurgitation    Primary osteoarthritis of left shoulder    Seizure-like activity (HCC)    Stress at home    TMJ (dislocation of temporomandibular joint)    Tobacco abuse    Visual impairment    Internal hernia    Vitamin D deficiency    Iron deficiency    SIRS (systemic inflammatory response syndrome) (HCC)    NICKI (acute kidney injury) ([facility]    Severe malnutrition (HCC)    Elevated d-dimer    Leukocytosis    Pulmonary nodules   IMPRESSION:  1. Suspect COPD exacerbation. This has resolved. .  2   septic emboli septic emboli on abdomen CT scan  3.  E. coli urinary tract infection  4. Elevated procalcitonin level, may be, because of acute kidney injury  plus infection. 5.  Acute kidney injury -resolved. .  Initial  creatinine was 2.21 and now 0.52 anion gap is now normal.  6.  Leukocytosis with bandemia. 7. MRSA bacteremia. 2 of 2 blood cultures October 10  8. Opiates and cocaine in  tox cream.  9.  Full code. Will check CT scan of chest without IV contrast tomorrow to see the extent of the septic lesions.   Renal function normal  Currently on vancomycin and cefepime    Transfer to [facility]    Electronically signed by Curtis Wright MD on 10/16/2022 at 4:44 PM

## 2022-10-16 NOTE — PROGRESS NOTES
Department of Psychiatry  Psychiatric Consult      Patient was seen and examined in person, chart reviewed, case discussed with staff/team     Reason for consult: Bipolar disorder, lethargy, erratic behavior     HISTORY OF PRESENT ILLNESS:    When approached for interview patient was lethargic and uncooperative with interview. Attempts at communication were made multiple times and patient remained noncompliant. We will need to reapproach for assessment to determine appropriateness for inpatient psychiatric hospitalization.

## 2022-10-16 NOTE — PROGRESS NOTES
Vancomycin Dosing by Pharmacy - Daily Note   Vancomycin Therapy Day:  7  Indication: bacteremia    Allergies:  Aspirin, Bactrim, and Codeine   Actual Weight:    Wt Readings from Last 1 Encounters:   10/14/22 98 lb 1.7 oz (44.5 kg)       Labs/Ancillary Data  Estimated Creatinine Clearance: 104 mL/min (A) (based on SCr of 0.44 mg/dL (L)). Recent Labs     10/14/22  0413 10/14/22  1936 10/15/22  0402 10/16/22  0422 10/16/22  0610   CREATININE 0.43* 0.44* 0.41*  --  0.44*   BUN 14 11 9  --  7   WBC 5.8  --  4.6 7.0  --      Procalcitonin   Date Value Ref Range Status   10/10/2022 6.01 (H) <0.09 ng/mL Final     Comment:           Suspected Sepsis:  <0.50 ng/mL     Low likelihood of sepsis. 0.50-2.00 ng/mL     Increased likelihood of sepsis. Antibiotics encouraged. >2.00 ng/mL     High risk of sepsis/shock. Antibiotics strongly encouraged. Suspected Lower Resp Tract Infections:  <0.24 ng/mL     Low likelihood of bacterial infection. >0.24 ng/mL     Increased likelihood of bacterial infection. Antibiotics encouraged. With successful antibiotic therapy, PCT levels should decrease rapidly. (Half-life of 24 to   36 hours.)        Procalcitonin values from samples collected within the first 6 hours of systemic infection   may still be low. Retesting may be indicated. Values from day 1 and day 4 can be entered into the Change in Procalcitonin Calculator   ([email or web address]) to determine the patient's Mortality Risk Prognosis        In healthy neonates, plasma Procalcitonin (PCT) concentrations increase gradually after   birth, reaching peak values at about 24 hours of age then decrease to normal values below   0.5 ng/mL by 48-72 hours of age.          Intake/Output Summary (Last 24 hours) at 10/16/2022 0727  Last data filed at 10/16/2022 0526  Gross per 24 hour   Intake --   Output 350 ml   Net -350 ml     Temp: 98.5 F    Culture Date / Source  /  Results  10/10 - urine - E Coli  10/10 - blood x2 - MRSA    Recent vancomycin administrations                     vancomycin 1000 mg IVPB in 250 mL D5W addavial (mg) 1,000 mg New Bag 10/16/22 0050     1,000 mg New Bag 10/15/22 1413     1,000 mg New Bag 10/14/22 2339     1,000 mg New Bag  1429     1,000 mg New Bag  0052    vancomycin (VANCOCIN) 1,250 mg in dextrose 5 % 250 mL IVPB (ADDAVIAL) (mg) 1,250 mg New Bag 10/13/22 1126                    Vancomycin Concentrations:   TROUGH:  No results for input(s): VANCOTROUGH in the last 72 hours. RANDOM:    Recent Labs     10/13/22  2313 10/14/22  1936   VANCORANDOM 13.8 21.9       MRSA Nasal Swab: N/A. Non-respiratory infection. Rona Bedolla PLAN     Continue current dose of 1000 mg q12h IV  Ensured BUN/sCr ordered at baseline and every 48 hours x at least 3 levels, then at least weekly. Repeat vancomycin concentration ordered for TBD  Pharmacy will continue to monitor patient and adjust therapy as indicated      Vancomycin Target Concentration Parameters  Treatment  Population Target AUC/GABBI Target Trough   Invasive MRSA Infection (bacteremia, pneumonia, meningitis, endocarditis, osteomyelitis)  Sepsis (undifferentiated) 400-600 N/A   Infection due to non-MRSA pathogen  Empiric treatment of non-invasive MRSA infection  (SSTI, UTI) <500 10-15 mg/L   CrCl < 29 mL/min  Rapidly fluctuating serum creatinine   NICKI N/A < 15 mg/L     Renal replacement therapy is dosed by levels, per hospital protocol. Abbreviations  * Pauc: probability that AUC is >400 (efficacy); Pconc: probability that Ctrough is above 20 ?g/mL (toxicity); Tox: Probability of nephrotoxicity, based on Iav et al. Clin Infect Dis 2009. Loading dose: N/A  Regimen: 1000 mg IV every 12 hours.   Start time: 12:50 on 10/16/2022  Exposure target: AUC24 (range)400-600 mg/L.hr   AUC24,ss: 507 mg/L.hr  Probability of AUC24 > 400: 97 %  Ctrough,ss: 13.8 mg/L  Probability of Ctrough,ss > 20: 3 %  Probability of nephrotoxicity (Iva DEVON 2009): 9 %      Thank you for the consult. Pharmacy will continue to follow.     Vin Bocanegra RPH,PharmD,  10/16/2022, 7:27 AM

## 2022-10-16 NOTE — PROGRESS NOTES
Progress Note  Date:10/16/2022       Room:  Patient Name:Anu Bourne     YOB: 1970     Age:51 y.o. Specialists notes, labs & imaging reviewed. Subjective    Subjective:  Symptoms:  (Remains somnolent. Took pills yesterday. MRI brain negative for any significant changes. ). Diet:  Poor intake. Activity level: Impaired due to weakness. Pain:  She reports no pain. Review of Systems   Constitutional:  Positive for fatigue. Skin:  Positive for pallor. Objective         Vitals Last 24 Hours:  TEMPERATURE:  Temp  Av.8 °F (36.6 °C)  Min: 97.4 °F (36.3 °C)  Max: 98.5 °F (36.9 °C)  RESPIRATIONS RANGE: Resp  Av.8  Min: 19  Max: 35  PULSE OXIMETRY RANGE: SpO2  Av.6 %  Min: 93 %  Max: 98 %  PULSE RANGE: Pulse  Av.8  Min: 63  Max: 94  BLOOD PRESSURE RANGE: Systolic (84NIA), LLB:566 , Min:131 , RFN:781   ; Diastolic (60OBQ), QSY:56, Min:42, Max:114    I/O (24Hr): Intake/Output Summary (Last 24 hours) at 10/16/2022 0811  Last data filed at 10/16/2022 0526  Gross per 24 hour   Intake --   Output 350 ml   Net -350 ml       Objective:  General Appearance:  Comfortable. Vital signs: (most recent): Blood pressure (!) 162/82, pulse 85, temperature 98.7 °F (37.1 °C), temperature source Axillary, resp. rate 20, height 4' 11\" (1.499 m), weight 98 lb 1.7 oz (44.5 kg), SpO2 93 %. Vital signs are normal.    Output: Producing urine. Lungs:  Normal effort and normal respiratory rate. There are decreased breath sounds. Heart: Normal rate. Regular rhythm.   S1 normal and S2 normal.    Labs/Imaging/Diagnostics    Labs:  CBC:  Recent Labs     10/14/22  0413 10/15/22  0402 10/16/22  0422   WBC 5.8 4.6 7.0   RBC 3.73* 3.47* 4.01   HGB 9.8* 9.1* 10.3*   HCT 29.8* 27.6* 32.3*   MCV 80.1 79.7* 80.5   RDW 15.4* 15.8* 16.1*   PLT 95* 80* 105*       CHEMISTRIES:  Recent Labs     10/14/22  0413 10/14/22  1936 10/15/22  0402 10/16/22  0422 10/16/22  0610    137 138 obstructive pulmonary disease) ([facility] [address] 10/11/2022 Yes    H/O gastric bypass 10/11/2022 Yes    Overview Signed 2/13/2020  7:19 PM by Cy Dubose MD     enlarged  pancreas, here for surgical work-up for EGD        Assessment & Plan    Sepsis with COPD exacerbation, possible septic emboli in chest - ADRIANNE not performed yet d/t lethargy. AMS - neurology following, MRI negative, suspect patient purposely not responding at times. Continue current treatments. Complete orders per chart.       Electronically signed by Charly Baca MD on 10/16/2022 at 8:11 AM

## 2022-10-16 NOTE — PROGRESS NOTES
[facility] Neurology   IN-PATIENT SERVICE      NEUROLOGY PROGRESS  NOTE            Date:   10/16/2022  Patient name:  Alida Brown  Date of admission:  10/10/2022  YOB: 1970      Interval History:     Patient appears to be improving with wakeful periods alternating with periods of drowsiness. This appears to be and improving encephalopathy. Patient may be clearing the effects of polysubstance abuse. History of Present Illness: The patient is a 46 y.o. female who presents with Shortness of Breath and Positive For Covid-19  . The patient was seen and examined and the chart was reviewed. Patient admitted to the hospital on 10/10/2022 through the ED and then transferred to the ICU on 10/13/2022. He had been admitted with hypertension COPD fibromyalgia and chronic pain presenting with shortness of breath. She had difficulty with breathing. She had had COVID 1 week prior to presentation. She then became very weak and had increased sleeping. Patient is also known to be a polysubstance abuser was noted to have had cocaine in her urine. Patient had tested positive for MRSA sepsis. He is currently being treated.     Past Medical History:     Past Medical History:   Diagnosis Date    Anemia     Arthritis     Asthma     Bipolar disorder, mixed ([address]     Carotid artery stenosis 2/13/2020    Cocaine abuse ([facility] [address] 11/4/2014    COPD (chronic obstructive pulmonary disease) (HCC)     Degeneration of cervical intervertebral disc     Depression with anxiety     Depression with anxiety     Fibromyalgia 1/5/2017    GERD (gastroesophageal reflux disease)     History of migraine headaches 6/10/2014    History of renal calculi 6/10/2014    History of seizure disorder 6/10/2014    Hoarseness of voice     HTN (hypertension) 6/10/2014    Hyperlipidemia 6/10/2014    IGT (impaired glucose tolerance) 6/10/2014    Kidney stones     Lactose intolerance 6/10/2014    Leukopenia 6/10/2014    Major depressive disorder, recurrent episode, severe, without mention of psychotic behavior 2014    MVP (mitral valve prolapse)     Seizures (HCC)     Sleep apnea 6/10/2014    Unspecified diseases of blood and blood-forming organs         Past Surgical History:     Past Surgical History:   Procedure Laterality Date    ANKLE FRACTURE SURGERY      recontruction surgery    APPENDECTOMY      BREAST LUMPECTOMY Right     CARDIAC CATHETERIZATION      CARPAL TUNNEL RELEASE      x2     SECTION      CHOLECYSTECTOMY      COLONOSCOPY  12    COLONOSCOPY  2016    severe spasms    COLONOSCOPY  2019    DR GOLDY MCINTOSH    GASTRIC BYPASS SURGERY      GASTRIC BYPASS SURGERY      HYSTERECTOMY (CERVIX STATUS UNKNOWN)      HYSTERECTOMY (CERVIX STATUS UNKNOWN)      LAPAROSCOPY N/A 2022    LAPAROSCOPY EXPLORATORY CONVERTED TO OPEN EXPLORATORY LAPAROTOMY, LYSIS OF ADHESIONS, REDUCTION OF INTERNAL HERNIA performed by Faviola Duron DO at [address] [place] [address]      APPLICATION OF ARCH BARS,SIMPLE EXTRACTION OF #15 AND RT TMJ JOINT REPLACEMENT    SHOULDER ARTHROSCOPY Left 2019    DR ROBERT GREEN    SHOULDER SURGERY      TONSILLECTOMY AND ADENOIDECTOMY      UPPER GASTROINTESTINAL ENDOSCOPY  7-3-12    egd    UPPER GASTROINTESTINAL ENDOSCOPY  2016    status post gastrectomy with a small remnant of gastric pouch.     UPPER GASTROINTESTINAL ENDOSCOPY  2019    DR Shilpa Blount        Medications during admission:      vancomycin  1,000 mg IntraVENous Q12H    Vitamin D  2,000 Units Oral Daily    carvedilol  3.125 mg Oral BID WC    sodium chloride flush  5-40 mL IntraVENous 2 times per day    heparin (porcine)  5,000 Units SubCUTAneous BID    ARIPiprazole  5 mg Oral Daily    atorvastatin  40 mg Oral Daily    divalproex  1,000 mg Oral BID    DULoxetine  60 mg Oral Daily    ferrous sulfate  325 mg Oral BID WC    ipratropium-albuterol  1 ampule Inhalation Q4H WA    vancomycin (VANCOCIN) intermittent dosing (placeholder)   Other RX Placeholder         Physical Exam:   BP (!) 171/79   Pulse 81   Temp 98.8 °F (37.1 °C) (Oral)   Resp (!) 35   Ht 4' 11\" (1.499 m)   Wt 98 lb 1.7 oz (44.5 kg)   SpO2 93%   BMI 19.81 kg/m²   Temp (24hrs), Av °F (36.7 °C), Min:97.4 °F (36.3 °C), Max:98.8 °F (37.1 °C)    Neurological examination:    Mental status   Alternating wakefulness with drowsy periods. This represents an improvement over the past few days. Patient is able to follow some simple commands for me. After period of stimulation patient appeared to have markedly improved wakefulness. Cranial nerves   II - pupils reactive  III, IV, VI - extraocular muscles intact  V - normal facial sensation                                                               VII - normal facial symmetry                                                             VIII - intact hearing                                                                             IX, X -able to phonate                                              XI -able to turn head side to side                                                    XII - midline tongue      Motor function  Moving all extremities equally            Sensory function Intact to touch throughout     Cerebellar No cogwheeling rigidity tremor bradykinesia or ataxia     Reflex function Trace/4 symmetric throughout . Downgoing plantar response bilaterally.    Gait                  Not testable             Diagnostics:      Laboratory Testing:  CBC:   Recent Labs     10/14/22  0413 10/15/22  0402 10/16/22  0422   WBC 5.8 4.6 7.0   HGB 9.8* 9.1* 10.3*   PLT 95* 80* 105*     BMP:    Recent Labs     10/14/22  1936 10/15/22  0402 10/16/22  0610    138 136   K 2.9* 3.7 2.9*    106 104   CO2 20 23 25   BUN 11 9 7   CREATININE 0.44* 0.41* 0.44*   GLUCOSE 161* 97 84         Lab Results   Component Value Date    CHOL 156 2022    LDLCHOLESTEROL 101 2022 HDL 39 (L) 01/30/2022    TRIG 78 01/30/2022    ALT 17 10/10/2022    AST 41 (H) 10/10/2022    TSH 1.37 01/29/2022    INR 1.2 10/10/2022    LABA1C 6.1 (H) 01/29/2022    LABMICR 419 (H) 10/10/2022    [id number] 311 01/29/2022       Lab Results   Component Value Date    VALPROATE 120 10/14/2022       Impression:      Encephalopathy. Nonspecific versus toxic versus metabolic. MRSA sepsis  Normal MRI brain recent. Plan:     No new suggestions per neurology at this time  Neurology will sign off. If there are any further questions or issues please reconsult neurology. I will be signing off and a new neurologist will be starting tomorrow.         Electronically signed by Orvel Homans, MD on 10/16/2022 at 12:05 PM      Orvel Homans, MD  [facility]  Neurology

## 2022-10-16 NOTE — PROGRESS NOTES
Progress Note    Reason for Consult: NICKI    Requesting Physician:  Jona Gill MD    INTERVAL HISTORY:  Creatinine ranging from 0.41 to 0.44  Potassium was 2.9. Phosphorus better at 2.9. Very sleepy and drowsy. HISTORY OF PRESENT ILLNESS:    The patient is a 46 y.o. female who presents with weakness, generalized pain, SOB and abdominal pain. She tested positive for COVID 19 on 9/30. She states she slept 2 days straight last week. C/o fever, chills and foul smelling urine with right flank pain. She also admits to doing crack cocaine at least twice weekly and when ever she can afford it more frequently. She states she uses cocaine to help with pain. She drinks homemade wine on occasion and smokes cigarettes, but states recently she decreased tobacco use. Creatinine baseline 0.6 from earlier this year. Creatinine 2.1 on admission. With sodium 132, potassium 3.3, bicarb 24, chloride 91, BUN 33, magnesium 2.1, procalcitonin 6.01, proBNP 4799. High sensitivity troponin was initially 30 and 13 on redraw. Glucose 129, albumin 3.44. Given normal saline bolus. X-ray showed multiple nodular opacities projecting over the right lung measuring up to 1.5 cm further evaluation with CT chest recommended. No acute focal airspace consolidation or pleural effusions. Blood pressure trending 110-190s/60-120s. Medications include Coreg, lisinopril, trazodone, gabapentin, Norvasc, Vistaril. She is very drowsy, slurring words. She has hx of HTN. She states she only takes BP meds if she remembers, which is not often. Hx of HTN since age 23s. Ordered urine sodium, urine creatinine, urine eosinophils, urine osmolality. Follow-up urine culture. On iv Zosyn per primary. Strict I&O discussed with RN. She received 1 dose of Lovenox and is now on heparin 5000 units twice a day. In the ER, she received Dilaudid and morphine and dose of Neurontin. She is very drowsy. Hold Neurontin and trazodone for now.   Blood SURGERY      OTHER SURGICAL HISTORY  56/40/3925    APPLICATION OF ARCH BARS,SIMPLE EXTRACTION OF #15 AND RT TMJ JOINT REPLACEMENT    SHOULDER ARTHROSCOPY Left 06/05/2019    DR ROBERT GREEN    SHOULDER SURGERY      TONSILLECTOMY AND ADENOIDECTOMY      UPPER GASTROINTESTINAL ENDOSCOPY  7-3-12    egd    UPPER GASTROINTESTINAL ENDOSCOPY  12/19/2016    status post gastrectomy with a small remnant of gastric pouch. UPPER GASTROINTESTINAL ENDOSCOPY  05/2019    DR Dat Walls       Prior to Admission medications    Medication Sig Start Date End Date Taking? Authorizing Provider   lisinopril (PRINIVIL;ZESTRIL) 20 MG tablet Take 20 mg by mouth daily   Yes Historical Provider, MD   carvedilol (COREG) 25 MG tablet TAKE 1 TABLET BY MOUTH IN THE MORNING AND 1 IN THE EVENING WITH MEALS 9/19/22   Mariah Tafoya MD   traZODone (DESYREL) 100 MG tablet TAKE 1 TABLET BY MOUTH AT BEDTIME 6/13/22   Historical Provider, MD   gabapentin (NEURONTIN) 300 MG capsule TAKE 1 CAPSULE BY MOUTH THREE TIMES DAILY 5/24/22 8/9/22  Mariah Tafoya MD   hydrOXYzine (VISTARIL) 25 MG capsule  9/15/20   Historical Provider, MD   DULoxetine (CYMBALTA) 30 MG extended release capsule Take 1 capsule by mouth daily  Patient taking differently: Take 60 mg by mouth in the morning.  4/8/19   KELLY Sierra - CNP   ARIPiprazole (ABILIFY) 5 MG tablet Take 5 mg by mouth daily    Historical Provider, MD       Scheduled Meds:   vancomycin  1,000 mg IntraVENous Q12H    Vitamin D  2,000 Units Oral Daily    carvedilol  3.125 mg Oral BID WC    sodium chloride flush  5-40 mL IntraVENous 2 times per day    heparin (porcine)  5,000 Units SubCUTAneous BID    ARIPiprazole  5 mg Oral Daily    atorvastatin  40 mg Oral Daily    divalproex  1,000 mg Oral BID    DULoxetine  60 mg Oral Daily    ferrous sulfate  325 mg Oral BID WC    ipratropium-albuterol  1 ampule Inhalation Q4H WA    vancomycin (VANCOCIN) intermittent dosing (placeholder)   Other RX Placeholder Continuous Infusions:   dextrose 40 mL/hr at 10/16/22 0048    sodium chloride       PRN Meds:ketorolac, potassium chloride **OR** potassium alternative oral replacement **OR** potassium chloride, hydrALAZINE, sodium chloride flush, sodium chloride, acetaminophen, ondansetron **OR** ondansetron, albuterol sulfate HFA, sodium chloride flush, perflutren lipid microspheres         Physical Exam:  Vitals:    10/16/22 1300 10/16/22 1330 10/16/22 1400 10/16/22 1458   BP: (!) 167/101  (!) 146/69    Pulse: 93 99 85 85   Resp: 25 (!) 33 (!) 32 24   Temp:       TempSrc:       SpO2:    94%   Weight:       Height:         I/O last 3 completed shifts: In: 5275 [P.O.:120; I.V.:4655; IV [id number]:938]  Out: 2050 [Urine:2050]    General:  lethargic, not in distress. Appears to be older than stated age. HEENT: Atraumatic, normocephalic. Anicteric sclera. Pink and moist oral mucosa. Neck supple. No JVD. Chest: Bilateral air entry, clear to auscultation, no wheezing, rhonchi or rales. Cardiovascular: S1S2, no murmur, rub or gallop. No lower extremity edema. Abdomen: soft Active bowel sounds. Musculoskeletal:  No cyanosis or clubbing. Integumentary: Pink, warm and dry. +wounds to BLE. No s/s of infection.  Skin turgor normal.  CNS: ISAAK    Data:    CBC:   Lab Results   Component Value Date    WBC 7.0 10/16/2022    HGB 10.3 (L) 10/16/2022    HCT 32.3 (L) 10/16/2022    MCV 80.5 10/16/2022     (L) 10/16/2022     BMP:    Lab Results   Component Value Date     10/16/2022     10/15/2022     10/14/2022    K 2.9 (LL) 10/16/2022    K 3.7 10/15/2022    K 2.9 (LL) 10/14/2022     10/16/2022     10/15/2022     10/14/2022    CO2 25 10/16/2022    CO2 23 10/15/2022    CO2 20 10/14/2022    BUN 7 10/16/2022    BUN 9 10/15/2022    BUN 11 10/14/2022    CREATININE 0.44 (L) 10/16/2022    CREATININE 0.41 (L) 10/15/2022    CREATININE 0.44 (L) 10/14/2022    GLUCOSE 84 10/16/2022    GLUCOSE 97 10/15/2022 GLUCOSE 161 (H) 10/14/2022     CMP:   Lab Results   Component Value Date/Time     10/16/2022 06:10 AM    K 2.9 10/16/2022 06:10 AM     10/16/2022 06:10 AM    CO2 25 10/16/2022 06:10 AM    BUN 7 10/16/2022 06:10 AM    CREATININE 0.44 10/16/2022 06:10 AM    GLUCOSE 84 10/16/2022 06:10 AM    CALCIUM 7.6 10/16/2022 06:10 AM    PROT 7.9 10/10/2022 04:55 AM    LABALBU 3.4 10/10/2022 04:55 AM    BILITOT 0.7 10/10/2022 04:55 AM    ALKPHOS 112 10/10/2022 04:55 AM    AST 41 10/10/2022 04:55 AM    ALT 17 10/10/2022 04:55 AM      Hepatic:   Lab Results   Component Value Date    AST 41 (H) 10/10/2022    AST 14 04/08/2022    AST 26 01/28/2022    ALT 17 10/10/2022    ALT 6 04/08/2022    ALT 11 01/28/2022    BILITOT 0.7 10/10/2022    BILITOT 0.18 (L) 04/08/2022    BILITOT 0.23 (L) 01/28/2022    ALKPHOS 112 (H) 10/10/2022    ALKPHOS 120 (H) 04/08/2022    ALKPHOS 90 01/28/2022     BNP: No results found for: BNP  Lipids:   Lab Results   Component Value Date    CHOL 156 01/30/2022    HDL 39 (L) 01/30/2022     INR:   Lab Results   Component Value Date    INR 1.2 10/10/2022    INR 1.0 07/22/2016    INR 0.9 05/30/2016     PTH: No results found for: PTH  Phosphorus:    Lab Results   Component Value Date/Time    PHOS 2.9 10/16/2022 04:22 AM     Ionized Calcium: No results found for: IONCA  Magnesium:   Lab Results   Component Value Date/Time    MG 1.8 10/16/2022 04:22 AM     Albumin:   Lab Results   Component Value Date/Time    LABALBU 3.4 10/10/2022 04:55 AM     Last 3 CK, CKMB, Troponin: @LABRCNT(CKTOTAL:3,CKMB:3,TROPONINI:3)       URINE:)No results found for: [place]     Radiology:   Reviewed. Impression:  NICKI, appears to be hemodynamically related, Cr is better. Mild hypernatremia, improved. Hypokalemia. Hypophosphatemia  Hypocalcemia. Hypovitaminosis D. Hypertension. MRSA sepsis with septic embolization. UTI with E coli. History of cocaine use. Ileus. Bipolar disorder. Plan:   Will discontinue D5W.  Potassium was repleted. On iv Vancomycin. Continue holding Lisinopril. Coreg dose was decreased to  3.125 mg BID with holding parameters. Monitor BP. Started on vitamin D replacement. Follow up labs ordered for AM.     Please do not hesitate to call with questions.       Electronically signed by Sharmin Crow MD  on 10/16/2022 at 3:01 PM

## 2022-10-17 ENCOUNTER — APPOINTMENT (OUTPATIENT)
Dept: CT IMAGING | Age: 52
DRG: 871 | End: 2022-10-17
Payer: COMMERCIAL

## 2022-10-17 PROBLEM — F05 DELIRIUM DUE TO ANOTHER MEDICAL CONDITION: Status: ACTIVE | Noted: 2022-10-17

## 2022-10-17 LAB
ABSOLUTE BANDS #: 1.17 K/UL (ref 0–1)
ABSOLUTE EOS #: 0 K/UL (ref 0–0.4)
ABSOLUTE LYMPH #: 0.51 K/UL (ref 1–4.8)
ABSOLUTE MONO #: 0.37 K/UL (ref 0.1–1.3)
ANION GAP SERPL CALCULATED.3IONS-SCNC: 9 MMOL/L (ref 9–17)
BANDS: 16 % (ref 0–10)
BASOPHILS # BLD: 0 % (ref 0–2)
BASOPHILS ABSOLUTE: 0 K/UL (ref 0–0.2)
BUN BLDV-MCNC: 8 MG/DL (ref 6–20)
CALCIUM SERPL-MCNC: 7.8 MG/DL (ref 8.6–10.4)
CHLORIDE BLD-SCNC: 103 MMOL/L (ref 98–107)
CO2: 26 MMOL/L (ref 20–31)
CREAT SERPL-MCNC: 0.46 MG/DL (ref 0.5–0.9)
EOSINOPHILS RELATIVE PERCENT: 0 % (ref 0–4)
GFR SERPL CREATININE-BSD FRML MDRD: >60 ML/MIN/1.73M2
GLUCOSE BLD-MCNC: 76 MG/DL (ref 70–99)
HCT VFR BLD CALC: 30.2 % (ref 36–46)
HEMOGLOBIN: 9.9 G/DL (ref 12–16)
LYMPHOCYTES # BLD: 7 % (ref 24–44)
MAGNESIUM: 1.9 MG/DL (ref 1.6–2.6)
MCH RBC QN AUTO: 26.2 PG (ref 26–34)
MCHC RBC AUTO-ENTMCNC: 32.8 G/DL (ref 31–37)
MCV RBC AUTO: 79.9 FL (ref 80–100)
METAMYELOCYTES ABSOLUTE COUNT: 0.07 K/UL
METAMYELOCYTES: 1 %
MONOCYTES # BLD: 5 % (ref 1–7)
MORPHOLOGY: ABNORMAL
PDW BLD-RTO: 15.6 % (ref 11.5–14.9)
PHOSPHORUS: 3.2 MG/DL (ref 2.6–4.5)
PLATELET # BLD: 115 K/UL (ref 150–450)
PMV BLD AUTO: 8 FL (ref 6–12)
POTASSIUM SERPL-SCNC: 3.3 MMOL/L (ref 3.7–5.3)
RBC # BLD: 3.79 M/UL (ref 4–5.2)
SEG NEUTROPHILS: 71 % (ref 36–66)
SEGMENTED NEUTROPHILS ABSOLUTE COUNT: 5.18 K/UL (ref 1.3–9.1)
SODIUM BLD-SCNC: 138 MMOL/L (ref 135–144)
WBC # BLD: 7.3 K/UL (ref 3.5–11)

## 2022-10-17 PROCEDURE — 2700000000 HC OXYGEN THERAPY PER DAY

## 2022-10-17 PROCEDURE — 6370000000 HC RX 637 (ALT 250 FOR IP): Performed by: INTERNAL MEDICINE

## 2022-10-17 PROCEDURE — 2580000003 HC RX 258: Performed by: INTERNAL MEDICINE

## 2022-10-17 PROCEDURE — 36415 COLL VENOUS BLD VENIPUNCTURE: CPT

## 2022-10-17 PROCEDURE — 71250 CT THORAX DX C-: CPT

## 2022-10-17 PROCEDURE — 85025 COMPLETE CBC W/AUTO DIFF WBC: CPT

## 2022-10-17 PROCEDURE — 6360000002 HC RX W HCPCS: Performed by: INTERNAL MEDICINE

## 2022-10-17 PROCEDURE — APPSS180 APP SPLIT SHARED TIME > 60 MINUTES

## 2022-10-17 PROCEDURE — 83735 ASSAY OF MAGNESIUM: CPT

## 2022-10-17 PROCEDURE — 94761 N-INVAS EAR/PLS OXIMETRY MLT: CPT

## 2022-10-17 PROCEDURE — 84100 ASSAY OF PHOSPHORUS: CPT

## 2022-10-17 PROCEDURE — 99232 SBSQ HOSP IP/OBS MODERATE 35: CPT | Performed by: FAMILY MEDICINE

## 2022-10-17 PROCEDURE — 6360000002 HC RX W HCPCS: Performed by: FAMILY MEDICINE

## 2022-10-17 PROCEDURE — 2060000000 HC ICU INTERMEDIATE R&B

## 2022-10-17 PROCEDURE — 80048 BASIC METABOLIC PNL TOTAL CA: CPT

## 2022-10-17 PROCEDURE — 94640 AIRWAY INHALATION TREATMENT: CPT

## 2022-10-17 RX ORDER — CIPROFLOXACIN 2 MG/ML
400 INJECTION, SOLUTION INTRAVENOUS EVERY 12 HOURS
Status: DISCONTINUED | OUTPATIENT
Start: 2022-10-17 | End: 2022-10-19

## 2022-10-17 RX ADMIN — KETOROLAC TROMETHAMINE 15 MG: 30 INJECTION, SOLUTION INTRAMUSCULAR; INTRAVENOUS at 00:57

## 2022-10-17 RX ADMIN — DIVALPROEX SODIUM 500 MG: 500 TABLET, DELAYED RELEASE ORAL at 11:55

## 2022-10-17 RX ADMIN — CIPROFLOXACIN 400 MG: 2 INJECTION, SOLUTION INTRAVENOUS at 12:30

## 2022-10-17 RX ADMIN — FERROUS SULFATE TAB 325 MG (65 MG ELEMENTAL FE) 325 MG: 325 (65 FE) TAB at 10:25

## 2022-10-17 RX ADMIN — IPRATROPIUM BROMIDE AND ALBUTEROL SULFATE 1 AMPULE: 2.5; .5 SOLUTION RESPIRATORY (INHALATION) at 20:10

## 2022-10-17 RX ADMIN — DULOXETINE 60 MG: 60 CAPSULE, DELAYED RELEASE ORAL at 10:25

## 2022-10-17 RX ADMIN — DIVALPROEX SODIUM 500 MG: 500 TABLET, DELAYED RELEASE ORAL at 10:25

## 2022-10-17 RX ADMIN — CARVEDILOL 3.12 MG: 3.12 TABLET, FILM COATED ORAL at 10:25

## 2022-10-17 RX ADMIN — ATORVASTATIN CALCIUM 40 MG: 40 TABLET, FILM COATED ORAL at 21:44

## 2022-10-17 RX ADMIN — HEPARIN SODIUM 5000 UNITS: 5000 INJECTION INTRAVENOUS; SUBCUTANEOUS at 10:26

## 2022-10-17 RX ADMIN — CARVEDILOL 3.12 MG: 3.12 TABLET, FILM COATED ORAL at 17:30

## 2022-10-17 RX ADMIN — VANCOMYCIN HYDROCHLORIDE 1000 MG: 1 INJECTION, POWDER, LYOPHILIZED, FOR SOLUTION INTRAVENOUS at 13:53

## 2022-10-17 RX ADMIN — FERROUS SULFATE TAB 325 MG (65 MG ELEMENTAL FE) 325 MG: 325 (65 FE) TAB at 17:30

## 2022-10-17 RX ADMIN — IPRATROPIUM BROMIDE AND ALBUTEROL SULFATE 1 AMPULE: 2.5; .5 SOLUTION RESPIRATORY (INHALATION) at 15:43

## 2022-10-17 RX ADMIN — POTASSIUM CHLORIDE 40 MEQ: 1500 TABLET, EXTENDED RELEASE ORAL at 10:25

## 2022-10-17 RX ADMIN — Medication 2000 UNITS: at 10:25

## 2022-10-17 RX ADMIN — HEPARIN SODIUM 5000 UNITS: 5000 INJECTION INTRAVENOUS; SUBCUTANEOUS at 21:44

## 2022-10-17 RX ADMIN — CIPROFLOXACIN 400 MG: 2 INJECTION, SOLUTION INTRAVENOUS at 23:22

## 2022-10-17 RX ADMIN — ARIPIPRAZOLE 5 MG: 5 TABLET ORAL at 10:27

## 2022-10-17 RX ADMIN — IPRATROPIUM BROMIDE AND ALBUTEROL SULFATE 1 AMPULE: 2.5; .5 SOLUTION RESPIRATORY (INHALATION) at 11:26

## 2022-10-17 RX ADMIN — DIVALPROEX SODIUM 1000 MG: 500 TABLET, DELAYED RELEASE ORAL at 21:44

## 2022-10-17 RX ADMIN — SODIUM CHLORIDE, PRESERVATIVE FREE 10 ML: 5 INJECTION INTRAVENOUS at 10:27

## 2022-10-17 RX ADMIN — SODIUM CHLORIDE, PRESERVATIVE FREE 10 ML: 5 INJECTION INTRAVENOUS at 21:44

## 2022-10-17 RX ADMIN — IPRATROPIUM BROMIDE AND ALBUTEROL SULFATE 1 AMPULE: 2.5; .5 SOLUTION RESPIRATORY (INHALATION) at 07:37

## 2022-10-17 RX ADMIN — VANCOMYCIN HYDROCHLORIDE 1000 MG: 1 INJECTION, POWDER, LYOPHILIZED, FOR SOLUTION INTRAVENOUS at 00:42

## 2022-10-17 ASSESSMENT — PAIN DESCRIPTION - PAIN TYPE: TYPE: ACUTE PAIN

## 2022-10-17 ASSESSMENT — PAIN DESCRIPTION - FREQUENCY: FREQUENCY: INTERMITTENT

## 2022-10-17 ASSESSMENT — PAIN DESCRIPTION - DESCRIPTORS: DESCRIPTORS: SHARP

## 2022-10-17 ASSESSMENT — PAIN SCALES - GENERAL
PAINLEVEL_OUTOF10: 8
PAINLEVEL_OUTOF10: 0

## 2022-10-17 ASSESSMENT — PAIN DESCRIPTION - ONSET: ONSET: GRADUAL

## 2022-10-17 ASSESSMENT — PAIN DESCRIPTION - LOCATION: LOCATION: ABDOMEN

## 2022-10-17 ASSESSMENT — PAIN - FUNCTIONAL ASSESSMENT: PAIN_FUNCTIONAL_ASSESSMENT: ACTIVITIES ARE NOT PREVENTED

## 2022-10-17 ASSESSMENT — PAIN DESCRIPTION - ORIENTATION: ORIENTATION: MID;UPPER

## 2022-10-17 NOTE — PROGRESS NOTES
Progress Note  Date:10/17/2022       Room:[address] [place] [address]  Patient Name:Anu Bourne     YOB: 1970     Age:51 y.o. Subjective    Subjective:  Symptoms:  Stable. Activity level: Impaired due to weakness. Pain:  She reports no pain. Review of Systems  Objective         Vitals Last 24 Hours:  TEMPERATURE:  Temp  Av.7 °F (37.1 °C)  Min: 97.8 °F (36.6 °C)  Max: 99.2 °F (37.3 °C)  RESPIRATIONS RANGE: Resp  Av.1  Min: 18  Max: 37  PULSE OXIMETRY RANGE: SpO2  Av.8 %  Min: 92 %  Max: 98 %  PULSE RANGE: Pulse  Av.9  Min: 74  Max: 99  BLOOD PRESSURE RANGE: Systolic (69VOA), RYM:661 , Min:125 , ZMS:137   ; Diastolic (66RPW), VXE:66, Min:63, Max:106    I/O (24Hr): No intake or output data in the 24 hours ending 10/17/22 0739  Objective:  General Appearance:  Comfortable. Vital signs: (most recent): Blood pressure (!) 155/67, pulse 90, temperature 97.8 °F (36.6 °C), temperature source Oral, resp. rate 18, height 4' 11\" (1.499 m), weight 98 lb 1.7 oz (44.5 kg), SpO2 97 %. (BP elevated). Lungs:  Normal effort and normal respiratory rate. There are decreased breath sounds. Heart: Normal rate. Regular rhythm. S1 normal and S2 normal.    Labs/Imaging/Diagnostics    Labs:  CBC:  Recent Labs     10/15/22  0402 10/16/22  0422 10/17/22  0438   WBC 4.6 7.0 7.3   RBC 3.47* 4.01 3.79*   HGB 9.1* 10.3* 9.9*   HCT 27.6* 32.3* 30.2*   MCV 79.7* 80.5 79.9*   RDW 15.8* 16.1* 15.6*   PLT 80* 105* 115*     CHEMISTRIES:  Recent Labs     10/15/22  0402 10/16/22  0422 10/16/22  0610 10/16/22  1942 10/17/22  0438     --  136  --  138   K 3.7  --  2.9* 3.9 3.3*     --  104  --  103   CO2 23  --  25  --  26   BUN 9  --  7  --  8   CREATININE 0.41*  --  0.44*  --  0.46*   GLUCOSE 97  --  84  --  76   PHOS 2.7 2.9  --   --  3.2   MG 1.8 1.8  --   --  1.9     PT/INR:No results for input(s): PROTIME, INR in the last 72 hours. APTT:No results for input(s):  APTT in the last 72 hours.  LIVER PROFILE:No results for input(s): AST, ALT, BILIDIR, BILITOT, ALKPHOS in the last 72 hours. Imaging Last 24 Hours:  No results found.   Assessment//Plan           Hospital Problems             Last Modified POA    * (Principal) SIRS (systemic inflammatory response syndrome) ([facility] 10/10/2022 Yes    NICKI (acute kidney injury) ([facility] 10/11/2022 Yes    Severe malnutrition (HCC) (Chronic) 10/11/2022 Yes    Elevated d-dimer 10/15/2022 Yes    Leukocytosis 10/15/2022 Yes    Pulmonary nodules 10/15/2022 Yes    Bipolar disorder, mixed ([facility] 10/11/2022 Yes    Acute encephalopathy 10/14/2022 Yes    COPD (chronic obstructive pulmonary disease) ([facility] 10/11/2022 Yes    H/O gastric bypass 10/11/2022 Yes    Overview Signed 2/13/2020  7:19 PM by Tiffany Torres MD     enlarged  pancreas, here for surgical work-up for EGD          Assessment & Plan    Septic emboli - ADRIANNE when less lethargic    E coli UTI/MRSA bacteremia - vancomycin    Encephalopathy - origin undetermined    Hx polysubstance abuse      Electronically signed by Tiffany Torres MD on 10/17/22 at 7:39 AM EDT

## 2022-10-17 NOTE — PLAN OF CARE
Problem: Discharge Planning  Goal: Discharge to home or other facility with appropriate resources  Outcome: Progressing     Problem: Pain  Goal: Verbalizes/displays adequate comfort level or baseline comfort level  Outcome: Progressing     Problem: Skin/Tissue Integrity  Goal: Absence of new skin breakdown  Description: 1. Monitor for areas of redness and/or skin breakdown  2. Assess vascular access sites hourly  3. Every 4-6 hours minimum:  Change oxygen saturation probe site  4. Every 4-6 hours:  If on nasal continuous positive airway pressure, respiratory therapy assess nares and determine need for appliance change or resting period.   Outcome: Progressing     Problem: Safety - Adult  Goal: Free from fall injury  Outcome: Progressing     Problem: ABCDS Injury Assessment  Goal: Absence of physical injury  Outcome: Progressing     Problem: Nutrition Deficit:  Goal: Optimize nutritional status  Outcome: Not Progressing  Flowsheets (Taken 10/17/2022 1523 by Deonte Viera, SCOOBY, LD)  Nutrient intake appropriate for improving, restoring, or maintaining nutritional needs:   Monitor oral intake, labs, and treatment plans   Recommend appropriate diets, oral nutritional supplements, and vitamin/mineral supplements  Note: PO intake remains poor     Problem: Nutrition Deficit:  Goal: Optimize nutritional status  Outcome: Not Progressing  Flowsheets (Taken 10/17/2022 1523 by Deonte Viera RD, LD)  Nutrient intake appropriate for improving, restoring, or maintaining nutritional needs:   Monitor oral intake, labs, and treatment plans   Recommend appropriate diets, oral nutritional supplements, and vitamin/mineral supplements  Note: PO intake remains poor

## 2022-10-17 NOTE — PROGRESS NOTES
Sent perfect serve message to Gabe Jose (currently Dr. Winsome Claero is covering). CT OF CHEST FROM 10/17/22: 1. Numerous bilateral nodular opacities in both lungs, many of which are cavitary, increased when compared to 10/10/2022, most likely septic emboli. Short-term follow-up to document resolution recommended. 2. Focal consolidation with cavitation in the superior segment right lower lobe and focal consolidation at the left base, likely infarct or pneumonia. 3. Small bilateral mildly loculated pleural effusions, new from 10/10/2022.   4. Borderline mediastinal lymphadenopathy, likely reactive. 5 [facility]. INFECTIOUS DISEASE CONSULTED TODAY. PT REMAINS ON VANCO AT THIS TIME. WANTED HIM TO BE AWARE.

## 2022-10-17 NOTE — PROGRESS NOTES
Progress Note    Reason for Consult: NICKI    Requesting Physician:  Unknown MD Anam    INTERVAL HISTORY:  Creatinine stable 0.4. Potassium was 3.3. Received protocol replacement today. Phosphorus better at 3.2  Very sleepy and drowsy. SBP trending 100-180s  CT chest showed numerous bilateral nodular opacities in both lungs, many are cavitary and increased compared to 10/10/22 imaging, likely septic emboli. Focal consolidation with cavitation, likely infarct or pneumonia. Small bilateral mildly loculated pleural effusions, new from 10/10. On vanco and cipro today for UTI. Taking poor po. Very drowsy. Psych evaluated today for suicidal ideations. HISTORY OF PRESENT ILLNESS:    The patient is a 46 y.o. female who presents with weakness, generalized pain, SOB and abdominal pain. She tested positive for COVID 19 on 9/30. She states she slept 2 days straight last week. C/o fever, chills and foul smelling urine with right flank pain. She also admits to doing crack cocaine at least twice weekly and when ever she can afford it more frequently. She states she uses cocaine to help with pain. She drinks homemade wine on occasion and smokes cigarettes, but states recently she decreased tobacco use. Creatinine baseline 0.6 from earlier this year. Creatinine 2.1 on admission. With sodium 132, potassium 3.3, bicarb 24, chloride 91, BUN 33, magnesium 2.1, procalcitonin 6.01, proBNP 4799. High sensitivity troponin was initially 30 and 13 on redraw. Glucose 129, albumin 3.44. Given normal saline bolus. X-ray showed multiple nodular opacities projecting over the right lung measuring up to 1.5 cm further evaluation with CT chest recommended. No acute focal airspace consolidation or pleural effusions. Blood pressure trending 110-190s/60-120s. Medications include Coreg, lisinopril, trazodone, gabapentin, Norvasc, Vistaril. She is very drowsy, slurring words. She has hx of HTN.  She states she only takes BP meds if she remembers, which is not often. Hx of HTN since age 23s. Ordered urine sodium, urine creatinine, urine eosinophils, urine osmolality. Follow-up urine culture. On iv Zosyn per primary. Strict I&O discussed with RN. She received 1 dose of Lovenox and is now on heparin 5000 units twice a day. In the ER, she received Dilaudid and morphine and dose of Neurontin. She is very drowsy. Hold Neurontin and trazodone for now. Blood pressure is improving. Holding lisinopril for now . Will also hold norvasc, as pt does not take medications regularly and need to avoid hypotension. Continue Coreg.       Review Of Systems:   Unable to obtain, patient is lethargic and not interactive    Past Medical History:   Diagnosis Date    Anemia     Arthritis     Asthma     Bipolar disorder, mixed ([address]     Carotid artery stenosis 2020    Cocaine abuse ([address] 2014    COPD (chronic obstructive pulmonary disease) (HCC)     Degeneration of cervical intervertebral disc     Depression with anxiety     Depression with anxiety     Fibromyalgia 2017    GERD (gastroesophageal reflux disease)     History of migraine headaches 6/10/2014    History of renal calculi 6/10/2014    History of seizure disorder 6/10/2014    Hoarseness of voice     HTN (hypertension) 6/10/2014    Hyperlipidemia 6/10/2014    IGT (impaired glucose tolerance) 6/10/2014    Kidney stones     Lactose intolerance 6/10/2014    Leukopenia 6/10/2014    Major depressive disorder, recurrent episode, severe, without mention of psychotic behavior 2014    MVP (mitral valve prolapse)     Seizures ([address]     Sleep apnea 6/10/2014    Unspecified diseases of blood and blood-forming organs        Past Surgical History:   Procedure Laterality Date    ANKLE FRACTURE SURGERY      recontruction surgery    APPENDECTOMY      BREAST LUMPECTOMY Right     CARDIAC CATHETERIZATION      CARPAL TUNNEL RELEASE      x2     SECTION      CHOLECYSTECTOMY      COLONOSCOPY 2,000 Units Oral Daily    carvedilol  3.125 mg Oral BID     sodium chloride flush  5-40 mL IntraVENous 2 times per day    heparin (porcine)  5,000 Units SubCUTAneous BID    ARIPiprazole  5 mg Oral Daily    atorvastatin  40 mg Oral Daily    divalproex  1,000 mg Oral BID    DULoxetine  60 mg Oral Daily    ferrous sulfate  325 mg Oral BID     ipratropium-albuterol  1 ampule Inhalation Q4H WA    vancomycin (VANCOCIN) intermittent dosing (placeholder)   Other RX Placeholder     Continuous Infusions:   sodium chloride       PRN Meds:ketorolac, potassium chloride **OR** potassium alternative oral replacement **OR** potassium chloride, hydrALAZINE, sodium chloride flush, sodium chloride, acetaminophen, ondansetron **OR** ondansetron, albuterol sulfate HFA, sodium chloride flush, perflutren lipid microspheres         Physical Exam:  Vitals:    10/17/22 1021 10/17/22 1126 10/17/22 1300 10/17/22 1543   BP:   108/63    Pulse: 73 75 73 74   Resp:  18 18 16   Temp:   98.4 °F (36.9 °C)    TempSrc:   Axillary    SpO2:  97% 95% 95%   Weight:       Height:         I/O last 3 completed shifts:  In: -   Out: 350 [Urine:350]    General:  lethargic, not in distress. Appears to be older than stated age. HEENT: Atraumatic, normocephalic. Anicteric sclera. Pink and moist oral mucosa. Neck supple. No JVD. Chest: Bilateral air entry, clear to auscultation, no wheezing, rhonchi or rales. Cardiovascular: S1S2, no murmur, rub or gallop. No lower extremity edema. Abdomen: soft Active bowel sounds. Musculoskeletal:  No cyanosis or clubbing. Integumentary: Pink, warm and dry.        Data:    CBC:   Lab Results   Component Value Date    WBC 7.3 10/17/2022    HGB 9.9 (L) 10/17/2022    HCT 30.2 (L) 10/17/2022    MCV 79.9 (L) 10/17/2022     (L) 10/17/2022     BMP:    Lab Results   Component Value Date     10/17/2022     10/16/2022     10/15/2022    K 3.3 (L) 10/17/2022    K 3.9 10/16/2022    K 2.9 (LL) 10/16/2022  10/17/2022     10/16/2022     10/15/2022    CO2 26 10/17/2022    CO2 25 10/16/2022    CO2 23 10/15/2022    BUN 8 10/17/2022    BUN 7 10/16/2022    BUN 9 10/15/2022    CREATININE 0.46 (L) 10/17/2022    CREATININE 0.44 (L) 10/16/2022    CREATININE 0.41 (L) 10/15/2022    GLUCOSE 76 10/17/2022    GLUCOSE 84 10/16/2022    GLUCOSE 97 10/15/2022     CMP:   Lab Results   Component Value Date/Time     10/17/2022 04:38 AM    K 3.3 10/17/2022 04:38 AM     10/17/2022 04:38 AM    CO2 26 10/17/2022 04:38 AM    BUN 8 10/17/2022 04:38 AM    CREATININE 0.46 10/17/2022 04:38 AM    GLUCOSE 76 10/17/2022 04:38 AM    CALCIUM 7.8 10/17/2022 04:38 AM    PROT 7.9 10/10/2022 04:55 AM    LABALBU 3.4 10/10/2022 04:55 AM    BILITOT 0.7 10/10/2022 04:55 AM    ALKPHOS 112 10/10/2022 04:55 AM    AST 41 10/10/2022 04:55 AM    ALT 17 10/10/2022 04:55 AM      Hepatic:   Lab Results   Component Value Date    AST 41 (H) 10/10/2022    AST 14 04/08/2022    AST 26 01/28/2022    ALT 17 10/10/2022    ALT 6 04/08/2022    ALT 11 01/28/2022    BILITOT 0.7 10/10/2022    BILITOT 0.18 (L) 04/08/2022    BILITOT 0.23 (L) 01/28/2022    ALKPHOS 112 (H) 10/10/2022    ALKPHOS 120 (H) 04/08/2022    ALKPHOS 90 01/28/2022     BNP: No results found for: BNP  Lipids:   Lab Results   Component Value Date    CHOL 156 01/30/2022    HDL 39 (L) 01/30/2022     INR:   Lab Results   Component Value Date    INR 1.2 10/10/2022    INR 1.0 07/22/2016    INR 0.9 05/30/2016     PTH: No results found for: PTH  Phosphorus:    Lab Results   Component Value Date/Time    PHOS 3.2 10/17/2022 04:38 AM     Ionized Calcium: No results found for: IONCA  Magnesium:   Lab Results   Component Value Date/Time    MG 1.9 10/17/2022 04:38 AM     Albumin:   Lab Results   Component Value Date/Time    LABALBU 3.4 10/10/2022 04:55 AM     Last 3 CK, CKMB, Troponin: @LABRCNT(CKTOTAL:3,CKMB:3,TROPONINI:3)       URINE:)No results found for: Ronaldo Lan     Radiology: Reviewed. Impression:  NICKI, appears to be hemodynamically related, Cr is better. Mild hypernatremia, improved. Hypokalemia. Hypophosphatemia  Hypocalcemia. Hypovitaminosis D. Hypertension. MRSA sepsis with septic embolization. UTI with E coli. History of cocaine use. Ileus. Bipolar disorder. Plan:  Off IVF. Encouraged po intake and ONS. Creatinine stable. Potassium was repleted. On IV Vancomycin. Cipro added today for UTI. Consider ID consultation. Discussed with RN. Continue holding Lisinopril. Coreg dose was decreased to  3.125 mg BID with holding parameters. Monitor BP. BP labile. Started on vitamin D replacement. Follow up labs ordered for AM.   Discussed with her father. Please do not hesitate to call with questions. Pt seen in collaboration with Dr. Cydney Mcbride.       Electronically signed by KELLY Lozano CNP  on 10/17/2022 at 4:11 PM

## 2022-10-17 NOTE — CONSULTS
Department of Psychiatry  Psychiatric Consult    Patient was seen and examined in person, chart reviewed, case discussed with staff/team     Reason for consult: Bipolar disorder, lethargy, erratic behavior     HISTORY OF PRESENT ILLNESS:   Patient is a 49-year-old female seen at bedside with father present. Father was helpful in providing details during interview. Patient presents as lethargic, withdrawn with no eye contact however is more agreeable to interview today. Patient is somewhat responsive verbally and is currently endorsing significant depression with suicidal ideation and reports having plans of how to end her life. Patient is not able to contract for safety outside of the hospital at this time and would benefit from inpatient psychiatric care for safety and stabilization. Patient is in agreement to this treatment plan. Patient's depression relates to a divorce that was hard on her approximately 10 years ago, current stress surrounding her daughter being in legal trouble, difficulty having relationships with her grandchildren, multiple health issues, multiple surgeries throughout her life and drug dependence for coping. Patient reports dealing with depression for years. Patient is currently endorsing depression as high. Patient currently reports active suicidal ideations, with a  current plan to end life, however did not disclose plan to staff at this time. Patient did not report any history of suicide attempts. Patient currently reports a Increase in sleep, decrease in interest, decrease in energy, decrease in concentration, decrease in appetite, and struggling with feelings of guilt and worthlessness. When discussing symptoms of lalo/hypomania patient endorses going 3 to 4 days with little to no sleep. Patient states the last time this happened was a week ago. Patient states that these occurrences have only happened while using crack cocaine.   Patient's father denies awareness of any occurrences while sober. Unable to elicit criteria for diagnosis of bipolar disorder. Discussing alcohol consumption patient reports occasionally drinking. When discussing illicit drug use patient endorses using cocaine approximately 2 times a week and when she can afford it, and opiates off the street. UDS positive for cocaine and opiates upon admission. Patient is denying of anxiety, denies symptoms of psychosis and does not endorse a history of trauma. When discussing medication patient states that she is linked in the community with Lynsey Kim and father confirms that patient is compliant with follow-up appointments. Patient endorses being prescribed Abilify, Depakote and Cymbalta. Patient denies consistent compliance with medication. Father reports patient had a seizure not too long ago and this occurrence is common when patient is not compliant with her medication. Patient reports she is open to medication compliance while in the hospital.    Recommendation: Continue current medication regimen. Once medically stable admitted to inpatient psychiatric unit secondary to suicidal ideation.       PSYCHIATRIC HISTORY: Endorses  Currently follows with Lynsey Kim  Lifetime suicide attempts: Does not report  Previous psychiatric hospital admissions: Most recent inpatient psychiatric hospitalization to [facility] on 11/6/2014 to 11/10/2014    Past psychiatric medications includes: Depakote, Cymbalta, Abilify    Adverse reactions from psychotropic medications: Denies    Lifetime Psychiatric Review of Systems       Depression: Endorses     Anxiety: Denies     Panic Attacks: Denies     Nenita or Hypomania: Endorses, unable to elicit criteria during interview     Phobias: Denies     Obsessions and Compulsions: Denies     Body or Vocal Tics: Denies     Visual Hallucinations: Denies     Auditory Hallucinations: Denies     Delusions/Paranoia: Denies     PTSD: Denies    Past Medical History:        Diagnosis Date Anemia     Arthritis     Asthma     Bipolar disorder, mixed ([facility]     Carotid artery stenosis 2020    Cocaine abuse ([address] 2014    COPD (chronic obstructive pulmonary disease) ([facility]     Degeneration of cervical intervertebral disc     Depression with anxiety     Depression with anxiety     Fibromyalgia 2017    GERD (gastroesophageal reflux disease)     History of migraine headaches 6/10/2014    History of renal calculi 6/10/2014    History of seizure disorder 6/10/2014    Hoarseness of voice     HTN (hypertension) 6/10/2014    Hyperlipidemia 6/10/2014    IGT (impaired glucose tolerance) 6/10/2014    Kidney stones     Lactose intolerance 6/10/2014    Leukopenia 6/10/2014    Major depressive disorder, recurrent episode, severe, without mention of psychotic behavior 2014    MVP (mitral valve prolapse)     Seizures ([facility]     Sleep apnea 6/10/2014    Unspecified diseases of blood and blood-forming organs        Past Surgical History:        Procedure Laterality Date    ANKLE FRACTURE SURGERY      recontruction surgery    APPENDECTOMY      BREAST LUMPECTOMY Right     CARDIAC CATHETERIZATION      CARPAL TUNNEL RELEASE      x2     SECTION      CHOLECYSTECTOMY      COLONOSCOPY  12    COLONOSCOPY  2016    severe spasms    COLONOSCOPY  2019    DR GOLDY MCINTOSH    GASTRIC BYPASS SURGERY      GASTRIC BYPASS SURGERY      HYSTERECTOMY (CERVIX STATUS UNKNOWN)      HYSTERECTOMY (CERVIX STATUS UNKNOWN)      LAPAROSCOPY N/A 2022    LAPAROSCOPY EXPLORATORY CONVERTED TO OPEN EXPLORATORY LAPAROTOMY, LYSIS OF ADHESIONS, REDUCTION OF INTERNAL HERNIA performed by Wilfredo Johnson DO at [address] [place] [address]      APPLICATION OF ARCH BARS,SIMPLE EXTRACTION OF #15 AND RT TMJ JOINT REPLACEMENT    SHOULDER ARTHROSCOPY Left 2019    DR ROBERT GREEN    SHOULDER SURGERY      TONSILLECTOMY AND ADENOIDECTOMY      UPPER GASTROINTESTINAL ENDOSCOPY  7-3-12 egd    UPPER GASTROINTESTINAL ENDOSCOPY  12/19/2016    status post gastrectomy with a small remnant of gastric pouch. UPPER GASTROINTESTINAL ENDOSCOPY  05/2019    DR Alec Tomlin       Allergies:  Aspirin, Bactrim, and Codeine    Social History:     Born in: [place]  Family: Client was raised by mother and father. He reported that mother passed away 7 years ago. Reports having 1 brother who she is somewhat close with. Highest Level of Education: Graduated surgical tech degree from [place]  Occupation: Unemployed, disability  Marital Status:  after a long marriage  Residence: Lives in an apartment with daughter and son-in-law. Reports daughter is currently in skilled nursing however son-in-law still living there. Father states that she can live with him after discharge if needed. Stressors: Mental health, physical health  Patient Assets/Supportive Factors: Desire to receive mental health treatment    DRUG USE HISTORY  Social History     Tobacco Use   Smoking Status Every Day    Packs/day: 0.50    Years: 33.00    Pack years: 16.50    Types: Cigarettes   Smokeless Tobacco Never   Tobacco Comments    quit  2000 started again  1/13      Social History     Substance and Sexual Activity   Alcohol Use Yes    Comment: occasional     Social History     Substance and Sexual Activity   Drug Use Not Currently     Discussing alcohol consumption patient reports occasionally drinking. When discussing illicit drug use patient endorses using cocaine approximately 2 times a week and when she can afford it, and opiates off the street. UDS positive for cocaine and opiates upon admission.      LEGAL HISTORY:   HISTORY OF INCARCERATION: Does not report     Family History:       Problem Relation Age of Onset    Diabetes Mother     Cancer Mother     Coronary Art Dis Father     COPD Father     Depression Brother     Alcohol Abuse Brother     Cancer Other         lung and skin    Diabetes Maternal Grandmother     Cancer Paternal Grandmother        Psychiatric Family History  Dx: Denies family mental health history  Suicides in family: Denies  Substance use in family: Endorses alcohol on mother's side    PHYSICAL EXAM:  Vitals:  BP (!) 141/82   Pulse 76   Temp 98.5 °F (36.9 °C) (Oral)   Resp 22   Ht 4' 11\" (1.499 m)   Wt 98 lb 1.7 oz (44.5 kg)   SpO2 93%   BMI 19.81 kg/m²     Review of Systems   Unable to assess, patient minimally cooperative     Physical Exam:   Constitutional:  Appears frail  HENT:   Head: Normocephalic and atraumatic. Eyes: Conjunctivae are normal. Right eye exhibits no discharge. Left eye exhibits no discharge. No scleral icterus. Neck: Normal range of motion. Neck supple. Pulmonary/Chest:  No acute respiratory distress noted  Cardiac: Regular rate and rhythm. Abdominal: Soft. Non-tender. Exhibits no distension. Musculoskeletal: Normal range of motion. Exhibits no edema. Neurological: cranial nerves II-XII grossly in tact, normal gait and station. Skin: Skin is warm and dry. Patient is not diaphoretic. No erythema. Mental Status Examination:    Level of consciousness: Lethargic  Appearance:  Appropriate attire, resting in bed, poor grooming   Behavior/Motor: Somewhat approachable, psychomotor slowing  attitude toward examiner: [address] cooperation, poor attention, no eye contact  Speech: Slow rate, low volume  Mood: \"Depressed\"  Affect: Down  Thought processes: coherent and slow. Thought content: Active suicidal ideations, with a  current plan, denies intent to harm self on unit. Unable to contract for safety off unit.               Denies homicidal ideations               Denies hallucinations              Denies delusions              Denies paranoia  Cognition: Oriented to person and general situation  Concentration: Poor  Memory: Intact  Insight &Judgment: Poor     ASSESSMENT:   Systemic inflammatory response syndrome  Delirium due to medical condition  Bipolar disorder    PLAN:  Continue current medication regimen    Will follow while on the inpatient service  Unlikely to benefit from admission to psychiatry      I independently saw and evaluated the patient. I reviewed the nurse practioner's documentation above. Any additional comments or changes to the    documentation are stated below otherwise agree with assessment.    -I have noted that the patient was admitted to Hospital with complaints of pain and shortness of breath. She reported that she had tested positive for COVID a week ago and has been sleeping ever since. The patient is on BiPAP. On admission her speech was slurred and rambling  -Psychiatry was consulted for the patient's erratic behavior and bipolar disorder.   -The patient was seen at bedside. The patient reports depressive symptoms. She is aware that she is prescribed psychotropic medications which she has been taking. The patient was oriented to situation but not to place and time. The patient reports ongoing suicidal thoughts. PLAN  The patient can continue with her psychotropic medication duloxetine Depakote and Abilify. Attempt to develop insight    Supportive Therapy conducted.   Follow-up daily while on inpatient unit    Electronically signed by Sivan Oseguera MD on 10/17/22 at 8:02 PM EDT

## 2022-10-17 NOTE — PROGRESS NOTES
Gil Rodriguez MD/Dami Sierra MD/ Mellisa St MD/Dr Lenora Littlejohn APRN AGACNP-BC, NP-C      Kaitlin Reed APRN NP-C     [id number] [facility] APRN NP-C                                           Pulmonary Progress Note    Patient - Aiyana Lobe   Age - 46 y.o.   - 1970  MRN - [id number]  Acct # - [de-identified]  Date of Admission - 10/10/2022  4:37 AM    Consulting Service/Physician:       Primary Care Physician: Cy Dubose MD    SUBJECTIVE:     Chief Complaint:   Chief Complaint   Patient presents with    Shortness of Breath    Positive For Covid-19     Subjective:    Erin Martinez appears drowsy today. Father is at bedside. She is currently on 2-1/2 L nasal cannula pulse ox 94%. White blood cell count is 7.3. Hemoglobin is 9.9. Father states she was smoking prior to her admission. She is currently on IV vancomycin. Cefepime has been discontinued. CT of the chest today showed cavitary lesions consistent with septic emboli. VITALS  BP (!) 141/82   Pulse 76   Temp 98.5 °F (36.9 °C) (Oral)   Resp 22   Ht 4' 11\" (1.499 m)   Wt 98 lb 1.7 oz (44.5 kg)   SpO2 93%   BMI 19.81 kg/m²   Wt Readings from Last 3 Encounters:   10/14/22 98 lb 1.7 oz (44.5 kg)   22 96 lb 8 oz (43.8 kg)   22 105 lb (47.6 kg)     I/O (24 Hours)    Intake/Output Summary (Last 24 hours) at 10/17/2022 0947  Last data filed at 10/17/2022 0934  Gross per 24 hour   Intake 120 ml   Output --   Net 120 ml     Ventilator:   Settings  FiO2 : 21 %  Exam:   Physical Exam   Constitutional: Lying in bed on 2-1/2 L in no acute distress  HENT: Unremarkable  Head: Normocephalic and atraumatic. Eyes: EOM are normal. Pupils are equal, round, and reactive to light. Neck: Neck supple. Cardiovascular:  Regular rate and rhythm. Normal heart tones. No JVD.     Pulmonary/Chest: Diminished at bases, respirations even and nonlabored, congested cough, on 2-1/2 L with pulse ox 94%. Abdominal: Soft. Bowel sounds are normal. There is no tenderness. Musculoskeletal: Normal range of motion. Neurological: Patient will open her eyes to voice. Does mumble some answers but overall sleepy. Skin: Skin is warm and dry. No rash noted.    Extremities: Clubbing to fingers i  Infusions:      sodium chloride       Meds:     Current Facility-Administered Medications:     vancomycin 1000 mg IVPB in 250 mL D5W addavial, 1,000 mg, IntraVENous, Q12H, Tami Amaro MD, Stopped at 10/17/22 0159    ketorolac (TORADOL) injection 15 mg, 15 mg, IntraVENous, Q6H PRN, Weston Kramer MD, 15 mg at 10/17/22 0057    potassium chloride (KLOR-CON M) extended release tablet 40 mEq, 40 mEq, Oral, PRN, 40 mEq at 10/13/22 1114 **OR** potassium bicarb-citric acid (EFFER-K) effervescent tablet 40 mEq, 40 mEq, Oral, PRN, 40 mEq at 10/14/22 2031 **OR** potassium chloride 10 mEq/100 mL IVPB (Peripheral Line), 10 mEq, IntraVENous, PRN, Tami Amaro MD, Last Rate: 100 mL/hr at 10/16/22 1505, 10 mEq at 10/16/22 1505    Vitamin D (CHOLECALCIFEROL) tablet 2,000 Units, 2,000 Units, Oral, Daily, Tami Amaro MD, 2,000 Units at 10/15/22 0834    hydrALAZINE (APRESOLINE) injection 10 mg, 10 mg, IntraVENous, Q6H PRN, Tami Amaro MD, 10 mg at 10/16/22 2349    carvedilol (COREG) tablet 3.125 mg, 3.125 mg, Oral, BID WC, Tami Amaro MD, 3.125 mg at 10/15/22 1722    sodium chloride flush 0.9 % injection 5-40 mL, 5-40 mL, IntraVENous, 2 times per day, Tami Amaro MD, 10 mL at 10/16/22 2031    sodium chloride flush 0.9 % injection 5-40 mL, 5-40 mL, IntraVENous, PRN, Tami Amaro MD    0.9 % sodium chloride infusion, , IntraVENous, PRN, Tami Amaro MD    heparin (porcine) injection 5,000 Units, 5,000 Units, SubCUTAneous, BID, Tami Amaro MD, 5,000 Units at 10/16/22 2031    acetaminophen (TYLENOL) tablet 650 mg, 650 mg, Oral, Q4H PRN, Tami Amaro MD, 650 mg at 10/14/22 2031    ondansetron (ZOFRAN-ODT) disintegrating tablet 4 mg, 4 mg, Oral, Q8H PRN **OR** ondansetron (ZOFRAN) injection 4 mg, 4 mg, IntraVENous, Q6H PRN, Adolph Rey MD    albuterol sulfate HFA (PROVENTIL;VENTOLIN;PROAIR) 108 (90 Base) MCG/ACT inhaler 2 puff, 2 puff, Inhalation, Q6H PRN, Adolph Rey MD    ARIPiprazole (ABILIFY) tablet 5 mg, 5 mg, Oral, Daily, Adolph Rey MD, 5 mg at 10/14/22 1204    atorvastatin (LIPITOR) tablet 40 mg, 40 mg, Oral, Daily, Adolph Rey MD, 40 mg at 10/16/22 2031    divalproex (DEPAKOTE) DR tablet 1,000 mg, 1,000 mg, Oral, BID, Adolph Rey MD, 1,000 mg at 10/16/22 2030    DULoxetine (CYMBALTA) extended release capsule 60 mg, 60 mg, Oral, Daily, Adolph Rey MD, 60 mg at 10/15/22 0835    ferrous sulfate (IRON 325) tablet 325 mg, 325 mg, Oral, BID WC, Adolph Rey MD, 325 mg at 10/15/22 1721    sodium chloride flush 0.9 % injection 10 mL, 10 mL, IntraVENous, PRN, Adolph Rey MD, 10 mL at 10/10/22 1427    ipratropium-albuterol (DUONEB) nebulizer solution 1 ampule, 1 ampule, Inhalation, Q4H WA, Adolph Rey MD, 1 ampule at 10/17/22 0737    perflutren lipid microspheres (DEFINITY) injection 1.65 mg, 1.5 mL, IntraVENous, ONCE PRN, Adolph Rey MD    vancomycin (VANCOCIN) intermittent dosing (placeholder), , Other, RX Placeholder, Adolph Rey MD    Lab Results:     Lab Results   Component Value Date    WBC 7.3 10/17/2022    HGB 9.9 (L) 10/17/2022    HCT 30.2 (L) 10/17/2022    MCV 79.9 (L) 10/17/2022     (L) 10/17/2022     Lab Results   Component Value Date    CALCIUM 7.8 (L) 10/17/2022     10/17/2022    K 3.3 (L) 10/17/2022    CO2 26 10/17/2022     10/17/2022    BUN 8 10/17/2022    CREATININE 0.46 (L) 10/17/2022       Lab Results   Component Value Date    INR 1.2 10/10/2022    PROTIME 14.8 (H) 10/10/2022       Radiology:     My reading of film: Multiple septic emboli and small left pleural effusion.     ASSESSMENT:       MRSA bacteremia  Septic emboli  E. coli UTI  Acute kidney injury, resolved  Suspect COPD, seen once in the office by Dr. Zoila Hager in 2019, FEV1 2.04 L or 86% predicted  History of opiate and cocaine use-reportedly uses crack cocaine at least twice weekly  History of tobacco use  Recent COVID +9/30  Elevated troponin likely type II MI  Encephalopathy, improving  History of hypertension  History of seizure disorder  PLAN:   Currently on IV Vanco with Cipro added today per primary  CT with multiple septic emboli, may benefit from ID consult regarding duration of antibiotics  Will benefit from ADRIANNE when able  Wean oxygen as tolerated, keep pulse ox above 89%  Continue nebulizers  Will need follow-up CT imaging after completion of antibiotics, likely will need minimum of 4-6 weeks of antibiotic therapy  Discussed with family at bedside      Electronically signed by KELLY Lemus CNP on 10/17/22     This progress note was completed using a voice transcription system. Every effort was made to ensure accuracy. However, inadvertent computerized transcription errors may be present.     Maria Guadalupe Young, NP-C, MSN  [facility] Pulmonary, Critical Care & Sleep

## 2022-10-17 NOTE — PROGRESS NOTES
Comprehensive Nutrition Assessment    Type and Reason for Visit:  Reassess    Nutrition Recommendations/Plan:   Continue current diet  Start Ensure Enlive 3x/day chocolate     Malnutrition Assessment:  Malnutrition Status:  Severe malnutrition (10/11/22 1232)    Context:  Acute Illness     Findings of the 6 clinical characteristics of malnutrition:  Energy Intake:  50% or less of estimated energy requirements for 5 or more days  Weight Loss:  Greater than 7.5% over 3 months     Body Fat Loss: Moderate body fat loss Orbital, Triceps, Fat Overlying Ribs   Muscle Mass Loss: Moderate muscle mass loss Temples (temporalis), Clavicles (pectoralis & deltoids)  Fluid Accumulation:  No significant fluid accumulation     Strength:  Not Performed    Nutrition Assessment:    Diet advanced to Regular. Patient PO intakes are fair 26-50%. At time of visit patient was deep asleep, did,'t wake to name x3.  at bedside, not able to wake up patient. Per  patient eating less than half of the meals.  said patient will like choclate nutrition shake. Will add high calorie high protein supplements with each meal.    Nutrition Related Findings:    Trace edema BUE. Bowel sounds active. Loss of appetite 10/16. Labs and meds reviewed Wound Type: Pressure Injury (refer to nursing flowsheet)       Current Nutrition Intake & Therapies:    Average Meal Intake: 1-25%, 26-50%  Average Supplements Intake: None Ordered  ADULT DIET; Regular    Anthropometric Measures:  Height: 4' 11\" (149.9 cm)  Ideal Body Weight (IBW): 95 lbs (43 kg)    Admission Body Weight: 89 lb (40.4 kg)  Current Body Weight: 98 lb (44.5 kg), 93.7 % IBW.  Weight Source: Bed Scale  Current BMI (kg/m2): 19.8  Usual Body Weight: 113 lb (51.3 kg) (per past records)  % Weight Change (Calculated): -21.2                    BMI Categories: Underweight (BMI less than 18.5)    Estimated Daily Nutrient Needs:  Energy Requirements Based On: Kcal/kg  Weight Used for Energy Requirements: Admission  Energy (kcal/day): 1414 kcal based on 35 kcal/kg admission  Weight Used for Protein Requirements: Admission  Protein (g/day): 50 gm based on 1.2 gm/kg admission  Method Used for Fluid Requirements: 1 ml/kcal    Nutrition Diagnosis:   Severe malnutrition related to impaired respiratory function, psychological cause or life stress as evidenced by intake 0-25%, poor intake prior to admission, BMI, weight loss, severe loss of subcutaneous fat, severe muscle loss    Nutrition Interventions:   Food and/or Nutrient Delivery: Continue Current Diet, Start Oral Nutrition Supplement  Nutrition Education/Counseling: Education not indicated  Coordination of Nutrition Care: Continue to monitor while inpatient       Goals:  Previous Goal Met: No Progress toward Goal(s)  Goals: Meet at least 75% of estimated needs, PO intake 75% or greater       Nutrition Monitoring and Evaluation:   Behavioral-Environmental Outcomes: None Identified  Food/Nutrient Intake Outcomes: Diet Advancement/Tolerance  Physical Signs/Symptoms Outcomes: Biochemical Data, GI Status, Chewing or Swallowing, Fluid Status or Edema, Skin, Weight    Discharge Planning:     Too soon to determine       Some areas of assessment may be incomplete due to COVID-19 precautions    Dagoberto Russ RD, LD  Office phone [phone]

## 2022-10-17 NOTE — PROGRESS NOTES
Vancomycin Dosing by Pharmacy - Daily Note   Vancomycin Therapy Day:  8  Indication: suspected endocarditis, 2 of 2 BCs MRSA, septic emboli on abdomen CT scan    Allergies:  Aspirin, Bactrim, and Codeine   Actual Weight:    Wt Readings from Last 1 Encounters:   10/14/22 98 lb 1.7 oz (44.5 kg)       Labs/Ancillary Data  Estimated Creatinine Clearance: 99 mL/min (A) (based on SCr of 0.46 mg/dL (L)). Recent Labs     10/15/22  0402 10/16/22  0422 10/16/22  0610 10/17/22  0438   CREATININE 0.41*  --  0.44* 0.46*   BUN 9  --  7 8   WBC 4.6 7.0  --  7.3     Procalcitonin   Date Value Ref Range Status   10/10/2022 6.01 (H) <0.09 ng/mL Final     Comment:           Suspected Sepsis:  <0.50 ng/mL     Low likelihood of sepsis. 0.50-2.00 ng/mL     Increased likelihood of sepsis. Antibiotics encouraged. >2.00 ng/mL     High risk of sepsis/shock. Antibiotics strongly encouraged. Suspected Lower Resp Tract Infections:  <0.24 ng/mL     Low likelihood of bacterial infection. >0.24 ng/mL     Increased likelihood of bacterial infection. Antibiotics encouraged. With successful antibiotic therapy, PCT levels should decrease rapidly. (Half-life of 24 to   36 hours.)        Procalcitonin values from samples collected within the first 6 hours of systemic infection   may still be low. Retesting may be indicated. Values from day 1 and day 4 can be entered into the Change in Procalcitonin Calculator   ([email or web address]-[email or web address] com) to determine the patient's Mortality Risk Prognosis        In healthy neonates, plasma Procalcitonin (PCT) concentrations increase gradually after   birth, reaching peak values at about 24 hours of age then decrease to normal values below   0.5 ng/mL by 48-72 hours of age.          Intake/Output Summary (Last 24 hours) at 10/17/2022 1358  Last data filed at 10/17/2022 0934  Gross per 24 hour   Intake 120 ml   Output --   Net 120 ml     Temp: 98.8 F    Culture Date / Source  /  Results  See micro  Recent vancomycin administrations                     vancomycin 1000 mg IVPB in 250 mL D5W addavial (mg) 1,000 mg New Bag 10/17/22 1353     1,000 mg New Bag  0042     1,000 mg New Bag 10/16/22 1338     1,000 mg New Bag  0050     1,000 mg New Bag 10/15/22 1413     1,000 mg New Bag 10/14/22 2339     1,000 mg New Bag  1429                    Vancomycin Concentrations:   TROUGH:  No results for input(s): VANCOTROUGH in the last 72 hours. RANDOM:    Recent Labs     10/14/22  1936   VANCORANDOM 21.9       MRSA Nasal Swab: N/A. Non-respiratory infection. Jestine Courts PLAN     Continue current dose of 1000 mg q12h IV  Ensured BUN/sCr ordered at baseline and every 48 hours x at least 3 levels, then at least weekly. Repeat vancomycin concentration ordered for 10/18 @ 0600. Pharmacy will continue to monitor patient and adjust therapy as indicated      Vancomycin Target Concentration Parameters  Treatment  Population Target AUC/GABBI Target Trough   Invasive MRSA Infection (bacteremia, pneumonia, meningitis, endocarditis, osteomyelitis)  Sepsis (undifferentiated) 400-600 N/A   Infection due to non-MRSA pathogen  Empiric treatment of non-invasive MRSA infection  (SSTI, UTI) <500 10-15 mg/L   CrCl < 29 mL/min  Rapidly fluctuating serum creatinine   NICKI N/A < 15 mg/L     Renal replacement therapy is dosed by levels, per hospital protocol. Abbreviations  * Pauc: probability that AUC is >400 (efficacy); Pconc: probability that Ctrough is above 20 ?g/mL (toxicity); Tox: Probability of nephrotoxicity, based on Iva et al. Clin Infect Dis 2009. Loading dose: N/A  Regimen: 1000 mg IV every 12 hours. Start time: 13:54 on 10/17/2022  Exposure target: AUC24 (range)400-600 mg/L.hr   AUC24,ss: 525 mg/L.hr  Probability of AUC24 > 400: 98 %  Ctrough,ss: 14.5 mg/L  Probability of Ctrough,ss > 20: 5 %  Probability of nephrotoxicity (Iva DEVON 2009): 10 %    Thank you for the consult. Pharmacy will continue to follow.      Phyllis Sanabria Andre Lopez. Ph.  10/17/2022  2:09 PM

## 2022-10-17 NOTE — CARE COORDINATION
ONGOING DISCHARGE PLAN:        Spoke with patients father regarding discharge plan and patient father confirms that plan is to discharge to a snf for possible need of IV ATB  Patient may need IV atb for 4-6 weeks   Referral sent to snfs  May need a ADRIANNE   Wean oxygen as tolerated         Will continue to follow for additional discharge needs.     Electronically signed by Maite Casas RN on 10/17/2022 at 3:55 PM

## 2022-10-17 NOTE — PROGRESS NOTES
[facility]   Progress Note                   Date:   10/16/22  Patient name: Mile Parker  Date of admission:  10/10/2022  4:37 AM  MRN:   [id number]  YOB: 1970  PCP: Radha Layton MD    Reason for Admission:     Subjective:       Clinical Changes / Abnormalities:  Remains stable but lethargic, intermittently unresponsive and not taking most of her pills. Medications:   Scheduled Meds:   vancomycin  1,000 mg IntraVENous Q12H    Vitamin D  2,000 Units Oral Daily    carvedilol  3.125 mg Oral BID WC    sodium chloride flush  5-40 mL IntraVENous 2 times per day    heparin (porcine)  5,000 Units SubCUTAneous BID    ARIPiprazole  5 mg Oral Daily    atorvastatin  40 mg Oral Daily    divalproex  1,000 mg Oral BID    DULoxetine  60 mg Oral Daily    ferrous sulfate  325 mg Oral BID     ipratropium-albuterol  1 ampule Inhalation Q4H WA    vancomycin (VANCOCIN) intermittent dosing (placeholder)   Other RX Placeholder     Continuous Infusions:   sodium chloride       CBC:   Recent Labs     10/15/22  0402 10/16/22  0422 10/17/22  0438   WBC 4.6 7.0 7.3   HGB 9.1* 10.3* 9.9*   PLT 80* 105* 115*     BMP:    Recent Labs     10/15/22  0402 10/16/22  0610 10/16/22  1942 10/17/22  0438    136  --  138   K 3.7 2.9* 3.9 3.3*    104  --  103   CO2 23 25  --  26   BUN 9 7  --  8   CREATININE 0.41* 0.44*  --  0.46*   GLUCOSE 97 84  --  76     Hepatic: No results for input(s): AST, ALT, ALB, BILITOT, ALKPHOS in the last 72 hours. Troponin: No results for input(s): TROPONINI in the last 72 hours. BNP: No results for input(s): BNP in the last 72 hours. Lipids: No results for input(s): CHOL, HDL in the last 72 hours. Invalid input(s): LDLCALCU  INR: No results for input(s): INR in the last 72 hours.     Objective:   Vitals: BP (!) 146/69   Pulse 80   Temp 98 °F (36.7 °C) (Oral)   Resp 26   Ht 4' 11\" (1.499 m)   Wt 98 lb 1.7 oz (44.5 kg)   SpO2 96%   BMI 19.81 kg/m² Illicit drug use     Postlaminectomy syndrome, cervical region     Degeneration of cervical intervertebral disc     Encounter for long-term (current) use of other medications     Bradycardia     Cellulitis     Hypokalemia     Primary hypertension     Elevated lipase     Right lower quadrant abdominal pain     Nausea     Diarrhea     Acute encephalopathy     Polysubstance abuse (HCC)     Fibromyalgia     Cocaine addiction (HCC)     Shoulder arthritis     Osteoarthritis of left glenohumeral joint     Abnormal EKG     Anemia     Anxiety     Carotid artery stenosis     COPD (chronic obstructive pulmonary disease) ([address]     Dental disease     HUTCHINSON (dyspnea on exertion)     Fatigue     Fractures     H/O gastric bypass     History of crack cocaine use     History of UTI     Injury of back     Intractable chronic migraine without aura and without status migrainosus     Intractable migraine with aura with status migrainosus     Kidney stones     Dizziness     Memory loss     Mild pulmonary hypertension (HCC)     Mild tricuspid regurgitation     Primary osteoarthritis of left shoulder     Seizure-like activity (HCC)     Stress at home     TMJ (dislocation of temporomandibular joint)     Tobacco abuse     Visual impairment     Internal hernia     Vitamin D deficiency     Iron deficiency     SIRS (systemic inflammatory response syndrome) (HCC)     NICKI (acute kidney injury) ([address]     Severe malnutrition (HCC)     Elevated d-dimer     Leukocytosis     Pulmonary nodules      Plan of Treatment:   Consideration for ADRIANNE after improvement in mental status  Continue atorvastatin and carvedilol  Continuing IV vancmycin    Electronically signed by Dulce Mccann MD on 10/17/2022 at 6:43 [address] [facility]  [phone]

## 2022-10-17 NOTE — DISCHARGE INSTR - COC
Continuity of Care Form    Patient Name: Isidoro Gonsales   :  1970  MRN:  [id number]    Admit date:  10/10/2022  Discharge date:  2022    Code Status Order: Full Code   Advance Directives:     Admitting Physician:  Yaneth Aquino MD  PCP: Yaneth Aquino MD    Discharging Nurse: [facility] Unit/Room#: 2117/2117-01  Discharging Unit Phone Number: [phone]    Emergency Contact:   Extended Emergency Contact Information  Primary Emergency Contact: MonkGuerrero  Address: [address], [address] [place] [address]  Home Phone: [phone]  Relation: Parent  Secondary Emergency Contact: [address] Phone: [phone]  Relation: Child    Past Surgical History:  Past Surgical History:   Procedure Laterality Date    ANKLE FRACTURE SURGERY      recontruction surgery    APPENDECTOMY      BREAST LUMPECTOMY Right     CARDIAC CATHETERIZATION      CARPAL TUNNEL RELEASE      x2     SECTION      CHOLECYSTECTOMY      COLONOSCOPY  12    COLONOSCOPY  2016    severe spasms    COLONOSCOPY  2019    DR GOLDY MCINTOSH    GASTRIC BYPASS SURGERY      GASTRIC BYPASS SURGERY      HYSTERECTOMY (CERVIX STATUS UNKNOWN)      HYSTERECTOMY (CERVIX STATUS UNKNOWN)      LAPAROSCOPY N/A 2022    LAPAROSCOPY EXPLORATORY CONVERTED TO OPEN EXPLORATORY LAPAROTOMY, LYSIS OF ADHESIONS, REDUCTION OF INTERNAL HERNIA performed by Iban Hernandez DO at [address] [place] [address]      APPLICATION OF ARCH BARS,SIMPLE EXTRACTION OF #15 AND RT TMJ JOINT REPLACEMENT    SHOULDER ARTHROSCOPY Left 2019    DR ROBERT GREEN    SHOULDER SURGERY      TONSILLECTOMY AND ADENOIDECTOMY      UPPER GASTROINTESTINAL ENDOSCOPY  7-3-12    egd    UPPER GASTROINTESTINAL ENDOSCOPY  2016    status post gastrectomy with a small remnant of gastric pouch.     UPPER GASTROINTESTINAL ENDOSCOPY  2019    DR Rick Barnhart       Immunization History:   Immunization History Administered Date(s) Administered    COVID-19, PFIZER PURPLE top, DILUTE for use, (age 15 y+), 30mcg/0.3mL 03/15/2021, 04/05/2021    Influenza Virus Vaccine 11/07/2006, 10/24/2008, 09/16/2014, 10/23/2015, 10/12/2016, 01/11/2019    Influenza, AFLURIA (age 1 yrs+), FLUZONE, (age 10 mo+), MDV, 0.5mL 10/12/2016, 10/15/2019    Pneumococcal Polysaccharide ([id number]) 03/23/2007, 10/23/2015       Active Problems:  Patient Active Problem List   Diagnosis Code    GERD (gastroesophageal reflux disease) K21.9    Hoarseness of voice R49.0    MVP (mitral valve prolapse) I34.1    Depression with anxiety F41.8    Lymphocytic colitis K52.832    History of migraine headaches Z86.69    Hyperlipidemia E78.5    Sleep apnea G47.30    IGT (impaired glucose tolerance) R73.02    History of renal calculi Z87.442    Lactose intolerance E73.9    History of seizure disorder Z86.69    Leukopenia D72.819    Bipolar disorder, mixed ([address] G08.98    Illicit drug use J82.26    Postlaminectomy syndrome, cervical region M96.1    Degeneration of cervical intervertebral disc M50.30    Encounter for long-term (current) use of other medications Z79.899    Bradycardia R00.1    Cellulitis L03.90    Hypokalemia E87.6    Primary hypertension I10    Elevated lipase R74.8    Right lower quadrant abdominal pain R10.31    Nausea R11.0    Diarrhea R19.7    Acute encephalopathy G93.40    Polysubstance abuse (HCC) F19.10    Fibromyalgia M79.7    Cocaine addiction ([facility]) F14.20    Shoulder arthritis M19.019    Osteoarthritis of left glenohumeral joint M19.012    Abnormal EKG R94.31    Anemia D64.9    Anxiety F41.9    Carotid artery stenosis I65.29    COPD (chronic obstructive pulmonary disease) (HCC) J44.9    Dental disease K08.9    HUTCHINSON (dyspnea on exertion) R06.09    Fatigue R53.83    Fractures T07. XXXA    H/O gastric bypass Z98.84    History of crack cocaine use Z87.898    History of UTI Z87.440    Injury of back S39. 92XA    Intractable chronic migraine without aura and without status migrainosus G43.719    Intractable migraine with aura with status migrainosus G43. 111    Kidney stones N20.0    Dizziness R42    Memory loss R41.3    Mild pulmonary hypertension (HCC) I27.20    Mild tricuspid regurgitation I07.1    Primary osteoarthritis of left shoulder M19.012    Seizure-like activity (HCC) R56.9    Stress at home F43.9    TMJ (dislocation of temporomandibular joint) S03. 00XA    Tobacco abuse Z72.0    Visual impairment H54.7    Internal hernia K45.8    Vitamin D deficiency E55.9    Iron deficiency E61.1    SIRS (systemic inflammatory response syndrome) ([facility]) R65.10    NICKI (acute kidney injury) ([address] N17.9    Severe malnutrition (HCC) E43    Elevated d-dimer R79.89    Leukocytosis D72.829    Pulmonary nodules R91.8       Isolation/Infection:   Isolation            Contact          Patient Infection Status       Infection Onset Added Last Indicated Last Indicated By Review Planned Expiration Resolved Resolved By    MRSA 10/10/22 10/12/22 10/10/22 Culture, Blood 2        Resolved    COVID-19 (Rule Out) 10/10/22 10/10/22 10/10/22 COVID-19 & Influenza Combo (Ordered)   10/10/22 Rule-Out Test Resulted            Nurse Assessment:  Last Vital Signs: BP (!) 155/67   Pulse 90   Temp 97.8 °F (36.6 °C) (Oral)   Resp 18   Ht 4' 11\" (1.499 m)   Wt 98 lb 1.7 oz (44.5 kg)   SpO2 97%   BMI 19.81 kg/m²     Last documented pain score (0-10 scale): Pain Level: 8  Last Weight:   Wt Readings from Last 1 Encounters:   10/14/22 98 lb 1.7 oz (44.5 kg)     Mental Status:  disoriented    IV Access:  - PICC - site  R Basilic, insertion date: 10/20/2022    Nursing Mobility/ADLs:  Walking   Dependent  Transfer  Assisted  Bathing  Assisted  Dressing  Assisted  Toileting  Assisted  Feeding  Assisted  Med Admin  Assisted  Med Delivery   crushed and prefers mixed with pudding    Wound Care Documentation and Therapy:        Elimination:  Continence:    Bowel: No  Bladder: No  Urinary Catheter: None Colostomy/Ileostomy/Ileal Conduit: No       Date of Last BM: 11/3/22  No intake or output data in the 24 hours ending 10/17/22 0850  I/O last 3 completed shifts:  In: -   Out: 350 [Urine:350]    Safety Concerns: At Risk for Falls    Impairments/Disabilities:      None    Nutrition Therapy:  Current Nutrition Therapy:   - Oral Diet:  General    Routes of Feeding: Oral  Liquids: No Restrictions  Daily Fluid Restriction: no  Last Modified Barium Swallow with Video (Video Swallowing Test): not done    Treatments at the Time of Hospital Discharge:   Respiratory Treatments:     Oxygen Therapy:  is not on home oxygen therapy. Ventilator:    - No ventilator support    Rehab Therapies: Physical Therapy and Occupational Therapy  Weight Bearing Status/Restrictions: Other Medical Equipment (for information only, NOT a DME order): Other Treatments: Skilled Nursing assessment and monitoring. Medication education and monitoring per protocol.    PICC LINE care and flushes per protocol   IV Teflaro 600 mg every 12 hours until 12/3/22   Follow CBC and BUN/creatinine weekly while on IV antibiotics  Patient's personal belongings (please select all that are sent with patient):  None    RN SIGNATURE:  Electronically signed by Jairo Mello RN on 11/4/22 at 2:23 PM EDT    CASE MANAGEMENT/SOCIAL WORK SECTION    Inpatient Status Date: 10/10/22    Readmission Risk Assessment Score:  Readmission Risk              Risk of Unplanned Readmission:  23           Discharging to Facility/ [address]   [address], [address]  P: [phone]  F:    Please refer to VNS at discharge:  [facility]  [address]  [address] [address]  Phone [phone]  Fax  [phone]  Dialysis Facility (if applicable)   Name:  Address:  Dialysis Schedule:  Phone:  Fax:    / signature: Electronically signed by Toni Griffin RN on 10/17/22 at 8:51 AM EDT    PHYSICIAN SECTION    Prognosis: Fair    Condition at Discharge: Stable    Rehab Potential (if transferring to Rehab): Fair    Recommended Labs or Other Treatments After Discharge:     Physician Certification: I certify the above information and transfer of Scotty Gray  is necessary for the continuing treatment of the diagnosis listed and that she requires [place] for less 30 days.      Update Admission H&P: No change in H&P    PHYSICIAN SIGNATURE:  Electronically signed by Rebekah Wilhelm MD on 11/4/22 at 2:16 PM EDT

## 2022-10-18 ENCOUNTER — APPOINTMENT (OUTPATIENT)
Dept: GENERAL RADIOLOGY | Age: 52
DRG: 871 | End: 2022-10-18
Payer: COMMERCIAL

## 2022-10-18 ENCOUNTER — APPOINTMENT (OUTPATIENT)
Dept: INTERVENTIONAL RADIOLOGY/VASCULAR | Age: 52
DRG: 871 | End: 2022-10-18
Payer: COMMERCIAL

## 2022-10-18 ENCOUNTER — ANESTHESIA EVENT (OUTPATIENT)
Dept: OPERATING ROOM | Age: 52
End: 2022-10-18
Payer: COMMERCIAL

## 2022-10-18 PROBLEM — G93.41 ACUTE METABOLIC ENCEPHALOPATHY: Status: ACTIVE | Noted: 2022-10-18

## 2022-10-18 LAB
ANION GAP SERPL CALCULATED.3IONS-SCNC: 9 MMOL/L (ref 9–17)
APPEARANCE CSF: CLEAR
BUN BLDV-MCNC: 9 MG/DL (ref 6–20)
CALCIUM SERPL-MCNC: 7.5 MG/DL (ref 8.6–10.4)
CHLORIDE BLD-SCNC: 105 MMOL/L (ref 98–107)
CO2: 26 MMOL/L (ref 20–31)
CREAT SERPL-MCNC: 0.59 MG/DL (ref 0.5–0.9)
GFR SERPL CREATININE-BSD FRML MDRD: >60 ML/MIN/1.73M2
GLUCOSE BLD-MCNC: 70 MG/DL (ref 70–99)
GLUCOSE, CSF: 57 MG/DL (ref 40–70)
INR BLD: 1.2
MAGNESIUM: 2 MG/DL (ref 1.6–2.6)
PH FLUID: 8
PHOSPHORUS: 3.6 MG/DL (ref 2.6–4.5)
POTASSIUM SERPL-SCNC: 3.7 MMOL/L (ref 3.7–5.3)
PROTEIN CSF: 18.1 MG/DL (ref 15–45)
PROTHROMBIN TIME: 15.4 SEC (ref 11.8–14.6)
RBC CSF: 23 /MM3
SODIUM BLD-SCNC: 140 MMOL/L (ref 135–144)
SPECIMEN TYPE: NORMAL
SUPERNAT COLOR CSF: COLORLESS
TUBE NUMBER CSF: 2
VANCOMYCIN RANDOM DATE LAST DOSE: NORMAL
VANCOMYCIN RANDOM DOSE AMOUNT: 1000
VANCOMYCIN RANDOM TIME LAST DOSE: 148
VANCOMYCIN RANDOM: 27.1 UG/ML
VOLUME CSF: ABNORMAL
WBC CSF: 3 /MM3
XANTHOCHROMIA: ABNORMAL

## 2022-10-18 PROCEDURE — 99233 SBSQ HOSP IP/OBS HIGH 50: CPT | Performed by: PSYCHIATRY & NEUROLOGY

## 2022-10-18 PROCEDURE — 86789 WEST NILE VIRUS ANTIBODY: CPT

## 2022-10-18 PROCEDURE — 89050 BODY FLUID CELL COUNT: CPT

## 2022-10-18 PROCEDURE — 80202 ASSAY OF VANCOMYCIN: CPT

## 2022-10-18 PROCEDURE — 6370000000 HC RX 637 (ALT 250 FOR IP): Performed by: INTERNAL MEDICINE

## 2022-10-18 PROCEDURE — 87483 CNS DNA AMP PROBE TYPE 12-25: CPT

## 2022-10-18 PROCEDURE — 84100 ASSAY OF PHOSPHORUS: CPT

## 2022-10-18 PROCEDURE — 2580000003 HC RX 258: Performed by: INTERNAL MEDICINE

## 2022-10-18 PROCEDURE — 88112 CYTOPATH CELL ENHANCE TECH: CPT

## 2022-10-18 PROCEDURE — 6360000002 HC RX W HCPCS: Performed by: INTERNAL MEDICINE

## 2022-10-18 PROCEDURE — 32555 ASPIRATE PLEURA W/ IMAGING: CPT

## 2022-10-18 PROCEDURE — 85610 PROTHROMBIN TIME: CPT

## 2022-10-18 PROCEDURE — 82945 GLUCOSE OTHER FLUID: CPT

## 2022-10-18 PROCEDURE — 87070 CULTURE OTHR SPECIMN AEROBIC: CPT

## 2022-10-18 PROCEDURE — 36415 COLL VENOUS BLD VENIPUNCTURE: CPT

## 2022-10-18 PROCEDURE — 0W9B3ZZ DRAINAGE OF LEFT PLEURAL CAVITY, PERCUTANEOUS APPROACH: ICD-10-PCS | Performed by: RADIOLOGY

## 2022-10-18 PROCEDURE — 2709999900 IR LUMBAR PUNCTURE FOR DIAGNOSIS

## 2022-10-18 PROCEDURE — 80048 BASIC METABOLIC PNL TOTAL CA: CPT

## 2022-10-18 PROCEDURE — 71045 X-RAY EXAM CHEST 1 VIEW: CPT

## 2022-10-18 PROCEDURE — 62328 DX LMBR SPI PNXR W/FLUOR/CT: CPT

## 2022-10-18 PROCEDURE — 87075 CULTR BACTERIA EXCEPT BLOOD: CPT

## 2022-10-18 PROCEDURE — 99223 1ST HOSP IP/OBS HIGH 75: CPT | Performed by: INTERNAL MEDICINE

## 2022-10-18 PROCEDURE — 2700000000 HC OXYGEN THERAPY PER DAY

## 2022-10-18 PROCEDURE — 2060000000 HC ICU INTERMEDIATE R&B

## 2022-10-18 PROCEDURE — 6360000002 HC RX W HCPCS: Performed by: FAMILY MEDICINE

## 2022-10-18 PROCEDURE — 83735 ASSAY OF MAGNESIUM: CPT

## 2022-10-18 PROCEDURE — 88305 TISSUE EXAM BY PATHOLOGIST: CPT

## 2022-10-18 PROCEDURE — 009U3ZZ DRAINAGE OF SPINAL CANAL, PERCUTANEOUS APPROACH: ICD-10-PCS | Performed by: RADIOLOGY

## 2022-10-18 PROCEDURE — 94640 AIRWAY INHALATION TREATMENT: CPT

## 2022-10-18 PROCEDURE — 99232 SBSQ HOSP IP/OBS MODERATE 35: CPT | Performed by: FAMILY MEDICINE

## 2022-10-18 PROCEDURE — 86403 PARTICLE AGGLUT ANTBDY SCRN: CPT

## 2022-10-18 PROCEDURE — 87040 BLOOD CULTURE FOR BACTERIA: CPT

## 2022-10-18 PROCEDURE — 83615 LACTATE (LD) (LDH) ENZYME: CPT

## 2022-10-18 PROCEDURE — 86788 WEST NILE VIRUS AB IGM: CPT

## 2022-10-18 PROCEDURE — 83986 ASSAY PH BODY FLUID NOS: CPT

## 2022-10-18 PROCEDURE — 84157 ASSAY OF PROTEIN OTHER: CPT

## 2022-10-18 PROCEDURE — 94761 N-INVAS EAR/PLS OXIMETRY MLT: CPT

## 2022-10-18 PROCEDURE — 87186 SC STD MICRODIL/AGAR DIL: CPT

## 2022-10-18 PROCEDURE — 94664 DEMO&/EVAL PT USE INHALER: CPT

## 2022-10-18 PROCEDURE — 2709999900 IR GUIDED THORACENTESIS PLEURAL

## 2022-10-18 PROCEDURE — 86618 LYME DISEASE ANTIBODY: CPT

## 2022-10-18 PROCEDURE — 87205 SMEAR GRAM STAIN: CPT

## 2022-10-18 RX ORDER — SODIUM CHLORIDE 9 MG/ML
INJECTION, SOLUTION INTRAVENOUS CONTINUOUS
Status: DISCONTINUED | OUTPATIENT
Start: 2022-10-18 | End: 2022-10-18

## 2022-10-18 RX ADMIN — IPRATROPIUM BROMIDE AND ALBUTEROL SULFATE 1 AMPULE: 2.5; .5 SOLUTION RESPIRATORY (INHALATION) at 11:16

## 2022-10-18 RX ADMIN — VANCOMYCIN HYDROCHLORIDE 750 MG: 750 INJECTION, POWDER, LYOPHILIZED, FOR SOLUTION INTRAVENOUS at 13:52

## 2022-10-18 RX ADMIN — CARVEDILOL 3.12 MG: 3.12 TABLET, FILM COATED ORAL at 18:26

## 2022-10-18 RX ADMIN — CARVEDILOL 3.12 MG: 3.12 TABLET, FILM COATED ORAL at 09:16

## 2022-10-18 RX ADMIN — IPRATROPIUM BROMIDE AND ALBUTEROL SULFATE 1 AMPULE: 2.5; .5 SOLUTION RESPIRATORY (INHALATION) at 19:26

## 2022-10-18 RX ADMIN — IPRATROPIUM BROMIDE AND ALBUTEROL SULFATE 1 AMPULE: 2.5; .5 SOLUTION RESPIRATORY (INHALATION) at 15:14

## 2022-10-18 RX ADMIN — FERROUS SULFATE TAB 325 MG (65 MG ELEMENTAL FE) 325 MG: 325 (65 FE) TAB at 18:26

## 2022-10-18 RX ADMIN — FERROUS SULFATE TAB 325 MG (65 MG ELEMENTAL FE) 325 MG: 325 (65 FE) TAB at 09:16

## 2022-10-18 RX ADMIN — HEPARIN SODIUM 5000 UNITS: 5000 INJECTION INTRAVENOUS; SUBCUTANEOUS at 09:16

## 2022-10-18 RX ADMIN — SODIUM CHLORIDE: 9 INJECTION, SOLUTION INTRAVENOUS at 12:00

## 2022-10-18 RX ADMIN — Medication 2000 UNITS: at 09:16

## 2022-10-18 RX ADMIN — DULOXETINE 60 MG: 60 CAPSULE, DELAYED RELEASE ORAL at 09:16

## 2022-10-18 RX ADMIN — CIPROFLOXACIN 400 MG: 2 INJECTION, SOLUTION INTRAVENOUS at 12:13

## 2022-10-18 RX ADMIN — ATORVASTATIN CALCIUM 40 MG: 40 TABLET, FILM COATED ORAL at 22:27

## 2022-10-18 RX ADMIN — DIVALPROEX SODIUM 1000 MG: 500 TABLET, DELAYED RELEASE ORAL at 22:27

## 2022-10-18 RX ADMIN — SODIUM CHLORIDE, PRESERVATIVE FREE 10 ML: 5 INJECTION INTRAVENOUS at 09:17

## 2022-10-18 RX ADMIN — DIVALPROEX SODIUM 1000 MG: 500 TABLET, DELAYED RELEASE ORAL at 09:16

## 2022-10-18 RX ADMIN — ARIPIPRAZOLE 5 MG: 5 TABLET ORAL at 09:17

## 2022-10-18 RX ADMIN — IPRATROPIUM BROMIDE AND ALBUTEROL SULFATE 1 AMPULE: 2.5; .5 SOLUTION RESPIRATORY (INHALATION) at 07:51

## 2022-10-18 RX ADMIN — VANCOMYCIN HYDROCHLORIDE 1000 MG: 1 INJECTION, POWDER, LYOPHILIZED, FOR SOLUTION INTRAVENOUS at 01:08

## 2022-10-18 ASSESSMENT — PAIN SCALES - GENERAL: PAINLEVEL_OUTOF10: 0

## 2022-10-18 NOTE — CONSULTS
[facility] -   Infectious diseases evaluation  admission date 10/10/2022    reason for consultation:   MRSA bacteremia    Impression :   Current:  MRSA bacteremia/possible pulmonary septic emboli, rule out endocarditis. Bilateral pulmonary nodules with cavitation small loculated  pleural effusion/suspected septic emboli  Sepsis secondary to above  Encephalopathy  History of cocaine drug abuse  E. coli UTI treated  Acute renal failure improved  Hypertension  Bipolar disorder      Recommendations   Continue IV vancomycin  Discontinue Cipro  Repeat blood cultures  Discussed with cardiology/plan for ADRIANNE tomorrow. Neurology following  Thoracentesis  MRI brain on 10/1422 unremarkable. Follow CBC and renal function  Supportive care  Discussed with nursing staff    Infection Control Recommendations   [place] Precautions  Contact Isolation       Antimicrobial Stewardship Recommendations   Simplification of therapy  Targeted therapy      History of Present Illness:   Initial history:  Otilia Conrad is a 46y.o.-year-old female was admitted 10/10/2020 for worsening shortness of breath associated with generalized body ache. The patient is drowsy, arousable, confused, follows simple commands, unable to provide history that was obtained from chart review and nursing staff. She does have history of cocaine abuse. She was found to have acute renal failure with a creatinine of 2.1. Urinalysis showed small leukocyte esterase, positive nitrate. Urine culture on 10/10/2022 grew E. Coli  Blood cultures 10/10/2022 2 of 2 grew MRSA  Echocardiogram showed no vegetations  Procalcitonin was 6.01, D-dimer was elevated at 3.85  Chest x-ray showed right lung nodules. VQ scan was indeterminant.   CT chest without contrast 10/16/2022 showed numerous bilateral nodular opacities both lungs suggestive of septic emboli , focal consolidation with cavitation in the superior right lower lobe focal consolidation at the left base, small bilateral loculated pleural effusion and mediastinal lymphadenopathy. Echocardiogram showed no vegetations  Interval changes  10/18/2022   Patient Vitals for the past 8 hrs:   BP Temp Temp src Pulse Resp SpO2   10/18/22 1645 137/66 97.8 °F (36.6 °C) Axillary 76 25 98 %   10/18/22 1545 130/71 -- -- 73 -- 96 %   10/18/22 1514 -- -- -- 74 20 94 %   10/18/22 1245 (!) 141/71 98.5 °F (36.9 °C) Oral 83 20 95 %   10/18/22 1116 -- -- -- 78 20 95 %   10/18/22 1045 121/62 99 °F (37.2 °C) Oral 78 28 96 %           I have personally reviewed the past medical history, past surgical history, medications, social history, and family history, and I haveupdated the database accordingly. Allergies:   Aspirin, Bactrim, and Codeine     Review of Systems:     Review of Systems  Unable to provide due to mental status changes  Physical Examination :       Physical Exam  Constitutional:       Appearance: She is ill-appearing. HENT:      Head: Normocephalic and atraumatic. Right Ear: External ear normal.      Left Ear: External ear normal.   Eyes:      General: No scleral icterus. Conjunctiva/sclera: Conjunctivae normal.   Cardiovascular:      Rate and Rhythm: Regular rhythm. Heart sounds: No murmur heard. Pulmonary:      Effort: No respiratory distress. Breath sounds: Rhonchi present. Comments:   Decreased breath sounds bilaterally  Abdominal:      Palpations: Abdomen is soft. Tenderness: There is no abdominal tenderness. Musculoskeletal:      Cervical back: Neck supple. No rigidity. Right lower leg: No edema. Left lower leg: No edema. Skin:     Coloration: Skin is not jaundiced. Findings: No erythema.    Neurological:      Comments: Drowsy, moves all extremities, follows simple commands       Past Medical History:     Past Medical History:   Diagnosis Date    Anemia     Arthritis     Asthma     Bipolar disorder, mixed ([address]     Carotid artery stenosis 2020    Cocaine abuse ([facility] 2014    COPD (chronic obstructive pulmonary disease) (HCC)     Degeneration of cervical intervertebral disc     Depression with anxiety     Depression with anxiety     Fibromyalgia 2017    GERD (gastroesophageal reflux disease)     History of migraine headaches 6/10/2014    History of renal calculi 6/10/2014    History of seizure disorder 6/10/2014    Hoarseness of voice     HTN (hypertension) 6/10/2014    Hyperlipidemia 6/10/2014    IGT (impaired glucose tolerance) 6/10/2014    Kidney stones     Lactose intolerance 6/10/2014    Leukopenia 6/10/2014    Major depressive disorder, recurrent episode, severe, without mention of psychotic behavior 2014    MVP (mitral valve prolapse)     Seizures ([facility]     Sleep apnea 6/10/2014    Unspecified diseases of blood and blood-forming organs        Past Surgical  History:     Past Surgical History:   Procedure Laterality Date    ANKLE FRACTURE SURGERY      recontruction surgery    APPENDECTOMY      BREAST LUMPECTOMY Right     CARDIAC CATHETERIZATION      CARPAL TUNNEL RELEASE      x2     SECTION      CHOLECYSTECTOMY      COLONOSCOPY  12    COLONOSCOPY  2016    severe spasms    COLONOSCOPY  2019    DR GOLDY MCINTOSH    GASTRIC BYPASS SURGERY      GASTRIC BYPASS SURGERY      HYSTERECTOMY (CERVIX STATUS UNKNOWN)      HYSTERECTOMY (CERVIX STATUS UNKNOWN)      LAPAROSCOPY N/A 2022    LAPAROSCOPY EXPLORATORY CONVERTED TO OPEN EXPLORATORY LAPAROTOMY, LYSIS OF ADHESIONS, REDUCTION OF INTERNAL HERNIA performed by Richy Vasquez DO at [address] [place] [address]      APPLICATION OF ARCH BARS,SIMPLE EXTRACTION OF #15 AND RT TMJ JOINT REPLACEMENT    SHOULDER ARTHROSCOPY Left 2019    DR ROBERT GREEN    SHOULDER SURGERY      TONSILLECTOMY AND ADENOIDECTOMY      UPPER GASTROINTESTINAL ENDOSCOPY  7-3-12    egd    UPPER GASTROINTESTINAL ENDOSCOPY  2016    status post gastrectomy with a small remnant of gastric pouch.     UPPER GASTROINTESTINAL ENDOSCOPY  05/2019    DR Shantell You       Medications:      vancomycin  750 mg IntraVENous Q12H    ciprofloxacin  400 mg IntraVENous Q12H    Vitamin D  2,000 Units Oral Daily    carvedilol  3.125 mg Oral BID WC    sodium chloride flush  5-40 mL IntraVENous 2 times per day    [Held by provider] heparin (porcine)  5,000 Units SubCUTAneous BID    ARIPiprazole  5 mg Oral Daily    atorvastatin  40 mg Oral Daily    divalproex  1,000 mg Oral BID    DULoxetine  60 mg Oral Daily    ferrous sulfate  325 mg Oral BID WC    ipratropium-albuterol  1 ampule Inhalation Q4H WA    vancomycin (VANCOCIN) intermittent dosing (placeholder)   Other RX Placeholder       Social History:     Social History     Socioeconomic History    Marital status:      Spouse name: Not on file    Number of children: Not on file    Years of education: Not on file    Highest education level: Not on file   Occupational History    Occupation: disability   Tobacco Use    Smoking status: Every Day     Packs/day: 0.50     Years: 33.00     Pack years: 16.50     Types: Cigarettes    Smokeless tobacco: Never    Tobacco comments:     quit  2000 started again  1/13    Substance and Sexual Activity    Alcohol use: Yes     Comment: occasional    Drug use: Not Currently    Sexual activity: Yes   Other Topics Concern    Not on file   Social History Narrative    Not on file     Social Determinants of Health     Financial Resource Strain: Not on file   Food Insecurity: Not on file   Transportation Needs: Not on file   Physical Activity: Not on file   Stress: Not on file   Social Connections: Not on file   Intimate Partner Violence: Not on file   Housing Stability: Not on file       Family History:     Family History   Problem Relation Age of Onset    Diabetes Mother     Cancer Mother     Coronary Art Dis Father     COPD Father     Depression Brother     Alcohol Abuse Brother Cancer Other         lung and skin    Diabetes Maternal Grandmother     Cancer Paternal Grandmother       Medical Decision Making:   I have independently reviewed/ordered the following labs:    CBC with Differential:   Recent Labs     10/16/22  0422 10/17/22  0438   WBC 7.0 7.3   HGB 10.3* 9.9*   HCT 32.3* 30.2*   * 115*   LYMPHOPCT 5* 7*   MONOPCT 11* 5     BMP:  Recent Labs     10/17/22  0438 10/18/22  0450    140   K 3.3* 3.7    105   CO2 26 26   BUN 8 9   CREATININE 0.46* 0.59   MG 1.9 2.0     Hepatic Function Panel: No results for input(s): PROT, LABALBU, BILIDIR, IBILI, BILITOT, ALKPHOS, ALT, AST in the last 72 hours. No results for input(s): RPR in the last 72 hours. No results for input(s): HIV in the last 72 hours. No results for input(s): BC in the last 72 hours. Lab Results   Component Value Date/Time    CREATININE 0.59 10/18/2022 04:50 AM    GLUCOSE 70 10/18/2022 04:50 AM       Detailed results: Thank you for allowing us to participate in the care of this patient. Please call with questions. This note is created with the assistance of a speech recognition program.  While intending to generate adocument that actually reflects the content of the visit, the document can still have some errors including those of syntax and sound a like substitutions which may escape proof reading. It such instances, actual meaningcan be extrapolated by contextual diversion.     Jose Nolen MD  Office: [phone]  Perfect serve / office [phone]

## 2022-10-18 NOTE — PROGRESS NOTES
Progress Note  Date:10/18/2022       Room:[address] [place] [address]  Patient Name:Aun Bourne     YOB: 1970     Age:51 y.o. Subjective    Subjective:  Symptoms:  (Sleeping while breathing treatment administered by mask. Briefly opens eyes to verbal stimulus but closes eyes again. ). Activity level: Impaired due to weakness. Review of Systems  Objective         Vitals Last 24 Hours:  TEMPERATURE:  Temp  Av.8 °F (37.1 °C)  Min: 98.4 °F (36.9 °C)  Max: 99.5 °F (37.5 °C)  RESPIRATIONS RANGE: Resp  Av.3  Min: 16  Max: 26  PULSE OXIMETRY RANGE: SpO2  Av.4 %  Min: 91 %  Max: 98 %  PULSE RANGE: Pulse  Av.6  Min: 73  Max: 85  BLOOD PRESSURE RANGE: Systolic (08PSX), BTT:814 , Min:108 , LHV:337   ; Diastolic (82UGR), XYP:96, Min:63, Max:84    I/O (24Hr): Intake/Output Summary (Last 24 hours) at 10/18/2022 0830  Last data filed at 10/17/2022 1300  Gross per 24 hour   Intake 240 ml   Output --   Net 240 ml     Objective:  General Appearance:  Comfortable. Vital signs: (most recent): Blood pressure (!) 148/70, pulse 75, temperature 99.3 °F (37.4 °C), temperature source Axillary, resp. rate 20, height 4' 11\" (1.499 m), weight 103 lb 13.4 oz (47.1 kg), SpO2 91 %. Vital signs are normal.    Lungs:  Normal effort and normal respiratory rate. There are decreased breath sounds. Heart: Normal rate. Regular rhythm.   S1 normal and S2 normal.    Labs/Imaging/Diagnostics    Labs:  CBC:  Recent Labs     10/16/22  0422 10/17/22  0438   WBC 7.0 7.3   RBC 4.01 3.79*   HGB 10.3* 9.9*   HCT 32.3* 30.2*   MCV 80.5 79.9*   RDW 16.1* 15.6*   * 115*     CHEMISTRIES:  Recent Labs     10/16/22  0422 10/16/22  0610 10/16/22  0610 10/16/22  1942 10/17/22  6858 10/18/22  6380   NA  --  136  --   --  138 140   K  --  2.9*   < > 3.9 3.3* 3.7   CL  --  104  --   --  103 105   CO2  --  25  --   --  26 26   BUN  --  7  --   --  8 9   CREATININE  --  0.44*  --   --  0.46* 0.59   GLUCOSE  --  84  --   -- 76 70   PHOS 2.9  --   --   --  3.2 3.6   MG 1.8  --   --   --  1.9 2.0    < > = values in this interval not displayed. PT/INR:No results for input(s): PROTIME, INR in the last 72 hours. APTT:No results for input(s): APTT in the last 72 hours. LIVER PROFILE:No results for input(s): AST, ALT, BILIDIR, BILITOT, ALKPHOS in the last 72 hours. Imaging Last 24 Hours:  CT CHEST WO CONTRAST    Result Date: 10/17/2022  EXAMINATION: CT OF THE CHEST WITHOUT CONTRAST 10/17/2022 8:14 am TECHNIQUE: CT of the chest was performed without the administration of intravenous contrast. Multiplanar reformatted images are provided for review. Automated exposure control, iterative reconstruction, and/or weight based adjustment of the mA/kV was utilized to reduce the radiation dose to as low as reasonably achievable. COMPARISON: CT abdomen and pelvis dated 10/10/2022. Chest x-ray dated 10/11/2022. HISTORY: ORDERING SYSTEM PROVIDED HISTORY: Please evaluate extent of what appears to be septic emboli pattern TECHNOLOGIST PROVIDED HISTORY: Please evaluate extent of what appears to be septic emboli pattern Is the patient pregnant?->No Reason for Exam: sob,recent covid FINDINGS: Mediastinum: Heart is normal in size. There is no pericardial effusion. Thoracic aorta and pulmonary arteries are grossly normal in caliber. There is calcified plaque at the aortic arch. Moderate coronary artery calcification. There is a mildly enlarged pretracheal lymph node, measuring 1 cm in short axis dimension. No other visible mediastinal lymphadenopathy. Lungs/pleura: There is a small amount of mildly loculated pleural fluid in the right hemithorax. Small left pleural effusion is noted as well, less loculated in appearance. There are numerous nodular opacities throughout both lungs, increased from 10/10/2022. Many (but not all) of the nodules are cavitary. There is focal consolidation with cavitation in the superior segment right lower lobe. Focal consolidation is also noted at the left base. Largest nodules are on the right, measuring up to 2.9 cm. No pneumothorax. Upper Abdomen: No acute findings in the limited visualized upper abdomen. Soft Tissues/Bones: Left shoulder arthroplasty. No acute or suspicious osseous abnormality. 1.  Numerous bilateral nodular opacities in both lungs, many of which are cavitary, increased when compared to 10/10/2022, most likely septic emboli. Short-term follow-up to document resolution recommended. 2.  Focal consolidation with cavitation in the superior segment right lower lobe and focal consolidation at the left base, likely infarct or pneumonia. 3.  Small bilateral mildly loculated pleural effusions, new from 10/10/2022. 4.  Borderline mediastinal lymphadenopathy, likely reactive.      Assessment//Plan           Hospital Problems             Last Modified POA    * (Principal) SIRS (systemic inflammatory response syndrome) ([address] 10/10/2022 Yes    NICKI (acute kidney injury) ([address] 10/11/2022 Yes    Severe malnutrition (HCC) (Chronic) 10/11/2022 Yes    Elevated d-dimer 10/15/2022 Yes    Leukocytosis 10/15/2022 Yes    Pulmonary nodules 10/15/2022 Yes    Delirium due to another medical condition 10/17/2022 Yes    Depression with suicidal ideation 10/17/2022 Yes    Bipolar disorder, mixed ([address] 10/11/2022 Yes    Acute encephalopathy 10/14/2022 Yes    COPD (chronic obstructive pulmonary disease) ([address] 10/11/2022 Yes    H/O gastric bypass 10/11/2022 Yes    Overview Signed 2/13/2020  7:19 PM by vIan To MD     enlarged  pancreas, here for surgical work-up for EGD          Assessment & Plan    ID consult pending    Continue current management    On discharge, [facility] d/t suicidal ideation    Electronically signed by Ivan To MD on 10/18/22 at 8:30 AM EDT

## 2022-10-18 NOTE — PROGRESS NOTES
Patient tolerated procedure well without distress. 400 mLs of cloudy, fred/sangiousness fluid removed. Area cleansed & dry dressing applied. Transport notified to take patient back to [facility]. LUIS Hughes updated.

## 2022-10-18 NOTE — PROGRESS NOTES
Progress Note    Reason for Consult: NICKI    Requesting Physician:  Amaris De Los Santos MD    INTERVAL HISTORY:  Creatinine stable 0.59  Potassium was 3.7  Phosphorus better at 3.6  SBP trending 100-140s  CT chest showed numerous bilateral nodular opacities in both lungs, many are cavitary and increased compared to 10/10/22 imaging, likely septic emboli. Focal consolidation with cavitation, likely infarct or pneumonia. Small bilateral mildly loculated pleural effusions, new from 10/10. On vanco and cipro today for UTI. ID has been consulted. Vanco level 27.1. Pulmonary planning thoracentesis. Neurology planning LP. Taking poor po. Psych following for suicidal ideations. She is drowsy, but answered simple questions today. HISTORY OF PRESENT ILLNESS:    The patient is a 46 y.o. female who presents with weakness, generalized pain, SOB and abdominal pain. She tested positive for COVID 19 on 9/30. She states she slept 2 days straight last week. C/o fever, chills and foul smelling urine with right flank pain. She also admits to doing crack cocaine at least twice weekly and when ever she can afford it more frequently. She states she uses cocaine to help with pain. She drinks homemade wine on occasion and smokes cigarettes, but states recently she decreased tobacco use. Creatinine baseline 0.6 from earlier this year. Creatinine 2.1 on admission. With sodium 132, potassium 3.3, bicarb 24, chloride 91, BUN 33, magnesium 2.1, procalcitonin 6.01, proBNP 4799. High sensitivity troponin was initially 30 and 13 on redraw. Glucose 129, albumin 3.44. Given normal saline bolus. X-ray showed multiple nodular opacities projecting over the right lung measuring up to 1.5 cm further evaluation with CT chest recommended. No acute focal airspace consolidation or pleural effusions. Blood pressure trending 110-190s/60-120s. Medications include Coreg, lisinopril, trazodone, gabapentin, Norvasc, Vistaril.  She is very drowsy, slurring words. She has hx of HTN. She states she only takes BP meds if she remembers, which is not often. Hx of HTN since age 23s. Ordered urine sodium, urine creatinine, urine eosinophils, urine osmolality. Follow-up urine culture. On iv Zosyn per primary. Strict I&O discussed with RN. She received 1 dose of Lovenox and is now on heparin 5000 units twice a day. In the ER, she received Dilaudid and morphine and dose of Neurontin. She is very drowsy. Hold Neurontin and trazodone for now. Blood pressure is improving. Holding lisinopril for now . Will also hold norvasc, as pt does not take medications regularly and need to avoid hypotension. Continue Coreg.       Review Of Systems:   Unable to obtain, patient is lethargic and not interactive    Past Medical History:   Diagnosis Date    Anemia     Arthritis     Asthma     Bipolar disorder, mixed ([address]     Carotid artery stenosis 2/13/2020    Cocaine abuse ([address] 11/4/2014    COPD (chronic obstructive pulmonary disease) ([address]     Degeneration of cervical intervertebral disc     Depression with anxiety     Depression with anxiety     Fibromyalgia 1/5/2017    GERD (gastroesophageal reflux disease)     History of migraine headaches 6/10/2014    History of renal calculi 6/10/2014    History of seizure disorder 6/10/2014    Hoarseness of voice     HTN (hypertension) 6/10/2014    Hyperlipidemia 6/10/2014    IGT (impaired glucose tolerance) 6/10/2014    Kidney stones     Lactose intolerance 6/10/2014    Leukopenia 6/10/2014    Major depressive disorder, recurrent episode, severe, without mention of psychotic behavior 11/6/2014    MVP (mitral valve prolapse)     Seizures ([address]     Sleep apnea 6/10/2014    Unspecified diseases of blood and blood-forming organs        Past Surgical History:   Procedure Laterality Date    ANKLE FRACTURE SURGERY      recontruction surgery    APPENDECTOMY      BREAST LUMPECTOMY Right     CARDIAC CATHETERIZATION      CARPAL TUNNEL RELEASE      x2     SECTION      CHOLECYSTECTOMY      COLONOSCOPY  12    COLONOSCOPY  2016    severe spasms    COLONOSCOPY  2019    DR GOLDY MCINTOSH    GASTRIC BYPASS SURGERY      GASTRIC BYPASS SURGERY      HYSTERECTOMY (CERVIX STATUS UNKNOWN)      HYSTERECTOMY (CERVIX STATUS UNKNOWN)      LAPAROSCOPY N/A 2022    LAPAROSCOPY EXPLORATORY CONVERTED TO OPEN EXPLORATORY LAPAROTOMY, LYSIS OF ADHESIONS, REDUCTION OF INTERNAL HERNIA performed by Tessa Owens, DO at [address] [place] [address]      APPLICATION OF ARCH BARS,SIMPLE EXTRACTION OF #15 AND RT TMJ JOINT REPLACEMENT    SHOULDER ARTHROSCOPY Left 2019    DR ROBERT GREEN    SHOULDER SURGERY      TONSILLECTOMY AND ADENOIDECTOMY      UPPER GASTROINTESTINAL ENDOSCOPY  7-3-12    egd    UPPER GASTROINTESTINAL ENDOSCOPY  2016    status post gastrectomy with a small remnant of gastric pouch. UPPER GASTROINTESTINAL ENDOSCOPY  2019    DR Rey Hill       Prior to Admission medications    Medication Sig Start Date End Date Taking? Authorizing Provider   lisinopril (PRINIVIL;ZESTRIL) 20 MG tablet Take 20 mg by mouth daily   Yes Historical Provider, MD   carvedilol (COREG) 25 MG tablet TAKE 1 TABLET BY MOUTH IN THE MORNING AND 1 IN THE EVENING WITH MEALS 22   Connie Patterson MD   traZODone (DESYREL) 100 MG tablet TAKE 1 TABLET BY MOUTH AT BEDTIME 22   Historical Provider, MD   gabapentin (NEURONTIN) 300 MG capsule TAKE 1 CAPSULE BY MOUTH THREE TIMES DAILY 22  Connie Patterson MD   hydrOXYzine (VISTARIL) 25 MG capsule  9/15/20   Historical Provider, MD   DULoxetine (CYMBALTA) 30 MG extended release capsule Take 1 capsule by mouth daily  Patient taking differently: Take 60 mg by mouth in the morning.  19   Yann Soto APRN - CNP   ARIPiprazole (ABILIFY) 5 MG tablet Take 5 mg by mouth daily    Historical Provider, MD       Scheduled Meds:   vancomycin  750 mg IntraVENous Q12H    ciprofloxacin  400 mg IntraVENous Q12H    Vitamin D  2,000 Units Oral Daily    carvedilol  3.125 mg Oral BID     sodium chloride flush  5-40 mL IntraVENous 2 times per day    [Held by provider] heparin (porcine)  5,000 Units SubCUTAneous BID    ARIPiprazole  5 mg Oral Daily    atorvastatin  40 mg Oral Daily    divalproex  1,000 mg Oral BID    DULoxetine  60 mg Oral Daily    ferrous sulfate  325 mg Oral BID     ipratropium-albuterol  1 ampule Inhalation Q4H WA    vancomycin (VANCOCIN) intermittent dosing (placeholder)   Other RX Placeholder     Continuous Infusions:   sodium chloride      sodium chloride 50 mL/hr at 10/18/22 1200     PRN Meds:ketorolac, potassium chloride **OR** potassium alternative oral replacement **OR** potassium chloride, hydrALAZINE, sodium chloride flush, sodium chloride, acetaminophen, ondansetron **OR** ondansetron, albuterol sulfate HFA, sodium chloride flush, perflutren lipid microspheres         Physical Exam:  Vitals:    10/18/22 0900 10/18/22 1045 10/18/22 1116 10/18/22 1245   BP: 117/73 121/62  (!) 141/71   Pulse: 95 78 78 83   Resp: (!) 33 28 20 20   Temp: 98.3 °F (36.8 °C) 99 °F (37.2 °C)  98.5 °F (36.9 °C)   TempSrc:  Oral  Oral   SpO2:  96% 95% 95%   Weight:       Height:         I/O last 3 completed shifts: In: 240 [P.O.:240]  Out: -     General:  lethargic, not in distress. Appears to be older than stated age. HEENT: Atraumatic, normocephalic. Anicteric sclera. Pink and moist oral mucosa. Neck supple. No JVD. Chest: Bilateral air entry, clear to auscultation, no wheezing, rhonchi or rales. Cardiovascular: S1S2, no murmur, rub or gallop. No lower extremity edema. Abdomen: soft Active bowel sounds. Musculoskeletal:  No cyanosis or clubbing. Integumentary: Pink, warm and dry.        Data:    CBC:   Lab Results   Component Value Date    WBC 7.3 10/17/2022    HGB 9.9 (L) 10/17/2022    HCT 30.2 (L) 10/17/2022    MCV 79.9 (L) 10/17/2022     (L) 10/17/2022     BMP:    Lab Results   Component Value Date     10/18/2022     10/17/2022     10/16/2022    K 3.7 10/18/2022    K 3.3 (L) 10/17/2022    K 3.9 10/16/2022     10/18/2022     10/17/2022     10/16/2022    CO2 26 10/18/2022    CO2 26 10/17/2022    CO2 25 10/16/2022    BUN 9 10/18/2022    BUN 8 10/17/2022    BUN 7 10/16/2022    CREATININE 0.59 10/18/2022    CREATININE 0.46 (L) 10/17/2022    CREATININE 0.44 (L) 10/16/2022    GLUCOSE 70 10/18/2022    GLUCOSE 76 10/17/2022    GLUCOSE 84 10/16/2022     CMP:   Lab Results   Component Value Date/Time     10/18/2022 04:50 AM    K 3.7 10/18/2022 04:50 AM     10/18/2022 04:50 AM    CO2 26 10/18/2022 04:50 AM    BUN 9 10/18/2022 04:50 AM    CREATININE 0.59 10/18/2022 04:50 AM    GLUCOSE 70 10/18/2022 04:50 AM    CALCIUM 7.5 10/18/2022 04:50 AM    PROT 7.9 10/10/2022 04:55 AM    LABALBU 3.4 10/10/2022 04:55 AM    BILITOT 0.7 10/10/2022 04:55 AM    ALKPHOS 112 10/10/2022 04:55 AM    AST 41 10/10/2022 04:55 AM    ALT 17 10/10/2022 04:55 AM      Hepatic:   Lab Results   Component Value Date    AST 41 (H) 10/10/2022    AST 14 04/08/2022    AST 26 01/28/2022    ALT 17 10/10/2022    ALT 6 04/08/2022    ALT 11 01/28/2022    BILITOT 0.7 10/10/2022    BILITOT 0.18 (L) 04/08/2022    BILITOT 0.23 (L) 01/28/2022    ALKPHOS 112 (H) 10/10/2022    ALKPHOS 120 (H) 04/08/2022    ALKPHOS 90 01/28/2022     BNP: No results found for: BNP  Lipids:   Lab Results   Component Value Date    CHOL 156 01/30/2022    HDL 39 (L) 01/30/2022     INR:   Lab Results   Component Value Date    INR 1.2 10/10/2022    INR 1.0 07/22/2016    INR 0.9 05/30/2016     PTH: No results found for: PTH  Phosphorus:    Lab Results   Component Value Date/Time    PHOS 3.6 10/18/2022 04:50 AM     Ionized Calcium: No results found for: IONCA  Magnesium:   Lab Results   Component Value Date/Time    MG 2.0 10/18/2022 04:50 AM     Albumin:   Lab Results   Component Value Date/Time    LABALBU 3.4 10/10/2022 04:55 AM     Last 3 CK, CKMB, Troponin: @LABRCNT(CKTOTAL:3,CKMB:3,TROPONINI:3)       URINE:)No results found for: [facility]     Radiology:   Reviewed. Impression:  NICKI, appears to be hemodynamically related, Cr is better. Mild hypernatremia, improved. Hypokalemia. Hypophosphatemia  Hypocalcemia. Hypovitaminosis D. Hypertension. MRSA sepsis with septic embolization. UTI with E coli. History of cocaine use. Ileus. Bipolar disorder. Plan:  Off IVF. Encouraged po intake and ONS. Creatinine stable. Electrolytes ok. On NS at 50mL/hr. On IV Vancomycin. Cipro added today for UTI. ID following. Continue holding Lisinopril. Coreg dose was decreased to  3.125 mg BID with holding parameters. Monitor BP. BP stable. Started on vitamin D replacement. DC toradol with poor oral intake. Discussed with her father. Will sign off. Please reconsult as needed. Pt seen in collaboration with Dr. Bowling Shown. Electronically signed by KELLY Pierre CNP  on 10/18/2022 at 2:05 PM         Physician Addendum:    Neurology assessment    NICKI hemodynamically related and improved  Electrolyte abnormalities corrected    Nephrology plan    Patient GFR at baseline  Discontinue IV fluids  Encourage p.o. intake   Antibiotics as per ID service   Continue to hold lisinopril for now   However no objection to restarting ACE inhibitor's if needed   Avoid Toradol   From renal perspective no further recommendation at this time     We will be on standby if any questions. Mireya Layne MD MD  [facility].   Ph: [phone]

## 2022-10-18 NOTE — PROGRESS NOTES
Pulmonary Progress Note  [facility]      Patient - Bassam Varela,  Age - 46 y.o.    - 1970      Room Number - 2117/2117-01   N -  [id number]   [facility] # - [de-identified]  Date of Admission -  10/10/2022  4:37 AM    Ha Gallagher MD  Primary Care Physician - Barbara Pepe MD     SUBJECTIVE   Patient appears to be in fair spirits. She can understand a few things from my standpoint. Father is at bedside. Patient's father has been helping with the patient's care daily since admission. OBJECTIVE   VITALS    height is 4' 11\" (1.499 m) and weight is 103 lb 13.4 oz (47.1 kg). Her oral temperature is 98.5 °F (36.9 °C). Her blood pressure is 141/71 (abnormal) and her pulse is 83. Her respiration is 20 and oxygen saturation is 95%. Body mass index is 20.97 kg/m². Temperature Range: Temp: 98.5 °F (36.9 °C) Temp  Av.8 °F (37.1 °C)  Min: 98.3 °F (36.8 °C)  Max: 99.5 °F (37.5 °C)  BP Range:  Systolic (48UUE), LDJ:602 , Min:117 , TOQ:457     Diastolic (34GPU), DBZ:27, Min:62, Max:84    Pulse Range: Pulse  Av.3  Min: 73  Max: 102  Respiration Range: Resp  Av.7  Min: 16  Max: 33  Current Pulse Ox[de-identified]  SpO2: 95 %  24HR Pulse Ox Range:  SpO2  Av.2 %  Min: 91 %  Max: 98 %  Oxygen Amount and Delivery: O2 Flow Rate (L/min): 2 L/min    Wt Readings from Last 3 Encounters:   10/18/22 103 lb 13.4 oz (47.1 kg)   22 96 lb 8 oz (43.8 kg)   22 105 lb (47.6 kg)       I/O (24 Hours)    Intake/Output Summary (Last 24 hours) at 10/18/2022 1317  Last data filed at 10/18/2022 0931  Gross per 24 hour   Intake 120 ml   Output --   Net 120 ml       EXAM     General Appearance  Awake, alert, oriented, in no acute distress  HEENT - normocephalic, atraumatic. Neck - Supple,  trachea midline   Lungs -coarse breath sounds some decreased at the left base  Heart Exam:PMI normal. No lifts, heaves, or thrills. RRR.  No murmurs, clicks, gallops, or rubs  Abdomen Exam: Abdomen soft, non-tender. Extremity Exam: No signs of cyanosis.     MEDS      vancomycin  750 mg IntraVENous Q12H    ciprofloxacin  400 mg IntraVENous Q12H    Vitamin D  2,000 Units Oral Daily    carvedilol  3.125 mg Oral BID WC    sodium chloride flush  5-40 mL IntraVENous 2 times per day    heparin (porcine)  5,000 Units SubCUTAneous BID    ARIPiprazole  5 mg Oral Daily    atorvastatin  40 mg Oral Daily    divalproex  1,000 mg Oral BID    DULoxetine  60 mg Oral Daily    ferrous sulfate  325 mg Oral BID     ipratropium-albuterol  1 ampule Inhalation Q4H WA    vancomycin (VANCOCIN) intermittent dosing (placeholder)   Other RX Placeholder      sodium chloride      sodium chloride 50 mL/hr at 10/18/22 1200     ketorolac, potassium chloride **OR** potassium alternative oral replacement **OR** potassium chloride, hydrALAZINE, sodium chloride flush, sodium chloride, acetaminophen, ondansetron **OR** ondansetron, albuterol sulfate HFA, sodium chloride flush, perflutren lipid microspheres    LABS   CBC   Recent Labs     10/17/22  0438   WBC 7.3   HGB 9.9*   HCT 30.2*   MCV 79.9*   *     BMP:   Lab Results   Component Value Date/Time     10/18/2022 04:50 AM    K 3.7 10/18/2022 04:50 AM     10/18/2022 04:50 AM    CO2 26 10/18/2022 04:50 AM    BUN 9 10/18/2022 04:50 AM    LABALBU 3.4 10/10/2022 04:55 AM    CREATININE 0.59 10/18/2022 04:50 AM    CALCIUM 7.5 10/18/2022 04:50 AM    GFRAA >60 04/08/2022 07:17 AM    LABGLOM >60 10/18/2022 04:50 AM     ABGs:  Lab Results   Component Value Date/Time    PHART 7.467 10/15/2022 10:40 AM    PO2ART 66.5 10/15/2022 10:40 AM    IFF9ESE 35.4 10/15/2022 10:40 AM      Lab Results   Component Value Date/Time    MODE PRVC 12/28/2016 04:36 AM     Ionized Calcium:  No results found for: IONCA  Magnesium:    Lab Results   Component Value Date/Time    MG 2.0 10/18/2022 04:50 AM     Phosphorus:    Lab Results   Component Value Date/Time    PHOS 3.6 10/18/2022 04:50 AM        LIVER PROFILE No results for input(s): AST, ALT, LIPASE, BILIDIR, BILITOT, ALKPHOS in the last 72 hours. Invalid input(s): AMYLASE,  ALB  INR No results for input(s): INR in the last 72 hours.   PTT   Lab Results   Component Value Date    APTT 25.6 07/22/2016         RADIOLOGY     (See actual reports for details)    ASSESSMENT/PLAN     Patient Active Problem List   Diagnosis    GERD (gastroesophageal reflux disease)    Hoarseness of voice    MVP (mitral valve prolapse)    Depression with suicidal ideation    Lymphocytic colitis    History of migraine headaches    Hyperlipidemia    Sleep apnea    IGT (impaired glucose tolerance)    History of renal calculi    Lactose intolerance    History of seizure disorder    Leukopenia    Bipolar disorder, mixed ([address]    Illicit drug use    Postlaminectomy syndrome, cervical region    Degeneration of cervical intervertebral disc    Encounter for long-term (current) use of other medications    Bradycardia    Cellulitis    Hypokalemia    Primary hypertension    Elevated lipase    Right lower quadrant abdominal pain    Nausea    Diarrhea    Acute encephalopathy    Polysubstance abuse (HCC)    Fibromyalgia    Cocaine addiction (HCC)    Shoulder arthritis    Osteoarthritis of left glenohumeral joint    Abnormal EKG    Anemia    Anxiety    Carotid artery stenosis    COPD (chronic obstructive pulmonary disease) ([address]    Dental disease    HUTCHINSON (dyspnea on exertion)    Fatigue    Fractures    H/O gastric bypass    History of crack cocaine use    History of UTI    Injury of back    Intractable chronic migraine without aura and without status migrainosus    Intractable migraine with aura with status migrainosus    Kidney stones    Dizziness    Memory loss    Mild pulmonary hypertension (HCC)    Mild tricuspid regurgitation    Primary osteoarthritis of left shoulder    Seizure-like activity (HCC)    Stress at home    TMJ (dislocation of temporomandibular joint)    Tobacco abuse    Visual impairment    Internal hernia    Vitamin D deficiency    Iron deficiency    SIRS (systemic inflammatory response syndrome) (HCC)    NICKI (acute kidney injury) (HCC)    Severe malnutrition (HCC)    Elevated d-dimer    Leukocytosis    Pulmonary nodules    Delirium due to another medical condition     MRSA bacteremia  Septic emboli  E. coli UTI  Acute kidney injury, resolved  Suspect COPD, seen once in the office by Dr. Bertha Mendez in 2019, FEV1 2.04 L or 86% predicted  History of opiate and cocaine use-reportedly uses crack cocaine at least twice weekly  History of tobacco use  Recent COVID +9/30  Elevated troponin likely type II MI  Encephalopathy, improving  History of hypertension  History of seizure disorder  13. CT scan did reveal evidence of septic emboli. There is a pleural effusion specifically on the left. 14, full code    Patient currently is on  Vancomycin and Cipro. ID just got consulted. Was told that patient will benefit from thoracentesis. Diagnostic and therapeutic if possible. Lab work reviewed  Patient's platelet count yesterday 105  INR 1.2 October 10    Explained risk benefits of ultrasound-guided thoracentesis on left for diagnostic and therapeutic reasons. We want evaluate for empyema. Patient appeared to understand. Father present at bedside and wants to have this done and patient appears to agree. We will have them sign consent.       Electronically signed by Rashid Fitch MD on 10/18/2022 at 1:17 PM

## 2022-10-18 NOTE — PROGRESS NOTES
BEHAVIORAL HEALTH FOLLOW-UP NOTE     10/18/2022     Patient was seen and examined in person, Chart reviewed   Patient's case discussed with staff/team    Chief Complaint: Bipolar disorder, lethargy, erratic behavior    Interim History:     Patient is currently sitting in bed with father at bedside, father is holding a cup and patient is taking sips of juice. Patient keeps eyes closed and will not open them to verbal stimuli or commands. Patient is not able to answer orientation questions but is able to name herself and her father. Denies any delusions, hallucinations, SI or HI at this time. Patient states she has had suicidal ideation in the past and has attempted suicide a few years ago by \"taking pills\". Patient has multiple medical issues ongoing with multiple teams on board including medicine, pulmonology, cardiology, nephrology, neurology and infectious disease.     /62   Pulse 78   Temp 99 °F (37.2 °C) (Oral)   Resp 20   Ht 4' 11\" (1.499 m)   Wt 103 lb 13.4 oz (47.1 kg)   SpO2 95%   BMI 20.97 kg/m²   Appetite:  [] Normal/Unchanged  [] Increased  [x] Decreased      Sleep:       [x] Normal/Unchanged  [] Fair       [] Poor              Energy:    [] Normal/Unchanged  [] Increased  [x] Decreased        Aggression:  [] yes  [x] no    Patient is [] able  [] unable to CONTRACT FOR SAFETY ON THE UNIT    PAST MEDICAL/PSYCHIATRIC HISTORY:   Past Medical History:   Diagnosis Date    Anemia     Arthritis     Asthma     Bipolar disorder, mixed ([address]     Carotid artery stenosis 2/13/2020    Cocaine abuse ([facility] [address] 11/4/2014    COPD (chronic obstructive pulmonary disease) ([facility]     Degeneration of cervical intervertebral disc     Depression with anxiety     Depression with anxiety     Fibromyalgia 1/5/2017    GERD (gastroesophageal reflux disease)     History of migraine headaches 6/10/2014    History of renal calculi 6/10/2014    History of seizure disorder 6/10/2014    Hoarseness of voice     HTN (hypertension) 6/10/2014    Hyperlipidemia 6/10/2014    IGT (impaired glucose tolerance) 6/10/2014    Kidney stones     Lactose intolerance 6/10/2014    Leukopenia 6/10/2014    Major depressive disorder, recurrent episode, severe, without mention of psychotic behavior 11/6/2014    MVP (mitral valve prolapse)     Seizures ([facility] [address]     Sleep apnea 6/10/2014    Unspecified diseases of blood and blood-forming organs        FAMILY/SOCIAL HISTORY:  Family History   Problem Relation Age of Onset    Diabetes Mother     Cancer Mother     Coronary Art Dis Father     COPD Father     Depression Brother     Alcohol Abuse Brother     Cancer Other         lung and skin    Diabetes Maternal Grandmother     Cancer Paternal Grandmother      Social History     Socioeconomic History    Marital status:      Spouse name: Not on file    Number of children: Not on file    Years of education: Not on file    Highest education level: Not on file   Occupational History    Occupation: disability   Tobacco Use    Smoking status: Every Day     Packs/day: 0.50     Years: 33.00     Pack years: 16.50     Types: Cigarettes    Smokeless tobacco: Never    Tobacco comments:     quit  2000 started again  1/13    Substance and Sexual Activity    Alcohol use: Yes     Comment: occasional    Drug use: Not Currently    Sexual activity: Yes   Other Topics Concern    Not on file   Social History Narrative    Not on file     Social Determinants of Health     Financial Resource Strain: Not on file   Food Insecurity: Not on file   Transportation Needs: Not on file   Physical Activity: Not on file   Stress: Not on file   Social Connections: Not on file   Intimate Partner Violence: Not on file   Housing Stability: Not on file           ROS:  [x] All negative/unchanged except if checked.  Explain positive(checked items) below:  [] Constitutional  [] Eyes  [] Ear/Nose/Mouth/Throat  [] Respiratory  [] CV  [] GI  []   [] Musculoskeletal  [] Skin/Breast  [] Neurological  [] Endocrine  [] Heme/Lymph  [] Allergic/Immunologic    Explanation:     MEDICATIONS:    Current Facility-Administered Medications:     vancomycin (VANCOCIN) 750 mg in dextrose 5 % 250 mL IVPB, 750 mg, IntraVENous, Q12H, Noe Najera MD    0.9 % sodium chloride infusion, , IntraVENous, Continuous, Debra Mcgee MD    ciprofloxacin (CIPRO) IVPB 400 mg, 400 mg, IntraVENous, Q12H, Debra Mcgee MD, Last Rate: 200 mL/hr at 10/18/22 1213, 400 mg at 10/18/22 1213    ketorolac (TORADOL) injection 15 mg, 15 mg, IntraVENous, Q6H PRN, Jenn Nava MD, 15 mg at 10/17/22 0057    potassium chloride (KLOR-CON M) extended release tablet 40 mEq, 40 mEq, Oral, PRN, 40 mEq at 10/17/22 1025 **OR** potassium bicarb-citric acid (EFFER-K) effervescent tablet 40 mEq, 40 mEq, Oral, PRN, 40 mEq at 10/14/22 2031 **OR** potassium chloride 10 mEq/100 mL IVPB (Peripheral Line), 10 mEq, IntraVENous, PRN, Marilee Arcos MD, Last Rate: 100 mL/hr at 10/16/22 1505, 10 mEq at 10/16/22 1505    Vitamin D (CHOLECALCIFEROL) tablet 2,000 Units, 2,000 Units, Oral, Daily, Marilee Arcos MD, 2,000 Units at 10/18/22 0916    hydrALAZINE (APRESOLINE) injection 10 mg, 10 mg, IntraVENous, Q6H PRN, Marilee Arcos MD, 10 mg at 10/16/22 2349    carvedilol (COREG) tablet 3.125 mg, 3.125 mg, Oral, BID WC, Marilee Arcos MD, 3.125 mg at 10/18/22 0916    sodium chloride flush 0.9 % injection 5-40 mL, 5-40 mL, IntraVENous, 2 times per day, Marilee Arcos MD, 10 mL at 10/18/22 0917    sodium chloride flush 0.9 % injection 5-40 mL, 5-40 mL, IntraVENous, PRN, Marilee Arcos MD    0.9 % sodium chloride infusion, , IntraVENous, PRN, Marilee Arcos MD, Last Rate: 50 mL/hr at 10/18/22 1200, New Bag at 10/18/22 1200    heparin (porcine) injection 5,000 Units, 5,000 Units, SubCUTAneous, BID, Marilee Arcos MD, 5,000 Units at 10/18/22 0916    acetaminophen (TYLENOL) tablet 650 mg, 650 mg, Oral, Q4H PRN, Marilee Arcos MD, 650 mg at 10/14/22 2031    ondansetron (ZOFRAN-ODT) disintegrating tablet 4 mg, 4 mg, Oral, Q8H PRN **OR** ondansetron (ZOFRAN) injection 4 mg, 4 mg, IntraVENous, Q6H PRN, Tia Stoner MD    albuterol sulfate HFA (PROVENTIL;VENTOLIN;PROAIR) 108 (90 Base) MCG/ACT inhaler 2 puff, 2 puff, Inhalation, Q6H PRN, Tia Stoner MD    ARIPiprazole (ABILIFY) tablet 5 mg, 5 mg, Oral, Daily, Tia Stoner MD, 5 mg at 10/18/22 0917    atorvastatin (LIPITOR) tablet 40 mg, 40 mg, Oral, Daily, Tia Stoner MD, 40 mg at 10/17/22 2144    divalproex (DEPAKOTE) DR tablet 1,000 mg, 1,000 mg, Oral, BID, Tia Stoner MD, 1,000 mg at 10/18/22 0916    DULoxetine (CYMBALTA) extended release capsule 60 mg, 60 mg, Oral, Daily, Tia Stoner MD, 60 mg at 10/18/22 0916    ferrous sulfate (IRON 325) tablet 325 mg, 325 mg, Oral, BID WC, Tia Stoner MD, 325 mg at 10/18/22 0916    sodium chloride flush 0.9 % injection 10 mL, 10 mL, IntraVENous, PRN, Tia Stoner MD, 10 mL at 10/10/22 1427    ipratropium-albuterol (DUONEB) nebulizer solution 1 ampule, 1 ampule, Inhalation, Q4H WA, Tia Stoner MD, 1 ampule at 10/18/22 1116    perflutren lipid microspheres (DEFINITY) injection 1.65 mg, 1.5 mL, IntraVENous, ONCE PRN, Tia Stoner MD    vancomycin (VANCOCIN) intermittent dosing (placeholder), , Other, RX Placeholder, Tia Stoner MD      Examination:  /62   Pulse 78   Temp 99 °F (37.2 °C) (Oral)   Resp 20   Ht 4' 11\" (1.499 m)   Wt 103 lb 13.4 oz (47.1 kg)   SpO2 95%   BMI 20.97 kg/m²   Gait -unable to test  Medication side effects(SE): Denies    Mental Status Examination:    Level of consciousness:  within normal limits   Appearance:  poor grooming and poor hygiene  Behavior/Motor: Psychomotor retardation, agitated upon stimulation  Attitude toward examiner: Minimally cooperative  Speech:  slow, whispered, and dysarthric   Mood: depressed  Affect:  flat  Thought processes:  linear and slow   Thought content:  Homicidal ideation - none  Suicidal Ideation:  denies suicidal ideation  Delusions:  no evidence of delusions  Perceptual Disturbance:  denies any perceptual disturbance  Cognition:  disoriented   Concentration intact  Insight poor   Judgement poor     ASSESSMENT:   Patient symptoms are:  [] Well controlled  [] Improving  [] Worsening  [x] No change      Diagnosis:   Principal Problem:    SIRS (systemic inflammatory response syndrome) ([facility])  Active Problems:    NICKI (acute kidney injury) ([facility]    Severe malnutrition ([facility])    Elevated d-dimer    Leukocytosis    Pulmonary nodules    Delirium due to another medical condition    Depression with suicidal ideation    B[facility] disorder, mixed ([facility]    Acute encephalopathy    COPD (chronic obstructive pulmonary disease) ([facility])    H/O gastric bypass  Resolved Problems:    * No resolved hospital problems. *      LABS:    Recent Labs     10/16/22  0422 10/17/22  0438   WBC 7.0 7.3   HGB 10.3* 9.9*   * 115*     Recent Labs     10/16/22  0610 10/16/22  1942 10/17/22  0438 10/18/22  0450     --  138 140   K 2.9* 3.9 3.3* 3.7     --  103 105   CO2 25  --  26 26   BUN 7  --  8 9   CREATININE 0.44*  --  0.46* 0.59   GLUCOSE 84  --  76 70     No results for input(s): BILITOT, ALKPHOS, AST, ALT in the last 72 hours. Lab Results   Component Value Date/Time    [facility] NEGATIVE 07/08/2019 12:00 AM    BARBSCNU NEGATIVE 10/13/2022 11:15 AM    LABBENZ NEGATIVE 10/13/2022 11:15 AM    LABBENZ NEGATIVE 12/22/2012 09:35 PM    LABMETH NEGATIVE 10/13/2022 11:15 AM    PPXUR NOT REPORTED 01/29/2022 03:06 PM     Lab Results   Component Value Date/Time    TSH 1.37 01/29/2022 12:38 PM     No results found for: LITHIUM  Lab Results   Component Value Date    VALPROATE 120 10/14/2022       RISK ASSESSMENT: Monitoring per standard protocol    Treatment Plan:  Reviewed current Medications with the patient. Continue duloxetine, Depakote and Abilify.     Patient is currently extremely lethargic and has multiple medical issues ongoing, will continue to follow and reevaluate daily. Risks, benefits, side effects, drug-to-drug interactions and alternatives to treatment were discussed. The patient consented to treatment. PSYCHOTHERAPY/COUNSELING:  [] Therapeutic interview  [x] Supportive  [] CBT  [] Ongoing  [] Other        Kameron Chacon is a 46 y.o. female being evaluated face to face. --Martha Locke MD on 10/18/2022 at 12:39 PM    An electronic signature was used to authenticate this note. **This report has been created using voice recognition software. It may contain minor errors which are inherent in voice recognition technology. **    I independently saw and evaluated the patient. I reviewed the nurse practioner's documentation above. Any additional comments or changes to the    documentation are stated below otherwise agree with assessment.      -The patient was seen in her room. Her father was present. The father reported that the patient has been able to engage in conversations this morning but is generally somnolent. The patient was somnolent. She opens her eyes for me. She did not give any meaningful answers to my questions. At this time the patient continues to be delirious. We will follow the patient and assess for suicide risk and need for admission to psychiatry as she gets better        PLAN  Medications as noted above  Attempt to develop insight    Psycho-education conducted. Supportive Therapy conducted.   Follow-up daily while on inpatient unit    Electronically signed by Juliana Henderson MD on 10/18/22 at 10:11 PM EDT

## 2022-10-18 NOTE — PROGRESS NOTES
Vancomycin Dosing by Pharmacy - Daily Note   Vancomycin Therapy Day:  9  Indication: suspected endocarditis, 2 of 2 BCs MRSA, septic emboli on abdomen CT scan    Allergies:  Aspirin, Bactrim, and Codeine   Actual Weight:    Wt Readings from Last 1 Encounters:   10/18/22 103 lb 13.4 oz (47.1 kg)       Labs/Ancillary Data  Estimated Creatinine Clearance: 82 mL/min (based on SCr of 0.59 mg/dL). Recent Labs     10/16/22  0422 10/16/22  0610 10/17/22  0438 10/18/22  0450   CREATININE  --  0.44* 0.46* 0.59   BUN  --  7 8 9   WBC 7.0  --  7.3  --      Procalcitonin   Date Value Ref Range Status   10/10/2022 6.01 (H) <0.09 ng/mL Final     Comment:           Suspected Sepsis:  <0.50 ng/mL     Low likelihood of sepsis. 0.50-2.00 ng/mL     Increased likelihood of sepsis. Antibiotics encouraged. >2.00 ng/mL     High risk of sepsis/shock. Antibiotics strongly encouraged. Suspected Lower Resp Tract Infections:  <0.24 ng/mL     Low likelihood of bacterial infection. >0.24 ng/mL     Increased likelihood of bacterial infection. Antibiotics encouraged. With successful antibiotic therapy, PCT levels should decrease rapidly. (Half-life of 24 to   36 hours.)        Procalcitonin values from samples collected within the first 6 hours of systemic infection   may still be low. Retesting may be indicated. Values from day 1 and day 4 can be entered into the Change in Procalcitonin Calculator   (www.[employer]-pct-calculator. com) to determine the patient's Mortality Risk Prognosis        In healthy neonates, plasma Procalcitonin (PCT) concentrations increase gradually after   birth, reaching peak values at about 24 hours of age then decrease to normal values below   0.5 ng/mL by 48-72 hours of age.          Intake/Output Summary (Last 24 hours) at 10/18/2022 0558  Last data filed at 10/17/2022 1300  Gross per 24 hour   Intake 240 ml   Output --   Net 240 ml     Temp: 99.5    Culture Date / Source  /  Results  See micro reports  Recent vancomycin administrations                     vancomycin 1000 mg IVPB in 250 mL D5W addavial (mg) 1,000 mg New Bag 10/18/22 0108     1,000 mg New Bag 10/17/22 1353     1,000 mg New Bag  0042     1,000 mg New Bag 10/16/22 1338     1,000 mg New Bag  0050     1,000 mg New Bag 10/15/22 1413                    Vancomycin Concentrations:   TROUGH:  No results for input(s): VANCOTROUGH in the last 72 hours. RANDOM:    Recent Labs     10/18/22  0450   VANCORANDOM 27.1       MRSA Nasal Swab: N/A. Non-respiratory infection. Larissa Mahajan PLAN     Decrease dose to 750 mg q12h IV  Ensured BUN/sCr ordered at baseline and every 48 hours x at least 3 levels, then at least weekly. Repeat vancomycin concentration ordered for 10/19/22 @ 0600   Pharmacy will continue to monitor patient and adjust therapy as indicated      Vancomycin Target Concentration Parameters  Treatment  Population Target AUC/GABBI Target Trough   Invasive MRSA Infection (bacteremia, pneumonia, meningitis, endocarditis, osteomyelitis)  Sepsis (undifferentiated) 400-600 N/A   Infection due to non-MRSA pathogen  Empiric treatment of non-invasive MRSA infection  (SSTI, UTI) <500 10-15 mg/L   CrCl < 29 mL/min  Rapidly fluctuating serum creatinine   NICKI N/A < 15 mg/L     Renal replacement therapy is dosed by levels, per hospital protocol. Abbreviations  * Pauc: probability that AUC is >400 (efficacy); Pconc: probability that Ctrough is above 20 ?g/mL (toxicity); Tox: Probability of nephrotoxicity, based on Iva et al. Clin Infect Dis 2009. Loading dose: N/A  Regimen: 750 mg IV every 12 hours. Start time: 13:08 on 10/18/2022  Exposure target: AUC24 (range)400-600 mg/L.hr   AUC24,ss: 478 mg/L.hr  Probability of AUC24 > 400: 93 %  Ctrough,ss: 14.2 mg/L  Probability of Ctrough,ss > 20: 3 %  Probability of nephrotoxicity (Iva DEVON 2009): 9 %      Thank you for the consult. Pharmacy will continue to follow.

## 2022-10-18 NOTE — PROGRESS NOTES
Writer asked by April Gonzalez, Case Mgmt, to speak to pt and her dad regarding decision maker. Patient is not oriented and alert enough to complete healthcare power of . In speaking with her dad, Brayan Chery, he notes that pt's daughter is incarcerated. That is pt's only child. Pt's mom  a few years ago. According to state law, her dad would be decision maker. Dad has been with the patient since admission and has been consent for treatment. Writer to notify Risk Management of situation. When possible, we will have pt complete HPOA. Writer provided listening presence as Guerrero talked about fact pt's mom always had ability to [employer] in check. \" He spoke of  being there for his wife as she went through medical challenges and will do the same for their daughter. 10/18/22 0459   Encounter Summary   Encounter Overview/Reason  Spiritual/Emotional Needs; Advance Care Planning   Service Provided For: Patient and family together   Referral/Consult From: Other (comment)  (Case Management)   Support System Parent   Last Encounter  10/18/22   Complexity of Encounter Moderate   Begin Time 1345   End Time  1400   Total Time Calculated 15 min   Spiritual/Emotional needs   Type Spiritual Support   Advance Care Planning   Type ACP conversation   Assessment/Intervention/Outcome   Assessment Calm;Coping   Intervention Active listening;Discussed illness injury and its impact; Explored/Affirmed feelings, thoughts, concerns;Explored Coping Skills/Resources;Sustaining Presence/Ministry of presence;Prayer (assurance of)/[place]   Outcome Engaged in conversation;Expressed feelings, needs, and concerns;Expressed Gratitude;Receptive; Optimistic

## 2022-10-18 NOTE — PLAN OF CARE
Problem: Discharge Planning  Goal: Discharge to home or other facility with appropriate resources  Outcome: Progressing     Problem: Pain  Goal: Verbalizes/displays adequate comfort level or baseline comfort level  Outcome: Progressing     Problem: Skin/Tissue Integrity  Goal: Absence of new skin breakdown  Description: 1. Monitor for areas of redness and/or skin breakdown  2. Assess vascular access sites hourly  3. Every 4-6 hours minimum:  Change oxygen saturation probe site  4. Every 4-6 hours:  If on nasal continuous positive airway pressure, respiratory therapy assess nares and determine need for appliance change or resting period.   Outcome: Progressing     Problem: Safety - Adult  Goal: Free from fall injury  Outcome: Progressing     Problem: ABCDS Injury Assessment  Goal: Absence of physical injury  Outcome: Progressing     Problem: Nutrition Deficit:  Goal: Optimize nutritional status  Outcome: Not Progressing  Note: PO intake is poor     Problem: Nutrition Deficit:  Goal: Optimize nutritional status  Outcome: Not Progressing  Note: PO intake is poor

## 2022-10-18 NOTE — PLAN OF CARE
Problem: Discharge Planning  Goal: Discharge to home or other facility with appropriate resources  10/18/2022 0206 by Tabatha Cruz RN  Outcome: Progressing  Flowsheets (Taken 10/17/2022 1910)  Discharge to home or other facility with appropriate resources: Identify barriers to discharge with patient and caregiver     Problem: Pain  Goal: Verbalizes/displays adequate comfort level or baseline comfort level  10/18/2022 0206 by Tabatha Cruz RN  Outcome: Progressing     Problem: Skin/Tissue Integrity  Goal: Absence of new skin breakdown  Description: 1. Monitor for areas of redness and/or skin breakdown  2. Assess vascular access sites hourly  3. Every 4-6 hours minimum:  Change oxygen saturation probe site  4. Every 4-6 hours:  If on nasal continuous positive airway pressure, respiratory therapy assess nares and determine need for appliance change or resting period.   10/18/2022 0206 by Tabatha Cruz RN  Outcome: Progressing     Problem: Safety - Adult  Goal: Free from fall injury  10/18/2022 0206 by Tabatha Cruz RN  Outcome: Progressing     Problem: Nutrition Deficit:  Goal: Optimize nutritional status  10/18/2022 0206 by Tabatha Cruz RN  Outcome: Progressing     Problem: ABCDS Injury Assessment  Goal: Absence of physical injury  10/18/2022 0206 by Tabatha Cruz RN  Outcome: Progressing     Problem: Nutrition Deficit:  Goal: Optimize nutritional status  10/18/2022 0206 by Tabatha Cruz RN  Outcome: Progressing  10/17/2022 1611 by Patty Merritt RN  Outcome: Not Progressing  Flowsheets (Taken 10/17/2022 1523 by Justin Shrestha, SCOOBY, LD)  Nutrient intake appropriate for improving, restoring, or maintaining nutritional needs:   Monitor oral intake, labs, and treatment plans   Recommend appropriate diets, oral nutritional supplements, and vitamin/mineral supplements  Note: PO intake remains poor

## 2022-10-18 NOTE — PROGRESS NOTES
[facility]   IN-PATIENT SERVICE      NEUROLOGY PROGRESS  NOTE            Date:   10/18/2022  Patient name:  Noble Cowart  Date of admission:  10/10/2022  YOB: 1970      Interval History:     Pt continues to be encephalopathic, lethargic. Now with findings of septic emboli to lungs, ID consulted. MRI brain back on 10/14 with no acute abnormalities. Father at bedside. History of Present Illness: The patient is a 46 y.o. female who presents with Shortness of Breath and Positive For Covid-19  . The patient was seen and examined and the chart was reviewed. Neurology consulted for encephalopathy. MRI brain done 10/14 showed no acute intracranial abnormalities.      Past Medical History:     Past Medical History:   Diagnosis Date    Anemia     Arthritis     Asthma     Bipolar disorder, mixed ([address]     Carotid artery stenosis 2/13/2020    Cocaine abuse ([address] 11/4/2014    COPD (chronic obstructive pulmonary disease) (HCC)     Degeneration of cervical intervertebral disc     Depression with anxiety     Depression with anxiety     Fibromyalgia 1/5/2017    GERD (gastroesophageal reflux disease)     History of migraine headaches 6/10/2014    History of renal calculi 6/10/2014    History of seizure disorder 6/10/2014    Hoarseness of voice     HTN (hypertension) 6/10/2014    Hyperlipidemia 6/10/2014    IGT (impaired glucose tolerance) 6/10/2014    Kidney stones     Lactose intolerance 6/10/2014    Leukopenia 6/10/2014    Major depressive disorder, recurrent episode, severe, without mention of psychotic behavior 11/6/2014    MVP (mitral valve prolapse)     Seizures ([address]     Sleep apnea 6/10/2014    Unspecified diseases of blood and blood-forming organs         Past Surgical History:     Past Surgical History:   Procedure Laterality Date    ANKLE FRACTURE SURGERY      recontruction surgery    APPENDECTOMY      BREAST LUMPECTOMY Right     CARDIAC CATHETERIZATION      CARPAL TUNNEL RELEASE x2     SECTION      CHOLECYSTECTOMY      COLONOSCOPY  12    COLONOSCOPY  2016    severe spasms    COLONOSCOPY  2019    DR GOLDY MCINTOSH    GASTRIC BYPASS SURGERY      GASTRIC BYPASS SURGERY      HYSTERECTOMY (CERVIX STATUS UNKNOWN)      HYSTERECTOMY (CERVIX STATUS UNKNOWN)      LAPAROSCOPY N/A 2022    LAPAROSCOPY EXPLORATORY CONVERTED TO OPEN EXPLORATORY LAPAROTOMY, LYSIS OF ADHESIONS, REDUCTION OF INTERNAL HERNIA [id number]    APPLICATION OF ARCH BARS,SIMPLE EXTRACTION OF #15 AND RT TMJ JOINT REPLACEMENT    SHOULDER ARTHROSCOPY Left 2019    DR ROBERT GREEN    SHOULDER SURGERY      TONSILLECTOMY AND ADENOIDECTOMY      UPPER GASTROINTESTINAL ENDOSCOPY  7-3-12    egd    UPPER GASTROINTESTINAL ENDOSCOPY  2016    status post gastrectomy with a small remnant of gastric pouch. UPPER GASTROINTESTINAL ENDOSCOPY  2019    DR Moncho Ochoa        Medications during admission:      vancomycin  750 mg IntraVENous Q12H    ciprofloxacin  400 mg IntraVENous Q12H    Vitamin D  2,000 Units Oral Daily    carvedilol  3.125 mg Oral BID WC    sodium chloride flush  5-40 mL IntraVENous 2 times per day    heparin (porcine)  5,000 Units SubCUTAneous BID    ARIPiprazole  5 mg Oral Daily    atorvastatin  40 mg Oral Daily    divalproex  1,000 mg Oral BID    DULoxetine  60 mg Oral Daily    ferrous sulfate  325 mg Oral BID WC    ipratropium-albuterol  1 ampule Inhalation Q4H WA    vancomycin (VANCOCIN) intermittent dosing (placeholder)   Other RX Placeholder         Physical Exam:   /62   Pulse 78   Temp 99 °F (37.2 °C) (Oral)   Resp 20   Ht 4' 11\" (1.499 m)   Wt 103 lb 13.4 oz (47.1 kg)   SpO2 95%   BMI 20.97 kg/m²   Temp (24hrs), Av.9 °F (37.2 °C), Min:98.3 °F (36.8 °C), Max:99.5 °F (37.5 °C)          Neurological examination:    Mental status   Alert and oriented to self. Lethargic.  Requires multiple prompts to arouse. following all commands with repeated prompting. Cranial nerves   II - pupils reactive  III, IV, VI - extraocular muscles intact; no VINNIE; no nystagmus; no ptosis   V - normal facial sensation                                                               VII - normal facial symmetry                                                             VIII - intact hearing                                                                                Motor function  3/5 strength in all extremities. Moves them all symmetrically. Sensory function Withdrawing to deep nailbed pressure symmetrically in all 4 extremities. Cerebellar Intact finger-nose-finger testing. Reflex function 2/4 symmetric throughout . Downgoing plantar response bilaterally.  (-)West's sign bilaterally    Gait                  Not assessed due to lethargy             Diagnostics:      Laboratory Testing:  CBC:   Recent Labs     10/16/22  0422 10/17/22  0438   WBC 7.0 7.3   HGB 10.3* 9.9*   * 115*     BMP:    Recent Labs     10/16/22  0610 10/16/22  1942 10/17/22  0438 10/18/22  0450     --  138 140   K 2.9* 3.9 3.3* 3.7     --  103 105   CO2 25  --  26 26   BUN 7  --  8 9   CREATININE 0.44*  --  0.46* 0.59   GLUCOSE 84  --  76 70         Lab Results   Component Value Date    CHOL 156 01/30/2022    LDLCHOLESTEROL 101 01/30/2022    HDL 39 (L) 01/30/2022    TRIG 78 01/30/2022    ALT 17 10/10/2022    AST 41 (H) 10/10/2022    TSH 1.37 01/29/2022    INR 1.2 10/10/2022    LABA1C 6.1 (H) 01/29/2022    LABMICR 419 (H) 10/10/2022    CJTNINIW91 311 01/29/2022       Lab Results   Component Value Date    VALPROATE 120 10/14/2022           Imaging/Diagnostics:      MRI BRAIN WO CONTRAST    Narrative  EXAMINATION:  MRI OF THE BRAIN WITHOUT CONTRAST  10/14/2022 10:31 am    TECHNIQUE:  Multiplanar multisequence MRI of the brain was performed without the  administration of intravenous contrast.    COMPARISON:  CT brain performed 04/08/2022. HISTORY:  ORDERING SYSTEM PROVIDED HISTORY: Stupor possible systemic embolism SIRS  recent COVID   recent crack cocaine  TECHNOLOGIST PROVIDED HISTORY:  Stupor possible systemic embolism SIRS recent COVID   recent crack cocaine  Reason for Exam: Stupor possible systemic embolism SIRS recent COVID recent  crack cocaine  Additional signs and symptoms: PATIENT UNABLE TO FOLLOW EXAM INSTRUCTIONS DUE  TO MENTAL STATUS    FINDINGS:  INTRACRANIAL STRUCTURES/VENTRICLES: The sellar and suprasellar structures,  optic chiasm, corpus callosum, pineal gland, tectum, and midline brainstem  structures are unremarkable. The craniocervical junction is unremarkable. There is no acute hemorrhage, mass effect, or midline shift. There is  satisfactory overall gray-white matter differentiation. The ventricular  structures are symmetric and unremarkable. The infratentorial structures  including the cerebellopontine angles and internal auditory canals are  unremarkable. There is no abnormal restricted diffusion. There is no  abnormal blooming artifact on susceptibility weighted imaging. ORBITS: The visualized portion of the orbits demonstrate no acute abnormality. SINUSES: The visualized paranasal sinuses and mastoid air cells demonstrate  no acute abnormality. BONES/SOFT TISSUES: The bone marrow signal intensity appears normal. The soft  tissues demonstrate no acute abnormality. Impression  Unremarkable MRI of the brain without acute abnormality. RECOMMENDATIONS:  Unavailable    Results for orders placed during the hospital encounter of 10/29/14    MRI Brain W WO Contrast    Narrative  ** FINAL **  Procedure:  MRI BRAIN COMBINED  CMR  10/29/2014     [id number]  Reason for Exam:  Maryjohnathan Joann, epilepsy    FULL RESULT:   MRI brain:    Clinical indication: Headaches. Dizziness. Seizures.     Multisequence, multiplanar imaging of the brain was performed without and  with IV gadolinium (10 ML, Magnevist). Findings:    Brain parenchyma is essentially normal in signal intensity. A few  scattered white matter hyperintensities are identified on FLAIR and  T2-weighted sequences, mostly in the subcortical location. Sulci,  cisterns and ventricles have a normal appearance. No restricted diffusion  identified on DWI. There is no abnormal extra-axial fluid collection. No  abnormal enhancement is identified following IV gadolinium. The pituitary, chiasmal, brainstem and cerebellar structures appear  unremarkable The craniocervical junction and cerebellopontine angle have  a normal appearance. Major intracerebral vessels demonstrate normal flow  voids. The orbits, paranasal sinuses and mastoid air cells are  unremarkable. Impression  A few scattered nonspecific white matter hyperintensities, but otherwise  unremarkable examination. Report Electronically signed by Que Lynch M.D. on 10/30/2014 6:00 AM  Transcribed by: Black Zapata on Oct 30 2014  6:02A  Read by:  Beverly Nogueira M.D.  [id number] on Oct 30 2014  6:02A  Electronically Signed by:  Gale Colunga M.D. on:  Oct 30 2014  6:02A    No results found for this or any previous visit. Results for orders placed during the hospital encounter of 04/08/22    CT Head WO Contrast    Narrative  EXAMINATION:  CT OF THE HEAD WITHOUT CONTRAST  4/8/2022 8:01 am    TECHNIQUE:  CT of the head was performed without the administration of intravenous  contrast. Dose modulation, iterative reconstruction, and/or weight based  adjustment of the mA/kV was utilized to reduce the radiation dose to as low  as reasonably achievable.     COMPARISON:  29 March 2022    HISTORY:  ORDERING SYSTEM PROVIDED HISTORY: seizure  TECHNOLOGIST PROVIDED HISTORY:  seizure    Decision Support Exception - unselect if not a suspected or confirmed  emergency medical condition->Emergency Medical Condition (MA)  Is the patient pregnant?->No  Reason for Exam: seizure  Additional signs and symptoms: per ER RN-pt had seizure approx 1 week ago,  headaches    FINDINGS:  BRAIN/VENTRICLES: There is no acute intracranial hemorrhage, mass effect or  midline shift. No abnormal extra-axial fluid collection. The gray-white  differentiation is maintained without evidence of an acute infarct. There is  no evidence of hydrocephalus. Mild cortical atrophy is present. Beam  hardening artifact from hardware in the right TMJ joint somewhat complicates  assessment of the inferior right temporal lobe. ORBITS: The visualized portion of the orbits demonstrate no acute abnormality. SINUSES: The visualized paranasal sinuses and mastoid air cells demonstrate  no acute abnormality. SOFT TISSUES/SKULL:  Again noted is hardware in the right TMJ joint. Severe  arthritic change left TMJ joint is noted. No acute bony abnormality in the  skull. Minimal soft tissue swelling over the right parietal area. Impression  No acute intracranial abnormality. Minimal soft tissue swelling over the  right parietal area consistent with resolving hematoma. RECOMMENDATIONS:  Unavailable        2D echo: EF 55 to 60%. LA and RA normal size. No vegetation seen. I personally reviewed all of the above medications, clinical laboratory, imaging and other diagnostic tests. Impression:      Lethargy, confusion in the setting of MRSA bacteremia, pulmonary septic emboli contributing to acute metabolic encephalopathy. Plan:     Underwent MRI brain on 10/14 which not reveal any evidence of cerebral septic emboli. May have to consider repeating if mentation does not improve  Recommend checking lumbar puncture to rule out CNS infection with underlying MRSA bacteremia and ongoing encephalopathy. Will place order to be done by IR.   Hold any type of sedating/pain medications at this time  Discussed with patient's father at bedside  Will follow        Electronically signed by Jose Luis Anguiano DO on 10/18/2022 at 1:06 PM      Emmanuelle Fagan, [facility]

## 2022-10-18 NOTE — PROGRESS NOTES
[facility]   Progress Note                   Date:   10/16/22  Patient name: Suki Gomes  Date of admission:  10/10/2022  4:37 AM  MRN:   [id number]  YOB: 1970  PCP: Casey Stover MD    Reason for Admission:     Subjective:       Clinical Changes / Abnormalities:    Lethargic this AM  Per nursing woke up and took minimal food then went back to sleep. Medications:   Scheduled Meds:   vancomycin  750 mg IntraVENous Q12H    ciprofloxacin  400 mg IntraVENous Q12H    Vitamin D  2,000 Units Oral Daily    carvedilol  3.125 mg Oral BID WC    sodium chloride flush  5-40 mL IntraVENous 2 times per day    heparin (porcine)  5,000 Units SubCUTAneous BID    ARIPiprazole  5 mg Oral Daily    atorvastatin  40 mg Oral Daily    divalproex  1,000 mg Oral BID    DULoxetine  60 mg Oral Daily    ferrous sulfate  325 mg Oral BID WC    ipratropium-albuterol  1 ampule Inhalation Q4H WA    vancomycin (VANCOCIN) intermittent dosing (placeholder)   Other RX Placeholder     Continuous Infusions:   sodium chloride       CBC:   Recent Labs     10/16/22  0422 10/17/22  0438   WBC 7.0 7.3   HGB 10.3* 9.9*   * 115*       BMP:    Recent Labs     10/16/22  0610 10/16/22  1942 10/17/22  0438 10/18/22  0450     --  138 140   K 2.9* 3.9 3.3* 3.7     --  103 105   CO2 25  --  26 26   BUN 7  --  8 9   CREATININE 0.44*  --  0.46* 0.59   GLUCOSE 84  --  76 70       Hepatic: No results for input(s): AST, ALT, ALB, BILITOT, ALKPHOS in the last 72 hours. Troponin: No results for input(s): TROPONINI in the last 72 hours. BNP: No results for input(s): BNP in the last 72 hours. Lipids: No results for input(s): CHOL, HDL in the last 72 hours. Invalid input(s): LDLCALCU  INR: No results for input(s): INR in the last 72 hours.     Objective:   Vitals: /73   Pulse 95   Temp 98.3 °F (36.8 °C)   Resp (!) 33   Ht 4' 11\" (1.499 m)   Wt 103 lb 13.4 oz (47.1 kg)   SpO2 92%   BMI 20.97 kg/m² General appearance: lethargic and uncooperative with exam  HEENT: Head: Normocephalic, no lesions, without obvious abnormality. Neck: no adenopathy, no carotid bruit, no JVD, supple, symmetrical, trachea midline and thyroid not enlarged, symmetric, no tenderness/mass/nodules  Lungs: clear to auscultation bilaterally  Heart: regular rate and rhythm, S1, S2 normal, 3/6 systolic murmur  Abdomen: soft, non-tender; bowel sounds normal; no masses,  no organomegaly  Extremities: extremities normal, atraumatic, no cyanosis or edema  Neurologic: Mental status: lethargic; not answering questions    2D ECHO ( 10/11/22)  Global left ventricular systolic function is normal. Estimated ejection  fraction is 55-60%. No obvious wall motion abnormality seen. Left and right atrium is normal in size. No significant valvular regurgitation or stenosis seen. Estimated right ventricular systolic pressure is 19 mmHg. No pericardial effusion seen. Normal diastolic filling. Assessment / Acute Cardiac Problems:   Elevated troponins. Most likely type II MI  Sepsis  with septic shock, resolved  3. MRSA bacteremia with 2/2 BC's + from 10/10/22  4. Suspected septic emboli to lungs; normal cranial MRI 10/14  5. NICKI, improved  6. Drug abuse (positive cocaine)  7. Encephalopathy with severe lethargy and uncooperative for ADRIANNE;  possible functional component with Psychiatry evaluation in progress  8. Recent COVID-19 infection, + PCR on 9/30/22  9. COPD with acute exacerbation, improved. 10. Systolic murmur on exam- Reviewed Echo 10/11/22- Murmur is likely due to hyperdynamic LVEF > 75% with sigmoid septum, no obvious HOCM/LVOT obstruction. Plan of Treatment:   Unfortunately she is quite altered and lethargic. Will discuss with primary, if they are okay with proceeding with ADRIANNE in this state (? If anesthesia will make her AMS worse), with assistance of anesthesia, we can get consent from spouse.    Continue atorvastatin and carvedilol  Continuing IV vancmycin    D/w spouse and nurse    Electronically signed by Alec Mckinley DO on 10/18/2022 at [address] [place] [address] Cardiology  [phone]

## 2022-10-18 NOTE — BRIEF OP NOTE
Brief Postoperative Note    Noble Cowart  YOB: 1970  [id number]    Pre-operative Diagnosis: Change of mental status, evaluate for source of infection    Post-operative Diagnosis: Same    Procedure: LP and Thoracentesis    Anesthesia: Local    Surgeons/Assistants: Dr. Shemar Tello    Estimated Blood Loss: less than 50     Complications: None    Specimens: Was Obtained: 12ml clear CSF and 400ml cloudy fred effusion to lab. Findings: Successful LP and L thoracentesis. CXR pending.      Electronically signed by Jade Victoria MD on 10/18/2022 at 4:30 PM

## 2022-10-19 ENCOUNTER — ANESTHESIA (OUTPATIENT)
Dept: OPERATING ROOM | Age: 52
End: 2022-10-19
Payer: COMMERCIAL

## 2022-10-19 ENCOUNTER — APPOINTMENT (OUTPATIENT)
Dept: NON INVASIVE DIAGNOSTICS | Age: 52
DRG: 871 | End: 2022-10-19
Payer: COMMERCIAL

## 2022-10-19 LAB
CRYPTOCOCCUS NEOFORMANS/GATTI CSF FILM ARR.: NOT DETECTED
CYTOMEGALOVIRUS (CMV) CSF FILM ARRAY: NOT DETECTED
ENTEROVIRUS CSF FILM ARRAY: NOT DETECTED
ESCHERICHIA COLI K1 CSF FILM ARRAY: NOT DETECTED
HAEMOPHILUS INFLUENZA CSF FILM ARRAY: NOT DETECTED
HHV-6 (HERPESVIRUS 6) CSF FILM ARRAY: NOT DETECTED
HSV-1 CSF FILM ARRAY: NOT DETECTED
HSV-2 CSF FILM ARRAY: NOT DETECTED
LACTATE DEHYDROGENASE, FLUID: 490 U/L
LISTERIA MONOCYTOGENES CSF FILM ARRAY: NOT DETECTED
LV EF: 55 %
LVEF MODALITY: NORMAL
NEISSERIA MENIGITIDIS CSF FILM ARRAY: NOT DETECTED
PARECHOVIRUS CSF FILM ARRAY: NOT DETECTED
SPECIMEN DESCRIPTION: NORMAL
SPECIMEN TYPE: NORMAL
SPECIMEN TYPE: NORMAL
STREPTOCOCCUS AGALACTIAE CSF FILM ARRAY: NOT DETECTED
STREPTOCOCCUS PNEUMONIAE CSF FILM ARRAY: NOT DETECTED
TOTAL PROTEIN, BODY FLUID: 2.3 G/DL
VANCOMYCIN RANDOM DATE LAST DOSE: NORMAL
VANCOMYCIN RANDOM DOSE AMOUNT: 750
VANCOMYCIN RANDOM TIME LAST DOSE: 315
VANCOMYCIN RANDOM: 32 UG/ML
VARICELLA-ZOSTER CSF FILM ARRAY: NOT DETECTED

## 2022-10-19 PROCEDURE — 2709999900 HC NON-CHARGEABLE SUPPLY: Performed by: INTERNAL MEDICINE

## 2022-10-19 PROCEDURE — 94640 AIRWAY INHALATION TREATMENT: CPT

## 2022-10-19 PROCEDURE — 2580000003 HC RX 258: Performed by: INTERNAL MEDICINE

## 2022-10-19 PROCEDURE — 2700000000 HC OXYGEN THERAPY PER DAY

## 2022-10-19 PROCEDURE — 99232 SBSQ HOSP IP/OBS MODERATE 35: CPT | Performed by: PSYCHIATRY & NEUROLOGY

## 2022-10-19 PROCEDURE — 3700000001 HC ADD 15 MINUTES (ANESTHESIA): Performed by: INTERNAL MEDICINE

## 2022-10-19 PROCEDURE — 99233 SBSQ HOSP IP/OBS HIGH 50: CPT | Performed by: INTERNAL MEDICINE

## 2022-10-19 PROCEDURE — 6370000000 HC RX 637 (ALT 250 FOR IP): Performed by: INTERNAL MEDICINE

## 2022-10-19 PROCEDURE — 7100000000 HC PACU RECOVERY - FIRST 15 MIN: Performed by: INTERNAL MEDICINE

## 2022-10-19 PROCEDURE — 7100000001 HC PACU RECOVERY - ADDTL 15 MIN: Performed by: INTERNAL MEDICINE

## 2022-10-19 PROCEDURE — 2500000003 HC RX 250 WO HCPCS

## 2022-10-19 PROCEDURE — 3600000011 HC SURGERY LEVEL 1  ADDTL 15MIN: Performed by: INTERNAL MEDICINE

## 2022-10-19 PROCEDURE — 6360000002 HC RX W HCPCS: Performed by: INTERNAL MEDICINE

## 2022-10-19 PROCEDURE — 2580000003 HC RX 258: Performed by: ANESTHESIOLOGY

## 2022-10-19 PROCEDURE — 36415 COLL VENOUS BLD VENIPUNCTURE: CPT

## 2022-10-19 PROCEDURE — 3600000001 HC SURGERY LEVEL 1  BASE: Performed by: INTERNAL MEDICINE

## 2022-10-19 PROCEDURE — 6360000002 HC RX W HCPCS

## 2022-10-19 PROCEDURE — 80202 ASSAY OF VANCOMYCIN: CPT

## 2022-10-19 PROCEDURE — 99232 SBSQ HOSP IP/OBS MODERATE 35: CPT | Performed by: FAMILY MEDICINE

## 2022-10-19 PROCEDURE — 2060000000 HC ICU INTERMEDIATE R&B

## 2022-10-19 PROCEDURE — 6360000002 HC RX W HCPCS: Performed by: FAMILY MEDICINE

## 2022-10-19 PROCEDURE — 94761 N-INVAS EAR/PLS OXIMETRY MLT: CPT

## 2022-10-19 PROCEDURE — 3700000000 HC ANESTHESIA ATTENDED CARE: Performed by: INTERNAL MEDICINE

## 2022-10-19 PROCEDURE — B24BZZ4 ULTRASONOGRAPHY OF HEART WITH AORTA, TRANSESOPHAGEAL: ICD-10-PCS | Performed by: INTERNAL MEDICINE

## 2022-10-19 PROCEDURE — 93312 ECHO TRANSESOPHAGEAL: CPT

## 2022-10-19 RX ORDER — LIDOCAINE HYDROCHLORIDE 10 MG/ML
INJECTION, SOLUTION INFILTRATION; PERINEURAL PRN
Status: DISCONTINUED | OUTPATIENT
Start: 2022-10-19 | End: 2022-10-19 | Stop reason: SDUPTHER

## 2022-10-19 RX ORDER — PROPOFOL 10 MG/ML
INJECTION, EMULSION INTRAVENOUS PRN
Status: DISCONTINUED | OUTPATIENT
Start: 2022-10-19 | End: 2022-10-19 | Stop reason: SDUPTHER

## 2022-10-19 RX ORDER — SODIUM CHLORIDE 0.9 % (FLUSH) 0.9 %
5-40 SYRINGE (ML) INJECTION PRN
Status: DISCONTINUED | OUTPATIENT
Start: 2022-10-19 | End: 2022-10-19 | Stop reason: HOSPADM

## 2022-10-19 RX ORDER — SODIUM CHLORIDE 9 MG/ML
INJECTION, SOLUTION INTRAVENOUS CONTINUOUS
Status: DISCONTINUED | OUTPATIENT
Start: 2022-10-19 | End: 2022-10-19

## 2022-10-19 RX ORDER — CARVEDILOL 25 MG/1
25 TABLET ORAL 2 TIMES DAILY WITH MEALS
Status: DISCONTINUED | OUTPATIENT
Start: 2022-10-19 | End: 2022-11-04 | Stop reason: HOSPADM

## 2022-10-19 RX ORDER — FENTANYL CITRATE 50 UG/ML
50 INJECTION, SOLUTION INTRAMUSCULAR; INTRAVENOUS EVERY 5 MIN PRN
Status: DISCONTINUED | OUTPATIENT
Start: 2022-10-19 | End: 2022-10-19 | Stop reason: HOSPADM

## 2022-10-19 RX ORDER — SODIUM CHLORIDE 9 MG/ML
INJECTION, SOLUTION INTRAVENOUS PRN
Status: DISCONTINUED | OUTPATIENT
Start: 2022-10-19 | End: 2022-10-19 | Stop reason: HOSPADM

## 2022-10-19 RX ORDER — FENTANYL CITRATE 50 UG/ML
25 INJECTION, SOLUTION INTRAMUSCULAR; INTRAVENOUS EVERY 5 MIN PRN
Status: DISCONTINUED | OUTPATIENT
Start: 2022-10-19 | End: 2022-10-19 | Stop reason: HOSPADM

## 2022-10-19 RX ORDER — SODIUM CHLORIDE 0.9 % (FLUSH) 0.9 %
5-40 SYRINGE (ML) INJECTION EVERY 12 HOURS SCHEDULED
Status: DISCONTINUED | OUTPATIENT
Start: 2022-10-19 | End: 2022-10-19 | Stop reason: HOSPADM

## 2022-10-19 RX ORDER — DIPHENHYDRAMINE HYDROCHLORIDE 50 MG/ML
12.5 INJECTION INTRAMUSCULAR; INTRAVENOUS
Status: DISCONTINUED | OUTPATIENT
Start: 2022-10-19 | End: 2022-10-19 | Stop reason: HOSPADM

## 2022-10-19 RX ORDER — LABETALOL HYDROCHLORIDE 5 MG/ML
20 INJECTION, SOLUTION INTRAVENOUS 2 TIMES DAILY PRN
Status: DISCONTINUED | OUTPATIENT
Start: 2022-10-19 | End: 2022-11-04 | Stop reason: HOSPADM

## 2022-10-19 RX ORDER — ONDANSETRON 2 MG/ML
4 INJECTION INTRAMUSCULAR; INTRAVENOUS
Status: DISCONTINUED | OUTPATIENT
Start: 2022-10-19 | End: 2022-10-19 | Stop reason: HOSPADM

## 2022-10-19 RX ADMIN — IPRATROPIUM BROMIDE AND ALBUTEROL SULFATE 1 AMPULE: 2.5; .5 SOLUTION RESPIRATORY (INHALATION) at 07:40

## 2022-10-19 RX ADMIN — SODIUM CHLORIDE: 9 INJECTION, SOLUTION INTRAVENOUS at 09:46

## 2022-10-19 RX ADMIN — CIPROFLOXACIN 400 MG: 2 INJECTION, SOLUTION INTRAVENOUS at 01:19

## 2022-10-19 RX ADMIN — VANCOMYCIN HYDROCHLORIDE 750 MG: 750 INJECTION, POWDER, LYOPHILIZED, FOR SOLUTION INTRAVENOUS at 03:15

## 2022-10-19 RX ADMIN — SODIUM CHLORIDE, PRESERVATIVE FREE 10 ML: 5 INJECTION INTRAVENOUS at 21:40

## 2022-10-19 RX ADMIN — PROPOFOL 70 MG: 10 INJECTION, EMULSION INTRAVENOUS at 10:57

## 2022-10-19 RX ADMIN — LIDOCAINE HYDROCHLORIDE 40 MG: 10 INJECTION, SOLUTION INFILTRATION; PERINEURAL at 10:54

## 2022-10-19 RX ADMIN — IPRATROPIUM BROMIDE AND ALBUTEROL SULFATE 1 AMPULE: 2.5; .5 SOLUTION RESPIRATORY (INHALATION) at 19:50

## 2022-10-19 RX ADMIN — VANCOMYCIN HYDROCHLORIDE 750 MG: 750 INJECTION, POWDER, LYOPHILIZED, FOR SOLUTION INTRAVENOUS at 14:35

## 2022-10-19 RX ADMIN — IPRATROPIUM BROMIDE AND ALBUTEROL SULFATE 1 AMPULE: 2.5; .5 SOLUTION RESPIRATORY (INHALATION) at 15:05

## 2022-10-19 ASSESSMENT — ENCOUNTER SYMPTOMS
DYSPNEA ACTIVITY LEVEL: NO INTERVAL CHANGE
STRIDOR: 0
SHORTNESS OF BREATH: 1

## 2022-10-19 ASSESSMENT — COPD QUESTIONNAIRES: CAT_SEVERITY: NO INTERVAL CHANGE

## 2022-10-19 ASSESSMENT — PAIN SCALES - WONG BAKER
WONGBAKER_NUMERICALRESPONSE: 0

## 2022-10-19 ASSESSMENT — PAIN SCALES - GENERAL
PAINLEVEL_OUTOF10: 0
PAINLEVEL_OUTOF10: 0

## 2022-10-19 ASSESSMENT — PAIN - FUNCTIONAL ASSESSMENT: PAIN_FUNCTIONAL_ASSESSMENT: 0-10

## 2022-10-19 ASSESSMENT — LIFESTYLE VARIABLES: SMOKING_STATUS: 1

## 2022-10-19 NOTE — PROGRESS NOTES
Vancomycin Dosing by Pharmacy - Daily Note   Vancomycin Therapy Day:  10  Indication: suspected endocarditis, 2 of 2 BCs MRSA, septic emboli on abdomen CT scan    Allergies:  Aspirin, Bactrim, and Codeine   Actual Weight:    Wt Readings from Last 1 Encounters:   10/19/22 104 lb 4.4 oz (47.3 kg)       Labs/Ancillary Data  Estimated Creatinine Clearance: 82 mL/min (based on SCr of 0.59 mg/dL). Recent Labs     10/17/22  0438 10/18/22  0450   CREATININE 0.46* 0.59   BUN 8 9   WBC 7.3  --      Procalcitonin   Date Value Ref Range Status   10/10/2022 6.01 (H) <0.09 ng/mL Final     Comment:           Suspected Sepsis:  <0.50 ng/mL     Low likelihood of sepsis. 0.50-2.00 ng/mL     Increased likelihood of sepsis. Antibiotics encouraged. >2.00 ng/mL     High risk of sepsis/shock. Antibiotics strongly encouraged. Suspected Lower Resp Tract Infections:  <0.24 ng/mL     Low likelihood of bacterial infection. >0.24 ng/mL     Increased likelihood of bacterial infection. Antibiotics encouraged. With successful antibiotic therapy, PCT levels should decrease rapidly. (Half-life of 24 to   36 hours.)        Procalcitonin values from samples collected within the first 6 hours of systemic infection   may still be low. Retesting may be indicated. Values from day 1 and day 4 can be entered into the Change in Procalcitonin Calculator   (www.[email or web address]-pct-calculator. [employer]) to determine the patient's Mortality Risk Prognosis        In healthy neonates, plasma Procalcitonin (PCT) concentrations increase gradually after   birth, reaching peak values at about 24 hours of age then decrease to normal values below   0.5 ng/mL by 48-72 hours of age.          Intake/Output Summary (Last 24 hours) at 10/19/2022 9804  Last data filed at 10/18/2022 1921  Gross per 24 hour   Intake 360 ml   Output --   Net 360 ml     Temp: 98    Culture Date / Source  /  Results  See micro reports  Recent vancomycin administrations vancomycin (VANCOCIN) 750 mg in dextrose 5 % 250 mL IVPB (mg) 750 mg New Bag 10/19/22 0315     750 mg New Bag 10/18/22 1352    vancomycin 1000 mg IVPB in 250 mL D5W addavial (mg) 1,000 mg New Bag 10/18/22 0108     1,000 mg New Bag 10/17/22 1353     1,000 mg New Bag  0042     1,000 mg New Bag 10/16/22 1338                    Vancomycin Concentrations:   TROUGH:  No results for input(s): VANCOTROUGH in the last 72 hours. RANDOM:    Recent Labs     10/18/22  0450 10/19/22  0531   VANCORANDOM 27.1 32.0       MRSA Nasal Swab: N/A. Non-respiratory infection. Steven Daniella PLAN     Continue current dose of 750 mg q12h IV  Ensured BUN/sCr ordered at baseline and every 48 hours x at least 3 levels, then at least weekly. Repeat vancomycin concentration in 7 days if needed. Pharmacy will continue to monitor patient and adjust therapy as indicated      Vancomycin Target Concentration Parameters  Treatment  Population Target AUC/GABBI Target Trough   Invasive MRSA Infection (bacteremia, pneumonia, meningitis, endocarditis, osteomyelitis)  Sepsis (undifferentiated) 400-600 N/A   Infection due to non-MRSA pathogen  Empiric treatment of non-invasive MRSA infection  (SSTI, UTI) <500 10-15 mg/L   CrCl < 29 mL/min  Rapidly fluctuating serum creatinine   NICKI N/A < 15 mg/L     Renal replacement therapy is dosed by levels, per hospital protocol. Abbreviations  * Pauc: probability that AUC is >400 (efficacy); Pconc: probability that Ctrough is above 20 ?g/mL (toxicity); Tox: Probability of nephrotoxicity, based on Iva et al. Clin Infect Dis 2009. Loading dose: N/A  Regimen: 750 mg IV every 12 hours. Start time: 15:15 on 10/19/2022  Exposure target: AUC24 (range)400-600 mg/L.hr   AUC24,ss: 499 mg/L.hr  Probability of AUC24 > 400: 98 %  Ctrough,ss: 14.5 mg/L  Probability of Ctrough,ss > 20: 4 %  Probability of nephrotoxicity (Iva DEVON 2009): 10 %      Thank you for the consult. Pharmacy will continue to follow.

## 2022-10-19 NOTE — PROGRESS NOTES
BEHAVIORAL HEALTH FOLLOW-UP NOTE     10/19/2022     Patient was seen and examined in person, Chart reviewed   Patient's case discussed with staff/team    Chief Complaint: Bipolar disorder, lethargy, erratic behavior    Interim History:     Patient is currently sitting in bed with father at bedside. Patient has her eyes closed and refuses to open eyes to repeated stimulation, will be discharged with any questions. Says \" I do not want to\" in response to any questions or commands. Per the father patient was somewhat more awake for nursing staff this morning and continues to have fluctuating mentation. Patient has multiple medical issues ongoing with multiple teams on board including medicine, pulmonology, cardiology, nephrology, neurology and infectious disease.     BP (!) 140/73   Pulse 77   Temp 99.2 °F (37.3 °C) (Oral)   Resp 26   Ht 4' 11\" (1.499 m)   Wt 104 lb (47.2 kg)   SpO2 94%   BMI 21.01 kg/m²   Appetite:  [] Normal/Unchanged  [] Increased  [x] Decreased      Sleep:       [x] Normal/Unchanged  [] Fair       [] Poor              Energy:    [] Normal/Unchanged  [] Increased  [x] Decreased        Aggression:  [] yes  [x] no    Patient is [] able  [] unable to CONTRACT FOR SAFETY ON THE UNIT    PAST MEDICAL/PSYCHIATRIC HISTORY:   Past Medical History:   Diagnosis Date    Anemia     Arthritis     Asthma     Bipolar disorder, mixed ([address]     Carotid artery stenosis 2/13/2020    Cocaine abuse ([facility] [address] 11/4/2014    COPD (chronic obstructive pulmonary disease) ([facility] [address]     Degeneration of cervical intervertebral disc     Depression with anxiety     Depression with anxiety     Fibromyalgia 1/5/2017    GERD (gastroesophageal reflux disease)     History of migraine headaches 6/10/2014    History of renal calculi 6/10/2014    History of seizure disorder 6/10/2014    Hoarseness of voice     HTN (hypertension) 6/10/2014    Hyperlipidemia 6/10/2014    IGT (impaired glucose tolerance) 6/10/2014    Kidney stones Lactose intolerance 6/10/2014    Leukopenia 6/10/2014    Major depressive disorder, recurrent episode, severe, without mention of psychotic behavior 11/6/2014    MVP (mitral valve prolapse)     Seizures ([facility]     Sleep apnea 6/10/2014    Unspecified diseases of blood and blood-forming organs        FAMILY/SOCIAL HISTORY:  Family History   Problem Relation Age of Onset    Diabetes Mother     Cancer Mother     Coronary Art Dis Father     COPD Father     Depression Brother     Alcohol Abuse Brother     Cancer Other         lung and skin    Diabetes Maternal Grandmother     Cancer Paternal Grandmother      Social History     Socioeconomic History    Marital status:      Spouse name: Not on file    Number of children: Not on file    Years of education: Not on file    Highest education level: Not on file   Occupational History    Occupation: disability   Tobacco Use    Smoking status: Every Day     Packs/day: 0.50     Years: 33.00     Pack years: 16.50     Types: Cigarettes    Smokeless tobacco: Never    Tobacco comments:     quit  2000 started again  1/13    Substance and Sexual Activity    Alcohol use: Yes     Comment: occasional    Drug use: Not Currently    Sexual activity: Yes   Other Topics Concern    Not on file   Social History Narrative    Not on file     Social Determinants of Health     Financial Resource Strain: Not on file   Food Insecurity: Not on file   Transportation Needs: Not on file   Physical Activity: Not on file   Stress: Not on file   Social Connections: Not on file   Intimate Partner Violence: Not on file   Housing Stability: Not on file           ROS:  [x] All negative/unchanged except if checked.  Explain positive(checked items) below:  [] Constitutional  [] Eyes  [] Ear/Nose/Mouth/Throat  [] Respiratory  [] CV  [] GI  []   [] Musculoskeletal  [] Skin/Breast  [] Neurological  [] Endocrine  [] Heme/Lymph  [] Allergic/Immunologic    Explanation:     MEDICATIONS:    Current Facility-Administered Medications:     carvedilol (COREG) tablet 25 mg, 25 mg, Oral, BID WC, Frandy Lele, DO    vancomycin (VANCOCIN) 750 mg in dextrose 5 % 250 mL IVPB, 750 mg, IntraVENous, Q12H, Frandy Lele, DO, Stopped at 10/19/22 0415    potassium chloride (KLOR-CON M) extended release tablet 40 mEq, 40 mEq, Oral, PRN, 40 mEq at 10/17/22 1025 **OR** potassium bicarb-citric acid (EFFER-K) effervescent tablet 40 mEq, 40 mEq, Oral, PRN, 40 mEq at 10/14/22 2031 **OR** potassium chloride 10 mEq/100 mL IVPB (Peripheral Line), 10 mEq, IntraVENous, PRN, Frandy Lele, DO, Last Rate: 100 mL/hr at 10/16/22 1505, 10 mEq at 10/16/22 1505    Vitamin D (CHOLECALCIFEROL) tablet 2,000 Units, 2,000 Units, Oral, Daily, Frandy Lele, DO, 2,000 Units at 10/18/22 0916    hydrALAZINE (APRESOLINE) injection 10 mg, 10 mg, IntraVENous, Q6H PRN, Frandy Lele, DO, 10 mg at 10/16/22 2349    sodium chloride flush 0.9 % injection 5-40 mL, 5-40 mL, IntraVENous, 2 times per day, Frandy Lele, DO, 10 mL at 10/18/22 0917    sodium chloride flush 0.9 % injection 5-40 mL, 5-40 mL, IntraVENous, PRN, Frandy Lele, DO    0.9 % sodium chloride infusion, , IntraVENous, PRN, Frandy Lele, DO, Last Rate: 50 mL/hr at 10/18/22 1200, New Bag at 10/18/22 1200    [Held by provider] heparin (porcine) injection 5,000 Units, 5,000 Units, SubCUTAneous, BID, Corinne Martínez MD, 5,000 Units at 10/18/22 0916    acetaminophen (TYLENOL) tablet 650 mg, 650 mg, Oral, Q4H PRN, Frandy Lele, DO, 650 mg at 10/14/22 2031    ondansetron (ZOFRAN-ODT) disintegrating tablet 4 mg, 4 mg, Oral, Q8H PRN **OR** ondansetron (ZOFRAN) injection 4 mg, 4 mg, IntraVENous, Q6H PRN, Frandy Lele, DO    albuterol sulfate HFA (PROVENTIL;VENTOLIN;PROAIR) 108 (90 Base) MCG/ACT inhaler 2 puff, 2 puff, Inhalation, Q6H PRN, Frandy Lele, DO    ARIPiprazole (ABILIFY) tablet 5 mg, 5 mg, Oral, Daily, Frandy Lele, DO, 5 mg at 10/18/22 0917    atorvastatin (LIPITOR) tablet 40 mg, 40 mg, Oral, Daily, Frandy Lele, DO, 40 mg at 10/18/22 2227    divalproex (DEPAKOTE) DR tablet 1,000 mg, 1,000 mg, Oral, BID, Frandy Lele, DO, 1,000 mg at 10/18/22 2227    DULoxetine (CYMBALTA) extended release capsule 60 mg, 60 mg, Oral, Daily, Frandy Lele, DO, 60 mg at 10/18/22 0916    ferrous sulfate (IRON 325) tablet 325 mg, 325 mg, Oral, BID WC, Frandy Lele, DO, 325 mg at 10/18/22 1826    sodium chloride flush 0.9 % injection 10 mL, 10 mL, IntraVENous, PRN, Frandy Lele, DO, 10 mL at 10/10/22 1427    ipratropium-albuterol (DUONEB) nebulizer solution 1 ampule, 1 ampule, Inhalation, Q4H WA, Frandy Lele, DO, 1 ampule at 10/19/22 0740    perflutren lipid microspheres (DEFINITY) injection 1.65 mg, 1.5 mL, IntraVENous, ONCE PRN, Frandy Lele, DO    vancomycin (VANCOCIN) intermittent dosing (placeholder), , Other, RX Placeholder, Frandy Lele, DO      Examination:  BP (!) 140/73   Pulse 77   Temp 99.2 °F (37.3 °C) (Oral)   Resp 26   Ht 4' 11\" (1.499 m)   Wt 104 lb (47.2 kg)   SpO2 94%   BMI 21.01 kg/m²   Gait -unable to test  Medication side effects(SE): Denies    Mental Status Examination:    Level of consciousness:  within normal limits   Appearance:  poor grooming and poor hygiene  Behavior/Motor: Lethargic and unarousable. Attitude toward examiner:  On cooperative  Speech:  slow, whispered, and dysarthric   Mood: depressed  Affect:  flat  Thought processes:  linear and slow   Thought content:  Homicidal ideation - none  Suicidal Ideation:  denies suicidal ideation  Delusions:  no evidence of delusions  Perceptual Disturbance:  denies any perceptual disturbance  Cognition:  disoriented   Concentration intact  Insight poor   Judgement poor     ASSESSMENT:   Patient symptoms are:  [] Well controlled  [] Improving  [] Worsening  [x] No change      Diagnosis:   Principal Problem:    SIRS (systemic inflammatory response syndrome) (HCC)  Active Problems:    NICKI (acute kidney injury) (HCC)    Severe malnutrition (HCC)    Elevated d-dimer    Leukocytosis    Pulmonary nodules    Delirium due to another medical condition    Acute metabolic encephalopathy    Depression with suicidal ideation    Bipolar disorder, mixed ([facility]    Acute encephalopathy    COPD (chronic obstructive pulmonary disease) ([facility]    H/O gastric bypass  Resolved Problems:    * No resolved hospital problems. *      LABS:    Recent Labs     10/17/22  0438   WBC 7.3   HGB 9.9*   *       Recent Labs     10/16/22  1942 10/17/22  0438 10/18/22  0450   NA  --  138 140   K 3.9 3.3* 3.7   CL  --  103 105   CO2  --  26 26   BUN  --  8 9   CREATININE  --  0.46* 0.59   GLUCOSE  --  76 70       No results for input(s): BILITOT, ALKPHOS, AST, ALT in the last 72 hours. Lab Results   Component Value Date/Time    [facility] NEGATIVE 07/08/2019 12:00 AM    BARB[facility] NEGATIVE 10/13/2022 11:15 AM    LABBENZ NEGATIVE 10/13/2022 11:15 AM    LABBENZ NEGATIVE 12/22/2012 09:35 PM    LABMETH NEGATIVE 10/13/2022 11:15 AM    PPXUR NOT REPORTED 01/29/2022 03:06 PM     Lab Results   Component Value Date/Time    TSH 1.37 01/29/2022 12:38 PM     No results found for: LITHIUM  Lab Results   Component Value Date    VALPROATE 120 10/14/2022       RISK ASSESSMENT: Monitoring per standard protocol    Treatment Plan:  Reviewed current Medications with the patient. Continue duloxetine, Depakote and Abilify. Patient is currently extremely lethargic and has multiple medical issues ongoing, will continue to follow and reevaluate daily. Risks, benefits, side effects, drug-to-drug interactions and alternatives to treatment were discussed. The patient consented to treatment. PSYCHOTHERAPY/COUNSELING:  [] Therapeutic interview  [x] Supportive  [] CBT  [] Ongoing  [] Other        Fredrick Stiles is a 46 y.o. female being evaluated face to face. --Carlita Deleon MD on 10/19/2022 at 1:23 PM    An electronic signature was used to authenticate this note. **This report has been created using voice recognition software.  It may contain minor errors which are inherent in voice recognition technology. **  I independently saw and evaluated the patient. I reviewed the  documentation above. Any additional comments or changes to the    documentation are stated below otherwise agree with assessment.        -The patient had a ADRIANNE today which was unremarkable. She continues to be sedated. She is difficult to arouse. She has been seen by neurology. We will continue with current psychotropic medication without any change.     PLAN  Medications as noted above   Follow-up daily while on inpatient unit    Electronically signed by Louisa Pinon MD on 10/19/22 at 8:33 PM EDT

## 2022-10-19 NOTE — ANESTHESIA PRE PROCEDURE
Department of Anesthesiology  Preprocedure Note       Name:  Richard Archibald   Age:  46 y.o.  :  1970                                          MRN:  [id number]         Date:  10/19/2022      Surgeon: Randell Otoole):  Frandy Royal DO    Procedure: Procedure(s):  TRANSESOPHAGEAL ECHOCARDIOGRAM    Medications prior to admission:   Prior to Admission medications    Medication Sig Start Date End Date Taking? Authorizing Provider   lisinopril (PRINIVIL;ZESTRIL) 20 MG tablet Take 20 mg by mouth daily   Yes Historical Provider, MD   carvedilol (COREG) 25 MG tablet TAKE 1 TABLET BY MOUTH IN THE MORNING AND 1 IN THE EVENING WITH MEALS 22   Jess Sullivan MD   traZODone (DESYREL) 100 MG tablet TAKE 1 TABLET BY MOUTH AT BEDTIME 22   Historical Provider, MD   gabapentin (NEURONTIN) 300 MG capsule TAKE 1 CAPSULE BY MOUTH THREE TIMES DAILY 22  Jess Sullivan MD   hydrOXYzine (VISTARIL) 25 MG capsule  9/15/20   Historical Provider, MD   DULoxetine (CYMBALTA) 30 MG extended release capsule Take 1 capsule by mouth daily  Patient taking differently: Take 60 mg by mouth in the morning.  19   Almira Rinne, APRN - CNP   ARIPiprazole (ABILIFY) 5 MG tablet Take 5 mg by mouth daily    Historical Provider, MD       Current medications:    Current Facility-Administered Medications   Medication Dose Route Frequency Provider Last Rate Last Admin    vancomycin (VANCOCIN) 750 mg in dextrose 5 % 250 mL IVPB  750 mg IntraVENous Q12H Richy Hunt  mL/hr at 10/19/22 0315 750 mg at 10/19/22 0315    ciprofloxacin (CIPRO) IVPB 400 mg  400 mg IntraVENous Q12H Jess Sullivan MD   Stopped at 10/19/22 0219    potassium chloride (KLOR-CON M) extended release tablet 40 mEq  40 mEq Oral PRN Richy Hunt MD   40 mEq at 10/17/22 1025    Or    potassium bicarb-citric acid (EFFER-K) effervescent tablet 40 mEq  40 mEq Oral PRN Richy Hunt MD   40 mEq at 10/14/22 2031    Or    potassium chloride 10 mEq/100 mL IVPB (Peripheral Line)  10 mEq IntraVENous PRN Rosalia Keys  mL/hr at 10/16/22 1505 10 mEq at 10/16/22 1505    Vitamin D (CHOLECALCIFEROL) tablet 2,000 Units  2,000 Units Oral Daily Rosalia Keys MD   2,000 Units at 10/18/22 0916    hydrALAZINE (APRESOLINE) injection 10 mg  10 mg IntraVENous Q6H PRN Rosalia Keys MD   10 mg at 10/16/22 2349    carvedilol (COREG) tablet 3.125 mg  3.125 mg Oral BID WC Rosalia Keys MD   3.125 mg at 10/18/22 1826    sodium chloride flush 0.9 % injection 5-40 mL  5-40 mL IntraVENous 2 times per day Rosalia Keys MD   10 mL at 10/18/22 0917    sodium chloride flush 0.9 % injection 5-40 mL  5-40 mL IntraVENous PRN Rosalia Keys MD        0.9 % sodium chloride infusion   IntraVENous PRN Rosalia Keys MD 50 mL/hr at 10/18/22 1200 New Bag at 10/18/22 1200    [Held by provider] heparin (porcine) injection 5,000 Units  5,000 Units SubCUTAneous BID Rosalia Keys MD   5,000 Units at 10/18/22 0916    acetaminophen (TYLENOL) tablet 650 mg  650 mg Oral Q4H PRN Rosalia Keys MD   650 mg at 10/14/22 2031    ondansetron (ZOFRAN-ODT) disintegrating tablet 4 mg  4 mg Oral Q8H PRN Rosalia Keys MD        Or    ondansetron (ZOFRAN) injection 4 mg  4 mg IntraVENous Q6H PRN Rosalia Keys MD        albuterol sulfate HFA (PROVENTIL;VENTOLIN;PROAIR) 108 (90 Base) MCG/ACT inhaler 2 puff  2 puff Inhalation Q6H PRN Rosalia Keys MD        ARIPiprazole (ABILIFY) tablet 5 mg  5 mg Oral Daily Rosalia Keys MD   5 mg at 10/18/22 0917    atorvastatin (LIPITOR) tablet 40 mg  40 mg Oral Daily Rosalia Keys MD   40 mg at 10/18/22 2227    divalproex (DEPAKOTE) DR tablet 1,000 mg  1,000 mg Oral BID Rosalia Keys MD   1,000 mg at 10/18/22 2227    DULoxetine (CYMBALTA) extended release capsule 60 mg  60 mg Oral Daily Rosalia Keys MD   60 mg at 10/18/22 0916    ferrous sulfate (IRON 325) tablet 325 mg  325 mg Oral BID  Rosalia Keys MD   325 mg at 10/18/22 1826    sodium chloride flush 0.9 % injection 10 mL  10 mL IntraVENous PRN Katherine Wing MD   10 mL at 10/10/22 1427    ipratropium-albuterol (DUONEB) nebulizer solution 1 ampule  1 ampule Inhalation Q4H WA Katherine Wing MD   1 ampule at 10/18/22 1926    perflutren lipid microspheres (DEFINITY) injection 1.65 mg  1.5 mL IntraVENous ONCE PRN Katherine Wing MD        [facility]) intermittent dosing (placeholder)   Other RX Placeholder Katherine Wing MD           Allergies: Allergies   Allergen Reactions    Aspirin      Gastric bypass    Bactrim Other (See Comments)     Severe chills    Codeine Other (See Comments)     Other reaction(s): pain  Caused chest pain ( was in the Tylenol 3# )  Chest pain       Problem List:    Patient Active Problem List   Diagnosis Code    GERD (gastroesophageal reflux disease) K21.9    Hoarseness of voice R49.0    MVP (mitral valve prolapse) I34.1    Depression with suicidal ideation F32. A, R45.851    Lymphocytic colitis K52.832    History of migraine headaches Z86.69    Hyperlipidemia E78.5    Sleep apnea G47.30    IGT (impaired glucose tolerance) R73.02    History of renal calculi Z87.442    Lactose intolerance E73.9    History of seizure disorder Z86.69    Leukopenia D72.819    Bipolar disorder, mixed (HCC) N39.90    Illicit drug use H37.42    Postlaminectomy syndrome, cervical region M96.1    Degeneration of cervical intervertebral disc M50.30    Encounter for long-term (current) use of other medications Z79.899    Bradycardia R00.1    Cellulitis L03.90    Hypokalemia E87.6    Primary hypertension I10    Elevated lipase R74.8    Right lower quadrant abdominal pain R10.31    Nausea R11.0    Diarrhea R19.7    Acute encephalopathy G93.40    Polysubstance abuse (HCC) F19.10    Fibromyalgia M79.7    Cocaine addiction (HCC) F14.20    Shoulder arthritis M19.019    Osteoarthritis of left glenohumeral joint M19.012    Abnormal EKG R94.31    Anemia D64.9    Anxiety F41.9    Carotid artery stenosis I65.29    COPD (chronic obstructive pulmonary disease) ([facility]) J44.9    Dental disease K08.9    HUTCHINSON (dyspnea on exertion) R06.09    Fatigue R53.83    Fractures T07. Jody Santo H/O gastric bypass Z98.84    History of crack cocaine use Z87.898    History of UTI Z87.440    Injury of back S39. 92XA    Intractable chronic migraine without aura and without status migrainosus G43.719    Intractable migraine with aura with status migrainosus G43. 111    Kidney stones N20.0    Dizziness R42    Memory loss R41.3    Mild pulmonary hypertension ([facility]) I27.20    Mild tricuspid regurgitation I07.1    Primary osteoarthritis of left shoulder M19.012    Seizure-like activity ([facility]) R56.9    Stress at home F43.9    TMJ (dislocation of temporomandibular joint) S03. 00XA    Tobacco abuse Z72.0    Visual impairment H54.7    Internal hernia K45.8    Vitamin D deficiency E55.9    Iron deficiency E61.1    SIRS (systemic inflammatory response syndrome) ([facility]) R65.10    NICKI (acute kidney injury) ([facility] [address] N17.9    Severe malnutrition ([facility]) E43    Elevated d-dimer R79.89    Leukocytosis D72.829    Pulmonary nodules R91.8    Delirium due to another medical condition F05    Acute metabolic encephalopathy X36.09       Past Medical History:        Diagnosis Date    Anemia     Arthritis     Asthma     Bipolar disorder, mixed ([facility] [address]     Carotid artery stenosis 2/13/2020    Cocaine abuse ([facility] [address] 11/4/2014    COPD (chronic obstructive pulmonary disease) ([facility] [address]     Degeneration of cervical intervertebral disc     Depression with anxiety     Depression with anxiety     Fibromyalgia 1/5/2017    GERD (gastroesophageal reflux disease)     History of migraine headaches 6/10/2014    History of renal calculi 6/10/2014    History of seizure disorder 6/10/2014    Hoarseness of voice     HTN (hypertension) 6/10/2014    Hyperlipidemia 6/10/2014    IGT (impaired glucose tolerance) 6/10/2014    Kidney stones     Lactose intolerance 6/10/2014    Leukopenia 6/10/2014    Major depressive disorder, recurrent episode, severe, without mention of psychotic behavior 2014    MVP (mitral valve prolapse)     Seizures ([facility] [address]     Sleep apnea 6/10/2014    Unspecified diseases of blood and blood-forming organs        Past Surgical History:        Procedure Laterality Date    ANKLE FRACTURE SURGERY      recontruction surgery    APPENDECTOMY      BREAST LUMPECTOMY Right     CARDIAC CATHETERIZATION      CARPAL TUNNEL RELEASE      x2     SECTION      CHOLECYSTECTOMY      COLONOSCOPY  12    COLONOSCOPY  2016    severe spasms    COLONOSCOPY  2019    DR GOLDY MCINTOSH    GASTRIC BYPASS SURGERY      GASTRIC BYPASS SURGERY      HYSTERECTOMY (CERVIX STATUS UNKNOWN)      HYSTERECTOMY (CERVIX STATUS UNKNOWN)      LAPAROSCOPY N/A 2022    LAPAROSCOPY EXPLORATORY CONVERTED TO OPEN EXPLORATORY LAPAROTOMY, LYSIS OF ADHESIONS, REDUCTION OF INTERNAL HERNIA performed by Sixto Mauro DO at [address] OTHER SURGICAL HISTORY      APPLICATION OF ARCH BARS,SIMPLE EXTRACTION OF #15 AND RT TMJ JOINT REPLACEMENT    SHOULDER ARTHROSCOPY Left 2019    DR ROBERT GREEN    SHOULDER SURGERY      TONSILLECTOMY AND ADENOIDECTOMY      UPPER GASTROINTESTINAL ENDOSCOPY  7-3-12    egd    UPPER GASTROINTESTINAL ENDOSCOPY  2016    status post gastrectomy with a small remnant of gastric pouch.     UPPER GASTROINTESTINAL ENDOSCOPY  2019    DR Dat Walls       Social History:    Social History     Tobacco Use    Smoking status: Every Day     Packs/day: 0.50     Years: 33.00     Pack years: 16.50     Types: Cigarettes    Smokeless tobacco: Never    Tobacco comments:     quit  2000 started again      Substance Use Topics    Alcohol use: Yes     Comment: occasional                                Ready to quit: Not Answered  Counseling given: Not Answered  Tobacco comments: quit  2000 started again  1/13       Vital Signs (Current):   Vitals:    10/18/22 2219 10/19/22 0015 10/19/22 0214 10/19/22 0412   BP: 127/74 (!) 150/81 116/70 108/65   Pulse: 72 77 69 68   Resp: 24 22 22 20   Temp: 98.6 °F (37 °C) 98.6 °F (37 °C) 98.6 °F (37 °C) 98 °F (36.7 °C)   TempSrc: Oral Oral Oral Oral   SpO2: 96% 97% 97% 94%   Weight:  104 lb 4.4 oz (47.3 kg)     Height:                                                  BP Readings from Last 3 Encounters:   10/19/22 108/65   08/09/22 (!) 182/100   04/08/22 (!) 198/111       NPO Status: Time of last liquid consumption: 2359                        Time of last solid consumption: 2359                        Date of last liquid consumption: 10/12/22                        Date of last solid food consumption: 10/12/22    BMI:   Wt Readings from Last 3 Encounters:   10/19/22 104 lb 4.4 oz (47.3 kg)   08/09/22 96 lb 8 oz (43.8 kg)   03/29/22 105 lb (47.6 kg)     Body mass index is 21.06 kg/m².     CBC:   Lab Results   Component Value Date/Time    WBC 7.3 10/17/2022 04:38 AM    RBC 3.79 10/17/2022 04:38 AM    HGB 9.9 10/17/2022 04:38 AM    HCT 30.2 10/17/2022 04:38 AM    MCV 79.9 10/17/2022 04:38 AM    RDW 15.6 10/17/2022 04:38 AM     10/17/2022 04:38 AM       CMP:   Lab Results   Component Value Date/Time     10/18/2022 04:50 AM    K 3.7 10/18/2022 04:50 AM     10/18/2022 04:50 AM    CO2 26 10/18/2022 04:50 AM    BUN 9 10/18/2022 04:50 AM    CREATININE 0.59 10/18/2022 04:50 AM    GFRAA >60 04/08/2022 07:17 AM    LABGLOM >60 10/18/2022 04:50 AM    GLUCOSE 70 10/18/2022 04:50 AM    PROT 7.9 10/10/2022 04:55 AM    CALCIUM 7.5 10/18/2022 04:50 AM    BILITOT 0.7 10/10/2022 04:55 AM    ALKPHOS 112 10/10/2022 04:55 AM    AST 41 10/10/2022 04:55 AM    ALT 17 10/10/2022 04:55 AM       POC Tests: No results for input(s): POCGLU, POCNA, POCK, POCCL, POCBUN, POCHEMO, POCHCT in the last 72 hours.    Coags:   Lab Results   Component Value Date/Time    PROTIME 15.4 10/18/2022 01:58 PM    INR 1.2 10/18/2022 01:58 PM    APTT 25.6 07/22/2016 04:20 PM       HCG (If Applicable):   Lab Results   Component Value Date    PREGTESTUR NEGATIVE 12/22/2012        ABGs:   Lab Results   Component Value Date/Time    PHART 7.467 10/15/2022 10:40 AM    PO2ART 66.5 10/15/2022 10:40 AM    EJP3LDM 35.4 10/15/2022 10:40 AM    JTD2VVA 25.6 10/15/2022 10:40 AM    K2XCIPLM 90.5 10/15/2022 10:40 AM        Type & Screen (If Applicable):  No results found for: LABABO, LABRH    Drug/Infectious Status (If Applicable):  No results found for: HIV, HEPCAB    COVID-19 Screening (If Applicable):   Lab Results   Component Value Date/Time    COVID19 Not Detected 10/10/2022 04:55 AM           Anesthesia Evaluation  Patient summary reviewed and Nursing notes reviewed  Airway: Mallampati: II  TM distance: >3 FB   Neck ROM: full  Mouth opening: > = 3 FB   Dental:          Pulmonary:normal exam  breath sounds clear to auscultation  (+) COPD: no interval change,  shortness of breath: no interval change,  sleep apnea:  asthma: allergic asthma, current smoker    (-) rhonchi, wheezes, rales, stridor and no decreased breath sounds          Patient did not smoke on day of surgery.                 ROS comment: Patient had [facility] recently >2weeks  Pleural Effusion s/p drainage   Cardiovascular:  Exercise tolerance: no interval change,   (+) hypertension: no interval change, valvular problems/murmurs: MVP, HUTCHINSON: no interval change,     (-) pacemaker, past MI, CAD, CABG/stent, dysrhythmias,  angina,  CHF, orthopnea, PND, murmur, weak pulses,  friction rub, systolic click, carotid bruit,  JVD, peripheral edema, no pulmonary hypertension and no hyperlipidemia    ECG reviewed  Rhythm: regular  Rate: normal  Echocardiogram reviewed         Beta Blocker:  Dose within 24 Hrs      ROS comment: Global left ventricular systolic function is normal. Estimated ejection fraction is 55-60%. Neuro/Psych:   (+) neuromuscular disease:, headaches: migraine headaches, psychiatric history:   (-) seizures, TIA, CVA and depression/anxiety             ROS comment: Degeneration of cervical intervertebral disc GI/Hepatic/Renal:   (+) GERD: no interval change, renal disease (Admitted with NICKI - being followed by Nephrologists):,      (-) hiatal hernia, PUD, hepatitis, liver disease, bowel prep and no morbid obesity       Endo/Other:    (+) : arthritis: OA., no malignancy/cancer. (-) diabetes mellitus, hypothyroidism, hyperthyroidism, blood dyscrasia, no electrolyte abnormalities, no malignancy/cancer                ROS comment: Sepsis - on antibiotics  Illicit drug use Abdominal:             Vascular:   + PVD, aortic or cerebral, .  - DVT and PE.       ROS comment: Carotid artery stenosis. Other Findings: Sleepy but arousable  Oriented to person and place            Anesthesia Plan      general     ASA 3       Induction: intravenous. MIPS: Postoperative opioids intended and Prophylactic antiemetics administered. Anesthetic plan and risks discussed with patient. Plan discussed with CRNA. Father at bedside - history confirmed from him.         Jack Quintero MD   10/19/2022

## 2022-10-19 NOTE — PROGRESS NOTES
Patient sleeping throughout visit; [place] visit with patient's father who provided medical update and talked about the medical testing and procedures patient has had over the last two days; listening presence and support; welcomed prayer;       10/19/22 1953   Encounter Summary   Encounter Overview/Reason  Spiritual/Emotional Needs   Service Provided For: Patient and family together   Referral/Consult From: [facility]   Last Encounter  10/19/22   Complexity of Encounter Moderate   Spiritual/Emotional needs   Type Spiritual Support   Grief, Loss, and Adjustments   Type Adjustment to illness   Assessment/Intervention/Outcome   Assessment Coping; Hopeful;Powerlessness   Intervention Active listening;Discussed illness injury and its impact; Explored/Affirmed feelings, thoughts, concerns;Prayer (assurance of)/[place];Sustaining Presence/Ministry of presence   Outcome Comfort;Coping;Engaged in conversation;Expressed feelings, needs, and concerns;Expressed Gratitude;Receptive

## 2022-10-19 NOTE — PLAN OF CARE
Problem: Discharge Planning  Goal: Discharge to home or other facility with appropriate resources  10/19/2022 0400 by Marry Chopra RN  Outcome: Progressing     Problem: Pain  Goal: Verbalizes/displays adequate comfort level or baseline comfort level  10/19/2022 0400 by Marry Chopra RN  Outcome: Progressing     Problem: Skin/Tissue Integrity  Goal: Absence of new skin breakdown  Description: 1. Monitor for areas of redness and/or skin breakdown  2. Assess vascular access sites hourly  3. Every 4-6 hours minimum:  Change oxygen saturation probe site  4. Every 4-6 hours:  If on nasal continuous positive airway pressure, respiratory therapy assess nares and determine need for appliance change or resting period.   10/19/2022 0400 by Marry Chopra RN  Outcome: Progressing     Problem: Safety - Adult  Goal: Free from fall injury  10/19/2022 0400 by Marry Chopra RN  Outcome: Progressing     Problem: Nutrition Deficit:  Goal: Optimize nutritional status  10/19/2022 0400 by Marry Chopra RN  Outcome: Progressing  10/18/2022 1938 by Bell Hernandez RN  Outcome: Not Progressing  Note: PO intake is poor

## 2022-10-19 NOTE — FLOWSHEET NOTE
10/19/22 3144   Treatment Team Notification   Reason for Communication Review case   Team Member Name Dr. Pardo Tina Provider   Method of Communication Page   Notification Time 94 20 56     Patient has been lethargic and only responsive to pain this shift. RN reached out to Cardiology regarding concern for patient not being able to take PO medications safely; see MAR for new orders. Electronically signed by Mikki Flores RN.

## 2022-10-19 NOTE — PROGRESS NOTES
BRONCHOSPASM/BRONCHOCONSTRICTION     [x]         IMPROVE AERATION/BREATH SOUNDS  [x]   ADMINISTER BRONCHODILATOR THERAPY AS APPROPRIATE  [x]   ASSESS BREATH SOUNDS  []   IMPLEMENT AEROSOL/MDI PROTOCOL  [x]   PATIENT EDUCATION AS NEEDED   PROVIDE ADEQUATE OXYGENATION WITH ACCEPTABLE SP02/ABG'S    [x]  IDENTIFY APPROPRIATE OXYGEN THERAPY  [x]   MONITOR SP02/ABG'S AS NEEDED   [x]   PATIENT EDUCATION AS NEEDED     Pt currently resting ing 2lnc SpO2 90% diminished breath sounds throughout.

## 2022-10-19 NOTE — PROGRESS NOTES
Pulmonary Progress Note  [facility]      Patient - Bassam Varela,  Age - 46 y.o.    - 1970      Room Number - 2117/2117-01   MRN -  [id number]   [facility] # - [de-identified]  Date of Admission -  10/10/2022  4:37 AM  Ha Gallagher MD  Primary Care Physician - Barbara Pepe MD     SUBJECTIVE   Patient appears to be resting quietly. Father at bedside  Status post transesophageal echo. No vegetations appreciated. OBJECTIVE   VITALS    height is 4' 11\" (1.499 m) and weight is 104 lb (47.2 kg). Her oral temperature is 99.2 °F (37.3 °C). Her blood pressure is 129/63 and her pulse is 76. Her respiration is 24 and oxygen saturation is 94%. Body mass index is 21.01 kg/m². Temperature Range: Temp: 99.2 °F (37.3 °C) Temp  Av.3 °F (36.8 °C)  Min: 97.1 °F (36.2 °C)  Max: 99.2 °F (37.3 °C)  BP Range:  Systolic (34OUC), JANINE:537 , Min:108 , MRY:282     Diastolic (45XLS), RAT:03, Min:59, Max:86    Pulse Range: Pulse  Av.6  Min: 68  Max: 82  Respiration Range: Resp  Av.3  Min: 16  Max: 31  Current Pulse Ox[de-identified]  SpO2: 94 %  24HR Pulse Ox Range:  SpO2  Av.4 %  Min: 90 %  Max: 98 %  Oxygen Amount and Delivery: O2 Flow Rate (L/min): 2 L/min    Wt Readings from Last 3 Encounters:   10/19/22 104 lb (47.2 kg)   22 96 lb 8 oz (43.8 kg)   22 105 lb (47.6 kg)       I/O (24 Hours)    Intake/Output Summary (Last 24 hours) at 10/19/2022 1453  Last data filed at 10/19/2022 1150  Gross per 24 hour   Intake 565 ml   Output --   Net 565 ml       EXAM     General Appearance Sleepy appearing   HEENT - normocephalic, atraumatic. Neck - Supple,  trachea midline   Lungs -coarse breath sounds no crackles rales or wheezes. Heart Exam:PMI normal. No lifts, heaves, or thrills. RRR. No murmurs, clicks, gallops, or rubs  Abdomen Exam: Abdomen soft, non-tender.   Extremity Exam: No signs of cyanosis at this    MEDS      carvedilol  25 mg Oral BID WC    vancomycin  750 mg IntraVENous Q12H    Vitamin D  2,000 Units Oral Daily    sodium chloride flush  5-40 mL IntraVENous 2 times per day    [Held by provider] heparin (porcine)  5,000 Units SubCUTAneous BID    ARIPiprazole  5 mg Oral Daily    atorvastatin  40 mg Oral Daily    divalproex  1,000 mg Oral BID    DULoxetine  60 mg Oral Daily    ferrous sulfate  325 mg Oral BID WC    ipratropium-albuterol  1 ampule Inhalation Q4H WA    vancomycin (VANCOCIN) intermittent dosing (placeholder)   Other RX Placeholder      sodium chloride 50 mL/hr at 10/18/22 1200     potassium chloride **OR** potassium alternative oral replacement **OR** potassium chloride, hydrALAZINE, sodium chloride flush, sodium chloride, acetaminophen, ondansetron **OR** ondansetron, albuterol sulfate HFA, sodium chloride flush, perflutren lipid microspheres    LABS   CBC   Recent Labs     10/17/22  0438   WBC 7.3   HGB 9.9*   HCT 30.2*   MCV 79.9*   *     BMP:   Lab Results   Component Value Date/Time     10/18/2022 04:50 AM    K 3.7 10/18/2022 04:50 AM     10/18/2022 04:50 AM    CO2 26 10/18/2022 04:50 AM    BUN 9 10/18/2022 04:50 AM    LABALBU 3.4 10/10/2022 04:55 AM    CREATININE 0.59 10/18/2022 04:50 AM    CALCIUM 7.5 10/18/2022 04:50 AM    GFRAA >60 04/08/2022 07:17 AM    LABGLOM >60 10/18/2022 04:50 AM     ABGs:  Lab Results   Component Value Date/Time    PHART 7.467 10/15/2022 10:40 AM    PO2ART 66.5 10/15/2022 10:40 AM    ZEX9JEI 35.4 10/15/2022 10:40 AM      Lab Results   Component Value Date/Time    [facility] 04[facility]36 AM     Ionized Calcium:  No results found for: IONCA  Magnesium:    Lab Results   Component Value Date/Time    MG 2.0 10/18/2022 04:50 AM     Phosphorus:    Lab Results   Component Value Date/Time    PHOS 3.6 10/18/2022 04:50 AM        LIVER PROFILE No results for input(s): AST, ALT, LIPASE, BILIDIR, BILITOT, ALKPHOS in the last 72 hours. Invalid input(s):   AMYLASE,  ALB  INR   Recent Labs     10/18/22  1358   INR 1.2     PTT   Lab Results   Component Value Date    APTT 25.6 07/22/2016         RADIOLOGY     (See actual reports for details)    ASSESSMENT/PLAN     Patient Active Problem List   Diagnosis    GERD (gastroesophageal reflux disease)    Hoarseness of voice    MVP (mitral valve prolapse)    Depression with suicidal ideation    Lymphocytic colitis    History of migraine headaches    Hyperlipidemia    Sleep apnea    IGT (impaired glucose tolerance)    History of renal calculi    Lactose intolerance    History of seizure disorder    Leukopenia    Bipolar disorder, mixed ([facility] [address]    Illicit drug use    Postlaminectomy syndrome, cervical region    Degeneration of cervical intervertebral disc    Encounter for long-term (current) use of other medications    Bradycardia    Cellulitis    Hypokalemia    Primary hypertension    Elevated lipase    Right lower quadrant abdominal pain    Nausea    Diarrhea    Acute encephalopathy    Polysubstance abuse (HCC)    Fibromyalgia    Cocaine addiction (HCC)    Shoulder arthritis    Osteoarthritis of left glenohumeral joint    Abnormal EKG    Anemia    Anxiety    Carotid artery stenosis    COPD (chronic obstructive pulmonary disease) ([facility] [address]    Dental disease    HUTCHINSON (dyspnea on exertion)    Fatigue    Fractures    H/O gastric bypass    History of crack cocaine use    History of UTI    Injury of back    Intractable chronic migraine without aura and without status migrainosus    Intractable migraine with aura with status migrainosus    Kidney stones    Dizziness    Memory loss    Mild pulmonary hypertension (HCC)    Mild tricuspid regurgitation    Primary osteoarthritis of left shoulder    Seizure-like activity (HCC)    Stress at home    TMJ (dislocation of temporomandibular joint)    Tobacco abuse    Visual impairment    Internal hernia    Vitamin D deficiency    Iron deficiency    SIRS (systemic inflammatory response syndrome) (HCC)    NICKI (acute kidney injury) ([facility] [address]    Severe malnutrition ([facility] [address]    Elevated d-dimer    Leukocytosis    Pulmonary nodules    Delirium due to another medical condition    Acute metabolic encephalopathy     MRSA bacteremia  Septic emboli  E. coli UTI  Acute kidney injury, resolved  Suspect COPD, seen once in the office by Dr. Kodak Pete in 2019, FEV1 2.04 L or 86% predicted  History of opiate and cocaine use-reportedly uses crack cocaine at least twice weekly  History of tobacco use  Recent COVID +9/30  Elevated troponin likely type II MI  Encephalopathy, improving  History of hypertension  History of seizure disorder  13. CT scan did reveal evidence of septic emboli. There is a pleural effusion specifically on the left. 14, bipolar, mixed disorder  15. Full code    Patient is status postthoracentesis on left. 400 mL of fluid removed. Gram stain positive with gram-positive cocci in clusters. Reported the fluid was fred-colored. Consistent with a complicated parapneumonic effusion. Repeat chest x-ray in the a.m. Considering repeating a chest CT scan. Currently on vancomycin. Follow-up with other service.       Electronically signed by Anson Jhaveri MD on 10/19/2022 at 2:53 PM

## 2022-10-19 NOTE — ANESTHESIA POSTPROCEDURE EVALUATION
Department of Anesthesiology  Postprocedure Note    Patient: Trevor Mtz  MRN: [id number]  YOB: 1970  Date of evaluation: 10/19/2022      Procedure Summary     Date: 10/19/22 Room / Location: [address] [place] [address] / [facility]: AMEENA MARTÍNEZ    Anesthesia Start: 7535 Anesthesia Stop: 5654    Procedure: TRANSESOPHAGEAL ECHOCARDIOGRAM WITH BUBBLE STUDY Diagnosis:       Bacteremia      (BACTEREMIA)      (IP RM 2117)    Surgeons: Marquez Quinn DO Responsible Provider: Divya Delgado MD    Anesthesia Type: general ASA Status: 3          Anesthesia Type: No value filed.     Abdi Phase I: Abid Score: 5    Abdi Phase II:        Anesthesia Post Evaluation    Comments: POST- ANESTHESIA EVALUATION       Pt Name: Trevor Mtz  MRN: [id number]  [place]: 1970  Date of evaluation: 10/19/2022  Time:  12:02 PM      BP (!) 116/59   Pulse 78   Temp 98.2 °F (36.8 °C) (Infrared)   Resp 27   Ht 4' 11\" (1.499 m)   Wt 104 lb (47.2 kg)   SpO2 93%   BMI 21.01 kg/m²      Consciousness Level  Awake  Cardiopulmonary Status  Stable  Pain Adequately Treated YES  Nausea / Vomiting  NO  Adequate Hydration  YES  Anesthesia Related Complications NONE      Electronically signed by Divya Delgado MD on 10/19/2022 at 12:02 PM

## 2022-10-19 NOTE — PROGRESS NOTES
[facility]   Progress Note                   Date:   10/16/22  Patient name: Bassam Varela  Date of admission:  10/10/2022  4:37 AM  MRN:   [id number]  YOB: 1970  PCP: Barbara Pepe MD    Reason for Admission:     Subjective:       Clinical Changes / Abnormalities:    Remains lethargic, not interactive with me during my encounter. S/p LP and Thora yesterday. Dad at bedside in preop area. Medications:   Scheduled Meds:   [MAR Hold] vancomycin  750 mg IntraVENous Q12H    [MAR Hold] ciprofloxacin  400 mg IntraVENous Q12H    [MAR Hold] Vitamin D  2,000 Units Oral Daily    [MAR Hold] carvedilol  3.125 mg Oral BID WC    [MAR Hold] sodium chloride flush  5-40 mL IntraVENous 2 times per day    [Held by provider] heparin (porcine)  5,000 Units SubCUTAneous BID    [MAR Hold] ARIPiprazole  5 mg Oral Daily    [MAR Hold] atorvastatin  40 mg Oral Daily    [MAR Hold] divalproex  1,000 mg Oral BID    [MAR Hold] DULoxetine  60 mg Oral Daily    [MAR Hold] ferrous sulfate  325 mg Oral BID WC    [MAR Hold] ipratropium-albuterol  1 ampule Inhalation Q4H WA    [MAR Hold] vancomycin (VANCOCIN) intermittent dosing (placeholder)   Other RX Placeholder     Continuous Infusions:   [MAR Hold] sodium chloride 50 mL/hr at 10/18/22 1200     CBC:   Recent Labs     10/17/22  0438   WBC 7.3   HGB 9.9*   *       BMP:    Recent Labs     10/16/22  1942 10/17/22  0438 10/18/22  0450   NA  --  138 140   K 3.9 3.3* 3.7   CL  --  103 105   CO2  --  26 26   BUN  --  8 9   CREATININE  --  0.46* 0.59   GLUCOSE  --  76 70       Hepatic: No results for input(s): AST, ALT, ALB, BILITOT, ALKPHOS in the last 72 hours. Troponin: No results for input(s): TROPONINI in the last 72 hours. BNP: No results for input(s): BNP in the last 72 hours. Lipids: No results for input(s): CHOL, HDL in the last 72 hours.     Invalid input(s): LDLCALCU  INR:   Recent Labs     10/18/22  1358   INR 1.2       Objective:   Vitals: BP (!) 174/72   Pulse 81   Temp 97.1 °F (36.2 °C) (Infrared)   Resp 20   Ht 4' 11\" (1.499 m)   Wt 104 lb (47.2 kg)   SpO2 93%   BMI 21.01 kg/m²   General appearance: lethargic and uncooperative with exam  HEENT: Head: Normocephalic, no lesions, without obvious abnormality. Neck: no adenopathy, no carotid bruit, no JVD, supple, symmetrical, trachea midline and thyroid not enlarged, symmetric, no tenderness/mass/nodules  Lungs: clear to auscultation bilaterally  Heart: regular rate and rhythm, S1, S2 normal, 3/6 systolic murmur  Abdomen: soft, non-tender; bowel sounds normal; no masses,  no organomegaly  Extremities: extremities normal, atraumatic, no cyanosis or edema  Neurologic: Mental status: lethargic; not answering questions    2D ECHO ( 10/11/22)  Global left ventricular systolic function is normal. Estimated ejection  fraction is 55-60%. No obvious wall motion abnormality seen. Left and right atrium is normal in size. No significant valvular regurgitation or stenosis seen. Estimated right ventricular systolic pressure is 19 mmHg. No pericardial effusion seen. Normal diastolic filling. Assessment / Acute Cardiac Problems:   Elevated troponins. Most likely type II MI  Sepsis  with septic shock, resolved  3. MRSA bacteremia with 2/2 BC's + from 10/10/22  4. Suspected septic emboli to lungs; normal cranial MRI 10/14  5. NICKI, improved  6. Drug abuse (positive cocaine)  7. Encephalopathy with severe lethargy and uncooperative for ADRIANNE;  possible functional component with Psychiatry evaluation in progress  8. Recent COVID-19 infection, + PCR on 9/30/22  9. COPD with acute exacerbation, improved. 10. Systolic murmur on exam- Reviewed Echo 10/11/22- Murmur is likely due to hyperdynamic LVEF > 75% with sigmoid septum, no obvious HOCM/LVOT obstruction.      Plan of Treatment:   Will get consent from father- (he has been providing consent for her other procedures- LP and Thora)  Briefly, the potential risks include, but are not limited to, bleeding, damage to teeth or pharynx, esophageal damage or rupture, chamber perforation, tamponade, respiratory failure requiring intubation, need for emergent surgery, and even death. All questions and concerns were answered. The father agreed to proceed with the procedure understanding the above risks and alternatives to the procedure. Consent in paper chart.     ID would like ADRIANNE  Plan for ADRIANNE today  Continue atorvastatin and carvedilol  Continuing IV vancmycin    D/w nurse    Electronically signed by Evert Garcia DO on 10/19/2022 at 9:29 [address] Card[facility]  [phone]

## 2022-10-19 NOTE — PROGRESS NOTES
[id number] [facility] Neurology   IN-PATIENT SERVICE      NEUROLOGY PROGRESS  NOTE            Date:   10/19/2022  Patient name:  Jose Ham  Date of admission:  10/10/2022  YOB: 1970      Interval History:     Patient is very lethargic, difficult to arouse. Not much change neurologically last 24 hours. Underwent lumbar puncture yesterday with CSF studies which are basically unremarkable for infection. Underwent ADRIANNE today with no evidence of vegetations. History of Present Illness: The patient is a 46 y.o. female who presents with Shortness of Breath and Positive For Covid-19  . The patient was seen and examined and the chart was reviewed. Neurology consulted for encephalopathy. MRI brain done 10/14 showed no acute intracranial abnormalities.        Past Medical History:     Past Medical History:   Diagnosis Date    Anemia     Arthritis     Asthma     Bipolar disorder, mixed ([address]     Carotid artery stenosis 2/13/2020    Cocaine abuse ([address] 11/4/2014    COPD (chronic obstructive pulmonary disease) (HCC)     Degeneration of cervical intervertebral disc     Depression with anxiety     Depression with anxiety     Fibromyalgia 1/5/2017    GERD (gastroesophageal reflux disease)     History of migraine headaches 6/10/2014    History of renal calculi 6/10/2014    History of seizure disorder 6/10/2014    Hoarseness of voice     HTN (hypertension) 6/10/2014    Hyperlipidemia 6/10/2014    IGT (impaired glucose tolerance) 6/10/2014    Kidney stones     Lactose intolerance 6/10/2014    Leukopenia 6/10/2014    Major depressive disorder, recurrent episode, severe, without mention of psychotic behavior 11/6/2014    MVP (mitral valve prolapse)     Seizures ([address]     Sleep apnea 6/10/2014    Unspecified diseases of blood and blood-forming organs         Past Surgical History:     Past Surgical History:   Procedure Laterality Date    ANKLE FRACTURE SURGERY      recontruction surgery    APPENDECTOMY      BREAST LUMPECTOMY Right     CARDIAC CATHETERIZATION      CARPAL TUNNEL RELEASE      x2     SECTION      CHOLECYSTECTOMY      COLONOSCOPY  12    COLONOSCOPY  2016    severe spasms    COLONOSCOPY  2019    DR GOLDY MCINTOSH    GASTRIC BYPASS SURGERY      GASTRIC BYPASS SURGERY      HYSTERECTOMY (CERVIX STATUS UNKNOWN)      HYSTERECTOMY (CERVIX STATUS UNKNOWN)      LAPAROSCOPY N/A 2022    LAPAROSCOPY EXPLORATORY CONVERTED TO OPEN EXPLORATORY LAPAROTOMY, LYSIS OF ADHESIONS, REDUCTION OF INTERNAL HERNIA performed by Isabel Canchola DO at [address] [facility]      APPLICATION OF ARCH BARS,SIMPLE EXTRACTION OF #15 AND RT TMJ JOINT REPLACEMENT    SHOULDER ARTHROSCOPY Left 2019    DR ROBERT GREEN    SHOULDER SURGERY      TONSILLECTOMY AND ADENOIDECTOMY      TRANSESOPHAGEAL ECHOCARDIOGRAM N/A 10/19/2022    TRANSESOPHAGEAL ECHOCARDIOGRAM WITH BUBBLE STUDY performed by Frandy Royal DO at [address] [facility]  7-3-12    egd    UPPER GASTROINTESTINAL ENDOSCOPY  2016    status post gastrectomy with a small remnant of gastric pouch.     UPPER GASTROINTESTINAL ENDOSCOPY  2019    DR Zenobia Blizzard        Medications during admission:      carvedilol  25 mg Oral BID WC    vancomycin  750 mg IntraVENous Q12H    Vitamin D  2,000 Units Oral Daily    sodium chloride flush  5-40 mL IntraVENous 2 times per day    [Held by provider] heparin (porcine)  5,000 Units SubCUTAneous BID    ARIPiprazole  5 mg Oral Daily    atorvastatin  40 mg Oral Daily    divalproex  1,000 mg Oral BID    DULoxetine  60 mg Oral Daily    ferrous sulfate  325 mg Oral BID WC    ipratropium-albuterol  1 ampule Inhalation Q4H WA    vancomycin (VANCOCIN) intermittent dosing (placeholder)   Other RX Placeholder         Physical Exam:   /68   Pulse 75   Temp 98.1 °F (36.7 °C) (Oral)   Resp 20   Ht 4' 11\" (1.499 m)   Wt 104 lb (47.2 kg)   SpO2 92%   BMI 21.01 kg/m² Temp (24hrs), Av.3 °F (36.8 °C), Min:97.1 °F (36.2 °C), Max:99.2 °F (37.3 °C)          Neurological examination:    Mental status    Alert and oriented to self. Lethargic. Requires multiple prompts to arouse. following all commands with repeated prompting. Cranial nerves    II - pupils reactive  III, IV, VI - extraocular muscles intact; no VINNIE; no nystagmus; no ptosis   V - normal facial sensation                                                               VII - normal facial symmetry                                                             VIII - intact hearing                                                                                 Motor function  3/5 strength in all extremities. Moves them all symmetrically. Sensory function Withdrawing to deep nailbed pressure symmetrically in all 4 extremities. Cerebellar Intact finger-nose-finger testing. Reflex function 2/4 symmetric throughout . Downgoing plantar response bilaterally.  (-)West's sign bilaterally    Gait                  Not assessed due to lethargy                     Diagnostics:      Laboratory Testing:  CBC:   Recent Labs     10/17/22  0438   WBC 7.3   HGB 9.9*   *     BMP:    Recent Labs     10/16/22  1942 10/17/22  0438 10/18/22  0450   NA  --  138 140   K 3.9 3.3* 3.7   CL  --  103 105   CO2  --  26 26   BUN  --  8 9   CREATININE  --  0.46* 0.59   GLUCOSE  --  76 70         Lab Results   Component Value Date    CHOL 156 2022    LDLCHOLESTEROL 101 2022    HDL 39 (L) 2022    TRIG 78 2022    ALT 17 10/10/2022    AST 41 (H) 10/10/2022    TSH 1.37 2022    INR 1.2 10/18/2022    LABA1C 6.1 (H) 2022    LABMICR 419 (H) 10/10/2022    [id number] 311 2022       Lab Results   Component Value Date    VALPROATE 120 10/14/2022           Imaging/Diagnostics:  MRI BRAIN WO CONTRAST    Narrative  EXAMINATION:  MRI OF THE BRAIN WITHOUT CONTRAST  10/14/2022 10:31 am    TECHNIQUE:  Multiplanar multisequence MRI of the brain was performed without the  administration of intravenous contrast.    COMPARISON:  CT brain performed 04/08/2022. HISTORY:  ORDERING SYSTEM PROVIDED HISTORY: Stupor possible systemic embolism SIRS  recent COVID   recent crack cocaine  TECHNOLOGIST PROVIDED HISTORY:  Stupor possible systemic embolism SIRS recent COVID   recent crack cocaine  Reason for Exam: Stupor possible systemic embolism SIRS recent COVID recent  crack cocaine  Additional signs and symptoms: PATIENT UNABLE TO FOLLOW EXAM INSTRUCTIONS DUE  TO MENTAL STATUS    FINDINGS:  INTRACRANIAL STRUCTURES/VENTRICLES: The sellar and suprasellar structures,  optic chiasm, corpus callosum, pineal gland, tectum, and midline brainstem  structures are unremarkable. The craniocervical junction is unremarkable. There is no acute hemorrhage, mass effect, or midline shift. There is  satisfactory overall gray-white matter differentiation. The ventricular  structures are symmetric and unremarkable. The infratentorial structures  including the cerebellopontine angles and internal auditory canals are  unremarkable. There is no abnormal restricted diffusion. There is no  abnormal blooming artifact on susceptibility weighted imaging. ORBITS: The visualized portion of the orbits demonstrate no acute abnormality. SINUSES: The visualized paranasal sinuses and mastoid air cells demonstrate  no acute abnormality. BONES/SOFT TISSUES: The bone marrow signal intensity appears normal. The soft  tissues demonstrate no acute abnormality. Impression  Unremarkable MRI of the brain without acute abnormality.     RECOMMENDATIONS:  Unavailable    Results for orders placed during the hospital encounter of 10/29/14    MRI Brain W WO Contrast    Narrative  ** FINAL **  Procedure:  MRI BRAIN COMBINED  CMR  10/29/2014     [id number]  Reason for Exam:  Godley Giorgio, epilepsy    FULL RESULT:   MRI brain:    Clinical indication: Headaches. Dizziness. Seizures. Multisequence, multiplanar imaging of the brain was performed without and  with IV gadolinium (10 ML, Magnevist). Findings:    Brain parenchyma is essentially normal in signal intensity. A few  scattered white matter hyperintensities are identified on FLAIR and  T2-weighted sequences, mostly in the subcortical location. Sulci,  cisterns and ventricles have a normal appearance. No restricted diffusion  identified on DWI. There is no abnormal extra-axial fluid collection. No  abnormal enhancement is identified following IV gadolinium. The pituitary, chiasmal, brainstem and cerebellar structures appear  unremarkable The craniocervical junction and cerebellopontine angle have  a normal appearance. Major intracerebral vessels demonstrate normal flow  voids. The orbits, paranasal sinuses and mastoid air cells are  unremarkable. Impression  A few scattered nonspecific white matter hyperintensities, but otherwise  unremarkable examination. Report Electronically signed by Charline Muñoz M.D. on 10/30/2014 6:00 AM  Transcribed by: [facility] on Oct 30 2014  6:02A  Read by:  Susan Jimenze M.D.  [id number] on Oct 30 2014  6:02A  Electronically Signed by:  Carmel Dave M.D. on:  Oct 30 2014  6:02A    No results found for this or any previous visit. Results for orders placed during the hospital encounter of 04/08/22    CT Head WO Contrast    Narrative  EXAMINATION:  CT OF THE HEAD WITHOUT CONTRAST  4/8/2022 8:01 am    TECHNIQUE:  CT of the head was performed without the administration of intravenous  contrast. Dose modulation, iterative reconstruction, and/or weight based  adjustment of the mA/kV was utilized to reduce the radiation dose to as low  as reasonably achievable.     COMPARISON:  29 March 2022    HISTORY:  ORDERING SYSTEM PROVIDED HISTORY: seizure  TECHNOLOGIST PROVIDED HISTORY:  seizure    Decision Support Exception - unselect if not a suspected or confirmed  emergency medical condition->Emergency Medical Condition (MA)  Is the patient pregnant?->No  Reason for Exam: seizure  Additional signs and symptoms: per ER RN-pt had seizure approx 1 week ago,  headaches    FINDINGS:  BRAIN/VENTRICLES: There is no acute intracranial hemorrhage, mass effect or  midline shift. No abnormal extra-axial fluid collection. The gray-white  differentiation is maintained without evidence of an acute infarct. There is  no evidence of hydrocephalus. Mild cortical atrophy is present. Beam  hardening artifact from hardware in the right TMJ joint somewhat complicates  assessment of the inferior right temporal lobe. ORBITS: The visualized portion of the orbits demonstrate no acute abnormality. SINUSES: The visualized paranasal sinuses and mastoid air cells demonstrate  no acute abnormality. SOFT TISSUES/SKULL:  Again noted is hardware in the right TMJ joint. Severe  arthritic change left TMJ joint is noted. No acute bony abnormality in the  skull. Minimal soft tissue swelling over the right parietal area. Impression  No acute intracranial abnormality. Minimal soft tissue swelling over the  right parietal area consistent with resolving hematoma. RECOMMENDATIONS:  Unavailable        I personally reviewed all of the above medications, clinical laboratory, imaging and other diagnostic tests. Impression:      Lethargy, confusion in the setting of MRSA bacteremia, pulmonary septic emboli contributing to acute metabolic encephalopathy. Plan: We will repeat MRI brain.   Will perform with and without contrast.  Rule out stroke  Routine EEG  Limits any type of sedating medications  Will follow        Electronically signed by Candy Allan DO on 10/19/2022 at 4:56 PM      Candy Allan [address] [place] [address]  Neurology

## 2022-10-19 NOTE — PROGRESS NOTES
Progress Note  Date:10/19/2022       Room:[address] [place] [address]  Patient Name:Anu Bourne     YOB: 1970     Age:51 y.o. Subjective    Subjective:  Symptoms:  (Somnolent and did not awaken with exam. ). Diet:  Poor intake. Activity level: Impaired due to weakness. Pain:  She reports no pain. Review of Systems  Objective         Vitals Last 24 Hours:  TEMPERATURE:  Temp  Av.3 °F (36.8 °C)  Min: 97.4 °F (36.3 °C)  Max: 99 °F (37.2 °C)  RESPIRATIONS RANGE: Resp  Av.6  Min: 20  Max: 33  PULSE OXIMETRY RANGE: SpO2  Av.1 %  Min: 91 %  Max: 98 %  PULSE RANGE: Pulse  Av.8  Min: 68  Max: 102  BLOOD PRESSURE RANGE: Systolic (44MEI), DNP:912 , Min:108 , INU:748   ; Diastolic (00CKV), ROP:62, Min:62, Max:86    I/O (24Hr): Intake/Output Summary (Last 24 hours) at 10/19/2022 0653  Last data filed at 10/18/2022 1921  Gross per 24 hour   Intake 360 ml   Output --   Net 360 ml     Objective:  General Appearance:  Comfortable. Vital signs: (most recent): Blood pressure (!) 174/72, pulse 81, temperature 97.1 °F (36.2 °C), temperature source Infrared, resp. rate 20, height 4' 11\" (1.499 m), weight 104 lb (47.2 kg), SpO2 93 %. Vital signs are normal.    Lungs:  Normal effort and normal respiratory rate. Breath sounds clear to auscultation. There are decreased breath sounds. Heart: Normal rate. Regular rhythm. S1 normal and S2 normal.    Labs/Imaging/Diagnostics    Labs:  CBC:  Recent Labs     10/17/22  0438   WBC 7.3   RBC 3.79*   HGB 9.9*   HCT 30.2*   MCV 79.9*   RDW 15.6*   *     CHEMISTRIES:  Recent Labs     10/16/22  1942 10/17/22  0438 10/18/22  0450   NA  --  138 140   K 3.9 3.3* 3.7   CL  --  103 105   CO2  --  26 26   BUN  --  8 9   CREATININE  --  0.46* 0.59   GLUCOSE  --  76 70   PHOS  --  3.2 3.6   MG  --  1.9 2.0     PT/INR:  Recent Labs     10/18/22  1358   PROTIME 15.4*   INR 1.2     APTT:No results for input(s): APTT in the last 72 hours.   LIVER PROFILE:No results for input(s): AST, ALT, BILIDIR, BILITOT, ALKPHOS in the last 72 hours. Imaging Last 24 Hours:  CT CHEST WO CONTRAST    Result Date: 10/17/2022  EXAMINATION: CT OF THE CHEST WITHOUT CONTRAST 10/17/2022 8:14 am TECHNIQUE: CT of the chest was performed without the administration of intravenous contrast. Multiplanar reformatted images are provided for review. Automated exposure control, iterative reconstruction, and/or weight based adjustment of the mA/kV was utilized to reduce the radiation dose to as low as reasonably achievable. COMPARISON: CT abdomen and pelvis dated 10/10/2022. Chest x-ray dated 10/11/2022. HISTORY: ORDERING SYSTEM PROVIDED HISTORY: Please evaluate extent of what appears to be septic emboli pattern TECHNOLOGIST PROVIDED HISTORY: Please evaluate extent of what appears to be septic emboli pattern Is the patient pregnant?->No Reason for Exam: sob,recent covid FINDINGS: Mediastinum: Heart is normal in size. There is no pericardial effusion. Thoracic aorta and pulmonary arteries are grossly normal in caliber. There is calcified plaque at the aortic arch. Moderate coronary artery calcification. There is a mildly enlarged pretracheal lymph node, measuring 1 cm in short axis dimension. No other visible mediastinal lymphadenopathy. Lungs/pleura: There is a small amount of mildly loculated pleural fluid in the right hemithorax. Small left pleural effusion is noted as well, less loculated in appearance. There are numerous nodular opacities throughout both lungs, increased from 10/10/2022. Many (but not all) of the nodules are cavitary. There is focal consolidation with cavitation in the superior segment right lower lobe. Focal consolidation is also noted at the left base. Largest nodules are on the right, measuring up to 2.9 cm. No pneumothorax. Upper Abdomen: No acute findings in the limited visualized upper abdomen. Soft Tissues/Bones: Left shoulder arthroplasty.   No acute or suspicious osseous abnormality. 1.  Numerous bilateral nodular opacities in both lungs, many of which are cavitary, increased when compared to 10/10/2022, most likely septic emboli. Short-term follow-up to document resolution recommended. 2.  Focal consolidation with cavitation in the superior segment right lower lobe and focal consolidation at the left base, likely infarct or pneumonia. 3.  Small bilateral mildly loculated pleural effusions, new from 10/10/2022. 4.  Borderline mediastinal lymphadenopathy, likely reactive. XR CHEST PORTABLE    Result Date: 10/18/2022  EXAMINATION: ONE XRAY VIEW OF THE CHEST 10/18/2022 6:24 pm COMPARISON: Chest radiograph dated 10/11/2022. Chest CT dated 10/17/2022. HISTORY: ORDERING SYSTEM PROVIDED HISTORY: Empyema, s/p thoracentesis TECHNOLOGIST PROVIDED HISTORY: Empyema, s/p thoracentesis Reason for Exam: S/p thoracentesis FINDINGS: The cardiac silhouette appears within normal limits. Previously seen left-sided pleural effusion appears to have decreased in size. There is a small left and trace right pleural effusion. There is redemonstration of multiple bilateral nodular opacity of the both lungs, suspicious for underlying septic emboli better seen and evaluated on the chest CT dated 10/17/2022. Components of left reverse total shoulder arthroplasty and changes of prior orthopedic fixation/fusion of the cervicothoracic spine are partially visualized. There are cholecystectomy clips overlying the right upper abdomen. Interval decrease in size of left-sided pleural effusion. Small left and trace right pleural effusion. Redemonstration of bilateral nodular opacities of the both lungs, suspicious for underlying septic emboli better seen and evaluated on the chest CT dated 10/17/2022.      IR LUMBAR PUNCTURE FOR DIAGNOSIS    Result Date: 10/18/2022  EXAMINATION: FLUOROSCOPIC GUIDED LUMBAR PUNCTURE 10/18/2022 4:47 pm HISTORY: ORDERING SYSTEM PROVIDED HISTORY: rule out encephalitis. underlying MRSA bacteremia TECHNOLOGIST PROVIDED HISTORY: rule out encephalitis. underlying MRSA bacteremia Is the patient pregnant?->No FLUOROSCOPY DOSE AND TYPE OR TIME AND EXPOSURES: Dose: 2 mGy D AP: 23 centigrays cm squared 3 fluoroscopic images sent to PACS FLUOROSCOPY TIME: 26 seconds PROCEDURE: :  Corey Agosto Informed consent was obtained via telephone from family after the risks and benefits of the procedure were discussed with the patient and all questions were answered fully. [place] protocol was observed and a standard timeout was performed. A timeout was performed prior to commencing the procedure. The patient was positioned prone and the back was prepped and draped in the normal sterile fashion. 1% lidocaine was used for local anesthesia. The subarachnoid space was accessed with a 22-gauge 3.5\" spinal needle at the L2/L3 level. Free flow of clear CSF was noted. Approximately 11.5 ml of clear CSF was removed and sent for analysis. The stylet was reinserted, spinal needle was removed and brief pressure was applied at the puncture site. There were no immediate complications and the patient tolerated the procedure well. Successful fluoroscopic-guided lumbar puncture. IR GUIDED THORACENTESIS PLEURAL    Result Date: 10/18/2022  PROCEDURE: Faviola Curtis LEFT THORACENTESIS 10/18/2022 HISTORY: ORDERING SYSTEM PROVIDED HISTORY: evaluate for empyema TECHNOLOGIST PROVIDED HISTORY: evaluate for empyema Which side should the procedure be performed? ->Left Is the patient pregnant?->No TECHNIQUE: Informed consent was from family via telephone obtained after a detailed explanation of the procedure including risks, benefits, and alternatives. Universal protocol was performed. A timeout was performed prior to commencing the procedure. The left chest was prepped and draped in sterile fashion and local anesthesia was achieved with lidocaine.   An 5 [facility] needle sheath was advanced under ultrasound guidance into pleural effusion and thoracentesis was performed. After completion, the needle was removed and hemostasis obtained with direct pressure. A sterile dressing was applied. The patient tolerated the procedure well. FINDINGS: A total of 400 mL cloudy dark fred fluid was removed. The entirety was sent to the lab. Successful ultrasound guided thoracentesis. Chest x-ray pending. Assessment//Plan           Hospital Problems             Last Modified POA    * (Principal) SIRS (systemic inflammatory response syndrome) ([facility] 10/10/2022 Yes    NICKI (acute kidney injury) ([address] 10/11/2022 Yes    Severe malnutrition (HCC) (Chronic) 10/11/2022 Yes    Elevated d-dimer 10/15/2022 Yes    Leukocytosis 10/15/2022 Yes    Pulmonary nodules 10/15/2022 Yes    Delirium due to another medical condition 10/17/2022 Yes    Acute metabolic encephalopathy 98/98/9904 Yes    Depression with suicidal ideation 10/17/2022 Yes    Bipolar disorder, mixed ([address] 10/11/2022 Yes    Acute encephalopathy 10/14/2022 Yes    COPD (chronic obstructive pulmonary disease) ([address] 10/11/2022 Yes    H/O gastric bypass 10/11/2022 Yes    Overview Signed 2/13/2020  7:19 PM by Travis Richardson MD     enlarged  pancreas, here for surgical work-up for EGD          Assessment & Plan  ADRIANNE today    LP and thoracentesis yesterday.        Electronically signed by Travis Richardson MD on 10/19/22 at 6:53 AM EDT

## 2022-10-19 NOTE — PROGRESS NOTES
1.Continue to follow with Dr. Gary.    2.  Follow up with Dr. Cortés as scheduled.   3.  Follow up with me as needed.  I will communicate with Dr. Gary.  She will contact me if any concerns with infection in the heel.   4.  Don't hesitate to call for any questions/concerns.     Patient back to room 2117 after ADRIANNE with Dr. Charly Mireles. Vital signs taken, patient reassessed, heart monitor checked and update given to father at bedside. Patient sleeping with same response to painful stimuli as this morning's initial assessment. Electronically signed by Mikki Flores RN.

## 2022-10-19 NOTE — PROGRESS NOTES
[facility] -   Infectious diseases evaluation  admission date 10/10/2022    reason for consultation:   MRSA bacteremia    Impression :   Current:  MRSA bacteremia  Bilateral pulmonary nodules with cavitation small loculated  pleural effusion  Sepsis secondary to above  Encephalopathy  History of cocaine drug abuse  E. coli UTI treated  Acute renal failure improved  Hypertension  Bipolar disorder      Recommendations        Continue IV vancomycin  Discontinue Cipro  Follow repeat blood cultures  Discussed with cardiology/ADRIANNE was done earlier today showed no vegetations  Follow-up pleural effusion and CSF culture  MRI brain on 10/14/22 unremarkable. Follow CBC and renal function  Supportive care  Discussed with nursing staff    Infection Control Recommendations   [place] Precautions  Contact Isolation       Antimicrobial Stewardship Recommendations   Simplification of therapy  Targeted therapy      History of Present Illness:   Initial history:  Jose R Schwab is a 46y.o.-year-old female was admitted 10/10/2020 for worsening shortness of breath associated with generalized body ache. The patient is drowsy, arousable, confused, follows simple commands, unable to provide history that was obtained from chart review and nursing staff. She does have history of cocaine abuse. She was found to have acute renal failure with a creatinine of 2.1. Urinalysis showed small leukocyte esterase, positive nitrate. Urine culture on 10/10/2022 grew E. Coli  Blood cultures 10/10/2022 2 of 2 grew MRSA  Echocardiogram showed no vegetations  Procalcitonin was 6.01, D-dimer was elevated at 3.85  Chest x-ray showed right lung nodules. VQ scan was indeterminant.   CT chest without contrast 10/16/2022 showed numerous bilateral nodular opacities both lungs suggestive of septic emboli , focal consolidation with cavitation in the superior right lower lobe focal consolidation at the left base, small bilateral loculated pleural effusion and mediastinal lymphadenopathy. Echocardiogram showed no vegetations  Interval changes  10/19/2022   She remains confused, had ADRIANNE earlier today showed no vegetations. Thoracentesis was done yesterday  Spinal tap was done yesterday, clear fluid, 3 WBC, 23 RBC, meningitis panel negative. Renal function normal yesterday  Patient Vitals for the past 8 hrs:   BP Temp Temp src Pulse Resp SpO2 Height Weight   10/19/22 1507 -- -- -- 76 20 94 % -- --   10/19/22 1442 129/63 99.2 °F (37.3 °C) Oral 76 24 94 % -- --   10/19/22 1215 (!) 140/73 99.2 °F (37.3 °C) Oral 77 26 94 % -- --   10/19/22 1150 (!) 116/59 -- -- 78 27 92 % -- --   10/19/22 1140 127/67 98.2 °F (36.8 °C) Infrared 81 27 92 % -- --   10/19/22 1130 119/63 -- -- 80 29 92 % -- --   10/19/22 1120 125/60 -- -- 81 (!) 31 93 % -- --   10/19/22 1116 138/65 98.7 °F (37.1 °C) Infrared 80 29 95 % -- --   10/19/22 0915 (!) 174/72 97.1 °F (36.2 °C) Infrared 81 20 93 % 4' 11\" (1.499 m) 104 lb (47.2 kg)             I have personally reviewed the past medical history, past surgical history, medications, social history, and family history, and I haveupdated the database accordingly. Allergies:   Aspirin, Bactrim, and Codeine     Review of Systems:     Review of Systems  Unable to provide due to mental status changes  Physical Examination :       Physical Exam  Constitutional:       Appearance: She is ill-appearing. HENT:      Head: Normocephalic and atraumatic. Right Ear: External ear normal.      Left Ear: External ear normal.   Eyes:      General: No scleral icterus. Conjunctiva/sclera: Conjunctivae normal.   Cardiovascular:      Rate and Rhythm: Regular rhythm. Heart sounds: No murmur heard. Pulmonary:      Effort: No respiratory distress. Breath sounds: Rhonchi present. Comments:   Decreased breath sounds bilaterally  Abdominal:      Palpations: Abdomen is soft. Tenderness:  There is no abdominal tenderness. Musculoskeletal:      Cervical back: Neck supple. No rigidity. Right lower leg: No edema. Left lower leg: No edema. Skin:     Coloration: Skin is not jaundiced. Findings: No erythema.    Neurological:      Comments: Drowsy, moves all extremities, follows simple commands       Past Medical History:     Past Medical History:   Diagnosis Date    Anemia     Arthritis     Asthma     Bipolar disorder, mixed ([facility] [address]     Carotid artery stenosis 2020    Cocaine abuse ([facility] [address] 2014    COPD (chronic obstructive pulmonary disease) (HCC)     Degeneration of cervical intervertebral disc     Depression with anxiety     Depression with anxiety     Fibromyalgia 2017    GERD (gastroesophageal reflux disease)     History of migraine headaches 6/10/2014    History of renal calculi 6/10/2014    History of seizure disorder 6/10/2014    Hoarseness of voice     HTN (hypertension) 6/10/2014    Hyperlipidemia 6/10/2014    IGT (impaired glucose tolerance) 6/10/2014    Kidney stones     Lactose intolerance 6/10/2014    Leukopenia 6/10/2014    Major depressive disorder, recurrent episode, severe, without mention of psychotic behavior 2014    MVP (mitral valve prolapse)     Seizures ([facility])     Sleep apnea 6/10/2014    Unspecified diseases of blood and blood-forming organs        Past Surgical  History:     Past Surgical History:   Procedure Laterality Date    ANKLE FRACTURE SURGERY      recontruction surgery    APPENDECTOMY      BREAST LUMPECTOMY Right     CARDIAC CATHETERIZATION      CARPAL TUNNEL RELEASE      x2     SECTION      CHOLECYSTECTOMY      COLONOSCOPY  12    COLONOSCOPY  2016    severe spasms    COLONOSCOPY  2019    DR GOLDY MCINTOSH    GASTRIC BYPASS SURGERY      GASTRIC BYPASS SURGERY      HYSTERECTOMY (CERVIX STATUS UNKNOWN)      HYSTERECTOMY (CERVIX STATUS UNKNOWN)      LAPAROSCOPY N/A 2022    LAPAROSCOPY EXPLORATORY CONVERTED TO OPEN EXPLORATORY LAPAROTOMY, LYSIS OF ADHESIONS, REDUCTION OF INTERNAL HERNIA [facility]  57/[facility]051    APPLICATION OF ARCH BARS,SIMPLE EXTRACTION OF #15 AND RT TMJ JOINT REPLACEMENT    SHOULDER ARTHROSCOPY Left 06/05/2019    DR ROBERT GREEN    SHOULDER SURGERY      TONSILLECTOMY AND ADENOIDECTOMY      TRANSESOPHAGEAL ECHOCARDIOGRAM N/A 10/19/2022    TRANSESOPHAGEAL ECHOCARDIOGRAM WITH BUBBLE STUDY performed by Frandy Royal DO at [address]   7-3-12    egd    UPPER GASTROINTESTINAL ENDOSCOPY  12/19/2016    status post gastrectomy with a small remnant of gastric pouch.     UPPER GASTROINTESTINAL ENDOSCOPY  05/2019    DR Ed Kim       Medications:      carvedilol  25 mg Oral BID WC    vancomycin  750 mg IntraVENous Q12H    Vitamin D  2,000 Units Oral Daily    sodium chloride flush  5-40 mL IntraVENous 2 times per day    [Held by provider] heparin (porcine)  5,000 Units SubCUTAneous BID    ARIPiprazole  5 mg Oral Daily    atorvastatin  40 mg Oral Daily    divalproex  1,000 mg Oral BID    DULoxetine  60 mg Oral Daily    ferrous sulfate  325 mg Oral BID WC    ipratropium-albuterol  1 ampule Inhalation Q4H WA    vancomycin (VANCOCIN) intermittent dosing (placeholder)   Other RX Placeholder       Social History:     Social History     Socioeconomic History    Marital status:      Spouse name: Not on file    Number of children: Not on file    Years of education: Not on file    Highest education level: Not on file   Occupational History    Occupation: disability   Tobacco Use    Smoking status: Every Day     Packs/day: 0.50     Years: 33.00     Pack years: 16.50     Types: Cigarettes    Smokeless tobacco: Never    Tobacco comments:     quit  2000 started again  1/13    Substance and Sexual Activity    Alcohol use: Yes     Comment: occasional    Drug use: Not Currently    Sexual activity: Yes   Other Topics Concern    Not on file   Social History Narrative    Not on file     Social Determinants of Health     Financial Resource Strain: Not on file   Food Insecurity: Not on file   Transportation Needs: Not on file   Physical Activity: Not on file   Stress: Not on file   Social Connections: Not on file   Intimate Partner Violence: Not on file   Housing Stability: Not on file       Family History:     Family History   Problem Relation Age of Onset    Diabetes Mother     Cancer Mother     Coronary Art Dis Father     COPD Father     Depression Brother     Alcohol Abuse Brother     Cancer Other         lung and skin    Diabetes Maternal Grandmother     Cancer Paternal Grandmother       Medical Decision Making:   I have independently reviewed/ordered the following labs:    CBC with Differential:   Recent Labs     10/17/22  0438   WBC 7.3   HGB 9.9*   HCT 30.2*   *   LYMPHOPCT 7*   MONOPCT 5       BMP:  Recent Labs     10/17/22  0438 10/18/22  0450    140   K 3.3* 3.7    105   CO2 26 26   BUN 8 9   CREATININE 0.46* 0.59   MG 1.9 2.0       Hepatic Function Panel: No results for input(s): PROT, LABALBU, BILIDIR, IBILI, BILITOT, ALKPHOS, ALT, AST in the last 72 hours. No results for input(s): RPR in the last 72 hours. No results for input(s): HIV in the last 72 hours. No results for input(s): BC in the last 72 hours. Lab Results   Component Value Date/Time    CREATININE 0.59 10/18/2022 04:50 AM    GLUCOSE 70 10/18/2022 04:50 AM       Detailed results: Thank you for allowing us to participate in the care of this patient. Please call with questions. This note is created with the assistance of a speech recognition program.  While intending to generate adocument that actually reflects the content of the visit, the document can still have some errors including those of syntax and sound a like substitutions which may escape proof reading.   It such instances, actual meaningcan be extrapolated by contextual diversion.     Xochitl Parrish MD  Office: [phone]  Perfect serve / office [phone]

## 2022-10-19 NOTE — PROCEDURES
[facility]  ADRIANNE        Amie Hill  [id number]  1970    Today's Date: 10/19/2022  Primary/Ordering Cardiologist: Dave Espinosa  Indication: Bacteremia    Operators:  Primary: Dave Francisca    Patient seen and examined. History and Physical reviewed. Labs reviewed. After informed consent was obtained with explanation of the risks and benefits, the patient was brought to [address] [place] [address]. All sedation was administered via the Anesthesia department. The oropharynx was pre anesthetisized with cetacaine spray. A single intubation effort was required. ADRIANNE Positive findings:    Structures:    Normal LV function with EF 55%  No wall motion abnormalities  Normal wall thickness  No pericardial effusion  Left Atrium/SNEHAL- no thrombus  IntraAtrial Septum- negative bubble study  Descending Thoracic Aorta: Grade I Atheromatous disease      Valves:    Mitral valve:  Normal .No valvular vegetations or thrombus identified. Trivial MR  Aortic valve: Normal .Trileaflet. No valvular vegetations or thrombus identified. AV opens well, no AS/AI. Tricuspid valve: Normal .No valvular vegetations or thrombus identified. No TR. Pulmonic valve: not well visualized. No significant PI      Summary:     1. A ADRIANNE was performed without complications. 2. LVEF 55%  3. No thrombus or valvular vegetation identified  4. No blood on probe post procedure. 5. Trivial MR. D/w father in waiting room. Remainder of care per primary/ID teams.      Electronically signed by Dave Espinosa DO on 10/19/2022 at [address] DO, [facility], [address], [address], [place] [address] [facility]  [phone]

## 2022-10-20 ENCOUNTER — APPOINTMENT (OUTPATIENT)
Dept: MRI IMAGING | Age: 52
DRG: 871 | End: 2022-10-20
Payer: COMMERCIAL

## 2022-10-20 ENCOUNTER — APPOINTMENT (OUTPATIENT)
Dept: NEUROLOGY | Age: 52
DRG: 871 | End: 2022-10-20
Payer: COMMERCIAL

## 2022-10-20 ENCOUNTER — APPOINTMENT (OUTPATIENT)
Dept: GENERAL RADIOLOGY | Age: 52
DRG: 871 | End: 2022-10-20
Payer: COMMERCIAL

## 2022-10-20 ENCOUNTER — APPOINTMENT (OUTPATIENT)
Dept: INTERVENTIONAL RADIOLOGY/VASCULAR | Age: 52
DRG: 871 | End: 2022-10-20
Payer: COMMERCIAL

## 2022-10-20 ENCOUNTER — APPOINTMENT (OUTPATIENT)
Dept: CT IMAGING | Age: 52
DRG: 871 | End: 2022-10-20
Payer: COMMERCIAL

## 2022-10-20 LAB
ANION GAP SERPL CALCULATED.3IONS-SCNC: 10 MMOL/L (ref 9–17)
BUN BLDV-MCNC: 14 MG/DL (ref 6–20)
CALCIUM SERPL-MCNC: 8.1 MG/DL (ref 8.6–10.4)
CHLORIDE BLD-SCNC: 100 MMOL/L (ref 98–107)
CO2: 27 MMOL/L (ref 20–31)
CREAT SERPL-MCNC: 0.65 MG/DL (ref 0.5–0.9)
GFR SERPL CREATININE-BSD FRML MDRD: >60 ML/MIN/1.73M2
GLUCOSE BLD-MCNC: 68 MG/DL (ref 70–99)
POTASSIUM SERPL-SCNC: 3.8 MMOL/L (ref 3.7–5.3)
SODIUM BLD-SCNC: 137 MMOL/L (ref 135–144)
SURGICAL PATHOLOGY REPORT: NORMAL

## 2022-10-20 PROCEDURE — 2709999900 IR FLUORO GUIDED CVA DEVICE PLMT/REPLACE/REMOVAL

## 2022-10-20 PROCEDURE — 6370000000 HC RX 637 (ALT 250 FOR IP): Performed by: INTERNAL MEDICINE

## 2022-10-20 PROCEDURE — 2580000003 HC RX 258: Performed by: INTERNAL MEDICINE

## 2022-10-20 PROCEDURE — 6360000002 HC RX W HCPCS: Performed by: INTERNAL MEDICINE

## 2022-10-20 PROCEDURE — 2700000000 HC OXYGEN THERAPY PER DAY

## 2022-10-20 PROCEDURE — 99232 SBSQ HOSP IP/OBS MODERATE 35: CPT | Performed by: PSYCHIATRY & NEUROLOGY

## 2022-10-20 PROCEDURE — 94640 AIRWAY INHALATION TREATMENT: CPT

## 2022-10-20 PROCEDURE — A9579 GAD-BASE MR CONTRAST NOS,1ML: HCPCS | Performed by: FAMILY MEDICINE

## 2022-10-20 PROCEDURE — 80048 BASIC METABOLIC PNL TOTAL CA: CPT

## 2022-10-20 PROCEDURE — 2060000000 HC ICU INTERMEDIATE R&B

## 2022-10-20 PROCEDURE — 2500000003 HC RX 250 WO HCPCS: Performed by: INTERNAL MEDICINE

## 2022-10-20 PROCEDURE — 99232 SBSQ HOSP IP/OBS MODERATE 35: CPT | Performed by: FAMILY MEDICINE

## 2022-10-20 PROCEDURE — 94761 N-INVAS EAR/PLS OXIMETRY MLT: CPT

## 2022-10-20 PROCEDURE — 36415 COLL VENOUS BLD VENIPUNCTURE: CPT

## 2022-10-20 PROCEDURE — 71250 CT THORAX DX C-: CPT

## 2022-10-20 PROCEDURE — 6360000004 HC RX CONTRAST MEDICATION: Performed by: FAMILY MEDICINE

## 2022-10-20 PROCEDURE — 99233 SBSQ HOSP IP/OBS HIGH 50: CPT | Performed by: INTERNAL MEDICINE

## 2022-10-20 PROCEDURE — 95822 EEG COMA OR SLEEP ONLY: CPT

## 2022-10-20 PROCEDURE — 71045 X-RAY EXAM CHEST 1 VIEW: CPT

## 2022-10-20 PROCEDURE — 2580000003 HC RX 258: Performed by: FAMILY MEDICINE

## 2022-10-20 PROCEDURE — 70553 MRI BRAIN STEM W/O & W/DYE: CPT

## 2022-10-20 PROCEDURE — 02HV33Z INSERTION OF INFUSION DEVICE INTO SUPERIOR VENA CAVA, PERCUTANEOUS APPROACH: ICD-10-PCS | Performed by: RADIOLOGY

## 2022-10-20 PROCEDURE — 36573 INSJ PICC RS&I 5 YR+: CPT

## 2022-10-20 PROCEDURE — 95819 EEG AWAKE AND ASLEEP: CPT | Performed by: PSYCHIATRY & NEUROLOGY

## 2022-10-20 RX ORDER — SODIUM CHLORIDE 0.9 % (FLUSH) 0.9 %
10 SYRINGE (ML) INJECTION PRN
Status: DISCONTINUED | OUTPATIENT
Start: 2022-10-20 | End: 2022-11-04 | Stop reason: HOSPADM

## 2022-10-20 RX ADMIN — HYDRALAZINE HYDROCHLORIDE 10 MG: 20 INJECTION INTRAMUSCULAR; INTRAVENOUS at 04:27

## 2022-10-20 RX ADMIN — IPRATROPIUM BROMIDE AND ALBUTEROL SULFATE 1 AMPULE: 2.5; .5 SOLUTION RESPIRATORY (INHALATION) at 18:43

## 2022-10-20 RX ADMIN — SODIUM CHLORIDE, PRESERVATIVE FREE 10 ML: 5 INJECTION INTRAVENOUS at 12:22

## 2022-10-20 RX ADMIN — IPRATROPIUM BROMIDE AND ALBUTEROL SULFATE 1 AMPULE: 2.5; .5 SOLUTION RESPIRATORY (INHALATION) at 11:14

## 2022-10-20 RX ADMIN — VANCOMYCIN HYDROCHLORIDE 750 MG: 750 INJECTION, POWDER, LYOPHILIZED, FOR SOLUTION INTRAVENOUS at 12:31

## 2022-10-20 RX ADMIN — VANCOMYCIN HYDROCHLORIDE 750 MG: 750 INJECTION, POWDER, LYOPHILIZED, FOR SOLUTION INTRAVENOUS at 02:12

## 2022-10-20 RX ADMIN — SODIUM CHLORIDE, PRESERVATIVE FREE 10 ML: 5 INJECTION INTRAVENOUS at 16:38

## 2022-10-20 RX ADMIN — DULOXETINE 60 MG: 60 CAPSULE, DELAYED RELEASE ORAL at 12:18

## 2022-10-20 RX ADMIN — LABETALOL HYDROCHLORIDE 20 MG: 5 INJECTION, SOLUTION INTRAVENOUS at 02:38

## 2022-10-20 RX ADMIN — CARVEDILOL 25 MG: 25 TABLET, FILM COATED ORAL at 12:14

## 2022-10-20 RX ADMIN — FERROUS SULFATE TAB 325 MG (65 MG ELEMENTAL FE) 325 MG: 325 (65 FE) TAB at 12:18

## 2022-10-20 RX ADMIN — IPRATROPIUM BROMIDE AND ALBUTEROL SULFATE 1 AMPULE: 2.5; .5 SOLUTION RESPIRATORY (INHALATION) at 14:47

## 2022-10-20 RX ADMIN — IPRATROPIUM BROMIDE AND ALBUTEROL SULFATE 1 AMPULE: 2.5; .5 SOLUTION RESPIRATORY (INHALATION) at 07:05

## 2022-10-20 RX ADMIN — Medication 2000 UNITS: at 12:19

## 2022-10-20 RX ADMIN — DIVALPROEX SODIUM 1000 MG: 500 TABLET, DELAYED RELEASE ORAL at 12:16

## 2022-10-20 RX ADMIN — ARIPIPRAZOLE 5 MG: 5 TABLET ORAL at 12:29

## 2022-10-20 RX ADMIN — GADOTERIDOL 15 ML: 279.3 INJECTION, SOLUTION INTRAVENOUS at 16:37

## 2022-10-20 NOTE — PROGRESS NOTES
[facility] -   Infectious diseases evaluation  admission date 10/10/2022    reason for consultation:   MRSA bacteremia    Impression :   Current:  MRSA bacteremia  Bilateral pulmonary nodules with cavitation small loculated  pleural effusion  Sepsis secondary to above  Encephalopathy  History of cocaine drug abuse  E. coli UTI treated  Acute renal failure improved  Hypertension  Bipolar disorder      Recommendations        Continue IV vancomycin end date 10/30  Will need PICC line  Pleural fluid culture growing staph aureus  Repeat CT chest without contrast to evaluate effusion  Follow repeat blood cultures  Discussed with cardiology/ADRIANNE was done earlier today showed no vegetations  MRI brain on 10/14/22 unremarkable. Follow CBC and renal function  Supportive care  Discussed with nursing staff    Infection Control Recommendations   [place] Precautions  Contact Isolation       Antimicrobial Stewardship Recommendations   Simplification of therapy  Targeted therapy      History of Present Illness:   Initial history:  Suki Gomes is a 46y.o.-year-old female was admitted 10/10/2020 for worsening shortness of breath associated with generalized body ache. The patient is drowsy, arousable, confused, follows simple commands, unable to provide history that was obtained from chart review and nursing staff. She does have history of cocaine abuse. She was found to have acute renal failure with a creatinine of 2.1. Urinalysis showed small leukocyte esterase, positive nitrate. Urine culture on 10/10/2022 grew E. Coli  Blood cultures 10/10/2022 2 of 2 grew MRSA  Echocardiogram showed no vegetations  Procalcitonin was 6.01, D-dimer was elevated at 3.85  Chest x-ray showed right lung nodules. VQ scan was indeterminant.   CT chest without contrast 10/16/2022 showed numerous bilateral nodular opacities both lungs suggestive of septic emboli , focal consolidation with cavitation in the superior right lower lobe focal consolidation at the left base, small bilateral loculated pleural effusion and mediastinal lymphadenopathy. Echocardiogram showed no vegetations  Interval changes  10/20/2022   She remains confused  Thoracentesis completed  Cultures are growing staph worse at this time    Patient Vitals for the past 8 hrs:   BP Temp Temp src Pulse Resp SpO2   10/20/22 1450 -- -- -- 75 20 95 %   10/20/22 1137 (!) 149/77 98.8 °F (37.1 °C) Oral 79 22 94 %   10/20/22 1114 -- -- -- 75 20 95 %           I have personally reviewed the past medical history, past surgical history, medications, social history, and family history, and I haveupdated the database accordingly. Allergies:   Aspirin, Bactrim, and Codeine     Review of Systems:     Review of Systems  Unable to provide due to mental status changes  Physical Examination :       Physical Exam  Constitutional:       Appearance: She is ill-appearing. HENT:      Head: Normocephalic and atraumatic. Right Ear: External ear normal.      Left Ear: External ear normal.   Eyes:      General: No scleral icterus. Conjunctiva/sclera: Conjunctivae normal.   Cardiovascular:      Rate and Rhythm: Regular rhythm. Heart sounds: No murmur heard. Pulmonary:      Effort: No respiratory distress. Breath sounds: Rhonchi present. Comments:   Decreased breath sounds bilaterally  Abdominal:      Palpations: Abdomen is soft. Tenderness: There is no abdominal tenderness. Musculoskeletal:      Cervical back: Neck supple. No rigidity. Right lower leg: No edema. Left lower leg: No edema. Skin:     Coloration: Skin is not jaundiced. Findings: No erythema.    Neurological:      Comments: Drowsy, moves all extremities, follows simple commands       Past Medical History:     Past Medical History:   Diagnosis Date    Anemia     Arthritis     Asthma     Bipolar disorder, mixed ([address]     Carotid artery stenosis 2020    Cocaine abuse ([facility] [address] 2014    COPD (chronic obstructive pulmonary disease) (HCC)     Degeneration of cervical intervertebral disc     Depression with anxiety     Depression with anxiety     Fibromyalgia 2017    GERD (gastroesophageal reflux disease)     History of migraine headaches 6/10/2014    History of renal calculi 6/10/2014    History of seizure disorder 6/10/2014    Hoarseness of voice     HTN (hypertension) 6/10/2014    Hyperlipidemia 6/10/2014    IGT (impaired glucose tolerance) 6/10/2014    Kidney stones     Lactose intolerance 6/10/2014    Leukopenia 6/10/2014    Major depressive disorder, recurrent episode, severe, without mention of psychotic behavior 2014    MVP (mitral valve prolapse)     Seizures ([facility] [address]     Sleep apnea 6/10/2014    Unspecified diseases of blood and blood-forming organs        Past Surgical  History:     Past Surgical History:   Procedure Laterality Date    ANKLE FRACTURE SURGERY      recontruction surgery    APPENDECTOMY      BREAST LUMPECTOMY Right     CARDIAC CATHETERIZATION      CARPAL TUNNEL RELEASE      x2     SECTION      CHOLECYSTECTOMY      COLONOSCOPY  12    COLONOSCOPY  2016    severe spasms    COLONOSCOPY  2019    DR GOLDY MCINTOSH    GASTRIC BYPASS SURGERY      GASTRIC BYPASS SURGERY      HYSTERECTOMY (CERVIX STATUS UNKNOWN)      HYSTERECTOMY (CERVIX STATUS UNKNOWN)      LAPAROSCOPY N/A 2022    LAPAROSCOPY EXPLORATORY CONVERTED TO OPEN EXPLORATORY LAPAROTOMY, LYSIS OF ADHESIONS, REDUCTION OF INTERNAL HERNIA performed by Allison Vargas DO at [address] [place] [address]      APPLICATION OF ARCH BARS,SIMPLE EXTRACTION OF #15 AND RT TMJ JOINT REPLACEMENT    SHOULDER ARTHROSCOPY Left 2019    DR ROBERT GREEN    SHOULDER SURGERY      TONSILLECTOMY AND ADENOIDECTOMY      TRANSESOPHAGEAL ECHOCARDIOGRAM N/A 10/19/2022    TRANSESOPHAGEAL ECHOCARDIOGRAM WITH BUBBLE STUDY performed by Frandy Royal DO at [address] [facility]  7-3-12    egd    UPPER GASTROINTESTINAL ENDOSCOPY  12/19/2016    status post gastrectomy with a small remnant of gastric pouch.     UPPER GASTROINTESTINAL ENDOSCOPY  05/2019    DR Steve Certain       Medications:      [START ON 10/21/2022] vancomycin  1,500 mg IntraVENous Q24H    carvedilol  25 mg Oral BID WC    Vitamin D  2,000 Units Oral Daily    sodium chloride flush  5-40 mL IntraVENous 2 times per day    [Held by provider] heparin (porcine)  5,000 Units SubCUTAneous BID    ARIPiprazole  5 mg Oral Daily    atorvastatin  40 mg Oral Daily    divalproex  1,000 mg Oral BID    DULoxetine  60 mg Oral Daily    ferrous sulfate  325 mg Oral BID WC    ipratropium-albuterol  1 ampule Inhalation Q4H WA    vancomycin (VANCOCIN) intermittent dosing (placeholder)   Other RX Placeholder       Social History:     Social History     Socioeconomic History    Marital status:      Spouse name: Not on file    Number of children: Not on file    Years of education: Not on file    Highest education level: Not on file   Occupational History    Occupation: disability   Tobacco Use    Smoking status: Every Day     Packs/day: 0.50     Years: 33.00     Pack years: 16.50     Types: Cigarettes    Smokeless tobacco: Never    Tobacco comments:     quit  2000 started again  1/13    Substance and Sexual Activity    Alcohol use: Yes     Comment: occasional    Drug use: Not Currently    Sexual activity: Yes   Other Topics Concern    Not on file   Social History Narrative    Not on file     Social Determinants of Health     Financial Resource Strain: Not on file   Food Insecurity: Not on file   Transportation Needs: Not on file   Physical Activity: Not on file   Stress: Not on file   Social Connections: Not on file   Intimate Partner Violence: Not on file   Housing Stability: Not on file       Family History:     Family History   Problem Relation Age of Onset    Diabetes Mother Cancer Mother     Coronary Art Dis Father     COPD Father     Depression Brother     Alcohol Abuse Brother     Cancer Other         lung and skin    Diabetes Maternal Grandmother     Cancer Paternal Grandmother       Medical Decision Making:   I have independently reviewed/ordered the following labs:    CBC with Differential:   No results for input(s): WBC, HGB, HCT, PLT, SEGSPCT, BANDSPCT, LYMPHOPCT, MONOPCT, EOSPCT in the last 72 hours. BMP:  Recent Labs     10/18/22  0450 10/20/22  0857    137   K 3.7 3.8    100   CO2 26 27   BUN 9 14   CREATININE 0.59 0.65   MG 2.0  --      Hepatic Function Panel: No results for input(s): PROT, LABALBU, BILIDIR, IBILI, BILITOT, ALKPHOS, ALT, AST in the last 72 hours. No results for input(s): RPR in the last 72 hours. No results for input(s): HIV in the last 72 hours. No results for input(s): BC in the last 72 hours. Lab Results   Component Value Date/Time    CREATININE 0.65 10/20/2022 08:57 AM    GLUCOSE 68 10/20/2022 08:57 AM       Detailed results: Thank you for allowing us to participate in the care of this patient. Please call with questions. This note is created with the assistance of a speech recognition program.  While intending to generate adocument that actually reflects the content of the visit, the document can still have some errors including those of syntax and sound a like substitutions which may escape proof reading. It such instances, actual meaningcan be extrapolated by contextual diversion. Charly Millan MD  Attending Physician Statement  I have discussed the care of the patient, including pertinent history and exam findings,  with the resident. I have reviewed the key elements of all parts of the encounter with the resident. I agree with the assessment, plan and orders as documented by the resident. The patient will be discharged to facility for observed IV antibiotics . Discussed with discharge planner.     Marie Logan Denny Laurent MD   Office: [phone]  Perfect serve / office [phone]

## 2022-10-20 NOTE — PROGRESS NOTES
Gil Rodriguez MD/Dami Barrett MD/ Iris Coyle MD/Dr Wilder Clubs APRN AGACHEVYP-BC, NP-C      Matthew MENDOZA NP-C     Donna Solano APRN NP-C                                           Pulmonary Progress Note    Patient - Praveena Raya   Age - 46 y.o.   - 1970  MRN - [id number]  Acct # - [de-identified]  Date of Admission - 10/10/2022  4:37 AM    Consulting Service/Physician:       Primary Care Physician: Corona Reid MD    SUBJECTIVE:     Chief Complaint:   Chief Complaint   Patient presents with    Shortness of Breath    Positive For Covid-19     Subjective:    Shellie Childs is seen during her EEG today. She is resting quietly. She is on 1 L nasal cannula with pulse ox 93%. She has been afebrile. She continues on IV vancomycin. Pleural fluid culture is positive for staph, likely parapneumonic effusion. Chest x-ray essentially unchanged. VITALS  BP (!) 158/69   Pulse 81   Temp 98.3 °F (36.8 °C) (Oral)   Resp 20   Ht 4' 11\" (1.499 m)   Wt 104 lb (47.2 kg)   SpO2 93%   BMI 21.01 kg/m²   Wt Readings from Last 3 Encounters:   10/19/22 104 lb (47.2 kg)   22 96 lb 8 oz (43.8 kg)   22 105 lb (47.6 kg)     I/O (24 Hours)    Intake/Output Summary (Last 24 hours) at 10/20/2022 1017  Last data filed at 10/19/2022 1150  Gross per 24 hour   Intake 325 ml   Output --   Net 325 ml     Ventilator:   Settings  FiO2 : 21 %  Exam:   Physical Exam   Constitutional: Lying in bed on 1 L in no acute distress resting having EEG performed  HENT: Unremarkable  Head: Normocephalic and atraumatic. Eyes: Eyes are closed   Neck: Neck supple. Pulmonary/Chest: Respirations are even and nonlabored, she is on 1 L   Neurological: Patient is resting with eyes closed  Skin: Skin is warm and dry. No rash noted.    Extremities: Clubbing to fingers   Infusions:      sodium chloride 50 mL/hr at 10/18/22 1200     Meds:     Current Facility-Administered Medications:     carvedilol (COREG) tablet 25 mg, 25 mg, Oral, BID WC, Frandy Lele, DO    labetalol (NORMODYNE;TRANDATE) injection 20 mg, 20 mg, IntraVENous, BID PRN, Frandy Lele, DO, 20 mg at 10/20/22 0238    vancomycin (VANCOCIN) 750 mg in dextrose 5 % 250 mL IVPB, 750 mg, IntraVENous, Q12H, Frandy Lele, DO, Stopped at 10/20/22 0330    potassium chloride (KLOR-CON M) extended release tablet 40 mEq, 40 mEq, Oral, PRN, 40 mEq at 10/17/22 1025 **OR** potassium bicarb-citric acid (EFFER-K) effervescent tablet 40 mEq, 40 mEq, Oral, PRN, 40 mEq at 10/14/22 2031 **OR** potassium chloride 10 mEq/100 mL IVPB (Peripheral Line), 10 mEq, IntraVENous, PRN, Frandy Lele, DO, Last Rate: 100 mL/hr at 10/16/22 1505, 10 mEq at 10/16/22 1505    Vitamin D (CHOLECALCIFEROL) tablet 2,000 Units, 2,000 Units, Oral, Daily, Frandy Lele, DO, 2,000 Units at 10/18/22 0916    hydrALAZINE (APRESOLINE) injection 10 mg, 10 mg, IntraVENous, Q6H PRN, Frandy Lele, DO, 10 mg at 10/20/22 0427    sodium chloride flush 0.9 % injection 5-40 mL, 5-40 mL, IntraVENous, 2 times per day, Frandy Lele, DO, 10 mL at 10/19/22 2140    sodium chloride flush 0.9 % injection 5-40 mL, 5-40 mL, IntraVENous, PRN, Frandy Lele, DO    0.9 % sodium chloride infusion, , IntraVENous, PRN, Frandy Lele, DO, Last Rate: 50 mL/hr at 10/18/22 1200, New Bag at 10/18/22 1200    [Held by provider] heparin (porcine) injection 5,000 Units, 5,000 Units, SubCUTAneous, BID, Joanna Arnold MD, 5,000 Units at 10/18/22 0916    acetaminophen (TYLENOL) tablet 650 mg, 650 mg, Oral, Q4H PRN, Frandy Royal DO, 650 mg at 10/14/22 2031    ondansetron (ZOFRAN-ODT) disintegrating tablet 4 mg, 4 mg, Oral, Q8H PRN **OR** ondansetron (ZOFRAN) injection 4 mg, 4 mg, IntraVENous, Q6H PRN, Frandy Juareso, DO    albuterol sulfate HFA (PROVENTIL;VENTOLIN;PROAIR) 108 (90 Base) MCG/ACT inhaler 2 puff, 2 puff, Inhalation, Q6H PRN, Frandy Juareso, DO    ARIPiprazole (ABILIFY) tablet 5 mg, 5 mg, Oral, Daily, Frandy Royal, , 5 mg at 10/18/22 0917    atorvastatin (LIPITOR) tablet 40 mg, 40 mg, Oral, Daily, Frandy Lele, DO, 40 mg at 10/18/22 2227    divalproex (DEPAKOTE) DR tablet 1,000 mg, 1,000 mg, Oral, BID, Frandy Lele, DO, 1,000 mg at 10/18/22 2227    DULoxetine (CYMBALTA) extended release capsule 60 mg, 60 mg, Oral, Daily, Frandy Lele, DO, 60 mg at 10/18/22 0916    ferrous sulfate (IRON 325) tablet 325 mg, 325 mg, Oral, BID WC, Frandy Lele, DO, 325 mg at 10/18/22 1826    sodium chloride flush 0.9 % injection 10 mL, 10 mL, IntraVENous, PRN, Frandy Lele, DO, 10 mL at 10/10/22 1427    ipratropium-albuterol (DUONEB) nebulizer solution 1 ampule, 1 ampule, Inhalation, Q4H WA, Frandy Lele, DO, 1 ampule at 10/20/22 0705    perflutren lipid microspheres (DEFINITY) injection 1.65 mg, 1.5 mL, IntraVENous, ONCE PRN, Frandy Lele, DO    vancomycin (VANCOCIN) intermittent dosing (placeholder), , Other, RX Placeholder, Frandy Lele, DO    Lab Results:     Lab Results   Component Value Date    WBC 7.3 10/17/2022    HGB 9.9 (L) 10/17/2022    HCT 30.2 (L) 10/17/2022    MCV 79.9 (L) 10/17/2022     (L) 10/17/2022     Lab Results   Component Value Date    CALCIUM 8.1 (L) 10/20/2022     10/20/2022    K 3.8 10/20/2022    CO2 27 10/20/2022     10/20/2022    BUN 14 10/20/2022    CREATININE 0.65 10/20/2022       Lab Results   Component Value Date    INR 1.2 10/18/2022    PROTIME 15.4 (H) 10/18/2022       Radiology:     10/20/2022    10/18/2022    My reading of film: Continued septic emboli, unchanged from previous  ASSESSMENT:       MRSA bacteremia  Septic emboli  E. coli UTI  Acute kidney injury, resolved  Suspect COPD, seen once in the office by Dr. Kika Riojas in 2019, FEV1 2.04 L or 86% predicted  Parapneumonic effusion, pleural fluid positive for staph  History of opiate and cocaine use-reportedly uses crack cocaine at least twice weekly  History of tobacco use  Recent COVID +9/30  Elevated troponin likely type II MI  Encephalopathy, improving  History of hypertension  History of seizure disorder  Full code    PLAN:   Currently on IV Vanco   ID following  ADRIANNE without vegetation  Wean oxygen as tolerated, keep pulse ox above 89%  Continue nebulizers  Will need follow-up CT imaging after completion of antibiotics  Discussed with Dr. Dov Ruiz      Electronically signed by KELLY Herrera CNP on 10/20/22     This progress note was completed using a voice transcription system. Every effort was made to ensure accuracy. However, inadvertent computerized transcription errors may be present.     Shivani Mejias, NP-C, MSN  [facility] Pulmonary, Critical Care & Sleep

## 2022-10-20 NOTE — PROGRESS NOTES
Neurology ---    Patient out of room currently receiving CT chest and MRI brain imaging. Reportedly she is more interactive today. EEG was done showing mild encephalopathy. We will continue to follow and await MRI results.     Tomy Haro, [address] [facility]

## 2022-10-20 NOTE — CARE COORDINATION
ONGOING DISCHARGE PLAN:      Plan is to discharge to [facility] following   IV atb until 10/30   Will need a Picc line   ADRIANNE done today   MRI Brain today     Will continue to follow for additional discharge needs.     Electronically signed by Iris Love RN on 10/20/2022 at 3:38 PM

## 2022-10-20 NOTE — PROCEDURES
EEG REPORT    Patient Name: Brett Downs  Patient MRN: [id number]    Referring Physician: Thi Solorzano DO  Dictating Physician: Thi Solorzano DO  Date: 10/20/2022      CLINICAL HISTORY: This EEG was performed on a 46 y.o. female with The primary encounter diagnosis was NICKI (acute kidney injury) ([facility] [address] Diagnoses of Leukocytosis, unspecified type, Elevated d-dimer, and Pulmonary nodules were also pertinent to this visit. .  Patient has persistent lethargy. It is being performed to evaluate for seizure activity. CURRENT ANTI-EPILEPTIC MEDICATIONS: None    DESCRIPTION: Wakefulness, drowsiness, and primarily stage II sleep were obtained. During wakefulness there was a posterior background of well modulated, reactive, symmetrical, low voltage, 7 Hz activity. During drowsiness there were symmetrical vertex sharp waves and attenuation of the waking background. During stage II sleep there were symmetrical K complexes and sleep spindles present. Photic stimulation evoked no significant posterior driving response. Hyperventilation was not performed. EEG DIAGNOSIS: This EEG was abnormal due to the presence of:  1. Mild diffuse background slowing    CLINICAL INTERPRETATION: The mild background slowing in this EEG is indicative of a mild encephalopathy, of nonspecific origin. No epileptiform activity was present.       Thi Solorzano, [address] [place] [address]  Neurology

## 2022-10-20 NOTE — PLAN OF CARE
Pt has MRI ordered, please fill out electronic Screening form w/pt or pt's POA. Once completed MRI will call to schedule a time for test.  Any questions call [phone]. Thank you.

## 2022-10-20 NOTE — PROGRESS NOTES
Progress Note  Date:10/20/2022       Room:[facility]-  Patient Name:Anu Bourne     YOB: 1970     Age:51 y.o. Subjective    Subjective:  Symptoms:  (Patient woke and spoke to me today, states she is hungry. Nursing reports she was more interactive overnight. ). Diet:  Poor intake. Activity level: Impaired due to weakness. Pain:  She complains of pain that is moderate. Review of Systems  Objective         Vitals Last 24 Hours:  TEMPERATURE:  Temp  Av.4 °F (36.9 °C)  Min: 97.1 °F (36.2 °C)  Max: 99.2 °F (37.3 °C)  RESPIRATIONS RANGE: Resp  Av.2  Min: 18  Max: 31  PULSE OXIMETRY RANGE: SpO2  Av.9 %  Min: 91 %  Max: 95 %  PULSE RANGE: Pulse  Av.3  Min: 71  Max: 82  BLOOD PRESSURE RANGE: Systolic (24XNC), JDU:764 , Min:116 , LIK:424   ; Diastolic (64DPJ), JFO:82, Min:59, Max:82    I/O (24Hr): Intake/Output Summary (Last 24 hours) at 10/20/2022 0835  Last data filed at 10/19/2022 1150  Gross per 24 hour   Intake 325 ml   Output --   Net 325 ml     Objective:  General Appearance:  Comfortable. Vital signs: (most recent): Blood pressure (!) 158/69, pulse 81, temperature 98.3 °F (36.8 °C), temperature source Oral, resp. rate 20, height 4' 11\" (1.499 m), weight 104 lb (47.2 kg), SpO2 93 %. (BP elevated). Lungs:  Normal effort and normal respiratory rate. There are decreased breath sounds. Heart: Normal rate. Regular rhythm. S1 normal and S2 normal.    Labs/Imaging/Diagnostics    Labs:  CBC:No results for input(s): WBC, RBC, HGB, HCT, MCV, RDW, PLT in the last 72 hours. CHEMISTRIES:  Recent Labs     10/18/22  0450      K 3.7      CO2 26   BUN 9   CREATININE 0.59   GLUCOSE 70   PHOS 3.6   MG 2.0     PT/INR:  Recent Labs     10/18/22  1358   PROTIME 15.4*   INR 1.2     APTT:No results for input(s): APTT in the last 72 hours. LIVER PROFILE:No results for input(s): AST, ALT, BILIDIR, BILITOT, ALKPHOS in the last 72 hours.     Imaging Last 24 Hours:  XR CHEST PORTABLE    Result Date: 10/18/2022  EXAMINATION: ONE XRAY VIEW OF THE CHEST 10/18/2022 6:24 pm COMPARISON: Chest radiograph dated 10/11/2022. Chest CT dated 10/17/2022. HISTORY: ORDERING SYSTEM PROVIDED HISTORY: Empyema, s/p thoracentesis TECHNOLOGIST PROVIDED HISTORY: Empyema, s/p thoracentesis Reason for Exam: S/p thoracentesis FINDINGS: The cardiac silhouette appears within normal limits. Previously seen left-sided pleural effusion appears to have decreased in size. There is a small left and trace right pleural effusion. There is redemonstration of multiple bilateral nodular opacity of the both lungs, suspicious for underlying septic emboli better seen and evaluated on the chest CT dated 10/17/2022. Components of left reverse total shoulder arthroplasty and changes of prior orthopedic fixation/fusion of the cervicothoracic spine are partially visualized. There are cholecystectomy clips overlying the right upper abdomen. Interval decrease in size of left-sided pleural effusion. Small left and trace right pleural effusion. Redemonstration of bilateral nodular opacities of the both lungs, suspicious for underlying septic emboli better seen and evaluated on the chest CT dated 10/17/2022. IR LUMBAR PUNCTURE FOR DIAGNOSIS    Result Date: 10/18/2022  EXAMINATION: FLUOROSCOPIC GUIDED LUMBAR PUNCTURE 10/18/2022 4:47 pm HISTORY: ORDERING SYSTEM PROVIDED HISTORY: rule out encephalitis. underlying MRSA bacteremia TECHNOLOGIST PROVIDED HISTORY: rule out encephalitis. underlying MRSA bacteremia Is the patient pregnant?->No FLUOROSCOPY DOSE AND TYPE OR TIME AND EXPOSURES: Dose: 2 mGy D AP: 23 centigrays cm squared 3 fluoroscopic images sent to PACS FLUOROSCOPY TIME: 26 seconds PROCEDURE: :  Jaiden Prabhakar Informed consent was obtained via telephone from family after the risks and benefits of the procedure were discussed with the patient and all questions were answered fully.   Universal protocol was observed and a standard timeout was performed. A timeout was performed prior to commencing the procedure. The patient was positioned prone and the back was prepped and draped in the normal sterile fashion. 1% lidocaine was used for local anesthesia. The subarachnoid space was accessed with a 22-gauge 3.5\" spinal needle at the L2/L3 level. Free flow of clear CSF was noted. Approximately 11.5 ml of clear CSF was removed and sent for analysis. The stylet was reinserted, spinal needle was removed and brief pressure was applied at the puncture site. There were no immediate complications and the patient tolerated the procedure well. Successful fluoroscopic-guided lumbar puncture. IR GUIDED THORACENTESIS PLEURAL    Result Date: 10/18/2022  PROCEDURE: Christiana Gonzáles LEFT THORACENTESIS 10/18/2022 HISTORY: ORDERING SYSTEM PROVIDED HISTORY: evaluate for empyema TECHNOLOGIST PROVIDED HISTORY: evaluate for empyema Which side should the procedure be performed? ->Left Is the patient pregnant?->No TECHNIQUE: Informed consent was from family via telephone obtained after a detailed explanation of the procedure including risks, benefits, and alternatives. Universal protocol was performed. A timeout was performed prior to commencing the procedure. The left chest was prepped and draped in sterile fashion and local anesthesia was achieved with lidocaine. An 5 [employer] Amy Yueh needle sheath was advanced under ultrasound guidance into pleural effusion and thoracentesis was performed. After completion, the needle was removed and hemostasis obtained with direct pressure. A sterile dressing was applied. The patient tolerated the procedure well. FINDINGS: A total of 400 mL cloudy dark fred fluid was removed. The entirety was sent to the lab. Successful ultrasound guided thoracentesis. Chest x-ray pending.      Assessment//Plan           Hospital Problems             Last Modified POA    * (Principal) SIRS (systemic inflammatory response syndrome) ([facility] [address] 10/10/2022 Yes    NICKI (acute kidney injury) ([facility] [address] 10/11/2022 Yes    Severe malnutrition ([address] (Chronic) 10/11/2022 Yes    Elevated d-dimer 10/15/2022 Yes    Leukocytosis 10/15/2022 Yes    Pulmonary nodules 10/15/2022 Yes    Delirium due to another medical condition 10/17/2022 Yes    Acute metabolic encephalopathy 60/92/3264 Yes    Depression with suicidal ideation 10/17/2022 Yes    Bipolar disorder, mixed ([facility] [address] 10/11/2022 Yes    Acute encephalopathy 10/14/2022 Yes    COPD (chronic obstructive pulmonary disease) ([facility] [address] 10/11/2022 Yes    H/O gastric bypass 10/11/2022 Yes    Overview Signed 2/13/2020  7:19 PM by Casey Stover MD     enlarged  pancreas, here for surgical work-up for EGD          Assessment & Plan    Continue current management      Electronically signed by Casey Stover MD on 10/20/22 at 8:35 AM EDT

## 2022-10-20 NOTE — PROGRESS NOTES
[facility] FOLLOW-UP NOTE     10/20/2022     Patient was seen and examined in person, Chart reviewed   Patient's case discussed with staff/team    Chief Complaint: Bipolar disorder, lethargy, erratic behavior    Interim History:   -The patient was seen at bedside with her father and her nurse present. The patient remains somnolent. She was able to wake up for me and answer some of my questions. She is mumbling in his speech and is hard to follow. She does not appear to be internally stimulated. She states her mood is okay.        BP (!) 149/77   Pulse 79   Temp 98.8 °F (37.1 °C) (Oral)   Resp 22   Ht 4' 11\" (1.499 m)   Wt 104 lb (47.2 kg)   SpO2 94%   BMI 21.01 kg/m²   Appetite:  [] Normal/Unchanged  [] Increased  [x] Decreased      Sleep:       [x] Normal/Unchanged  [] Fair       [] Poor              Energy:    [] Normal/Unchanged  [] Increased  [x] Decreased        Aggression:  [] yes  [x] no    Patient is [] able  [] unable to CONTRACT FOR SAFETY ON THE UNIT    PAST MEDICAL/PSYCHIATRIC HISTORY:   Past Medical History:   Diagnosis Date    Anemia     Arthritis     Asthma     Bipolar disorder, mixed ([address]     Carotid artery stenosis 2/13/2020    Cocaine abuse ([address] 11/4/2014    COPD (chronic obstructive pulmonary disease) ([address]     Degeneration of cervical intervertebral disc     Depression with anxiety     Depression with anxiety     Fibromyalgia 1/5/2017    GERD (gastroesophageal reflux disease)     History of migraine headaches 6/10/2014    History of renal calculi 6/10/2014    History of seizure disorder 6/10/2014    Hoarseness of voice     HTN (hypertension) 6/10/2014    Hyperlipidemia 6/10/2014    IGT (impaired glucose tolerance) 6/10/2014    Kidney stones     Lactose intolerance 6/10/2014    Leukopenia 6/10/2014    Major depressive disorder, recurrent episode, severe, without mention of psychotic behavior 11/6/2014    MVP (mitral valve prolapse)     Seizures ([address]     Sleep apnea 6/10/2014 Unspecified diseases of blood and blood-forming organs        FAMILY/SOCIAL HISTORY:  Family History   Problem Relation Age of Onset    Diabetes Mother     Cancer Mother     Coronary Art Dis Father     COPD Father     Depression Brother     Alcohol Abuse Brother     Cancer Other         lung and skin    Diabetes Maternal Grandmother     Cancer Paternal Grandmother      Social History     Socioeconomic History    Marital status:      Spouse name: Not on file    Number of children: Not on file    Years of education: Not on file    Highest education level: Not on file   Occupational History    Occupation: disability   Tobacco Use    Smoking status: Every Day     Packs/day: 0.50     Years: 33.00     Pack years: 16.50     Types: Cigarettes    Smokeless tobacco: Never    Tobacco comments:     quit  2000 started again  1/13    Substance and Sexual Activity    Alcohol use: Yes     Comment: occasional    Drug use: Not Currently    Sexual activity: Yes   Other Topics Concern    Not on file   Social History Narrative    Not on file     Social Determinants of Health     Financial Resource Strain: Not on file   Food Insecurity: Not on file   Transportation Needs: Not on file   Physical Activity: Not on file   Stress: Not on file   Social Connections: Not on file   Intimate Partner Violence: Not on file   Housing Stability: Not on file           ROS:  [x] All negative/unchanged except if checked.  Explain positive(checked items) below:  [] Constitutional  [] Eyes  [] Ear/Nose/Mouth/Throat  [] Respiratory  [] CV  [] GI  []   [] Musculoskeletal  [] Skin/Breast  [] Neurological  [] Endocrine  [] Heme/Lymph  [] Allergic/Immunologic    Explanation:     MEDICATIONS:    Current Facility-Administered Medications:     carvedilol (COREG) tablet 25 mg, 25 mg, Oral, BID WC, Frandy Lele, DO, 25 mg at 10/20/22 1214    labetalol (NORMODYNE;TRANDATE) injection 20 mg, 20 mg, IntraVENous, BID PRN, Frandy Lele, DO, 20 mg at 10/20/22 0809 vancomycin (VANCOCIN) 750 mg in dextrose 5 % 250 mL IVPB, 750 mg, IntraVENous, Q12H, Frandy Lele, DO, Last Rate: 250 mL/hr at 10/20/22 1231, 750 mg at 10/20/22 1231    potassium chloride (KLOR-CON M) extended release tablet 40 mEq, 40 mEq, Oral, PRN, 40 mEq at 10/17/22 1025 **OR** potassium bicarb-citric acid (EFFER-K) effervescent tablet 40 mEq, 40 mEq, Oral, PRN, 40 mEq at 10/14/22 2031 **OR** potassium chloride 10 mEq/100 mL IVPB (Peripheral Line), 10 mEq, IntraVENous, PRN, Frandy Lele, DO, Last Rate: 100 mL/hr at 10/16/22 1505, 10 mEq at 10/16/22 1505    Vitamin D (CHOLECALCIFEROL) tablet 2,000 Units, 2,000 Units, Oral, Daily, Frandy Lele, DO, 2,000 Units at 10/20/22 1219    hydrALAZINE (APRESOLINE) injection 10 mg, 10 mg, IntraVENous, Q6H PRN, Frandy Lele, DO, 10 mg at 10/20/22 0427    sodium chloride flush 0.9 % injection 5-40 mL, 5-40 mL, IntraVENous, 2 times per day, Frandy Lele, DO, 10 mL at 10/20/22 1222    sodium chloride flush 0.9 % injection 5-40 mL, 5-40 mL, IntraVENous, PRN, Frandy Lele, DO    0.9 % sodium chloride infusion, , IntraVENous, PRN, Frandy Lele, DO, Last Rate: 50 mL/hr at 10/18/22 1200, New Bag at 10/18/22 1200    [Held by provider] heparin (porcine) injection 5,000 Units, 5,000 Units, SubCUTAneous, BID, Patsy Jules MD, 5,000 Units at 10/18/22 0916    acetaminophen (TYLENOL) tablet 650 mg, 650 mg, Oral, Q4H PRN, Frandy Lele, DO, 650 mg at 10/14/22 2031    ondansetron (ZOFRAN-ODT) disintegrating tablet 4 mg, 4 mg, Oral, Q8H PRN **OR** ondansetron (ZOFRAN) injection 4 mg, 4 mg, IntraVENous, Q6H PRN, Frandy Lele, DO    albuterol sulfate HFA (PROVENTIL;VENTOLIN;PROAIR) 108 (90 Base) MCG/ACT inhaler 2 puff, 2 puff, Inhalation, Q6H PRN, Frandy Lele, DO    ARIPiprazole (ABILIFY) tablet 5 mg, 5 mg, Oral, Daily, Frandy Lele, DO, 5 mg at 10/20/22 1229    atorvastatin (LIPITOR) tablet 40 mg, 40 mg, Oral, Daily, Frandy Lele, DO, 40 mg at 10/18/22 2227    divalproex (DEPAKOTE) DR tablet 1,000 mg, 1,000 mg, Oral, BID, Frandy Lele, DO, 1,000 mg at 10/20/22 1216    DULoxetine (CYMBALTA) extended release capsule 60 mg, 60 mg, Oral, Daily, Frandy Lele, DO, 60 mg at 10/20/22 1218    ferrous sulfate (IRON 325) tablet 325 mg, 325 mg, Oral, BID WC, Frandy Lele, DO, 325 mg at 10/20/22 1218    sodium chloride flush 0.9 % injection 10 mL, 10 mL, IntraVENous, PRN, Frandy Lele, DO, 10 mL at 10/10/22 1427    ipratropium-albuterol (DUONEB) nebulizer solution 1 ampule, 1 ampule, Inhalation, Q4H WA, Frandy Lele, DO, 1 ampule at 10/20/22 1114    perflutren lipid microspheres (DEFINITY) injection 1.65 mg, 1.5 mL, IntraVENous, ONCE PRN, Frandy Lele, DO    vancomycin (VANCOCIN) intermittent dosing (placeholder), , Other, RX Placeholder, Frandy Lele DO      Examination:  BP (!) 149/77   Pulse 79   Temp 98.8 °F (37.1 °C) (Oral)   Resp 22   Ht 4' 11\" (1.499 m)   Wt 104 lb (47.2 kg)   SpO2 94%   BMI 21.01 kg/m²   Gait -unable to test  Medication side effects(SE): Denies    Mental Status Examination:    Level of consciousness: Lethargic  Appearance: Fair grooming and poor hygiene  Behavior/Motor: Lethargic and unarousable. Attitude toward examiner:  On cooperative  Speech:  slow, whispered, and dysarthric   Mood: Constricted  Affect: Blunted  Thought processes:  linear and slow   Thought content:  Homicidal ideation - none  Suicidal Ideation:  denies suicidal ideation  Delusions:  no evidence of delusions  Perceptual Disturbance:  denies any perceptual disturbance  Cognition:  disoriented   Concentration intact  Insight poor   Judgement poor     ASSESSMENT:   Patient symptoms are:  [] Well controlled  [x] Improving  [] Worsening  [] No change      Diagnosis:   Principal Problem:    SIRS (systemic inflammatory response syndrome) (HCC)  Active Problems:    NICKI (acute kidney injury) (HCC)    Severe malnutrition (HCC)    Elevated d-dimer    Leukocytosis    Pulmonary nodules    Delirium due to another medical condition    Acute metabolic encephalopathy    Depression with suicidal ideation    Bipolar disorder, mixed ([facility]    Acute encephalopathy    COPD (chronic obstructive pulmonary disease) ([facility]    H/O gastric bypass  Resolved Problems:    * No resolved hospital problems. *      LABS:    No results for input(s): WBC, HGB, PLT in the last 72 hours. Recent Labs     10/18/22  0450 10/20/22  0857    137   K 3.7 3.8    100   CO2 26 27   BUN 9 14   CREATININE 0.59 0.65   GLUCOSE 70 68*     No results for input(s): BILITOT, ALKPHOS, AST, ALT in the last 72 hours. Lab Results   Component Value Date/Time    [facility] NEGATIVE 07/08/2019 12:00 AM    BARBSCNU NEGATIVE 10/13/2022 11:15 AM    LABBENZ NEGATIVE 10/13/2022 11:15 AM    LABBENZ NEGATIVE 12/22/2012 09:35 PM    LABMETH NEGATIVE 10/13/2022 11:15 AM    PPXUR NOT REPORTED 01/29/2022 03:06 PM     Lab Results   Component Value Date/Time    TSH 1.37 01/29/2022 12:38 PM     No results found for: LITHIUM  Lab Results   Component Value Date    VALPROATE 120 10/14/2022       RISK ASSESSMENT: low risk of suicide or harm to others              PLAN  Continue Abilify Cymbalta Depakote as ordered  No indication for admission to psychiatry  Medications as noted above . No changes today.    Follow-up daily while on inpatient unit  Electronically signed by Edwar Vazquez MD on 10/20/2022 at 12:37 PM

## 2022-10-20 NOTE — PLAN OF CARE
Problem: Discharge Planning  Goal: Discharge to home or other facility with appropriate resources  10/20/2022 0324 by Fili Parra RN  Outcome: Progressing     Problem: Pain  Goal: Verbalizes/displays adequate comfort level or baseline comfort level  10/20/2022 0324 by Fili Parra RN  Outcome: Progressing     Problem: Skin/Tissue Integrity  Goal: Absence of new skin breakdown  Description: 1. Monitor for areas of redness and/or skin breakdown  2. Assess vascular access sites hourly  3. Every 4-6 hours minimum:  Change oxygen saturation probe site  4. Every 4-6 hours:  If on nasal continuous positive airway pressure, respiratory therapy assess nares and determine need for appliance change or resting period.   10/20/2022 0324 by Fili Parra RN  Outcome: Progressing     Problem: Safety - Adult  Goal: Free from fall injury  10/20/2022 0324 by Fili Parra RN  Outcome: Progressing     Problem: Nutrition Deficit:  Goal: Optimize nutritional status  10/20/2022 0324 by Fili Parra RN  Outcome: Progressing

## 2022-10-20 NOTE — PROGRESS NOTES
Vancomycin Dosing by Pharmacy - Daily Note   Vancomycin Therapy Day:  11  Indication: endocarditis     Allergies:  Aspirin, Bactrim, and Codeine   Actual Weight:    Wt Readings from Last 1 Encounters:   10/19/22 104 lb (47.2 kg)       Labs/Ancillary Data  Estimated Creatinine Clearance: 75 mL/min (based on SCr of 0.65 mg/dL). Recent Labs     10/18/22  0450 10/20/22  0857   CREATININE 0.59 0.65   BUN 9 14     Procalcitonin   Date Value Ref Range Status   10/10/2022 6.01 (H) <0.09 ng/mL Final     Comment:           Suspected Sepsis:  <0.50 ng/mL     Low likelihood of sepsis. 0.50-2.00 ng/mL     Increased likelihood of sepsis. Antibiotics encouraged. >2.00 ng/mL     High risk of sepsis/shock. Antibiotics strongly encouraged. Suspected Lower Resp Tract Infections:  <0.24 ng/mL     Low likelihood of bacterial infection. >0.24 ng/mL     Increased likelihood of bacterial infection. Antibiotics encouraged. With successful antibiotic therapy, PCT levels should decrease rapidly. (Half-life of 24 to   36 hours.)        Procalcitonin values from samples collected within the first 6 hours of systemic infection   may still be low. Retesting may be indicated. Values from day 1 and day 4 can be entered into the Change in Procalcitonin Calculator   ([email or web address]. [employer]) to determine the patient's Mortality Risk Prognosis        In healthy neonates, plasma Procalcitonin (PCT) concentrations increase gradually after   birth, reaching peak values at about 24 hours of age then decrease to normal values below   0.5 ng/mL by 48-72 hours of age.        No intake or output data in the 24 hours ending 10/20/22 1342  Temp: 98.8    Culture Date / Source  /  Results  See micro   Recent vancomycin administrations                     vancomycin (VANCOCIN) 750 mg in dextrose 5 % 250 mL IVPB (mg) 750 mg New Bag 10/20/22 1231     750 mg New Bag  0212     750 mg New Bag 10/19/22 1435     750 mg New Bag  0315     750 mg New Bag 10/18/22 1352    vancomycin 1000 mg IVPB in 250 mL D5W addavial (mg) 1,000 mg New Bag 10/18/22 0108     1,000 mg New Bag 10/17/22 1353                    Vancomycin Concentrations:   TROUGH:  No results for input(s): VANCOTROUGH in the last 72 hours. RANDOM:    Recent Labs     10/18/22  0450 10/19/22  0531   VANCORANDOM 27.1 32.0       MRSA Nasal Swab: N/A. Non-respiratory infection. Talisha Beckman PLAN     Increase dose to 1500 mg q24h IV  Ensured BUN/sCr ordered at baseline and every 48 hours x at least 3 levels, then at least weekly. Pharmacy will continue to monitor patient and adjust therapy as indicated      Vancomycin Target Concentration Parameters  Treatment  Population Target AUC/GABBI Target Trough   Invasive MRSA Infection (bacteremia, pneumonia, meningitis, endocarditis, osteomyelitis)  Sepsis (undifferentiated) 400-600 N/A   Infection due to non-MRSA pathogen  Empiric treatment of non-invasive MRSA infection  (SSTI, UTI) <500 10-15 mg/L   CrCl < 29 mL/min  Rapidly fluctuating serum creatinine   NCIKI N/A < 15 mg/L     Renal replacement therapy is dosed by levels, per hospital protocol. Abbreviations  * Pauc: probability that AUC is >400 (efficacy); Pconc: probability that Ctrough is above 20 ?g/mL (toxicity); Tox: Probability of nephrotoxicity, based on Lodlala et al. Clin Infect Dis 2009. Loading dose: N/A  Regimen: 1500 mg IV every 24 hours. Start time: 00:31 on 10/21/2022  Exposure target: AUC24 (range)400-600 mg/L.hr   AUC24,ss: 544 mg/L.hr  Probability of AUC24 > 400: 100 %  Ctrough,ss: 13.1 mg/L  Probability of Ctrough,ss > 20: 4 %  Probability of nephrotoxicity (Lodise DEVON 2009): 8 %    Thank you for the consult. Pharmacy will continue to follow.     Solo Staples, PharmD, BCPS  10/20/2022 1:44 PM

## 2022-10-21 ENCOUNTER — APPOINTMENT (OUTPATIENT)
Dept: CT IMAGING | Age: 52
DRG: 871 | End: 2022-10-21
Payer: COMMERCIAL

## 2022-10-21 ENCOUNTER — APPOINTMENT (OUTPATIENT)
Dept: INTERVENTIONAL RADIOLOGY/VASCULAR | Age: 52
DRG: 871 | End: 2022-10-21
Payer: COMMERCIAL

## 2022-10-21 ENCOUNTER — APPOINTMENT (OUTPATIENT)
Dept: GENERAL RADIOLOGY | Age: 52
DRG: 871 | End: 2022-10-21
Payer: COMMERCIAL

## 2022-10-21 LAB
APPEARANCE FLUID: NORMAL
B BURGDORFERI AB,CSF: 0.03 LIV
BASO FLUID: ABNORMAL %
COLOR FLUID: NORMAL
CULTURE: ABNORMAL
CULTURE: NORMAL
DIRECT EXAM: ABNORMAL
DIRECT EXAM: NORMAL
EOSINOPHIL FLUID: ABNORMAL %
GLUCOSE BLD-MCNC: 88 MG/DL (ref 65–105)
LACTATE DEHYDROGENASE, FLUID: 279 U/L
LYMPHOCYTES, BODY FLUID: 20 %
MONOCYTE, FLUID: ABNORMAL %
NEUTROPHIL, FLUID: 77 %
OTHER CELLS FLUID: 3 %
PH FLUID: 8.5
RBC FLUID: NORMAL /MM3
SPECIMEN DESCRIPTION: ABNORMAL
SPECIMEN DESCRIPTION: NORMAL
SPECIMEN TYPE: NORMAL
SURGICAL PATHOLOGY REPORT: NORMAL
TOTAL PROTEIN, BODY FLUID: 2.8 G/DL
WBC FLUID: 1151 /MM3
WEST NILE IGG, CSF: 0.03 IV
WEST NILE IGM ANTIBODY CSF: 0.01 IV

## 2022-10-21 PROCEDURE — 6370000000 HC RX 637 (ALT 250 FOR IP): Performed by: INTERNAL MEDICINE

## 2022-10-21 PROCEDURE — 87205 SMEAR GRAM STAIN: CPT

## 2022-10-21 PROCEDURE — 94640 AIRWAY INHALATION TREATMENT: CPT

## 2022-10-21 PROCEDURE — 87075 CULTR BACTERIA EXCEPT BLOOD: CPT

## 2022-10-21 PROCEDURE — 99233 SBSQ HOSP IP/OBS HIGH 50: CPT | Performed by: INTERNAL MEDICINE

## 2022-10-21 PROCEDURE — 83986 ASSAY PH BODY FLUID NOS: CPT

## 2022-10-21 PROCEDURE — 84157 ASSAY OF PROTEIN OTHER: CPT

## 2022-10-21 PROCEDURE — 2060000000 HC ICU INTERMEDIATE R&B

## 2022-10-21 PROCEDURE — 94761 N-INVAS EAR/PLS OXIMETRY MLT: CPT

## 2022-10-21 PROCEDURE — 87186 SC STD MICRODIL/AGAR DIL: CPT

## 2022-10-21 PROCEDURE — 86403 PARTICLE AGGLUT ANTBDY SCRN: CPT

## 2022-10-21 PROCEDURE — 99232 SBSQ HOSP IP/OBS MODERATE 35: CPT | Performed by: FAMILY MEDICINE

## 2022-10-21 PROCEDURE — 99232 SBSQ HOSP IP/OBS MODERATE 35: CPT | Performed by: PSYCHIATRY & NEUROLOGY

## 2022-10-21 PROCEDURE — 88112 CYTOPATH CELL ENHANCE TECH: CPT

## 2022-10-21 PROCEDURE — 0W9930Z DRAINAGE OF RIGHT PLEURAL CAVITY WITH DRAINAGE DEVICE, PERCUTANEOUS APPROACH: ICD-10-PCS | Performed by: RADIOLOGY

## 2022-10-21 PROCEDURE — C1713 ANCHOR/SCREW BN/BN,TIS/BN: HCPCS

## 2022-10-21 PROCEDURE — 32555 ASPIRATE PLEURA W/ IMAGING: CPT

## 2022-10-21 PROCEDURE — 83615 LACTATE (LD) (LDH) ENZYME: CPT

## 2022-10-21 PROCEDURE — 2580000003 HC RX 258: Performed by: INTERNAL MEDICINE

## 2022-10-21 PROCEDURE — 6360000002 HC RX W HCPCS: Performed by: INTERNAL MEDICINE

## 2022-10-21 PROCEDURE — 71045 X-RAY EXAM CHEST 1 VIEW: CPT

## 2022-10-21 PROCEDURE — 2700000000 HC OXYGEN THERAPY PER DAY

## 2022-10-21 PROCEDURE — 88305 TISSUE EXAM BY PATHOLOGIST: CPT

## 2022-10-21 PROCEDURE — 82947 ASSAY GLUCOSE BLOOD QUANT: CPT

## 2022-10-21 PROCEDURE — C1729 CATH, DRAINAGE: HCPCS

## 2022-10-21 PROCEDURE — 87070 CULTURE OTHR SPECIMN AEROBIC: CPT

## 2022-10-21 PROCEDURE — 71250 CT THORAX DX C-: CPT

## 2022-10-21 PROCEDURE — 32557 INSERT CATH PLEURA W/ IMAGE: CPT

## 2022-10-21 PROCEDURE — 0W9B30Z DRAINAGE OF LEFT PLEURAL CAVITY WITH DRAINAGE DEVICE, PERCUTANEOUS APPROACH: ICD-10-PCS | Performed by: RADIOLOGY

## 2022-10-21 RX ADMIN — IPRATROPIUM BROMIDE AND ALBUTEROL SULFATE 1 AMPULE: 2.5; .5 SOLUTION RESPIRATORY (INHALATION) at 08:09

## 2022-10-21 RX ADMIN — FERROUS SULFATE TAB 325 MG (65 MG ELEMENTAL FE) 325 MG: 325 (65 FE) TAB at 09:28

## 2022-10-21 RX ADMIN — ARIPIPRAZOLE 5 MG: 5 TABLET ORAL at 09:30

## 2022-10-21 RX ADMIN — HYDRALAZINE HYDROCHLORIDE 10 MG: 20 INJECTION INTRAMUSCULAR; INTRAVENOUS at 06:59

## 2022-10-21 RX ADMIN — IPRATROPIUM BROMIDE AND ALBUTEROL SULFATE 1 AMPULE: 2.5; .5 SOLUTION RESPIRATORY (INHALATION) at 19:51

## 2022-10-21 RX ADMIN — Medication 2000 UNITS: at 09:28

## 2022-10-21 RX ADMIN — IPRATROPIUM BROMIDE AND ALBUTEROL SULFATE 1 AMPULE: 2.5; .5 SOLUTION RESPIRATORY (INHALATION) at 11:44

## 2022-10-21 RX ADMIN — DIVALPROEX SODIUM 1000 MG: 500 TABLET, DELAYED RELEASE ORAL at 21:57

## 2022-10-21 RX ADMIN — ATORVASTATIN CALCIUM 40 MG: 40 TABLET, FILM COATED ORAL at 21:56

## 2022-10-21 RX ADMIN — VANCOMYCIN HYDROCHLORIDE 1500 MG: 1.5 INJECTION, POWDER, LYOPHILIZED, FOR SOLUTION INTRAVENOUS at 16:05

## 2022-10-21 RX ADMIN — SODIUM CHLORIDE, PRESERVATIVE FREE 10 ML: 5 INJECTION INTRAVENOUS at 21:57

## 2022-10-21 RX ADMIN — DIVALPROEX SODIUM 1000 MG: 500 TABLET, DELAYED RELEASE ORAL at 09:28

## 2022-10-21 RX ADMIN — IPRATROPIUM BROMIDE AND ALBUTEROL SULFATE 1 AMPULE: 2.5; .5 SOLUTION RESPIRATORY (INHALATION) at 15:32

## 2022-10-21 RX ADMIN — FERROUS SULFATE TAB 325 MG (65 MG ELEMENTAL FE) 325 MG: 325 (65 FE) TAB at 18:56

## 2022-10-21 RX ADMIN — DULOXETINE 60 MG: 60 CAPSULE, DELAYED RELEASE ORAL at 09:28

## 2022-10-21 RX ADMIN — CARVEDILOL 25 MG: 25 TABLET, FILM COATED ORAL at 09:28

## 2022-10-21 NOTE — PROGRESS NOTES
Patient returned from IR with bilateral chest tubes to water seal. Patient sleeping without s/s of distress and father, Guerrero, at bedside. Heart monitor checked, vital signs taken and all questions/concerns addressed at this time. Electronically signed by Melody Velazquez RN.

## 2022-10-21 NOTE — FLOWSHEET NOTE
10/21/22 1015   Treatment Team Notification   Reason for Communication Review case   Team Member Name Dr. Mickie Escobedo Provider   Method of Communication Call   Notification Time 21      Patient to be kept NPO status until after pleural drain placement today. Electronically signed by Joy Mares RN.

## 2022-10-21 NOTE — PLAN OF CARE
Problem: Discharge Planning  Goal: Discharge to home or other facility with appropriate resources  10/21/2022 0354 by Lion Magallon RN  Outcome: Progressing       Problem: Safety - Adult  Goal: Free from fall injury  10/21/2022 0355 by Lion Magallon RN  Outcome: Progressing  Note: No falls noted this shift. Patient ambulates with x1 staff assistance without difficulty. Bed kept in low position. Safe environment maintained. Bedside table & call light in reach. Uses call light appropriately when needing assistance.        Problem: Nutrition Deficit:  Goal: Optimize nutritional status  10/21/2022 0355 by Lion Magallon RN  Outcome: Progressing  Flowsheets (Taken 10/21/2022 0355)  Nutrient intake appropriate for improving, restoring, or maintaining nutritional needs:   Assess nutritional status and recommend course of action   Monitor oral intake, labs, and treatment plans   Recommend appropriate diets, oral nutritional supplements, and vitamin/mineral supplements

## 2022-10-21 NOTE — CARE COORDINATION
ONGOING DISCHARGE PLAN:    Patient is alert and oriented x4 but drowsy. Spoke with patient regarding discharge plan and patient confirms that plan is still to go to [address] [place] [address]  Father stated that the patient could come stay with him if needed. Per Pulm IR to do Ct guided left side pleural drain and alos to look at right side. IR completed Left side thoracentesis on 10/18 400 ml removed. Cultures positive for MRSA. ID folowing for IV ATB Currently on Vanco daily. Per ID patient will need IVATB until 10/30, has PICC line placed 10/20. Following for home O2 currently on 1.5 lpm NC 99%. Will continue to follow for additional discharge needs.     Electronically signed by Shila Wagoner RN on 10/21/2022 at 1:55 PM

## 2022-10-21 NOTE — PROGRESS NOTES
Vancomycin Dosing by Pharmacy - Daily Note   Vancomycin Therapy Day:  12  Indication: endocarditis    Allergies:  Aspirin, Bactrim, and Codeine   Actual Weight:    Wt Readings from Last 1 Encounters:   10/20/22 100 lb 5 oz (45.5 kg)       Labs/Ancillary Data  Estimated Creatinine Clearance: 72 mL/min (based on SCr of 0.65 mg/dL). Recent Labs     10/20/22  0857   CREATININE 0.65   BUN 14     Procalcitonin   Date Value Ref Range Status   10/10/2022 6.01 (H) <0.09 ng/mL Final     Comment:           Suspected Sepsis:  <0.50 ng/mL     Low likelihood of sepsis. 0.50-2.00 ng/mL     Increased likelihood of sepsis. Antibiotics encouraged. >2.00 ng/mL     High risk of sepsis/shock. Antibiotics strongly encouraged. Suspected Lower Resp Tract Infections:  <0.24 ng/mL     Low likelihood of bacterial infection. >0.24 ng/mL     Increased likelihood of bacterial infection. Antibiotics encouraged. With successful antibiotic therapy, PCT levels should decrease rapidly. (Half-life of 24 to   36 hours.)        Procalcitonin values from samples collected within the first 6 hours of systemic infection   may still be low. Retesting may be indicated. Values from day 1 and day 4 can be entered into the Change in Procalcitonin Calculator   (www.[employer]-pct-calculator. [employer]) to determine the patient's Mortality Risk Prognosis        In healthy neonates, plasma Procalcitonin (PCT) concentrations increase gradually after   birth, reaching peak values at about 24 hours of age then decrease to normal values below   0.5 ng/mL by 48-72 hours of age.          Intake/Output Summary (Last 24 hours) at 10/21/2022 1125  Last data filed at 10/21/2022 0700  Gross per 24 hour   Intake 178.11 ml   Output --   Net 178.11 ml     Temp: 98.4 F    Culture Date / Source  /  Results  10/21 - pleural fluid - MRSA  -- see additional micro  Recent vancomycin administrations                     vancomycin (VANCOCIN) 750 mg in dextrose 5 % 250 mL IVPB (mg) 750 mg New Bag 10/20/22 1231     750 mg New Bag  0212     750 mg New Bag 10/19/22 1435     750 mg New Bag  0315     750 mg New Bag 10/18/22 1352                    Vancomycin Concentrations:   TROUGH:  No results for input(s): VANCOTROUGH in the last 72 hours. RANDOM:    Recent Labs     10/19/22  0531   VANCORANDOM 32.0       MRSA Nasal Swab: N/A. Non-respiratory infection. Kirti Joy PLAN     Continue current dose of 1500 mg q24h IV  Ensured BUN/sCr ordered at baseline and every 48 hours x at least 3 levels, then at least weekly. Repeat vancomycin concentration ordered for 10/22 @ 0600   Pharmacy will continue to monitor patient and adjust therapy as indicated      Vancomycin Target Concentration Parameters  Treatment  Population Target AUC/GABBI Target Trough   Invasive MRSA Infection (bacteremia, pneumonia, meningitis, endocarditis, osteomyelitis)  Sepsis (undifferentiated) 400-600 N/A   Infection due to non-MRSA pathogen  Empiric treatment of non-invasive MRSA infection  (SSTI, UTI) <500 10-15 mg/L   CrCl < 29 mL/min  Rapidly fluctuating serum creatinine   NICKI N/A < 15 mg/L     Renal replacement therapy is dosed by levels, per hospital protocol. Abbreviations  * Pauc: probability that AUC is >400 (efficacy); Pconc: probability that Ctrough is above 20 ?g/mL (toxicity); Tox: Probability of nephrotoxicity, based on Lodlala et al. Clin Infect Dis 2009. Loading dose: N/A  Regimen: 1500 mg IV every 24 hours. Start time: 00:31 on 10/21/2022  Exposure target: AUC24 (range)400-600 mg/L.hr   AUC24,ss: 544 mg/L.hr  Probability of AUC24 > 400: 100 %  Ctrough,ss: 13.1 mg/L  Probability of Ctrough,ss > 20: 4 %  Probability of nephrotoxicity (Lodise DEVON 2009): 8 %    Thank you for the consult. Pharmacy will continue to follow.     Alex Ledezma RP,PharmD,  10/21/2022, 11:27 AM

## 2022-10-21 NOTE — FLOWSHEET NOTE
10/21/22 0900   Treatment Team Notification   Reason for Communication Patient/Family request   Team Member Name Dr. Winford Siemens Attending Provider   Method of Communication Secure Message   Notification Time 4693     Patient okay to advance to regular diet with encouragement to sit up in chair with meals. Electronically signed by Mor Brooke RN.

## 2022-10-21 NOTE — PROGRESS NOTES
Progress Note  Date:10/21/2022       Room:[facility]-  Patient Name:Anu Bourne     YOB: 1970     Age:51 y.o. Subjective    Subjective:  Symptoms:  Stable. (Lying in bed, responds to questions. Father present. ). Diet:  Poor intake (states she is hungry). Activity level: Impaired due to weakness. Pain:  She reports no pain. Review of Systems  Objective         Vitals Last 24 Hours:  TEMPERATURE:  Temp  Av °F (36.7 °C)  Min: 97.3 °F (36.3 °C)  Max: 98.8 °F (37.1 °C)  RESPIRATIONS RANGE: Resp  Av.4  Min: 16  Max: 22  PULSE OXIMETRY RANGE: SpO2  Av.9 %  Min: 92 %  Max: 97 %  PULSE RANGE: Pulse  Av.1  Min: 63  Max: 79  BLOOD PRESSURE RANGE: Systolic (47BLA), MFI:082 , Min:98 , XHZ:148   ; Diastolic (89MYV), YMJ:99, Min:53, Max:88    I/O (24Hr): Intake/Output Summary (Last 24 hours) at 10/21/2022 0824  Last data filed at 10/21/2022 0700  Gross per 24 hour   Intake 178.11 ml   Output --   Net 178.11 ml     Objective:  General Appearance:  Comfortable. Vital signs: (most recent): Blood pressure (!) 173/78, pulse 75, temperature 97.3 °F (36.3 °C), temperature source Oral, resp. rate 16, height 4' 11\" (1.499 m), weight 100 lb 5 oz (45.5 kg), SpO2 94 %. (BP elevated). Lungs:  Normal effort and normal respiratory rate. There are decreased breath sounds. Heart: Normal rate. Regular rhythm. S1 normal and S2 normal.    Labs/Imaging/Diagnostics    Labs:  CBC:No results for input(s): WBC, RBC, HGB, HCT, MCV, RDW, PLT in the last 72 hours. CHEMISTRIES:  Recent Labs     10/20/22  0857      K 3.8      CO2 27   BUN 14   CREATININE 0.65   GLUCOSE 68*     PT/INR:  Recent Labs     10/18/22  1358   PROTIME 15.4*   INR 1.2     APTT:No results for input(s): APTT in the last 72 hours. LIVER PROFILE:No results for input(s): AST, ALT, BILIDIR, BILITOT, ALKPHOS in the last 72 hours.     Imaging Last 24 Hours:  CT CHEST WO CONTRAST    Result Date: 10/20/2022  EXAMINATION: CT OF THE CHEST WITHOUT CONTRAST 10/20/2022 3:17 pm TECHNIQUE: CT of the chest was performed without the administration of intravenous contrast. Multiplanar reformatted images are provided for review. Automated exposure control, iterative reconstruction, and/or weight based adjustment of the mA/kV was utilized to reduce the radiation dose to as low as reasonably achievable. COMPARISON: 10/17/2022 HISTORY: ORDERING SYSTEM PROVIDED HISTORY: evaluate effusion TECHNOLOGIST PROVIDED HISTORY: Evaluate effusion Is the patient pregnant?->No Reason for Exam: evaluate effusion FINDINGS: Mediastinum: Thoracic aorta, pulmonary arteries, heart and pericardium appear stable. Paratracheal nodes are borderline enlarged measuring 1 cm. Lungs/pleura: There are small bilateral pleural effusions both partly loculated. The effusion on the right is increased compared to the previous evaluation. Redemonstration of multiple bilateral lung parenchymal masses, some with cavitations. These lesions appear to have increased in size compared to the previous evaluation. A right upper lobe cavitary lesion with air-fluid level currently measures 2.2 x 2.2 cm compared to 1.9 x 1.7 cm previously. A partly cavitated right lower lobe lesion currently measures 5 x 3.7 cm compared with 3 x 4.8 cm previously. These are likely septic emboli. Upper Abdomen: No acute abnormality. Soft Tissues/Bones: No evidence of destructive changes. 1. Bilateral pleural effusions, stable on the left and slightly increased on the right, with loculations. 2. Redemonstration of multiple bilateral lung parenchymal masses, some cavitated, which have overall increased in size. These likely represent septic emboli. Endocarditis is likely. 3. Mild mediastinal lymphadenopathy. IR FLUORO GUIDED CVA DEVICE PLMT/REPLACE/REMOVAL    Result Date: 10/20/2022  PROCEDURE: ULTRASOUND GUIDED VASCULAR ACCESS.  [facility] 10/20/2022. HISTORY: ORDERING SYSTEM PROVIDED HISTORY: IV Abx TECHNOLOGIST PROVIDED HISTORY: IV Abx Is the patient pregnant?->No SEDATION: None FLUOROSCOPY DOSE AND TYPE OR TIME AND EXPOSURES: 12 seconds; D AP 10 cGy cm2 TECHNIQUE: Informed consent was obtained from the patient's family member after a detailed explanation of the procedure including risks, benefits, and alternatives. Universal protocol was observed. The right arm was prepped and draped in sterile fashion using maximum sterile barrier technique. Local anesthesia was achieved with lidocaine. A micropuncture needle was used to access the right basilic vein using ultrasound guidance. An ultrasound image demonstrating patency of the vein with needle tip located within it. An image was obtained and stored in PACs. A 0.018 guidewire was used to place a peel-a-way sheath and a 5 Pashto dual-lumen PICC was advanced with fluoroscopic guidance with the tip at the cavo-atrial junction. Fluoroscopy shows right lung masses/cavitary lesions better shown on recent chest CT. Fusion hardware lower cervical spine. The catheter flushed easily and there was a good blood return. The catheter was secured to the skin. The patient tolerated the procedure well and there were no immediate complications. EBL: Less than 3 mL FINDINGS: Fluoroscopic image demonstrates the tip of the catheter at the cavo-atrial junction. Successful ultrasound and fluoroscopy guided PICC placement     XR CHEST PORTABLE    Result Date: 10/20/2022  EXAMINATION: ONE XRAY VIEW OF THE CHEST 10/20/2022 7:13 am COMPARISON: Chest x-ray dated 18 October 2022 HISTORY: ORDERING SYSTEM PROVIDED HISTORY: Septic emboli reevaluate pleural effusion on left TECHNOLOGIST PROVIDED HISTORY: Septic emboli reevaluate pleural effusion on left Reason for Exam: hx. of pleural effusion. FINDINGS: Similar appearance of bilateral nodular airspace opacities. Trace left pleural effusion. No pneumothorax.   Stable cardiomediastinal silhouette     1. Stable appearance of bilateral nodular opacities in both lungs suspicious for underlying septic emboli better seen on the chest CT dated 17 October 2022 2. Trace left pleural effusion     MRI BRAIN W WO CONTRAST    Result Date: 10/20/2022  EXAMINATION: MRI OF THE BRAIN WITHOUT AND WITH CONTRAST  10/20/2022 4:03 pm TECHNIQUE: Multiplanar multisequence MRI of the head/brain was performed without and with the administration of intravenous contrast. COMPARISON: 10/14/2022 HISTORY: ORDERING SYSTEM PROVIDED HISTORY: Persistent encephalopathy, rule out stroke TECHNOLOGIST PROVIDED HISTORY: Persistent encephalopathy, rule out stroke FINDINGS: INTRACRANIAL STRUCTURES/VENTRICLES:  There is no acute infarct. No mass effect or midline shift. No evidence of an acute intracranial hemorrhage. The ventricles and sulci are normal in size and configuration. The sellar/suprasellar regions appear unremarkable. The normal signal voids within the major intracranial vessels appear maintained. No abnormal focus of enhancement is seen within the brain. There are few scattered foci of T2/FLAIR hyperintensity in the periventricular and subcortical white matter, concerning for microangiopathic change. ORBITS: The visualized portion of the orbits demonstrate no acute abnormality. SINUSES: Paranasal sinuses are predominantly clear. Left mastoid air cell effusion. BONES/SOFT TISSUES: The bone marrow signal intensity appears normal. The soft tissues demonstrate no acute abnormality. No acute intracranial abnormality. No acute infarction.      Assessment//Plan           Hospital Problems             Last Modified POA    * (Principal) SIRS (systemic inflammatory response syndrome) ([address] 10/10/2022 Yes    NICKI (acute kidney injury) ([address] 10/11/2022 Yes    Severe malnutrition (HCC) (Chronic) 10/11/2022 Yes    Elevated d-dimer 10/15/2022 Yes    Leukocytosis 10/15/2022 Yes    Pulmonary nodules 10/15/2022 Yes Delirium due to another medical condition 10/17/2022 Yes    Acute metabolic encephalopathy 22/55/8269 Yes    Depression with suicidal ideation 10/17/2022 Yes    Bipolar disorder, mixed ([facility] 10/11/2022 Yes    Acute encephalopathy 10/14/2022 Yes    COPD (chronic obstructive pulmonary disease) ([facility] 10/11/2022 Yes    H/O gastric bypass 10/11/2022 Yes    Overview Signed 2/13/2020  7:19 PM by Jess Sullivan MD     enlarged  pancreas, here for surgical work-up for EGD          Assessment & Plan    Continue specialist management      Electronically signed by Jess Sullivan MD on 10/21/22 at 8:24 AM EDT

## 2022-10-21 NOTE — PROGRESS NOTES
[facility] -   Infectious diseases evaluation  admission date 10/10/2022  Attending Physician Statement  I have discussed the care of the patient, including pertinent history and exam findings,  with the resident. I have reviewed the key elements of all parts of the encounter with the resident. I agree with the assessment, plan and orders as documented by the resident. ADRIANNE on October 20 showed no vegetations  Nursing facility on discharge  Kaylynn Guerra MD   reason for consultation:   MRSA bacteremia    Impression :   Current:  MRSA bacteremia  Bilateral pulmonary nodules with cavitation /loculated  pleural effusion S/P thoracentesis 10/18/22  Sepsis secondary to above  Encephalopathy  History of cocaine drug abuse  E. coli UTI treated  Acute renal failure improved  Hypertension  Bipolar disorder    Recommendations     Continue IV vancomycin end date 12/03  PICC line placed  IR to place bilateral pleural drains  Pleural fluid culture growing staph aureus  Repeat CT chest showing small pleural effusions bilaterally which appear loculated and some with cavitation suggesting septic emboli  Follow repeat blood cultures, still no growth  ADRIANNE done few days ago was negative  MRI brain on 10/14/22 unremarkable. Follow CBC and renal function  Supportive care  Discussed with nursing staff    Infection Control Recommendations   [place] Precautions  Contact Isolation       Antimicrobial Stewardship Recommendations   Simplification of therapy  Targeted therapy      History of Present Illness:   Initial history:  Ira Viera is a 46y.o.-year-old female was admitted 10/10/2020 for worsening shortness of breath associated with generalized body ache. The patient is drowsy, arousable, confused, follows simple commands, unable to provide history that was obtained from chart review and nursing staff. She does have history of cocaine abuse.   She was found to have acute renal failure with a creatinine of 2.1. Urinalysis showed small leukocyte esterase, positive nitrate. Urine culture on 10/10/2022 grew E. Coli  Blood cultures 10/10/2022 2 of 2 grew MRSA  Echocardiogram showed no vegetations  Procalcitonin was 6.01, D-dimer was elevated at 3.85  Chest x-ray showed right lung nodules. VQ scan was indeterminant. CT chest without contrast 10/16/2022 showed numerous bilateral nodular opacities both lungs suggestive of septic emboli , focal consolidation with cavitation in the superior right lower lobe focal consolidation at the left base, small bilateral loculated pleural effusion and mediastinal lymphadenopathy. Echocardiogram showed no vegetations  Interval changes  10/21/2022   She remains confused, appears very sick today  Thoracentesis completed  CT chest showing persistent pleural effusions with septic emboli      Patient Vitals for the past 8 hrs:   BP Temp Temp src Pulse Resp SpO2   10/21/22 1415 (!) 104/56 97.5 °F (36.4 °C) Axillary 60 15 --   10/21/22 1253 (!) 115/59 -- -- 66 18 99 %   10/21/22 1200 118/61 97.7 °F (36.5 °C) Axillary 64 17 95 %   10/21/22 1146 -- -- -- -- -- 96 %   10/21/22 0900 (!) 155/71 -- -- 79 -- --   10/21/22 0810 -- -- -- 75 16 94 %   10/21/22 0630 (!) 173/78 97.3 °F (36.3 °C) Oral 74 18 96 %           I have personally reviewed the past medical history, past surgical history, medications, social history, and family history, and I haveupdated the database accordingly. Allergies:   Aspirin, Bactrim, and Codeine     Review of Systems:     Review of Systems  Unable to provide due to mental status changes  Physical Examination :       Physical Exam  Constitutional:       Appearance: She is ill-appearing. HENT:      Head: Normocephalic and atraumatic. Right Ear: External ear normal.      Left Ear: External ear normal.   Eyes:      General: No scleral icterus.      Conjunctiva/sclera: Conjunctivae normal.   Cardiovascular:      Rate and Rhythm: Regular rhythm. Heart sounds: No murmur heard. Pulmonary:      Effort: No respiratory distress. Breath sounds: Rhonchi present. Comments:   Decreased breath sounds bilaterally  Abdominal:      Palpations: Abdomen is soft. Tenderness: There is no abdominal tenderness. Musculoskeletal:      Cervical back: Neck supple. No rigidity. Right lower leg: No edema. Left lower leg: No edema. Skin:     Coloration: Skin is not jaundiced. Findings: No erythema.    Neurological:      Comments: Drowsy, moves all extremities, follows simple commands     Past Medical History:     Past Medical History:   Diagnosis Date    Anemia     Arthritis     Asthma     Bipolar disorder, mixed ([address]     Carotid artery stenosis 2020    Cocaine abuse ([address] 2014    COPD (chronic obstructive pulmonary disease) (HCC)     Degeneration of cervical intervertebral disc     Depression with anxiety     Depression with anxiety     Fibromyalgia 2017    GERD (gastroesophageal reflux disease)     History of migraine headaches 6/10/2014    History of renal calculi 6/10/2014    History of seizure disorder 6/10/2014    Hoarseness of voice     HTN (hypertension) 6/10/2014    Hyperlipidemia 6/10/2014    IGT (impaired glucose tolerance) 6/10/2014    Kidney stones     Lactose intolerance 6/10/2014    Leukopenia 6/10/2014    Major depressive disorder, recurrent episode, severe, without mention of psychotic behavior 2014    MVP (mitral valve prolapse)     Seizures ([address]     Sleep apnea 6/10/2014    Unspecified diseases of blood and blood-forming organs        Past Surgical  History:     Past Surgical History:   Procedure Laterality Date    ANKLE FRACTURE SURGERY      recontruction surgery    APPENDECTOMY      BREAST LUMPECTOMY Right     CARDIAC CATHETERIZATION      CARPAL TUNNEL RELEASE      x2     SECTION      CHOLECYSTECTOMY      COLONOSCOPY  12    COLONOSCOPY  2016    severe spasms COLONOSCOPY  05/20/2019    DR Sasha Patiño    GASTRIC BYPASS SURGERY      GASTRIC BYPASS SURGERY      HYSTERECTOMY (CERVIX STATUS UNKNOWN)      HYSTERECTOMY (CERVIX STATUS UNKNOWN)      LAPAROSCOPY N/A 1/29/2022    LAPAROSCOPY EXPLORATORY CONVERTED TO OPEN EXPLORATORY LAPAROTOMY, LYSIS OF ADHESIONS, REDUCTION OF INTERNAL HERNIA performed by Donnell Ram DO at  [facility]  76/[id number]/[id number]    APPLICATION OF ARCH BARS,SIMPLE EXTRACTION OF #15 AND RT TMJ JOINT REPLACEMENT    SHOULDER ARTHROSCOPY Left 06/05/2019    DR ROBERT GREEN    SHOULDER SURGERY      TONSILLECTOMY AND ADENOIDECTOMY      TRANSESOPHAGEAL ECHOCARDIOGRAM N/A 10/19/2022    TRANSESOPHAGEAL ECHOCARDIOGRAM WITH BUBBLE STUDY performed by Frandy Royal DO at [facility]  7-3-12    egd    UPPER GASTROINTESTINAL ENDOSCOPY  12/19/2016    status post gastrectomy with a small remnant of gastric pouch.     UPPER GASTROINTESTINAL ENDOSCOPY  05/2019    DR Sasha Patiño       Medications:      vancomycin  1,500 mg IntraVENous Q24H    carvedilol  25 mg Oral BID WC    Vitamin D  2,000 Units Oral Daily    sodium chloride flush  5-40 mL IntraVENous 2 times per day    [Held by provider] heparin (porcine)  5,000 Units SubCUTAneous BID    ARIPiprazole  5 mg Oral Daily    atorvastatin  40 mg Oral Daily    divalproex  1,000 mg Oral BID    DULoxetine  60 mg Oral Daily    ferrous sulfate  325 mg Oral BID WC    ipratropium-albuterol  1 ampule Inhalation Q4H WA    vancomycin (VANCOCIN) intermittent dosing (placeholder)   Other RX Placeholder       Social History:     Social History     Socioeconomic History    Marital status:      Spouse name: Not on file    Number of children: Not on file    Years of education: Not on file    Highest education level: Not on file   Occupational History    Occupation: disability   Tobacco Use    Smoking status: Every Day     Packs/day: 0.50     Years: 33.00     Pack years: 16. 50     Types: Cigarettes    Smokeless tobacco: Never    Tobacco comments:     quit  2000 started again  1/13    Substance and Sexual Activity    Alcohol use: Yes     Comment: occasional    Drug use: Not Currently    Sexual activity: Yes   Other Topics Concern    Not on file   Social History Narrative    Not on file     Social Determinants of Health     Financial Resource Strain: Not on file   Food Insecurity: Not on file   Transportation Needs: Not on file   Physical Activity: Not on file   Stress: Not on file   Social Connections: Not on file   Intimate Partner Violence: Not on file   Housing Stability: Not on file       Family History:     Family History   Problem Relation Age of Onset    Diabetes Mother     Cancer Mother     Coronary Art Dis Father     COPD Father     Depression Brother     Alcohol Abuse Brother     Cancer Other         lung and skin    Diabetes Maternal Grandmother     Cancer Paternal Grandmother       Medical Decision Making:   I have independently reviewed/ordered the following labs:    CBC with Differential:   No results for input(s): WBC, HGB, HCT, PLT, SEGSPCT, BANDSPCT, LYMPHOPCT, MONOPCT, EOSPCT in the last 72 hours. BMP:  Recent Labs     10/20/22  0857      K 3.8      CO2 27   BUN 14   CREATININE 0.65     Hepatic Function Panel: No results for input(s): PROT, LABALBU, BILIDIR, IBILI, BILITOT, ALKPHOS, ALT, AST in the last 72 hours. No results for input(s): RPR in the last 72 hours. No results for input(s): HIV in the last 72 hours. No results for input(s): BC in the last 72 hours. Lab Results   Component Value Date/Time    CREATININE 0.65 10/20/2022 08:57 AM    GLUCOSE 68 10/20/2022 08:57 AM       Detailed results: Thank you for allowing us to participate in the care of this patient. Please call with questions.     This note is created with the assistance of a speech recognition program.  While intending to generate adocument that actually reflects the content of the visit, the document can still have some errors including those of syntax and sound a like substitutions which may escape proof reading. It such instances, actual meaningcan be extrapolated by contextual diversion.     Fred Adrian MD

## 2022-10-21 NOTE — PROGRESS NOTES
Comprehensive Nutrition Assessment    Type and Reason for Visit:  Reassess    Nutrition Recommendations/Plan:   Continue diet as ordered. Malnutrition Assessment:  Malnutrition Status:  Severe malnutrition (10/11/22 1232)    Context:  Acute Illness     Findings of the 6 clinical characteristics of malnutrition:  Energy Intake:  50% or less of estimated energy requirements for 5 or more days  Weight Loss:  Greater than 7.5% over 3 months     Body Fat Loss: Moderate body fat loss Orbital, Triceps, Fat Overlying Ribs   Muscle Mass Loss: Moderate muscle mass loss Temples (temporalis), Clavicles (pectoralis & deltoids)  Fluid Accumulation:  No significant fluid accumulation     Strength:  Not Performed    Nutrition Assessment:    Pt's oral intake remains overall poor with her dad selecting Ensure Enlive at meals. Nutrition Related Findings:    Trace edema BUE. Bowel sounds active. Loss of appetite 10/16. Labs and meds reviewed Wound Type: Pressure Injury (refer to nursing flowsheet)       Current Nutrition Intake & Therapies:    Average Meal Intake: 1-25%, 26-50%  Average Supplements Intake: None Ordered  ADULT DIET; Regular  ADULT ORAL NUTRITION SUPPLEMENT; Breakfast, Lunch, Dinner; Standard High Calorie/High Protein Oral Supplement    Anthropometric Measures:  Height: 4' 11\" (149.9 cm)  Ideal Body Weight (IBW): 95 lbs (43 kg)    Admission Body Weight: 89 lb (40.4 kg)  Current Body Weight: 100 lb (45.4 kg), 93.7 % IBW.  Weight Source: Bed Scale  Current BMI (kg/m2): 20.2  Usual Body Weight: 113 lb (51.3 kg) (per past records)  % Weight Change (Calculated): -21.2                    BMI Categories: Underweight (BMI less than 18.5)    Estimated Daily Nutrient Needs:  Energy Requirements Based On: Kcal/kg  Weight Used for Energy Requirements: Admission  Energy (kcal/day): 1414 kcal based on 35 kcal/kg admission  Weight Used for Protein Requirements: Admission  Protein (g/day): 50 gm based on 1.2 gm/kg admission  Method Used for Fluid Requirements: 1 ml/kcal      Nutrition Diagnosis:   Severe malnutrition related to impaired respiratory function, psychological cause or life stress as evidenced by intake 0-25%, poor intake prior to admission, BMI, weight loss, severe loss of subcutaneous fat, severe muscle loss    Nutrition Interventions:   Food and/or Nutrient Delivery: Continue Current Diet, Start Oral Nutrition Supplement  Nutrition Education/Counseling: Education not indicated  Coordination of Nutrition Care: Continue to monitor while inpatient       Goals:  Previous Goal Met: No Progress toward Goal(s)  Goals: Meet at least 75% of estimated needs, PO intake 75% or greater       Nutrition Monitoring and Evaluation:   Behavioral-Environmental Outcomes: None Identified  Food/Nutrient Intake Outcomes: Diet Advancement/Tolerance  Physical Signs/Symptoms Outcomes: Biochemical Data, GI Status, Chewing or Swallowing, Fluid Status or Edema, Skin, Weight    Discharge Planning:     Too soon to determine     Lety Chatterjeeable, [address] [place] [address],   [phone]

## 2022-10-21 NOTE — PLAN OF CARE
Problem: Discharge Planning  Goal: Discharge to home or other facility with appropriate resources  Outcome: Progressing  Flowsheets (Taken 10/20/2022 2003)  Discharge to home or other facility with appropriate resources:   Identify barriers to discharge with patient and caregiver   Arrange for needed discharge resources and transportation as appropriate   Identify discharge learning needs (meds, wound care, etc)   Refer to discharge planning if patient needs post-hospital services based on physician order or complex needs related to functional status, cognitive ability or social support system   Arrange for interpreters to assist at discharge as needed     Problem: Pain  Goal: Verbalizes/displays adequate comfort level or baseline comfort level  Outcome: Progressing  Flowsheets (Taken 10/20/2022 2003)  Verbalizes/displays adequate comfort level or baseline comfort level:   Encourage patient to monitor pain and request assistance   Assess pain using appropriate pain scale   Implement non-pharmacological measures as appropriate and evaluate response   Administer analgesics based on type and severity of pain and evaluate response     Problem: Skin/Tissue Integrity  Goal: Absence of new skin breakdown  Description: 1. Monitor for areas of redness and/or skin breakdown  2. Assess vascular access sites hourly  3. Every 4-6 hours minimum:  Change oxygen saturation probe site  4. Every 4-6 hours:  If on nasal continuous positive airway pressure, respiratory therapy assess nares and determine need for appliance change or resting period.   Outcome: Progressing     Problem: Safety - Adult  Goal: Free from fall injury  Outcome: Progressing  Flowsheets (Taken 10/20/2022 2009)  Free From Fall Injury:   Instruct family/caregiver on patient safety   Based on caregiver fall risk screen, instruct family/caregiver to ask for assistance with transferring infant if caregiver noted to have fall risk factors     Problem: Nutrition Deficit:  Goal: Optimize nutritional status  Outcome: Progressing

## 2022-10-21 NOTE — PLAN OF CARE
Problem: Discharge Planning  Goal: Discharge to home or other facility with appropriate resources  10/21/2022 1712 by Isma Lozano RN  Outcome: Progressing  Flowsheets (Taken 10/21/2022 1712)  Discharge to home or other facility with appropriate resources:   Identify barriers to discharge with patient and caregiver   Arrange for needed discharge resources and transportation as appropriate   Refer to discharge planning if patient needs post-hospital services based on physician order or complex needs related to functional status, cognitive ability or social support system   Identify discharge learning needs (meds, wound care, etc)     Problem: Pain  Goal: Verbalizes/displays adequate comfort level or baseline comfort level  10/21/2022 1712 by Isma Lozano RN  Outcome: Progressing  Flowsheets (Taken 10/21/2022 1712)  Verbalizes/displays adequate comfort level or baseline comfort level:   Encourage patient to monitor pain and request assistance   Assess pain using appropriate pain scale     Problem: Skin/Tissue Integrity  Goal: Absence of new skin breakdown  Description: 1. Monitor for areas of redness and/or skin breakdown  2. Assess vascular access sites hourly  3. Every 4-6 hours minimum:  Change oxygen saturation probe site  4. Every 4-6 hours:  If on nasal continuous positive airway pressure, respiratory therapy assess nares and determine need for appliance change or resting period.   10/21/2022 1712 by Isma Lozano RN  Outcome: Progressing     Problem: Safety - Adult  Goal: Free from fall injury  10/21/2022 1712 by Isma Lozano RN  Outcome: Progressing  Flowsheets (Taken 10/21/2022 1712)  Free From Fall Injury:   Maida Eldridge family/caregiver on patient safety   Based on caregiver fall risk screen, instruct family/caregiver to ask for assistance with transferring infant if caregiver noted to have fall risk factors

## 2022-10-21 NOTE — PROGRESS NOTES
Pulmonary Progress Note  [facility]      Patient - Nani Crawford,  Age - 46 y.o.    - 1970      Room Number - 2109/2109-01   N -  [id number]   [facility]  Date of Admission -  10/10/2022  4:37 AM        Linda Guillaume MD  Primary Care Physician - Mariah Tafoya MD     SUBJECTIVE   Patient appears to be resting quietly. Not in any distress. Very sleepy, opens her eyes to name  No fevers today    VITALS    height is 4' 11\" (1.499 m) and weight is 100 lb 5 oz (45.5 kg). Her oral temperature is 97.3 °F (36.3 °C). Her blood pressure is 155/71 (abnormal) and her pulse is 79. Her respiration is 16 and oxygen saturation is 94%. Body mass index is 20.26 kg/m². Temperature Range: Temp: 97.3 °F (36.3 °C) Temp  Av °F (36.7 °C)  Min: 97.3 °F (36.3 °C)  Max: 98.8 °F (37.1 °C)  BP Range:  Systolic (08NZC), YEZ:975 , Min:98 , JSW:074     Diastolic (36LRS), JZD:42, Min:53, Max:88    Pulse Range: Pulse  Av.9  Min: 63  Max: 79  Respiration Range: Resp  Av.4  Min: 16  Max: 22  Current Pulse Ox[de-identified]  SpO2: 94 %  24HR Pulse Ox Range:  SpO2  Av.9 %  Min: 92 %  Max: 97 %  Oxygen Amount and Delivery: O2 Flow Rate (L/min): 1.5 L/min    Wt Readings from Last 3 Encounters:   10/20/22 100 lb 5 oz (45.5 kg)   22 96 lb 8 oz (43.8 kg)   22 105 lb (47.6 kg)       I/O (24 Hours)    Intake/Output Summary (Last 24 hours) at 10/21/2022 1043  Last data filed at 10/21/2022 0700  Gross per 24 hour   Intake 178.11 ml   Output --   Net 178.11 ml       EXAM   Blood pressures 150s. No evidence of any shock at this time. No fevers. Patient does look ill    General Appearance patient able to follow commands but very sleepy. Ayesha Balling HEENT - normocephalic, atraumatic. Neck - Supple,  trachea midline   Lungs -creased breath sounds at bases bilateral  Heart Exam:PMI normal. No lifts, heaves, or thrills. RRR.  No murmurs, clicks, gallops, or rubs  Abdomen Exam: Abdomen soft, non-tender. BS normal.  Extremity Exam: No signs of cyanosis. MEDS      vancomycin  1,500 mg IntraVENous Q24H    carvedilol  25 mg Oral BID WC    Vitamin D  2,000 Units Oral Daily    sodium chloride flush  5-40 mL IntraVENous 2 times per day    [Held by provider] heparin (porcine)  5,000 Units SubCUTAneous BID    ARIPiprazole  5 mg Oral Daily    atorvastatin  40 mg Oral Daily    divalproex  1,000 mg Oral BID    DULoxetine  60 mg Oral Daily    ferrous sulfate  325 mg Oral BID     ipratropium-albuterol  1 ampule Inhalation Q4H WA    vancomycin (VANCOCIN) intermittent dosing (placeholder)   Other RX Placeholder      sodium chloride 50 mL/hr at 10/18/22 1200     sodium chloride flush, labetalol, potassium chloride **OR** potassium alternative oral replacement **OR** potassium chloride, hydrALAZINE, sodium chloride flush, sodium chloride, acetaminophen, ondansetron **OR** ondansetron, albuterol sulfate HFA, sodium chloride flush, perflutren lipid microspheres    LABS   CBC No results for input(s): WBC, HGB, HCT, MCV, PLT in the last 72 hours.   BMP:   Lab Results   Component Value Date/Time     10/20/2022 08:57 AM    K 3.8 10/20/2022 08:57 AM     10/20/2022 08:57 AM    CO2 27 10/20/2022 08:57 AM    BUN 14 10/20/2022 08:57 AM    LABALBU 3.4 10/10/2022 04:55 AM    CREATININE 0.65 10/20/2022 08:57 AM    CALCIUM 8.1 10/20/2022 08:57 AM    GFRAA >60 04/08/2022 07:17 AM    LABGLOM >60 10/20/2022 08:57 AM     ABGs:  Lab Results   Component Value Date/Time    PHART 7.467 10/15/2022 10:40 AM    PO2ART 66.5 10/15/2022 10:40 AM    LCP7PVC 35.4 10/15/2022 10:40 AM      Lab Results   Component Value Date/Time    MODE PRVC 12/28/2016 04:36 AM     Ionized Calcium:  No results found for: IONCA  Magnesium:    Lab Results   Component Value Date/Time    MG 2.0 10/18/2022 04:50 AM     Phosphorus:    Lab Results   Component Value Date/Time    PHOS 3.6 10/18/2022 04:50 AM LIVER PROFILE No results for input(s): AST, ALT, LIPASE, BILIDIR, BILITOT, ALKPHOS in the last 72 hours. Invalid input(s):   AMYLASE,  ALB  INR   Recent Labs     10/18/22  1358   INR 1.2     PTT   Lab Results   Component Value Date    APTT 25.6 07/22/2016         RADIOLOGY     (See actual reports for details)    ASSESSMENT/PLAN     Patient Active Problem List   Diagnosis    GERD (gastroesophageal reflux disease)    Hoarseness of voice    MVP (mitral valve prolapse)    Depression with suicidal ideation    Lymphocytic colitis    History of migraine headaches    Hyperlipidemia    Sleep apnea    IGT (impaired glucose tolerance)    History of renal calculi    Lactose intolerance    History of seizure disorder    Leukopenia    Bipolar disorder, mixed ([address]    Illicit drug use    Postlaminectomy syndrome, cervical region    Degeneration of cervical intervertebral disc    Encounter for long-term (current) use of other medications    Bradycardia    Cellulitis    Hypokalemia    Primary hypertension    Elevated lipase    Right lower quadrant abdominal pain    Nausea    Diarrhea    Acute encephalopathy    Polysubstance abuse (HCC)    Fibromyalgia    Cocaine addiction (HCC)    Shoulder arthritis    Osteoarthritis of left glenohumeral joint    Abnormal EKG    Anemia    Anxiety    Carotid artery stenosis    COPD (chronic obstructive pulmonary disease) ([address]    Dental disease    HUTCHINSON (dyspnea on exertion)    Fatigue    Fractures    H/O gastric bypass    History of crack cocaine use    History of UTI    Injury of back    Intractable chronic migraine without aura and without status migrainosus    Intractable migraine with aura with status migrainosus    Kidney stones    Dizziness    Memory loss    Mild pulmonary hypertension (HCC)    Mild tricuspid regurgitation    Primary osteoarthritis of left shoulder    Seizure-like activity (HCC)    Stress at home    TMJ (dislocation of temporomandibular joint)    Tobacco abuse    Visual impairment    Internal hernia    Vitamin D deficiency    Iron deficiency    SIRS (systemic inflammatory response syndrome) (HCC)    NICKI (acute kidney injury) ([facility]    Severe malnutrition (HCC)    Elevated d-dimer    Leukocytosis    Pulmonary nodules    Delirium due to another medical condition    Acute metabolic encephalopathy       IntraVENous, PRN, Frandy Lele, DO, Last Rate: 50 mL/hr at 10/18/22 1200, New Bag at 10/18/22 1200    [Held by provider] heparin (porcine) injection 5,000 Units, 5,000 Units, SubCUTAneous, BID, Ivette Cabello MD, 5,000 Units at 10/18/22 0916    acetaminophen (TYLENOL) tablet 650 mg, 650 mg, Oral, Q4H PRN, Frandy Lele, DO, 650 mg at 10/14/22 2031    ondansetron (ZOFRAN-ODT) disintegrating tablet 4 mg, 4 mg, Oral, Q8H PRN **OR** ondansetron (ZOFRAN) injection 4 mg, 4 mg, IntraVENous, Q6H PRN, Frandy Lele, DO    albuterol sulfate HFA (PROVENTIL;VENTOLIN;PROAIR) 108 (90 Base) MCG/ACT inhaler 2 puff, 2 puff, Inhalation, Q6H PRN, Frandy Lele, DO    ARIPiprazole (ABILIFY) tablet 5 mg, 5 mg, Oral, Daily, Frandy Lele, DO, 5 mg at 10/18/22 0917    atorvastatin (LIPITOR) tablet 40 mg, 40 mg, Oral, Daily, Frandy Lele, DO, 40 mg at 10/18/22 2227    divalproex (DEPAKOTE) DR tablet 1,000 mg, 1,000 mg, Oral, BID, Frandy Lele, DO, 1,000 mg at 10/18/22 2227    DULoxetine (CYMBALTA) extended release capsule 60 mg, 60 mg, Oral, Daily, Frandy Lele, DO, 60 mg at 10/18/22 0916    ferrous sulfate (IRON 325) tablet 325 mg, 325 mg, Oral, BID WC, Frandy Lele, DO, 325 mg at 10/18/22 1826    sodium chloride flush 0.9 % injection 10 mL, 10 mL, IntraVENous, PRN, Frandy Lele, DO, 10 mL at 10/10/22 1427    ipratropium-albuterol (DUONEB) nebulizer solution 1 ampule, 1 ampule, Inhalation, Q4H WA, Frandy Royal DO, 1 ampule at 10/20/22 0705    perflutren lipid microspheres (DEFINITY) injection 1.65 mg, 1.5 mL, IntraVENous, ONCE PRN, Frandy Royal DO    vancomycin (VANCOCIN) intermittent dosing (placeholder), , Other, RX Placeholder, Frandy Royal DO Lab Results:            Lab Results   Component Value Date     WBC 7.3 10/17/2022     HGB 9.9 (L) 10/17/2022     HCT 30.2 (L) 10/17/2022     MCV 79.9 (L) 10/17/2022      (L) 10/17/2022            Lab Results   Component Value Date     CALCIUM 8.1 (L) 10/20/2022      10/20/2022     K 3.8 10/20/2022     CO2 27 10/20/2022      10/20/2022     BUN 14 10/20/2022     CREATININE 0.65 10/20/2022               Lab Results   Component Value Date     INR 1.2 10/18/2022     PROTIME 15.4 (H) 10/18/2022         Radiology:   10/20/2022                                          10/18/2022     My reading of film: Continued septic emboli, unchanged from previous  ASSESSMENT:         MRSA bacteremia  Septic emboli  E. coli UTI  Acute kidney injury, resolved  Suspect COPD, seen once in the office by Dr. Alex Guillaume in 2019, FEV1 2.04 L or 86% predicted  Parapneumonic effusion, pleural fluid positive for staph  History of opiate and cocaine use-reportedly uses crack cocaine at least twice weekly  History of tobacco use  Recent COVID +9/30  Elevated troponin likely type II MI  Encephalopathy, improving  History of hypertension  History of seizure disorder  Full code    Reviewed CT scans. Patient underwent IR guided thoracentesis were 400 mL of cloudy fluid was removed. The cultures are positive for MRSA. Suspect complicated parapneumonic effusion versus empyema on the left. I will consult IR to see if a CT guided pleural catheter on the left is appropriate. There is also a pleural effusion on the right. We will ask if a CT-guided thoracentesis is appropriate for the right side as well. Early ADRIANNE done earlier, October 20, revealed no vegetations. On vancomycin ID following  On DuoNeb treatments.   I did update patient's father at bedside    Electronically signed by Jaqui Duckworth MD on 10/21/2022 at 10:43 AM

## 2022-10-22 ENCOUNTER — APPOINTMENT (OUTPATIENT)
Dept: GENERAL RADIOLOGY | Age: 52
DRG: 871 | End: 2022-10-22
Payer: COMMERCIAL

## 2022-10-22 LAB
ALBUMIN SERPL-MCNC: 1.7 G/DL (ref 3.5–5.2)
ALP BLD-CCNC: 69 U/L (ref 35–104)
ALT SERPL-CCNC: <5 U/L (ref 5–33)
AMMONIA: 49 UMOL/L (ref 11–51)
AMORPHOUS: ABNORMAL
ANION GAP SERPL CALCULATED.3IONS-SCNC: 10 MMOL/L (ref 9–17)
AST SERPL-CCNC: 11 U/L
BACTERIA: ABNORMAL
BILIRUB SERPL-MCNC: 0.2 MG/DL (ref 0.3–1.2)
BILIRUBIN URINE: NEGATIVE
BUN BLDV-MCNC: 15 MG/DL (ref 6–20)
CALCIUM SERPL-MCNC: 7.4 MG/DL (ref 8.6–10.4)
CHLORIDE BLD-SCNC: 104 MMOL/L (ref 98–107)
CO2: 25 MMOL/L (ref 20–31)
COLOR: YELLOW
CREAT SERPL-MCNC: 0.66 MG/DL (ref 0.5–0.9)
CULTURE: NORMAL
DIRECT EXAM: NORMAL
EPITHELIAL CELLS UA: ABNORMAL /HPF
GFR SERPL CREATININE-BSD FRML MDRD: >60 ML/MIN/1.73M2
GLUCOSE BLD-MCNC: 87 MG/DL (ref 70–99)
GLUCOSE URINE: NEGATIVE
HCO3 ARTERIAL: 29.8 MMOL/L (ref 22–26)
HCT VFR BLD CALC: 25.5 % (ref 36–46)
HEMOGLOBIN: 8.1 G/DL (ref 12–16)
KETONES, URINE: ABNORMAL
LEUKOCYTE ESTERASE, URINE: ABNORMAL
MAGNESIUM: 2.2 MG/DL (ref 1.6–2.6)
MCH RBC QN AUTO: 25.9 PG (ref 26–34)
MCHC RBC AUTO-ENTMCNC: 31.8 G/DL (ref 31–37)
MCV RBC AUTO: 81.3 FL (ref 80–100)
METHEMOGLOBIN: 1.3 % (ref 0–1.9)
MUCUS: ABNORMAL
NITRITE, URINE: NEGATIVE
O2 SAT, ARTERIAL: 94.3 % (ref 95–98)
PCO2 ARTERIAL: 42.7 MMHG (ref 35–45)
PDW BLD-RTO: 16.8 % (ref 11.5–14.9)
PH ARTERIAL: 7.45 (ref 7.35–7.45)
PH UA: 5.5 (ref 5–8)
PLATELET # BLD: 51 K/UL (ref 150–450)
PMV BLD AUTO: 7.3 FL (ref 6–12)
PO2 ARTERIAL: 86.7 MMHG (ref 80–100)
POSITIVE BASE EXCESS, ART: 5.8 MMOL/L (ref 0–2)
POTASSIUM SERPL-SCNC: 3.2 MMOL/L (ref 3.7–5.3)
PROTEIN UA: ABNORMAL
RBC # BLD: 3.14 M/UL (ref 4–5.2)
RBC UA: ABNORMAL /HPF
REASON FOR REJECTION: NORMAL
SODIUM BLD-SCNC: 139 MMOL/L (ref 135–144)
SPECIFIC GRAVITY UA: 1.02 (ref 1–1.03)
SPECIMEN DESCRIPTION: NORMAL
TOTAL PROTEIN: 5.8 G/DL (ref 6.4–8.3)
TURBIDITY: CLEAR
URINE HGB: ABNORMAL
UROBILINOGEN, URINE: NORMAL
VANCOMYCIN RANDOM DATE LAST DOSE: NORMAL
VANCOMYCIN RANDOM DOSE AMOUNT: NORMAL
VANCOMYCIN RANDOM TIME LAST DOSE: 1605
VANCOMYCIN RANDOM: 25.4 UG/ML
WBC # BLD: 6.9 K/UL (ref 3.5–11)
WBC UA: ABNORMAL /HPF
ZZ NTE CLEAN UP: ORDERED TEST: NORMAL
ZZ NTE WITH NAME CLEAN UP: SPECIMEN SOURCE: NORMAL

## 2022-10-22 PROCEDURE — 94761 N-INVAS EAR/PLS OXIMETRY MLT: CPT

## 2022-10-22 PROCEDURE — 94640 AIRWAY INHALATION TREATMENT: CPT

## 2022-10-22 PROCEDURE — 6360000002 HC RX W HCPCS: Performed by: INTERNAL MEDICINE

## 2022-10-22 PROCEDURE — 6370000000 HC RX 637 (ALT 250 FOR IP): Performed by: INTERNAL MEDICINE

## 2022-10-22 PROCEDURE — 99232 SBSQ HOSP IP/OBS MODERATE 35: CPT | Performed by: FAMILY MEDICINE

## 2022-10-22 PROCEDURE — 6360000002 HC RX W HCPCS: Performed by: FAMILY MEDICINE

## 2022-10-22 PROCEDURE — 82140 ASSAY OF AMMONIA: CPT

## 2022-10-22 PROCEDURE — 2700000000 HC OXYGEN THERAPY PER DAY

## 2022-10-22 PROCEDURE — 85027 COMPLETE CBC AUTOMATED: CPT

## 2022-10-22 PROCEDURE — 2060000000 HC ICU INTERMEDIATE R&B

## 2022-10-22 PROCEDURE — 82805 BLOOD GASES W/O2 SATURATION: CPT

## 2022-10-22 PROCEDURE — 2580000003 HC RX 258: Performed by: FAMILY MEDICINE

## 2022-10-22 PROCEDURE — 81001 URINALYSIS AUTO W/SCOPE: CPT

## 2022-10-22 PROCEDURE — 99232 SBSQ HOSP IP/OBS MODERATE 35: CPT | Performed by: NURSE PRACTITIONER

## 2022-10-22 PROCEDURE — 80053 COMPREHEN METABOLIC PANEL: CPT

## 2022-10-22 PROCEDURE — 83735 ASSAY OF MAGNESIUM: CPT

## 2022-10-22 PROCEDURE — 80202 ASSAY OF VANCOMYCIN: CPT

## 2022-10-22 PROCEDURE — 51701 INSERT BLADDER CATHETER: CPT

## 2022-10-22 PROCEDURE — 71045 X-RAY EXAM CHEST 1 VIEW: CPT

## 2022-10-22 PROCEDURE — 87086 URINE CULTURE/COLONY COUNT: CPT

## 2022-10-22 PROCEDURE — 36600 WITHDRAWAL OF ARTERIAL BLOOD: CPT

## 2022-10-22 PROCEDURE — 94664 DEMO&/EVAL PT USE INHALER: CPT

## 2022-10-22 PROCEDURE — 36415 COLL VENOUS BLD VENIPUNCTURE: CPT

## 2022-10-22 PROCEDURE — 2580000003 HC RX 258: Performed by: INTERNAL MEDICINE

## 2022-10-22 RX ADMIN — SODIUM CHLORIDE, PRESERVATIVE FREE 10 ML: 5 INJECTION INTRAVENOUS at 10:43

## 2022-10-22 RX ADMIN — IPRATROPIUM BROMIDE AND ALBUTEROL SULFATE 1 AMPULE: 2.5; .5 SOLUTION RESPIRATORY (INHALATION) at 06:52

## 2022-10-22 RX ADMIN — POTASSIUM CHLORIDE 10 MEQ: 7.46 INJECTION, SOLUTION INTRAVENOUS at 23:10

## 2022-10-22 RX ADMIN — IPRATROPIUM BROMIDE AND ALBUTEROL SULFATE 1 AMPULE: 2.5; .5 SOLUTION RESPIRATORY (INHALATION) at 19:33

## 2022-10-22 RX ADMIN — VANCOMYCIN HYDROCHLORIDE 1000 MG: 1 INJECTION, POWDER, LYOPHILIZED, FOR SOLUTION INTRAVENOUS at 20:34

## 2022-10-22 RX ADMIN — IPRATROPIUM BROMIDE AND ALBUTEROL SULFATE 1 AMPULE: 2.5; .5 SOLUTION RESPIRATORY (INHALATION) at 15:26

## 2022-10-22 RX ADMIN — DULOXETINE 60 MG: 60 CAPSULE, DELAYED RELEASE ORAL at 10:37

## 2022-10-22 RX ADMIN — ARIPIPRAZOLE 5 MG: 5 TABLET ORAL at 10:42

## 2022-10-22 RX ADMIN — SODIUM CHLORIDE, PRESERVATIVE FREE 10 ML: 5 INJECTION INTRAVENOUS at 20:34

## 2022-10-22 RX ADMIN — Medication 2000 UNITS: at 10:37

## 2022-10-22 RX ADMIN — IPRATROPIUM BROMIDE AND ALBUTEROL SULFATE 1 AMPULE: 2.5; .5 SOLUTION RESPIRATORY (INHALATION) at 11:16

## 2022-10-22 RX ADMIN — CARVEDILOL 25 MG: 25 TABLET, FILM COATED ORAL at 10:37

## 2022-10-22 RX ADMIN — DIVALPROEX SODIUM 1000 MG: 500 TABLET, DELAYED RELEASE ORAL at 10:55

## 2022-10-22 RX ADMIN — FERROUS SULFATE TAB 325 MG (65 MG ELEMENTAL FE) 325 MG: 325 (65 FE) TAB at 10:38

## 2022-10-22 RX ADMIN — HYDRALAZINE HYDROCHLORIDE 10 MG: 20 INJECTION INTRAMUSCULAR; INTRAVENOUS at 06:44

## 2022-10-22 NOTE — PROGRESS NOTES
[facility] -   Infectious diseases evaluation  admission date 10/10/2022    reason for consultation:   MRSA bacteremia    Impression :   Current:  MRSA bacteremia  Bilateral pulmonary nodules with cavitation /loculated  pleural effusion S/P thoracentesis 10/18/22  Sepsis secondary to above  Encephalopathy  History of cocaine drug abuse  E. coli UTI treated  Acute renal failure improved  Hypertension  Bipolar disorder    Recommendations     Continue IV vancomycin end date 12/03  PICC line placed  IR to place bilateral pleural drains  Pleural fluid culture growing staph aureus  Repeat CT chest showing small pleural effusions bilaterally which appear loculated and some with cavitation suggesting septic emboli  Follow repeat blood cultures, still no growth  ADRIANNE done few days ago was negative  MRI brain on 10/14/22 unremarkable. Follow CBC and renal function  Supportive care      Infection Control Recommendations   [place] Precautions  Contact Isolation       Antimicrobial Stewardship Recommendations   Simplification of therapy  Targeted therapy      History of Present Illness:   Initial history:  Amie Hill is a 46y.o.-year-old female was admitted 10/10/2020 for worsening shortness of breath associated with generalized body ache. The patient is drowsy, arousable, confused, follows simple commands, unable to provide history that was obtained from chart review and nursing staff. She does have history of cocaine abuse. She was found to have acute renal failure with a creatinine of 2.1. Urinalysis showed small leukocyte esterase, positive nitrate. Urine culture on 10/10/2022 grew E. Coli  Blood cultures 10/10/2022 2 of 2 grew MRSA  Echocardiogram showed no vegetations  Procalcitonin was 6.01, D-dimer was elevated at 3.85  Chest x-ray showed right lung nodules. VQ scan was indeterminant.   CT chest without contrast 10/16/2022 showed numerous bilateral nodular opacities both lungs suggestive of septic emboli , focal consolidation with cavitation in the superior right lower lobe focal consolidation at the left base, small bilateral loculated pleural effusion and mediastinal lymphadenopathy. Echocardiogram showed no vegetations      Interval changes  10/22/2022   Afebrile  SpO2 94% 2 L/nc  Bilateral pleural pigtail catheter drains to suction  Not responding, moving extremities, no acute distress          Patient Vitals for the past 8 hrs:   BP Temp Temp src Pulse Resp SpO2 Weight   10/22/22 1037 133/60 -- -- 68 -- -- --   10/22/22 0800 (!) 160/71 97.8 °F (36.6 °C) -- 73 20 94 % --   10/22/22 0709 -- -- -- -- -- 97 % --   10/22/22 0652 -- -- -- 63 20 93 % --   10/22/22 0630 (!) 179/76 97.6 °F (36.4 °C) Axillary 68 20 91 % --   10/22/22 0421 -- -- -- -- -- -- 106 lb 7.7 oz (48.3 kg)             I have personally reviewed the past medical history, past surgical history, medications, social history, and family history, and I haveupdated the database accordingly. Allergies:   Aspirin, Bactrim, and Codeine     Review of Systems:     Review of Systems  Unable to provide due to mental status changes  Physical Examination :       Physical Exam  Vitals reviewed. Constitutional:       General: She is not in acute distress. Appearance: She is not ill-appearing. HENT:      Head: Normocephalic and atraumatic. Right Ear: External ear normal.      Left Ear: External ear normal.   Eyes:      General: No scleral icterus. Conjunctiva/sclera: Conjunctivae normal.   Cardiovascular:      Rate and Rhythm: Regular rhythm. Heart sounds: No murmur heard. Pulmonary:      Effort: Pulmonary effort is normal. No respiratory distress. Breath sounds: Rhonchi present. Comments:   Decreased breath sounds bilaterally  Abdominal:      Palpations: Abdomen is soft. Tenderness: There is no abdominal tenderness. Musculoskeletal:      Cervical back: Neck supple.  No rigidity. Right lower leg: No edema. Left lower leg: No edema. Skin:     Coloration: Skin is not jaundiced. Findings: No erythema.    Neurological:      Comments: Drowsy, moves all extremities, follows simple commands     Past Medical History:     Past Medical History:   Diagnosis Date    Anemia     Arthritis     Asthma     Bipolar disorder, mixed ([address]     Carotid artery stenosis 2020    Cocaine abuse ([address] 2014    COPD (chronic obstructive pulmonary disease) (HCC)     Degeneration of cervical intervertebral disc     Depression with anxiety     Depression with anxiety     Fibromyalgia 2017    GERD (gastroesophageal reflux disease)     History of migraine headaches 6/10/2014    History of renal calculi 6/10/2014    History of seizure disorder 6/10/2014    Hoarseness of voice     HTN (hypertension) 6/10/2014    Hyperlipidemia 6/10/2014    IGT (impaired glucose tolerance) 6/10/2014    Kidney stones     Lactose intolerance 6/10/2014    Leukopenia 6/10/2014    Major depressive disorder, recurrent episode, severe, without mention of psychotic behavior 2014    MVP (mitral valve prolapse)     Seizures ([facility]     Sleep apnea 6/10/2014    Unspecified diseases of blood and blood-forming organs        Past Surgical  History:     Past Surgical History:   Procedure Laterality Date    ANKLE FRACTURE SURGERY      recontruction surgery    APPENDECTOMY      BREAST LUMPECTOMY Right     CARDIAC CATHETERIZATION      CARPAL TUNNEL RELEASE      x2     SECTION      CHOLECYSTECTOMY      COLONOSCOPY  12    COLONOSCOPY  2016    severe spasms    COLONOSCOPY  2019    DR GOLDY MCINTOSH    GASTRIC BYPASS SURGERY      GASTRIC BYPASS SURGERY      HYSTERECTOMY (CERVIX STATUS UNKNOWN)      HYSTERECTOMY (CERVIX STATUS UNKNOWN)      IR PERC CATH PLEURAL DRAIN W/IMAG  10/21/2022    IR PERC CATH PLEURAL DRAIN W/IMAG 10/21/2022 [facility]    IR PERC CATH PLEURAL DRAIN W/IMAG  10/21/2022 IR PERC CATH PLEURAL DRAIN W/IMAG 10/21/2022 [facility]    LAPAROSCOPY N/A 1/29/2022    LAPAROSCOPY EXPLORATORY CONVERTED TO OPEN EXPLORATORY LAPAROTOMY, LYSIS OF ADHESIONS, REDUCTION OF INTERNAL HERNIA performed by Whitney Blakely DO [facility]  42/19[facility]0609    APPLICATION OF ARCH BARS,SIMPLE EXTRACTION OF #15 AND RT TMJ JOINT REPLACEMENT    SHOULDER ARTHROSCOPY Left 06/05/2019    DR ROBERT GREEN    SHOULDER SURGERY      TONSILLECTOMY AND ADENOIDECTOMY      TRANSESOPHAGEAL ECHOCARDIOGRAM N/A 10/19/2022    TRANSESOPHAGEAL ECHOCARDIOGRAM WITH BUBBLE STUDY performed by Frandy Royal DO at [address]  7-3-    egd    UPPER GASTROINTESTINAL ENDOSCOPY  12/19/2016    status post gastrectomy with a small remnant of gastric pouch.     UPPER GASTROINTESTINAL ENDOSCOPY  05/2019    DR Rafita Jorge       Medications:      vancomycin  1,000 mg IntraVENous Q24H    carvedilol  25 mg Oral BID WC    Vitamin D  2,000 Units Oral Daily    sodium chloride flush  5-40 mL IntraVENous 2 times per day    [Held by provider] heparin (porcine)  5,000 Units SubCUTAneous BID    ARIPiprazole  5 mg Oral Daily    atorvastatin  40 mg Oral Daily    divalproex  1,000 mg Oral BID    DULoxetine  60 mg Oral Daily    ferrous sulfate  325 mg Oral BID WC    ipratropium-albuterol  1 ampule Inhalation Q4H WA    vancomycin (VANCOCIN) intermittent dosing (placeholder)   Other RX Placeholder       Social History:     Social History     Socioeconomic History    Marital status:      Spouse name: Not on file    Number of children: Not on file    Years of education: Not on file    Highest education level: Not on file   Occupational History    Occupation: disability   Tobacco Use    Smoking status: Every Day     Packs/day: 0.50     Years: 33.00     Pack years: 16.50     Types: Cigarettes    Smokeless tobacco: Never    Tobacco comments:     quit  2000 started again  1/13 Substance and Sexual Activity    Alcohol use: Yes     Comment: occasional    Drug use: Not Currently    Sexual activity: Yes   Other Topics Concern    Not on file   Social History Narrative    Not on file     Social Determinants of Health     Financial Resource Strain: Not on file   Food Insecurity: Not on file   Transportation Needs: Not on file   Physical Activity: Not on file   Stress: Not on file   Social Connections: Not on file   Intimate Partner Violence: Not on file   Housing Stability: Not on file       Family History:     Family History   Problem Relation Age of Onset    Diabetes Mother     Cancer Mother     Coronary Art Dis Father     COPD Father     Depression Brother     Alcohol Abuse Brother     Cancer Other         lung and skin    Diabetes Maternal Grandmother     Cancer Paternal Grandmother       Medical Decision Making:   I have independently reviewed/ordered the following labs:    CBC with Differential:   No results for input(s): WBC, HGB, HCT, PLT, SEGSPCT, BANDSPCT, LYMPHOPCT, MONOPCT, EOSPCT in the last 72 hours. BMP:  Recent Labs     10/20/22  0857      K 3.8      CO2 27   BUN 14   CREATININE 0.65       Hepatic Function Panel: No results for input(s): PROT, LABALBU, BILIDIR, IBILI, BILITOT, ALKPHOS, ALT, AST in the last 72 hours. No results for input(s): RPR in the last 72 hours. No results for input(s): HIV in the last 72 hours. No results for input(s): BC in the last 72 hours. Lab Results   Component Value Date/Time    CREATININE 0.65 10/20/2022 08:57 AM    GLUCOSE 68 10/20/2022 08:57 AM       Detailed results: Thank you for allowing us to participate in the care of this patient. Please call with questions.     This note is created with the assistance of a speech recognition program.  While intending to generate adocument that actually reflects the content of the visit, the document can still have some errors including those of syntax and sound a like substitutions which may escape proof reading. It such instances, actual meaningcan be extrapolated by contextual diversion.     Steve Cullen, APRN - CNP

## 2022-10-22 NOTE — PROGRESS NOTES
Pulmonary Progress Note  [facility]      Patient - Andre Soto,  Age - 46 y.o.    - 1970      Room Number - 2109/2109-01   MRN -  [id number]   Acct # - [de-identified]  Date of Admission -  10/10/2022  4:37 AM        Consulting Renee Christianson MD  Primary Care Physician - Jennifer Leung MD     SUBJECTIVE   Alert, responsive but still groans. Bilateral pigtail catheters placed by interventional radiology left side seems to have drained only 30 mL right side over 140    OBJECTIVE   VITALS    height is 4' 11\" (1.499 m) and weight is 106 lb 7.7 oz (48.3 kg). Her temperature is 97.8 °F (36.6 °C). Her blood pressure is 160/71 (abnormal) and her pulse is 73. Her respiration is 20 and oxygen saturation is 94%. Body mass index is 21.51 kg/m². Temperature Range: Temp: 97.8 °F (36.6 °C) Temp  Av.7 °F (36.5 °C)  Min: 97.5 °F (36.4 °C)  Max: 98.1 °F (36.7 °C)  BP Range:  Systolic (84KES), NFK:605 , Min:91 , ZLW:509     Diastolic (28IDZ), VRO:54, Min:48, Max:76    Pulse Range: Pulse  Av.2  Min: 57  Max: 73  Respiration Range: Resp  Av.7  Min: 15  Max: 21  Current Pulse Ox[de-identified]  SpO2: 94 %  24HR Pulse Ox Range:  SpO2  Av.2 %  Min: 82 %  Max: 99 %  Oxygen Amount and Delivery: O2 Flow Rate (L/min): 2 L/min    Wt Readings from Last 3 Encounters:   10/22/22 106 lb 7.7 oz (48.3 kg)   22 96 lb 8 oz (43.8 kg)   22 105 lb (47.6 kg)       I/O (24 Hours)    Intake/Output Summary (Last 24 hours) at 10/22/2022 0929  Last data filed at 10/22/2022 8726  Gross per 24 hour   Intake --   Output 239 ml   Net -239 ml       EXAM     General Appearance arousable   HEENT - normocephalic, atraumatic.  []  Mallampati  [] Crowded airway   [] Macroglossia  []  Retrognathia  [] Micrognathia  []  Normal tongue size []  Normal Bite  [] Alise sign positive    Neck - Supple,  trachea midline   Lungs -diminished with crackles  Cardiovascular - Heart sounds are normal.  Regular rate and rhythm   Abdomen - Soft, nontender, nondistended, no masses or organomegaly  Neurologic - There are no focal motor or sensory deficits  Skin - No bruising or bleeding  Extremities - No clubbing, cyanosis, edema    MEDS      vancomycin  1,000 mg IntraVENous Q24H    carvedilol  25 mg Oral BID WC    Vitamin D  2,000 Units Oral Daily    sodium chloride flush  5-40 mL IntraVENous 2 times per day    [Held by provider] heparin (porcine)  5,000 Units SubCUTAneous BID    ARIPiprazole  5 mg Oral Daily    atorvastatin  40 mg Oral Daily    divalproex  1,000 mg Oral BID    DULoxetine  60 mg Oral Daily    ferrous sulfate  325 mg Oral BID WC    ipratropium-albuterol  1 ampule Inhalation Q4H WA    vancomycin (VANCOCIN) intermittent dosing (placeholder)   Other RX Placeholder      sodium chloride 50 mL/hr at 10/18/22 1200     sodium chloride flush, labetalol, potassium chloride **OR** potassium alternative oral replacement **OR** potassium chloride, hydrALAZINE, sodium chloride flush, sodium chloride, acetaminophen, ondansetron **OR** ondansetron, albuterol sulfate HFA, sodium chloride flush, perflutren lipid microspheres    LABS   CBC No results for input(s): WBC, HGB, HCT, MCV, PLT in the last 72 hours.   BMP:   Lab Results   Component Value Date/Time     10/20/2022 08:57 AM    K 3.8 10/20/2022 08:57 AM     10/20/2022 08:57 AM    CO2 27 10/20/2022 08:57 AM    BUN 14 10/20/2022 08:57 AM    LABALBU 3.4 10/10/2022 04:55 AM    CREATININE 0.65 10/20/2022 08:57 AM    CALCIUM 8.1 10/20/2022 08:57 AM    GFRAA >60 04/08/2022 07:17 AM    LABGLOM >60 10/20/2022 08:57 AM     ABGs:  Lab Results   Component Value Date/Time    PHART 7.467 10/15/2022 10:40 AM    PO2ART 66.5 10/15/2022 10:40 AM    KTD7ZVF 35.4 10/15/2022 10:40 AM      Lab Results   Component Value Date/Time    MODE PRVC 12/28/2016 04:36 AM     Ionized Calcium:  No results found for: IONCA  Magnesium: Lab Results   Component Value Date/Time    MG 2.0 10/18/2022 04:50 AM     Phosphorus:    Lab Results   Component Value Date/Time    PHOS 3.6 10/18/2022 04:50 AM        LIVER PROFILE No results for input(s): AST, ALT, LIPASE, BILIDIR, BILITOT, ALKPHOS in the last 72 hours. Invalid input(s): AMYLASE,  ALB  INR No results for input(s): INR in the last 72 hours.   PTT   Lab Results   Component Value Date    APTT 25.6 07/22/2016         RADIOLOGY     (See actual reports for details)    ASSESSMENT/PLAN   MRSA bacteremia  Septic emboli  E. coli UTI  Acute kidney injury, resolved  Suspect COPD, seen once in the office by Dr. Shamar Epps in 2019, FEV1 2.04 L or 86% predicted  Parapneumonic effusion, pleural fluid positive for staph  History of opiate and cocaine use-reportedly uses crack cocaine at least twice weekly  History of tobacco use  Recent COVID +9/30  Elevated troponin likely type II MI  Encephalopathy, improving  History of hypertension  History of seizure disorder  Full code      Will place pigtails to suction at least for the next 24 hours and assess drainage  Check follow-up x-ray  Continue antibiotics-vancomycin  Await cultures from pleural fluid  Discussed with nursing staff and patient's father    Electronically signed by Cornelio Tolentino MD on 10/22/2022 at 9:29 AM

## 2022-10-22 NOTE — PLAN OF CARE
Problem: Discharge Planning  Goal: Discharge to home or other facility with appropriate resources  10/22/2022 0555 by Kevin Díaz RN  Outcome: Progressing     Problem: Pain  Goal: Verbalizes/displays adequate comfort level or baseline comfort level  10/22/2022 0555 by Kevin Díaz RN  Outcome: Progressing     Problem: Skin/Tissue Integrity  Goal: Absence of new skin breakdown  Description: 1. Monitor for areas of redness and/or skin breakdown  2. Assess vascular access sites hourly  3. Every 4-6 hours minimum:  Change oxygen saturation probe site  4. Every 4-6 hours:  If on nasal continuous positive airway pressure, respiratory therapy assess nares and determine need for appliance change or resting period.   10/22/2022 0555 by Kevin Díaz RN  Outcome: Progressing     Problem: Safety - Adult  Goal: Free from fall injury  10/22/2022 0555 by Kevin Díaz RN  Outcome: Progressing     Problem: ABCDS Injury Assessment  Goal: Absence of physical injury  Outcome: Progressing     Problem: Nutrition Deficit:  Goal: Optimize nutritional status  Outcome: Progressing  Flowsheets (Taken 10/21/2022 1600 by Yu Howell RD, LD)  Nutrient intake appropriate for improving, restoring, or maintaining nutritional needs: Monitor oral intake, labs, and treatment plans

## 2022-10-22 NOTE — PROGRESS NOTES
Writer assessed chest tubes throughout shift and patient has had not had any chest tube output during this shift. Edda Dejesus at 18:38pm, Dr. Gigi Dejesus states no new orders at this time.

## 2022-10-22 NOTE — PROGRESS NOTES
BEHAVIORAL HEALTH FOLLOW-UP NOTE     10/21/2022     Patient was seen and examined in person, Chart reviewed   Patient's case discussed with staff/team    Chief Complaint: Bipolar disorder, lethargy, erratic behavior    Interim History:   -The patient was seen at bedside with her father was present. The patient is sedated as she is in full procedure this morning.   She was unable to engage in the interview       BP (!) 91/48   Pulse 58   Temp 97.5 °F (36.4 °C) (Axillary)   Resp 20   Ht 4' 11\" (1.499 m)   Wt 100 lb 5 oz (45.5 kg)   SpO2 95%   BMI 20.26 kg/m²   A       Aggression:  [] yes  [x] no    Patient is [] able  [] unable to CONTRACT FOR SAFETY ON THE UNIT    PAST MEDICAL/PSYCHIATRIC HISTORY:   Past Medical History:   Diagnosis Date    Anemia     Arthritis     Asthma     Bipolar disorder, mixed ([address]     Carotid artery stenosis 2/13/2020    Cocaine abuse ([facility] 11/4/2014    COPD (chronic obstructive pulmonary disease) ([facility] [address]     Degeneration of cervical intervertebral disc     Depression with anxiety     Depression with anxiety     Fibromyalgia 1/5/2017    GERD (gastroesophageal reflux disease)     History of migraine headaches 6/10/2014    History of renal calculi 6/10/2014    History of seizure disorder 6/10/2014    Hoarseness of voice     HTN (hypertension) 6/10/2014    Hyperlipidemia 6/10/2014    IGT (impaired glucose tolerance) 6/10/2014    Kidney stones     Lactose intolerance 6/10/2014    Leukopenia 6/10/2014    Major depressive disorder, recurrent episode, severe, without mention of psychotic behavior 11/6/2014    MVP (mitral valve prolapse)     Seizures (HCC)     Sleep apnea 6/10/2014    Unspecified diseases of blood and blood-forming organs        FAMILY/SOCIAL HISTORY:  Family History   Problem Relation Age of Onset    Diabetes Mother     Cancer Mother     Coronary Art Dis Father     COPD Father     Depression Brother     Alcohol Abuse Brother     Cancer Other         lung and skin    Diabetes Maternal Grandmother     Cancer Paternal Grandmother      Social History     Socioeconomic History    Marital status:      Spouse name: Not on file    Number of children: Not on file    Years of education: Not on file    Highest education level: Not on file   Occupational History    Occupation: disability   Tobacco Use    Smoking status: Every Day     Packs/day: 0.50     Years: 33.00     Pack years: 16.50     Types: Cigarettes    Smokeless tobacco: Never    Tobacco comments:     quit  2000 started again  1/13    Substance and Sexual Activity    Alcohol use: Yes     Comment: occasional    Drug use: Not Currently    Sexual activity: Yes   Other Topics Concern    Not on file   Social History Narrative    Not on file     Social Determinants of Health     Financial Resource Strain: Not on file   Food Insecurity: Not on file   Transportation Needs: Not on file   Physical Activity: Not on file   Stress: Not on file   Social Connections: Not on file   Intimate Partner Violence: Not on file   Housing Stability: Not on file           ROS:  [x] All negative/unchanged except if checked.  Explain positive(checked items) below:  [] Constitutional  [] Eyes  [] Ear/Nose/Mouth/Throat  [] Respiratory  [] CV  [] GI  []   [] Musculoskeletal  [] Skin/Breast  [] Neurological  [] Endocrine  [] Heme/Lymph  [] Allergic/Immunologic    Explanation:     MEDICATIONS:    Current Facility-Administered Medications:     vancomycin (VANCOCIN) 1,500 mg in dextrose 5 % 250 mL IVPB (ADDAVIAL), 1,500 mg, IntraVENous, Q24H, Elba Soliz MD, Stopped at 10/21/22 1842    sodium chloride flush 0.9 % injection 10 mL, 10 mL, IntraVENous, PRN, Natalie Ochoa MD, 10 mL at 10/20/22 1638    carvedilol (COREG) tablet 25 mg, 25 mg, Oral, BID WC, Frandy Juareso, DO, 25 mg at 10/21/22 0928    labetalol (NORMODYNE;TRANDATE) injection 20 mg, 20 mg, IntraVENous, BID PRN, Frandy Lele, DO, 20 mg at 10/20/22 0238    potassium chloride (KLOR-CON M) extended release tablet 40 mEq, 40 mEq, Oral, PRN, 40 mEq at 10/17/22 1025 **OR** potassium bicarb-citric acid (EFFER-K) effervescent tablet 40 mEq, 40 mEq, Oral, PRN, 40 mEq at 10/14/22 2031 **OR** potassium chloride 10 mEq/100 mL IVPB (Peripheral Line), 10 mEq, IntraVENous, PRN, Frandy Lele, DO, Last Rate: 100 mL/hr at 10/16/22 1505, 10 mEq at 10/16/22 1505    Vitamin D (CHOLECALCIFEROL) tablet 2,000 Units, 2,000 Units, Oral, Daily, Frandy Lele, DO, 2,000 Units at 10/21/22 0928    hydrALAZINE (APRESOLINE) injection 10 mg, 10 mg, IntraVENous, Q6H PRN, Frandy Lele, DO, 10 mg at 10/21/22 0659    sodium chloride flush 0.9 % injection 5-40 mL, 5-40 mL, IntraVENous, 2 times per day, Frandy Lele, DO, 10 mL at 10/20/22 1222    sodium chloride flush 0.9 % injection 5-40 mL, 5-40 mL, IntraVENous, PRN, Frandy Lele, DO    0.9 % sodium chloride infusion, , IntraVENous, PRN, Frandy Lele, DO, Last Rate: 50 mL/hr at 10/18/22 1200, New Bag at 10/18/22 1200    [Held by provider] heparin (porcine) injection 5,000 Units, 5,000 Units, SubCUTAneous, BID, Adolph Rey MD, 5,000 Units at 10/18/22 0916    acetaminophen (TYLENOL) tablet 650 mg, 650 mg, Oral, Q4H PRN, Frandy Lele, DO, 650 mg at 10/14/22 2031    ondansetron (ZOFRAN-ODT) disintegrating tablet 4 mg, 4 mg, Oral, Q8H PRN **OR** ondansetron (ZOFRAN) injection 4 mg, 4 mg, IntraVENous, Q6H PRN, Frandy Lele, DO    albuterol sulfate HFA (PROVENTIL;VENTOLIN;PROAIR) 108 (90 Base) MCG/ACT inhaler 2 puff, 2 puff, Inhalation, Q6H PRN, Frandy Lele, DO    ARIPiprazole (ABILIFY) tablet 5 mg, 5 mg, Oral, Daily, Frandy Lele, DO, 5 mg at 10/21/22 0930    atorvastatin (LIPITOR) tablet 40 mg, 40 mg, Oral, Daily, Frandy Lele, DO, 40 mg at 10/18/22 2227    divalproex (DEPAKOTE) DR tablet 1,000 mg, 1,000 mg, Oral, BID, Frandy Lele, DO, 1,000 mg at 10/21/22 9329    DULoxetine (CYMBALTA) extended release capsule 60 mg, 60 mg, Oral, Daily, Frandy Lele, DO, 60 mg at 10/21/22 0928    ferrous sulfate (IRON 325) tablet 325 mg, 325 mg, Oral, BID WC, Frandy Lele, DO, 325 mg at 10/21/22 1856    sodium chloride flush 0.9 % injection 10 mL, 10 mL, IntraVENous, PRN, Frandy Lele, DO, 10 mL at 10/10/22 1427    ipratropium-albuterol (DUONEB) nebulizer solution 1 ampule, 1 ampule, Inhalation, Q4H WA, Frandy Lele, DO, 1 ampule at 10/21/22 1951    perflutren lipid microspheres (DEFINITY) injection 1.65 mg, 1.5 mL, IntraVENous, ONCE PRN, Frandy Lele, DO    vancomycin (VANCOCIN) intermittent dosing (placeholder), , Other, RX Placeholder, Frandy Lele, DO      Examination:  BP (!) 91/48   Pulse 58   Temp 97.5 °F (36.4 °C) (Axillary)   Resp 20   Ht 4' 11\" (1.499 m)   Wt 100 lb 5 oz (45.5 kg)   SpO2 95%   BMI 20.26 kg/m²   Gait -unable to test  Medication side effects(SE): Denies    Mental Status Examination:    Level of consciousness: Somnolent   lethargic  Appearance: Fair grooming and poor hygiene  Behavior/Motor: Lethargic and unarousable. Unable to complete remainder of mental status exam because the patient did not wake up  ASSESSMENT:   Patient symptoms are:  [] Well controlled  [x] Improving  [] Worsening  [] No change      Diagnosis:   Principal Problem:    SIRS (systemic inflammatory response syndrome) (HCC)  Active Problems:    NICKI (acute kidney injury) ([address]    Severe malnutrition (HCC)    Elevated d-dimer    Leukocytosis    Pulmonary nodules    Delirium due to another medical condition    Acute metabolic encephalopathy    Depression with suicidal ideation    Bipolar disorder, mixed ([facility]    Acute encephalopathy    COPD (chronic obstructive pulmonary disease) ([address]    H/O gastric bypass  Resolved Problems:    * No resolved hospital problems. *      LABS:    No results for input(s): WBC, HGB, PLT in the last 72 hours. Recent Labs     10/20/22  0857      K 3.8      CO2 27   BUN 14   CREATININE 0.65   GLUCOSE 68*     No results for input(s): BILITOT, ALKPHOS, AST, ALT in the last 72 hours.   Lab Results   Component Value Date/Time    LABAMPH NEGATIVE 07/08/2019 12:00 AM    BARBSCNU NEGATIVE 10/13/2022 11:15 AM    LABBENZ NEGATIVE 10/13/2022 11:15 AM    LABBENZ NEGATIVE 12/22/2012 09:35 PM    LABMETH NEGATIVE 10/13/2022 11:15 AM    PPXUR NOT REPORTED 01/29/2022 03:06 PM     Lab Results   Component Value Date/Time    TSH 1.37 01/29/2022 12:38 PM     No results found for: LITHIUM  Lab Results   Component Value Date    VALPROATE 120 10/14/2022       RISK ASSESSMENT: low risk of suicide or harm to others           PLAN  Continue Abilify Cymbalta Depakote as ordered  Will sign off  No indication for admission to psychiatry  Medications as noted above . No changes today.    Follow-up daily while on inpatient unit  Electronically signed by Jerry Brady MD on 10/21/2022 at 8:28 PM

## 2022-10-22 NOTE — PROGRESS NOTES
Vancomycin Dosing by Pharmacy - Daily Note   Vancomycin Therapy Day:  12  Indication: endocarditis    Allergies:  Aspirin, Bactrim, and Codeine   Actual Weight:    Wt Readings from Last 1 Encounters:   10/22/22 106 lb 7.7 oz (48.3 kg)       Labs/Ancillary Data  Estimated Creatinine Clearance: 76 mL/min (based on SCr of 0.65 mg/dL). Recent Labs     10/20/22  0857   CREATININE 0.65   BUN 14     Procalcitonin   Date Value Ref Range Status   10/10/2022 6.01 (H) <0.09 ng/mL Final     Comment:           Suspected Sepsis:  <0.50 ng/mL     Low likelihood of sepsis. 0.50-2.00 ng/mL     Increased likelihood of sepsis. Antibiotics encouraged. >2.00 ng/mL     High risk of sepsis/shock. Antibiotics strongly encouraged. Suspected Lower Resp Tract Infections:  <0.24 ng/mL     Low likelihood of bacterial infection. >0.24 ng/mL     Increased likelihood of bacterial infection. Antibiotics encouraged. With successful antibiotic therapy, PCT levels should decrease rapidly. (Half-life of 24 to   36 hours.)        Procalcitonin values from samples collected within the first 6 hours of systemic infection   may still be low. Retesting may be indicated. Values from day 1 and day 4 can be entered into the Change in Procalcitonin Calculator   (www.[employer]-pct-calculator. [employer]) to determine the patient's Mortality Risk Prognosis        In healthy neonates, plasma Procalcitonin (PCT) concentrations increase gradually after   birth, reaching peak values at about 24 hours of age then decrease to normal values below   0.5 ng/mL by 48-72 hours of age.          Intake/Output Summary (Last 24 hours) at 10/22/2022 0804  Last data filed at 10/22/2022 3607  Gross per 24 hour   Intake --   Output 239 ml   Net -239 ml     Temp: 97.6    Culture Date / Source  /  Results  See  micro  Recent vancomycin administrations                     vancomycin (VANCOCIN) 1,500 mg in dextrose 5 % 250 mL IVPB (ADDAVIAL) (mg) 1,500 mg New Bag 10/21/22 5395 vancomycin (VANCOCIN) 750 mg in dextrose 5 % 250 mL IVPB (mg) 750 mg New Bag 10/20/22 1231     750 mg New Bag  0212     750 mg New Bag 10/19/22 1435                    Vancomycin Concentrations:   TROUGH:  No results for input(s): VANCOTROUGH in the last 72 hours. RANDOM:    Recent Labs     10/22/22  0548   VANCORANDOM 25.4       MRSA Nasal Swab: N/A. Non-respiratory infection. Bonnie Rincon PLAN     Decrease dose to 1000 mg q24h IV  Ensured BUN/sCr ordered at baseline and every 48 hours x at least 3 levels, then at least weekly. Repeat vancomycin concentration ordered for 10/24 @ 0600   Pharmacy will continue to monitor patient and adjust therapy as indicated      Vancomycin Target Concentration Parameters  Treatment  Population Target AUC/GABBI Target Trough   Invasive MRSA Infection (bacteremia, pneumonia, meningitis, endocarditis, osteomyelitis)  Sepsis (undifferentiated) 400-600 N/A   Infection due to non-MRSA pathogen  Empiric treatment of non-invasive MRSA infection  (SSTI, UTI) <500 10-15 mg/L   CrCl < 29 mL/min  Rapidly fluctuating serum creatinine   NICKI N/A < 15 mg/L     Renal replacement therapy is dosed by levels, per hospital protocol. Abbreviations  * Pauc: probability that AUC is >400 (efficacy); Pconc: probability that Ctrough is above 20 ?g/mL (toxicity); Tox: Probability of nephrotoxicity, based on Iva et al. Clin Infect Dis 2009. Loading dose: N/A  Regimen: 1000 mg IV every 24 hours. Start time: 16:05 on 10/22/2022  Exposure target: AUC24 (range)400-600 mg/L.hr   AUC24,ss: 437 mg/L.hr  Probability of AUC24 > 400: 77 %  Ctrough,ss: 12.6 mg/L  Probability of Ctrough,ss > 20: 1 %  Probability of nephrotoxicity (Iva DEVON 2009): 8 %      Thank you for the consult. Pharmacy will continue to follow.    Staci Vásquezut  10/22/2022  8:05 AM

## 2022-10-22 NOTE — PROGRESS NOTES
Chest tubes to wall suction. Right chest tube bubbling in water seal chamber. Dr. Carmen Sanderson notified.

## 2022-10-22 NOTE — PROGRESS NOTES
Progress Note  Date:10/22/2022       Room:[facility]-  Patient Name:Anu Bourne     YOB: 1970     Age:51 y.o. Subjective    Subjective:  Symptoms:  (Bilateral thoracenteses yesterday with bilateral chest tubes placed. Patient lethargic but does minimally answer questions. Denies pain. Did not eat much this morning. ). Diet:  Poor intake. Activity level: Impaired due to weakness. Pain:  She reports no pain. Review of Systems  Objective         Vitals Last 24 Hours:  TEMPERATURE:  Temp  Av.7 °F (36.5 °C)  Min: 97.5 °F (36.4 °C)  Max: 98.1 °F (36.7 °C)  RESPIRATIONS RANGE: Resp  Av.7  Min: 15  Max: 21  PULSE OXIMETRY RANGE: SpO2  Av.1 %  Min: 82 %  Max: 99 %  PULSE RANGE: Pulse  Av.5  Min: 57  Max: 73  BLOOD PRESSURE RANGE: Systolic (57ZIA), FVU:658 , Min:91 , JDE:641   ; Diastolic (07TAO), ZCR:08, Min:48, Max:76    I/O (24Hr): Intake/Output Summary (Last 24 hours) at 10/22/2022 1127  Last data filed at 10/22/2022 1043  Gross per 24 hour   Intake 10 ml   Output 239 ml   Net -229 ml     Objective:  General Appearance:  Comfortable. Vital signs: (most recent): Blood pressure 133/60, pulse 68, temperature 97.8 °F (36.6 °C), resp. rate 20, height 4' 11\" (1.499 m), weight 106 lb 7.7 oz (48.3 kg), SpO2 93 %. Vital signs are normal.    Lungs:  Normal effort and normal respiratory rate. There are decreased breath sounds. Heart: Normal rate. S1 normal and S2 normal.    Labs/Imaging/Diagnostics    Labs:  CBC:No results for input(s): WBC, RBC, HGB, HCT, MCV, RDW, PLT in the last 72 hours. CHEMISTRIES:  Recent Labs     10/20/22  0857      K 3.8      CO2 27   BUN 14   CREATININE 0.65   GLUCOSE 68*     PT/INR:No results for input(s): PROTIME, INR in the last 72 hours. APTT:No results for input(s): APTT in the last 72 hours. LIVER PROFILE:No results for input(s): AST, ALT, BILIDIR, BILITOT, ALKPHOS in the last 72 hours.     Imaging Last 24 Hours:  XR CHEST (SINGLE VIEW FRONTAL)    Result Date: 10/21/2022  EXAMINATION: ONE XRAY VIEW OF THE CHEST 10/21/2022 1:37 pm COMPARISON: 10/20/2022 HISTORY: ORDERING SYSTEM PROVIDED HISTORY: s/p bilateral chest tube placemetnt in IR TECHNOLOGIST PROVIDED HISTORY: s/p bilateral chest tube placemetnt in IR Reason for Exam: Bilateral chest tube placement FINDINGS: Interval placement of bilateral pleural pigtail catheters projecting over the lower chest on each side. Right arm PICC line tip is at the SVC/RA junction region. Left shoulder prosthesis. Partially visualized fusion hardware cervical spine. No significant pneumothorax is seen. Stable cardiomediastinal silhouette. Cholecystectomy clips. Bilateral parenchymal nodules/masses with cavitation, better shown on the recent CT, and suspicious for septic emboli. Patchy right perihilar and left lower lobe atelectasis/airspace disease. Interval placement of bilateral pleural pigtail catheters. CT CHEST WO CONTRAST    Result Date: 10/21/2022  EXAMINATION: CT OF THE CHEST WITHOUT CONTRAST 10/21/2022 8:50 am TECHNIQUE: CT of the chest was performed without the administration of intravenous contrast. Multiplanar reformatted images are provided for review. Automated exposure control, iterative reconstruction, and/or weight based adjustment of the mA/kV was utilized to reduce the radiation dose to as low as reasonably achievable. COMPARISON: 10/20/2022, 10/17/2022 HISTORY: ORDERING SYSTEM PROVIDED HISTORY: diagonsis of pleural space infection ---- possible procedure pending results TECHNOLOGIST PROVIDED HISTORY: diagonsis of pleural space infection ---- possible procedure pending results Is the patient pregnant?->No Reason for Exam: order states pleural space infection, pt poor historian FINDINGS: Mediastinum: Interval placement of a right arm PICC line with its tip in the proximal right atrium. Mild calcific plaque of the thoracic aorta.  Ascending aorta measures up to 3. 7 cm. Coronary vascular calcifications. Similar small mediastinal lymph nodes without bulky adenopathy. Lungs/pleura: Again shown are multiple generally similar bilateral masslike densities, some with cavitation. Again findings are suggestive of septic emboli. Overall size and distribution are similar to the previous study from yesterday. Similar small bilateral pleural effusions which appear partially loculated. There is mild respiratory motion artifact. Mild lower lobe consolidation with air bronchograms, greater on the left. Subtle ground-glass changes in the lingula and left lower lobe likely inflammatory/infectious. Upper Abdomen: No acute findings. Previous cholecystectomy. Postoperative changes of the stomach. Soft Tissues/Bones: Degenerative changes and previous ACDF lower cervical spine. Left shoulder prosthesis. Overall relatively stable exam.  Small pleural effusions which appear partially loculated with multiple bilateral masses, many with cavitation, suggesting septic emboli. CT CHEST WO CONTRAST    Result Date: 10/20/2022  EXAMINATION: CT OF THE CHEST WITHOUT CONTRAST 10/20/2022 3:17 pm TECHNIQUE: CT of the chest was performed without the administration of intravenous contrast. Multiplanar reformatted images are provided for review. Automated exposure control, iterative reconstruction, and/or weight based adjustment of the mA/kV was utilized to reduce the radiation dose to as low as reasonably achievable. COMPARISON: 10/17/2022 HISTORY: ORDERING SYSTEM PROVIDED HISTORY: evaluate effusion TECHNOLOGIST PROVIDED HISTORY: Evaluate effusion Is the patient pregnant?->No Reason for Exam: evaluate effusion FINDINGS: Mediastinum: Thoracic aorta, pulmonary arteries, heart and pericardium appear stable. Paratracheal nodes are borderline enlarged measuring 1 cm. Lungs/pleura: There are small bilateral pleural effusions both partly loculated.  The effusion on the right is increased compared to the previous evaluation. Redemonstration of multiple bilateral lung parenchymal masses, some with cavitations. These lesions appear to have increased in size compared to the previous evaluation. A right upper lobe cavitary lesion with air-fluid level currently measures 2.2 x 2.2 cm compared to 1.9 x 1.7 cm previously. A partly cavitated right lower lobe lesion currently measures 5 x 3.7 cm compared with 3 x 4.8 cm previously. These are likely septic emboli. Upper Abdomen: No acute abnormality. Soft Tissues/Bones: No evidence of destructive changes. 1. Bilateral pleural effusions, stable on the left and slightly increased on the right, with loculations. 2. Redemonstration of multiple bilateral lung parenchymal masses, some cavitated, which have overall increased in size. These likely represent septic emboli. Endocarditis is likely. 3. Mild mediastinal lymphadenopathy. IR FLUORO GUIDED CVA DEVICE PLMT/REPLACE/REMOVAL    Result Date: 10/20/2022  PROCEDURE: ULTRASOUND GUIDED VASCULAR ACCESS. FLUOROSCOPY GUIDED PICC PLACEMENT 10/20/2022. HISTORY: ORDERING SYSTEM PROVIDED HISTORY: IV Abx TECHNOLOGIST PROVIDED HISTORY: IV Abx Is the patient pregnant?->No SEDATION: None FLUOROSCOPY DOSE AND TYPE OR TIME AND EXPOSURES: 12 seconds; D AP 10 cGy cm2 TECHNIQUE: Informed consent was obtained from the patient's family member after a detailed explanation of the procedure including risks, benefits, and alternatives. Universal protocol was observed. The right arm was prepped and draped in sterile fashion using maximum sterile barrier technique. Local anesthesia was achieved with lidocaine. A micropuncture needle was used to access the right basilic vein using ultrasound guidance. An ultrasound image demonstrating patency of the vein with needle tip located within it. An image was obtained and stored in PACs.   A 0.018 guidewire was used to place a peel-a-way sheath and a 5 Faroese dual-lumen PICC was advanced with fluoroscopic guidance with the tip at the cavo-atrial junction. Fluoroscopy shows right lung masses/cavitary lesions better shown on recent chest CT. Fusion hardware lower cervical spine. The catheter flushed easily and there was a good blood return. The catheter was secured to the skin. The patient tolerated the procedure well and there were no immediate complications. EBL: Less than 3 mL FINDINGS: Fluoroscopic image demonstrates the tip of the catheter at the cavo-atrial junction. Successful ultrasound and fluoroscopy guided PICC placement     XR CHEST PORTABLE    Result Date: 10/22/2022  EXAMINATION: ONE XRAY VIEW OF THE CHEST 10/22/2022 10:19 am COMPARISON: 10/21/2022 at 13:37 HISTORY: ORDERING SYSTEM PROVIDED HISTORY: Pigtail position TECHNOLOGIST PROVIDED HISTORY: Pigtail position Reason for Exam: Pigtail position FINDINGS: Right PICC line terminating right atrium remains in place. Pigtail catheter along left lung base with the tip overlying the hemidiaphragm. Right pigtail pleural catheter has pulled back into a more peripheral position now called in region of costophrenic angle whereas previously it was more central. Normal cardiomediastinal silhouette. Mild interstitial prominence. Stable scattered moderate size nodular opacities in the lungs right greater than left. Patchy infiltrates at the lung bases probably unchanged allowing for difference in inspiration. Small right pleural effusion is new. Anterior fusion lower cervical spine and left total shoulder prosthesis. 1. Right pigtail pleural catheter has pulled back into the more peripheral position projecting at costophrenic angle whereas previously it was small central.  Left pigtail catheter continues to lie along the lung base. 2. Right PICC line unchanged. 3. Small right pleural effusion appears to be new but patchy bibasilar atelectasis unchanged. Diffuse nodular opacities also unchanged.      MRI BRAIN W WO CONTRAST    Result Date: 10/20/2022  EXAMINATION: MRI OF THE BRAIN WITHOUT AND WITH CONTRAST  10/20/2022 4:03 pm TECHNIQUE: Multiplanar multisequence MRI of the head/brain was performed without and with the administration of intravenous contrast. COMPARISON: 10/14/2022 HISTORY: ORDERING SYSTEM PROVIDED HISTORY: Persistent encephalopathy, rule out stroke TECHNOLOGIST PROVIDED HISTORY: Persistent encephalopathy, rule out stroke FINDINGS: INTRACRANIAL STRUCTURES/VENTRICLES:  There is no acute infarct. No mass effect or midline shift. No evidence of an acute intracranial hemorrhage. The ventricles and sulci are normal in size and configuration. The sellar/suprasellar regions appear unremarkable. The normal signal voids within the major intracranial vessels appear maintained. No abnormal focus of enhancement is seen within the brain. There are few scattered foci of T2/FLAIR hyperintensity in the periventricular and subcortical white matter, concerning for microangiopathic change. ORBITS: The visualized portion of the orbits demonstrate no acute abnormality. SINUSES: Paranasal sinuses are predominantly clear. Left mastoid air cell effusion. BONES/SOFT TISSUES: The bone marrow signal intensity appears normal. The soft tissues demonstrate no acute abnormality. No acute intracranial abnormality. No acute infarction. IR GUIDED THORACENTESIS PLEURAL    Result Date: 10/21/2022  PROCEDURE: ULTRASOUNDGUIDED BILATERAL THORACENTESIS WITH BILATERAL PLEURAL DRAINAGE CATHETER PLACEMENT 10/21/2022 HISTORY: ORDERING SYSTEM PROVIDED HISTORY: diagnositic and possibly therapeutic TECHNOLOGIST PROVIDED HISTORY: diagnositic and possibly therapeutic Which side should the procedure be BILATERAL Is the patient pregnant?->No History of septic emboli with previous left thoracentesis demonstrating infected fluid, CT shows loculated effusions, please perform diagnostic and therapeutic thoracentesis is with bilateral pigtail catheter placement for drainage. TECHNIQUE AND FINDINGS: Informed consent was obtained from the patient's family member after a detailed explanation of the procedure including risks, benefits, and alternatives. Universal protocol was performed. The patient's posterior chest was prepped and draped in standard sterile fashion. 1% lidocaine was used for local anesthesia. Procedure was performed with the patient in an upright position. Initially attention was directed towards the left side. Ultrasound shows a very small residual loculated pleural effusion in the medial left lower chest.  Skin over the area is prepped and draped in standard sterile fashion. 1% lidocaine used for local anesthesia. Using realtime ultrasound guidance an 8 Canadian pigtail catheter was placed using trocar technique. Ultrasound images show a safe tract and access into the fluid. Thoracentesis was performed with removal of a small amount of slightly turbid yellow fluid. The catheter was sutured to the skin and secured in place with a Vaseline gauze dressing. The tube was connected to an atrium drainage system. Attention was then directed towards the right side. Ultrasound right chest shows a small right pleural effusion which appears to be partially loculated with minimal debris and septations. Skin over the area was prepped and draped in standard sterile fashion. 1% lidocaine used for local anesthesia. Using realtime ultrasound guidance an 8 Canadian pigtail catheter was advanced into the pleural fluid using trocar technique. Ultrasound images show a safe tract and access into the fluid. Thoracentesis was performed with removal of approximately 20 mL of fairly clear light red colored fluid. Sample was sent for the requested studies. The catheter was sutured to the skin and secured in place with a Vaseline gauze dressing. Tube was connected to an atrium drainage system. There are no immediate complications. The patient left the department in stable condition. Further tube management per the pulmonary team. EBL: Less than 5 mL     Successful ultrasound-guided bilateral pleural pigtail catheter placement (8 Greenlandic catheters were placed on each side). Small partially loculated pleural effusions, slightly more prominent on the right. Fluid sample was sent for the requested studies. IR GUIDED THORACENTESIS PLEURAL    Result Date: 10/21/2022  PROCEDURE: ULTRASOUNDGUIDED BILATERAL THORACENTESIS WITH BILATERAL PLEURAL DRAINAGE CATHETER PLACEMENT 10/21/2022 HISTORY: ORDERING SYSTEM PROVIDED HISTORY: diagnositic and possibly therapeutic TECHNOLOGIST PROVIDED HISTORY: diagnositic and possibly therapeutic Which side should the procedure be BILATERAL Is the patient pregnant?->No History of septic emboli with previous left thoracentesis demonstrating infected fluid, CT shows loculated effusions, please perform diagnostic and therapeutic thoracentesis is with bilateral pigtail catheter placement for drainage. TECHNIQUE AND FINDINGS: Informed consent was obtained from the patient's family member after a detailed explanation of the procedure including risks, benefits, and alternatives. Universal protocol was performed. The patient's posterior chest was prepped and draped in standard sterile fashion. 1% lidocaine was used for local anesthesia. Procedure was performed with the patient in an upright position. Initially attention was directed towards the left side. Ultrasound shows a very small residual loculated pleural effusion in the medial left lower chest.  Skin over the area is prepped and draped in standard sterile fashion. 1% lidocaine used for local anesthesia. Using realtime ultrasound guidance an 8 Greenlandic pigtail catheter was placed using trocar technique. Ultrasound images show a safe tract and access into the fluid. Thoracentesis was performed with removal of a small amount of slightly turbid yellow fluid.  The catheter was sutured to the skin and secured in place with a Vaseline gauze dressing. The tube was connected to an atrium drainage system. Attention was then directed towards the right side. Ultrasound right chest shows a small right pleural effusion which appears to be partially loculated with minimal debris and septations. Skin over the area was prepped and draped in standard sterile fashion. 1% lidocaine used for local anesthesia. Using realtime ultrasound guidance an 8 Comoran pigtail catheter was advanced into the pleural fluid using trocar technique. Ultrasound images show a safe tract and access into the fluid. Thoracentesis was performed with removal of approximately 20 mL of fairly clear light red colored fluid. Sample was sent for the requested studies. The catheter was sutured to the skin and secured in place with a Vaseline gauze dressing. Tube was connected to an atrium drainage system. There are no immediate complications. The patient left the department in stable condition. Further tube management per the pulmonary team. EBL: Less than 5 mL     Successful ultrasound-guided bilateral pleural pigtail catheter placement (8 Comoran catheters were placed on each side). Small partially loculated pleural effusions, slightly more prominent on the right. Fluid sample was sent for the requested studies. IR GUIDED PERC PLEURAL DRAIN W CATH INSERT    Result Date: 10/21/2022  PROCEDURE: ULTRASOUNDGUIDED BILATERAL THORACENTESIS WITH BILATERAL PLEURAL DRAINAGE CATHETER PLACEMENT 10/21/2022 HISTORY: ORDERING SYSTEM PROVIDED HISTORY: diagnositic and possibly therapeutic TECHNOLOGIST PROVIDED HISTORY: diagnositic and possibly therapeutic Which side should the procedure be BILATERAL Is the patient pregnant?->No History of septic emboli with previous left thoracentesis demonstrating infected fluid, CT shows loculated effusions, please perform diagnostic and therapeutic thoracentesis is with bilateral pigtail catheter placement for drainage.  TECHNIQUE AND FINDINGS: Informed consent was obtained from the patient's family member after a detailed explanation of the procedure including risks, benefits, and alternatives. Universal protocol was performed. The patient's posterior chest was prepped and draped in standard sterile fashion. 1% lidocaine was used for local anesthesia. Procedure was performed with the patient in an upright position. Initially attention was directed towards the left side. Ultrasound shows a very small residual loculated pleural effusion in the medial left lower chest.  Skin over the area is prepped and draped in standard sterile fashion. 1% lidocaine used for local anesthesia. Using realtime ultrasound guidance an 8 Czech pigtail catheter was placed using trocar technique. Ultrasound images show a safe tract and access into the fluid. Thoracentesis was performed with removal of a small amount of slightly turbid yellow fluid. The catheter was sutured to the skin and secured in place with a Vaseline gauze dressing. The tube was connected to an atrium drainage system. Attention was then directed towards the right side. Ultrasound right chest shows a small right pleural effusion which appears to be partially loculated with minimal debris and septations. Skin over the area was prepped and draped in standard sterile fashion. 1% lidocaine used for local anesthesia. Using realtime ultrasound guidance an 8 Czech pigtail catheter was advanced into the pleural fluid using trocar technique. Ultrasound images show a safe tract and access into the fluid. Thoracentesis was performed with removal of approximately 20 mL of fairly clear light red colored fluid. Sample was sent for the requested studies. The catheter was sutured to the skin and secured in place with a Vaseline gauze dressing. Tube was connected to an atrium drainage system. There are no immediate complications. The patient left the department in stable condition.   Further tube management per the pulmonary team. EBL: Less than 5 mL     Successful ultrasound-guided bilateral pleural pigtail catheter placement (8 Yi catheters were placed on each side). Small partially loculated pleural effusions, slightly more prominent on the right. Fluid sample was sent for the requested studies. IR GUIDED PERC PLEURAL DRAIN W CATH INSERT    Result Date: 10/21/2022  PROCEDURE: ULTRASOUNDGUIDED BILATERAL THORACENTESIS WITH BILATERAL PLEURAL DRAINAGE CATHETER PLACEMENT 10/21/2022 HISTORY: ORDERING SYSTEM PROVIDED HISTORY: diagnositic and possibly therapeutic TECHNOLOGIST PROVIDED HISTORY: diagnositic and possibly therapeutic Which side should the procedure be BILATERAL Is the patient pregnant?->No History of septic emboli with previous left thoracentesis demonstrating infected fluid, CT shows loculated effusions, please perform diagnostic and therapeutic thoracentesis is with bilateral pigtail catheter placement for drainage. TECHNIQUE AND FINDINGS: Informed consent was obtained from the patient's family member after a detailed explanation of the procedure including risks, benefits, and alternatives. Universal protocol was performed. The patient's posterior chest was prepped and draped in standard sterile fashion. 1% lidocaine was used for local anesthesia. Procedure was performed with the patient in an upright position. Initially attention was directed towards the left side. Ultrasound shows a very small residual loculated pleural effusion in the medial left lower chest.  Skin over the area is prepped and draped in standard sterile fashion. 1% lidocaine used for local anesthesia. Using realtime ultrasound guidance an 8 Yi pigtail catheter was placed using trocar technique. Ultrasound images show a safe tract and access into the fluid. Thoracentesis was performed with removal of a small amount of slightly turbid yellow fluid.  The catheter was sutured to the skin and secured in place with a Vaseline gauze dressing. The tube was connected to an atrium drainage system. Attention was then directed towards the right side. Ultrasound right chest shows a small right pleural effusion which appears to be partially loculated with minimal debris and septations. Skin over the area was prepped and draped in standard sterile fashion. 1% lidocaine used for local anesthesia. Using realtime ultrasound guidance an 8 Kyrgyz pigtail catheter was advanced into the pleural fluid using trocar technique. Ultrasound images show a safe tract and access into the fluid. Thoracentesis was performed with removal of approximately 20 mL of fairly clear light red colored fluid. Sample was sent for the requested studies. The catheter was sutured to the skin and secured in place with a Vaseline gauze dressing. Tube was connected to an atrium drainage system. There are no immediate complications. The patient left the department in stable condition. Further tube management per the pulmonary team. EBL: Less than 5 mL     Successful ultrasound-guided bilateral pleural pigtail catheter placement (8 Kyrgyz catheters were placed on each side). Small partially loculated pleural effusions, slightly more prominent on the right. Fluid sample was sent for the requested studies.      Assessment//Plan           Hospital Problems             Last Modified POA    * (Principal) SIRS (systemic inflammatory response syndrome) ([address] 10/10/2022 Yes    NICKI (acute kidney injury) ([address] 10/11/2022 Yes    Severe malnutrition (HCC) (Chronic) 10/11/2022 Yes    Elevated d-dimer 10/15/2022 Yes    Leukocytosis 10/15/2022 Yes    Pulmonary nodules 10/15/2022 Yes    Delirium due to another medical condition 10/17/2022 Yes    Acute metabolic encephalopathy 35/51/1657 Yes    Depression with suicidal ideation 10/17/2022 Yes    Bipolar disorder, mixed ([address] 10/11/2022 Yes    Acute encephalopathy 10/14/2022 Yes    COPD (chronic obstructive pulmonary disease) ([facility] 10/11/2022 Yes    H/O gastric bypass 10/11/2022 Yes    Overview Signed 2/13/2020  7:19 PM by Lizbet Marquez MD     enlarged  pancreas, here for surgical work-up for EGD          Assessment & Plan    Continue specialist management    Electronically signed by Lizbet Marquez MD on 10/22/22 at 11:27 AM EDT

## 2022-10-22 NOTE — PROGRESS NOTES
[facility] Neurology   IN-PATIENT SERVICE      NEUROLOGY PROGRESS  NOTE            Date:   10/21/2022  Patient name:  Tucker Olivera  Date of admission:  10/10/2022  YOB: 1970      Interval History:     Patient continues to be very lethargic. Reportedly by father she is more awake in the morning and more interactive. She did receive bilateral chest tubes today. Repeat of MRI brain did not reveal any evidence of acute stroke or any acute intracranial abnormalities. EEG showed mild diffuse encephalopathy. CSF studies negative. History of Present Illness: The patient is a 46 y.o. female who presents with Shortness of Breath and Positive For Covid-19  . The patient was seen and examined and the chart was reviewed. Neurology consulted for encephalopathy. MRI brain done 10/14 showed no acute intracranial abnormalities.      Past Medical History:     Past Medical History:   Diagnosis Date    Anemia     Arthritis     Asthma     Bipolar disorder, mixed ([address]     Carotid artery stenosis 2/13/2020    Cocaine abuse ([address] 11/4/2014    COPD (chronic obstructive pulmonary disease) (HCC)     Degeneration of cervical intervertebral disc     Depression with anxiety     Depression with anxiety     Fibromyalgia 1/5/2017    GERD (gastroesophageal reflux disease)     History of migraine headaches 6/10/2014    History of renal calculi 6/10/2014    History of seizure disorder 6/10/2014    Hoarseness of voice     HTN (hypertension) 6/10/2014    Hyperlipidemia 6/10/2014    IGT (impaired glucose tolerance) 6/10/2014    Kidney stones     Lactose intolerance 6/10/2014    Leukopenia 6/10/2014    Major depressive disorder, recurrent episode, severe, without mention of psychotic behavior 11/6/2014    MVP (mitral valve prolapse)     Seizures ([address]     Sleep apnea 6/10/2014    Unspecified diseases of blood and blood-forming organs         Past Surgical History:     Past Surgical History:   Procedure Laterality Date    ANKLE FRACTURE SURGERY      recontruction surgery    APPENDECTOMY      BREAST LUMPECTOMY Right     CARDIAC CATHETERIZATION      CARPAL TUNNEL RELEASE      x2     SECTION      CHOLECYSTECTOMY      COLONOSCOPY  12    COLONOSCOPY  2016    severe spasms    COLONOSCOPY  2019    DR GOLDY MCINTOSH    GASTRIC BYPASS SURGERY      GASTRIC BYPASS SURGERY      HYSTERECTOMY (CERVIX STATUS UNKNOWN)      HYSTERECTOMY (CERVIX STATUS UNKNOWN)      LAPAROSCOPY N/A 2022    LAPAROSCOPY EXPLORATORY CONVERTED TO OPEN EXPLORATORY LAPAROTOMY, LYSIS OF ADHESIONS, REDUCTION OF INTERNAL HERNIA performed by Allison Vargas DO at [address] [place] [address]      APPLICATION OF ARCH BARS,SIMPLE EXTRACTION OF #15 AND RT TMJ JOINT REPLACEMENT    SHOULDER ARTHROSCOPY Left 2019    DR ROBERT GREEN    SHOULDER SURGERY      TONSILLECTOMY AND ADENOIDECTOMY      TRANSESOPHAGEAL ECHOCARDIOGRAM N/A 10/19/2022    TRANSESOPHAGEAL ECHOCARDIOGRAM WITH BUBBLE STUDY performed by Frandy Royal DO at [address] [place] [address]  73-12    egd    UPPER GASTROINTESTINAL ENDOSCOPY  2016    status post gastrectomy with a small remnant of gastric pouch.     UPPER GASTROINTESTINAL ENDOSCOPY  2019    DR Karlee Marquis        Medications during admission:      vancomycin  1,500 mg IntraVENous Q24H    carvedilol  25 mg Oral BID WC    Vitamin D  2,000 Units Oral Daily    sodium chloride flush  5-40 mL IntraVENous 2 times per day    [Held by provider] heparin (porcine)  5,000 Units SubCUTAneous BID    ARIPiprazole  5 mg Oral Daily    atorvastatin  40 mg Oral Daily    divalproex  1,000 mg Oral BID    DULoxetine  60 mg Oral Daily    ferrous sulfate  325 mg Oral BID     ipratropium-albuterol  1 ampule Inhalation Q4H WA    vancomycin (VANCOCIN) intermittent dosing (placeholder)   Other RX Placeholder         Physical Exam:   BP (!) 91/48   Pulse 58   Temp 97.5 °F (36.4 °C) (Axillary)   Resp 20   Ht 4' 11\" (1.499 m)   Wt 100 lb 5 oz (45.5 kg)   SpO2 95%   BMI 20.26 kg/m²   Temp (24hrs), Av.7 °F (36.5 °C), Min:97.3 °F (36.3 °C), Max:98.4 °F (36.9 °C)          Neurological examination:  Mental status    Alert and oriented to self. Lethargic. Requires multiple prompts to arouse. following all commands with repeated prompting. Cranial nerves    II - pupils reactive  III, IV, VI - extraocular muscles intact; no VINNIE; no nystagmus; no ptosis   V - normal facial sensation                                                               VII - normal facial symmetry                                                             VIII - intact hearing                                                                                 Motor function  3/5 strength in all extremities. Moves them all symmetrically. Sensory function Withdrawing to deep nailbed pressure symmetrically in all 4 extremities. Cerebellar Intact finger-nose-finger testing. Reflex function 2/4 symmetric throughout . Downgoing plantar response bilaterally. (-)West's sign bilaterally    Gait                  Not assessed due to lethargy                  Diagnostics:      Laboratory Testing:  CBC: No results for input(s): WBC, HGB, PLT in the last 72 hours.   BMP:    Recent Labs     10/20/22  0857      K 3.8      CO2 27   BUN 14   CREATININE 0.65   GLUCOSE 68*         Lab Results   Component Value Date    CHOL 156 2022    LDLCHOLESTEROL 101 2022    HDL 39 (L) 2022    TRIG 78 2022    ALT 17 10/10/2022    AST 41 (H) 10/10/2022    TSH 1.37 2022    INR 1.2 10/18/2022    LABA1C 6.1 (H) 2022    LABMICR 419 (H) 10/10/2022    SQQNLOGY19 311 2022       Lab Results   Component Value Date    VALPROATE 120 10/14/2022           Imaging/Diagnostics:      EEG: Mild diffuse encephalopathy    MRI BRAIN WO CONTRAST    Narrative  EXAMINATION:  MRI OF THE BRAIN WITHOUT CONTRAST 10/14/2022 10:31 am    TECHNIQUE:  Multiplanar multisequence MRI of the brain was performed without the  administration of intravenous contrast.    COMPARISON:  CT brain performed 04/08/2022. HISTORY:  ORDERING SYSTEM PROVIDED HISTORY: Stupor possible systemic embolism SIRS  recent COVID   recent crack cocaine  TECHNOLOGIST PROVIDED HISTORY:  Stupor possible systemic embolism SIRS recent COVID   recent crack cocaine  Reason for Exam: Stupor possible systemic embolism SIRS recent COVID recent  crack cocaine  Additional signs and symptoms: PATIENT UNABLE TO FOLLOW EXAM INSTRUCTIONS DUE  TO MENTAL STATUS    FINDINGS:  INTRACRANIAL STRUCTURES/VENTRICLES: The sellar and suprasellar structures,  optic chiasm, corpus callosum, pineal gland, tectum, and midline brainstem  structures are unremarkable. The craniocervical junction is unremarkable. There is no acute hemorrhage, mass effect, or midline shift. There is  satisfactory overall gray-white matter differentiation. The ventricular  structures are symmetric and unremarkable. The infratentorial structures  including the cerebellopontine angles and internal auditory canals are  unremarkable. There is no abnormal restricted diffusion. There is no  abnormal blooming artifact on susceptibility weighted imaging. ORBITS: The visualized portion of the orbits demonstrate no acute abnormality. SINUSES: The visualized paranasal sinuses and mastoid air cells demonstrate  no acute abnormality. BONES/SOFT TISSUES: The bone marrow signal intensity appears normal. The soft  tissues demonstrate no acute abnormality. Impression  Unremarkable MRI of the brain without acute abnormality.     RECOMMENDATIONS:  Unavailable    Results for orders placed during the hospital encounter of 10/10/22    MRI BRAIN W WO CONTRAST    Narrative  EXAMINATION:  MRI OF THE BRAIN WITHOUT AND WITH CONTRAST  10/20/2022 4:03 pm    TECHNIQUE:  Multiplanar multisequence MRI of the head/brain was performed without and  with the administration of intravenous contrast.    COMPARISON:  10/14/2022    HISTORY:  ORDERING SYSTEM PROVIDED HISTORY: Persistent encephalopathy, rule out stroke  TECHNOLOGIST PROVIDED HISTORY:  Persistent encephalopathy, rule out stroke    FINDINGS:  INTRACRANIAL STRUCTURES/VENTRICLES:  There is no acute infarct. No mass  effect or midline shift. No evidence of an acute intracranial hemorrhage. The ventricles and sulci are normal in size and configuration. The  sellar/suprasellar regions appear unremarkable. The normal signal voids  within the major intracranial vessels appear maintained. No abnormal focus  of enhancement is seen within the brain. There are few scattered foci of  T2/FLAIR hyperintensity in the periventricular and subcortical white matter,  concerning for microangiopathic change. ORBITS: The visualized portion of the orbits demonstrate no acute abnormality. SINUSES: Paranasal sinuses are predominantly clear. Left mastoid air cell  effusion. BONES/SOFT TISSUES: The bone marrow signal intensity appears normal. The soft  tissues demonstrate no acute abnormality. Impression  No acute intracranial abnormality. No acute infarction. No results found for this or any previous visit. Results for orders placed during the hospital encounter of 04/08/22    CT Head WO Contrast    Narrative  EXAMINATION:  CT OF THE HEAD WITHOUT CONTRAST  4/8/2022 8:01 am    TECHNIQUE:  CT of the head was performed without the administration of intravenous  contrast. Dose modulation, iterative reconstruction, and/or weight based  adjustment of the mA/kV was utilized to reduce the radiation dose to as low  as reasonably achievable.     COMPARISON:  29 March 2022    HISTORY:  ORDERING SYSTEM PROVIDED HISTORY: seizure  TECHNOLOGIST PROVIDED HISTORY:  seizure    Decision Support Exception - unselect if not a suspected or confirmed  emergency medical condition->Emergency Medical Condition (MA)  Is the patient pregnant?->No  Reason for Exam: seizure  Additional signs and symptoms: per ER RN-pt had seizure approx 1 week ago,  headaches    FINDINGS:  BRAIN/VENTRICLES: There is no acute intracranial hemorrhage, mass effect or  midline shift. No abnormal extra-axial fluid collection. The gray-white  differentiation is maintained without evidence of an acute infarct. There is  no evidence of hydrocephalus. Mild cortical atrophy is present. Beam  hardening artifact from hardware in the right TMJ joint somewhat complicates  assessment of the inferior right temporal lobe. ORBITS: The visualized portion of the orbits demonstrate no acute abnormality. SINUSES: The visualized paranasal sinuses and mastoid air cells demonstrate  no acute abnormality. SOFT TISSUES/SKULL:  Again noted is hardware in the right TMJ joint. Severe  arthritic change left TMJ joint is noted. No acute bony abnormality in the  skull. Minimal soft tissue swelling over the right parietal area. Impression  No acute intracranial abnormality. Minimal soft tissue swelling over the  right parietal area consistent with resolving hematoma. RECOMMENDATIONS:  Unavailable        I personally reviewed all of the above medications, clinical laboratory, imaging and other diagnostic tests. Impression:      Lethargy, confusion in the setting of MRSA bacteremia, pulmonary septic emboli contributing to acute metabolic encephalopathy. Plan:     Overall neurologic work-up has been unrevealing. There is no evidence of CNS infection given normal CSF results. Repeat MRI brain does not reveal any evidence of septic cerebral emboli or stroke  EEG shows mild encephalopathy  Continue current treatment of underlying infection, metabolic derangements  We will sign off at this time. Please do not hesitate to reconsult as needed.         Electronically signed by Edin Bright DO on 10/21/2022 at 8:05 PM      Edin Bright [address] [facility]

## 2022-10-22 NOTE — CARE COORDINATION
ONGOING DISCHARGE PLAN:    Patient is alert and oriented x4. Spoke with patient regarding discharge plan and patient confirms that plan is still to go to [facility]. Pt had chest tubes placed on 10/21 to Right and left lung . IV vanco and aerosols    Will continue to follow for additional discharge needs.     Electronically signed by Suzette Vegas RN on 10/22/2022 at 3:55 PM

## 2022-10-22 NOTE — PROGRESS NOTES
Pt's O2 started to decrease SPO2 86%, RN went into room and turned NC up to 5L. SPO2 phoebe tback down to 82%, RN put pt on venti mask at 40% 6L, spo2 at 93%. LUIS franklin served Dr. Deedee Niño, awaiting response.

## 2022-10-22 NOTE — PLAN OF CARE
Problem: Discharge Planning  Goal: Discharge to home or other facility with appropriate resources  10/22/2022 1602 by Derrek Anderson RN  Outcome: Progressing     Problem: Pain  Goal: Verbalizes/displays adequate comfort level or baseline comfort level  10/22/2022 1602 by Derrek Anderson RN  Outcome: Progressing  Note: Patient states having some pain but is unable to verbalize pain location or level. Problem: Skin/Tissue Integrity  Goal: Absence of new skin breakdown  Description: 1. Monitor for areas of redness and/or skin breakdown  2. Assess vascular access sites hourly  3. Every 4-6 hours minimum:  Change oxygen saturation probe site  4. Every 4-6 hours:  If on nasal continuous positive airway pressure, respiratory therapy assess nares and determine need for appliance change or resting period. 10/22/2022 1602 by Derrek Anderson RN  Outcome: Progressing  Note: No presence of new skin breakdown throughout shift. Pt positions changed every 2 hours. Pt heels maintained off bed with the foot of bed elevated. Pt understands reasoning for interventions to prevent skin breakdown. Problem: Safety - Adult  Goal: Free from fall injury  10/22/2022 1602 by Derrek Anderson RN  Outcome: Progressing  Note: Free from falls throughout shift. Pt is compliant with safety measures into place. Pt seen by care team every hour with purposefully hourly rounding, bed is in the lowest position, call light and personal belongings within reach. Two side rails are up and bed alarm is on and telesitter in place. Problem: ABCDS Injury Assessment  Goal: Absence of physical injury  10/22/2022 1602 by Derrek Anderson RN  Outcome: Progressing  Note: No presence of new injury throughout shift, patient is confused at times and attempts to get out of bed. Telesitter in place and bed alarm on.         Problem: Nutrition Deficit:  Goal: Optimize nutritional status  10/22/2022 1602 by Derrek Anderson RN  Outcome: Progressing

## 2022-10-23 ENCOUNTER — APPOINTMENT (OUTPATIENT)
Dept: GENERAL RADIOLOGY | Age: 52
DRG: 871 | End: 2022-10-23
Payer: COMMERCIAL

## 2022-10-23 PROBLEM — Z88.1 ALLERGY TO ANTIBIOTIC: Status: ACTIVE | Noted: 2022-10-23

## 2022-10-23 PROBLEM — I26.90 ACUTE SEPTIC PULMONARY EMBOLISM (HCC): Status: ACTIVE | Noted: 2022-10-23

## 2022-10-23 PROBLEM — B95.62 MRSA BACTEREMIA: Status: ACTIVE | Noted: 2022-10-23

## 2022-10-23 PROBLEM — J98.4 CAVITATING MASS OF LUNG: Status: ACTIVE | Noted: 2022-10-23

## 2022-10-23 PROBLEM — R78.81 MRSA BACTEREMIA: Status: ACTIVE | Noted: 2022-10-23

## 2022-10-23 LAB
ABSOLUTE EOS #: 0 K/UL (ref 0–0.4)
ABSOLUTE LYMPH #: 0.6 K/UL (ref 1–4.8)
ABSOLUTE MONO #: 0.6 K/UL (ref 0.1–1.3)
ANION GAP SERPL CALCULATED.3IONS-SCNC: 7 MMOL/L (ref 9–17)
BASOPHILS # BLD: 0 % (ref 0–2)
BASOPHILS ABSOLUTE: 0 K/UL (ref 0–0.2)
BUN BLDV-MCNC: 13 MG/DL (ref 6–20)
CALCIUM SERPL-MCNC: 7.5 MG/DL (ref 8.6–10.4)
CHLORIDE BLD-SCNC: 106 MMOL/L (ref 98–107)
CO2: 27 MMOL/L (ref 20–31)
CREAT SERPL-MCNC: 0.58 MG/DL (ref 0.5–0.9)
CULTURE: NO GROWTH
CULTURE: NORMAL
CULTURE: NORMAL
EOSINOPHILS RELATIVE PERCENT: 0 % (ref 0–4)
GFR SERPL CREATININE-BSD FRML MDRD: >60 ML/MIN/1.73M2
GLUCOSE BLD-MCNC: 80 MG/DL (ref 70–99)
HCT VFR BLD CALC: 25.7 % (ref 36–46)
HEMOGLOBIN: 8.1 G/DL (ref 12–16)
LYMPHOCYTES # BLD: 7 % (ref 24–44)
MCH RBC QN AUTO: 26.1 PG (ref 26–34)
MCHC RBC AUTO-ENTMCNC: 31.7 G/DL (ref 31–37)
MCV RBC AUTO: 82.3 FL (ref 80–100)
MONOCYTES # BLD: 8 % (ref 1–7)
PDW BLD-RTO: 17 % (ref 11.5–14.9)
PLATELET # BLD: 57 K/UL (ref 150–450)
PMV BLD AUTO: 6.8 FL (ref 6–12)
POTASSIUM SERPL-SCNC: 5.1 MMOL/L (ref 3.7–5.3)
PROCALCITONIN: 0.15 NG/ML
RBC # BLD: 3.12 M/UL (ref 4–5.2)
SEG NEUTROPHILS: 85 % (ref 36–66)
SEGMENTED NEUTROPHILS ABSOLUTE COUNT: 7.1 K/UL (ref 1.3–9.1)
SODIUM BLD-SCNC: 140 MMOL/L (ref 135–144)
SPECIMEN DESCRIPTION: NORMAL
WBC # BLD: 8.4 K/UL (ref 3.5–11)

## 2022-10-23 PROCEDURE — 85025 COMPLETE CBC W/AUTO DIFF WBC: CPT

## 2022-10-23 PROCEDURE — 36415 COLL VENOUS BLD VENIPUNCTURE: CPT

## 2022-10-23 PROCEDURE — 94640 AIRWAY INHALATION TREATMENT: CPT

## 2022-10-23 PROCEDURE — 6370000000 HC RX 637 (ALT 250 FOR IP): Performed by: INTERNAL MEDICINE

## 2022-10-23 PROCEDURE — 2060000000 HC ICU INTERMEDIATE R&B

## 2022-10-23 PROCEDURE — 6360000002 HC RX W HCPCS: Performed by: INTERNAL MEDICINE

## 2022-10-23 PROCEDURE — 71045 X-RAY EXAM CHEST 1 VIEW: CPT

## 2022-10-23 PROCEDURE — 80048 BASIC METABOLIC PNL TOTAL CA: CPT

## 2022-10-23 PROCEDURE — 6360000002 HC RX W HCPCS: Performed by: FAMILY MEDICINE

## 2022-10-23 PROCEDURE — 94761 N-INVAS EAR/PLS OXIMETRY MLT: CPT

## 2022-10-23 PROCEDURE — 99232 SBSQ HOSP IP/OBS MODERATE 35: CPT | Performed by: NURSE PRACTITIONER

## 2022-10-23 PROCEDURE — 84145 PROCALCITONIN (PCT): CPT

## 2022-10-23 PROCEDURE — 99232 SBSQ HOSP IP/OBS MODERATE 35: CPT | Performed by: FAMILY MEDICINE

## 2022-10-23 PROCEDURE — 2580000003 HC RX 258: Performed by: INTERNAL MEDICINE

## 2022-10-23 PROCEDURE — 2700000000 HC OXYGEN THERAPY PER DAY

## 2022-10-23 PROCEDURE — 2580000003 HC RX 258: Performed by: FAMILY MEDICINE

## 2022-10-23 RX ADMIN — POTASSIUM CHLORIDE 10 MEQ: 7.46 INJECTION, SOLUTION INTRAVENOUS at 00:17

## 2022-10-23 RX ADMIN — DULOXETINE 60 MG: 60 CAPSULE, DELAYED RELEASE ORAL at 10:32

## 2022-10-23 RX ADMIN — SODIUM CHLORIDE, PRESERVATIVE FREE 10 ML: 5 INJECTION INTRAVENOUS at 10:35

## 2022-10-23 RX ADMIN — IPRATROPIUM BROMIDE AND ALBUTEROL SULFATE 1 AMPULE: 2.5; .5 SOLUTION RESPIRATORY (INHALATION) at 19:48

## 2022-10-23 RX ADMIN — IPRATROPIUM BROMIDE AND ALBUTEROL SULFATE 1 AMPULE: 2.5; .5 SOLUTION RESPIRATORY (INHALATION) at 11:06

## 2022-10-23 RX ADMIN — FERROUS SULFATE TAB 325 MG (65 MG ELEMENTAL FE) 325 MG: 325 (65 FE) TAB at 18:33

## 2022-10-23 RX ADMIN — IPRATROPIUM BROMIDE AND ALBUTEROL SULFATE 1 AMPULE: 2.5; .5 SOLUTION RESPIRATORY (INHALATION) at 14:54

## 2022-10-23 RX ADMIN — DIVALPROEX SODIUM 1000 MG: 500 TABLET, DELAYED RELEASE ORAL at 10:34

## 2022-10-23 RX ADMIN — POTASSIUM CHLORIDE 10 MEQ: 7.46 INJECTION, SOLUTION INTRAVENOUS at 01:42

## 2022-10-23 RX ADMIN — VANCOMYCIN HYDROCHLORIDE 1000 MG: 1 INJECTION, POWDER, LYOPHILIZED, FOR SOLUTION INTRAVENOUS at 20:03

## 2022-10-23 RX ADMIN — Medication 2000 UNITS: at 10:33

## 2022-10-23 RX ADMIN — FERROUS SULFATE TAB 325 MG (65 MG ELEMENTAL FE) 325 MG: 325 (65 FE) TAB at 10:33

## 2022-10-23 RX ADMIN — POTASSIUM CHLORIDE 10 MEQ: 7.46 INJECTION, SOLUTION INTRAVENOUS at 02:58

## 2022-10-23 RX ADMIN — IPRATROPIUM BROMIDE AND ALBUTEROL SULFATE 1 AMPULE: 2.5; .5 SOLUTION RESPIRATORY (INHALATION) at 07:17

## 2022-10-23 RX ADMIN — CARVEDILOL 25 MG: 25 TABLET, FILM COATED ORAL at 10:33

## 2022-10-23 RX ADMIN — ARIPIPRAZOLE 5 MG: 5 TABLET ORAL at 10:33

## 2022-10-23 RX ADMIN — CARVEDILOL 25 MG: 25 TABLET, FILM COATED ORAL at 18:33

## 2022-10-23 NOTE — CARE COORDINATION
ONGOING DISCHARGE PLAN:     Patient awakens but does not communicate with CM      Spoke with patient's father  regarding discharge plan and patient confirms that plan is still to go to [facility]. Pt had chest tubes placed on 10/21 to Right and left lung . IV vanco and aerosols     Will continue to follow for additional discharge needs.

## 2022-10-23 NOTE — PROGRESS NOTES
Pulmonary Progress Note  [facility]      Patient - Mile Parker,  Age - 46 y.o.    - 1970      Room Number - 2109/2109-01   N -  [id number]   [facility]  Date of Admission -  10/10/2022  4:37 AM        Consulting Service/Physician   Consulting - Radha Layton MD  Primary Care Physician - Radha Layton MD     SUBJECTIVE   Left pigtail catheter fell out overnight. Right pigtail catheter only drained 44 mL last 24 hours. There is bubbling in the Pleur-evac chamber. OBJECTIVE   VITALS    height is 4' 11\" (1.499 m) and weight is 101 lb 6.6 oz (46 kg). Her axillary temperature is 97.9 °F (36.6 °C). Her blood pressure is 146/71 (abnormal) and her pulse is 72. Her respiration is 16 and oxygen saturation is 98%. Body mass index is 20.48 kg/m². Temperature Range: Temp: 97.9 °F (36.6 °C) Temp  Av.8 °F (36.6 °C)  Min: 97.3 °F (36.3 °C)  Max: 98.3 °F (36.8 °C)  BP Range:  Systolic (47FFQ), OHE:433 , Min:98 , VGZ:854     Diastolic (70XNU), FUC:30, Min:57, Max:76    Pulse Range: Pulse  Av.3  Min: 62  Max: 74  Respiration Range: Resp  Av.3  Min: 16  Max: 24  Current Pulse Ox[de-identified]  SpO2: 98 %  24HR Pulse Ox Range:  SpO2  Av.1 %  Min: 93 %  Max: 100 %  Oxygen Amount and Delivery: O2 Flow Rate (L/min): 2 L/min    Wt Readings from Last 3 Encounters:   10/23/22 101 lb 6.6 oz (46 kg)   22 96 lb 8 oz (43.8 kg)   22 105 lb (47.6 kg)       I/O (24 Hours)    Intake/Output Summary (Last 24 hours) at 10/23/2022 1241  Last data filed at 10/23/2022 3328  Gross per 24 hour   Intake --   Output 200 ml   Net -200 ml       EXAM     General Appearance arousable in no acute distress  HEENT - normocephalic, atraumatic.  []  Mallampati  [] Crowded airway   [] Macroglossia  []  Retrognathia  [] Micrognathia  []  Normal tongue size []  Normal Bite  [] Alise sign positive    Neck - Supple,  trachea midline   Lungs -diminished right side  Cardiovascular - Heart sounds are normal.  Regular rate and rhythm   Abdomen - Soft, nontender, nondistended, no masses or organomegaly  Neurologic - There are no focal motor or sensory deficits  Skin - No bruising or bleeding  Extremities - No clubbing, cyanosis, edema    MEDS      vancomycin  1,000 mg IntraVENous Q24H    carvedilol  25 mg Oral BID WC    Vitamin D  2,000 Units Oral Daily    sodium chloride flush  5-40 mL IntraVENous 2 times per day    [Held by provider] heparin (porcine)  5,000 Units SubCUTAneous BID    ARIPiprazole  5 mg Oral Daily    atorvastatin  40 mg Oral Daily    divalproex  1,000 mg Oral BID    DULoxetine  60 mg Oral Daily    ferrous sulfate  325 mg Oral BID WC    ipratropium-albuterol  1 ampule Inhalation Q4H WA    vancomycin (VANCOCIN) intermittent dosing (placeholder)   Other RX Placeholder      sodium chloride 50 mL/hr at 10/18/22 1200     sodium chloride flush, labetalol, potassium chloride **OR** potassium alternative oral replacement **OR** potassium chloride, hydrALAZINE, sodium chloride flush, sodium chloride, acetaminophen, ondansetron **OR** ondansetron, sodium chloride flush, perflutren lipid microspheres    LABS   CBC   Recent Labs     10/23/22  0502   WBC 8.4   HGB 8.1*   HCT 25.7*   MCV 82.3   PLT 57*     BMP:   Lab Results   Component Value Date/Time     10/23/2022 05:02 AM    K 5.1 10/23/2022 05:02 AM     10/23/2022 05:02 AM    CO2 27 10/23/2022 05:02 AM    BUN 13 10/23/2022 05:02 AM    LABALBU 1.7 10/22/2022 04:15 PM    CREATININE 0.58 10/23/2022 05:02 AM    CALCIUM 7.5 10/23/2022 05:02 AM    GFRAA >60 04/08/2022 07:17 AM    LABGLOM >60 10/23/2022 05:02 AM     ABGs:  Lab Results   Component Value Date/Time    PHART 7.452 10/22/2022 03:24 PM    PO2ART 86.7 10/22/2022 03:24 PM    SVX0RXX 42.7 10/22/2022 03:24 PM      Lab Results   Component Value Date/Time    MODE PRVC 12/28/2016 04:36 AM     Ionized Calcium:  No results found for: IONCA  Magnesium:    Lab Results   Component Value Date/Time    MG 2.2 10/22/2022 04:15 PM     Phosphorus:    Lab Results   Component Value Date/Time    PHOS 3.6 10/18/2022 04:50 AM        LIVER PROFILE   Recent Labs     10/22/22  1615   AST 11   ALT <5*   BILITOT 0.2*   ALKPHOS 69     INR No results for input(s): INR in the last 72 hours. PTT   Lab Results   Component Value Date    APTT 25.6 07/22/2016         RADIOLOGY     Chest x-ray shows increased effusion slightly on the right side but no reaccumulation on the left side        ASSESSMENT/PLAN   MRSA bacteremia  Septic emboli  E. coli UTI  Acute kidney injury, resolved  Suspect COPD, seen once in the office by Dr. Johnson Rising in 2019, FEV1 2.04 L or 86% predicted  Parapneumonic effusion, pleural fluid positive for staph  History of opiate and cocaine use-reportedly uses crack cocaine at least twice weekly  History of tobacco use  Recent COVID +9/30  Elevated troponin likely type II MI  Encephalopathy, improving  History of hypertension  History of seizure disorder  Full code    Continue vancomycin per ID  I went and removed the dressing and inspected the right sided pleural catheter. It was outside of the pleural space resting on the chest wall. I remove the sutures and the area was redressed. Patient is in no distress saturating 98 to 99%. However, feel that that area has to be drained-we will go ahead and have interventional radiology reinsert a pleural catheter. I was told that the patient was moving around and sitting up in bed. Maybe it needs to be sutured in multiple areas. Check follow-up x-ray tomorrow  Repeat culture still growing out MRSA.   Cussed with nursing staff, ID, and patient's father  Electronically signed by Cuco Bland MD on 10/23/2022 at 12:41 PM

## 2022-10-23 NOTE — PROGRESS NOTES
[facility] -   Infectious diseases evaluation  admission date 10/10/2022    reason for consultation:   MRSA bacteremia    Impression :   Current:  MRSA bacteremia  Bilateral pulmonary nodules with cavitation /septic emboli s/p thoracentesis with bilateral CT placement. Sepsis secondary to above  Encephalopathy  History of cocaine drug abuse  E. coli UTI treated  Acute renal failure improved  Hypertension  Bipolar disorder  Allergy to Bactrim. Recommendations     Continue Vancomycin IV, pharmacy to dose until 12/03  Follow CBC and renal function closely. Chest tubes to be replaced tomorrow per IR. Supportive care. Discussed with RN, Father at the bedside, Dr. Barb Pearson. Infection Control Recommendations   Parks Precautions  Contact Isolation     Antimicrobial Stewardship Recommendations   Simplification of therapy  Targeted therapy      History of Present Illness:   Initial history:  Scotty Gray is a 46y.o.-year-old female was admitted 10/10/2020 for worsening shortness of breath associated with generalized body ache. The patient is drowsy, arousable, confused, follows simple commands, unable to provide history that was obtained from chart review and nursing staff. She does have history of cocaine abuse. She was found to have acute renal failure with a creatinine of 2.1. Urinalysis showed small leukocyte esterase, positive nitrate. Urine culture on 10/10/2022 grew E. Coli  Blood cultures 10/10/2022 2 of 2 grew MRSA  Echocardiogram showed no vegetations  Procalcitonin was 6.01, D-dimer was elevated at 3.85  Chest x-ray showed right lung nodules. VQ scan was indeterminant.   CT chest without contrast 10/16/2022 showed numerous bilateral nodular opacities both lungs suggestive of septic emboli , focal consolidation with cavitation in the superior right lower lobe focal consolidation at the left base, small bilateral loculated pleural effusion and mediastinal lymphadenopathy. Echocardiogram showed no vegetations  ADRIANNE negative for vegetations. Interval changes  10/23/2022   Afebrile  Unresponsive. Bilateral CT became dislodged, removed per Dr. Chinmay Silva. Pleural fluid showing SA 1 colony unit. E PICC site clean. Patient Vitals for the past 8 hrs:   BP Temp Temp src Pulse Resp SpO2 Weight   10/23/22 1230 (!) 97/55 98.2 °F (36.8 °C) -- 64 20 96 % --   10/23/22 1106 -- -- -- -- -- 98 % --   10/23/22 1030 (!) 146/71 -- -- 72 -- -- --   10/23/22 0717 -- -- -- 74 16 99 % --   10/23/22 0642 (!) 184/76 97.9 °F (36.6 °C) Axillary 73 20 96 % 101 lb 6.6 oz (46 kg)     Labs:   Cre: 0.59-0.65-0.58  WBC: 7.0-7.3-8.4    Micro:  10/21 Thoracentesis-Staph Aureus 1 colony unit. 10/22 UA-Negative. I have personally reviewed the past medical history, past surgical history, medications, social history, and family history, and I haveupdated the database accordingly. Allergies:   Aspirin, Bactrim, and Codeine     Review of Systems:     Review of Systems   Reason unable to perform ROS: Unresponsive. Unable to provide due to mental status changes  Physical Examination :       Physical Exam  Vitals and nursing note reviewed. Constitutional:       General: She is not in acute distress. Appearance: She is not ill-appearing. Comments: Unresponsive. HENT:      Head: Normocephalic and atraumatic. Right Ear: External ear normal.      Left Ear: External ear normal.      Nose: Nose normal.      Mouth/Throat:      Mouth: Mucous membranes are moist.      Pharynx: Oropharynx is clear. Eyes:      General: No scleral icterus. Conjunctiva/sclera: Conjunctivae normal.   Cardiovascular:      Rate and Rhythm: Regular rhythm. Heart sounds: Normal heart sounds. No murmur heard. Pulmonary:      Effort: Pulmonary effort is normal. No respiratory distress. Breath sounds: No wheezing or rhonchi. Comments: Diminished t/o posterior.   CT became dislodged bilaterally. Abdominal:      General: Bowel sounds are normal. There is no distension. Palpations: Abdomen is soft. Tenderness: There is no abdominal tenderness. Genitourinary:     Comments: No chamberlain. Musculoskeletal:      Cervical back: Neck supple. No rigidity. Right lower leg: No edema. Left lower leg: No edema. Skin:     General: Skin is warm and dry. Capillary Refill: Capillary refill takes less than 2 seconds. Coloration: Skin is not jaundiced. Findings: No erythema. Neurological:      Comments: Unresponsive.    Psychiatric:      Comments: ISAAK     Past Medical History:     Past Medical History:   Diagnosis Date    Anemia     Arthritis     Asthma     Bipolar disorder, mixed ([address]     Carotid artery stenosis 2020    Cocaine abuse ([address] 2014    COPD (chronic obstructive pulmonary disease) (HCC)     Degeneration of cervical intervertebral disc     Depression with anxiety     Depression with anxiety     Fibromyalgia 2017    GERD (gastroesophageal reflux disease)     History of migraine headaches 6/10/2014    History of renal calculi 6/10/2014    History of seizure disorder 6/10/2014    Hoarseness of voice     HTN (hypertension) 6/10/2014    Hyperlipidemia 6/10/2014    IGT (impaired glucose tolerance) 6/10/2014    Kidney stones     Lactose intolerance 6/10/2014    Leukopenia 6/10/2014    Major depressive disorder, recurrent episode, severe, without mention of psychotic behavior 2014    MVP (mitral valve prolapse)     Seizures ([address]     Sleep apnea 6/10/2014    Unspecified diseases of blood and blood-forming organs        Past Surgical  History:     Past Surgical History:   Procedure Laterality Date    ANKLE FRACTURE SURGERY      recontruction surgery    APPENDECTOMY      BREAST LUMPECTOMY Right     CARDIAC CATHETERIZATION      CARPAL TUNNEL RELEASE      x2     SECTION      CHOLECYSTECTOMY      COLONOSCOPY  12    COLONOSCOPY 12/19/2016    severe spasms    COLONOSCOPY  05/20/2019    DR GOLDY MCINTOSH    GASTRIC BYPASS SURGERY      GASTRIC BYPASS SURGERY      HYSTERECTOMY (CERVIX STATUS UNKNOWN)      HYSTERECTOMY (CERVIX STATUS UNKNOWN)      IR PERC CATH PLEURAL DRAIN W/IMAG  10/21/2022    IR PERC CATH PLEURAL DRAIN W/IMAG 10/21/2022 [facility]    IR PERC CATH PLEURAL DRAIN W/IMAG  10/21/2022    IR PERC CATH PLEURAL DRAIN W/IMAG 10/21/2022 [facility]    LAPAROSCOPY N/A 1/29/2022    LAPAROSCOPY EXPLORATORY CONVERTED TO OPEN EXPLORATORY LAPAROTOMY, LYSIS OF ADHESIONS, REDUCTION OF INTERNAL HERNIA performed by Tessa Owens DO at [address] [place] [address]  [id number]    APPLICATION OF ARCH BARS,SIMPLE EXTRACTION OF #15 AND RT TMJ JOINT REPLACEMENT    SHOULDER ARTHROSCOPY Left 06/05/2019    DR ROBETR GREEN    SHOULDER SURGERY      TONSILLECTOMY AND ADENOIDECTOMY      TRANSESOPHAGEAL ECHOCARDIOGRAM N/A 10/19/2022    TRANSESOPHAGEAL ECHOCARDIOGRAM WITH BUBBLE STUDY performed by Frandy Royal DO at [address]  7-3-12    egd    UPPER GASTROINTESTINAL ENDOSCOPY  12/19/2016    status post gastrectomy with a small remnant of gastric pouch.     UPPER GASTROINTESTINAL ENDOSCOPY  05/2019    DR Rey Hill       Medications:      vancomycin  1,000 mg IntraVENous Q24H    carvedilol  25 mg Oral BID WC    Vitamin D  2,000 Units Oral Daily    sodium chloride flush  5-40 mL IntraVENous 2 times per day    [Held by provider] heparin (porcine)  5,000 Units SubCUTAneous BID    ARIPiprazole  5 mg Oral Daily    atorvastatin  40 mg Oral Daily    divalproex  1,000 mg Oral BID    DULoxetine  60 mg Oral Daily    ferrous sulfate  325 mg Oral BID WC    ipratropium-albuterol  1 ampule Inhalation Q4H WA    vancomycin (VANCOCIN) intermittent dosing (placeholder)   Other RX Placeholder       Social History:     Social History     Socioeconomic History    Marital status:      Spouse Value Date/Time    CREATININE 0.58 10/23/2022 05:02 AM    GLUCOSE 80 10/23/2022 05:02 AM       Detailed results: Thank you for allowing us to participate in the care of this patient. Please call with questions. This note is created with the assistance of a speech recognition program.  While intending to generate adocument that actually reflects the content of the visit, the document can still have some errors including those of syntax and sound a like substitutions which may escape proof reading. It such instances, actual meaningcan be extrapolated by contextual diversion.     KELLY East - CNP  Office/Perfect Serve:   [phone]

## 2022-10-23 NOTE — PLAN OF CARE
Problem: Discharge Planning  Goal: Discharge to home or other facility with appropriate resources  10/23/2022 0544 by Cindy Krishnamurthy RN  Outcome: Progressing     Problem: Pain  Goal: Verbalizes/displays adequate comfort level or baseline comfort level  10/23/2022 0544 by Cindy Krishnamurthy RN  Outcome: Progressing     Problem: Skin/Tissue Integrity  Goal: Absence of new skin breakdown  Description: 1. Monitor for areas of redness and/or skin breakdown  2. Assess vascular access sites hourly  3. Every 4-6 hours minimum:  Change oxygen saturation probe site  4. Every 4-6 hours:  If on nasal continuous positive airway pressure, respiratory therapy assess nares and determine need for appliance change or resting period.   10/23/2022 0544 by Cindy Krishnamurthy RN  Outcome: Progressing     Problem: Safety - Adult  Goal: Free from fall injury  10/23/2022 0544 by Cindy Krishnamurthy RN  Outcome: Progressing

## 2022-10-23 NOTE — PROGRESS NOTES
Progress Note  Date:10/23/2022       Room:[facility]-  Patient Name:Anu Bourne     YOB: 1970     Age:51 y.o. Subjective    Subjective:  Symptoms:  (Awakens to verbal stimuli, will not open eyes. Answers questions as she has been previously in short sentences. ). Activity level: Impaired due to weakness. Pain:  She reports no pain. Review of Systems  Objective         Vitals Last 24 Hours:  TEMPERATURE:  Temp  Av.8 °F (36.6 °C)  Min: 97.3 °F (36.3 °C)  Max: 98.3 °F (36.8 °C)  RESPIRATIONS RANGE: Resp  Av  Min: 16  Max: 24  PULSE OXIMETRY RANGE: SpO2  Av.8 %  Min: 93 %  Max: 100 %  PULSE RANGE: Pulse  Av.7  Min: 62  Max: 74  BLOOD PRESSURE RANGE: Systolic (85XWX), FMB:024 , Min:95 , AYT:775   ; Diastolic (60XPJ), GFF:32, Min:57, Max:76    I/O (24Hr): Intake/Output Summary (Last 24 hours) at 10/23/2022 0818  Last data filed at 10/23/2022 0888  Gross per 24 hour   Intake 10 ml   Output 200 ml   Net -190 ml     Objective:  General Appearance:  Comfortable. Vital signs: (most recent): Blood pressure (!) 184/76, pulse 74, temperature 97.9 °F (36.6 °C), temperature source Axillary, resp. rate 16, height 4' 11\" (1.499 m), weight 101 lb 6.6 oz (46 kg), SpO2 99 %. (Elevated BP earlier this morning). Lungs:  Normal effort and normal respiratory rate. There are decreased breath sounds. Heart: Normal rate. Regular rhythm. S1 normal and S2 normal.    Labs/Imaging/Diagnostics    Labs:  CBC:  Recent Labs     10/22/22  1921 10/23/22  0502   WBC 6.9 8.4   RBC 3.14* 3.12*   HGB 8.1* 8.1*   HCT 25.5* 25.7*   MCV 81.3 82.3   RDW 16.8* 17.0*   PLT 51* 57*     CHEMISTRIES:  Recent Labs     10/20/22  0857 10/22/22  1615 10/23/22  0502    139 140   K 3.8 3.2* 5.1    104 106   CO2 27 25 27   BUN 14 15 13   CREATININE 0.65 0.66 0.58   GLUCOSE 68* 87 80   MG  --  2.2  --      PT/INR:No results for input(s): PROTIME, INR in the last 72 hours.   APTT:No results for input(s): APTT in the last 72 hours. LIVER PROFILE:  Recent Labs     10/22/22  1615   AST 11   ALT <5*   BILITOT 0.2*   ALKPHOS 69       Imaging Last 24 Hours:  XR CHEST (SINGLE VIEW FRONTAL)    Result Date: 10/23/2022  EXAMINATION: ONE XRAY VIEW OF THE CHEST. ONE PORTABLE AP VIEW OF CHEST. 10/23/2022 12:41 am COMPARISON: Portable chest x-ray on 10/22/2022 at 0938 hours. HISTORY: ORDERING SYSTEM PROVIDED HISTORY: L chest tube out TECHNOLOGIST PROVIDED HISTORY: L chest tube out Reason for Exam: L chest tube out FINDINGS: In right lower lung, there are heterogeneous dense opacities, indicating pneumonia and atelectatic changes which are increased as compared to previous chest x-ray. At the right CP angle area, there is confluent density indicating pleural effusion versus significant pleural thickening or atelectatic changes or infiltrates. In the right mid and upper lung fields, there are mild heterogeneous dense opacities indicating scattered infiltrates and atelectatic changes, similar to previous study. At the left lung base, there are mild atelectatic changes with or without mild infiltrates, similar to previous study. In the lateral portion of left upper lung field, there is focal density measuring 3 cm x 2.4 cm which may be due to focal infiltrates or mass lesion, unchanged as compared to previous study. Previous noted left basilar pleural tube has been withdrawn. There appears to be a pleural tube in the right lateral CP angle area similar to previous chest x-ray. Superior to the right pleural drainage tube there is significant density which may be due to loculated pleural effusion and with combination of infiltrates and atelectasis. No pneumothorax. Mild cardiomegaly. Minimal-to-mild pulmonary vascular congestion. There is no overt pulmonary edema. No pneumoperitoneum.      The right basilar opacity due to combination of pleural effusion or pleural thickening and pulmonary infiltrates and atelectasis appears to be increased to some extent as compared to previous chest x-ray. In the rest of both lungs, the infiltrates are unchanged. Mild-to-moderate pulmonary vascular congestion without obvious pulmonary edema. XR CHEST (SINGLE VIEW FRONTAL)    Result Date: 10/21/2022  EXAMINATION: ONE XRAY VIEW OF THE CHEST 10/21/2022 1:37 pm COMPARISON: 10/20/2022 HISTORY: ORDERING SYSTEM PROVIDED HISTORY: s/p bilateral chest tube placemetnt in IR TECHNOLOGIST PROVIDED HISTORY: s/p bilateral chest tube placemetnt in IR Reason for Exam: Bilateral chest tube placement FINDINGS: Interval placement of bilateral pleural pigtail catheters projecting over the lower chest on each side. Right arm PICC line tip is at the SVC/RA junction region. Left shoulder prosthesis. Partially visualized fusion hardware cervical spine. No significant pneumothorax is seen. Stable cardiomediastinal silhouette. Cholecystectomy clips. Bilateral parenchymal nodules/masses with cavitation, better shown on the recent CT, and suspicious for septic emboli. Patchy right perihilar and left lower lobe atelectasis/airspace disease. Interval placement of bilateral pleural pigtail catheters. CT CHEST WO CONTRAST    Result Date: 10/21/2022  EXAMINATION: CT OF THE CHEST WITHOUT CONTRAST 10/21/2022 8:50 am TECHNIQUE: CT of the chest was performed without the administration of intravenous contrast. Multiplanar reformatted images are provided for review. Automated exposure control, iterative reconstruction, and/or weight based adjustment of the mA/kV was utilized to reduce the radiation dose to as low as reasonably achievable.  COMPARISON: 10/20/2022, 10/17/2022 HISTORY: ORDERING SYSTEM PROVIDED HISTORY: diagonsis of pleural space infection ---- possible procedure pending results TECHNOLOGIST PROVIDED HISTORY: diagonsis of pleural space infection ---- possible procedure pending results Is the patient pregnant?->No Reason for Exam: order states pleural space infection, pt poor historian FINDINGS: Mediastinum: Interval placement of a right arm PICC line with its tip in the proximal right atrium. Mild calcific plaque of the thoracic aorta. Ascending aorta measures up to 3.7 cm. Coronary vascular calcifications. Similar small mediastinal lymph nodes without bulky adenopathy. Lungs/pleura: Again shown are multiple generally similar bilateral masslike densities, some with cavitation. Again findings are suggestive of septic emboli. Overall size and distribution are similar to the previous study from yesterday. Similar small bilateral pleural effusions which appear partially loculated. There is mild respiratory motion artifact. Mild lower lobe consolidation with air bronchograms, greater on the left. Subtle ground-glass changes in the lingula and left lower lobe likely inflammatory/infectious. Upper Abdomen: No acute findings. Previous cholecystectomy. Postoperative changes of the stomach. Soft Tissues/Bones: Degenerative changes and previous ACDF lower cervical spine. Left shoulder prosthesis. Overall relatively stable exam.  Small pleural effusions which appear partially loculated with multiple bilateral masses, many with cavitation, suggesting septic emboli. XR CHEST PORTABLE    Result Date: 10/22/2022  EXAMINATION: ONE XRAY VIEW OF THE CHEST 10/22/2022 10:19 am COMPARISON: 10/21/2022 at 13:37 HISTORY: ORDERING SYSTEM PROVIDED HISTORY: Pigtail position TECHNOLOGIST PROVIDED HISTORY: Pigtail position Reason for Exam: Pigtail position FINDINGS: Right PICC line terminating right atrium remains in place. Pigtail catheter along left lung base with the tip overlying the hemidiaphragm. Right pigtail pleural catheter has pulled back into a more peripheral position now called in region of costophrenic angle whereas previously it was more central. Normal cardiomediastinal silhouette. Mild interstitial prominence.   Stable scattered moderate size nodular side should the procedure be BILATERAL Is the patient pregnant?->No History of septic emboli with previous left thoracentesis demonstrating infected fluid, CT shows loculated effusions, please perform diagnostic and therapeutic thoracentesis is with bilateral pigtail catheter placement for drainage. TECHNIQUE AND FINDINGS: Informed consent was obtained from the patient's family member after a detailed explanation of the procedure including risks, benefits, and alternatives. Universal protocol was performed. The patient's posterior chest was prepped and draped in standard sterile fashion. 1% lidocaine was used for local anesthesia. Procedure was performed with the patient in an upright position. Initially attention was directed towards the left side. Ultrasound shows a very small residual loculated pleural effusion in the medial left lower chest.  Skin over the area is prepped and draped in standard sterile fashion. 1% lidocaine used for local anesthesia. Using realtime ultrasound guidance an 8 Ukrainian pigtail catheter was placed using trocar technique. Ultrasound images show a safe tract and access into the fluid. Thoracentesis was performed with removal of a small amount of slightly turbid yellow fluid. The catheter was sutured to the skin and secured in place with a Vaseline gauze dressing. The tube was connected to an atrium drainage system. Attention was then directed towards the right side. Ultrasound right chest shows a small right pleural effusion which appears to be partially loculated with minimal debris and septations. Skin over the area was prepped and draped in standard sterile fashion. 1% lidocaine used for local anesthesia. Using realtime ultrasound guidance an 8 Ukrainian pigtail catheter was advanced into the pleural fluid using trocar technique. Ultrasound images show a safe tract and access into the fluid.   Thoracentesis was performed with removal of approximately 20 mL of fairly clear light red colored fluid. Sample was sent for the requested studies. The catheter was sutured to the skin and secured in place with a Vaseline gauze dressing. Tube was connected to an atrium drainage system. There are no immediate complications. The patient left the department in stable condition. Further tube management per the pulmonary team. EBL: Less than 5 mL     Successful ultrasound-guided bilateral pleural pigtail catheter placement (8 Italian catheters were placed on each side). Small partially loculated pleural effusions, slightly more prominent on the right. Fluid sample was sent for the requested studies. IR GUIDED PERC PLEURAL DRAIN W CATH INSERT    Result Date: 10/21/2022  PROCEDURE: ULTRASOUNDGUIDED BILATERAL THORACENTESIS WITH BILATERAL PLEURAL DRAINAGE CATHETER PLACEMENT 10/21/2022 HISTORY: ORDERING SYSTEM PROVIDED HISTORY: diagnositic and possibly therapeutic TECHNOLOGIST PROVIDED HISTORY: diagnositic and possibly therapeutic Which side should the procedure be BILATERAL Is the patient pregnant?->No History of septic emboli with previous left thoracentesis demonstrating infected fluid, CT shows loculated effusions, please perform diagnostic and therapeutic thoracentesis is with bilateral pigtail catheter placement for drainage. TECHNIQUE AND FINDINGS: Informed consent was obtained from the patient's family member after a detailed explanation of the procedure including risks, benefits, and alternatives. Universal protocol was performed. The patient's posterior chest was prepped and draped in standard sterile fashion. 1% lidocaine was used for local anesthesia. Procedure was performed with the patient in an upright position. Initially attention was directed towards the left side. Ultrasound shows a very small residual loculated pleural effusion in the medial left lower chest.  Skin over the area is prepped and draped in standard sterile fashion. 1% lidocaine used for local anesthesia. Using realtime ultrasound guidance an 8 Ugandan pigtail catheter was placed using trocar technique. Ultrasound images show a safe tract and access into the fluid. Thoracentesis was performed with removal of a small amount of slightly turbid yellow fluid. The catheter was sutured to the skin and secured in place with a Vaseline gauze dressing. The tube was connected to an atrium drainage system. Attention was then directed towards the right side. Ultrasound right chest shows a small right pleural effusion which appears to be partially loculated with minimal debris and septations. Skin over the area was prepped and draped in standard sterile fashion. 1% lidocaine used for local anesthesia. Using realtime ultrasound guidance an 8 Ugandan pigtail catheter was advanced into the pleural fluid using trocar technique. Ultrasound images show a safe tract and access into the fluid. Thoracentesis was performed with removal of approximately 20 mL of fairly clear light red colored fluid. Sample was sent for the requested studies. The catheter was sutured to the skin and secured in place with a Vaseline gauze dressing. Tube was connected to an atrium drainage system. There are no immediate complications. The patient left the department in stable condition. Further tube management per the pulmonary team. EBL: Less than 5 mL     Successful ultrasound-guided bilateral pleural pigtail catheter placement (8 Ugandan catheters were placed on each side). Small partially loculated pleural effusions, slightly more prominent on the right. Fluid sample was sent for the requested studies.      IR GUIDED PERC PLEURAL DRAIN W CATH INSERT    Result Date: 10/21/2022  PROCEDURE: ULTRASOUNDGUIDED BILATERAL THORACENTESIS WITH BILATERAL PLEURAL DRAINAGE CATHETER PLACEMENT 10/21/2022 HISTORY: ORDERING SYSTEM PROVIDED HISTORY: diagnositic and possibly therapeutic TECHNOLOGIST PROVIDED HISTORY: diagnositic and possibly therapeutic Which side should the procedure be BILATERAL Is the patient pregnant?->No History of septic emboli with previous left thoracentesis demonstrating infected fluid, CT shows loculated effusions, please perform diagnostic and therapeutic thoracentesis is with bilateral pigtail catheter placement for drainage. TECHNIQUE AND FINDINGS: Informed consent was obtained from the patient's family member after a detailed explanation of the procedure including risks, benefits, and alternatives. Universal protocol was performed. The patient's posterior chest was prepped and draped in standard sterile fashion. 1% lidocaine was used for local anesthesia. Procedure was performed with the patient in an upright position. Initially attention was directed towards the left side. Ultrasound shows a very small residual loculated pleural effusion in the medial left lower chest.  Skin over the area is prepped and draped in standard sterile fashion. 1% lidocaine used for local anesthesia. Using realtime ultrasound guidance an 8 Khmer pigtail catheter was placed using trocar technique. Ultrasound images show a safe tract and access into the fluid. Thoracentesis was performed with removal of a small amount of slightly turbid yellow fluid. The catheter was sutured to the skin and secured in place with a Vaseline gauze dressing. The tube was connected to an atrium drainage system. Attention was then directed towards the right side. Ultrasound right chest shows a small right pleural effusion which appears to be partially loculated with minimal debris and septations. Skin over the area was prepped and draped in standard sterile fashion. 1% lidocaine used for local anesthesia. Using realtime ultrasound guidance an 8 Khmer pigtail catheter was advanced into the pleural fluid using trocar technique. Ultrasound images show a safe tract and access into the fluid.   Thoracentesis was performed with removal of approximately 20 mL of fairly clear light red colored fluid. Sample was sent for the requested studies. The catheter was sutured to the skin and secured in place with a Vaseline gauze dressing. Tube was connected to an atrium drainage system. There are no immediate complications. The patient left the department in stable condition. Further tube management per the pulmonary team. EBL: Less than 5 mL     Successful ultrasound-guided bilateral pleural pigtail catheter placement (8 Azeri catheters were placed on each side). Small partially loculated pleural effusions, slightly more prominent on the right. Fluid sample was sent for the requested studies.      Assessment//Plan           Hospital Problems             Last Modified POA    * (Principal) SIRS (systemic inflammatory response syndrome) ([facility] [address] 10/10/2022 Yes    NICKI (acute kidney injury) ([address] 10/11/2022 Yes    Severe malnutrition (HCC) (Chronic) 10/11/2022 Yes    Elevated d-dimer 10/15/2022 Yes    Leukocytosis 10/15/2022 Yes    Pulmonary nodules 10/15/2022 Yes    Delirium due to another medical condition 10/17/2022 Yes    Acute metabolic encephalopathy 60/42/8684 Yes    Depression with suicidal ideation 10/17/2022 Yes    Bipolar disorder, mixed ([address] 10/11/2022 Yes    Acute encephalopathy 10/14/2022 Yes    COPD (chronic obstructive pulmonary disease) ([address] 10/11/2022 Yes    H/O gastric bypass 10/11/2022 Yes    Overview Signed 2/13/2020  7:19 PM by Myla Casper MD     enlarged  pancreas, here for surgical work-up for EGD          Assessment & Plan    MRSA bacteremia - vancomycin        Electronically signed by Myla Casper MD on 10/23/22 at 8:18 AM EDT

## 2022-10-23 NOTE — PLAN OF CARE
Problem: Discharge Planning  Goal: Discharge to home or other facility with appropriate resources  10/23/2022 1841 by Ivy Dubose RN  Outcome: Progressing     Problem: Pain  Goal: Verbalizes/displays adequate comfort level or baseline comfort level  10/23/2022 1841 by Ivy Dubose RN  Outcome: Progressing  Note: Patient unable to verbalize pain on 1-10 scale. Patient does not seen to be in pain or distress. Problem: Skin/Tissue Integrity  Goal: Absence of new skin breakdown  Description: 1. Monitor for areas of redness and/or skin breakdown  2. Assess vascular access sites hourly  3. Every 4-6 hours minimum:  Change oxygen saturation probe site  4. Every 4-6 hours:  If on nasal continuous positive airway pressure, respiratory therapy assess nares and determine need for appliance change or resting period. 10/23/2022 1841 by Ivy Dubose RN  Outcome: Progressing  Note: No presence of new skin breakdown throughout shift. Pt positions changed every two hours by staff. Pt heels maintained off bed with the foot of bed elevated. Pt understands reasoning for interventions to prevent skin breakdown. Problem: Safety - Adult  Goal: Free from fall injury  10/23/2022 1841 by Ivy Dubose RN  Outcome: Progressing  Note: Free from falls throughout shift. Pt is compliant with safety measures into place. Pt seen by care team every hour with purposefully hourly rounding, bed is in the lowest position, call light and personal belongings within reach. Two side rails are up and bed alarm is on. Pts father at beside throughout shift. Problem: ABCDS Injury Assessment  Goal: Absence of physical injury  10/23/2022 1841 by Ivy Dubose RN  Outcome: Progressing  Note: No presence of new injury throughout shift, patient is complaint with safety measures put into place.        Problem: Nutrition Deficit:  Goal: Optimize nutritional status  10/23/2022 1841 by Ivy Dubose RN  Outcome: Progressing

## 2022-10-23 NOTE — FLOWSHEET NOTE
10/22/22 1954   Treatment Team Notification   Reason for Communication Review case   Team Member Name Dr. Catrachita Smallwood Team Role Attending Provider   Method of Communication Secure Message   Response Waiting for response   Notification Time 1955   Dr. Ruben Delgado notified of potassium 3.2. Pt lethargic and having difficulty swallowing. Per Dr. Ruben Delgado ok to replace potassium via IV protocol. UA results also sent to Dr. Ruben Delgado.

## 2022-10-23 NOTE — PROGRESS NOTES
Vancomycin Dosing by Pharmacy - Daily Note   Vancomycin Therapy Day:  13  Indication: endocarditis    Allergies:  Aspirin, Bactrim, and Codeine   Actual Weight:    Wt Readings from Last 1 Encounters:   10/23/22 101 lb 6.6 oz (46 kg)       Labs/Ancillary Data  Estimated Creatinine Clearance: 81 mL/min (based on SCr of 0.58 mg/dL). Recent Labs     10/22/22  1615 10/22/22  1921 10/23/22  0502   CREATININE 0.66  --  0.58   BUN 15  --  13   WBC  --  6.9 8.4     Procalcitonin   Date Value Ref Range Status   10/23/2022 0.15 (H) <0.09 ng/mL Final     Comment:           Suspected Sepsis:  <0.50 ng/mL     Low likelihood of sepsis. 0.50-2.00 ng/mL     Increased likelihood of sepsis. Antibiotics encouraged. >2.00 ng/mL     High risk of sepsis/shock. Antibiotics strongly encouraged. Suspected Lower Resp Tract Infections:  <0.24 ng/mL     Low likelihood of bacterial infection. >0.24 ng/mL     Increased likelihood of bacterial infection. Antibiotics encouraged. With successful antibiotic therapy, PCT levels should decrease rapidly. (Half-life of 24 to   36 hours.)        Procalcitonin values from samples collected within the first 6 hours of systemic infection   may still be low. Retesting may be indicated. Values from day 1 and day 4 can be entered into the Change in Procalcitonin Calculator   ([email or web address]-pct-calculator. [employer]) to determine the patient's Mortality Risk Prognosis        In healthy neonates, plasma Procalcitonin (PCT) concentrations increase gradually after   birth, reaching peak values at about 24 hours of age then decrease to normal values below   0.5 ng/mL by 48-72 hours of age.          Intake/Output Summary (Last 24 hours) at 10/23/2022 1200  Last data filed at 10/23/2022 1690  Gross per 24 hour   Intake --   Output 200 ml   Net -200 ml     Temp: 97.9    Culture Date / Source  /  Results  See micro  Recent vancomycin administrations                     vancomycin 1000 mg IVPB in 250 mL D5W addavial (mg) 1,000 mg New Bag 10/22/22 2034    vancomycin (VANCOCIN) 1,500 mg in dextrose 5 % 250 mL IVPB (ADDAVIAL) (mg) 1,500 mg New Bag 10/21/22 1605    vancomycin (VANCOCIN) 750 mg in dextrose 5 % 250 mL IVPB (mg) 750 mg New Bag 10/20/22 1231                    Vancomycin Concentrations:   TROUGH:  No results for input(s): VANCOTROUGH in the last 72 hours. RANDOM:    Recent Labs     10/22/22  0548   VANCORANDOM 25.4       MRSA Nasal Swab: N/A. Non-respiratory infection. Talisha Beckman PLAN     Continue current dose of 1000 mg q24h IV  Ensured BUN/sCr ordered at baseline and every 48 hours x at least 3 levels, then at least weekly. Repeat vancomycin concentration ordered for 10/24  @ 0600   Pharmacy will continue to monitor patient and adjust therapy as indicated      Vancomycin Target Concentration Parameters  Treatment  Population Target AUC/GABBI Target Trough   Invasive MRSA Infection (bacteremia, pneumonia, meningitis, endocarditis, osteomyelitis)  Sepsis (undifferentiated) 400-600 N/A   Infection due to non-MRSA pathogen  Empiric treatment of non-invasive MRSA infection  (SSTI, UTI) <500 10-15 mg/L   CrCl < 29 mL/min  Rapidly fluctuating serum creatinine   NICKI N/A < 15 mg/L     Renal replacement therapy is dosed by levels, per hospital protocol. Abbreviations  * Pauc: probability that AUC is >400 (efficacy); Pconc: probability that Ctrough is above 20 ?g/mL (toxicity); Tox: Probability of nephrotoxicity, based on Iva et al. Clin Infect Dis 2009. Loading dose: N/A  Regimen: 1000 mg IV every 24 hours. Start time: 20:34 on 10/23/2022  Exposure target: AUC24 (range)400-600 mg/L.hr   AUC24,ss: 376 mg/L.hr  Probability of AUC24 > 400: 30 %  Ctrough,ss: 10.1 mg/L  Probability of Ctrough,ss > 20: 0 %  Probability of nephrotoxicity (Lodise DEVON 2009): 6 %      Thank you for the consult. Pharmacy will continue to follow.    Bard Naranjo [place]  10/23/2022  12:01 PM

## 2022-10-23 NOTE — FLOWSHEET NOTE
10/23/22 0130   Treatment Team Notification   Reason for Communication Shift event  (L chest tube out)   Team Member Name Dr. Snow Johnson Provider   Method of Communication Secure Message   Response Waiting for response   Notification Time 0131   L chest tube out. Petroleum gauze and dry gauze applied to site. Tegaderm applied. Taped on 3 sides. Dr. Odalys Mari notified.  See orders for stat CXR

## 2022-10-24 ENCOUNTER — APPOINTMENT (OUTPATIENT)
Dept: INTERVENTIONAL RADIOLOGY/VASCULAR | Age: 52
DRG: 871 | End: 2022-10-24
Payer: COMMERCIAL

## 2022-10-24 ENCOUNTER — APPOINTMENT (OUTPATIENT)
Dept: GENERAL RADIOLOGY | Age: 52
DRG: 871 | End: 2022-10-24
Payer: COMMERCIAL

## 2022-10-24 LAB
ABSOLUTE EOS #: 0 K/UL (ref 0–0.4)
ABSOLUTE LYMPH #: 0.6 K/UL (ref 1–4.8)
ABSOLUTE MONO #: 0.7 K/UL (ref 0.1–1.3)
ANION GAP SERPL CALCULATED.3IONS-SCNC: 8 MMOL/L (ref 9–17)
BASOPHILS # BLD: 0 % (ref 0–2)
BASOPHILS ABSOLUTE: 0 K/UL (ref 0–0.2)
BUN BLDV-MCNC: 12 MG/DL (ref 6–20)
CALCIUM SERPL-MCNC: 7.7 MG/DL (ref 8.6–10.4)
CHLORIDE BLD-SCNC: 108 MMOL/L (ref 98–107)
CO2: 28 MMOL/L (ref 20–31)
CREAT SERPL-MCNC: 0.59 MG/DL (ref 0.5–0.9)
EOSINOPHILS RELATIVE PERCENT: 0 % (ref 0–4)
GFR SERPL CREATININE-BSD FRML MDRD: >60 ML/MIN/1.73M2
GLUCOSE BLD-MCNC: 77 MG/DL (ref 70–99)
HCT VFR BLD CALC: 24.3 % (ref 36–46)
HEMOGLOBIN: 7.8 G/DL (ref 12–16)
LYMPHOCYTES # BLD: 8 % (ref 24–44)
MCH RBC QN AUTO: 26.2 PG (ref 26–34)
MCHC RBC AUTO-ENTMCNC: 31.9 G/DL (ref 31–37)
MCV RBC AUTO: 82.1 FL (ref 80–100)
MONOCYTES # BLD: 9 % (ref 1–7)
PDW BLD-RTO: 17.1 % (ref 11.5–14.9)
PLATELET # BLD: 42 K/UL (ref 150–450)
PMV BLD AUTO: 7 FL (ref 6–12)
POTASSIUM SERPL-SCNC: 3.5 MMOL/L (ref 3.7–5.3)
RBC # BLD: 2.96 M/UL (ref 4–5.2)
SEG NEUTROPHILS: 83 % (ref 36–66)
SEGMENTED NEUTROPHILS ABSOLUTE COUNT: 6.2 K/UL (ref 1.3–9.1)
SODIUM BLD-SCNC: 144 MMOL/L (ref 135–144)
VANCOMYCIN RANDOM DATE LAST DOSE: NORMAL
VANCOMYCIN RANDOM DOSE AMOUNT: NORMAL
VANCOMYCIN RANDOM TIME LAST DOSE: 2003
VANCOMYCIN RANDOM: 21.9 UG/ML
WBC # BLD: 7.5 K/UL (ref 3.5–11)

## 2022-10-24 PROCEDURE — 2060000000 HC ICU INTERMEDIATE R&B

## 2022-10-24 PROCEDURE — 71045 X-RAY EXAM CHEST 1 VIEW: CPT

## 2022-10-24 PROCEDURE — 85025 COMPLETE CBC W/AUTO DIFF WBC: CPT

## 2022-10-24 PROCEDURE — 94761 N-INVAS EAR/PLS OXIMETRY MLT: CPT

## 2022-10-24 PROCEDURE — 32557 INSERT CATH PLEURA W/ IMAGE: CPT

## 2022-10-24 PROCEDURE — 6360000002 HC RX W HCPCS: Performed by: INTERNAL MEDICINE

## 2022-10-24 PROCEDURE — 94640 AIRWAY INHALATION TREATMENT: CPT

## 2022-10-24 PROCEDURE — 2709999900 IR GUIDED PERC PLEURAL DRAIN W CATH INSERT

## 2022-10-24 PROCEDURE — 6370000000 HC RX 637 (ALT 250 FOR IP): Performed by: INTERNAL MEDICINE

## 2022-10-24 PROCEDURE — 36415 COLL VENOUS BLD VENIPUNCTURE: CPT

## 2022-10-24 PROCEDURE — 80048 BASIC METABOLIC PNL TOTAL CA: CPT

## 2022-10-24 PROCEDURE — 99232 SBSQ HOSP IP/OBS MODERATE 35: CPT | Performed by: INTERNAL MEDICINE

## 2022-10-24 PROCEDURE — 2580000003 HC RX 258: Performed by: INTERNAL MEDICINE

## 2022-10-24 PROCEDURE — 2700000000 HC OXYGEN THERAPY PER DAY

## 2022-10-24 PROCEDURE — 80202 ASSAY OF VANCOMYCIN: CPT

## 2022-10-24 PROCEDURE — 99232 SBSQ HOSP IP/OBS MODERATE 35: CPT | Performed by: FAMILY MEDICINE

## 2022-10-24 RX ORDER — HEPARIN SODIUM 5000 [USP'U]/ML
5000 INJECTION, SOLUTION INTRAVENOUS; SUBCUTANEOUS EVERY 12 HOURS
Status: DISCONTINUED | OUTPATIENT
Start: 2022-10-24 | End: 2022-10-24

## 2022-10-24 RX ADMIN — CARVEDILOL 25 MG: 25 TABLET, FILM COATED ORAL at 09:10

## 2022-10-24 RX ADMIN — ARIPIPRAZOLE 5 MG: 5 TABLET ORAL at 09:13

## 2022-10-24 RX ADMIN — SODIUM CHLORIDE, PRESERVATIVE FREE 10 ML: 5 INJECTION INTRAVENOUS at 20:38

## 2022-10-24 RX ADMIN — ATORVASTATIN CALCIUM 40 MG: 40 TABLET, FILM COATED ORAL at 20:40

## 2022-10-24 RX ADMIN — SODIUM CHLORIDE, PRESERVATIVE FREE 10 ML: 5 INJECTION INTRAVENOUS at 12:00

## 2022-10-24 RX ADMIN — IPRATROPIUM BROMIDE AND ALBUTEROL SULFATE 1 AMPULE: 2.5; .5 SOLUTION RESPIRATORY (INHALATION) at 20:12

## 2022-10-24 RX ADMIN — CARVEDILOL 25 MG: 25 TABLET, FILM COATED ORAL at 17:50

## 2022-10-24 RX ADMIN — DIVALPROEX SODIUM 1000 MG: 500 TABLET, DELAYED RELEASE ORAL at 09:09

## 2022-10-24 RX ADMIN — VANCOMYCIN HYDROCHLORIDE 1250 MG: 1.25 INJECTION, POWDER, LYOPHILIZED, FOR SOLUTION INTRAVENOUS at 20:39

## 2022-10-24 RX ADMIN — FERROUS SULFATE TAB 325 MG (65 MG ELEMENTAL FE) 325 MG: 325 (65 FE) TAB at 17:51

## 2022-10-24 RX ADMIN — FERROUS SULFATE TAB 325 MG (65 MG ELEMENTAL FE) 325 MG: 325 (65 FE) TAB at 09:09

## 2022-10-24 RX ADMIN — IPRATROPIUM BROMIDE AND ALBUTEROL SULFATE 1 AMPULE: 2.5; .5 SOLUTION RESPIRATORY (INHALATION) at 08:22

## 2022-10-24 RX ADMIN — DULOXETINE 60 MG: 60 CAPSULE, DELAYED RELEASE ORAL at 09:10

## 2022-10-24 RX ADMIN — Medication 2000 UNITS: at 09:09

## 2022-10-24 RX ADMIN — DIVALPROEX SODIUM 1000 MG: 500 TABLET, DELAYED RELEASE ORAL at 20:40

## 2022-10-24 RX ADMIN — IPRATROPIUM BROMIDE AND ALBUTEROL SULFATE 1 AMPULE: 2.5; .5 SOLUTION RESPIRATORY (INHALATION) at 14:54

## 2022-10-24 RX ADMIN — IPRATROPIUM BROMIDE AND ALBUTEROL SULFATE 1 AMPULE: 2.5; .5 SOLUTION RESPIRATORY (INHALATION) at 11:57

## 2022-10-24 NOTE — PROGRESS NOTES
Vancomycin Dosing by Pharmacy - Daily Note   Vancomycin Therapy Day:  14  Indication: endocarditis     Allergies:  Aspirin, Bactrim, and Codeine   Actual Weight:    Wt Readings from Last 1 Encounters:   10/23/22 101 lb 6.6 oz (46 kg)       Labs/Ancillary Data  Estimated Creatinine Clearance: 80 mL/min (based on SCr of 0.59 mg/dL). Recent Labs     10/22/22  1615 10/22/22  1921 10/23/22  0502 10/24/22  0501   CREATININE 0.66  --  0.58 0.59   BUN 15  --  13 12   WBC  --  6.9 8.4 7.5     Procalcitonin   Date Value Ref Range Status   10/23/2022 0.15 (H) <0.09 ng/mL Final     Comment:           Suspected Sepsis:  <0.50 ng/mL     Low likelihood of sepsis. 0.50-2.00 ng/mL     Increased likelihood of sepsis. Antibiotics encouraged. >2.00 ng/mL     High risk of sepsis/shock. Antibiotics strongly encouraged. Suspected Lower Resp Tract Infections:  <0.24 ng/mL     Low likelihood of bacterial infection. >0.24 ng/mL     Increased likelihood of bacterial infection. Antibiotics encouraged. With successful antibiotic therapy, PCT levels should decrease rapidly. (Half-life of 24 to   36 hours.)        Procalcitonin values from samples collected within the first 6 hours of systemic infection   may still be low. Retesting may be indicated. Values from day 1 and day 4 can be entered into the Change in Procalcitonin Calculator   (www.[employer]-pct-calculator. [employer]) to determine the patient's Mortality Risk Prognosis        In healthy neonates, plasma Procalcitonin (PCT) concentrations increase gradually after   birth, reaching peak values at about 24 hours of age then decrease to normal values below   0.5 ng/mL by 48-72 hours of age.          Intake/Output Summary (Last 24 hours) at 10/24/2022 0752  Last data filed at 10/24/2022 0602  Gross per 24 hour   Intake --   Output 250 ml   Net -250 ml     Temp: 97.9    Culture Date / Source  /  Results  See micro   Recent vancomycin administrations                     vancomycin 1000 mg IVPB in 250 mL D5W addavial (mg) 1,000 mg New Bag 10/23/22 2003     1,000 mg New Bag 10/22/22 2034    vancomycin (VANCOCIN) 1,500 mg in dextrose 5 % 250 mL IVPB (ADDAVIAL) (mg) 1,500 mg New Bag 10/21/22 1605                    Vancomycin Concentrations:   TROUGH:  No results for input(s): VANCOTROUGH in the last 72 hours. RANDOM:    Recent Labs     10/22/22  0548 10/24/22  0501   VANCORANDOM 25.4 21.9       MRSA Nasal Swab: N/A. Non-respiratory infection. Carlos Ojeda PLAN     Increase dose to 1250 mg q24h IV  Ensured BUN/sCr ordered at baseline and every 48 hours x at least 3 levels, then at least weekly. Pharmacy will continue to monitor patient and adjust therapy as indicated      Vancomycin Target Concentration Parameters  Treatment  Population Target AUC/GABBI Target Trough   Invasive MRSA Infection (bacteremia, pneumonia, meningitis, endocarditis, osteomyelitis)  Sepsis (undifferentiated) 400-600 N/A   Infection due to non-MRSA pathogen  Empiric treatment of non-invasive MRSA infection  (SSTI, UTI) <500 10-15 mg/L   CrCl < 29 mL/min  Rapidly fluctuating serum creatinine   NICKI N/A < 15 mg/L     Renal replacement therapy is dosed by levels, per hospital protocol. Abbreviations  * Pauc: probability that AUC is >400 (efficacy); Pconc: probability that Ctrough is above 20 ?g/mL (toxicity); Tox: Probability of nephrotoxicity, based on Iva et al. Clin Infect Dis 2009. Loading dose: N/A  Regimen: 1250 mg IV every 24 hours. Start time: 20:03 on 10/24/2022  Exposure target: AUC24 (range)400-600 mg/L.hr   AUC24,ss: 529 mg/L.hr  Probability of AUC24 > 400: 100 %  Ctrough,ss: 15.2 mg/L  Probability of Ctrough,ss > 20: 2 %  Probability of nephrotoxicity (Lodise DEVON 2009): 10 %    Thank you for the consult. Pharmacy will continue to follow.     Zohreh OronaD, BCPS  10/24/2022 7:55 AM

## 2022-10-24 NOTE — PLAN OF CARE
Problem: Discharge Planning  Goal: Discharge to home or other facility with appropriate resources  10/24/2022 1804 by Jorge Wall RN  Outcome: Progressing  Flowsheets (Taken 10/21/2022 1712 by Stewart Fleischer, RN)  Discharge to home or other facility with appropriate resources:   Identify barriers to discharge with patient and caregiver   Arrange for needed discharge resources and transportation as appropriate   Refer to discharge planning if patient needs post-hospital services based on physician order or complex needs related to functional status, cognitive ability or social support system   Identify discharge learning needs (meds, wound care, etc)  10/24/2022 0708 by Oscar Cassidy RN  Outcome: Progressing     Problem: Pain  Goal: Verbalizes/displays adequate comfort level or baseline comfort level  10/24/2022 1804 by Jorge Wall RN  Outcome: Progressing  Flowsheets (Taken 10/24/2022 1804)  Verbalizes/displays adequate comfort level or baseline comfort level:   Encourage patient to monitor pain and request assistance   Assess pain using appropriate pain scale   Administer analgesics based on type and severity of pain and evaluate response   Implement non-pharmacological measures as appropriate and evaluate response  Note: No indication of pain this shift. Will continue to monitor. 10/24/2022 0708 by Oscar Cassidy RN  Outcome: Progressing     Problem: Skin/Tissue Integrity  Goal: Absence of new skin breakdown  Description: 1. Monitor for areas of redness and/or skin breakdown  2. Assess vascular access sites hourly  3. Every 4-6 hours minimum:  Change oxygen saturation probe site  4. Every 4-6 hours:  If on nasal continuous positive airway pressure, respiratory therapy assess nares and determine need for appliance change or resting period. 10/24/2022 1804 by Jorge Wall RN  Outcome: Progressing  Note: Skin assessment complete. No new skin breakdown this shift. See Flowsheets. Will continue to monitor for additional needs or changes in skin integrity.    10/24/2022 0708 by Catrina Menjivar RN  Outcome: Progressing     Problem: Safety - Adult  Goal: Free from fall injury  10/24/2022 1804 by Christine Pierce RN  Outcome: Progressing  Flowsheets (Taken 10/21/2022 1712 by Brina Chanel, RN)  Free From Fall Injury:   Instruct family/caregiver on patient safety   Based on caregiver fall risk screen, instruct family/caregiver to ask for assistance with transferring infant if caregiver noted to have fall risk factors  10/24/2022 0708 by Catrina Menjivar RN  Outcome: Progressing     Problem: ABCDS Injury Assessment  Goal: Absence of physical injury  Outcome: Progressing  Flowsheets (Taken 10/15/2022 0355 by Bayron Stuart, RN)  Absence of Physical Injury: Implement safety measures based on patient assessment     Problem: Nutrition Deficit:  Goal: Optimize nutritional status  10/24/2022 1804 by Christine Pierce RN  Outcome: Progressing  Flowsheets (Taken 10/21/2022 1600 by Domingo Torrez, SCOOBY, LD)  Nutrient intake appropriate for improving, restoring, or maintaining nutritional needs: Monitor oral intake, labs, and treatment plans  10/24/2022 0708 by Catrina Menjivar RN  Outcome: Progressing

## 2022-10-24 NOTE — PROGRESS NOTES
Pulmonary Progress Note  [facility]      Patient - Kin Gamble,  Age - 46 y.o.    - 1970      Room Number - 2109/2109-01   N -  [id number]   [facility]  Date of Admission -  10/10/2022  4:37 AM    Masood Malloy MD  Primary Care Physician - Ivan To MD     SUBJECTIVE   Patient resting quietly. Status post with placement of a right-sided pleural cath. Clinical status discussed with Dr. Sim Haddad earlier. We will plan to suction.  -20 cm to see if any fluid is expressed    Both pleural spaces grew out staph aureus. Currently on Vancomycin . OBJECTIVE   VITALS    height is 4' 11\" (1.499 m) and weight is 101 lb 6.6 oz (46 kg). Her axillary temperature is 97.6 °F (36.4 °C). Her blood pressure is 125/71 and her pulse is 65. Her respiration is 20 and oxygen saturation is 100%. Body mass index is 20.48 kg/m². Temperature Range: Temp: 97.6 °F (36.4 °C) Temp  Av.1 °F (36.7 °C)  Min: 97.6 °F (36.4 °C)  Max: 98.8 °F (37.1 °C)  BP Range:  Systolic (16MEG), PRC:221 , Min:109 , BLI:774     Diastolic (30PZB), VGK:53, Min:61, Max:82    Pulse Range: Pulse  Av.9  Min: 61  Max: 84  Respiration Range: Resp  Av.3  Min: 18  Max: 20  Current Pulse Ox[de-identified]  SpO2: 100 %  24HR Pulse Ox Range:  SpO2  Av.8 %  Min: 91 %  Max: 100 %  Oxygen Amount and Delivery: O2 Flow Rate (L/min): 2 L/min    Wt Readings from Last 3 Encounters:   10/23/22 101 lb 6.6 oz (46 kg)   22 96 lb 8 oz (43.8 kg)   22 105 lb (47.6 kg)       I/O (24 Hours)    Intake/Output Summary (Last 24 hours) at 10/24/2022 1419  Last data filed at 10/24/2022 0602  Gross per 24 hour   Intake --   Output 250 ml   Net -250 ml       EXAM     General Appearance  Awake, alert, oriented, in no acute distress  HEENT - normocephalic, atraumatic.   Neck - Supple,  trachea midline   Lungs -coarse breath sounds no crackles rales or wheezes  Heart Exam:PMI normal. No lifts, heaves, or thrills. RRR. No murmurs, clicks, gallops, or rubs  Abdomen Exam: Abdomen soft, non-tender.  BS normal. No masses,    Extremity Exam: No signs of cyanosis    MEDS      vancomycin  1,250 mg IntraVENous Q24H    heparin (porcine)  5,000 Units SubCUTAneous Q12H    carvedilol  25 mg Oral BID WC    Vitamin D  2,000 Units Oral Daily    sodium chloride flush  5-40 mL IntraVENous 2 times per day    [Held by provider] heparin (porcine)  5,000 Units SubCUTAneous BID    ARIPiprazole  5 mg Oral Daily    atorvastatin  40 mg Oral Daily    divalproex  1,000 mg Oral BID    DULoxetine  60 mg Oral Daily    ferrous sulfate  325 mg Oral BID WC    ipratropium-albuterol  1 ampule Inhalation Q4H WA    vancomycin (VANCOCIN) intermittent dosing (placeholder)   Other RX Placeholder      sodium chloride 50 mL/hr at 10/18/22 1200     sodium chloride flush, labetalol, potassium chloride **OR** potassium alternative oral replacement **OR** potassium chloride, hydrALAZINE, sodium chloride flush, sodium chloride, acetaminophen, ondansetron **OR** ondansetron, sodium chloride flush, perflutren lipid microspheres    LABS   CBC   Recent Labs     10/24/22  0501   WBC 7.5   HGB 7.8*   HCT 24.3*   MCV 82.1   PLT 42*     BMP:   Lab Results   Component Value Date/Time     10/24/2022 05:01 AM    K 3.5 10/24/2022 05:01 AM     10/24/2022 05:01 AM    CO2 28 10/24/2022 05:01 AM    BUN 12 10/24/2022 05:01 AM    LABALBU 1.7 10/22/2022 04:15 PM    CREATININE 0.59 10/24/2022 05:01 AM    CALCIUM 7.7 10/24/2022 05:01 AM    GFRAA >60 04/08/2022 07:17 AM    LABGLOM >60 10/24/2022 05:01 AM     ABGs:  Lab Results   Component Value Date/Time    PHART 7.452 10/22/2022 03:24 PM    PO2ART 86.7 10/22/2022 03:24 PM    IGX0YZA 42.7 10/22/2022 03:24 PM      Lab Results   Component Value Date/Time    MODE [facility] 12/28/2016 04:36 AM     Ionized Calcium:  No results found for: IONCA  Magnesium:    Lab Results   Component Value Date/Time    MG 2.2 10/22/2022 04:15 PM     Phosphorus:    Lab Results   Component Value Date/Time    PHOS 3.6 10/18/2022 04:50 AM        LIVER PROFILE   Recent Labs     10/22/22  1615   AST 11   ALT <5*   BILITOT 0.2*   ALKPHOS 69     INR No results for input(s): INR in the last 72 hours.   PTT   Lab Results   Component Value Date    APTT 25.6 07/22/2016         RADIOLOGY     (See actual reports for details)    ASSESSMENT/PLAN     Patient Active Problem List   Diagnosis    GERD (gastroesophageal reflux disease)    Hoarseness of voice    MVP (mitral valve prolapse)    Depression with suicidal ideation    Lymphocytic colitis    History of migraine headaches    Hyperlipidemia    Sleep apnea    IGT (impaired glucose tolerance)    History of renal calculi    Lactose intolerance    History of seizure disorder    Leukopenia    Bipolar disorder, mixed ([address]    Illicit drug use    Postlaminectomy syndrome, cervical region    Degeneration of cervical intervertebral disc    Encounter for long-term (current) use of other medications    Bradycardia    Cellulitis    Hypokalemia    Primary hypertension    Elevated lipase    Right lower quadrant abdominal pain    Nausea    Diarrhea    Acute encephalopathy    Polysubstance abuse (HCC)    Fibromyalgia    Cocaine addiction (HCC)    Shoulder arthritis    Osteoarthritis of left glenohumeral joint    Abnormal EKG    Anemia    Anxiety    Carotid artery stenosis    COPD (chronic obstructive pulmonary disease) ([address]    Dental disease    HUTCHINSON (dyspnea on exertion)    Fatigue    Fractures    H/O gastric bypass    History of crack cocaine use    History of UTI    Injury of back    Intractable chronic migraine without aura and without status migrainosus    Intractable migraine with aura with status migrainosus    Kidney stones    Dizziness    Memory loss    Mild pulmonary hypertension (HCC)    Mild tricuspid regurgitation    Primary osteoarthritis of left shoulder    Seizure-like activity ([address] Stress at home    TMJ (dislocation of temporomandibular joint)    Tobacco abuse    Visual impairment    Internal hernia    Vitamin D deficiency    Iron deficiency    SIRS (systemic inflammatory response syndrome) (HCC)    NICKI (acute kidney injury) (HCC)    Severe malnutrition (HCC)    Elevated d-dimer    Leukocytosis    Pulmonary nodules    Delirium due to another medical condition    Acute metabolic encephalopathy    MRSA bacteremia    Acute septic pulmonary embolism (HCC)    Cavitating mass of lung    Allergy to antibiotic     MRSA bacteremia  Septic emboli  E. coli UTI  Acute kidney injury, resolved  Suspect COPD, seen once in the office by Dr. Haley Sic in 2019, FEV1 2.04 L or 86% predicted  Parapneumonic effusion, pleural fluid positive for staph  History of opiate and cocaine use-reportedly uses crack cocaine at least twice weekly  History of tobacco use  Recent COVID +9/30  Elevated troponin likely type II MI  Encephalopathy, improving  History of hypertension  History of seizure disorder  Full code    Status post replacement of the right pleural catheter on right pleural space. Both pleural studies October 18 October 11 both grew out staph Aureus. On vancomycin. We will restart DVT prophylaxis with heparin if okay with IR    Prognosis very guarded.       Electronically signed by Rey Alonzo MD on 10/24/2022 at 2:19 PM

## 2022-10-24 NOTE — CARE COORDINATION
[facility] can accept patient. Patient will need authorization to admit to facility once close to being medically ready.  Electronically signed by JOSE Claros on 10/24/2022 at 4:04 PM

## 2022-10-24 NOTE — PROGRESS NOTES
Progress Note  Date:10/24/2022       Room:[address] [place] [address]  Patient Name:Anu Bourne     YOB: 1970     Age:51 y.o. Subjective    Subjective:  Symptoms:  Stable. (Sitting up at edge of bed. ). Diet:  Poor intake. Activity level: Impaired due to weakness. Pain:  She reports no pain. Review of Systems  Objective         Vitals Last 24 Hours:  TEMPERATURE:  Temp  Av.2 °F (36.8 °C)  Min: 97.9 °F (36.6 °C)  Max: 98.8 °F (37.1 °C)  RESPIRATIONS RANGE: Resp  Av  Min: 18  Max: 20  PULSE OXIMETRY RANGE: SpO2  Av.1 %  Min: 91 %  Max: 100 %  PULSE RANGE: Pulse  Av.3  Min: 61  Max: 82  BLOOD PRESSURE RANGE: Systolic (98WAC), DYD:516 , Min:97 , ZAV:505   ; Diastolic (89YVH), MZP:60, Min:55, Max:82    I/O (24Hr): Intake/Output Summary (Last 24 hours) at 10/24/2022 0801  Last data filed at 10/24/2022 0602  Gross per 24 hour   Intake --   Output 250 ml   Net -250 ml     Objective:  General Appearance:  Comfortable. Vital signs: (most recent): Blood pressure (!) 175/73, pulse 82, temperature 98 °F (36.7 °C), temperature source Axillary, resp. rate 18, height 4' 11\" (1.499 m), weight 101 lb 6.6 oz (46 kg), SpO2 91 %. (Last BP elevated). Lungs:  Normal effort and normal respiratory rate. There are decreased breath sounds. Heart: Normal rate. Regular rhythm. S1 normal and S2 normal.    Labs/Imaging/Diagnostics    Labs:  CBC:  Recent Labs     10/22/22  1921 10/23/22  0502 10/24/22  0501   WBC 6.9 8.4 7.5   RBC 3.14* 3.12* 2.96*   HGB 8.1* 8.1* 7.8*   HCT 25.5* 25.7* 24.3*   MCV 81.3 82.3 82.1   RDW 16.8* 17.0* 17.1*   PLT 51* 57* 42*     CHEMISTRIES:  Recent Labs     10/22/22  1615 10/23/22  0502 10/24/22  0501    140 144   K 3.2* 5.1 3.5*    106 108*   CO2 25 27 28   BUN 15 13 12   CREATININE 0.66 0.58 0.59   GLUCOSE 87 80 77   MG 2.2  --   --      PT/INR:No results for input(s): PROTIME, INR in the last 72 hours. APTT:No results for input(s):  APTT in the last 72 hours. LIVER PROFILE:  Recent Labs     10/22/22  1615   AST 11   ALT <5*   BILITOT 0.2*   ALKPHOS 69       Imaging Last 24 Hours:  XR CHEST (SINGLE VIEW FRONTAL)    Result Date: 10/23/2022  EXAMINATION: ONE XRAY VIEW OF THE CHEST. ONE PORTABLE AP VIEW OF CHEST. 10/23/2022 12:41 am COMPARISON: Portable chest x-ray on 10/22/2022 at 0938 hours. HISTORY: ORDERING SYSTEM PROVIDED HISTORY: L chest tube out TECHNOLOGIST PROVIDED HISTORY: L chest tube out Reason for Exam: L chest tube out FINDINGS: In right lower lung, there are heterogeneous dense opacities, indicating pneumonia and atelectatic changes which are increased as compared to previous chest x-ray. At the right CP angle area, there is confluent density indicating pleural effusion versus significant pleural thickening or atelectatic changes or infiltrates. In the right mid and upper lung fields, there are mild heterogeneous dense opacities indicating scattered infiltrates and atelectatic changes, similar to previous study. At the left lung base, there are mild atelectatic changes with or without mild infiltrates, similar to previous study. In the lateral portion of left upper lung field, there is focal density measuring 3 cm x 2.4 cm which may be due to focal infiltrates or mass lesion, unchanged as compared to previous study. Previous noted left basilar pleural tube has been withdrawn. There appears to be a pleural tube in the right lateral CP angle area similar to previous chest x-ray. Superior to the right pleural drainage tube there is significant density which may be due to loculated pleural effusion and with combination of infiltrates and atelectasis. No pneumothorax. Mild cardiomegaly. Minimal-to-mild pulmonary vascular congestion. There is no overt pulmonary edema. No pneumoperitoneum.      The right basilar opacity due to combination of pleural effusion or pleural thickening and pulmonary infiltrates and atelectasis appears to be increased to some extent as compared to previous chest x-ray. In the rest of both lungs, the infiltrates are unchanged. Mild-to-moderate pulmonary vascular congestion without obvious pulmonary edema. XR CHEST PORTABLE    Result Date: 10/22/2022  EXAMINATION: ONE XRAY VIEW OF THE CHEST 10/22/2022 10:19 am COMPARISON: 10/21/2022 at 13:37 HISTORY: ORDERING SYSTEM PROVIDED HISTORY: Pigtail position TECHNOLOGIST PROVIDED HISTORY: Pigtail position Reason for Exam: Pigtail position FINDINGS: Right PICC line terminating right atrium remains in place. Pigtail catheter along left lung base with the tip overlying the hemidiaphragm. Right pigtail pleural catheter has pulled back into a more peripheral position now called in region of costophrenic angle whereas previously it was more central. Normal cardiomediastinal silhouette. Mild interstitial prominence. Stable scattered moderate size nodular opacities in the lungs right greater than left. Patchy infiltrates at the lung bases probably unchanged allowing for difference in inspiration. Small right pleural effusion is new. Anterior fusion lower cervical spine and left total shoulder prosthesis. 1. Right pigtail pleural catheter has pulled back into the more peripheral position projecting at costophrenic angle whereas previously it was small central.  Left pigtail catheter continues to lie along the lung base. 2. Right PICC line unchanged. 3. Small right pleural effusion appears to be new but patchy bibasilar atelectasis unchanged. Diffuse nodular opacities also unchanged.      Assessment//Plan           Hospital Problems             Last Modified POA    * (Principal) SIRS (systemic inflammatory response syndrome) ([address] 10/10/2022 Yes    NICKI (acute kidney injury) ([address] 10/11/2022 Yes    Severe malnutrition (HCC) (Chronic) 10/11/2022 Yes    Elevated d-dimer 10/15/2022 Yes    Leukocytosis 10/15/2022 Yes    Pulmonary nodules 10/15/2022 Yes    Delirium due to another medical condition 10/17/2022 Yes    Acute metabolic encephalopathy 75/42/0895 Yes    MRSA bacteremia 10/23/2022 Yes    Acute septic pulmonary embolism ([facility] 10/23/2022 Yes    Cavitating mass of lung 10/23/2022 Yes    Allergy to antibiotic 10/23/2022 Yes    Depression with suicidal ideation 10/17/2022 Yes    Bipolar disorder, mixed ([facility] 10/11/2022 Yes    Acute encephalopathy 10/14/2022 Yes    COPD (chronic obstructive pulmonary disease) ([facility] 10/11/2022 Yes    H/O gastric bypass 10/11/2022 Yes    Overview Signed 2/13/2020  7:19 PM by Travis Richardson MD     enlarged  pancreas, here for surgical work-up for EGD          Assessment & Plan  MRSA Bacteremia - vancomycin    Septic emboli chest    Encephalopathy - improving      Electronically signed by Travis Richardson MD on 10/24/22 at 8:01 AM EDT

## 2022-10-24 NOTE — PLAN OF CARE
Problem: Discharge Planning  Goal: Discharge to home or other facility with appropriate resources  10/24/2022 0708 by Apolinar Mcleod RN  Outcome: Progressing     Problem: Pain  Goal: Verbalizes/displays adequate comfort level or baseline comfort level  10/24/2022 0708 by Apolinar Mcleod RN  Outcome: Progressing     Problem: Skin/Tissue Integrity  Goal: Absence of new skin breakdown  Description: 1. Monitor for areas of redness and/or skin breakdown  2. Assess vascular access sites hourly  3. Every 4-6 hours minimum:  Change oxygen saturation probe site  4. Every 4-6 hours:  If on nasal continuous positive airway pressure, respiratory therapy assess nares and determine need for appliance change or resting period.   10/24/2022 0708 by Apolinar Mcleod RN  Outcome: Progressing     Problem: Safety - Adult  Goal: Free from fall injury  10/24/2022 0708 by Apolinar Mcleod RN  Outcome: Progressing     Problem: Nutrition Deficit:  Goal: Optimize nutritional status  10/24/2022 0708 by Apolinar Mcleod RN  Outcome: Progressing

## 2022-10-24 NOTE — PROGRESS NOTES
Patient was sleeping; sitter in room. Patient's dad Guerrero at bedside; he provided update for writer. He is ever hopeful saying he sees a bit of progress and that pt is \"a little\" more alert from time to time. 10/24/22 1813   Encounter Summary   Encounter Overview/Reason  Spiritual/Emotional Needs   Service Provided For: Patient and family together   Referral/Consult From: Rounding   Last Encounter  10/24/22   Complexity of Encounter Moderate   Spiritual/Emotional needs   Type Spiritual Support   Assessment/Intervention/Outcome   Assessment Coping; Hopeful   Intervention Active listening;Discussed illness injury and its impact; Explored/Affirmed feelings, thoughts, concerns;Explored Coping Skills/Resources;Prayer (assurance of)/Flushing;Sustaining Presence/Ministry of presence   Outcome Engaged in conversation;Expressed feelings, needs, and concerns;Expressed Gratitude;Receptive

## 2022-10-25 ENCOUNTER — APPOINTMENT (OUTPATIENT)
Dept: GENERAL RADIOLOGY | Age: 52
DRG: 871 | End: 2022-10-25
Payer: COMMERCIAL

## 2022-10-25 LAB
ABSOLUTE EOS #: 0 K/UL (ref 0–0.4)
ABSOLUTE LYMPH #: 0.62 K/UL (ref 1–4.8)
ABSOLUTE MONO #: 0.45 K/UL (ref 0.1–1.3)
ANION GAP SERPL CALCULATED.3IONS-SCNC: 5 MMOL/L (ref 9–17)
BASOPHILS # BLD: 0 % (ref 0–2)
BASOPHILS ABSOLUTE: 0 K/UL (ref 0–0.2)
BUN BLDV-MCNC: 12 MG/DL (ref 6–20)
CALCIUM SERPL-MCNC: 7.9 MG/DL (ref 8.6–10.4)
CHLORIDE BLD-SCNC: 105 MMOL/L (ref 98–107)
CO2: 30 MMOL/L (ref 20–31)
CREAT SERPL-MCNC: 0.71 MG/DL (ref 0.5–0.9)
CULTURE: ABNORMAL
DIRECT EXAM: ABNORMAL
DIRECT EXAM: ABNORMAL
EOSINOPHILS RELATIVE PERCENT: 0 % (ref 0–4)
GFR SERPL CREATININE-BSD FRML MDRD: >60 ML/MIN/1.73M2
GLUCOSE BLD-MCNC: 62 MG/DL (ref 65–105)
GLUCOSE BLD-MCNC: 82 MG/DL (ref 70–99)
GLUCOSE BLD-MCNC: 85 MG/DL (ref 65–105)
HCT VFR BLD CALC: 24.9 % (ref 36–46)
HEMOGLOBIN: 7.8 G/DL (ref 12–16)
LYMPHOCYTES # BLD: 11 % (ref 24–44)
MCH RBC QN AUTO: 25.8 PG (ref 26–34)
MCHC RBC AUTO-ENTMCNC: 31.4 G/DL (ref 31–37)
MCV RBC AUTO: 82.2 FL (ref 80–100)
MONOCYTES # BLD: 8 % (ref 1–7)
MORPHOLOGY: ABNORMAL
MORPHOLOGY: ABNORMAL
PDW BLD-RTO: 16.9 % (ref 11.5–14.9)
PLATELET # BLD: 34 K/UL (ref 150–450)
PMV BLD AUTO: 7.3 FL (ref 6–12)
POTASSIUM SERPL-SCNC: 3.4 MMOL/L (ref 3.7–5.3)
RBC # BLD: 3.03 M/UL (ref 4–5.2)
SEG NEUTROPHILS: 81 % (ref 36–66)
SEGMENTED NEUTROPHILS ABSOLUTE COUNT: 4.53 K/UL (ref 1.3–9.1)
SODIUM BLD-SCNC: 140 MMOL/L (ref 135–144)
SPECIMEN DESCRIPTION: ABNORMAL
SURGICAL PATHOLOGY REPORT: NORMAL
VALPROIC ACID LEVEL: 88 UG/ML (ref 50–125)
VALPROIC DATE LAST DOSE: NORMAL
VALPROIC DOSE AMOUNT: 1000
VALPROIC TIME LAST DOSE: 2024
WBC # BLD: 5.6 K/UL (ref 3.5–11)

## 2022-10-25 PROCEDURE — 2060000000 HC ICU INTERMEDIATE R&B

## 2022-10-25 PROCEDURE — 6360000002 HC RX W HCPCS: Performed by: INTERNAL MEDICINE

## 2022-10-25 PROCEDURE — 6370000000 HC RX 637 (ALT 250 FOR IP): Performed by: INTERNAL MEDICINE

## 2022-10-25 PROCEDURE — 80048 BASIC METABOLIC PNL TOTAL CA: CPT

## 2022-10-25 PROCEDURE — 82947 ASSAY GLUCOSE BLOOD QUANT: CPT

## 2022-10-25 PROCEDURE — 94761 N-INVAS EAR/PLS OXIMETRY MLT: CPT

## 2022-10-25 PROCEDURE — 99232 SBSQ HOSP IP/OBS MODERATE 35: CPT | Performed by: FAMILY MEDICINE

## 2022-10-25 PROCEDURE — 85025 COMPLETE CBC W/AUTO DIFF WBC: CPT

## 2022-10-25 PROCEDURE — 80164 ASSAY DIPROPYLACETIC ACD TOT: CPT

## 2022-10-25 PROCEDURE — 2700000000 HC OXYGEN THERAPY PER DAY

## 2022-10-25 PROCEDURE — 94640 AIRWAY INHALATION TREATMENT: CPT

## 2022-10-25 PROCEDURE — 36415 COLL VENOUS BLD VENIPUNCTURE: CPT

## 2022-10-25 PROCEDURE — 71045 X-RAY EXAM CHEST 1 VIEW: CPT

## 2022-10-25 PROCEDURE — 2580000003 HC RX 258: Performed by: INTERNAL MEDICINE

## 2022-10-25 PROCEDURE — 99232 SBSQ HOSP IP/OBS MODERATE 35: CPT | Performed by: INTERNAL MEDICINE

## 2022-10-25 RX ADMIN — SODIUM CHLORIDE, PRESERVATIVE FREE 10 ML: 5 INJECTION INTRAVENOUS at 21:35

## 2022-10-25 RX ADMIN — ATORVASTATIN CALCIUM 40 MG: 40 TABLET, FILM COATED ORAL at 21:02

## 2022-10-25 RX ADMIN — SODIUM CHLORIDE, PRESERVATIVE FREE 10 ML: 5 INJECTION INTRAVENOUS at 08:51

## 2022-10-25 RX ADMIN — IPRATROPIUM BROMIDE AND ALBUTEROL SULFATE 1 AMPULE: 2.5; .5 SOLUTION RESPIRATORY (INHALATION) at 19:02

## 2022-10-25 RX ADMIN — DULOXETINE 60 MG: 60 CAPSULE, DELAYED RELEASE ORAL at 08:49

## 2022-10-25 RX ADMIN — DIVALPROEX SODIUM 1000 MG: 500 TABLET, DELAYED RELEASE ORAL at 21:02

## 2022-10-25 RX ADMIN — CARVEDILOL 25 MG: 25 TABLET, FILM COATED ORAL at 08:50

## 2022-10-25 RX ADMIN — IPRATROPIUM BROMIDE AND ALBUTEROL SULFATE 1 AMPULE: 2.5; .5 SOLUTION RESPIRATORY (INHALATION) at 11:01

## 2022-10-25 RX ADMIN — IPRATROPIUM BROMIDE AND ALBUTEROL SULFATE 1 AMPULE: 2.5; .5 SOLUTION RESPIRATORY (INHALATION) at 07:24

## 2022-10-25 RX ADMIN — DIVALPROEX SODIUM 1000 MG: 500 TABLET, DELAYED RELEASE ORAL at 08:48

## 2022-10-25 RX ADMIN — FERROUS SULFATE TAB 325 MG (65 MG ELEMENTAL FE) 325 MG: 325 (65 FE) TAB at 17:29

## 2022-10-25 RX ADMIN — VANCOMYCIN HYDROCHLORIDE 1000 MG: 1 INJECTION, POWDER, LYOPHILIZED, FOR SOLUTION INTRAVENOUS at 21:35

## 2022-10-25 RX ADMIN — FERROUS SULFATE TAB 325 MG (65 MG ELEMENTAL FE) 325 MG: 325 (65 FE) TAB at 08:49

## 2022-10-25 RX ADMIN — IPRATROPIUM BROMIDE AND ALBUTEROL SULFATE 1 AMPULE: 2.5; .5 SOLUTION RESPIRATORY (INHALATION) at 15:26

## 2022-10-25 RX ADMIN — ARIPIPRAZOLE 5 MG: 5 TABLET ORAL at 08:52

## 2022-10-25 RX ADMIN — CARVEDILOL 25 MG: 25 TABLET, FILM COATED ORAL at 17:31

## 2022-10-25 RX ADMIN — POTASSIUM CHLORIDE 40 MEQ: 1500 TABLET, EXTENDED RELEASE ORAL at 08:49

## 2022-10-25 RX ADMIN — Medication 2000 UNITS: at 08:49

## 2022-10-25 NOTE — PROGRESS NOTES
[facility] -   Infectious diseases evaluation  admission date 10/10/2022    reason for consultation:   MRSA bacteremia    Impression :   Current:  MRSA bacteremia  Bilateral pulmonary nodules with cavitation /loculated pleural effusion /suspected septic emboli s/p thoracentesis with bilateral CT placement. Sepsis secondary to above  Encephalopathy  History of cocaine drug abuse  E. coli UTI treated  Acute renal failure improved  Hypertension  Bipolar disorder  Allergy to Bactrim. Recommendations     Continue Vancomycin IV, pharmacy to dose until 12/03  IR for chest tube replacement  Follow CBC and renal function closely. Supportive care. Infection Control Recommendations   [place] Precautions  Contact Isolation     Antimicrobial Stewardship Recommendations   Simplification of therapy  Targeted therapy      History of Present Illness:   Initial history:  Eleno Martinez is a 46y.o.-year-old female was admitted 10/10/2020 for worsening shortness of breath associated with generalized body ache. The patient is drowsy, arousable, confused, follows simple commands, unable to provide history that was obtained from chart review and nursing staff. She does have history of cocaine abuse. She was found to have acute renal failure with a creatinine of 2.1. Urinalysis showed small leukocyte esterase, positive nitrate. Urine culture on 10/10/2022 grew E. Coli  Blood cultures 10/10/2022 2 of 2 grew MRSA  Echocardiogram showed no vegetations  Procalcitonin was 6.01, D-dimer was elevated at 3.85  Chest x-ray showed right lung nodules. VQ scan was indeterminant.   CT chest without contrast 10/16/2022 showed numerous bilateral nodular opacities both lungs suggestive of septic emboli , focal consolidation with cavitation in the superior right lower lobe focal consolidation at the left base, small bilateral loculated pleural effusion and mediastinal lymphadenopathy. Echocardiogram showed no vegetations  ADRIANNE negative for vegetations. Interval changes  10/24/2022   Afebrile, confused, afebrile. Chest tubes dislodged. RUE PICC site clean. Patient Vitals for the past 8 hrs:   BP Temp Temp src Pulse Resp SpO2   10/24/22 2012 -- -- -- 60 20 98 %   10/24/22 1830 130/64 97.9 °F (36.6 °C) Axillary 71 16 92 %   10/24/22 1750 (!) 149/73 -- -- 66 -- --   10/24/22 1454 -- -- -- 66 22 99 %     Labs:   Cre: 0.59-0.65-0.58  WBC: 7.0-7.3-8.4    Micro:  10/21 Thoracentesis-Staph Aureus 1 colony unit. 10/22 UA-Negative. I have personally reviewed the past medical history, past surgical history, medications, social history, and family history, and I haveupdated the database accordingly. Allergies:   Aspirin, Bactrim, and Codeine     Review of Systems:     Review of Systems   Reason unable to perform ROS: Unresponsive. Unable to provide due to mental status changes  Physical Examination :       Physical Exam  Vitals and nursing note reviewed. Constitutional:       General: She is not in acute distress. Appearance: She is not ill-appearing. Comments: Unresponsive. HENT:      Head: Normocephalic and atraumatic. Right Ear: External ear normal.      Left Ear: External ear normal.      Nose: Nose normal.      Mouth/Throat:      Mouth: Mucous membranes are moist.      Pharynx: Oropharynx is clear. Eyes:      General: No scleral icterus. Conjunctiva/sclera: Conjunctivae normal.   Cardiovascular:      Rate and Rhythm: Regular rhythm. Heart sounds: Normal heart sounds. No murmur heard. Pulmonary:      Effort: Pulmonary effort is normal. No respiratory distress. Breath sounds: No wheezing or rhonchi. Comments:   Decreased breath sounds bilaterally  Abdominal:      General: Bowel sounds are normal. There is no distension. Palpations: Abdomen is soft. Tenderness: There is no abdominal tenderness.    Genitourinary: Comments: No chamberlain. Musculoskeletal:      Cervical back: Neck supple. No rigidity. Right lower leg: No edema. Left lower leg: No edema. Skin:     General: Skin is warm and dry. Capillary Refill: Capillary refill takes less than 2 seconds. Coloration: Skin is not jaundiced. Findings: No erythema. Neurological:      Comments: Unresponsive.    Psychiatric:      Comments: ISAAK     Past Medical History:     Past Medical History:   Diagnosis Date    Anemia     Arthritis     Asthma     Bipolar disorder, mixed ([facility] [address]     Carotid artery stenosis 2020    Cocaine abuse ([facility] [address] 2014    COPD (chronic obstructive pulmonary disease) (HCC)     Degeneration of cervical intervertebral disc     Depression with anxiety     Depression with anxiety     Fibromyalgia 2017    GERD (gastroesophageal reflux disease)     History of migraine headaches 6/10/2014    History of renal calculi 6/10/2014    History of seizure disorder 6/10/2014    Hoarseness of voice     HTN (hypertension) 6/10/2014    Hyperlipidemia 6/10/2014    IGT (impaired glucose tolerance) 6/10/2014    Kidney stones     Lactose intolerance 6/10/2014    Leukopenia 6/10/2014    Major depressive disorder, recurrent episode, severe, without mention of psychotic behavior 2014    MVP (mitral valve prolapse)     Seizures (HCC)     Sleep apnea 6/10/2014    Unspecified diseases of blood and blood-forming organs        Past Surgical  History:     Past Surgical History:   Procedure Laterality Date    ANKLE FRACTURE SURGERY      recontruction surgery    APPENDECTOMY      BREAST LUMPECTOMY Right     CARDIAC CATHETERIZATION      CARPAL TUNNEL RELEASE      x2     SECTION      CHOLECYSTECTOMY      COLONOSCOPY  12    COLONOSCOPY  2016    severe spasms    COLONOSCOPY  2019    DR GOLDY MCINTOSH    GASTRIC BYPASS SURGERY      GASTRIC BYPASS SURGERY      HYSTERECTOMY (CERVIX STATUS UNKNOWN)      HYSTERECTOMY (CERVIX STATUS UNKNOWN) IR PERC CATH PLEURAL DRAIN W/IMAG  10/21/2022    IR PERC CATH PLEURAL DRAIN W/IMAG 10/21/2022 [facility]    IR PERC CATH PLEURAL DRAIN W/IMAG  10/21/2022    IR PERC CATH PLEURAL DRAIN W/IMAG 10/21/2022 [facility]    LAPAROSCOPY N/A 1/29/2022    LAPAROSCOPY EXPLORATORY CONVERTED TO OPEN EXPLORATORY LAPAROTOMY, LYSIS OF ADHESIONS, REDUCTION OF INTERNAL HERNIA performed by Morenita Rome DO at [address] [place] [address]  28/47/6643    APPLICATION OF ARCH BARS,SIMPLE EXTRACTION OF #15 AND RT TMJ JOINT REPLACEMENT    SHOULDER ARTHROSCOPY Left 06/05/2019    DR ROBERT GREEN    SHOULDER SURGERY      TONSILLECTOMY AND ADENOIDECTOMY      TRANSESOPHAGEAL ECHOCARDIOGRAM N/A 10/19/2022    TRANSESOPHAGEAL ECHOCARDIOGRAM WITH BUBBLE STUDY performed by Frandy Royal DO at [address] [place], [place] [address]    egd    UPPER GASTROINTESTINAL ENDOSCOPY  12/19/2016    status post gastrectomy with a small remnant of gastric pouch.     UPPER GASTROINTESTINAL ENDOSCOPY  05/2019    DR Marialuisa Sevilla       Medications:      vancomycin  1,250 mg IntraVENous Q24H    carvedilol  25 mg Oral BID WC    Vitamin D  2,000 Units Oral Daily    sodium chloride flush  5-40 mL IntraVENous 2 times per day    ARIPiprazole  5 mg Oral Daily    atorvastatin  40 mg Oral Daily    divalproex  1,000 mg Oral BID    DULoxetine  60 mg Oral Daily    ferrous sulfate  325 mg Oral BID WC    ipratropium-albuterol  1 ampule Inhalation Q4H WA    vancomycin (VANCOCIN) intermittent dosing (placeholder)   Other RX Placeholder       Social History:     Social History     Socioeconomic History    Marital status:      Spouse name: Not on file    Number of children: Not on file    Years of education: Not on file    Highest education level: Not on file   Occupational History    Occupation: disability   Tobacco Use    Smoking status: Every Day     Packs/day: 0.50     Years: 33.00     Pack years: 16.50 Types: Cigarettes    Smokeless tobacco: Never    Tobacco comments:     quit  2000 started again  1/13    Substance and Sexual Activity    Alcohol use: Yes     Comment: occasional    Drug use: Not Currently    Sexual activity: Yes   Other Topics Concern    Not on file   Social History Narrative    Not on file     Social Determinants of Health     Financial Resource Strain: Not on file   Food Insecurity: Not on file   Transportation Needs: Not on file   Physical Activity: Not on file   Stress: Not on file   Social Connections: Not on file   Intimate Partner Violence: Not on file   Housing Stability: Not on file       Family History:     Family History   Problem Relation Age of Onset    Diabetes Mother     Cancer Mother     Coronary Art Dis Father     COPD Father     Depression Brother     Alcohol Abuse Brother     Cancer Other         lung and skin    Diabetes Maternal Grandmother     Cancer Paternal Grandmother       Medical Decision Making:   I have independently reviewed/ordered the following labs:    CBC with Differential:   Recent Labs     10/23/22  0502 10/24/22  0501   WBC 8.4 7.5   HGB 8.1* 7.8*   HCT 25.7* 24.3*   PLT 57* 42*   LYMPHOPCT 7* 8*   MONOPCT 8* 9*         BMP:  Recent Labs     10/22/22  1615 10/23/22  0502 10/24/22  0501    140 144   K 3.2* 5.1 3.5*    106 108*   CO2 25 27 28   BUN 15 13 12   CREATININE 0.66 0.58 0.59   MG 2.2  --   --        Hepatic Function Panel:   Recent Labs     10/22/22  1615   PROT 5.8*   LABALBU 1.7*   BILITOT 0.2*   ALKPHOS 69   ALT <5*   AST 11       No results for input(s): RPR in the last 72 hours. No results for input(s): HIV in the last 72 hours. No results for input(s): BC in the last 72 hours. Lab Results   Component Value Date/Time    CREATININE 0.59 10/24/2022 05:01 AM    GLUCOSE 77 10/24/2022 05:01 AM       Detailed results: Thank you for allowing us to participate in the care of this patient. Please call with questions.     This note is created with the assistance of a speech recognition program.  While intending to generate adocument that actually reflects the content of the visit, the document can still have some errors including those of syntax and sound a like substitutions which may escape proof reading. It such instances, actual meaningcan be extrapolated by contextual diversion.     Kartik Schneider MD  Office/Perfect Serve:   [phone]

## 2022-10-25 NOTE — PROGRESS NOTES
[facility] -   Infectious diseases evaluation  admission date 10/10/2022    reason for consultation:   MRSA bacteremia    Impression :   Current:  MRSA bacteremia  Bilateral pulmonary nodules with cavitation /loculated pleural effusion /suspected septic emboli s/p thoracentesis with bilateral CT placement. Sepsis secondary to above  Thrombocytopenia  Encephalopathy  History of cocaine drug abuse  E. coli UTI treated  Acute renal failure improved  Hypertension  Bipolar disorder  Allergy to Bactrim. Recommendations     Continue Vancomycin IV through 12/03/22  Follow CBC and renal function closely. Follow chest x-ray  Supportive care. Infection Control Recommendations   [place] Precautions  Contact Isolation     Antimicrobial Stewardship Recommendations   Simplification of therapy  Targeted therapy      History of Present Illness:   Initial history:  Juliane Olmos is a 46y.o.-year-old female was admitted 10/10/2020 for worsening shortness of breath associated with generalized body ache. The patient is drowsy, arousable, confused, follows simple commands, unable to provide history that was obtained from chart review and nursing staff. She does have history of cocaine abuse. She was found to have acute renal failure with a creatinine of 2.1. Urinalysis showed small leukocyte esterase, positive nitrate. Urine culture on 10/10/2022 grew E. Coli  Blood cultures 10/10/2022 2 of 2 grew MRSA  Echocardiogram showed no vegetations  Procalcitonin was 6.01, D-dimer was elevated at 3.85  Chest x-ray showed right lung nodules. VQ scan was indeterminant.   CT chest without contrast 10/16/2022 showed numerous bilateral nodular opacities both lungs suggestive of septic emboli , focal consolidation with cavitation in the superior right lower lobe focal consolidation at the left base, small bilateral loculated pleural effusion and mediastinal lymphadenopathy. Echocardiogram showed no vegetations  ADRIANNE negative for vegetations. Interval changes  10/25/2022   She is afebrile, confuse. Status post ultrasound-guided right pleural drain placement. Platelet count 42  RUE PICC site clean. Patient Vitals for the past 8 hrs:   BP Temp Temp src Pulse Resp SpO2   10/25/22 1729 124/70 -- -- 68 -- --   10/25/22 1526 -- -- -- 64 18 97 %   10/25/22 1200 108/63 98 °F (36.7 °C) Axillary 62 22 100 %   10/25/22 1101 -- -- -- 61 18 100 %     Labs:   Cre: 0.59-0.65-0.58  WBC: 7.0-7.3-8.4    Micro:  10/21 Thoracentesis-Staph Aureus 1 colony unit. 10/22 UA-Negative. I have personally reviewed the past medical history, past surgical history, medications, social history, and family history, and I haveupdated the database accordingly. Allergies:   Aspirin, Bactrim, and Codeine     Review of Systems:     Confused, unable to provide  Physical Examination :       Physical Exam  Vitals and nursing note reviewed. Constitutional:       General: She is not in acute distress. Appearance: She is not ill-appearing. HENT:      Head: Normocephalic and atraumatic. Right Ear: External ear normal.      Left Ear: External ear normal.      Nose: Nose normal.      Mouth/Throat:      Mouth: Mucous membranes are moist.      Pharynx: Oropharynx is clear. Eyes:      General: No scleral icterus. Conjunctiva/sclera: Conjunctivae normal.   Cardiovascular:      Rate and Rhythm: Regular rhythm. Heart sounds: Normal heart sounds. No murmur heard. Pulmonary:      Effort: Pulmonary effort is normal. No respiratory distress. Breath sounds: No wheezing or rhonchi. Comments:   Decreased breath sounds bilaterally  Abdominal:      General: Bowel sounds are normal. There is no distension. Palpations: Abdomen is soft. Tenderness: There is no abdominal tenderness. Genitourinary:     Comments: No chamberlain. Musculoskeletal:      Cervical back: Neck supple. No rigidity. Right lower leg: No edema. Left lower leg: No edema. Skin:     General: Skin is warm and dry. Coloration: Skin is not jaundiced. Findings: No erythema.    Neurological:      Comments: Confused   Psychiatric:      Comments: ISAAK     Past Medical History:     Past Medical History:   Diagnosis Date    Anemia     Arthritis     Asthma     Bipolar disorder, mixed ([facility] [address]     Carotid artery stenosis 2020    Cocaine abuse ([facility] [address] 2014    COPD (chronic obstructive pulmonary disease) ([facility] [address]     Degeneration of cervical intervertebral disc     Depression with anxiety     Depression with anxiety     Fibromyalgia 2017    GERD (gastroesophageal reflux disease)     History of migraine headaches 6/10/2014    History of renal calculi 6/10/2014    History of seizure disorder 6/10/2014    Hoarseness of voice     HTN (hypertension) 6/10/2014    Hyperlipidemia 6/10/2014    IGT (impaired glucose tolerance) 6/10/2014    Kidney stones     Lactose intolerance 6/10/2014    Leukopenia 6/10/2014    Major depressive disorder, recurrent episode, severe, without mention of psychotic behavior 2014    MVP (mitral valve prolapse)     Seizures (HCC)     Sleep apnea 6/10/2014    Unspecified diseases of blood and blood-forming organs        Past Surgical  History:     Past Surgical History:   Procedure Laterality Date    ANKLE FRACTURE SURGERY      recontruction surgery    APPENDECTOMY      BREAST LUMPECTOMY Right     CARDIAC CATHETERIZATION      CARPAL TUNNEL RELEASE      x2     SECTION      CHOLECYSTECTOMY      COLONOSCOPY  12    COLONOSCOPY  2016    severe spasms    COLONOSCOPY  2019    DR GOLDY MCINTOSH    GASTRIC BYPASS SURGERY      GASTRIC BYPASS SURGERY      HYSTERECTOMY (CERVIX STATUS UNKNOWN)      HYSTERECTOMY (CERVIX STATUS UNKNOWN)      IR PERC CATH PLEURAL DRAIN W/IMAG  10/21/2022    IR PERC CATH PLEURAL DRAIN W/IMAG 10/21/2022 [facility]    IR PERC CATH PLEURAL DRAIN W/IMAG  10/21/2022    IR PERC CATH PLEURAL DRAIN W/IMAG 10/21/2022 [facility]    IR PERC CATH PLEURAL DRAIN W/IMAG  10/24/2022    IR PERC CATH PLEURAL DRAIN W/IMAG 10/24/2022 [facility]    LAPAROSCOPY N/A 1/29/2022    LAPAROSCOPY EXPLORATORY CONVERTED TO OPEN EXPLORATORY LAPAROTOMY, LYSIS OF ADHESIONS, REDUCTION OF INTERNAL HERNIA performed by Yayo Briceño DO at [address] [place] [address]  49/16/6687    APPLICATION OF ARCH BARS,SIMPLE EXTRACTION OF #15 AND RT TMJ JOINT REPLACEMENT    SHOULDER ARTHROSCOPY Left 06/05/2019    DR ROBERT GREEN    SHOULDER SURGERY      TONSILLECTOMY AND ADENOIDECTOMY      TRANSESOPHAGEAL ECHOCARDIOGRAM N/A 10/19/2022    TRANSESOPHAGEAL ECHOCARDIOGRAM WITH BUBBLE STUDY performed by Frandy Royal DO at [address] [place] [id number]    egd    UPPER GASTROINTESTINAL ENDOSCOPY  12/19/2016    status post gastrectomy with a small remnant of gastric pouch.     UPPER GASTROINTESTINAL ENDOSCOPY  05/2019    DR Sarah Che       Medications:      vancomycin  1,000 mg IntraVENous Q24H    carvedilol  25 mg Oral BID WC    Vitamin D  2,000 Units Oral Daily    sodium chloride flush  5-40 mL IntraVENous 2 times per day    ARIPiprazole  5 mg Oral Daily    atorvastatin  40 mg Oral Daily    divalproex  1,000 mg Oral BID    DULoxetine  60 mg Oral Daily    ferrous sulfate  325 mg Oral BID WC    ipratropium-albuterol  1 ampule Inhalation Q4H WA    vancomycin (VANCOCIN) intermittent dosing (placeholder)   Other RX Placeholder       Social History:     Social History     Socioeconomic History    Marital status:      Spouse name: Not on file    Number of children: Not on file    Years of education: Not on file    Highest education level: Not on file   Occupational History    Occupation: disability   Tobacco Use    Smoking status: Every Day     Packs/day: 0.50     Years: 33.00     Pack years: 16.50     Types: Cigarettes Smokeless tobacco: Never    Tobacco comments:     quit  2000 started again  1/13    Substance and Sexual Activity    Alcohol use: Yes     Comment: occasional    Drug use: Not Currently    Sexual activity: Yes   Other Topics Concern    Not on file   Social History Narrative    Not on file     Social Determinants of Health     Financial Resource Strain: Not on file   Food Insecurity: Not on file   Transportation Needs: Not on file   Physical Activity: Not on file   Stress: Not on file   Social Connections: Not on file   Intimate Partner Violence: Not on file   Housing Stability: Not on file       Family History:     Family History   Problem Relation Age of Onset    Diabetes Mother     Cancer Mother     Coronary Art Dis Father     COPD Father     Depression Brother     Alcohol Abuse Brother     Cancer Other         lung and skin    Diabetes Maternal Grandmother     Cancer Paternal Grandmother       Medical Decision Making:   I have independently reviewed/ordered the following labs:    CBC with Differential:   Recent Labs     10/24/22  0501 10/25/22  0531   WBC 7.5 5.6   HGB 7.8* 7.8*   HCT 24.3* 24.9*   PLT 42* 34*   LYMPHOPCT 8* 11*   MONOPCT 9* 8*         BMP:  Recent Labs     10/24/22  0501 10/25/22  0531    140   K 3.5* 3.4*   * 105   CO2 28 30   BUN 12 12   CREATININE 0.59 0.71       Hepatic Function Panel: No results for input(s): PROT, LABALBU, BILIDIR, IBILI, BILITOT, ALKPHOS, ALT, AST in the last 72 hours. No results for input(s): RPR in the last 72 hours. No results for input(s): HIV in the last 72 hours. No results for input(s): BC in the last 72 hours. Lab Results   Component Value Date/Time    CREATININE 0.71 10/25/2022 05:31 AM    GLUCOSE 82 10/25/2022 05:31 AM       Detailed results: Thank you for allowing us to participate in the care of this patient. Please call with questions.     This note is created with the assistance of a speech recognition program.  While intending to generate adocument that actually reflects the content of the visit, the document can still have some errors including those of syntax and sound a like substitutions which may escape proof reading. It such instances, actual meaningcan be extrapolated by contextual diversion.     Johnathan Arthur MD  Office/Perfect Serve:   [phone]

## 2022-10-25 NOTE — PROGRESS NOTES
[facility] FOLLOW-UP NOTE     10/25/2022     Patient was seen and examined in person, Chart reviewed   Patient's case discussed with staff/team    Chief Complaint: Bipolar disorder, lethargy, erratic behavior    Interim History:     Patient is currently lying in bed with father at bedside. Father is massaging her left arm. Patient continues to lie in bed with eyes closed, responds to pain stimuli but not to verbal or tactile stimuli. She does not open eyes even with painful stimuli. Patient is not answering any questions at this time. Patient has multiple medical issues ongoing with multiple teams on board including medicine, pulmonology, cardiology, nephrology, neurology and infectious disease.     /72   Pulse 85   Temp 98.2 °F (36.8 °C) (Axillary)   Resp 18   Ht 4' 11\" (1.499 m)   Wt 101 lb 6.6 oz (46 kg)   SpO2 96%   BMI 20.48 kg/m²   Appetite:  [] Normal/Unchanged  [] Increased  [x] Decreased      Sleep:       [x] Normal/Unchanged  [] Fair       [] Poor              Energy:    [] Normal/Unchanged  [] Increased  [x] Decreased        Aggression:  [] yes  [x] no    Patient is [] able  [] unable to CONTRACT FOR SAFETY ON THE UNIT    PAST MEDICAL/PSYCHIATRIC HISTORY:   Past Medical History:   Diagnosis Date    Anemia     Arthritis     Asthma     Bipolar disorder, mixed ([address]     Carotid artery stenosis 2/13/2020    Cocaine abuse ([facility] [address] 11/4/2014    COPD (chronic obstructive pulmonary disease) ([facility] [address]     Degeneration of cervical intervertebral disc     Depression with anxiety     Depression with anxiety     Fibromyalgia 1/5/2017    GERD (gastroesophageal reflux disease)     History of migraine headaches 6/10/2014    History of renal calculi 6/10/2014    History of seizure disorder 6/10/2014    Hoarseness of voice     HTN (hypertension) 6/10/2014    Hyperlipidemia 6/10/2014    IGT (impaired glucose tolerance) 6/10/2014    Kidney stones     Lactose intolerance 6/10/2014    Leukopenia 6/10/2014 Major depressive disorder, recurrent episode, severe, without mention of psychotic behavior 11/6/2014    MVP (mitral valve prolapse)     Seizures (HCC)     Sleep apnea 6/10/2014    Unspecified diseases of blood and blood-forming organs        FAMILY/SOCIAL HISTORY:  Family History   Problem Relation Age of Onset    Diabetes Mother     Cancer Mother     Coronary Art Dis Father     COPD Father     Depression Brother     Alcohol Abuse Brother     Cancer Other         lung and skin    Diabetes Maternal Grandmother     Cancer Paternal Grandmother      Social History     Socioeconomic History    Marital status:      Spouse name: Not on file    Number of children: Not on file    Years of education: Not on file    Highest education level: Not on file   Occupational History    Occupation: disability   Tobacco Use    Smoking status: Every Day     Packs/day: 0.50     Years: 33.00     Pack years: 16.50     Types: Cigarettes    Smokeless tobacco: Never    Tobacco comments:     quit  2000 started again  1/13    Substance and Sexual Activity    Alcohol use: Yes     Comment: occasional    Drug use: Not Currently    Sexual activity: Yes   Other Topics Concern    Not on file   Social History Narrative    Not on file     Social Determinants of Health     Financial Resource Strain: Not on file   Food Insecurity: Not on file   Transportation Needs: Not on file   Physical Activity: Not on file   Stress: Not on file   Social Connections: Not on file   Intimate Partner Violence: Not on file   Housing Stability: Not on file           ROS:  [x] All negative/unchanged except if checked.  Explain positive(checked items) below:  [] Constitutional  [] Eyes  [] Ear/Nose/Mouth/Throat  [] Respiratory  [] CV  [] GI  []   [] Musculoskeletal  [] Skin/Breast  [] Neurological  [] Endocrine  [] Heme/Lymph  [] Allergic/Immunologic    Explanation:     MEDICATIONS:    Current Facility-Administered Medications:     vancomycin 1000 mg IVPB in 250 mL D5W addavial, 1,000 mg, IntraVENous, Q24H, Kaylynn Guerra MD    sodium chloride flush 0.9 % injection 10 mL, 10 mL, IntraVENous, PRN, Amaris De Los Santos MD, 10 mL at 10/20/22 1638    carvedilol (COREG) tablet 25 mg, 25 mg, Oral, BID WC, Frandy Lele, DO, 25 mg at 10/25/22 0850    labetalol (NORMODYNE;TRANDATE) injection 20 mg, 20 mg, IntraVENous, BID PRN, Frandy Lele, DO, 20 mg at 10/20/22 0238    potassium chloride (KLOR-CON M) extended release tablet 40 mEq, 40 mEq, Oral, PRN, 40 mEq at 10/25/22 0849 **OR** potassium bicarb-citric acid (EFFER-K) effervescent tablet 40 mEq, 40 mEq, Oral, PRN, 40 mEq at 10/14/22 2031 **OR** potassium chloride 10 mEq/100 mL IVPB (Peripheral Line), 10 mEq, IntraVENous, PRN, Frandy Lele, DO, Last Rate: 100 mL/hr at 10/23/22 0258, 10 mEq at 10/23/22 0258    Vitamin D (CHOLECALCIFEROL) tablet 2,000 Units, 2,000 Units, Oral, Daily, Frandy Lele, DO, 2,000 Units at 10/25/22 0849    hydrALAZINE (APRESOLINE) injection 10 mg, 10 mg, IntraVENous, Q6H PRN, Frandy Lele, DO, 10 mg at 10/22/22 0644    sodium chloride flush 0.9 % injection 5-40 mL, 5-40 mL, IntraVENous, 2 times per day, Frandy Lele, DO, 10 mL at 10/25/22 0851    sodium chloride flush 0.9 % injection 5-40 mL, 5-40 mL, IntraVENous, PRN, Frandy Lele, DO    0.9 % sodium chloride infusion, , IntraVENous, PRN, Frandy Lele, DO, Last Rate: 50 mL/hr at 10/18/22 1200, New Bag at 10/18/22 1200    acetaminophen (TYLENOL) tablet 650 mg, 650 mg, Oral, Q4H PRN, Frandy Lele, DO, 650 mg at 10/14/22 2031    ondansetron (ZOFRAN-ODT) disintegrating tablet 4 mg, 4 mg, Oral, Q8H PRN **OR** ondansetron (ZOFRAN) injection 4 mg, 4 mg, IntraVENous, Q6H PRN, Frandy Juareso, DO    ARIPiprazole (ABILIFY) tablet 5 mg, 5 mg, Oral, Daily, Frandy Royal DO, 5 mg at 10/25/22 0852    atorvastatin (LIPITOR) tablet 40 mg, 40 mg, Oral, Daily, Frandy Royal DO, 40 mg at 10/24/22 2040    divalproex (DEPAKOTE) DR tablet 1,000 mg, 1,000 mg, Oral, BID, Frandy Royal DO, 1,000 mg at 10/25/22 1901 DULoxetine (CYMBALTA) extended release capsule 60 mg, 60 mg, Oral, Daily, Frandy Lele, DO, 60 mg at 10/25/22 0849    ferrous sulfate (IRON 325) tablet 325 mg, 325 mg, Oral, BID WC, Frandy Lele, DO, 325 mg at 10/25/22 0849    sodium chloride flush 0.9 % injection 10 mL, 10 mL, IntraVENous, PRN, Frandy Lele, DO, 10 mL at 10/10/22 1427    ipratropium-albuterol (DUONEB) nebulizer solution 1 ampule, 1 ampule, Inhalation, Q4H WA, Frandy Lele, DO, 1 ampule at 10/25/22 0724    perflutren lipid microspheres (DEFINITY) injection 1.65 mg, 1.5 mL, IntraVENous, ONCE PRN, Frandy Lele, DO    vancomycin (VANCOCIN) intermittent dosing (placeholder), , Other, RX Placeholder, Frandy Lele, DO      Examination:  /72   Pulse 85   Temp 98.2 °F (36.8 °C) (Axillary)   Resp 18   Ht 4' 11\" (1.499 m)   Wt 101 lb 6.6 oz (46 kg)   SpO2 96%   BMI 20.48 kg/m²   Gait -unable to test  Medication side effects(SE): Denies    Mental Status Examination:    Level of consciousness: Extremely drowsy and lethargic  Appearance:  poor grooming and poor hygiene  Behavior/Motor: Lethargic and unarousable.   Attitude toward examiner: Uncooperative  Speech:  slow, whispered, and dysarthric   Mood: depressed  Affect:  flat  Thought processes:  linear and slow   Thought content:  Homicidal ideation - none  Suicidal Ideation:  denies suicidal ideation  Delusions:  no evidence of delusions  Perceptual Disturbance:  denies any perceptual disturbance  Cognition:  disoriented   Concentration intact  Insight poor   Judgement poor     ASSESSMENT:   Patient symptoms are:  [] Well controlled  [] Improving  [] Worsening  [x] No change      Diagnosis:   Principal Problem:    SIRS (systemic inflammatory response syndrome) ([facility])  Active Problems:    NICKI (acute kidney injury) ([facility])    Severe malnutrition (HCC)    Elevated d-dimer    Leukocytosis    Pulmonary nodules    Delirium due to another medical condition    Acute metabolic encephalopathy    MRSA bacteremia    Acute septic pulmonary embolism (HCC)    Cavitating mass of lung    Allergy to antibiotic    Depression with suicidal ideation    Bipolar disorder, mixed ([facility] [address]    Acute encephalopathy    COPD (chronic obstructive pulmonary disease) ([facility] [address]    H/O gastric bypass  Resolved Problems:    * No resolved hospital problems. *      LABS:    Recent Labs     10/23/22  0502 10/24/22  0501 10/25/22  0531   WBC 8.4 7.5 5.6   HGB 8.1* 7.8* 7.8*   PLT 57* 42* 34*       Recent Labs     10/23/22  0502 10/24/22  0501 10/25/22  0531    144 140   K 5.1 3.5* 3.4*    108* 105   CO2 27 28 30   BUN 13 12 12   CREATININE 0.58 0.59 0.71   GLUCOSE 80 77 82       Recent Labs     10/22/22  1615   BILITOT 0.2*   ALKPHOS 69   AST 11   ALT <5*     Lab Results   Component Value Date/Time    LABAMPH NEGATIVE 07/08/2019 12:00 AM    BARBSCNU NEGATIVE 10/13/2022 11:15 AM    LABBENZ NEGATIVE 10/13/2022 11:15 AM    LABBENZ NEGATIVE 12/22/2012 09:35 PM    LABMETH NEGATIVE 10/13/2022 11:15 AM    PPXUR NOT REPORTED 01/29/2022 03:06 PM     Lab Results   Component Value Date/Time    TSH 1.37 01/29/2022 12:38 PM     No results found for: LITHIUM  Lab Results   Component Value Date    VALPROATE 120 10/14/2022       RISK ASSESSMENT: Monitoring per standard protocol    Treatment Plan:  Reviewed current Medications with the patient. Continue duloxetine, Depakote and Abilify. Patient is currently extremely lethargic and has multiple medical issues ongoing, will continue to follow and reevaluate daily. Risks, benefits, side effects, drug-to-drug interactions and alternatives to treatment were discussed. The patient consented to treatment. PSYCHOTHERAPY/COUNSELING:  [] Therapeutic interview  [x] Supportive  [] CBT  [] Ongoing  [] Other        Noble Cowart is a 46 y.o. female being evaluated face to face. --Vasiliy Miller MD on 10/25/2022 at 10:27 AM    An electronic signature was used to authenticate this note.      **This report has been created using voice recognition software. It may contain minor errors which are inherent in voice recognition technology. **  I independently saw and evaluated the patient. I reviewed the  documentation above. Any additional comments or changes to the    documentation are stated below otherwise agree with assessment.      -The patient was seen face to face. Her father was present. The patient continues to be somnolent though she has improved in her mentation as per the father. She woke up for me. She answered some of my questions but struggled to engage because of somnolence. PLAN  Medications as noted above  Attempt to develop insight   Psycho-education conducted. Supportive Therapy conducted.   Follow-up daily while on inpatient unit    Electronically signed by Jelly Ma MD on 10/25/22 at 9:58 PM EDT

## 2022-10-25 NOTE — PLAN OF CARE
Problem: Discharge Planning  Goal: Discharge to home or other facility with appropriate resources  10/25/2022 1625 by Jeannie Justice RN  Outcome: Progressing  10/25/2022 0255 by Carlos Eagle RN  Outcome: Progressing     Problem: Pain  Goal: Verbalizes/displays adequate comfort level or baseline comfort level  10/25/2022 1625 by Jeannie Justice RN  Outcome: Progressing  10/25/2022 0255 by Carlos Eagle RN  Outcome: Progressing     Problem: Skin/Tissue Integrity  Goal: Absence of new skin breakdown  Description: 1. Monitor for areas of redness and/or skin breakdown  2. Assess vascular access sites hourly  3. Every 4-6 hours minimum:  Change oxygen saturation probe site  4. Every 4-6 hours:  If on nasal continuous positive airway pressure, respiratory therapy assess nares and determine need for appliance change or resting period.   10/25/2022 1625 by Jeannie Justice RN  Outcome: Progressing  10/25/2022 0255 by Carlos Eagle RN  Outcome: Progressing     Problem: Safety - Adult  Goal: Free from fall injury  10/25/2022 1625 by Jeannie Justice RN  Outcome: Progressing  10/25/2022 0255 by Carlos Eagle RN  Outcome: Progressing     Problem: ABCDS Injury Assessment  Goal: Absence of physical injury  Outcome: Progressing     Problem: Nutrition Deficit:  Goal: Optimize nutritional status  10/25/2022 1625 by Jeannie Justice RN  Outcome: Progressing  10/25/2022 0255 by Carlos Eagle RN  Outcome: Progressing

## 2022-10-25 NOTE — PROGRESS NOTES
Dr. Rema Dinero about patient becoming more lethargic after morning medications. Depakote 1000 mg 2 times a day. Waiting for response.

## 2022-10-25 NOTE — PROGRESS NOTES
Comprehensive Nutrition Assessment    Type and Reason for Visit:  Reassess    Nutrition Recommendations/Plan:   Continue diet and supplements as ordered. Malnutrition Assessment:  Malnutrition Status:  Severe malnutrition (10/11/22 1232)    Context:  Acute Illness     Findings of the 6 clinical characteristics of malnutrition:  Energy Intake:  50% or less of estimated energy requirements for 5 or more days  Weight Loss:  Greater than 7.5% over 3 months     Body Fat Loss: Moderate body fat loss Orbital, Triceps, Fat Overlying Ribs   Muscle Mass Loss: Moderate muscle mass loss Temples (temporalis), Clavicles (pectoralis & deltoids)  Fluid Accumulation:  No significant fluid accumulation     Strength:  Not Performed    Nutrition Assessment:    Nurse reports pt continues with poor intake, lethargic and needs fed which father often helps with. Nutrition Related Findings:    Trace edema BUE's. Labs & meds reviewed. Wound Type: Pressure Injury (refer to nursing flowsheet)       Current Nutrition Intake & Therapies:    Average Meal Intake: 1-25%, 26-50%  Average Supplements Intake: None Ordered  ADULT DIET; Regular  ADULT ORAL NUTRITION SUPPLEMENT; Breakfast, Lunch, Dinner; Standard High Calorie/High Protein Oral Supplement    Anthropometric Measures:  Height: 4' 11\" (149.9 cm)  Ideal Body Weight (IBW): 95 lbs (43 kg)    Admission Body Weight: 89 lb (40.4 kg)  Current Body Weight: 101 lb (45.8 kg), 93.7 % IBW. Weight Source: Bed Scale  Current BMI (kg/m2): 20.4  Usual Body Weight: 113 lb (51.3 kg) (per past records)  % Weight Change (Calculated): -21.2                    BMI Categories: Normal Weight (BMI 18.5-24. 9)    Estimated Daily Nutrient Needs:  Energy Requirements Based On: Kcal/kg  Weight Used for Energy Requirements: Admission  Energy (kcal/day): 1414 kcal based on 35 kcal/kg admission  Weight Used for Protein Requirements: Admission  Protein (g/day): 50 gm based on 1.2 gm/kg admission  Method Used for Fluid Requirements: 1 ml/kcal      Nutrition Diagnosis:   Severe malnutrition related to impaired respiratory function, psychological cause or life stress as evidenced by intake 0-25%, poor intake prior to admission, BMI, weight loss, severe loss of subcutaneous fat, severe muscle loss    Nutrition Interventions:   Food and/or Nutrient Delivery: Continue Current Diet, Continue Oral Nutrition Supplement  Nutrition Education/Counseling: Education not indicated  Coordination of Nutrition Care: Continue to monitor while inpatient       Goals:  Previous Goal Met: No Progress toward Goal(s)  Goals: Meet at least 75% of estimated needs, PO intake 75% or greater       Nutrition Monitoring and Evaluation:   Behavioral-Environmental Outcomes: None Identified  Food/Nutrient Intake Outcomes: Food and Nutrient Intake, Supplement Intake  Physical Signs/Symptoms Outcomes: Biochemical Data, GI Status, Chewing or Swallowing, Fluid Status or Edema, Skin, Weight    Discharge Planning:    Continue Oral Nutrition Supplement, Continue current diet     Alaina Fontanez, [address] [place] [address], Ana Cristina [id number]

## 2022-10-25 NOTE — PROGRESS NOTES
Pulmonary Progress Note  [facility]      Patient - Andre Soto,  Age - 46 y.o.    - 1970      Room Number - 2109/2109-01   MRN -  [id number]   [facility] # - [de-identified]  Date of Admission -  10/10/2022  4:37 AM    Consulting Service/Physician   Consulting - No att. providers found  Primary Care Physician - Jennifer Leung MD     SUBJECTIVE   Patient appears to be resting quietly. No new issues at this time. Not much drainage on the recurrent pleural catheter. OBJECTIVE   VITALS    height is 4' 11\" (1.499 m) and weight is 101 lb 6.6 oz (46 kg). Her axillary temperature is 98 °F (36.7 °C). Her blood pressure is 108/63 and her pulse is 62. Her respiration is 22 and oxygen saturation is 100%. Body mass index is 20.48 kg/m². Temperature Range: Temp: 98 °F (36.7 °C) Temp  Av °F (36.7 °C)  Min: 97.7 °F (36.5 °C)  Max: 98.2 °F (36.8 °C)  BP Range:  Systolic (20KVG), TND:727 , Min:108 , ODF:956     Diastolic (38JNQ), ZOS:59, Min:63, Max:73    Pulse Range: Pulse  Av.1  Min: 60  Max: 85  Respiration Range: Resp  Av.4  Min: 16  Max: 22  Current Pulse Ox[de-identified]  SpO2: 100 %  24HR Pulse Ox Range:  SpO2  Av.6 %  Min: 92 %  Max: 100 %  Oxygen Amount and Delivery: O2 Flow Rate (L/min): 1 L/min    Wt Readings from Last 3 Encounters:   10/25/22 101 lb 6.6 oz (46 kg)   22 96 lb 8 oz (43.8 kg)   22 105 lb (47.6 kg)       I/O (24 Hours)    Intake/Output Summary (Last 24 hours) at 10/25/2022 1522  Last data filed at 10/25/2022 1805  Gross per 24 hour   Intake --   Output 5 ml   Net -5 ml       EXAM     General Appearance  Awake, alert, oriented, in no acute distress  HEENT - normocephalic, atraumatic. Neck - Supple,  trachea midline   Lungs -coarse breath sounds no crackles rales or wheezes. Heart Exam:PMI normal. No lifts, heaves, or thrills. RRR. No murmurs, clicks, gallops, or rubs  Abdomen Exam: Abdomen soft, non-tender.    Extremity Exam: No signs of cyanosis. MEDS      vancomycin  1,000 mg IntraVENous Q24H    carvedilol  25 mg Oral BID WC    Vitamin D  2,000 Units Oral Daily    sodium chloride flush  5-40 mL IntraVENous 2 times per day    ARIPiprazole  5 mg Oral Daily    atorvastatin  40 mg Oral Daily    divalproex  1,000 mg Oral BID    DULoxetine  60 mg Oral Daily    ferrous sulfate  325 mg Oral BID     ipratropium-albuterol  1 ampule Inhalation Q4H WA    vancomycin (VANCOCIN) intermittent dosing (placeholder)   Other RX Placeholder      sodium chloride 50 mL/hr at 10/18/22 1200     sodium chloride flush, labetalol, potassium chloride **OR** potassium alternative oral replacement **OR** potassium chloride, hydrALAZINE, sodium chloride flush, sodium chloride, acetaminophen, ondansetron **OR** ondansetron, sodium chloride flush, perflutren lipid microspheres    LABS   CBC   Recent Labs     10/25/22  0531   WBC 5.6   HGB 7.8*   HCT 24.9*   MCV 82.2   PLT 34*     BMP:   Lab Results   Component Value Date/Time     10/25/2022 05:31 AM    K 3.4 10/25/2022 05:31 AM     10/25/2022 05:31 AM    CO2 30 10/25/2022 05:31 AM    BUN 12 10/25/2022 05:31 AM    LABALBU 1.7 10/22/2022 04:15 PM    CREATININE 0.71 10/25/2022 05:31 AM    CALCIUM 7.9 10/25/2022 05:31 AM    GFRAA >60 04/08/2022 07:17 AM    LABGLOM >60 10/25/2022 05:31 AM     ABGs:  Lab Results   Component Value Date/Time    PHART 7.452 10/22/2022 03:24 PM    PO2ART 86.7 10/22/2022 03:24 PM    GOB3UUQ 42.7 10/22/2022 03:24 PM      Lab Results   Component Value Date/Time    MODE [facility] 12/28/2016 04:36 AM     Ionized Calcium:  No results found for: IONCA  Magnesium:    Lab Results   Component Value Date/Time    MG 2.2 10/22/2022 04:15 PM     Phosphorus:    Lab Results   Component Value Date/Time    PHOS 3.6 10/18/2022 04:50 AM        LIVER PROFILE   Recent Labs     10/22/22  1615   AST 11   ALT <5*   BILITOT 0.2*   ALKPHOS 69     INR No results for input(s): INR in the last 72 hours.   PTT   Lab Results Component Value Date    APTT 25.6 07/22/2016         RADIOLOGY     (See actual reports for details)    ASSESSMENT/PLAN     Patient Active Problem List   Diagnosis    GERD (gastroesophageal reflux disease)    Hoarseness of voice    MVP (mitral valve prolapse)    Depression with suicidal ideation    Lymphocytic colitis    History of migraine headaches    Hyperlipidemia    Sleep apnea    IGT (impaired glucose tolerance)    History of renal calculi    Lactose intolerance    History of seizure disorder    Leukopenia    Bipolar disorder, mixed ([address]    Illicit drug use    Postlaminectomy syndrome, cervical region    Degeneration of cervical intervertebral disc    Encounter for long-term (current) use of other medications    Bradycardia    Cellulitis    Hypokalemia    Primary hypertension    Elevated lipase    Right lower quadrant abdominal pain    Nausea    Diarrhea    Acute encephalopathy    Polysubstance abuse (HCC)    Fibromyalgia    Cocaine addiction (HCC)    Shoulder arthritis    Osteoarthritis of left glenohumeral joint    Abnormal EKG    Anemia    Anxiety    Carotid artery stenosis    COPD (chronic obstructive pulmonary disease) ([address]    Dental disease    HUTCHINSON (dyspnea on exertion)    Fatigue    Fractures    H/O gastric bypass    History of crack cocaine use    History of UTI    Injury of back    Intractable chronic migraine without aura and without status migrainosus    Intractable migraine with aura with status migrainosus    Kidney stones    Dizziness    Memory loss    Mild pulmonary hypertension (HCC)    Mild tricuspid regurgitation    Primary osteoarthritis of left shoulder    Seizure-like activity (HCC)    Stress at home    TMJ (dislocation of temporomandibular joint)    Tobacco abuse    Visual impairment    Internal hernia    Vitamin D deficiency    Iron deficiency    SIRS (systemic inflammatory response syndrome) (HCC)    NICKI (acute kidney injury) ([address]    Severe malnutrition (HCC)    Elevated d-dimer Leukocytosis    Pulmonary nodules    Delirium due to another medical condition    Acute metabolic encephalopathy    MRSA bacteremia    Acute septic pulmonary embolism (HCC)    Cavitating mass of lung    Allergy to antibiotic     MRSA bacteremia  Septic emboli  E. coli UTI  Acute kidney injury, resolved  Suspect COPD, seen once in the office by Dr. René Wilburn in 2019, FEV1 2.04 L or 86% predicted  Parapneumonic effusion, pleural fluid positive for staph  History of opiate and cocaine use-reportedly uses crack cocaine at least twice weekly  History of tobacco use  Recent COVID +9/30  Elevated troponin likely type II MI  Encephalopathy, improving  History of hypertension  History of seizure disorder  Full code    Patient currently looks stable. Currently no fevers. Vital signs appears to be stable. Currently tube to suction. We will review chest x-ray in a.m. Patient with thrombocytopenia. On EPC cuffs. Holding off on pharmacological prophylaxis    Patient's father updated. He asked a lot of questions to me regarding the patient's sleepiness fearing that it may be the Depakote. I did collaborate with the nurse who informed me that this is being addressed and the levels been ordered. Reportedly the RN did speak to the patient's father on this. Appreciate her efforts.     Electronically signed by Andrew Rob MD on 10/25/2022 at 3:22 PM

## 2022-10-25 NOTE — PLAN OF CARE
Problem: Discharge Planning  Goal: Discharge to home or other facility with appropriate resources  10/25/2022 0255 by Arti Eubanks RN  Outcome: Progressing     Problem: Pain  Goal: Verbalizes/displays adequate comfort level or baseline comfort level  10/25/2022 0255 by Arti Eubanks RN  Outcome: Progressing     Problem: Skin/Tissue Integrity  Goal: Absence of new skin breakdown  Description: 1. Monitor for areas of redness and/or skin breakdown  2. Assess vascular access sites hourly  3. Every 4-6 hours minimum:  Change oxygen saturation probe site  4. Every 4-6 hours:  If on nasal continuous positive airway pressure, respiratory therapy assess nares and determine need for appliance change or resting period.   10/25/2022 0255 by Arti Eubanks RN  Outcome: Progressing     Problem: Safety - Adult  Goal: Free from fall injury  10/25/2022 0255 by Arti Eubanks RN  Outcome: Progressing     Problem: Nutrition Deficit:  Goal: Optimize nutritional status  10/25/2022 0255 by Arti Eubanks RN  Outcome: Progressing

## 2022-10-25 NOTE — PROGRESS NOTES
Vancomycin Dosing by Pharmacy - Daily Note   Vancomycin Therapy Day:  15  Indication: MRSA bacteremia     Allergies:  Aspirin, Bactrim, and Codeine   Actual Weight:    Wt Readings from Last 1 Encounters:   10/25/22 101 lb 6.6 oz (46 kg)       Labs/Ancillary Data  Estimated Creatinine Clearance: 67 mL/min (based on SCr of 0.71 mg/dL). Recent Labs     10/23/22  0502 10/24/22  0501 10/25/22  0531   CREATININE 0.58 0.59 0.71   BUN 13 12 12   WBC 8.4 7.5 5.6     Procalcitonin   Date Value Ref Range Status   10/23/2022 0.15 (H) <0.09 ng/mL Final     Comment:           Suspected Sepsis:  <0.50 ng/mL     Low likelihood of sepsis. 0.50-2.00 ng/mL     Increased likelihood of sepsis. Antibiotics encouraged. >2.00 ng/mL     High risk of sepsis/shock. Antibiotics strongly encouraged. Suspected Lower Resp Tract Infections:  <0.24 ng/mL     Low likelihood of bacterial infection. >0.24 ng/mL     Increased likelihood of bacterial infection. Antibiotics encouraged. With successful antibiotic therapy, PCT levels should decrease rapidly. (Half-life of 24 to   36 hours.)        Procalcitonin values from samples collected within the first 6 hours of systemic infection   may still be low. Retesting may be indicated. Values from day 1 and day 4 can be entered into the Change in Procalcitonin Calculator   (www.[employer]-pct-calculator. [place]) to determine the patient's Mortality Risk Prognosis        In healthy neonates, plasma Procalcitonin (PCT) concentrations increase gradually after   birth, reaching peak values at about 24 hours of age then decrease to normal values below   0.5 ng/mL by 48-72 hours of age.          Intake/Output Summary (Last 24 hours) at 10/25/2022 0802  Last data filed at 10/25/2022 6711  Gross per 24 hour   Intake 10 ml   Output 5 ml   Net 5 ml     Temp: 98.2    Culture Date / Source  /  Results  See micro   Recent vancomycin administrations                     vancomycin (VANCOCIN) 1,250 mg in dextrose 5 % 250 mL IVPB (ADDAVIAL) (mg) 1,250 mg New Bag 10/24/22 2039    vancomycin 1000 mg IVPB in 250 mL D5W addavial (mg) 1,000 mg New Bag 10/23/22 2003     1,000 mg New Bag 10/22/22 2034                    Vancomycin Concentrations:   TROUGH:  No results for input(s): VANCOTROUGH in the last 72 hours. RANDOM:    Recent Labs     10/24/22  0501   VANCORANDOM 21.9       MRSA Nasal Swab: N/A. Non-respiratory infection. Mago Ricci PLAN     Decrease dose to 1000 mg q24h IV  Ensured BUN/sCr ordered at baseline and every 48 hours x at least 3 levels, then at least weekly. Pharmacy will continue to monitor patient and adjust therapy as indicated      Vancomycin Target Concentration Parameters  Treatment  Population Target AUC/GABBI Target Trough   Invasive MRSA Infection (bacteremia, pneumonia, meningitis, endocarditis, osteomyelitis)  Sepsis (undifferentiated) 400-600 N/A   Infection due to non-MRSA pathogen  Empiric treatment of non-invasive MRSA infection  (SSTI, UTI) <500 10-15 mg/L   CrCl < 29 mL/min  Rapidly fluctuating serum creatinine   NICKI N/A < 15 mg/L     Renal replacement therapy is dosed by levels, per hospital protocol. Abbreviations  * Pauc: probability that AUC is >400 (efficacy); Pconc: probability that Ctrough is above 20 ?g/mL (toxicity); Tox: Probability of nephrotoxicity, based on Iva et al. Clin Infect Dis 2009. Loading dose: N/A  Regimen: 1000 mg IV every 24 hours. Start time: 20:39 on 10/25/2022  Exposure target: AUC24 (range)400-600 mg/L.hr   AUC24,ss: 496 mg/L.hr  Probability of AUC24 > 400: 98 %  Ctrough,ss: 15.0 mg/L  Probability of Ctrough,ss > 20: 1 %  Probability of nephrotoxicity (Lodise DEVON 2009): 10 %    Thank you for the consult. Pharmacy will continue to follow.     Delphine Siu PharmD, BCPS  10/25/2022 8:03 AM

## 2022-10-25 NOTE — CARE COORDINATION
[address] following patient. Patient will need authorization once close to being medically ready. One on one needs to be discontinued for 24 hours before discharge as well. AUTHORIZATION HAS NOT BEEN STARTED. Omre Estevez Electronically signed by JOSE Thurston on 10/25/2022 at 5:07 PM

## 2022-10-25 NOTE — PROGRESS NOTES
Progress Note  Date:10/25/2022       Room:[address] [place] [address]  Patient Name:Anu Bourne     YOB: 1970     Age:51 y.o. Subjective    Subjective:  Symptoms:  Stable. (Being fed breakfast. Does not open eyes, speaking but I cannot understand her. ). Diet:  Poor intake. Activity level: Impaired due to weakness. Pain:  She reports no pain. Review of Systems  Objective         Vitals Last 24 Hours:  TEMPERATURE:  Temp  Av.9 °F (36.6 °C)  Min: 97.6 °F (36.4 °C)  Max: 98.2 °F (36.8 °C)  RESPIRATIONS RANGE: Resp  Av.6  Min: 16  Max: 22  PULSE OXIMETRY RANGE: SpO2  Av.4 %  Min: 92 %  Max: 100 %  PULSE RANGE: Pulse  Av.1  Min: 60  Max: 84  BLOOD PRESSURE RANGE: Systolic (41OPU), MBW:025 , Min:120 , JXV:826   ; Diastolic (53MXB), NTC:57, Min:64, Max:78    I/O (24Hr): Intake/Output Summary (Last 24 hours) at 10/25/2022 0804  Last data filed at 10/25/2022 2592  Gross per 24 hour   Intake 10 ml   Output 5 ml   Net 5 ml     Objective:  General Appearance:  Comfortable. Vital signs: (most recent): Blood pressure 138/72, pulse 72, temperature 98.2 °F (36.8 °C), temperature source Axillary, resp. rate 18, height 4' 11\" (1.499 m), weight 101 lb 6.6 oz (46 kg), SpO2 96 %. Vital signs are normal.    Heart: Normal rate. Regular rhythm. S1 normal and S2 normal.    Labs/Imaging/Diagnostics    Labs:  CBC:  Recent Labs     10/23/22  0502 10/24/22  0501 10/25/22  0531   WBC 8.4 7.5 5.6   RBC 3.12* 2.96* 3.03*   HGB 8.1* 7.8* 7.8*   HCT 25.7* 24.3* 24.9*   MCV 82.3 82.1 82.2   RDW 17.0* 17.1* 16.9*   PLT 57* 42* 34*     CHEMISTRIES:  Recent Labs     10/22/22  1615 10/23/22  0502 10/24/22  0501 10/25/22  0531    140 144 140   K 3.2* 5.1 3.5* 3.4*    106 108* 105   CO2 25 27 28 30   BUN 15 13 12 12   CREATININE 0.66 0.58 0.59 0.71   GLUCOSE 87 80 77 82   MG 2.2  --   --   --      PT/INR:No results for input(s): PROTIME, INR in the last 72 hours.   APTT:No results for input(s): APTT in the last 72 hours. LIVER PROFILE:  Recent Labs     10/22/22  1615   AST 11   ALT <5*   BILITOT 0.2*   ALKPHOS 69       Imaging Last 24 Hours:  XR CHEST PORTABLE    Result Date: 10/24/2022  EXAMINATION: ONE XRAY VIEW OF THE CHEST 10/24/2022 6:27 am COMPARISON: 10/23/2022 HISTORY: ORDERING SYSTEM PROVIDED HISTORY: Follow-up empyema; pleural catheters fell out TECHNOLOGIST PROVIDED HISTORY: Follow-up empyema; pleural catheters fell out Reason for Exam: post chest tube removal FINDINGS: Pleural pigtail catheter is no longer visualized. Left shoulder prosthesis and fusion hardware cervical spine. Right arm PICC line tip is in the right atrium. Stable cardiomediastinal silhouette. There is improved aeration of the right lung base. No significant pneumothorax is seen. No large pleural effusions within limits of the exam.  There are nodular parenchymal opacities in both lungs, some with cavitation, compatible with a septic emboli and better shown on the prior CT. Cholecystectomy clips. Improved aeration of the right lung base since the prior exam.     IR GUIDED PERC PLEURAL DRAIN W CATH INSERT    Result Date: 10/24/2022  PROCEDURE: IR PERC CATH PLEURAL DRAIN W/IMAG 10/24/2022 HISTORY: ORDERING SYSTEM PROVIDED HISTORY: Empyema, previous catheter fell out TECHNOLOGIST PROVIDED HISTORY: Empyema, previous catheter fell out Which side should the procedure be performed?->Right Is the patient pregnant?->No CONTRAST: None SEDATION: None FLUOROSCOPY DOSE AND TYPE OR TIME AND EXPOSURES: None DESCRIPTION OF PROCEDURE AND FINDINGS: Informed consent was obtained from the patient's father after a detailed explanation of the procedure including risks, benefits, and alternatives. Universal protocol was observed. Sterile gowns, masks, hats and gloves utilized for maximal sterile barrier. Additionally targeted ultrasound of right chest was performed and shows a small complex pleural effusion.   Skin over the area was prepped and draped in standard sterile fashion. 1% lidocaine used for local anesthesia. Using realtime ultrasound guidance an 8 [place] pigtail catheter was advanced into the pleural fluid collection using trocar technique. Ultrasound images show a safe tract and access into the fluid. The catheter was sutured to the skin and secured in place with a sterile Vaseline gauze dressing. The tube was connected to an Atrium drainage system. There are no immediate complications. The patient left the department in stable condition. EBL: None. Ultrasound shows a small complex loculated right pleural effusion. 8 [place] pleural pigtail catheter was placed. Findings were discussed with Dr. Mary Hayward At 1:00 pm on 10/24/2022.      Assessment//Plan           Hospital Problems             Last Modified POA    * (Principal) SIRS (systemic inflammatory response syndrome) ([address] 10/10/2022 Yes    NICKI (acute kidney injury) ([address] 10/11/2022 Yes    Severe malnutrition (HCC) (Chronic) 10/11/2022 Yes    Elevated d-dimer 10/15/2022 Yes    Leukocytosis 10/15/2022 Yes    Pulmonary nodules 10/15/2022 Yes    Delirium due to another medical condition 10/17/2022 Yes    Acute metabolic encephalopathy 81/03/7231 Yes    MRSA bacteremia 10/23/2022 Yes    Acute septic pulmonary embolism ([address] 10/23/2022 Yes    Cavitating mass of lung 10/23/2022 Yes    Allergy to antibiotic 10/23/2022 Yes    Depression with suicidal ideation 10/17/2022 Yes    Bipolar disorder, mixed ([address] 10/11/2022 Yes    Acute encephalopathy 10/14/2022 Yes    COPD (chronic obstructive pulmonary disease) ([address] 10/11/2022 Yes    H/O gastric bypass 10/11/2022 Yes    Overview Signed 2/13/2020  7:19 PM by Mariah Tafoya MD     enlarged  pancreas, here for surgical work-up for EGD          Assessment & Plan    Continue current management      Electronically signed by Mariah Tafoya MD on 10/25/22 at 8:04 AM EDT

## 2022-10-26 ENCOUNTER — APPOINTMENT (OUTPATIENT)
Dept: CT IMAGING | Age: 52
DRG: 871 | End: 2022-10-26
Payer: COMMERCIAL

## 2022-10-26 PROBLEM — D69.6 THROMBOCYTOPENIA (HCC): Status: ACTIVE | Noted: 2022-10-26

## 2022-10-26 LAB
ABSOLUTE EOS #: 0 K/UL (ref 0–0.4)
ABSOLUTE LYMPH #: 0.82 K/UL (ref 1–4.8)
ABSOLUTE MONO #: 0.21 K/UL (ref 0.1–1.3)
ABSOLUTE RETIC #: 0.04 M/UL (ref 0.02–0.1)
ANION GAP SERPL CALCULATED.3IONS-SCNC: 7 MMOL/L (ref 9–17)
BASOPHILS # BLD: 0 % (ref 0–2)
BASOPHILS ABSOLUTE: 0 K/UL (ref 0–0.2)
BUN BLDV-MCNC: 11 MG/DL (ref 6–20)
CALCIUM SERPL-MCNC: 7.7 MG/DL (ref 8.6–10.4)
CHLORIDE BLD-SCNC: 107 MMOL/L (ref 98–107)
CO2: 29 MMOL/L (ref 20–31)
CREAT SERPL-MCNC: 0.69 MG/DL (ref 0.5–0.9)
EOSINOPHILS RELATIVE PERCENT: 0 % (ref 0–4)
FDP: >5 UG/ML
FERRITIN: 645 NG/ML (ref 13–150)
GFR SERPL CREATININE-BSD FRML MDRD: >60 ML/MIN/1.73M2
GLUCOSE BLD-MCNC: 94 MG/DL (ref 70–99)
HAPTOGLOBIN: 310 MG/DL (ref 30–200)
HCT VFR BLD CALC: 22.2 % (ref 36–46)
HCT VFR BLD CALC: 25.3 % (ref 36–46)
HEMOGLOBIN: 6.8 G/DL (ref 12–16)
HEMOGLOBIN: 8.1 G/DL (ref 12–16)
HIV AG/AB: NONREACTIVE
INR BLD: 1.3
IRON SATURATION: 47 % (ref 20–55)
IRON: 61 UG/DL (ref 37–145)
LACTATE DEHYDROGENASE: 182 U/L (ref 135–214)
LYMPHOCYTES # BLD: 20 % (ref 24–44)
MCH RBC QN AUTO: 25.5 PG (ref 26–34)
MCHC RBC AUTO-ENTMCNC: 30.9 G/DL (ref 31–37)
MCV RBC AUTO: 82.5 FL (ref 80–100)
MONOCYTES # BLD: 5 % (ref 1–7)
MORPHOLOGY: ABNORMAL
PARTIAL THROMBOPLASTIN TIME: 30.6 SEC (ref 24–36)
PDW BLD-RTO: 17 % (ref 11.5–14.9)
PLATELET # BLD: 26 K/UL (ref 150–450)
PMV BLD AUTO: 7.4 FL (ref 6–12)
POTASSIUM SERPL-SCNC: 3.9 MMOL/L (ref 3.7–5.3)
PROTHROMBIN TIME: 15.9 SEC (ref 11.8–14.6)
RBC # BLD: 2.69 M/UL (ref 4–5.2)
RETIC %: 1.3 % (ref 0.5–2)
SEG NEUTROPHILS: 75 % (ref 36–66)
SEGMENTED NEUTROPHILS ABSOLUTE COUNT: 3.07 K/UL (ref 1.3–9.1)
SODIUM BLD-SCNC: 143 MMOL/L (ref 135–144)
TOTAL IRON BINDING CAPACITY: 129 UG/DL (ref 250–450)
UNSATURATED IRON BINDING CAPACITY: 68 UG/DL (ref 112–347)
WBC # BLD: 4.1 K/UL (ref 3.5–11)

## 2022-10-26 PROCEDURE — 86920 COMPATIBILITY TEST SPIN: CPT

## 2022-10-26 PROCEDURE — 6370000000 HC RX 637 (ALT 250 FOR IP): Performed by: INTERNAL MEDICINE

## 2022-10-26 PROCEDURE — 83010 ASSAY OF HAPTOGLOBIN QUANT: CPT

## 2022-10-26 PROCEDURE — 2700000000 HC OXYGEN THERAPY PER DAY

## 2022-10-26 PROCEDURE — 2060000000 HC ICU INTERMEDIATE R&B

## 2022-10-26 PROCEDURE — 83521 IG LIGHT CHAINS FREE EACH: CPT

## 2022-10-26 PROCEDURE — 99232 SBSQ HOSP IP/OBS MODERATE 35: CPT | Performed by: FAMILY MEDICINE

## 2022-10-26 PROCEDURE — 94664 DEMO&/EVAL PT USE INHALER: CPT

## 2022-10-26 PROCEDURE — 80048 BASIC METABOLIC PNL TOTAL CA: CPT

## 2022-10-26 PROCEDURE — 97162 PT EVAL MOD COMPLEX 30 MIN: CPT

## 2022-10-26 PROCEDURE — 85018 HEMOGLOBIN: CPT

## 2022-10-26 PROCEDURE — 85610 PROTHROMBIN TIME: CPT

## 2022-10-26 PROCEDURE — 86850 RBC ANTIBODY SCREEN: CPT

## 2022-10-26 PROCEDURE — 85025 COMPLETE CBC W/AUTO DIFF WBC: CPT

## 2022-10-26 PROCEDURE — 94761 N-INVAS EAR/PLS OXIMETRY MLT: CPT

## 2022-10-26 PROCEDURE — 2580000003 HC RX 258: Performed by: INTERNAL MEDICINE

## 2022-10-26 PROCEDURE — 36415 COLL VENOUS BLD VENIPUNCTURE: CPT

## 2022-10-26 PROCEDURE — 82607 VITAMIN B-12: CPT

## 2022-10-26 PROCEDURE — 85730 THROMBOPLASTIN TIME PARTIAL: CPT

## 2022-10-26 PROCEDURE — 97166 OT EVAL MOD COMPLEX 45 MIN: CPT

## 2022-10-26 PROCEDURE — 82746 ASSAY OF FOLIC ACID SERUM: CPT

## 2022-10-26 PROCEDURE — 83615 LACTATE (LD) (LDH) ENZYME: CPT

## 2022-10-26 PROCEDURE — 6360000002 HC RX W HCPCS: Performed by: INTERNAL MEDICINE

## 2022-10-26 PROCEDURE — 99232 SBSQ HOSP IP/OBS MODERATE 35: CPT | Performed by: INTERNAL MEDICINE

## 2022-10-26 PROCEDURE — 94640 AIRWAY INHALATION TREATMENT: CPT

## 2022-10-26 PROCEDURE — P9016 RBC LEUKOCYTES REDUCED: HCPCS

## 2022-10-26 PROCEDURE — 83540 ASSAY OF IRON: CPT

## 2022-10-26 PROCEDURE — 99223 1ST HOSP IP/OBS HIGH 75: CPT | Performed by: INTERNAL MEDICINE

## 2022-10-26 PROCEDURE — 86900 BLOOD TYPING SEROLOGIC ABO: CPT

## 2022-10-26 PROCEDURE — 86901 BLOOD TYPING SEROLOGIC RH(D): CPT

## 2022-10-26 PROCEDURE — 82728 ASSAY OF FERRITIN: CPT

## 2022-10-26 PROCEDURE — 71250 CT THORAX DX C-: CPT

## 2022-10-26 PROCEDURE — 83550 IRON BINDING TEST: CPT

## 2022-10-26 PROCEDURE — 85014 HEMATOCRIT: CPT

## 2022-10-26 PROCEDURE — 36430 TRANSFUSION BLD/BLD COMPNT: CPT

## 2022-10-26 PROCEDURE — 85362 FIBRIN DEGRADATION PRODUCTS: CPT

## 2022-10-26 PROCEDURE — 87389 HIV-1 AG W/HIV-1&-2 AB AG IA: CPT

## 2022-10-26 PROCEDURE — 85045 AUTOMATED RETICULOCYTE COUNT: CPT

## 2022-10-26 PROCEDURE — 86803 HEPATITIS C AB TEST: CPT

## 2022-10-26 RX ORDER — SODIUM CHLORIDE 9 MG/ML
INJECTION, SOLUTION INTRAVENOUS PRN
Status: DISCONTINUED | OUTPATIENT
Start: 2022-10-26 | End: 2022-11-01

## 2022-10-26 RX ADMIN — SODIUM CHLORIDE, PRESERVATIVE FREE 10 ML: 5 INJECTION INTRAVENOUS at 22:48

## 2022-10-26 RX ADMIN — CEFTAROLINE FOSAMIL 600 MG: 600 POWDER, FOR SOLUTION INTRAVENOUS at 22:47

## 2022-10-26 RX ADMIN — CARVEDILOL 25 MG: 25 TABLET, FILM COATED ORAL at 18:15

## 2022-10-26 RX ADMIN — IPRATROPIUM BROMIDE AND ALBUTEROL SULFATE 1 AMPULE: 2.5; .5 SOLUTION RESPIRATORY (INHALATION) at 10:59

## 2022-10-26 RX ADMIN — IPRATROPIUM BROMIDE AND ALBUTEROL SULFATE 1 AMPULE: 2.5; .5 SOLUTION RESPIRATORY (INHALATION) at 19:17

## 2022-10-26 RX ADMIN — DULOXETINE 60 MG: 60 CAPSULE, DELAYED RELEASE ORAL at 09:05

## 2022-10-26 RX ADMIN — SODIUM CHLORIDE, PRESERVATIVE FREE 10 ML: 5 INJECTION INTRAVENOUS at 09:12

## 2022-10-26 RX ADMIN — Medication 2000 UNITS: at 09:05

## 2022-10-26 RX ADMIN — DIVALPROEX SODIUM 1000 MG: 500 TABLET, DELAYED RELEASE ORAL at 09:05

## 2022-10-26 RX ADMIN — CARVEDILOL 25 MG: 25 TABLET, FILM COATED ORAL at 09:06

## 2022-10-26 RX ADMIN — FERROUS SULFATE TAB 325 MG (65 MG ELEMENTAL FE) 325 MG: 325 (65 FE) TAB at 09:06

## 2022-10-26 RX ADMIN — IPRATROPIUM BROMIDE AND ALBUTEROL SULFATE 1 AMPULE: 2.5; .5 SOLUTION RESPIRATORY (INHALATION) at 08:27

## 2022-10-26 RX ADMIN — ARIPIPRAZOLE 5 MG: 5 TABLET ORAL at 09:05

## 2022-10-26 RX ADMIN — IPRATROPIUM BROMIDE AND ALBUTEROL SULFATE 1 AMPULE: 2.5; .5 SOLUTION RESPIRATORY (INHALATION) at 15:59

## 2022-10-26 RX ADMIN — FERROUS SULFATE TAB 325 MG (65 MG ELEMENTAL FE) 325 MG: 325 (65 FE) TAB at 18:15

## 2022-10-26 ASSESSMENT — PAIN SCALES - GENERAL: PAINLEVEL_OUTOF10: 0

## 2022-10-26 NOTE — PROGRESS NOTES
Vancomycin Dosing by Pharmacy - Daily Note   Vancomycin Therapy Day:  16  Indication: MRSA bacteremia    Allergies:  Aspirin, Bactrim, and Codeine   Actual Weight:    Wt Readings from Last 1 Encounters:   10/26/22 101 lb 13.6 oz (46.2 kg)       Labs/Ancillary Data  Estimated Creatinine Clearance: 67 mL/min (based on SCr of 0.71 mg/dL). Recent Labs     10/24/22  0501 10/25/22  0531   CREATININE 0.59 0.71   BUN 12 12   WBC 7.5 5.6     Procalcitonin   Date Value Ref Range Status   10/23/2022 0.15 (H) <0.09 ng/mL Final     Comment:           Suspected Sepsis:  <0.50 ng/mL     Low likelihood of sepsis. 0.50-2.00 ng/mL     Increased likelihood of sepsis. Antibiotics encouraged. >2.00 ng/mL     High risk of sepsis/shock. Antibiotics strongly encouraged. Suspected Lower Resp Tract Infections:  <0.24 ng/mL     Low likelihood of bacterial infection. >0.24 ng/mL     Increased likelihood of bacterial infection. Antibiotics encouraged. With successful antibiotic therapy, PCT levels should decrease rapidly. (Half-life of 24 to   36 hours.)        Procalcitonin values from samples collected within the first 6 hours of systemic infection   may still be low. Retesting may be indicated. Values from day 1 and day 4 can be entered into the Change in Procalcitonin Calculator   (www.[employer]Ms-pct-calculator. com) to determine the patient's Mortality Risk Prognosis        In healthy neonates, plasma Procalcitonin (PCT) concentrations increase gradually after   birth, reaching peak values at about 24 hours of age then decrease to normal values below   0.5 ng/mL by 48-72 hours of age.          Intake/Output Summary (Last 24 hours) at 10/26/2022 0834  Last data filed at 10/26/2022 0536  Gross per 24 hour   Intake 350 ml   Output --   Net 350 ml     Temp: 97.7 F    Culture Date / Source  /  Results  See micro  Recent vancomycin administrations                     vancomycin 1000 mg IVPB in 250 mL D5W addavial (mg) 1,000 mg New

## 2022-10-26 NOTE — PROGRESS NOTES
Dr. Pardeep Wynn about her needing to contact father for informed consent to give blood. Awaiting response.

## 2022-10-26 NOTE — PROGRESS NOTES
Reached patient's father, Brayan Chery, by phone. Discussed risks and benefits of blood transfusion, including infection, allergic reaction, and organ failure/dysfunction if hemoglobin continues to drop. Mr. Hsieh Centers agrees to transfusion on [facility] behalf.      Amrita King MD, FAAFP  10/26/2022 4940

## 2022-10-26 NOTE — PROGRESS NOTES
[facility] -   Infectious diseases evaluation  admission date 10/10/2022    reason for consultation:   MRSA bacteremia    Impression :   Current:  MRSA bacteremia  Bilateral pulmonary nodules with cavitation /loculated pleural effusion /suspected septic emboli s/p thoracentesis with bilateral CT placement. Sepsis secondary to above  Thrombocytopenia/anemia? Vancomycin related  Encephalopathy  History of cocaine drug abuse  E. coli UTI treated  Acute renal failure improved  Hypertension  Bipolar disorder  Allergy to Bactrim. Recommendations     Discontinue vancomycin IV due to thrombocytopenia  IV Teflaro through 12/03/22  Hematology oncology consult  Follow CBC and renal function closely. Supportive care. Infection Control Recommendations   [place] Precautions  Contact Isolation     Antimicrobial Stewardship Recommendations   Simplification of therapy  Targeted therapy      History of Present Illness:   Initial history:  Olayinka Srivastava is a 46y.o.-year-old female was admitted 10/10/2020 for worsening shortness of breath associated with generalized body ache. The patient is drowsy, arousable, confused, follows simple commands, unable to provide history that was obtained from chart review and nursing staff. She does have history of cocaine abuse. She was found to have acute renal failure with a creatinine of 2.1. Urinalysis showed small leukocyte esterase, positive nitrate. Urine culture on 10/10/2022 grew E. Coli  Blood cultures 10/10/2022 2 of 2 grew MRSA  Echocardiogram showed no vegetations  Procalcitonin was 6.01, D-dimer was elevated at 3.85  Chest x-ray showed right lung nodules. VQ scan was indeterminant.   CT chest without contrast 10/16/2022 showed numerous bilateral nodular opacities both lungs suggestive of septic emboli , focal consolidation with cavitation in the superior right lower lobe focal consolidation at the left base, small bilateral loculated pleural effusion and mediastinal lymphadenopathy. Echocardiogram showed no vegetations  ADRIANNE negative for vegetations. Interval changes  10/26/2022   She is afebrile, drowsy, no new complaints  Status post ultrasound-guided right pleural drain placement yesterday. Platelet count 24 and hemoglobin 6.8, no active bleed  RUE PICC site clean. Patient Vitals for the past 8 hrs:   BP Temp Temp src Pulse Resp SpO2   10/26/22 1231 117/60 97.9 °F (36.6 °C) Axillary 68 20 98 %   10/26/22 1100 -- -- -- -- -- 100 %   10/26/22 0906 (!) 147/74 -- -- 66 -- --   10/26/22 0827 -- -- -- -- -- 100 %     Labs:   Cre: 0.59-0.65-0.58  WBC: 7.0-7.3-8.4    Micro:  10/21 Thoracentesis-Staph Aureus 1 colony unit. 10/22 UA-Negative. I have personally reviewed the past medical history, past surgical history, medications, social history, and family history, and I haveupdated the database accordingly. Allergies:   Aspirin, Bactrim, and Codeine     Review of Systems:     Confused, unable to provide  Physical Examination :       Physical Exam  Vitals and nursing note reviewed. Constitutional:       General: She is not in acute distress. Appearance: She is not ill-appearing. HENT:      Head: Normocephalic and atraumatic. Right Ear: External ear normal.      Left Ear: External ear normal.      Nose: Nose normal.      Mouth/Throat:      Mouth: Mucous membranes are moist.      Pharynx: Oropharynx is clear. Eyes:      General: No scleral icterus. Conjunctiva/sclera: Conjunctivae normal.   Cardiovascular:      Rate and Rhythm: Regular rhythm. Heart sounds: Normal heart sounds. No murmur heard. Pulmonary:      Effort: Pulmonary effort is normal. No respiratory distress. Breath sounds: No wheezing or rhonchi. Comments:   Decreased breath sounds bilaterally  Abdominal:      General: Bowel sounds are normal. There is no distension. Palpations: Abdomen is soft. Tenderness:  There is no abdominal tenderness. Genitourinary:     Comments: No chamberlain. Musculoskeletal:      Cervical back: Neck supple. No rigidity. Right lower leg: No edema. Left lower leg: No edema. Skin:     General: Skin is warm and dry. Coloration: Skin is not jaundiced. Findings: No erythema.    Neurological:      Comments: Drowsy   Psychiatric:      Comments: ISAAK     Past Medical History:     Past Medical History:   Diagnosis Date    Anemia     Arthritis     Asthma     Bipolar disorder, mixed ([facility]     Carotid artery stenosis 2020    Cocaine abuse ([facility] 2014    COPD (chronic obstructive pulmonary disease) ([facility]     Degeneration of cervical intervertebral disc     Depression with anxiety     Depression with anxiety     Fibromyalgia 2017    GERD (gastroesophageal reflux disease)     History of migraine headaches 6/10/2014    History of renal calculi 6/10/2014    History of seizure disorder 6/10/2014    Hoarseness of voice     HTN (hypertension) 6/10/2014    Hyperlipidemia 6/10/2014    IGT (impaired glucose tolerance) 6/10/2014    Kidney stones     Lactose intolerance 6/10/2014    Leukopenia 6/10/2014    Major depressive disorder, recurrent episode, severe, without mention of psychotic behavior 2014    MVP (mitral valve prolapse)     Seizures (HCC)     Sleep apnea 6/10/2014    Unspecified diseases of blood and blood-forming organs        Past Surgical  History:     Past Surgical History:   Procedure Laterality Date    ANKLE FRACTURE SURGERY      recontruction surgery    APPENDECTOMY      BREAST LUMPECTOMY Right     CARDIAC CATHETERIZATION      CARPAL TUNNEL RELEASE      x2     SECTION      CHOLECYSTECTOMY      COLONOSCOPY  10    COLONOSCOPY  2016    severe spasms    COLONOSCOPY  2019    DR GOLDY MCINTOSH    GASTRIC BYPASS SURGERY      GASTRIC BYPASS SURGERY      HYSTERECTOMY (CERVIX STATUS UNKNOWN)      HYSTERECTOMY (CERVIX STATUS UNKNOWN)      IR PERC CATH PLEURAL DRAIN W/IMAG  10/21/2022    IR PERC CATH PLEURAL DRAIN W/IMAG 10/21/2022 [facility]    IR PERC CATH PLEURAL DRAIN W/IMAG  10/21/2022    IR PERC CATH PLEURAL DRAIN W/IMAG 10/21/2022 [facility]    IR PERC CATH PLEURAL DRAIN W/IMAG  10/24/2022    IR PERC CATH PLEURAL DRAIN W/IMAG 10/24/2022 [facility]    LAPAROSCOPY N/A 1/29/2022    LAPAROSCOPY EXPLORATORY CONVERTED TO OPEN EXPLORATORY LAPAROTOMY, LYSIS OF ADHESIONS, REDUCTION OF INTERNAL HERNIA [facility]  [facility]    APPLICATION OF ARCH BARS,SIMPLE EXTRACTION OF #15 AND RT TMJ JOINT REPLACEMENT    SHOULDER ARTHROSCOPY Left 06/05/2019    DR ROBERT GREEN    SHOULDER SURGERY      TONSILLECTOMY AND ADENOIDECTOMY      TRANSESOPHAGEAL ECHOCARDIOGRAM N/A 10/19/2022    TRANSESOPHAGEAL ECHOCARDIOGRAM WITH BUBBLE STUDY performed by Frandy Royal DO at [facility]  7-3-12    egd    UPPER GASTROINTESTINAL ENDOSCOPY  12/19/2016    status post gastrectomy with a small remnant of gastric pouch.     UPPER GASTROINTESTINAL ENDOSCOPY  05/2019    DR Rafita Jorge       Medications:      ceftaroline fosamil (TEFLARO) IVPB  600 mg IntraVENous Q12H    vancomycin  1,000 mg IntraVENous Q24H    carvedilol  25 mg Oral BID WC    Vitamin D  2,000 Units Oral Daily    sodium chloride flush  5-40 mL IntraVENous 2 times per day    ARIPiprazole  5 mg Oral Daily    atorvastatin  40 mg Oral Daily    divalproex  1,000 mg Oral BID    DULoxetine  60 mg Oral Daily    ferrous sulfate  325 mg Oral BID WC    ipratropium-albuterol  1 ampule Inhalation Q4H WA       Social History:     Social History     Socioeconomic History    Marital status:      Spouse name: Not on file    Number of children: Not on file    Years of education: Not on file    Highest education level: Not on file   Occupational History    Occupation: disability   Tobacco Use    Smoking status: Every Day     Packs/day: 0.50     Years: 33.00     Pack years: 16.50     Types: Cigarettes    Smokeless tobacco: Never    Tobacco comments:     quit  2000 started again  1/13    Substance and Sexual Activity    Alcohol use: Yes     Comment: occasional    Drug use: Not Currently    Sexual activity: Yes   Other Topics Concern    Not on file   Social History Narrative    Not on file     Social Determinants of Health     Financial Resource Strain: Not on file   Food Insecurity: Not on file   Transportation Needs: Not on file   Physical Activity: Not on file   Stress: Not on file   Social Connections: Not on file   Intimate Partner Violence: Not on file   Housing Stability: Not on file       Family History:     Family History   Problem Relation Age of Onset    Diabetes Mother     Cancer Mother     Coronary Art Dis Father     COPD Father     Depression Brother     Alcohol Abuse Brother     Cancer Other         lung and skin    Diabetes Maternal Grandmother     Cancer Paternal Grandmother       Medical Decision Making:   I have independently reviewed/ordered the following labs:    CBC with Differential:   Recent Labs     10/25/22  0531 10/26/22  1338   WBC 5.6 4.1   HGB 7.8* 6.8*   HCT 24.9* 22.2*   PLT 34* 26*   LYMPHOPCT 11* 20*   MONOPCT 8* 5         BMP:  Recent Labs     10/25/22  0531 10/26/22  1338    143   K 3.4* 3.9    107   CO2 30 29   BUN 12 11   CREATININE 0.71 0.69       Hepatic Function Panel: No results for input(s): PROT, LABALBU, BILIDIR, IBILI, BILITOT, ALKPHOS, ALT, AST in the last 72 hours. No results for input(s): RPR in the last 72 hours. No results for input(s): HIV in the last 72 hours. No results for input(s): BC in the last 72 hours. Lab Results   Component Value Date/Time    CREATININE 0.69 10/26/2022 01:38 PM    GLUCOSE 94 10/26/2022 01:38 PM       Detailed results: Thank you for allowing us to participate in the care of this patient. Please call with questions. This note is created with the assistance of a speech recognition program.  While intending to generate adocument that actually reflects the content of the visit, the document can still have some errors including those of syntax and sound a like substitutions which may escape proof reading. It such instances, actual meaningcan be extrapolated by contextual diversion.     Kartik Schneider MD  Office/Perfect Serve:   [phone]

## 2022-10-26 NOTE — CARE COORDINATION
SW following for when patient will be medically ready to start authorization for [facility].  Electronically signed by JOSE Celestin on 10/26/2022 at 3:55 PM

## 2022-10-26 NOTE — PROGRESS NOTES
Pulmonary Progress Note  [facility]      Patient - Mary Molina,  Age - 46 y.o.    - 1970      Room Number - 2109/2109-01   N -  [id number]   [facility]  Date of Admission -  10/10/2022  4:37 AM    Consulting Service/Physician   Consulting - No att. providers found  Primary Care Physician - Lizbet Marquez MD     SUBJECTIVE   Patient appears to be stable. Resting quietly    OBJECTIVE   VITALS    height is 4' 11\" (1.499 m) and weight is 101 lb 13.6 oz (46.2 kg). Her axillary temperature is 97.9 °F (36.6 °C). Her blood pressure is 117/60 and her pulse is 68. Her respiration is 20 and oxygen saturation is 98%. Body mass index is 20.57 kg/m². Temperature Range: Temp: 97.9 °F (36.6 °C) Temp  Av.7 °F (36.5 °C)  Min: 97.5 °F (36.4 °C)  Max: 97.9 °F (36.6 °C)  BP Range:  Systolic (75XPP), XEV:031 , Min:115 , DBP:157     Diastolic (77HWX), UOH:95, Min:54, Max:74    Pulse Range: Pulse  Av.1  Min: 64  Max: 77  Respiration Range: Resp  Av  Min: 16  Max: 20  Current Pulse Ox[de-identified]  SpO2: 98 %  24HR Pulse Ox Range:  SpO2  Av.8 %  Min: 91 %  Max: 100 %  Oxygen Amount and Delivery: O2 Flow Rate (L/min): 1 L/min    Wt Readings from Last 3 Encounters:   10/26/22 101 lb 13.6 oz (46.2 kg)   22 96 lb 8 oz (43.8 kg)   22 105 lb (47.6 kg)       I/O (24 Hours)    Intake/Output Summary (Last 24 hours) at 10/26/2022 1315  Last data filed at 10/26/2022 0536  Gross per 24 hour   Intake 350 ml   Output --   Net 350 ml       EXAM     General Appearance  Awake, alert, oriented, in no acute distress  HEENT - normocephalic, atraumatic. Neck - Supple,  trachea midline   Lungs -coarse breath sounds anterior lateral  Heart Exam:PMI normal. No lifts, heaves, or thrills. RRR. No murmurs, clicks, gallops, or rubs  Abdomen Exam: Abdomen soft, non-tender.    Extremity Exam: No signs of cyanosis    MEDS      vancomycin  1,000 mg IntraVENous Q24H    carvedilol  25 mg Oral BID WC    Vitamin D  2,000 Units Oral Daily    sodium chloride flush  5-40 mL IntraVENous 2 times per day    ARIPiprazole  5 mg Oral Daily    atorvastatin  40 mg Oral Daily    divalproex  1,000 mg Oral BID    DULoxetine  60 mg Oral Daily    ferrous sulfate  325 mg Oral BID     ipratropium-albuterol  1 ampule Inhalation Q4H WA    vancomycin (VANCOCIN) intermittent dosing (placeholder)   Other RX Placeholder      sodium chloride 50 mL/hr at 10/18/22 1200     sodium chloride flush, labetalol, potassium chloride **OR** potassium alternative oral replacement **OR** potassium chloride, hydrALAZINE, sodium chloride flush, sodium chloride, acetaminophen, ondansetron **OR** ondansetron, sodium chloride flush, perflutren lipid microspheres    LABS   CBC   Recent Labs     10/25/22  0531   WBC 5.6   HGB 7.8*   HCT 24.9*   MCV 82.2   PLT 34*     BMP:   Lab Results   Component Value Date/Time     10/25/2022 05:31 AM    K 3.4 10/25/2022 05:31 AM     10/25/2022 05:31 AM    CO2 30 10/25/2022 05:31 AM    BUN 12 10/25/2022 05:31 AM    LABALBU 1.7 10/22/2022 04:15 PM    CREATININE 0.71 10/25/2022 05:31 AM    CALCIUM 7.9 10/25/2022 05:31 AM    GFRAA >60 04/08/2022 07:17 AM    LABGLOM >60 10/25/2022 05:31 AM     ABGs:  Lab Results   Component Value Date/Time    PHART 7.452 10/22/2022 03:24 PM    PO2ART 86.7 10/22/2022 03:24 PM    BQV4RWQ 42.7 10/22/2022 03:24 PM      Lab Results   Component Value Date/Time    MODE PRVC 12/28/2016 04:36 AM     Ionized Calcium:  No results found for: IONCA  Magnesium:    Lab Results   Component Value Date/Time    MG 2.2 10/22/2022 04:15 PM     Phosphorus:    Lab Results   Component Value Date/Time    PHOS 3.6 10/18/2022 04:50 AM        LIVER PROFILE No results for input(s): AST, ALT, LIPASE, BILIDIR, BILITOT, ALKPHOS in the last 72 hours. Invalid input(s): AMYLASE,  ALB  INR No results for input(s): INR in the last 72 hours.   PTT   Lab Results   Component Value Date    APTT 25.6 07/22/2016         RADIOLOGY     (See actual reports for details)    ASSESSMENT/PLAN     Patient Active Problem List   Diagnosis    GERD (gastroesophageal reflux disease)    Hoarseness of voice    MVP (mitral valve prolapse)    Depression with suicidal ideation    Lymphocytic colitis    History of migraine headaches    Hyperlipidemia    Sleep apnea    IGT (impaired glucose tolerance)    History of renal calculi    Lactose intolerance    History of seizure disorder    Leukopenia    Bipolar disorder, mixed ([address]    Illicit drug use    Postlaminectomy syndrome, cervical region    Degeneration of cervical intervertebral disc    Encounter for long-term (current) use of other medications    Bradycardia    Cellulitis    Hypokalemia    Primary hypertension    Elevated lipase    Right lower quadrant abdominal pain    Nausea    Diarrhea    Acute encephalopathy    Polysubstance abuse (HCC)    Fibromyalgia    Cocaine addiction (HCC)    Shoulder arthritis    Osteoarthritis of left glenohumeral joint    Abnormal EKG    Anemia    Anxiety    Carotid artery stenosis    COPD (chronic obstructive pulmonary disease) ([address]    Dental disease    HUTCHINSON (dyspnea on exertion)    Fatigue    Fractures    H/O gastric bypass    History of crack cocaine use    History of UTI    Injury of back    Intractable chronic migraine without aura and without status migrainosus    Intractable migraine with aura with status migrainosus    Kidney stones    Dizziness    Memory loss    Mild pulmonary hypertension (HCC)    Mild tricuspid regurgitation    Primary osteoarthritis of left shoulder    Seizure-like activity (HCC)    Stress at home    TMJ (dislocation of temporomandibular joint)    Tobacco abuse    Visual impairment    Internal hernia    Vitamin D deficiency    Iron deficiency    SIRS (systemic inflammatory response syndrome) (HCC)    NICKI (acute kidney injury) ([address]    Severe malnutrition (HCC)    Elevated d-dimer    Leukocytosis    Pulmonary nodules Delirium due to another medical condition    Acute metabolic encephalopathy    MRSA bacteremia    Acute septic pulmonary embolism (HCC)    Cavitating mass of lung    Allergy to antibiotic     MRSA bacteremia  Septic emboli  E. coli UTI  Acute kidney injury, resolved  Suspect COPD, seen once in the office by Dr. Alex Guillaume in 2019, FEV1 2.04 L or 86% predicted  Parapneumonic effusion, pleural fluid positive for staph  History of opiate and cocaine use-reportedly uses crack cocaine at least twice weekly  History of tobacco use  Recent COVID +9/30  Elevated troponin likely type II MI  Encephalopathy, improving  History of hypertension  History of seizure disorder  Full code    Currently on DuoNeb treatments  On vancomycin  EPC cuffs given patient's thrombocytopenia. We will recheck labs. Recheck CT without IV contrast to see extent of residual empyema. Hopefully we can discontinue the catheter soon.   Electronically signed by Jaqui Duckworth MD on 10/26/2022 at 1:15 PM

## 2022-10-26 NOTE — PROGRESS NOTES
BRONCHOSPASM/BRONCHOCONSTRICTION     []         IMPROVE AERATION/BREATH SOUNDS  []   ADMINISTER BRONCHODILATOR THERAPY AS APPROPRIATE  []   ASSESS BREATH SOUNDS  []   IMPLEMENT AEROSOL/MDI PROTOCOL  []   PATIENT EDUCATION AS NEEDED

## 2022-10-26 NOTE — PROGRESS NOTES
[address]    Occupational Therapy Evaluation  Date: 10/26/22  Patient Name: Trevor Mtz       Room:   MRN: [id number]  Account: [de-identified]   : 1970  (46 y.o.) Gender: female     Discharge Recommendations:  Further Occupational Therapy is recommended upon facility discharge. Referring Practitioner: Javan Guillaume MD  Diagnosis: SIRS, pulmonary nodules, NICKI      Treatment Diagnosis: Impaired self-care status. Past Medical History:  has a past medical history of Anemia, Arthritis, Asthma, Bipolar disorder, mixed ([address] Carotid artery stenosis, Cocaine abuse ([address] COPD (chronic obstructive pulmonary disease) ([address] Degeneration of cervical intervertebral disc, Depression with anxiety, Depression with anxiety, Fibromyalgia, GERD (gastroesophageal reflux disease), History of migraine headaches, History of renal calculi, History of seizure disorder, Hoarseness of voice, HTN (hypertension), Hyperlipidemia, IGT (impaired glucose tolerance), Kidney stones, Lactose intolerance, Leukopenia, Major depressive disorder, recurrent episode, severe, without mention of psychotic behavior, MVP (mitral valve prolapse), Seizures ([address] Sleep apnea, and Unspecified diseases of blood and blood-forming organs. Past Surgical History:   has a past surgical history that includes Gastric bypass surgery; Cholecystectomy; Hysterectomy; Appendectomy; Hysterectomy; Tonsillectomy and adenoidectomy; Ankle fracture surgery; Neck surgery; shoulder surgery;  section; Carpal tunnel release; Colonoscopy (12); Upper gastrointestinal endoscopy (7-3-12); Gastric bypass surgery; Breast lumpectomy (Right); Cardiac catheterization; Upper gastrointestinal endoscopy (2016); Colonoscopy (2016); Upper gastrointestinal endoscopy (2019); Colonoscopy (2019);  Shoulder arthroscopy (Left, 2019); other surgical history (10/24/2019); laparoscopy (N/A, 2022); transesophageal echocardiogram (N/A, 10/19/2022); IR GUIDED PERC PLEURAL DRAIN W CATH INSERT (10/21/2022); IR GUIDED PERC PLEURAL DRAIN W CATH INSERT (10/21/2022); and IR GUIDED PERC PLEURAL DRAIN W CATH INSERT (10/24/2022). Restrictions  Restrictions/Precautions  Restrictions/Precautions: Fall Risk (R chest tube)        Subjective  Subjective: \"Not right now\" patient states in regards to whether or not she is in pain  Subjective  Pain: Patient denies pain. Social/Functional History  Social/Functional History  Lives With: Family (living with her Father)  Type of Home: Apartment  ADL Assistance:  (\"sometimes\" patient states in regards to whether or not she performs self-care per self)  Ambulation Assistance:  (\"it depends\" patient states in regards to whether or not she ambulates with a cane or walker)  Additional Comments: Patient is a poor historian with no family present to verify prior level of function history. Above information obtained from patient and case management notes. Objective  Cognition  Orientation  Overall Orientation Status:  (Patient is slow to process/answer questions. Unable to fully assess at this time)  Cognition  Overall Cognitive Status: Exceptions  Arousal/Alertness: Inconsistent responses to stimuli, Delayed responses to stimuli  Following Commands:  Follows one step commands with repetition, Follows one step commands with increased time  Attention Span: Difficulty attending to directions  Memory: Decreased short term memory, Decreased recall of recent events  Safety Judgement: Decreased awareness of need for safety, Decreased awareness of need for assistance  Problem Solving: Assistance required to correct errors made, Assistance required to identify errors made, Assistance required to implement solutions, Assistance required to generate solutions  Insights: Decreased awareness of deficits  Initiation: Requires cues for all  Sequencing: Requires cues for all        Activities of Daily Living  ADL  Feeding: Moderate assistance  Grooming: Maximum assistance  Grooming Skilled Clinical Factors: to wash face while sitting EOB with Min/Mod A for sitting balancec  UE Bathing: Dependent/Total  LE Bathing: Dependent/Total  UE Dressing: Dependent/Total  LE Dressing: Dependent/Total  Toileting: Dependent/Total  Additional Comments: Patient requires significant assist for self-care tasks this date due to decreased activity tolerance, decreased cognitive status, generalized weakness, and impaired balance. ADL scores based on skilled observations and clinical reasoning unless otherwise noted. UE Function  LUE PROM (degrees)  LUE General PROM: Attempted PROM of shoulder, however resistance noted. ROM affected by patient's current cognitive status. LUE AROM (degrees)  LUE AROM : Exceptions  LUE General AROM: Shoulder AROM ~30%, elbow WFL. ROM affected by patient's current cognitive status. Left Hand AROM (degrees)  Left Hand AROM: WFL  Tone LUE  LUE Tone: Normotonic  LUE Strength  Gross LUE Strength: Exceptions to [facility]  L Hand General: 3+/5  LUE Strength Comment: Grossly 3-/5    RUE PROM (degrees)  RUE General PROM: Attempted PROM of shoulder, however resistance noted. ROM affected by patient's current cognitive status. RUE AROM (degrees)  RUE AROM : Exceptions  RUE General AROM: Shoulder AROM ~10%, elbow WFL. ROM affected by patient's current cognitive status.   Right Hand AROM (degrees)  Right Hand AROM: WFL  Tone RUE  RUE Tone: Normotonic  RUE Strength  Gross RUE Strength: [facility]  R Hand General: 3+/5  RUE Strength Comment: Grossly 3-/5         Fine Motor Skills/Coordination  Coordination  Movements Are Fluid And Coordinated: No  Coordination and Movement Description: Left UE, Right UE, Gross motor impairments, Fine motor impairments                Mobility  Bed Mobility  Bed mobility  Rolling to Left: Stand by assistance  Supine to Sit: 2 Person assistance, Moderate assistance (with HOB elevated)  Sit to Supine: 2 Person assistance, Moderate assistance    Balance  Balance  Sitting Balance:  (Min/Mod with posterior lean and R lateral lean noted)  Standing Balance: Dependent/Total (Moderate assist x 2 with B UE support on RW)       Transfers  Transfers  Sit to stand: 2 Person assistance, Moderate assistance  Stand to sit: 2 Person assistance, Moderate assistance  Transfer Comments: with Maximal verbal cues    Functional Mobility  Functional - Mobility Device: Rolling Walker  Activity:  (2 lateral side steps to R towards [facility])  Assist Level:  (Moderate assist x 2)  Functional Mobility Comments: with Maximal verbal cues    Assessment  Assessment  Performance deficits / Impairments: Decreased functional mobility , Decreased ADL status, Decreased ROM, Decreased strength, Decreased safe awareness, Decreased cognition, Decreased endurance, Decreased balance, Decreased high-level IADLs, Decreased fine motor control, Decreased coordination  Treatment Diagnosis: Impaired self-care status. Prognosis: Good  Decision Making: Medium Complexity  Discharge Recommendations: Patient would benefit from continued therapy after discharge    Activity Tolerance  Activity Tolerance: Patient limited by fatigue, Treatment limited secondary to decreased cognition, Patient Tolerated treatment well    Safety Devices  Type of Devices:  All fall risk precautions in place, Call light within reach, Bed alarm in place, Gait belt, Patient at risk for falls, Left in bed, Sitter present, Nurse notified    Patient Education  Patient Education  Education Given To: Patient  Education Provided: Role of Therapy, Plan of Care, Precautions, ADL Adaptive Strategies, Transfer Training  Education Method: Verbal, Demonstration  Barriers to Learning: Cognition  Education Outcome: Continued education needed      Functional Outcome Measures  AM-PAC Daily Activity Inpatient   How much help for putting on and taking off regular lower body clothing?: Total  How much help for Bathing?: Total  How much help for Toileting?: Total  How much help for putting on and taking off regular upper body clothing?: Total  How much help for taking care of personal grooming?: A Lot  How much help for eating meals?: A Lot  AM-[facility] Inpatient Daily Activity Raw Score: 8  AM-PAC Inpatient ADL T-Scale Score : 22.86  ADL Inpatient CMS 0-100% Score: 85.69  ADL Inpatient CMS G-Code Modifier : CM       Goals  Patient Goals   Patient goals : Patient unable to verbalize goals  Short Term Goals  Time Frame for Short Term Goals: By discharge  Short Term Goal 1: Patient will actively participate in 15+ minutes of self-care to the best of patient's ability with Min VCs. Short Term Goal 2: Patient will actively participate in 15-25 minutes of therapeutic exercise/functional activities to promote increased IND with self-care and mobility. Short Term Goal 3: Patient will perform upper body bathing/dressing with Moderate assist.  Short Term Goal 4: Patient will perform lower body bathing/dressing with Maximal assist.  Short Term Goal 5: Patient will perform stand pivot transfer to BSC/chair with Moderate assist, RW, and Fair safety. Plan  Occupational Therapy Plan  Times Per Week: 4-6  Times Per Day:  Once a day  Current Treatment Recommendations: Self-Care / ADL, Strengthening, ROM, Balance training, Functional mobility training, Endurance training, Cognitive reorientation, Pain management, Safety education & training, Patient/Caregiver education & training, Equipment evaluation, education, & procurement, Positioning, Cognitive/Perceptual training, Coordination training      OT Individual Minutes  OT Individual Minutes  Time In: 5052  Time Out: 0900  Minutes: 21           Electronically signed by TYLOR Looney on 10/26/22 at 9:49 AM EDT

## 2022-10-26 NOTE — PROGRESS NOTES
Physical Therapy  Facility/Department: [address] [place] [address]  Physical Therapy Initial Assessment    Name: Chace Siu  : 1970  MRN: [id number]  Date of Service: 10/26/2022    Discharge Recommendations:  Patient would benefit from continued therapy after discharge, Therapy recommended at discharge, Continue to assess pending progress          Patient Diagnosis(es): The primary encounter diagnosis was NICKI (acute kidney injury) ([facility] [address] Diagnoses of Leukocytosis, unspecified type, Elevated d-dimer, and Pulmonary nodules were also pertinent to this visit. Past Medical History:  has a past medical history of Anemia, Arthritis, Asthma, Bipolar disorder, mixed ([address] Carotid artery stenosis, Cocaine abuse ([address] COPD (chronic obstructive pulmonary disease) ([address] Degeneration of cervical intervertebral disc, Depression with anxiety, Depression with anxiety, Fibromyalgia, GERD (gastroesophageal reflux disease), History of migraine headaches, History of renal calculi, History of seizure disorder, Hoarseness of voice, HTN (hypertension), Hyperlipidemia, IGT (impaired glucose tolerance), Kidney stones, Lactose intolerance, Leukopenia, Major depressive disorder, recurrent episode, severe, without mention of psychotic behavior, MVP (mitral valve prolapse), Seizures ([address] Sleep apnea, and Unspecified diseases of blood and blood-forming organs. Past Surgical History:  has a past surgical history that includes Gastric bypass surgery; Cholecystectomy; Hysterectomy; Appendectomy; Hysterectomy; Tonsillectomy and adenoidectomy; Ankle fracture surgery; Neck surgery; shoulder surgery;  section; Carpal tunnel release; Colonoscopy (12); Upper gastrointestinal endoscopy (7-3-12); Gastric bypass surgery; Breast lumpectomy (Right); Cardiac catheterization; Upper gastrointestinal endoscopy (2016); Colonoscopy (2016); Upper gastrointestinal endoscopy (2019); Colonoscopy (2019);  Shoulder arthroscopy (Left, 06/05/2019); other surgical history (10/24/2019); laparoscopy (N/A, 1/29/2022); transesophageal echocardiogram (N/A, 10/19/2022); IR GUIDED PERC PLEURAL DRAIN W CATH INSERT (10/21/2022); IR GUIDED PERC PLEURAL DRAIN W CATH INSERT (10/21/2022); and IR GUIDED PERC PLEURAL DRAIN W CATH INSERT (10/24/2022). Assessment   Assessment: The patient presents with impaired strength, balance, and activity tolerance requiring Moderate Ax2 people for bed mobilty as well as for Transfers and limited side-steppng ambulation with RW. Pt current with R chest tube placement and is very lethargic throughout assessment requiring Maximal VVT cues and encouragement throughout; PT services warranted to progress pt towards Prior level of independence. At this time, the patient would be unsafe to return to prior living arrangements  Treatment Diagnosis: Impaired mobility 2* Systemic inflammatory response syndrome; pulmonary nodules, and cavitating mass of lung  Specific Instructions for Next Treatment: Progress ambulation as tolerated; Sitting and standing balance; Therapeutic activity  Therapy Prognosis: Fair  Decision Making: Medium Complexity  Exam: ROM, MMT, Balance and functional mobility assessment  Clinical Presentation: Evolving; pt with decreased alertness, very lethargic, is pleasant but cooperation is limited 2* cognitive barrier and lethargy  Barriers to Learning: Cognition; lethargy  Requires PT Follow-Up: Yes  Activity Tolerance  Activity Tolerance: Patient limited by fatigue;Treatment limited secondary to decreased cognition     Plan   Physcial Therapy Plan  General Plan: 5-7 times per week  Specific Instructions for Next Treatment: Progress ambulation as tolerated; Sitting and standing balance;  Therapeutic activity  Current Treatment Recommendations: Strengthening, Balance training, Transfer training, Functional mobility training, Gait training, Safety education & training, Equipment evaluation, education, & procurement, Therapeutic activities  Safety Devices  Type of Devices: All fall risk precautions in place, Call light within reach, Bed alarm in place, Gait belt, Patient at risk for falls, Left in bed, Sitter present, Nurse notified     Restrictions  Restrictions/Precautions  Restrictions/Precautions: Fall Risk (R chest tube)  Required Braces or Orthoses?: No  Position Activity Restriction  Other position/activity restrictions: R Chest Tube     Subjective   Pain: Patient denies pain. General  Chart Reviewed: Yes  Patient assessed for rehabilitation services?: Yes  Additional Pertinent Hx: Otilia Conrad is a 46y.o.-year-old female was admitted 10/10/2020 for worsening shortness of breath associated with generalized body ache. The patient is drowsy, arousable, confused, follows simple commands, unable to provide history that was obtained from chart review and nursing staff. She does have history of cocaine abuse. She was found to have acute renal failure with a creatinine of 2.1. Urinalysis showed small leukocyte esterase, positive nitrate. Urine culture on 10/10/2022 grew E. Coli  Blood cultures 10/10/2022 2 of 2 grew MRSA  Echocardiogram showed no vegetations  Procalcitonin was 6.01, D-dimer was elevated at 3.85  Chest x-ray showed right lung nodules. VQ scan was indeterminant. CT chest without contrast 10/16/2022 showed numerous bilateral nodular opacities both lungs suggestive of septic emboli , focal consolidation with cavitation in the superior right lower lobe focal consolidation at the left base, small bilateral loculated pleural effusion and mediastinal lymphadenopathy.   Response To Previous Treatment: Not applicable  Family / Caregiver Present: No  Referring Practitioner: Dr. Solo Ma  Referral Date : 10/25/22  Diagnosis: Systemic Inflammatory Response Syndrome; Cavitating Mass Of Lung  Follows Commands: Impaired (With increased time and repetition)  Other (Comment): Pt is difficult to arouse and maintain alertness  General Comment  Comments: Ok per Nurse Reece Curry to proceed  Subjective  Subjective: pt agreeable to light mobility with therapy and once seated pt states \"I need something to drink\". Pt with minimal verbalizations during session, verbalizations often inaudible         Social/Functional History  Social/Functional History  Lives With: Family (living with her Father)  Type of Home: Apartment  ADL Assistance:  (\"sometimes\" patient states in regards to whether or not she performs self-care per self)  Ambulation Assistance:  (\"it depends\" patient states in regards to whether or not she ambulates with a cane or walker)  Additional Comments: Patient is a poor historian with no family present to verify prior level of function history. Above information obtained from patient and case management notes. Vision/Hearing  Vision  Vision:  (ISAAK)  Hearing  Hearing:  (ISAAK)    Cognition   Orientation  Overall Orientation Status:  (Patient is slow to process/answer questions. Unable to fully assess at this time)  Cognition  Overall Cognitive Status: Exceptions  Arousal/Alertness: Inconsistent responses to stimuli;Delayed responses to stimuli  Following Commands: Follows one step commands with repetition; Follows one step commands with increased time  Attention Span: Difficulty attending to directions  Memory: Decreased short term memory;Decreased recall of recent events  Safety Judgement: Decreased awareness of need for safety;Decreased awareness of need for assistance  Problem Solving: Assistance required to correct errors made;Assistance required to identify errors made;Assistance required to implement solutions;Assistance required to generate solutions  Insights: Decreased awareness of deficits  Initiation: Requires cues for all  Sequencing: Requires cues for all     Objective   Heart Rate: 66  Heart Rate Source: Monitor  BP: (!) 147/74  BP Location: Left upper arm  BP Method: Automatic  Patient Position: Semi oneal  MAP (Calculated): 98.33  Resp: 20  SpO2: 100 %  O2 Device: Nasal cannula     Observation/Palpation  Observation: Very lethargic, difficult to arouse and maintain wakefulness; pt with tendency to keep eyes closed despite maximal cues/tactile stimuli and encouragement to stay awake. PROM RLE (degrees)  RLE PROM: WFL  AROM RLE (degrees)  RLE AROM: WFL  PROM LLE (degrees)  LLE PROM: WFL  AROM LLE (degrees)  LLE AROM : WFL  AROM RUE (degrees)  RUE General AROM: See OT  AROM LUE (degrees)  LUE General AROM: See OT  Strength RLE  Strength RLE: WFL  Comment: pt demo's at least 3/5 strength during AROM against gravity, bed mobility, and transfers/standing  Strength LLE  Strength LLE: WFL  Comment: pt demo's at least 3/5 strength during AROM against gravity, bed mobility, and transfers/standing  Strength RUE  Comment: See OT  Strength LUE  Comment: See OT           Bed mobility  Rolling to Left: Stand by assistance  Supine to Sit: 2 Person assistance; Moderate assistance  Sit to Supine: 2 Person assistance; Moderate assistance  Bed Mobility Comments: HOB elevated with use of bed rails; Maximal cues and encouragement to perform tasks to completion  Transfers  Sit to Stand: 2 Person Assistance; Moderate Assistance  Stand to Sit: 2 Person Assistance; Moderate Assistance  Comment: All transfers with RW; Writer manually places pt's hands to walker. Ambulation  Surface: Level tile  Device: Rolling Walker  Assistance: 2 Person assistance; Moderate assistance  Quality of Gait: Short shuffled side-steps toward [facility]  Gait Deviations: Slow [place];Decreased step length;Decreased step height;Decreased head and trunk rotation  Distance: 2'  Stairs/Curb  Stairs?: No (unsafe to attempt d/t cognition barrier and pt's lethargy/fatigue)     Balance  Posture: Fair  Sitting - Static: Poor;+ (Min-ModA for sitting balance)  Sitting - Dynamic: Poor;+  Standing - Static: Poor;+ (RW)  Standing - Dynamic: Poor;+ (RW)  Comments: Pt with tendency to keep eyes closed during mobility tasks despite cueing to keep eyes open thus affecting balance. needing min-ModA at EOB and ModAx2 during standing and side-stepping with RW. Exercise Treatment: Bed Mobilityx2; EOB dangling x5 minutes. STS transfers, limited ambulation. AM-PAC Score  AM-PAC Inpatient Mobility Raw Score : 8 (10/26/22 0839)  AM-PAC Inpatient T-Scale Score : 28.52 (10/26/22 0839)  Mobility Inpatient CMS 0-100% Score: 86.62 (10/26/22 0839)  Mobility Inpatient CMS G-Code Modifier : CM (10/26/22 1821)        Goals  Short Term Goals  Time Frame for Short Term Goals: 6-8 visits  Short Term Goal 1: pt to demo Bed mobility with MinAx1  Short Term Goal 2: pt to perform transfers with device as needed and MinAx1  Short Term Goal 3: pt to ambulate 22' with device as needed and MinAx1  Short Term Goal 4: pt to improve sitting and standing balance to FAIR to decrease fall risk and improve safety with mobility. Patient Goals   Patient Goals : pt does not verbalize goals       Education  Patient Education  Education Given To: Patient  Education Provided: Role of Therapy;Plan of Care;Transfer Training; Fall Prevention Strategies  Education Method: Verbal  Barriers to Learning: Cognition (very lethargic)  Education Outcome: Continued education needed      Therapy Time   Individual Concurrent Group Co-treatment   Time In 0839         Time Out 0900         Minutes 21         Timed Code Treatment Minutes: 0 Minutes       Olga Eddy, PT

## 2022-10-26 NOTE — PROGRESS NOTES
BRONCHOSPASM/BRONCHOCONSTRICTION     [x]         IMPROVE AERATION/BREATH SOUNDS  [x]   ADMINISTER BRONCHODILATOR THERAPY AS APPROPRIATE  [x]   ASSESS BREATH SOUNDS  []   IMPLEMENT AEROSOL/MDI PROTOCOL  [x]   PATIENT EDUCATION AS NEEDED     PATIENT REFUSES TO WEAR BIPAP     [x] Risks and benefits explained to patient   [x] Patient refuses to wear Bipap stating \"no. \"  [x] Patient verbalizes understanding of information presented.

## 2022-10-26 NOTE — PLAN OF CARE
Problem: Discharge Planning  Goal: Discharge to home or other facility with appropriate resources  10/26/2022 0233 by Marilyn Steel RN  Outcome: Progressing  10/25/2022 1625 by Praveena Asencio RN  Outcome: Progressing     Problem: Pain  Goal: Verbalizes/displays adequate comfort level or baseline comfort level  10/26/2022 0233 by Marilyn Steel RN  Outcome: Progressing  Note: Denies pain. 10/25/2022 1625 by Praveena Asencio RN  Outcome: Progressing     Problem: Skin/Tissue Integrity  Goal: Absence of new skin breakdown  Description: 1. Monitor for areas of redness and/or skin breakdown  2. Assess vascular access sites hourly  3. Every 4-6 hours minimum:  Change oxygen saturation probe site  4. Every 4-6 hours:  If on nasal continuous positive airway pressure, respiratory therapy assess nares and determine need for appliance change or resting period. 10/26/2022 0233 by Marilyn Steel RN  Outcome: Progressing  Note: Buttocks reddened. Mepelix remains to coccyx area. 10/25/2022 1625 by Praveena Asencio RN  Outcome: Progressing     Problem: Safety - Adult  Goal: Free from fall injury  10/26/2022 0233 by Marilyn Steel RN  Outcome: Progressing  Note: Patient lethargic. Arouses when stimulated. One on one remains.   Bed alarm on.  10/25/2022 1625 by Praveena Asencio RN  Outcome: Progressing     Problem: ABCDS Injury Assessment  Goal: Absence of physical injury  10/26/2022 0233 by Marilyn Steel RN  Outcome: Progressing  10/25/2022 1625 by Praveena Asencio RN  Outcome: Progressing     Problem: Nutrition Deficit:  Goal: Optimize nutritional status  10/26/2022 0233 by Marilyn Steel RN  Outcome: Progressing  10/25/2022 1625 by Praveena Asencio RN  Outcome: Progressing

## 2022-10-26 NOTE — PROGRESS NOTES
Progress Note  Date:10/26/2022       Room:[address] [place] [address]  Patient Name:Anu Bourne     YOB: 1970     Age:51 y.o. Subjective    Subjective:  Symptoms:  Stable. (Sleepy, responds to verbal stimuli. Speech clearer at times than yesterday. Father present. ). Diet:  Poor intake. Activity level: Impaired due to weakness. Pain:  She reports no pain. Review of Systems  Objective         Vitals Last 24 Hours:  TEMPERATURE:  Temp  Av.7 °F (36.5 °C)  Min: 97.5 °F (36.4 °C)  Max: 98 °F (36.7 °C)  RESPIRATIONS RANGE: Resp  Av.3  Min: 16  Max: 22  PULSE OXIMETRY RANGE: SpO2  Av %  Min: 91 %  Max: 100 %  PULSE RANGE: Pulse  Av.7  Min: 61  Max: 77  BLOOD PRESSURE RANGE: Systolic (73BMB), QAX:814 , Min:108 , JYN:966   ; Diastolic (80RGL), OHL:81, Min:54, Max:74    I/O (24Hr): Intake/Output Summary (Last 24 hours) at 10/26/2022 1014  Last data filed at 10/26/2022 0536  Gross per 24 hour   Intake 350 ml   Output --   Net 350 ml     Objective:  General Appearance:  Comfortable. Vital signs: (most recent): Blood pressure (!) 147/74, pulse 66, temperature 97.7 °F (36.5 °C), temperature source Axillary, resp. rate 20, height 4' 11\" (1.499 m), weight 101 lb 13.6 oz (46.2 kg), SpO2 100 %. Vital signs are normal.    Lungs:  Normal effort and normal respiratory rate. There are decreased breath sounds. Heart: Normal rate. Regular rhythm. S1 normal and S2 normal.    Labs/Imaging/Diagnostics    Labs:  CBC:  Recent Labs     10/24/22  0501 10/25/22  0531   WBC 7.5 5.6   RBC 2.96* 3.03*   HGB 7.8* 7.8*   HCT 24.3* 24.9*   MCV 82.1 82.2   RDW 17.1* 16.9*   PLT 42* 34*     CHEMISTRIES:  Recent Labs     10/24/22  0501 10/25/22  0531    140   K 3.5* 3.4*   * 105   CO2 28 30   BUN 12 12   CREATININE 0.59 0.71   GLUCOSE 77 82     PT/INR:No results for input(s): PROTIME, INR in the last 72 hours. APTT:No results for input(s): APTT in the last 72 hours.   LIVER PROFILE:No results for input(s): AST, ALT, BILIDIR, BILITOT, ALKPHOS in the last 72 hours. Imaging Last 24 Hours:  XR CHEST PORTABLE    Result Date: 10/25/2022  EXAMINATION: ONE XRAY VIEW OF THE CHEST 10/25/2022 6:12 pm COMPARISON: 10/24/2022 HISTORY: ORDERING SYSTEM PROVIDED HISTORY: Patient with right-sided chest tube. TECHNOLOGIST PROVIDED HISTORY: Patient with right-sided chest tube. Reason for Exam: Rt. sided chest tube Follow-up exam FINDINGS: Right pleural catheter is noted. No significant interval change in bilateral airspace opacities. Right PICC line is stable in positioning. Orthopedic hardware is stable in appearance in the cervical spine left shoulder. No obvious pneumothorax. Stable cardiac silhouette. The left costophrenic angle sharp. Interval placement of a right pleural catheter. No obvious pneumothorax. No significant interval change in bilateral airspace opacities. IR GUIDED PERC PLEURAL DRAIN W CATH INSERT    Result Date: 10/25/2022  PROCEDURE: IR PERC CATH PLEURAL DRAIN W/IMAG 10/24/2022 HISTORY: ORDERING SYSTEM PROVIDED HISTORY: Empyema, previous catheter fell out TECHNOLOGIST PROVIDED HISTORY: Empyema, previous catheter fell out Which side should the procedure be performed?->Right Is the patient pregnant?->No CONTRAST: None SEDATION: None FLUOROSCOPY DOSE AND TYPE OR TIME AND EXPOSURES: None DESCRIPTION OF PROCEDURE AND FINDINGS: Informed consent was obtained from the patient's father after a detailed explanation of the procedure including risks, benefits, and alternatives. Universal protocol was observed. Sterile gowns, masks, hats and gloves utilized for maximal sterile barrier. Additionally targeted ultrasound of right chest was performed and shows a small complex pleural effusion. Skin over the area was prepped and draped in standard sterile fashion. 1% lidocaine used for local anesthesia.   Using realtime ultrasound guidance an 8 Arabic pigtail catheter was advanced into the pleural fluid collection using trocar technique. Ultrasound images show a safe tract and access into the fluid. The catheter was sutured to the skin and secured in place with a sterile Vaseline gauze dressing. The tube was connected to an [facility] drainage system. There are no immediate complications. The patient left the department in stable condition. EBL: None. Ultrasound shows a small complex loculated right pleural effusion. 8 Mongolian pleural pigtail catheter was placed. Findings were discussed with Dr. Travis Ledesma At 1:00 pm on 10/24/2022. Assessment//Plan           Hospital Problems             Last Modified POA    * (Principal) SIRS (systemic inflammatory response syndrome) ([address] 10/10/2022 Yes    NICKI (acute kidney injury) ([address] 10/11/2022 Yes    Severe malnutrition (HCC) (Chronic) 10/11/2022 Yes    Elevated d-dimer 10/15/2022 Yes    Leukocytosis 10/15/2022 Yes    Pulmonary nodules 10/15/2022 Yes    Delirium due to another medical condition 10/17/2022 Yes    Acute metabolic encephalopathy 17/63/3188 Yes    MRSA bacteremia 10/23/2022 Yes    Acute septic pulmonary embolism ([address] 10/23/2022 Yes    Cavitating mass of lung 10/23/2022 Yes    Allergy to antibiotic 10/23/2022 Yes    Depression with suicidal ideation 10/17/2022 Yes    Bipolar disorder, mixed ([address] 10/11/2022 Yes    Acute encephalopathy 10/14/2022 Yes    COPD (chronic obstructive pulmonary disease) ([address] 10/11/2022 Yes    H/O gastric bypass 10/11/2022 Yes    Overview Signed 2/13/2020  7:19 PM by Henrik Issa MD     enlarged  pancreas, here for surgical work-up for EGD          Assessment & Plan    Continue current management    Depakote level in normal range.        Electronically signed by Henrik Issa MD on 10/26/22 at 10:14 AM EDT

## 2022-10-27 LAB
ABSOLUTE EOS #: 0 K/UL (ref 0–0.4)
ABSOLUTE LYMPH #: 0.6 K/UL (ref 1–4.8)
ABSOLUTE MONO #: 0.4 K/UL (ref 0.1–1.3)
ANION GAP SERPL CALCULATED.3IONS-SCNC: 8 MMOL/L (ref 9–17)
BASOPHILS # BLD: 0 % (ref 0–2)
BASOPHILS ABSOLUTE: 0 K/UL (ref 0–0.2)
BUN BLDV-MCNC: 12 MG/DL (ref 6–20)
CALCIUM SERPL-MCNC: 7.8 MG/DL (ref 8.6–10.4)
CHLORIDE BLD-SCNC: 106 MMOL/L (ref 98–107)
CO2: 28 MMOL/L (ref 20–31)
CREAT SERPL-MCNC: 0.71 MG/DL (ref 0.5–0.9)
EOSINOPHILS RELATIVE PERCENT: 0 % (ref 0–4)
FIBRINOGEN: 329 MG/DL (ref 210–530)
FOLATE: 5.1 NG/ML
FREE KAPPA/LAMBDA RATIO: 0.61 (ref 0.26–1.65)
GFR SERPL CREATININE-BSD FRML MDRD: >60 ML/MIN/1.73M2
GLUCOSE BLD-MCNC: 76 MG/DL (ref 70–99)
HCT VFR BLD CALC: 26.6 % (ref 36–46)
HEMOGLOBIN: 8.4 G/DL (ref 12–16)
HEPATITIS C ANTIBODY: NONREACTIVE
KAPPA FREE LIGHT CHAINS QNT: 22.39 MG/DL (ref 0.37–1.94)
LAMBDA FREE LIGHT CHAINS QNT: 36.71 MG/DL (ref 0.57–2.63)
LYMPHOCYTES # BLD: 10 % (ref 24–44)
MCH RBC QN AUTO: 26.2 PG (ref 26–34)
MCHC RBC AUTO-ENTMCNC: 31.7 G/DL (ref 31–37)
MCV RBC AUTO: 82.7 FL (ref 80–100)
MONOCYTES # BLD: 8 % (ref 1–7)
PATHOLOGIST REVIEW: NORMAL
PDW BLD-RTO: 16 % (ref 11.5–14.9)
PLATELET # BLD: 31 K/UL (ref 150–450)
PMV BLD AUTO: 7.6 FL (ref 6–12)
POTASSIUM SERPL-SCNC: 3.6 MMOL/L (ref 3.7–5.3)
RBC # BLD: 3.22 M/UL (ref 4–5.2)
SEG NEUTROPHILS: 82 % (ref 36–66)
SEGMENTED NEUTROPHILS ABSOLUTE COUNT: 4.5 K/UL (ref 1.3–9.1)
SODIUM BLD-SCNC: 142 MMOL/L (ref 135–144)
SURGICAL PATHOLOGY REPORT: NORMAL
VITAMIN B-12: 957 PG/ML (ref 232–1245)
WBC # BLD: 5.5 K/UL (ref 3.5–11)

## 2022-10-27 PROCEDURE — 36415 COLL VENOUS BLD VENIPUNCTURE: CPT

## 2022-10-27 PROCEDURE — 2580000003 HC RX 258: Performed by: INTERNAL MEDICINE

## 2022-10-27 PROCEDURE — 85025 COMPLETE CBC W/AUTO DIFF WBC: CPT

## 2022-10-27 PROCEDURE — 6360000002 HC RX W HCPCS: Performed by: INTERNAL MEDICINE

## 2022-10-27 PROCEDURE — 99232 SBSQ HOSP IP/OBS MODERATE 35: CPT | Performed by: FAMILY MEDICINE

## 2022-10-27 PROCEDURE — 6370000000 HC RX 637 (ALT 250 FOR IP): Performed by: INTERNAL MEDICINE

## 2022-10-27 PROCEDURE — 85384 FIBRINOGEN ACTIVITY: CPT

## 2022-10-27 PROCEDURE — 2060000000 HC ICU INTERMEDIATE R&B

## 2022-10-27 PROCEDURE — 80048 BASIC METABOLIC PNL TOTAL CA: CPT

## 2022-10-27 PROCEDURE — 94761 N-INVAS EAR/PLS OXIMETRY MLT: CPT

## 2022-10-27 PROCEDURE — 94640 AIRWAY INHALATION TREATMENT: CPT

## 2022-10-27 PROCEDURE — 99232 SBSQ HOSP IP/OBS MODERATE 35: CPT | Performed by: INTERNAL MEDICINE

## 2022-10-27 PROCEDURE — 99233 SBSQ HOSP IP/OBS HIGH 50: CPT | Performed by: INTERNAL MEDICINE

## 2022-10-27 PROCEDURE — 2700000000 HC OXYGEN THERAPY PER DAY

## 2022-10-27 RX ADMIN — CARVEDILOL 25 MG: 25 TABLET, FILM COATED ORAL at 09:59

## 2022-10-27 RX ADMIN — CEFTAROLINE FOSAMIL 600 MG: 600 POWDER, FOR SOLUTION INTRAVENOUS at 22:13

## 2022-10-27 RX ADMIN — FERROUS SULFATE TAB 325 MG (65 MG ELEMENTAL FE) 325 MG: 325 (65 FE) TAB at 17:38

## 2022-10-27 RX ADMIN — DIVALPROEX SODIUM 1000 MG: 500 TABLET, DELAYED RELEASE ORAL at 09:59

## 2022-10-27 RX ADMIN — ATORVASTATIN CALCIUM 40 MG: 40 TABLET, FILM COATED ORAL at 21:47

## 2022-10-27 RX ADMIN — CEFTAROLINE FOSAMIL 600 MG: 600 POWDER, FOR SOLUTION INTRAVENOUS at 10:06

## 2022-10-27 RX ADMIN — Medication 2000 UNITS: at 09:59

## 2022-10-27 RX ADMIN — DIVALPROEX SODIUM 1000 MG: 500 TABLET, DELAYED RELEASE ORAL at 02:40

## 2022-10-27 RX ADMIN — IPRATROPIUM BROMIDE AND ALBUTEROL SULFATE 1 AMPULE: 2.5; .5 SOLUTION RESPIRATORY (INHALATION) at 14:54

## 2022-10-27 RX ADMIN — SODIUM CHLORIDE, PRESERVATIVE FREE 10 ML: 5 INJECTION INTRAVENOUS at 10:00

## 2022-10-27 RX ADMIN — SODIUM CHLORIDE, PRESERVATIVE FREE 10 ML: 5 INJECTION INTRAVENOUS at 22:14

## 2022-10-27 RX ADMIN — IPRATROPIUM BROMIDE AND ALBUTEROL SULFATE 1 AMPULE: 2.5; .5 SOLUTION RESPIRATORY (INHALATION) at 11:18

## 2022-10-27 RX ADMIN — HYDRALAZINE HYDROCHLORIDE 10 MG: 20 INJECTION INTRAMUSCULAR; INTRAVENOUS at 04:22

## 2022-10-27 RX ADMIN — DULOXETINE 60 MG: 60 CAPSULE, DELAYED RELEASE ORAL at 10:01

## 2022-10-27 RX ADMIN — FERROUS SULFATE TAB 325 MG (65 MG ELEMENTAL FE) 325 MG: 325 (65 FE) TAB at 09:59

## 2022-10-27 RX ADMIN — IPRATROPIUM BROMIDE AND ALBUTEROL SULFATE 1 AMPULE: 2.5; .5 SOLUTION RESPIRATORY (INHALATION) at 18:50

## 2022-10-27 RX ADMIN — ARIPIPRAZOLE 5 MG: 5 TABLET ORAL at 09:59

## 2022-10-27 RX ADMIN — CARVEDILOL 25 MG: 25 TABLET, FILM COATED ORAL at 17:38

## 2022-10-27 RX ADMIN — IPRATROPIUM BROMIDE AND ALBUTEROL SULFATE 1 AMPULE: 2.5; .5 SOLUTION RESPIRATORY (INHALATION) at 07:05

## 2022-10-27 ASSESSMENT — PAIN SCALES - GENERAL: PAINLEVEL_OUTOF10: 0

## 2022-10-27 NOTE — PROGRESS NOTES
ALKPHOS in the last 72 hours. Imaging Last 24 Hours:  CT CHEST WO CONTRAST    Result Date: 10/26/2022  EXAMINATION: CT OF THE CHEST WITHOUT CONTRAST 10/26/2022 3:13 pm TECHNIQUE: CT of the chest was performed without the administration of intravenous contrast. Multiplanar reformatted images are provided for review. Automated exposure control, iterative reconstruction, and/or weight based adjustment of the mA/kV was utilized to reduce the radiation dose to as low as reasonably achievable. COMPARISON: 10/25/2022 and 10/21/2022 HISTORY: ORDERING SYSTEM PROVIDED HISTORY: Reevaluate signs of empyema, will see if continuous drainage is necessary or discontinuance of pleural catheter TECHNOLOGIST PROVIDED HISTORY: Reevaluate signs of empyema, will see if continuous drainage is necessary or discontinuance of pleural catheter Is the patient pregnant?->No Reason for Exam: sob, recent covid 23. pt has chest tube FINDINGS: Mediastinum: No cardiomegaly. Decreased attenuation of intra cardiac blood pool suggesting anemia. Suggestion of mediastinal and bilateral hilar adenopathy though evaluation is limited without intravenous contrast. Lungs/pleura: Small loculated pleural effusions bilaterally, slightly decreased in size on the right and increased in size in the left. Right pleural pigtail catheter via an intercostal approach. Numerous pulmonary nodules/masses redemonstrated bilaterally, multiple of which appear cavitary. Interval increase in interstitial/alveolar densities within the lower lobes and lingula compared to prior study. Upper Abdomen: Cholecystectomy. Splenomegaly. Soft Tissues/Bones: Mild anasarca. Right PICC tip extends to the right atrium. Left shoulder arthroplasty. Fusion hardware within the lower cervical spine. 1.  Interval increase in bibasilar interstitial/alveolar densities most pronounced within the lingula.   Differential considerations include superimposed multifocal pneumonia versus asymmetric edema. 2.  Small partially loculated pleural effusions redemonstrated bilaterally with interval right pleural catheter placement, slightly increased on the left. 3.  Numerous pulmonary nodules/masses bilaterally with several appearing cavitary, grossly unchanged from prior, most suggestive of septic emboli though metastases could produce a similar appearance. 4.  Additional nonemergent findings, as above. XR CHEST PORTABLE    Result Date: 10/25/2022  EXAMINATION: ONE XRAY VIEW OF THE CHEST 10/25/2022 6:12 pm COMPARISON: 10/24/2022 HISTORY: ORDERING SYSTEM PROVIDED HISTORY: Patient with right-sided chest tube. TECHNOLOGIST PROVIDED HISTORY: Patient with right-sided chest tube. Reason for Exam: Rt. sided chest tube Follow-up exam FINDINGS: Right pleural catheter is noted. No significant interval change in bilateral airspace opacities. Right PICC line is stable in positioning. Orthopedic hardware is stable in appearance in the cervical spine left shoulder. No obvious pneumothorax. Stable cardiac silhouette. The left costophrenic angle sharp. Interval placement of a right pleural catheter. No obvious pneumothorax. No significant interval change in bilateral airspace opacities.      Assessment//Plan           Hospital Problems             Last Modified POA    * (Principal) SIRS (systemic inflammatory response syndrome) ([address] 10/10/2022 Yes    NICKI (acute kidney injury) ([address] 10/11/2022 Yes    Severe malnutrition (HCC) (Chronic) 10/11/2022 Yes    Elevated d-dimer 10/15/2022 Yes    Leukocytosis 10/15/2022 Yes    Pulmonary nodules 10/15/2022 Yes    Delirium due to another medical condition 10/17/2022 Yes    Acute metabolic encephalopathy 95/89/2197 Yes    MRSA bacteremia 10/23/2022 Yes    Acute septic pulmonary embolism ([address] 10/23/2022 Yes    Cavitating mass of lung 10/23/2022 Yes    Allergy to antibiotic 10/23/2022 Yes    Thrombocytopenia ([address] 10/26/2022 Yes    Depression with suicidal ideation 10/17/2022 Yes    Bipolar disorder, mixed ([facility] 10/11/2022 Yes    Acute encephalopathy 10/14/2022 Yes    COPD (chronic obstructive pulmonary disease) ([facility] [address] 10/11/2022 Yes    H/O gastric bypass 10/11/2022 Yes    Overview Signed 2/13/2020  7:19 PM by Radha Layton MD     enlarged  pancreas, here for surgical work-up for EGD          Assessment & Plan    MRSA bacteremia - changed from vancomycin to Teflaro.  Thrombocytopenia improved today    Anemia - workup in progress per hematology    Encephalopathy- waxes and wanes      Electronically signed by Radha Layton MD on 10/27/22 at 8:09 AM EDT

## 2022-10-27 NOTE — CARE COORDINATION
ONGOING DISCHARGE PLAN:      Spoke with patient's Dad, Guerrero, regarding discharge plan and patient confirms that plan is still to follow for SNF, [facility]. Writer/JOSE, did speak to Guerrero, about referral to [facility], he is agreeable & writer did make Referral & Ax[facility] Myles, is following. [facility], will need Sitter off & will need Pre-cert. Pt. Has Chest Tube. Per Nursing, Pt. Has already pulled out 2 previous chest tubes. Sitter is at the bedside. Per ID Notes- Pt. Will need IV Teflaro, until 12/3/22. Checking with [facility], to see if they can accept w/ the antibiotics. Will continue to follow for additional discharge needs.     Electronically signed by Obdulia Goncalves RN on 10/27/2022 at 12:25 PM

## 2022-10-27 NOTE — PROGRESS NOTES
Today's Date: 10/27/2022  Patient Name: Mary Molina  Date of admission: 10/10/2022  4:37 AM  Patient's age: 46 y. o., 1970  Admission Dx: Pulmonary nodules [R91.8]  SIRS (systemic inflammatory response syndrome) ([facility]) [R65.10]  NICKI (acute kidney injury) ([facility] [address] [N17.9]  Elevated d-dimer [R79.89]  Leukocytosis, unspecified type [E12.089]    Reason for Consult: management recommendations  Requesting Physician: Lizbet Marquez MD    CHIEF COMPLAINT: Thrombocytopenia    History Obtained From:  father, electronic medical record. Patient very lethargic not able to give any meaningful history      Interval history :    Pt seen and examined  Labs and vitlas reviewed  Remains  confused   No bleeding noted   Plt count slightly improved           HISTORY OF PRESENT ILLNESS:      The patient is a 46 y.o.  female who is admitted to the hospital with chief complaint of shortness of breath. Patient was hospitalized on 10/10/2022. During the hospitalization course patient has been diagnosed with MRSA bacteremia. Clinical picture suggestive of infective endocarditis with bilateral pulmonary nodules with cavitation. Patient also has history of cocaine abuse. Patient was already treated with cefepime and then switched to vancomycin. Patient noted to have progressive cytopenias especially platelet count of 31,015. Subsequently vancomycin has been discontinued and patient has been started on Teflaro. Patient lab work-up from today shows hemoglobin of 6.8. Count of 26,000. Platelet count has been gradually dropping. Hemoglobin also has been gradually dropping. Iron studies from January 2022 shows iron deficiency. Patient's INR on 10/18 was 1.2. Immunofixation studies from 10/11/2022 were negative. Excellent patient's hemoglobin at presentation on 10/10 was 14.9 and platelet count was 291.   Patient has history of gastric bypass  Patient lethargic not able to give any meaningful history    Past Medical History:   has a past medical history of Anemia, Arthritis, Asthma, Bipolar disorder, mixed ([address] Carotid artery stenosis, Cocaine abuse ([address] COPD (chronic obstructive pulmonary disease) ([address] Degeneration of cervical intervertebral disc, Depression with anxiety, Depression with anxiety, Fibromyalgia, GERD (gastroesophageal reflux disease), History of migraine headaches, History of renal calculi, History of seizure disorder, Hoarseness of voice, HTN (hypertension), Hyperlipidemia, IGT (impaired glucose tolerance), Kidney stones, Lactose intolerance, Leukopenia, Major depressive disorder, recurrent episode, severe, without mention of psychotic behavior, MVP (mitral valve prolapse), Seizures ([address] Sleep apnea, and Unspecified diseases of blood and blood-forming organs. Past Surgical History:   has a past surgical history that includes Gastric bypass surgery; Cholecystectomy; Hysterectomy; Appendectomy; Hysterectomy; Tonsillectomy and adenoidectomy; Ankle fracture surgery; Neck surgery; shoulder surgery;  section; Carpal tunnel release; Colonoscopy (12); Upper gastrointestinal endoscopy (7-3-12); Gastric bypass surgery; Breast lumpectomy (Right); Cardiac catheterization; Upper gastrointestinal endoscopy (2016); Colonoscopy (2016); Upper gastrointestinal endoscopy (2019); Colonoscopy (2019); Shoulder arthroscopy (Left, 2019); other surgical history (10/24/2019); laparoscopy (N/A, 2022); transesophageal echocardiogram (N/A, 10/19/2022); IR GUIDED PERC PLEURAL DRAIN W CATH INSERT (10/21/2022); IR GUIDED PERC PLEURAL DRAIN W CATH INSERT (10/21/2022); and IR GUIDED PERC PLEURAL DRAIN W CATH INSERT (10/24/2022). Medications:    Prior to Admission medications    Medication Sig Start Date End Date Taking?  Authorizing Provider   lisinopril (PRINIVIL;ZESTRIL) 20 MG tablet Take 20 mg by mouth daily   Yes Historical Provider, MD   carvedilol (COREG) 25 MG tablet TAKE 1 TABLET BY MOUTH IN THE MORNING AND 1 IN THE EVENING WITH MEALS 9/19/22   Jennifer Leung MD   traZODone (DESYREL) 100 MG tablet TAKE 1 TABLET BY MOUTH AT BEDTIME 6/13/22   Historical Provider, MD   gabapentin (NEURONTIN) 300 MG capsule TAKE 1 CAPSULE BY MOUTH THREE TIMES DAILY 5/24/22 8/9/22  Jennifer Leung MD   hydrOXYzine (VISTARIL) 25 MG capsule  9/15/20   Historical Provider, MD   DULoxetine (CYMBALTA) 30 MG extended release capsule Take 1 capsule by mouth daily  Patient taking differently: Take 60 mg by mouth in the morning.  4/8/19   KELLY Walton - CNP   ARIPiprazole (ABILIFY) 5 MG tablet Take 5 mg by mouth daily    Historical Provider, MD     Current Facility-Administered Medications   Medication Dose Route Frequency Provider Last Rate Last Admin    ceftaroline fosamil (TEFLARO) 600 mg in sodium chloride 0.9 % 50 mL IVPB  600 mg IntraVENous Q12H Sonny Wallis MD   Stopped at 10/27/22 1106    0.9 % sodium chloride infusion   IntraVENous PRN Jennifer Leung MD        sodium chloride flush 0.9 % injection 10 mL  10 mL IntraVENous PRN Jennifer Leung MD   10 mL at 10/20/22 1638    carvedilol (COREG) tablet 25 mg  25 mg Oral BID WC Frandy Lele, DO   25 mg at 10/27/22 0959    labetalol (NORMODYNE;TRANDATE) injection 20 mg  20 mg IntraVENous BID PRN Frandy Lele, DO   20 mg at 10/20/22 0238    potassium chloride (KLOR-CON M) extended release tablet 40 mEq  40 mEq Oral PRN Frandy Lele, DO   40 mEq at 10/25/22 0849    Or    potassium bicarb-citric acid (EFFER-K) effervescent tablet 40 mEq  40 mEq Oral PRN Frandy Lele, DO   40 mEq at 10/14/22 2031    Or    potassium chloride 10 mEq/100 mL IVPB (Peripheral Line)  10 mEq IntraVENous PRN Frandy Lele,  mL/hr at 10/23/22 0258 10 mEq at 10/23/22 0258    Vitamin D (CHOLECALCIFEROL) tablet 2,000 Units  2,000 Units Oral Daily Frandy Lele, DO   2,000 Units at 10/27/22 0959    hydrALAZINE (APRESOLINE) injection 10 mg  10 mg IntraVENous Q6H PRN Frandy Royal DO 10 mg at 10/27/22 0422    sodium chloride flush 0.9 % injection 5-40 mL  5-40 mL IntraVENous 2 times per day Frandy Lele, DO   10 mL at 10/27/22 1000    sodium chloride flush 0.9 % injection 5-40 mL  5-40 mL IntraVENous PRN Frandy Lele, DO        0.9 % sodium chloride infusion   IntraVENous PRN Frandy Lele, DO 50 mL/hr at 10/18/22 1200 New Bag at 10/18/22 1200    acetaminophen (TYLENOL) tablet 650 mg  650 mg Oral Q4H PRN Frandy Lele, DO   650 mg at 10/14/22 2031    ondansetron (ZOFRAN-ODT) disintegrating tablet 4 mg  4 mg Oral Q8H PRN Frandy Lele, DO        Or    ondansetron (ZOFRAN) injection 4 mg  4 mg IntraVENous Q6H PRN Frandy Lele, DO        ARIPiprazole (ABILIFY) tablet 5 mg  5 mg Oral Daily Frandy Lele, DO   5 mg at 10/27/22 0959    atorvastatin (LIPITOR) tablet 40 mg  40 mg Oral Daily Frandy Lele, DO   40 mg at 10/25/22 2102    divalproex (DEPAKOTE) DR tablet 1,000 mg  1,000 mg Oral BID Frandy Lele, DO   1,000 mg at 10/27/22 0959    DULoxetine (CYMBALTA) extended release capsule 60 mg  60 mg Oral Daily Frandy Lele, DO   60 mg at 10/27/22 1001    ferrous sulfate (IRON 325) tablet 325 mg  325 mg Oral BID WC Frandy Lele, DO   325 mg at 10/27/22 0959    sodium chloride flush 0.9 % injection 10 mL  10 mL IntraVENous PRN Frandy Lele, DO   10 mL at 10/10/22 1427    ipratropium-albuterol (DUONEB) nebulizer solution 1 ampule  1 ampule Inhalation Q4H WA Frandy Lele, DO   1 ampule at 10/27/22 1454    perflutren lipid microspheres (DEFINITY) injection 1.65 mg  1.5 mL IntraVENous ONCE PRN Frandy Lele, DO           Allergies:  Aspirin, Bactrim, and Codeine    Social History:   reports that she has been smoking cigarettes. She has a 16.50 pack-year smoking history. She has never used smokeless tobacco. She reports current alcohol use. She reports that she does not currently use drugs.      Family History: family history includes Alcohol Abuse in her brother; COPD in her father; Cancer in her mother, paternal grandmother, and another family member; Coronary Art Dis in her father; Depression in her brother; Diabetes in her maternal grandmother and mother. REVIEW OF SYSTEMS:      Patient lethargic and not able to give any meaningful history  PHYSICAL EXAM:        BP (!) 146/71   Pulse 65   Temp 97.9 °F (36.6 °C) (Axillary)   Resp 18   Ht 4' 11\" (1.499 m)   Wt 101 lb 13.6 oz (46.2 kg)   SpO2 95%   BMI 20.57 kg/m²    Temp (24hrs), Av.9 °F (36.6 °C), Min:97.5 °F (36.4 °C), Max:98.7 °F (37.1 °C)      General appearance -ill-appearing  Mental status -lethargic  Eyes - pupils equal and reactive, extraocular eye movements intact   Ears - bilateral TM's and external ear canals normal   Mouth - mucous membranes moist, pharynx normal without lesions   Neck - supple, no significant adenopathy   Lymphatics - no palpable lymphadenopathy, no hepatosplenomegaly   Chest -decreased breathing sounds bilaterally. Heart - normal rate, regular rhythm, normal S1, S2, no murmurs  Abdomen - soft, nontender, nondistended, no masses or organomegaly   Neurological -very lethargic.   Cannot carry out a conversation  Musculoskeletal - no joint tenderness, deformity or swelling   Extremities - peripheral pulses normal, no pedal edema, no clubbing or cyanosis   Skin - normal coloration and turgor, no rashes, no suspicious skin lesions noted ,      DATA:      Labs:      Latest Reference Range & Units 10/26/22 13:38   WBC 3.5 - 11.0 k/uL 4.1   RBC 4.0 - 5.2 m/uL 2.69 (L)   Hemoglobin Quant 12.0 - 16.0 g/dL 6.8 (LL)   Hematocrit 36 - 46 % 22.2 (L)   MCV 80 - 100 fL 82.5   MCH 26 - 34 pg 25.5 (L)   MCHC 31 - 37 g/dL 30.9 (L)   MPV 6.0 - 12.0 fL 7.4   RDW 11.5 - 14.9 % 17.0 (H)   Platelet Count 993 - 450 k/uL 26 (L)   Absolute Mono # 0.1 - 1.3 k/uL 0.21   (LL): Data is critically low  (L): Data is abnormally low  (H): Data is abnormally high        IMAGING DATA:    CT ABDOMEN PELVIS WO CONTRAST Additional Contrast? None    Result Date: 10/10/2022  EXAMINATION: CT OF THE ABDOMEN AND PELVIS WITHOUT CONTRAST 10/10/2022 2:37 pm TECHNIQUE: CT of the abdomen and pelvis was performed without the administration of intravenous contrast. Multiplanar reformatted images are provided for review. Automated exposure control, iterative reconstruction, and/or weight based adjustment of the mA/kV was utilized to reduce the radiation dose to as low as reasonably achievable. COMPARISON: 01/28/2022 HISTORY: ORDERING SYSTEM PROVIDED HISTORY: abdominal pain TECHNOLOGIST PROVIDED HISTORY: abdominal pain Is the patient pregnant?->No Reason for Exam: abd pain FINDINGS: LOWER CHEST: Extensive pulmonary in nodules with cavitation and focal consolidative changes throughout the visualized bilateral lower lobes are highly concerning for septic emboli within the lungs. KIDNEYS AND URINARY TRACT: Bilateral 2-4 mm nonobstructing kidney stones are visualized. . There is fluid density adjacent to the inferior pole of right kidney measuring 34 x 14 mm. This likely represents extrarenal pelvis. There is no evidence for hydronephrosis. The ureters are of normal course and caliber. ORGANS: Visualized portions of the liver, spleen, pancreas, gallbladder, and adrenal glands demonstrate no acute abnormality. GI/BOWEL: No bowel obstruction. No evidence of acute appendicitis. Status post gastric bypass. PELVIS: The bladder and pelvic organs are unremarkable. PERITONEUM/RETROPERITONEUM: No free air or free fluid is noted. No pathologically enlarged lymphadenopathy. The vasculature do not demonstrate acute abnormality. BONES/SOFT TISSUES: The osseous structures demonstrate no acute abnormality. Lack of contrast administration significantly decreases sensitivity of the study for evaluation of parenchymal lesions and  vascular pathologies. Extensive pulmonary nodules with cavitation and focal consolidative changes throughout the visualized bilateral lower lobes are highly concerning for septic emboli within the lungs.   For further evaluation contrast enhanced chest CT can be obtained. There is fluid density adjacent to the inferior pole of right kidney, likely extrarenal pelvis. For further evaluation post-contrast images of the abdomen is recommended. Status post gastric bypass. Bilateral 2-4 mm nonobstructing kidney stones are visualized. . The findings were sent to the Radiology Results [id number] at 5:11 pm on 10/10/2022 to be communicated to a licensed caregiver. XR CHEST (SINGLE VIEW FRONTAL)    Result Date: 10/23/2022  EXAMINATION: ONE XRAY VIEW OF THE CHEST. ONE PORTABLE AP VIEW OF CHEST. 10/23/2022 12:41 am COMPARISON: Portable chest x-ray on 10/22/2022 at 0938 hours. HISTORY: ORDERING SYSTEM PROVIDED HISTORY: L chest tube out TECHNOLOGIST PROVIDED HISTORY: L chest tube out Reason for Exam: L chest tube out FINDINGS: In right lower lung, there are heterogeneous dense opacities, indicating pneumonia and atelectatic changes which are increased as compared to previous chest x-ray. At the right CP angle area, there is confluent density indicating pleural effusion versus significant pleural thickening or atelectatic changes or infiltrates. In the right mid and upper lung fields, there are mild heterogeneous dense opacities indicating scattered infiltrates and atelectatic changes, similar to previous study. At the left lung base, there are mild atelectatic changes with or without mild infiltrates, similar to previous study. In the lateral portion of left upper lung field, there is focal density measuring 3 cm x 2.4 cm which may be due to focal infiltrates or mass lesion, unchanged as compared to previous study. Previous noted left basilar pleural tube has been withdrawn. There appears to be a pleural tube in the right lateral CP angle area similar to previous chest x-ray.   Superior to the right pleural drainage tube there is significant density which may be due to loculated pleural effusion and with combination of infiltrates and atelectasis. No pneumothorax. Mild cardiomegaly. Minimal-to-mild pulmonary vascular congestion. There is no overt pulmonary edema. No pneumoperitoneum. The right basilar opacity due to combination of pleural effusion or pleural thickening and pulmonary infiltrates and atelectasis appears to be increased to some extent as compared to previous chest x-ray. In the rest of both lungs, the infiltrates are unchanged. Mild-to-moderate pulmonary vascular congestion without obvious pulmonary edema. XR CHEST (SINGLE VIEW FRONTAL)    Result Date: 10/21/2022  EXAMINATION: ONE XRAY VIEW OF THE CHEST 10/21/2022 1:37 pm COMPARISON: 10/20/2022 HISTORY: ORDERING SYSTEM PROVIDED HISTORY: s/p bilateral chest tube placemetnt in IR TECHNOLOGIST PROVIDED HISTORY: s/p bilateral chest tube placemetnt in IR Reason for Exam: Bilateral chest tube placement FINDINGS: Interval placement of bilateral pleural pigtail catheters projecting over the lower chest on each side. Right arm PICC line tip is at the SVC/RA junction region. Left shoulder prosthesis. Partially visualized fusion hardware cervical spine. No significant pneumothorax is seen. Stable cardiomediastinal silhouette. Cholecystectomy clips. Bilateral parenchymal nodules/masses with cavitation, better shown on the recent CT, and suspicious for septic emboli. Patchy right perihilar and left lower lobe atelectasis/airspace disease. Interval placement of bilateral pleural pigtail catheters. XR LUMBAR SPINE (2-3 VIEWS)    Result Date: 10/12/2022  EXAMINATION: XRAY VIEWS OF THE LUMBAR SPINE 10/12/2022 2:06 pm COMPARISON: None. HISTORY: ORDERING SYSTEM PROVIDED HISTORY: back pain TECHNOLOGIST PROVIDED HISTORY: back pain Reason for Exam: back pain FINDINGS: No compression fracture or subluxation. Disc spaces maintained. Pedicles are intact. Diffuse calcification nondilated abdominal aorta. Unremarkable lumbar spine examination.      XR ABDOMEN (KUB) (SINGLE AP VIEW)    Result Date: 10/12/2022  EXAMINATION: ONE SUPINE XRAY VIEW(S) OF THE ABDOMEN 10/12/2022 2:06 pm COMPARISON: 02/01/2022 HISTORY: ORDERING SYSTEM PROVIDED HISTORY: abd pain TECHNOLOGIST PROVIDED HISTORY: abd pain Reason for Exam: abd pain FINDINGS: Mildly dilated colon and several small bowel loops. Residual stool in colon. No urinary tract calcifications. Osseous structures unremarkable. Findings consistent with mild diffuse ileus. No bowel obstruction or urinary tract calcifications. CT CHEST WO CONTRAST    Result Date: 10/21/2022  EXAMINATION: CT OF THE CHEST WITHOUT CONTRAST 10/21/2022 8:50 am TECHNIQUE: CT of the chest was performed without the administration of intravenous contrast. Multiplanar reformatted images are provided for review. Automated exposure control, iterative reconstruction, and/or weight based adjustment of the mA/kV was utilized to reduce the radiation dose to as low as reasonably achievable. COMPARISON: 10/20/2022, 10/17/2022 HISTORY: ORDERING SYSTEM PROVIDED HISTORY: diagonsis of pleural space infection ---- possible procedure pending results TECHNOLOGIST PROVIDED HISTORY: diagonsis of pleural space infection ---- possible procedure pending results Is the patient pregnant?->No Reason for Exam: order states pleural space infection, pt poor historian FINDINGS: Mediastinum: Interval placement of a right arm PICC line with its tip in the proximal right atrium. Mild calcific plaque of the thoracic aorta. Ascending aorta measures up to 3.7 cm. Coronary vascular calcifications. Similar small mediastinal lymph nodes without bulky adenopathy. Lungs/pleura: Again shown are multiple generally similar bilateral masslike densities, some with cavitation. Again findings are suggestive of septic emboli. Overall size and distribution are similar to the previous study from yesterday. Similar small bilateral pleural effusions which appear partially loculated.   There is mild respiratory motion artifact. Mild lower lobe consolidation with air bronchograms, greater on the left. Subtle ground-glass changes in the lingula and left lower lobe likely inflammatory/infectious. Upper Abdomen: No acute findings. Previous cholecystectomy. Postoperative changes of the stomach. Soft Tissues/Bones: Degenerative changes and previous ACDF lower cervical spine. Left shoulder prosthesis. Overall relatively stable exam.  Small pleural effusions which appear partially loculated with multiple bilateral masses, many with cavitation, suggesting septic emboli. CT CHEST WO CONTRAST  s normal in size. There is no pericardial effusion. Thoracic aorta and pulmonary arteries are grossly normal in caliber. There is calcified plaque at the aortic arch. Moderate coronary artery calcification. There is a mildly enlarged pretracheal lymph node, measuring 1 cm in short axis dimension. No other visible mediastinal lymphadenopathy. Lungs/pleura: There is a small amount of mildly loculated pleural fluid in the right hemithorax. Small left pleural effusion is noted as well, less loculated in appearance. There are numerous nodular opacities throughout both lungs, increased from 10/10/2022. Many (but not all) of the nodules are cavitary. There is focal consolidation with cavitation in the superior segment right lower lobe. Focal consolidation is also noted at the left base. Largest nodules are on the right, measuring up to 2.9 cm. No pneumothorax. Upper Abdomen: No acute findings in the limited visualized upper abdomen. Soft Tissues/Bones: Left shoulder arthroplasty. No acute or suspicious osseous abnormality. Limited, unremarkable renal ultrasound. No hydronephrosis. RECOMMENDATIONS: Unavailable     IR FLUORO GUIDED CVA DEVICE PLMT/REPLACE/REMOVAL    Result Date: 10/20/2022  PROCEDURE: ULTRASOUND GUIDED VASCULAR ACCESS. FLUOROSCOPY GUIDED PICC PLACEMENT 10/20/2022.  HISTORY: ORDERING SYSTEM PROVIDED HISTORY: IV Abx TECHNOLOGIST PROVIDED HISTORY: IV Abx Is the patient pregnant?->No SEDATION: None FLUOROSCOPY DOSE AND TYPE OR TIME AND EXPOSURES: 12 seconds; D AP 10 cGy cm2 TECHNIQUE: Informed consent was obtained from the patient's family member after a detailed explanation of the procedure including risks, benefits, and alternatives. Universal protocol was observed. The right arm was prepped and draped in sterile fashion using maximum sterile barrier technique. Local anesthesia was achieved with lidocaine. A micropuncture needle was used to access the right basilic vein using ultrasound guidance. An ultrasound image demonstrating patency of the vein with needle tip located within it. An image was obtained and stored in PACs. A 0.018 guidewire was used to place a peel-a-way sheath and a 5 Czech dual-lumen PICC was advanced with fluoroscopic guidance with the tip at the cavo-atrial junction. Fluoroscopy shows right lung masses/cavitary lesions better shown on recent chest CT. Fusion hardware lower cervical spine. The catheter flushed easily and there was a good blood return. The catheter was secured to the skin. The patient tolerated the procedure well and there were no immediate complications. EBL: Less than 3 mL FINDINGS: Fluoroscopic image demonstrates the tip of the catheter at the cavo-atrial junction. Successful ultrasound and fluoroscopy guided PICC placement     XR CHEST PORTABLE    Result Date: 10/25/2022  EXAMINATION: ONE XRAY VIEW OF THE CHEST 10/25/2022 6:12 pm COMPARISON: 10/24/2022 HISTORY: ORDERING SYSTEM PROVIDED HISTORY: Patient with right-sided chest tube. TECHNOLOGIST PROVIDED HISTORY: Patient with right-sided chest tube. Reason for Exam: Rt. sided chest tube Follow-up exam FINDINGS: Right pleural catheter is noted. No significant interval change in bilateral airspace opacities. Right PICC line is stable in positioning.   Orthopedic hardware is stable in appearance in the cervical spine left shoulder. No obvious pneumothorax. Stable cardiac silhouette. The left costophrenic angle sharp. Interval placement of a right pleural catheter. No obvious pneumothorax. No significant interval change in bilateral airspace opacities. XR CHEST PORTABLE    Result Date: 10/24/2022  EXAMINATION: ONE XRAY VIEW OF THE CHEST 10/24/2022 6:27 am COMPARISON: 10/23/2022 HISTORY: ORDERING SYSTEM PROVIDED HISTORY: Follow-up empyema; pleural catheters fell out TECHNOLOGIST PROVIDED HISTORY: Follow-up empyema; pleural catheters fell out Reason for Exam: post chest tube removal FINDINGS: Pleural pigtail catheter is no longer visualized. Left shoulder prosthesis and fusion hardware cervical spine. Right arm PICC line tip is in the right atrium. Stable cardiomediastinal silhouette. There is improved aeration of the right lung base. No significant pneumothorax is seen. No large pleural effusions within limits of the exam.  There are nodular parenchymal opacities in both lungs, some with cavitation, compatible with a septic emboli and better shown on the prior CT. Cholecystectomy clips. Improved aeration of the right lung base since the prior exam.     XR CHEST PORTABLE    Result Date: 10/22/2022  EXAMINATION: ONE XRAY VIEW OF THE CHEST 10/22/2022 10:19 am COMPARISON: 10/21/2022 at 13:37 HISTORY: ORDERING SYSTEM PROVIDED HISTORY: Pigtail position TECHNOLOGIST PROVIDED HISTORY: Pigtail position Reason for Exam: Pigtail position FINDINGS: Right PICC line terminating right atrium remains in place. Pigtail catheter along left lung base with the tip overlying the hemidiaphragm. Right pigtail pleural catheter has pulled back into a more peripheral position now called in region of costophrenic angle whereas previously it was more central. Normal cardiomediastinal silhouette. Mild interstitial prominence. Stable scattered moderate size nodular opacities in the lungs right greater than left. Patchy infiltrates at the lung bases probably unchanged allowing for difference in inspiration. Small right pleural effusion is new. Anterior fusion lower cervical spine and left total shoulder prosthesis. 1. Right pigtail pleural catheter has pulled back into the more peripheral position projecting at costophrenic angle whereas previously it was small central.  Left pigtail catheter continues to lie along the lung base. 2. Right PICC line unchanged. 3. Small right pleural effusion appears to be new but patchy bibasilar atelectasis unchanged. Diffuse nodular opacities also unchanged. XR CHEST PORTABLE    Result Date: 10/20/2022  EXAMINATION: ONE XRAY VIEW OF THE CHEST 10/20/2022 7:13 am COMPARISON: Chest x-ray dated 18 October 2022 HISTORY: ORDERING SYSTEM PROVIDED HISTORY: Septic emboli reevaluate pleural effusion on left TECHNOLOGIST PROVIDED HISTORY: Septic emboli reevaluate pleural effusion on left Reason for Exam: hx. of pleural effusion. FINDINGS: Similar appearance of bilateral nodular airspace opacities. Trace left pleural effusion. No pneumothorax. Stable cardiomediastinal silhouette     1. Stable appearance of bilateral nodular opacities in both lungs suspicious for underlying septic emboli better seen on the chest CT dated 17 October 2022 2. Trace left pleural effusion     XR CHEST PORTABLE    Result Date: 10/18/2022  EXAMINATION: ONE XRAY VIEW OF THE CHEST 10/18/2022 6:24 pm COMPARISON: Chest radiograph dated 10/11/2022. Chest CT dated 10/17/2022. HISTORY: ORDERING SYSTEM PROVIDED HISTORY: Empyema, s/p thoracentesis TECHNOLOGIST PROVIDED HISTORY: Empyema, s/p thoracentesis Reason for Exam: S/p thoracentesis FINDINGS: The cardiac silhouette appears within normal limits. Previously seen left-sided pleural effusion appears to have decreased in size. There is a small left and trace right pleural effusion.   There is redemonstration of multiple bilateral nodular opacity of the both lungs, suspicious for underlying septic emboli better seen and evaluated on the chest CT dated 10/17/2022. Components of left reverse total shoulder arthroplasty and changes of prior orthopedic fixation/fusion of the cervicothoracic spine are partially visualized. There are cholecystectomy clips overlying the right upper abdomen. Interval decrease in size of left-sided pleural effusion. Small left and trace right pleural effusion. Redemonstration of bilateral nodular opacities of the both lungs, suspicious for underlying septic emboli better seen and evaluated on the chest CT dated 10/17/2022. XR CHEST PORTABLE    Result Date: 10/11/2022  EXAMINATION: ONE XRAY VIEW OF THE CHEST 10/11/2022 6:35 am COMPARISON: 10/10/2022 HISTORY: ORDERING SYSTEM PROVIDED HISTORY: line placement TECHNOLOGIST PROVIDED HISTORY: line placement Reason for Exam: R sided IJ central line placement FINDINGS: Right IJ central line terminating at cavoatrial junction new from prior. Normal cardiomediastinal silhouette. Small patchy opacities left lung base reflecting subsegmental atelectasis. Scattered patchy opacities with a somewhat nodular appearance noted in the periphery of the lungs more pronounced on the right. This can be further assessed with CT. No change from prior. No pleural effusion or pneumothorax. Left shoulder prosthesis and cervical spine fusion noted. placed. Findings were discussed with Dr. Rosemary Nunez At 1:00 pm on 10/24/2022.      IR GUIDED PERC PLEURAL DRAIN W CATH INSERT    Result Date: 10/21/2022  PROCEDURE: ULTRASOUNDGUIDED BILATERAL THORACENTESIS WITH BILATERAL PLEURAL DRAINAGE CATHETER PLACEMENT 10/21/2022 HISTORY: ORDERING SYSTEM PROVIDED HISTORY: diagnositic and possibly therapeutic TECHNOLOGIST PROVIDED HISTORY: diagnositic and possibly therapeutic Which side should the procedure be BILATERAL Is the patient pregnant?->No History of septic emboli with previous left thoracentesis demonstrating infected fluid, CT shows loculated effusions, please perform diagnostic and therapeutic thoracentesis is with bilateral pigtail catheter placement for drainage. TECHNIQUE AND FINDINGS: Informed consent was obtained from the patient's family member after a detailed explanation of the procedure including risks, benefits, and alternatives. Universal protocol was performed. The patient's posterior chest was prepped and draped in standard sterile fashion. 1% lidocaine was used for local anesthesia. Procedure was performed with the patient in an upright position. Initially attention was directed towards the left side. Ultrasound shows a very small residual loculated pleural effusion in the medial left lower chest.  Skin over the area is prepped and draped in standard sterile fashion. 1% lidocaine used for local anesthesia. Using realtime ultrasound guidance an 8 Kosovan pigtail catheter was placed using trocar technique. Ultrasound images show a safe tract and access into the fluid. Thoracentesis was performed with removal of a small amount of slightly turbid yellow fluid. The catheter was sutured to the skin and secured in place with a Vaseline gauze dressing. The tube was connected to an atrium drainage system. Attention was then directed towards the right side. Ultrasound right chest shows a small right pleural effusion which appears to be partially loculated with minimal debris and septations. Skin over the area was prepped and draped in standard sterile fashion. 1% lidocaine used for local anesthesia. Using realtime ultrasound guidance an 8 Kosovan pigtail catheter was advanced into the pleural fluid using trocar technique. Ultrasound images show a safe tract and access into the fluid. Thoracentesis was performed with removal of approximately 20 mL of fairly clear light red colored fluid. Sample was sent for the requested studies. The catheter was sutured to the skin and secured in place with a Vaseline gauze dressing. Tube was connected to an atrium drainage system. There are no immediate complications. The patient left the department in stable condition. Further tube management per the pulmonary team. EBL: Less than 5 mL     Successful ultrasound-guided bilateral pleural pigtail catheter placement (8 [place] Rican catheters were placed on each side). Small partially loculated pleural effusions, slightly more prominent on the right. Fluid sample was sent for the requested studies. IR GUIDED PERC PLEURAL DRAIN W CATH INSERT    Result Date: 10/21/2022  PROCEDURE: ULTRASOUNDGUIDED BILATERAL THORACENTESIS WITH BILATERAL PLEURAL DRAINAGE CATHETER PLACEMENT 10/21/2022 HISTORY: ORDERING SYSTEM PROVIDED HISTORY: diagnositic and possibly therapeutic TECHNOLOGIST PROVIDED HISTORY: diagnositic and possibly therapeutic Which side should the procedure be BILATERAL Is the patient pregnant?->No History of septic emboli with previous left thoracentesis demonstrating infected fluid, CT shows loculated effusions, please perform diagnostic and therapeutic thoracentesis is with bilateral pigtail catheter placement for drainage. TECHNIQUE AND FINDINGS: Informed consent was obtained from the patient's family member after a detailed explanation of the procedure including risks, benefits, and alternatives. Universal protocol was performed. The patient's posterior chest was prepped and draped in standard sterile fashion. 1% lidocaine was used for local anesthesia. Procedure was performed with the patient in an upright position. Initially attention was directed towards the left side. Ultrasound shows a very small residual loculated pleural effusion in the medial left lower chest.  Skin over the area is prepped and draped in standard sterile fashion. 1% lidocaine used for local anesthesia. Using realtime ultrasound guidance an 8 [place]ican pigtail catheter was placed using trocar technique.   Ultrasound images show a safe tract and access into the fluid.  Thoracentesis was performed with removal of a small amount of slightly turbid yellow fluid. The catheter was sutured to the skin and secured in place with a Vaseline gauze dressing. The tube was connected to an atrium drainage system. Attention was then directed towards the right side. Ultrasound right chest shows a small right pleural effusion which appears to be partially loculated with minimal debris and septations. Skin over the area was prepped and draped in standard sterile fashion. 1% lidocaine used for local anesthesia. Using realtime ultrasound guidance an 8 Somali pigtail catheter was advanced into the pleural fluid using trocar technique. Ultrasound images show a safe tract and access into the fluid. Thoracentesis was performed with removal of approximately 20 mL of fairly clear light red colored fluid. Sample was sent for the requested studies. The catheter was sutured to the skin and secured in place with a Vaseline gauze dressing. Tube was connected to an atrium drainage system. There are no immediate complications. The patient left the department in stable condition. Further tube management per the pulmonary team. EBL: Less than 5 mL     Successful ultrasound-guided bilateral pleural pigtail catheter placement (8 Somali catheters were placed on each side). Small partially loculated pleural effusions, slightly more prominent on the right. Fluid sample was sent for the requested studies.          IMPRESSION:   Primary Problem  SIRS (systemic inflammatory response syndrome) [facility])    Active Hospital Problems    Diagnosis Date Noted    [facility] [D69.6] 10/26/2022     Priority: Medium    MRSA bacteremia [R78.81, B95.62] 10/23/2022     Priority: Medium    Acute septic pulmonary embolism ([facility] [I26.90] 10/23/2022     Priority: Medium    Cavitating mass of lung [J98.4] 10/23/2022     Priority: Medium    Allergy to antibiotic [Z88.1] 10/23/2022     Priority: Medium    Acute metabolic encephalopathy [R43.81] 10/18/2022     Priority: Medium    Delirium due to another medical condition [F05] 10/17/2022     Priority: Medium    Elevated d-dimer [R79.89] 10/15/2022     Priority: Medium    Leukocytosis [D72.829] 10/15/2022     Priority: Medium    Pulmonary nodules [R91.8] 10/15/2022     Priority: Medium    NICKI (acute kidney injury) ([facility] [N17.9] 10/11/2022     Priority: Medium    Severe malnutrition ([address] [E43] 10/11/2022     Priority: Medium    SIRS (systemic inflammatory response syndrome) ([facility] [R65.10] 10/10/2022     Priority: Medium    H/O gastric bypass [Z98.84] 02/13/2020    Acute encephalopathy [G93.40] 12/26/2016    COPD (chronic obstructive pulmonary disease) ([facility] [J44.9] 10/25/2016    Bipolar disorder, mixed ([facility] [F31.60] 11/06/2014    Depression with suicidal ideation [F32. 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 I explained to the patient the nature of this problem. I explained the significance of these abnormalities and possible etiology and management options  Reviewed hospitalization course  Peripheral smear does not show any schistocytes. Shows rouleaux formation. Also shows findings suggestive of acute infection/inflammation on the peripheral blood. Fibrinogen within range. Patient has adequate N31 folic acid stores. Free light chain ratio within range. Iron studies suggest anemia of chronic disease. Work-up suggest hypoproliferative anemia  Platelet count slightly improved. I believe patient's thrombocytopenia is multifactorial secondary to acute illness, peripheral consumption, myelosuppression as well as polypharmacy. Avoid drugs causing myelosuppression and thrombocytopenia.   Transfuse to keep platelet count above 20,000 in background of sepsis  Follow-up immunofixation studies  Transfuse to keep hemoglobin above 7. Discussed with father and Nurse. Thank you for asking us to see this patient. Katie Galindo MD          This note is created with the assistance of a speech recognition program.  While intending to generate a document that actually reflects the content of the visit, the document can still have some errors including those of syntax and sound a like substitutions which may escape proof reading. It such instances, actual meaning can be extrapolated by contextual diversion.

## 2022-10-27 NOTE — PROGRESS NOTES
Pulmonary Progress Note  [facility]      Patient - Eleno Martinez,  Age - 46 y.o.    - 1970      Room Number - 2109/2109-01   N -  [id number]   [facility]  Date of Admission -  10/10/2022  4:37 AM    Consulting Serina Landa MD  Primary Care Physician - Tanya Hernandez MD     SUBJECTIVE   Resting quietly. Opens her eyes sluggishly. Father at bedside. OBJECTIVE   VITALS    height is 4' 11\" (1.499 m) and weight is 101 lb 13.6 oz (46.2 kg). Her axillary temperature is 97.9 °F (36.6 °C). Her blood pressure is 146/71 (abnormal) and her pulse is 65. Her respiration is 18 and oxygen saturation is 96%. Body mass index is 20.57 kg/m². Temperature Range: Temp: 97.9 °F (36.6 °C) Temp  Av.9 °F (36.6 °C)  Min: 97.5 °F (36.4 °C)  Max: 98.7 °F (37.1 °C)  BP Range:  Systolic (94DMS), PAV:638 , Min:117 , LLB:813     Diastolic (09DNL), GNY:63, Min:62, Max:91    Pulse Range: Pulse  Av.4  Min: 61  Max: 74  Respiration Range: Resp  Av.1  Min: 18  Max: 22  Current Pulse Ox[de-identified]  SpO2: 96 %  24HR Pulse Ox Range:  SpO2  Av.5 %  Min: 93 %  Max: 100 %  Oxygen Amount and Delivery: O2 Flow Rate (L/min): 1 L/min (placed on room air)    Wt Readings from Last 3 Encounters:   10/26/22 101 lb 13.6 oz (46.2 kg)   22 96 lb 8 oz (43.8 kg)   22 105 lb (47.6 kg)       I/O (24 Hours)    Intake/Output Summary (Last 24 hours) at 10/27/2022 1313  Last data filed at 10/27/2022 1042  Gross per 24 hour   Intake 635.42 ml   Output 3 ml   Net 632.42 ml       EXAM     General Appearance  Awake, alert, oriented, in no acute distress  HEENT - normocephalic, atraumatic. Neck - Supple,  trachea midline   Lungs -coarse breath sounds anterior laterally  Heart Exam:PMI normal. No lifts, heaves, or thrills. RRR. No murmurs,   Abdomen Exam: Abdomen soft, non-tender. BS normal.   Extremity Exam: No signs of cyanosis.     MEDS      ceftaroline fosamil (TEFLARO) IVPB  600 mg IntraVENous Q12H    carvedilol  25 mg Oral BID     Vitamin D  2,000 Units Oral Daily    sodium chloride flush  5-40 mL IntraVENous 2 times per day    ARIPiprazole  5 mg Oral Daily    atorvastatin  40 mg Oral Daily    divalproex  1,000 mg Oral BID    DULoxetine  60 mg Oral Daily    ferrous sulfate  325 mg Oral BID     ipratropium-albuterol  1 ampule Inhalation Q4H WA      sodium chloride      sodium chloride 50 mL/hr at 10/18/22 1200     sodium chloride, sodium chloride flush, labetalol, potassium chloride **OR** potassium alternative oral replacement **OR** potassium chloride, hydrALAZINE, sodium chloride flush, sodium chloride, acetaminophen, ondansetron **OR** ondansetron, sodium chloride flush, perflutren lipid microspheres    LABS   CBC   Recent Labs     10/27/22  0442   WBC 5.5   HGB 8.4*   HCT 26.6*   MCV 82.7   PLT 31*     BMP:   Lab Results   Component Value Date/Time     10/27/2022 04:42 AM    K 3.6 10/27/2022 04:42 AM     10/27/2022 04:42 AM    CO2 28 10/27/2022 04:42 AM    BUN 12 10/27/2022 04:42 AM    LABALBU 1.7 10/22/2022 04:15 PM    CREATININE 0.71 10/27/2022 04:42 AM    CALCIUM 7.8 10/27/2022 04:42 AM    GFRAA >60 04/08/2022 07:17 AM    LABGLOM >60 10/27/2022 04:42 AM     ABGs:  Lab Results   Component Value Date/Time    PHART 7.452 10/22/2022 03:24 PM    PO2ART 86.7 10/22/2022 03:24 PM    KWT8MAZ 42.7 10/22/2022 03:24 PM      Lab Results   Component Value Date/Time    MODE PRVC 12/28/2016 04:36 AM     Ionized Calcium:  No results found for: IONCA  Magnesium:    Lab Results   Component Value Date/Time    MG 2.2 10/22/2022 04:15 PM     Phosphorus:    Lab Results   Component Value Date/Time    PHOS 3.6 10/18/2022 04:50 AM        LIVER PROFILE No results for input(s): AST, ALT, LIPASE, BILIDIR, BILITOT, ALKPHOS in the last 72 hours. Invalid input(s):   AMYLASE,  ALB  INR   Recent Labs     10/26/22  1735   INR 1.3     PTT   Lab Results   Component Value Date    APTT 30.6 10/26/2022         RADIOLOGY     (See actual reports for details)    ASSESSMENT/PLAN     Patient Active Problem List   Diagnosis    GERD (gastroesophageal reflux disease)    Hoarseness of voice    MVP (mitral valve prolapse)    Depression with suicidal ideation    Lymphocytic colitis    History of migraine headaches    Hyperlipidemia    Sleep apnea    IGT (impaired glucose tolerance)    History of renal calculi    Lactose intolerance    History of seizure disorder    Leukopenia    Bipolar disorder, mixed ([address]    Illicit drug use    Postlaminectomy syndrome, cervical region    Degeneration of cervical intervertebral disc    Encounter for long-term (current) use of other medications    Bradycardia    Cellulitis    Hypokalemia    Primary hypertension    Elevated lipase    Right lower quadrant abdominal pain    Nausea    Diarrhea    Acute encephalopathy    Polysubstance abuse (HCC)    Fibromyalgia    Cocaine addiction (HCC)    Shoulder arthritis    Osteoarthritis of left glenohumeral joint    Abnormal EKG    Anemia    Anxiety    Carotid artery stenosis    COPD (chronic obstructive pulmonary disease) ([facility]    Dental disease    HUTCHINSON (dyspnea on exertion)    Fatigue    Fractures    H/O gastric bypass    History of crack cocaine use    History of UTI    Injury of back    Intractable chronic migraine without aura and without status migrainosus    Intractable migraine with aura with status migrainosus    Kidney stones    Dizziness    Memory loss    Mild pulmonary hypertension (HCC)    Mild tricuspid regurgitation    Primary osteoarthritis of left shoulder    Seizure-like activity (HCC)    Stress at home    TMJ (dislocation of temporomandibular joint)    Tobacco abuse    Visual impairment    Internal hernia    Vitamin D deficiency    Iron deficiency    SIRS (systemic inflammatory response syndrome) (HCC)    NICKI (acute kidney injury) ([address]    Severe malnutrition (HCC)    Elevated d-dimer    Leukocytosis Pulmonary nodules    Delirium due to another medical condition    Acute metabolic encephalopathy    MRSA bacteremia    Acute septic pulmonary embolism (HCC)    Cavitating mass of lung    Allergy to antibiotic    Thrombocytopenia (HCC)     MRSA bacteremia  Septic emboli  E. coli UTI  Acute kidney injury, resolved  Suspect COPD, seen once in the office by Dr. Esperanza London in 2019, FEV1 2.04 L or 86% predicted  Parapneumonic effusion, pleural fluid positive for staph  History of opiate and cocaine use-reportedly uses crack cocaine at least twice weekly  History of tobacco use  Recent COVID +9/30  Elevated troponin likely type II MI  Encephalopathy, improving  History of hypertension  History of seizure disorder  Full code    Antibiotics switched over to Teflaro. I was able to collaborate with Dr. Alec Lima and her team earlier. Appreciate their efforts. I suspect that the patient will benefit from eventual transfer to an LTAC rather than a skilled nursing facility    Chest CT scan reveals that the pleural effusions are not worse however there is still a tube on the right side. Fearful that removal tube will result in increasing of the effusion. Her long-term prognosis is very guarded CT scan    Updated patient's father.       Electronically signed by Mingo Mesa MD on 10/27/2022 at 1:13 PM

## 2022-10-27 NOTE — CONSENT
See 10/26 note on obtaining informed consent for transfusion    Kathie Cash MD, [facility]

## 2022-10-27 NOTE — PLAN OF CARE
Problem: Discharge Planning  Goal: Discharge to home or other facility with appropriate resources  10/27/2022 0408 by Augie English RN  Outcome: Progressing     Problem: Pain  Goal: Verbalizes/displays adequate comfort level or baseline comfort level  10/27/2022 0408 by Augie English RN  Outcome: Progressing  Note: Pt showed no signs of pain throughout shift. Pt slept soundly. Problem: Skin/Tissue Integrity  Goal: Absence of new skin breakdown  Description: 1. Monitor for areas of redness and/or skin breakdown  2. Assess vascular access sites hourly  3. Every 4-6 hours minimum:  Change oxygen saturation probe site  4. Every 4-6 hours:  If on nasal continuous positive airway pressure, respiratory therapy assess nares and determine need for appliance change or resting period. 10/27/2022 0408 by Augie English RN  Outcome: Progressing  Note: Multiple skin abnormalities noted upon assessment of pt. No new skin breakdown evident. Skin kept clean and dry. Will continue to monitor for changes in skin integrity. Problem: Safety - Adult  Goal: Free from fall injury  10/27/2022 0408 by Augie English RN  Outcome: Progressing  Note: Pt made multiple attempts to get out of bed. Sitter had to regularly redirect pt to keep her in bed. No falls or injuries at this time.

## 2022-10-27 NOTE — PLAN OF CARE
Problem: Discharge Planning  Goal: Discharge to home or other facility with appropriate resources  10/27/2022 1704 by Brittany Willard RN  Outcome: Progressing     Problem: Pain  Goal: Verbalizes/displays adequate comfort level or baseline comfort level  10/27/2022 1704 by Brittany Willard RN  Outcome: Progressing     Problem: Skin/Tissue Integrity  Goal: Absence of new skin breakdown  10/27/2022 1704 by Brittany Willard RN  Outcome: Progressing     Problem: Safety - Adult  Goal: Free from fall injury  10/27/2022 1704 by Brittany Willard RN  Outcome: Progressing     Problem: ABCDS Injury Assessment  Goal: Absence of physical injury  Outcome: Progressing     Problem: Nutrition Deficit:  Goal: Optimize nutritional status  Outcome: Progressing

## 2022-10-27 NOTE — PROGRESS NOTES
[facility] -   Infectious diseases evaluation  admission date 10/10/2022    reason for consultation:   MRSA bacteremia    Impression :   Current:  MRSA bacteremia  Bilateral pulmonary nodules with cavitation /loculated pleural effusion /suspected septic emboli s/p thoracentesis with bilateral CT placement. Sepsis secondary to above  Thrombocytopenia/anemia? Vancomycin related  Encephalopathy  History of cocaine drug abuse  E. coli UTI treated  Acute renal failure improved  Hypertension  Bipolar disorder  Allergy to Bactrim. Recommendations     Continue IV Teflaro through 12/03/22  Platelet slight increase today  Follow CBC and renal function closely. Supportive care. Infection Control Recommendations   [place] Precautions  Contact Isolation     Antimicrobial Stewardship Recommendations   Simplification of therapy  Targeted therapy      History of Present Illness:   Initial history:  Armando Gaspar is a 46y.o.-year-old female was admitted 10/10/2020 for worsening shortness of breath associated with generalized body ache. The patient is drowsy, arousable, confused, follows simple commands, unable to provide history that was obtained from chart review and nursing staff. She does have history of cocaine abuse. She was found to have acute renal failure with a creatinine of 2.1. Urinalysis showed small leukocyte esterase, positive nitrate. Urine culture on 10/10/2022 grew E. Coli  Blood cultures 10/10/2022 2 of 2 grew MRSA  Echocardiogram showed no vegetations  Procalcitonin was 6.01, D-dimer was elevated at 3.85  Chest x-ray showed right lung nodules. VQ scan was indeterminant.   CT chest without contrast 10/16/2022 showed numerous bilateral nodular opacities both lungs suggestive of septic emboli , focal consolidation with cavitation in the superior right lower lobe focal consolidation at the left base, small bilateral loculated pleural effusion and mediastinal lymphadenopathy. Echocardiogram showed no vegetations  ADRIANNE negative for vegetations. Interval changes  10/27/2022   She is afebrile, drowsy, extremely lethargic  Platelet count up to 31 today  Tolerating IV Teflaro    Patient Vitals for the past 8 hrs:   BP Temp Temp src Pulse Resp SpO2   10/27/22 1245 (!) 146/71 97.9 °F (36.6 °C) Axillary 65 18 96 %   10/27/22 1118 -- -- -- 67 18 93 %   10/27/22 0709 (!) 174/91 97.8 °F (36.6 °C) Axillary 69 22 98 %   10/27/22 0705 -- -- -- 64 18 100 %   Labs:   Cre: 0.59-0.65-0.58  WBC: 7.0-7.3-8.4    Micro:  10/21 Thoracentesis-Staph Aureus 1 colony unit. 10/22 UA-Negative. I have personally reviewed the past medical history, past surgical history, medications, social history, and family history, and I haveupdated the database accordingly. Allergies:   Aspirin, Bactrim, and Codeine     Review of Systems:     Confused, unable to provide  Physical Examination :       Physical Exam  Vitals and nursing note reviewed. Constitutional:       General: She is not in acute distress. Appearance: She is not ill-appearing. HENT:      Head: Normocephalic and atraumatic. Right Ear: External ear normal.      Left Ear: External ear normal.      Nose: Nose normal.      Mouth/Throat:      Mouth: Mucous membranes are moist.      Pharynx: Oropharynx is clear. Eyes:      General: No scleral icterus. Conjunctiva/sclera: Conjunctivae normal.   Cardiovascular:      Rate and Rhythm: Regular rhythm. Heart sounds: Normal heart sounds. No murmur heard. Pulmonary:      Effort: Pulmonary effort is normal. No respiratory distress. Breath sounds: No wheezing or rhonchi. Comments:   Decreased breath sounds bilaterally  Abdominal:      General: Bowel sounds are normal. There is no distension. Palpations: Abdomen is soft. Tenderness: There is no abdominal tenderness. Genitourinary:     Comments: No chamberlain.   Musculoskeletal:      Cervical back: Neck supple. No rigidity. Right lower leg: No edema. Left lower leg: No edema. Skin:     General: Skin is warm and dry. Coloration: Skin is not jaundiced. Findings: No erythema.    Neurological:      Comments: Drowsy   Psychiatric:      Comments: ISAAK     Past Medical History:     Past Medical History:   Diagnosis Date    Anemia     Arthritis     Asthma     Bipolar disorder, mixed ([facility]     Carotid artery stenosis 2020    Cocaine abuse ([facility] 2014    COPD (chronic obstructive pulmonary disease) ([facility]     Degeneration of cervical intervertebral disc     Depression with anxiety     Depression with anxiety     Fibromyalgia 2017    GERD (gastroesophageal reflux disease)     History of migraine headaches 6/10/2014    History of renal calculi 6/10/2014    History of seizure disorder 6/10/2014    Hoarseness of voice     HTN (hypertension) 6/10/2014    Hyperlipidemia 6/10/2014    IGT (impaired glucose tolerance) 6/10/2014    Kidney stones     Lactose intolerance 6/10/2014    Leukopenia 6/10/2014    Major depressive disorder, recurrent episode, severe, without mention of psychotic behavior 2014    MVP (mitral valve prolapse)     Seizures (HCC)     Sleep apnea 6/10/2014    Unspecified diseases of blood and blood-forming organs        Past Surgical  History:     Past Surgical History:   Procedure Laterality Date    ANKLE FRACTURE SURGERY      recontruction surgery    APPENDECTOMY      BREAST LUMPECTOMY Right     CARDIAC CATHETERIZATION      CARPAL TUNNEL RELEASE      x2     SECTION      CHOLECYSTECTOMY      COLONOSCOPY  12    COLONOSCOPY  2016    severe spasms    COLONOSCOPY  2019    DR GOLDY MCINTOSH    GASTRIC BYPASS SURGERY      GASTRIC BYPASS SURGERY      HYSTERECTOMY (CERVIX STATUS UNKNOWN)      HYSTERECTOMY (CERVIX STATUS UNKNOWN)      IR PERC CATH PLEURAL DRAIN W/IMAG  10/21/2022    IR PERC CATH PLEURAL DRAIN W/IMAG 10/21/2022 [facility]    IR PERC CATH PLEURAL DRAIN W/IMAG  10/21/2022    IR PERC CATH PLEURAL DRAIN W/IMAG 10/21/2022 [facility]    IR PERC CATH PLEURAL DRAIN W/IMAG  10/24/2022    IR PERC CATH PLEURAL DRAIN W/IMAG 10/24/2022 [facility]    LAPAROSCOPY N/A 1/29/2022    LAPAROSCOPY EXPLORATORY CONVERTED TO OPEN EXPLORATORY LAPAROTOMY, LYSIS OF ADHESIONS, REDUCTION OF INTERNAL HERNIA performed by Iban Hernandez DO at [address] [facility]  36/14/8965    APPLICATION OF ARCH BARS,SIMPLE EXTRACTION OF #15 AND RT TMJ JOINT REPLACEMENT    SHOULDER ARTHROSCOPY Left 06/05/2019    DR ROBERT GREEN    SHOULDER SURGERY      TONSILLECTOMY AND ADENOIDECTOMY      TRANSESOPHAGEAL ECHOCARDIOGRAM N/A 10/19/2022    TRANSESOPHAGEAL ECHOCARDIOGRAM WITH BUBBLE STUDY performed by Frandy Royal DO at [address] [place] [address]  7-3-12    egd    UPPER GASTROINTESTINAL ENDOSCOPY  12/19/2016    status post gastrectomy with a small remnant of gastric pouch.     UPPER GASTROINTESTINAL ENDOSCOPY  05/2019    DR Rick Barnhart       Medications:      ceftaroline fosamil (TEFLARO) IVPB  600 mg IntraVENous Q12H    carvedilol  25 mg Oral BID WC    Vitamin D  2,000 Units Oral Daily    sodium chloride flush  5-40 mL IntraVENous 2 times per day    ARIPiprazole  5 mg Oral Daily    atorvastatin  40 mg Oral Daily    divalproex  1,000 mg Oral BID    DULoxetine  60 mg Oral Daily    ferrous sulfate  325 mg Oral BID WC    ipratropium-albuterol  1 ampule Inhalation Q4H WA       Social History:     Social History     Socioeconomic History    Marital status:      Spouse name: Not on file    Number of children: Not on file    Years of education: Not on file    Highest education level: Not on file   Occupational History    Occupation: disability   Tobacco Use    Smoking status: Every Day     Packs/day: 0.50     Years: 33.00     Pack years: 16.50     Types: Cigarettes    Smokeless tobacco: Never    Tobacco comments: quit  2000 started again  1/13    Substance and Sexual Activity    Alcohol use: Yes     Comment: occasional    Drug use: Not Currently    Sexual activity: Yes   Other Topics Concern    Not on file   Social History Narrative    Not on file     Social Determinants of Health     Financial Resource Strain: Not on file   Food Insecurity: Not on file   Transportation Needs: Not on file   Physical Activity: Not on file   Stress: Not on file   Social Connections: Not on file   Intimate Partner Violence: Not on file   Housing Stability: Not on file       Family History:     Family History   Problem Relation Age of Onset    Diabetes Mother     Cancer Mother     Coronary Art Dis Father     COPD Father     Depression Brother     Alcohol Abuse Brother     Cancer Other         lung and skin    Diabetes Maternal Grandmother     Cancer Paternal Grandmother       Medical Decision Making:   I have independently reviewed/ordered the following labs:    CBC with Differential:   Recent Labs     10/26/22  1338 10/26/22  2308 10/27/22  0442   WBC 4.1  --  5.5   HGB 6.8* 8.1* 8.4*   HCT 22.2* 25.3* 26.6*   PLT 26*  --  31*   LYMPHOPCT 20*  --  10*   MONOPCT 5  --  8*       BMP:  Recent Labs     10/26/22  1338 10/27/22  0442    142   K 3.9 3.6*    106   CO2 29 28   BUN 11 12   CREATININE 0.69 0.71     Hepatic Function Panel: No results for input(s): PROT, LABALBU, BILIDIR, IBILI, BILITOT, ALKPHOS, ALT, AST in the last 72 hours. No results for input(s): RPR in the last 72 hours. No results for input(s): HIV in the last 72 hours. No results for input(s): BC in the last 72 hours. Lab Results   Component Value Date/Time    CREATININE 0.71 10/27/2022 04:42 AM    GLUCOSE 76 10/27/2022 04:42 AM       Detailed results: Thank you for allowing us to participate in the care of this patient. Please call with questions.     This note is created with the assistance of a speech recognition program.  While intending to generate adocument that actually reflects the content of the visit, the document can still have some errors including those of syntax and sound a like substitutions which may escape proof reading. It such instances, actual meaningcan be extrapolated by contextual diversion. Marta Toscano MD  Attending Physician Statement  I have discussed the care of the patient, including pertinent history and exam findings,  with the resident. I have reviewed the key elements of all parts of the encounter with the resident. I agree with the assessment, plan and orders as documented by the resident.   Discussed with Dr. Malik Bailey MD   Office/Perfect Serve:   [phone]

## 2022-10-27 NOTE — PROGRESS NOTES
Patient sleeping; [place] visit with patient's father, Guerrero,  who provided medical update; Guerrero anxious and hopeful; welcomed prayer; listening presence and support;     10/26/22 2037   Encounter Summary   Encounter Overview/Reason  Spiritual/Emotional Needs   Service Provided For: Patient and family together   Referral/Consult From: [facility]   Last Encounter  10/26/22   Complexity of Encounter Moderate   Spiritual/Emotional needs   Type Spiritual Support   Grief, Loss, and Adjustments   Type Adjustment to illness   Assessment/Intervention/Outcome   Assessment Coping; Anxious; Powerlessness   Intervention Active listening;Discussed illness injury and its impact; Explored/Affirmed feelings, thoughts, concerns;Prayer (assurance of)/[place];Sustaining Presence/Ministry of presence   Outcome Comfort;Coping;Engaged in conversation;Expressed feelings, needs, and concerns;Expressed Gratitude;Receptive

## 2022-10-28 ENCOUNTER — APPOINTMENT (OUTPATIENT)
Dept: VASCULAR LAB | Age: 52
DRG: 871 | End: 2022-10-28
Payer: COMMERCIAL

## 2022-10-28 LAB
ABO/RH: NORMAL
ABSOLUTE EOS #: 0 K/UL (ref 0–0.4)
ABSOLUTE LYMPH #: 0.6 K/UL (ref 1–4.8)
ABSOLUTE MONO #: 0.6 K/UL (ref 0.1–1.3)
ANION GAP SERPL CALCULATED.3IONS-SCNC: 6 MMOL/L (ref 9–17)
ANTIBODY SCREEN: NEGATIVE
ARM BAND NUMBER: NORMAL
BASOPHILS # BLD: 0 % (ref 0–2)
BASOPHILS ABSOLUTE: 0 K/UL (ref 0–0.2)
BLD PROD TYP BPU: NORMAL
BLOOD BANK BLOOD PRODUCT EXPIRATION DATE: NORMAL
BLOOD BANK ISBT PRODUCT BLOOD TYPE: 5100
BLOOD BANK PRODUCT CODE: NORMAL
BLOOD BANK UNIT TYPE AND RH: NORMAL
BPU ID: NORMAL
BUN BLDV-MCNC: 15 MG/DL (ref 6–20)
CALCIUM SERPL-MCNC: 7.6 MG/DL (ref 8.6–10.4)
CHLORIDE BLD-SCNC: 107 MMOL/L (ref 98–107)
CO2: 29 MMOL/L (ref 20–31)
CREAT SERPL-MCNC: 0.86 MG/DL (ref 0.5–0.9)
CROSSMATCH RESULT: NORMAL
DISPENSE STATUS BLOOD BANK: NORMAL
EOSINOPHILS RELATIVE PERCENT: 0 % (ref 0–4)
EXPIRATION DATE: NORMAL
GFR SERPL CREATININE-BSD FRML MDRD: >60 ML/MIN/1.73M2
GLUCOSE BLD-MCNC: 78 MG/DL (ref 70–99)
HCT VFR BLD CALC: 27.5 % (ref 36–46)
HEMOGLOBIN: 8.7 G/DL (ref 12–16)
LYMPHOCYTES # BLD: 10 % (ref 24–44)
MCH RBC QN AUTO: 26.2 PG (ref 26–34)
MCHC RBC AUTO-ENTMCNC: 31.7 G/DL (ref 31–37)
MCV RBC AUTO: 82.7 FL (ref 80–100)
MONOCYTES # BLD: 9 % (ref 1–7)
PDW BLD-RTO: 16.3 % (ref 11.5–14.9)
PLATELET # BLD: 39 K/UL (ref 150–450)
PMV BLD AUTO: 7.7 FL (ref 6–12)
POTASSIUM SERPL-SCNC: 3.8 MMOL/L (ref 3.7–5.3)
RBC # BLD: 3.33 M/UL (ref 4–5.2)
SEG NEUTROPHILS: 81 % (ref 36–66)
SEGMENTED NEUTROPHILS ABSOLUTE COUNT: 4.9 K/UL (ref 1.3–9.1)
SODIUM BLD-SCNC: 142 MMOL/L (ref 135–144)
TRANSFUSION STATUS: NORMAL
UNIT DIVISION: 0
UNIT ISSUE DATE/TIME: NORMAL
WBC # BLD: 6.1 K/UL (ref 3.5–11)

## 2022-10-28 PROCEDURE — 93970 EXTREMITY STUDY: CPT

## 2022-10-28 PROCEDURE — 94640 AIRWAY INHALATION TREATMENT: CPT

## 2022-10-28 PROCEDURE — 94761 N-INVAS EAR/PLS OXIMETRY MLT: CPT

## 2022-10-28 PROCEDURE — 6360000002 HC RX W HCPCS: Performed by: INTERNAL MEDICINE

## 2022-10-28 PROCEDURE — 2580000003 HC RX 258: Performed by: INTERNAL MEDICINE

## 2022-10-28 PROCEDURE — 85025 COMPLETE CBC W/AUTO DIFF WBC: CPT

## 2022-10-28 PROCEDURE — 6370000000 HC RX 637 (ALT 250 FOR IP): Performed by: INTERNAL MEDICINE

## 2022-10-28 PROCEDURE — 2060000000 HC ICU INTERMEDIATE R&B

## 2022-10-28 PROCEDURE — 99232 SBSQ HOSP IP/OBS MODERATE 35: CPT | Performed by: INTERNAL MEDICINE

## 2022-10-28 PROCEDURE — 99233 SBSQ HOSP IP/OBS HIGH 50: CPT | Performed by: INTERNAL MEDICINE

## 2022-10-28 PROCEDURE — 99232 SBSQ HOSP IP/OBS MODERATE 35: CPT | Performed by: FAMILY MEDICINE

## 2022-10-28 PROCEDURE — 36415 COLL VENOUS BLD VENIPUNCTURE: CPT

## 2022-10-28 PROCEDURE — 80048 BASIC METABOLIC PNL TOTAL CA: CPT

## 2022-10-28 RX ADMIN — HYDRALAZINE HYDROCHLORIDE 10 MG: 20 INJECTION INTRAMUSCULAR; INTRAVENOUS at 00:53

## 2022-10-28 RX ADMIN — FERROUS SULFATE TAB 325 MG (65 MG ELEMENTAL FE) 325 MG: 325 (65 FE) TAB at 08:28

## 2022-10-28 RX ADMIN — DULOXETINE 60 MG: 60 CAPSULE, DELAYED RELEASE ORAL at 08:27

## 2022-10-28 RX ADMIN — IPRATROPIUM BROMIDE AND ALBUTEROL SULFATE 1 AMPULE: 2.5; .5 SOLUTION RESPIRATORY (INHALATION) at 19:13

## 2022-10-28 RX ADMIN — Medication 2000 UNITS: at 08:28

## 2022-10-28 RX ADMIN — IPRATROPIUM BROMIDE AND ALBUTEROL SULFATE 1 AMPULE: 2.5; .5 SOLUTION RESPIRATORY (INHALATION) at 07:04

## 2022-10-28 RX ADMIN — ATORVASTATIN CALCIUM 40 MG: 40 TABLET, FILM COATED ORAL at 21:55

## 2022-10-28 RX ADMIN — CEFTAROLINE FOSAMIL 600 MG: 600 POWDER, FOR SOLUTION INTRAVENOUS at 20:57

## 2022-10-28 RX ADMIN — DIVALPROEX SODIUM 1000 MG: 500 TABLET, DELAYED RELEASE ORAL at 21:55

## 2022-10-28 RX ADMIN — IPRATROPIUM BROMIDE AND ALBUTEROL SULFATE 1 AMPULE: 2.5; .5 SOLUTION RESPIRATORY (INHALATION) at 15:28

## 2022-10-28 RX ADMIN — IPRATROPIUM BROMIDE AND ALBUTEROL SULFATE 1 AMPULE: 2.5; .5 SOLUTION RESPIRATORY (INHALATION) at 10:31

## 2022-10-28 RX ADMIN — CEFTAROLINE FOSAMIL 600 MG: 600 POWDER, FOR SOLUTION INTRAVENOUS at 09:47

## 2022-10-28 RX ADMIN — CARVEDILOL 25 MG: 25 TABLET, FILM COATED ORAL at 18:26

## 2022-10-28 RX ADMIN — HYDRALAZINE HYDROCHLORIDE 10 MG: 20 INJECTION INTRAMUSCULAR; INTRAVENOUS at 22:12

## 2022-10-28 ASSESSMENT — PAIN SCALES - GENERAL: PAINLEVEL_OUTOF10: 0

## 2022-10-28 ASSESSMENT — PAIN SCALES - WONG BAKER: WONGBAKER_NUMERICALRESPONSE: 0

## 2022-10-28 NOTE — PROGRESS NOTES
Pulmonary Progress Note  [facility]      Patient - Andre Soto,  Age - 46 y.o.    - 1970      Room Number - 2109/2109-01   N -  [id number]   [facility]  Date of Admission -  10/10/2022  4:37 AM    Consulting Renee Christianson MD  Primary Care Physician - Jennifer Leung MD     SUBJECTIVE   Resting quietly. Reportedly more alert per patient's father's report    OBJECTIVE   VITALS    height is 4' 11\" (1.499 m) and weight is 99 lb 3.3 oz (45 kg). Her axillary temperature is 97.5 °F (36.4 °C). Her blood pressure is 188/77 (abnormal) and her pulse is 68. Her respiration is 16 and oxygen saturation is 99%. Body mass index is 20.04 kg/m². Temperature Range: Temp: 97.5 °F (36.4 °C) Temp  Av.9 °F (36.6 °C)  Min: 97.5 °F (36.4 °C)  Max: 98.1 °F (36.7 °C)  BP Range:  Systolic (33KJR), WMU:777 , Min:145 , MYR:880     Diastolic (28BSC), TME:34, Min:71, Max:81    Pulse Range: Pulse  Av.5  Min: 64  Max: 71  Respiration Range: Resp  Av.8  Min: 16  Max: 20  Current Pulse Ox[de-identified]  SpO2: 99 %  24HR Pulse Ox Range:  SpO2  Av.2 %  Min: 92 %  Max: 99 %  Oxygen Amount and Delivery: O2 Flow Rate (L/min): 1 L/min (placed on room air)    Wt Readings from Last 3 Encounters:   10/28/22 99 lb 3.3 oz (45 kg)   22 96 lb 8 oz (43.8 kg)   22 105 lb (47.6 kg)       I/O (24 Hours)    Intake/Output Summary (Last 24 hours) at 10/28/2022 1053  Last data filed at 10/28/2022 0954  Gross per 24 hour   Intake 700 ml   Output --   Net 700 ml       EXAM     General Appearance  Awake, alert, oriented, in no acute distress  HEENT - normocephalic, atraumatic. Neck - Supple,  trachea midline   Lungs -no signs of signs  Heart Exam:PMI normal. No lifts, heaves, or thrills. RRR. No murmurs, clicks, gallops, or rubs  Abdomen Exam: Abdomen soft, non-tender. BS normal. No masses,    Extremity Exam: No signs of cyanosis.   No edema    MEDS ceftaroline fosamil (TEFLARO) IVPB  600 mg IntraVENous Q12H    carvedilol  25 mg Oral BID     Vitamin D  2,000 Units Oral Daily    sodium chloride flush  5-40 mL IntraVENous 2 times per day    ARIPiprazole  5 mg Oral Daily    atorvastatin  40 mg Oral Daily    divalproex  1,000 mg Oral BID    DULoxetine  60 mg Oral Daily    ferrous sulfate  325 mg Oral BID     ipratropium-albuterol  1 ampule Inhalation Q4H WA      sodium chloride      sodium chloride 50 mL/hr at 10/18/22 1200     sodium chloride, sodium chloride flush, labetalol, potassium chloride **OR** potassium alternative oral replacement **OR** potassium chloride, hydrALAZINE, sodium chloride flush, sodium chloride, acetaminophen, ondansetron **OR** ondansetron, sodium chloride flush, perflutren lipid microspheres    LABS   CBC   Recent Labs     10/28/22  0541   WBC 6.1   HGB 8.7*   HCT 27.5*   MCV 82.7   PLT 39*     BMP:   Lab Results   Component Value Date/Time     10/28/2022 05:41 AM    K 3.8 10/28/2022 05:41 AM     10/28/2022 05:41 AM    CO2 29 10/28/2022 05:41 AM    BUN 15 10/28/2022 05:41 AM    LABALBU 1.7 10/22/2022 04:15 PM    CREATININE 0.86 10/28/2022 05:41 AM    CALCIUM 7.6 10/28/2022 05:41 AM    GFRAA >60 04/08/2022 07:17 AM    LABGLOM >60 10/28/2022 05:41 AM     ABGs:  Lab Results   Component Value Date/Time    PHART 7.452 10/22/2022 03:24 PM    PO2ART 86.7 10/22/2022 03:24 PM    UAY2OCO 42.7 10/22/2022 03:24 PM      Lab Results   Component Value Date/Time    MODE [facility] 12/28/2016 04:36 AM     Ionized Calcium:  No results found for: IONCA  Magnesium:    Lab Results   Component Value Date/Time    MG 2.2 10/22/2022 04:15 PM     Phosphorus:    Lab Results   Component Value Date/Time    PHOS 3.6 10/18/2022 04:50 AM        LIVER PROFILE No results for input(s): AST, ALT, LIPASE, BILIDIR, BILITOT, ALKPHOS in the last 72 hours. Invalid input(s):   AMYLASE,  ALB  INR   Recent Labs     10/26/22  1735   INR 1.3     PTT   Lab Results Component Value Date    APTT 30.6 10/26/2022         RADIOLOGY     (See actual reports for details)    ASSESSMENT/PLAN     Patient Active Problem List   Diagnosis    GERD (gastroesophageal reflux disease)    Hoarseness of voice    MVP (mitral valve prolapse)    Depression with suicidal ideation    Lymphocytic colitis    History of migraine headaches    Hyperlipidemia    Sleep apnea    IGT (impaired glucose tolerance)    History of renal calculi    Lactose intolerance    History of seizure disorder    Leukopenia    Bipolar disorder, mixed ([address]    Illicit drug use    Postlaminectomy syndrome, cervical region    Degeneration of cervical intervertebral disc    Encounter for long-term (current) use of other medications    Bradycardia    Cellulitis    Hypokalemia    Primary hypertension    Elevated lipase    Right lower quadrant abdominal pain    Nausea    Diarrhea    Acute encephalopathy    Polysubstance abuse (HCC)    Fibromyalgia    Cocaine addiction (HCC)    Shoulder arthritis    Osteoarthritis of left glenohumeral joint    Abnormal EKG    Anemia    Anxiety    Carotid artery stenosis    COPD (chronic obstructive pulmonary disease) ([address]    Dental disease    HUTCHINSON (dyspnea on exertion)    Fatigue    Fractures    H/O gastric bypass    History of crack cocaine use    History of UTI    Injury of back    Intractable chronic migraine without aura and without status migrainosus    Intractable migraine with aura with status migrainosus    Kidney stones    Dizziness    Memory loss    Mild pulmonary hypertension (HCC)    Mild tricuspid regurgitation    Primary osteoarthritis of left shoulder    Seizure-like activity (HCC)    Stress at home    TMJ (dislocation of temporomandibular joint)    Tobacco abuse    Visual impairment    Internal hernia    Vitamin D deficiency    Iron deficiency    SIRS (systemic inflammatory response syndrome) (HCC)    NICKI (acute kidney injury) ([address]    Severe malnutrition (HCC)    Elevated d-dimer Leukocytosis    Pulmonary nodules    Delirium due to another medical condition    Acute metabolic encephalopathy    MRSA bacteremia    Acute septic pulmonary embolism (HCC)    Cavitating mass of lung    Allergy to antibiotic    Thrombocytopenia (HCC)     MRSA bacteremia  Septic emboli  E. coli UTI  Acute kidney injury, resolved  Suspect COPD, seen once in the office by Dr. David Das in 2019, FEV1 2.04 L or 86% predicted  Parapneumonic effusion, pleural fluid positive for staph  History of opiate and cocaine use-reportedly uses crack cocaine at least twice weekly  History of tobacco use  Recent COVID +9/30  Elevated troponin likely type II MI  Encephalopathy, improving  History of hypertension  History of seizure disorder  Thrombocytopenia. Platelet count 75N  Full code    Currently on Teflaro. Lengthy conversation with patient's father. We believe an LTAC is more appropriate for her at this time. He voiced understanding. Discharge planning at other services discretion. , EPC cuffs not on patient. Will check Doppler ultrasounds to make sure no clot has developed. Unable to give pharmacological prophylaxis given patient's thrombocytopenia.     Electronically signed by Soo Quan MD on 10/28/2022 at 10:53 AM

## 2022-10-28 NOTE — PROGRESS NOTES
[facility] -   Infectious diseases evaluation  admission date 10/10/2022    reason for consultation:   MRSA bacteremia    Impression :   Current:  MRSA bacteremia  Bilateral pulmonary nodules with cavitation /loculated pleural effusion /suspected septic emboli s/p thoracentesis with bilateral CT placement. Sepsis secondary to above  Thrombocytopenia/anemia? Vancomycin related  Encephalopathy  History of cocaine drug abuse  E. coli UTI treated  Acute renal failure improved  Hypertension  Bipolar disorder  Allergy to Bactrim. Recommendations     Continue IV Teflaro through 12/03/22  Follow CBC and renal function closely. Supportive care. Consider LTAC on discharge    Infection Control Recommendations   [place] Precautions  Contact Isolation     Antimicrobial Stewardship Recommendations   Simplification of therapy  Targeted therapy      History of Present Illness:   Initial history:  Scotty Gray is a 46y.o.-year-old female was admitted 10/10/2020 for worsening shortness of breath associated with generalized body ache. The patient is drowsy, arousable, confused, follows simple commands, unable to provide history that was obtained from chart review and nursing staff. She does have history of cocaine abuse. She was found to have acute renal failure with a creatinine of 2.1. Urinalysis showed small leukocyte esterase, positive nitrate. Urine culture on 10/10/2022 grew E. Coli  Blood cultures 10/10/2022 2 of 2 grew MRSA  Echocardiogram showed no vegetations  Procalcitonin was 6.01, D-dimer was elevated at 3.85  Chest x-ray showed right lung nodules. VQ scan was indeterminant.   CT chest without contrast 10/16/2022 showed numerous bilateral nodular opacities both lungs suggestive of septic emboli , focal consolidation with cavitation in the superior right lower lobe focal consolidation at the left base, small bilateral loculated pleural effusion and mediastinal lymphadenopathy. Echocardiogram showed no vegetations  ADRIANNE negative for vegetations. Interval changes  10/28/2022   She is sleeping, arousable, afebrile, right chest tube in place. CT chest from 10/26/2022 reviewed, discussed with Dr. Rosemary Nunez. Patient Vitals for the past 8 hrs:   BP Temp Temp src Pulse Resp SpO2 Weight   10/28/22 0745 (!) 188/77 -- -- 68 -- -- --   10/28/22 0704 -- -- -- 71 16 98 % --   10/28/22 0637 (!) 199/77 97.5 °F (36.4 °C) Axillary 67 20 98 % --   10/28/22 0530 -- -- -- -- -- -- 99 lb 3.3 oz (45 kg)   10/28/22 0308 -- -- -- 69 20 97 % --           I have personally reviewed the past medical history, past surgical history, medications, social history, and family history, and I haveupdated the database accordingly. Allergies:   Aspirin, Bactrim, and Codeine     Review of Systems:     Confused, unable to provide  Physical Examination :       Physical Exam  Vitals and nursing note reviewed. Constitutional:       General: She is not in acute distress. Appearance: She is not ill-appearing. HENT:      Head: Normocephalic and atraumatic. Right Ear: External ear normal.      Left Ear: External ear normal.      Nose: Nose normal.      Mouth/Throat:      Mouth: Mucous membranes are moist.      Pharynx: Oropharynx is clear. Eyes:      General: No scleral icterus. Conjunctiva/sclera: Conjunctivae normal.   Cardiovascular:      Rate and Rhythm: Regular rhythm. Heart sounds: Normal heart sounds. No murmur heard. Pulmonary:      Effort: Pulmonary effort is normal. No respiratory distress. Breath sounds: No wheezing or rhonchi. Comments:   Decreased breath sounds bilaterally  Abdominal:      General: Bowel sounds are normal. There is no distension. Palpations: Abdomen is soft. Tenderness: There is no abdominal tenderness. Genitourinary:     Comments: No chamberlain. Musculoskeletal:      Cervical back: Neck supple. No rigidity.       Right lower leg: No edema. Left lower leg: No edema. Skin:     General: Skin is warm and dry. Coloration: Skin is not jaundiced. Findings: No erythema.    Neurological:      Comments: Drowsy   Psychiatric:      Comments: ISAAK     Past Medical History:     Past Medical History:   Diagnosis Date    Anemia     Arthritis     Asthma     Bipolar disorder, mixed ([address]     Carotid artery stenosis 2020    Cocaine abuse ([address] 2014    COPD (chronic obstructive pulmonary disease) ([address]     Degeneration of cervical intervertebral disc     Depression with anxiety     Depression with anxiety     Fibromyalgia 2017    GERD (gastroesophageal reflux disease)     History of migraine headaches 6/10/2014    History of renal calculi 6/10/2014    History of seizure disorder 6/10/2014    Hoarseness of voice     HTN (hypertension) 6/10/2014    Hyperlipidemia 6/10/2014    IGT (impaired glucose tolerance) 6/10/2014    Kidney stones     Lactose intolerance 6/10/2014    Leukopenia 6/10/2014    Major depressive disorder, recurrent episode, severe, without mention of psychotic behavior 2014    MVP (mitral valve prolapse)     Seizures ([address]     Sleep apnea 6/10/2014    Unspecified diseases of blood and blood-forming organs        Past Surgical  History:     Past Surgical History:   Procedure Laterality Date    ANKLE FRACTURE SURGERY      recontruction surgery    APPENDECTOMY      BREAST LUMPECTOMY Right     CARDIAC CATHETERIZATION      CARPAL TUNNEL RELEASE      x2     SECTION      CHOLECYSTECTOMY      COLONOSCOPY  10    COLONOSCOPY  2016    severe spasms    COLONOSCOPY  2019    DR GOLDY MCINTOSH    GASTRIC BYPASS SURGERY      GASTRIC BYPASS SURGERY      HYSTERECTOMY (CERVIX STATUS UNKNOWN)      HYSTERECTOMY (CERVIX STATUS UNKNOWN)      IR PERC CATH PLEURAL DRAIN W/IMAG  10/21/2022    IR PERC CATH PLEURAL DRAIN W/IMAG 10/21/2022 [facility]    IR PERC CATH PLEURAL DRAIN W/IMAG  10/21/2022 IR PERC CATH PLEURAL DRAIN W/IMAG 10/21/2022 [facility]    IR PERC CATH PLEURAL DRAIN W/IMAG  10/24/2022    IR PERC CATH PLEURAL DRAIN W/IMAG 10/24/2022 [facility]    LAPAROSCOPY N/A 1/29/2022    LAPAROSCOPY EXPLORATORY CONVERTED TO OPEN EXPLORATORY LAPAROTOMY, LYSIS OF ADHESIONS, REDUCTION OF INTERNAL HERNIA performed by Candy King DO at [address] [place] [address]  16/97/8465    APPLICATION OF ARCH BARS,SIMPLE EXTRACTION OF #15 AND RT TMJ JOINT REPLACEMENT    SHOULDER ARTHROSCOPY Left 06/05/2019    DR ROBERT GREEN    SHOULDER SURGERY      TONSILLECTOMY AND ADENOIDECTOMY      TRANSESOPHAGEAL ECHOCARDIOGRAM N/A 10/19/2022    TRANSESOPHAGEAL ECHOCARDIOGRAM WITH BUBBLE STUDY performed by Frandy Royal DO at [address]  [address]    egd    UPPER GASTROINTESTINAL ENDOSCOPY  12/19/2016    status post gastrectomy with a small remnant of gastric pouch.     UPPER GASTROINTESTINAL ENDOSCOPY  05/2019    DR Ed Cheney       Medications:      ceftaroline fosamil (TEFLARO) IVPB  600 mg IntraVENous Q12H    carvedilol  25 mg Oral BID WC    Vitamin D  2,000 Units Oral Daily    sodium chloride flush  5-40 mL IntraVENous 2 times per day    ARIPiprazole  5 mg Oral Daily    atorvastatin  40 mg Oral Daily    divalproex  1,000 mg Oral BID    DULoxetine  60 mg Oral Daily    ferrous sulfate  325 mg Oral BID WC    ipratropium-albuterol  1 ampule Inhalation Q4H WA       Social History:     Social History     Socioeconomic History    Marital status:      Spouse name: Not on file    Number of children: Not on file    Years of education: Not on file    Highest education level: Not on file   Occupational History    Occupation: disability   Tobacco Use    Smoking status: Every Day     Packs/day: 0.50     Years: 33.00     Pack years: 16.50     Types: Cigarettes    Smokeless tobacco: Never    Tobacco comments:     quit  2000 started again  1/13 Substance and Sexual Activity    Alcohol use: Yes     Comment: occasional    Drug use: Not Currently    Sexual activity: Yes   Other Topics Concern    Not on file   Social History Narrative    Not on file     Social Determinants of Health     Financial Resource Strain: Not on file   Food Insecurity: Not on file   Transportation Needs: Not on file   Physical Activity: Not on file   Stress: Not on file   Social Connections: Not on file   Intimate Partner Violence: Not on file   Housing Stability: Not on file       Family History:     Family History   Problem Relation Age of Onset    Diabetes Mother     Cancer Mother     Coronary Art Dis Father     COPD Father     Depression Brother     Alcohol Abuse Brother     Cancer Other         lung and skin    Diabetes Maternal Grandmother     Cancer Paternal Grandmother       Medical Decision Making:   I have independently reviewed/ordered the following labs:    CBC with Differential:   Recent Labs     10/27/22  0442 10/28/22  0541   WBC 5.5 6.1   HGB 8.4* 8.7*   HCT 26.6* 27.5*   PLT 31* 39*   LYMPHOPCT 10* 10*   MONOPCT 8* 9*         BMP:  Recent Labs     10/27/22  0442 10/28/22  0541    142   K 3.6* 3.8    107   CO2 28 29   BUN 12 15   CREATININE 0.71 0.86       Hepatic Function Panel: No results for input(s): PROT, LABALBU, BILIDIR, IBILI, BILITOT, ALKPHOS, ALT, AST in the last 72 hours. No results for input(s): RPR in the last 72 hours. No results for input(s): HIV in the last 72 hours. No results for input(s): BC in the last 72 hours. Lab Results   Component Value Date/Time    CREATININE 0.86 10/28/2022 05:41 AM    GLUCOSE 78 10/28/2022 05:41 AM       Detailed results: Thank you for allowing us to participate in the care of this patient. Please call with questions.     This note is created with the assistance of a speech recognition program.  While intending to generate adocument that actually reflects the content of the visit, the document can still have some errors including those of syntax and sound a like substitutions which may escape proof reading. It such instances, actual meaningcan be extrapolated by contextual diversion.         Jory Mathis MD   Office/Perfect Serve:   [phone]

## 2022-10-28 NOTE — PROGRESS NOTES
Today's Date: 10/28/2022  Patient Name: Mile Parker  Date of admission: 10/10/2022  4:37 AM  Patient's age: 46 y. o., 1970  Admission Dx: Pulmonary nodules [R91.8]  SIRS (systemic inflammatory response syndrome) ([facility]) [R65.10]  NICKI (acute kidney injury) ([facility] [address] [N17.9]  Elevated d-dimer [R79.89]  Leukocytosis, unspecified type [B80.821]    Reason for Consult: management recommendations  Requesting Physician: Radha Layton MD    CHIEF COMPLAINT: Thrombocytopenia    History Obtained From:  father, electronic medical record. Patient very lethargic not able to give any meaningful history      Interval history :    Pt seen and examined  Labs and vitlas reviewed  No new complaint of edema  Seems to be more awake  Chest tube in place  Present from gradually improving. 39,000 today. H&H stable. HISTORY OF PRESENT ILLNESS:      The patient is a 46 y.o.  female who is admitted to the hospital with chief complaint of shortness of breath. Patient was hospitalized on 10/10/2022. During the hospitalization course patient has been diagnosed with MRSA bacteremia. Clinical picture suggestive of infective endocarditis with bilateral pulmonary nodules with cavitation. Patient also has history of cocaine abuse. Patient was already treated with cefepime and then switched to vancomycin. Patient noted to have progressive cytopenias especially platelet count of 01,268. Subsequently vancomycin has been discontinued and patient has been started on Teflaro. Patient lab work-up from today shows hemoglobin of 6.8. Count of 26,000. Platelet count has been gradually dropping. Hemoglobin also has been gradually dropping. Iron studies from January 2022 shows iron deficiency. Patient's INR on 10/18 was 1.2. Immunofixation studies from 10/11/2022 were negative. Excellent patient's hemoglobin at presentation on 10/10 was 14.9 and platelet count was 320.   Patient has history of gastric bypass  Patient lethargic not able to give any meaningful history    Past Medical History:   has a past medical history of Anemia, Arthritis, Asthma, Bipolar disorder, mixed ([address] Carotid artery stenosis, Cocaine abuse ([address] COPD (chronic obstructive pulmonary disease) ([facility] [address] Degeneration of cervical intervertebral disc, Depression with anxiety, Depression with anxiety, Fibromyalgia, GERD (gastroesophageal reflux disease), History of migraine headaches, History of renal calculi, History of seizure disorder, Hoarseness of voice, HTN (hypertension), Hyperlipidemia, IGT (impaired glucose tolerance), Kidney stones, Lactose intolerance, Leukopenia, Major depressive disorder, recurrent episode, severe, without mention of psychotic behavior, MVP (mitral valve prolapse), Seizures ([address] Sleep apnea, and Unspecified diseases of blood and blood-forming organs. Past Surgical History:   has a past surgical history that includes Gastric bypass surgery; Cholecystectomy; Hysterectomy; Appendectomy; Hysterectomy; Tonsillectomy and adenoidectomy; Ankle fracture surgery; Neck surgery; shoulder surgery;  section; Carpal tunnel release; Colonoscopy (12); Upper gastrointestinal endoscopy (7-3-12); Gastric bypass surgery; Breast lumpectomy (Right); Cardiac catheterization; Upper gastrointestinal endoscopy (2016); Colonoscopy (2016); Upper gastrointestinal endoscopy (2019); Colonoscopy (2019); Shoulder arthroscopy (Left, 2019); other surgical history (10/24/2019); laparoscopy (N/A, 2022); transesophageal echocardiogram (N/A, 10/19/2022); IR GUIDED PERC PLEURAL DRAIN W CATH INSERT (10/21/2022); IR GUIDED PERC PLEURAL DRAIN W CATH INSERT (10/21/2022); and IR GUIDED PERC PLEURAL DRAIN W CATH INSERT (10/24/2022). Medications:    Prior to Admission medications    Medication Sig Start Date End Date Taking?  Authorizing Provider   lisinopril (PRINIVIL;ZESTRIL) 20 MG tablet Take 20 mg by mouth daily   Yes Historical Provider, MD   carvedilol (COREG) 25 MG tablet TAKE 1 TABLET BY MOUTH IN THE MORNING AND 1 IN THE EVENING WITH MEALS 9/19/22   Lizbet Marquez MD   traZODone (DESYREL) 100 MG tablet TAKE 1 TABLET BY MOUTH AT BEDTIME 6/13/22   Historical Provider, MD   gabapentin (NEURONTIN) 300 MG capsule TAKE 1 CAPSULE BY MOUTH THREE TIMES DAILY 5/24/22 8/9/22  Lizbet Marquez MD   hydrOXYzine (VISTARIL) 25 MG capsule  9/15/20   Historical Provider, MD   DULoxetine (CYMBALTA) 30 MG extended release capsule Take 1 capsule by mouth daily  Patient taking differently: Take 60 mg by mouth in the morning.  4/8/19   KELLY Calderon - CNP   ARIPiprazole (ABILIFY) 5 MG tablet Take 5 mg by mouth daily    Historical Provider, MD     Current Facility-Administered Medications   Medication Dose Route Frequency Provider Last Rate Last Admin    ceftaroline fosamil (TEFLARO) 600 mg in sodium chloride 0.9 % 50 mL IVPB  600 mg IntraVENous Q12H Kandace Colby MD   Stopped at 10/28/22 1108    0.9 % sodium chloride infusion   IntraVENous PRN Lizbet Marquez MD        sodium chloride flush 0.9 % injection 10 mL  10 mL IntraVENous PRN Lizbet Marquez MD   10 mL at 10/20/22 1638    carvedilol (COREG) tablet 25 mg  25 mg Oral BID  Frandy Lele, DO   25 mg at 10/28/22 1826    labetalol (NORMODYNE;TRANDATE) injection 20 mg  20 mg IntraVENous BID PRN Frandy Lele, DO   20 mg at 10/20/22 0238    potassium chloride (KLOR-CON M) extended release tablet 40 mEq  40 mEq Oral PRN Frandy Lele, DO   40 mEq at 10/25/22 0849    Or    potassium bicarb-citric acid (EFFER-K) effervescent tablet 40 mEq  40 mEq Oral PRN Frandy Lele, DO   40 mEq at 10/14/22 2031    Or    potassium chloride 10 mEq/100 mL IVPB (Peripheral Line)  10 mEq IntraVENous PRN Frandy Lele,  mL/hr at 10/23/22 0258 10 mEq at 10/23/22 0258    Vitamin D (CHOLECALCIFEROL) tablet 2,000 Units  2,000 Units Oral Daily Frandy Lele, DO   2,000 Units at 10/28/22 0828    hydrALAZINE (APRESOLINE) injection 10 mg  10 mg IntraVENous Q6H PRN Frandy Lele, DO   10 mg at 10/28/22 0053    sodium chloride flush 0.9 % injection 5-40 mL  5-40 mL IntraVENous 2 times per day Frandy Lele, DO   10 mL at 10/27/22 2214    sodium chloride flush 0.9 % injection 5-40 mL  5-40 mL IntraVENous PRN Frandy Lele, DO        0.9 % sodium chloride infusion   IntraVENous PRN Frandy Lele, DO 50 mL/hr at 10/18/22 1200 New Bag at 10/18/22 1200    acetaminophen (TYLENOL) tablet 650 mg  650 mg Oral Q4H PRN Frandy Lele, DO   650 mg at 10/14/22 2031    ondansetron (ZOFRAN-ODT) disintegrating tablet 4 mg  4 mg Oral Q8H PRN Frandy Lele, DO        Or    ondansetron (ZOFRAN) injection 4 mg  4 mg IntraVENous Q6H PRN Frandy Lele, DO        ARIPiprazole (ABILIFY) tablet 5 mg  5 mg Oral Daily Frandy Lele, DO   5 mg at 10/27/22 0959    atorvastatin (LIPITOR) tablet 40 mg  40 mg Oral Daily Frandy Lele, DO   40 mg at 10/27/22 2147    divalproex (DEPAKOTE) DR tablet 1,000 mg  1,000 mg Oral BID Frandy Lele, DO   1,000 mg at 10/27/22 0959    DULoxetine (CYMBALTA) extended release capsule 60 mg  60 mg Oral Daily Frandy Lele, DO   60 mg at 10/28/22 0827    ferrous sulfate (IRON 325) tablet 325 mg  325 mg Oral BID  Frandy Lele, DO   325 mg at 10/28/22 0828    sodium chloride flush 0.9 % injection 10 mL  10 mL IntraVENous PRN Frandy Lele, DO   10 mL at 10/10/22 1427    ipratropium-albuterol (DUONEB) nebulizer solution 1 ampule  1 ampule Inhalation Q4H WA Frandy Lele, DO   1 ampule at 10/28/22 1528    perflutren lipid microspheres (DEFINITY) injection 1.65 mg  1.5 mL IntraVENous ONCE PRN Frandy Lele, DO           Allergies:  Aspirin, Bactrim, and Codeine    Social History:   reports that she has been smoking cigarettes. She has a 16.50 pack-year smoking history. She has never used smokeless tobacco. She reports current alcohol use. She reports that she does not currently use drugs.      Family History: family history includes Alcohol Abuse in her brother; COPD in her father; Cancer in her mother, paternal grandmother, and another family member; Coronary Art Dis in her father; Depression in her brother; Diabetes in her maternal grandmother and mother. REVIEW OF SYSTEMS:      Patient lethargic and not able to give any meaningful history  PHYSICAL EXAM:        BP (!) 182/59   Pulse 66   Temp 97.6 °F (36.4 °C) (Oral)   Resp 16   Ht 4' 11\" (1.499 m)   Wt 99 lb 3.3 oz (45 kg)   SpO2 98%   BMI 20.04 kg/m²    Temp (24hrs), Av.6 °F (36.4 °C), Min:97.2 °F (36.2 °C), Max:98.1 °F (36.7 °C)      General appearance -ill-appearing  Mental status -lethargic  Eyes - pupils equal and reactive, extraocular eye movements intact   Ears - bilateral TM's and external ear canals normal   Mouth - mucous membranes moist, pharynx normal without lesions   Neck - supple, no significant adenopathy   Lymphatics - no palpable lymphadenopathy, no hepatosplenomegaly   Chest -decreased breathing sounds bilaterally. Heart - normal rate, regular rhythm, normal S1, S2, no murmurs  Abdomen - soft, nontender, nondistended, no masses or organomegaly   Neurological -very lethargic.   Cannot carry out a conversation  Musculoskeletal - no joint tenderness, deformity or swelling   Extremities - peripheral pulses normal, no pedal edema, no clubbing or cyanosis   Skin - normal coloration and turgor, no rashes, no suspicious skin lesions noted ,      DATA:      Labs:      Latest Reference Range & Units 10/26/22 13:38   WBC 3.5 - 11.0 k/uL 4.1   RBC 4.0 - 5.2 m/uL 2.69 (L)   Hemoglobin Quant 12.0 - 16.0 g/dL 6.8 (LL)   Hematocrit 36 - 46 % 22.2 (L)   MCV 80 - 100 fL 82.5   MCH 26 - 34 pg 25.5 (L)   MCHC 31 - 37 g/dL 30.9 (L)   MPV 6.0 - 12.0 fL 7.4   RDW 11.5 - 14.9 % 17.0 (H)   Platelet Count 224 - 450 k/uL 26 (L)   Absolute Mono # 0.1 - 1.3 k/uL 0.21   (LL): Data is critically low  (L): Data is abnormally low  (H): Data is abnormally high        IMAGING DATA:    CT ABDOMEN PELVIS WO CONTRAST Additional Contrast? None    Result Date: 10/10/2022  EXAMINATION: CT OF THE ABDOMEN AND PELVIS WITHOUT CONTRAST 10/10/2022 2:37 pm TECHNIQUE: CT of the abdomen and pelvis was performed without the administration of intravenous contrast. Multiplanar reformatted images are provided for review. Automated exposure control, iterative reconstruction, and/or weight based adjustment of the mA/kV was utilized to reduce the radiation dose to as low as reasonably achievable. COMPARISON: 01/28/2022 HISTORY: ORDERING SYSTEM PROVIDED HISTORY: abdominal pain TECHNOLOGIST PROVIDED HISTORY: abdominal pain Is the patient pregnant?->No Reason for Exam: abd pain FINDINGS: LOWER CHEST: Extensive pulmonary in nodules with cavitation and focal consolidative changes throughout the visualized bilateral lower lobes are highly concerning for septic emboli within the lungs. KIDNEYS AND URINARY TRACT: Bilateral 2-4 mm nonobstructing kidney stones are visualized. . There is fluid density adjacent to the inferior pole of right kidney measuring 34 x 14 mm. This likely represents extrarenal pelvis. There is no evidence for hydronephrosis. The ureters are of normal course and caliber. ORGANS: Visualized portions of the liver, spleen, pancreas, gallbladder, and adrenal glands demonstrate no acute abnormality. GI/BOWEL: No bowel obstruction. No evidence of acute appendicitis. Status post gastric bypass. PELVIS: The bladder and pelvic organs are unremarkable. PERITONEUM/RETROPERITONEUM: No free air or free fluid is noted. No pathologically enlarged lymphadenopathy. The vasculature do not demonstrate acute abnormality. BONES/SOFT TISSUES: The osseous structures demonstrate no acute abnormality. Lack of contrast administration significantly decreases sensitivity of the study for evaluation of parenchymal lesions and  vascular pathologies.  Extensive pulmonary nodules with cavitation and focal consolidative changes throughout the visualized bilateral lower lobes are highly concerning for septic emboli within the lungs. For further evaluation contrast enhanced chest CT can be obtained. There is fluid density adjacent to the inferior pole of right kidney, likely extrarenal pelvis. For further evaluation post-contrast images of the abdomen is recommended. Status post gastric bypass. Bilateral 2-4 mm nonobstructing kidney stones are visualized. . The findings were sent to the Radiology Results [address] at 5:11 pm on 10/10/2022 to be communicated to a licensed caregiver. XR CHEST (SINGLE VIEW FRONTAL)    Result Date: 10/23/2022  EXAMINATION: ONE XRAY VIEW OF THE CHEST. ONE PORTABLE AP VIEW OF CHEST. 10/23/2022 12:41 am COMPARISON: Portable chest x-ray on 10/22/2022 at 0938 hours. HISTORY: ORDERING SYSTEM PROVIDED HISTORY: L chest tube out TECHNOLOGIST PROVIDED HISTORY: L chest tube out Reason for Exam: L chest tube out FINDINGS: In right lower lung, there are heterogeneous dense opacities, indicating pneumonia and atelectatic changes which are increased as compared to previous chest x-ray. At the right CP angle area, there is confluent density indicating pleural effusion versus significant pleural thickening or atelectatic changes or infiltrates. In the right mid and upper lung fields, there are mild heterogeneous dense opacities indicating scattered infiltrates and atelectatic changes, similar to previous study. At the left lung base, there are mild atelectatic changes with or without mild infiltrates, similar to previous study. In the lateral portion of left upper lung field, there is focal density measuring 3 cm x 2.4 cm which may be due to focal infiltrates or mass lesion, unchanged as compared to previous study. Previous noted left basilar pleural tube has been withdrawn. There appears to be a pleural tube in the right lateral CP angle area similar to previous chest x-ray.   Superior to the right pleural drainage tube there is significant density which may be due to loculated pleural effusion and with combination of infiltrates and atelectasis. No pneumothorax. Mild cardiomegaly. Minimal-to-mild pulmonary vascular congestion. There is no overt pulmonary edema. No pneumoperitoneum. The right basilar opacity due to combination of pleural effusion or pleural thickening and pulmonary infiltrates and atelectasis appears to be increased to some extent as compared to previous chest x-ray. In the rest of both lungs, the infiltrates are unchanged. Mild-to-moderate pulmonary vascular congestion without obvious pulmonary edema. XR CHEST (SINGLE VIEW FRONTAL)    Result Date: 10/21/2022  EXAMINATION: ONE XRAY VIEW OF THE CHEST 10/21/2022 1:37 pm COMPARISON: 10/20/2022 HISTORY: ORDERING SYSTEM PROVIDED HISTORY: s/p bilateral chest tube placemetnt in IR TECHNOLOGIST PROVIDED HISTORY: s/p bilateral chest tube placemetnt in IR Reason for Exam: Bilateral chest tube placement FINDINGS: Interval placement of bilateral pleural pigtail catheters projecting over the lower chest on each side. Right arm PICC line tip is at the SVC/RA junction region. Left shoulder prosthesis. Partially visualized fusion hardware cervical spine. No significant pneumothorax is seen. Stable cardiomediastinal silhouette. Cholecystectomy clips. Bilateral parenchymal nodules/masses with cavitation, better shown on the recent CT, and suspicious for septic emboli. Patchy right perihilar and left lower lobe atelectasis/airspace disease. Interval placement of bilateral pleural pigtail catheters. XR LUMBAR SPINE (2-3 VIEWS)    Result Date: 10/12/2022  EXAMINATION: XRAY VIEWS OF THE LUMBAR SPINE 10/12/2022 2:06 pm COMPARISON: None. HISTORY: ORDERING SYSTEM PROVIDED HISTORY: back pain TECHNOLOGIST PROVIDED HISTORY: back pain Reason for Exam: back pain FINDINGS: No compression fracture or subluxation. Disc spaces maintained. Pedicles are intact. Diffuse calcification nondilated abdominal aorta.      Unremarkable lumbar spine examination. XR ABDOMEN (KUB) (SINGLE AP VIEW)    Result Date: 10/12/2022  EXAMINATION: ONE SUPINE XRAY VIEW(S) OF THE ABDOMEN 10/12/2022 2:06 pm COMPARISON: 02/01/2022 HISTORY: ORDERING SYSTEM PROVIDED HISTORY: abd pain TECHNOLOGIST PROVIDED HISTORY: abd pain Reason for Exam: abd pain FINDINGS: Mildly dilated colon and several small bowel loops. Residual stool in colon. No urinary tract calcifications. Osseous structures unremarkable. Findings consistent with mild diffuse ileus. No bowel obstruction or urinary tract calcifications. CT CHEST WO CONTRAST    Result Date: 10/21/2022  EXAMINATION: CT OF THE CHEST WITHOUT CONTRAST 10/21/2022 8:50 am TECHNIQUE: CT of the chest was performed without the administration of intravenous contrast. Multiplanar reformatted images are provided for review. Automated exposure control, iterative reconstruction, and/or weight based adjustment of the mA/kV was utilized to reduce the radiation dose to as low as reasonably achievable. COMPARISON: 10/20/2022, 10/17/2022 HISTORY: ORDERING SYSTEM PROVIDED HISTORY: diagonsis of pleural space infection ---- possible procedure pending results TECHNOLOGIST PROVIDED HISTORY: diagonsis of pleural space infection ---- possible procedure pending results Is the patient pregnant?->No Reason for Exam: order states pleural space infection, pt poor historian FINDINGS: Mediastinum: Interval placement of a right arm PICC line with its tip in the proximal right atrium. Mild calcific plaque of the thoracic aorta. Ascending aorta measures up to 3.7 cm. Coronary vascular calcifications. Similar small mediastinal lymph nodes without bulky adenopathy. Lungs/pleura: Again shown are multiple generally similar bilateral masslike densities, some with cavitation. Again findings are suggestive of septic emboli. Overall size and distribution are similar to the previous study from yesterday.   Similar small bilateral pleural effusions which appear partially loculated. There is mild respiratory motion artifact. Mild lower lobe consolidation with air bronchograms, greater on the left. Subtle ground-glass changes in the lingula and left lower lobe likely inflammatory/infectious. Upper Abdomen: No acute findings. Previous cholecystectomy. Postoperative changes of the stomach. Soft Tissues/Bones: Degenerative changes and previous ACDF lower cervical spine. Left shoulder prosthesis. Overall relatively stable exam.  Small pleural effusions which appear partially loculated with multiple bilateral masses, many with cavitation, suggesting septic emboli. CT CHEST WO CONTRAST  s normal in size. There is no pericardial effusion. Thoracic aorta and pulmonary arteries are grossly normal in caliber. There is calcified plaque at the aortic arch. Moderate coronary artery calcification. There is a mildly enlarged pretracheal lymph node, measuring 1 cm in short axis dimension. No other visible mediastinal lymphadenopathy. Lungs/pleura: There is a small amount of mildly loculated pleural fluid in the right hemithorax. Small left pleural effusion is noted as well, less loculated in appearance. There are numerous nodular opacities throughout both lungs, increased from 10/10/2022. Many (but not all) of the nodules are cavitary. There is focal consolidation with cavitation in the superior segment right lower lobe. Focal consolidation is also noted at the left base. Largest nodules are on the right, measuring up to 2.9 cm. No pneumothorax. Upper Abdomen: No acute findings in the limited visualized upper abdomen. Soft Tissues/Bones: Left shoulder arthroplasty. No acute or suspicious osseous abnormality. Limited, unremarkable renal ultrasound. No hydronephrosis. RECOMMENDATIONS: Unavailable     IR FLUORO GUIDED CVA DEVICE PLMT/REPLACE/REMOVAL    Result Date: 10/20/2022  PROCEDURE: ULTRASOUND GUIDED VASCULAR ACCESS.  [facility] PLACEMENT 10/20/2022. HISTORY: ORDERING SYSTEM PROVIDED HISTORY: IV Abx TECHNOLOGIST PROVIDED HISTORY: IV Abx Is the patient pregnant?->No SEDATION: None FLUOROSCOPY DOSE AND TYPE OR TIME AND EXPOSURES: 12 seconds; D AP 10 cGy cm2 TECHNIQUE: Informed consent was obtained from the patient's family member after a detailed explanation of the procedure including risks, benefits, and alternatives. Universal protocol was observed. The right arm was prepped and draped in sterile fashion using maximum sterile barrier technique. Local anesthesia was achieved with lidocaine. A micropuncture needle was used to access the right basilic vein using ultrasound guidance. An ultrasound image demonstrating patency of the vein with needle tip located within it. An image was obtained and stored in PACs. A 0.018 guidewire was used to place a peel-a-way sheath and a 5 Czech dual-lumen PICC was advanced with fluoroscopic guidance with the tip at the cavo-atrial junction. Fluoroscopy shows right lung masses/cavitary lesions better shown on recent chest CT. Fusion hardware lower cervical spine. The catheter flushed easily and there was a good blood return. The catheter was secured to the skin. The patient tolerated the procedure well and there were no immediate complications. EBL: Less than 3 mL FINDINGS: Fluoroscopic image demonstrates the tip of the catheter at the cavo-atrial junction. Successful ultrasound and fluoroscopy guided PICC placement     XR CHEST PORTABLE    Result Date: 10/25/2022  EXAMINATION: ONE XRAY VIEW OF THE CHEST 10/25/2022 6:12 pm COMPARISON: 10/24/2022 HISTORY: ORDERING SYSTEM PROVIDED HISTORY: Patient with right-sided chest tube. TECHNOLOGIST PROVIDED HISTORY: Patient with right-sided chest tube. Reason for Exam: Rt. sided chest tube Follow-up exam FINDINGS: Right pleural catheter is noted. No significant interval change in bilateral airspace opacities. Right PICC line is stable in positioning. Orthopedic hardware is stable in appearance in the cervical spine left shoulder. No obvious pneumothorax. Stable cardiac silhouette. The left costophrenic angle sharp. Interval placement of a right pleural catheter. No obvious pneumothorax. No significant interval change in bilateral airspace opacities. XR CHEST PORTABLE    Result Date: 10/24/2022  EXAMINATION: ONE XRAY VIEW OF THE CHEST 10/24/2022 6:27 am COMPARISON: 10/23/2022 HISTORY: ORDERING SYSTEM PROVIDED HISTORY: Follow-up empyema; pleural catheters fell out TECHNOLOGIST PROVIDED HISTORY: Follow-up empyema; pleural catheters fell out Reason for Exam: post chest tube removal FINDINGS: Pleural pigtail catheter is no longer visualized. Left shoulder prosthesis and fusion hardware cervical spine. Right arm PICC line tip is in the right atrium. Stable cardiomediastinal silhouette. There is improved aeration of the right lung base. No significant pneumothorax is seen. No large pleural effusions within limits of the exam.  There are nodular parenchymal opacities in both lungs, some with cavitation, compatible with a septic emboli and better shown on the prior CT. Cholecystectomy clips. Improved aeration of the right lung base since the prior exam.     XR CHEST PORTABLE    Result Date: 10/22/2022  EXAMINATION: ONE XRAY VIEW OF THE CHEST 10/22/2022 10:19 am COMPARISON: 10/21/2022 at 13:37 HISTORY: ORDERING SYSTEM PROVIDED HISTORY: Pigtail position TECHNOLOGIST PROVIDED HISTORY: Pigtail position Reason for Exam: Pigtail position FINDINGS: Right PICC line terminating right atrium remains in place. Pigtail catheter along left lung base with the tip overlying the hemidiaphragm. Right pigtail pleural catheter has pulled back into a more peripheral position now called in region of costophrenic angle whereas previously it was more central. Normal cardiomediastinal silhouette. Mild interstitial prominence.   Stable scattered moderate size nodular opacities in the lungs right greater than left. Patchy infiltrates at the lung bases probably unchanged allowing for difference in inspiration. Small right pleural effusion is new. Anterior fusion lower cervical spine and left total shoulder prosthesis. 1. Right pigtail pleural catheter has pulled back into the more peripheral position projecting at costophrenic angle whereas previously it was small central.  Left pigtail catheter continues to lie along the lung base. 2. Right PICC line unchanged. 3. Small right pleural effusion appears to be new but patchy bibasilar atelectasis unchanged. Diffuse nodular opacities also unchanged. XR CHEST PORTABLE    Result Date: 10/20/2022  EXAMINATION: ONE XRAY VIEW OF THE CHEST 10/20/2022 7:13 am COMPARISON: Chest x-ray dated 18 October 2022 HISTORY: ORDERING SYSTEM PROVIDED HISTORY: Septic emboli reevaluate pleural effusion on left TECHNOLOGIST PROVIDED HISTORY: Septic emboli reevaluate pleural effusion on left Reason for Exam: hx. of pleural effusion. FINDINGS: Similar appearance of bilateral nodular airspace opacities. Trace left pleural effusion. No pneumothorax. Stable cardiomediastinal silhouette     1. Stable appearance of bilateral nodular opacities in both lungs suspicious for underlying septic emboli better seen on the chest CT dated 17 October 2022 2. Trace left pleural effusion     XR CHEST PORTABLE    Result Date: 10/18/2022  EXAMINATION: ONE XRAY VIEW OF THE CHEST 10/18/2022 6:24 pm COMPARISON: Chest radiograph dated 10/11/2022. Chest CT dated 10/17/2022. HISTORY: ORDERING SYSTEM PROVIDED HISTORY: Empyema, s/p thoracentesis TECHNOLOGIST PROVIDED HISTORY: Empyema, s/p thoracentesis Reason for Exam: S/p thoracentesis FINDINGS: The cardiac silhouette appears within normal limits. Previously seen left-sided pleural effusion appears to have decreased in size. There is a small left and trace right pleural effusion.   There is redemonstration of multiple bilateral nodular opacity of the both lungs, suspicious for underlying septic emboli better seen and evaluated on the chest CT dated 10/17/2022. Components of left reverse total shoulder arthroplasty and changes of prior orthopedic fixation/fusion of the cervicothoracic spine are partially visualized. There are cholecystectomy clips overlying the right upper abdomen. Interval decrease in size of left-sided pleural effusion. Small left and trace right pleural effusion. Redemonstration of bilateral nodular opacities of the both lungs, suspicious for underlying septic emboli better seen and evaluated on the chest CT dated 10/17/2022. XR CHEST PORTABLE    Result Date: 10/11/2022  EXAMINATION: ONE XRAY VIEW OF THE CHEST 10/11/2022 6:35 am COMPARISON: 10/10/2022 HISTORY: ORDERING SYSTEM PROVIDED HISTORY: line placement TECHNOLOGIST PROVIDED HISTORY: line placement Reason for Exam: R sided IJ central line placement FINDINGS: Right IJ central line terminating at cavoatrial junction new from prior. Normal cardiomediastinal silhouette. Small patchy opacities left lung base reflecting subsegmental atelectasis. Scattered patchy opacities with a somewhat nodular appearance noted in the periphery of the lungs more pronounced on the right. This can be further assessed with CT. No change from prior. No pleural effusion or pneumothorax. Left shoulder prosthesis and cervical spine fusion noted. placed. Findings were discussed with Dr. Kishor Lovell At 1:00 pm on 10/24/2022.      IR GUIDED PERC PLEURAL DRAIN W CATH INSERT    Result Date: 10/21/2022  PROCEDURE: ULTRASOUNDGUIDED BILATERAL THORACENTESIS WITH BILATERAL PLEURAL DRAINAGE CATHETER PLACEMENT 10/21/2022 HISTORY: ORDERING SYSTEM PROVIDED HISTORY: diagnositic and possibly therapeutic TECHNOLOGIST PROVIDED HISTORY: diagnositic and possibly therapeutic Which side should the procedure be BILATERAL Is the patient pregnant?->No History of septic emboli with previous left thoracentesis demonstrating infected fluid, CT shows loculated effusions, please perform diagnostic and therapeutic thoracentesis is with bilateral pigtail catheter placement for drainage. TECHNIQUE AND FINDINGS: Informed consent was obtained from the patient's family member after a detailed explanation of the procedure including risks, benefits, and alternatives. Universal protocol was performed. The patient's posterior chest was prepped and draped in standard sterile fashion. 1% lidocaine was used for local anesthesia. Procedure was performed with the patient in an upright position. Initially attention was directed towards the left side. Ultrasound shows a very small residual loculated pleural effusion in the medial left lower chest.  Skin over the area is prepped and draped in standard sterile fashion. 1% lidocaine used for local anesthesia. Using realtime ultrasound guidance an 8 Yemeni pigtail catheter was placed using trocar technique. Ultrasound images show a safe tract and access into the fluid. Thoracentesis was performed with removal of a small amount of slightly turbid yellow fluid. The catheter was sutured to the skin and secured in place with a Vaseline gauze dressing. The tube was connected to an atrium drainage system. Attention was then directed towards the right side. Ultrasound right chest shows a small right pleural effusion which appears to be partially loculated with minimal debris and septations. Skin over the area was prepped and draped in standard sterile fashion. 1% lidocaine used for local anesthesia. Using realtime ultrasound guidance an 8 Yemeni pigtail catheter was advanced into the pleural fluid using trocar technique. Ultrasound images show a safe tract and access into the fluid. Thoracentesis was performed with removal of approximately 20 mL of fairly clear light red colored fluid. Sample was sent for the requested studies.   The catheter was sutured to the skin and secured in place with a Vaseline gauze dressing. Tube was connected to an atrium drainage system. There are no immediate complications. The patient left the department in stable condition. Further tube management per the pulmonary team. EBL: Less than 5 mL     Successful ultrasound-guided bilateral pleural pigtail catheter placement (8 Hebrew catheters were placed on each side). Small partially loculated pleural effusions, slightly more prominent on the right. Fluid sample was sent for the requested studies. IR GUIDED PERC PLEURAL DRAIN W CATH INSERT    Result Date: 10/21/2022  PROCEDURE: ULTRASOUNDGUIDED BILATERAL THORACENTESIS WITH BILATERAL PLEURAL DRAINAGE CATHETER PLACEMENT 10/21/2022 HISTORY: ORDERING SYSTEM PROVIDED HISTORY: diagnositic and possibly therapeutic TECHNOLOGIST PROVIDED HISTORY: diagnositic and possibly therapeutic Which side should the procedure be BILATERAL Is the patient pregnant?->No History of septic emboli with previous left thoracentesis demonstrating infected fluid, CT shows loculated effusions, please perform diagnostic and therapeutic thoracentesis is with bilateral pigtail catheter placement for drainage. TECHNIQUE AND FINDINGS: Informed consent was obtained from the patient's family member after a detailed explanation of the procedure including risks, benefits, and alternatives. Universal protocol was performed. The patient's posterior chest was prepped and draped in standard sterile fashion. 1% lidocaine was used for local anesthesia. Procedure was performed with the patient in an upright position. Initially attention was directed towards the left side. Ultrasound shows a very small residual loculated pleural effusion in the medial left lower chest.  Skin over the area is prepped and draped in standard sterile fashion. 1% lidocaine used for local anesthesia. Using realtime ultrasound guidance an 8 Hebrew pigtail catheter was placed using trocar technique.   Ultrasound images show a safe tract and access into the fluid. Thoracentesis was performed with removal of a small amount of slightly turbid yellow fluid. The catheter was sutured to the skin and secured in place with a Vaseline gauze dressing. The tube was connected to an atrium drainage system. Attention was then directed towards the right side. Ultrasound right chest shows a small right pleural effusion which appears to be partially loculated with minimal debris and septations. Skin over the area was prepped and draped in standard sterile fashion. 1% lidocaine used for local anesthesia. Using realtime ultrasound guidance an 8 Serbian pigtail catheter was advanced into the pleural fluid using trocar technique. Ultrasound images show a safe tract and access into the fluid. Thoracentesis was performed with removal of approximately 20 mL of fairly clear light red colored fluid. Sample was sent for the requested studies. The catheter was sutured to the skin and secured in place with a Vaseline gauze dressing. Tube was connected to an atrium drainage system. There are no immediate complications. The patient left the department in stable condition. Further tube management per the pulmonary team. EBL: Less than 5 mL     Successful ultrasound-guided bilateral pleural pigtail catheter placement (8 Serbian catheters were placed on each side). Small partially loculated pleural effusions, slightly more prominent on the right. Fluid sample was sent for the requested studies.          IMPRESSION:   Primary Problem  SIRS (systemic inflammatory response syndrome) [facility])    Active Hospital Problems    Diagnosis Date Noted    Thrombocytopenia ([facility] [D69.6] 10/26/2022     Priority: Medium    MRSA bacteremia [R78.81, B95.62] 10/23/2022     Priority: Medium    Acute septic pulmonary embolism ([facility] [I26.90] 10/23/2022     Priority: Medium    Cavitating mass of lung [J98.4] 10/23/2022     Priority: Medium    Allergy to antibiotic [Z88.1] 10/23/2022     Priority: Medium    Acute metabolic encephalopathy [F37.30] 10/18/2022     Priority: Medium    Delirium due to another medical condition [F05] 10/17/2022     Priority: Medium    Elevated d-dimer [R79.89] 10/15/2022     Priority: Medium    Leukocytosis [D72.829] 10/15/2022     Priority: Medium    Pulmonary nodules [R91.8] 10/15/2022     Priority: Medium    NICKI (acute kidney injury) ([facility] [N17.9] 10/11/2022     Priority: Medium    Severe malnutrition ([facility] [E43] 10/11/2022     Priority: Medium    SIRS (systemic inflammatory response syndrome) ([facility] [R65.10] 10/10/2022     Priority: Medium    H/O gastric bypass [Z98.84] 02/13/2020    Acute encephalopathy [G93.40] 12/26/2016    COPD (chronic obstructive pulmonary disease) ([facility] [J44.9] 10/25/2016    Bipolar disorder, mixed ([facility] [F31.60] 11/06/2014    Depression with suicidal ideation [F32. 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 I explained to the patient the nature of this problem. I explained the significance of these abnormalities and possible etiology and management options  Reviewed hospitalization course  Peripheral smear does not show any schistocytes. Shows rouleaux formation. Also shows findings suggestive of acute infection/inflammation on the peripheral blood. Fibrinogen within range. Patient has adequate T14 folic acid stores. Free light chain ratio within range. Iron studies suggest anemia of chronic disease. Work-up suggest hypoproliferative anemia  Platelet count slowly improving. I believe patient's thrombocytopenia is multifactorial secondary to acute illness, peripheral consumption, myelosuppression as well as polypharmacy. Avoid drugs causing myelosuppression and thrombocytopenia.   Patient was earlier on cefepime and then on vancomycin both have been now discontinued. Patient currently is on Teflaro  Transfuse to keep platelet count above 20,000 in background of sepsis  Follow-up immunofixation studies  Transfuse to keep hemoglobin above 7. Discussed with father and Nurse. Thank you for asking us to see this patient. Lurene Hamman Shahid,MD          This note is created with the assistance of a speech recognition program.  While intending to generate a document that actually reflects the content of the visit, the document can still have some errors including those of syntax and sound a like substitutions which may escape proof reading. It such instances, actual meaning can be extrapolated by contextual diversion.

## 2022-10-28 NOTE — PROGRESS NOTES
[address] [facility]   OCCUPATIONAL THERAPY MISSED TREATMENT NOTE   INPATIENT   Date: 10/28/22  Patient Name: Makenna Mckeon       Room:   MRN: [id number]   Account #: [de-identified]    : 1970  (46 y.o.)  Gender: female   Referring Practitioner: Elena Deleon MD  Diagnosis: SIRS, pulmonary nodules, NICKI             REASON FOR MISSED TREATMENT:  Patient unable to participate   -    Pt lying in bed upon arrival, 1:1 present; reports hasn't really opened eyes all day. Pt mumbles and continues to keep eyes closed despite cues. OT will continue to follow. Attempt made at 1323.          [address], [place]

## 2022-10-28 NOTE — PROGRESS NOTES
Progress Note  Date:10/28/2022       Room:[address] [place] [address]  Patient Name:Anu Bouren     YOB: 1970     Age:51 y.o. Subjective    Subjective:  Symptoms:  Stable. (Speech clearer this morning for the most part. Still not opening eyes. ). Diet:  Poor intake. Activity level: Impaired due to weakness. Pain:  Pain severity now: left thigh. Review of Systems  Objective         Vitals Last 24 Hours:  TEMPERATURE:  Temp  Av.9 °F (36.6 °C)  Min: 97.5 °F (36.4 °C)  Max: 98.1 °F (36.7 °C)  RESPIRATIONS RANGE: Resp  Av  Min: 16  Max: 20  PULSE OXIMETRY RANGE: SpO2  Av.9 %  Min: 92 %  Max: 98 %  PULSE RANGE: Pulse  Av.5  Min: 64  Max: 71  BLOOD PRESSURE RANGE: Systolic (53PMT), QUC:336 , Min:145 , UJY:433   ; Diastolic (65OPE), NCQ:72, Min:71, Max:81    I/O (24Hr): Intake/Output Summary (Last 24 hours) at 10/28/2022 0846  Last data filed at 10/28/2022 0500  Gross per 24 hour   Intake 740 ml   Output 0 ml   Net 740 ml     Objective:  General Appearance:  Comfortable. Vital signs: (most recent): Blood pressure (!) 188/77, pulse 68, temperature 97.5 °F (36.4 °C), temperature source Axillary, resp. rate 16, height 4' 11\" (1.499 m), weight 99 lb 3.3 oz (45 kg), SpO2 98 %. (BP elevated). Lungs:  Normal effort and normal respiratory rate. There are decreased breath sounds. Heart: Normal rate. Regular rhythm.   S1 normal and S2 normal.    Labs/Imaging/Diagnostics    Labs:  CBC:  Recent Labs     10/26/22  1338 10/26/22  2308 10/27/22  0442 10/28/22  0541   WBC 4.1  --  5.5 6.1   RBC 2.69*  --  3.22* 3.33*   HGB 6.8* 8.1* 8.4* 8.7*   HCT 22.2* 25.3* 26.6* 27.5*   MCV 82.5  --  82.7 82.7   RDW 17.0*  --  16.0* 16.3*   PLT 26*  --  31* 39*     CHEMISTRIES:  Recent Labs     10/26/22  1338 10/27/22  0442 10/28/22  0541    142 142   K 3.9 3.6* 3.8    106 107   CO2 29 28 29   BUN 11 12 15   CREATININE 0.69 0.71 0.86   GLUCOSE 94 76 78     PT/INR:  Recent Labs 10/26/22  1735   PROTIME 15.9*   INR 1.3     APTT:  Recent Labs     10/26/22  1735   APTT 30.6     LIVER PROFILE:No results for input(s): AST, ALT, BILIDIR, BILITOT, ALKPHOS in the last 72 hours. Imaging Last 24 Hours:  CT CHEST WO CONTRAST    Result Date: 10/26/2022  EXAMINATION: CT OF THE CHEST WITHOUT CONTRAST 10/26/2022 3:13 pm TECHNIQUE: CT of the chest was performed without the administration of intravenous contrast. Multiplanar reformatted images are provided for review. Automated exposure control, iterative reconstruction, and/or weight based adjustment of the mA/kV was utilized to reduce the radiation dose to as low as reasonably achievable. COMPARISON: 10/25/2022 and 10/21/2022 HISTORY: ORDERING SYSTEM PROVIDED HISTORY: Reevaluate signs of empyema, will see if continuous drainage is necessary or discontinuance of pleural catheter TECHNOLOGIST PROVIDED HISTORY: Reevaluate signs of empyema, will see if continuous drainage is necessary or discontinuance of pleural catheter Is the patient pregnant?->No Reason for Exam: sob, recent covid 23. pt has chest tube FINDINGS: Mediastinum: No cardiomegaly. Decreased attenuation of intra cardiac blood pool suggesting anemia. Suggestion of mediastinal and bilateral hilar adenopathy though evaluation is limited without intravenous contrast. Lungs/pleura: Small loculated pleural effusions bilaterally, slightly decreased in size on the right and increased in size in the left. Right pleural pigtail catheter via an intercostal approach. Numerous pulmonary nodules/masses redemonstrated bilaterally, multiple of which appear cavitary. Interval increase in interstitial/alveolar densities within the lower lobes and lingula compared to prior study. Upper Abdomen: Cholecystectomy. Splenomegaly. Soft Tissues/Bones: Mild anasarca. Right PICC tip extends to the right atrium. Left shoulder arthroplasty. Fusion hardware within the lower cervical spine.      1.  Interval increase in bibasilar interstitial/alveolar densities most pronounced within the lingula. Differential considerations include superimposed multifocal pneumonia versus asymmetric edema. 2.  Small partially loculated pleural effusions redemonstrated bilaterally with interval right pleural catheter placement, slightly increased on the left. 3.  Numerous pulmonary nodules/masses bilaterally with several appearing cavitary, grossly unchanged from prior, most suggestive of septic emboli though metastases could produce a similar appearance. 4.  Additional nonemergent findings, as above.      Assessment//Plan           Hospital Problems             Last Modified POA    * (Principal) SIRS (systemic inflammatory response syndrome) ([address] 10/10/2022 Yes    NICKI (acute kidney injury) ([address] 10/11/2022 Yes    Severe malnutrition (HCC) (Chronic) 10/11/2022 Yes    Elevated d-dimer 10/15/2022 Yes    Leukocytosis 10/15/2022 Yes    Pulmonary nodules 10/15/2022 Yes    Delirium due to another medical condition 10/17/2022 Yes    Acute metabolic encephalopathy 61/00/7562 Yes    MRSA bacteremia 10/23/2022 Yes    Acute septic pulmonary embolism ([address] 10/23/2022 Yes    Cavitating mass of lung 10/23/2022 Yes    Allergy to antibiotic 10/23/2022 Yes    Thrombocytopenia ([address] 10/26/2022 Yes    Depression with suicidal ideation 10/17/2022 Yes    Bipolar disorder, mixed ([address] 10/11/2022 Yes    Acute encephalopathy 10/14/2022 Yes    COPD (chronic obstructive pulmonary disease) ([address] 10/11/2022 Yes    H/O gastric bypass 10/11/2022 Yes    Overview Signed 2/13/2020  7:19 PM by Amaris De Los Santos MD     enlarged  pancreas, here for surgical work-up for EGD          Assessment & Plan  Discharge planning for [facility]    Electronically signed by Amaris De Los Santos MD on 10/28/22 at 8:46 AM EDT

## 2022-10-29 ENCOUNTER — APPOINTMENT (OUTPATIENT)
Dept: GENERAL RADIOLOGY | Age: 52
DRG: 871 | End: 2022-10-29
Payer: COMMERCIAL

## 2022-10-29 PROCEDURE — 6370000000 HC RX 637 (ALT 250 FOR IP): Performed by: INTERNAL MEDICINE

## 2022-10-29 PROCEDURE — 71045 X-RAY EXAM CHEST 1 VIEW: CPT

## 2022-10-29 PROCEDURE — 2060000000 HC ICU INTERMEDIATE R&B

## 2022-10-29 PROCEDURE — 2580000003 HC RX 258: Performed by: INTERNAL MEDICINE

## 2022-10-29 PROCEDURE — 99232 SBSQ HOSP IP/OBS MODERATE 35: CPT | Performed by: INTERNAL MEDICINE

## 2022-10-29 PROCEDURE — 6360000002 HC RX W HCPCS: Performed by: INTERNAL MEDICINE

## 2022-10-29 PROCEDURE — 99231 SBSQ HOSP IP/OBS SF/LOW 25: CPT | Performed by: INTERNAL MEDICINE

## 2022-10-29 PROCEDURE — 94761 N-INVAS EAR/PLS OXIMETRY MLT: CPT

## 2022-10-29 PROCEDURE — 99232 SBSQ HOSP IP/OBS MODERATE 35: CPT | Performed by: FAMILY MEDICINE

## 2022-10-29 PROCEDURE — 94640 AIRWAY INHALATION TREATMENT: CPT

## 2022-10-29 PROCEDURE — 2500000003 HC RX 250 WO HCPCS: Performed by: INTERNAL MEDICINE

## 2022-10-29 RX ADMIN — DIVALPROEX SODIUM 1000 MG: 500 TABLET, DELAYED RELEASE ORAL at 21:18

## 2022-10-29 RX ADMIN — ARIPIPRAZOLE 5 MG: 5 TABLET ORAL at 10:17

## 2022-10-29 RX ADMIN — HYDRALAZINE HYDROCHLORIDE 10 MG: 20 INJECTION INTRAMUSCULAR; INTRAVENOUS at 10:32

## 2022-10-29 RX ADMIN — Medication 2000 UNITS: at 10:17

## 2022-10-29 RX ADMIN — ATORVASTATIN CALCIUM 40 MG: 40 TABLET, FILM COATED ORAL at 21:17

## 2022-10-29 RX ADMIN — IPRATROPIUM BROMIDE AND ALBUTEROL SULFATE 1 AMPULE: 2.5; .5 SOLUTION RESPIRATORY (INHALATION) at 06:58

## 2022-10-29 RX ADMIN — CEFTAROLINE FOSAMIL 600 MG: 600 POWDER, FOR SOLUTION INTRAVENOUS at 21:15

## 2022-10-29 RX ADMIN — DULOXETINE 60 MG: 60 CAPSULE, DELAYED RELEASE ORAL at 10:17

## 2022-10-29 RX ADMIN — DIVALPROEX SODIUM 1000 MG: 500 TABLET, DELAYED RELEASE ORAL at 10:16

## 2022-10-29 RX ADMIN — CEFTAROLINE FOSAMIL 600 MG: 600 POWDER, FOR SOLUTION INTRAVENOUS at 10:39

## 2022-10-29 RX ADMIN — SODIUM CHLORIDE, PRESERVATIVE FREE 10 ML: 5 INJECTION INTRAVENOUS at 21:27

## 2022-10-29 RX ADMIN — FERROUS SULFATE TAB 325 MG (65 MG ELEMENTAL FE) 325 MG: 325 (65 FE) TAB at 10:17

## 2022-10-29 RX ADMIN — IPRATROPIUM BROMIDE AND ALBUTEROL SULFATE 1 AMPULE: 2.5; .5 SOLUTION RESPIRATORY (INHALATION) at 15:26

## 2022-10-29 RX ADMIN — IPRATROPIUM BROMIDE AND ALBUTEROL SULFATE 1 AMPULE: 2.5; .5 SOLUTION RESPIRATORY (INHALATION) at 10:44

## 2022-10-29 RX ADMIN — LABETALOL HYDROCHLORIDE 20 MG: 5 INJECTION, SOLUTION INTRAVENOUS at 11:21

## 2022-10-29 RX ADMIN — CARVEDILOL 25 MG: 25 TABLET, FILM COATED ORAL at 10:17

## 2022-10-29 RX ADMIN — HYDRALAZINE HYDROCHLORIDE 10 MG: 20 INJECTION INTRAMUSCULAR; INTRAVENOUS at 05:15

## 2022-10-29 RX ADMIN — HYDRALAZINE HYDROCHLORIDE 10 MG: 20 INJECTION INTRAMUSCULAR; INTRAVENOUS at 18:19

## 2022-10-29 RX ADMIN — CARVEDILOL 25 MG: 25 TABLET, FILM COATED ORAL at 18:05

## 2022-10-29 RX ADMIN — SODIUM CHLORIDE, PRESERVATIVE FREE 10 ML: 5 INJECTION INTRAVENOUS at 09:00

## 2022-10-29 RX ADMIN — IPRATROPIUM BROMIDE AND ALBUTEROL SULFATE 1 AMPULE: 2.5; .5 SOLUTION RESPIRATORY (INHALATION) at 19:04

## 2022-10-29 NOTE — PROGRESS NOTES
Today's Date: 10/29/2022  Patient Name: Valda Epley  Date of admission: 10/10/2022  4:37 AM  Patient's age: 46 y. o., 1970  Admission Dx: Pulmonary nodules [R91.8]  SIRS (systemic inflammatory response syndrome) ([facility]) [R65.10]  NICKI (acute kidney injury) ([facility] [address] [N17.9]  Elevated d-dimer [R79.89]  Leukocytosis, unspecified type [Z01.455]    Reason for Consult: management recommendations  Requesting Physician: Truman Mendieta MD    CHIEF COMPLAINT: Thrombocytopenia    History Obtained From:  father, electronic medical record. Patient very lethargic not able to give any meaningful history      Interval history :    Pt seen and examined  Labs and vitlas reviewed  Overall condition seems to be stable. She is sleepy now but according to the nurses, she has been doing okay all day. Appetite is still suppressed. Asking about the chest tube to be removed patient is sleepy now. She could not give me a good review of system. We will try tomorrow        HISTORY OF PRESENT ILLNESS:      The patient is a 46 y.o.  female who is admitted to the hospital with chief complaint of shortness of breath. Patient was hospitalized on 10/10/2022. During the hospitalization course patient has been diagnosed with MRSA bacteremia. Clinical picture suggestive of infective endocarditis with bilateral pulmonary nodules with cavitation. Patient also has history of cocaine abuse. Patient was already treated with cefepime and then switched to vancomycin. Patient noted to have progressive cytopenias especially platelet count of 48,363. Subsequently vancomycin has been discontinued and patient has been started on Teflaro. Patient lab work-up from today shows hemoglobin of 6.8. Count of 26,000. Platelet count has been gradually dropping. Hemoglobin also has been gradually dropping. Iron studies from January 2022 shows iron deficiency. Patient's INR on 10/18 was 1.2.   Immunofixation studies from 10/11/2022 were negative. Excellent patient's hemoglobin at presentation on 10/10 was 14.9 and platelet count was 777. Patient has history of gastric bypass  Patient lethargic not able to give any meaningful history    Past Medical History:   has a past medical history of Anemia, Arthritis, Asthma, Bipolar disorder, mixed ([address] Carotid artery stenosis, Cocaine abuse ([address] COPD (chronic obstructive pulmonary disease) ([address] Degeneration of cervical intervertebral disc, Depression with anxiety, Depression with anxiety, Fibromyalgia, GERD (gastroesophageal reflux disease), History of migraine headaches, History of renal calculi, History of seizure disorder, Hoarseness of voice, HTN (hypertension), Hyperlipidemia, IGT (impaired glucose tolerance), Kidney stones, Lactose intolerance, Leukopenia, Major depressive disorder, recurrent episode, severe, without mention of psychotic behavior, MVP (mitral valve prolapse), Seizures ([address] Sleep apnea, and Unspecified diseases of blood and blood-forming organs. Past Surgical History:   has a past surgical history that includes Gastric bypass surgery; Cholecystectomy; Hysterectomy; Appendectomy; Hysterectomy; Tonsillectomy and adenoidectomy; Ankle fracture surgery; Neck surgery; shoulder surgery;  section; Carpal tunnel release; Colonoscopy (12); Upper gastrointestinal endoscopy (7-3-12); Gastric bypass surgery; Breast lumpectomy (Right); Cardiac catheterization; Upper gastrointestinal endoscopy (2016); Colonoscopy (2016); Upper gastrointestinal endoscopy (2019); Colonoscopy (2019); Shoulder arthroscopy (Left, 2019); other surgical history (10/24/2019); laparoscopy (N/A, 2022); transesophageal echocardiogram (N/A, 10/19/2022); IR GUIDED PERC PLEURAL DRAIN W CATH INSERT (10/21/2022); IR GUIDED PERC PLEURAL DRAIN W CATH INSERT (10/21/2022); and IR GUIDED PERC PLEURAL DRAIN W CATH INSERT (10/24/2022).      Medications:    Prior to Admission medications    Medication Sig Start Date End Date Taking? Authorizing Provider   lisinopril (PRINIVIL;ZESTRIL) 20 MG tablet Take 20 mg by mouth daily   Yes Historical Provider, MD   carvedilol (COREG) 25 MG tablet TAKE 1 TABLET BY MOUTH IN THE MORNING AND 1 IN THE EVENING WITH MEALS 9/19/22   Rajinder Gibson MD   traZODone (DESYREL) 100 MG tablet TAKE 1 TABLET BY MOUTH AT BEDTIME 6/13/22   Historical Provider, MD   gabapentin (NEURONTIN) 300 MG capsule TAKE 1 CAPSULE BY MOUTH THREE TIMES DAILY 5/24/22 8/9/22  Rajinder Gibson MD   hydrOXYzine (VISTARIL) 25 MG capsule  9/15/20   Historical Provider, MD   DULoxetine (CYMBALTA) 30 MG extended release capsule Take 1 capsule by mouth daily  Patient taking differently: Take 60 mg by mouth in the morning.  4/8/19   KELLY Moreno - CNP   ARIPiprazole (ABILIFY) 5 MG tablet Take 5 mg by mouth daily    Historical Provider, MD     Current Facility-Administered Medications   Medication Dose Route Frequency Provider Last Rate Last Admin    ceftaroline fosamil (TEFLARO) 600 mg in sodium chloride 0.9 % 50 mL IVPB  600 mg IntraVENous Q12H Bianca Villalobos MD   Stopped at 10/29/22 1145    0.9 % sodium chloride infusion   IntraVENous PRN Rajinder Gibson MD        sodium chloride flush 0.9 % injection 10 mL  10 mL IntraVENous PRN Rajinder Gibson MD   10 mL at 10/20/22 1638    carvedilol (COREG) tablet 25 mg  25 mg Oral BID  Frandy Lele, DO   25 mg at 10/29/22 1017    labetalol (NORMODYNE;TRANDATE) injection 20 mg  20 mg IntraVENous BID PRN Frandy Lele, DO   20 mg at 10/29/22 1121    potassium chloride (KLOR-CON M) extended release tablet 40 mEq  40 mEq Oral PRN Frandy Lele, DO   40 mEq at 10/25/22 0849    Or    potassium bicarb-citric acid (EFFER-K) effervescent tablet 40 mEq  40 mEq Oral PRN Frandy Lele, DO   40 mEq at 10/14/22 2031    Or    potassium chloride 10 mEq/100 mL IVPB (Peripheral Line)  10 mEq IntraVENous PRN Frandy Lele,  mL/hr at 10/23/22 0258 10 mEq at 10/23/22 0258    Vitamin D (CHOLECALCIFEROL) tablet 2,000 Units  2,000 Units Oral Daily Frandy Lele, DO   2,000 Units at 10/29/22 1017    hydrALAZINE (APRESOLINE) injection 10 mg  10 mg IntraVENous Q6H PRN Frandy Lele, DO   10 mg at 10/29/22 1032    sodium chloride flush 0.9 % injection 5-40 mL  5-40 mL IntraVENous 2 times per day Frandy Lele, DO   10 mL at 10/29/22 0900    sodium chloride flush 0.9 % injection 5-40 mL  5-40 mL IntraVENous PRN Frandy Lele, DO        0.9 % sodium chloride infusion   IntraVENous PRN Frandy Lele, DO 50 mL/hr at 10/18/22 1200 New Bag at 10/18/22 1200    acetaminophen (TYLENOL) tablet 650 mg  650 mg Oral Q4H PRN Frandy Lele, DO   650 mg at 10/14/22 2031    ondansetron (ZOFRAN-ODT) disintegrating tablet 4 mg  4 mg Oral Q8H PRN Frandy Lele, DO        Or    ondansetron (ZOFRAN) injection 4 mg  4 mg IntraVENous Q6H PRN Frandy Lele, DO        ARIPiprazole (ABILIFY) tablet 5 mg  5 mg Oral Daily Frandy Lele, DO   5 mg at 10/29/22 1017    atorvastatin (LIPITOR) tablet 40 mg  40 mg Oral Daily Frandy Lele, DO   40 mg at 10/28/22 2155    divalproex (DEPAKOTE) DR tablet 1,000 mg  1,000 mg Oral BID Frandy Lele, DO   1,000 mg at 10/29/22 1016    DULoxetine (CYMBALTA) extended release capsule 60 mg  60 mg Oral Daily Frandy Lele, DO   60 mg at 10/29/22 1017    ferrous sulfate (IRON 325) tablet 325 mg  325 mg Oral BID WC Frandy Lele, DO   325 mg at 10/29/22 1017    sodium chloride flush 0.9 % injection 10 mL  10 mL IntraVENous PRN Frandy Lele, DO   10 mL at 10/10/22 1427    ipratropium-albuterol (DUONEB) nebulizer solution 1 ampule  1 ampule Inhalation Q4H WA Frandy Lele, DO   1 ampule at 10/29/22 1044    perflutren lipid microspheres (DEFINITY) injection 1.65 mg  1.5 mL IntraVENous ONCE PRN Frandy Royal DO           Allergies:  Aspirin, Bactrim, and Codeine    Social History:   reports that she has been smoking cigarettes. She has a 16.50 pack-year smoking history. She has never used smokeless tobacco. She reports current alcohol use. She reports that she does not currently use drugs. Family History: family history includes Alcohol Abuse in her brother; COPD in her father; Cancer in her mother, paternal grandmother, and another family member; Coronary Art Dis in her father; Depression in her brother; Diabetes in her maternal grandmother and mother. REVIEW OF SYSTEMS:      Patient sleepy and unable to give me full history. We will try again tomorrow. PHYSICAL EXAM:        BP (!) 145/76   Pulse 68   Temp 97 °F (36.1 °C) (Oral)   Resp 19   Ht 4' 11\" (1.499 m)   Wt 99 lb 13.9 oz (45.3 kg)   SpO2 100%   BMI 20.17 kg/m²    Temp (24hrs), Av °F (36.7 °C), Min:97 °F (36.1 °C), Max:98.7 °F (37.1 °C)      General appearance -ill-appearing  Mental status -lethargic  Eyes - pupils equal and reactive, extraocular eye movements intact   Ears - bilateral TM's and external ear canals normal   Mouth - mucous membranes moist, pharynx normal without lesions   Neck - supple, no significant adenopathy   Lymphatics - no palpable lymphadenopathy, no hepatosplenomegaly   Chest -decreased breathing sounds bilaterally. Heart - normal rate, regular rhythm, normal S1, S2, no murmurs  Abdomen - soft, nontender, nondistended, no masses or organomegaly   Neurological -very lethargic.   Cannot carry out a conversation  Musculoskeletal - no joint tenderness, deformity or swelling   Extremities - peripheral pulses normal, no pedal edema, no clubbing or cyanosis   Skin - normal coloration and turgor, no rashes, no suspicious skin lesions noted ,      DATA:      Labs:      Latest Reference Range & Units 10/26/22 13:38   WBC 3.5 - 11.0 k/uL 4.1   RBC 4.0 - 5.2 m/uL 2.69 (L)   Hemoglobin Quant 12.0 - 16.0 g/dL 6.8 (LL)   Hematocrit 36 - 46 % 22.2 (L)   MCV 80 - 100 fL 82.5   MCH 26 - 34 pg 25.5 (L)   MCHC 31 - 37 g/dL 30.9 (L)   MPV 6.0 - 12.0 fL 7.4   RDW 11.5 - 14.9 % 17.0 (H)   Platelet Count 014 - 450 k/uL 26 (L)   Absolute Mono # 0.1 - 1.3 k/uL 0.21   (LL): Data is critically low  (L): Data is abnormally low  (H): Data is abnormally high        IMAGING DATA:    CT ABDOMEN PELVIS WO CONTRAST Additional Contrast? None    Result Date: 10/10/2022  EXAMINATION: CT OF THE ABDOMEN AND PELVIS WITHOUT CONTRAST 10/10/2022 2:37 pm TECHNIQUE: CT of the abdomen and pelvis was performed without the administration of intravenous contrast. Multiplanar reformatted images are provided for review. Automated exposure control, iterative reconstruction, and/or weight based adjustment of the mA/kV was utilized to reduce the radiation dose to as low as reasonably achievable. COMPARISON: 01/28/2022 HISTORY: ORDERING SYSTEM PROVIDED HISTORY: abdominal pain TECHNOLOGIST PROVIDED HISTORY: abdominal pain Is the patient pregnant?->No Reason for Exam: abd pain FINDINGS: LOWER CHEST: Extensive pulmonary in nodules with cavitation and focal consolidative changes throughout the visualized bilateral lower lobes are highly concerning for septic emboli within the lungs. KIDNEYS AND URINARY TRACT: Bilateral 2-4 mm nonobstructing kidney stones are visualized. . There is fluid density adjacent to the inferior pole of right kidney measuring 34 x 14 mm. This likely represents extrarenal pelvis. There is no evidence for hydronephrosis. The ureters are of normal course and caliber. ORGANS: Visualized portions of the liver, spleen, pancreas, gallbladder, and adrenal glands demonstrate no acute abnormality. GI/BOWEL: No bowel obstruction. No evidence of acute appendicitis. Status post gastric bypass. PELVIS: The bladder and pelvic organs are unremarkable. PERITONEUM/RETROPERITONEUM: No free air or free fluid is noted. No pathologically enlarged lymphadenopathy. The vasculature do not demonstrate acute abnormality. BONES/SOFT TISSUES: The osseous structures demonstrate no acute abnormality.      Lack of contrast administration significantly decreases sensitivity of the study for evaluation of parenchymal lesions and  vascular pathologies. Extensive pulmonary nodules with cavitation and focal consolidative changes throughout the visualized bilateral lower lobes are highly concerning for septic emboli within the lungs. For further evaluation contrast enhanced chest CT can be obtained. There is fluid density adjacent to the inferior pole of right kidney, likely extrarenal pelvis. For further evaluation post-contrast images of the abdomen is recommended. Status post gastric bypass. Bilateral 2-4 mm nonobstructing kidney stones are visualized. . The findings were sent to the Radiology Results [address] at 5:11 pm on 10/10/2022 to be communicated to a licensed caregiver. XR CHEST (SINGLE VIEW FRONTAL)    Result Date: 10/23/2022  EXAMINATION: ONE XRAY VIEW OF THE CHEST. ONE PORTABLE AP VIEW OF CHEST. 10/23/2022 12:41 am COMPARISON: Portable chest x-ray on 10/22/2022 at 0938 hours. HISTORY: ORDERING SYSTEM PROVIDED HISTORY: L chest tube out TECHNOLOGIST PROVIDED HISTORY: L chest tube out Reason for Exam: L chest tube out FINDINGS: In right lower lung, there are heterogeneous dense opacities, indicating pneumonia and atelectatic changes which are increased as compared to previous chest x-ray. At the right CP angle area, there is confluent density indicating pleural effusion versus significant pleural thickening or atelectatic changes or infiltrates. In the right mid and upper lung fields, there are mild heterogeneous dense opacities indicating scattered infiltrates and atelectatic changes, similar to previous study. At the left lung base, there are mild atelectatic changes with or without mild infiltrates, similar to previous study. In the lateral portion of left upper lung field, there is focal density measuring 3 cm x 2.4 cm which may be due to focal infiltrates or mass lesion, unchanged as compared to previous study. Previous noted left basilar pleural tube has been withdrawn.  There appears to be a pleural tube in the right lateral CP angle area similar to previous chest x-ray. Superior to the right pleural drainage tube there is significant density which may be due to loculated pleural effusion and with combination of infiltrates and atelectasis. No pneumothorax. Mild cardiomegaly. Minimal-to-mild pulmonary vascular congestion. There is no overt pulmonary edema. No pneumoperitoneum. The right basilar opacity due to combination of pleural effusion or pleural thickening and pulmonary infiltrates and atelectasis appears to be increased to some extent as compared to previous chest x-ray. In the rest of both lungs, the infiltrates are unchanged. Mild-to-moderate pulmonary vascular congestion without obvious pulmonary edema. XR CHEST (SINGLE VIEW FRONTAL)    Result Date: 10/21/2022  EXAMINATION: ONE XRAY VIEW OF THE CHEST 10/21/2022 1:37 pm COMPARISON: 10/20/2022 HISTORY: ORDERING SYSTEM PROVIDED HISTORY: s/p bilateral chest tube placemetnt in IR TECHNOLOGIST PROVIDED HISTORY: s/p bilateral chest tube placemetnt in IR Reason for Exam: Bilateral chest tube placement FINDINGS: Interval placement of bilateral pleural pigtail catheters projecting over the lower chest on each side. Right arm PICC line tip is at the SVC/RA junction region. Left shoulder prosthesis. Partially visualized fusion hardware cervical spine. No significant pneumothorax is seen. Stable cardiomediastinal silhouette. Cholecystectomy clips. Bilateral parenchymal nodules/masses with cavitation, better shown on the recent CT, and suspicious for septic emboli. Patchy right perihilar and left lower lobe atelectasis/airspace disease. Interval placement of bilateral pleural pigtail catheters. XR LUMBAR SPINE (2-3 VIEWS)    Result Date: 10/12/2022  EXAMINATION: XRAY VIEWS OF THE LUMBAR SPINE 10/12/2022 2:06 pm COMPARISON: None.  HISTORY: ORDERING SYSTEM PROVIDED HISTORY: back pain TECHNOLOGIST PROVIDED HISTORY: back pain Reason for Exam: back pain FINDINGS: No compression fracture or subluxation. Disc spaces maintained. Pedicles are intact. Diffuse calcification nondilated abdominal aorta. Unremarkable lumbar spine examination. XR ABDOMEN (KUB) (SINGLE AP VIEW)    Result Date: 10/12/2022  EXAMINATION: ONE SUPINE XRAY VIEW(S) OF THE ABDOMEN 10/12/2022 2:06 pm COMPARISON: 02/01/2022 HISTORY: ORDERING SYSTEM PROVIDED HISTORY: abd pain TECHNOLOGIST PROVIDED HISTORY: abd pain Reason for Exam: abd pain FINDINGS: Mildly dilated colon and several small bowel loops. Residual stool in colon. No urinary tract calcifications. Osseous structures unremarkable. Findings consistent with mild diffuse ileus. No bowel obstruction or urinary tract calcifications. CT CHEST WO CONTRAST    Result Date: 10/21/2022  EXAMINATION: CT OF THE CHEST WITHOUT CONTRAST 10/21/2022 8:50 am TECHNIQUE: CT of the chest was performed without the administration of intravenous contrast. Multiplanar reformatted images are provided for review. Automated exposure control, iterative reconstruction, and/or weight based adjustment of the mA/kV was utilized to reduce the radiation dose to as low as reasonably achievable. COMPARISON: 10/20/2022, 10/17/2022 HISTORY: ORDERING SYSTEM PROVIDED HISTORY: diagonsis of pleural space infection ---- possible procedure pending results TECHNOLOGIST PROVIDED HISTORY: diagonsis of pleural space infection ---- possible procedure pending results Is the patient pregnant?->No Reason for Exam: order states pleural space infection, pt poor historian FINDINGS: Mediastinum: Interval placement of a right arm PICC line with its tip in the proximal right atrium. Mild calcific plaque of the thoracic aorta. Ascending aorta measures up to 3.7 cm. Coronary vascular calcifications. Similar small mediastinal lymph nodes without bulky adenopathy.  Lungs/pleura: Again shown are multiple generally similar bilateral masslike densities, some with cavitation. Again findings are suggestive of septic emboli. Overall size and distribution are similar to the previous study from yesterday. Similar small bilateral pleural effusions which appear partially loculated. There is mild respiratory motion artifact. Mild lower lobe consolidation with air bronchograms, greater on the left. Subtle ground-glass changes in the lingula and left lower lobe likely inflammatory/infectious. Upper Abdomen: No acute findings. Previous cholecystectomy. Postoperative changes of the stomach. Soft Tissues/Bones: Degenerative changes and previous ACDF lower cervical spine. Left shoulder prosthesis. Overall relatively stable exam.  Small pleural effusions which appear partially loculated with multiple bilateral masses, many with cavitation, suggesting septic emboli. CT CHEST WO CONTRAST  s normal in size. There is no pericardial effusion. Thoracic aorta and pulmonary arteries are grossly normal in caliber. There is calcified plaque at the aortic arch. Moderate coronary artery calcification. There is a mildly enlarged pretracheal lymph node, measuring 1 cm in short axis dimension. No other visible mediastinal lymphadenopathy. Lungs/pleura: There is a small amount of mildly loculated pleural fluid in the right hemithorax. Small left pleural effusion is noted as well, less loculated in appearance. There are numerous nodular opacities throughout both lungs, increased from 10/10/2022. Many (but not all) of the nodules are cavitary. There is focal consolidation with cavitation in the superior segment right lower lobe. Focal consolidation is also noted at the left base. Largest nodules are on the right, measuring up to 2.9 cm. No pneumothorax. Upper Abdomen: No acute findings in the limited visualized upper abdomen. Soft Tissues/Bones: Left shoulder arthroplasty. No acute or suspicious osseous abnormality. Limited, unremarkable renal ultrasound.   No hydronephrosis. RECOMMENDATIONS: Unavailable     IR FLUORO GUIDED CVA DEVICE PLMT/REPLACE/REMOVAL    Result Date: 10/20/2022  PROCEDURE: ULTRASOUND GUIDED VASCULAR ACCESS. FLUOROSCOPY GUIDED PICC PLACEMENT 10/20/2022. HISTORY: ORDERING SYSTEM PROVIDED HISTORY: IV Abx TECHNOLOGIST PROVIDED HISTORY: IV Abx Is the patient pregnant?->No SEDATION: None FLUOROSCOPY DOSE AND TYPE OR TIME AND EXPOSURES: 12 seconds; D AP 10 cGy cm2 TECHNIQUE: Informed consent was obtained from the patient's family member after a detailed explanation of the procedure including risks, benefits, and alternatives. Universal protocol was observed. The right arm was prepped and draped in sterile fashion using maximum sterile barrier technique. Local anesthesia was achieved with lidocaine. A micropuncture needle was used to access the right basilic vein using ultrasound guidance. An ultrasound image demonstrating patency of the vein with needle tip located within it. An image was obtained and stored in PACs. A 0.018 guidewire was used to place a peel-a-way sheath and a 5 Spanish dual-lumen PICC was advanced with fluoroscopic guidance with the tip at the cavo-atrial junction. Fluoroscopy shows right lung masses/cavitary lesions better shown on recent chest CT. Fusion hardware lower cervical spine. The catheter flushed easily and there was a good blood return. The catheter was secured to the skin. The patient tolerated the procedure well and there were no immediate complications. EBL: Less than 3 mL FINDINGS: Fluoroscopic image demonstrates the tip of the catheter at the cavo-atrial junction. Successful ultrasound and fluoroscopy guided PICC placement     XR CHEST PORTABLE    Result Date: 10/25/2022  EXAMINATION: ONE XRAY VIEW OF THE CHEST 10/25/2022 6:12 pm COMPARISON: 10/24/2022 HISTORY: ORDERING SYSTEM PROVIDED HISTORY: Patient with right-sided chest tube. TECHNOLOGIST PROVIDED HISTORY: Patient with right-sided chest tube.  Reason for Exam: Rt. sided chest tube Follow-up exam FINDINGS: Right pleural catheter is noted. No significant interval change in bilateral airspace opacities. Right PICC line is stable in positioning. Orthopedic hardware is stable in appearance in the cervical spine left shoulder. No obvious pneumothorax. Stable cardiac silhouette. The left costophrenic angle sharp. Interval placement of a right pleural catheter. No obvious pneumothorax. No significant interval change in bilateral airspace opacities. XR CHEST PORTABLE    Result Date: 10/24/2022  EXAMINATION: ONE XRAY VIEW OF THE CHEST 10/24/2022 6:27 am COMPARISON: 10/23/2022 HISTORY: ORDERING SYSTEM PROVIDED HISTORY: Follow-up empyema; pleural catheters fell out TECHNOLOGIST PROVIDED HISTORY: Follow-up empyema; pleural catheters fell out Reason for Exam: post chest tube removal FINDINGS: Pleural pigtail catheter is no longer visualized. Left shoulder prosthesis and fusion hardware cervical spine. Right arm PICC line tip is in the right atrium. Stable cardiomediastinal silhouette. There is improved aeration of the right lung base. No significant pneumothorax is seen. No large pleural effusions within limits of the exam.  There are nodular parenchymal opacities in both lungs, some with cavitation, compatible with a septic emboli and better shown on the prior CT. Cholecystectomy clips. Improved aeration of the right lung base since the prior exam.     XR CHEST PORTABLE    Result Date: 10/22/2022  EXAMINATION: ONE XRAY VIEW OF THE CHEST 10/22/2022 10:19 am COMPARISON: 10/21/2022 at 13:37 HISTORY: ORDERING SYSTEM PROVIDED HISTORY: Pigtail position TECHNOLOGIST PROVIDED HISTORY: Pigtail position Reason for Exam: Pigtail position FINDINGS: Right PICC line terminating right atrium remains in place. Pigtail catheter along left lung base with the tip overlying the hemidiaphragm.   Right pigtail pleural catheter has pulled back into a more peripheral position now called in region of costophrenic angle whereas previously it was more central. Normal cardiomediastinal silhouette. Mild interstitial prominence. Stable scattered moderate size nodular opacities in the lungs right greater than left. Patchy infiltrates at the lung bases probably unchanged allowing for difference in inspiration. Small right pleural effusion is new. Anterior fusion lower cervical spine and left total shoulder prosthesis. 1. Right pigtail pleural catheter has pulled back into the more peripheral position projecting at costophrenic angle whereas previously it was small central.  Left pigtail catheter continues to lie along the lung base. 2. Right PICC line unchanged. 3. Small right pleural effusion appears to be new but patchy bibasilar atelectasis unchanged. Diffuse nodular opacities also unchanged. XR CHEST PORTABLE    Result Date: 10/20/2022  EXAMINATION: ONE XRAY VIEW OF THE CHEST 10/20/2022 7:13 am COMPARISON: Chest x-ray dated 18 October 2022 HISTORY: ORDERING SYSTEM PROVIDED HISTORY: Septic emboli reevaluate pleural effusion on left TECHNOLOGIST PROVIDED HISTORY: Septic emboli reevaluate pleural effusion on left Reason for Exam: hx. of pleural effusion. FINDINGS: Similar appearance of bilateral nodular airspace opacities. Trace left pleural effusion. No pneumothorax. Stable cardiomediastinal silhouette     1. Stable appearance of bilateral nodular opacities in both lungs suspicious for underlying septic emboli better seen on the chest CT dated 17 October 2022 2. Trace left pleural effusion     XR CHEST PORTABLE    Result Date: 10/18/2022  EXAMINATION: ONE XRAY VIEW OF THE CHEST 10/18/2022 6:24 pm COMPARISON: Chest radiograph dated 10/11/2022. Chest CT dated 10/17/2022.  HISTORY: ORDERING SYSTEM PROVIDED HISTORY: Empyema, s/p thoracentesis TECHNOLOGIST PROVIDED HISTORY: Empyema, s/p thoracentesis Reason for Exam: S/p thoracentesis FINDINGS: The cardiac silhouette appears within normal limits. Previously seen left-sided pleural effusion appears to have decreased in size. There is a small left and trace right pleural effusion. There is redemonstration of multiple bilateral nodular opacity of the both lungs, suspicious for underlying septic emboli better seen and evaluated on the chest CT dated 10/17/2022. Components of left reverse total shoulder arthroplasty and changes of prior orthopedic fixation/fusion of the cervicothoracic spine are partially visualized. There are cholecystectomy clips overlying the right upper abdomen. Interval decrease in size of left-sided pleural effusion. Small left and trace right pleural effusion. Redemonstration of bilateral nodular opacities of the both lungs, suspicious for underlying septic emboli better seen and evaluated on the chest CT dated 10/17/2022. XR CHEST PORTABLE    Result Date: 10/11/2022  EXAMINATION: ONE XRAY VIEW OF THE CHEST 10/11/2022 6:35 am COMPARISON: 10/10/2022 HISTORY: ORDERING SYSTEM PROVIDED HISTORY: line placement TECHNOLOGIST PROVIDED HISTORY: line placement Reason for Exam: R sided IJ central line placement FINDINGS: Right IJ central line terminating at cavoatrial junction new from prior. Normal cardiomediastinal silhouette. Small patchy opacities left lung base reflecting subsegmental atelectasis. Scattered patchy opacities with a somewhat nodular appearance noted in the periphery of the lungs more pronounced on the right. This can be further assessed with CT. No change from prior. No pleural effusion or pneumothorax. Left shoulder prosthesis and cervical spine fusion noted. placed. Findings were discussed with Dr. Alejandra Parks At 1:00 pm on 10/24/2022.      IR GUIDED PERC PLEURAL DRAIN W CATH INSERT    Result Date: 10/21/2022  PROCEDURE: ULTRASOUNDGUIDED BILATERAL THORACENTESIS WITH BILATERAL PLEURAL DRAINAGE CATHETER PLACEMENT 10/21/2022 HISTORY: ORDERING SYSTEM PROVIDED HISTORY: diagnositic and possibly therapeutic TECHNOLOGIST PROVIDED HISTORY: diagnositic and possibly therapeutic Which side should the procedure be BILATERAL Is the patient pregnant?->No History of septic emboli with previous left thoracentesis demonstrating infected fluid, CT shows loculated effusions, please perform diagnostic and therapeutic thoracentesis is with bilateral pigtail catheter placement for drainage. TECHNIQUE AND FINDINGS: Informed consent was obtained from the patient's family member after a detailed explanation of the procedure including risks, benefits, and alternatives. Universal protocol was performed. The patient's posterior chest was prepped and draped in standard sterile fashion. 1% lidocaine was used for local anesthesia. Procedure was performed with the patient in an upright position. Initially attention was directed towards the left side. Ultrasound shows a very small residual loculated pleural effusion in the medial left lower chest.  Skin over the area is prepped and draped in standard sterile fashion. 1% lidocaine used for local anesthesia. Using realtime ultrasound guidance an 8 Azeri pigtail catheter was placed using trocar technique. Ultrasound images show a safe tract and access into the fluid. Thoracentesis was performed with removal of a small amount of slightly turbid yellow fluid. The catheter was sutured to the skin and secured in place with a Vaseline gauze dressing. The tube was connected to an atrium drainage system. Attention was then directed towards the right side. Ultrasound right chest shows a small right pleural effusion which appears to be partially loculated with minimal debris and septations. Skin over the area was prepped and draped in standard sterile fashion. 1% lidocaine used for local anesthesia. Using realtime ultrasound guidance an 8 Azeri pigtail catheter was advanced into the pleural fluid using trocar technique.   Ultrasound images show a safe tract and access into the fluid.  Thoracentesis was performed with removal of approximately 20 mL of fairly clear light red colored fluid. Sample was sent for the requested studies. The catheter was sutured to the skin and secured in place with a Vaseline gauze dressing. Tube was connected to an atrium drainage system. There are no immediate complications. The patient left the department in stable condition. Further tube management per the pulmonary team. EBL: Less than 5 mL     Successful ultrasound-guided bilateral pleural pigtail catheter placement (8 Latvian catheters were placed on each side). Small partially loculated pleural effusions, slightly more prominent on the right. Fluid sample was sent for the requested studies. IR GUIDED PERC PLEURAL DRAIN W CATH INSERT    Result Date: 10/21/2022  PROCEDURE: ULTRASOUNDGUIDED BILATERAL THORACENTESIS WITH BILATERAL PLEURAL DRAINAGE CATHETER PLACEMENT 10/21/2022 HISTORY: ORDERING SYSTEM PROVIDED HISTORY: diagnositic and possibly therapeutic TECHNOLOGIST PROVIDED HISTORY: diagnositic and possibly therapeutic Which side should the procedure be BILATERAL Is the patient pregnant?->No History of septic emboli with previous left thoracentesis demonstrating infected fluid, CT shows loculated effusions, please perform diagnostic and therapeutic thoracentesis is with bilateral pigtail catheter placement for drainage. TECHNIQUE AND FINDINGS: Informed consent was obtained from the patient's family member after a detailed explanation of the procedure including risks, benefits, and alternatives. Universal protocol was performed. The patient's posterior chest was prepped and draped in standard sterile fashion. 1% lidocaine was used for local anesthesia. Procedure was performed with the patient in an upright position. Initially attention was directed towards the left side.   Ultrasound shows a very small residual loculated pleural effusion in the medial left lower chest.  Skin over the area is prepped and draped in standard sterile fashion. 1% lidocaine used for local anesthesia. Using realtime ultrasound guidance an 8 Indonesian pigtail catheter was placed using trocar technique. Ultrasound images show a safe tract and access into the fluid. Thoracentesis was performed with removal of a small amount of slightly turbid yellow fluid. The catheter was sutured to the skin and secured in place with a Vaseline gauze dressing. The tube was connected to an atrium drainage system. Attention was then directed towards the right side. Ultrasound right chest shows a small right pleural effusion which appears to be partially loculated with minimal debris and septations. Skin over the area was prepped and draped in standard sterile fashion. 1% lidocaine used for local anesthesia. Using realtime ultrasound guidance an 8 Indonesian pigtail catheter was advanced into the pleural fluid using trocar technique. Ultrasound images show a safe tract and access into the fluid. Thoracentesis was performed with removal of approximately 20 mL of fairly clear light red colored fluid. Sample was sent for the requested studies. The catheter was sutured to the skin and secured in place with a Vaseline gauze dressing. Tube was connected to an atrium drainage system. There are no immediate complications. The patient left the department in stable condition. Further tube management per the pulmonary team. EBL: Less than 5 mL     Successful ultrasound-guided bilateral pleural pigtail catheter placement (8 Indonesian catheters were placed on each side). Small partially loculated pleural effusions, slightly more prominent on the right. Fluid sample was sent for the requested studies.          IMPRESSION:   Primary Problem  SIRS (systemic inflammatory response syndrome) [facility])    Active Hospital Problems    Diagnosis Date Noted    Thrombocytopenia ([facility] [address] [D69.6] 10/26/2022     Priority: Medium    MRSA bacteremia [R78.81, B95.62] 10/23/2022 Priority: Medium    Acute septic pulmonary embolism ([address] [I26.90] 10/23/2022     Priority: Medium    Cavitating mass of lung [J98.4] 10/23/2022     Priority: Medium    Allergy to antibiotic [Z88.1] 10/23/2022     Priority: Medium    Acute metabolic encephalopathy [W46.49] 10/18/2022     Priority: Medium    Delirium due to another medical condition [F05] 10/17/2022     Priority: Medium    Elevated d-dimer [R79.89] 10/15/2022     Priority: Medium    Leukocytosis [D72.829] 10/15/2022     Priority: Medium    Pulmonary nodules [R91.8] 10/15/2022     Priority: Medium    NICKI (acute kidney injury) ([address] [N17.9] 10/11/2022     Priority: Medium    Severe malnutrition ([address] [E43] 10/11/2022     Priority: Medium    SIRS (systemic inflammatory response syndrome) ([facility] [address] [R65.10] 10/10/2022     Priority: Medium    H/O gastric bypass [Z98.84] 02/13/2020    Acute encephalopathy [G93.40] 12/26/2016    COPD (chronic obstructive pulmonary disease) ([facility] [address] [J44.9] 10/25/2016    Bipolar disorder, mixed ([facility] [address] [F31.60] 11/06/2014    Depression with suicidal ideation [F32. 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 I explained to the patient the nature of this problem. I explained the significance of these abnormalities and possible etiology and management options  Anemia and thrombocytopenia are related to septic shock  Condition seems to be improving and response to supportive care and antibiotic  Continue antibiotics and care per ID  We will watch hemoglobin and platelets and intervene with transfusion as needed        Discussed with father and Nurse. Thank you for asking us to see this patient.             Damon Flynn MD  Hematologist/Medical [id number] [facility] hematology oncology physicians            This note is created with the assistance of a speech recognition program.  While intending to generate a document that actually reflects the content of the visit, the document can still have some errors including those of syntax and sound a like substitutions which may escape proof reading. It such instances, actual meaning can be extrapolated by contextual diversion.

## 2022-10-29 NOTE — PLAN OF CARE
Problem: Discharge Planning  Goal: Discharge to home or other facility with appropriate resources  10/28/2022 2018 by Brooklyn Martinez RN  Outcome: Progressing     Problem: Pain  Goal: Verbalizes/displays adequate comfort level or baseline comfort level  10/28/2022 2018 by Brooklyn Martinez RN  Outcome: Progressing     Problem: Skin/Tissue Integrity  Goal: Absence of new skin breakdown  Description: 1. Monitor for areas of redness and/or skin breakdown  2. Assess vascular access sites hourly  3. Every 4-6 hours minimum:  Change oxygen saturation probe site  4. Every 4-6 hours:  If on nasal continuous positive airway pressure, respiratory therapy assess nares and determine need for appliance change or resting period.   10/28/2022 2018 by Brooklyn Martinez RN  Outcome: Progressing     Problem: Safety - Adult  Goal: Free from fall injury  10/28/2022 2018 by Brooklyn Martinez RN  Outcome: Progressing     Problem: ABCDS Injury Assessment  Goal: Absence of physical injury  10/28/2022 2018 by Brooklyn Martinez RN  Outcome: Progressing     Problem: Nutrition Deficit:  Goal: Optimize nutritional status  10/28/2022 2018 by Brooklyn Martinez RN  Outcome: Progressing

## 2022-10-29 NOTE — PROGRESS NOTES
Pulmonary Progress Note  [facility]      Patient - Juliane Olmos,  Age - 46 y.o.    - 1970      Room Number - 2109/2109-01   N -  [id number]   Acct # - [de-identified]  Date of Admission -  10/10/2022  4:37 AM    Consulting Marcia Betts MD  Primary Care Physician - Rajinder Gibson MD     SUBJECTIVE   Resting quietly. Patient's father went home briefly. Reportedly no new issues. OBJECTIVE   VITALS    height is 4' 11\" (1.499 m) and weight is 99 lb 13.9 oz (45.3 kg). Her oral temperature is 97 °F (36.1 °C). Her blood pressure is 145/76 (abnormal) and her pulse is 68. Her respiration is 19 and oxygen saturation is 100%. Body mass index is 20.17 kg/m². Temperature Range: Temp: 97 °F (36.1 °C) Temp  Av °F (36.7 °C)  Min: 97 °F (36.1 °C)  Max: 98.7 °F (37.1 °C)  BP Range:  Systolic (23VJH), VKU:229 , Min:145 , RXC:329     Diastolic (38UNQ), LFS:82, Min:59, Max:98    Pulse Range: Pulse  Av.1  Min: 64  Max: 74  Respiration Range: Resp  Av.9  Min: 16  Max: 20  Current Pulse Ox[de-identified]  SpO2: 100 %  24HR Pulse Ox Range:  SpO2  Av.5 %  Min: 95 %  Max: 100 %  Oxygen Amount and Delivery: O2 Flow Rate (L/min): 0 L/min    Wt Readings from Last 3 Encounters:   10/29/22 99 lb 13.9 oz (45.3 kg)   22 96 lb 8 oz (43.8 kg)   22 105 lb (47.6 kg)       I/O (24 Hours)    Intake/Output Summary (Last 24 hours) at 10/29/2022 1419  Last data filed at 10/29/2022 1030  Gross per 24 hour   Intake 360 ml   Output 0 ml   Net 360 ml       EXAM     General Appearance  Awake, alert, oriented, in no acute distress  HEENT - normocephalic, atraumatic. Neck - Supple,  trachea midline   Lungs -coarse breath sounds no crackles rales or wheezes. Heart Exam:PMI normal. No lifts, heaves, or thrills. RRR. No murmurs, clicks, gallops, or rubs  Abdomen Exam: Abdomen soft, non-tender.  BS normal.   Extremity Exam: No signs of cyanosis    MEDS ceftaroline fosamil (TEFLARO) IVPB  600 mg IntraVENous Q12H    carvedilol  25 mg Oral BID     Vitamin D  2,000 Units Oral Daily    sodium chloride flush  5-40 mL IntraVENous 2 times per day    ARIPiprazole  5 mg Oral Daily    atorvastatin  40 mg Oral Daily    divalproex  1,000 mg Oral BID    DULoxetine  60 mg Oral Daily    ferrous sulfate  325 mg Oral BID     ipratropium-albuterol  1 ampule Inhalation Q4H WA      sodium chloride      sodium chloride 50 mL/hr at 10/18/22 1200     sodium chloride, sodium chloride flush, labetalol, potassium chloride **OR** potassium alternative oral replacement **OR** potassium chloride, hydrALAZINE, sodium chloride flush, sodium chloride, acetaminophen, ondansetron **OR** ondansetron, sodium chloride flush, perflutren lipid microspheres    LABS   CBC   Recent Labs     10/28/22  0541   WBC 6.1   HGB 8.7*   HCT 27.5*   MCV 82.7   PLT 39*     BMP:   Lab Results   Component Value Date/Time     10/28/2022 05:41 AM    K 3.8 10/28/2022 05:41 AM     10/28/2022 05:41 AM    CO2 29 10/28/2022 05:41 AM    BUN 15 10/28/2022 05:41 AM    LABALBU 1.7 10/22/2022 04:15 PM    CREATININE 0.86 10/28/2022 05:41 AM    CALCIUM 7.6 10/28/2022 05:41 AM    GFRAA >60 04/08/2022 07:17 AM    LABGLOM >60 10/28/2022 05:41 AM     ABGs:  Lab Results   Component Value Date/Time    PHART 7.452 10/22/2022 03:24 PM    PO2ART 86.7 10/22/2022 03:24 PM    KLT6CZU 42.7 10/22/2022 03:24 PM      Lab Results   Component Value Date/Time    MODE [facility] 12/28/2016 04:36 AM     Ionized Calcium:  No results found for: IONCA  Magnesium:    Lab Results   Component Value Date/Time    MG 2.2 10/22/2022 04:15 PM     Phosphorus:    Lab Results   Component Value Date/Time    PHOS 3.6 10/18/2022 04:50 AM        LIVER PROFILE No results for input(s): AST, ALT, LIPASE, BILIDIR, BILITOT, ALKPHOS in the last 72 hours. Invalid input(s):   AMYLASE,  ALB  INR   Recent Labs     10/26/22  1735   INR 1.3     PTT   Lab Results Component Value Date    APTT 30.6 10/26/2022         RADIOLOGY     (See actual reports for details)    ASSESSMENT/PLAN     Patient Active Problem List   Diagnosis    GERD (gastroesophageal reflux disease)    Hoarseness of voice    MVP (mitral valve prolapse)    Depression with suicidal ideation    Lymphocytic colitis    History of migraine headaches    Hyperlipidemia    Sleep apnea    IGT (impaired glucose tolerance)    History of renal calculi    Lactose intolerance    History of seizure disorder    Leukopenia    Bipolar disorder, mixed ([address]    Illicit drug use    Postlaminectomy syndrome, cervical region    Degeneration of cervical intervertebral disc    Encounter for long-term (current) use of other medications    Bradycardia    Cellulitis    Hypokalemia    Primary hypertension    Elevated lipase    Right lower quadrant abdominal pain    Nausea    Diarrhea    Acute encephalopathy    Polysubstance abuse (HCC)    Fibromyalgia    Cocaine addiction (HCC)    Shoulder arthritis    Osteoarthritis of left glenohumeral joint    Abnormal EKG    Anemia    Anxiety    Carotid artery stenosis    COPD (chronic obstructive pulmonary disease) ([address]    Dental disease    HUTCHINSON (dyspnea on exertion)    Fatigue    Fractures    H/O gastric bypass    History of crack cocaine use    History of UTI    Injury of back    Intractable chronic migraine without aura and without status migrainosus    Intractable migraine with aura with status migrainosus    Kidney stones    Dizziness    Memory loss    Mild pulmonary hypertension (HCC)    Mild tricuspid regurgitation    Primary osteoarthritis of left shoulder    Seizure-like activity (HCC)    Stress at home    TMJ (dislocation of temporomandibular joint)    Tobacco abuse    Visual impairment    Internal hernia    Vitamin D deficiency    Iron deficiency    SIRS (systemic inflammatory response syndrome) (HCC)    NICKI (acute kidney injury) ([address]    Severe malnutrition (HCC)    Elevated d-dimer Leukocytosis    Pulmonary nodules    Delirium due to another medical condition    Acute metabolic encephalopathy    MRSA bacteremia    Acute septic pulmonary embolism (HCC)    Cavitating mass of lung    Allergy to antibiotic    Thrombocytopenia (HCC)     MRSA bacteremia  Septic emboli  E. coli UTI  Acute kidney injury, resolved  Suspect COPD, seen once in the office by Dr. Darlene Cole in 2019, FEV1 2.04 L or 86% predicted  Parapneumonic effusion, pleural fluid positive for staph  History of opiate and cocaine use-reportedly uses crack cocaine at least twice weekly  History of tobacco use  Recent COVID +9/30  Elevated troponin likely type II MI  Encephalopathy, improving  History of hypertension  History of seizure disorder  Thrombocytopenia. Platelet count 50U  Full code    EPC  DuoNeb treatments  Teflaro  We will try chest tube to waterseal.  Repeat chest x-ray in a.m.     Electronically signed by Zora Zuñiga MD on 10/29/2022 at 2:19 PM

## 2022-10-29 NOTE — PLAN OF CARE
Problem: Discharge Planning  Goal: Discharge to home or other facility with appropriate resources  10/29/2022 0326 by Arty Ormond, RN  Outcome: Progressing     Problem: Pain  Goal: Verbalizes/displays adequate comfort level or baseline comfort level  10/29/2022 0326 by Arty Ormond, RN  Outcome: Progressing  Note: No pain at this time. 0/10 pain scale. Problem: Skin/Tissue Integrity  Goal: Absence of new skin breakdown  Description: 1. Monitor for areas of redness and/or skin breakdown  2. Assess vascular access sites hourly  3. Every 4-6 hours minimum:  Change oxygen saturation probe site  4. Every 4-6 hours:  If on nasal continuous positive airway pressure, respiratory therapy assess nares and determine need for appliance change or resting period. 10/29/2022 0326 by Arty Ormond, RN  Outcome: Progressing  Note: Skin assessment complete. Coccyx reddened. Sensicare applied PRN. Turned and repositioned every two hours. Area kept free from moisture. Proper nourishment and fluids encouraged, as appropriate. Will continue to monitor for additional needs and changes in skin breakdown.        Problem: Nutrition Deficit:  Goal: Optimize nutritional status  10/29/2022 0326 by Arty Ormond, RN  Outcome: Progressing

## 2022-10-29 NOTE — PROGRESS NOTES
[facility] -   Infectious diseases evaluation  admission date 10/10/2022    reason for consultation:   MRSA bacteremia    Impression :   Current:  MRSA bacteremia  Bilateral pulmonary nodules with cavitation /loculated pleural effusion /suspected septic emboli s/p thoracentesis with bilateral CT placement. Sepsis secondary to above  Thrombocytopenia/anemia? Vancomycin related  Encephalopathy  History of cocaine drug abuse  E. coli UTI treated  Acute renal failure improved  Hypertension  Bipolar disorder  Allergy to Bactrim. Recommendations     Continue IV Teflaro through 12/03/22  Follow CBC and renal function closely. Supportive care. Infection Control Recommendations   [place] Precautions  Contact Isolation     Antimicrobial Stewardship Recommendations   Simplification of therapy  Targeted therapy      History of Present Illness:   Initial history:  Praveena Raya is a 46y.o.-year-old female was admitted 10/10/2020 for worsening shortness of breath associated with generalized body ache. The patient is drowsy, arousable, confused, follows simple commands, unable to provide history that was obtained from chart review and nursing staff. She does have history of cocaine abuse. She was found to have acute renal failure with a creatinine of 2.1. Urinalysis showed small leukocyte esterase, positive nitrate. Urine culture on 10/10/2022 grew E. Coli  Blood cultures 10/10/2022 2 of 2 grew MRSA  Echocardiogram showed no vegetations  Procalcitonin was 6.01, D-dimer was elevated at 3.85  Chest x-ray showed right lung nodules. VQ scan was indeterminant.   CT chest without contrast 10/16/2022 showed numerous bilateral nodular opacities both lungs suggestive of septic emboli , focal consolidation with cavitation in the superior right lower lobe focal consolidation at the left base, small bilateral loculated pleural effusion and mediastinal lymphadenopathy. Echocardiogram showed no vegetations  ADRIANNE negative for vegetations. Interval changes  10/29/2022   She is sleeping, arousable, complaining of discomfort at chest tube site, afebrile, right chest tube in place. No new events. Patient Vitals for the past 8 hrs:   BP Temp src Pulse Resp SpO2   10/29/22 1200 (!) 145/76 Axillary 68 19 100 %   10/29/22 1122 (!) 175/84 -- 72 -- --   10/29/22 1113 (!) 181/79 -- -- -- --   10/29/22 1044 -- -- 72 18 96 %   10/29/22 1032 (!) 185/75 -- -- -- --           I have personally reviewed the past medical history, past surgical history, medications, social history, and family history, and I haveupdated the database accordingly. Allergies:   Aspirin, Bactrim, and Codeine     Review of Systems:     Confused, unable to provide  Physical Examination :       Physical Exam  Vitals and nursing note reviewed. Constitutional:       General: She is not in acute distress. Appearance: She is not ill-appearing. HENT:      Head: Normocephalic and atraumatic. Right Ear: External ear normal.      Left Ear: External ear normal.      Nose: Nose normal.      Mouth/Throat:      Mouth: Mucous membranes are moist.      Pharynx: Oropharynx is clear. Eyes:      General: No scleral icterus. Conjunctiva/sclera: Conjunctivae normal.   Cardiovascular:      Rate and Rhythm: Regular rhythm. Heart sounds: Normal heart sounds. No murmur heard. Pulmonary:      Effort: Pulmonary effort is normal. No respiratory distress. Breath sounds: No wheezing or rhonchi. Comments:   Decreased breath sounds bilaterally  Abdominal:      General: Bowel sounds are normal. There is no distension. Palpations: Abdomen is soft. Tenderness: There is no abdominal tenderness. Genitourinary:     Comments: No chamberlain. Musculoskeletal:      Cervical back: Neck supple. No rigidity. Right lower leg: No edema. Left lower leg: No edema.    Skin:     General: Skin is warm and dry. Coloration: Skin is not jaundiced. Findings: No erythema.    Neurological:      Comments: Drowsy   Psychiatric:      Comments: ISAAK     Past Medical History:     Past Medical History:   Diagnosis Date    Anemia     Arthritis     Asthma     Bipolar disorder, mixed ([facility]     Carotid artery stenosis 2020    Cocaine abuse ([facility] [address] 2014    COPD (chronic obstructive pulmonary disease) (HCC)     Degeneration of cervical intervertebral disc     Depression with anxiety     Depression with anxiety     Fibromyalgia 2017    GERD (gastroesophageal reflux disease)     History of migraine headaches 6/10/2014    History of renal calculi 6/10/2014    History of seizure disorder 6/10/2014    Hoarseness of voice     HTN (hypertension) 6/10/2014    Hyperlipidemia 6/10/2014    IGT (impaired glucose tolerance) 6/10/2014    Kidney stones     Lactose intolerance 6/10/2014    Leukopenia 6/10/2014    Major depressive disorder, recurrent episode, severe, without mention of psychotic behavior 2014    MVP (mitral valve prolapse)     Seizures (HCC)     Sleep apnea 6/10/2014    Unspecified diseases of blood and blood-forming organs        Past Surgical  History:     Past Surgical History:   Procedure Laterality Date    ANKLE FRACTURE SURGERY      recontruction surgery    APPENDECTOMY      BREAST LUMPECTOMY Right     CARDIAC CATHETERIZATION      CARPAL TUNNEL RELEASE      x2     SECTION      CHOLECYSTECTOMY      COLONOSCOPY  12    COLONOSCOPY  2016    severe spasms    COLONOSCOPY  2019    DR GOLDY MCINTOSH    GASTRIC BYPASS SURGERY      GASTRIC BYPASS SURGERY      HYSTERECTOMY (CERVIX STATUS UNKNOWN)      HYSTERECTOMY (CERVIX STATUS UNKNOWN)      IR PERC CATH PLEURAL DRAIN W/IMAG  10/21/2022    IR PERC CATH PLEURAL DRAIN W/IMAG 10/21/2022 [facility]    IR PERC CATH PLEURAL DRAIN W/IMAG  10/21/2022    IR PERC CATH PLEURAL DRAIN W/IMAG 10/21/2022 [facility]    IR PERC CATH PLEURAL DRAIN W/IMAG  10/24/2022    IR PERC CATH PLEURAL DRAIN W/IMAG 10/24/2022 [facility]    LAPAROSCOPY N/A 1/29/2022    LAPAROSCOPY EXPLORATORY CONVERTED TO OPEN EXPLORATORY LAPAROTOMY, LYSIS OF ADHESIONS, REDUCTION OF INTERNAL HERNIA performed [facility] Romulo Sen DO [facility]  40/[id number]/[id number]    APPLICATION OF ARCH BARS,SIMPLE EXTRACTION OF #15 AND RT TMJ JOINT REPLACEMENT    SHOULDER ARTHROSCOPY Left 06/05/2019    DR ROBERT GREEN    SHOULDER SURGERY      TONSILLECTOMY AND ADENOIDECTOMY      TRANSESOPHAGEAL ECHOCARDIOGRAM N/A 10/19/2022    TRANSESOPHAGEAL ECHOCARDIOGRAM WITH BUBBLE STUDY performed by Frandy Royal DO at [address]  7-3-12    egd    UPPER GASTROINTESTINAL ENDOSCOPY  12/19/2016    status post gastrectomy with a small remnant of gastric pouch.     UPPER GASTROINTESTINAL ENDOSCOPY  05/2019    DR Mark Izquierdo       Medications:      ceftaroline fosamil (TEFLARO) IVPB  600 mg IntraVENous Q12H    carvedilol  25 mg Oral BID WC    Vitamin D  2,000 Units Oral Daily    sodium chloride flush  5-40 mL IntraVENous 2 times per day    ARIPiprazole  5 mg Oral Daily    atorvastatin  40 mg Oral Daily    divalproex  1,000 mg Oral BID    DULoxetine  60 mg Oral Daily    ferrous sulfate  325 mg Oral BID WC    ipratropium-albuterol  1 ampule Inhalation Q4H WA       Social History:     Social History     Socioeconomic History    Marital status:      Spouse name: Not on file    Number of children: Not on file    Years of education: Not on file    Highest education level: Not on file   Occupational History    Occupation: disability   Tobacco Use    Smoking status: Every Day     Packs/day: 0.50     Years: 33.00     Pack years: 16.50     Types: Cigarettes    Smokeless tobacco: Never    Tobacco comments:     quit  2000 started again  1/13    Substance and Sexual Activity    Alcohol use: Yes     Comment: occasional    Drug use: Not Currently    Sexual activity: Yes   Other Topics Concern    Not on file   Social History Narrative    Not on file     Social Determinants of Health     Financial Resource Strain: Not on file   Food Insecurity: Not on file   Transportation Needs: Not on file   Physical Activity: Not on file   Stress: Not on file   Social Connections: Not on file   Intimate Partner Violence: Not on file   Housing Stability: Not on file       Family History:     Family History   Problem Relation Age of Onset    Diabetes Mother     Cancer Mother     Coronary Art Dis Father     COPD Father     Depression Brother     Alcohol Abuse Brother     Cancer Other         lung and skin    Diabetes Maternal Grandmother     Cancer Paternal Grandmother       Medical Decision Making:   I have independently reviewed/ordered the following labs:    CBC with Differential:   Recent Labs     10/27/22  0442 10/28/22  0541   WBC 5.5 6.1   HGB 8.4* 8.7*   HCT 26.6* 27.5*   PLT 31* 39*   LYMPHOPCT 10* 10*   MONOPCT 8* 9*         BMP:  Recent Labs     10/27/22  0442 10/28/22  0541    142   K 3.6* 3.8    107   CO2 28 29   BUN 12 15   CREATININE 0.71 0.86       Hepatic Function Panel: No results for input(s): PROT, LABALBU, BILIDIR, IBILI, BILITOT, ALKPHOS, ALT, AST in the last 72 hours. No results for input(s): RPR in the last 72 hours. No results for input(s): HIV in the last 72 hours. No results for input(s): BC in the last 72 hours. Lab Results   Component Value Date/Time    CREATININE 0.86 10/28/2022 05:41 AM    GLUCOSE 78 10/28/2022 05:41 AM       Detailed results: Thank you for allowing us to participate in the care of this patient. Please call with questions.     This note is created with the assistance of a speech recognition program.  While intending to generate adocument that actually reflects the content of the visit, the document can still have some errors including those of syntax and sound a like substitutions which may escape proof reading. It such instances, actual meaningcan be extrapolated by contextual diversion.         Mercy Raman MD   Office/Perfect Serve:   [phone]

## 2022-10-29 NOTE — PROGRESS NOTES
Progress Note  Date:10/29/2022       Room:[id number]  Patient Name:Anu Bourne     YOB: 1970     Age:51 y.o. Subjective    Subjective:  Symptoms:  Stable. (Speech clearer this morning for the most part. Still not opening eyes. ). Diet:  Poor intake. Activity level: Impaired due to weakness. Pain:  Pain severity now: left thigh. Review of Systems  Review of Systems - General ROS: positive for  - fatigue and weight loss  Psychological ROS: positive for - anxiety, depression, and mood swings  Respiratory ROS: positive for - pleuritic pain and shortness of breath  Neurological ROS: positive for - weakness and lethargy    Objective         Vitals Last 24 Hours:  TEMPERATURE:  Temp  Av.9 °F (36.6 °C)  Min: 97 °F (36.1 °C)  Max: 98.7 °F (37.1 °C)  RESPIRATIONS RANGE: Resp  Av.8  Min: 16  Max: 20  PULSE OXIMETRY RANGE: SpO2  Av.3 %  Min: 95 %  Max: 99 %  PULSE RANGE: Pulse  Av.6  Min: 64  Max: 74  BLOOD PRESSURE RANGE: Systolic (36HVF), CTX:156 , Min:149 , OIV:564   ; Diastolic (78QGV), LUY:25, Min:59, Max:98    I/O (24Hr): Intake/Output Summary (Last 24 hours) at 10/29/2022 1046  Last data filed at 10/29/2022 0630  Gross per 24 hour   Intake 600 ml   Output --   Net 600 ml       Objective:  General Appearance:  Comfortable. Vital signs: (most recent): Blood pressure (!) 185/75, pulse 70, temperature 97 °F (36.1 °C), temperature source Oral, resp. rate 18, height 4' 11\" (1.499 m), weight 99 lb 13.9 oz (45.3 kg), SpO2 95 %. (BP elevated). Lungs:  Normal effort and normal respiratory rate. There are decreased breath sounds. Heart: Normal rate. Regular rhythm.   S1 normal and S2 normal.    Labs/Imaging/Diagnostics    Labs:  CBC:  Recent Labs     10/26/22  1338 10/26/22  2308 10/27/22  0442 10/28/22  0541   WBC 4.1  --  5.5 6.1   RBC 2.69*  --  3.22* 3.33*   HGB 6.8* 8.1* 8.4* 8.7*   HCT 22.2* 25.3* 26.6* 27.5*   MCV 82.5  --  82.7 82.7   RDW 17.0*  -- 16.0* 16.3*   PLT 26*  --  31* 39*       CHEMISTRIES:  Recent Labs     10/26/22  1338 10/27/22  0442 10/28/22  0541    142 142   K 3.9 3.6* 3.8    106 107   CO2 29 28 29   BUN 11 12 15   CREATININE 0.69 0.71 0.86   GLUCOSE 94 76 78       PT/INR:  Recent Labs     10/26/22  1735   PROTIME 15.9*   INR 1.3       APTT:  Recent Labs     10/26/22  1735   APTT 30.6       LIVER PROFILE:No results for input(s): AST, ALT, BILIDIR, BILITOT, ALKPHOS in the last 72 hours. Imaging Last 24 Hours:  CT CHEST WO CONTRAST    Result Date: 10/26/2022  EXAMINATION: CT OF THE CHEST WITHOUT CONTRAST 10/26/2022 3:13 pm TECHNIQUE: CT of the chest was performed without the administration of intravenous contrast. Multiplanar reformatted images are provided for review. Automated exposure control, iterative reconstruction, and/or weight based adjustment of the mA/kV was utilized to reduce the radiation dose to as low as reasonably achievable. COMPARISON: 10/25/2022 and 10/21/2022 HISTORY: ORDERING SYSTEM PROVIDED HISTORY: Reevaluate signs of empyema, will see if continuous drainage is necessary or discontinuance of pleural catheter TECHNOLOGIST PROVIDED HISTORY: Reevaluate signs of empyema, will see if continuous drainage is necessary or discontinuance of pleural catheter Is the patient pregnant?->No Reason for Exam: sob, recent covid 23. pt has chest tube FINDINGS: Mediastinum: No cardiomegaly. Decreased attenuation of intra cardiac blood pool suggesting anemia. Suggestion of mediastinal and bilateral hilar adenopathy though evaluation is limited without intravenous contrast. Lungs/pleura: Small loculated pleural effusions bilaterally, slightly decreased in size on the right and increased in size in the left. Right pleural pigtail catheter via an intercostal approach. Numerous pulmonary nodules/masses redemonstrated bilaterally, multiple of which appear cavitary.  Interval increase in interstitial/alveolar densities within the lower lobes and lingula compared to prior study. Upper Abdomen: Cholecystectomy. Splenomegaly. Soft Tissues/Bones: Mild anasarca. Right PICC tip extends to the right atrium. Left shoulder arthroplasty. Fusion hardware within the lower cervical spine. 1.  Interval increase in bibasilar interstitial/alveolar densities most pronounced within the lingula. Differential considerations include superimposed multifocal pneumonia versus asymmetric edema. 2.  Small partially loculated pleural effusions redemonstrated bilaterally with interval right pleural catheter placement, slightly increased on the left. 3.  Numerous pulmonary nodules/masses bilaterally with several appearing cavitary, grossly unchanged from prior, most suggestive of septic emboli though metastases could produce a similar appearance. 4.  Additional nonemergent findings, as above.      Assessment//Plan           Hospital Problems             Last Modified POA    * (Principal) SIRS (systemic inflammatory response syndrome) ([address] 10/10/2022 Yes    NICKI (acute kidney injury) ([address] 10/11/2022 Yes    Severe malnutrition (HCC) (Chronic) 10/11/2022 Yes    Elevated d-dimer 10/15/2022 Yes    Leukocytosis 10/15/2022 Yes    Pulmonary nodules 10/15/2022 Yes    Delirium due to another medical condition 10/17/2022 Yes    Acute metabolic encephalopathy 56/83/3275 Yes    MRSA bacteremia 10/23/2022 Yes    Acute septic pulmonary embolism ([address] 10/23/2022 Yes    Cavitating mass of lung 10/23/2022 Yes    Allergy to antibiotic 10/23/2022 Yes    Thrombocytopenia ([address] 10/26/2022 Yes    Depression with suicidal ideation 10/17/2022 Yes    Bipolar disorder, mixed ([address] 10/11/2022 Yes    Acute encephalopathy 10/14/2022 Yes    COPD (chronic obstructive pulmonary disease) ([address] 10/11/2022 Yes    H/O gastric bypass 10/11/2022 Yes    Overview Signed 2/13/2020  7:19 PM by Yaneth Aquino MD     enlarged  pancreas, here for surgical work-up for EGD        Assessment & Plan  Discharge planning for LTACH - pre-cert started yesterday. Patient asking about chest tube removal.    Continue current treatments.     Electronically signed by Mary Palomino MD on 10/29/2022 at 10:47 AM

## 2022-10-30 ENCOUNTER — APPOINTMENT (OUTPATIENT)
Dept: GENERAL RADIOLOGY | Age: 52
DRG: 871 | End: 2022-10-30
Payer: COMMERCIAL

## 2022-10-30 LAB
ABSOLUTE EOS #: 0 K/UL (ref 0–0.4)
ABSOLUTE LYMPH #: 0.93 K/UL (ref 1–4.8)
ABSOLUTE MONO #: 0.52 K/UL (ref 0.1–1.3)
BASOPHILS # BLD: 0 % (ref 0–2)
BASOPHILS ABSOLUTE: 0 K/UL (ref 0–0.2)
EOSINOPHILS RELATIVE PERCENT: 0 % (ref 0–4)
HCT VFR BLD CALC: 24.8 % (ref 36–46)
HEMOGLOBIN: 7.9 G/DL (ref 12–16)
LYMPHOCYTES # BLD: 16 % (ref 24–44)
MCH RBC QN AUTO: 26.5 PG (ref 26–34)
MCHC RBC AUTO-ENTMCNC: 32 G/DL (ref 31–37)
MCV RBC AUTO: 83 FL (ref 80–100)
MONOCYTES # BLD: 9 % (ref 1–7)
MORPHOLOGY: ABNORMAL
PDW BLD-RTO: 16.8 % (ref 11.5–14.9)
PLATELET # BLD: 40 K/UL (ref 150–450)
PMV BLD AUTO: 7 FL (ref 6–12)
RBC # BLD: 2.99 M/UL (ref 4–5.2)
SEG NEUTROPHILS: 75 % (ref 36–66)
SEGMENTED NEUTROPHILS ABSOLUTE COUNT: 4.35 K/UL (ref 1.3–9.1)
WBC # BLD: 5.8 K/UL (ref 3.5–11)

## 2022-10-30 PROCEDURE — 6360000002 HC RX W HCPCS: Performed by: INTERNAL MEDICINE

## 2022-10-30 PROCEDURE — 99231 SBSQ HOSP IP/OBS SF/LOW 25: CPT | Performed by: INTERNAL MEDICINE

## 2022-10-30 PROCEDURE — 2060000000 HC ICU INTERMEDIATE R&B

## 2022-10-30 PROCEDURE — 6370000000 HC RX 637 (ALT 250 FOR IP): Performed by: INTERNAL MEDICINE

## 2022-10-30 PROCEDURE — 99232 SBSQ HOSP IP/OBS MODERATE 35: CPT | Performed by: INTERNAL MEDICINE

## 2022-10-30 PROCEDURE — 36415 COLL VENOUS BLD VENIPUNCTURE: CPT

## 2022-10-30 PROCEDURE — 85025 COMPLETE CBC W/AUTO DIFF WBC: CPT

## 2022-10-30 PROCEDURE — 99232 SBSQ HOSP IP/OBS MODERATE 35: CPT | Performed by: FAMILY MEDICINE

## 2022-10-30 PROCEDURE — 94640 AIRWAY INHALATION TREATMENT: CPT

## 2022-10-30 PROCEDURE — 2580000003 HC RX 258: Performed by: INTERNAL MEDICINE

## 2022-10-30 PROCEDURE — 94761 N-INVAS EAR/PLS OXIMETRY MLT: CPT

## 2022-10-30 PROCEDURE — 71045 X-RAY EXAM CHEST 1 VIEW: CPT

## 2022-10-30 RX ORDER — DIVALPROEX SODIUM 500 MG/1
1000 TABLET, DELAYED RELEASE ORAL 2 TIMES DAILY
Status: DISCONTINUED | OUTPATIENT
Start: 2022-10-30 | End: 2022-11-04 | Stop reason: HOSPADM

## 2022-10-30 RX ADMIN — IPRATROPIUM BROMIDE AND ALBUTEROL SULFATE 1 AMPULE: 2.5; .5 SOLUTION RESPIRATORY (INHALATION) at 19:22

## 2022-10-30 RX ADMIN — ARIPIPRAZOLE 5 MG: 5 TABLET ORAL at 10:07

## 2022-10-30 RX ADMIN — DIVALPROEX SODIUM 1000 MG: 500 TABLET, DELAYED RELEASE ORAL at 20:33

## 2022-10-30 RX ADMIN — Medication 2000 UNITS: at 10:07

## 2022-10-30 RX ADMIN — SODIUM CHLORIDE, PRESERVATIVE FREE 10 ML: 5 INJECTION INTRAVENOUS at 10:08

## 2022-10-30 RX ADMIN — HYDRALAZINE HYDROCHLORIDE 10 MG: 20 INJECTION INTRAMUSCULAR; INTRAVENOUS at 06:17

## 2022-10-30 RX ADMIN — ATORVASTATIN CALCIUM 40 MG: 40 TABLET, FILM COATED ORAL at 20:33

## 2022-10-30 RX ADMIN — FERROUS SULFATE TAB 325 MG (65 MG ELEMENTAL FE) 325 MG: 325 (65 FE) TAB at 10:07

## 2022-10-30 RX ADMIN — FERROUS SULFATE TAB 325 MG (65 MG ELEMENTAL FE) 325 MG: 325 (65 FE) TAB at 17:33

## 2022-10-30 RX ADMIN — CARVEDILOL 25 MG: 25 TABLET, FILM COATED ORAL at 17:32

## 2022-10-30 RX ADMIN — CEFTAROLINE FOSAMIL 600 MG: 600 POWDER, FOR SOLUTION INTRAVENOUS at 10:14

## 2022-10-30 RX ADMIN — DIVALPROEX SODIUM 1000 MG: 500 TABLET, DELAYED RELEASE ORAL at 10:07

## 2022-10-30 RX ADMIN — CARVEDILOL 25 MG: 25 TABLET, FILM COATED ORAL at 10:07

## 2022-10-30 RX ADMIN — IPRATROPIUM BROMIDE AND ALBUTEROL SULFATE 1 AMPULE: 2.5; .5 SOLUTION RESPIRATORY (INHALATION) at 08:30

## 2022-10-30 RX ADMIN — DULOXETINE 60 MG: 60 CAPSULE, DELAYED RELEASE ORAL at 10:10

## 2022-10-30 RX ADMIN — IPRATROPIUM BROMIDE AND ALBUTEROL SULFATE 1 AMPULE: 2.5; .5 SOLUTION RESPIRATORY (INHALATION) at 11:16

## 2022-10-30 RX ADMIN — SODIUM CHLORIDE, PRESERVATIVE FREE 5 ML: 5 INJECTION INTRAVENOUS at 21:26

## 2022-10-30 RX ADMIN — CEFTAROLINE FOSAMIL 600 MG: 600 POWDER, FOR SOLUTION INTRAVENOUS at 20:32

## 2022-10-30 RX ADMIN — IPRATROPIUM BROMIDE AND ALBUTEROL SULFATE 1 AMPULE: 2.5; .5 SOLUTION RESPIRATORY (INHALATION) at 15:23

## 2022-10-30 ASSESSMENT — PAIN SCALES - GENERAL
PAINLEVEL_OUTOF10: 0

## 2022-10-30 ASSESSMENT — PAIN SCALES - WONG BAKER
WONGBAKER_NUMERICALRESPONSE: 0

## 2022-10-30 NOTE — PROGRESS NOTES
[facility] -   Infectious diseases evaluation  admission date 10/10/2022    reason for consultation:   MRSA bacteremia    Impression :   Current:  MRSA bacteremia  Bilateral pulmonary nodules with cavitation /loculated pleural effusion /suspected septic emboli s/p thoracentesis with bilateral CT placement. Sepsis secondary to above  Thrombocytopenia/anemia? Vancomycin related  Encephalopathy  History of cocaine drug abuse  E. coli UTI treated  Acute renal failure improved  Hypertension  Bipolar disorder  Allergy to Bactrim. Recommendations     Continue IV Teflaro through 12/03/22  Follow CBC and renal function closely. Supportive care. Infection Control Recommendations   [place] Precautions  Contact Isolation     Antimicrobial Stewardship Recommendations   Simplification of therapy  Targeted therapy      History of Present Illness:   Initial history:  Jose Ham is a 46y.o.-year-old female was admitted 10/10/2020 for worsening shortness of breath associated with generalized body ache. The patient is drowsy, arousable, confused, follows simple commands, unable to provide history that was obtained from chart review and nursing staff. She does have history of cocaine abuse. She was found to have acute renal failure with a creatinine of 2.1. Urinalysis showed small leukocyte esterase, positive nitrate. Urine culture on 10/10/2022 grew E. Coli  Blood cultures 10/10/2022 2 of 2 grew MRSA  Echocardiogram showed no vegetations  Procalcitonin was 6.01, D-dimer was elevated at 3.85  Chest x-ray showed right lung nodules. VQ scan was indeterminant.   CT chest without contrast 10/16/2022 showed numerous bilateral nodular opacities both lungs suggestive of septic emboli , focal consolidation with cavitation in the superior right lower lobe focal consolidation at the left base, small bilateral loculated pleural effusion and mediastinal lymphadenopathy. Echocardiogram showed no vegetations  ADRIANNE negative for vegetations. Interval changes  10/30/2022   She is awake, follows simple commands, afebrile, right chest tube in place. No new events. WBC 5.8, hemoglobin 7.9, platelets 40  Patient Vitals for the past 8 hrs:   BP Temp Temp src Pulse Resp SpO2   10/30/22 1230 114/71 98.1 °F (36.7 °C) Axillary 65 16 94 %   10/30/22 1118 -- -- -- -- -- 94 %   10/30/22 0832 -- -- -- -- -- 92 %   10/30/22 0630 (!) 142/58 98 °F (36.7 °C) Axillary 70 16 93 %           I have personally reviewed the past medical history, past surgical history, medications, social history, and family history, and I haveupdated the database accordingly. Allergies:   Aspirin, Bactrim, and Codeine     Review of Systems:     Confused, unable to provide  Physical Examination :       Physical Exam  Vitals and nursing note reviewed. Constitutional:       General: She is not in acute distress. Appearance: She is not ill-appearing. HENT:      Head: Normocephalic and atraumatic. Right Ear: External ear normal.      Left Ear: External ear normal.      Nose: Nose normal.      Mouth/Throat:      Mouth: Mucous membranes are moist.      Pharynx: Oropharynx is clear. Eyes:      General: No scleral icterus. Conjunctiva/sclera: Conjunctivae normal.   Cardiovascular:      Rate and Rhythm: Regular rhythm. Heart sounds: Normal heart sounds. No murmur heard. Pulmonary:      Effort: Pulmonary effort is normal. No respiratory distress. Breath sounds: No wheezing or rhonchi. Comments:   Decreased breath sounds bilaterally  Abdominal:      General: Bowel sounds are normal. There is no distension. Palpations: Abdomen is soft. Tenderness: There is no abdominal tenderness. Genitourinary:     Comments: No chamberlain. Musculoskeletal:      Cervical back: Neck supple. No rigidity. Right lower leg: No edema. Left lower leg: No edema.    Skin:     General: Skin is warm and dry. Coloration: Skin is not jaundiced. Findings: No erythema.    Neurological:      Comments: Confused   Psychiatric:      Comments: ISAAK     Past Medical History:     Past Medical History:   Diagnosis Date    Anemia     Arthritis     Asthma     Bipolar disorder, mixed ([facility] [address]     Carotid artery stenosis 2020    Cocaine abuse ([facility] [address] 2014    COPD (chronic obstructive pulmonary disease) ([facility] [address]     Degeneration of cervical intervertebral disc     Depression with anxiety     Depression with anxiety     Fibromyalgia 2017    GERD (gastroesophageal reflux disease)     History of migraine headaches 6/10/2014    History of renal calculi 6/10/2014    History of seizure disorder 6/10/2014    Hoarseness of voice     HTN (hypertension) 6/10/2014    Hyperlipidemia 6/10/2014    IGT (impaired glucose tolerance) 6/10/2014    Kidney stones     Lactose intolerance 6/10/2014    Leukopenia 6/10/2014    Major depressive disorder, recurrent episode, severe, without mention of psychotic behavior 2014    MVP (mitral valve prolapse)     Seizures (HCC)     Sleep apnea 6/10/2014    Unspecified diseases of blood and blood-forming organs        Past Surgical  History:     Past Surgical History:   Procedure Laterality Date    ANKLE FRACTURE SURGERY      recontruction surgery    APPENDECTOMY      BREAST LUMPECTOMY Right     CARDIAC CATHETERIZATION      CARPAL TUNNEL RELEASE      x2     SECTION      CHOLECYSTECTOMY      COLONOSCOPY  12    COLONOSCOPY  2016    severe spasms    COLONOSCOPY  2019    DR GOLDY MCINTOSH    GASTRIC BYPASS SURGERY      GASTRIC BYPASS SURGERY      HYSTERECTOMY (CERVIX STATUS UNKNOWN)      HYSTERECTOMY (CERVIX STATUS UNKNOWN)      IR PERC CATH PLEURAL DRAIN W/IMAG  10/21/2022    IR PERC CATH PLEURAL DRAIN W/IMAG 10/21/2022 [facility]    IR PERC CATH PLEURAL DRAIN W/IMAG  10/21/2022    IR PERC CATH PLEURAL DRAIN W/IMAG 10/21/2022 [facility]    IR PERC CATH PLEURAL DRAIN W/IMAG  10/24/2022    IR PERC CATH PLEURAL DRAIN W/IMAG 10/24/2022 [facility]    LAPAROSCOPY N/A 1/29/2022    LAPAROSCOPY EXPLORATORY CONVERTED TO OPEN EXPLORATORY LAPAROTOMY, LYSIS OF ADHESIONS, REDUCTION OF INTERNAL HERNIA performed by Kassy Link DO at [facility]  06/61/3692    APPLICATION OF ARCH BARS,SIMPLE EXTRACTION OF #15 AND RT TMJ JOINT REPLACEMENT    SHOULDER ARTHROSCOPY Left 06/05/2019    DR ROBERT GREEN    SHOULDER SURGERY      TONSILLECTOMY AND ADENOIDECTOMY      TRANSESOPHAGEAL ECHOCARDIOGRAM N/A 10/19/2022    TRANSESOPHAGEAL ECHOCARDIOGRAM WITH BUBBLE STUDY performed by Frandy Royal DO at [address]  7-3-12    egd    UPPER GASTROINTESTINAL ENDOSCOPY  12/19/2016    status post gastrectomy with a small remnant of gastric pouch.     UPPER GASTROINTESTINAL ENDOSCOPY  05/2019    DR Susan Suarez       Medications:      divalproex  1,000 mg Oral BID    ceftaroline fosamil (TEFLARO) IVPB  600 mg IntraVENous Q12H    carvedilol  25 mg Oral BID WC    Vitamin D  2,000 Units Oral Daily    sodium chloride flush  5-40 mL IntraVENous 2 times per day    ARIPiprazole  5 mg Oral Daily    atorvastatin  40 mg Oral Daily    DULoxetine  60 mg Oral Daily    ferrous sulfate  325 mg Oral BID WC    ipratropium-albuterol  1 ampule Inhalation Q4H WA       Social History:     Social History     Socioeconomic History    Marital status:      Spouse name: Not on file    Number of children: Not on file    Years of education: Not on file    Highest education level: Not on file   Occupational History    Occupation: disability   Tobacco Use    Smoking status: Every Day     Packs/day: 0.50     Years: 33.00     Pack years: 16.50     Types: Cigarettes    Smokeless tobacco: Never    Tobacco comments:     quit  2000 started again  1/13    Substance and Sexual Activity    Alcohol use: Yes     Comment: occasional    Drug use: Not Currently    Sexual activity: Yes   Other Topics Concern    Not on file   Social History Narrative    Not on file     Social Determinants of Health     Financial Resource Strain: Not on file   Food Insecurity: Not on file   Transportation Needs: Not on file   Physical Activity: Not on file   Stress: Not on file   Social Connections: Not on file   Intimate Partner Violence: Not on file   Housing Stability: Not on file       Family History:     Family History   Problem Relation Age of Onset    Diabetes Mother     Cancer Mother     Coronary Art Dis Father     COPD Father     Depression Brother     Alcohol Abuse Brother     Cancer Other         lung and skin    Diabetes Maternal Grandmother     Cancer Paternal Grandmother       Medical Decision Making:   I have independently reviewed/ordered the following labs:    CBC with Differential:   Recent Labs     10/28/22  0541 10/30/22  0617   WBC 6.1 5.8   HGB 8.7* 7.9*   HCT 27.5* 24.8*   PLT 39* 40*   LYMPHOPCT 10* 16*   MONOPCT 9* 9*         BMP:  Recent Labs     10/28/22  0541      K 3.8      CO2 29   BUN 15   CREATININE 0.86       Hepatic Function Panel: No results for input(s): PROT, LABALBU, BILIDIR, IBILI, BILITOT, ALKPHOS, ALT, AST in the last 72 hours. No results for input(s): RPR in the last 72 hours. No results for input(s): HIV in the last 72 hours. No results for input(s): BC in the last 72 hours. Lab Results   Component Value Date/Time    CREATININE 0.86 10/28/2022 05:41 AM    GLUCOSE 78 10/28/2022 05:41 AM       Detailed results: Thank you for allowing us to participate in the care of this patient. Please call with questions.     This note is created with the assistance of a speech recognition program.  While intending to generate adocument that actually reflects the content of the visit, the document can still have some errors including those of syntax and sound a like substitutions which may escape proof reading. It such instances, actual meaningcan be extrapolated by contextual diversion.         Eleazar Barry MD   Office/Perfect Serve:   [phone]

## 2022-10-30 NOTE — PROGRESS NOTES
Pulmonary Progress Note  [facility]      Patient - Kameron Chacon,  Age - 46 y.o.    - 1970      Room Number - 2109/2109-01   N -  [id number]   [facility]  Date of Admission -  10/10/2022  4:37 AM    Consulting Gracie Lewis MD  Primary Care Physician - Tiffany Torres MD     SUBJECTIVE   Patient appears to be about the same    OBJECTIVE   VITALS    height is 4' 11\" (1.499 m) and weight is 115 lb 15.4 oz (52.6 kg). Her axillary temperature is 98.1 °F (36.7 °C). Her blood pressure is 114/71 and her pulse is 65. Her respiration is 16 and oxygen saturation is 94%. Body mass index is 23.42 kg/m². Temperature Range: Temp: 98.1 °F (36.7 °C) Temp  Av.9 °F (36.6 °C)  Min: 97.3 °F (36.3 °C)  Max: 98.5 °F (36.9 °C)  BP Range:  Systolic (40JSG), QPE:738 , Min:108 , JOHANNA:885     Diastolic (23QRQ), ZTP:71, Min:58, Max:87    Pulse Range: Pulse  Av.8  Min: 63  Max: 70  Respiration Range: Resp  Av.1  Min: 16  Max: 18  Current Pulse Ox[de-identified]  SpO2: 94 %  24HR Pulse Ox Range:  SpO2  Av.1 %  Min: 92 %  Max: 100 %  Oxygen Amount and Delivery: O2 Flow Rate (L/min): 0 L/min    Wt Readings from Last 3 Encounters:   10/30/22 115 lb 15.4 oz (52.6 kg)   22 96 lb 8 oz (43.8 kg)   22 105 lb (47.6 kg)       I/O (24 Hours)    Intake/Output Summary (Last 24 hours) at 10/30/2022 1322  Last data filed at 10/30/2022 0447  Gross per 24 hour   Intake 290 ml   Output 5 ml   Net 285 ml       EXAM     General Appearance  Awake, alert, oriented, in no acute distress  HEENT - normocephalic, atraumatic. Neck - Supple,  trachea midline   Lungs -breath sounds anterior and laterally   Heart Exam:PMI normal. No lifts, heaves, or thrills. RRR. No murmurs, clicks, gallops, or rubs  Abdomen Exam: Abdomen soft, non-tender. BS normal.   Extremity Exam: No signs of cyanosis.     MEDS      divalproex  1,000 mg Oral BID    ceftaroline fosamil (TEFLARO) IVPB  600 mg IntraVENous Q12H    carvedilol  25 mg Oral BID     Vitamin D  2,000 Units Oral Daily    sodium chloride flush  5-40 mL IntraVENous 2 times per day    ARIPiprazole  5 mg Oral Daily    atorvastatin  40 mg Oral Daily    DULoxetine  60 mg Oral Daily    ferrous sulfate  325 mg Oral BID     ipratropium-albuterol  1 ampule Inhalation Q4H WA      sodium chloride      sodium chloride 50 mL/hr at 10/18/22 1200     sodium chloride, sodium chloride flush, labetalol, potassium chloride **OR** potassium alternative oral replacement **OR** potassium chloride, hydrALAZINE, sodium chloride flush, sodium chloride, acetaminophen, ondansetron **OR** ondansetron, sodium chloride flush, perflutren lipid microspheres    LABS   CBC   Recent Labs     10/30/22  0617   WBC 5.8   HGB 7.9*   HCT 24.8*   MCV 83.0   PLT 40*     BMP:   Lab Results   Component Value Date/Time     10/28/2022 05:41 AM    K 3.8 10/28/2022 05:41 AM     10/28/2022 05:41 AM    CO2 29 10/28/2022 05:41 AM    BUN 15 10/28/2022 05:41 AM    LABALBU 1.7 10/22/2022 04:15 PM    CREATININE 0.86 10/28/2022 05:41 AM    CALCIUM 7.6 10/28/2022 05:41 AM    GFRAA >60 04/08/2022 07:17 AM    LABGLOM >60 10/28/2022 05:41 AM     ABGs:  Lab Results   Component Value Date/Time    PHART 7.452 10/22/2022 03:24 PM    PO2ART 86.7 10/22/2022 03:24 PM    GMX2IOQ 42.7 10/22/2022 03:24 PM      Lab Results   Component Value Date/Time    MODE PRVC 12/28/2016 04:36 AM     Ionized Calcium:  No results found for: IONCA  Magnesium:    Lab Results   Component Value Date/Time    MG 2.2 10/22/2022 04:15 PM     Phosphorus:    Lab Results   Component Value Date/Time    PHOS 3.6 10/18/2022 04:50 AM        LIVER PROFILE No results for input(s): AST, ALT, LIPASE, BILIDIR, BILITOT, ALKPHOS in the last 72 hours. Invalid input(s): AMYLASE,  ALB  INR No results for input(s): INR in the last 72 hours.   PTT   Lab Results   Component Value Date    APTT 30.6 10/26/2022 RADIOLOGY     (See actual reports for details)    ASSESSMENT/PLAN     Patient Active Problem List   Diagnosis    GERD (gastroesophageal reflux disease)    Hoarseness of voice    MVP (mitral valve prolapse)    Depression with suicidal ideation    Lymphocytic colitis    History of migraine headaches    Hyperlipidemia    Sleep apnea    IGT (impaired glucose tolerance)    History of renal calculi    Lactose intolerance    History of seizure disorder    Leukopenia    Bipolar disorder, mixed ([facility] [address]    Illicit drug use    Postlaminectomy syndrome, cervical region    Degeneration of cervical intervertebral disc    Encounter for long-term (current) use of other medications    Bradycardia    Cellulitis    Hypokalemia    Primary hypertension    Elevated lipase    Right lower quadrant abdominal pain    Nausea    Diarrhea    Acute encephalopathy    Polysubstance abuse (HCC)    Fibromyalgia    Cocaine addiction (HCC)    Shoulder arthritis    Osteoarthritis of left glenohumeral joint    Abnormal EKG    Anemia    Anxiety    Carotid artery stenosis    COPD (chronic obstructive pulmonary disease) ([facility] [address]    Dental disease    HUTCHINSON (dyspnea on exertion)    Fatigue    Fractures    H/O gastric bypass    History of crack cocaine use    History of UTI    Injury of back    Intractable chronic migraine without aura and without status migrainosus    Intractable migraine with aura with status migrainosus    Kidney stones    Dizziness    Memory loss    Mild pulmonary hypertension (HCC)    Mild tricuspid regurgitation    Primary osteoarthritis of left shoulder    Seizure-like activity (HCC)    Stress at home    TMJ (dislocation of temporomandibular joint)    Tobacco abuse    Visual impairment    Internal hernia    Vitamin D deficiency    Iron deficiency    SIRS (systemic inflammatory response syndrome) (HCC)    NICKI (acute kidney injury) (HCC)    Severe malnutrition (HCC)    Elevated d-dimer    Leukocytosis    Pulmonary nodules    Delirium due to another medical condition    Acute metabolic encephalopathy    MRSA bacteremia    Acute septic pulmonary embolism (HCC)    Cavitating mass of lung    Allergy to antibiotic    Thrombocytopenia (HCC)     MRSA bacteremia  Septic emboli  E. coli UTI  Acute kidney injury, resolved  Suspect COPD, seen once in the office by Dr. Rosetta Espinoza in 2019, FEV1 2.04 L or 86% predicted  Parapneumonic effusion, pleural fluid positive for staph  History of opiate and cocaine use-reportedly uses crack cocaine at least twice weekly  History of tobacco use  Recent COVID +9/30  Elevated troponin likely type II MI  Encephalopathy, improving  History of hypertension  History of seizure disorder  Thrombocytopenia. Platelet count 22J  Full code      On EPC cuffs, still thrombocytopenic with platelet count of 54W. Following hemoglobin, currently 7.9  Will discharge with plan for CT review of removing pleural catheter. Fearful that things may reaccumulate given the patient's fatigue.     Long-term prognosis very guarded  Electronically signed by Dakota Li MD on 10/30/2022 at 1:22 PM

## 2022-10-30 NOTE — PROGRESS NOTES
Today's Date: 10/30/2022  Patient Name: Tucker Olivera  Date of admission: 10/10/2022  4:37 AM  Patient's age: 46 y. o., 1970  Admission Dx: Pulmonary nodules [R91.8]  SIRS (systemic inflammatory response syndrome) ([facility]) [R65.10]  NICKI (acute kidney injury) ([facility] [address] [address] [N17.9]  Elevated d-dimer [R79.89]  Leukocytosis, unspecified type [O81.565]    Reason for Consult: management recommendations  Requesting Physician: Nathan Goncalves MD    CHIEF COMPLAINT: Thrombocytopenia    History Obtained From:  father, electronic medical record. Patient very lethargic not able to give any meaningful history      Interval history :    Pt seen and examined  Labs and vitlas reviewed  The patient is lethargic and complaining of severe pruritus in both upper extremities. There are no active sores. Overall condition seems to be improving. Pulmonary and infectious disease input appreciated  Currently on Teflaro and the plan is to continue that for 6 more weeks. Blood counts have been fairly stable platelets but hemoglobin is slowly dropping. I do not see any evidence of active bleeding. HISTORY OF PRESENT ILLNESS:      The patient is a 46 y.o.  female who is admitted to the hospital with chief complaint of shortness of breath. Patient was hospitalized on 10/10/2022. During the hospitalization course patient has been diagnosed with MRSA bacteremia. Clinical picture suggestive of infective endocarditis with bilateral pulmonary nodules with cavitation. Patient also has history of cocaine abuse. Patient was already treated with cefepime and then switched to vancomycin. Patient noted to have progressive cytopenias especially platelet count of 66,737. Subsequently vancomycin has been discontinued and patient has been started on Teflaro. Patient lab work-up from today shows hemoglobin of 6.8. Count of 26,000. Platelet count has been gradually dropping. Hemoglobin also has been gradually dropping.   Iron studies from 2022 shows iron deficiency. Patient's INR on 10/18 was 1.2. Immunofixation studies from 10/11/2022 were negative. Excellent patient's hemoglobin at presentation on 10/10 was 14.9 and platelet count was 310. Patient has history of gastric bypass  Patient lethargic not able to give any meaningful history    Past Medical History:   has a past medical history of Anemia, Arthritis, Asthma, Bipolar disorder, mixed ([address] Carotid artery stenosis, Cocaine abuse ([address] COPD (chronic obstructive pulmonary disease) ([address] Degeneration of cervical intervertebral disc, Depression with anxiety, Depression with anxiety, Fibromyalgia, GERD (gastroesophageal reflux disease), History of migraine headaches, History of renal calculi, History of seizure disorder, Hoarseness of voice, HTN (hypertension), Hyperlipidemia, IGT (impaired glucose tolerance), Kidney stones, Lactose intolerance, Leukopenia, Major depressive disorder, recurrent episode, severe, without mention of psychotic behavior, MVP (mitral valve prolapse), Seizures ([address] Sleep apnea, and Unspecified diseases of blood and blood-forming organs. Past Surgical History:   has a past surgical history that includes Gastric bypass surgery; Cholecystectomy; Hysterectomy; Appendectomy; Hysterectomy; Tonsillectomy and adenoidectomy; Ankle fracture surgery; Neck surgery; shoulder surgery;  section; Carpal tunnel release; Colonoscopy (12); Upper gastrointestinal endoscopy (7-3-12); Gastric bypass surgery; Breast lumpectomy (Right); Cardiac catheterization; Upper gastrointestinal endoscopy (2016); Colonoscopy (2016); Upper gastrointestinal endoscopy (2019); Colonoscopy (2019); Shoulder arthroscopy (Left, 2019); other surgical history (10/24/2019); laparoscopy (N/A, 2022); transesophageal echocardiogram (N/A, 10/19/2022); IR GUIDED PERC PLEURAL DRAIN W CATH INSERT (10/21/2022);  IR GUIDED PERC PLEURAL DRAIN W CATH INSERT (10/21/2022); and IR GUIDED PERC PLEURAL DRAIN W CATH INSERT (10/24/2022). Medications:    Prior to Admission medications    Medication Sig Start Date End Date Taking? Authorizing Provider   lisinopril (PRINIVIL;ZESTRIL) 20 MG tablet Take 20 mg by mouth daily   Yes Historical Provider, MD   carvedilol (COREG) 25 MG tablet TAKE 1 TABLET BY MOUTH IN THE MORNING AND 1 IN THE EVENING WITH MEALS 9/19/22   Corona Reid MD   traZODone (DESYREL) 100 MG tablet TAKE 1 TABLET BY MOUTH AT BEDTIME 6/13/22   Historical Provider, MD   gabapentin (NEURONTIN) 300 MG capsule TAKE 1 CAPSULE BY MOUTH THREE TIMES DAILY 5/24/22 8/9/22  Corona Reid MD   hydrOXYzine (VISTARIL) 25 MG capsule  9/15/20   Historical Provider, MD   DULoxetine (CYMBALTA) 30 MG extended release capsule Take 1 capsule by mouth daily  Patient taking differently: Take 60 mg by mouth in the morning.  4/8/19   KELLY Smith - CNP   ARIPiprazole (ABILIFY) 5 MG tablet Take 5 mg by mouth daily    Historical Provider, MD     Current Facility-Administered Medications   Medication Dose Route Frequency Provider Last Rate Last Admin    divalproex (DEPAKOTE) DR tablet 1,000 mg  1,000 mg Oral BID Frandy Lele, DO   1,000 mg at 10/30/22 1007    ceftaroline fosamil (TEFLARO) 600 mg in sodium chloride 0.9 % 50 mL IVPB  600 mg IntraVENous Q12H Evelin Loja MD 50 mL/hr at 10/30/22 1014 600 mg at 10/30/22 1014    0.9 % sodium chloride infusion   IntraVENous PRN Corona Reid MD        sodium chloride flush 0.9 % injection 10 mL  10 mL IntraVENous PRN Corona Reid MD   10 mL at 10/20/22 1638    carvedilol (COREG) tablet 25 mg  25 mg Oral BID  Frandy Lele, DO   25 mg at 10/30/22 1007    labetalol (NORMODYNE;TRANDATE) injection 20 mg  20 mg IntraVENous BID PRN Frandy Lele, DO   20 mg at 10/29/22 1121    potassium chloride (KLOR-CON M) extended release tablet 40 mEq  40 mEq Oral PRN Frandy Lele, DO   40 mEq at 10/25/22 0849    Or    potassium bicarb-citric acid (EFFER-K) effervescent tablet 40 mEq  40 mEq Oral PRN Frandy Lele, DO   40 mEq at 10/14/22 2031    Or    potassium chloride 10 mEq/100 mL IVPB (Peripheral Line)  10 mEq IntraVENous PRN Frandy Lele,  mL/hr at 10/23/22 0258 10 mEq at 10/23/22 0258    Vitamin D (CHOLECALCIFEROL) tablet 2,000 Units  2,000 Units Oral Daily Frandy Lele, DO   2,000 Units at 10/30/22 1007    hydrALAZINE (APRESOLINE) injection 10 mg  10 mg IntraVENous Q6H PRN Frandy Lele, DO   10 mg at 10/30/22 0617    sodium chloride flush 0.9 % injection 5-40 mL  5-40 mL IntraVENous 2 times per day Frandy Lele, DO   10 mL at 10/30/22 1008    sodium chloride flush 0.9 % injection 5-40 mL  5-40 mL IntraVENous PRN Frandy Lele, DO        0.9 % sodium chloride infusion   IntraVENous PRN Frandy Lele, DO 50 mL/hr at 10/18/22 1200 New Bag at 10/18/22 1200    acetaminophen (TYLENOL) tablet 650 mg  650 mg Oral Q4H PRN Frandy Lele, DO   650 mg at 10/14/22 2031    ondansetron (ZOFRAN-ODT) disintegrating tablet 4 mg  4 mg Oral Q8H PRN Frandy Llee, DO        Or    ondansetron (ZOFRAN) injection 4 mg  4 mg IntraVENous Q6H PRN Frandy Lele, DO        ARIPiprazole (ABILIFY) tablet 5 mg  5 mg Oral Daily Frandy Lele, DO   5 mg at 10/30/22 1007    atorvastatin (LIPITOR) tablet 40 mg  40 mg Oral Daily Frandy Lele, DO   40 mg at 10/29/22 2117    DULoxetine (CYMBALTA) extended release capsule 60 mg  60 mg Oral Daily Frandy Lele, DO   60 mg at 10/30/22 1010    ferrous sulfate (IRON 325) tablet 325 mg  325 mg Oral BID WC Frandy Lele, DO   325 mg at 10/30/22 1007    sodium chloride flush 0.9 % injection 10 mL  10 mL IntraVENous PRN Frandy Lele, DO   10 mL at 10/10/22 1427    ipratropium-albuterol (DUONEB) nebulizer solution 1 ampule  1 ampule Inhalation Q4H WA Frandy Royal DO   1 ampule at 10/30/22 0830    perflutren lipid microspheres (DEFINITY) injection 1.65 mg  1.5 mL IntraVENous ONCE PRN Frandy Royal DO           Allergies:  Aspirin, Bactrim, and Codeine    Social History:   reports that she has been smoking cigarettes. She has a 16.50 pack-year smoking history. She has never used smokeless tobacco. She reports current alcohol use. She reports that she does not currently use drugs. Family History: family history includes Alcohol Abuse in her brother; COPD in her father; Cancer in her mother, paternal grandmother, and another family member; Coronary Art Dis in her father; Depression in her brother; Diabetes in her maternal grandmother and mother. REVIEW OF SYSTEMS:      Patient sleepy and unable to give me full history. We will try again tomorrow. PHYSICAL EXAM:        BP (!) 142/58   Pulse 70   Temp 98 °F (36.7 °C) (Axillary)   Resp 16   Ht 4' 11\" (1.499 m)   Wt 115 lb 15.4 oz (52.6 kg)   SpO2 92%   BMI 23.42 kg/m²    Temp (24hrs), Av.9 °F (36.6 °C), Min:97.3 °F (36.3 °C), Max:98.5 °F (36.9 °C)      General appearance -ill-appearing  Mental status -lethargic  Eyes - pupils equal and reactive, extraocular eye movements intact   Ears - bilateral TM's and external ear canals normal   Mouth - mucous membranes moist, pharynx normal without lesions   Neck - supple, no significant adenopathy   Lymphatics - no palpable lymphadenopathy, no hepatosplenomegaly   Chest -decreased breathing sounds bilaterally. Heart - normal rate, regular rhythm, normal S1, S2, no murmurs  Abdomen - soft, nontender, nondistended, no masses or organomegaly   Neurological -very lethargic.   Cannot carry out a conversation  Musculoskeletal - no joint tenderness, deformity or swelling   Extremities - peripheral pulses normal, no pedal edema, no clubbing or cyanosis   Skin - normal coloration and turgor, no rashes, no suspicious skin lesions noted ,      DATA:      Labs:      Latest Reference Range & Units 10/26/22 13:38   WBC 3.5 - 11.0 k/uL 4.1   RBC 4.0 - 5.2 m/uL 2.69 (L)   Hemoglobin Quant 12.0 - 16.0 g/dL 6.8 (LL)   Hematocrit 36 - 46 % 22.2 (L)   MCV 80 - 100 fL 82.5   MCH 26 - 34 pg 25.5 (L)   MCHC 31 - 37 g/dL 30.9 (L)   MPV 6.0 - 12.0 fL 7.4   RDW 11.5 - 14.9 % 17.0 (H)   Platelet Count 771 - 450 k/uL 26 (L)   Absolute Mono # 0.1 - 1.3 k/uL 0.21   (LL): Data is critically low  (L): Data is abnormally low  (H): Data is abnormally high        IMAGING DATA:    CT ABDOMEN PELVIS WO CONTRAST Additional Contrast? None    Result Date: 10/10/2022  EXAMINATION: CT OF THE ABDOMEN AND PELVIS WITHOUT CONTRAST 10/10/2022 2:37 pm TECHNIQUE: CT of the abdomen and pelvis was performed without the administration of intravenous contrast. Multiplanar reformatted images are provided for review. Automated exposure control, iterative reconstruction, and/or weight based adjustment of the mA/kV was utilized to reduce the radiation dose to as low as reasonably achievable. COMPARISON: 01/28/2022 HISTORY: ORDERING SYSTEM PROVIDED HISTORY: abdominal pain TECHNOLOGIST PROVIDED HISTORY: abdominal pain Is the patient pregnant?->No Reason for Exam: abd pain FINDINGS: LOWER CHEST: Extensive pulmonary in nodules with cavitation and focal consolidative changes throughout the visualized bilateral lower lobes are highly concerning for septic emboli within the lungs. KIDNEYS AND URINARY TRACT: Bilateral 2-4 mm nonobstructing kidney stones are visualized. . There is fluid density adjacent to the inferior pole of right kidney measuring 34 x 14 mm. This likely represents extrarenal pelvis. There is no evidence for hydronephrosis. The ureters are of normal course and caliber. ORGANS: Visualized portions of the liver, spleen, pancreas, gallbladder, and adrenal glands demonstrate no acute abnormality. GI/BOWEL: No bowel obstruction. No evidence of acute appendicitis. Status post gastric bypass. PELVIS: The bladder and pelvic organs are unremarkable. PERITONEUM/RETROPERITONEUM: No free air or free fluid is noted. No pathologically enlarged lymphadenopathy. The vasculature do not demonstrate acute abnormality.  BONES/SOFT TISSUES: The osseous structures demonstrate no acute abnormality. Lack of contrast administration significantly decreases sensitivity of the study for evaluation of parenchymal lesions and  vascular pathologies. Extensive pulmonary nodules with cavitation and focal consolidative changes throughout the visualized bilateral lower lobes are highly concerning for septic emboli within the lungs. For further evaluation contrast enhanced chest CT can be obtained. There is fluid density adjacent to the inferior pole of right kidney, likely extrarenal pelvis. For further evaluation post-contrast images of the abdomen is recommended. Status post gastric bypass. Bilateral 2-4 mm nonobstructing kidney stones are visualized. . The findings were sent to the Radiology Results [id number] at 5:11 pm on 10/10/2022 to be communicated to a licensed caregiver. XR CHEST (SINGLE VIEW FRONTAL)    Result Date: 10/23/2022  EXAMINATION: ONE XRAY VIEW OF THE CHEST. ONE PORTABLE AP VIEW OF CHEST. 10/23/2022 12:41 am COMPARISON: Portable chest x-ray on 10/22/2022 at 0938 hours. HISTORY: ORDERING SYSTEM PROVIDED HISTORY: L chest tube out TECHNOLOGIST PROVIDED HISTORY: L chest tube out Reason for Exam: L chest tube out FINDINGS: In right lower lung, there are heterogeneous dense opacities, indicating pneumonia and atelectatic changes which are increased as compared to previous chest x-ray. At the right CP angle area, there is confluent density indicating pleural effusion versus significant pleural thickening or atelectatic changes or infiltrates. In the right mid and upper lung fields, there are mild heterogeneous dense opacities indicating scattered infiltrates and atelectatic changes, similar to previous study. At the left lung base, there are mild atelectatic changes with or without mild infiltrates, similar to previous study.  In the lateral portion of left upper lung field, there is focal density measuring 3 cm x 2.4 cm which may be due to focal infiltrates or mass lesion, unchanged as compared to previous study. Previous noted left basilar pleural tube has been withdrawn. There appears to be a pleural tube in the right lateral CP angle area similar to previous chest x-ray. Superior to the right pleural drainage tube there is significant density which may be due to loculated pleural effusion and with combination of infiltrates and atelectasis. No pneumothorax. Mild cardiomegaly. Minimal-to-mild pulmonary vascular congestion. There is no overt pulmonary edema. No pneumoperitoneum. The right basilar opacity due to combination of pleural effusion or pleural thickening and pulmonary infiltrates and atelectasis appears to be increased to some extent as compared to previous chest x-ray. In the rest of both lungs, the infiltrates are unchanged. Mild-to-moderate pulmonary vascular congestion without obvious pulmonary edema. XR CHEST (SINGLE VIEW FRONTAL)    Result Date: 10/21/2022  EXAMINATION: ONE XRAY VIEW OF THE CHEST 10/21/2022 1:37 pm COMPARISON: 10/20/2022 HISTORY: ORDERING SYSTEM PROVIDED HISTORY: s/p bilateral chest tube placemetnt in IR TECHNOLOGIST PROVIDED HISTORY: s/p bilateral chest tube placemetnt in IR Reason for Exam: Bilateral chest tube placement FINDINGS: Interval placement of bilateral pleural pigtail catheters projecting over the lower chest on each side. Right arm PICC line tip is at the SVC/RA junction region. Left shoulder prosthesis. Partially visualized fusion hardware cervical spine. No significant pneumothorax is seen. Stable cardiomediastinal silhouette. Cholecystectomy clips. Bilateral parenchymal nodules/masses with cavitation, better shown on the recent CT, and suspicious for septic emboli. Patchy right perihilar and left lower lobe atelectasis/airspace disease. Interval placement of bilateral pleural pigtail catheters.      XR LUMBAR SPINE (2-3 VIEWS)    Result Date: 10/12/2022  EXAMINATION: XRAY VIEWS OF THE LUMBAR SPINE 10/12/2022 2:06 pm COMPARISON: None. HISTORY: ORDERING SYSTEM PROVIDED HISTORY: back pain TECHNOLOGIST PROVIDED HISTORY: back pain Reason for Exam: back pain FINDINGS: No compression fracture or subluxation. Disc spaces maintained. Pedicles are intact. Diffuse calcification nondilated abdominal aorta. Unremarkable lumbar spine examination. XR ABDOMEN (KUB) (SINGLE AP VIEW)    Result Date: 10/12/2022  EXAMINATION: ONE SUPINE XRAY VIEW(S) OF THE ABDOMEN 10/12/2022 2:06 pm COMPARISON: 02/01/2022 HISTORY: ORDERING SYSTEM PROVIDED HISTORY: abd pain TECHNOLOGIST PROVIDED HISTORY: abd pain Reason for Exam: abd pain FINDINGS: Mildly dilated colon and several small bowel loops. Residual stool in colon. No urinary tract calcifications. Osseous structures unremarkable. Findings consistent with mild diffuse ileus. No bowel obstruction or urinary tract calcifications. CT CHEST WO CONTRAST    Result Date: 10/21/2022  EXAMINATION: CT OF THE CHEST WITHOUT CONTRAST 10/21/2022 8:50 am TECHNIQUE: CT of the chest was performed without the administration of intravenous contrast. Multiplanar reformatted images are provided for review. Automated exposure control, iterative reconstruction, and/or weight based adjustment of the mA/kV was utilized to reduce the radiation dose to as low as reasonably achievable. COMPARISON: 10/20/2022, 10/17/2022 HISTORY: ORDERING SYSTEM PROVIDED HISTORY: diagonsis of pleural space infection ---- possible procedure pending results TECHNOLOGIST PROVIDED HISTORY: diagonsis of pleural space infection ---- possible procedure pending results Is the patient pregnant?->No Reason for Exam: order states pleural space infection, pt poor historian FINDINGS: Mediastinum: Interval placement of a right arm PICC line with its tip in the proximal right atrium. Mild calcific plaque of the thoracic aorta. Ascending aorta measures up to 3.7 cm. Coronary vascular calcifications.  Similar small mediastinal lymph nodes without bulky adenopathy. Lungs/pleura: Again shown are multiple generally similar bilateral masslike densities, some with cavitation. Again findings are suggestive of septic emboli. Overall size and distribution are similar to the previous study from yesterday. Similar small bilateral pleural effusions which appear partially loculated. There is mild respiratory motion artifact. Mild lower lobe consolidation with air bronchograms, greater on the left. Subtle ground-glass changes in the lingula and left lower lobe likely inflammatory/infectious. Upper Abdomen: No acute findings. Previous cholecystectomy. Postoperative changes of the stomach. Soft Tissues/Bones: Degenerative changes and previous ACDF lower cervical spine. Left shoulder prosthesis. Overall relatively stable exam.  Small pleural effusions which appear partially loculated with multiple bilateral masses, many with cavitation, suggesting septic emboli. CT CHEST WO CONTRAST  s normal in size. There is no pericardial effusion. Thoracic aorta and pulmonary arteries are grossly normal in caliber. There is calcified plaque at the aortic arch. Moderate coronary artery calcification. There is a mildly enlarged pretracheal lymph node, measuring 1 cm in short axis dimension. No other visible mediastinal lymphadenopathy. Lungs/pleura: There is a small amount of mildly loculated pleural fluid in the right hemithorax. Small left pleural effusion is noted as well, less loculated in appearance. There are numerous nodular opacities throughout both lungs, increased from 10/10/2022. Many (but not all) of the nodules are cavitary. There is focal consolidation with cavitation in the superior segment right lower lobe. Focal consolidation is also noted at the left base. Largest nodules are on the right, measuring up to 2.9 cm. No pneumothorax. Upper Abdomen: No acute findings in the limited visualized upper abdomen. Soft Tissues/Bones: Left shoulder arthroplasty. No acute or suspicious osseous abnormality. Limited, unremarkable renal ultrasound. No hydronephrosis. RECOMMENDATIONS: Unavailable     IR FLUORO GUIDED CVA DEVICE PLMT/REPLACE/REMOVAL    Result Date: 10/20/2022  PROCEDURE: ULTRASOUND GUIDED VASCULAR ACCESS. FLUOROSCOPY GUIDED PICC PLACEMENT 10/20/2022. HISTORY: ORDERING SYSTEM PROVIDED HISTORY: IV Abx TECHNOLOGIST PROVIDED HISTORY: IV Abx Is the patient pregnant?->No SEDATION: None FLUOROSCOPY DOSE AND TYPE OR TIME AND EXPOSURES: 12 seconds; D AP 10 cGy cm2 TECHNIQUE: Informed consent was obtained from the patient's family member after a detailed explanation of the procedure including risks, benefits, and alternatives. Universal protocol was observed. The right arm was prepped and draped in sterile fashion using maximum sterile barrier technique. Local anesthesia was achieved with lidocaine. A micropuncture needle was used to access the right basilic vein using ultrasound guidance. An ultrasound image demonstrating patency of the vein with needle tip located within it. An image was obtained and stored in PACs. A 0.018 guidewire was used to place a peel-a-way sheath and a 5 Bulgarian dual-lumen PICC was advanced with fluoroscopic guidance with the tip at the cavo-atrial junction. Fluoroscopy shows right lung masses/cavitary lesions better shown on recent chest CT. Fusion hardware lower cervical spine. The catheter flushed easily and there was a good blood return. The catheter was secured to the skin. The patient tolerated the procedure well and there were no immediate complications. EBL: Less than 3 mL FINDINGS: Fluoroscopic image demonstrates the tip of the catheter at the cavo-atrial junction.      Successful ultrasound and fluoroscopy guided PICC placement     XR CHEST PORTABLE    Result Date: 10/25/2022  EXAMINATION: ONE XRAY VIEW OF THE CHEST 10/25/2022 6:12 pm COMPARISON: 10/24/2022 HISTORY: ORDERING SYSTEM PROVIDED HISTORY: Patient with right-sided chest tube. TECHNOLOGIST PROVIDED HISTORY: Patient with right-sided chest tube. Reason for Exam: Rt. sided chest tube Follow-up exam FINDINGS: Right pleural catheter is noted. No significant interval change in bilateral airspace opacities. Right PICC line is stable in positioning. Orthopedic hardware is stable in appearance in the cervical spine left shoulder. No obvious pneumothorax. Stable cardiac silhouette. The left costophrenic angle sharp. Interval placement of a right pleural catheter. No obvious pneumothorax. No significant interval change in bilateral airspace opacities. XR CHEST PORTABLE    Result Date: 10/24/2022  EXAMINATION: ONE XRAY VIEW OF THE CHEST 10/24/2022 6:27 am COMPARISON: 10/23/2022 HISTORY: ORDERING SYSTEM PROVIDED HISTORY: Follow-up empyema; pleural catheters fell out TECHNOLOGIST PROVIDED HISTORY: Follow-up empyema; pleural catheters fell out Reason for Exam: post chest tube removal FINDINGS: Pleural pigtail catheter is no longer visualized. Left shoulder prosthesis and fusion hardware cervical spine. Right arm PICC line tip is in the right atrium. Stable cardiomediastinal silhouette. There is improved aeration of the right lung base. No significant pneumothorax is seen. No large pleural effusions within limits of the exam.  There are nodular parenchymal opacities in both lungs, some with cavitation, compatible with a septic emboli and better shown on the prior CT. Cholecystectomy clips. Improved aeration of the right lung base since the prior exam.     XR CHEST PORTABLE    Result Date: 10/22/2022  EXAMINATION: ONE XRAY VIEW OF THE CHEST 10/22/2022 10:19 am COMPARISON: 10/21/2022 at 13:37 HISTORY: ORDERING SYSTEM PROVIDED HISTORY: Pigtail position TECHNOLOGIST PROVIDED HISTORY: Pigtail position Reason for Exam: Pigtail position FINDINGS: Right PICC line terminating right atrium remains in place.   Pigtail catheter along left lung base with the tip overlying the hemidiaphragm. Right pigtail pleural catheter has pulled back into a more peripheral position now called in region of costophrenic angle whereas previously it was more central. Normal cardiomediastinal silhouette. Mild interstitial prominence. Stable scattered moderate size nodular opacities in the lungs right greater than left. Patchy infiltrates at the lung bases probably unchanged allowing for difference in inspiration. Small right pleural effusion is new. Anterior fusion lower cervical spine and left total shoulder prosthesis. 1. Right pigtail pleural catheter has pulled back into the more peripheral position projecting at costophrenic angle whereas previously it was small central.  Left pigtail catheter continues to lie along the lung base. 2. Right PICC line unchanged. 3. Small right pleural effusion appears to be new but patchy bibasilar atelectasis unchanged. Diffuse nodular opacities also unchanged. XR CHEST PORTABLE    Result Date: 10/20/2022  EXAMINATION: ONE XRAY VIEW OF THE CHEST 10/20/2022 7:13 am COMPARISON: Chest x-ray dated 18 October 2022 HISTORY: ORDERING SYSTEM PROVIDED HISTORY: Septic emboli reevaluate pleural effusion on left TECHNOLOGIST PROVIDED HISTORY: Septic emboli reevaluate pleural effusion on left Reason for Exam: hx. of pleural effusion. FINDINGS: Similar appearance of bilateral nodular airspace opacities. Trace left pleural effusion. No pneumothorax. Stable cardiomediastinal silhouette     1. Stable appearance of bilateral nodular opacities in both lungs suspicious for underlying septic emboli better seen on the chest CT dated 17 October 2022 2. Trace left pleural effusion     XR CHEST PORTABLE    Result Date: 10/18/2022  EXAMINATION: ONE XRAY VIEW OF THE CHEST 10/18/2022 6:24 pm COMPARISON: Chest radiograph dated 10/11/2022. Chest CT dated 10/17/2022.  HISTORY: ORDERING SYSTEM PROVIDED HISTORY: Empyema, s/p thoracentesis TECHNOLOGIST PROVIDED HISTORY: Empyema, s/p thoracentesis Reason for Exam: S/p thoracentesis FINDINGS: The cardiac silhouette appears within normal limits. Previously seen left-sided pleural effusion appears to have decreased in size. There is a small left and trace right pleural effusion. There is redemonstration of multiple bilateral nodular opacity of the both lungs, suspicious for underlying septic emboli better seen and evaluated on the chest CT dated 10/17/2022. Components of left reverse total shoulder arthroplasty and changes of prior orthopedic fixation/fusion of the cervicothoracic spine are partially visualized. There are cholecystectomy clips overlying the right upper abdomen. Interval decrease in size of left-sided pleural effusion. Small left and trace right pleural effusion. Redemonstration of bilateral nodular opacities of the both lungs, suspicious for underlying septic emboli better seen and evaluated on the chest CT dated 10/17/2022. XR CHEST PORTABLE    Result Date: 10/11/2022  EXAMINATION: ONE XRAY VIEW OF THE CHEST 10/11/2022 6:35 am COMPARISON: 10/10/2022 HISTORY: ORDERING SYSTEM PROVIDED HISTORY: line placement TECHNOLOGIST PROVIDED HISTORY: line placement Reason for Exam: R sided IJ central line placement FINDINGS: Right IJ central line terminating at cavoatrial junction new from prior. Normal cardiomediastinal silhouette. Small patchy opacities left lung base reflecting subsegmental atelectasis. Scattered patchy opacities with a somewhat nodular appearance noted in the periphery of the lungs more pronounced on the right. This can be further assessed with CT. No change from prior. No pleural effusion or pneumothorax. Left shoulder prosthesis and cervical spine fusion noted. placed. Findings were discussed with Dr. Kizzy Blackwell At 1:00 pm on 10/24/2022.      IR GUIDED PERC PLEURAL Gama Sanes CATH INSERT    Result Date: 10/21/2022  PROCEDURE: Flor Gonsalez BILATERAL THORACENTESIS WITH BILATERAL PLEURAL DRAINAGE CATHETER PLACEMENT 10/21/2022 HISTORY: ORDERING SYSTEM PROVIDED HISTORY: diagnositic and possibly therapeutic TECHNOLOGIST PROVIDED HISTORY: diagnositic and possibly therapeutic Which side should the procedure be BILATERAL Is the patient pregnant?->No History of septic emboli with previous left thoracentesis demonstrating infected fluid, CT shows loculated effusions, please perform diagnostic and therapeutic thoracentesis is with bilateral pigtail catheter placement for drainage. TECHNIQUE AND FINDINGS: Informed consent was obtained from the patient's family member after a detailed explanation of the procedure including risks, benefits, and alternatives. Universal protocol was performed. The patient's posterior chest was prepped and draped in standard sterile fashion. 1% lidocaine was used for local anesthesia. Procedure was performed with the patient in an upright position. Initially attention was directed towards the left side. Ultrasound shows a very small residual loculated pleural effusion in the medial left lower chest.  Skin over the area is prepped and draped in standard sterile fashion. 1% lidocaine used for local anesthesia. Using realtime ultrasound guidance an 8 Polish pigtail catheter was placed using trocar technique. Ultrasound images show a safe tract and access into the fluid. Thoracentesis was performed with removal of a small amount of slightly turbid yellow fluid. The catheter was sutured to the skin and secured in place with a Vaseline gauze dressing. The tube was connected to an atrium drainage system. Attention was then directed towards the right side. Ultrasound right chest shows a small right pleural effusion which appears to be partially loculated with minimal debris and septations. Skin over the area was prepped and draped in standard sterile fashion. 1% lidocaine used for local anesthesia.  Using realtime ultrasound guidance an 8 Japanese pigtail catheter was advanced into the pleural fluid using trocar technique. Ultrasound images show a safe tract and access into the fluid. Thoracentesis was performed with removal of approximately 20 mL of fairly clear light red colored fluid. Sample was sent for the requested studies. The catheter was sutured to the skin and secured in place with a Vaseline gauze dressing. Tube was connected to an atrium drainage system. There are no immediate complications. The patient left the department in stable condition. Further tube management per the pulmonary team. EBL: Less than 5 mL     Successful ultrasound-guided bilateral pleural pigtail catheter placement (8 Japanese catheters were placed on each side). Small partially loculated pleural effusions, slightly more prominent on the right. Fluid sample was sent for the requested studies. IR GUIDED PERC PLEURAL DRAIN W CATH INSERT    Result Date: 10/21/2022  PROCEDURE: ULTRASOUNDGUIDED BILATERAL THORACENTESIS WITH BILATERAL PLEURAL DRAINAGE CATHETER PLACEMENT 10/21/2022 HISTORY: ORDERING SYSTEM PROVIDED HISTORY: diagnositic and possibly therapeutic TECHNOLOGIST PROVIDED HISTORY: diagnositic and possibly therapeutic Which side should the procedure be BILATERAL Is the patient pregnant?->No History of septic emboli with previous left thoracentesis demonstrating infected fluid, CT shows loculated effusions, please perform diagnostic and therapeutic thoracentesis is with bilateral pigtail catheter placement for drainage. TECHNIQUE AND FINDINGS: Informed consent was obtained from the patient's family member after a detailed explanation of the procedure including risks, benefits, and alternatives. Universal protocol was performed. The patient's posterior chest was prepped and draped in standard sterile fashion. 1% lidocaine was used for local anesthesia. Procedure was performed with the patient in an upright position.  Initially attention was directed towards the left side. Ultrasound shows a very small residual loculated pleural effusion in the medial left lower chest.  Skin over the area is prepped and draped in standard sterile fashion. 1% lidocaine used for local anesthesia. Using realtime ultrasound guidance an 8 Macanese pigtail catheter was placed using trocar technique. Ultrasound images show a safe tract and access into the fluid. Thoracentesis was performed with removal of a small amount of slightly turbid yellow fluid. The catheter was sutured to the skin and secured in place with a Vaseline gauze dressing. The tube was connected to an atrium drainage system. Attention was then directed towards the right side. Ultrasound right chest shows a small right pleural effusion which appears to be partially loculated with minimal debris and septations. Skin over the area was prepped and draped in standard sterile fashion. 1% lidocaine used for local anesthesia. Using realtime ultrasound guidance an 8 Macanese pigtail catheter was advanced into the pleural fluid using trocar technique. Ultrasound images show a safe tract and access into the fluid. Thoracentesis was performed with removal of approximately 20 mL of fairly clear light red colored fluid. Sample was sent for the requested studies. The catheter was sutured to the skin and secured in place with a Vaseline gauze dressing. Tube was connected to an atrium drainage system. There are no immediate complications. The patient left the department in stable condition. Further tube management per the pulmonary team. EBL: Less than 5 mL     Successful ultrasound-guided bilateral pleural pigtail catheter placement (8 Macanese catheters were placed on each side). Small partially loculated pleural effusions, slightly more prominent on the right. Fluid sample was sent for the requested studies.          IMPRESSION:   Primary Problem  SIRS (systemic inflammatory response syndrome) (HCC)    Active Hospital Problems    Diagnosis Date Noted    Thrombocytopenia ([facility] [D69.6] 10/26/2022     Priority: Medium    MRSA bacteremia [R78.81, B95.62] 10/23/2022     Priority: Medium    Acute septic pulmonary embolism ([address] [I26.90] 10/23/2022     Priority: Medium    Cavitating mass of lung [J98.4] 10/23/2022     Priority: Medium    Allergy to antibiotic [Z88.1] 10/23/2022     Priority: Medium    Acute metabolic encephalopathy [H51.06] 10/18/2022     Priority: Medium    Delirium due to another medical condition [F05] 10/17/2022     Priority: Medium    Elevated d-dimer [R79.89] 10/15/2022     Priority: Medium    Leukocytosis [D72.829] 10/15/2022     Priority: Medium    Pulmonary nodules [R91.8] 10/15/2022     Priority: Medium    NICKI (acute kidney injury) ([facility] [N17.9] 10/11/2022     Priority: Medium    Severe malnutrition ([facility] [E43] 10/11/2022     Priority: Medium    SIRS (systemic inflammatory response syndrome) ([facility] [R65.10] 10/10/2022     Priority: Medium    H/O gastric bypass [Z98.84] 02/13/2020    Acute encephalopathy [G93.40] 12/26/2016    COPD (chronic obstructive pulmonary disease) ([facility] [J44.9] 10/25/2016    Bipolar disorder, mixed ([facility] [F31.60] 11/06/2014    Depression with suicidal ideation [F32. 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 I explained to the patient the nature of this problem. I explained the significance of these abnormalities and possible etiology and management options  Anemia and thrombocytopenia are related to septic shock and active infective endocarditis. The patient condition is stabilizing. Appreciate infectious disease and pulmonary input. Plan is for 6 more weeks of IV antibiotics. I am a little concerned about the drop in hemoglobin. We will watch for active bleeding.   Patient iron studies, folic acid and P75 are okay. No need for further supplementation. I would hold off on transfusion until hemoglobin drops under 7 or platelets are under 10  Continue antibiotics and care per ID  Overall prognosis is guarded . Discussed with father and Nurse. Thank you for asking us to see this patient. Daniele Flynn MD  Hematologist/Medical [id number] [facility] hematology oncology physicians            This note is created with the assistance of a speech recognition program.  While intending to generate a document that actually reflects the content of the visit, the document can still have some errors including those of syntax and sound a like substitutions which may escape proof reading. It such instances, actual meaning can be extrapolated by contextual diversion.

## 2022-10-30 NOTE — CARE COORDINATION
DISCHARGE PLANNING NOTE:    Plan is for patient to be discharged to [facility] with pre-cert started 61/40. Will need IV Teflaro through 12/3. Will continue to follow for additional discharge needs.     Electronically signed by Curtis Kapoor RN on 10/30/2022 at 4:10 PM

## 2022-10-30 NOTE — PROGRESS NOTES
Progress Note  Date:10/30/2022       YKYA:[id number]  Patient Name:Anu Bourne     YOB: 1970     Age:51 y.o. Subjective    Subjective:  Symptoms:  Stable. (Speech clearer this morning for the most part. Still not opening eyes. ). Diet:  Poor intake. Activity level: Impaired due to weakness. Pain:  Pain severity now: left thigh. Review of Systems  Review of Systems - General ROS: positive for  - fatigue and weight loss  Psychological ROS: positive for - anxiety, depression, and mood swings  Respiratory ROS: positive for - pleuritic pain and shortness of breath  Neurological ROS: positive for - weakness and lethargy    Objective         Vitals Last 24 Hours:  TEMPERATURE:  Temp  Av.9 °F (36.6 °C)  Min: 97.3 °F (36.3 °C)  Max: 98.5 °F (36.9 °C)  RESPIRATIONS RANGE: Resp  Av.6  Min: 16  Max: 19  PULSE OXIMETRY RANGE: SpO2  Av.1 %  Min: 92 %  Max: 100 %  PULSE RANGE: Pulse  Av.1  Min: 63  Max: 72  BLOOD PRESSURE RANGE: Systolic (69YJJ), UFM:111 , Min:108 , ZCO:918   ; Diastolic (12AJR), ECT:20, Min:58, Max:87    I/O (24Hr): Intake/Output Summary (Last 24 hours) at 10/30/2022 0834  Last data filed at 10/30/2022 0447  Gross per 24 hour   Intake 290 ml   Output 5 ml   Net 285 ml       Objective:  General Appearance:  Comfortable. Vital signs: (most recent): Blood pressure (!) 142/58, pulse 70, temperature 98 °F (36.7 °C), temperature source Axillary, resp. rate 16, height 4' 11\" (1.499 m), weight 115 lb 15.4 oz (52.6 kg), SpO2 92 %. (BP elevated). Lungs:  Normal effort and normal respiratory rate. There are decreased breath sounds. Heart: Normal rate. Regular rhythm.   S1 normal and S2 normal.    Labs/Imaging/Diagnostics    Labs:  CBC:  Recent Labs     10/28/22  0541 10/30/22  0617   WBC 6.1 5.8   RBC 3.33* 2.99*   HGB 8.7* 7.9*   HCT 27.5* 24.8*   MCV 82.7 83.0   RDW 16.3* 16.8*   PLT 39* 40*       CHEMISTRIES:  Recent Labs     10/28/22  0541   NA 142   K 3.8      CO2 29   BUN 15   CREATININE 0.86   GLUCOSE 78       PT/INR:  No results for input(s): PROTIME, INR in the last 72 hours. APTT:  No results for input(s): APTT in the last 72 hours. LIVER PROFILE:No results for input(s): AST, ALT, BILIDIR, BILITOT, ALKPHOS in the last 72 hours. Imaging Last 24 Hours:  CT CHEST WO CONTRAST    Result Date: 10/26/2022  EXAMINATION: CT OF THE CHEST WITHOUT CONTRAST 10/26/2022 3:13 pm TECHNIQUE: CT of the chest was performed without the administration of intravenous contrast. Multiplanar reformatted images are provided for review. Automated exposure control, iterative reconstruction, and/or weight based adjustment of the mA/kV was utilized to reduce the radiation dose to as low as reasonably achievable. COMPARISON: 10/25/2022 and 10/21/2022 HISTORY: ORDERING SYSTEM PROVIDED HISTORY: Reevaluate signs of empyema, will see if continuous drainage is necessary or discontinuance of pleural catheter TECHNOLOGIST PROVIDED HISTORY: Reevaluate signs of empyema, will see if continuous drainage is necessary or discontinuance of pleural catheter Is the patient pregnant?->No Reason for Exam: sob, recent covid 23. pt has chest tube FINDINGS: Mediastinum: No cardiomegaly. Decreased attenuation of intra cardiac blood pool suggesting anemia. Suggestion of mediastinal and bilateral hilar adenopathy though evaluation is limited without intravenous contrast. Lungs/pleura: Small loculated pleural effusions bilaterally, slightly decreased in size on the right and increased in size in the left. Right pleural pigtail catheter via an intercostal approach. Numerous pulmonary nodules/masses redemonstrated bilaterally, multiple of which appear cavitary. Interval increase in interstitial/alveolar densities within the lower lobes and lingula compared to prior study. Upper Abdomen: Cholecystectomy. Splenomegaly. Soft Tissues/Bones: Mild anasarca.   Right PICC tip extends to the right atrium. Left shoulder arthroplasty. Fusion hardware within the lower cervical spine. 1.  Interval increase in bibasilar interstitial/alveolar densities most pronounced within the lingula. Differential considerations include superimposed multifocal pneumonia versus asymmetric edema. 2.  Small partially loculated pleural effusions redemonstrated bilaterally with interval right pleural catheter placement, slightly increased on the left. 3.  Numerous pulmonary nodules/masses bilaterally with several appearing cavitary, grossly unchanged from prior, most suggestive of septic emboli though metastases could produce a similar appearance. 4.  Additional nonemergent findings, as above. Assessment//Plan           Hospital Problems             Last Modified POA    * (Principal) SIRS (systemic inflammatory response syndrome) ([address] 10/10/2022 Yes    NICKI (acute kidney injury) ([address] 10/11/2022 Yes    Severe malnutrition (HCC) (Chronic) 10/11/2022 Yes    Elevated d-dimer 10/15/2022 Yes    Leukocytosis 10/15/2022 Yes    Pulmonary nodules 10/15/2022 Yes    Delirium due to another medical condition 10/17/2022 Yes    Acute metabolic encephalopathy 43/75/3102 Yes    MRSA bacteremia 10/23/2022 Yes    Acute septic pulmonary embolism ([address] 10/23/2022 Yes    Cavitating mass of lung 10/23/2022 Yes    Allergy to antibiotic 10/23/2022 Yes    Thrombocytopenia ([address] 10/26/2022 Yes    Depression with suicidal ideation 10/17/2022 Yes    Bipolar disorder, mixed ([address] 10/11/2022 Yes    Acute encephalopathy 10/14/2022 Yes    COPD (chronic obstructive pulmonary disease) ([address] 10/11/2022 Yes    H/O gastric bypass 10/11/2022 Yes    Overview Signed 2/13/2020  7:19 PM by Connie Patterson MD     enlarged  pancreas, here for surgical work-up for EGD        Assessment & Plan  Discharge planning for [facility]ACH - pre-cert started Friday. Patient asking about chest tube removal.    Continue current treatments.     Electronically signed by Laurie Cardona Nena Montano MD on 10/30/2022 at 8:34 AM

## 2022-10-31 ENCOUNTER — APPOINTMENT (OUTPATIENT)
Dept: GENERAL RADIOLOGY | Age: 52
DRG: 871 | End: 2022-10-31
Payer: COMMERCIAL

## 2022-10-31 PROCEDURE — 2060000000 HC ICU INTERMEDIATE R&B

## 2022-10-31 PROCEDURE — 71045 X-RAY EXAM CHEST 1 VIEW: CPT

## 2022-10-31 PROCEDURE — 6370000000 HC RX 637 (ALT 250 FOR IP): Performed by: INTERNAL MEDICINE

## 2022-10-31 PROCEDURE — 94640 AIRWAY INHALATION TREATMENT: CPT

## 2022-10-31 PROCEDURE — 2580000003 HC RX 258: Performed by: INTERNAL MEDICINE

## 2022-10-31 PROCEDURE — 6360000002 HC RX W HCPCS: Performed by: INTERNAL MEDICINE

## 2022-10-31 PROCEDURE — 99232 SBSQ HOSP IP/OBS MODERATE 35: CPT | Performed by: FAMILY MEDICINE

## 2022-10-31 PROCEDURE — 99232 SBSQ HOSP IP/OBS MODERATE 35: CPT | Performed by: INTERNAL MEDICINE

## 2022-10-31 PROCEDURE — 99231 SBSQ HOSP IP/OBS SF/LOW 25: CPT | Performed by: INTERNAL MEDICINE

## 2022-10-31 PROCEDURE — 94761 N-INVAS EAR/PLS OXIMETRY MLT: CPT

## 2022-10-31 RX ADMIN — Medication 2000 UNITS: at 07:43

## 2022-10-31 RX ADMIN — CEFTAROLINE FOSAMIL 600 MG: 600 POWDER, FOR SOLUTION INTRAVENOUS at 07:47

## 2022-10-31 RX ADMIN — DULOXETINE 60 MG: 60 CAPSULE, DELAYED RELEASE ORAL at 07:43

## 2022-10-31 RX ADMIN — IPRATROPIUM BROMIDE AND ALBUTEROL SULFATE 1 AMPULE: 2.5; .5 SOLUTION RESPIRATORY (INHALATION) at 18:57

## 2022-10-31 RX ADMIN — DIVALPROEX SODIUM 1000 MG: 500 TABLET, DELAYED RELEASE ORAL at 21:13

## 2022-10-31 RX ADMIN — CARVEDILOL 25 MG: 25 TABLET, FILM COATED ORAL at 07:43

## 2022-10-31 RX ADMIN — IPRATROPIUM BROMIDE AND ALBUTEROL SULFATE 1 AMPULE: 2.5; .5 SOLUTION RESPIRATORY (INHALATION) at 07:00

## 2022-10-31 RX ADMIN — FERROUS SULFATE TAB 325 MG (65 MG ELEMENTAL FE) 325 MG: 325 (65 FE) TAB at 16:55

## 2022-10-31 RX ADMIN — ATORVASTATIN CALCIUM 40 MG: 40 TABLET, FILM COATED ORAL at 21:14

## 2022-10-31 RX ADMIN — IPRATROPIUM BROMIDE AND ALBUTEROL SULFATE 1 AMPULE: 2.5; .5 SOLUTION RESPIRATORY (INHALATION) at 14:58

## 2022-10-31 RX ADMIN — SODIUM CHLORIDE, PRESERVATIVE FREE 10 ML: 5 INJECTION INTRAVENOUS at 07:43

## 2022-10-31 RX ADMIN — DIVALPROEX SODIUM 1000 MG: 500 TABLET, DELAYED RELEASE ORAL at 07:45

## 2022-10-31 RX ADMIN — HYDRALAZINE HYDROCHLORIDE 10 MG: 20 INJECTION INTRAMUSCULAR; INTRAVENOUS at 00:21

## 2022-10-31 RX ADMIN — IPRATROPIUM BROMIDE AND ALBUTEROL SULFATE 1 AMPULE: 2.5; .5 SOLUTION RESPIRATORY (INHALATION) at 10:53

## 2022-10-31 RX ADMIN — SODIUM CHLORIDE, PRESERVATIVE FREE 10 ML: 5 INJECTION INTRAVENOUS at 21:19

## 2022-10-31 RX ADMIN — CARVEDILOL 25 MG: 25 TABLET, FILM COATED ORAL at 16:55

## 2022-10-31 RX ADMIN — CEFTAROLINE FOSAMIL 600 MG: 600 POWDER, FOR SOLUTION INTRAVENOUS at 21:12

## 2022-10-31 RX ADMIN — ARIPIPRAZOLE 5 MG: 5 TABLET ORAL at 07:45

## 2022-10-31 RX ADMIN — FERROUS SULFATE TAB 325 MG (65 MG ELEMENTAL FE) 325 MG: 325 (65 FE) TAB at 07:43

## 2022-10-31 ASSESSMENT — PAIN SCALES - GENERAL
PAINLEVEL_OUTOF10: 0
PAINLEVEL_OUTOF10: 2

## 2022-10-31 ASSESSMENT — PAIN SCALES - WONG BAKER
WONGBAKER_NUMERICALRESPONSE: 0
WONGBAKER_NUMERICALRESPONSE: 0

## 2022-10-31 NOTE — CARE COORDINATION
JAY received a text message from Samson fontenot at [facility]. [facility] reported that Rosalva Vegas has an intent to deny patient and a peer to peer needs to be completed. Peer to peer must be scheduled by 1500 (3:00pm) on 10/31/2022. Contact number for Rosalva Vegas is [phone]. Reference #: [id number]. JAY provided peer to peer information to  who is working with this patient today.  Electronically signed by JOSE Alva on 10/31/2022 at 8:43 AM

## 2022-10-31 NOTE — PROGRESS NOTES
Progress Note  Date:10/31/2022       RLTZ:[id number]  Patient Name:Anu Bourne     YOB: 1970     Age:51 y.o. Subjective    Subjective:  Symptoms:  Improved. (Awake and eyes open. States she is at [address] [place] [address], [address]. Answers questions more alertly than any time during admission. ). Diet:  Poor intake. Activity level: Impaired due to weakness. Pain:  She reports no pain. Review of Systems  Objective         Vitals Last 24 Hours:  TEMPERATURE:  Temp  Av.2 °F (36.8 °C)  Min: 97.8 °F (36.6 °C)  Max: 98.6 °F (37 °C)  RESPIRATIONS RANGE: Resp  Av.6  Min: 16  Max: 18  PULSE OXIMETRY RANGE: SpO2  Av.6 %  Min: 92 %  Max: 100 %  PULSE RANGE: Pulse  Av.3  Min: 63  Max: 76  BLOOD PRESSURE RANGE: Systolic (80JJJ), AOH:314 , Min:114 , OEZ:809   ; Diastolic (41UGT), BIP:58, Min:61, Max:79    I/O (24Hr): Intake/Output Summary (Last 24 hours) at 10/31/2022 0753  Last data filed at 10/31/2022 0441  Gross per 24 hour   Intake 120 ml   Output 7 ml   Net 113 ml     Objective:  General Appearance:  Comfortable. Vital signs: (most recent): Blood pressure (!) 153/71, pulse 71, temperature 98.6 °F (37 °C), temperature source Oral, resp. rate 18, height 4' 11\" (1.499 m), weight 115 lb 15.4 oz (52.6 kg), SpO2 100 %. (BP slightly elevated). Lungs: There are decreased breath sounds. Heart: Normal rate. Regular rhythm. S1 normal and S2 normal.    Labs/Imaging/Diagnostics    Labs:  CBC:  Recent Labs     10/30/22  0617   WBC 5.8   RBC 2.99*   HGB 7.9*   HCT 24.8*   MCV 83.0   RDW 16.8*   PLT 40*     CHEMISTRIES:No results for input(s): NA, K, CL, CO2, BUN, CREATININE, GLUCOSE, CA, PHOS, MG in the last 72 hours. PT/INR:No results for input(s): PROTIME, INR in the last 72 hours. APTT:No results for input(s): APTT in the last 72 hours. LIVER PROFILE:No results for input(s): AST, ALT, BILIDIR, BILITOT, ALKPHOS in the last 72 hours.     Imaging Last 24 Hours:  XR CHEST PORTABLE    Result Date: 10/30/2022  EXAMINATION: ONE XRAY VIEW OF THE CHEST 10/30/2022 8:05 am COMPARISON: Chest radiograph dated 10/29/2022. Chest CT dated 10/26/2022. HISTORY: ORDERING SYSTEM PROVIDED HISTORY: chest tube TECHNOLOGIST PROVIDED HISTORY: chest tube Reason for Exam: Chest tube FINDINGS: Again seen is right-sided PICC line, appearing similar to the prior study. There is redemonstration of right basilar pleural drainage catheter. The cardiac silhouette is magnified and likely enlarged. There is redemonstration of extensive bilateral nodular opacities as well as worsening airspace opacities, particularly affecting the right upper lobe. There is suspected small bilateral pleural effusions. No large pneumothorax is seen. Components of left reverse total shoulder arthroplasty are seen. Changes of prior orthopedic fixation of the lower cervical spine, partially visualized. Redemonstration of bilateral nodular and airspace opacities with worsening aeration of the right upper lobe when compared to prior chest radiograph dated 10/29/2022. XR CHEST PORTABLE    Result Date: 10/29/2022  EXAMINATION: ONE XRAY VIEW OF THE CHEST 10/29/2022 8:10 am COMPARISON: 10/25/2022. chest CT dated 10/26/2022. HISTORY: ORDERING SYSTEM PROVIDED HISTORY: Chest tube on right history of MRSA septic emboli with empyema TECHNOLOGIST PROVIDED HISTORY: Chest tube on right history of MRSA septic emboli with empyema Reason for Exam: CHEST TUBE FINDINGS: There is redemonstration of right-sided PICC line, appearing similar to the prior study. There is redemonstration of right pleural drainage catheter overlying the right lung base. The cardiac silhouette appears within normal limits. The aorta is uncoiled and atherosclerotic. There is redemonstration of bilateral nodular and airspace opacities with suggestion of slight improved aeration of the lateral aspect of the left lower lung field.   No large pleural effusion or pneumothorax is seen. There are cholecystectomy clips overlying the right upper abdomen. Changes of prior orthopedic fixation/fusion of cervicothoracic spine, partially visualized. There is redemonstration of left reverse total shoulder arthroplasty hardware is, partially visualized. Redemonstration of bilateral nodular and airspace opacities with improved aeration of the lateral aspect of the left lower lung field when compared to prior chest radiograph dated 10/25/2022.      Assessment//Plan           Hospital Problems             Last Modified POA    * (Principal) SIRS (systemic inflammatory response syndrome) ([address] 10/10/2022 Yes    NICKI (acute kidney injury) ([address] 10/11/2022 Yes    Severe malnutrition (HCC) (Chronic) 10/11/2022 Yes    Elevated d-dimer 10/15/2022 Yes    Leukocytosis 10/15/2022 Yes    Pulmonary nodules 10/15/2022 Yes    Delirium due to another medical condition 10/17/2022 Yes    Acute metabolic encephalopathy 83/45/0185 Yes    MRSA bacteremia 10/23/2022 Yes    Acute septic pulmonary embolism ([address] 10/23/2022 Yes    Cavitating mass of lung 10/23/2022 Yes    Allergy to antibiotic 10/23/2022 Yes    Thrombocytopenia ([address] 10/26/2022 Yes    Depression with suicidal ideation 10/17/2022 Yes    Bipolar disorder, mixed ([address] 10/11/2022 Yes    Acute encephalopathy 10/14/2022 Yes    COPD (chronic obstructive pulmonary disease) ([address] 10/11/2022 Yes    H/O gastric bypass 10/11/2022 Yes    Overview Signed 2/13/2020  7:19 PM by Nathan Goncalves MD     enlarged  pancreas, here for surgical work-up for EGD          Assessment & Plan    Continue current management    Await LTACH placement    Electronically signed by Nathan Goncalves MD on 10/31/22 at 7:53 AM EDT

## 2022-10-31 NOTE — PROGRESS NOTES
[facility] -   Infectious diseases evaluation  admission date 10/10/2022    reason for consultation:   MRSA bacteremia    Impression :   Current:  MRSA bacteremia  Bilateral pulmonary nodules with cavitation /loculated pleural effusion /suspected septic emboli s/p thoracentesis with bilateral CT placement. Sepsis secondary to above  Thrombocytopenia/anemia? Vancomycin related  Encephalopathy  History of cocaine drug abuse  E. coli UTI treated  Acute renal failure improved  Hypertension  Bipolar disorder  Allergy to Bactrim. Recommendations     Continue IV Teflaro through 12/03/22  Follow CBC and renal function closely. Supportive care. Infection Control Recommendations   [facility] Precautions  Contact Isolation     Antimicrobial Stewardship Recommendations   Simplification of therapy  Targeted therapy      History of Present Illness:   Initial history:  Alida Brown is a 46y.o.-year-old female was admitted 10/10/2020 for worsening shortness of breath associated with generalized body ache. The patient is drowsy, arousable, confused, follows simple commands, unable to provide history that was obtained from chart review and nursing staff. She does have history of cocaine abuse. She was found to have acute renal failure with a creatinine of 2.1. Urinalysis showed small leukocyte esterase, positive nitrate. Urine culture on 10/10/2022 grew E. Coli  Blood cultures 10/10/2022 2 of 2 grew MRSA  Echocardiogram showed no vegetations  Procalcitonin was 6.01, D-dimer was elevated at 3.85  Chest x-ray showed right lung nodules. VQ scan was indeterminant.   CT chest without contrast 10/16/2022 showed numerous bilateral nodular opacities both lungs suggestive of septic emboli , focal consolidation with cavitation in the superior right lower lobe focal consolidation at the left base, small bilateral loculated pleural effusion and mediastinal lymphadenopathy. Echocardiogram showed no vegetations  ADRIANNE negative for vegetations. Interval changes  10/31/2022   She is more awake, oriented to person and place, disoriented to time, less confused, complaining of discomfort at the right chest tube site, no other complaints. Right chest tube in place. Patient Vitals for the past 8 hrs:   BP Temp Temp src Pulse Resp SpO2   10/31/22 1054 -- -- -- -- -- 97 %   10/31/22 0702 -- -- -- -- -- 100 %   10/31/22 0640 (!) 153/71 98.6 °F (37 °C) Oral 71 18 100 %   10/31/22 0526 (!) 169/79 97.8 °F (36.6 °C) Axillary 76 18 --           I have personally reviewed the past medical history, past surgical history, medications, social history, and family history, and I haveupdated the database accordingly. Allergies:   Aspirin, Bactrim, and Codeine     Review of Systems:     Confused, unable to provide  Physical Examination :       Physical Exam  Vitals and nursing note reviewed. Constitutional:       General: She is not in acute distress. Appearance: She is not ill-appearing. HENT:      Head: Normocephalic and atraumatic. Right Ear: External ear normal.      Left Ear: External ear normal.      Nose: Nose normal.      Mouth/Throat:      Mouth: Mucous membranes are moist.      Pharynx: Oropharynx is clear. Eyes:      General: No scleral icterus. Conjunctiva/sclera: Conjunctivae normal.   Cardiovascular:      Rate and Rhythm: Regular rhythm. Heart sounds: Normal heart sounds. No murmur heard. Pulmonary:      Effort: Pulmonary effort is normal. No respiratory distress. Breath sounds: No wheezing or rhonchi. Comments:   Decreased breath sounds bilaterally  Abdominal:      General: Bowel sounds are normal. There is no distension. Palpations: Abdomen is soft. Tenderness: There is no abdominal tenderness. Genitourinary:     Comments: No chamberlain. Musculoskeletal:      Cervical back: Neck supple. No rigidity.       Right lower leg: No edema. Left lower leg: No edema. Skin:     General: Skin is warm and dry. Coloration: Skin is not jaundiced. Findings: No erythema.    Neurological:      Comments: Confused   Psychiatric:      Comments: ISAAK     Past Medical History:     Past Medical History:   Diagnosis Date    Anemia     Arthritis     Asthma     Bipolar disorder, mixed [facility]     Carotid artery stenosis 2020    Cocaine abuse ([facility] 2014    COPD (chronic obstructive pulmonary disease) ([facility]     Degeneration of cervical intervertebral disc     Depression with anxiety     Depression with anxiety     Fibromyalgia 2017    GERD (gastroesophageal reflux disease)     History of migraine headaches 6/10/2014    History of renal calculi 6/10/2014    History of seizure disorder 6/10/2014    Hoarseness of voice     HTN (hypertension) 6/10/2014    Hyperlipidemia 6/10/2014    IGT (impaired glucose tolerance) 6/10/2014    Kidney stones     Lactose intolerance 6/10/2014    Leukopenia 6/10/2014    Major depressive disorder, recurrent episode, severe, without mention of psychotic behavior 2014    MVP (mitral valve prolapse)     Seizures (HCC)     Sleep apnea 6/10/2014    Unspecified diseases of blood and blood-forming organs        Past Surgical  History:     Past Surgical History:   Procedure Laterality Date    ANKLE FRACTURE SURGERY      recontruction surgery    APPENDECTOMY      BREAST LUMPECTOMY Right     CARDIAC CATHETERIZATION      CARPAL TUNNEL RELEASE      x2     SECTION      CHOLECYSTECTOMY      COLONOSCOPY  10    COLONOSCOPY  2016    severe spasms    COLONOSCOPY  2019    DR GOLDY MCINTOSH    GASTRIC BYPASS SURGERY      GASTRIC BYPASS SURGERY      HYSTERECTOMY (CERVIX STATUS UNKNOWN)      HYSTERECTOMY (CERVIX STATUS UNKNOWN)      IR PERC CATH PLEURAL DRAIN W/IMAG  10/21/2022    IR PERC CATH PLEURAL DRAIN W/IMAG 10/21/2022 [facility]    IR PERC CATH PLEURAL DRAIN W/IMAG  10/21/2022    IR PERC CATH PLEURAL DRAIN W/IMAG 10/21/2022 [facility]    IR PERC CATH PLEURAL DRAIN W/IMAG  10/24/2022    IR PERC CATH PLEURAL DRAIN W/IMAG 10/24/2022 [facility]    LAPAROSCOPY N/A 1/29/2022    LAPAROSCOPY EXPLORATORY CONVERTED TO OPEN EXPLORATORY LAPAROTOMY, LYSIS OF ADHESIONS, REDUCTION OF INTERNAL HERNIA performed by Allison Vargas DO [facility]  [id number]/[id number]    APPLICATION OF ARCH BARS,SIMPLE EXTRACTION OF #15 AND RT TMJ JOINT REPLACEMENT    SHOULDER ARTHROSCOPY Left 06/05/2019    DR ROBERT GREEN    SHOULDER SURGERY      TONSILLECTOMY AND ADENOIDECTOMY      TRANSESOPHAGEAL ECHOCARDIOGRAM N/A 10/19/2022    TRANSESOPHAGEAL ECHOCARDIOGRAM WITH BUBBLE STUDY performed by Frandy Royal DO at [address] [facility]  [id number]    egd    UPPER GASTROINTESTINAL ENDOSCOPY  12/19/2016    status post gastrectomy with a small remnant of gastric pouch.     UPPER GASTROINTESTINAL ENDOSCOPY  05/2019    DR Karlee Marquis       Medications:      divalproex  1,000 mg Oral BID    ceftaroline fosamil (TEFLARO) IVPB  600 mg IntraVENous Q12H    carvedilol  25 mg Oral BID WC    Vitamin D  2,000 Units Oral Daily    sodium chloride flush  5-40 mL IntraVENous 2 times per day    ARIPiprazole  5 mg Oral Daily    atorvastatin  40 mg Oral Daily    DULoxetine  60 mg Oral Daily    ferrous sulfate  325 mg Oral BID WC    ipratropium-albuterol  1 ampule Inhalation Q4H WA       Social History:     Social History     Socioeconomic History    Marital status:      Spouse name: Not on file    Number of children: Not on file    Years of education: Not on file    Highest education level: Not on file   Occupational History    Occupation: disability   Tobacco Use    Smoking status: Every Day     Packs/day: 0.50     Years: 33.00     Pack years: 16.50     Types: Cigarettes    Smokeless tobacco: Never    Tobacco comments:     quit  2000 started again  1/13    Substance and Sexual Activity    Alcohol use: Yes     Comment: occasional    Drug use: Not Currently    Sexual activity: Yes   Other Topics Concern    Not on file   Social History Narrative    Not on file     Social Determinants of Health     Financial Resource Strain: Not on file   Food Insecurity: Not on file   Transportation Needs: Not on file   Physical Activity: Not on file   Stress: Not on file   Social Connections: Not on file   Intimate Partner Violence: Not on file   Housing Stability: Not on file       Family History:     Family History   Problem Relation Age of Onset    Diabetes Mother     Cancer Mother     Coronary Art Dis Father     COPD Father     Depression Brother     Alcohol Abuse Brother     Cancer Other         lung and skin    Diabetes Maternal Grandmother     Cancer Paternal Grandmother       Medical Decision Making:   I have independently reviewed/ordered the following labs:    CBC with Differential:   Recent Labs     10/30/22  0617   WBC 5.8   HGB 7.9*   HCT 24.8*   PLT 40*   LYMPHOPCT 16*   MONOPCT 9*         BMP:  No results for input(s): NA, K, CL, CO2, BUN, CREATININE, CA, MG in the last 72 hours. Hepatic Function Panel: No results for input(s): PROT, LABALBU, BILIDIR, IBILI, BILITOT, ALKPHOS, ALT, AST in the last 72 hours. No results for input(s): RPR in the last 72 hours. No results for input(s): HIV in the last 72 hours. No results for input(s): BC in the last 72 hours. Lab Results   Component Value Date/Time    CREATININE 0.86 10/28/2022 05:41 AM    GLUCOSE 78 10/28/2022 05:41 AM       Detailed results: Thank you for allowing us to participate in the care of this patient. Please call with questions.     This note is created with the assistance of a speech recognition program.  While intending to generate adocument that actually reflects the content of the visit, the document can still have some errors including those of syntax and sound a like substitutions which may escape proof reading. It such instances, actual meaningcan be extrapolated by contextual diversion.         Everett Flores MD   Office/Perfect Serve:   [phone]

## 2022-10-31 NOTE — PROGRESS NOTES
Message left with IR regarding order from Dr. García Lomas to remove pigtail catheter. Awaiting reply.

## 2022-10-31 NOTE — PROGRESS NOTES
Today's Date: 10/31/2022  Patient Name: Kameron Chacon  Date of admission: 10/10/2022  4:37 AM  Patient's age: 46 y. o., 1970  Admission Dx: Pulmonary nodules [R91.8]  SIRS (systemic inflammatory response syndrome) ([facility]) [R65.10]  NICKI (acute kidney injury) ([facility] [address] [N17.9]  Elevated d-dimer [R79.89]  Leukocytosis, unspecified type [O00.975]    Reason for Consult: management recommendations  Requesting Physician: Tiffany Torres MD    CHIEF COMPLAINT: Thrombocytopenia    History Obtained From:  father, electronic medical record. Patient very lethargic not able to give any meaningful history      Interval history :    Pt seen and examined  Labs and vitlas reviewed  The patient is lethargic and minimally responsive. According to the sitter, she is just sleepy because the chest tube was just removed. She was much more alert earlier this morning. HISTORY OF PRESENT ILLNESS:      The patient is a 46 y.o.  female who is admitted to the hospital with chief complaint of shortness of breath. Patient was hospitalized on 10/10/2022. During the hospitalization course patient has been diagnosed with MRSA bacteremia. Clinical picture suggestive of infective endocarditis with bilateral pulmonary nodules with cavitation. Patient also has history of cocaine abuse. Patient was already treated with cefepime and then switched to vancomycin. Patient noted to have progressive cytopenias especially platelet count of 51,710. Subsequently vancomycin has been discontinued and patient has been started on Teflaro. Patient lab work-up from today shows hemoglobin of 6.8. Count of 26,000. Platelet count has been gradually dropping. Hemoglobin also has been gradually dropping. Iron studies from January 2022 shows iron deficiency. Patient's INR on 10/18 was 1.2. Immunofixation studies from 10/11/2022 were negative. Excellent patient's hemoglobin at presentation on 10/10 was 14.9 and platelet count was 575. Patient has history of gastric bypass  Patient lethargic not able to give any meaningful history    Past Medical History:   has a past medical history of Anemia, Arthritis, Asthma, Bipolar disorder, mixed ([address] Carotid artery stenosis, Cocaine abuse ([address] COPD (chronic obstructive pulmonary disease) ([facility] [address] Degeneration of cervical intervertebral disc, Depression with anxiety, Depression with anxiety, Fibromyalgia, GERD (gastroesophageal reflux disease), History of migraine headaches, History of renal calculi, History of seizure disorder, Hoarseness of voice, HTN (hypertension), Hyperlipidemia, IGT (impaired glucose tolerance), Kidney stones, Lactose intolerance, Leukopenia, Major depressive disorder, recurrent episode, severe, without mention of psychotic behavior, MVP (mitral valve prolapse), Seizures ([address] Sleep apnea, and Unspecified diseases of blood and blood-forming organs. Past Surgical History:   has a past surgical history that includes Gastric bypass surgery; Cholecystectomy; Hysterectomy; Appendectomy; Hysterectomy; Tonsillectomy and adenoidectomy; Ankle fracture surgery; Neck surgery; shoulder surgery;  section; Carpal tunnel release; Colonoscopy (12); Upper gastrointestinal endoscopy (7-3-12); Gastric bypass surgery; Breast lumpectomy (Right); Cardiac catheterization; Upper gastrointestinal endoscopy (2016); Colonoscopy (2016); Upper gastrointestinal endoscopy (2019); Colonoscopy (2019); Shoulder arthroscopy (Left, 2019); other surgical history (10/24/2019); laparoscopy (N/A, 2022); transesophageal echocardiogram (N/A, 10/19/2022); IR GUIDED PERC PLEURAL DRAIN W CATH INSERT (10/21/2022); IR GUIDED PERC PLEURAL DRAIN W CATH INSERT (10/21/2022); and IR GUIDED PERC PLEURAL DRAIN W CATH INSERT (10/24/2022). Medications:    Prior to Admission medications    Medication Sig Start Date End Date Taking?  Authorizing Provider   lisinopril (PRINIVIL;ZESTRIL) 20 MG tablet Take 20 mg by mouth daily   Yes Historical Provider, MD   carvedilol (COREG) 25 MG tablet TAKE 1 TABLET BY MOUTH IN THE MORNING AND 1 IN THE EVENING WITH MEALS 9/19/22   Mindy Maddox MD   traZODone (DESYREL) 100 MG tablet TAKE 1 TABLET BY MOUTH AT BEDTIME 6/13/22   Historical Provider, MD   gabapentin (NEURONTIN) 300 MG capsule TAKE 1 CAPSULE BY MOUTH THREE TIMES DAILY 5/24/22 8/9/22  Mindy Maddox MD   hydrOXYzine (VISTARIL) 25 MG capsule  9/15/20   Historical Provider, MD   DULoxetine (CYMBALTA) 30 MG extended release capsule Take 1 capsule by mouth daily  Patient taking differently: Take 60 mg by mouth in the morning.  4/8/19   Keren Pineda APRN - CNP   ARIPiprazole (ABILIFY) 5 MG tablet Take 5 mg by mouth daily    Historical Provider, MD     Current Facility-Administered Medications   Medication Dose Route Frequency Provider Last Rate Last Admin    divalproex (DEPAKOTE) DR tablet 1,000 mg  1,000 mg Oral BID Frandy Lele, DO   1,000 mg at 10/31/22 0745    ceftaroline fosamil (TEFLARO) 600 mg in sodium chloride 0.9 % 50 mL IVPB  600 mg IntraVENous Q12H Sunni Gonzales MD   Stopped at 10/31/22 0847    0.9 % sodium chloride infusion   IntraVENous PRN Mindy Maddox MD        sodium chloride flush 0.9 % injection 10 mL  10 mL IntraVENous PRN Mindy Maddox MD   10 mL at 10/20/22 1638    carvedilol (COREG) tablet 25 mg  25 mg Oral BID  Frandy Lele, DO   25 mg at 10/31/22 0743    labetalol (NORMODYNE;TRANDATE) injection 20 mg  20 mg IntraVENous BID PRN Frandy Lele, DO   20 mg at 10/29/22 1121    potassium chloride (KLOR-CON M) extended release tablet 40 mEq  40 mEq Oral PRN Frandy Lele, DO   40 mEq at 10/25/22 0849    Or    potassium bicarb-citric acid (EFFER-K) effervescent tablet 40 mEq  40 mEq Oral PRN Frandy Lele, DO   40 mEq at 10/14/22 2031    Or    potassium chloride 10 mEq/100 mL IVPB (Peripheral Line)  10 mEq IntraVENous PRN Frandy Lele,  mL/hr at 10/23/22 0258 10 mEq at 10/23/22 3926    Vitamin D (CHOLECALCIFEROL) tablet 2,000 Units  2,000 Units Oral Daily Frandy Lele, DO   2,000 Units at 10/31/22 0743    hydrALAZINE (APRESOLINE) injection 10 mg  10 mg IntraVENous Q6H PRN Frandy Lele, DO   10 mg at 10/31/22 0021    sodium chloride flush 0.9 % injection 5-40 mL  5-40 mL IntraVENous 2 times per day Frandy Lele, DO   10 mL at 10/31/22 0743    sodium chloride flush 0.9 % injection 5-40 mL  5-40 mL IntraVENous PRN Frandy Lele, DO        0.9 % sodium chloride infusion   IntraVENous PRN Frandy Lele, DO 50 mL/hr at 10/18/22 1200 New Bag at 10/18/22 1200    acetaminophen (TYLENOL) tablet 650 mg  650 mg Oral Q4H PRN Frandy Lele, DO   650 mg at 10/14/22 2031    ondansetron (ZOFRAN-ODT) disintegrating tablet 4 mg  4 mg Oral Q8H PRN Frandy Lele, DO        Or    ondansetron (ZOFRAN) injection 4 mg  4 mg IntraVENous Q6H PRN Frandy Lele, DO        ARIPiprazole (ABILIFY) tablet 5 mg  5 mg Oral Daily Frandy Lele, DO   5 mg at 10/31/22 0745    atorvastatin (LIPITOR) tablet 40 mg  40 mg Oral Daily Frandy Lele, DO   40 mg at 10/30/22 2033    DULoxetine (CYMBALTA) extended release capsule 60 mg  60 mg Oral Daily Frandy Lele, DO   60 mg at 10/31/22 0743    ferrous sulfate (IRON 325) tablet 325 mg  325 mg Oral BID WC Frandy Lele, DO   325 mg at 10/31/22 0743    sodium chloride flush 0.9 % injection 10 mL  10 mL IntraVENous PRN Frandy Lele, DO   10 mL at 10/10/22 1427    ipratropium-albuterol (DUONEB) nebulizer solution 1 ampule  1 ampule Inhalation Q4H WA Frandy Lele, DO   1 ampule at 10/31/22 1053    perflutren lipid microspheres (DEFINITY) injection 1.65 mg  1.5 mL IntraVENous ONCE PRN Frandy Lele, DO           Allergies:  Aspirin, Bactrim, and Codeine    Social History:   reports that she has been smoking cigarettes. She has a 16.50 pack-year smoking history. She has never used smokeless tobacco. She reports current alcohol use. She reports that she does not currently use drugs.      Family History: family history includes Alcohol Abuse in her brother; COPD in her father; Cancer in her mother, paternal grandmother, and another family member; Coronary Art Dis in her father; Depression in her brother; Diabetes in her maternal grandmother and mother. REVIEW OF SYSTEMS:      Patient sleepy and unable to give me full history. We will try again tomorrow. PHYSICAL EXAM:        BP (!) 165/77   Pulse 67   Temp 97.8 °F (36.6 °C) (Axillary)   Resp 18   Ht 4' 11\" (1.499 m)   Wt 115 lb 15.4 oz (52.6 kg)   SpO2 97%   BMI 23.42 kg/m²    Temp (24hrs), Av.2 °F (36.8 °C), Min:97.8 °F (36.6 °C), Max:98.6 °F (37 °C)      General appearance -ill-appearing  Mental status -lethargic  Eyes - pupils equal and reactive, extraocular eye movements intact   Ears - bilateral TM's and external ear canals normal   Mouth - mucous membranes moist, pharynx normal without lesions   Neck - supple, no significant adenopathy   Lymphatics - no palpable lymphadenopathy, no hepatosplenomegaly   Chest -decreased breathing sounds bilaterally. Heart - normal rate, regular rhythm, normal S1, S2, no murmurs  Abdomen - soft, nontender, nondistended, no masses or organomegaly   Neurological -very lethargic.   Cannot carry out a conversation  Musculoskeletal - no joint tenderness, deformity or swelling   Extremities - peripheral pulses normal, no pedal edema, no clubbing or cyanosis   Skin - normal coloration and turgor, no rashes, no suspicious skin lesions noted ,      DATA:      Labs:      Latest Reference Range & Units 10/26/22 13:38   WBC 3.5 - 11.0 k/uL 4.1   RBC 4.0 - 5.2 m/uL 2.69 (L)   Hemoglobin Quant 12.0 - 16.0 g/dL 6.8 (LL)   Hematocrit 36 - 46 % 22.2 (L)   MCV 80 - 100 fL 82.5   MCH 26 - 34 pg 25.5 (L)   MCHC 31 - 37 g/dL 30.9 (L)   MPV 6.0 - 12.0 fL 7.4   RDW 11.5 - 14.9 % 17.0 (H)   Platelet Count 658 - 450 k/uL 26 (L)   Absolute Mono # 0.1 - 1.3 k/uL 0.21   (LL): Data is critically low  (L): Data is abnormally low  (H): Data is abnormally high        IMAGING DATA:    CT ABDOMEN PELVIS WO CONTRAST Additional Contrast? None    Result Date: 10/10/2022  EXAMINATION: CT OF THE ABDOMEN AND PELVIS WITHOUT CONTRAST 10/10/2022 2:37 pm TECHNIQUE: CT of the abdomen and pelvis was performed without the administration of intravenous contrast. Multiplanar reformatted images are provided for review. Automated exposure control, iterative reconstruction, and/or weight based adjustment of the mA/kV was utilized to reduce the radiation dose to as low as reasonably achievable. COMPARISON: 01/28/2022 HISTORY: ORDERING SYSTEM PROVIDED HISTORY: abdominal pain TECHNOLOGIST PROVIDED HISTORY: abdominal pain Is the patient pregnant?->No Reason for Exam: abd pain FINDINGS: LOWER CHEST: Extensive pulmonary in nodules with cavitation and focal consolidative changes throughout the visualized bilateral lower lobes are highly concerning for septic emboli within the lungs. KIDNEYS AND URINARY TRACT: Bilateral 2-4 mm nonobstructing kidney stones are visualized. . There is fluid density adjacent to the inferior pole of right kidney measuring 34 x 14 mm. This likely represents extrarenal pelvis. There is no evidence for hydronephrosis. The ureters are of normal course and caliber. ORGANS: Visualized portions of the liver, spleen, pancreas, gallbladder, and adrenal glands demonstrate no acute abnormality. GI/BOWEL: No bowel obstruction. No evidence of acute appendicitis. Status post gastric bypass. PELVIS: The bladder and pelvic organs are unremarkable. PERITONEUM/RETROPERITONEUM: No free air or free fluid is noted. No pathologically enlarged lymphadenopathy. The vasculature do not demonstrate acute abnormality. BONES/SOFT TISSUES: The osseous structures demonstrate no acute abnormality. Lack of contrast administration significantly decreases sensitivity of the study for evaluation of parenchymal lesions and  vascular pathologies.  Extensive pulmonary nodules with cavitation and focal consolidative changes throughout the visualized bilateral lower lobes are highly concerning for septic emboli within the lungs. For further evaluation contrast enhanced chest CT can be obtained. There is fluid density adjacent to the inferior pole of right kidney, likely extrarenal pelvis. For further evaluation post-contrast images of the abdomen is recommended. Status post gastric bypass. Bilateral 2-4 mm nonobstructing kidney stones are visualized. . The findings were sent to the Radiology Results [id number] at 5:11 pm on 10/10/2022 to be communicated to a licensed caregiver. XR CHEST (SINGLE VIEW FRONTAL)    Result Date: 10/23/2022  EXAMINATION: ONE XRAY VIEW OF THE CHEST. ONE PORTABLE AP VIEW OF CHEST. 10/23/2022 12:41 am COMPARISON: Portable chest x-ray on 10/22/2022 at 0938 hours. HISTORY: ORDERING SYSTEM PROVIDED HISTORY: L chest tube out TECHNOLOGIST PROVIDED HISTORY: L chest tube out Reason for Exam: L chest tube out FINDINGS: In right lower lung, there are heterogeneous dense opacities, indicating pneumonia and atelectatic changes which are increased as compared to previous chest x-ray. At the right CP angle area, there is confluent density indicating pleural effusion versus significant pleural thickening or atelectatic changes or infiltrates. In the right mid and upper lung fields, there are mild heterogeneous dense opacities indicating scattered infiltrates and atelectatic changes, similar to previous study. At the left lung base, there are mild atelectatic changes with or without mild infiltrates, similar to previous study. In the lateral portion of left upper lung field, there is focal density measuring 3 cm x 2.4 cm which may be due to focal infiltrates or mass lesion, unchanged as compared to previous study. Previous noted left basilar pleural tube has been withdrawn. There appears to be a pleural tube in the right lateral CP angle area similar to previous chest x-ray.   Superior to the right pleural drainage tube there is significant density which may be due to loculated pleural effusion and with combination of infiltrates and atelectasis. No pneumothorax. Mild cardiomegaly. Minimal-to-mild pulmonary vascular congestion. There is no overt pulmonary edema. No pneumoperitoneum. The right basilar opacity due to combination of pleural effusion or pleural thickening and pulmonary infiltrates and atelectasis appears to be increased to some extent as compared to previous chest x-ray. In the rest of both lungs, the infiltrates are unchanged. Mild-to-moderate pulmonary vascular congestion without obvious pulmonary edema. XR CHEST (SINGLE VIEW FRONTAL)    Result Date: 10/21/2022  EXAMINATION: ONE XRAY VIEW OF THE CHEST 10/21/2022 1:37 pm COMPARISON: 10/20/2022 HISTORY: ORDERING SYSTEM PROVIDED HISTORY: s/p bilateral chest tube placemetnt in IR TECHNOLOGIST PROVIDED HISTORY: s/p bilateral chest tube placemetnt in IR Reason for Exam: Bilateral chest tube placement FINDINGS: Interval placement of bilateral pleural pigtail catheters projecting over the lower chest on each side. Right arm PICC line tip is at the SVC/RA junction region. Left shoulder prosthesis. Partially visualized fusion hardware cervical spine. No significant pneumothorax is seen. Stable cardiomediastinal silhouette. Cholecystectomy clips. Bilateral parenchymal nodules/masses with cavitation, better shown on the recent CT, and suspicious for septic emboli. Patchy right perihilar and left lower lobe atelectasis/airspace disease. Interval placement of bilateral pleural pigtail catheters. XR LUMBAR SPINE (2-3 VIEWS)    Result Date: 10/12/2022  EXAMINATION: XRAY VIEWS OF THE LUMBAR SPINE 10/12/2022 2:06 pm COMPARISON: None. HISTORY: ORDERING SYSTEM PROVIDED HISTORY: back pain TECHNOLOGIST PROVIDED HISTORY: back pain Reason for Exam: back pain FINDINGS: No compression fracture or subluxation. Disc spaces maintained.   Pedicles are intact. Diffuse calcification nondilated abdominal aorta. Unremarkable lumbar spine examination. XR ABDOMEN (KUB) (SINGLE AP VIEW)    Result Date: 10/12/2022  EXAMINATION: ONE SUPINE XRAY VIEW(S) OF THE ABDOMEN 10/12/2022 2:06 pm COMPARISON: 02/01/2022 HISTORY: ORDERING SYSTEM PROVIDED HISTORY: abd pain TECHNOLOGIST PROVIDED HISTORY: abd pain Reason for Exam: abd pain FINDINGS: Mildly dilated colon and several small bowel loops. Residual stool in colon. No urinary tract calcifications. Osseous structures unremarkable. Findings consistent with mild diffuse ileus. No bowel obstruction or urinary tract calcifications. CT CHEST WO CONTRAST    Result Date: 10/21/2022  EXAMINATION: CT OF THE CHEST WITHOUT CONTRAST 10/21/2022 8:50 am TECHNIQUE: CT of the chest was performed without the administration of intravenous contrast. Multiplanar reformatted images are provided for review. Automated exposure control, iterative reconstruction, and/or weight based adjustment of the mA/kV was utilized to reduce the radiation dose to as low as reasonably achievable. COMPARISON: 10/20/2022, 10/17/2022 HISTORY: ORDERING SYSTEM PROVIDED HISTORY: diagonsis of pleural space infection ---- possible procedure pending results TECHNOLOGIST PROVIDED HISTORY: diagonsis of pleural space infection ---- possible procedure pending results Is the patient pregnant?->No Reason for Exam: order states pleural space infection, pt poor historian FINDINGS: Mediastinum: Interval placement of a right arm PICC line with its tip in the proximal right atrium. Mild calcific plaque of the thoracic aorta. Ascending aorta measures up to 3.7 cm. Coronary vascular calcifications. Similar small mediastinal lymph nodes without bulky adenopathy. Lungs/pleura: Again shown are multiple generally similar bilateral masslike densities, some with cavitation. Again findings are suggestive of septic emboli.   Overall size and distribution are similar to the previous study from yesterday. Similar small bilateral pleural effusions which appear partially loculated. There is mild respiratory motion artifact. Mild lower lobe consolidation with air bronchograms, greater on the left. Subtle ground-glass changes in the lingula and left lower lobe likely inflammatory/infectious. Upper Abdomen: No acute findings. Previous cholecystectomy. Postoperative changes of the stomach. Soft Tissues/Bones: Degenerative changes and previous ACDF lower cervical spine. Left shoulder prosthesis. Overall relatively stable exam.  Small pleural effusions which appear partially loculated with multiple bilateral masses, many with cavitation, suggesting septic emboli. CT CHEST WO CONTRAST  s normal in size. There is no pericardial effusion. Thoracic aorta and pulmonary arteries are grossly normal in caliber. There is calcified plaque at the aortic arch. Moderate coronary artery calcification. There is a mildly enlarged pretracheal lymph node, measuring 1 cm in short axis dimension. No other visible mediastinal lymphadenopathy. Lungs/pleura: There is a small amount of mildly loculated pleural fluid in the right hemithorax. Small left pleural effusion is noted as well, less loculated in appearance. There are numerous nodular opacities throughout both lungs, increased from 10/10/2022. Many (but not all) of the nodules are cavitary. There is focal consolidation with cavitation in the superior segment right lower lobe. Focal consolidation is also noted at the left base. Largest nodules are on the right, measuring up to 2.9 cm. No pneumothorax. Upper Abdomen: No acute findings in the limited visualized upper abdomen. Soft Tissues/Bones: Left shoulder arthroplasty. No acute or suspicious osseous abnormality. Limited, unremarkable renal ultrasound. No hydronephrosis.  RECOMMENDATIONS: Unavailable     IR FLUORO GUIDED CVA DEVICE PLMT/REPLACE/REMOVAL    Result Date: 10/20/2022  PROCEDURE: ULTRASOUND GUIDED VASCULAR ACCESS. FLUOROSCOPY GUIDED PICC PLACEMENT 10/20/2022. HISTORY: ORDERING SYSTEM PROVIDED HISTORY: IV Abx TECHNOLOGIST PROVIDED HISTORY: IV Abx Is the patient pregnant?->No SEDATION: None FLUOROSCOPY DOSE AND TYPE OR TIME AND EXPOSURES: 12 seconds; D AP 10 cGy cm2 TECHNIQUE: Informed consent was obtained from the patient's family member after a detailed explanation of the procedure including risks, benefits, and alternatives. Universal protocol was observed. The right arm was prepped and draped in sterile fashion using maximum sterile barrier technique. Local anesthesia was achieved with lidocaine. A micropuncture needle was used to access the right basilic vein using ultrasound guidance. An ultrasound image demonstrating patency of the vein with needle tip located within it. An image was obtained and stored in PACs. A 0.018 guidewire was used to place a peel-a-way sheath and a 5 [place]ian dual-lumen PICC was advanced with fluoroscopic guidance with the tip at the cavo-atrial junction. Fluoroscopy shows right lung masses/cavitary lesions better shown on recent chest CT. Fusion hardware lower cervical spine. The catheter flushed easily and there was a good blood return. The catheter was secured to the skin. The patient tolerated the procedure well and there were no immediate complications. EBL: Less than 3 mL FINDINGS: Fluoroscopic image demonstrates the tip of the catheter at the cavo-atrial junction. Successful ultrasound and fluoroscopy guided PICC placement     XR CHEST PORTABLE    Result Date: 10/25/2022  EXAMINATION: ONE XRAY VIEW OF THE CHEST 10/25/2022 6:12 pm COMPARISON: 10/24/2022 HISTORY: ORDERING SYSTEM PROVIDED HISTORY: Patient with right-sided chest tube. TECHNOLOGIST PROVIDED HISTORY: Patient with right-sided chest tube. Reason for Exam: Rt. sided chest tube Follow-up exam FINDINGS: Right pleural catheter is noted.   No significant interval change in bilateral airspace opacities. Right PICC line is stable in positioning. Orthopedic hardware is stable in appearance in the cervical spine left shoulder. No obvious pneumothorax. Stable cardiac silhouette. The left costophrenic angle sharp. Interval placement of a right pleural catheter. No obvious pneumothorax. No significant interval change in bilateral airspace opacities. XR CHEST PORTABLE    Result Date: 10/24/2022  EXAMINATION: ONE XRAY VIEW OF THE CHEST 10/24/2022 6:27 am COMPARISON: 10/23/2022 HISTORY: ORDERING SYSTEM PROVIDED HISTORY: Follow-up empyema; pleural catheters fell out TECHNOLOGIST PROVIDED HISTORY: Follow-up empyema; pleural catheters fell out Reason for Exam: post chest tube removal FINDINGS: Pleural pigtail catheter is no longer visualized. Left shoulder prosthesis and fusion hardware cervical spine. Right arm PICC line tip is in the right atrium. Stable cardiomediastinal silhouette. There is improved aeration of the right lung base. No significant pneumothorax is seen. No large pleural effusions within limits of the exam.  There are nodular parenchymal opacities in both lungs, some with cavitation, compatible with a septic emboli and better shown on the prior CT. Cholecystectomy clips. Improved aeration of the right lung base since the prior exam.     XR CHEST PORTABLE    Result Date: 10/22/2022  EXAMINATION: ONE XRAY VIEW OF THE CHEST 10/22/2022 10:19 am COMPARISON: 10/21/2022 at 13:37 HISTORY: ORDERING SYSTEM PROVIDED HISTORY: Pigtail position TECHNOLOGIST PROVIDED HISTORY: Pigtail position Reason for Exam: Pigtail position FINDINGS: Right PICC line terminating right atrium remains in place. Pigtail catheter along left lung base with the tip overlying the hemidiaphragm. Right pigtail pleural catheter has pulled back into a more peripheral position now called in region of costophrenic angle whereas previously it was more central. Normal cardiomediastinal silhouette. Mild interstitial prominence. Stable scattered moderate size nodular opacities in the lungs right greater than left. Patchy infiltrates at the lung bases probably unchanged allowing for difference in inspiration. Small right pleural effusion is new. Anterior fusion lower cervical spine and left total shoulder prosthesis. 1. Right pigtail pleural catheter has pulled back into the more peripheral position projecting at costophrenic angle whereas previously it was small central.  Left pigtail catheter continues to lie along the lung base. 2. Right PICC line unchanged. 3. Small right pleural effusion appears to be new but patchy bibasilar atelectasis unchanged. Diffuse nodular opacities also unchanged. XR CHEST PORTABLE    Result Date: 10/20/2022  EXAMINATION: ONE XRAY VIEW OF THE CHEST 10/20/2022 7:13 am COMPARISON: Chest x-ray dated 18 October 2022 HISTORY: ORDERING SYSTEM PROVIDED HISTORY: Septic emboli reevaluate pleural effusion on left TECHNOLOGIST PROVIDED HISTORY: Septic emboli reevaluate pleural effusion on left Reason for Exam: hx. of pleural effusion. FINDINGS: Similar appearance of bilateral nodular airspace opacities. Trace left pleural effusion. No pneumothorax. Stable cardiomediastinal silhouette     1. Stable appearance of bilateral nodular opacities in both lungs suspicious for underlying septic emboli better seen on the chest CT dated 17 October 2022 2. Trace left pleural effusion     XR CHEST PORTABLE    Result Date: 10/18/2022  EXAMINATION: ONE XRAY VIEW OF THE CHEST 10/18/2022 6:24 pm COMPARISON: Chest radiograph dated 10/11/2022. Chest CT dated 10/17/2022. HISTORY: ORDERING SYSTEM PROVIDED HISTORY: Empyema, s/p thoracentesis TECHNOLOGIST PROVIDED HISTORY: Empyema, s/p thoracentesis Reason for Exam: S/p thoracentesis FINDINGS: The cardiac silhouette appears within normal limits. Previously seen left-sided pleural effusion appears to have decreased in size.   There is a small left and trace right pleural effusion. There is redemonstration of multiple bilateral nodular opacity of the both lungs, suspicious for underlying septic emboli better seen and evaluated on the chest CT dated 10/17/2022. Components of left reverse total shoulder arthroplasty and changes of prior orthopedic fixation/fusion of the cervicothoracic spine are partially visualized. There are cholecystectomy clips overlying the right upper abdomen. Interval decrease in size of left-sided pleural effusion. Small left and trace right pleural effusion. Redemonstration of bilateral nodular opacities of the both lungs, suspicious for underlying septic emboli better seen and evaluated on the chest CT dated 10/17/2022. XR CHEST PORTABLE    Result Date: 10/11/2022  EXAMINATION: ONE XRAY VIEW OF THE CHEST 10/11/2022 6:35 am COMPARISON: 10/10/2022 HISTORY: ORDERING SYSTEM PROVIDED HISTORY: line placement TECHNOLOGIST PROVIDED HISTORY: line placement Reason for Exam: R sided IJ central line placement FINDINGS: Right IJ central line terminating at cavoatrial junction new from prior. Normal cardiomediastinal silhouette. Small patchy opacities left lung base reflecting subsegmental atelectasis. Scattered patchy opacities with a somewhat nodular appearance noted in the periphery of the lungs more pronounced on the right. This can be further assessed with CT. No change from prior. No pleural effusion or pneumothorax. Left shoulder prosthesis and cervical spine fusion noted. placed. Findings were discussed with Dr. Kylee Colin At 1:00 pm on 10/24/2022.      IR GUIDED PERC PLEURAL DRAIN W CATH INSERT    Result Date: 10/21/2022  PROCEDURE: ULTRASOUNDGUIDED BILATERAL THORACENTESIS WITH BILATERAL PLEURAL DRAINAGE CATHETER PLACEMENT 10/21/2022 HISTORY: ORDERING SYSTEM PROVIDED HISTORY: diagnositic and possibly therapeutic TECHNOLOGIST PROVIDED HISTORY: diagnositic and possibly therapeutic Which side should the procedure be BILATERAL Is the patient pregnant?->No History of septic emboli with previous left thoracentesis demonstrating infected fluid, CT shows loculated effusions, please perform diagnostic and therapeutic thoracentesis is with bilateral pigtail catheter placement for drainage. TECHNIQUE AND FINDINGS: Informed consent was obtained from the patient's family member after a detailed explanation of the procedure including risks, benefits, and alternatives. Universal protocol was performed. The patient's posterior chest was prepped and draped in standard sterile fashion. 1% lidocaine was used for local anesthesia. Procedure was performed with the patient in an upright position. Initially attention was directed towards the left side. Ultrasound shows a very small residual loculated pleural effusion in the medial left lower chest.  Skin over the area is prepped and draped in standard sterile fashion. 1% lidocaine used for local anesthesia. Using realtime ultrasound guidance an 8 Thai pigtail catheter was placed using trocar technique. Ultrasound images show a safe tract and access into the fluid. Thoracentesis was performed with removal of a small amount of slightly turbid yellow fluid. The catheter was sutured to the skin and secured in place with a Vaseline gauze dressing. The tube was connected to an atrium drainage system. Attention was then directed towards the right side. Ultrasound right chest shows a small right pleural effusion which appears to be partially loculated with minimal debris and septations. Skin over the area was prepped and draped in standard sterile fashion. 1% lidocaine used for local anesthesia. Using realtime ultrasound guidance an 8 Thai pigtail catheter was advanced into the pleural fluid using trocar technique. Ultrasound images show a safe tract and access into the fluid. Thoracentesis was performed with removal of approximately 20 mL of fairly clear light red colored fluid.   Sample was sent for the requested studies. The catheter was sutured to the skin and secured in place with a Vaseline gauze dressing. Tube was connected to an atrium drainage system. There are no immediate complications. The patient left the department in stable condition. Further tube management per the pulmonary team. EBL: Less than 5 mL     Successful ultrasound-guided bilateral pleural pigtail catheter placement (8 Welsh catheters were placed on each side). Small partially loculated pleural effusions, slightly more prominent on the right. Fluid sample was sent for the requested studies. IR GUIDED PERC PLEURAL DRAIN W CATH INSERT    Result Date: 10/21/2022  PROCEDURE: ULTRASOUNDGUIDED BILATERAL THORACENTESIS WITH BILATERAL PLEURAL DRAINAGE CATHETER PLACEMENT 10/21/2022 HISTORY: ORDERING SYSTEM PROVIDED HISTORY: diagnositic and possibly therapeutic TECHNOLOGIST PROVIDED HISTORY: diagnositic and possibly therapeutic Which side should the procedure be BILATERAL Is the patient pregnant?->No History of septic emboli with previous left thoracentesis demonstrating infected fluid, CT shows loculated effusions, please perform diagnostic and therapeutic thoracentesis is with bilateral pigtail catheter placement for drainage. TECHNIQUE AND FINDINGS: Informed consent was obtained from the patient's family member after a detailed explanation of the procedure including risks, benefits, and alternatives. Universal protocol was performed. The patient's posterior chest was prepped and draped in standard sterile fashion. 1% lidocaine was used for local anesthesia. Procedure was performed with the patient in an upright position. Initially attention was directed towards the left side. Ultrasound shows a very small residual loculated pleural effusion in the medial left lower chest.  Skin over the area is prepped and draped in standard sterile fashion. 1% lidocaine used for local anesthesia.   Using realtime ultrasound guidance an 8 Macedonian pigtail catheter was placed using trocar technique. Ultrasound images show a safe tract and access into the fluid. Thoracentesis was performed with removal of a small amount of slightly turbid yellow fluid. The catheter was sutured to the skin and secured in place with a Vaseline gauze dressing. The tube was connected to an atrium drainage system. Attention was then directed towards the right side. Ultrasound right chest shows a small right pleural effusion which appears to be partially loculated with minimal debris and septations. Skin over the area was prepped and draped in standard sterile fashion. 1% lidocaine used for local anesthesia. Using realtime ultrasound guidance an 8 Macedonian pigtail catheter was advanced into the pleural fluid using trocar technique. Ultrasound images show a safe tract and access into the fluid. Thoracentesis was performed with removal of approximately 20 mL of fairly clear light red colored fluid. Sample was sent for the requested studies. The catheter was sutured to the skin and secured in place with a Vaseline gauze dressing. Tube was connected to an atrium drainage system. There are no immediate complications. The patient left the department in stable condition. Further tube management per the pulmonary team. EBL: Less than 5 mL     Successful ultrasound-guided bilateral pleural pigtail catheter placement (8 Macedonian catheters were placed on each side). Small partially loculated pleural effusions, slightly more prominent on the right. Fluid sample was sent for the requested studies.          IMPRESSION:   Primary Problem  SIRS (systemic inflammatory response syndrome) [facility])    Active Hospital Problems    Diagnosis Date Noted    Thrombocytopenia ([facility] [D69.6] 10/26/2022     Priority: Medium    MRSA bacteremia [R78.81, B95.62] 10/23/2022     Priority: Medium    Acute septic pulmonary embolism ([facility] [I26.90] 10/23/2022     Priority: Medium    Cavitating mass of lung [J98.4] 10/23/2022     Priority: Medium    Allergy to antibiotic [Z88.1] 10/23/2022     Priority: Medium    Acute metabolic encephalopathy [N26.20] 10/18/2022     Priority: Medium    Delirium due to another medical condition [F05] 10/17/2022     Priority: Medium    Elevated d-dimer [R79.89] 10/15/2022     Priority: Medium    Leukocytosis [D72.829] 10/15/2022     Priority: Medium    Pulmonary nodules [R91.8] 10/15/2022     Priority: Medium    NICKI (acute kidney injury) ([facility] [N17.9] 10/11/2022     Priority: Medium    Severe malnutrition ([address] [E43] 10/11/2022     Priority: Medium    SIRS (systemic inflammatory response syndrome) ([address] [R65.10] 10/10/2022     Priority: Medium    H/O gastric bypass [Z98.84] 02/13/2020    Acute encephalopathy [G93.40] 12/26/2016    COPD (chronic obstructive pulmonary disease) ([facility] [J44.9] 10/25/2016    Bipolar disorder, mixed ([address] [F31.60] 11/06/2014    Depression with suicidal ideation [F32. 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 I explained to the patient the nature of this problem. I explained the significance of these abnormalities and possible etiology and management options  Anemia and thrombocytopenia are related to septic shock and active infective endocarditis. The patient condition is stabilizing. Appreciate infectious disease and pulmonary input. Plan is for 6 more weeks of IV antibiotics. Labs were not done today, we will repeat her CBC in the morning  I would hold off on transfusion until hemoglobin drops under 7 or platelets are under 10  Continue antibiotics and care per ID  Overall prognosis is guarded . Discussed with father and Nurse. Thank you for asking us to see this patient.             Soy Flynn MD  Hematologist/Medical Oncologist  [facility] hematology oncology physicians            This note is created with the assistance of a speech recognition program.  While intending to generate a document that actually reflects the content of the visit, the document can still have some errors including those of syntax and sound a like substitutions which may escape proof reading. It such instances, actual meaning can be extrapolated by contextual diversion.

## 2022-10-31 NOTE — CARE COORDINATION
ONGOING DISCHARGE PLAN:    Patient is alert and oriented x4. Spoke with patient regarding discharge plan and patient confirms that plan is still to discharge to a [facility]  Peer to Peer done today   Will need a expedited appeal done  Ayesha Lambert to ask him to start expedited appeal       Will continue to follow for additional discharge needs.     Electronically signed by Lizy Pantoja RN on 10/31/2022 at 2:54 PM

## 2022-10-31 NOTE — PROGRESS NOTES
Comprehensive Nutrition Assessment    Type and Reason for Visit:  Reassess    Nutrition Recommendations/Plan:   Continue diet and supplements as ordered. Malnutrition Assessment:  Malnutrition Status:  Severe malnutrition (10/11/22 1232)    Context:  Acute Illness     Findings of the 6 clinical characteristics of malnutrition:  Energy Intake:  50% or less of estimated energy requirements for 5 or more days  Weight Loss:  Greater than 7.5% over 3 months     Body Fat Loss: Moderate body fat loss Orbital, Triceps, Fat Overlying Ribs   Muscle Mass Loss: Moderate muscle mass loss Temples (temporalis), Clavicles (pectoralis & deltoids)  Fluid Accumulation:  No significant fluid accumulation     Strength:  Not Performed    Nutrition Assessment:    Nurse reports pt still not eating much but he has taken care of her for the last 3 days and today is the most alert he has seen her. Dad is usually with pt and helps her eat. Nutrition Related Findings:    Edema: Trace BUE's. BM-10-28. Labs & meds reviewed. Wound Type: Pressure Injury (refer to nursing flowsheet)       Current Nutrition Intake & Therapies:    Average Meal Intake: 26-50%, 1-25%  Average Supplements Intake: 26-50%, 1-25%  ADULT DIET; Regular  ADULT ORAL NUTRITION SUPPLEMENT; Breakfast, Lunch, Dinner; Standard High Calorie/High Protein Oral Supplement    Anthropometric Measures:  Height: 4' 11\" (149.9 cm)  Ideal Body Weight (IBW): 95 lbs (43 kg)    Admission Body Weight: 89 lb (40.4 kg)  Current Body Weight: 115 lb (52.2 kg) (weight discrepancy noted), 93.7 % IBW. Weight Source: Bed Scale  Current BMI (kg/m2): 23.2  Usual Body Weight: 113 lb (51.3 kg) (per past records)  % Weight Change (Calculated): -21.2                    BMI Categories: Normal Weight (BMI 18.5-24. 9)    Estimated Daily Nutrient Needs:  Energy Requirements Based On: Kcal/kg  Weight Used for Energy Requirements: Admission  Energy (kcal/day): 1414 kcal based on 35 kcal/kg admission  Weight Used for Protein Requirements: Admission  Protein (g/day): 50 gm based on 1.2 gm/kg admission  Method Used for Fluid Requirements: 1 ml/kcal      Nutrition Diagnosis:   Severe malnutrition related to impaired respiratory function, psychological cause or life stress as evidenced by intake 0-25%, poor intake prior to admission, BMI, weight loss, severe loss of subcutaneous fat, severe muscle loss    Nutrition Interventions:   Food and/or Nutrient Delivery: Continue Current Diet, Continue Oral Nutrition Supplement  Nutrition Education/Counseling: Education not indicated  Coordination of Nutrition Care: Continue to monitor while inpatient       Goals:  Previous Goal Met: No Progress toward Goal(s)  Goals: Meet at least 75% of estimated needs, PO intake 75% or greater       Nutrition Monitoring and Evaluation:   Behavioral-Environmental Outcomes: None Identified  Food/Nutrient Intake Outcomes: Food and Nutrient Intake, Supplement Intake  Physical Signs/Symptoms Outcomes: Biochemical Data, GI Status, Chewing or Swallowing, Fluid Status or Edema, Skin, Weight    Discharge Planning:    Continue Oral Nutrition Supplement, Continue current diet     [facility], [address] [place] Cristina [facility]

## 2022-10-31 NOTE — CONSENT
Informed Consent for Blood Component Transfusion Note    I have discussed with the father the rationale for blood component transfusion; its benefits in treating or preventing fatigue, organ damage, or death; and its risk which includes mild transfusion reactions, rare risk of blood borne infection, or more serious but rare reactions. I have discussed the alternatives to transfusion, including the risk and consequences of not receiving transfusion. The father had an opportunity to ask questions and had agreed to proceed with transfusion of blood components.  This was done on 10/26/2022 at [address]    Electronically signed by Debra Mcgee MD on 10/31/22 at 7:51 AM EDT

## 2022-10-31 NOTE — PROGRESS NOTES
Pulmonary Progress Note  [facility]      Patient - Jose Ham,  Age - 46 y.o.    - 1970      Room Number - 2109/2109-01   MRN -  [id number]   [facility]  Date of Admission -  10/10/2022  4:37 AM        Consulting Lynnette Kent MD  Primary Care Physician - Mindy Maddox MD     SUBJECTIVE   On room air, denies any dyspnea, pigtail catheter drain only 7 mL overnight    OBJECTIVE   VITALS    height is 4' 11\" (1.499 m) and weight is 115 lb 15.4 oz (52.6 kg). Her oral temperature is 98.6 °F (37 °C). Her blood pressure is 153/71 (abnormal) and her pulse is 71. Her respiration is 18 and oxygen saturation is 97%. Body mass index is 23.42 kg/m². Temperature Range: Temp: 98.6 °F (37 °C) Temp  Av.2 °F (36.8 °C)  Min: 97.8 °F (36.6 °C)  Max: 98.6 °F (37 °C)  BP Range:  Systolic (46DWE), CHI:384 , Min:114 , DLU:752     Diastolic (57OZX), LTS:42, Min:61, Max:79    Pulse Range: Pulse  Av.3  Min: 63  Max: 76  Respiration Range: Resp  Av.6  Min: 16  Max: 18  Current Pulse Ox[de-identified]  SpO2: 97 %  24HR Pulse Ox Range:  SpO2  Av.7 %  Min: 94 %  Max: 100 %  Oxygen Amount and Delivery: O2 Flow Rate (L/min): 0 L/min    Wt Readings from Last 3 Encounters:   10/30/22 115 lb 15.4 oz (52.6 kg)   22 96 lb 8 oz (43.8 kg)   22 105 lb (47.6 kg)       I/O (24 Hours)    Intake/Output Summary (Last 24 hours) at 10/31/2022 1150  Last data filed at 10/31/2022 0441  Gross per 24 hour   Intake 120 ml   Output 7 ml   Net 113 ml       EXAM     General Appearance  Awake, alert, oriented, in no acute distress, frail  HEENT - normocephalic, atraumatic.  []  Mallampati  [] Crowded airway   [] Macroglossia  []  Retrognathia  [] Micrognathia  []  Normal tongue size []  Normal Bite  [] Alise sign positive    Neck - Supple,  trachea midline   Lungs -diminished with occasional crackles  Cardiovascular - Heart sounds are normal.  Regular rate and rhythm   Abdomen - Soft, nontender, nondistended, no masses or organomegaly  Neurologic - There are no focal motor or sensory deficits  Skin - No bruising or bleeding  Extremities - No clubbing, cyanosis, edema    MEDS      divalproex  1,000 mg Oral BID    ceftaroline fosamil (TEFLARO) IVPB  600 mg IntraVENous Q12H    carvedilol  25 mg Oral BID     Vitamin D  2,000 Units Oral Daily    sodium chloride flush  5-40 mL IntraVENous 2 times per day    ARIPiprazole  5 mg Oral Daily    atorvastatin  40 mg Oral Daily    DULoxetine  60 mg Oral Daily    ferrous sulfate  325 mg Oral BID     ipratropium-albuterol  1 ampule Inhalation Q4H WA      sodium chloride      sodium chloride 50 mL/hr at 10/18/22 1200     sodium chloride, sodium chloride flush, labetalol, potassium chloride **OR** potassium alternative oral replacement **OR** potassium chloride, hydrALAZINE, sodium chloride flush, sodium chloride, acetaminophen, ondansetron **OR** ondansetron, sodium chloride flush, perflutren lipid microspheres    LABS   CBC   Recent Labs     10/30/22  0617   WBC 5.8   HGB 7.9*   HCT 24.8*   MCV 83.0   PLT 40*     BMP:   Lab Results   Component Value Date/Time     10/28/2022 05:41 AM    K 3.8 10/28/2022 05:41 AM     10/28/2022 05:41 AM    CO2 29 10/28/2022 05:41 AM    BUN 15 10/28/2022 05:41 AM    LABALBU 1.7 10/22/2022 04:15 PM    CREATININE 0.86 10/28/2022 05:41 AM    CALCIUM 7.6 10/28/2022 05:41 AM    GFRAA >60 04/08/2022 07:17 AM    LABGLOM >60 10/28/2022 05:41 AM     ABGs:  Lab Results   Component Value Date/Time    PHART 7.452 10/22/2022 03:24 PM    PO2ART 86.7 10/22/2022 03:24 PM    YTY1OBY 42.7 10/22/2022 03:24 PM      Lab Results   Component Value Date/Time    MODE PRVC 12/28/2016 04:36 AM     Ionized Calcium:  No results found for: IONCA  Magnesium:    Lab Results   Component Value Date/Time    MG 2.2 10/22/2022 04:15 PM     Phosphorus:    Lab Results   Component Value Date/Time    PHOS 3.6 10/18/2022 04:50 AM        LIVER PROFILE No results for input(s): AST, ALT, LIPASE, BILIDIR, BILITOT, ALKPHOS in the last 72 hours. Invalid input(s): AMYLASE,  ALB  INR No results for input(s): INR in the last 72 hours.   PTT   Lab Results   Component Value Date    APTT 30.6 10/26/2022         RADIOLOGY         ASSESSMENT/PLAN   MRSA bacteremia  Septic emboli  E. coli UTI  Acute kidney injury, resolved  Suspect COPD, seen once in the office by Dr. Zoila Hager in 2019, FEV1 2.04 L or 86% predicted  Parapneumonic effusion, pleural fluid positive for staph  History of opiate and cocaine use-reportedly uses crack cocaine at least twice weekly  History of tobacco use  Recent COVID +9/30  Elevated troponin likely type II MI  Encephalopathy, improving  History of hypertension  History of seizure disorder  Full code      Okay to remove pigtail catheter  Will need extensive rehab, but do not feel that she is ill enough for LTAC  Antibiotics per ID will need to be on IV Teflaro for another month  She may have extensive scarring from her septic emboli  Discussed with patient's   Electronically signed by Jose Weir MD on 10/31/2022 at 11:50 AM

## 2022-10-31 NOTE — PROGRESS NOTES
Right chest tube catheter removed at bedside without complication. Occlusive dressing to site. Nurse Caryn Meckel updated.

## 2022-10-31 NOTE — FLOWSHEET NOTE
10/31/22 1032   Treatment Team Notification   Reason for Communication Evaluate   Team Member Name Dr. Severiano Maurer Attending Provider   Method of Communication Secure Message   Response Waiting for response   Notification Time 1032   5 [facility] followed by a 2 [facility]  Patient asymptomatic and currently in NSR    10:43 -  No new orders

## 2022-10-31 NOTE — PLAN OF CARE
Problem: Discharge Planning  Goal: Discharge to home or other facility with appropriate resources  Outcome: Progressing     Problem: Pain  Goal: Verbalizes/displays adequate comfort level or baseline comfort level  Outcome: Progressing     Problem: Skin/Tissue Integrity  Goal: Absence of new skin breakdown  Description: 1. Monitor for areas of redness and/or skin breakdown  2. Assess vascular access sites hourly  3. Every 4-6 hours minimum:  Change oxygen saturation probe site  4. Every 4-6 hours:  If on nasal continuous positive airway pressure, respiratory therapy assess nares and determine need for appliance change or resting period.   Outcome: Progressing     Problem: Safety - Adult  Goal: Free from fall injury  Outcome: Progressing  Flowsheets (Taken 10/31/2022 0756)  Free From Fall Injury: Instruct family/caregiver on patient safety     Problem: ABCDS Injury Assessment  Goal: Absence of physical injury  Outcome: Progressing  Flowsheets (Taken 10/31/2022 0756)  Absence of Physical Injury: Implement safety measures based on patient assessment

## 2022-11-01 ENCOUNTER — APPOINTMENT (OUTPATIENT)
Dept: GENERAL RADIOLOGY | Age: 52
DRG: 871 | End: 2022-11-01
Payer: COMMERCIAL

## 2022-11-01 LAB — AMMONIA: 21 UMOL/L (ref 11–51)

## 2022-11-01 PROCEDURE — 6370000000 HC RX 637 (ALT 250 FOR IP): Performed by: INTERNAL MEDICINE

## 2022-11-01 PROCEDURE — 94640 AIRWAY INHALATION TREATMENT: CPT

## 2022-11-01 PROCEDURE — 99232 SBSQ HOSP IP/OBS MODERATE 35: CPT | Performed by: INTERNAL MEDICINE

## 2022-11-01 PROCEDURE — 82140 ASSAY OF AMMONIA: CPT

## 2022-11-01 PROCEDURE — 94664 DEMO&/EVAL PT USE INHALER: CPT

## 2022-11-01 PROCEDURE — 6370000000 HC RX 637 (ALT 250 FOR IP): Performed by: FAMILY MEDICINE

## 2022-11-01 PROCEDURE — 2580000003 HC RX 258: Performed by: INTERNAL MEDICINE

## 2022-11-01 PROCEDURE — 6360000002 HC RX W HCPCS: Performed by: INTERNAL MEDICINE

## 2022-11-01 PROCEDURE — 99232 SBSQ HOSP IP/OBS MODERATE 35: CPT | Performed by: FAMILY MEDICINE

## 2022-11-01 PROCEDURE — 36415 COLL VENOUS BLD VENIPUNCTURE: CPT

## 2022-11-01 PROCEDURE — 1200000000 HC SEMI PRIVATE

## 2022-11-01 PROCEDURE — 94761 N-INVAS EAR/PLS OXIMETRY MLT: CPT

## 2022-11-01 PROCEDURE — 71045 X-RAY EXAM CHEST 1 VIEW: CPT

## 2022-11-01 PROCEDURE — 2500000003 HC RX 250 WO HCPCS: Performed by: INTERNAL MEDICINE

## 2022-11-01 RX ORDER — ARIPIPRAZOLE 15 MG/1
7.5 TABLET ORAL DAILY
Status: DISCONTINUED | OUTPATIENT
Start: 2022-11-02 | End: 2022-11-04 | Stop reason: HOSPADM

## 2022-11-01 RX ORDER — LISINOPRIL 10 MG/1
10 TABLET ORAL DAILY
Status: DISCONTINUED | OUTPATIENT
Start: 2022-11-01 | End: 2022-11-04

## 2022-11-01 RX ADMIN — FERROUS SULFATE TAB 325 MG (65 MG ELEMENTAL FE) 325 MG: 325 (65 FE) TAB at 09:04

## 2022-11-01 RX ADMIN — DIVALPROEX SODIUM 500 MG: 500 TABLET, DELAYED RELEASE ORAL at 09:07

## 2022-11-01 RX ADMIN — IPRATROPIUM BROMIDE AND ALBUTEROL SULFATE 1 AMPULE: 2.5; .5 SOLUTION RESPIRATORY (INHALATION) at 14:55

## 2022-11-01 RX ADMIN — FERROUS SULFATE TAB 325 MG (65 MG ELEMENTAL FE) 325 MG: 325 (65 FE) TAB at 17:03

## 2022-11-01 RX ADMIN — LABETALOL HYDROCHLORIDE 20 MG: 5 INJECTION, SOLUTION INTRAVENOUS at 04:34

## 2022-11-01 RX ADMIN — IPRATROPIUM BROMIDE AND ALBUTEROL SULFATE 1 AMPULE: 2.5; .5 SOLUTION RESPIRATORY (INHALATION) at 19:44

## 2022-11-01 RX ADMIN — CARVEDILOL 25 MG: 25 TABLET, FILM COATED ORAL at 17:03

## 2022-11-01 RX ADMIN — IPRATROPIUM BROMIDE AND ALBUTEROL SULFATE 1 AMPULE: 2.5; .5 SOLUTION RESPIRATORY (INHALATION) at 07:34

## 2022-11-01 RX ADMIN — DIVALPROEX SODIUM 1000 MG: 500 TABLET, DELAYED RELEASE ORAL at 21:56

## 2022-11-01 RX ADMIN — SODIUM CHLORIDE, PRESERVATIVE FREE 10 ML: 5 INJECTION INTRAVENOUS at 22:05

## 2022-11-01 RX ADMIN — HYDRALAZINE HYDROCHLORIDE 10 MG: 20 INJECTION INTRAMUSCULAR; INTRAVENOUS at 23:04

## 2022-11-01 RX ADMIN — ARIPIPRAZOLE 5 MG: 5 TABLET ORAL at 09:07

## 2022-11-01 RX ADMIN — Medication 2000 UNITS: at 09:05

## 2022-11-01 RX ADMIN — CEFTAROLINE FOSAMIL 600 MG: 600 POWDER, FOR SOLUTION INTRAVENOUS at 10:00

## 2022-11-01 RX ADMIN — ATORVASTATIN CALCIUM 40 MG: 40 TABLET, FILM COATED ORAL at 21:56

## 2022-11-01 RX ADMIN — LISINOPRIL 10 MG: 10 TABLET ORAL at 09:04

## 2022-11-01 RX ADMIN — IPRATROPIUM BROMIDE AND ALBUTEROL SULFATE 1 AMPULE: 2.5; .5 SOLUTION RESPIRATORY (INHALATION) at 11:45

## 2022-11-01 RX ADMIN — CARVEDILOL 25 MG: 25 TABLET, FILM COATED ORAL at 09:05

## 2022-11-01 RX ADMIN — DULOXETINE 60 MG: 60 CAPSULE, DELAYED RELEASE ORAL at 09:04

## 2022-11-01 RX ADMIN — SODIUM CHLORIDE, PRESERVATIVE FREE 10 ML: 5 INJECTION INTRAVENOUS at 09:14

## 2022-11-01 RX ADMIN — CEFTAROLINE FOSAMIL 600 MG: 600 POWDER, FOR SOLUTION INTRAVENOUS at 21:57

## 2022-11-01 ASSESSMENT — PAIN SCALES - GENERAL: PAINLEVEL_OUTOF10: 0

## 2022-11-01 NOTE — CARE COORDINATION
Writer arcelia amado for an update on the pt.  She replied that they were working on reviewing and working on the carve out for the iv abx

## 2022-11-01 NOTE — PROGRESS NOTES
Progress Note  Date:2022       HKQW:[id number]  Patient Name:Anu Bourne     YOB: 1970     Age:51 y.o. Subjective    Subjective:  Symptoms:  Stable. (Patient sleeping. More awake yesterday per nursing. ). Diet:  Poor intake. Activity level: Impaired due to weakness. Review of Systems  Objective         Vitals Last 24 Hours:  TEMPERATURE:  Temp  Av.9 °F (36.6 °C)  Min: 97.5 °F (36.4 °C)  Max: 98.4 °F (36.9 °C)  RESPIRATIONS RANGE: Resp  Av.3  Min: 16  Max: 20  PULSE OXIMETRY RANGE: SpO2  Av.3 %  Min: 92 %  Max: 97 %  PULSE RANGE: Pulse  Av.1  Min: 60  Max: 68  BLOOD PRESSURE RANGE: Systolic (05KVQ), UCV:122 , Min:133 , YCO:923   ; Diastolic (72LVO), FTR:77, Min:70, Max:85    I/O (24Hr): No intake or output data in the 24 hours ending 22 07  Objective:  General Appearance:  Comfortable. Vital signs: (most recent): Blood pressure (!) 178/70, pulse 64, temperature 98.4 °F (36.9 °C), temperature source Oral, resp. rate 20, height 4' 11\" (1.499 m), weight 111 lb 8.8 oz (50.6 kg), SpO2 93 %. (BP elevated). Lungs:  Normal effort and normal respiratory rate. There are decreased breath sounds. Heart: Normal rate. Regular rhythm. S1 normal and S2 normal.    Labs/Imaging/Diagnostics    Labs:  CBC:  Recent Labs     10/30/22  0617   WBC 5.8   RBC 2.99*   HGB 7.9*   HCT 24.8*   MCV 83.0   RDW 16.8*   PLT 40*     CHEMISTRIES:No results for input(s): NA, K, CL, CO2, BUN, CREATININE, GLUCOSE, CA, PHOS, MG in the last 72 hours. PT/INR:No results for input(s): PROTIME, INR in the last 72 hours. APTT:No results for input(s): APTT in the last 72 hours. LIVER PROFILE:No results for input(s): AST, ALT, BILIDIR, BILITOT, ALKPHOS in the last 72 hours.     Imaging Last 24 Hours:  XR CHEST PORTABLE    Result Date: 10/31/2022  EXAMINATION: ONE XRAY VIEW OF THE CHEST 10/31/2022 7:09 am COMPARISON: Chest x-ray dated 2022 HISTORY:  Bridgette Coronado PROVIDED HISTORY: Chest tube on right TECHNOLOGIST PROVIDED HISTORY: Chest tube on right Reason for Exam: Chest tube on right FINDINGS: Stable bilateral airspace opacities. No pneumothorax. Probable small left pleural effusion. Right lower lobe pigtail drainage catheter in place. Right-sided PICC line with the tip in the superior vena cava     Stable exam with bilateral airspace opacities and trace left pleural effusion. No pneumothorax. XR CHEST PORTABLE    Result Date: 10/30/2022  EXAMINATION: ONE XRAY VIEW OF THE CHEST 10/30/2022 8:05 am COMPARISON: Chest radiograph dated 10/29/2022. Chest CT dated 10/26/2022. HISTORY: ORDERING SYSTEM PROVIDED HISTORY: chest tube TECHNOLOGIST PROVIDED HISTORY: chest tube Reason for Exam: Chest tube FINDINGS: Again seen is right-sided PICC line, appearing similar to the prior study. There is redemonstration of right basilar pleural drainage catheter. The cardiac silhouette is magnified and likely enlarged. There is redemonstration of extensive bilateral nodular opacities as well as worsening airspace opacities, particularly affecting the right upper lobe. There is suspected small bilateral pleural effusions. No large pneumothorax is seen. Components of left reverse total shoulder arthroplasty are seen. Changes of prior orthopedic fixation of the lower cervical spine, partially visualized. Redemonstration of bilateral nodular and airspace opacities with worsening aeration of the right upper lobe when compared to prior chest radiograph dated 10/29/2022.      Assessment//Plan           Hospital Problems             Last Modified POA    * (Principal) SIRS (systemic inflammatory response syndrome) ([facility] [address] 10/10/2022 Yes    NICKI (acute kidney injury) ([address] 10/11/2022 Yes    Severe malnutrition (HCC) (Chronic) 10/11/2022 Yes    Elevated d-dimer 10/15/2022 Yes    Leukocytosis 10/15/2022 Yes    Pulmonary nodules 10/15/2022 Yes    Delirium due to another medical condition 10/17/2022 Yes    Acute metabolic encephalopathy 53/54/1362 Yes    MRSA bacteremia 10/23/2022 Yes    Acute septic pulmonary embolism ([address] 10/23/2022 Yes    Cavitating mass of lung 10/23/2022 Yes    Allergy to antibiotic 10/23/2022 Yes    Thrombocytopenia ([address] 10/26/2022 Yes    Depression with suicidal ideation 10/17/2022 Yes    Bipolar disorder, mixed ([address] 10/11/2022 Yes    Acute encephalopathy 10/14/2022 Yes    COPD (chronic obstructive pulmonary disease) ([facility] [address] 10/11/2022 Yes    H/O gastric bypass 10/11/2022 Yes    Overview Signed 2/13/2020  7:19 PM by Rebekah Wilhelm MD     enlarged  pancreas, here for surgical work-up for EGD          Assessment & Plan    Add lisinopril back to hypertensive regimen    Discharge planning    Electronically signed by Rebekah Wilhelm MD on 11/1/22 at 7:20 AM EDT

## 2022-11-01 NOTE — CARE COORDINATION
Spoke to [facility] regarding discharge for this patient.  stated that this patient most likely will not get approved for a ltach due to the fact that the patient no longer has a chest tube and is on room air. I explained that the IV medication is too expensive for the facilities to take and I am getting denials from facilities for this reason. Anthony Herrera from [facility] stated that we will have to have the facility run the prior auth and request a carve out for the medication. I spoke to [facility] to inform them of this information and they stated that they will re look at this patient. For help with insurance we can call Anthony Herrera from [facility] at [phone] will continue to follow for needs . Trevor Moniquel

## 2022-11-01 NOTE — PROGRESS NOTES
BEHAVIORAL HEALTH FOLLOW-UP NOTE     11/1/2022     Patient was seen and examined in person, Chart reviewed   Patient's case discussed with staff/team    Chief Complaint: Bipolar disorder, lethargy, erratic behavior    Interim History:     The patient was seen at bedside. She was disoriented. She does not know that she is in the hospital.  She told me that she was at an address in [place] but it is not clear if this is her home address. The patient has been seeing a dog. She is having difficulty in sustaining attention. She is falling asleep intermittently. She is disorganized in her thought process.     BP (!) 165/76   Pulse 62   Temp 97.8 °F (36.6 °C)   Resp 16   Ht 4' 11\" (1.499 m)   Wt 111 lb 8.8 oz (50.6 kg)   SpO2 96%   BMI 22.53 kg/m²   Appetite:  [] Normal/Unchanged  [] Increased  [x] Decreased      Sleep:       [x] Normal/Unchanged  [] Fair       [] Poor              Energy:    [] Normal/Unchanged  [] Increased  [x] Decreased        Aggression:  [] yes  [x] no    Patient is [] able  [] unable to CONTRACT FOR SAFETY ON THE UNIT    PAST MEDICAL/PSYCHIATRIC HISTORY:   Past Medical History:   Diagnosis Date    Anemia     Arthritis     Asthma     Bipolar disorder, mixed ([facility] [address]     Carotid artery stenosis 2/13/2020    Cocaine abuse ([facility] [address] 11/4/2014    COPD (chronic obstructive pulmonary disease) ([facility] [address]     Degeneration of cervical intervertebral disc     Depression with anxiety     Depression with anxiety     Fibromyalgia 1/5/2017    GERD (gastroesophageal reflux disease)     History of migraine headaches 6/10/2014    History of renal calculi 6/10/2014    History of seizure disorder 6/10/2014    Hoarseness of voice     HTN (hypertension) 6/10/2014    Hyperlipidemia 6/10/2014    IGT (impaired glucose tolerance) 6/10/2014    Kidney stones     Lactose intolerance 6/10/2014    Leukopenia 6/10/2014    Major depressive disorder, recurrent episode, severe, without mention of psychotic behavior 11/6/2014    MVP (mitral valve prolapse)     Seizures (HCC)     Sleep apnea 6/10/2014    Unspecified diseases of blood and blood-forming organs        FAMILY/SOCIAL HISTORY:  Family History   Problem Relation Age of Onset    Diabetes Mother     Cancer Mother     Coronary Art Dis Father     COPD Father     Depression Brother     Alcohol Abuse Brother     Cancer Other         lung and skin    Diabetes Maternal Grandmother     Cancer Paternal Grandmother      Social History     Socioeconomic History    Marital status:      Spouse name: Not on file    Number of children: Not on file    Years of education: Not on file    Highest education level: Not on file   Occupational History    Occupation: disability   Tobacco Use    Smoking status: Every Day     Packs/day: 0.50     Years: 33.00     Pack years: 16.50     Types: Cigarettes    Smokeless tobacco: Never    Tobacco comments:     quit  2000 started again  1/13    Substance and Sexual Activity    Alcohol use: Yes     Comment: occasional    Drug use: Not Currently    Sexual activity: Yes   Other Topics Concern    Not on file   Social History Narrative    Not on file     Social Determinants of Health     Financial Resource Strain: Not on file   Food Insecurity: Not on file   Transportation Needs: Not on file   Physical Activity: Not on file   Stress: Not on file   Social Connections: Not on file   Intimate Partner Violence: Not on file   Housing Stability: Not on file           ROS:  [x] All negative/unchanged except if checked.  Explain positive(checked items) below:  [] Constitutional  [] Eyes  [] Ear/Nose/Mouth/Throat  [] Respiratory  [] CV  [] GI  []   [] Musculoskeletal  [] Skin/Breast  [] Neurological  [] Endocrine  [] Heme/Lymph  [] Allergic/Immunologic    Explanation:     MEDICATIONS:    Current Facility-Administered Medications:     lisinopril (PRINIVIL;ZESTRIL) tablet 10 mg, 10 mg, Oral, Daily, Jona Gill MD, 10 mg at 11/01/22 0904    [START ON 11/2/2022] ARIPiprazole (ABILIFY) tablet 7.5 mg, 7.5 mg, Oral, Daily, Jasvir Kamara MD    divalproex (DEPAKOTE) DR tablet 1,000 mg, 1,000 mg, Oral, BID, Frandy Lele, DO, 500 mg at 11/01/22 0907    ceftaroline fosamil (TEFLARO) 600 mg in sodium chloride 0.9 % 50 mL IVPB, 600 mg, IntraVENous, Q12H, Bianca Villalobos MD, Stopped at 11/01/22 1100    sodium chloride flush 0.9 % injection 10 mL, 10 mL, IntraVENous, PRN, Rajinder Gibson MD, 10 mL at 10/20/22 1638    carvedilol (COREG) tablet 25 mg, 25 mg, Oral, BID WC, Frandy Lele, DO, 25 mg at 11/01/22 0905    labetalol (NORMODYNE;TRANDATE) injection 20 mg, 20 mg, IntraVENous, BID PRN, Frandy Lele, DO, 20 mg at 11/01/22 0434    potassium chloride (KLOR-CON M) extended release tablet 40 mEq, 40 mEq, Oral, PRN, 40 mEq at 10/25/22 0849 **OR** potassium bicarb-citric acid (EFFER-K) effervescent tablet 40 mEq, 40 mEq, Oral, PRN, 40 mEq at 10/14/22 2031 **OR** potassium chloride 10 mEq/100 mL IVPB (Peripheral Line), 10 mEq, IntraVENous, PRN, Frandy Lele, DO, Last Rate: 100 mL/hr at 10/23/22 0258, 10 mEq at 10/23/22 0258    Vitamin D (CHOLECALCIFEROL) tablet 2,000 Units, 2,000 Units, Oral, Daily, Frandy Lele, DO, 2,000 Units at 11/01/22 0905    hydrALAZINE (APRESOLINE) injection 10 mg, 10 mg, IntraVENous, Q6H PRN, Frandy Lele, DO, 10 mg at 10/31/22 0021    sodium chloride flush 0.9 % injection 5-40 mL, 5-40 mL, IntraVENous, 2 times per day, Frandy Lele, DO, 10 mL at 11/01/22 0914    sodium chloride flush 0.9 % injection 5-40 mL, 5-40 mL, IntraVENous, PRN, Frandy Lele, DO    0.9 % sodium chloride infusion, , IntraVENous, PRN, Frandy Lele, DO, Last Rate: 50 mL/hr at 10/18/22 1200, New Bag at 10/18/22 1200    acetaminophen (TYLENOL) tablet 650 mg, 650 mg, Oral, Q4H PRN, Frandy Lele, DO, 650 mg at 10/14/22 2031    ondansetron (ZOFRAN-ODT) disintegrating tablet 4 mg, 4 mg, Oral, Q8H PRN **OR** ondansetron (ZOFRAN) injection 4 mg, 4 mg, IntraVENous, Q6H PRN, Frandy Lele, DO    atorvastatin (LIPITOR) tablet 40 mg, 40 mg, Oral, Daily, Frandy Lele, DO, 40 mg at 10/31/22 2114    DULoxetine (CYMBALTA) extended release capsule 60 mg, 60 mg, Oral, Daily, Frandy Lele, DO, 60 mg at 11/01/22 0904    ferrous sulfate (IRON 325) tablet 325 mg, 325 mg, Oral, BID WC, Frandy Lele, DO, 325 mg at 11/01/22 0904    sodium chloride flush 0.9 % injection 10 mL, 10 mL, IntraVENous, PRN, Frandy Lele, DO, 10 mL at 10/10/22 1427    ipratropium-albuterol (DUONEB) nebulizer solution 1 ampule, 1 ampule, Inhalation, Q4H WA, Frandy Lele, DO, 1 ampule at 11/01/22 1145    perflutren lipid microspheres (DEFINITY) injection 1.65 mg, 1.5 mL, IntraVENous, ONCE PRN, Frandy Lele, DO      Examination:  BP (!) 165/76   Pulse 62   Temp 97.8 °F (36.6 °C)   Resp 16   Ht 4' 11\" (1.499 m)   Wt 111 lb 8.8 oz (50.6 kg)   SpO2 96%   BMI 22.53 kg/m²   Gait -unable to test  Medication side effects(SE): Denies    Mental Status Examination:    Level of consciousness: Lethargic and somnolent  Appearance:  poor grooming and poor hygiene  Behavior/Motor: Restless when awake.   Attitude toward examiner: Attempting to engage but unable to sustain attention   speech:   mumbling  Mood: depressed  Affect:  blunted  Thought processes:  linear and slow   Thought content:  Homicidal ideation - none  Suicidal Ideation:  denies suicidal ideation  Delusions:  no evidence of delusions  Perceptual Disturbance:  denies any perceptual disturbance  Cognition:  disoriented   Concentration intact  Insight poor   Judgement poor     ASSESSMENT:   Patient symptoms are:  [] Well controlled  [] Improving  [] Worsening  [x] No change      Diagnosis:   Principal Problem:    SIRS (systemic inflammatory response syndrome) ([facility])  Active Problems:    NICKI (acute kidney injury) ([facility] [address]    Severe malnutrition (HCC)    Elevated d-dimer    Leukocytosis    Pulmonary nodules    Delirium due to another medical condition    Acute metabolic encephalopathy    MRSA bacteremia    Acute septic pulmonary embolism ([facility] [address]    Cavitating mass of lung    Allergy to antibiotic    Thrombocytopenia ([facility] [address]    Depression with suicidal ideation    Bipolar disorder, mixed ([facility] [address]    Acute encephalopathy    COPD (chronic obstructive pulmonary disease) ([facility] [address]    H/O gastric bypass  Resolved Problems:    * No resolved hospital problems. *      LABS:    Recent Labs     10/30/22  0617   WBC 5.8   HGB 7.9*   PLT 40*     No results for input(s): NA, K, CL, CO2, BUN, CREATININE, GLUCOSE in the last 72 hours. No results for input(s): BILITOT, ALKPHOS, AST, ALT in the last 72 hours.     Lab Results   Component Value Date/Time    [address] NEGATIVE 07/08/2019 12:00 AM    BARBSCNU NEGATIVE 10/13/2022 11:15 AM    LABBENZ NEGATIVE 10/13/2022 11:15 AM    LABBENZ NEGATIVE 12/22/2012 09:35 PM    LABMETH NEGATIVE 10/13/2022 11:15 AM    PPXUR NOT REPORTED 01/29/2022 03:06 PM     Lab Results   Component Value Date/Time    TSH 1.37 01/29/2022 12:38 PM     No results found for: LITHIUM  Lab Results   Component Value Date    VALPROATE 88 10/25/2022       RISK ASSESSMENT: Monitoring per standard protocol       PLAN  Ammonia levels ordered  Abilify increased to 7.5 mg daily  Medications as noted above      Follow-up daily while on inpatient unit  Electronically signed by Skyler Munoz MD on 11/1/2022 at 2:19 PM

## 2022-11-01 NOTE — PROGRESS NOTES
[address]   OCCUPATIONAL THERAPY MISSED TREATMENT NOTE   INPATIENT   Date: 22  Patient Name: Pratik Gonzales       Room:   MRN: [id number]   Account #: [de-identified]    : 1970  (46 y.o.)  Gender: female   Referring Practitioner: Sarmad Simms MD  Diagnosis: SIRS, pulmonary nodules, NICKI           REASON FOR MISSED TREATMENT:  Patient refusal   -    Pt moaning with eyes closed upon arrival, brief responses however not consistent. Pt does state \"no\" when asked if wanted to participate in therapy. Will continue to follow.          [address], [place]

## 2022-11-01 NOTE — PLAN OF CARE
Problem: Discharge Planning  Goal: Discharge to home or other facility with appropriate resources  11/1/2022 0331 by Miranda Hooper RN  Outcome: Progressing     Problem: Pain  Goal: Verbalizes/displays adequate comfort level or baseline comfort level  11/1/2022 0331 by Miranda Hooper RN  Outcome: Progressing  Note: . No pain at this time. 0/10 pain scale. Problem: Skin/Tissue Integrity  Goal: Absence of new skin breakdown  Description: 1. Monitor for areas of redness and/or skin breakdown  2. Assess vascular access sites hourly  3. Every 4-6 hours minimum:  Change oxygen saturation probe site  4. Every 4-6 hours:  If on nasal continuous positive airway pressure, respiratory therapy assess nares and determine need for appliance change or resting period. 11/1/2022 0331 by Miranda Hooper RN  Outcome: Progressing  Note: Skin assessment complete. S[employer]are applied PRN. Turned and repositioned every two hours. Area kept free from moisture. Proper nourishment and fluids encouraged, as appropriate. Will continue to monitor for additional needs and changes in skin breakdown.        Problem: Safety - Adult  Goal: Free from fall injury  11/1/2022 0331 by Miranda Hooper RN  Outcome: Progressing     Problem: ABCDS Injury Assessment  Goal: Absence of physical injury  11/1/2022 0331 by Miranda Hooper RN  Outcome: Progressing

## 2022-11-01 NOTE — PROGRESS NOTES
Pulmonary Progress Note  [facility]      Patient - Makenna Mckeon,  Age - 46 y.o.    - 1970      Room Number - 2109/2109-01   N -  [id number]   [facility]  Date of Admission -  10/10/2022  4:37 AM        Consulting Marissa Hager MD  Primary Care Physician - Elena Deleon MD     SUBJECTIVE   Remains on room air, slowly improving and becoming more responsive but still mumbles words and intermittently has to be aroused. OBJECTIVE   VITALS    height is 4' 11\" (1.499 m) and weight is 111 lb 8.8 oz (50.6 kg). Her oral temperature is 98.4 °F (36.9 °C). Her blood pressure is 178/70 (abnormal) and her pulse is 63. Her respiration is 16 and oxygen saturation is 97%. Body mass index is 22.53 kg/m². Temperature Range: Temp: 98.4 °F (36.9 °C) Temp  Av.9 °F (36.6 °C)  Min: 97.5 °F (36.4 °C)  Max: 98.4 °F (36.9 °C)  BP Range:  Systolic (96ZQW), HCO:630 , Min:133 , LZD:480     Diastolic (66JHN), KZS:02, Min:70, Max:85    Pulse Range: Pulse  Av  Min: 60  Max: 68  Respiration Range: Resp  Av  Min: 16  Max: 20  Current Pulse Ox[de-identified]  SpO2: 97 %  24HR Pulse Ox Range:  SpO2  Av.6 %  Min: 92 %  Max: 97 %  Oxygen Amount and Delivery: O2 Flow Rate (L/min): 0 L/min    Wt Readings from Last 3 Encounters:   22 111 lb 8.8 oz (50.6 kg)   22 96 lb 8 oz (43.8 kg)   22 105 lb (47.6 kg)       I/O (24 Hours)  No intake or output data in the 24 hours ending 22 0945    EXAM     General Appearance arousable answer simple question, in no acute distress  HEENT - normocephalic, atraumatic.  []  Mallampati  [] Crowded airway   [] Macroglossia  []  Retrognathia  [] Micrognathia  []  Normal tongue size []  Normal Bite  [] Emery sign positive    Neck - Supple,  trachea midline   Lungs -diminished with crackles  Cardiovascular - Heart sounds are normal.  Regular rate and rhythm Abdomen - Soft, nontender, nondistended, no masses or organomegaly  Neurologic - There are no focal motor or sensory deficits  Skin - No bruising or bleeding  Extremities - No clubbing, cyanosis, edema    MEDS      lisinopril  10 mg Oral Daily    divalproex  1,000 mg Oral BID    ceftaroline fosamil (TEFLARO) IVPB  600 mg IntraVENous Q12H    carvedilol  25 mg Oral BID     Vitamin D  2,000 Units Oral Daily    sodium chloride flush  5-40 mL IntraVENous 2 times per day    ARIPiprazole  5 mg Oral Daily    atorvastatin  40 mg Oral Daily    DULoxetine  60 mg Oral Daily    ferrous sulfate  325 mg Oral BID     ipratropium-albuterol  1 ampule Inhalation Q4H WA      sodium chloride      sodium chloride 50 mL/hr at 10/18/22 1200     sodium chloride, sodium chloride flush, labetalol, potassium chloride **OR** potassium alternative oral replacement **OR** potassium chloride, hydrALAZINE, sodium chloride flush, sodium chloride, acetaminophen, ondansetron **OR** ondansetron, sodium chloride flush, perflutren lipid microspheres    LABS   CBC   Recent Labs     10/30/22  0617   WBC 5.8   HGB 7.9*   HCT 24.8*   MCV 83.0   PLT 40*     BMP:   Lab Results   Component Value Date/Time     10/28/2022 05:41 AM    K 3.8 10/28/2022 05:41 AM     10/28/2022 05:41 AM    CO2 29 10/28/2022 05:41 AM    BUN 15 10/28/2022 05:41 AM    LABALBU 1.7 10/22/2022 04:15 PM    CREATININE 0.86 10/28/2022 05:41 AM    CALCIUM 7.6 10/28/2022 05:41 AM    GFRAA >60 04/08/2022 07:17 AM    LABGLOM >60 10/28/2022 05:41 AM     ABGs:  Lab Results   Component Value Date/Time    PHART 7.452 10/22/2022 03:24 PM    PO2ART 86.7 10/22/2022 03:24 PM    BSM0WDK 42.7 10/22/2022 03:24 PM      Lab Results   Component Value Date/Time    MODE PRVC 12/28/2016 04:36 AM     Ionized Calcium:  No results found for: IONCA  Magnesium:    Lab Results   Component Value Date/Time    MG 2.2 10/22/2022 04:15 PM     Phosphorus:    Lab Results   Component Value Date/Time    PHOS 3.6 10/18/2022 04:50 AM        LIVER PROFILE No results for input(s): AST, ALT, LIPASE, BILIDIR, BILITOT, ALKPHOS in the last 72 hours. Invalid input(s): AMYLASE,  ALB  INR No results for input(s): INR in the last 72 hours.   PTT   Lab Results   Component Value Date    APTT 30.6 10/26/2022         RADIOLOGY     (See actual reports for details)    ASSESSMENT/PLAN   MRSA bacteremia  Septic emboli  E. coli UTI  Acute kidney injury, resolved  Suspect COPD, seen once in the office by Dr. Kellie Pryor in 2019, FEV1 2.04 L or 86% predicted  Parapneumonic effusion, pleural fluid positive for staph; status post bilateral pigtail catheters, 1 fell out today after it was placed the other 1 removed 10/31  History of opiate and cocaine use-reportedly uses crack cocaine at least twice weekly  History of tobacco use  Recent COVID +9/30  Elevated troponin likely type II MI  Encephalopathy, improving  History of hypertension  History of seizure disorder  Full code    Antibiotics per ID, patient on Teflaro  Will check follow-up x-ray tomorrow  She needs mobilization  Okay to transfer from my standpoint to medical surgical floor    Electronically signed by Karan Kim MD on 11/1/2022 at 9:45 AM

## 2022-11-01 NOTE — PROGRESS NOTES
[facility] -   Infectious diseases evaluation  admission date 10/10/2022    reason for consultation:   MRSA bacteremia    Impression :   Current:  MRSA bacteremia  Bilateral pulmonary nodules with cavitation /loculated pleural effusion /suspected septic emboli s/p thoracentesis , chest tube placement. Chest tube was removed 10/31/2022  Sepsis secondary to above  Thrombocytopenia/anemia? Vancomycin related  Encephalopathy  History of cocaine drug abuse  E. coli UTI treated  Acute renal failure improved  Hypertension  Bipolar disorder  Allergy to Bactrim. Recommendations     Continue IV Teflaro through 12/03/22  Follow CBC and BUN/creatinine weekly while on IV antibiotics. Discussed with discharge planner. Possible acute rehab on discharge. No objection for discharge from infectious disease point of view  Supportive care. Infection Control Recommendations   [place] Precautions  Contact Isolation     Antimicrobial Stewardship Recommendations   Simplification of therapy  Targeted therapy      History of Present Illness:   Initial history:  Juliane Olmos is a 46y.o.-year-old female was admitted 10/10/2020 for worsening shortness of breath associated with generalized body ache. The patient is drowsy, arousable, confused, follows simple commands, unable to provide history that was obtained from chart review and nursing staff. She does have history of cocaine abuse. She was found to have acute renal failure with a creatinine of 2.1. Urinalysis showed small leukocyte esterase, positive nitrate. Urine culture on 10/10/2022 grew E. Coli  Blood cultures 10/10/2022 2 of 2 grew MRSA  Echocardiogram showed no vegetations  Procalcitonin was 6.01, D-dimer was elevated at 3.85  Chest x-ray showed right lung nodules. VQ scan was indeterminant.   CT chest without contrast 10/16/2022 showed numerous bilateral nodular opacities both lungs suggestive of septic emboli , focal consolidation with cavitation in the superior right lower lobe focal consolidation at the left base, small bilateral loculated pleural effusion and mediastinal lymphadenopathy. Echocardiogram showed no vegetations  ADRIANNE negative for vegetations. Interval changes  11/1/2022   She is awake, follows simple commands, confused, afebrile, chest tube was removed, no new events. WBC 5.8, hemoglobin 7.9, platelet 40  Patient Vitals for the past 8 hrs:   BP Temp Temp src Pulse Resp SpO2 Weight   11/01/22 0734 -- -- -- 63 16 97 % --   11/01/22 0645 (!) 178/70 98.4 °F (36.9 °C) Oral 64 20 -- --   11/01/22 0626 -- -- -- -- -- -- 111 lb 8.8 oz (50.6 kg)   11/01/22 0530 (!) 160/72 -- -- 61 -- -- --   11/01/22 0408 (!) 184/85 97.5 °F (36.4 °C) Oral 66 20 93 % --           I have personally reviewed the past medical history, past surgical history, medications, social history, and family history, and I haveupdated the database accordingly. Allergies:   Aspirin, Bactrim, and Codeine     Review of Systems:     Confused, unable to provide  Physical Examination :       Physical Exam  Vitals and nursing note reviewed. Constitutional:       General: She is not in acute distress. Appearance: She is not ill-appearing. HENT:      Head: Normocephalic and atraumatic. Right Ear: External ear normal.      Left Ear: External ear normal.      Nose: Nose normal.      Mouth/Throat:      Mouth: Mucous membranes are moist.      Pharynx: Oropharynx is clear. Eyes:      General: No scleral icterus. Conjunctiva/sclera: Conjunctivae normal.   Cardiovascular:      Rate and Rhythm: Regular rhythm. Heart sounds: Normal heart sounds. No murmur heard. Pulmonary:      Effort: Pulmonary effort is normal. No respiratory distress. Breath sounds: No wheezing or rhonchi. Comments:   Decreased breath sounds bilaterally  Abdominal:      General: Bowel sounds are normal. There is no distension.       Palpations: Abdomen is soft. Tenderness: There is no abdominal tenderness. Genitourinary:     Comments: No chamberlain. Musculoskeletal:      Cervical back: Neck supple. No rigidity. Right lower leg: No edema. Left lower leg: No edema. Skin:     General: Skin is warm and dry. Coloration: Skin is not jaundiced. Findings: No erythema.    Neurological:      Comments: Confused   Psychiatric:      Comments: ISAAK     Past Medical History:     Past Medical History:   Diagnosis Date    Anemia     Arthritis     Asthma     Bipolar disorder, mixed ([address]     Carotid artery stenosis 2020    Cocaine abuse ([facility] [address] 2014    COPD (chronic obstructive pulmonary disease) ([facility] [address]     Degeneration of cervical intervertebral disc     Depression with anxiety     Depression with anxiety     Fibromyalgia 2017    GERD (gastroesophageal reflux disease)     History of migraine headaches 6/10/2014    History of renal calculi 6/10/2014    History of seizure disorder 6/10/2014    Hoarseness of voice     HTN (hypertension) 6/10/2014    Hyperlipidemia 6/10/2014    IGT (impaired glucose tolerance) 6/10/2014    Kidney stones     Lactose intolerance 6/10/2014    Leukopenia 6/10/2014    Major depressive disorder, recurrent episode, severe, without mention of psychotic behavior 2014    MVP (mitral valve prolapse)     Seizures (HCC)     Sleep apnea 6/10/2014    Unspecified diseases of blood and blood-forming organs        Past Surgical  History:     Past Surgical History:   Procedure Laterality Date    ANKLE FRACTURE SURGERY      recontruction surgery    APPENDECTOMY      BREAST LUMPECTOMY Right     CARDIAC CATHETERIZATION      CARPAL TUNNEL RELEASE      x2     SECTION      CHOLECYSTECTOMY      COLONOSCOPY  12    COLONOSCOPY  2016    severe spasms    COLONOSCOPY  2019    DR GOLDY MCINTOSH    GASTRIC BYPASS SURGERY      GASTRIC BYPASS SURGERY      HYSTERECTOMY (CERVIX STATUS UNKNOWN)      HYSTERECTOMY (CERVIX STATUS UNKNOWN)      IR PERC CATH PLEURAL DRAIN W/IMAG  10/21/2022    IR PERC CATH PLEURAL DRAIN W/IMAG 10/21/2022 [facility]    IR PERC CATH PLEURAL DRAIN W/IMAG  10/21/2022    IR PERC CATH PLEURAL DRAIN W/IMAG 10/21/2022 [facility]    IR PERC CATH PLEURAL DRAIN W/IMAG  10/24/2022    IR PERC CATH PLEURAL DRAIN W/IMAG 10/24/2022 [facility]    LAPAROSCOPY N/A 1/29/2022    LAPAROSCOPY EXPLORATORY CONVERTED TO OPEN EXPLORATORY LAPAROTOMY, LYSIS OF ADHESIONS, REDUCTION OF INTERNAL HERNIA performed by Moon Mcfarland DO at [address] [facility]  48/67/9124    APPLICATION OF ARCH BARS,SIMPLE EXTRACTION OF #15 AND RT TMJ JOINT REPLACEMENT    SHOULDER ARTHROSCOPY Left 06/05/2019    DR ROBERT GREEN    SHOULDER SURGERY      TONSILLECTOMY AND ADENOIDECTOMY      TRANSESOPHAGEAL ECHOCARDIOGRAM N/A 10/19/2022    TRANSESOPHAGEAL ECHOCARDIOGRAM WITH BUBBLE STUDY performed by Frandy Royal DO at [address] [facility]  7-3-12    egd    UPPER GASTROINTESTINAL ENDOSCOPY  12/19/2016    status post gastrectomy with a small remnant of gastric pouch.     UPPER GASTROINTESTINAL ENDOSCOPY  05/2019    DR Luis Williamson       Medications:      lisinopril  10 mg Oral Daily    divalproex  1,000 mg Oral BID    ceftaroline fosamil (TEFLARO) IVPB  600 mg IntraVENous Q12H    carvedilol  25 mg Oral BID WC    Vitamin D  2,000 Units Oral Daily    sodium chloride flush  5-40 mL IntraVENous 2 times per day    ARIPiprazole  5 mg Oral Daily    atorvastatin  40 mg Oral Daily    DULoxetine  60 mg Oral Daily    ferrous sulfate  325 mg Oral BID WC    ipratropium-albuterol  1 ampule Inhalation Q4H WA       Social History:     Social History     Socioeconomic History    Marital status:      Spouse name: Not on file    Number of children: Not on file    Years of education: Not on file    Highest education level: Not on file   Occupational History    Occupation: disability Tobacco Use    Smoking status: Every Day     Packs/day: 0.50     Years: 33.00     Pack years: 16.50     Types: Cigarettes    Smokeless tobacco: Never    Tobacco comments:     quit  2000 started again  1/13    Substance and Sexual Activity    Alcohol use: Yes     Comment: occasional    Drug use: Not Currently    Sexual activity: Yes   Other Topics Concern    Not on file   Social History Narrative    Not on file     Social Determinants of Health     Financial Resource Strain: Not on file   Food Insecurity: Not on file   Transportation Needs: Not on file   Physical Activity: Not on file   Stress: Not on file   Social Connections: Not on file   Intimate Partner Violence: Not on file   Housing Stability: Not on file       Family History:     Family History   Problem Relation Age of Onset    Diabetes Mother     Cancer Mother     Coronary Art Dis Father     COPD Father     Depression Brother     Alcohol Abuse Brother     Cancer Other         lung and skin    Diabetes Maternal Grandmother     Cancer Paternal Grandmother       Medical Decision Making:   I have independently reviewed/ordered the following labs:    CBC with Differential:   Recent Labs     10/30/22  0617   WBC 5.8   HGB 7.9*   HCT 24.8*   PLT 40*   LYMPHOPCT 16*   MONOPCT 9*           Lab Results   Component Value Date/Time    CREATININE 0.86 10/28/2022 05:41 AM    GLUCOSE 78 10/28/2022 05:41 AM       Detailed results: Thank you for allowing us to participate in the care of this patient. Please call with questions. This note is created with the assistance of a speech recognition program.  While intending to generate adocument that actually reflects the content of the visit, the document can still have some errors including those of syntax and sound a like substitutions which may escape proof reading. It such instances, actual meaningcan be extrapolated by contextual diversion.         Jo Ann Salgado MD   Office/Perfect Serve:   [phone]

## 2022-11-02 ENCOUNTER — APPOINTMENT (OUTPATIENT)
Dept: GENERAL RADIOLOGY | Age: 52
DRG: 871 | End: 2022-11-02
Payer: COMMERCIAL

## 2022-11-02 LAB
ABSOLUTE EOS #: 0 K/UL (ref 0–0.4)
ABSOLUTE LYMPH #: 0.8 K/UL (ref 1–4.8)
ABSOLUTE MONO #: 0.6 K/UL (ref 0.1–1.3)
ANION GAP SERPL CALCULATED.3IONS-SCNC: 8 MMOL/L (ref 9–17)
BASOPHILS # BLD: 0 % (ref 0–2)
BASOPHILS ABSOLUTE: 0 K/UL (ref 0–0.2)
BUN BLDV-MCNC: 17 MG/DL (ref 6–20)
CALCIUM SERPL-MCNC: 7.6 MG/DL (ref 8.6–10.4)
CHLORIDE BLD-SCNC: 104 MMOL/L (ref 98–107)
CO2: 29 MMOL/L (ref 20–31)
CREAT SERPL-MCNC: 1.17 MG/DL (ref 0.5–0.9)
EOSINOPHILS RELATIVE PERCENT: 0 % (ref 0–4)
GFR SERPL CREATININE-BSD FRML MDRD: 56 ML/MIN/1.73M2
GLUCOSE BLD-MCNC: 72 MG/DL (ref 70–99)
HCT VFR BLD CALC: 22 % (ref 36–46)
HEMOGLOBIN: 7.2 G/DL (ref 12–16)
LYMPHOCYTES # BLD: 19 % (ref 24–44)
MCH RBC QN AUTO: 26.8 PG (ref 26–34)
MCHC RBC AUTO-ENTMCNC: 32.7 G/DL (ref 31–37)
MCV RBC AUTO: 82 FL (ref 80–100)
MONOCYTES # BLD: 12 % (ref 1–7)
PDW BLD-RTO: 16.9 % (ref 11.5–14.9)
PLATELET # BLD: 41 K/UL (ref 150–450)
PMV BLD AUTO: 7.1 FL (ref 6–12)
POTASSIUM SERPL-SCNC: 2.9 MMOL/L (ref 3.7–5.3)
RBC # BLD: 2.68 M/UL (ref 4–5.2)
SEG NEUTROPHILS: 69 % (ref 36–66)
SEGMENTED NEUTROPHILS ABSOLUTE COUNT: 3.1 K/UL (ref 1.3–9.1)
SODIUM BLD-SCNC: 141 MMOL/L (ref 135–144)
WBC # BLD: 4.5 K/UL (ref 3.5–11)

## 2022-11-02 PROCEDURE — 1200000000 HC SEMI PRIVATE

## 2022-11-02 PROCEDURE — 6370000000 HC RX 637 (ALT 250 FOR IP): Performed by: INTERNAL MEDICINE

## 2022-11-02 PROCEDURE — 71045 X-RAY EXAM CHEST 1 VIEW: CPT

## 2022-11-02 PROCEDURE — 6370000000 HC RX 637 (ALT 250 FOR IP): Performed by: FAMILY MEDICINE

## 2022-11-02 PROCEDURE — 80048 BASIC METABOLIC PNL TOTAL CA: CPT

## 2022-11-02 PROCEDURE — 6360000002 HC RX W HCPCS: Performed by: INTERNAL MEDICINE

## 2022-11-02 PROCEDURE — 6370000000 HC RX 637 (ALT 250 FOR IP): Performed by: PSYCHIATRY & NEUROLOGY

## 2022-11-02 PROCEDURE — 85025 COMPLETE CBC W/AUTO DIFF WBC: CPT

## 2022-11-02 PROCEDURE — 99232 SBSQ HOSP IP/OBS MODERATE 35: CPT | Performed by: INTERNAL MEDICINE

## 2022-11-02 PROCEDURE — 94761 N-INVAS EAR/PLS OXIMETRY MLT: CPT

## 2022-11-02 PROCEDURE — 2580000003 HC RX 258: Performed by: FAMILY MEDICINE

## 2022-11-02 PROCEDURE — 2580000003 HC RX 258: Performed by: INTERNAL MEDICINE

## 2022-11-02 PROCEDURE — 6360000002 HC RX W HCPCS: Performed by: FAMILY MEDICINE

## 2022-11-02 PROCEDURE — 99232 SBSQ HOSP IP/OBS MODERATE 35: CPT | Performed by: FAMILY MEDICINE

## 2022-11-02 PROCEDURE — 36415 COLL VENOUS BLD VENIPUNCTURE: CPT

## 2022-11-02 PROCEDURE — 94640 AIRWAY INHALATION TREATMENT: CPT

## 2022-11-02 RX ADMIN — DIVALPROEX SODIUM 1000 MG: 500 TABLET, DELAYED RELEASE ORAL at 10:02

## 2022-11-02 RX ADMIN — ARIPIPRAZOLE 7.5 MG: 15 TABLET ORAL at 10:02

## 2022-11-02 RX ADMIN — IPRATROPIUM BROMIDE AND ALBUTEROL SULFATE 1 AMPULE: 2.5; .5 SOLUTION RESPIRATORY (INHALATION) at 14:40

## 2022-11-02 RX ADMIN — CARVEDILOL 25 MG: 25 TABLET, FILM COATED ORAL at 08:49

## 2022-11-02 RX ADMIN — FERROUS SULFATE TAB 325 MG (65 MG ELEMENTAL FE) 325 MG: 325 (65 FE) TAB at 17:06

## 2022-11-02 RX ADMIN — SODIUM CHLORIDE, PRESERVATIVE FREE 10 ML: 5 INJECTION INTRAVENOUS at 22:15

## 2022-11-02 RX ADMIN — SODIUM CHLORIDE, PRESERVATIVE FREE 10 ML: 5 INJECTION INTRAVENOUS at 10:03

## 2022-11-02 RX ADMIN — ATORVASTATIN CALCIUM 40 MG: 40 TABLET, FILM COATED ORAL at 21:38

## 2022-11-02 RX ADMIN — IPRATROPIUM BROMIDE AND ALBUTEROL SULFATE 1 AMPULE: 2.5; .5 SOLUTION RESPIRATORY (INHALATION) at 19:28

## 2022-11-02 RX ADMIN — DULOXETINE 60 MG: 60 CAPSULE, DELAYED RELEASE ORAL at 08:49

## 2022-11-02 RX ADMIN — CEFTAROLINE FOSAMIL 600 MG: 600 POWDER, FOR SOLUTION INTRAVENOUS at 22:22

## 2022-11-02 RX ADMIN — CEFTAROLINE FOSAMIL 600 MG: 600 POWDER, FOR SOLUTION INTRAVENOUS at 10:14

## 2022-11-02 RX ADMIN — LISINOPRIL 10 MG: 10 TABLET ORAL at 08:49

## 2022-11-02 RX ADMIN — CARVEDILOL 25 MG: 25 TABLET, FILM COATED ORAL at 17:06

## 2022-11-02 RX ADMIN — POTASSIUM CHLORIDE: 2 INJECTION, SOLUTION, CONCENTRATE INTRAVENOUS at 10:18

## 2022-11-02 RX ADMIN — DIVALPROEX SODIUM 1000 MG: 500 TABLET, DELAYED RELEASE ORAL at 21:38

## 2022-11-02 RX ADMIN — IPRATROPIUM BROMIDE AND ALBUTEROL SULFATE 1 AMPULE: 2.5; .5 SOLUTION RESPIRATORY (INHALATION) at 11:04

## 2022-11-02 RX ADMIN — FERROUS SULFATE TAB 325 MG (65 MG ELEMENTAL FE) 325 MG: 325 (65 FE) TAB at 08:49

## 2022-11-02 RX ADMIN — IPRATROPIUM BROMIDE AND ALBUTEROL SULFATE 1 AMPULE: 2.5; .5 SOLUTION RESPIRATORY (INHALATION) at 07:37

## 2022-11-02 RX ADMIN — Medication 2000 UNITS: at 08:49

## 2022-11-02 NOTE — CARE COORDINATION
SW received a call from [facility] reporting that they needed information about the dosage, duration, and milligram.     Cleotis Laughter from [facility] needs this information faxed to her. Leatha did not provide SW with a fax number, and JAY attempted to contact admissions at [facility] to get a fax number. Electronically signed by JOSE Lynne on 11/2/2022 at 2:17 PM     SW was able to fax documents to [facility].  SW waiting approval. Electronically signed by JOSE Lynne on 11/2/2022 at 3:44 PM

## 2022-11-02 NOTE — PROGRESS NOTES
11/02/22 1856   Encounter Summary   Encounter Overview/Reason  Spiritual/Emotional Needs   Service Provided For: Patient   Referral/Consult From: Rounding   Complexity of Encounter Low   Spiritual/Emotional needs   Type Spiritual Support   Assessment/Intervention/Outcome   Assessment Unable to assess  (patient sleeping)   Intervention Prayer (assurance of)/Crum

## 2022-11-02 NOTE — PLAN OF CARE
Problem: Discharge Planning  Goal: Discharge to home or other facility with appropriate resources  Outcome: Progressing  Flowsheets (Taken 11/2/2022 1307)  Discharge to home or other facility with appropriate resources:   Identify barriers to discharge with patient and caregiver   Identify discharge learning needs (meds, wound care, etc)   Refer to discharge planning if patient needs post-hospital services based on physician order or complex needs related to functional status, cognitive ability or social support system   Arrange for needed discharge resources and transportation as appropriate     Problem: Pain  Goal: Verbalizes/displays adequate comfort level or baseline comfort level  Outcome: Progressing  Flowsheets (Taken 11/2/2022 1307)  Verbalizes/displays adequate comfort level or baseline comfort level:   Encourage patient to monitor pain and request assistance   Administer analgesics based on type and severity of pain and evaluate response   Consider cultural and social influences on pain and pain management   Assess pain using appropriate pain scale   Implement non-pharmacological measures as appropriate and evaluate response  Note: Monitor and medicate for pain as needed as ordered. Problem: Skin/Tissue Integrity  Goal: Absence of new skin breakdown  Description: 1. Monitor for areas of redness and/or skin breakdown  2. Assess vascular access sites hourly  3. Every 4-6 hours minimum:  Change oxygen saturation probe site  4. Every 4-6 hours:  If on nasal continuous positive airway pressure, respiratory therapy assess nares and determine need for appliance change or resting period. Outcome: Progressing  Note: Assess and document any skin issues every shift. Problem: Safety - Adult  Goal: Free from fall injury  Outcome: Progressing  Flowsheets (Taken 11/2/2022 1307)  Free From Fall Injury: Instruct family/caregiver on patient safety  Note: Instruct pt to call out for any assistance to get OOB. Bed in low position, call light in place at all times. Pt confused and non-compliant with calling out. Pt has Telesitter. Frequent bedchecks for safety. Remains free from injury. Problem: ABCDS Injury Assessment  Goal: Absence of physical injury  Outcome: Progressing  Flowsheets (Taken 11/2/2022 1307)  Absence of Physical Injury: Implement safety measures based on patient assessment     Problem: Nutrition Deficit:  Goal: Optimize nutritional status  Outcome: Progressing  Flowsheets (Taken 11/2/2022 1307)  Nutrient intake appropriate for improving, restoring, or maintaining nutritional needs:   Assess nutritional status and recommend course of action   Recommend appropriate diets, oral nutritional supplements, and vitamin/mineral supplements   Monitor oral intake, labs, and treatment plans   Provide specific nutrition education to patient or family as appropriate  Note: Encourage protein intake to help promote healing.

## 2022-11-02 NOTE — PROGRESS NOTES
Pt extremely lethargic, no response to thumb or finger press. Some response of moaning and movement to sternal rub but pt does not awaken. VS: 97.8-55-20 161/81, MEWS is 2, SpO2 96% on room air. Pt's father present, states she was wide awake yesterday but has been quite lethargic since yesterday afternoon. Writer informs father that pt took a few bits of yogurt at breakfast and swallowed her pills, also that she became a bit agitated while taking her pills and was able to respond \"that's enough. \" Father was present when pt took her Depakote and Abilify that arrived late from pharmacy and is aware she was responsive and alert enough to swallow the pills with drinks of water without difficulty at that time. Writer asked , Abbe Bedolla, to check pt also.

## 2022-11-02 NOTE — PROGRESS NOTES
Pulmonary Progress Note  [facility]      Patient - Amie Hill,  Age - 46 y.o.    - 1970      Room Number - 4827/3840-89   MRN -  [id number]   [facility]  Date of Admission -  10/10/2022  4:37 AM        Consulting Shanelle Taylor MD  Primary Care Physician - Travis Richardson MD     SUBJECTIVE   She looks about the same, remains on room air    OBJECTIVE   VITALS    height is 4' 11\" (1.499 m) and weight is 111 lb 8.8 oz (50.6 kg). Her oral temperature is 98.3 °F (36.8 °C). Her blood pressure is 168/78 (abnormal) and her pulse is 61. Her respiration is 16 and oxygen saturation is 96%. Body mass index is 22.53 kg/m². Temperature Range: Temp: 98.3 °F (36.8 °C) Temp  Av.1 °F (36.7 °C)  Min: 97.8 °F (36.6 °C)  Max: 98.3 °F (36.8 °C)  BP Range:  Systolic (60DDU), NTQ:431 , Min:165 , ADN:119     Diastolic (92QLG), TG:43, Min:71, Max:80    Pulse Range: Pulse  Av.8  Min: 62  Max: 64  Respiration Range: Resp  Av  Min: 14  Max: 20  Current Pulse Ox[de-identified]  SpO2: 96 %  24HR Pulse Ox Range:  SpO2  Av %  Min: 96 %  Max: 99 %  Oxygen Amount and Delivery: O2 Flow Rate (L/min): 0 L/min    Wt Readings from Last 3 Encounters:   22 111 lb 8.8 oz (50.6 kg)   22 96 lb 8 oz (43.8 kg)   22 105 lb (47.6 kg)       I/O (24 Hours)  No intake or output data in the 24 hours ending 22 1101    EXAM     General Appearance arousable, follows command, in no acute distress  HEENT - normocephalic, atraumatic.  []  Mallampati  [] Crowded airway   [] Macroglossia  []  Retrognathia  [] Micrognathia  []  Normal tongue size []  Normal Bite  [] [place] sign positive    Neck - Supple,  trachea midline   Lungs -diminished occasional crackles  Cardiovascular - Heart sounds are normal.  Regular rate and rhythm   Abdomen - Soft, nontender, nondistended, no masses or organomegaly  Neurologic - There are no focal motor or sensory deficits  Skin - No bruising or bleeding  Extremities - No clubbing, cyanosis, edema    MEDS      IVPB builder   IntraVENous Once    lisinopril  10 mg Oral Daily    ARIPiprazole  7.5 mg Oral Daily    divalproex  1,000 mg Oral BID    ceftaroline fosamil (TEFLARO) IVPB  600 mg IntraVENous Q12H    carvedilol  25 mg Oral BID     Vitamin D  2,000 Units Oral Daily    sodium chloride flush  5-40 mL IntraVENous 2 times per day    atorvastatin  40 mg Oral Daily    DULoxetine  60 mg Oral Daily    ferrous sulfate  325 mg Oral BID     ipratropium-albuterol  1 ampule Inhalation Q4H WA      sodium chloride 50 mL/hr at 10/18/22 1200     sodium chloride flush, labetalol, potassium chloride **OR** potassium alternative oral replacement **OR** potassium chloride, hydrALAZINE, sodium chloride flush, sodium chloride, acetaminophen, ondansetron **OR** ondansetron, sodium chloride flush, perflutren lipid microspheres    LABS   CBC   Recent Labs     11/02/22  0607   WBC 4.5   HGB 7.2*   HCT 22.0*   MCV 82.0   PLT 41*     BMP:   Lab Results   Component Value Date/Time     11/02/2022 06:07 AM    K 2.9 11/02/2022 06:07 AM     11/02/2022 06:07 AM    CO2 29 11/02/2022 06:07 AM    BUN 17 11/02/2022 06:07 AM    LABALBU 1.7 10/22/2022 04:15 PM    CREATININE 1.17 11/02/2022 06:07 AM    CALCIUM 7.6 11/02/2022 06:07 AM    GFRAA >60 04/08/2022 07:17 AM    LABGLOM 56 11/02/2022 06:07 AM     ABGs:  Lab Results   Component Value Date/Time    PHART 7.452 10/22/2022 03:24 PM    PO2ART 86.7 10/22/2022 03:24 PM    QJG9NYL 42.7 10/22/2022 03:24 PM      Lab Results   Component Value Date/Time    MODE PRVC 12/28/2016 04:36 AM     Ionized Calcium:  No results found for: IONCA  Magnesium:    Lab Results   Component Value Date/Time    MG 2.2 10/22/2022 04:15 PM     Phosphorus:    Lab Results   Component Value Date/Time    PHOS 3.6 10/18/2022 04:50 AM        LIVER PROFILE No results for input(s): AST, ALT, LIPASE, BILIDIR, BILITOT, ALKPHOS in the last 72 hours. Invalid input(s): AMYLASE,  ALB  INR No results for input(s): INR in the last 72 hours.   PTT   Lab Results   Component Value Date    APTT 30.6 10/26/2022         RADIOLOGY     Chest x-ray is fairly stable perhaps slightly increased consolidation of the left lung field        ASSESSMENT/PLAN   MRSA bacteremia  Septic emboli  E. coli UTI  Acute kidney injury, resolved  Suspect COPD, seen once in the office by Dr. Sudha Upton in 2019, FEV1 2.04 L or 86% predicted  Parapneumonic effusion, pleural fluid positive for staph; status post bilateral pigtail catheters, 1 fell out today after it was placed the other 1 removed 10/31  History of opiate and cocaine use-reportedly uses crack cocaine at least twice weekly  History of tobacco use  Recent COVID +9/30  Elevated troponin likely type II MI  Encephalopathy, improving  History of hypertension  History of seizure disorder  Full code      Will need long-term antibiotic treatment as noted above previously  X-ray remains unchanged, no evidence of pneumothoraces or pleural fluid reaccumulation after pigtails were removed  Needs physical therapy and occupational therapy  Discharge planning in progress  We will see intermittently  Discussed with patient's   Electronically signed by Magda Cummings MD on 11/2/2022 at 11:01 AM

## 2022-11-02 NOTE — PROGRESS NOTES
Writer called to room as patient was reportedly \" not waking up. \"  Patient responded to painful stimuli on bilateral feet. Patient opened eyes, kicked feet, and stated \"ow, ow, ow.\"  Vital signs stable.

## 2022-11-02 NOTE — PLAN OF CARE
Problem: Discharge Planning  Goal: Discharge to home or other facility with appropriate resources  Outcome: Progressing  Flowsheets (Taken 11/1/2022 0830)  Discharge to home or other facility with appropriate resources:   Identify barriers to discharge with patient and caregiver   Arrange for needed discharge resources and transportation as appropriate   Identify discharge learning needs (meds, wound care, etc)   Refer to discharge planning if patient needs post-hospital services based on physician order or complex needs related to functional status, cognitive ability or social support system     Problem: Pain  Goal: Verbalizes/displays adequate comfort level or baseline comfort level  Outcome: Progressing     Problem: Skin/Tissue Integrity  Goal: Absence of new skin breakdown  Description: 1. Monitor for areas of redness and/or skin breakdown  2. Assess vascular access sites hourly  3. Every 4-6 hours minimum:  Change oxygen saturation probe site  4. Every 4-6 hours:  If on nasal continuous positive airway pressure, respiratory therapy assess nares and determine need for appliance change or resting period.   Outcome: Progressing     Problem: Safety - Adult  Goal: Free from fall injury  Outcome: Progressing     Problem: ABCDS Injury Assessment  Goal: Absence of physical injury  Outcome: Progressing     Problem: Nutrition Deficit:  Goal: Optimize nutritional status  Outcome: Progressing

## 2022-11-02 NOTE — PROGRESS NOTES
Today's Date: 11/2/2022  Patient Name: Kin Gamble  Date of admission: 10/10/2022  4:37 AM  Patient's age: 46 y. o., 1970  Admission Dx: Pulmonary nodules [R91.8]  SIRS (systemic inflammatory response syndrome) ([facility]) [R65.10]  NICKI (acute kidney injury) ([facility] [N17.9]  Elevated d-dimer [R79.89]  Leukocytosis, unspecified type [M57.592]    Reason for Consult: management recommendations  Requesting Physician: Ivan To MD    CHIEF COMPLAINT: Thrombocytopenia    History Obtained From:  father, electronic medical record. Patient very lethargic not able to give any meaningful history      Interval history :    Pt seen and examined  Labs and vitlas reviewed  Feeling much better. The chest tube was removed. No fever or chills. HISTORY OF PRESENT ILLNESS:      The patient is a 46 y.o.  female who is admitted to the hospital with chief complaint of shortness of breath. Patient was hospitalized on 10/10/2022. During the hospitalization course patient has been diagnosed with MRSA bacteremia. Clinical picture suggestive of infective endocarditis with bilateral pulmonary nodules with cavitation. Patient also has history of cocaine abuse. Patient was already treated with cefepime and then switched to vancomycin. Patient noted to have progressive cytopenias especially platelet count of 59,727. Subsequently vancomycin has been discontinued and patient has been started on Teflaro. Patient lab work-up from today shows hemoglobin of 6.8. Count of 26,000. Platelet count has been gradually dropping. Hemoglobin also has been gradually dropping. Iron studies from January 2022 shows iron deficiency. Patient's INR on 10/18 was 1.2. Immunofixation studies from 10/11/2022 were negative. Excellent patient's hemoglobin at presentation on 10/10 was 14.9 and platelet count was 419.   Patient has history of gastric bypass  Patient lethargic not able to give any meaningful history    Past Medical History:   has a past medical history of Anemia, Arthritis, Asthma, Bipolar disorder, mixed ([address] Carotid artery stenosis, Cocaine abuse ([address] COPD (chronic obstructive pulmonary disease) ([address] Degeneration of cervical intervertebral disc, Depression with anxiety, Depression with anxiety, Fibromyalgia, GERD (gastroesophageal reflux disease), History of migraine headaches, History of renal calculi, History of seizure disorder, Hoarseness of voice, HTN (hypertension), Hyperlipidemia, IGT (impaired glucose tolerance), Kidney stones, Lactose intolerance, Leukopenia, Major depressive disorder, recurrent episode, severe, without mention of psychotic behavior, MVP (mitral valve prolapse), Seizures ([address] Sleep apnea, and Unspecified diseases of blood and blood-forming organs. Past Surgical History:   has a past surgical history that includes Gastric bypass surgery; Cholecystectomy; Hysterectomy; Appendectomy; Hysterectomy; Tonsillectomy and adenoidectomy; Ankle fracture surgery; Neck surgery; shoulder surgery;  section; Carpal tunnel release; Colonoscopy (12); Upper gastrointestinal endoscopy (7-3-12); Gastric bypass surgery; Breast lumpectomy (Right); Cardiac catheterization; Upper gastrointestinal endoscopy (2016); Colonoscopy (2016); Upper gastrointestinal endoscopy (2019); Colonoscopy (2019); Shoulder arthroscopy (Left, 2019); other surgical history (10/24/2019); laparoscopy (N/A, 2022); transesophageal echocardiogram (N/A, 10/19/2022); IR GUIDED PERC PLEURAL DRAIN W CATH INSERT (10/21/2022); IR GUIDED PERC PLEURAL DRAIN W CATH INSERT (10/21/2022); and IR GUIDED PERC PLEURAL DRAIN W CATH INSERT (10/24/2022). Medications:    Prior to Admission medications    Medication Sig Start Date End Date Taking?  Authorizing Provider   lisinopril (PRINIVIL;ZESTRIL) 20 MG tablet Take 20 mg by mouth daily   Yes Historical Provider, MD   carvedilol (COREG) 25 MG tablet TAKE 1 TABLET BY MOUTH IN THE MORNING AND 1 IN THE EVENING WITH MEALS 9/19/22   Casey Stover MD   traZODone (DESYREL) 100 MG tablet TAKE 1 TABLET BY MOUTH AT BEDTIME 6/13/22   Historical Provider, MD   gabapentin (NEURONTIN) 300 MG capsule TAKE 1 CAPSULE BY MOUTH THREE TIMES DAILY 5/24/22 8/9/22  Casey Stover MD   hydrOXYzine (VISTARIL) 25 MG capsule  9/15/20   Historical Provider, MD   DULoxetine (CYMBALTA) 30 MG extended release capsule Take 1 capsule by mouth daily  Patient taking differently: Take 60 mg by mouth in the morning.  4/8/19   KELLY Naranjo - CNP   ARIPiprazole (ABILIFY) 5 MG tablet Take 5 mg by mouth daily    Historical Provider, MD     Current Facility-Administered Medications   Medication Dose Route Frequency Provider Last Rate Last Admin    potassium chloride 60 mEq in sodium chloride 0.9 % 500 mL IVPB   IntraVENous Once Casey Stover MD 83.3 mL/hr at 11/02/22 1018 New Bag at 11/02/22 1018    lisinopril (PRINIVIL;ZESTRIL) tablet 10 mg  10 mg Oral Daily Casey Stover MD   10 mg at 11/02/22 0849    ARIPiprazole (ABILIFY) tablet 7.5 mg  7.5 mg Oral Daily Juve Aquino MD   7.5 mg at 11/02/22 1002    divalproex (DEPAKOTE) DR tablet 1,000 mg  1,000 mg Oral BID Frandy Lele, DO   1,000 mg at 11/02/22 1002    ceftaroline fosamil (TEFLARO) 600 mg in sodium chloride 0.9 % 50 mL IVPB  600 mg IntraVENous Q12H Johnathan Arthur MD   Stopped at 11/02/22 1140    sodium chloride flush 0.9 % injection 10 mL  10 mL IntraVENous PRN Casey Stover MD   10 mL at 10/20/22 1638    carvedilol (COREG) tablet 25 mg  25 mg Oral BID WC Frandy Lele, DO   25 mg at 11/02/22 0849    labetalol (NORMODYNE;TRANDATE) injection 20 mg  20 mg IntraVENous BID PRN Frandy Juareso, DO   20 mg at 11/01/22 0434    potassium chloride (KLOR-CON M) extended release tablet 40 mEq  40 mEq Oral PRN Frandy Lele, DO   40 mEq at 10/25/22 0849    Or    potassium bicarb-citric acid (EFFER-K) effervescent tablet 40 mEq  40 mEq Oral PRN Frandy Lele, DO   40 mEq at 10/14/22 2031    Or    potassium chloride 10 mEq/100 mL IVPB (Peripheral Line)  10 mEq IntraVENous PRN Frandy Lele,  mL/hr at 10/23/22 0258 10 mEq at 10/23/22 0258    Vitamin D (CHOLECALCIFEROL) tablet 2,000 Units  2,000 Units Oral Daily Frandy Lele, DO   2,000 Units at 11/02/22 0849    hydrALAZINE (APRESOLINE) injection 10 mg  10 mg IntraVENous Q6H PRN Frandy Lele, DO   10 mg at 11/01/22 2304    sodium chloride flush 0.9 % injection 5-40 mL  5-40 mL IntraVENous 2 times per day Frandy Lele, DO   10 mL at 11/02/22 1003    sodium chloride flush 0.9 % injection 5-40 mL  5-40 mL IntraVENous PRN Frandy Lele, DO        0.9 % sodium chloride infusion   IntraVENous PRN Frandy Lele, DO 50 mL/hr at 10/18/22 1200 New Bag at 10/18/22 1200    acetaminophen (TYLENOL) tablet 650 mg  650 mg Oral Q4H PRN Frandy Lele, DO   650 mg at 10/14/22 2031    ondansetron (ZOFRAN-ODT) disintegrating tablet 4 mg  4 mg Oral Q8H PRN Frandy Lele, DO        Or    ondansetron (ZOFRAN) injection 4 mg  4 mg IntraVENous Q6H PRN Frandy Lele, DO        atorvastatin (LIPITOR) tablet 40 mg  40 mg Oral Daily Frandy Lele, DO   40 mg at 11/01/22 2156    DULoxetine (CYMBALTA) extended release capsule 60 mg  60 mg Oral Daily Frandy Lele, DO   60 mg at 11/02/22 0849    ferrous sulfate (IRON 325) tablet 325 mg  325 mg Oral BID WC Frandy Lele, DO   325 mg at 11/02/22 0849    sodium chloride flush 0.9 % injection 10 mL  10 mL IntraVENous PRN Frandy Lele, DO   10 mL at 10/10/22 1427    ipratropium-albuterol (DUONEB) nebulizer solution 1 ampule  1 ampule Inhalation Q4H WA Frandy Lele, DO   1 ampule at 11/02/22 1104    perflutren lipid microspheres (DEFINITY) injection 1.65 mg  1.5 mL IntraVENous ONCE PRN Frandy Royal DO           Allergies:  Aspirin, Bactrim, and Codeine    Social History:   reports that she has been smoking cigarettes. She has a 16.50 pack-year smoking history. She has never used smokeless tobacco. She reports current alcohol use.  She reports that she does not currently use drugs. Family History: family history includes Alcohol Abuse in her brother; COPD in her father; Cancer in her mother, paternal grandmother, and another family member; Coronary Art Dis in her father; Depression in her brother; Diabetes in her maternal grandmother and mother. REVIEW OF SYSTEMS:      Patient sleepy and unable to give me full history. We will try again tomorrow. PHYSICAL EXAM:        BP (!) 161/80   Pulse 55   Temp 97.8 °F (36.6 °C) (Oral)   Resp 20   Ht 4' 11\" (1.499 m)   Wt 111 lb 8.8 oz (50.6 kg)   SpO2 96%   BMI 22.53 kg/m²    Temp (24hrs), Av °F (36.7 °C), Min:97.8 °F (36.6 °C), Max:98.3 °F (36.8 °C)      General appearance -ill-appearing  Mental status -lethargic  Eyes - pupils equal and reactive, extraocular eye movements intact   Ears - bilateral TM's and external ear canals normal   Mouth - mucous membranes moist, pharynx normal without lesions   Neck - supple, no significant adenopathy   Lymphatics - no palpable lymphadenopathy, no hepatosplenomegaly   Chest -decreased breathing sounds bilaterally. Heart - normal rate, regular rhythm, normal S1, S2, no murmurs  Abdomen - soft, nontender, nondistended, no masses or organomegaly   Neurological -very lethargic.   Cannot carry out a conversation  Musculoskeletal - no joint tenderness, deformity or swelling   Extremities - peripheral pulses normal, no pedal edema, no clubbing or cyanosis   Skin - normal coloration and turgor, no rashes, no suspicious skin lesions noted ,      DATA:      Labs:      Latest Reference Range & Units 10/26/22 13:38   WBC 3.5 - 11.0 k/uL 4.1   RBC 4.0 - 5.2 m/uL 2.69 (L)   Hemoglobin Quant 12.0 - 16.0 g/dL 6.8 (LL)   Hematocrit 36 - 46 % 22.2 (L)   MCV 80 - 100 fL 82.5   MCH 26 - 34 pg 25.5 (L)   MCHC 31 - 37 g/dL 30.9 (L)   MPV 6.0 - 12.0 fL 7.4   RDW 11.5 - 14.9 % 17.0 (H)   Platelet Count 753 - 450 k/uL 26 (L)   Absolute Mono # 0.1 - 1.3 k/uL 0.21   (LL): Data is critically low  (L): Data is abnormally low  (H): Data is abnormally high        IMAGING DATA:    CT ABDOMEN PELVIS WO CONTRAST Additional Contrast? None    Result Date: 10/10/2022  EXAMINATION: CT OF THE ABDOMEN AND PELVIS WITHOUT CONTRAST 10/10/2022 2:37 pm TECHNIQUE: CT of the abdomen and pelvis was performed without the administration of intravenous contrast. Multiplanar reformatted images are provided for review. Automated exposure control, iterative reconstruction, and/or weight based adjustment of the mA/kV was utilized to reduce the radiation dose to as low as reasonably achievable. COMPARISON: 01/28/2022 HISTORY: ORDERING SYSTEM PROVIDED HISTORY: abdominal pain TECHNOLOGIST PROVIDED HISTORY: abdominal pain Is the patient pregnant?->No Reason for Exam: abd pain FINDINGS: LOWER CHEST: Extensive pulmonary in nodules with cavitation and focal consolidative changes throughout the visualized bilateral lower lobes are highly concerning for septic emboli within the lungs. KIDNEYS AND URINARY TRACT: Bilateral 2-4 mm nonobstructing kidney stones are visualized. . There is fluid density adjacent to the inferior pole of right kidney measuring 34 x 14 mm. This likely represents extrarenal pelvis. There is no evidence for hydronephrosis. The ureters are of normal course and caliber. ORGANS: Visualized portions of the liver, spleen, pancreas, gallbladder, and adrenal glands demonstrate no acute abnormality. GI/BOWEL: No bowel obstruction. No evidence of acute appendicitis. Status post gastric bypass. PELVIS: The bladder and pelvic organs are unremarkable. PERITONEUM/RETROPERITONEUM: No free air or free fluid is noted. No pathologically enlarged lymphadenopathy. The vasculature do not demonstrate acute abnormality. BONES/SOFT TISSUES: The osseous structures demonstrate no acute abnormality.      Lack of contrast administration significantly decreases sensitivity of the study for evaluation of parenchymal lesions and vascular pathologies. Extensive pulmonary nodules with cavitation and focal consolidative changes throughout the visualized bilateral lower lobes are highly concerning for septic emboli within the lungs. For further evaluation contrast enhanced chest CT can be obtained. There is fluid density adjacent to the inferior pole of right kidney, likely extrarenal pelvis. For further evaluation post-contrast images of the abdomen is recommended. Status post gastric bypass. Bilateral 2-4 mm nonobstructing kidney stones are visualized. . The findings were sent to the Radiology Results [id number] at 5:11 pm on 10/10/2022 to be communicated to a licensed caregiver. XR CHEST (SINGLE VIEW FRONTAL)    Result Date: 10/23/2022  EXAMINATION: ONE XRAY VIEW OF THE CHEST. ONE PORTABLE AP VIEW OF CHEST. 10/23/2022 12:41 am COMPARISON: Portable chest x-ray on 10/22/2022 at 0938 hours. HISTORY: ORDERING SYSTEM PROVIDED HISTORY: L chest tube out TECHNOLOGIST PROVIDED HISTORY: L chest tube out Reason for Exam: L chest tube out FINDINGS: In right lower lung, there are heterogeneous dense opacities, indicating pneumonia and atelectatic changes which are increased as compared to previous chest x-ray. At the right CP angle area, there is confluent density indicating pleural effusion versus significant pleural thickening or atelectatic changes or infiltrates. In the right mid and upper lung fields, there are mild heterogeneous dense opacities indicating scattered infiltrates and atelectatic changes, similar to previous study. At the left lung base, there are mild atelectatic changes with or without mild infiltrates, similar to previous study. In the lateral portion of left upper lung field, there is focal density measuring 3 cm x 2.4 cm which may be due to focal infiltrates or mass lesion, unchanged as compared to previous study. Previous noted left basilar pleural tube has been withdrawn.  There appears to be a pleural tube in pain FINDINGS: No compression fracture or subluxation. Disc spaces maintained. Pedicles are intact. Diffuse calcification nondilated abdominal aorta. Unremarkable lumbar spine examination. XR ABDOMEN (KUB) (SINGLE AP VIEW)    Result Date: 10/12/2022  EXAMINATION: ONE SUPINE XRAY VIEW(S) OF THE ABDOMEN 10/12/2022 2:06 pm COMPARISON: 02/01/2022 HISTORY: ORDERING SYSTEM PROVIDED HISTORY: abd pain TECHNOLOGIST PROVIDED HISTORY: abd pain Reason for Exam: abd pain FINDINGS: Mildly dilated colon and several small bowel loops. Residual stool in colon. No urinary tract calcifications. Osseous structures unremarkable. Findings consistent with mild diffuse ileus. No bowel obstruction or urinary tract calcifications. CT CHEST WO CONTRAST    Result Date: 10/21/2022  EXAMINATION: CT OF THE CHEST WITHOUT CONTRAST 10/21/2022 8:50 am TECHNIQUE: CT of the chest was performed without the administration of intravenous contrast. Multiplanar reformatted images are provided for review. Automated exposure control, iterative reconstruction, and/or weight based adjustment of the mA/kV was utilized to reduce the radiation dose to as low as reasonably achievable. COMPARISON: 10/20/2022, 10/17/2022 HISTORY: ORDERING SYSTEM PROVIDED HISTORY: diagonsis of pleural space infection ---- possible procedure pending results TECHNOLOGIST PROVIDED HISTORY: diagonsis of pleural space infection ---- possible procedure pending results Is the patient pregnant?->No Reason for Exam: order states pleural space infection, pt poor historian FINDINGS: Mediastinum: Interval placement of a right arm PICC line with its tip in the proximal right atrium. Mild calcific plaque of the thoracic aorta. Ascending aorta measures up to 3.7 cm. Coronary vascular calcifications. Similar small mediastinal lymph nodes without bulky adenopathy. Lungs/pleura: Again shown are multiple generally similar bilateral masslike densities, some with cavitation.   Again findings are suggestive of septic emboli. Overall size and distribution are similar to the previous study from yesterday. Similar small bilateral pleural effusions which appear partially loculated. There is mild respiratory motion artifact. Mild lower lobe consolidation with air bronchograms, greater on the left. Subtle ground-glass changes in the lingula and left lower lobe likely inflammatory/infectious. Upper Abdomen: No acute findings. Previous cholecystectomy. Postoperative changes of the stomach. Soft Tissues/Bones: Degenerative changes and previous ACDF lower cervical spine. Left shoulder prosthesis. Overall relatively stable exam.  Small pleural effusions which appear partially loculated with multiple bilateral masses, many with cavitation, suggesting septic emboli. CT CHEST WO CONTRAST  s normal in size. There is no pericardial effusion. Thoracic aorta and pulmonary arteries are grossly normal in caliber. There is calcified plaque at the aortic arch. Moderate coronary artery calcification. There is a mildly enlarged pretracheal lymph node, measuring 1 cm in short axis dimension. No other visible mediastinal lymphadenopathy. Lungs/pleura: There is a small amount of mildly loculated pleural fluid in the right hemithorax. Small left pleural effusion is noted as well, less loculated in appearance. There are numerous nodular opacities throughout both lungs, increased from 10/10/2022. Many (but not all) of the nodules are cavitary. There is focal consolidation with cavitation in the superior segment right lower lobe. Focal consolidation is also noted at the left base. Largest nodules are on the right, measuring up to 2.9 cm. No pneumothorax. Upper Abdomen: No acute findings in the limited visualized upper abdomen. Soft Tissues/Bones: Left shoulder arthroplasty. No acute or suspicious osseous abnormality. Limited, unremarkable renal ultrasound. No hydronephrosis.  RECOMMENDATIONS: Unavailable     IR FLUORO GUIDED CVA DEVICE PLMT/REPLACE/REMOVAL    Result Date: 10/20/2022  PROCEDURE: ULTRASOUND GUIDED VASCULAR ACCESS. FLUOROSCOPY GUIDED PICC PLACEMENT 10/20/2022. HISTORY: ORDERING SYSTEM PROVIDED HISTORY: IV Abx TECHNOLOGIST PROVIDED HISTORY: IV Abx Is the patient pregnant?->No SEDATION: None FLUOROSCOPY DOSE AND TYPE OR TIME AND EXPOSURES: 12 seconds; D AP 10 cGy cm2 TECHNIQUE: Informed consent was obtained from the patient's family member after a detailed explanation of the procedure including risks, benefits, and alternatives. Universal protocol was observed. The right arm was prepped and draped in sterile fashion using maximum sterile barrier technique. Local anesthesia was achieved with lidocaine. A micropuncture needle was used to access the right basilic vein using ultrasound guidance. An ultrasound image demonstrating patency of the vein with needle tip located within it. An image was obtained and stored in PACs. A 0.018 guidewire was used to place a peel-a-way sheath and a 5 Hungarian dual-lumen PICC was advanced with fluoroscopic guidance with the tip at the cavo-atrial junction. Fluoroscopy shows right lung masses/cavitary lesions better shown on recent chest CT. Fusion hardware lower cervical spine. The catheter flushed easily and there was a good blood return. The catheter was secured to the skin. The patient tolerated the procedure well and there were no immediate complications. EBL: Less than 3 mL FINDINGS: Fluoroscopic image demonstrates the tip of the catheter at the cavo-atrial junction. Successful ultrasound and fluoroscopy guided PICC placement     XR CHEST PORTABLE    Result Date: 10/25/2022  EXAMINATION: ONE XRAY VIEW OF THE CHEST 10/25/2022 6:12 pm COMPARISON: 10/24/2022 HISTORY: ORDERING SYSTEM PROVIDED HISTORY: Patient with right-sided chest tube. TECHNOLOGIST PROVIDED HISTORY: Patient with right-sided chest tube.  Reason for Exam: Rt. sided chest tube Follow-up exam FINDINGS: Right pleural catheter is noted. No significant interval change in bilateral airspace opacities. Right PICC line is stable in positioning. Orthopedic hardware is stable in appearance in the cervical spine left shoulder. No obvious pneumothorax. Stable cardiac silhouette. The left costophrenic angle sharp. Interval placement of a right pleural catheter. No obvious pneumothorax. No significant interval change in bilateral airspace opacities. XR CHEST PORTABLE    Result Date: 10/24/2022  EXAMINATION: ONE XRAY VIEW OF THE CHEST 10/24/2022 6:27 am COMPARISON: 10/23/2022 HISTORY: ORDERING SYSTEM PROVIDED HISTORY: Follow-up empyema; pleural catheters fell out TECHNOLOGIST PROVIDED HISTORY: Follow-up empyema; pleural catheters fell out Reason for Exam: post chest tube removal FINDINGS: Pleural pigtail catheter is no longer visualized. Left shoulder prosthesis and fusion hardware cervical spine. Right arm PICC line tip is in the right atrium. Stable cardiomediastinal silhouette. There is improved aeration of the right lung base. No significant pneumothorax is seen. No large pleural effusions within limits of the exam.  There are nodular parenchymal opacities in both lungs, some with cavitation, compatible with a septic emboli and better shown on the prior CT. Cholecystectomy clips. Improved aeration of the right lung base since the prior exam.     XR CHEST PORTABLE    Result Date: 10/22/2022  EXAMINATION: ONE XRAY VIEW OF THE CHEST 10/22/2022 10:19 am COMPARISON: 10/21/2022 at 13:37 HISTORY: ORDERING SYSTEM PROVIDED HISTORY: Pigtail position TECHNOLOGIST PROVIDED HISTORY: Pigtail position Reason for Exam: Pigtail position FINDINGS: Right PICC line terminating right atrium remains in place. Pigtail catheter along left lung base with the tip overlying the hemidiaphragm.   Right pigtail pleural catheter has pulled back into a more peripheral position now called in region of costophrenic angle whereas previously it was more central. Normal cardiomediastinal silhouette. Mild interstitial prominence. Stable scattered moderate size nodular opacities in the lungs right greater than left. Patchy infiltrates at the lung bases probably unchanged allowing for difference in inspiration. Small right pleural effusion is new. Anterior fusion lower cervical spine and left total shoulder prosthesis. 1. Right pigtail pleural catheter has pulled back into the more peripheral position projecting at costophrenic angle whereas previously it was small central.  Left pigtail catheter continues to lie along the lung base. 2. Right PICC line unchanged. 3. Small right pleural effusion appears to be new but patchy bibasilar atelectasis unchanged. Diffuse nodular opacities also unchanged. XR CHEST PORTABLE    Result Date: 10/20/2022  EXAMINATION: ONE XRAY VIEW OF THE CHEST 10/20/2022 7:13 am COMPARISON: Chest x-ray dated 18 October 2022 HISTORY: ORDERING SYSTEM PROVIDED HISTORY: Septic emboli reevaluate pleural effusion on left TECHNOLOGIST PROVIDED HISTORY: Septic emboli reevaluate pleural effusion on left Reason for Exam: hx. of pleural effusion. FINDINGS: Similar appearance of bilateral nodular airspace opacities. Trace left pleural effusion. No pneumothorax. Stable cardiomediastinal silhouette     1. Stable appearance of bilateral nodular opacities in both lungs suspicious for underlying septic emboli better seen on the chest CT dated 17 October 2022 2. Trace left pleural effusion     XR CHEST PORTABLE    Result Date: 10/18/2022  EXAMINATION: ONE XRAY VIEW OF THE CHEST 10/18/2022 6:24 pm COMPARISON: Chest radiograph dated 10/11/2022. Chest CT dated 10/17/2022. HISTORY: ORDERING SYSTEM PROVIDED HISTORY: Empyema, s/p thoracentesis TECHNOLOGIST PROVIDED HISTORY: Empyema, s/p thoracentesis Reason for Exam: S/p thoracentesis FINDINGS: The cardiac silhouette appears within normal limits.   Previously seen left-sided pleural effusion appears to have decreased in size. There is a small left and trace right pleural effusion. There is redemonstration of multiple bilateral nodular opacity of the both lungs, suspicious for underlying septic emboli better seen and evaluated on the chest CT dated 10/17/2022. Components of left reverse total shoulder arthroplasty and changes of prior orthopedic fixation/fusion of the cervicothoracic spine are partially visualized. There are cholecystectomy clips overlying the right upper abdomen. Interval decrease in size of left-sided pleural effusion. Small left and trace right pleural effusion. Redemonstration of bilateral nodular opacities of the both lungs, suspicious for underlying septic emboli better seen and evaluated on the chest CT dated 10/17/2022. XR CHEST PORTABLE    Result Date: 10/11/2022  EXAMINATION: ONE XRAY VIEW OF THE CHEST 10/11/2022 6:35 am COMPARISON: 10/10/2022 HISTORY: ORDERING SYSTEM PROVIDED HISTORY: line placement TECHNOLOGIST PROVIDED HISTORY: line placement Reason for Exam: R sided IJ central line placement FINDINGS: Right IJ central line terminating at cavoatrial junction new from prior. Normal cardiomediastinal silhouette. Small patchy opacities left lung base reflecting subsegmental atelectasis. Scattered patchy opacities with a somewhat nodular appearance noted in the periphery of the lungs more pronounced on the right. This can be further assessed with CT. No change from prior. No pleural effusion or pneumothorax. Left shoulder prosthesis and cervical spine fusion noted. placed. Findings were discussed with Dr. Tuan Abrams At 1:00 pm on 10/24/2022.      IR GUIDED PERC PLEURAL DRAIN W CATH INSERT    Result Date: 10/21/2022  PROCEDURE: ULTRASOUNDGUIDED BILATERAL THORACENTESIS WITH BILATERAL PLEURAL DRAINAGE CATHETER PLACEMENT 10/21/2022 HISTORY: ORDERING SYSTEM PROVIDED HISTORY: diagnositic and possibly therapeutic TECHNOLOGIST PROVIDED HISTORY: diagnositic and possibly therapeutic Which side should the procedure be BILATERAL Is the patient pregnant?->No History of septic emboli with previous left thoracentesis demonstrating infected fluid, CT shows loculated effusions, please perform diagnostic and therapeutic thoracentesis is with bilateral pigtail catheter placement for drainage. TECHNIQUE AND FINDINGS: Informed consent was obtained from the patient's family member after a detailed explanation of the procedure including risks, benefits, and alternatives. Universal protocol was performed. The patient's posterior chest was prepped and draped in standard sterile fashion. 1% lidocaine was used for local anesthesia. Procedure was performed with the patient in an upright position. Initially attention was directed towards the left side. Ultrasound shows a very small residual loculated pleural effusion in the medial left lower chest.  Skin over the area is prepped and draped in standard sterile fashion. 1% lidocaine used for local anesthesia. Using realtime ultrasound guidance an 8 [place] pigtail catheter was placed using trocar technique. Ultrasound images show a safe tract and access into the fluid. Thoracentesis was performed with removal of a small amount of slightly turbid yellow fluid. The catheter was sutured to the skin and secured in place with a Vaseline gauze dressing. The tube was connected to an atrium drainage system. Attention was then directed towards the right side. Ultrasound right chest shows a small right pleural effusion which appears to be partially loculated with minimal debris and septations. Skin over the area was prepped and draped in standard sterile fashion. 1% lidocaine used for local anesthesia. Using realtime ultrasound guidance an 8 [place] pigtail catheter was advanced into the pleural fluid using trocar technique. Ultrasound images show a safe tract and access into the fluid.   Thoracentesis was performed with removal of approximately 20 mL of fairly clear light red colored fluid. Sample was sent for the requested studies. The catheter was sutured to the skin and secured in place with a Vaseline gauze dressing. Tube was connected to an atrium drainage system. There are no immediate complications. The patient left the department in stable condition. Further tube management per the pulmonary team. EBL: Less than 5 mL     Successful ultrasound-guided bilateral pleural pigtail catheter placement (8 Khmer catheters were placed on each side). Small partially loculated pleural effusions, slightly more prominent on the right. Fluid sample was sent for the requested studies. IR GUIDED PERC PLEURAL DRAIN W CATH INSERT    Result Date: 10/21/2022  PROCEDURE: ULTRASOUNDGUIDED BILATERAL THORACENTESIS WITH BILATERAL PLEURAL DRAINAGE CATHETER PLACEMENT 10/21/2022 HISTORY: ORDERING SYSTEM PROVIDED HISTORY: diagnositic and possibly therapeutic TECHNOLOGIST PROVIDED HISTORY: diagnositic and possibly therapeutic Which side should the procedure be BILATERAL Is the patient pregnant?->No History of septic emboli with previous left thoracentesis demonstrating infected fluid, CT shows loculated effusions, please perform diagnostic and therapeutic thoracentesis is with bilateral pigtail catheter placement for drainage. TECHNIQUE AND FINDINGS: Informed consent was obtained from the patient's family member after a detailed explanation of the procedure including risks, benefits, and alternatives. Universal protocol was performed. The patient's posterior chest was prepped and draped in standard sterile fashion. 1% lidocaine was used for local anesthesia. Procedure was performed with the patient in an upright position. Initially attention was directed towards the left side.   Ultrasound shows a very small residual loculated pleural effusion in the medial left lower chest.  Skin over the area is prepped and draped in standard sterile fashion. 1% lidocaine used for local anesthesia. Using realtime ultrasound guidance an 8 [place]ian pigtail catheter was placed using trocar technique. Ultrasound images show a safe tract and access into the fluid. Thoracentesis was performed with removal of a small amount of slightly turbid yellow fluid. The catheter was sutured to the skin and secured in place with a Vaseline gauze dressing. The tube was connected to an atrium drainage system. Attention was then directed towards the right side. Ultrasound right chest shows a small right pleural effusion which appears to be partially loculated with minimal debris and septations. Skin over the area was prepped and draped in standard sterile fashion. 1% lidocaine used for local anesthesia. Using realtime ultrasound guidance an 8 [place]ian pigtail catheter was advanced into the pleural fluid using trocar technique. Ultrasound images show a safe tract and access into the fluid. Thoracentesis was performed with removal of approximately 20 mL of fairly clear light red colored fluid. Sample was sent for the requested studies. The catheter was sutured to the skin and secured in place with a Vaseline gauze dressing. Tube was connected to an atrium drainage system. There are no immediate complications. The patient left the department in stable condition. Further tube management per the pulmonary team. EBL: Less than 5 mL     Successful ultrasound-guided bilateral pleural pigtail catheter placement (8 Lithuanian catheters were placed on each side). Small partially loculated pleural effusions, slightly more prominent on the right. Fluid sample was sent for the requested studies.          IMPRESSION:   Primary Problem  SIRS (systemic inflammatory response syndrome) [facility])    Active Hospital Problems    Diagnosis Date Noted    Thrombocyt[facility] ([facility] [D69.6] 10/26/2022     Priority: Medium    MRSA bacteremia [R78.81, B95.62] 10/23/2022     Priority: Medium    Acute septic pulmonary embolism ([facility] [I26.90] 10/23/2022     Priority: Medium    Cavitating mass of lung [J98.4] 10/23/2022     Priority: Medium    Allergy to antibiotic [Z88.1] 10/23/2022     Priority: Medium    Acute metabolic encephalopathy [O48.30] 10/18/2022     Priority: Medium    Delirium due to another medical condition [F05] 10/17/2022     Priority: Medium    Elevated d-dimer [R79.89] 10/15/2022     Priority: Medium    Leukocytosis [D72.829] 10/15/2022     Priority: Medium    Pulmonary nodules [R91.8] 10/15/2022     Priority: Medium    NICKI (acute kidney injury) ([facility] [N17.9] 10/11/2022     Priority: Medium    Severe malnutrition ([facility] [E43] 10/11/2022     Priority: Medium    SIRS (systemic inflammatory response syndrome) ([facility] [R65.10] 10/10/2022     Priority: Medium    H/O gastric bypass [Z98.84] 02/13/2020    Acute encephalopathy [G93.40] 12/26/2016    COPD (chronic obstructive pulmonary disease) ([facility] [J44.9] 10/25/2016    Bipolar disorder, mixed ([facility] [F31.60] 11/06/2014    Depression with suicidal ideation [F32. 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 I explained to the patient the nature of this problem. I explained the significance of these abnormalities and possible etiology and management options  Anemia and thrombocytopenia are related to septic shock and active infective endocarditis. Hemoglobin is dropping. Will exclude bleeding. The patient condition is stabilizing. Appreciate infectious disease and pulmonary input. Plan is for 6 more weeks of IV antibiotics. Labs were not done today, we will repeat her CBC in the morning  I would hold off on transfusion until hemoglobin drops under 7 or platelets are under 10  Continue antibiotics and care per ID  Overall prognosis is guarded .         Discussed with father and Nurse. Thank you for asking us to see this patient. Odin Flynn MD  Hematologist/Medical [id number] [facility] hematology oncology physicians            This note is created with the assistance of a speech recognition program.  While intending to generate a document that actually reflects the content of the visit, the document can still have some errors including those of syntax and sound a like substitutions which may escape proof reading. It such instances, actual meaning can be extrapolated by contextual diversion.

## 2022-11-02 NOTE — PROGRESS NOTES
prolapse)     Seizures (HCC)     Sleep apnea 6/10/2014    Unspecified diseases of blood and blood-forming organs        FAMILY/SOCIAL HISTORY:  Family History   Problem Relation Age of Onset    Diabetes Mother     Cancer Mother     Coronary Art Dis Father     COPD Father     Depression Brother     Alcohol Abuse Brother     Cancer Other         lung and skin    Diabetes Maternal Grandmother     Cancer Paternal Grandmother      Social History     Socioeconomic History    Marital status:      Spouse name: Not on file    Number of children: Not on file    Years of education: Not on file    Highest education level: Not on file   Occupational History    Occupation: disability   Tobacco Use    Smoking status: Every Day     Packs/day: 0.50     Years: 33.00     Pack years: 16.50     Types: Cigarettes    Smokeless tobacco: Never    Tobacco comments:     quit  2000 started again  1/13    Substance and Sexual Activity    Alcohol use: Yes     Comment: occasional    Drug use: Not Currently    Sexual activity: Yes   Other Topics Concern    Not on file   Social History Narrative    Not on file     Social Determinants of Health     Financial Resource Strain: Not on file   Food Insecurity: Not on file   Transportation Needs: Not on file   Physical Activity: Not on file   Stress: Not on file   Social Connections: Not on file   Intimate Partner Violence: Not on file   Housing Stability: Not on file           ROS:  [x] All negative/unchanged except if checked.  Explain positive(checked items) below:  [] Constitutional  [] Eyes  [] Ear/Nose/Mouth/Throat  [] Respiratory  [] CV  [] GI  []   [] Musculoskeletal  [] Skin/Breast  [] Neurological  [] Endocrine  [] Heme/Lymph  [] Allergic/Immunologic    Explanation:     MEDICATIONS:    Current Facility-Administered Medications:     potassium chloride 60 mEq in sodium chloride 0.9 % 500 mL IVPB, , IntraVENous, Once, Elena Deleon MD, Last Rate: 83.3 mL/hr at 11/02/22 1018, New Bag at 11/02/22 1018    lisinopril (PRINIVIL;ZESTRIL) tablet 10 mg, 10 mg, Oral, Daily, Tanya Hernandez MD, 10 mg at 11/02/22 0849    ARIPiprazole (ABILIFY) tablet 7.5 mg, 7.5 mg, Oral, Daily, Eddye Alpers, MD, 7.5 mg at 11/02/22 1002    divalproex (DEPAKOTE) DR tablet 1,000 mg, 1,000 mg, Oral, BID, Frandy Lele, DO, 1,000 mg at 11/02/22 1002    ceftaroline fosamil (TEFLARO) 600 mg in sodium chloride 0.9 % 50 mL IVPB, 600 mg, IntraVENous, Q12H, Jory Mathis MD, Stopped at 11/02/22 1140    sodium chloride flush 0.9 % injection 10 mL, 10 mL, IntraVENous, PRN, Tanya Hernandez MD, 10 mL at 10/20/22 1638    carvedilol (COREG) tablet 25 mg, 25 mg, Oral, BID WC, Frandy Lele, DO, 25 mg at 11/02/22 0849    labetalol (NORMODYNE;TRANDATE) injection 20 mg, 20 mg, IntraVENous, BID PRN, Frandy Lele, DO, 20 mg at 11/01/22 0434    potassium chloride (KLOR-CON M) extended release tablet 40 mEq, 40 mEq, Oral, PRN, 40 mEq at 10/25/22 0849 **OR** potassium bicarb-citric acid (EFFER-K) effervescent tablet 40 mEq, 40 mEq, Oral, PRN, 40 mEq at 10/14/22 2031 **OR** potassium chloride 10 mEq/100 mL IVPB (Peripheral Line), 10 mEq, IntraVENous, PRN, Frandy Lele, DO, Last Rate: 100 mL/hr at 10/23/22 0258, 10 mEq at 10/23/22 0258    Vitamin D (CHOLECALCIFEROL) tablet 2,000 Units, 2,000 Units, Oral, Daily, Frandy Lele, DO, 2,000 Units at 11/02/22 0849    hydrALAZINE (APRESOLINE) injection 10 mg, 10 mg, IntraVENous, Q6H PRN, Frandy Lele, DO, 10 mg at 11/01/22 2304    sodium chloride flush 0.9 % injection 5-40 mL, 5-40 mL, IntraVENous, 2 times per day, Frandy Lele, DO, 10 mL at 11/02/22 1003    sodium chloride flush 0.9 % injection 5-40 mL, 5-40 mL, IntraVENous, PRN, Frandy Lele, DO    0.9 % sodium chloride infusion, , IntraVENous, PRN, Frandy Lele, DO, Last Rate: 50 mL/hr at 10/18/22 1200, New Bag at 10/18/22 1200    acetaminophen (TYLENOL) tablet 650 mg, 650 mg, Oral, Q4H PRN, Frandy Lele, DO, 650 mg at 10/14/22 2031    ondansetron (ZOFRAN-ODT) disintegrating tablet 4 mg, 4 mg, Oral, Q8H PRN **OR** ondansetron (ZOFRAN) injection 4 mg, 4 mg, IntraVENous, Q6H PRN, Frandy Lele, DO    atorvastatin (LIPITOR) tablet 40 mg, 40 mg, Oral, Daily, Frandy Lele, DO, 40 mg at 11/01/22 2156    DULoxetine (CYMBALTA) extended release capsule 60 mg, 60 mg, Oral, Daily, Frandy Lele, DO, 60 mg at 11/02/22 0849    ferrous sulfate (IRON 325) tablet 325 mg, 325 mg, Oral, BID WC, Frandy Lele, DO, 325 mg at 11/02/22 0849    sodium chloride flush 0.9 % injection 10 mL, 10 mL, IntraVENous, PRN, Frandy Lele, DO, 10 mL at 10/10/22 1427    ipratropium-albuterol (DUONEB) nebulizer solution 1 ampule, 1 ampule, Inhalation, Q4H WA, Frandy Lele, DO, 1 ampule at 11/02/22 1104    perflutren lipid microspheres (DEFINITY) injection 1.65 mg, 1.5 mL, IntraVENous, ONCE PRN, Frandy Lele, DO      Examination:  BP (!) 161/80   Pulse 55   Temp 97.8 °F (36.6 °C) (Oral)   Resp 20   Ht 4' 11\" (1.499 m)   Wt 111 lb 8.8 oz (50.6 kg)   SpO2 96%   BMI 22.53 kg/m²   Gait -unable to test  Medication side effects(SE): Denies    Mental Status Examination:    Level of consciousness: Somnolent   appearance:  poor grooming and poor hygiene  Behavior/Motor: Unable to wake up.   Attitude toward examiner:   Unable to complete remainder of medical exam because the patient did not wake up to engage in the interview  ASSESSMENT:   Patient symptoms are:  [] Well controlled  [] Improving  [] Worsening  [x] No change      Diagnosis:   Principal Problem:    SIRS (systemic inflammatory response syndrome) (HCC)  Active Problems:    NICKI (acute kidney injury) ([facility]    Severe malnutrition (HCC)    Elevated d-dimer    Leukocytosis    Pulmonary nodules    Delirium due to another medical condition    Acute metabolic encephalopathy    MRSA bacteremia    Acute septic pulmonary embolism ([facility]    Cavitating mass of lung    Allergy to antibiotic    Thrombocytopenia ([address]    Depression with suicidal ideation    Bipolar disorder, mixed ([facility]    Acute encephalopathy    COPD (chronic obstructive pulmonary disease) ([facility])    H/O gastric bypass  Resolved Problems:    * No resolved hospital problems. *      LABS:    Recent Labs     11/02/22  0607   WBC 4.5   HGB 7.2*   PLT 41*     Recent Labs     11/02/22  0607      K 2.9*      CO2 29   BUN 17   CREATININE 1.17*   GLUCOSE 72     No results for input(s): BILITOT, ALKPHOS, AST, ALT in the last 72 hours. Lab Results   Component Value Date/Time    [facility] NEGATIVE 07/08/2019 12:00 AM    BARBSCNU NEGATIVE 10/13/2022 11:15 AM    LABBENZ NEGATIVE 10/13/2022 11:15 AM    LABBENZ NEGATIVE 12/22/2012 09:35 PM    LABMETH NEGATIVE 10/13/2022 11:15 AM    PPXUR NOT REPORTED 01/29/2022 03:06 PM     Lab Results   Component Value Date/Time    TSH 1.37 01/29/2022 12:38 PM     No results found for: LITHIUM  Lab Results   Component Value Date    VALPROATE 88 10/25/2022       RISK ASSESSMENT: Monitoring per standard protocol       PLAN  Ammonia levels are normal.  Noted increase in Cr. And decrease in potassium. Primary team is aware   Continue Abilify at current dose.        Follow-up daily while on inpatient unit  Electronically signed by Rodrcik Guillen MD on 11/2/2022 at 12:36 PM

## 2022-11-02 NOTE — PROGRESS NOTES
[facility] -   Infectious diseases evaluation  admission date 10/10/2022    reason for consultation:   MRSA bacteremia    Impression :   Current:  MRSA bacteremia  Bilateral pulmonary nodules with cavitation /loculated pleural effusion /suspected septic emboli s/p thoracentesis , chest tube placement. Chest tube was removed 10/31/2022  Sepsis secondary to above  Thrombocytopenia/anemia? Vancomycin related  Encephalopathy  History of cocaine drug abuse  E. coli UTI treated  Acute renal failure improved  Hypertension  Bipolar disorder  Allergy to Bactrim. Recommendations     Continue IV Teflaro through 12/03/22  Follow CBC and BUN/creatinine weekly while on IV antibiotics. No objection for discharge from infectious disease point of view  Supportive care. Infection Control Recommendations   [place] Precautions  Contact Isolation     Antimicrobial Stewardship Recommendations   Simplification of therapy  Targeted therapy      History of Present Illness:   Initial history:  Brett Downs is a 46y.o.-year-old female was admitted 10/10/2020 for worsening shortness of breath associated with generalized body ache. The patient is drowsy, arousable, confused, follows simple commands, unable to provide history that was obtained from chart review and nursing staff. She does have history of cocaine abuse. She was found to have acute renal failure with a creatinine of 2.1. Urinalysis showed small leukocyte esterase, positive nitrate. Urine culture on 10/10/2022 grew E. Coli  Blood cultures 10/10/2022 2 of 2 grew MRSA  Echocardiogram showed no vegetations  Procalcitonin was 6.01, D-dimer was elevated at 3.85  Chest x-ray showed right lung nodules. VQ scan was indeterminant.   CT chest without contrast 10/16/2022 showed numerous bilateral nodular opacities both lungs suggestive of septic emboli , focal consolidation with cavitation in the superior right lower lobe focal consolidation at the left base, small bilateral loculated pleural effusion and mediastinal lymphadenopathy. Echocardiogram showed no vegetations  ADRIANNE negative for vegetations. Interval changes  11/2/2022   The patient is comfortable on room air, afebrile, no acute events. WBC 4.5, hemoglobin 7.2, platelet 41  Chest x-ray from earlier today reviewed unchanged. Patient Vitals for the past 8 hrs:   BP Temp Temp src Pulse Resp SpO2   11/02/22 1130 (!) 161/80 97.8 °F (36.6 °C) Oral 55 20 96 %   11/02/22 1104 -- -- -- -- -- 96 %   11/02/22 0849 (!) 168/78 -- -- 61 -- --   11/02/22 0737 -- -- -- 60 16 96 %   11/02/22 0708 (!) 168/78 98.3 °F (36.8 °C) Oral 61 14 96 %           I have personally reviewed the past medical history, past surgical history, medications, social history, and family history, and I haveupdated the database accordingly. Allergies:   Aspirin, Bactrim, and Codeine     Review of Systems:     Confused, unable to provide  Physical Examination :       Physical Exam  Vitals and nursing note reviewed. Constitutional:       General: She is not in acute distress. Appearance: She is not ill-appearing. HENT:      Head: Normocephalic and atraumatic. Right Ear: External ear normal.      Left Ear: External ear normal.      Nose: Nose normal.      Mouth/Throat:      Mouth: Mucous membranes are moist.      Pharynx: Oropharynx is clear. Eyes:      General: No scleral icterus. Conjunctiva/sclera: Conjunctivae normal.   Cardiovascular:      Rate and Rhythm: Regular rhythm. Heart sounds: Normal heart sounds. No murmur heard. Pulmonary:      Effort: Pulmonary effort is normal. No respiratory distress. Breath sounds: No wheezing or rhonchi. Comments:   Decreased breath sounds bilaterally  Abdominal:      General: Bowel sounds are normal. There is no distension. Palpations: Abdomen is soft. Tenderness: There is no abdominal tenderness.    Genitourinary: Comments: No chamberlain. Musculoskeletal:      Cervical back: Neck supple. No rigidity. Right lower leg: No edema. Left lower leg: No edema. Skin:     General: Skin is warm and dry. Coloration: Skin is not jaundiced. Findings: No erythema.    Neurological:      Comments: Confused   Psychiatric:      Comments: ISAAK     Past Medical History:     Past Medical History:   Diagnosis Date    Anemia     Arthritis     Asthma     Bipolar disorder, mixed ([address]     Carotid artery stenosis 2020    Cocaine abuse ([address] 2014    COPD (chronic obstructive pulmonary disease) ([address]     Degeneration of cervical intervertebral disc     Depression with anxiety     Depression with anxiety     Fibromyalgia 2017    GERD (gastroesophageal reflux disease)     History of migraine headaches 6/10/2014    History of renal calculi 6/10/2014    History of seizure disorder 6/10/2014    Hoarseness of voice     HTN (hypertension) 6/10/2014    Hyperlipidemia 6/10/2014    IGT (impaired glucose tolerance) 6/10/2014    Kidney stones     Lactose intolerance 6/10/2014    Leukopenia 6/10/2014    Major depressive disorder, recurrent episode, severe, without mention of psychotic behavior 2014    MVP (mitral valve prolapse)     Seizures ([address]     Sleep apnea 6/10/2014    Unspecified diseases of blood and blood-forming organs        Past Surgical  History:     Past Surgical History:   Procedure Laterality Date    ANKLE FRACTURE SURGERY      recontruction surgery    APPENDECTOMY      BREAST LUMPECTOMY Right     CARDIAC CATHETERIZATION      CARPAL TUNNEL RELEASE      x2     SECTION      CHOLECYSTECTOMY      COLONOSCOPY  10    COLONOSCOPY  2016    severe spasms    COLONOSCOPY  2019    DR GOLDY MCINTOSH    GASTRIC BYPASS SURGERY      GASTRIC BYPASS SURGERY      HYSTERECTOMY (CERVIX STATUS UNKNOWN)      HYSTERECTOMY (CERVIX STATUS UNKNOWN)      IR PERC CATH PLEURAL DRAIN W/IMAG  10/21/2022    IR PERC CATH PLEURAL DRAIN W/IMAG 10/21/2022 [facility]    IR PERC CATH PLEURAL DRAIN W/IMAG  10/21/2022    IR PERC CATH PLEURAL DRAIN W/IMAG 10/21/2022 [facility]    IR PERC CATH PLEURAL DRAIN W/IMAG  10/24/2022    IR PERC CATH PLEURAL DRAIN W/IMAG 10/24/2022 [facility]    LAPAROSCOPY N/A 1/29/2022    LAPAROSCOPY EXPLORATORY CONVERTED TO OPEN EXPLORATORY LAPAROTOMY, LYSIS OF ADHESIONS, REDUCTION OF INTERNAL HERNIA performed by Mil Eden DO at [address] [facility]  29/16/3669    APPLICATION OF ARCH BARS,SIMPLE EXTRACTION OF #15 AND RT TMJ JOINT REPLACEMENT    SHOULDER ARTHROSCOPY Left 06/05/2019    DR ROBERT GREEN    SHOULDER SURGERY      TONSILLECTOMY AND ADENOIDECTOMY      TRANSESOPHAGEAL ECHOCARDIOGRAM N/A 10/19/2022    TRANSESOPHAGEAL ECHOCARDIOGRAM WITH BUBBLE STUDY performed by Frandy Royal DO at [address]  7-3-12    egd    UPPER GASTROINTESTINAL ENDOSCOPY  12/19/2016    status post gastrectomy with a small remnant of gastric pouch.     UPPER GASTROINTESTINAL ENDOSCOPY  05/2019    DR Adele Graham       Medications:      IVPB builder   IntraVENous Once    lisinopril  10 mg Oral Daily    ARIPiprazole  7.5 mg Oral Daily    divalproex  1,000 mg Oral BID    ceftaroline fosamil (TEFLARO) IVPB  600 mg IntraVENous Q12H    carvedilol  25 mg Oral BID WC    Vitamin D  2,000 Units Oral Daily    sodium chloride flush  5-40 mL IntraVENous 2 times per day    atorvastatin  40 mg Oral Daily    DULoxetine  60 mg Oral Daily    ferrous sulfate  325 mg Oral BID WC    ipratropium-albuterol  1 ampule Inhalation Q4H WA       Social History:     Social History     Socioeconomic History    Marital status:      Spouse name: Not on file    Number of children: Not on file    Years of education: Not on file    Highest education level: Not on file   Occupational History    Occupation: disability   Tobacco Use    Smoking status: Every Day Packs/day: 0.50     Years: 33.00     Pack years: 16.50     Types: Cigarettes    Smokeless tobacco: Never    Tobacco comments:     quit  2000 started again  1/13    Substance and Sexual Activity    Alcohol use: Yes     Comment: occasional    Drug use: Not Currently    Sexual activity: Yes   Other Topics Concern    Not on file   Social History Narrative    Not on file     Social Determinants of Health     Financial Resource Strain: Not on file   Food Insecurity: Not on file   Transportation Needs: Not on file   Physical Activity: Not on file   Stress: Not on file   Social Connections: Not on file   Intimate Partner Violence: Not on file   Housing Stability: Not on file       Family History:     Family History   Problem Relation Age of Onset    Diabetes Mother     Cancer Mother     Coronary Art Dis Father     COPD Father     Depression Brother     Alcohol Abuse Brother     Cancer Other         lung and skin    Diabetes Maternal Grandmother     Cancer Paternal Grandmother       Medical Decision Making:   I have independently reviewed/ordered the following labs:    CBC with Differential:   Recent Labs     11/02/22  0607   WBC 4.5   HGB 7.2*   HCT 22.0*   PLT 41*   LYMPHOPCT 19*   MONOPCT 12*           Lab Results   Component Value Date/Time    CREATININE 1.17 11/02/2022 06:07 AM    GLUCOSE 72 11/02/2022 06:07 AM       Detailed results: Thank you for allowing us to participate in the care of this patient. Please call with questions. This note is created with the assistance of a speech recognition program.  While intending to generate adocument that actually reflects the content of the visit, the document can still have some errors including those of syntax and sound a like substitutions which may escape proof reading. It such instances, actual meaningcan be extrapolated by contextual diversion.         Chilango Headley MD   Office/Perfect Serve:   [phone]

## 2022-11-02 NOTE — PROGRESS NOTES
[address]   OCCUPATIONAL THERAPY MISSED TREATMENT NOTE   INPATIENT   Date: 22  Patient Name: Makenna Mckeon       Room: 5648/4971-07  MRN: [id number]   Account #: [de-identified]    : 1970  (46 y.o.)  Gender: female   Referring Practitioner: Elena Deleon MD  Diagnosis: SIRS, pulmonary nodules, NICKI       REASON FOR MISSED TREATMENT:  Patient unable to participate   -   attempt at [id number], pt unable to actively participate in today's therapy session due to lethargy and unable to be roused. Pt would not respond to verbal stimuli this date, would not open eyes or squeeze writer's fingers. OT will continue to follow and will re-attempt when pt is able to actively participate.       Arron Arias, OT

## 2022-11-02 NOTE — PROGRESS NOTES
Physical Therapy        Physical Therapy Cancel Note      DATE: 2022    NAME: Bassam Varela  MRN: [id number]   : 1970      Patient not seen this date for Physical Therapy due to:    22 Patient unable to participate d/t lethargy and unarousable  -   attempt at [phone], Pt would not respond to verbal or tactile stimuli this date, would not open eyes, speak, or squeeze writer's fingers. PT will continue to follow and will re-attempt when pt is able to actively participate.       Electronically signed by Pat Vance PT on 2022 at 5:21 PM

## 2022-11-02 NOTE — PROGRESS NOTES
Progress Note  Date:2022       Room:7-  Patient Name:Anu Bouren     YOB: 1970     Age:51 y.o. Subjective    Subjective:  Symptoms:  (Easily awakened and responsive, still mumbles and drifts off at times. ). Activity level: Impaired due to weakness. Review of Systems  Objective         Vitals Last 24 Hours:  TEMPERATURE:  Temp  Av.1 °F (36.7 °C)  Min: 97.8 °F (36.6 °C)  Max: 98.3 °F (36.8 °C)  RESPIRATIONS RANGE: Resp  Av  Min: 14  Max: 20  PULSE OXIMETRY RANGE: SpO2  Av %  Min: 96 %  Max: 99 %  PULSE RANGE: Pulse  Av.8  Min: 58  Max: 64  BLOOD PRESSURE RANGE: Systolic (38OFD), GBP:574 , Min:165 , CVB:684   ; Diastolic (71RWN), MSY:76, Min:71, Max:80    I/O (24Hr): No intake or output data in the 24 hours ending 22 0830  Objective:  General Appearance:  Comfortable. Vital signs: (most recent): Blood pressure (!) 168/78, pulse 60, temperature 98.3 °F (36.8 °C), temperature source Oral, resp. rate 16, height 4' 11\" (1.499 m), weight 111 lb 8.8 oz (50.6 kg), SpO2 96 %. (BP elevated - lisinopril restarted yesterday). Lungs:  Normal effort and normal respiratory rate. There are decreased breath sounds. Heart: Normal rate. Regular rhythm. S1 normal and S2 normal.    Labs/Imaging/Diagnostics    Labs:  CBC:  Recent Labs     22  0607   WBC 4.5   RBC 2.68*   HGB 7.2*   HCT 22.0*   MCV 82.0   RDW 16.9*   PLT 41*     CHEMISTRIES:  Recent Labs     22  0607      K 2.9*      CO2 29   BUN 17   CREATININE 1.17*   GLUCOSE 72     PT/INR:No results for input(s): PROTIME, INR in the last 72 hours. APTT:No results for input(s): APTT in the last 72 hours. LIVER PROFILE:No results for input(s): AST, ALT, BILIDIR, BILITOT, ALKPHOS in the last 72 hours.     Imaging Last 24 Hours:  XR CHEST PORTABLE    Result Date: 2022  EXAMINATION: ONE XRAY VIEW OF THE CHEST 2022 7:14 am COMPARISON: 10/31/2022 HISTORY:  Bridgette Coronado PROVIDED HISTORY: Follow-up septic emboli, removal of pigtail catheter TECHNOLOGIST PROVIDED HISTORY: Follow-up septic emboli, removal of pigtail catheter Reason for Exam: removal of pigtail catheter FINDINGS: There is a right PICC with the tip in the right atrium Cardiac size is normal.  Stable patchy bilateral alveolar and nodular infiltrates. The pulmonary vascularity is stable. No pneumothorax. No progressive pleural effusions identified . Postsurgical changes overlying the cervical spine.   Left shoulder prosthesis     Stable patchy bilateral alveolar and nodular infiltrates     Assessment//Plan           Hospital Problems             Last Modified POA    * (Principal) SIRS (systemic inflammatory response syndrome) ([address] 10/10/2022 Yes    NICKI (acute kidney injury) ([address] 10/11/2022 Yes    Severe malnutrition (HCC) (Chronic) 10/11/2022 Yes    Elevated d-dimer 10/15/2022 Yes    Leukocytosis 10/15/2022 Yes    Pulmonary nodules 10/15/2022 Yes    Delirium due to another medical condition 10/17/2022 Yes    Acute metabolic encephalopathy 78/09/3663 Yes    MRSA bacteremia 10/23/2022 Yes    Acute septic pulmonary embolism ([address] 10/23/2022 Yes    Cavitating mass of lung 10/23/2022 Yes    Allergy to antibiotic 10/23/2022 Yes    Thrombocytopenia ([address] 10/26/2022 Yes    Depression with suicidal ideation 10/17/2022 Yes    Bipolar disorder, mixed ([address] 10/11/2022 Yes    Acute encephalopathy 10/14/2022 Yes    COPD (chronic obstructive pulmonary disease) ([address] 10/11/2022 Yes    H/O gastric bypass 10/11/2022 Yes    Overview Signed 2/13/2020  7:19 PM by Amaris De Los Santos MD     enlarged  pancreas, here for surgical work-up for EGD          Assessment & Plan    Discharge planning    Potassium replacement    Electronically signed by Amaris De Los Santos MD on 11/2/22 at 8:30 AM EDT

## 2022-11-03 LAB
BUN BLDV-MCNC: 17 MG/DL (ref 6–20)
CREAT SERPL-MCNC: 1.23 MG/DL (ref 0.5–0.9)
GFR SERPL CREATININE-BSD FRML MDRD: 53 ML/MIN/1.73M2
POTASSIUM SERPL-SCNC: 3.3 MMOL/L (ref 3.7–5.3)

## 2022-11-03 PROCEDURE — 6370000000 HC RX 637 (ALT 250 FOR IP): Performed by: INTERNAL MEDICINE

## 2022-11-03 PROCEDURE — 82565 ASSAY OF CREATININE: CPT

## 2022-11-03 PROCEDURE — 94761 N-INVAS EAR/PLS OXIMETRY MLT: CPT

## 2022-11-03 PROCEDURE — 36415 COLL VENOUS BLD VENIPUNCTURE: CPT

## 2022-11-03 PROCEDURE — 94640 AIRWAY INHALATION TREATMENT: CPT

## 2022-11-03 PROCEDURE — 6360000002 HC RX W HCPCS: Performed by: INTERNAL MEDICINE

## 2022-11-03 PROCEDURE — 6370000000 HC RX 637 (ALT 250 FOR IP): Performed by: FAMILY MEDICINE

## 2022-11-03 PROCEDURE — 84132 ASSAY OF SERUM POTASSIUM: CPT

## 2022-11-03 PROCEDURE — 1200000000 HC SEMI PRIVATE

## 2022-11-03 PROCEDURE — 6370000000 HC RX 637 (ALT 250 FOR IP): Performed by: PSYCHIATRY & NEUROLOGY

## 2022-11-03 PROCEDURE — 99232 SBSQ HOSP IP/OBS MODERATE 35: CPT | Performed by: INTERNAL MEDICINE

## 2022-11-03 PROCEDURE — 84520 ASSAY OF UREA NITROGEN: CPT

## 2022-11-03 PROCEDURE — 2580000003 HC RX 258: Performed by: INTERNAL MEDICINE

## 2022-11-03 RX ADMIN — ATORVASTATIN CALCIUM 40 MG: 40 TABLET, FILM COATED ORAL at 20:56

## 2022-11-03 RX ADMIN — DULOXETINE 60 MG: 60 CAPSULE, DELAYED RELEASE ORAL at 11:06

## 2022-11-03 RX ADMIN — CEFTAROLINE FOSAMIL 600 MG: 600 POWDER, FOR SOLUTION INTRAVENOUS at 08:55

## 2022-11-03 RX ADMIN — IPRATROPIUM BROMIDE AND ALBUTEROL SULFATE 1 AMPULE: 2.5; .5 SOLUTION RESPIRATORY (INHALATION) at 07:43

## 2022-11-03 RX ADMIN — POTASSIUM CHLORIDE 40 MEQ: 1500 TABLET, EXTENDED RELEASE ORAL at 17:46

## 2022-11-03 RX ADMIN — FERROUS SULFATE TAB 325 MG (65 MG ELEMENTAL FE) 325 MG: 325 (65 FE) TAB at 11:06

## 2022-11-03 RX ADMIN — CEFTAROLINE FOSAMIL 600 MG: 600 POWDER, FOR SOLUTION INTRAVENOUS at 21:00

## 2022-11-03 RX ADMIN — CARVEDILOL 25 MG: 25 TABLET, FILM COATED ORAL at 17:46

## 2022-11-03 RX ADMIN — SODIUM CHLORIDE, PRESERVATIVE FREE 10 ML: 5 INJECTION INTRAVENOUS at 20:59

## 2022-11-03 RX ADMIN — ARIPIPRAZOLE 7.5 MG: 15 TABLET ORAL at 11:06

## 2022-11-03 RX ADMIN — DIVALPROEX SODIUM 1000 MG: 500 TABLET, DELAYED RELEASE ORAL at 20:56

## 2022-11-03 RX ADMIN — IPRATROPIUM BROMIDE AND ALBUTEROL SULFATE 1 AMPULE: 2.5; .5 SOLUTION RESPIRATORY (INHALATION) at 19:32

## 2022-11-03 RX ADMIN — IPRATROPIUM BROMIDE AND ALBUTEROL SULFATE 1 AMPULE: 2.5; .5 SOLUTION RESPIRATORY (INHALATION) at 15:01

## 2022-11-03 RX ADMIN — CARVEDILOL 25 MG: 25 TABLET, FILM COATED ORAL at 13:12

## 2022-11-03 RX ADMIN — LISINOPRIL 10 MG: 10 TABLET ORAL at 09:07

## 2022-11-03 RX ADMIN — Medication 2000 UNITS: at 11:06

## 2022-11-03 RX ADMIN — HYDRALAZINE HYDROCHLORIDE 10 MG: 20 INJECTION INTRAMUSCULAR; INTRAVENOUS at 09:33

## 2022-11-03 RX ADMIN — FERROUS SULFATE TAB 325 MG (65 MG ELEMENTAL FE) 325 MG: 325 (65 FE) TAB at 17:46

## 2022-11-03 RX ADMIN — IPRATROPIUM BROMIDE AND ALBUTEROL SULFATE 1 AMPULE: 2.5; .5 SOLUTION RESPIRATORY (INHALATION) at 10:53

## 2022-11-03 ASSESSMENT — PAIN SCALES - WONG BAKER

## 2022-11-03 ASSESSMENT — PAIN SCALES - GENERAL: PAINLEVEL_OUTOF10: 0

## 2022-11-03 NOTE — PROGRESS NOTES
Progress Note  Date:11/3/2022       Room:[facility]2047-  Patient Name:Anu Bourne     YOB: 1970     Age:51 y.o. Subjective    Subjective:  Symptoms:  (Does not open eyes this morning, mumbles for me to leave her alone. ). Diet:  Poor intake. Activity level: Impaired due to weakness. Pain:  She reports no pain. Review of Systems  Objective         Vitals Last 24 Hours:  TEMPERATURE:  Temp  Av.7 °F (36.5 °C)  Min: 97.6 °F (36.4 °C)  Max: 97.8 °F (36.6 °C)  RESPIRATIONS RANGE: Resp  Av.5  Min: 18  Max: 20  PULSE OXIMETRY RANGE: SpO2  Av.1 %  Min: 94 %  Max: 96 %  PULSE RANGE: Pulse  Av.4  Min: 55  Max: 66  BLOOD PRESSURE RANGE: Systolic (19WSU), ZOO:639 , Min:131 , ZI   ; Diastolic (63KSP), ACW:70, Min:66, Max:80    I/O (24Hr): No intake or output data in the 24 hours ending 22 0843  Objective:  General Appearance:  Comfortable. Vital signs: (most recent): Blood pressure (!) 162/74, pulse 60, temperature 98.1 °F (36.7 °C), temperature source Oral, resp. rate 16, height 4' 11\" (1.499 m), weight 111 lb 8.8 oz (50.6 kg), SpO2 95 %. (BP elevated). Lungs:  Normal effort and normal respiratory rate. There are decreased breath sounds. Heart: Normal rate. Regular rhythm. S1 normal and S2 normal.    Labs/Imaging/Diagnostics    Labs:  CBC:  Recent Labs     22  0607   WBC 4.5   RBC 2.68*   HGB 7.2*   HCT 22.0*   MCV 82.0   RDW 16.9*   PLT 41*     CHEMISTRIES:  Recent Labs     22  0607 22  0609     --    K 2.9*  --      --    CO2 29  --    BUN 17 17   CREATININE 1.17* 1.23*   GLUCOSE 72  --      PT/INR:No results for input(s): PROTIME, INR in the last 72 hours. APTT:No results for input(s): APTT in the last 72 hours. LIVER PROFILE:No results for input(s): AST, ALT, BILIDIR, BILITOT, ALKPHOS in the last 72 hours.     Imaging Last 24 Hours:  XR CHEST PORTABLE    Result Date: 2022  EXAMINATION: ONE X-RAY VIEW OF THE CHEST 11/2/2022 6:24 am COMPARISON: 11/01/2022 HISTORY: ORDERING SYSTEM PROVIDED HISTORY: Septic emboli (MRSA); status post removal of pigtail catheter TECHNOLOGIST PROVIDED HISTORY: Septic emboli (MRSA); status post removal of pigtail catheter Reason for Exam: post pigtail catheter removal. FINDINGS: Right arm PICC remains in place. Heart size is stable. Patchy bilateral pleural and parenchymal opacities are unchanged. No evidence of pneumothorax or sizable pleural effusion. No significant interval change. Patchy bilateral pleural and parenchymal opacities. XR CHEST PORTABLE    Result Date: 11/1/2022  EXAMINATION: ONE XRAY VIEW OF THE CHEST 11/1/2022 7:14 am COMPARISON: 10/31/2022 HISTORY: ORDERING SYSTEM PROVIDED HISTORY: Follow-up septic emboli, removal of pigtail catheter TECHNOLOGIST PROVIDED HISTORY: Follow-up septic emboli, removal of pigtail catheter Reason for Exam: removal of pigtail catheter FINDINGS: There is a right PICC with the tip in the right atrium Cardiac size is normal.  Stable patchy bilateral alveolar and nodular infiltrates. The pulmonary vascularity is stable. No pneumothorax. No progressive pleural effusions identified . Postsurgical changes overlying the cervical spine.   Left shoulder prosthesis     Stable patchy bilateral alveolar and nodular infiltrates     Assessment//Plan           Hospital Problems             Last Modified POA    * (Principal) SIRS (systemic inflammatory response syndrome) ([address] 10/10/2022 Yes    NICKI (acute kidney injury) ([address] 10/11/2022 Yes    Severe malnutrition (HCC) (Chronic) 10/11/2022 Yes    Elevated d-dimer 10/15/2022 Yes    Leukocytosis 10/15/2022 Yes    Pulmonary nodules 10/15/2022 Yes    Delirium due to another medical condition 10/17/2022 Yes    Acute metabolic encephalopathy 82/53/2223 Yes    MRSA bacteremia 10/23/2022 Yes    Acute septic pulmonary embolism ([address] 10/23/2022 Yes    Cavitating mass of lung 10/23/2022 Yes    Allergy to antibiotic 10/23/2022 Yes    Thrombocytopenia [address] 10/26/2022 Yes    Depression with suicidal ideation 10/17/2022 Yes    Bipolar disorder, mixed ([facility] 10/11/2022 Yes    Acute encephalopathy 10/14/2022 Yes    COPD (chronic obstructive pulmonary disease) ([facility] 10/11/2022 Yes    H/O gastric bypass 10/11/2022 Yes    Overview Signed 2/13/2020  7:19 PM by Sarmad Simms MD     enlarged  pancreas, here for surgical work-up for EGD          Assessment & Plan    Awaiting placement    Electronically signed by Sarmad Simms MD on 11/3/22 at 8:43 AM EDT

## 2022-11-03 NOTE — CARE COORDINATION
Spoke with Jessica Ingram from [facility]. Jessica Ingram was requesting that we send over the cost of the medication. JAY explained that the cost varies per pharmacy. Jessica Ingram then stated that it has been \"approved\", but they need the cost of the medication. Jessica Ingram reported that she will figure out the cost herself and let us know what the next steps are.  Electronically signed by JOSE Thurston on 11/3/2022 at 2:13 PM

## 2022-11-03 NOTE — PROGRESS NOTES
Writer in to attempt to give patient morning medications. Patient was initially agreeable to taking medications. However, patient was very sleepy and lethargic. Patient only oriented to self. Writer was able to get patient to take Lisinopril. However, patient refused all other medications at this time. Writer will reattempt.

## 2022-11-03 NOTE — PLAN OF CARE
Problem: Discharge Planning  Goal: Discharge to home or other facility with appropriate resources  11/2/2022 1307 by Dilcia Harmon RN  Outcome: Progressing  Flowsheets (Taken 11/2/2022 1307)  Discharge to home or other facility with appropriate resources:   Identify barriers to discharge with patient and caregiver   Identify discharge learning needs (meds, wound care, etc)   Refer to discharge planning if patient needs post-hospital services based on physician order or complex needs related to functional status, cognitive ability or social support system   Arrange for needed discharge resources and transportation as appropriate     Problem: Skin/Tissue Integrity  Goal: Absence of new skin breakdown  Description: 1. Monitor for areas of redness and/or skin breakdown  2. Assess vascular access sites hourly  3. Every 4-6 hours minimum:  Change oxygen saturation probe site  4. Every 4-6 hours:  If on nasal continuous positive airway pressure, respiratory therapy assess nares and determine need for appliance change or resting period. 11/3/2022 0002 by Patsy Atkins RN  Outcome: Progressing  11/2/2022 1307 by Dilcia Harmon RN  Outcome: Progressing  Note: Assess and document any skin issues every shift. Problem: Pain  Goal: Verbalizes/displays adequate comfort level or baseline comfort level  11/3/2022 0002 by Patsy Atkins RN  Outcome: Not Progressing  11/2/2022 1307 by Dilcia Harmon RN  Outcome: Progressing  Flowsheets (Taken 11/2/2022 1307)  Verbalizes/displays adequate comfort level or baseline comfort level:   Encourage patient to monitor pain and request assistance   Administer analgesics based on type and severity of pain and evaluate response   Consider cultural and social influences on pain and pain management   Assess pain using appropriate pain scale   Implement non-pharmacological measures as appropriate and evaluate response  Note: Monitor and medicate for pain as needed as ordered.

## 2022-11-03 NOTE — PROGRESS NOTES
Comprehensive Nutrition Assessment    Type and Reason for Visit:  Reassess    Nutrition Recommendations/Plan:   Will continue to provide Regular diet with Ensure High Protein once daily     Malnutrition Assessment:  Malnutrition Status:  Severe malnutrition (10/11/22 1232)    Context:  Acute Illness     Findings of the 6 clinical characteristics of malnutrition:  Energy Intake:  50% or less of estimated energy requirements for 5 or more days  Weight Loss:  Greater than 7.5% over 3 months     Body Fat Loss: Moderate body fat loss Orbital, Triceps, Fat Overlying Ribs   Muscle Mass Loss: Moderate muscle mass loss Temples (temporalis), Clavicles (pectoralis & deltoids)  Fluid Accumulation:  No significant fluid accumulation     Strength:  Not Performed    Nutrition Assessment:    Spoke to pt's Dad who brought in a small dish of food from home. Dad states today is the best she's eaten since she got here (25-50%). Pt has Ensure stacked around her room. Nutrition Related Findings:    no edema, Labs/Meds: Reviewed,  11/2 Wound Type: Pressure Injury (refer to nursing flowsheet)       Current Nutrition Intake & Therapies:    Average Meal Intake: 26-50%  Average Supplements Intake: 1-25%  ADULT DIET; Regular  ADULT ORAL NUTRITION SUPPLEMENT; Breakfast; Standard High Calorie/High Protein Oral Supplement    Anthropometric Measures:  Height: 4' 11\" (149.9 cm)  Ideal Body Weight (IBW): 95 lbs (43 kg)    Admission Body Weight: 89 lb (40.4 kg)  Current Body Weight: 111 lb (50.3 kg) (11/1), 93.7 % IBW. Weight Source: Bed Scale  Current BMI (kg/m2): 22.4  Usual Body Weight: 113 lb (51.3 kg) (per past records)  % Weight Change (Calculated): -21.2                    BMI Categories: Normal Weight (BMI 18.5-24. 9)    Estimated Daily Nutrient Needs:  Energy Requirements Based On: Kcal/kg  Weight Used for Energy Requirements: Admission  Energy (kcal/day): 1414 kcal based on 35 kcal/kg admission  Weight Used for Protein Requirements: Admission  Protein (g/day): 50 gm based on 1.2 gm/kg admission  Method Used for Fluid Requirements: 1 ml/kcal    Nutrition Diagnosis:   Severe malnutrition related to impaired respiratory function, psychological cause or life stress as evidenced by intake 0-25%, poor intake prior to admission, BMI, weight loss, severe loss of subcutaneous fat, severe muscle loss    Nutrition Interventions:   Food and/or Nutrient Delivery: Continue Current Diet, Modify Oral Nutrition Supplement  Nutrition Education/Counseling: Education not indicated  Coordination of Nutrition Care: Continue to monitor while inpatient       Goals:  Previous Goal Met: No Progress toward Goal(s)  Goals: Meet at least 75% of estimated needs, PO intake 75% or greater       Nutrition Monitoring and Evaluation:   Behavioral-Environmental Outcomes: None Identified  Food/Nutrient Intake Outcomes: Food and Nutrient Intake, Supplement Intake  Physical Signs/Symptoms Outcomes: Biochemical Data, GI Status, Skin, Weight, Fluid Status or Edema    Discharge Planning:    Continue current diet     Andre Patel, [address] [place] [address],   Contact: [phone]

## 2022-11-03 NOTE — FLOWSHEET NOTE
Physical Therapy Cancel Note      DATE: 11/3/2022    NAME: Kin Gamble  MRN: [id number]   : 1970      Patient not seen this date for Physical Therapy due to:    Patient Declined: Patient not opening eyes or answering questions appropriately but repeats she does not want to do therapy until she is showered. Will follow up later if time allows.       Electronically signed by Eloina Albetr PTA on 11/3/2022 at 10:23 AM

## 2022-11-03 NOTE — PROGRESS NOTES
[facility] -   Infectious diseases evaluation  admission date 10/10/2022    reason for consultation:   MRSA bacteremia    Impression :   Current:  MRSA bacteremia  Bilateral pulmonary nodules with cavitation /loculated pleural effusion /suspected septic emboli s/p thoracentesis , chest tube placement. Chest tube was removed 10/31/2022  Sepsis secondary to above  Thrombocytopenia/anemia? Vancomycin related  Encephalopathy  History of cocaine drug abuse  E. coli UTI treated  Acute renal failure improved  Hypertension  Bipolar disorder  Allergy to Bactrim. Recommendations     Continue IV Teflaro through 12/03/22  Follow renal function and adjust Teflaro dose if needed. Follow CBC and BUN/creatinine weekly while on IV antibiotics. No objection for discharge from infectious disease point of view  Supportive care. Infection Control Recommendations   [place] Precautions  Contact Isolation     Antimicrobial Stewardship Recommendations   Simplification of therapy  Targeted therapy      History of Present Illness:   Initial history:  Isidoro Gonsales is a 46y.o.-year-old female was admitted 10/10/2020 for worsening shortness of breath associated with generalized body ache. The patient is drowsy, arousable, confused, follows simple commands, unable to provide history that was obtained from chart review and nursing staff. She does have history of cocaine abuse. She was found to have acute renal failure with a creatinine of 2.1. Urinalysis showed small leukocyte esterase, positive nitrate. Urine culture on 10/10/2022 grew E. Coli  Blood cultures 10/10/2022 2 of 2 grew MRSA  Echocardiogram showed no vegetations  Procalcitonin was 6.01, D-dimer was elevated at 3.85  Chest x-ray showed right lung nodules. VQ scan was indeterminant.   CT chest without contrast 10/16/2022 showed numerous bilateral nodular opacities both lungs suggestive of septic emboli , focal consolidation with cavitation in the superior right lower lobe focal consolidation at the left base, small bilateral loculated pleural effusion and mediastinal lymphadenopathy. Echocardiogram showed no vegetations  ADRIANNE negative for vegetations. Interval changes  11/3/2022   She is comfortable on room air, sleepy, open eyes, was awake and oriented earlier with nursing staff. Creatinine increased to 1.23 today  11/3 /22 WBC 4.5, hemoglobin 7.2, platelet 41  Chest x-ray yesterday unchanged. Patient Vitals for the past 8 hrs:   BP Temp Temp src Pulse Resp SpO2   11/03/22 1502 -- -- -- 60 16 95 %   11/03/22 1245 (!) 162/74 98.1 °F (36.7 °C) Oral 66 16 96 %   11/03/22 1054 -- -- -- -- -- 96 %           I have personally reviewed the past medical history, past surgical history, medications, social history, and family history, and I haveupdated the database accordingly. Allergies:   Aspirin, Bactrim, and Codeine     Review of Systems:      unable to provide due to mentation  Physical Examination :       Physical Exam  Vitals and nursing note reviewed. Constitutional:       General: She is not in acute distress. Appearance: She is not ill-appearing. HENT:      Head: Normocephalic and atraumatic. Right Ear: External ear normal.      Left Ear: External ear normal.   Eyes:      General: No scleral icterus. Conjunctiva/sclera: Conjunctivae normal.   Cardiovascular:      Rate and Rhythm: Regular rhythm. Heart sounds: Normal heart sounds. No murmur heard. Pulmonary:      Effort: Pulmonary effort is normal. No respiratory distress. Breath sounds: No wheezing or rhonchi. Comments:   Decreased breath sounds bilaterally  Abdominal:      General: Bowel sounds are normal. There is no distension. Palpations: Abdomen is soft. Tenderness: There is no abdominal tenderness. Genitourinary:     Comments: No chamberlain. Musculoskeletal:      Cervical back: Neck supple. No rigidity.       Right lower leg: No edema. Left lower leg: No edema. Skin:     General: Skin is warm and dry. Coloration: Skin is not jaundiced. Findings: No erythema.    Neurological:      Comments: Sleepy   Psychiatric:      Comments: ISAAK     Past Medical History:     Past Medical History:   Diagnosis Date    Anemia     Arthritis     Asthma     Bipolar disorder, mixed ([facility]     Carotid artery stenosis 2020    Cocaine abuse ([facility] 2014    [facility]ch[facility]     Degeneration of cervical intervertebral disc     Depression with anxiety     Depression with anxiety     Fibromyalgia 2017    GERD (gastroesophageal reflux disease)     History of migraine headaches 6/10/2014    History of renal calculi 6/10/2014    History of seizure disorder 6/10/2014    Hoarseness of voice     HTN (hypertension) 6/10/2014    Hyperlipidemia 6/10/2014    IGT (impaired glucose tolerance) 6/10/2014    Kidney stones     Lactose intolerance 6/10/2014    Leukopenia 6/10/2014    Major depressive disorder, recurrent episode, severe, without mention of psychotic behavior 2014    MVP (mitral valve prolapse)     Seizures (HCC)     Sleep apnea 6/10/2014    Unspecified diseases of blood and blood-forming organs        Past Surgical  History:     Past Surgical History:   Procedure Laterality Date    ANKLE FRACTURE SURGERY      recontruction surgery    APPENDECTOMY      BREAST LUMPECTOMY Right     CARDIAC CATHETERIZATION      CARPAL TUNNEL RELEASE      x2     SECTION      CHOLECYSTECTOMY      COLONOSCOPY  12    COLONOSCOPY  2016    severe spasms    COLONOSCOPY  2019    DR GOLDY MCINTOSH    GASTRIC BYPASS SURGERY      GASTRIC BYPASS SURGERY      HYSTERECTOMY (CERVIX STATUS UNKNOWN)      HYSTERECTOMY (CERVIX STATUS UNKNOWN)      IR PERC CATH PLEURAL DRAIN W/IMAG  10/21/2022    IR PERC CATH PLEURAL DRAIN W/IMAG 10/21/2022 [facility]    IR PERC CATH PLEURAL DRAIN W/IMAG 10/21/2022    IR PERC CATH PLEURAL DRAIN W/IMAG 10/21/2022 [facility]    IR PERC CATH PLEURAL DRAIN W/IMAG  10/24/2022    IR PERC CATH PLEURAL DRAIN W/IMAG 10/24/2022 [facility]    LAPAROSCOPY N/A 1/29/2022    LAPAROSCOPY EXPLORATORY CONVERTED TO OPEN EXPLORATORY LAPAROTOMY, LYSIS OF ADHESIONS, REDUCTION OF INTERNAL HERNIA [facility]  04[facility]    APPLICATION OF ARCH BARS,SIMPLE EXTRACTION OF #15 AND RT TMJ JOINT REPLACEMENT    SHOULDER ARTHROSCOPY Left 06/05/2019    DR ROBERT GREEN    SHOULDER SURGERY      TONSILLECTOMY AND ADENOIDECTOMY      TRANSESOPHAGEAL ECHOCARDIOGRAM N/A 10/19/2022    TRANSESOPHAGEAL ECHOCARDIOGRAM WITH BUBBLE STUDY performed by Frandy Royal DO at [address]  7-3-12    egd    UPPER GASTROINTESTINAL ENDOSCOPY  12/19/2016    status post gastrectomy with a small remnant of gastric pouch.     UPPER GASTROINTESTINAL ENDOSCOPY  05/2019    DR Marialuisa Sevilla       Medications:      lisinopril  10 mg Oral Daily    ARIPiprazole  7.5 mg Oral Daily    divalproex  1,000 mg Oral BID    ceftaroline fosamil (TEFLARO) IVPB  600 mg IntraVENous Q12H    carvedilol  25 mg Oral BID WC    Vitamin D  2,000 Units Oral Daily    sodium chloride flush  5-40 mL IntraVENous 2 times per day    atorvastatin  40 mg Oral Daily    DULoxetine  60 mg Oral Daily    ferrous sulfate  325 mg Oral BID WC    ipratropium-albuterol  1 ampule Inhalation Q4H WA       Social History:     Social History     Socioeconomic History    Marital status:      Spouse name: Not on file    Number of children: Not on file    Years of education: Not on file    Highest education level: Not on file   Occupational History    Occupation: disability   Tobacco Use    Smoking status: Every Day     Packs/day: 0.50     Years: 33.00     Pack years: 16.50     Types: Cigarettes    Smokeless tobacco: Never    Tobacco comments:     quit  2000 started again  1/13    Substance and Sexual Activity    Alcohol use: Yes     Comment: occasional    Drug use: Not Currently    Sexual activity: Yes   Other Topics Concern    Not on file   Social History Narrative    Not on file     Social Determinants of Health     Financial Resource Strain: Not on file   Food Insecurity: Not on file   Transportation Needs: Not on file   Physical Activity: Not on file   Stress: Not on file   Social Connections: Not on file   Intimate Partner Violence: Not on file   Housing Stability: Not on file       Family History:     Family History   Problem Relation Age of Onset    Diabetes Mother     Cancer Mother     Coronary Art Dis Father     COPD Father     Depression Brother     Alcohol Abuse Brother     Cancer Other         lung and skin    Diabetes Maternal Grandmother     Cancer Paternal Grandmother       Medical Decision Making:   I have independently reviewed/ordered the following labs:    CBC with Differential:   Recent Labs     11/02/22  0607   WBC 4.5   HGB 7.2*   HCT 22.0*   PLT 41*   LYMPHOPCT 19*   MONOPCT 12*           Lab Results   Component Value Date/Time    CREATININE 1.23 11/03/2022 06:09 AM    GLUCOSE 72 11/02/2022 06:07 AM       Detailed results: Thank you for allowing us to participate in the care of this patient. Please call with questions. This note is created with the assistance of a speech recognition program.  While intending to generate adocument that actually reflects the content of the visit, the document can still have some errors including those of syntax and sound a like substitutions which may escape proof reading. It such instances, actual meaningcan be extrapolated by contextual diversion.         Chase Casanova MD   Office/Perfect Serve:   [phone]

## 2022-11-04 VITALS
HEART RATE: 63 BPM | SYSTOLIC BLOOD PRESSURE: 154 MMHG | WEIGHT: 111.55 LBS | BODY MASS INDEX: 22.49 KG/M2 | DIASTOLIC BLOOD PRESSURE: 75 MMHG | TEMPERATURE: 97.7 F | OXYGEN SATURATION: 94 % | RESPIRATION RATE: 16 BRPM | HEIGHT: 59 IN

## 2022-11-04 PROCEDURE — 6370000000 HC RX 637 (ALT 250 FOR IP): Performed by: FAMILY MEDICINE

## 2022-11-04 PROCEDURE — 94761 N-INVAS EAR/PLS OXIMETRY MLT: CPT

## 2022-11-04 PROCEDURE — 6370000000 HC RX 637 (ALT 250 FOR IP): Performed by: INTERNAL MEDICINE

## 2022-11-04 PROCEDURE — 6360000002 HC RX W HCPCS: Performed by: INTERNAL MEDICINE

## 2022-11-04 PROCEDURE — 2580000003 HC RX 258: Performed by: INTERNAL MEDICINE

## 2022-11-04 PROCEDURE — 6370000000 HC RX 637 (ALT 250 FOR IP): Performed by: PSYCHIATRY & NEUROLOGY

## 2022-11-04 PROCEDURE — 99232 SBSQ HOSP IP/OBS MODERATE 35: CPT | Performed by: INTERNAL MEDICINE

## 2022-11-04 PROCEDURE — 94640 AIRWAY INHALATION TREATMENT: CPT

## 2022-11-04 RX ORDER — IPRATROPIUM BROMIDE AND ALBUTEROL SULFATE 2.5; .5 MG/3ML; MG/3ML
3 SOLUTION RESPIRATORY (INHALATION)
Qty: 360 ML | Refills: 0
Start: 2022-11-04

## 2022-11-04 RX ORDER — DIVALPROEX SODIUM 500 MG/1
1000 TABLET, DELAYED RELEASE ORAL 2 TIMES DAILY
Qty: 120 TABLET | Refills: 0
Start: 2022-11-04

## 2022-11-04 RX ORDER — AMLODIPINE BESYLATE 5 MG/1
5 TABLET ORAL DAILY
Qty: 30 TABLET | Refills: 3 | Status: ON HOLD
Start: 2022-11-04 | End: 2022-11-18 | Stop reason: HOSPADM

## 2022-11-04 RX ORDER — FERROUS SULFATE 325(65) MG
325 TABLET ORAL 2 TIMES DAILY
Qty: 56 TABLET | Refills: 11
Start: 2022-11-04

## 2022-11-04 RX ORDER — CHOLECALCIFEROL (VITAMIN D3) 50 MCG
2000 TABLET ORAL DAILY
Qty: 60 TABLET | Refills: 0
Start: 2022-11-04

## 2022-11-04 RX ORDER — AMLODIPINE BESYLATE 5 MG/1
5 TABLET ORAL DAILY
Status: DISCONTINUED | OUTPATIENT
Start: 2022-11-04 | End: 2022-11-04 | Stop reason: HOSPADM

## 2022-11-04 RX ORDER — ARIPIPRAZOLE 15 MG/1
7.5 TABLET ORAL DAILY
Qty: 30 TABLET | Refills: 3
Start: 2022-11-04

## 2022-11-04 RX ORDER — DULOXETIN HYDROCHLORIDE 60 MG/1
60 CAPSULE, DELAYED RELEASE ORAL DAILY
Qty: 30 CAPSULE | Refills: 3
Start: 2022-11-04

## 2022-11-04 RX ADMIN — IPRATROPIUM BROMIDE AND ALBUTEROL SULFATE 1 AMPULE: 2.5; .5 SOLUTION RESPIRATORY (INHALATION) at 11:58

## 2022-11-04 RX ADMIN — DIVALPROEX SODIUM 1000 MG: 500 TABLET, DELAYED RELEASE ORAL at 09:36

## 2022-11-04 RX ADMIN — ARIPIPRAZOLE 7.5 MG: 15 TABLET ORAL at 09:36

## 2022-11-04 RX ADMIN — CARVEDILOL 25 MG: 25 TABLET, FILM COATED ORAL at 09:41

## 2022-11-04 RX ADMIN — FERROUS SULFATE TAB 325 MG (65 MG ELEMENTAL FE) 325 MG: 325 (65 FE) TAB at 09:41

## 2022-11-04 RX ADMIN — IPRATROPIUM BROMIDE AND ALBUTEROL SULFATE 1 AMPULE: 2.5; .5 SOLUTION RESPIRATORY (INHALATION) at 08:16

## 2022-11-04 RX ADMIN — SODIUM CHLORIDE, PRESERVATIVE FREE 10 ML: 5 INJECTION INTRAVENOUS at 09:37

## 2022-11-04 RX ADMIN — AMLODIPINE BESYLATE 5 MG: 5 TABLET ORAL at 09:36

## 2022-11-04 RX ADMIN — DULOXETINE 60 MG: 60 CAPSULE, DELAYED RELEASE ORAL at 09:36

## 2022-11-04 RX ADMIN — CEFTAROLINE FOSAMIL 600 MG: 600 POWDER, FOR SOLUTION INTRAVENOUS at 10:04

## 2022-11-04 RX ADMIN — Medication 2000 UNITS: at 09:36

## 2022-11-04 ASSESSMENT — PAIN SCALES - GENERAL: PAINLEVEL_OUTOF10: 0

## 2022-11-04 ASSESSMENT — PAIN SCALES - WONG BAKER

## 2022-11-04 NOTE — PLAN OF CARE
Problem: Discharge Planning  Goal: Discharge to home or other facility with appropriate resources  Outcome: Progressing     Problem: Pain  Goal: Verbalizes/displays adequate comfort level or baseline comfort level  Outcome: Progressing     Problem: Skin/Tissue Integrity  Goal: Absence of new skin breakdown  Description: 1. Monitor for areas of redness and/or skin breakdown  2. Assess vascular access sites hourly  3. Every 4-6 hours minimum:  Change oxygen saturation probe site  4. Every 4-6 hours:  If on nasal continuous positive airway pressure, respiratory therapy assess nares and determine need for appliance change or resting period.   Outcome: Progressing     Problem: Safety - Adult  Goal: Free from fall injury  Outcome: Progressing  Flowsheets (Taken 11/3/2022 1751 by Isaac Thompson RN)  Free From Fall Injury: Instruct family/caregiver on patient safety

## 2022-11-04 NOTE — PROGRESS NOTES
Progress Note  Date:2022       Room:7-  Patient Name:Anu Bourne     YOB: 1970     Age:51 y.o. Subjective    Subjective:  Symptoms:  (More awake than I have seen her this admission - had conversation. ). Diet:  Poor intake. Activity level: Impaired due to weakness. Pain:  She reports no pain. Review of Systems  Objective         Vitals Last 24 Hours:  TEMPERATURE:  Temp  Av.1 °F (36.7 °C)  Min: 97.7 °F (36.5 °C)  Max: 98.6 °F (37 °C)  RESPIRATIONS RANGE: Resp  Av.3  Min: 16  Max: 17  PULSE OXIMETRY RANGE: SpO2  Av.2 %  Min: 95 %  Max: 99 %  PULSE RANGE: Pulse  Av  Min: 60  Max: 72  BLOOD PRESSURE RANGE: Systolic (66QGC), FRU:126 , Min:127 , TUX:737   ; Diastolic (07FJN), XSD:10, Min:69, Max:75    I/O (24Hr): No intake or output data in the 24 hours ending 22 0800  Objective:  General Appearance:  Comfortable. Vital signs: (most recent): Blood pressure (!) 144/75, pulse 61, temperature 97.7 °F (36.5 °C), resp. rate 17, height 4' 11\" (1.499 m), weight 111 lb 8.8 oz (50.6 kg), SpO2 96 %. Vital signs are normal.    Lungs:  Normal effort and normal respiratory rate. There are decreased breath sounds. Heart: Normal rate. Regular rhythm. S1 normal and S2 normal.    Labs/Imaging/Diagnostics    Labs:  CBC:  Recent Labs     22  0607   WBC 4.5   RBC 2.68*   HGB 7.2*   HCT 22.0*   MCV 82.0   RDW 16.9*   PLT 41*     CHEMISTRIES:  Recent Labs     22  0607 22  0609 22  1441     --   --    K 2.9*  --  3.3*     --   --    CO2 29  --   --    BUN 17 17  --    CREATININE 1.17* 1.23*  --    GLUCOSE 72  --   --      PT/INR:No results for input(s): PROTIME, INR in the last 72 hours. APTT:No results for input(s): APTT in the last 72 hours. LIVER PROFILE:No results for input(s): AST, ALT, BILIDIR, BILITOT, ALKPHOS in the last 72 hours. Imaging Last 24 Hours:  No results found.   Assessment//Plan Hospital Problems             Last Modified POA    * (Principal) SIRS (systemic inflammatory response syndrome) ([address] 10/10/2022 Yes    NICKI (acute kidney injury) ([address] 10/11/2022 Yes    Severe malnutrition (HCC) (Chronic) 10/11/2022 Yes    Elevated d-dimer 10/15/2022 Yes    Leukocytosis 10/15/2022 Yes    Pulmonary nodules 10/15/2022 Yes    Delirium due to another medical condition 10/17/2022 Yes    Acute metabolic encephalopathy 18/84/0640 Yes    MRSA bacteremia 10/23/2022 Yes    Acute septic pulmonary embolism ([address] 10/23/2022 Yes    Cavitating mass of lung 10/23/2022 Yes    Allergy to antibiotic 10/23/2022 Yes    Thrombocytopenia ([address] 10/26/2022 Yes    Depression with suicidal ideation 10/17/2022 Yes    Bipolar disorder, mixed ([address] 10/11/2022 Yes    Acute encephalopathy 10/14/2022 Yes    COPD (chronic obstructive pulmonary disease) ([address] 10/11/2022 Yes    H/O gastric bypass 10/11/2022 Yes    Overview Signed 2/13/2020  7:19 PM by Kaye Mylse MD     enlarged  pancreas, here for surgical work-up for EGD          Assessment & Plan    D/c lisinopril d/t increasing creatinine - start amlodipine    Discharge planning      Electronically signed by Kaye Myles MD on 11/4/22 at 8:00 AM EDT

## 2022-11-04 NOTE — PROGRESS NOTES
Caitlin  let this RN know she called Dr. Sarah Barnes office regarding plans to DC and need for DC orders and RYANN signed.

## 2022-11-04 NOTE — PROGRESS NOTES
[facility] -   Infectious diseases evaluation  admission date 10/10/2022    reason for consultation:   MRSA bacteremia    Impression :   Current:  MRSA bacteremia  Bilateral pulmonary nodules with cavitation /loculated pleural effusion s/p thoracentesis ,chest tube placement. Chest tube was removed 10/31/2022  Sepsis secondary to above  Thrombocytopenia/anemia? Vancomycin related  Encephalopathy  History of cocaine drug abuse  E. coli UTI treated  Acute renal failure improved  Hypertension  Bipolar disorder  Allergy to Bactrim. Recommendations     Continue IV Teflaro through 12/03/22  Follow renal function and adjust Teflaro dose if needed. Follow CBC and BUN/creatinine weekly while on IV antibiotics. Await discharge  Follow-up CT chest in 4 to 6 weeks  Follow-up in 1 to 2 weeks  Supportive care. Infection Control Recommendations   Annabella Precautions  Contact Isolation     Antimicrobial Stewardship Recommendations   Simplification of therapy  Targeted therapy      History of Present Illness:   Initial history:  Juliane Olmos is a 46y.o.-year-old female was admitted 10/10/2020 for worsening shortness of breath associated with generalized body ache. The patient is drowsy, arousable, confused, follows simple commands, unable to provide history that was obtained from chart review and nursing staff. She does have history of cocaine abuse. She was found to have acute renal failure with a creatinine of 2.1. Urinalysis showed small leukocyte esterase, positive nitrate. Urine culture on 10/10/2022 grew E. Coli  Blood cultures 10/10/2022 2 of 2 grew MRSA  Echocardiogram showed no vegetations  Procalcitonin was 6.01, D-dimer was elevated at 3.85  Chest x-ray showed right lung nodules. VQ scan was indeterminant.   CT chest without contrast 10/16/2022 showed numerous bilateral nodular opacities both lungs suggestive of septic emboli , focal consolidation with cavitation in the superior right lower lobe focal consolidation at the left base, small bilateral loculated pleural effusion and mediastinal lymphadenopathy. Echocardiogram showed no vegetations  ADRIANNE negative for vegetations. Interval changes  11/4/2022   She is afebrile, comfortable on room air, no complaints, no acute events  Creatinine increased to 1.23 on 11/3  11/2 /22 WBC 4.5, hemoglobin 7.2, platelet 41  Chest x-ray 11/2/22 unchanged. Patient Vitals for the past 8 hrs:   BP Temp Pulse Resp SpO2   11/04/22 0817 -- -- 61 17 96 %   11/04/22 0609 (!) 144/75 97.7 °F (36.5 °C) 61 17 96 %           I have personally reviewed the past medical history, past surgical history, medications, social history, and family history, and I haveupdated the database accordingly. Allergies:   Aspirin, Bactrim, and Codeine     Review of Systems:     As per history of present illness, other than above 12 system review was negative  Physical Examination :       Physical Exam  Vitals and nursing note reviewed. Constitutional:       General: She is not in acute distress. Appearance: She is not ill-appearing. HENT:      Head: Normocephalic and atraumatic. Right Ear: External ear normal.      Left Ear: External ear normal.   Eyes:      General: No scleral icterus. Conjunctiva/sclera: Conjunctivae normal.   Cardiovascular:      Rate and Rhythm: Regular rhythm. Heart sounds: Normal heart sounds. No murmur heard. Pulmonary:      Effort: Pulmonary effort is normal. No respiratory distress. Breath sounds: No wheezing or rhonchi. Abdominal:      General: Bowel sounds are normal. There is no distension. Palpations: Abdomen is soft. Tenderness: There is no abdominal tenderness. Genitourinary:     Comments: No chamberlain. Musculoskeletal:      Cervical back: Neck supple. No rigidity. Right lower leg: No edema. Left lower leg: No edema. Skin:     General: Skin is warm and dry. Coloration: Skin is not jaundiced. Findings: No erythema. Neurological:      Mental Status: She is oriented to person, place, and time.       Comments: Sleepy, arousable   Psychiatric:      Comments: ISAAK     Past Medical History:     Past Medical History:   Diagnosis Date    Anemia     Arthritis     Asthma     Bipolar disorder, mixed ([facility]     Carotid artery stenosis 2020    Cocaine abuse ([facility] 2014    COPD (chronic obstructive pulmonary disease) ([facility]     Degeneration of cervical intervertebral disc     Depression with anxiety     Depression with anxiety     Fibromyalgia 2017    GERD (gastroesophageal reflux disease)     History of migraine headaches 6/10/2014    History of renal calculi 6/10/2014    History of seizure disorder 6/10/2014    Hoarseness of voice     HTN (hypertension) 6/10/2014    Hyperlipidemia 6/10/2014    IGT (impaired glucose tolerance) 6/10/2014    Kidney stones     Lactose intolerance 6/10/2014    Leukopenia 6/10/2014    Major depressive disorder, recurrent episode, severe, without mention of psychotic behavior 2014    MVP (mitral valve prolapse)     Seizures (HCC)     Sleep apnea 6/10/2014    Unspecified diseases of blood and blood-forming organs        Past Surgical  History:     Past Surgical History:   Procedure Laterality Date    ANKLE FRACTURE SURGERY      recontruction surgery    APPENDECTOMY      BREAST LUMPECTOMY Right     CARDIAC CATHETERIZATION      CARPAL TUNNEL RELEASE      x2     SECTION      CHOLECYSTECTOMY      COLONOSCOPY  12    COLONOSCOPY  2016    severe spasms    COLONOSCOPY  2019    DR GOLDY MCINTOSH    GASTRIC BYPASS SURGERY      GASTRIC BYPASS SURGERY      HYSTERECTOMY (CERVIX STATUS UNKNOWN)      HYSTERECTOMY (CERVIX STATUS UNKNOWN)      IR PERC CATH PLEURAL DRAIN W/IMAG  10/21/2022    IR PERC CATH PLEURAL DRAIN W/IMAG 10/21/2022 [facility]    IR PERC CATH PLEURAL DRAIN W/IMAG  10/21/2022    IR PERC CATH PLEURAL DRAIN W/IMAG 10/21/2022 [facility]    IR PERC CATH PLEURAL DRAIN W/IMAG  10/24/2022    IR PERC CATH PLEURAL DRAIN W/IMAG 10/24/2022 [facility]    LAPAROSCOPY N/A 1/29/2022    LAPAROSCOPY EXPLORATORY CONVERTED TO OPEN EXPLORATORY LAPAROTOMY, LYSIS OF ADHESIONS, REDUCTION OF INTERNAL HERNIA performed by Jayro Cao DO at [address] [place] [address]  72/72/5412    APPLICATION OF ARCH BARS,SIMPLE EXTRACTION OF #15 AND RT TMJ JOINT REPLACEMENT    SHOULDER ARTHROSCOPY Left 06/05/2019    DR ROBERT GREEN    SHOULDER SURGERY      TONSILLECTOMY AND ADENOIDECTOMY      TRANSESOPHAGEAL ECHOCARDIOGRAM N/A 10/19/2022    TRANSESOPHAGEAL ECHOCARDIOGRAM WITH BUBBLE STUDY performed by Frandy Royal DO at [address] [place] [address]  73-12    egd    UPPER GASTROINTESTINAL ENDOSCOPY  12/19/2016    status post gastrectomy with a small remnant of gastric pouch.     UPPER GASTROINTESTINAL ENDOSCOPY  05/2019    DR Ramirez       Medications:      amLODIPine  5 mg Oral Daily    ARIPiprazole  7.5 mg Oral Daily    divalproex  1,000 mg Oral BID    ceftaroline fosamil (TEFLARO) IVPB  600 mg IntraVENous Q12H    carvedilol  25 mg Oral BID WC    Vitamin D  2,000 Units Oral Daily    sodium chloride flush  5-40 mL IntraVENous 2 times per day    atorvastatin  40 mg Oral Daily    DULoxetine  60 mg Oral Daily    ferrous sulfate  325 mg Oral BID WC    ipratropium-albuterol  1 ampule Inhalation Q4H WA       Social History:     Social History     Socioeconomic History    Marital status:      Spouse name: Not on file    Number of children: Not on file    Years of education: Not on file    Highest education level: Not on file   Occupational History    Occupation: disability   Tobacco Use    Smoking status: Every Day     Packs/day: 0.50     Years: 33.00     Pack years: 16.50     Types: Cigarettes    Smokeless tobacco: Never    Tobacco comments:     quit  2000 started again  1/13 Substance and Sexual Activity    Alcohol use: Yes     Comment: occasional    Drug use: Not Currently    Sexual activity: Yes   Other Topics Concern    Not on file   Social History Narrative    Not on file     Social Determinants of Health     Financial Resource Strain: Not on file   Food Insecurity: Not on file   Transportation Needs: Not on file   Physical Activity: Not on file   Stress: Not on file   Social Connections: Not on file   Intimate Partner Violence: Not on file   Housing Stability: Not on file       Family History:     Family History   Problem Relation Age of Onset    Diabetes Mother     Cancer Mother     Coronary Art Dis Father     COPD Father     Depression Brother     Alcohol Abuse Brother     Cancer Other         lung and skin    Diabetes Maternal Grandmother     Cancer Paternal Grandmother       Medical Decision Making:   I have independently reviewed/ordered the following labs:    CBC with Differential:   Recent Labs     11/02/22  0607   WBC 4.5   HGB 7.2*   HCT 22.0*   PLT 41*   LYMPHOPCT 19*   MONOPCT 12*           Lab Results   Component Value Date/Time    CREATININE 1.23 11/03/2022 06:09 AM    GLUCOSE 72 11/02/2022 06:07 AM       Detailed results: Thank you for allowing us to participate in the care of this patient. Please call with questions. This note is created with the assistance of a speech recognition program.  While intending to generate adocument that actually reflects the content of the visit, the document can still have some errors including those of syntax and sound a like substitutions which may escape proof reading. It such instances, actual meaningcan be extrapolated by contextual diversion.         Armando Rangel MD   Office/Perfect Serve:   [phone]

## 2022-11-04 NOTE — CARE COORDINATION
[employer] transportation scheduled for 3-3:30 pm today to go to [facility]. Electronically signed by Rashid Mendiola RN on 11/4/2022 at 2:09 PM   Dr Aden Chew notified of need for D/C order and RYANN .   Electronically signed by Rashid Mendiola RN on 11/4/2022 at 2:26 PM

## 2022-11-04 NOTE — PROGRESS NOTES
[address] [facility]   OCCUPATIONAL THERAPY MISSED TREATMENT NOTE   INPATIENT   Date: 22  Patient Name: Pratik Gonzales       Room: 2082/0934-72  MRN: [id number]   Account #: [de-identified]    : 1970  (46 y.o.)  Gender: female   Referring Practitioner: Sarmad Simms MD  Diagnosis: SIRS, pulmonary nodules, NICKI             REASON FOR MISSED TREATMENT:  1400- pt sleeping upon arrival. Pt will only open eyes briefly when spoken to and falls back to sleep. Will continue to follow for OT needs.         DALIA Boone

## 2022-11-04 NOTE — PROGRESS NOTES
RN gave report to Paramedic from [facility] for transport to [facility]. Pt transferred to [facility]. Pt father at bedside and has all pt belongings.

## 2022-11-04 NOTE — PROGRESS NOTES
Pulmonary Progress Note  [facility]      Patient - Olayinka Srivastava,  Age - 46 y.o.    - 1970      Room Number - 0356/5195-35   N -  [id number]   [facility]  Date of Admission -  10/10/2022  4:37 AM        Consulting Ember Arambula MD  Primary Care Physician - Myla Casper MD     SUBJECTIVE   Alert, answers questions appropriately    OBJECTIVE   VITALS    height is 4' 11\" (1.499 m) and weight is 111 lb 8.8 oz (50.6 kg). Her temperature is 97.7 °F (36.5 °C). Her blood pressure is 144/75 (abnormal) and her pulse is 61. Her respiration is 17 and oxygen saturation is 96%. Body mass index is 22.53 kg/m². Temperature Range: Temp: 97.7 °F (36.5 °C) Temp  Av.1 °F (36.7 °C)  Min: 97.7 °F (36.5 °C)  Max: 98.6 °F (37 °C)  BP Range:  Systolic (99GNK), PWN:181 , Min:127 , YTB:320     Diastolic (21AJJ), TJZ:53, Min:69, Max:75    Pulse Range: Pulse  Av.3  Min: 60  Max: 72  Respiration Range: Resp  Av.4  Min: 16  Max: 17  Current Pulse Ox[de-identified]  SpO2: 96 %  24HR Pulse Ox Range:  SpO2  Av.1 %  Min: 95 %  Max: 99 %  Oxygen Amount and Delivery: O2 Flow Rate (L/min): 0 L/min    Wt Readings from Last 3 Encounters:   22 111 lb 8.8 oz (50.6 kg)   22 96 lb 8 oz (43.8 kg)   22 105 lb (47.6 kg)       I/O (24 Hours)  No intake or output data in the 24 hours ending 22 0901    EXAM     General Appearance  Awake, alert,  in no acute distress  HEENT - normocephalic, atraumatic.  []  Mallampati  [] Crowded airway   [] Macroglossia  []  Retrognathia  [] Micrognathia  []  Normal tongue size []  Normal Bite  [] Alise sign positive    Neck - Supple,  trachea midline   Lungs -diminished no wheezes or rhonchi  Cardiovascular - Heart sounds are normal.  Regular rate and rhythm   Abdomen - Soft, nontender, nondistended, no masses or organomegaly  Neurologic - There are no focal motor or sensory deficits  Skin - No bruising or bleeding  Extremities - No clubbing, cyanosis, edema    MEDS      amLODIPine  5 mg Oral Daily    ARIPiprazole  7.5 mg Oral Daily    divalproex  1,000 mg Oral BID    ceftaroline fosamil (TEFLARO) IVPB  600 mg IntraVENous Q12H    carvedilol  25 mg Oral BID     Vitamin D  2,000 Units Oral Daily    sodium chloride flush  5-40 mL IntraVENous 2 times per day    atorvastatin  40 mg Oral Daily    DULoxetine  60 mg Oral Daily    ferrous sulfate  325 mg Oral BID     ipratropium-albuterol  1 ampule Inhalation Q4H WA      sodium chloride 50 mL/hr at 10/18/22 1200     sodium chloride flush, labetalol, potassium chloride **OR** potassium alternative oral replacement **OR** potassium chloride, hydrALAZINE, sodium chloride flush, sodium chloride, acetaminophen, ondansetron **OR** ondansetron, sodium chloride flush, perflutren lipid microspheres    LABS   CBC   Recent Labs     11/02/22  0607   WBC 4.5   HGB 7.2*   HCT 22.0*   MCV 82.0   PLT 41*     BMP:   Lab Results   Component Value Date/Time     11/02/2022 06:07 AM    K 3.3 11/03/2022 02:41 PM     11/02/2022 06:07 AM    CO2 29 11/02/2022 06:07 AM    BUN 17 11/03/2022 06:09 AM    LABALBU 1.7 10/22/2022 04:15 PM    CREATININE 1.23 11/03/2022 06:09 AM    CALCIUM 7.6 11/02/2022 06:07 AM    GFRAA >60 04/08/2022 07:17 AM    LABGLOM 53 11/03/2022 06:09 AM     ABGs:  Lab Results   Component Value Date/Time    PHART 7.452 10/22/2022 03:24 PM    PO2ART 86.7 10/22/2022 03:24 PM    DBP7XHN 42.7 10/22/2022 03:24 PM      Lab Results   Component Value Date/Time    MODE PRVC 12/28/2016 04:36 AM     Ionized Calcium:  No results found for: IONCA  Magnesium:    Lab Results   Component Value Date/Time    MG 2.2 10/22/2022 04:15 PM     Phosphorus:    Lab Results   Component Value Date/Time    PHOS 3.6 10/18/2022 04:50 AM        LIVER PROFILE No results for input(s): AST, ALT, LIPASE, BILIDIR, BILITOT, ALKPHOS in the last 72 hours.     Invalid input(s): AMYLASE,  ALB  INR No results for input(s): INR in the last 72 hours.   PTT   Lab Results   Component Value Date    APTT 30.6 10/26/2022         RADIOLOGY     (See actual reports for details)    ASSESSMENT/PLAN   MRSA bacteremia  Septic emboli  E. coli UTI  Acute kidney injury, resolved  Suspect COPD, seen once in the office by Dr. Kika Riojas in 2019, FEV1 2.04 L or 86% predicted  Parapneumonic effusion, pleural fluid positive for staph; status post bilateral pigtail catheters, 1 fell out today after it was placed the other 1 removed 10/31  History of opiate and cocaine use-reportedly uses crack cocaine at least twice weekly  History of tobacco use  Recent COVID +9/30  Elevated troponin likely type II MI  Encephalopathy, improving  History of hypertension  History of seizure disorder  Full code    Remains on room air  Antibiotics per ID  Still awaiting placement  Will need reevaluation of pulmonary function in approximately 3 months with PFTs  Will need follow-up CT of chest in approximately 6 to 8 weeks  Can follow-up in the office with us in 4 to 6 weeks if discharged over the weekend  We will see if needed over the weekend    Electronically signed by Ailyn Moreno MD on 11/4/2022 at 9:01 AM

## 2022-11-04 NOTE — PROGRESS NOTES
BEHAVIORAL HEALTH FOLLOW-UP NOTE     11/4/2022     Patient was seen and examined in person, Chart reviewed   Patient's case discussed with staff/team    Chief Complaint: Bipolar disorder, lethargy, erratic behavior    Interim History:     The patient was seen at bedside. The patient's father was present. The patient woke up for me. She sat up in bed but kept her eyes closed most of the time. She believes she is at [facility].  She has difficulty answering orientation questions. The patient is mumbling in her speech and is difficult to follow. I discussed the patient with her nurse. The patient has continued to be delirious but she has slept well through the night and there have been no behavioral issues.   We will leave the patient's medications unchanged today    BP (!) 154/75   Pulse 63   Temp 97.7 °F (36.5 °C)   Resp 16   Ht 4' 11\" (1.499 m)   Wt 111 lb 8.8 oz (50.6 kg)   SpO2 94%   BMI 22.53 kg/m²   Appetite:  [] Normal/Unchanged  [] Increased  [x] Decreased      Sleep:       [x] Normal/Unchanged  [] Fair       [] Poor              Energy:    [] Normal/Unchanged  [] Increased  [x] Decreased        Aggression:  [] yes  [x] no    Patient is [] able  [] unable to CONTRACT FOR SAFETY ON THE UNIT    PAST MEDICAL/PSYCHIATRIC HISTORY:   Past Medical History:   Diagnosis Date    Anemia     Arthritis     Asthma     Bipolar disorder, mixed ([address]     Carotid artery stenosis 2/13/2020    Cocaine abuse ([address] 11/4/2014    COPD (chronic obstructive pulmonary disease) [facility]     Degeneration of cervical intervertebral disc     Depression with anxiety     Depression with anxiety     Fibromyalgia 1/5/2017    GERD (gastroesophageal reflux disease)     History of migraine headaches 6/10/2014    History of renal calculi 6/10/2014    History of seizure disorder 6/10/2014    Hoarseness of voice     HTN (hypertension) 6/10/2014    Hyperlipidemia 6/10/2014    IGT (impaired glucose tolerance) 6/10/2014    Kidney stones     Lactose intolerance 6/10/2014    Leukopenia 6/10/2014    Major depressive disorder, recurrent episode, severe, without mention of psychotic behavior 11/6/2014    MVP (mitral valve prolapse)     Seizures ([facility]     Sleep apnea 6/10/2014    Unspecified diseases of blood and blood-forming organs        FAMILY/SOCIAL HISTORY:  Family History   Problem Relation Age of Onset    Diabetes Mother     Cancer Mother     Coronary Art Dis Father     COPD Father     Depression Brother     Alcohol Abuse Brother     Cancer Other         lung and skin    Diabetes Maternal Grandmother     Cancer Paternal Grandmother      Social History     Socioeconomic History    Marital status:      Spouse name: Not on file    Number of children: Not on file    Years of education: Not on file    Highest education level: Not on file   Occupational History    Occupation: disability   Tobacco Use    Smoking status: Every Day     Packs/day: 0.50     Years: 33.00     Pack years: 16.50     Types: Cigarettes    Smokeless tobacco: Never    Tobacco comments:     quit  2000 started again  1/13    Substance and Sexual Activity    Alcohol use: Yes     Comment: occasional    Drug use: Not Currently    Sexual activity: Yes   Other Topics Concern    Not on file   Social History Narrative    Not on file     Social Determinants of Health     Financial Resource Strain: Not on file   Food Insecurity: Not on file   Transportation Needs: Not on file   Physical Activity: Not on file   Stress: Not on file   Social Connections: Not on file   Intimate Partner Violence: Not on file   Housing Stability: Not on file           ROS:  [x] All negative/unchanged except if checked.  Explain positive(checked items) below:  [] Constitutional  [] Eyes  [] Ear/Nose/Mouth/Throat  [] Respiratory  [] CV  [] GI  []   [] Musculoskeletal  [] Skin/Breast  [] Neurological  [] Endocrine  [] Heme/Lymph  [] Allergic/Immunologic    Explanation:     MEDICATIONS:    Current Facility-Administered Medications:     amLODIPine (NORVASC) tablet 5 mg, 5 mg, Oral, Daily, Elena Deleon MD, 5 mg at 11/04/22 0936    ARIPiprazole (ABILIFY) tablet 7.5 mg, 7.5 mg, Oral, Daily, Fani King MD, 7.5 mg at 11/04/22 0936    divalproex (DEPAKOTE) DR tablet 1,000 mg, 1,000 mg, Oral, BID, Frandy Lele, DO, 1,000 mg at 11/04/22 0936    ceftaroline fosamil (TEFLARO) 600 mg in sodium chloride 0.9 % 50 mL IVPB, 600 mg, IntraVENous, Q12H, Betsy Stevens MD, Last Rate: 50 mL/hr at 11/04/22 1004, 600 mg at 11/04/22 1004    sodium chloride flush 0.9 % injection 10 mL, 10 mL, IntraVENous, PRN, Elena Deleon MD, 10 mL at 10/20/22 1638    carvedilol (COREG) tablet 25 mg, 25 mg, Oral, BID WC, Frandy Lele, DO, 25 mg at 11/04/22 0941    labetalol (NORMODYNE;TRANDATE) injection 20 mg, 20 mg, IntraVENous, BID PRN, Frandy Lele, DO, 20 mg at 11/01/22 0434    potassium chloride (KLOR-CON M) extended release tablet 40 mEq, 40 mEq, Oral, PRN, 40 mEq at 11/03/22 1746 **OR** potassium bicarb-citric acid (EFFER-K) effervescent tablet 40 mEq, 40 mEq, Oral, PRN, 40 mEq at 10/14/22 2031 **OR** potassium chloride 10 mEq/100 mL IVPB (Peripheral Line), 10 mEq, IntraVENous, PRN, Frandy Lele, DO, Last Rate: 100 mL/hr at 10/23/22 0258, 10 mEq at 10/23/22 0258    Vitamin D (CHOLECALCIFEROL) tablet 2,000 Units, 2,000 Units, Oral, Daily, Frandy Lele, DO, 2,000 Units at 11/04/22 0936    hydrALAZINE (APRESOLINE) injection 10 mg, 10 mg, IntraVENous, Q6H PRN, Frandy Lele, DO, 10 mg at 11/03/22 0933    sodium chloride flush 0.9 % injection 5-40 mL, 5-40 mL, IntraVENous, 2 times per day, Frandy Lele, DO, 10 mL at 11/04/22 0937    sodium chloride flush 0.9 % injection 5-40 mL, 5-40 mL, IntraVENous, PRN, Frandy Lele, DO    0.9 % sodium chloride infusion, , IntraVENous, PRN, Frandy Lele, DO, Last Rate: 50 mL/hr at 10/18/22 1200, New Bag at 10/18/22 1200    acetaminophen (TYLENOL) tablet 650 mg, 650 mg, Oral, Q4H PRN, Frandy Lele, DO, 650 mg at 10/14/22 2031    ondansetron (ZOFRAN-ODT) disintegrating tablet 4 mg, 4 mg, Oral, Q8H PRN **OR** ondansetron (ZOFRAN) injection 4 mg, 4 mg, IntraVENous, Q6H PRN, Frandy Lele, DO    atorvastatin (LIPITOR) tablet 40 mg, 40 mg, Oral, Daily, Frandy Lele, DO, 40 mg at 11/03/22 2056    DULoxetine (CYMBALTA) extended release capsule 60 mg, 60 mg, Oral, Daily, Frandy Lele, DO, 60 mg at 11/04/22 0936    ferrous sulfate (IRON 325) tablet 325 mg, 325 mg, Oral, BID WC, Frandy Lele, DO, 325 mg at 11/04/22 0941    sodium chloride flush 0.9 % injection 10 mL, 10 mL, IntraVENous, PRN, Frandy Lele, DO, 10 mL at 10/10/22 1427    ipratropium-albuterol (DUONEB) nebulizer solution 1 ampule, 1 ampule, Inhalation, Q4H WA, Frandy Lele, DO, 1 ampule at 11/04/22 1158    perflutren lipid microspheres (DEFINITY) injection 1.65 mg, 1.5 mL, IntraVENous, ONCE PRN, Frandy Lele, DO      Examination:  BP (!) 154/75   Pulse 63   Temp 97.7 °F (36.5 °C)   Resp 16   Ht 4' 11\" (1.499 m)   Wt 111 lb 8.8 oz (50.6 kg)   SpO2 94%   BMI 22.53 kg/m²   Gait -unable to test  Medication side effects(SE): Denies    Mental Status Examination:    Level of consciousness: Lethargic and somnolent  appearance:  poor grooming and poor hygiene  Behavior/Motor: Unable to wake up.   Attitude toward examiner: Tried to engage but unable to stay awake  Speech: Mumbling  Oriented to situation but not to time or place  Unable to complete remainder of medical exam because the patient did not wake up to engage in the interview  ASSESSMENT:   Patient symptoms are:  [] Well controlled  [] Improving  [] Worsening  [x] No change      Diagnosis:   Principal Problem:    SIRS (systemic inflammatory response syndrome) ([facility]  Active Problems:    NICKI (acute kidney injury) ([facility]    Severe malnutrition ([address]    Elevated d-dimer    Leukocytosis    Pulmonary nodules    Delirium due to another medical condition    Acute metabolic encephalopathy    MRSA bacteremia    Acute septic pulmonary embolism ([facility] Cavitating mass of lung    Allergy to antibiotic    Thrombocytopenia ([facility]    Depression with suicidal ideation    Bipolar disorder, mixed ([facility]    Acute encephalopathy    COPD (chronic obstructive pulmonary disease) ([facility]    H/O gastric bypass  Resolved Problems:    * No resolved hospital problems. *      LABS:    Recent Labs     11/02/22  0607   WBC 4.5   HGB 7.2*   PLT 41*     Recent Labs     11/02/22  0607 11/03/22  0609 11/03/22  1441     --   --    K 2.9*  --  3.3*     --   --    CO2 29  --   --    BUN 17 17  --    CREATININE 1.17* 1.23*  --    GLUCOSE 72  --   --      No results for input(s): BILITOT, ALKPHOS, AST, ALT in the last 72 hours. Lab Results   Component Value Date/Time    [address] NEGATIVE 07/08/2019 12:00 AM    BARBSCNU NEGATIVE 10/13/2022 11:15 AM    LABBENZ NEGATIVE 10/13/2022 11:15 AM    LABBENZ NEGATIVE 12/22/2012 09:35 PM    LABMETH NEGATIVE 10/13/2022 11:15 AM    PPXUR NOT REPORTED 01/29/2022 03:06 PM     Lab Results   Component Value Date/Time    TSH 1.37 01/29/2022 12:38 PM     No results found for: LITHIUM  Lab Results   Component Value Date    VALPROATE 88 10/25/2022       RISK ASSESSMENT: low risk of suicide or harm to others         PLAN  We will continue Abilify at current dose.   No indication for sitter or admission to psychiatry    Follow-up daily while on inpatient unit  Electronically signed by Mary Tinajero MD on 11/4/2022 at 1:15 PM

## 2022-11-15 ENCOUNTER — HOSPITAL ENCOUNTER (INPATIENT)
Age: 52
LOS: 11 days | Discharge: HOME | DRG: 628 | End: 2022-11-29
Attending: EMERGENCY MEDICINE | Admitting: FAMILY MEDICINE
Payer: COMMERCIAL

## 2022-11-15 DIAGNOSIS — E87.6 HYPOKALEMIA: Primary | ICD-10-CM

## 2022-11-15 LAB
ANION GAP SERPL CALCULATED.3IONS-SCNC: 8 MMOL/L (ref 9–17)
BUN BLDV-MCNC: 12 MG/DL (ref 6–20)
CALCIUM SERPL-MCNC: 7.4 MG/DL (ref 8.6–10.4)
CHLORIDE BLD-SCNC: 96 MMOL/L (ref 98–107)
CO2: 31 MMOL/L (ref 20–31)
CREAT SERPL-MCNC: 1.32 MG/DL (ref 0.5–0.9)
GFR SERPL CREATININE-BSD FRML MDRD: 48 ML/MIN/1.73M2
GLUCOSE BLD-MCNC: 92 MG/DL (ref 70–99)
MAGNESIUM: 1.6 MG/DL (ref 1.6–2.6)
PHOSPHORUS: 3.4 MG/DL (ref 2.6–4.5)
POTASSIUM SERPL-SCNC: 2.4 MMOL/L (ref 3.7–5.3)
SODIUM BLD-SCNC: 135 MMOL/L (ref 135–144)

## 2022-11-15 PROCEDURE — G0378 HOSPITAL OBSERVATION PER HR: HCPCS

## 2022-11-15 PROCEDURE — 2580000003 HC RX 258: Performed by: FAMILY MEDICINE

## 2022-11-15 PROCEDURE — 96365 THER/PROPH/DIAG IV INF INIT: CPT

## 2022-11-15 PROCEDURE — 36592 COLLECT BLOOD FROM PICC: CPT

## 2022-11-15 PROCEDURE — 80048 BASIC METABOLIC PNL TOTAL CA: CPT

## 2022-11-15 PROCEDURE — 99285 EMERGENCY DEPT VISIT HI MDM: CPT

## 2022-11-15 PROCEDURE — 6360000002 HC RX W HCPCS: Performed by: FAMILY MEDICINE

## 2022-11-15 PROCEDURE — 83735 ASSAY OF MAGNESIUM: CPT

## 2022-11-15 PROCEDURE — 96372 THER/PROPH/DIAG INJ SC/IM: CPT

## 2022-11-15 PROCEDURE — 36415 COLL VENOUS BLD VENIPUNCTURE: CPT

## 2022-11-15 PROCEDURE — 6360000002 HC RX W HCPCS: Performed by: EMERGENCY MEDICINE

## 2022-11-15 PROCEDURE — 6370000000 HC RX 637 (ALT 250 FOR IP): Performed by: EMERGENCY MEDICINE

## 2022-11-15 PROCEDURE — 84100 ASSAY OF PHOSPHORUS: CPT

## 2022-11-15 PROCEDURE — 93005 ELECTROCARDIOGRAM TRACING: CPT | Performed by: EMERGENCY MEDICINE

## 2022-11-15 RX ORDER — ONDANSETRON 2 MG/ML
4 INJECTION INTRAMUSCULAR; INTRAVENOUS EVERY 6 HOURS PRN
Status: DISCONTINUED | OUTPATIENT
Start: 2022-11-15 | End: 2022-11-29 | Stop reason: HOSPADM

## 2022-11-15 RX ORDER — FERROUS SULFATE 325(65) MG
325 TABLET ORAL 2 TIMES DAILY
Status: DISCONTINUED | OUTPATIENT
Start: 2022-11-15 | End: 2022-11-29 | Stop reason: HOSPADM

## 2022-11-15 RX ORDER — SODIUM CHLORIDE 9 MG/ML
INJECTION, SOLUTION INTRAVENOUS PRN
Status: DISCONTINUED | OUTPATIENT
Start: 2022-11-15 | End: 2022-11-29 | Stop reason: HOSPADM

## 2022-11-15 RX ORDER — VITAMIN B COMPLEX
2000 TABLET ORAL DAILY
Status: DISCONTINUED | OUTPATIENT
Start: 2022-11-15 | End: 2022-11-29 | Stop reason: HOSPADM

## 2022-11-15 RX ORDER — CARVEDILOL 12.5 MG/1
25 TABLET ORAL 2 TIMES DAILY WITH MEALS
Status: DISCONTINUED | OUTPATIENT
Start: 2022-11-15 | End: 2022-11-16 | Stop reason: CLARIF

## 2022-11-15 RX ORDER — POTASSIUM CHLORIDE 20 MEQ/1
40 TABLET, EXTENDED RELEASE ORAL PRN
Status: DISCONTINUED | OUTPATIENT
Start: 2022-11-15 | End: 2022-11-29 | Stop reason: HOSPADM

## 2022-11-15 RX ORDER — POLYETHYLENE GLYCOL 3350 17 G/17G
17 POWDER, FOR SOLUTION ORAL DAILY PRN
Status: DISCONTINUED | OUTPATIENT
Start: 2022-11-15 | End: 2022-11-29 | Stop reason: HOSPADM

## 2022-11-15 RX ORDER — DULOXETIN HYDROCHLORIDE 60 MG/1
60 CAPSULE, DELAYED RELEASE ORAL DAILY
Status: DISCONTINUED | OUTPATIENT
Start: 2022-11-15 | End: 2022-11-16

## 2022-11-15 RX ORDER — ACETAMINOPHEN 650 MG/1
650 SUPPOSITORY RECTAL EVERY 6 HOURS PRN
Status: DISCONTINUED | OUTPATIENT
Start: 2022-11-15 | End: 2022-11-29 | Stop reason: HOSPADM

## 2022-11-15 RX ORDER — IPRATROPIUM BROMIDE AND ALBUTEROL SULFATE 2.5; .5 MG/3ML; MG/3ML
3 SOLUTION RESPIRATORY (INHALATION)
Status: DISCONTINUED | OUTPATIENT
Start: 2022-11-16 | End: 2022-11-29 | Stop reason: HOSPADM

## 2022-11-15 RX ORDER — SODIUM CHLORIDE 0.9 % (FLUSH) 0.9 %
5-40 SYRINGE (ML) INJECTION PRN
Status: DISCONTINUED | OUTPATIENT
Start: 2022-11-15 | End: 2022-11-29 | Stop reason: HOSPADM

## 2022-11-15 RX ORDER — SODIUM CHLORIDE 0.9 % (FLUSH) 0.9 %
5-40 SYRINGE (ML) INJECTION EVERY 12 HOURS SCHEDULED
Status: DISCONTINUED | OUTPATIENT
Start: 2022-11-15 | End: 2022-11-29 | Stop reason: HOSPADM

## 2022-11-15 RX ORDER — ARIPIPRAZOLE 15 MG/1
7.5 TABLET ORAL DAILY
Status: DISCONTINUED | OUTPATIENT
Start: 2022-11-15 | End: 2022-11-16 | Stop reason: CLARIF

## 2022-11-15 RX ORDER — POTASSIUM CHLORIDE 7.45 MG/ML
10 INJECTION INTRAVENOUS ONCE
Status: COMPLETED | OUTPATIENT
Start: 2022-11-15 | End: 2022-11-15

## 2022-11-15 RX ORDER — AMLODIPINE BESYLATE 5 MG/1
5 TABLET ORAL DAILY
Status: DISCONTINUED | OUTPATIENT
Start: 2022-11-15 | End: 2022-11-18

## 2022-11-15 RX ORDER — POTASSIUM CHLORIDE 7.45 MG/ML
10 INJECTION INTRAVENOUS PRN
Status: DISCONTINUED | OUTPATIENT
Start: 2022-11-15 | End: 2022-11-29 | Stop reason: HOSPADM

## 2022-11-15 RX ORDER — ONDANSETRON 4 MG/1
4 TABLET, ORALLY DISINTEGRATING ORAL EVERY 8 HOURS PRN
Status: DISCONTINUED | OUTPATIENT
Start: 2022-11-15 | End: 2022-11-29 | Stop reason: HOSPADM

## 2022-11-15 RX ORDER — DIVALPROEX SODIUM 500 MG/1
1000 TABLET, DELAYED RELEASE ORAL 2 TIMES DAILY
Status: DISCONTINUED | OUTPATIENT
Start: 2022-11-15 | End: 2022-11-29 | Stop reason: HOSPADM

## 2022-11-15 RX ORDER — ENOXAPARIN SODIUM 100 MG/ML
30 INJECTION SUBCUTANEOUS DAILY
Status: DISCONTINUED | OUTPATIENT
Start: 2022-11-15 | End: 2022-11-29 | Stop reason: HOSPADM

## 2022-11-15 RX ORDER — ACETAMINOPHEN 325 MG/1
650 TABLET ORAL EVERY 6 HOURS PRN
Status: DISCONTINUED | OUTPATIENT
Start: 2022-11-15 | End: 2022-11-29 | Stop reason: HOSPADM

## 2022-11-15 RX ADMIN — POTASSIUM CHLORIDE 10 MEQ: 7.46 INJECTION, SOLUTION INTRAVENOUS at 19:15

## 2022-11-15 RX ADMIN — SODIUM CHLORIDE, PRESERVATIVE FREE 10 ML: 5 INJECTION INTRAVENOUS at 21:22

## 2022-11-15 RX ADMIN — POTASSIUM BICARBONATE 40 MEQ: 782 TABLET, EFFERVESCENT ORAL at 19:07

## 2022-11-15 RX ADMIN — ENOXAPARIN SODIUM 30 MG: 100 INJECTION SUBCUTANEOUS at 21:22

## 2022-11-15 ASSESSMENT — PAIN - FUNCTIONAL ASSESSMENT: PAIN_FUNCTIONAL_ASSESSMENT: NONE - DENIES PAIN

## 2022-11-15 ASSESSMENT — PAIN SCALES - GENERAL: PAINLEVEL_OUTOF10: 0

## 2022-11-15 ASSESSMENT — ENCOUNTER SYMPTOMS
DIARRHEA: 0
COUGH: 0
VOMITING: 0

## 2022-11-15 ASSESSMENT — LIFESTYLE VARIABLES
HOW OFTEN DO YOU HAVE A DRINK CONTAINING ALCOHOL: 2-4 TIMES A MONTH
HOW MANY STANDARD DRINKS CONTAINING ALCOHOL DO YOU HAVE ON A TYPICAL DAY: 1 OR 2

## 2022-11-15 NOTE — ED PROVIDER NOTES
[facility]      Pt Name: Jose Ham  MRN: [id number]  [place] 1970  Date of evaluation: 11/15/22      CHIEF COMPLAINT       Chief Complaint   Patient presents with    Abnormal Lab     K 2.3     HISTORY OF PRESENT ILLNESS   HPI 46 y.o. female presents with c/o hypokalemia. Pt sent from NH for evaluation of hypokalemia. Potassium reported as 2.3. Pt denies n/v. She does not take any diuretics. K+ on 11/3/22 was 3.3. Pt reports fatigue, body aches, and tremors. REVIEW OF SYSTEMS       Review of Systems   Constitutional:  Positive for fatigue. Negative for fever. HENT:  Negative for congestion. Eyes:  Negative for visual disturbance. Respiratory:  Negative for cough. Cardiovascular:  Negative for chest pain. Gastrointestinal:  Negative for diarrhea and vomiting. Genitourinary:  Negative for dysuria. Musculoskeletal:  Positive for myalgias. Neurological:  Negative for headaches. Hematological:  Negative for adenopathy.      PAST MEDICAL HISTORY     Past Medical History:   Diagnosis Date    Anemia     Arthritis     Asthma     Bipolar disorder, mixed ([facility]     Carotid artery stenosis 2/13/2020    Cocaine abuse ([facility] 11/4/2014    COPD (chronic obstructive pulmonary disease) ([facility])     Degeneration of cervical intervertebral disc     Depression with anxiety     Depression with anxiety     Fibromyalgia 1/5/2017    GERD (gastroesophageal reflux disease)     History of migraine headaches 6/10/2014    History of renal calculi 6/10/2014    History of seizure disorder 6/10/2014    Hoarseness of voice     HTN (hypertension) 6/10/2014    Hyperlipidemia 6/10/2014    IGT (impaired glucose tolerance) 6/10/2014    Kidney stones     Lactose intolerance 6/10/2014    Leukopenia 6/10/2014    Major depressive disorder, recurrent episode, severe, without mention of psychotic behavior 11/6/2014    MVP (mitral valve prolapse)     Seizures ([facility])     Sleep apnea 6/10/2014    Unspecified diseases of blood and blood-forming organs        SURGICAL HISTORY       Past Surgical History:   Procedure Laterality Date    ANKLE FRACTURE SURGERY      recontruction surgery    APPENDECTOMY      BREAST LUMPECTOMY Right     CARDIAC CATHETERIZATION      CARPAL TUNNEL RELEASE      x2     SECTION      CHOLECYSTECTOMY      COLONOSCOPY  12    COLONOSCOPY  2016    severe spasms    COLONOSCOPY  2019    DR GOLDY MCINTOSH    GASTRIC BYPASS SURGERY      GASTRIC BYPASS SURGERY      HYSTERECTOMY (CERVIX STATUS UNKNOWN)      HYSTERECTOMY (CERVIX STATUS UNKNOWN)      IR PERC CATH PLEURAL DRAIN W/IMAG  10/21/2022    IR PERC CATH PLEURAL DRAIN W/IMAG 10/21/2022 [facility]    IR PERC CATH PLEURAL DRAIN W/IMAG  10/21/2022    IR PERC CATH PLEURAL DRAIN W/IMAG 10/21/2022 [facility]    IR PERC CATH PLEURAL DRAIN W/IMAG  10/24/2022    IR PERC CATH PLEURAL DRAIN W/IMAG 10/24/2022 [facility]    LAPAROSCOPY N/A 2022    LAPAROSCOPY EXPLORATORY CONVERTED TO OPEN EXPLORATORY LAPAROTOMY, LYSIS OF ADHESIONS, REDUCTION OF INTERNAL HERNIA performed by Candy King DO at [address] [place] [address]      APPLICATION OF ARCH BARS,SIMPLE EXTRACTION OF #15 AND RT TMJ JOINT REPLACEMENT    SHOULDER ARTHROSCOPY Left 2019    DR ROBERT GREEN    SHOULDER SURGERY      TONSILLECTOMY AND ADENOIDECTOMY      TRANSESOPHAGEAL ECHOCARDIOGRAM N/A 10/19/2022    TRANSESOPHAGEAL ECHOCARDIOGRAM WITH BUBBLE STUDY performed by Frandy Royal DO at [facility]  7-3-12    egd    UPPER GASTROINTESTINAL ENDOSCOPY  2016    status post gastrectomy with a small remnant of gastric pouch.     UPPER GASTROINTESTINAL ENDOSCOPY  2019    DR Ed Cheney       CURRENT MEDICATIONS       Current Discharge Medication List        CONTINUE these medications which have NOT CHANGED    Details   amLODIPine (NORVASC) 5 MG tablet Take 1 tablet by mouth daily  Qty: 30 tablet, Refills: 3      ARIPiprazole (ABILIFY) 15 MG tablet Take 0.5 tablets by mouth daily  Qty: 30 tablet, Refills: 3      divalproex (DEPAKOTE) 500 MG DR tablet Take 2 tablets by mouth 2 times daily Indications: Level 41 as of 16 (Pt takes two 500 mg DR tabs = 1,000mg PO BID)  Qty: 120 tablet, Refills: 0      DULoxetine (CYMBALTA) 60 MG extended release capsule Take 1 capsule by mouth daily  Qty: 30 capsule, Refills: 3      ferrous sulfate (FEROSUL) 325 (65 Fe) MG tablet Take 1 tablet by mouth in the morning and at bedtime  Qty: 56 tablet, Refills: 11    Comments: Prescription   Associated Diagnoses: Iron deficiency anemia secondary to inadequate dietary iron intake      ipratropium-albuterol (DUONEB) 0.5-2.5 (3) MG/3ML SOLN nebulizer solution Inhale 3 mLs into the lungs every 4 hours (while awake)  Qty: 360 mL, Refills: 0      Vitamin D (CHOLECALCIFEROL) 50 MCG (2000 UT) TABS tablet Take 1 tablet by mouth daily  Qty: 60 tablet, Refills: 0    Comments: Labeling may look different. 25 mcg=1000 Units. Please double check dosages. carvedilol (COREG) 25 MG tablet TAKE 1 TABLET BY MOUTH IN THE MORNING AND 1 IN THE EVENING WITH MEALS  Qty: 60 tablet, Refills: 11    Associated Diagnoses: Essential hypertension             ALLERGIES     is allergic to aspirin, bactrim, and codeine. FAMILY HISTORY     She indicated that her mother is . She indicated that her father is alive. She indicated that her brother is alive. She indicated that her maternal grandmother is alive. She indicated that her maternal grandfather is . She indicated that her paternal grandmother is . She indicated that her paternal grandfather is . She indicated that the status of her other is unknown. SOCIAL HISTORY      reports that she has been smoking cigarettes. She has a 16.50 pack-year smoking history.  She has never used smokeless tobacco. She reports current alcohol use. She reports that she does not currently use drugs. PHYSICAL EXAM     INITIAL VITALS: /61   Pulse 56   Temp 97.9 °F (36.6 °C) (Oral)   Resp 18   Ht 4' 11.02\" (1.499 m)   Wt 99 lb 3.3 oz (45 kg)   SpO2 97%   BMI 20.03 kg/m²   Gen: nad  Head: Normocephalic, atraumatic  Eye: Pupils equal round reactive to light, no conjunctivitis  ENT: MMM  Neck: no JVD  Heart: Regular rate and rhythm no murmurs  Lungs: Clear to auscultation bilaterally, no respiratory distress  Chest wall: No crepitus, no tenderness palpation  Abdomen: Soft, nontender, nondistended, with no peritoneal signs  MSK: low muscle mass. Neurologic: Patient is alert and oriented x3,, fluent speech  Extremities: picc line in r. Arm, no erythema. MEDICAL DECISION MAKING:     [facility]  & Emergency Department Course:   [address] y.o. female presenting with hypokalemia. EKG obtained and shows twave flattening which is associated with hypokalemia. K+ rechecked and does remain low. Magnesium wnl. D/w Dr. Yovani Vickers, will place in hospital for replacement of potassium overnight. DIAGNOSTIC RESULTS     EKG: All EKG's are interpreted by the Emergency Department Physician who either signs or Co-signs this chart in the absence of a cardiologist.    EKG shows a sinus bradycardia rhythm. HR is 57, , , , no BERTRAM, No STD,  TW flattening, the axis is normal.        RADIOLOGY:All plain film, CT, MRI, and formal ultrasound images (except ED bedside ultrasound) are read by the radiologist and the images and interpretations are directly viewed by the emergency physician. No orders to display       LABS: All lab results were reviewed by myself, and all abnormals are listed below.   Labs Reviewed   BASIC METABOLIC PANEL - Abnormal; Notable for the following components:       Result Value    Creatinine 1.32 (*)     Est, Glom Filt Rate 48 (*)     Calcium 7.4 (*)     Potassium 2.4 (*)     Chloride 96 (*)     Anion Gap 8 (*)     All other components within normal limits   MAGNESIUM   PHOSPHORUS   BASIC METABOLIC PANEL W/ REFLEX TO MG FOR LOW K       EMERGENCY DEPARTMENT COURSE:   Vitals:    Vitals:    11/15/22 1845 11/15/22 1945 11/15/22 1959 11/15/22 2000   BP: (!) 110/52 (!) 121/59  130/61   Pulse: 58 58 58 56   Resp:  19 18 18   Temp:    97.9 °F (36.6 °C)   TempSrc:    Oral   SpO2: 97%   97%   Weight:    99 lb 3.3 oz (45 kg)   Height:    4' 11.02\" (1.499 m)       The patient was given the following medications while in the emergency department:  Orders Placed This Encounter   Medications    potassium chloride 10 mEq/100 mL IVPB (Peripheral Line)    potassium bicarb-citric acid (EFFER-K) effervescent tablet 40 mEq    OR Linked Order Group     potassium chloride (KLOR-CON M) extended release tablet 40 mEq     potassium bicarb-citric acid (EFFER-K) effervescent tablet 40 mEq     potassium chloride 10 mEq/100 mL IVPB (Peripheral Line)    amLODIPine (NORVASC) tablet 5 mg    ARIPiprazole (ABILIFY) tablet 7.5 mg    carvedilol (COREG) tablet 25 mg    divalproex (DEPAKOTE) DR tablet 1,000 mg    DULoxetine (CYMBALTA) extended release capsule 60 mg    ferrous sulfate (IRON 325) tablet 325 mg    ipratropium-albuterol (DUONEB) nebulizer solution 3 mL     Order Specific Question:   Initiate RT Bronchodilator Protocol     Answer:   Yes - Inpatient Protocol    vitamin D (CHOLECALCIFEROL) tablet 2,000 Units    sodium chloride flush 0.9 % injection 5-40 mL    sodium chloride flush 0.9 % injection 5-40 mL    0.9 % sodium chloride infusion    enoxaparin Sodium (LOVENOX) injection 30 mg     Order Specific Question:   Indication of Use     Answer:   Prophylaxis-DVT/PE    OR Linked Order Group     ondansetron (ZOFRAN-ODT) disintegrating tablet 4 mg     ondansetron (ZOFRAN) injection 4 mg    polyethylene glycol (GLYCOLAX) packet 17 g    OR Linked Order Group     acetaminophen (TYLENOL) tablet 650 mg     acetaminophen (TYLENOL) suppository 650 mg -------------------------  CRITICAL CARE:   CONSULTS: IP CONSULT TO PRIMARY CARE PROVIDER  PROCEDURES: Procedures     FINAL IMPRESSION      1. Hypokalemia          DISPOSITION/PLAN   DISPOSITION Admitted 11/15/2022 07:40:29 PM      PATIENT REFERRED TO:  No follow-up provider specified.     DISCHARGE MEDICATIONS:  Current Discharge Medication List            Krystle Mahmood MD  Attending Emergency Physician                      Krystle Mahmood MD  11/15/22 [id number]

## 2022-11-15 NOTE — ED TRIAGE NOTES
Mode of arrival (squad #, walk in, police, etc) : walk in        Chief complaint(s): Low K        Arrival Note (brief scenario, treatment PTA, etc). : Patient sent here by [address] [place] [address] for low potassium. Patient states she was advised her potassium was 2.         C= \"Have you ever felt that you should Cut down on your drinking? \"  No  A= \"Have people Annoyed you by criticizing your drinking? \"  No  G= \"Have you ever felt bad or Guilty about your drinking? \"  No  E= \"Have you ever had a drink as an Eye-opener first thing in the morning to steady your nerves or to help a hangover? \"  No      Deferred []      Reason for deferring: N/A    *If yes to two or more: probable alcohol abuse. *

## 2022-11-16 LAB
ANION GAP SERPL CALCULATED.3IONS-SCNC: 7 MMOL/L (ref 9–17)
BUN BLDV-MCNC: 11 MG/DL (ref 6–20)
CALCIUM IONIZED: 1.09 MMOL/L (ref 1.1–1.33)
CALCIUM SERPL-MCNC: 7.1 MG/DL (ref 8.6–10.4)
CHLORIDE BLD-SCNC: 99 MMOL/L (ref 98–107)
CO2: 31 MMOL/L (ref 20–31)
CREAT SERPL-MCNC: 1.2 MG/DL (ref 0.5–0.9)
EKG ATRIAL RATE: 57 BPM
EKG P AXIS: -4 DEGREES
EKG P-R INTERVAL: 142 MS
EKG Q-T INTERVAL: 440 MS
EKG QRS DURATION: 102 MS
EKG QTC CALCULATION (BAZETT): 428 MS
EKG R AXIS: 14 DEGREES
EKG T AXIS: 34 DEGREES
EKG VENTRICULAR RATE: 57 BPM
GFR SERPL CREATININE-BSD FRML MDRD: 54 ML/MIN/1.73M2
GLUCOSE BLD-MCNC: 74 MG/DL (ref 70–99)
MAGNESIUM: 1.6 MG/DL (ref 1.6–2.6)
POTASSIUM SERPL-SCNC: 2.5 MMOL/L (ref 3.7–5.3)
SODIUM BLD-SCNC: 137 MMOL/L (ref 135–144)

## 2022-11-16 PROCEDURE — 96372 THER/PROPH/DIAG INJ SC/IM: CPT

## 2022-11-16 PROCEDURE — 96366 THER/PROPH/DIAG IV INF ADDON: CPT

## 2022-11-16 PROCEDURE — 99222 1ST HOSP IP/OBS MODERATE 55: CPT | Performed by: INTERNAL MEDICINE

## 2022-11-16 PROCEDURE — 6370000000 HC RX 637 (ALT 250 FOR IP): Performed by: FAMILY MEDICINE

## 2022-11-16 PROCEDURE — G0378 HOSPITAL OBSERVATION PER HR: HCPCS

## 2022-11-16 PROCEDURE — 82330 ASSAY OF CALCIUM: CPT

## 2022-11-16 PROCEDURE — 93010 ELECTROCARDIOGRAM REPORT: CPT | Performed by: INTERNAL MEDICINE

## 2022-11-16 PROCEDURE — 80048 BASIC METABOLIC PNL TOTAL CA: CPT

## 2022-11-16 PROCEDURE — 96368 THER/DIAG CONCURRENT INF: CPT

## 2022-11-16 PROCEDURE — 83735 ASSAY OF MAGNESIUM: CPT

## 2022-11-16 PROCEDURE — 36415 COLL VENOUS BLD VENIPUNCTURE: CPT

## 2022-11-16 PROCEDURE — 96376 TX/PRO/DX INJ SAME DRUG ADON: CPT

## 2022-11-16 PROCEDURE — 94664 DEMO&/EVAL PT USE INHALER: CPT

## 2022-11-16 PROCEDURE — 6360000002 HC RX W HCPCS: Performed by: FAMILY MEDICINE

## 2022-11-16 PROCEDURE — 2580000003 HC RX 258: Performed by: FAMILY MEDICINE

## 2022-11-16 PROCEDURE — 94640 AIRWAY INHALATION TREATMENT: CPT

## 2022-11-16 PROCEDURE — 94761 N-INVAS EAR/PLS OXIMETRY MLT: CPT

## 2022-11-16 RX ORDER — DULOXETIN HYDROCHLORIDE 30 MG/1
60 CAPSULE, DELAYED RELEASE ORAL DAILY
Status: DISCONTINUED | OUTPATIENT
Start: 2022-11-17 | End: 2022-11-29 | Stop reason: HOSPADM

## 2022-11-16 RX ORDER — ARIPIPRAZOLE 5 MG/1
7.5 TABLET ORAL DAILY
Status: DISCONTINUED | OUTPATIENT
Start: 2022-11-16 | End: 2022-11-29 | Stop reason: HOSPADM

## 2022-11-16 RX ORDER — POTASSIUM CHLORIDE 20 MEQ/1
40 TABLET, EXTENDED RELEASE ORAL PRN
Status: DISCONTINUED | OUTPATIENT
Start: 2022-11-16 | End: 2022-11-16 | Stop reason: SDUPTHER

## 2022-11-16 RX ORDER — POTASSIUM CHLORIDE 7.45 MG/ML
10 INJECTION INTRAVENOUS PRN
Status: DISCONTINUED | OUTPATIENT
Start: 2022-11-16 | End: 2022-11-16 | Stop reason: SDUPTHER

## 2022-11-16 RX ORDER — MAGNESIUM SULFATE HEPTAHYDRATE 40 MG/ML
2000 INJECTION, SOLUTION INTRAVENOUS PRN
Status: DISCONTINUED | OUTPATIENT
Start: 2022-11-16 | End: 2022-11-29 | Stop reason: HOSPADM

## 2022-11-16 RX ORDER — CARVEDILOL 25 MG/1
25 TABLET ORAL 2 TIMES DAILY WITH MEALS
Status: DISCONTINUED | OUTPATIENT
Start: 2022-11-16 | End: 2022-11-29 | Stop reason: HOSPADM

## 2022-11-16 RX ADMIN — Medication 2000 UNITS: at 14:05

## 2022-11-16 RX ADMIN — POTASSIUM CHLORIDE 10 MEQ: 7.46 INJECTION, SOLUTION INTRAVENOUS at 13:58

## 2022-11-16 RX ADMIN — CEFTAROLINE FOSAMIL 400 MG: 600 POWDER, FOR SOLUTION INTRAVENOUS at 21:29

## 2022-11-16 RX ADMIN — POTASSIUM CHLORIDE 10 MEQ: 7.46 INJECTION, SOLUTION INTRAVENOUS at 16:15

## 2022-11-16 RX ADMIN — ARIPIPRAZOLE 7.5 MG: 5 TABLET ORAL at 14:10

## 2022-11-16 RX ADMIN — POTASSIUM CHLORIDE 10 MEQ: 7.46 INJECTION, SOLUTION INTRAVENOUS at 10:35

## 2022-11-16 RX ADMIN — POTASSIUM CHLORIDE 10 MEQ: 7.46 INJECTION, SOLUTION INTRAVENOUS at 12:49

## 2022-11-16 RX ADMIN — DIVALPROEX SODIUM 1000 MG: 250 TABLET, DELAYED RELEASE ORAL at 14:05

## 2022-11-16 RX ADMIN — AMLODIPINE BESYLATE 5 MG: 5 TABLET ORAL at 14:05

## 2022-11-16 RX ADMIN — POTASSIUM CHLORIDE 10 MEQ: 7.46 INJECTION, SOLUTION INTRAVENOUS at 11:45

## 2022-11-16 RX ADMIN — CARVEDILOL 25 MG: 25 TABLET ORAL at 14:10

## 2022-11-16 RX ADMIN — IPRATROPIUM BROMIDE AND ALBUTEROL SULFATE 3 ML: .5; 2.5 SOLUTION RESPIRATORY (INHALATION) at 08:01

## 2022-11-16 RX ADMIN — SODIUM CHLORIDE, PRESERVATIVE FREE 10 ML: 5 INJECTION INTRAVENOUS at 10:20

## 2022-11-16 RX ADMIN — ENOXAPARIN SODIUM 30 MG: 100 INJECTION SUBCUTANEOUS at 10:20

## 2022-11-16 RX ADMIN — CEFTAROLINE FOSAMIL 400 MG: 600 POWDER, FOR SOLUTION INTRAVENOUS at 10:34

## 2022-11-16 RX ADMIN — FERROUS SULFATE TAB 325 MG (65 MG ELEMENTAL FE) 325 MG: 325 (65 FE) TAB at 21:24

## 2022-11-16 RX ADMIN — SODIUM CHLORIDE, PRESERVATIVE FREE 10 ML: 5 INJECTION INTRAVENOUS at 21:26

## 2022-11-16 RX ADMIN — DIVALPROEX SODIUM 1000 MG: 250 TABLET, DELAYED RELEASE ORAL at 21:24

## 2022-11-16 RX ADMIN — POTASSIUM CHLORIDE 10 MEQ: 7.46 INJECTION, SOLUTION INTRAVENOUS at 17:43

## 2022-11-16 RX ADMIN — ONDANSETRON 4 MG: 4 TABLET, ORALLY DISINTEGRATING ORAL at 10:50

## 2022-11-16 RX ADMIN — FERROUS SULFATE TAB 325 MG (65 MG ELEMENTAL FE) 325 MG: 325 (65 FE) TAB at 14:05

## 2022-11-16 RX ADMIN — IPRATROPIUM BROMIDE AND ALBUTEROL SULFATE 3 ML: .5; 2.5 SOLUTION RESPIRATORY (INHALATION) at 20:14

## 2022-11-16 ASSESSMENT — PAIN SCALES - GENERAL
PAINLEVEL_OUTOF10: 0
PAINLEVEL_OUTOF10: 0

## 2022-11-16 NOTE — DISCHARGE SUMMARY
Family Medicine Discharge Summary    Isidoro Gonsales  :  1970  MRN:  [id number]    Admit date:  10/10/2022  Discharge date:  2022    Admitting Physician:  Yaneth Aquino MD    Discharge Diagnoses:    Patient Active Problem List   Diagnosis    GERD (gastroesophageal reflux disease)    Hoarseness of voice    MVP (mitral valve prolapse)    Depression with suicidal ideation    Lymphocytic colitis    History of migraine headaches    Hyperlipidemia    Sleep apnea    IGT (impaired glucose tolerance)    History of renal calculi    Lactose intolerance    History of seizure disorder    Leukopenia    Bipolar disorder, mixed ([facility]    Illicit drug use    Postlaminectomy syndrome, cervical region    Degeneration of cervical intervertebral disc    Encounter for long-term (current) use of other medications    Bradycardia    Cellulitis    Hypokalemia    Primary hypertension    Elevated lipase    Right lower quadrant abdominal pain    Nausea    Diarrhea    Acute encephalopathy    Polysubstance abuse (HCC)    Fibromyalgia    Cocaine addiction (HCC)    Shoulder arthritis    Osteoarthritis of left glenohumeral joint    Abnormal EKG    Anemia    Anxiety    Carotid artery stenosis    COPD (chronic obstructive pulmonary disease) ([facility]    Dental disease    HUTCHINSON (dyspnea on exertion)    Fatigue    Fractures    H/O gastric bypass    History of crack cocaine use    History of UTI    Injury of back    Intractable chronic migraine without aura and without status migrainosus    Intractable migraine with aura with status migrainosus    Kidney stones    Dizziness    Memory loss    Mild pulmonary hypertension (HCC)    Mild tricuspid regurgitation    Primary osteoarthritis of left shoulder    Seizure-like activity (HCC)    Stress at home    TMJ (dislocation of temporomandibular joint)    Tobacco abuse    Visual impairment    Internal hernia    Vitamin D deficiency    Iron deficiency    SIRS (systemic inflammatory response syndrome) ([facility] [address]    NICKI (acute kidney injury) ([facility]    Severe malnutrition ([facility]    Elevated d-dimer    Leukocytosis    Pulmonary nodules    Delirium due to another medical condition    Acute metabolic encephalopathy    MRSA bacteremia    Acute septic pulmonary embolism ([facility]    Cavitating mass of lung    Allergy to antibiotic    Thrombocytopenia [facility])       Admission Condition:  critical  Discharged Condition:  guarded    Hospital Course:   47 yo admitted with shortness of breath and a history of COVID infection a week prior. She had to be placed on BiPAP and Levophed initially, but these were stopped shortly after admission. She was found to have septic emboli in the lungs and MRSA bacteremia; she was initially treated with vancomycin, but was switched to Teflaro to be continued through 12/3/2022 due to thrombocytopenia from the vancomycin. She exhibited signs of toxic metabolic encephalopathy throughout the admission; this began to resolve in the last days of her stay. Discharge Medications:         Medication List        START taking these medications      amLODIPine 5 MG tablet  Commonly known as: NORVASC  Take 1 tablet by mouth daily     ipratropium-albuterol 0.5-2.5 (3) MG/3ML Soln nebulizer solution  Commonly known as: DUONEB  Inhale 3 mLs into the lungs every 4 hours (while awake)     vitamin D 50 MCG (2000 UT) Tabs tablet  Commonly known as: CHOLECALCIFEROL  Take 1 tablet by mouth daily            CHANGE how you take these medications      ARIPiprazole 15 MG tablet  Commonly known as: ABILIFY  Take 0.5 tablets by mouth daily  What changed:   medication strength  how much to take     DULoxetine 60 MG extended release capsule  Commonly known as: CYMBALTA  Take 1 capsule by mouth daily  What changed:   medication strength  how much to take     ferrous sulfate 325 (65 Fe) MG tablet  Commonly known as: FeroSul  Take 1 tablet by mouth in the morning and at bedtime  What changed: See the new instructions. CONTINUE taking these medications      carvedilol 25 MG tablet  Commonly known as: COREG  TAKE 1 TABLET BY MOUTH IN THE MORNING AND 1 IN THE EVENING WITH MEALS     divalproex 500 MG DR tablet  Commonly known as: DEPAKOTE  Take 2 tablets by mouth 2 times daily Indications: Level 41 as of 7/22/16 (Pt takes two 500 mg DR tabs = 1,000mg PO BID)            STOP taking these medications      gabapentin 300 MG capsule  Commonly known as: NEURONTIN     hydrOXYzine pamoate 25 MG capsule  Commonly known as: VISTARIL     lisinopril 20 MG tablet  Commonly known as: PRINIVIL;ZESTRIL     traZODone 100 MG tablet  Commonly known as: DESYREL               Where to Get Your Medications        Information about where to get these medications is not yet available    Ask your nurse or doctor about these medications  amLODIPine 5 MG tablet  ARIPiprazole 15 MG tablet  divalproex 500 MG DR tablet  DULoxetine 60 MG extended release capsule  ferrous sulfate 325 (65 Fe) MG tablet  ipratropium-albuterol 0.5-2.5 (3) MG/3ML Soln nebulizer solution  vitamin D 50 MCG (2000 UT) Tabs tablet         Consults:  nephrology, pulmonology, general surgery, cardiology, neurology, psychiatry, infectious disease, hematology    Significant Diagnostic Studies:  labs, radiology, thoracentesis    Treatments:   ventilatory and pressor support, IV abx, thoracentesis with bilateral chest tubes    Disposition:   [facility]  Follow up with Amaris De Los Santos MD after discharge from [facility]    Signed:   Darryle Ferry, MD, FAAFP  11/16/2022, 3:49 PM

## 2022-11-16 NOTE — CONSULTS
[facility] -   Infectious diseases evaluation  admission date 11/15/2022    reason for consultation:   MRSA infection    Impression :   Current:  Hypokalemia  Recent MRSA bacteremia  Recent empyema/pulmonary nodules with cavitation status post chest tube placement that was subsequently removed on 10/31/2022. Thrombocytopenia and anemia thought to be related to vancomycin that was discontinued. Renal insufficiency  History of cocaine drug abuse  Hypertension  Bipolar disorder  Allergy to Bactrim. Recommendations   Continue IV Teflaro 400 mg twice a day as planned through 12/3/2022  Chest x-ray in a.m. Follow CBC and renal function      Infection Control Recommendations   Odd Precautions  Contact Isolation       Antimicrobial Stewardship Recommendations   Simplification of therapy  Targeted therapy      History of Present Illness:   Initial history:  Ira Viera is a 46y.o.-year-old female was brought to the hospital from SNF for hypokalemia with reported potassium of 2.4. Patient is complaining of body ache, generalized weakness and abdominal discomfort for 2 days. Symptoms are moderate, no alleviating or aggravating factors. She denied cough or shortness of breath, no other complaints. Right arm PICC line in place  The patient had prolonged hospitalization recently for MRSA bacteremia and empyema status post chest tube placement but was subsequently removed.   She was discharged to skilled nursing facility on 11/4/2024 on IV Teflaro through 12/3/2022  Interval changes  11/16/2022   Patient Vitals for the past 8 hrs:   BP Temp Temp src Pulse Resp SpO2   11/16/22 1405 -- -- -- 78 -- --   11/16/22 1221 (!) 187/71 98.1 °F (36.7 °C) Oral 70 16 95 %   11/16/22 0801 -- -- -- 55 18 95 %   11/16/22 0645 (!) 158/67 98 °F (36.7 °C) Oral 55 18 94 %             I have personally reviewed the past medical history, past surgical history, medications, social history, and family history, and I haveupdated the database accordingly.       Allergies:   Aspirin, Bactrim, and Codeine     Review of Systems:     Review of Systems  As per history of present illness, other than above 12 system review was negative  Physical Examination :       Physical Exam  Head: Normocephalic, atraumatic  Eye: Pupils equal round reactive to light, no conjunctivitis  ENT: No throat erythema or thrush  Neck: Supple, no JVD  Heart: Regular rate and rhythm no murmurs  Lungs: Clear to auscultation bilaterally, no respiratory distress  Abdomen: Soft, nontender, nondistended, with no peritoneal signs  MSK: No joint swelling or erythema  Neurologic: Patient is alert and oriented x3,, fluent speech  Extremities: No edema, no clubbing, no cyanosis  Past Medical History:     Past Medical History:   Diagnosis Date    Anemia     Arthritis     Asthma     Bipolar disorder, mixed ([address]     Carotid artery stenosis 2/13/2020    Cocaine abuse ([facility] 11/4/2014    COPD (chronic obstructive pulmonary disease) (HCC)     Degeneration of cervical intervertebral disc     Depression with anxiety     Depression with anxiety     Fibromyalgia 1/5/2017    GERD (gastroesophageal reflux disease)     History of migraine headaches 6/10/2014    History of renal calculi 6/10/2014    History of seizure disorder 6/10/2014    Hoarseness of voice     HTN (hypertension) 6/10/2014    Hyperlipidemia 6/10/2014    IGT (impaired glucose tolerance) 6/10/2014    Kidney stones     Lactose intolerance 6/10/2014    Leukopenia 6/10/2014    Major depressive disorder, recurrent episode, severe, without mention of psychotic behavior 11/6/2014    MVP (mitral valve prolapse)     Seizures (HCC)     Sleep apnea 6/10/2014    Unspecified diseases of blood and blood-forming organs        Past Surgical  History:     Past Surgical History:   Procedure Laterality Date    ANKLE FRACTURE SURGERY      recontruction surgery    APPENDECTOMY      BREAST LUMPECTOMY Right     CARDIAC CATHETERIZATION      CARPAL TUNNEL RELEASE      x2     SECTION      CHOLECYSTECTOMY      COLONOSCOPY  12    COLONOSCOPY  2016    severe spasms    COLONOSCOPY  2019    DR GOLDY MCINTOSH    GASTRIC BYPASS SURGERY      GASTRIC BYPASS SURGERY      HYSTERECTOMY (CERVIX STATUS UNKNOWN)      HYSTERECTOMY (CERVIX STATUS UNKNOWN)      IR PERC CATH PLEURAL DRAIN W/IMAG  10/21/2022    IR PERC CATH PLEURAL DRAIN W/IMAG 10/21/2022 [facility]    IR PERC CATH PLEURAL DRAIN W/IMAG  10/21/2022    IR PERC CATH PLEURAL DRAIN W/IMAG 10/21/2022 [facility]    IR PERC CATH PLEURAL DRAIN W/IMAG  10/24/2022    IR PERC CATH PLEURAL DRAIN W/IMAG 10/24/2022 [facility]    LAPAROSCOPY N/A 2022    LAPAROSCOPY EXPLORATORY CONVERTED TO OPEN EXPLORATORY LAPAROTOMY, LYSIS OF ADHESIONS, REDUCTION OF INTERNAL HERNIA performed by Mil Eden DO at [address]      APPLICATION OF ARCH BARS,SIMPLE EXTRACTION OF #15 AND RT TMJ JOINT REPLACEMENT    SHOULDER ARTHROSCOPY Left 2019    DR ROBERT GREEN    SHOULDER SURGERY      TONSILLECTOMY AND ADENOIDECTOMY      TRANSESOPHAGEAL ECHOCARDIOGRAM N/A 10/19/2022    TRANSESOPHAGEAL ECHOCARDIOGRAM WITH BUBBLE STUDY performed by Frandy Royal DO at [address]  7-3-12    egd    UPPER GASTROINTESTINAL ENDOSCOPY  2016    status post gastrectomy with a small remnant of gastric pouch.     UPPER GASTROINTESTINAL ENDOSCOPY  2019    DR Adele Graham       Medications:      ceftaroline fosamil (TEFLARO) IVPB  400 mg IntraVENous Q12H    [START ON 2022] DULoxetine  60 mg Oral Daily    carvedilol  25 mg Oral BID WC    ARIPiprazole  7.5 mg Oral Daily    amLODIPine  5 mg Oral Daily    divalproex  1,000 mg Oral BID    ferrous sulfate  325 mg Oral BID    ipratropium-albuterol  3 mL Inhalation Q4H WA    Vitamin D  2,000 Units Oral Daily    sodium chloride flush  5-40 mL IntraVENous 2 times per day    enoxaparin  30 mg SubCUTAneous Daily       Social History:     Social History     Socioeconomic History    Marital status:      Spouse name: Not on file    Number of children: Not on file    Years of education: Not on file    Highest education level: Not on file   Occupational History    Occupation: disability   Tobacco Use    Smoking status: Every Day     Packs/day: 0.50     Years: 33.00     Pack years: 16.50     Types: Cigarettes    Smokeless tobacco: Never    Tobacco comments:     quit  2000 started again  1/13    Substance and Sexual Activity    Alcohol use: Yes     Comment: occasional    Drug use: Not Currently    Sexual activity: Yes   Other Topics Concern    Not on file   Social History Narrative    Not on file     Social Determinants of Health     Financial Resource Strain: Not on file   Food Insecurity: Not on file   Transportation Needs: Not on file   Physical Activity: Not on file   Stress: Not on file   Social Connections: Not on file   Intimate Partner Violence: Not on file   Housing Stability: Not on file       Family History:     Family History   Problem Relation Age of Onset    Diabetes Mother     Cancer Mother     Coronary Art Dis Father     COPD Father     Depression Brother     Alcohol Abuse Brother     Cancer Other         lung and skin    Diabetes Maternal Grandmother     Cancer Paternal Grandmother       Medical Decision Making:   I have independently reviewed/ordered the following labs:    CBC with Differential: No results for input(s): WBC, HGB, HCT, PLT, SEGSPCT, BANDSPCT, LYMPHOPCT, MONOPCT, EOSPCT in the last 72 hours. BMP:  Recent Labs     11/15/22  1719 11/16/22  0545    137   K 2.4* 2.5*   CL 96* 99   CO2 31 31   BUN 12 11   CREATININE 1.32* 1.20*   MG 1.6 1.6     Hepatic Function Panel: No results for input(s): PROT, LABALBU, BILIDIR, IBILI, BILITOT, ALKPHOS, ALT, AST in the last 72 hours.   No results for input(s): RPR in the last 72 hours. No results for input(s): HIV in the last 72 hours. No results for input(s): BC in the last 72 hours. Lab Results   Component Value Date/Time    CREATININE 1.20 11/16/2022 05:45 AM    GLUCOSE 74 11/16/2022 05:45 AM       Detailed results: Thank you for allowing us to participate in the care of this patient. Please call with questions. This note is created with the assistance of a speech recognition program.  While intending to generate adocument that actually reflects the content of the visit, the document can still have some errors including those of syntax and sound a like substitutions which may escape proof reading. It such instances, actual meaningcan be extrapolated by contextual diversion.     Jory Mathis MD  Office: [phone]  Perfect serve / office [phone]

## 2022-11-16 NOTE — CARE COORDINATION
CASE MANAGEMENT NOTE:    Admission Date:  11/15/2022 Juliane Olmos is a 46 y.o.  female    Admitted for : Hypokalemia [E87.6]    Met with:  Patient    PCP:  Dr. Tonya Schafer:  Nae Bustillo      Is patient alert and oriented at time of discussion:  Yes    Current Residence/ Living Arrangements:  in nursing home             Is the Patient an [facility] RE[facility] with Readmission Risk Score greater than 14%? No  If yes, pt needs a follow up appointment made within 7 days. Family Members/Caregivers that pt would like involved in their care:    Yes    If yes, list name here:  Guerrero    Transportation Provider:  Family             Discharge Plan:  11/16/22 Leatha Gardiner Pt is from [facility] is to return back when discharged. RYANN is started and needs signed will follow for needs. .Warden Vazquez                 Electronically signed by:  Annabelle Vásquez RN on 11/16/2022 at 3:04 PM

## 2022-11-16 NOTE — H&P
Family Medicine Admit Note    PCP: Kaye Myles MD    Date of Admission: 11/15/2022    Date of Service: Pt seen/examined on 222 and  Placed in Observation. Chief Complaint:  hypokalemia      History Of Present Illness: The patient is a 46 y.o. female who presents to [facility] from SNF with hypokalemia. She had a recent lengthy hospital stay for MRSA Bacteremia and has been rehabilitating at SNF on IV Teflaro through 12/3; she had routine labs and K was 2.4. She was sent to [facility] for replacement. This morning, she is sitting up at the side of bed. She states she has been irritable with staff while here. Mental status has improved significantly while she has been at [facility].        Past Medical History:        Diagnosis Date    Anemia     Arthritis     Asthma     Bipolar disorder, mixed ([address]     Carotid artery stenosis 2020    Cocaine abuse ([address] 2014    COPD (chronic obstructive pulmonary disease) (HCC)     Degeneration of cervical intervertebral disc     Depression with anxiety     Depression with anxiety     Fibromyalgia 2017    GERD (gastroesophageal reflux disease)     History of migraine headaches 6/10/2014    History of renal calculi 6/10/2014    History of seizure disorder 6/10/2014    Hoarseness of voice     HTN (hypertension) 6/10/2014    Hyperlipidemia 6/10/2014    IGT (impaired glucose tolerance) 6/10/2014    Kidney stones     Lactose intolerance 6/10/2014    Leukopenia 6/10/2014    Major depressive disorder, recurrent episode, severe, without mention of psychotic behavior 2014    MVP (mitral valve prolapse)     Seizures ([address]     Sleep apnea 6/10/2014    Unspecified diseases of blood and blood-forming organs        Past Surgical History:        Procedure Laterality Date    ANKLE FRACTURE SURGERY      recontruction surgery    APPENDECTOMY      BREAST LUMPECTOMY Right     CARDIAC CATHETERIZATION      CARPAL TUNNEL RELEASE      x2     SECTION CHOLECYSTECTOMY      COLONOSCOPY  07/03/12    COLONOSCOPY  12/19/2016    severe spasms    COLONOSCOPY  05/20/2019    DR GOLDY MCINTOSH    GASTRIC BYPASS SURGERY      GASTRIC BYPASS SURGERY      HYSTERECTOMY (CERVIX STATUS UNKNOWN)      HYSTERECTOMY (CERVIX STATUS UNKNOWN)      IR PERC CATH PLEURAL DRAIN W/IMAG  10/21/2022    IR PERC CATH PLEURAL DRAIN W/IMAG 10/21/2022 [facility]    IR PERC CATH PLEURAL DRAIN W/IMAG  10/21/2022    IR PERC CATH PLEURAL DRAIN W/IMAG 10/21/2022 [facility]    IR PERC CATH PLEURAL DRAIN W/IMAG  10/24/2022    IR PERC CATH PLEURAL DRAIN W/IMAG 10/24/2022 [facility]    LAPAROSCOPY N/A 1/29/2022    LAPAROSCOPY EXPLORATORY CONVERTED TO OPEN EXPLORATORY LAPAROTOMY, LYSIS OF ADHESIONS, REDUCTION OF INTERNAL HERNIA performed by Morenita Rome DO at [facility]  18/01/4300    APPLICATION OF ARCH BARS,SIMPLE EXTRACTION OF #15 AND RT TMJ JOINT REPLACEMENT    SHOULDER ARTHROSCOPY Left 06/05/2019    DR ROBERT GREEN    SHOULDER SURGERY      TONSILLECTOMY AND ADENOIDECTOMY      TRANSESOPHAGEAL ECHOCARDIOGRAM N/A 10/19/2022    TRANSESOPHAGEAL ECHOCARDIOGRAM WITH BUBBLE STUDY performed by Frandy Royal DO at [address] [place] [address]  73-12    egd    UPPER GASTROINTESTINAL ENDOSCOPY  12/19/2016    status post gastrectomy with a small remnant of gastric pouch. UPPER GASTROINTESTINAL ENDOSCOPY  05/2019    DR Marialuisa Sevilla       Medications Prior to Admission:    Prior to Admission medications    Medication Sig Start Date End Date Taking?  Authorizing Provider   amLODIPine (NORVASC) 5 MG tablet Take 1 tablet by mouth daily 11/4/22   Jess Sullivan MD   ARIPiprazole (ABILIFY) 15 MG tablet Take 0.5 tablets by mouth daily 11/4/22   Jess Sullivan MD   divalproex (DEPAKOTE) 500 MG DR tablet Take 2 tablets by mouth 2 times daily Indications: Level 41 as of 7/22/16 (Pt takes two 500 mg DR tabs = 1,000mg PO BID) 11/4/22   Javan Guillaume MD   DULoxetine (CYMBALTA) 60 MG extended release capsule Take 1 capsule by mouth daily 11/4/22   Javan Guillaume MD   ferrous sulfate (FEROSUL) 325 (65 Fe) MG tablet Take 1 tablet by mouth in the morning and at bedtime 11/4/22   Javan Guillaume MD   ipratropium-albuterol (DUONEB) 0.5-2.5 (3) MG/3ML SOLN nebulizer solution Inhale 3 mLs into the lungs every 4 hours (while awake) 11/4/22   Javan Guillaume MD   Vitamin D (CHOLECALCIFEROL) 50 MCG (2000 UT) TABS tablet Take 1 tablet by mouth daily 11/4/22   Javan Guillaume MD   carvedilol (COREG) 25 MG tablet TAKE 1 TABLET BY MOUTH IN THE MORNING AND 1 IN THE EVENING WITH MEALS 9/19/22   Javan Guillaume MD       Allergies:  Aspirin, Bactrim, and Codeine    Social History:  The patient currently lives at [facility] for rehab. TOBACCO:   reports that she has been smoking cigarettes. She has a 16.50 pack-year smoking history. She has never used smokeless tobacco.  ETOH:   reports current alcohol use. Family History:          Problem Relation Age of Onset    Diabetes Mother     Cancer Mother     Coronary Art Dis Father     COPD Father     Depression Brother     Alcohol Abuse Brother     Cancer Other         lung and skin    Diabetes Maternal Grandmother     Cancer Paternal Grandmother        PHYSICAL EXAM:    BP (!) 158/67   Pulse 55   Temp 98 °F (36.7 °C) (Oral)   Resp 18   Ht 4' 11.02\" (1.499 m)   Wt 99 lb 3.3 oz (45 kg)   SpO2 95%   BMI 20.03 kg/m²     General appearance: No apparent distress appears stated age and cooperative. Appears cachectic. HEENT Normal cephalic, atraumatic without obvious deformity. Neck: Supple, No jugular venous distention/bruits. Lungs: Clear to auscultation, bilaterally without rales/wheezes/rhonchi with good respiratory effort.   Heart: Regular rate and rhythm with Normal S1/S2 without murmurs, rubs or gallops  Mental status: Alert, oriented, thought content appropriate. CBC   No results for input(s): WBC, HGB, HCT, PLT in the last 72 hours. RENAL  Recent Labs     11/15/22  1719 11/16/22  0545    137   K 2.4* 2.5*   CL 96* 99   CO2 31 31   PHOS 3.4  --    BUN 12 11   CREATININE 1.32* 1.20*     LFT'S  No results for input(s): AST, ALT, ALB, BILIDIR, BILITOT, ALKPHOS in the last 72 hours. COAG  No results for input(s): INR in the last 72 hours. CARDIAC ENZYMES  No results for input(s): CKTOTAL, CKMB, CKMBINDEX, TROPONINI in the last 72 hours. U/A:    Lab Results   Component Value Date/Time    NITRITE neg 08/09/2022 01:35 PM    COLORU Yellow 10/22/2022 06:29 PM    WBCUA 10 TO 20 10/22/2022 06:29 PM    RBCUA 10 TO 20 10/22/2022 06:29 PM    MUCUS 1+ 10/22/2022 06:29 PM    BACTERIA FEW 10/22/2022 06:29 PM    CLARITYU clear 08/09/2022 01:35 PM    SPECGRAV 1.016 10/22/2022 06:29 PM    LEUKOCYTESUR TRACE 10/22/2022 06:29 PM    BLOODU neg 08/09/2022 01:35 PM    GLUCOSEU NEGATIVE 10/22/2022 06:29 PM    AMORPHOUS 1+ 10/22/2022 06:29 PM       ABG    Lab Results   Component Value Date/Time    DEB1GTH 29.8 10/22/2022 03:24 PM    F9QRQIWY 94.3 10/22/2022 03:24 PM    PHART 7.452 10/22/2022 03:24 PM    DZO3SMX 42.7 10/22/2022 03:24 PM    PO2ART 86.7 10/22/2022 03:24 PM           Active Hospital Problems    Diagnosis Date Noted    Cavitating mass of lung [J98.4] 10/23/2022     Priority: Medium    Pulmonary nodules [R91.8] 10/15/2022     Priority: Medium    Severe malnutrition ([address] [E43] 10/11/2022     Priority: Medium    Hypokalemia [E87.6] 12/18/2015         ASSESSMENT/PLAN:    Hypokalemia, hypocalcemia, hypomagnesemia - recheck and replace. Calcium replacement pending ionized calcium level    MRSA bacteremia and septic emboli in lungs - was on IV Teflaro through 12/3 - restart and consult ID. Severe malnutrition - encourage PO intake and nutrition supplements. DVT Prophylaxis: enoxaparin  Diet: ADULT DIET;  Regular  Code Status: Full Code      Dispo - observation        Luis Sams MD, FAAFP  11/16/2022, 8:39 AM

## 2022-11-16 NOTE — PROGRESS NOTES
Writer notified Dr. Teodoro Fox at [facility] of potassium of 2.5 as stated on potassium replacement medication order to give 60 mEq IV and notify provider. Dr. Teodoro Fox states she is aware and putting orders in now.

## 2022-11-16 NOTE — PROGRESS NOTES
RN took over care from "[employer]. Other home meds that were not ordered were given to patient's father to take home.   Electronically signed by Ritesh Gutierrez RN on 11/16/2022 at 4:25 PM

## 2022-11-16 NOTE — PROGRESS NOTES
Medication History completed:    New medications: none    Medications discontinued: none    Changes to dosing: none    Stated allergies: as listed    Other pertinent information: Medications confirmed the patient's family member and patient. Patient was unable to confirm the Abilify dose. OARRS reoport confirmed for Gabapentin 300 mg dispensed on 9/19/22 for 30 days. The student completed this medication history under my supervision. I certify that it is complete and accurate.     Thank you,  Sissy Solomon, PharmD, BCPS  [phone]

## 2022-11-16 NOTE — ED NOTES
Report given to Henry Lynch RN from [facility]. Report method by phone   The following was reviewed with receiving RN:   Current vital signs:  BP (!) 121/59   Pulse 58   Temp 97.7 °F (36.5 °C) (Oral)   Resp 19   Ht 4' 11\" (1.499 m)   Wt 91 lb (41.3 kg)   SpO2 97%   BMI 18.38 kg/m²                      Any medication or safety alerts were reviewed. Any pending diagnostics and notifications were also reviewed, as well as any safety concerns or issues, abnormal labs, abnormal imaging, and abnormal assessment findings. Questions were answered.             Marshall Wooten RN  11/15/22 5155

## 2022-11-16 NOTE — PROGRESS NOTES
Father states he will be leaving to go home and get patients home medications since patient is observation status.

## 2022-11-16 NOTE — PROGRESS NOTES
11/16/22 1534   Encounter Summary   Encounter Overview/Reason  Attempted Encounter   Service Provided For: Patient not available  (patient not in her room)

## 2022-11-16 NOTE — PROGRESS NOTES
Patient and her father known from previous admission; [facility] visit with patient and her father, Guerrero; patient and Guerrero provided medical update; listening presence and support; welcomed prayer     11/16/22 1810   Encounter Summary   Encounter Overview/Reason  Spiritual/Emotional Needs   Service Provided For: Patient and family together   Referral/Consult From: [facility]   Last Encounter  11/16/22   Complexity of Encounter Moderate   Spiritual/Emotional needs   Type Spiritual Support   Grief, Loss, and Adjustments   Type Adjustment to illness   Assessment/Intervention/Outcome   Assessment Impaired resilience; Powerlessness;Coping   Intervention Active listening;Discussed illness injury and its impact; Explored/Affirmed feelings, thoughts, concerns;Prayer (assurance of)/[place];Sustaining Presence/Ministry of presence   Outcome Engaged in conversation;Expressed feelings, needs, and concerns;Expressed Gratitude;Receptive

## 2022-11-17 ENCOUNTER — APPOINTMENT (OUTPATIENT)
Dept: GENERAL RADIOLOGY | Age: 52
DRG: 628 | End: 2022-11-17
Payer: COMMERCIAL

## 2022-11-17 PROBLEM — D64.9 SEVERE ANEMIA: Status: ACTIVE | Noted: 2022-11-17

## 2022-11-17 PROBLEM — D69.6 SEVERE THROMBOCYTOPENIA (HCC): Status: ACTIVE | Noted: 2022-11-17

## 2022-11-17 LAB
ABSOLUTE BANDS #: 0.2 K/UL (ref 0–1)
ABSOLUTE EOS #: 0 K/UL (ref 0–0.4)
ABSOLUTE LYMPH #: 1.01 K/UL (ref 1–4.8)
ABSOLUTE MONO #: 0.32 K/UL (ref 0.1–1.3)
ABSOLUTE RETIC #: 0.18 M/UL (ref 0.02–0.1)
ANION GAP SERPL CALCULATED.3IONS-SCNC: 4 MMOL/L (ref 9–17)
ANION GAP SERPL CALCULATED.3IONS-SCNC: 7 MMOL/L (ref 9–17)
ATYPICAL LYMPHOCYTE ABSOLUTE COUNT: 0.08 K/UL
ATYPICAL LYMPHOCYTES: 2 %
BANDS: 5 % (ref 0–10)
BASOPHILS # BLD: 1 % (ref 0–2)
BASOPHILS ABSOLUTE: 0.04 K/UL (ref 0–0.2)
BILIRUB SERPL-MCNC: 0.3 MG/DL (ref 0.3–1.2)
BILIRUBIN DIRECT: 0.1 MG/DL
BUN BLDV-MCNC: 6 MG/DL (ref 6–20)
BUN BLDV-MCNC: 8 MG/DL (ref 6–20)
CALCIUM SERPL-MCNC: 7.4 MG/DL (ref 8.6–10.4)
CALCIUM SERPL-MCNC: 7.5 MG/DL (ref 8.6–10.4)
CHLORIDE BLD-SCNC: 102 MMOL/L (ref 98–107)
CHLORIDE BLD-SCNC: 103 MMOL/L (ref 98–107)
CO2: 28 MMOL/L (ref 20–31)
CO2: 31 MMOL/L (ref 20–31)
CREAT SERPL-MCNC: 0.94 MG/DL (ref 0.5–0.9)
CREAT SERPL-MCNC: 1.04 MG/DL (ref 0.5–0.9)
D-DIMER QUANTITATIVE: 2.31 MG/L FEU (ref 0–0.59)
DAT, POLYSPECIFIC: POSITIVE
EOSINOPHILS RELATIVE PERCENT: 0 % (ref 0–4)
FIBRINOGEN: 136 MG/DL (ref 210–530)
GFR SERPL CREATININE-BSD FRML MDRD: >60 ML/MIN/1.73M2
GFR SERPL CREATININE-BSD FRML MDRD: >60 ML/MIN/1.73M2
GLUCOSE BLD-MCNC: 81 MG/DL (ref 70–99)
GLUCOSE BLD-MCNC: 96 MG/DL (ref 70–99)
HCT VFR BLD CALC: 15.9 % (ref 36–46)
HCT VFR BLD CALC: 20.9 % (ref 36–46)
HCT VFR BLD CALC: 27.3 % (ref 36–46)
HEMOGLOBIN: 5.3 G/DL (ref 12–16)
HEMOGLOBIN: 6.8 G/DL (ref 12–16)
HEMOGLOBIN: 9.4 G/DL (ref 12–16)
INR BLD: 1.3
LACTATE DEHYDROGENASE: 309 U/L (ref 135–214)
LYMPHOCYTES # BLD: 25 % (ref 24–44)
MAGNESIUM: 1.6 MG/DL (ref 1.6–2.6)
MCH RBC QN AUTO: 28.7 PG (ref 26–34)
MCHC RBC AUTO-ENTMCNC: 33.1 G/DL (ref 31–37)
MCV RBC AUTO: 86.5 FL (ref 80–100)
MONOCYTES # BLD: 8 % (ref 1–7)
MORPHOLOGY: ABNORMAL
MORPHOLOGY: ABNORMAL
NUCLEATED RED BLOOD CELLS: 2 PER 100 WBC
PARTIAL THROMBOPLASTIN TIME: 32.9 SEC (ref 24–36)
PDW BLD-RTO: 24.2 % (ref 11.5–14.9)
PLATELET # BLD: 32 K/UL (ref 150–450)
PMV BLD AUTO: 7.1 FL (ref 6–12)
POTASSIUM SERPL-SCNC: 2.8 MMOL/L (ref 3.7–5.3)
POTASSIUM SERPL-SCNC: 3.6 MMOL/L (ref 3.7–5.3)
PROTHROMBIN TIME: 15.8 SEC (ref 11.8–14.6)
RBC # BLD: 1.84 M/UL (ref 4–5.2)
RETIC %: 5.2 % (ref 0.5–2)
SEG NEUTROPHILS: 59 % (ref 36–66)
SEGMENTED NEUTROPHILS ABSOLUTE COUNT: 2.37 K/UL (ref 1.3–9.1)
SODIUM BLD-SCNC: 137 MMOL/L (ref 135–144)
SODIUM BLD-SCNC: 138 MMOL/L (ref 135–144)
WBC # BLD: 4 K/UL (ref 3.5–11)

## 2022-11-17 PROCEDURE — 82784 ASSAY IGA/IGD/IGG/IGM EACH: CPT

## 2022-11-17 PROCEDURE — 80048 BASIC METABOLIC PNL TOTAL CA: CPT

## 2022-11-17 PROCEDURE — 6360000002 HC RX W HCPCS: Performed by: FAMILY MEDICINE

## 2022-11-17 PROCEDURE — 96366 THER/PROPH/DIAG IV INF ADDON: CPT

## 2022-11-17 PROCEDURE — 82728 ASSAY OF FERRITIN: CPT

## 2022-11-17 PROCEDURE — 86850 RBC ANTIBODY SCREEN: CPT

## 2022-11-17 PROCEDURE — 85014 HEMATOCRIT: CPT

## 2022-11-17 PROCEDURE — 83550 IRON BINDING TEST: CPT

## 2022-11-17 PROCEDURE — 36430 TRANSFUSION BLD/BLD COMPNT: CPT

## 2022-11-17 PROCEDURE — 85025 COMPLETE CBC W/AUTO DIFF WBC: CPT

## 2022-11-17 PROCEDURE — 82247 BILIRUBIN TOTAL: CPT

## 2022-11-17 PROCEDURE — 6370000000 HC RX 637 (ALT 250 FOR IP): Performed by: FAMILY MEDICINE

## 2022-11-17 PROCEDURE — 2580000003 HC RX 258: Performed by: FAMILY MEDICINE

## 2022-11-17 PROCEDURE — 85730 THROMBOPLASTIN TIME PARTIAL: CPT

## 2022-11-17 PROCEDURE — 85018 HEMOGLOBIN: CPT

## 2022-11-17 PROCEDURE — 83521 IG LIGHT CHAINS FREE EACH: CPT

## 2022-11-17 PROCEDURE — 36415 COLL VENOUS BLD VENIPUNCTURE: CPT

## 2022-11-17 PROCEDURE — 83010 ASSAY OF HAPTOGLOBIN QUANT: CPT

## 2022-11-17 PROCEDURE — 86920 COMPATIBILITY TEST SPIN: CPT

## 2022-11-17 PROCEDURE — 82248 BILIRUBIN DIRECT: CPT

## 2022-11-17 PROCEDURE — 96375 TX/PRO/DX INJ NEW DRUG ADDON: CPT

## 2022-11-17 PROCEDURE — 99232 SBSQ HOSP IP/OBS MODERATE 35: CPT | Performed by: INTERNAL MEDICINE

## 2022-11-17 PROCEDURE — 85384 FIBRINOGEN ACTIVITY: CPT

## 2022-11-17 PROCEDURE — 94761 N-INVAS EAR/PLS OXIMETRY MLT: CPT

## 2022-11-17 PROCEDURE — 86901 BLOOD TYPING SEROLOGIC RH(D): CPT

## 2022-11-17 PROCEDURE — 6360000002 HC RX W HCPCS: Performed by: INTERNAL MEDICINE

## 2022-11-17 PROCEDURE — 99232 SBSQ HOSP IP/OBS MODERATE 35: CPT | Performed by: FAMILY MEDICINE

## 2022-11-17 PROCEDURE — 83615 LACTATE (LD) (LDH) ENZYME: CPT

## 2022-11-17 PROCEDURE — G0378 HOSPITAL OBSERVATION PER HR: HCPCS

## 2022-11-17 PROCEDURE — 99223 1ST HOSP IP/OBS HIGH 75: CPT | Performed by: INTERNAL MEDICINE

## 2022-11-17 PROCEDURE — 83735 ASSAY OF MAGNESIUM: CPT

## 2022-11-17 PROCEDURE — 85045 AUTOMATED RETICULOCYTE COUNT: CPT

## 2022-11-17 PROCEDURE — 82746 ASSAY OF FOLIC ACID SERUM: CPT

## 2022-11-17 PROCEDURE — 85610 PROTHROMBIN TIME: CPT

## 2022-11-17 PROCEDURE — P9016 RBC LEUKOCYTES REDUCED: HCPCS

## 2022-11-17 PROCEDURE — 85379 FIBRIN DEGRADATION QUANT: CPT

## 2022-11-17 PROCEDURE — 86880 COOMBS TEST DIRECT: CPT

## 2022-11-17 PROCEDURE — 96376 TX/PRO/DX INJ SAME DRUG ADON: CPT

## 2022-11-17 PROCEDURE — 71045 X-RAY EXAM CHEST 1 VIEW: CPT

## 2022-11-17 PROCEDURE — 86334 IMMUNOFIX E-PHORESIS SERUM: CPT

## 2022-11-17 PROCEDURE — 83540 ASSAY OF IRON: CPT

## 2022-11-17 PROCEDURE — 84165 PROTEIN E-PHORESIS SERUM: CPT

## 2022-11-17 PROCEDURE — 86900 BLOOD TYPING SEROLOGIC ABO: CPT

## 2022-11-17 PROCEDURE — 82607 VITAMIN B-12: CPT

## 2022-11-17 PROCEDURE — 94640 AIRWAY INHALATION TREATMENT: CPT

## 2022-11-17 PROCEDURE — 02H633Z INSERTION OF INFUSION DEVICE INTO RIGHT ATRIUM, PERCUTANEOUS APPROACH: ICD-10-PCS | Performed by: INTERNAL MEDICINE

## 2022-11-17 PROCEDURE — 84155 ASSAY OF PROTEIN SERUM: CPT

## 2022-11-17 RX ORDER — SODIUM CHLORIDE 9 MG/ML
INJECTION, SOLUTION INTRAVENOUS PRN
Status: DISCONTINUED | OUTPATIENT
Start: 2022-11-17 | End: 2022-11-28

## 2022-11-17 RX ORDER — POTASSIUM CHLORIDE 20 MEQ/1
40 TABLET, EXTENDED RELEASE ORAL DAILY
Qty: 60 TABLET | Refills: 5
Start: 2022-11-17

## 2022-11-17 RX ORDER — HYDRALAZINE HYDROCHLORIDE 20 MG/ML
10 INJECTION INTRAMUSCULAR; INTRAVENOUS EVERY 6 HOURS PRN
Status: DISCONTINUED | OUTPATIENT
Start: 2022-11-17 | End: 2022-11-29 | Stop reason: HOSPADM

## 2022-11-17 RX ADMIN — POTASSIUM CHLORIDE 10 MEQ: 7.46 INJECTION, SOLUTION INTRAVENOUS at 18:01

## 2022-11-17 RX ADMIN — CEFTAROLINE FOSAMIL 400 MG: 600 POWDER, FOR SOLUTION INTRAVENOUS at 08:40

## 2022-11-17 RX ADMIN — DIVALPROEX SODIUM 1000 MG: 250 TABLET, DELAYED RELEASE ORAL at 20:16

## 2022-11-17 RX ADMIN — CEFTAROLINE FOSAMIL 400 MG: 600 POWDER, FOR SOLUTION INTRAVENOUS at 22:40

## 2022-11-17 RX ADMIN — POTASSIUM CHLORIDE 10 MEQ: 7.46 INJECTION, SOLUTION INTRAVENOUS at 10:25

## 2022-11-17 RX ADMIN — POTASSIUM CHLORIDE 10 MEQ: 7.46 INJECTION, SOLUTION INTRAVENOUS at 17:04

## 2022-11-17 RX ADMIN — IPRATROPIUM BROMIDE AND ALBUTEROL SULFATE 3 ML: .5; 2.5 SOLUTION RESPIRATORY (INHALATION) at 19:30

## 2022-11-17 RX ADMIN — CARVEDILOL 25 MG: 25 TABLET ORAL at 08:26

## 2022-11-17 RX ADMIN — POTASSIUM CHLORIDE 10 MEQ: 7.46 INJECTION, SOLUTION INTRAVENOUS at 18:58

## 2022-11-17 RX ADMIN — Medication 2000 UNITS: at 08:22

## 2022-11-17 RX ADMIN — DIVALPROEX SODIUM 1000 MG: 250 TABLET, DELAYED RELEASE ORAL at 08:21

## 2022-11-17 RX ADMIN — HYDRALAZINE HYDROCHLORIDE 10 MG: 20 INJECTION INTRAMUSCULAR; INTRAVENOUS at 20:11

## 2022-11-17 RX ADMIN — AMLODIPINE BESYLATE 5 MG: 5 TABLET ORAL at 08:22

## 2022-11-17 RX ADMIN — IPRATROPIUM BROMIDE AND ALBUTEROL SULFATE 3 ML: .5; 2.5 SOLUTION RESPIRATORY (INHALATION) at 12:10

## 2022-11-17 RX ADMIN — POTASSIUM CHLORIDE 10 MEQ: 7.46 INJECTION, SOLUTION INTRAVENOUS at 15:35

## 2022-11-17 RX ADMIN — ARIPIPRAZOLE 7.5 MG: 5 TABLET ORAL at 08:26

## 2022-11-17 RX ADMIN — POTASSIUM CHLORIDE 10 MEQ: 7.46 INJECTION, SOLUTION INTRAVENOUS at 14:04

## 2022-11-17 RX ADMIN — FERROUS SULFATE TAB 325 MG (65 MG ELEMENTAL FE) 325 MG: 325 (65 FE) TAB at 20:16

## 2022-11-17 RX ADMIN — FERROUS SULFATE TAB 325 MG (65 MG ELEMENTAL FE) 325 MG: 325 (65 FE) TAB at 08:22

## 2022-11-17 RX ADMIN — CARVEDILOL 25 MG: 25 TABLET ORAL at 17:59

## 2022-11-17 RX ADMIN — IPRATROPIUM BROMIDE AND ALBUTEROL SULFATE 3 ML: .5; 2.5 SOLUTION RESPIRATORY (INHALATION) at 08:06

## 2022-11-17 RX ADMIN — ALTEPLASE 1 MG: 2.2 INJECTION, POWDER, LYOPHILIZED, FOR SOLUTION INTRAVENOUS at 16:39

## 2022-11-17 RX ADMIN — SODIUM CHLORIDE, PRESERVATIVE FREE 10 ML: 5 INJECTION INTRAVENOUS at 08:22

## 2022-11-17 RX ADMIN — DULOXETIN HYDROCHLORIDE 60 MG: 30 CAPSULE, DELAYED RELEASE ORAL at 08:27

## 2022-11-17 RX ADMIN — IPRATROPIUM BROMIDE AND ALBUTEROL SULFATE 3 ML: .5; 2.5 SOLUTION RESPIRATORY (INHALATION) at 15:30

## 2022-11-17 RX ADMIN — SODIUM CHLORIDE, PRESERVATIVE FREE 10 ML: 5 INJECTION INTRAVENOUS at 20:11

## 2022-11-17 NOTE — CONSENT
I have discussed with the patient the rationale for blood component transfusion; its benefits in treating or preventing fatigue, organ damage, or death; and its risk which includes mild transfusion reactions, rare risk of blood borne infection, or more serious but rare reactions. I have discussed the alternatives to transfusion, including the risk and consequences of not receiving transfusion. The patient had an opportunity to ask questions and had agreed to proceed with transfusion of blood components.      Lucina Ding MD, [facility] 5728

## 2022-11-17 NOTE — PROGRESS NOTES
Progress Note  Date:2022       Room:[address] [place] [address]  Patient Name:Anu Bourne     YOB: 1970     Age:51 y.o. Subjective    Subjective:  Symptoms:  Stable. Diet:  Adequate intake. Activity level: Impaired due to weakness. Pain:  She reports no pain. Review of Systems  Objective         Vitals Last 24 Hours:  TEMPERATURE:  Temp  Av.7 °F (36.5 °C)  Min: 97.2 °F (36.2 °C)  Max: 98.1 °F (36.7 °C)  RESPIRATIONS RANGE: Resp  Av.2  Min: 16  Max: 18  PULSE OXIMETRY RANGE: SpO2  Av.3 %  Min: 93 %  Max: 97 %  PULSE RANGE: Pulse  Av.5  Min: 55  Max: 78  BLOOD PRESSURE RANGE: Systolic (74PID), NWU:420 , Min:118 , JSQ:288   ; Diastolic (80MBD), PMT:16, Min:66, Max:103    I/O (24Hr): No intake or output data in the 24 hours ending 22 0815  Objective:  General Appearance:  Comfortable. Vital signs: (most recent): Blood pressure (!) 151/66, pulse 68, temperature 97.7 °F (36.5 °C), temperature source Oral, resp. rate 18, height 4' 11.02\" (1.499 m), weight 99 lb 3.3 oz (45 kg), SpO2 95 %. Vital signs are normal.    Labs/Imaging/Diagnostics    Labs:  CBC:No results for input(s): WBC, RBC, HGB, HCT, MCV, RDW, PLT in the last 72 hours. CHEMISTRIES:  Recent Labs     11/15/22  1719 22  0545    137   K 2.4* 2.5*   CL 96* 99   CO2 31 31   BUN 12 11   CREATININE 1.32* 1.20*   GLUCOSE 92 74   PHOS 3.4  --    MG 1.6 1.6     PT/INR:No results for input(s): PROTIME, INR in the last 72 hours. APTT:No results for input(s): APTT in the last 72 hours. LIVER PROFILE:No results for input(s): AST, ALT, BILIDIR, BILITOT, ALKPHOS in the last 72 hours. Imaging Last 24 Hours:  No results found.   Assessment//Plan           Hospital Problems             Last Modified POA    * (Principal) Hypokalemia 11/15/2022 Yes    Severe malnutrition (HCC) (Chronic) 2022 Yes    Pulmonary nodules 2022 Yes    Cavitating mass of lung 2022 Yes     Assessment & Plan    Awaiting lab results - back to SNF when electrolytes corrected      Electronically signed by Nathan Goncalves MD on 11/17/22 at 8:15 AM EST

## 2022-11-17 NOTE — PLAN OF CARE
Problem: Discharge Planning  Goal: Discharge to home or other facility with appropriate resources  11/17/2022 1539 by Shelby Razo RN  Outcome: Progressing   Tbd when hgb and K stable. Pending occult stool    Problem: Safety - Adult  Goal: Free from fall injury  11/17/2022 1539 by Shelby Razo RN  Outcome: Progressing   Up one assist. Needs reminding to call out first, bed alarm utilized. Pt weak/fatigued d/t low hgb    Problem: Skin/Tissue Integrity  Goal: Absence of new skin breakdown  Description: 1. Monitor for areas of redness and/or skin breakdown  2. Assess vascular access sites hourly  3. Every 4-6 hours minimum:  Change oxygen saturation probe site  4. Every 4-6 hours:  If on nasal continuous positive airway pressure, respiratory therapy assess nares and determine need for appliance change or resting period.   11/17/2022 1539 by Shelby Razo RN  Outcome: Progressing   See assessment

## 2022-11-17 NOTE — CARE COORDINATION
ONGOING DISCHARGE PLAN:    Patient is alert and oriented x4. Spoke with patient regarding discharge plan and patient confirms that plan is still to discharge to [facility]    Patient will have one Unit of RBC today     New Hem[facility] Consult ordered    Labs       Will continue to follow for additional discharge needs.     Electronically signed by Campos Goode RN on 11/17/2022 at 10:19 AM

## 2022-11-17 NOTE — CONSULTS
Today's Date: 2022  Patient Name: Noble Cowart  Date of admission: 11/15/2022  4:51 PM  Patient's age: 46 y. o., 1970  Admission Dx: Hypokalemia [E87.6]    Reason for Consult: management recommendations  Requesting Physician: Henrik Issa MD    CHIEF COMPLAINT: Severe anemia    History Obtained From:  patient    HISTORY OF PRESENT ILLNESS:      The patient is a 46 y.o.  female who is admitted to the hospital for hypokalemia with potassium of 2.4 patient recently hospitalized for MRSA bacteremia and empyema and had anemia and thrombocytopenia thought related to bacteremia and sepsis and antibiotic/vancomycin, patient had pulmonary nodules with cavitation, hemoglobin was 5.3 with WBC 4.0 and platelet count of 32    Past Medical History:   has a past medical history of Anemia, Arthritis, Asthma, Bipolar disorder, mixed ([address] Carotid artery stenosis, Cocaine abuse ([address] COPD (chronic obstructive pulmonary disease) ([address] Degeneration of cervical intervertebral disc, Depression with anxiety, Depression with anxiety, Fibromyalgia, GERD (gastroesophageal reflux disease), History of migraine headaches, History of renal calculi, History of seizure disorder, Hoarseness of voice, HTN (hypertension), Hyperlipidemia, IGT (impaired glucose tolerance), Kidney stones, Lactose intolerance, Leukopenia, Major depressive disorder, recurrent episode, severe, without mention of psychotic behavior, MVP (mitral valve prolapse), Seizures ([address] Sleep apnea, and Unspecified diseases of blood and blood-forming organs. Past Surgical History:   has a past surgical history that includes Gastric bypass surgery; Cholecystectomy; Hysterectomy; Appendectomy; Hysterectomy; Tonsillectomy and adenoidectomy; Ankle fracture surgery; Neck surgery; shoulder surgery;  section; Carpal tunnel release; Colonoscopy (12); Upper gastrointestinal endoscopy (7-3-12);  Gastric bypass surgery; Breast lumpectomy (Right); Cardiac catheterization; Upper gastrointestinal endoscopy (2016); Colonoscopy (2016); Upper gastrointestinal endoscopy (2019); Colonoscopy (2019); Shoulder arthroscopy (Left, 2019); other surgical history (10/24/2019); laparoscopy (N/A, 2022); transesophageal echocardiogram (N/A, 10/19/2022); IR GUIDED PERC PLEURAL DRAIN W CATH INSERT (10/21/2022); IR GUIDED PERC PLEURAL DRAIN W CATH INSERT (10/21/2022); and IR GUIDED PERC PLEURAL DRAIN W CATH INSERT (10/24/2022). Medications:    Reviewed in Epic     Allergies:  Aspirin, Bactrim, and Codeine    Social History:   reports that she has been smoking cigarettes. She has a 16.50 pack-year smoking history. She has never used smokeless tobacco. She reports current alcohol use. She reports that she does not currently use drugs. Family History: family history includes Alcohol Abuse in her brother; COPD in her father; Cancer in her mother, paternal grandmother, and another family member; Coronary Art Dis in her father; Depression in her brother; Diabetes in her maternal grandmother and mother.     REVIEW OF SYSTEMS:    14 point review of system has been obtained unremarkable although it was mentioned above    PHYSICAL EXAM:      BP (!) 169/79   Pulse 72   Temp 97.7 °F (36.5 °C) (Axillary)   Resp 16   Ht 4' 11.02\" (1.499 m)   Wt 99 lb 3.3 oz (45 kg)   SpO2 96%   BMI 20.03 kg/m²    Temp (24hrs), Av.8 °F (36.6 °C), Min:97.2 °F (36.2 °C), Max:98.1 °F (36.7 °C)    General appearance - well appearing, no in pain or distress   Mental status - alert and cooperative   Eyes - pupils equal and reactive, extraocular eye movements intact   Ears - bilateral TM's and external ear canals normal   Mouth - mucous membranes moist, pharynx normal without lesions   Neck - supple, no significant adenopathy   Lymphatics - no palpable lymphadenopathy, no hepatosplenomegaly   Chest - clear to auscultation, no wheezes, rales or rhonchi, symmetric air entry   Heart - normal rate, regular rhythm, normal S1, S2, no murmurs  Abdomen - soft, nontender, nondistended, no masses or organomegaly   Neurological - alert, oriented, normal speech, no focal findings or movement disorder noted   Musculoskeletal - no joint tenderness, deformity or swelling   Extremities - peripheral pulses normal, no pedal edema, no clubbing or cyanosis   Skin - normal coloration and turgor, no rashes, no suspicious skin lesions noted ,    DATA:    Labs:   CBC:   Recent Labs     11/17/22  0806 11/17/22  1555   WBC 4.0  --    HGB 5.3* 6.8*   HCT 15.9* 20.9*   PLT 32*  --      BMP:   Recent Labs     11/16/22  0545 11/17/22  0806    138   K 2.5* 2.8*   CO2 31 31   BUN 11 8   CREATININE 1.20* 1.04*   LABGLOM 54* >60   GLUCOSE 74 96     PT/INR: No results for input(s): PROTIME, INR in the last 72 hours. IMAGING DATA:  XR CHEST (SINGLE VIEW FRONTAL)   Final Result   1. Right PICC tip terminates within the lower right atrium. Retraction by   5-6 cm and radiographic follow-up suggested. 2.  Extensive pleural/parenchymal disease bilaterally without significant   overall change from prior. Primary Problem  Hypokalemia    Active Hospital Problems    Diagnosis Date Noted    Cavitating mass of lung [J98.4] 10/23/2022     Priority: Medium    Pulmonary nodules [R91.8] 10/15/2022     Priority: Medium    Severe malnutrition ([address] [E43] 10/11/2022     Priority: Medium    Hypokalemia [E87.6] 12/18/2015         IMPRESSION:   Severe anemia and thrombocytopenia  Pulmonary nodules  MRSA bacteremia  Hypokalemia    RECOMMENDATIONS:  I reviewed the labs/imaging available to me,outside records and discussed with the patient. I explained to the patient the nature of this problem.  I explained the significance of these abnormalities and possible etiology and management options   Patient had severe anemia and thrombocytopenia since last admission initially thought it is related to medication as patient had persistent bicytopenia will obtain bone marrow biopsy and aspirate to rule out primary bone marrow disorder/MPN  Hemolysis panel anemia work-up and myeloma/DIC panel  Discussed with the patient at bedside  Transfuse to keep hemoglobin above 7 and platelet above 10 unless there is a bleeding to keep platelet above 30  Need to have follow-up scan of the chest prior to discharge from the hospital and when creatinine down (follow-up on the pulmonary lesions from previous admission)      Discussed with patient and Nurse. Thank you for asking us to see this patient. [facility]                            This note is created with the assistance of a speech recognition program.  While intending to generate a document that actually reflects the content of the visit, the document can still have some errors including those of syntax and sound a like substitutions which may escape proof reading. It such instances, actual meaning can be extrapolated by contextual diversion.      Hematologist/Medical Oncologist

## 2022-11-17 NOTE — PROGRESS NOTES
ICU notified to for assistance in gaining another IV d/t poor access, pts picc line only having 1 line open since second is clogged. They will try to send someone over shortly. IR notified of order to check out the second lumen in picc line. IR called back asking us to try cathflo first. Dr Cherylene Grand notified. New order for 1 mg cathflo to be given once. 5 pm - cathflo given and lumen working now.  IR order discontinued

## 2022-11-17 NOTE — PROGRESS NOTES
Dr Mak Rai notified pt reported Ryan Cameron last night. Notified hgb 5.3. also reviewed with dr Mak Rai and dr Desmond Sosa that the pink line on double lumen picc is not flushing or giving blood return.     Dr Mak Rai instructed me to contact IR to evaluate line

## 2022-11-17 NOTE — PLAN OF CARE
Problem: Discharge Planning  Goal: Discharge to home or other facility with appropriate resources  11/17/2022 0315 by Cristino Curry RN  Outcome: Progressing     Problem: Safety - Adult  Goal: Free from fall injury  11/17/2022 0315 by Cristino Curry RN  Outcome: Progressing     Problem: Skin/Tissue Integrity  Goal: Absence of new skin breakdown  Description: 1. Monitor for areas of redness and/or skin breakdown  2. Assess vascular access sites hourly  3. Every 4-6 hours minimum:  Change oxygen saturation probe site  4. Every 4-6 hours:  If on nasal continuous positive airway pressure, respiratory therapy assess nares and determine need for appliance change or resting period.   11/17/2022 0315 by Cristino Curry RN  Outcome: Progressing

## 2022-11-17 NOTE — DISCHARGE INSTR - COC
06/05/2019    DR ROBERT GREEN    SHOULDER SURGERY      TONSILLECTOMY AND ADENOIDECTOMY      TRANSESOPHAGEAL ECHOCARDIOGRAM N/A 10/19/2022    TRANSESOPHAGEAL ECHOCARDIOGRAM WITH BUBBLE STUDY performed by Frandy Royal DO at [facility]  7-3-12    egd    UPPER GASTROINTESTINAL ENDOSCOPY  12/19/2016    status post gastrectomy with a small remnant of gastric pouch. UPPER GASTROINTESTINAL ENDOSCOPY  05/2019    DR Shantell You       Immunization History:   Immunization History   Administered Date(s) Administered    COVID-19, PFIZER PURPLE top, DILUTE for use, (age 15 y+), 30mcg/0.3mL 03/15/2021, 04/05/2021    Influenza Virus Vaccine 11/07/2006, 10/24/2008, 09/16/2014, 10/23/2015, 10/12/2016, 01/11/2019    Influenza, AFLURIA (age 1 yrs+), FLUZONE, (age 10 mo+), MDV, 0.5mL 10/12/2016, 10/15/2019    Pneumococcal Polysaccharide ([id number]) 03/23/2007, 10/23/2015       Active Problems:  Patient Active Problem List   Diagnosis Code    GERD (gastroesophageal reflux disease) K21.9    Hoarseness of voice R49.0    MVP (mitral valve prolapse) I34.1    Depression with suicidal ideation F32. A, R45.851    Lymphocytic colitis K52.832    History of migraine headaches Z86.69    Hyperlipidemia E78.5    Sleep apnea G47.30    IGT (impaired glucose tolerance) R73.02    History of renal calculi Z87.442    Lactose intolerance E73.9    History of seizure disorder Z86.69    Leukopenia D72.819    Bipolar disorder, mixed (HCC) C86.98    Illicit drug use P72.78    Postlaminectomy syndrome, cervical region M96.1    Degeneration of cervical intervertebral disc M50.30    Encounter for long-term (current) use of other medications Z79.899    Bradycardia R00.1    Cellulitis L03.90    Hypokalemia E87.6    Primary hypertension I10    Elevated lipase R74.8    Right lower quadrant abdominal pain R10.31    Nausea R11.0    Diarrhea R19.7    Acute encephalopathy G93.40    Polysubstance abuse (HCC) F19.10    Fibromyalgia M79.7 Cocaine addiction ([facility]) F14.20    Shoulder arthritis M19.019    Osteoarthritis of left glenohumeral joint M19.012    Abnormal EKG R94.31    Anemia D64.9    Anxiety F41.9    Carotid artery stenosis I65.29    COPD (chronic obstructive pulmonary disease) ([facility]) J44.9    Dental disease K08.9    HUTCHINSON (dyspnea on exertion) R06.09    Fatigue R53.83    Fractures T07. XXXA    H/O gastric bypass Z98.84    History of crack cocaine use Z87.898    History of UTI Z87.440    Injury of back S39. 92XA    Intractable chronic migraine without aura and without status migrainosus G43.719    Intractable migraine with aura with status migrainosus G43. 111    Kidney stones N20.0    Dizziness R42    Memory loss R41.3    Mild pulmonary hypertension ([facility]) I27.20    Mild tricuspid regurgitation I07.1    Primary osteoarthritis of left shoulder M19.012    Seizure-like activity ([facility]) R56.9    Stress at home F43.9    TMJ (dislocation of temporomandibular joint) S03. 00XA    Tobacco abuse Z72.0    Visual impairment H54.7    Internal hernia K45.8    Vitamin D deficiency E55.9    Iron deficiency E61.1    SIRS (systemic inflammatory response syndrome) ([facility]) R65.10    NICKI (acute kidney injury) ([facility] [address] [address] N17.9    Severe malnutrition ([facility]) E43    Elevated d-dimer R79.89    Leukocytosis D72.829    Pulmonary nodules R91.8    Delirium due to another medical condition F08    Acute metabolic encephalopathy B38.68    MRSA bacteremia R78.81, B95.62    Acute septic pulmonary embolism ([facility]) I26.90    Cavitating mass of lung J98.4    Allergy to antibiotic Z88. 1    Thrombocytopenia ([facility]) D69.6       Isolation/Infection:   Isolation            Contact          Patient Infection Status       Infection Onset Added Last Indicated Last Indicated By Review Planned Expiration Resolved Resolved By    MRSA 10/10/22 10/12/22 10/21/22 Culture, Anaerobic and Aerobic        Resolved    COVID-19 (Rule Out) 10/10/22 10/10/22 10/10/22 COVID-19 & Influenza Combo (Ordered)   10/10/22 Rule-Out Test Resulted            Nurse Assessment:  Last Vital Signs: BP (!) 151/66   Pulse 68   Temp 97.7 °F (36.5 °C) (Oral)   Resp 18   Ht 4' 11.02\" (1.499 m)   Wt 99 lb 3.3 oz (45 kg)   SpO2 95%   BMI 20.03 kg/m²     Last documented pain score (0-10 scale): Pain Level: 0  Last Weight:   Wt Readings from Last 1 Encounters:   11/15/22 99 lb 3.3 oz (45 kg)     Mental Status:  oriented and alert    IV Access:  - PICC - site  R Upper Arm, insertion date: 10/20/2022    Nursing Mobility/ADLs:  Walking   Independent  Transfer  Independent  Bathing  Independent  Dressing  Independent  [address]  Med Delivery   whole    Wound Care Documentation and Therapy:        Elimination:  Continence: Bowel: Yes  Bladder: Yes  Urinary Catheter: None   Colostomy/Ileostomy/Ileal Conduit: No       Date of Last BM: 11/27/2022  No intake or output data in the 24 hours ending 11/17/22 0816  I/O last 3 completed shifts: In: 103 [IV Piggyback:103]  Out: 400 [Urine:400]    Safety Concerns: At Risk for Falls    Impairments/Disabilities:      Vision    Nutrition Therapy:  Current Nutrition Therapy:   - Oral Diet:  General    Routes of Feeding: Oral  Liquids: No Restrictions  Daily Fluid Restriction: no  Last Modified Barium Swallow with Video (Video Swallowing Test): not done    Treatments at the Time of Hospital Discharge:   Respiratory Treatments: DuoNeb  Oxygen Therapy:  is not on home oxygen therapy. Ventilator:    - No ventilator support    Rehab Therapies: Physical Therapy and Occupational Therapy  Weight Bearing Status/Restrictions: Other Medical Equipment (for information only, NOT a DME order): Other Treatments: Skilled Nursing assessment and monitoring. Medication education and monitoring per protocol.    IV Teflaro 400 mg twice a day as planned through 12/3/2022    Patient's personal belongings (please select all that are sent with patient):  Personal Belongings

## 2022-11-17 NOTE — PROGRESS NOTES
Dr Melo Said notfiied of chest xray results:    1. Right PICC tip terminates within the lower right atrium. Retraction by   5-6 cm and radiographic follow-up suggested. 2.  Extensive pleural/parenchymal disease bilaterally without significant   overall change from prior.

## 2022-11-17 NOTE — PROGRESS NOTES
Eliel, RN notified to try Cathflo at the bedside per Dr. Virginie Parson. Verified with pharmacist that all nurses are allowed to administer Cathflo.

## 2022-11-17 NOTE — PROGRESS NOTES
[facility] -   Infectious diseases evaluation  admission date 11/15/2022    reason for consultation:   MRSA infection    Impression :   Current:  Hypokalemia  Recent MRSA bacteremia  Recent empyema/pulmonary nodules with cavitation status post chest tube placement that was subsequently removed on 10/31/2022. Thrombocytopenia and anemia thought to be related to vancomycin that was discontinued. Renal insufficiency  History of cocaine drug abuse  Hypertension  Bipolar disorder  Allergy to Bactrim. Recommendations   Continue IV Teflaro 400 mg twice a day as planned through 12/3/2022  Chest x-ray   Follow potassium level  Follow CBC and renal function      Infection Control Recommendations   [place] Precautions  Contact Isolation       Antimicrobial Stewardship Recommendations   Simplification of therapy  Targeted therapy      History of Present Illness:   Initial history:  Amie Hill is a 46y.o.-year-old female was brought to the hospital from SNF for hypokalemia with reported potassium of 2.4. Patient is complaining of body ache, generalized weakness and abdominal discomfort for 2 days. Symptoms are moderate, no alleviating or aggravating factors. She denied cough or shortness of breath, no other complaints. Right arm PICC line in place  The patient had prolonged hospitalization recently for MRSA bacteremia and empyema status post chest tube placement but was subsequently removed.   She was discharged to skilled nursing facility on 11/4/2024 on IV Teflaro through 12/3/2022  Interval changes  11/17/2022   She is awake, complaining of abdominal discomfort, denied diarrhea, no nausea or vomiting, afebrile, denied cough or shortness of breath.  K 2.8  Right arm PICC line one /2 lumen clogged  Patient Vitals for the past 8 hrs:   BP Temp Temp src Pulse Resp SpO2   11/17/22 0806 -- -- -- 68 18 95 %   11/17/22 0715 (!) 151/66 97.7 °F (36.5 °C) Oral 64 18 95 % I have personally reviewed the past medical history, past surgical history, medications, social history, and family history, and I haveupdated the database accordingly.       Allergies:   Aspirin, Bactrim, and Codeine     Review of Systems:     Review of Systems  As per history of present illness, other than above 12 system review was negative  Physical Examination :       Physical Exam  Head: Normocephalic, atraumatic  Eye: Pupils equal round reactive to light, no conjunctivitis  ENT: No throat erythema or thrush  Neck: Supple, no JVD  Heart: Regular rate and rhythm no murmurs  Lungs: Clear to auscultation bilaterally, no respiratory distress  Abdomen: Soft, nontender, nondistended, with no peritoneal signs  MSK: No joint swelling or erythema  Neurologic: Patient is alert and oriented x3,, fluent speech  Extremities: No edema, no clubbing, no cyanosis  Past Medical History:     Past Medical History:   Diagnosis Date    Anemia     Arthritis     Asthma     Bipolar disorder, mixed ([address]     Carotid artery stenosis 2/13/2020    Cocaine abuse ([facility] 11/4/2014    COPD (chronic obstructive pulmonary disease) ([facility])     Degeneration of cervical intervertebral disc     Depression with anxiety     Depression with anxiety     Fibromyalgia 1/5/2017    GERD (gastroesophageal reflux disease)     History of migraine headaches 6/10/2014    History of renal calculi 6/10/2014    History of seizure disorder 6/10/2014    Hoarseness of voice     HTN (hypertension) 6/10/2014    Hyperlipidemia 6/10/2014    IGT (impaired glucose tolerance) 6/10/2014    Kidney stones     Lactose intolerance 6/10/2014    Leukopenia 6/10/2014    Major depressive disorder, recurrent episode, severe, without mention of psychotic behavior 11/6/2014    MVP (mitral valve prolapse)     Seizures ([facility])     Sleep apnea 6/10/2014    Unspecified diseases of blood and blood-forming organs        Past Surgical  History:     Past Surgical History:   Procedure Laterality Date    ANKLE FRACTURE SURGERY      recontruction surgery    APPENDECTOMY      BREAST LUMPECTOMY Right     CARDIAC CATHETERIZATION      CARPAL TUNNEL RELEASE      x2     SECTION      CHOLECYSTECTOMY      COLONOSCOPY  12    COLONOSCOPY  2016    severe spasms    COLONOSCOPY  2019    DR GOLDY MCINTOSH    GASTRIC BYPASS SURGERY      GASTRIC BYPASS SURGERY      HYSTERECTOMY (CERVIX STATUS UNKNOWN)      HYSTERECTOMY (CERVIX STATUS UNKNOWN)      IR PERC CATH PLEURAL DRAIN W/IMAG  10/21/2022    IR PERC CATH PLEURAL DRAIN W/IMAG 10/21/2022 [facility]    IR PERC CATH PLEURAL DRAIN W/IMAG  10/21/2022    IR PERC CATH PLEURAL DRAIN W/IMAG 10/21/2022 [facility]    IR PERC CATH PLEURAL DRAIN W/IMAG  10/24/2022    IR PERC CATH PLEURAL DRAIN W/IMAG 10/24/2022 [facility]    LAPAROSCOPY N/A 2022    LAPAROSCOPY EXPLORATORY CONVERTED TO OPEN EXPLORATORY LAPAROTOMY, LYSIS OF ADHESIONS, REDUCTION OF INTERNAL HERNIA performed by Issac Crowe DO at [address]      APPLICATION OF ARCH BARS,SIMPLE EXTRACTION OF #15 AND RT TMJ JOINT REPLACEMENT    SHOULDER ARTHROSCOPY Left 2019    DR ROBERT GREEN    SHOULDER SURGERY      TONSILLECTOMY AND ADENOIDECTOMY      TRANSESOPHAGEAL ECHOCARDIOGRAM N/A 10/19/2022    TRANSESOPHAGEAL ECHOCARDIOGRAM WITH BUBBLE STUDY performed by Frandy Royal DO at [facility]  7-3-12    egd    UPPER GASTROINTESTINAL ENDOSCOPY  2016    status post gastrectomy with a small remnant of gastric pouch.     UPPER GASTROINTESTINAL ENDOSCOPY  2019    DR Kristine Ballesteros       Medications:      ceftaroline fosamil (TEFLARO) IVPB  400 mg IntraVENous Q12H    DULoxetine  60 mg Oral Daily    carvedilol  25 mg Oral BID WC    ARIPiprazole  7.5 mg Oral Daily    amLODIPine  5 mg Oral Daily    divalproex  1,000 mg Oral BID    ferrous sulfate  325 mg Oral BID ipratropium-albuterol  3 mL Inhalation Q4H WA    Vitamin D  2,000 Units Oral Daily    sodium chloride flush  5-40 mL IntraVENous 2 times per day    enoxaparin  30 mg SubCUTAneous Daily       Social History:     Social History     Socioeconomic History    Marital status:      Spouse name: Not on file    Number of children: Not on file    Years of education: Not on file    Highest education level: Not on file   Occupational History    Occupation: disability   Tobacco Use    Smoking status: Every Day     Packs/day: 0.50     Years: 33.00     Pack years: 16.50     Types: Cigarettes    Smokeless tobacco: Never    Tobacco comments:     quit  2000 started again  1/13    Substance and Sexual Activity    Alcohol use: Yes     Comment: occasional    Drug use: Not Currently    Sexual activity: Yes   Other Topics Concern    Not on file   Social History Narrative    Not on file     Social Determinants of Health     Financial Resource Strain: Not on file   Food Insecurity: Not on file   Transportation Needs: Not on file   Physical Activity: Not on file   Stress: Not on file   Social Connections: Not on file   Intimate Partner Violence: Not on file   Housing Stability: Not on file       Family History:     Family History   Problem Relation Age of Onset    Diabetes Mother     Cancer Mother     Coronary Art Dis Father     COPD Father     Depression Brother     Alcohol Abuse Brother     Cancer Other         lung and skin    Diabetes Maternal Grandmother     Cancer Paternal Grandmother       Medical Decision Making:   I have independently reviewed/ordered the following labs:    CBC with Differential:   Recent Labs     11/17/22  0806   WBC 4.0   HGB 5.3*   HCT 15.9*   PLT 32*   LYMPHOPCT 25   MONOPCT 8*     BMP:  Recent Labs     11/16/22  0545 11/17/22  0806    138   K 2.5* 2.8*   CL 99 103   CO2 31 31   BUN 11 8   CREATININE 1.20* 1.04*   MG 1.6 1.6       Hepatic Function Panel: No results for input(s): PROT, LABALBU, BILIDIR, IBILI, BILITOT, ALKPHOS, ALT, AST in the last 72 hours. No results for input(s): RPR in the last 72 hours. No results for input(s): HIV in the last 72 hours. No results for input(s): BC in the last 72 hours. Lab Results   Component Value Date/Time    CREATININE 1.04 11/17/2022 08:06 AM    GLUCOSE 96 11/17/2022 08:06 AM       Detailed results: Thank you for allowing us to participate in the care of this patient. Please call with questions. This note is created with the assistance of a speech recognition program.  While intending to generate adocument that actually reflects the content of the visit, the document can still have some errors including those of syntax and sound a like substitutions which may escape proof reading. It such instances, actual meaningcan be extrapolated by contextual diversion.     Josiane Luz MD  Office: [phone]  Perfect serve / office [phone]

## 2022-11-18 ENCOUNTER — APPOINTMENT (OUTPATIENT)
Dept: CT IMAGING | Age: 52
DRG: 628 | End: 2022-11-18
Payer: COMMERCIAL

## 2022-11-18 ENCOUNTER — APPOINTMENT (OUTPATIENT)
Dept: INTERVENTIONAL RADIOLOGY/VASCULAR | Age: 52
DRG: 628 | End: 2022-11-18
Payer: COMMERCIAL

## 2022-11-18 LAB
ABO/RH: NORMAL
ABSOLUTE EOS #: 0 K/UL (ref 0–0.4)
ABSOLUTE LYMPH #: 2 K/UL (ref 1–4.8)
ABSOLUTE MONO #: 0.5 K/UL (ref 0.1–1.3)
ABSOLUTE RETIC #: 0.17 M/UL (ref 0.02–0.1)
ALBUMIN (CALCULATED): 1.8 G/DL (ref 3.2–5.2)
ALBUMIN PERCENT: 28 % (ref 45–65)
ALPHA 1 PERCENT: 4 % (ref 3–6)
ALPHA 2 PERCENT: 8 % (ref 6–13)
ALPHA-1-GLOBULIN: 0.3 G/DL (ref 0.1–0.4)
ALPHA-2-GLOBULIN: 0.5 G/DL (ref 0.5–0.9)
ANION GAP SERPL CALCULATED.3IONS-SCNC: 9 MMOL/L (ref 9–17)
ANTIBODY SCREEN: NEGATIVE
ARM BAND NUMBER: NORMAL
BASOPHILS # BLD: 1 % (ref 0–2)
BASOPHILS ABSOLUTE: 0 K/UL (ref 0–0.2)
BETA GLOBULIN: 0.9 G/DL (ref 0.5–1.1)
BETA PERCENT: 14 % (ref 11–19)
BLD PROD TYP BPU: NORMAL
BLD PROD TYP BPU: NORMAL
BLOOD BANK BLOOD PRODUCT EXPIRATION DATE: NORMAL
BLOOD BANK BLOOD PRODUCT EXPIRATION DATE: NORMAL
BLOOD BANK ISBT PRODUCT BLOOD TYPE: 5100
BLOOD BANK ISBT PRODUCT BLOOD TYPE: 5100
BLOOD BANK PRODUCT CODE: NORMAL
BLOOD BANK PRODUCT CODE: NORMAL
BLOOD BANK UNIT TYPE AND RH: NORMAL
BLOOD BANK UNIT TYPE AND RH: NORMAL
BPU ID: NORMAL
BPU ID: NORMAL
BUN BLDV-MCNC: 5 MG/DL (ref 6–20)
CALCIUM SERPL-MCNC: 7.7 MG/DL (ref 8.6–10.4)
CHLORIDE BLD-SCNC: 103 MMOL/L (ref 98–107)
CO2: 28 MMOL/L (ref 20–31)
CREAT SERPL-MCNC: 0.95 MG/DL (ref 0.5–0.9)
CROSSMATCH RESULT: NORMAL
CROSSMATCH RESULT: NORMAL
DATE, STOOL #1: NORMAL
DISPENSE STATUS BLOOD BANK: NORMAL
DISPENSE STATUS BLOOD BANK: NORMAL
EOSINOPHILS RELATIVE PERCENT: 0 % (ref 0–4)
EXPIRATION DATE: NORMAL
FERRITIN: 736 NG/ML (ref 13–150)
FOLATE: 2.5 NG/ML
FREE KAPPA/LAMBDA RATIO: 0.93 (ref 0.26–1.65)
GAMMA GLOBULIN %: 46 % (ref 9–20)
GAMMA GLOBULIN: 3 G/DL (ref 0.5–1.5)
GFR SERPL CREATININE-BSD FRML MDRD: >60 ML/MIN/1.73M2
GLUCOSE BLD-MCNC: 77 MG/DL (ref 70–99)
HAPTOGLOBIN: 101 MG/DL (ref 30–200)
HCT VFR BLD CALC: 26.8 % (ref 36–46)
HEMOCCULT SP1 STL QL: NEGATIVE
HEMOGLOBIN: 9.1 G/DL (ref 12–16)
IGA: 888 MG/DL (ref 70–400)
IGG: 3220 MG/DL (ref 700–1600)
IGM: 64 MG/DL (ref 40–230)
IRON SATURATION: 81 % (ref 20–55)
IRON: 96 UG/DL (ref 37–145)
KAPPA FREE LIGHT CHAINS QNT: 19.4 MG/DL (ref 0.37–1.94)
LAMBDA FREE LIGHT CHAINS QNT: 20.95 MG/DL (ref 0.57–2.63)
LYMPHOCYTES # BLD: 44 % (ref 24–44)
MAGNESIUM: 1.6 MG/DL (ref 1.6–2.6)
MCH RBC QN AUTO: 28.3 PG (ref 26–34)
MCHC RBC AUTO-ENTMCNC: 33.9 G/DL (ref 31–37)
MCV RBC AUTO: 83.3 FL (ref 80–100)
MONOCYTES # BLD: 11 % (ref 1–7)
PATHOLOGIST: ABNORMAL
PATHOLOGIST: NORMAL
PDW BLD-RTO: 18.9 % (ref 11.5–14.9)
PLATELET # BLD: 44 K/UL (ref 150–450)
PMV BLD AUTO: 6.7 FL (ref 6–12)
POTASSIUM SERPL-SCNC: 3.5 MMOL/L (ref 3.7–5.3)
PROTEIN ELECTROPHORESIS, SERUM: ABNORMAL
RBC # BLD: 3.22 M/UL (ref 4–5.2)
RETIC %: 5.3 % (ref 0.5–2)
SEG NEUTROPHILS: 44 % (ref 36–66)
SEGMENTED NEUTROPHILS ABSOLUTE COUNT: 2.1 K/UL (ref 1.3–9.1)
SERUM IFX INTERP: NORMAL
SODIUM BLD-SCNC: 140 MMOL/L (ref 135–144)
TIME, STOOL #1: NORMAL
TOTAL IRON BINDING CAPACITY: 119 UG/DL (ref 250–450)
TOTAL PROT. SUM,%: 100 % (ref 98–102)
TOTAL PROT. SUM: 6.5 G/DL (ref 6.3–8.2)
TOTAL PROTEIN: 6.6 G/DL (ref 6.4–8.3)
TRANSFUSION STATUS: NORMAL
TRANSFUSION STATUS: NORMAL
UNIT DIVISION: 0
UNIT DIVISION: 0
UNIT ISSUE DATE/TIME: NORMAL
UNIT ISSUE DATE/TIME: NORMAL
UNSATURATED IRON BINDING CAPACITY: 23 UG/DL (ref 112–347)
VITAMIN B-12: 919 PG/ML (ref 232–1245)
WBC # BLD: 4.7 K/UL (ref 3.5–11)

## 2022-11-18 PROCEDURE — 07DR3ZX EXTRACTION OF ILIAC BONE MARROW, PERCUTANEOUS APPROACH, DIAGNOSTIC: ICD-10-PCS | Performed by: INTERNAL MEDICINE

## 2022-11-18 PROCEDURE — 88280 CHROMOSOME KARYOTYPE STUDY: CPT

## 2022-11-18 PROCEDURE — 80048 BASIC METABOLIC PNL TOTAL CA: CPT

## 2022-11-18 PROCEDURE — 6370000000 HC RX 637 (ALT 250 FOR IP): Performed by: FAMILY MEDICINE

## 2022-11-18 PROCEDURE — 94761 N-INVAS EAR/PLS OXIMETRY MLT: CPT

## 2022-11-18 PROCEDURE — 0QB23ZX EXCISION OF RIGHT PELVIC BONE, PERCUTANEOUS APPROACH, DIAGNOSTIC: ICD-10-PCS | Performed by: INTERNAL MEDICINE

## 2022-11-18 PROCEDURE — 94640 AIRWAY INHALATION TREATMENT: CPT

## 2022-11-18 PROCEDURE — 83735 ASSAY OF MAGNESIUM: CPT

## 2022-11-18 PROCEDURE — 88300 SURGICAL PATH GROSS: CPT

## 2022-11-18 PROCEDURE — 85045 AUTOMATED RETICULOCYTE COUNT: CPT

## 2022-11-18 PROCEDURE — 71250 CT THORAX DX C-: CPT

## 2022-11-18 PROCEDURE — 88237 TISSUE CULTURE BONE MARROW: CPT

## 2022-11-18 PROCEDURE — 2580000003 HC RX 258: Performed by: FAMILY MEDICINE

## 2022-11-18 PROCEDURE — 6370000000 HC RX 637 (ALT 250 FOR IP): Performed by: INTERNAL MEDICINE

## 2022-11-18 PROCEDURE — 6360000002 HC RX W HCPCS: Performed by: FAMILY MEDICINE

## 2022-11-18 PROCEDURE — 88185 FLOWCYTOMETRY/TC ADD-ON: CPT

## 2022-11-18 PROCEDURE — 82270 OCCULT BLOOD FECES: CPT

## 2022-11-18 PROCEDURE — 85025 COMPLETE CBC W/AUTO DIFF WBC: CPT

## 2022-11-18 PROCEDURE — 88264 CHROMOSOME ANALYSIS 20-25: CPT

## 2022-11-18 PROCEDURE — 36415 COLL VENOUS BLD VENIPUNCTURE: CPT

## 2022-11-18 PROCEDURE — 99233 SBSQ HOSP IP/OBS HIGH 50: CPT | Performed by: INTERNAL MEDICINE

## 2022-11-18 PROCEDURE — 2060000000 HC ICU INTERMEDIATE R&B

## 2022-11-18 PROCEDURE — 2709999900 CT BIOPSY BONE MARROW

## 2022-11-18 PROCEDURE — 88275 CYTOGENETICS 100-300: CPT

## 2022-11-18 PROCEDURE — 88184 FLOWCYTOMETRY/ TC 1 MARKER: CPT

## 2022-11-18 PROCEDURE — 88271 CYTOGENETICS DNA PROBE: CPT

## 2022-11-18 PROCEDURE — 99232 SBSQ HOSP IP/OBS MODERATE 35: CPT | Performed by: INTERNAL MEDICINE

## 2022-11-18 RX ORDER — AMLODIPINE BESYLATE 10 MG/1
10 TABLET ORAL DAILY
Qty: 30 TABLET | Refills: 3
Start: 2022-11-18

## 2022-11-18 RX ORDER — AMLODIPINE BESYLATE 10 MG/1
10 TABLET ORAL DAILY
Status: DISCONTINUED | OUTPATIENT
Start: 2022-11-18 | End: 2022-11-29 | Stop reason: HOSPADM

## 2022-11-18 RX ORDER — FOLIC ACID 1 MG/1
1 TABLET ORAL DAILY
Status: DISCONTINUED | OUTPATIENT
Start: 2022-11-18 | End: 2022-11-29 | Stop reason: HOSPADM

## 2022-11-18 RX ADMIN — SODIUM CHLORIDE, PRESERVATIVE FREE 10 ML: 5 INJECTION INTRAVENOUS at 22:15

## 2022-11-18 RX ADMIN — CEFTAROLINE FOSAMIL 400 MG: 600 POWDER, FOR SOLUTION INTRAVENOUS at 10:40

## 2022-11-18 RX ADMIN — IPRATROPIUM BROMIDE AND ALBUTEROL SULFATE 3 ML: .5; 2.5 SOLUTION RESPIRATORY (INHALATION) at 20:13

## 2022-11-18 RX ADMIN — ARIPIPRAZOLE 7.5 MG: 5 TABLET ORAL at 09:29

## 2022-11-18 RX ADMIN — FERROUS SULFATE TAB 325 MG (65 MG ELEMENTAL FE) 325 MG: 325 (65 FE) TAB at 20:30

## 2022-11-18 RX ADMIN — HYDRALAZINE HYDROCHLORIDE 10 MG: 20 INJECTION INTRAMUSCULAR; INTRAVENOUS at 12:37

## 2022-11-18 RX ADMIN — CARVEDILOL 25 MG: 25 TABLET ORAL at 09:08

## 2022-11-18 RX ADMIN — AMLODIPINE BESYLATE 10 MG: 10 TABLET ORAL at 09:11

## 2022-11-18 RX ADMIN — ACETAMINOPHEN 650 MG: 325 TABLET ORAL at 07:06

## 2022-11-18 RX ADMIN — DIVALPROEX SODIUM 1000 MG: 250 TABLET, DELAYED RELEASE ORAL at 12:40

## 2022-11-18 RX ADMIN — IPRATROPIUM BROMIDE AND ALBUTEROL SULFATE 3 ML: .5; 2.5 SOLUTION RESPIRATORY (INHALATION) at 15:21

## 2022-11-18 RX ADMIN — FOLIC ACID 1 MG: 1 TABLET ORAL at 18:21

## 2022-11-18 RX ADMIN — DIVALPROEX SODIUM 1000 MG: 250 TABLET, DELAYED RELEASE ORAL at 20:30

## 2022-11-18 RX ADMIN — DULOXETIN HYDROCHLORIDE 60 MG: 30 CAPSULE, DELAYED RELEASE ORAL at 09:26

## 2022-11-18 RX ADMIN — CEFTAROLINE FOSAMIL 400 MG: 600 POWDER, FOR SOLUTION INTRAVENOUS at 22:17

## 2022-11-18 RX ADMIN — POTASSIUM BICARBONATE 40 MEQ: 782 TABLET, EFFERVESCENT ORAL at 09:34

## 2022-11-18 RX ADMIN — SODIUM CHLORIDE, PRESERVATIVE FREE 10 ML: 5 INJECTION INTRAVENOUS at 20:30

## 2022-11-18 RX ADMIN — IPRATROPIUM BROMIDE AND ALBUTEROL SULFATE 3 ML: .5; 2.5 SOLUTION RESPIRATORY (INHALATION) at 07:43

## 2022-11-18 RX ADMIN — SODIUM CHLORIDE, PRESERVATIVE FREE 10 ML: 5 INJECTION INTRAVENOUS at 09:15

## 2022-11-18 RX ADMIN — POTASSIUM CHLORIDE 10 MEQ: 7.46 INJECTION, SOLUTION INTRAVENOUS at 09:22

## 2022-11-18 RX ADMIN — CARVEDILOL 25 MG: 25 TABLET ORAL at 18:21

## 2022-11-18 ASSESSMENT — PAIN SCALES - GENERAL
PAINLEVEL_OUTOF10: 3
PAINLEVEL_OUTOF10: 6

## 2022-11-18 ASSESSMENT — PAIN DESCRIPTION - DESCRIPTORS
DESCRIPTORS: ACHING
DESCRIPTORS: ACHING

## 2022-11-18 ASSESSMENT — PAIN DESCRIPTION - LOCATION
LOCATION: HEAD
LOCATION: HEAD

## 2022-11-18 NOTE — PROGRESS NOTES
Kamala in [facility] contacted, plan is for bone marrow biopsy at 10 am. Notified that patient had small breakfast this morning, ok to proceed.

## 2022-11-18 NOTE — CARE COORDINATION
SW spoke with [facility]. Patient will require authorization to return to facility. Authorization started 11-18.      Electronically signed by JOSE Campos on 11/18/2022 at 11:24 AM

## 2022-11-18 NOTE — PLAN OF CARE
Problem: Discharge Planning  Goal: Discharge to home or other facility with appropriate resources  11/18/2022 1558 by Shante Antunez RN  Outcome: Progressing  Flowsheets (Taken 11/15/2022 2000 by Yasmine Squires RN)  Discharge to home or other facility with appropriate resources:   Identify barriers to discharge with patient and caregiver   Arrange for needed discharge resources and transportation as appropriate     Problem: Safety - Adult  Goal: Free from fall injury  11/18/2022 1558 by Shante Antunez RN  Outcome: Progressing  Flowsheets (Taken 11/18/2022 0306 by Odilia Hubbard RN)  Free From Fall Injury: Instruct family/caregiver on patient safety     Problem: Skin/Tissue Integrity  Goal: Absence of new skin breakdown  Description: 1. Monitor for areas of redness and/or skin breakdown  2. Assess vascular access sites hourly  3. Every 4-6 hours minimum:  Change oxygen saturation probe site  4. Every 4-6 hours:  If on nasal continuous positive airway pressure, respiratory therapy assess nares and determine need for appliance change or resting period.   11/18/2022 1558 by Shante Antunez RN  Outcome: Progressing       Problem: ABCDS Injury Assessment  Goal: Absence of physical injury  Outcome: Progressing  Flowsheets (Taken 11/18/2022 1558)  Absence of Physical Injury: Implement safety measures based on patient assessment

## 2022-11-18 NOTE — PLAN OF CARE
Problem: Discharge Planning  Goal: Discharge to home or other facility with appropriate resources  11/18/2022 0306 by Jay Paiz RN  Outcome: Progressing     Problem: Safety - Adult  Goal: Free from fall injury  11/18/2022 0306 by Jay Paiz RN  Outcome: Progressing  Flowsheets (Taken 11/18/2022 0306)  Free From Fall Injury: Instruct family/caregiver on patient safety  Note: The patient remained free from falls this shift, call light within reach, bed in locked and lowest position. Side rails up x2. Problem: Skin/Tissue Integrity  Goal: Absence of new skin breakdown  Description: 1. Monitor for areas of redness and/or skin breakdown  2. Assess vascular access sites hourly  3. Every 4-6 hours minimum:  Change oxygen saturation probe site  4. Every 4-6 hours:  If on nasal continuous positive airway pressure, respiratory therapy assess nares and determine need for appliance change or resting period.   11/18/2022 0306 by Jay Paiz RN  Outcome: Progressing

## 2022-11-18 NOTE — CARE COORDINATION
SW sent message and attempted to call facility to see if patient needed an authorization to return to facility. SW waiting on an answer.      Electronically signed by JOSE Martinez on 11/18/2022 at 9:27 AM

## 2022-11-18 NOTE — PROGRESS NOTES
[facility] -   Infectious diseases evaluation  admission date 11/15/2022    reason for consultation:   MRSA infection    Impression :   Current:  Hypokalemia  Recent MRSA bacteremia  Recent empyema/pulmonary nodules with cavitation status post chest tube placement that was subsequently removed on 10/31/2022. Thrombocytopenia and anemia (thought to be related to vancomycin that was discontinued last admission). Renal insufficiency  History of cocaine drug abuse  Hypertension  Bipolar disorder  Allergy to Bactrim. Recommendations   Continue IV Teflaro 400 mg twice a day as planned through 12/3/2022  Chest x-ray showed extensive pleural parenchymal disease bilaterally with no change. CT chest without contrast  Follow CBC and renal function      Infection Control Recommendations   [place] Precautions  Contact Isolation       Antimicrobial Stewardship Recommendations   Simplification of therapy  Targeted therapy      History of Present Illness:   Initial history:  Otilia Conrad is a 46y.o.-year-old female was brought to the hospital from SNF for hypokalemia with reported potassium of 2.4. Patient is complaining of body ache, generalized weakness and abdominal discomfort for 2 days. Symptoms are moderate, no alleviating or aggravating factors. She denied cough or shortness of breath, no other complaints. Right arm PICC line in place  The patient had prolonged hospitalization recently for MRSA bacteremia and empyema status post chest tube placement but was subsequently removed. She was discharged to skilled nursing facility on 11/4/2024 on IV Teflaro through 12/3/2022  Interval changes  11/18/2022   She is afebrile, comfortable on room air, no significant cough or shortness of breath, no new complaints.   Blood pressure on the high side  Status post bone marrow biopsy earlier today  Patient Vitals for the past 8 hrs:   BP Temp Temp src Pulse Resp SpO2 11/18/22 1237 (!) 166/82 -- -- -- -- --   11/18/22 1200 (!) 166/82 -- -- -- -- --   11/18/22 1145 (!) 167/79 -- -- 57 14 97 %   11/18/22 1140 (!) 169/86 -- -- 57 17 98 %   11/18/22 1135 (!) 176/82 -- -- 59 15 97 %   11/18/22 1130 (!) 176/84 -- -- 58 17 97 %   11/18/22 1125 (!) 177/83 -- -- 59 20 98 %   11/18/22 1121 (!) 154/107 -- -- 57 12 97 %   11/18/22 1116 (!) 180/80 -- -- 59 12 98 %   11/18/22 1111 (!) 179/78 -- -- 61 15 97 %   11/18/22 1106 (!) 179/89 -- -- 63 16 99 %   11/18/22 0900 (!) 208/89 -- -- 63 -- 98 %   11/18/22 0743 -- -- -- 68 16 97 %   11/18/22 0645 (!) 192/92 98.3 °F (36.8 °C) Oral 60 16 97 %               I have personally reviewed the past medical history, past surgical history, medications, social history, and family history, and I haveupdated the database accordingly.       Allergies:   Aspirin, Bactrim, and Codeine     Review of Systems:     Review of Systems  As per history of present illness, other than above 12 system review was negative  Physical Examination :       Physical Exam  Head: Normocephalic, atraumatic  Eye: Pupils equal round reactive to light, no conjunctivitis  ENT: No throat erythema or thrush  Neck: Supple, no JVD  Heart: Regular rate and rhythm no murmurs  Lungs: Clear to auscultation bilaterally, no respiratory distress  Abdomen: Soft, nontender, nondistended, with no peritoneal signs  MSK: No joint swelling or erythema  Neurologic: Patient is alert and oriented x3,, fluent speech  Extremities: No edema, no clubbing, no cyanosis  Past Medical History:     Past Medical History:   Diagnosis Date    Anemia     Arthritis     Asthma     Bipolar disorder, mixed ([address]     Carotid artery stenosis 2/13/2020    Coc[facility] abuse ([facility] 11/4/2014    COPD (chronic obstructive pulmonary disease) ([facility])     Degeneration of cervical intervertebral disc     Depression with anxiety     Depression with anxiety     Fibromyalgia 1/5/2017    GERD (gastroesophageal reflux disease)     History of migraine headaches 6/10/2014    History of renal calculi 6/10/2014    History of seizure disorder 6/10/2014    Hoarseness of voice     HTN (hypertension) 6/10/2014    Hyperlipidemia 6/10/2014    IGT (impaired glucose tolerance) 6/10/2014    Kidney stones     Lactose intolerance 6/10/2014    Leukopenia 6/10/2014    Major depressive disorder, recurrent episode, severe, without mention of psychotic behavior 2014    MVP (mitral valve prolapse)     Seizures ([address]     Sleep apnea 6/10/2014    Unspecified diseases of blood and blood-forming organs        Past Surgical  History:     Past Surgical History:   Procedure Laterality Date    ANKLE FRACTURE SURGERY      recontruction surgery    APPENDECTOMY      BREAST LUMPECTOMY Right     CARDIAC CATHETERIZATION      CARPAL TUNNEL RELEASE      x2     SECTION      CHOLECYSTECTOMY      COLONOSCOPY  12    COLONOSCOPY  2016    severe spasms    COLONOSCOPY  2019    DR GOLDY MCINTOSH    GASTRIC BYPASS SURGERY      GASTRIC BYPASS SURGERY      HYSTERECTOMY (CERVIX STATUS UNKNOWN)      HYSTERECTOMY (CERVIX STATUS UNKNOWN)      IR PERC CATH PLEURAL DRAIN W/IMAG  10/21/2022    IR PERC CATH PLEURAL DRAIN W/IMAG 10/21/2022 [facility]    IR PERC CATH PLEURAL DRAIN W/IMAG  10/21/2022    IR PERC CATH PLEURAL DRAIN W/IMAG 10/21/2022 [facility]    IR PERC CATH PLEURAL DRAIN W/IMAG  10/24/2022    IR PERC CATH PLEURAL DRAIN W/IMAG 10/24/2022 [facility]    LAPAROSCOPY N/A 2022    LAPAROSCOPY EXPLORATORY CONVERTED TO OPEN EXPLORATORY LAPAROTOMY, LYSIS OF ADHESIONS, REDUCTION OF INTERNAL HERNIA performed by Kassy Link, DO at [address] [place] [address]      APPLICATION OF ARCH BARS,SIMPLE EXTRACTION OF #15 AND RT TMJ JOINT REPLACEMENT    SHOULDER ARTHROSCOPY Left 2019    DR ROBERT GREEN    SHOULDER SURGERY      TONSILLECTOMY AND ADENOIDECTOMY      TRANSESOPHAGEAL ECHOCARDIOGRAM N/A 10/19/2022    TRANSESOPHAGEAL ECHOCARDIOGRAM WITH BUBBLE STUDY performed by Frandy Royal DO at [facility]  7-3-12    egd    UPPER GASTROINTESTINAL ENDOSCOPY  12/19/2016    status post gastrectomy with a small remnant of gastric pouch.     UPPER GASTROINTESTINAL ENDOSCOPY  05/2019    DR Farheen Sharp       Medications:      amLODIPine  10 mg Oral Daily    ceftaroline fosamil (TEFLARO) IVPB  400 mg IntraVENous Q12H    DULoxetine  60 mg Oral Daily    carvedilol  25 mg Oral BID WC    ARIPiprazole  7.5 mg Oral Daily    divalproex  1,000 mg Oral BID    ferrous sulfate  325 mg Oral BID    ipratropium-albuterol  3 mL Inhalation Q4H WA    Vitamin D  2,000 Units Oral Daily    sodium chloride flush  5-40 mL IntraVENous 2 times per day    enoxaparin  30 mg SubCUTAneous Daily       Social History:     Social History     Socioeconomic History    Marital status:      Spouse name: Not on file    Number of children: Not on file    Years of education: Not on file    Highest education level: Not on file   Occupational History    Occupation: disability   Tobacco Use    Smoking status: Every Day     Packs/day: 0.50     Years: 33.00     Pack years: 16.50     Types: Cigarettes    Smokeless tobacco: Never    Tobacco comments:     quit  2000 started again  1/13    Substance and Sexual Activity    Alcohol use: Yes     Comment: occasional    Drug use: Not Currently    Sexual activity: Yes   Other Topics Concern    Not on file   Social History Narrative    Not on file     Social Determinants of Health     Financial Resource Strain: Not on file   Food Insecurity: Not on file   Transportation Needs: Not on file   Physical Activity: Not on file   Stress: Not on file   Social Connections: Not on file   Intimate Partner Violence: Not on file   Housing Stability: Not on file       Family History:     Family History   Problem Relation Age of Onset    Diabetes Mother     Cancer Mother     Coronary Art Dis Father COPD Father     Depression Brother     Alcohol Abuse Brother     Cancer Other         lung and skin    Diabetes Maternal Grandmother     Cancer Paternal Grandmother       Medical Decision Making:   I have independently reviewed/ordered the following labs:    CBC with Differential:   Recent Labs     11/17/22  0806 11/17/22  1555 11/17/22 2045 11/18/22  0536   WBC 4.0  --   --  4.7   HGB 5.3*   < > 9.4* 9.1*   HCT 15.9*   < > 27.3* 26.8*   PLT 32*  --   --  44*   LYMPHOPCT 25  --   --  44   MONOPCT 8*  --   --  11*    < > = values in this interval not displayed. BMP:  Recent Labs     11/17/22  0806 11/17/22 2045 11/18/22  0536    137 140   K 2.8* 3.6* 3.5*    102 103   CO2 31 28 28   BUN 8 6 5*   CREATININE 1.04* 0.94* 0.95*   MG 1.6  --  1.6       Hepatic Function Panel:   Recent Labs     11/17/22 2045   PROT 6.6   BILIDIR 0.1   BILITOT 0.3     No results for input(s): RPR in the last 72 hours. No results for input(s): HIV in the last 72 hours. No results for input(s): BC in the last 72 hours. Lab Results   Component Value Date/Time    CREATININE 0.95 11/18/2022 05:36 AM    GLUCOSE 77 11/18/2022 05:36 AM       Detailed results: Thank you for allowing us to participate in the care of this patient. Please call with questions. This note is created with the assistance of a speech recognition program.  While intending to generate adocument that actually reflects the content of the visit, the document can still have some errors including those of syntax and sound a like substitutions which may escape proof reading. It such instances, actual meaningcan be extrapolated by contextual diversion.     Johnathan Arthur MD  Office: [phone]  Perfect serve / office [phone]

## 2022-11-18 NOTE — BRIEF OP NOTE
Brief Postoperative Note    Armando Gaspar  YOB: 1970  [id number]    Pre-operative Diagnosis: Anemia/thrombocytopenia    Post-operative Diagnosis: Same    Procedure: Bone marrow Bx    Anesthesia: Local    Surgeons/Assistants: Tyree Hernandez MD     Estimated Blood Loss: minimal    Complications: none immediate    Specimens: were obtained    Findings: CT-guided Right posterior ilial Bx and bone marrow aspiration yielding ~10 ml marrow.       Electronically signed by Tyree Hernandez MD on 11/18/2022 at 11:57 AM

## 2022-11-18 NOTE — PROGRESS NOTES
Progress Note  Date:2022       Room:[address] [place] [address]  Patient Name:Anu Bourne     YOB: 1970     Age:51 y.o. Subjective    Subjective:  Symptoms:  Resolved. Diet:  Adequate intake. Activity level: Returning to normal.    Pain:  She reports no pain. Review of Systems  Objective         Vitals Last 24 Hours:  TEMPERATURE:  Temp  Av.8 °F (36.6 °C)  Min: 97.4 °F (36.3 °C)  Max: 98.3 °F (36.8 °C)  RESPIRATIONS RANGE: Resp  Av.4  Min: 15  Max: 18  PULSE OXIMETRY RANGE: SpO2  Av %  Min: 92 %  Max: 100 %  PULSE RANGE: Pulse  Av.2  Min: 56  Max: 74  BLOOD PRESSURE RANGE: Systolic (26XHT), JHD:740 , Min:133 , DJR:364   ; Diastolic (77ZGY), TPV:59, Min:64, Max:93    I/O (24Hr): Intake/Output Summary (Last 24 hours) at 2022 0829  Last data filed at 2022 1947  Gross per 24 hour   Intake 727.5 ml   Output --   Net 727.5 ml     Objective:  General Appearance:  Comfortable. Vital signs: (most recent): Blood pressure (!) 192/92, pulse 68, temperature 98.3 °F (36.8 °C), temperature source Oral, resp. rate 16, height 4' 11.02\" (1.499 m), weight 100 lb 0.7 oz (45.4 kg), SpO2 97 %.   Vital signs are normal.    Labs/Imaging/Diagnostics    Labs:  CBC:  Recent Labs     22  0806 22  1555 22  2045 22  0536   WBC 4.0  --   --  4.7   RBC 1.84*  --   --  3.22*   HGB 5.3* 6.8* 9.4* 9.1*   HCT 15.9* 20.9* 27.3* 26.8*   MCV 86.5  --   --  83.3   RDW 24.2*  --   --  18.9*   PLT 32*  --   --  44*     CHEMISTRIES:  Recent Labs     11/15/22  1719 22  0545 22  0806 22  0536    137 138 137 140   K 2.4* 2.5* 2.8* 3.6* 3.5*   CL 96* 99 103 102 103   CO2 31 31 31 28 28   BUN 12 11 8 6 5*   CREATININE 1.32* 1.20* 1.04* 0.94* 0.95*   GLUCOSE 92 74 96 81 77   PHOS 3.4  --   --   --   --    MG 1.6 1.6 1.6  --  1.6     PT/INR:  Recent Labs     22   PROTIME 15.8*   INR 1.3     APTT:  Recent Labs     22 APTT 32.9     LIVER PROFILE:  Recent Labs     11/17/22 2045   BILIDIR 0.1   BILITOT 0.3       Imaging Last 24 Hours:  XR CHEST (SINGLE VIEW FRONTAL)    Result Date: 11/17/2022  EXAMINATION: ONE XRAY VIEW OF THE CHEST 11/17/2022 3:36 pm COMPARISON: 11/02/2022 HISTORY: ORDERING SYSTEM PROVIDED HISTORY: recent empyema TECHNOLOGIST PROVIDED HISTORY: recent empyema Reason for Exam: recent empyema FINDINGS: Cardiomediastinal silhouette appears unremarkable. Right PICC tip terminates in the lower right atrium. Patchy pulmonary parenchymal opacities appear slightly increased in distribution from prior though less dense. No pneumothorax. Small bilateral pleural effusions. No aggressive osseous lesion. Left shoulder arthroplasty. 1.  Right PICC tip terminates within the lower right atrium. Retraction by 5-6 cm and radiographic follow-up suggested. 2.  Extensive pleural/parenchymal disease bilaterally without significant overall change from prior. Assessment//Plan           Hospital Problems             Last Modified POA    * (Principal) Hypokalemia 11/15/2022 Yes    Severe malnutrition (HCC) (Chronic) 11/16/2022 Yes    Pulmonary nodules 11/16/2022 Yes    Cavitating mass of lung 11/16/2022 Yes    Severe thrombocytopenia (HCC) 11/17/2022 Yes    Severe anemia 11/17/2022 Yes     Assessment & Plan    Bone marrow biopsy per heme/onc    Repeat CT chest was recommended by Dr. Franky Packer on 11/4 to be done in 4-6 weeks, which would be early-mid December. D/c when other services agree - PO potassium on discharge.        Electronically signed by Kaye Myles MD on 11/18/22 at 8:29 AM EST

## 2022-11-19 LAB
ABSOLUTE BANDS #: 0.07 K/UL (ref 0–1)
ABSOLUTE EOS #: 0 K/UL (ref 0–0.4)
ABSOLUTE LYMPH #: 1.96 K/UL (ref 1–4.8)
ABSOLUTE MONO #: 0.41 K/UL (ref 0.1–1.3)
ABSOLUTE RETIC #: 0.21 M/UL (ref 0.02–0.1)
ANION GAP SERPL CALCULATED.3IONS-SCNC: 6 MMOL/L (ref 9–17)
BANDS: 2 % (ref 0–10)
BASOPHILS # BLD: 0 % (ref 0–2)
BASOPHILS ABSOLUTE: 0 K/UL (ref 0–0.2)
BUN BLDV-MCNC: 6 MG/DL (ref 6–20)
CALCIUM SERPL-MCNC: 7.5 MG/DL (ref 8.6–10.4)
CHLORIDE BLD-SCNC: 100 MMOL/L (ref 98–107)
CO2: 31 MMOL/L (ref 20–31)
CREAT SERPL-MCNC: 0.81 MG/DL (ref 0.5–0.9)
EOSINOPHILS RELATIVE PERCENT: 0 % (ref 0–4)
GFR SERPL CREATININE-BSD FRML MDRD: >60 ML/MIN/1.73M2
GLUCOSE BLD-MCNC: 72 MG/DL (ref 70–99)
HCT VFR BLD CALC: 27.6 % (ref 36–46)
HEMOGLOBIN: 9 G/DL (ref 12–16)
LYMPHOCYTES # BLD: 53 % (ref 24–44)
MAGNESIUM: 1.7 MG/DL (ref 1.6–2.6)
MCH RBC QN AUTO: 27.6 PG (ref 26–34)
MCHC RBC AUTO-ENTMCNC: 32.7 G/DL (ref 31–37)
MCV RBC AUTO: 84.3 FL (ref 80–100)
MONOCYTES # BLD: 11 % (ref 1–7)
MORPHOLOGY: ABNORMAL
PDW BLD-RTO: 21.5 % (ref 11.5–14.9)
PLATELET # BLD: 42 K/UL (ref 150–450)
PMV BLD AUTO: 6.4 FL (ref 6–12)
POTASSIUM SERPL-SCNC: 3.2 MMOL/L (ref 3.7–5.3)
RBC # BLD: 3.27 M/UL (ref 4–5.2)
RETIC %: 6.6 % (ref 0.5–2)
SEG NEUTROPHILS: 34 % (ref 36–66)
SEGMENTED NEUTROPHILS ABSOLUTE COUNT: 1.26 K/UL (ref 1.3–9.1)
SODIUM BLD-SCNC: 137 MMOL/L (ref 135–144)
WBC # BLD: 3.7 K/UL (ref 3.5–11)

## 2022-11-19 PROCEDURE — 85025 COMPLETE CBC W/AUTO DIFF WBC: CPT

## 2022-11-19 PROCEDURE — 99232 SBSQ HOSP IP/OBS MODERATE 35: CPT | Performed by: INTERNAL MEDICINE

## 2022-11-19 PROCEDURE — 83735 ASSAY OF MAGNESIUM: CPT

## 2022-11-19 PROCEDURE — 94761 N-INVAS EAR/PLS OXIMETRY MLT: CPT

## 2022-11-19 PROCEDURE — 36415 COLL VENOUS BLD VENIPUNCTURE: CPT

## 2022-11-19 PROCEDURE — 80048 BASIC METABOLIC PNL TOTAL CA: CPT

## 2022-11-19 PROCEDURE — 2060000000 HC ICU INTERMEDIATE R&B

## 2022-11-19 PROCEDURE — 94640 AIRWAY INHALATION TREATMENT: CPT

## 2022-11-19 PROCEDURE — 99232 SBSQ HOSP IP/OBS MODERATE 35: CPT | Performed by: FAMILY MEDICINE

## 2022-11-19 PROCEDURE — 6370000000 HC RX 637 (ALT 250 FOR IP): Performed by: FAMILY MEDICINE

## 2022-11-19 PROCEDURE — 6370000000 HC RX 637 (ALT 250 FOR IP): Performed by: INTERNAL MEDICINE

## 2022-11-19 PROCEDURE — 2580000003 HC RX 258: Performed by: FAMILY MEDICINE

## 2022-11-19 PROCEDURE — 85397 CLOTTING FUNCT ACTIVITY: CPT

## 2022-11-19 PROCEDURE — 99232 SBSQ HOSP IP/OBS MODERATE 35: CPT | Performed by: NURSE PRACTITIONER

## 2022-11-19 PROCEDURE — 85045 AUTOMATED RETICULOCYTE COUNT: CPT

## 2022-11-19 PROCEDURE — 6360000002 HC RX W HCPCS: Performed by: FAMILY MEDICINE

## 2022-11-19 RX ORDER — PREDNISONE 20 MG/1
60 TABLET ORAL DAILY
Status: DISCONTINUED | OUTPATIENT
Start: 2022-11-19 | End: 2022-11-29 | Stop reason: HOSPADM

## 2022-11-19 RX ADMIN — IPRATROPIUM BROMIDE AND ALBUTEROL SULFATE 3 ML: .5; 2.5 SOLUTION RESPIRATORY (INHALATION) at 20:29

## 2022-11-19 RX ADMIN — SODIUM CHLORIDE, PRESERVATIVE FREE 10 ML: 5 INJECTION INTRAVENOUS at 08:38

## 2022-11-19 RX ADMIN — FERROUS SULFATE TAB 325 MG (65 MG ELEMENTAL FE) 325 MG: 325 (65 FE) TAB at 22:00

## 2022-11-19 RX ADMIN — SODIUM CHLORIDE, PRESERVATIVE FREE 10 ML: 5 INJECTION INTRAVENOUS at 21:59

## 2022-11-19 RX ADMIN — DULOXETIN HYDROCHLORIDE 60 MG: 30 CAPSULE, DELAYED RELEASE ORAL at 08:37

## 2022-11-19 RX ADMIN — FOLIC ACID 1 MG: 1 TABLET ORAL at 08:36

## 2022-11-19 RX ADMIN — CEFTAROLINE FOSAMIL 400 MG: 600 POWDER, FOR SOLUTION INTRAVENOUS at 12:20

## 2022-11-19 RX ADMIN — ARIPIPRAZOLE 7.5 MG: 5 TABLET ORAL at 08:36

## 2022-11-19 RX ADMIN — DIVALPROEX SODIUM 1000 MG: 250 TABLET, DELAYED RELEASE ORAL at 12:18

## 2022-11-19 RX ADMIN — CARVEDILOL 25 MG: 25 TABLET ORAL at 17:29

## 2022-11-19 RX ADMIN — PREDNISONE 60 MG: 20 TABLET ORAL at 08:36

## 2022-11-19 RX ADMIN — FERROUS SULFATE TAB 325 MG (65 MG ELEMENTAL FE) 325 MG: 325 (65 FE) TAB at 08:36

## 2022-11-19 RX ADMIN — CEFTAROLINE FOSAMIL 400 MG: 600 POWDER, FOR SOLUTION INTRAVENOUS at 22:01

## 2022-11-19 RX ADMIN — CARVEDILOL 25 MG: 25 TABLET ORAL at 08:37

## 2022-11-19 RX ADMIN — POTASSIUM CHLORIDE 40 MEQ: 1500 TABLET, EXTENDED RELEASE ORAL at 08:35

## 2022-11-19 RX ADMIN — Medication 2000 UNITS: at 08:36

## 2022-11-19 RX ADMIN — AMLODIPINE BESYLATE 10 MG: 10 TABLET ORAL at 08:36

## 2022-11-19 RX ADMIN — DIVALPROEX SODIUM 1000 MG: 250 TABLET, DELAYED RELEASE ORAL at 22:00

## 2022-11-19 RX ADMIN — IPRATROPIUM BROMIDE AND ALBUTEROL SULFATE 3 ML: .5; 2.5 SOLUTION RESPIRATORY (INHALATION) at 10:31

## 2022-11-19 ASSESSMENT — PAIN SCALES - GENERAL: PAINLEVEL_OUTOF10: 0

## 2022-11-19 NOTE — PROGRESS NOTES
[facility] -   Infectious diseases evaluation  admission date 11/15/2022    reason for consultation:   MRSA Infection    Impression :   Current:  Hypokalemia  Recent MRSA bacteremia  Recent empyema/pulmonary nodules with cavitation status post chest tube placement that was subsequently removed on 10/31/2022. Thrombocytopenia and anemia (thought to be related to vancomycin that was discontinued last admission). Renal insufficiency  History of cocaine drug abuse  Hypertension  Bipolar disorder  Allergy to Bactrim    Other:    Discussion / summary of stay / plan of care     Recommendations   Teflaro 400 mg IV twice daily until 12/3/2022. Follow CBC and renal function. Bone marrow biopsy 11/18. Supportive care. Infection Control Recommendations   [place] Precautions  Contact Isolation     Antimicrobial Stewardship Recommendations   Simplification of therapy  Targeted therapy    History of Present Illness:   Initial history:  Ira Viera is a 46y.o.-year-old female  who was brought to the hospital from SNF for hypokalemia with reported potassium of 2.4. Patient is complaining of body ache, generalized weakness and abdominal discomfort for 2 days. Symptoms are moderate, no alleviating or aggravating factors. She denied cough or shortness of breath, no other complaints. Right arm PICC line in place  The patient had prolonged hospitalization recently for MRSA bacteremia and empyema status post chest tube placement but was subsequently removed.   She was discharged to skilled nursing facility on 11/4/2024 on IV Teflaro through 12/3/2022    Interval changes  11/19/2022   Patient Vitals for the past 8 hrs:   BP Temp Temp src Pulse Resp SpO2   11/19/22 1100 138/79 98.5 °F (36.9 °C) Oral 56 16 97 %   11/19/22 1031 -- -- -- 57 16 96 %   11/19/22 0834 (!) 157/69 98.5 °F (36.9 °C) Oral 56 16 98 %   11/19/22 0646 (!) 195/88 98.5 °F (36.9 °C) Oral 53 16 98 % ¿Qué causa el estrés?  Las sensaciones de estrés son ocasionadas por el instinto que el cuerpo tiene de defenderse a sí mismo. Gian instinto es reddy en emergencias, ayala salirse del chikis si viene un katie a charisma velocidad. Jax el estrés puede causar síntomas físicos si continúa por mucho tiempo, por ejemplo, en respuesta a los retos de la price diaria y a los cambios.  Ante esta situación, es ayala si wagoner cuerpo se preparara para saltar del automóvil jax usted aún esta ahí. Wagoner cuerpo está trabajando más de lo necesario sin tener ningún lugar donde poner toda keyla energía adicional. Elaine puede hacerlo sentir ansioso, temeroso, preocupado y tenso.  ¿Qué cambios pueden causarle estrés?  Cualquier tipo de cambio puede hacerlo sentir estresado así sea un cambio reddy. No es solamente el cambio o el suceso en sí, jax también la forma ayala usted reacciona ante éste, lo que importa. Lo que es estresante es diferente para cada persona. Por ejemplo, sonya persona puede sentirse estresada al jubilarse, a diferencia de otras.  Entre otras situaciones estresantes, se encuentran un despido laboral, cuando wagoner hijo o hija se marcha de la casa o regresa a jacques, la muerte de un cónyuge, el divorcio o el matrimonio, sonya enfermedad, sonya lesión, sonya promoción en el trabajo, problemas de dinero, sonya mudanza o el nacimiento de un hijo.  ¿Puede el estrés causarme problemas de ghazal?  Sí e, incluso, puede empeorarlos. Hable con wagoner médico de la julia si usted piensa que algunos de javid síntomas son por causa del estrés. Es importante cerciorarse de que javid síntomas no son causados por otros problemas de ghazal.  Señas posibles de estrés:      * Ansiedad      * Dolor de espalda      * Estreñimiento o diarrea      * Depresión      * Fatiga      * Fabienne de nicole      * Presión arterial (sanguínea) elevada      * Dificultad para conciliar el sueño o insomnio      * Problemas en javid relaciones con los demás      * Sensación de \"falta de  Afebrile. CT chest redemonstrating multiple pulmonary nodules bilaterally with resolving cavitations. Sleeping. Summary of relevant labs:  Labs:  Cre: 0.95-0.81  WBC: 4.7-3.7  Plt: 32-44-42    Micro:    Imagin/18 CT Chest      FINDINGS:   Mediastinum: No cardiomegaly. Stable       mediastinal and bilateral hilar adenopathy though evaluation is limited   without intravenous contrast.       Lungs/pleura: Resolving pleural effusions bilaterally. Numerous pulmonary       nodules/masses redemonstrated bilaterally with resolving cavities associated   with multiple lesions. Interval increase in interstitial/alveolar densities within the lower lobes       and lingula from improved compared to prior study. Upper Abdomen: Cholecystectomy. Splenomegaly. Soft Tissues/Bones: Mild anasarca. Right PICC tip extends to the right       atrium. Left shoulder arthroplasty. Fusion hardware within the lower       cervical spine. Impression   Resolving pleural effusions bilaterally. Numerous pulmonary       nodules/masses redemonstrated bilaterally with resolving cavities associated   with multiple lesions. Resolving bilateral pleural effusions           I have personally reviewed the past medical history, past surgical history, medications, social history, and family history, and I haveupdated the database accordingly. Allergies:   Aspirin, Bactrim, and Codeine     Review of Systems:     Review of Systems   Reason unable to perform ROS: Sleeping. Physical Examination :       Physical Exam  Vitals and nursing note reviewed. Constitutional:       Appearance: She is normal weight. She is not ill-appearing. Comments: Sleeping. HENT:      Head: Normocephalic and atraumatic. Right Ear: External ear normal.      Left Ear: External ear normal.      Nose: Nose normal.      Mouth/Throat:      Pharynx: No oropharyngeal exudate.    Eyes:      General:         Right eye: No aire\"      * Rigidez en el haydee o en la mandíbula      * Malestar estomacal      * Subir o bajar de peso  ¿Qué puedo hacer para disminuir mi estrés?  El primer paso es aprender a reconocer cuando usted se está sintiendo estresado. Las primeras señas de estrés incluyen tensar los hombros y el haydee, o cerrar las manuela en forma de puño.  El siguiente paso consiste en escoger un método para manejar el estrés. Sonya forma es evitar el evento o la cosa que le produce el estrés; santosh con frecuencia esto no es posible. Sonya segunda alternativa es cambiar la forma ayala usted reacciona ante el estrés. Por lo general, esta es la mejor opción.  Consejos para manejar el estrés:      * No se preocupe por las cosas que usted no puede controlar, tales ayala el clima.      * Resuelva los pequeños problemas. De larry modo, puede adquirir sonya sensación de control.      * Prepárese lo mejor que pueda para sucesos que usted sabe que pueden ocasionarle estrés, ayala sonya entrevista de trabajo.      * Trate de rossi los cambios ayala un reto positivo, no ayala sonya amenaza.      * Trate de resolver los conflictos con las demás personas.      * Hable con un amigo en el cual usted confía, con un familiar o con un asesor psicológico.      * Fíjese metas realistas tanto en la casa ayala en el trabajo. Evite el exceso de planes.      * Ebony ejercicio de manera regular.      * Coma comidas violet balanceadas en forma regular y duerma lo suficiente.      * Medite.      * Participe en algo que no le produzca estrés, sivan ayala en deportes, eventos sociales o aficiones.  ¿Por qué el ejercicio resulta útil?  El ejercicio es sonya buena forma de tratar el estrés, pues es sonya forma saludable de aliviar la energía y la tensión reprimida. Nos consta que el ejercicio libera sustancias químicas en el cerebro que lo hacen sentir violet. Además, le ayuda a ponerse en mejor condición física, lo cual lo hace sentirse mejor en general.  Pasos para la respiración profunda       * Acuéstese sobre sonya superficie plana.      * Coloque sonya mano sobre el estómago, inmediatamente por encima del ombligo. Coloque la otra mano sobre el pecho.      * Llene los pulmones de aire despacio y trate de levantar un poco el estómago.      * Mantenga la respiración por un liliya.      * Exhale lentamente y deje que el estómago regrese a la posición baja.  ¿Qué es la meditación?  La meditación es sonya forma de pensamiento guiado. Puede amador muchas formas. Usted puede implementarla con algún ejercicio en que se repiten los mismos movimientos sonya y otra vez, ayala caminar o nadar. Usted puede meditar practicando las técnicas de relajación, haciendo ejercicios de estiramiento o respirando profundamente.  El entrenamiento para relajarse es fácil. Empiece con un músculo. Manténgalo tenso por unos cuantos segundos y luego relájelo. Repita larry paso con cada flory de los músculos empezando por los dedos de los pies, siguiendo por los pies y subiendo por las distintas partes del cuerpo, un cheryl muscular por vez.  El estiramiento también sirve para aliviar la tensión. Déle vuelta a wagoner nicole haciendo un círculo suave. Levante los brazos tratando de alcanzar el techo y dóblese de lado a lado lentamente. Ebony movimientos circulares con los hombros.  La respiración relajada y profunda por sí misma puede ayudar a aliviar el estrés (rossi el cuadro a la derecha). De larry modo, puede obtener bastante oxígeno y activar la respuesta a la relajación, el antídoto del cuerpo para el estrés.  Si desea recibir más ayuda para el tratamiento de los síntomas del estrés, pídale consejos a wagoner médico de julia.     [employer]ire Yoga (YouTube)  En [employer] se integran todos los elementos necesarios para mejorar la ghazal holísticamente. Es decir se considera a la persona ayala un ser integral donde el cuerpo, la mente, las emociones y el espíritu trabajan conjuntamente para crear la armonía necesaria que nos lleva a un estado de ghazal pleno.  discharge. Left eye: No discharge. Cardiovascular:      Rate and Rhythm: Normal rate and regular rhythm. Heart sounds: Normal heart sounds. No murmur heard. Pulmonary:      Effort: Pulmonary effort is normal.      Breath sounds: Normal breath sounds. No wheezing or rhonchi. Comments: RA  Abdominal:      General: Bowel sounds are normal. There is no distension. Palpations: Abdomen is soft. Tenderness: There is no abdominal tenderness. Genitourinary:     Comments: No chamberlain. Musculoskeletal:      Cervical back: No rigidity. Right lower leg: No edema. Left lower leg: No edema. Skin:     General: Skin is warm and dry. Capillary Refill: Capillary refill takes less than 2 seconds. Findings: No rash. Neurological:      Comments: Sleeping. Psychiatric:      Comments: Sleeping.        Past Medical History:     Past Medical History:   Diagnosis Date    Anemia     Arthritis     Asthma     Bipolar disorder, mixed ([address]     Carotid artery stenosis 2/13/2020    Cocaine abuse ([address] 11/4/2014    COPD (chronic obstructive pulmonary disease) (HCC)     Degeneration of cervical intervertebral disc     Depression with anxiety     Depression with anxiety     Fibromyalgia 1/5/2017    GERD (gastroesophageal reflux disease)     History of migraine headaches 6/10/2014    History of renal calculi 6/10/2014    History of seizure disorder 6/10/2014    Hoarseness of voice     HTN (hypertension) 6/10/2014    Hyperlipidemia 6/10/2014    IGT (impaired glucose tolerance) 6/10/2014    Kidney stones     Lactose intolerance 6/10/2014    Leukopenia 6/10/2014    Major depressive disorder, recurrent episode, severe, without mention of psychotic behavior 11/6/2014    MVP (mitral valve prolapse)     Seizures ([address]     Sleep apnea 6/10/2014    Unspecified diseases of blood and blood-forming organs        Past Surgical  History:     Past Surgical History:   Procedure Laterality Date    ANKLE FRACTURE     Yoga with Jeanette (YouTube)    SURGERY      recontruction surgery    APPENDECTOMY      BREAST LUMPECTOMY Right     CARDIAC CATHETERIZATION      CARPAL TUNNEL RELEASE      x2     SECTION      CHOLECYSTECTOMY      COLONOSCOPY  10    COLONOSCOPY  2016    severe spasms    COLONOSCOPY  2019    DR GOLDY MCINTOSH    CT BONE MARROW BIOPSY  2022    CT BONE MARROW BIOPSY 2022 [facility] CT SCAN    GASTRIC BYPASS SURGERY      GASTRIC BYPASS SURGERY      HYSTERECTOMY (CERVIX STATUS UNKNOWN)      HYSTERECTOMY (CERVIX STATUS UNKNOWN)      IR PERC CATH PLEURAL DRAIN W/IMAG  10/21/2022    IR PERC CATH PLEURAL DRAIN W/IMAG 10/21/2022 [facility] PROCEDURES    IR PERC CATH PLEURAL DRAIN W/IMAG  10/21/2022    IR PERC CATH PLEURAL DRAIN W/IMAG 10/21/2022 [facility] PROC[facility]    IR PERC CATH PLEURAL DRAIN W/IMAG  10/24/2022    IR PERC CATH PLEURAL DRAIN W/IMAG 10/24/2022 [facility] PROC[facility]    LAPAROSCOPY N/A 2022    LAPAROSCOPY EXPLORATORY CONVERTED TO OPEN EXPLORATORY LAPAROTOMY, LYSIS OF ADHESIONS, REDUCTION OF INTERNAL HERNIA performed [facility]ia [facility]ivas DO at  [facility]      APPLICATION OF ARCH BARS,SIMPLE EXTRACTION OF #15 AND RT TMJ JOINT REPLACEMENT    SHOULDER ARTHROSCOPY Left 2019    DR ROBERT GREEN    SHOULDER SURGERY      TONSILLECTOMY AND ADENOIDECTOMY      TRANSESOPHAGEAL ECHOCARDIOGRAM N/A 10/19/2022    TRANSESOPHAGEAL ECHOCARDIOGRAM WITH BUBBLE STUDY performed by Frandy Royal DO at [address] [place], [place] [id number]    egd    UPPER GASTROINTESTINAL ENDOSCOPY  2016    status post gastrectomy with a small remnant of gastric pouch.     UPPER GASTROINTESTINAL ENDOSCOPY  2019    DR Anamaria Watson       Medications:      predniSONE  60 mg Oral Daily    amLODIPine  10 mg Oral Daily    folic acid  1 mg Oral Daily    ceftaroline fosamil (TEFLARO) IVPB  400 mg IntraVENous Q12H    DULoxetine  60 mg Oral Daily    carvedilol  25 mg Oral BID WC ARIPiprazole  7.5 mg Oral Daily    divalproex  1,000 mg Oral BID    ferrous sulfate  325 mg Oral BID    ipratropium-albuterol  3 mL Inhalation Q4H WA    Vitamin D  2,000 Units Oral Daily    sodium chloride flush  5-40 mL IntraVENous 2 times per day    enoxaparin  30 mg SubCUTAneous Daily       Social History:     Social History     Socioeconomic History    Marital status:      Spouse name: Not on file    Number of children: Not on file    Years of education: Not on file    Highest education level: Not on file   Occupational History    Occupation: disability   Tobacco Use    Smoking status: Every Day     Packs/day: 0.50     Years: 33.00     Pack years: 16.50     Types: Cigarettes    Smokeless tobacco: Never    Tobacco comments:     quit  2000 started again  1/13    Substance and Sexual Activity    Alcohol use: Yes     Comment: occasional    Drug use: Not Currently    Sexual activity: Yes   Other Topics Concern    Not on file   Social History Narrative    Not on file     Social Determinants of Health     Financial Resource Strain: Not on file   Food Insecurity: Not on file   Transportation Needs: Not on file   Physical Activity: Not on file   Stress: Not on file   Social Connections: Not on file   Intimate Partner Violence: Not on file   Housing Stability: Not on file       Family History:     Family History   Problem Relation Age of Onset    Diabetes Mother     Cancer Mother     Coronary Art Dis Father     COPD Father     Depression Brother     Alcohol Abuse Brother     Cancer Other         lung and skin    Diabetes Maternal Grandmother     Cancer Paternal Grandmother       Medical Decision Making:   I have independently reviewed/ordered the following labs:    CBC with Differential:   Recent Labs     11/18/22 0536 11/19/22  0235   WBC 4.7 3.7   HGB 9.1* 9.0*   HCT 26.8* 27.6*   PLT 44* 42*   LYMPHOPCT 44 53*   MONOPCT 11* 11*     BMP:  Recent Labs     11/18/22 0536 11/19/22  0235    137   K 3.5* 3.2*    100   CO2 28 31   BUN 5* 6   CREATININE 0.95* 0.81   MG 1.6 1.7     Hepatic Function Panel:   Recent Labs     11/17/22 2045   PROT 6.6   BILIDIR 0.1   BILITOT 0.3     No results for input(s): RPR in the last 72 hours. No results for input(s): HIV in the last 72 hours. No results for input(s): BC in the last 72 hours. Lab Results   Component Value Date/Time    CREATININE 0.81 11/19/2022 02:35 AM    GLUCOSE 72 11/19/2022 02:35 AM       Detailed results: Thank you for allowing us to participate in the care of this patient. Please call with questions. This note is created with the assistance of a speech recognition program.  While intending to generate adocument that actually reflects the content of the visit, the document can still have some errors including those of syntax and sound a like substitutions which may escape proof reading. It such instances, actual meaningcan be extrapolated by contextual diversion.     Soledad Posadas, KELLY - CNP  Office: [phone]  Perfect serve / office [phone]

## 2022-11-19 NOTE — PROGRESS NOTES
Progress Note  Date:2022       Room:[address] [place] [address]  Patient Name:Anu Bourne     YOB: 1970     Age:51 y.o. Subjective    Subjective:  Symptoms:  (Sleeping, father in room. No concerns. ). Diet:  Adequate intake. Activity level: Impaired due to weakness. Review of Systems  Objective         Vitals Last 24 Hours:  TEMPERATURE:  Temp  Av.3 °F (36.8 °C)  Min: 97.8 °F (36.6 °C)  Max: 98.5 °F (36.9 °C)  RESPIRATIONS RANGE: Resp  Avg: 15.5  Min: 12  Max: 20  PULSE OXIMETRY RANGE: SpO2  Av.1 %  Min: 94 %  Max: 98 %  PULSE RANGE: Pulse  Av.3  Min: 53  Max: 66  BLOOD PRESSURE RANGE: Systolic (03EDN), HYH:208 , Min:114 , SKF:873   ; Diastolic (71JFG), YPL:59, Min:52, Max:107    I/O (24Hr): No intake or output data in the 24 hours ending 22 1108  Objective:  General Appearance:  Comfortable. Vital signs: (most recent): Blood pressure (!) 157/69, pulse 57, temperature 98.5 °F (36.9 °C), temperature source Oral, resp. rate 16, height 4' 11.02\" (1.499 m), weight 92 lb 6 oz (41.9 kg), SpO2 96 %. (BP elevated). Lungs:  Normal effort and normal respiratory rate. There are decreased breath sounds. Heart: Normal rate. Regular rhythm. S1 normal and S2 normal.    Labs/Imaging/Diagnostics    Labs:  CBC:  Recent Labs     22  0806 22  1555 22  2045 22  0536 22  0235   WBC 4.0  --   --  4.7 3.7   RBC 1.84*  --   --  3.22* 3.27*   HGB 5.3*   < > 9.4* 9.1* 9.0*   HCT 15.9*   < > 27.3* 26.8* 27.6*   MCV 86.5  --   --  83.3 84.3   RDW 24.2*  --   --  18.9* 21.5*   PLT 32*  --   --  44* 42*    < > = values in this interval not displayed.      CHEMISTRIES:  Recent Labs     22  0806 225 22  0536 22  0235    137 140 137   K 2.8* 3.6* 3.5* 3.2*    102 103 100   CO2 31 28 28 31   BUN 8 6 5* 6   CREATININE 1.04* 0.94* 0.95* 0.81   GLUCOSE 96 81 77 72   MG 1.6  --  1.6 1.7     PT/INR:  Recent Labs 11/17/22 2045   PROTIME 15.8*   INR 1.3     APTT:  Recent Labs     11/17/22 2045   APTT 32.9     LIVER PROFILE:  Recent Labs     11/17/22 2045   BILIDIR 0.1   BILITOT 0.3       Imaging Last 24 Hours:  XR CHEST (SINGLE VIEW FRONTAL)    Result Date: 11/17/2022  EXAMINATION: ONE XRAY VIEW OF THE CHEST 11/17/2022 3:36 pm COMPARISON: 11/02/2022 HISTORY: ORDERING SYSTEM PROVIDED HISTORY: recent empyema TECHNOLOGIST PROVIDED HISTORY: recent empyema Reason for Exam: recent empyema FINDINGS: Cardiomediastinal silhouette appears unremarkable. Right PICC tip terminates in the lower right atrium. Patchy pulmonary parenchymal opacities appear slightly increased in distribution from prior though less dense. No pneumothorax. Small bilateral pleural effusions. No aggressive osseous lesion. Left shoulder arthroplasty. 1.  Right PICC tip terminates within the lower right atrium. Retraction by 5-6 cm and radiographic follow-up suggested. 2.  Extensive pleural/parenchymal disease bilaterally without significant overall change from prior. CT CHEST WO CONTRAST    Result Date: 11/18/2022  EXAMINATION: CT OF THE CHEST WITHOUT CONTRAST 11/18/2022 10:45 am TECHNIQUE: CT of the chest was performed without the administration of intravenous contrast. Multiplanar reformatted images are provided for review. Automated exposure control, iterative reconstruction, and/or weight based adjustment of the mA/kV was utilized to reduce the radiation dose to as low as reasonably achievable. COMPARISON: 10/26/2022 HISTORY: ORDERING SYSTEM PROVIDED HISTORY: empyema TECHNOLOGIST PROVIDED HISTORY: empyema Is the patient pregnant?->No Reason for Exam: Empyema FINDINGS: Mediastinum: No cardiomegaly. Stable mediastinal and bilateral hilar adenopathy though evaluation is limited without intravenous contrast. Lungs/pleura: Resolving pleural effusions bilaterally.  Numerous pulmonary nodules/masses redemonstrated bilaterally with resolving cavities associated with multiple lesions. Interval increase in interstitial/alveolar densities within the lower lobes and lingula from improved compared to prior study. Upper Abdomen: Cholecystectomy. Splenomegaly. Soft Tissues/Bones: Mild anasarca. Right PICC tip extends to the right atrium. Left shoulder arthroplasty. Fusion hardware within the lower cervical spine. Resolving pleural effusions bilaterally. Numerous pulmonary nodules/masses redemonstrated bilaterally with resolving cavities associated with multiple lesions. Resolving bilateral pleural effusions     CT BIOPSY BONE MARROW    Result Date: 11/18/2022  PROCEDURE: CT GUIDED BONE MARROW ASPIRATION AND CORE NEEDLE BONE BIOPSY OF THE RIGHT ILIAC BONE. 11/18/2022 HISTORY: ORDERING SYSTEM PROVIDED HISTORY: bone marrow biopsy and aspirate patient had anemia and thrombocytopenia MDS panel and myeloma TECHNOLOGIST PROVIDED HISTORY: bone marrow biopsy and aspirate patient had anemia and thrombocytopenia MDS panel and myeloma Is the patient pregnant?->No SEDATION: Local anesthesia. DOSE: DOSE/DLP: 108.1 mGy-cm Dose modulation, iterative reconstruction, and/or weight based adjustment of the mA/kV was utilized to reduce the radiation dose to as low as reasonably achievable. TECHNIQUE: Informed consent was obtained following a detailed explanation of the procedure including risks. Universal protocol was followed. Sterile gown, masks, hats, and gloves were utilized for maximal sterile barrier. A  scan was performed through the upper pelvis including at the level of the posterior superior iliac spines. A site was selected for biopsy in the posterior aspect of the right ilium. The overlying skin, subcutaneous tissue, and periosteum were anesthetized with 1% lidocaine (including intermittent CT guidance of the needle tip). Under intermittent CT guidance, an 11 gauge SiVerion bone marrow biopsy needle was advanced into the right iliac bone.  10 mL of marrow were aspirated. Upon removal of the coring device, the bone core sample was given to the pathology technologist.  The patient tolerated the procedure well. COMPLICATIONS: None immediately apparent. Successful CT-guided right posterior ilium bone marrow biopsy, as above.      Assessment//Plan           Hospital Problems             Last Modified POA    * (Principal) Hypokalemia 11/15/2022 Yes    Severe malnutrition (HCC) (Chronic) 11/16/2022 Yes    Pulmonary nodules 11/16/2022 Yes    Cavitating mass of lung 11/16/2022 Yes    Severe thrombocytopenia ([address] 11/17/2022 Yes    Severe anemia 11/17/2022 Yes     Assessment & Plan    Replace potassium per scale    Continue Teflaro through 12/3    Bone marrow biopsy completed - awaiting results    Hemoglobin and platelets stable      Electronically signed by Mariah Tafoya MD on 11/19/22 at 11:08 AM EST

## 2022-11-19 NOTE — PROGRESS NOTES
Today's Date: 2022  Patient Name: Ren Newberry  Date of admission: 11/15/2022  4:51 PM  Patient's age: 46 y. o., 1970  Admission Dx: Hypokalemia [E87.6]    Reason for Consult: management recommendations  Requesting Physician: Shruti Briscoe MD    CHIEF COMPLAINT: Severe anemia    History Obtained From:  patient    Subjective  afebrile,   Hgb and plt improved  S/p bone marrow         HISTORY OF PRESENT ILLNESS:      The patient is a 46 y.o.  female who is admitted to the hospital for hypokalemia with potassium of 2.4 patient recently hospitalized for MRSA bacteremia and empyema and had anemia and thrombocytopenia thought related to bacteremia and sepsis and antibiotic/vancomycin, patient had pulmonary nodules with cavitation, hemoglobin was 5.3 with WBC 4.0 and platelet count of 32    Past Medical History:   has a past medical history of Anemia, Arthritis, Asthma, Bipolar disorder, mixed ([address] Carotid artery stenosis, Cocaine abuse ([address] COPD (chronic obstructive pulmonary disease) ([address] Degeneration of cervical intervertebral disc, Depression with anxiety, Depression with anxiety, Fibromyalgia, GERD (gastroesophageal reflux disease), History of migraine headaches, History of renal calculi, History of seizure disorder, Hoarseness of voice, HTN (hypertension), Hyperlipidemia, IGT (impaired glucose tolerance), Kidney stones, Lactose intolerance, Leukopenia, Major depressive disorder, recurrent episode, severe, without mention of psychotic behavior, MVP (mitral valve prolapse), Seizures ([address] Sleep apnea, and Unspecified diseases of blood and blood-forming organs. Past Surgical History:   has a past surgical history that includes Gastric bypass surgery; Cholecystectomy; Hysterectomy; Appendectomy; Hysterectomy; Tonsillectomy and adenoidectomy; Ankle fracture surgery; Neck surgery; shoulder surgery;  section; Carpal tunnel release; Colonoscopy (12);  Upper gastrointestinal endoscopy (7-3-12); Gastric bypass surgery; Breast lumpectomy (Right); Cardiac catheterization; Upper gastrointestinal endoscopy (2016); Colonoscopy (2016); Upper gastrointestinal endoscopy (2019); Colonoscopy (2019); Shoulder arthroscopy (Left, 2019); other surgical history (10/24/2019); laparoscopy (N/A, 2022); transesophageal echocardiogram (N/A, 10/19/2022); IR GUIDED PERC PLEURAL DRAIN W CATH INSERT (10/21/2022); IR GUIDED PERC PLEURAL DRAIN W CATH INSERT (10/21/2022); IR GUIDED PERC PLEURAL DRAIN W CATH INSERT (10/24/2022); and CT BIOPSY BONE MARROW (2022). Medications:    Reviewed in Epic     Allergies:  Aspirin, Bactrim, and Codeine    Social History:   reports that she has been smoking cigarettes. She has a 16.50 pack-year smoking history. She has never used smokeless tobacco. She reports current alcohol use. She reports that she does not currently use drugs. Family History: family history includes Alcohol Abuse in her brother; COPD in her father; Cancer in her mother, paternal grandmother, and another family member; Coronary Art Dis in her father; Depression in her brother; Diabetes in her maternal grandmother and mother.     REVIEW OF SYSTEMS:    14 point review of system has been obtained unremarkable although it was mentioned above    PHYSICAL EXAM:      BP (!) 151/76   Pulse 59   Temp 97.8 °F (36.6 °C) (Oral)   Resp 16   Ht 4' 11.02\" (1.499 m)   Wt 100 lb 0.7 oz (45.4 kg)   SpO2 94%   BMI 20.20 kg/m²    Temp (24hrs), Av.1 °F (36.7 °C), Min:97.8 °F (36.6 °C), Max:98.3 °F (36.8 °C)    General appearance - well appearing, no in pain or distress   Mental status - alert and cooperative   Eyes - pupils equal and reactive, extraocular eye movements intact   Ears - bilateral TM's and external ear canals normal   Mouth - mucous membranes moist, pharynx normal without lesions   Neck - supple, no significant adenopathy   Lymphatics - no palpable lymphadenopathy, no hepatosplenomegaly   Chest - clear to auscultation, no wheezes, rales or rhonchi, symmetric air entry   Heart - normal rate, regular rhythm, normal S1, S2, no murmurs  Abdomen - soft, nontender, nondistended, no masses or organomegaly   Neurological - alert, oriented, normal speech, no focal findings or movement disorder noted   Musculoskeletal - no joint tenderness, deformity or swelling   Extremities - peripheral pulses normal, no pedal edema, no clubbing or cyanosis   Skin - normal coloration and turgor, no rashes, no suspicious skin lesions noted ,    DATA:    Labs:   CBC:   Recent Labs     11/17/22  0806 11/17/22  1555 11/17/22 2045 11/18/22  0536   WBC 4.0  --   --  4.7   HGB 5.3*   < > 9.4* 9.1*   HCT 15.9*   < > 27.3* 26.8*   PLT 32*  --   --  44*    < > = values in this interval not displayed. BMP:   Recent Labs     11/17/22 2045 11/18/22  0536    140   K 3.6* 3.5*   CO2 28 28   BUN 6 5*   CREATININE 0.94* 0.95*   LABGLOM >60 >60   GLUCOSE 81 77       PT/INR:   Recent Labs     11/17/22 2045   PROTIME 15.8*   INR 1.3       IMAGING DATA:  CT CHEST WO CONTRAST   Final Result   Resolving pleural effusions bilaterally. Numerous pulmonary      nodules/masses redemonstrated bilaterally with resolving cavities associated   with multiple lesions. Resolving bilateral pleural effusions         CT BIOPSY BONE MARROW   Final Result   Successful CT-guided right posterior ilium bone marrow biopsy, as above. XR CHEST (SINGLE VIEW FRONTAL)   Final Result   1. Right PICC tip terminates within the lower right atrium. Retraction by   5-6 cm and radiographic follow-up suggested. 2.  Extensive pleural/parenchymal disease bilaterally without significant   overall change from prior.              Primary Problem  Hypokalemia    Active Hospital Problems    Diagnosis Date Noted    Severe thrombocytopenia ([address] [D69.6] 11/17/2022     Priority: Medium    Severe anemia [D64.9] 11/17/2022     Priority: Medium    Cavitating mass of lung [J98.4] 10/23/2022     Priority: Medium    Pulmonary nodules [R91.8] 10/15/2022     Priority: Medium    Severe malnutrition ([address] [E43] 10/11/2022     Priority: Medium    Hypokalemia [E87.6] 12/18/2015         IMPRESSION:   Severe anemia and thrombocytopenia  Pulmonary nodules  MRSA bacteremia  Hypokalemia  Abnormal ct chest/lung nodules  Low fibrinogen  CASSANDRA + hemolysis    RECOMMENDATIONS:  I reviewed the labs/imaging available to me,outside records and discussed with the patient. I explained to the patient the nature of this problem. I explained the significance of these abnormalities and possible etiology and management options   Patient had severe anemia and thrombocytopenia which has been persistent since last admission> bone marrow biopsy and aspirate done today  The low fibrinogen suggest consumptive process which can cause anemia and thrombocytopenia, the DIC associated with infection or malignancy> PET scan as an outpatient for the lung lesions and possible biopsy> monitor DIC panel and keep fibrinogen above 125  Antibiotic per ID  Cassandra positive hemolytic anemia> start empiric prednisone 1 mg/kg> monitor hemolysis panel  Oz 13 level  Discussed with the patient and the father  Follow-up with hematology after discharge          Discussed with patient and Nurse. Thank you for asking us to see this patient. [facility]                            This note is created with the assistance of a speech recognition program.  While intending to generate a document that actually reflects the content of the visit, the document can still have some errors including those of syntax and sound a like substitutions which may escape proof reading. It such instances, actual meaning can be extrapolated by contextual diversion.      Hematologist/Medical Oncologist

## 2022-11-19 NOTE — PLAN OF CARE
Problem: Discharge Planning  Goal: Discharge to home or other facility with appropriate resources  11/19/2022 1845 by Ck Sanderson RN  Outcome: Progressing     Problem: Safety - Adult  Goal: Free from fall injury  11/19/2022 1845 by Ck Sanderson RN  Outcome: Progressing     Problem: Skin/Tissue Integrity  Goal: Absence of new skin breakdown  Description: 1. Monitor for areas of redness and/or skin breakdown  2. Assess vascular access sites hourly  3. Every 4-6 hours minimum:  Change oxygen saturation probe site  4. Every 4-6 hours:  If on nasal continuous positive airway pressure, respiratory therapy assess nares and determine need for appliance change or resting period.   11/19/2022 1845 by Ck Sanderson RN  Outcome: Progressing     Problem: ABCDS Injury Assessment  Goal: Absence of physical injury  11/19/2022 1845 by Ck Sanderson RN  Outcome: Progressing

## 2022-11-19 NOTE — PROGRESS NOTES
Today's Date: 11/19/2022  Patient Name: Olayinka Srivastava  Date of admission: 11/15/2022  4:51 PM  Patient's age: 46 y. o., 1970  Admission Dx: Hypokalemia [E87.6]    Reason for Consult: management recommendations  Requesting Physician: Myla Casper MD    CHIEF COMPLAINT: Severe anemia    INTERVAL HISTORY:    Patient seen and examined  Patient had bone marrow biopsy yesterday and tolerated well  Results are pending  Hemoglobin today stable at 9.0 and platelets are still low at 42    HISTORY OF PRESENT ILLNESS:    The patient is a 46 y.o.  female who is admitted to the hospital for hypokalemia with potassium of 2.4 patient recently hospitalized for MRSA bacteremia and empyema and had anemia and thrombocytopenia thought related to bacteremia and sepsis and antibiotic/vancomycin, patient had pulmonary nodules with cavitation, hemoglobin was 5.3 with WBC 4.0 and platelet count of 32    Past Medical History:   has a past medical history of Anemia, Arthritis, Asthma, Bipolar disorder, mixed ([address] Carotid artery stenosis, Cocaine abuse ([address] COPD (chronic obstructive pulmonary disease) ([address] Degeneration of cervical intervertebral disc, Depression with anxiety, Depression with anxiety, Fibromyalgia, GERD (gastroesophageal reflux disease), History of migraine headaches, History of renal calculi, History of seizure disorder, Hoarseness of voice, HTN (hypertension), Hyperlipidemia, IGT (impaired glucose tolerance), Kidney stones, Lactose intolerance, Leukopenia, Major depressive disorder, recurrent episode, severe, without mention of psychotic behavior, MVP (mitral valve prolapse), Seizures ([address] Sleep apnea, and Unspecified diseases of blood and blood-forming organs. Past Surgical History:   has a past surgical history that includes Gastric bypass surgery; Cholecystectomy; Hysterectomy; Appendectomy; Hysterectomy; Tonsillectomy and adenoidectomy; Ankle fracture surgery;  Neck surgery; shoulder surgery;  section; Carpal tunnel release; Colonoscopy (12); Upper gastrointestinal endoscopy (7-3-12); Gastric bypass surgery; Breast lumpectomy (Right); Cardiac catheterization; Upper gastrointestinal endoscopy (2016); Colonoscopy (2016); Upper gastrointestinal endoscopy (2019); Colonoscopy (2019); Shoulder arthroscopy (Left, 2019); other surgical history (10/24/2019); laparoscopy (N/A, 2022); transesophageal echocardiogram (N/A, 10/19/2022); IR GUIDED PERC PLEURAL DRAIN W CATH INSERT (10/21/2022); IR GUIDED PERC PLEURAL DRAIN W CATH INSERT (10/21/2022); IR GUIDED PERC PLEURAL DRAIN W CATH INSERT (10/24/2022); and CT BIOPSY BONE MARROW (2022). Medications:    Reviewed in Epic     Allergies:  Aspirin, Bactrim, and Codeine    Social History:   reports that she has been smoking cigarettes. She has a 16.50 pack-year smoking history. She has never used smokeless tobacco. She reports current alcohol use. She reports that she does not currently use drugs. Family History: family history includes Alcohol Abuse in her brother; COPD in her father; Cancer in her mother, paternal grandmother, and another family member; Coronary Art Dis in her father; Depression in her brother; Diabetes in her maternal grandmother and mother.     REVIEW OF SYSTEMS:    14 point review of system has been obtained unremarkable although it was mentioned above    PHYSICAL EXAM:      /79   Pulse 56   Temp 98.5 °F (36.9 °C) (Oral)   Resp 16   Ht 4' 11.02\" (1.499 m)   Wt 92 lb 6 oz (41.9 kg)   SpO2 97%   BMI 18.65 kg/m²    Temp (24hrs), Av.3 °F (36.8 °C), Min:97.8 °F (36.6 °C), Max:98.5 °F (36.9 °C)    General appearance - well appearing, no in pain or distress   Mental status - alert and cooperative   Eyes - pupils equal and reactive, extraocular eye movements intact   Ears - bilateral TM's and external ear canals normal   Mouth - mucous membranes moist, pharynx normal without lesions   Neck - supple, no significant adenopathy   Lymphatics - no palpable lymphadenopathy, no hepatosplenomegaly   Chest - clear to auscultation, no wheezes, rales or rhonchi, symmetric air entry   Heart - normal rate, regular rhythm, normal S1, S2, no murmurs  Abdomen - soft, nontender, nondistended, no masses or organomegaly   Neurological - alert, oriented, normal speech, no focal findings or movement disorder noted   Musculoskeletal - no joint tenderness, deformity or swelling   Extremities - peripheral pulses normal, no pedal edema, no clubbing or cyanosis   Skin - normal coloration and turgor, no rashes, no suspicious skin lesions noted ,    DATA:    Labs:   CBC:   Recent Labs     11/18/22  0536 11/19/22  0235   WBC 4.7 3.7   HGB 9.1* 9.0*   HCT 26.8* 27.6*   PLT 44* 42*       BMP:   Recent Labs     11/18/22  0536 11/19/22  0235    137   K 3.5* 3.2*   CO2 28 31   BUN 5* 6   CREATININE 0.95* 0.81   LABGLOM >60 >60   GLUCOSE 77 72       PT/INR:   Recent Labs     11/17/22 2045   PROTIME 15.8*   INR 1.3       IMAGING DATA:  CT CHEST WO CONTRAST   Final Result   Resolving pleural effusions bilaterally. Numerous pulmonary      nodules/masses redemonstrated bilaterally with resolving cavities associated   with multiple lesions. Resolving bilateral pleural effusions         CT BIOPSY BONE MARROW   Final Result   Successful CT-guided right posterior ilium bone marrow biopsy, as above. XR CHEST (SINGLE VIEW FRONTAL)   Final Result   1. Right PICC tip terminates within the lower right atrium. Retraction by   5-6 cm and radiographic follow-up suggested. 2.  Extensive pleural/parenchymal disease bilaterally without significant   overall change from prior.            Primary Problem  Hypokalemia    Active Hospital Problems    Diagnosis Date Noted    Severe thrombocytopenia ([address] [D69.6] 11/17/2022     Priority: Medium    Severe anemia [D64.9] 11/17/2022     Priority: Medium    Cavitating mass of lung [J98.4] 10/23/2022     Priority: Medium    Pulmonary nodules [R91.8] 10/15/2022     Priority: Medium    Severe malnutrition ([address] [E43] 10/11/2022     Priority: Medium    Hypokalemia [E87.6] 12/18/2015         IMPRESSION:   Severe anemia and thrombocytopenia  Pulmonary nodules, numerous with cavities  Bilateral pleural effusions  MRSA bacteremia  Hypokalemia    RECOMMENDATIONS:  I reviewed the laboratory, imaging studies, discussed diagnosis and other treatment recommendations   Patient had bone marrow biopsy yesterday on 11/18/2022  Will follow results of bone marrow biopsy  Patient has low folic acid level and receiving folic acid and also on iron pill  Hemolysis panel anemia work-up and myeloma/DIC panel  Discussed with the patient at bedside  Transfuse to keep hemoglobin above 7 and platelet above 10 unless there is a bleeding to keep platelet above 30  Patient had bilateral pulmonary nodules during her previous admission and has been evaluated by ID and pulmonary at that time and was thought to be suspected septic emboli. She would need follow-up CT scan/PET scan as outpatient  Other management as per primary team  Monitor counts closely    Discussed with patient and Nurse. Thank you for asking us to see this patient. Fortunato Williamson MD  Hematologist/Medical Oncologist                      This note is created with the assistance of a speech recognition program.  While intending to generate a document that actually reflects the content of the visit, the document can still have some errors including those of syntax and sound a like substitutions which may escape proof reading. It such instances, actual meaning can be extrapolated by contextual diversion.

## 2022-11-20 PROCEDURE — 6370000000 HC RX 637 (ALT 250 FOR IP): Performed by: FAMILY MEDICINE

## 2022-11-20 PROCEDURE — 99232 SBSQ HOSP IP/OBS MODERATE 35: CPT | Performed by: INTERNAL MEDICINE

## 2022-11-20 PROCEDURE — 2580000003 HC RX 258: Performed by: FAMILY MEDICINE

## 2022-11-20 PROCEDURE — 99232 SBSQ HOSP IP/OBS MODERATE 35: CPT | Performed by: NURSE PRACTITIONER

## 2022-11-20 PROCEDURE — 6370000000 HC RX 637 (ALT 250 FOR IP): Performed by: INTERNAL MEDICINE

## 2022-11-20 PROCEDURE — 2060000000 HC ICU INTERMEDIATE R&B

## 2022-11-20 PROCEDURE — 94761 N-INVAS EAR/PLS OXIMETRY MLT: CPT

## 2022-11-20 PROCEDURE — 6360000002 HC RX W HCPCS: Performed by: FAMILY MEDICINE

## 2022-11-20 PROCEDURE — 99232 SBSQ HOSP IP/OBS MODERATE 35: CPT | Performed by: FAMILY MEDICINE

## 2022-11-20 PROCEDURE — 94640 AIRWAY INHALATION TREATMENT: CPT

## 2022-11-20 RX ADMIN — ARIPIPRAZOLE 7.5 MG: 5 TABLET ORAL at 08:31

## 2022-11-20 RX ADMIN — IPRATROPIUM BROMIDE AND ALBUTEROL SULFATE 3 ML: .5; 2.5 SOLUTION RESPIRATORY (INHALATION) at 15:28

## 2022-11-20 RX ADMIN — PREDNISONE 60 MG: 20 TABLET ORAL at 08:33

## 2022-11-20 RX ADMIN — CEFTAROLINE FOSAMIL 400 MG: 600 POWDER, FOR SOLUTION INTRAVENOUS at 11:16

## 2022-11-20 RX ADMIN — AMLODIPINE BESYLATE 10 MG: 10 TABLET ORAL at 08:33

## 2022-11-20 RX ADMIN — DIVALPROEX SODIUM 1000 MG: 250 TABLET, DELAYED RELEASE ORAL at 11:56

## 2022-11-20 RX ADMIN — IPRATROPIUM BROMIDE AND ALBUTEROL SULFATE 3 ML: .5; 2.5 SOLUTION RESPIRATORY (INHALATION) at 11:25

## 2022-11-20 RX ADMIN — HYDRALAZINE HYDROCHLORIDE 10 MG: 20 INJECTION INTRAMUSCULAR; INTRAVENOUS at 02:12

## 2022-11-20 RX ADMIN — Medication 2000 UNITS: at 08:33

## 2022-11-20 RX ADMIN — CARVEDILOL 25 MG: 25 TABLET ORAL at 08:32

## 2022-11-20 RX ADMIN — SODIUM CHLORIDE, PRESERVATIVE FREE 10 ML: 5 INJECTION INTRAVENOUS at 08:36

## 2022-11-20 RX ADMIN — DULOXETIN HYDROCHLORIDE 60 MG: 30 CAPSULE, DELAYED RELEASE ORAL at 08:32

## 2022-11-20 RX ADMIN — FERROUS SULFATE TAB 325 MG (65 MG ELEMENTAL FE) 325 MG: 325 (65 FE) TAB at 08:34

## 2022-11-20 RX ADMIN — CARVEDILOL 25 MG: 25 TABLET ORAL at 17:45

## 2022-11-20 RX ADMIN — IPRATROPIUM BROMIDE AND ALBUTEROL SULFATE 3 ML: .5; 2.5 SOLUTION RESPIRATORY (INHALATION) at 20:23

## 2022-11-20 RX ADMIN — FOLIC ACID 1 MG: 1 TABLET ORAL at 08:34

## 2022-11-20 ASSESSMENT — PAIN SCALES - GENERAL
PAINLEVEL_OUTOF10: 0

## 2022-11-20 NOTE — PLAN OF CARE
Problem: Discharge Planning  Goal: Discharge to home or other facility with appropriate resources  11/20/2022 0153 by Mary Snow RN  Outcome: Progressing  11/19/2022 1845 by Keyanna Lawson RN  Outcome: Progressing     Problem: Safety - Adult  Goal: Free from fall injury  11/20/2022 0153 by Mary Snow RN  Outcome: Progressing  11/19/2022 1845 by Keyanna Lawson RN  Outcome: Progressing     Problem: Skin/Tissue Integrity  Goal: Absence of new skin breakdown  Description: 1. Monitor for areas of redness and/or skin breakdown  2. Assess vascular access sites hourly  3. Every 4-6 hours minimum:  Change oxygen saturation probe site  4. Every 4-6 hours:  If on nasal continuous positive airway pressure, respiratory therapy assess nares and determine need for appliance change or resting period.   11/20/2022 0153 by Mary Snow RN  Outcome: Progressing  11/19/2022 1845 by Keyanna Lawson RN  Outcome: Progressing     Problem: ABCDS Injury Assessment  Goal: Absence of physical injury  11/20/2022 0153 by Mary Snow RN  Outcome: Progressing  11/19/2022 1845 by Keyanna Lawson RN  Outcome: Progressing

## 2022-11-20 NOTE — PROGRESS NOTES
Progress Note  Date:2022       Room:[address] [place] [address]  Patient Name:Anu Bourne     YOB: 1970     Age:51 y.o. Subjective    Subjective:  Symptoms:  Improved. (Awake and alert, no questions or needs at this time. ). Diet:  Adequate intake (\"I'm so hungry all the time. \"). Activity level: Impaired due to weakness. Pain:  She reports no pain. Review of Systems  Objective         Vitals Last 24 Hours:  TEMPERATURE:  Temp  Av.5 °F (36.9 °C)  Min: 98.1 °F (36.7 °C)  Max: 98.7 °F (37.1 °C)  RESPIRATIONS RANGE: Resp  Av.5  Min: 16  Max: 18  PULSE OXIMETRY RANGE: SpO2  Av %  Min: 96 %  Max: 98 %  PULSE RANGE: Pulse  Av.8  Min: 53  Max: 59  BLOOD PRESSURE RANGE: Systolic (76EDX), EQY:930 , Min:109 , PDR:530   ; Diastolic (80SUG), PBP:24, Min:71, Max:92    I/O (24Hr): Intake/Output Summary (Last 24 hours) at 2022 0836  Last data filed at 2022 0202  Gross per 24 hour   Intake 1474.47 ml   Output --   Net 1474.47 ml     Objective:  General Appearance:  Comfortable. Vital signs: (most recent): Blood pressure (!) 118/92, pulse 59, temperature 98.1 °F (36.7 °C), temperature source Oral, resp. rate 18, height 4' 11.02\" (1.499 m), weight 93 lb 11.1 oz (42.5 kg), SpO2 96 %.   Vital signs are normal.    Labs/Imaging/Diagnostics    Labs:  CBC:  Recent Labs     22  2045 22  0536 22  0235   WBC  --  4.7 3.7   RBC  --  3.22* 3.27*   HGB 9.4* 9.1* 9.0*   HCT 27.3* 26.8* 27.6*   MCV  --  83.3 84.3   RDW  --  18.9* 21.5*   PLT  --  44* 42*     CHEMISTRIES:  Recent Labs     22  0536 22  0235    140 137   K 3.6* 3.5* 3.2*    103 100   CO2 28 28 31   BUN 6 5* 6   CREATININE 0.94* 0.95* 0.81   GLUCOSE 81 77 72   MG  --  1.6 1.7     PT/INR:  Recent Labs     22   PROTIME 15.8*   INR 1.3     APTT:  Recent Labs     22   APTT 32.9     LIVER PROFILE:  Recent Labs     22   BILIDIR 0.1 BILITOT 0.3       Imaging Last 24 Hours:  CT CHEST WO CONTRAST    Result Date: 11/18/2022  EXAMINATION: CT OF THE CHEST WITHOUT CONTRAST 11/18/2022 10:45 am TECHNIQUE: CT of the chest was performed without the administration of intravenous contrast. Multiplanar reformatted images are provided for review. Automated exposure control, iterative reconstruction, and/or weight based adjustment of the mA/kV was utilized to reduce the radiation dose to as low as reasonably achievable. COMPARISON: 10/26/2022 HISTORY: ORDERING SYSTEM PROVIDED HISTORY: empyema TECHNOLOGIST PROVIDED HISTORY: empyema Is the patient pregnant?->No Reason for Exam: Empyema FINDINGS: Mediastinum: No cardiomegaly. Stable mediastinal and bilateral hilar adenopathy though evaluation is limited without intravenous contrast. Lungs/pleura: Resolving pleural effusions bilaterally. Numerous pulmonary nodules/masses redemonstrated bilaterally with resolving cavities associated with multiple lesions. Interval increase in interstitial/alveolar densities within the lower lobes and lingula from improved compared to prior study. Upper Abdomen: Cholecystectomy. Splenomegaly. Soft Tissues/Bones: Mild anasarca. Right PICC tip extends to the right atrium. Left shoulder arthroplasty. Fusion hardware within the lower cervical spine. Resolving pleural effusions bilaterally. Numerous pulmonary nodules/masses redemonstrated bilaterally with resolving cavities associated with multiple lesions.  Resolving bilateral pleural effusions     CT BIOPSY BONE MARROW    Result Date: 11/18/2022  PROCEDURE: CT GUIDED BONE MARROW ASPIRATION AND CORE NEEDLE BONE BIOPSY OF THE RIGHT ILIAC BONE. 11/18/2022 HISTORY: ORDERING SYSTEM PROVIDED HISTORY: bone marrow biopsy and aspirate patient had anemia and thrombocytopenia MDS panel and myeloma TECHNOLOGIST PROVIDED HISTORY: bone marrow biopsy and aspirate patient had anemia and thrombocytopenia MDS panel and myeloma Is the patient pregnant?->No SEDATION: Local anesthesia. DOSE: DOSE/DLP: 108.1 mGy-cm Dose modulation, iterative reconstruction, and/or weight based adjustment of the mA/kV was utilized to reduce the radiation dose to as low as reasonably achievable. TECHNIQUE: Informed consent was obtained following a detailed explanation of the procedure including risks. Universal protocol was followed. Sterile gown, masks, hats, and gloves were utilized for maximal sterile barrier. A  scan was performed through the upper pelvis including at the level of the posterior superior iliac spines. A site was selected for biopsy in the posterior aspect of the right ilium. The overlying skin, subcutaneous tissue, and periosteum were anesthetized with 1% lidocaine (including intermittent CT guidance of the needle tip). Under intermittent CT guidance, an 11 gauge CereScan bone marrow biopsy needle was advanced into the right iliac bone. 10 mL of marrow were aspirated. Upon removal of the coring device, the bone core sample was given to the pathology technologist.  The patient tolerated the procedure well. COMPLICATIONS: None immediately apparent. Successful CT-guided right posterior ilium bone marrow biopsy, as above. Assessment//Plan           Hospital Problems             Last Modified POA    * (Principal) Hypokalemia 11/15/2022 Yes    Severe malnutrition (HCC) (Chronic) 11/16/2022 Yes    Pulmonary nodules 11/16/2022 Yes    Cavitating mass of lung 11/16/2022 Yes    Severe thrombocytopenia ([address] 11/17/2022 Yes    Severe anemia 11/17/2022 Yes     Assessment & Plan    Await labs    Discharge back to [facility] when other services agree.      Electronically signed by Tanya Hernandez MD on 11/20/22 at 8:36 AM EST

## 2022-11-20 NOTE — PROGRESS NOTES
Asked Dr. Titi Guillaume about possibly lowing Coreg due to roseline HR in the 46s. Dr. Titi Guillaume does not wish to make any changes at this time.

## 2022-11-20 NOTE — PROGRESS NOTES
gastrointestinal endoscopy (7-3-12); Gastric bypass surgery; Breast lumpectomy (Right); Cardiac catheterization; Upper gastrointestinal endoscopy (2016); Colonoscopy (2016); Upper gastrointestinal endoscopy (2019); Colonoscopy (2019); Shoulder arthroscopy (Left, 2019); other surgical history (10/24/2019); laparoscopy (N/A, 2022); transesophageal echocardiogram (N/A, 10/19/2022); IR GUIDED PERC PLEURAL DRAIN W CATH INSERT (10/21/2022); IR GUIDED PERC PLEURAL DRAIN W CATH INSERT (10/21/2022); IR GUIDED PERC PLEURAL DRAIN W CATH INSERT (10/24/2022); and CT BIOPSY BONE MARROW (2022). Medications:    Reviewed in Epic     Allergies:  Aspirin, Bactrim, and Codeine    Social History:   reports that she has been smoking cigarettes. She has a 16.50 pack-year smoking history. She has never used smokeless tobacco. She reports current alcohol use. She reports that she does not currently use drugs. Family History: family history includes Alcohol Abuse in her brother; COPD in her father; Cancer in her mother, paternal grandmother, and another family member; Coronary Art Dis in her father; Depression in her brother; Diabetes in her maternal grandmother and mother.     REVIEW OF SYSTEMS:    14 point review of system has been obtained unremarkable although it was mentioned above    PHYSICAL EXAM:      BP (!) 144/77 Comment: nurse notified  Pulse 56   Temp 98.1 °F (36.7 °C) (Oral)   Resp 16   Ht 4' 11.02\" (1.499 m)   Wt 93 lb 11.1 oz (42.5 kg)   SpO2 95%   BMI 18.91 kg/m²    Temp (24hrs), Av.4 °F (36.9 °C), Min:98.1 °F (36.7 °C), Max:98.7 °F (37.1 °C)    General appearance - well appearing, no in pain or distress   Mental status - alert and cooperative   Eyes - pupils equal and reactive, extraocular eye movements intact   Ears - bilateral TM's and external ear canals normal   Mouth - mucous membranes moist, pharynx normal without lesions   Neck - supple, no significant adenopathy Lymphatics - no palpable lymphadenopathy, no hepatosplenomegaly   Chest - clear to auscultation, no wheezes, rales or rhonchi, symmetric air entry   Heart - normal rate, regular rhythm, normal S1, S2, no murmurs  Abdomen - soft, nontender, nondistended, no masses or organomegaly   Neurological - alert, oriented, normal speech, no focal findings or movement disorder noted   Musculoskeletal - no joint tenderness, deformity or swelling   Extremities - peripheral pulses normal, no pedal edema, no clubbing or cyanosis   Skin - normal coloration and turgor, no rashes, no suspicious skin lesions noted ,    DATA:    Labs:   CBC:   Recent Labs     11/18/22 0536 11/19/22 0235   WBC 4.7 3.7   HGB 9.1* 9.0*   HCT 26.8* 27.6*   PLT 44* 42*       BMP:   Recent Labs     11/18/22 0536 11/19/22 0235    137   K 3.5* 3.2*   CO2 28 31   BUN 5* 6   CREATININE 0.95* 0.81   LABGLOM >60 >60   GLUCOSE 77 72       PT/INR:   Recent Labs     11/17/22 2045   PROTIME 15.8*   INR 1.3         IMAGING DATA:  CT CHEST WO CONTRAST   Final Result   Resolving pleural effusions bilaterally. Numerous pulmonary      nodules/masses redemonstrated bilaterally with resolving cavities associated   with multiple lesions. Resolving bilateral pleural effusions         CT BIOPSY BONE MARROW   Final Result   Successful CT-guided right posterior ilium bone marrow biopsy, as above. XR CHEST (SINGLE VIEW FRONTAL)   Final Result   1. Right PICC tip terminates within the lower right atrium. Retraction by   5-6 cm and radiographic follow-up suggested. 2.  Extensive pleural/parenchymal disease bilaterally without significant   overall change from prior.            Primary Problem  Hypokalemia    Active Hospital Problems    Diagnosis Date Noted    Severe thrombocytopenia ([address] [D69.6] 11/17/2022     Priority: Medium    Severe anemia [D64.9] 11/17/2022     Priority: Medium    Cavitating mass of lung [J98.4] 10/23/2022     Priority: Medium Pulmonary nodules [R91.8] 10/15/2022     Priority: Medium    Severe malnutrition ([address] [E43] 10/11/2022     Priority: Medium    Hypokalemia [E87.6] 12/18/2015         IMPRESSION:   Severe anemia and thrombocytopenia  Pulmonary nodules, numerous with cavities  Bilateral pleural effusions  MRSA bacteremia  Hypokalemia    RECOMMENDATIONS:  I reviewed the laboratory, imaging studies, discussed diagnosis and other treatment recommendations   Patient had bone marrow biopsy yesterday on 11/18/2022  Will follow results of bone marrow biopsy  Patient has low folic acid level and receiving folic acid and also on iron pill  Hemolysis panel anemia work-up and myeloma/DIC panel  Discussed with the patient at bedside  Transfuse to keep hemoglobin above 7 and platelet above 10 unless there is a bleeding to keep platelet above 30  Patient had bilateral pulmonary nodules during her previous admission and has been evaluated by ID and pulmonary at that time and was thought to be suspected septic emboli. She would need follow-up CT scan/PET scan as outpatient  Other management as per primary team  Monitor counts closely    Discussed with patient and Nurse. Thank you for asking us to see this patient. Fortunato Hidalgo MD  Hematologist/Medical Oncologist                      This note is created with the assistance of a speech recognition program.  While intending to generate a document that actually reflects the content of the visit, the document can still have some errors including those of syntax and sound a like substitutions which may escape proof reading. It such instances, actual meaning can be extrapolated by contextual diversion.

## 2022-11-20 NOTE — CARE COORDINATION
ONGOING DISCHARGE PLAN:    Patient is alert and oriented x4. Spoke with patient regarding discharge plan and patient confirms that plan is still [facility]      IV Teflaro until 12/3  11/18 bone marrow biopsy      Will continue to follow for additional discharge needs.     Electronically signed by Zora Escoto RN on 11/20/2022 at 4:48 PM

## 2022-11-20 NOTE — PROGRESS NOTES
[facility] -   Infectious diseases evaluation  admission date 11/15/2022    reason for consultation:   MRSA Infection    Impression :   Current:  Hypokalemia  Recent MRSA bacteremia  Recent empyema/pulmonary nodules with cavitation status post chest tube placement that was subsequently removed on 10/31/2022. Thrombocytopenia and anemia (thought to be related to vancomycin that was discontinued last admission). Renal insufficiency  History of cocaine drug abuse  Hypertension  Bipolar disorder  Allergy to Bactrim    Other:    Discussion / summary of stay / plan of care     Recommendations   Teflaro 400 mg IV twice daily until 12/3/2022. Follow CBC and renal function. Bone marrow biopsy 11/18. Supportive care. Infection Control Recommendations   Keo Precautions  Contact Isolation     Antimicrobial Stewardship Recommendations   Simplification of therapy  Targeted therapy    History of Present Illness:   Initial history:  Jose Ham is a 46y.o.-year-old female  who was brought to the hospital from SNF for hypokalemia with reported potassium of 2.4. Patient is complaining of body ache, generalized weakness and abdominal discomfort for 2 days. Symptoms are moderate, no alleviating or aggravating factors. She denied cough or shortness of breath, no other complaints. Right arm PICC line in place  The patient had prolonged hospitalization recently for MRSA bacteremia and empyema status post chest tube placement but was subsequently removed. She was discharged to skilled nursing facility on 11/4/2024 on IV Teflaro through 12/3/2022    Interval changes  11/20/2022   Afebrile. SpO2 95% RA  11/18 CT chest redemonstrating multiple pulmonary nodules bilaterally with resolving cavitations.   Sleeping      Patient Vitals for the past 8 hrs:   BP Temp Temp src Pulse Resp SpO2   11/20/22 0630 (!) 118/92 98.1 °F (36.7 °C) Oral 59 18 96 %   11/20/22 0301 (!) 140/71 -- -- 57 -- --   22 0203 (!) 168/85 -- -- 56 -- --         Summary of relevant labs:  Labs:  Cre: 0.95-0.81  WBC: 4.7-3.7  Plt: 32-44-42    Micro:    Imagin/18 CT Chest      FINDINGS:   Mediastinum: No cardiomegaly. Stable       mediastinal and bilateral hilar adenopathy though evaluation is limited   without intravenous contrast.       Lungs/pleura: Resolving pleural effusions bilaterally. Numerous pulmonary       nodules/masses redemonstrated bilaterally with resolving cavities associated   with multiple lesions. Interval increase in interstitial/alveolar densities within the lower lobes       and lingula from improved compared to prior study. Upper Abdomen: Cholecystectomy. Splenomegaly. Soft Tissues/Bones: Mild anasarca. Right PICC tip extends to the right       atrium. Left shoulder arthroplasty. Fusion hardware within the lower       cervical spine. Impression   Resolving pleural effusions bilaterally. Numerous pulmonary       nodules/masses redemonstrated bilaterally with resolving cavities associated   with multiple lesions. Resolving bilateral pleural effusions           I have personally reviewed the past medical history, past surgical history, medications, social history, and family history, and I haveupdated the database accordingly. Allergies:   Aspirin, Bactrim, and Codeine     Review of Systems:     Review of Systems   Reason unable to perform ROS: Sleeping. Physical Examination :       Physical Exam  Vitals and nursing note reviewed. Constitutional:       General: She is not in acute distress. Appearance: She is normal weight. She is not ill-appearing. Comments: Sleeping. HENT:      Head: Normocephalic and atraumatic. Right Ear: External ear normal.      Left Ear: External ear normal.      Nose: Nose normal.   Cardiovascular:      Rate and Rhythm: Normal rate and regular rhythm.    Pulmonary:      Effort: Pulmonary effort is normal. No respiratory distress. Genitourinary:     Comments: No chamberlain. Musculoskeletal:      Right lower leg: No edema. Left lower leg: No edema. Skin:     General: Skin is warm and dry. Neurological:      Comments: Sleeping. Psychiatric:      Comments: Sleeping.        Past Medical History:     Past Medical History:   Diagnosis Date    Anemia     Arthritis     Asthma     Bipolar disorder, mixed ([address]     Carotid artery stenosis 2020    Cocaine abuse ([facility] 2014    COPD (chronic obstructive pulmonary disease) (HCC)     Degeneration of cervical intervertebral disc     Depression with anxiety     Depression with anxiety     Fibromyalgia 2017    GERD (gastroesophageal reflux disease)     History of migraine headaches 6/10/2014    History of renal calculi 6/10/2014    History of seizure disorder 6/10/2014    Hoarseness of voice     HTN (hypertension) 6/10/2014    Hyperlipidemia 6/10/2014    IGT (impaired glucose tolerance) 6/10/2014    Kidney stones     Lactose intolerance 6/10/2014    Leukopenia 6/10/2014    Major depressive disorder, recurrent episode, severe, without mention of psychotic behavior 2014    MVP (mitral valve prolapse)     Seizures ([facility]     Sleep apnea 6/10/2014    Unspecified diseases of blood and blood-forming organs        Past Surgical  History:     Past Surgical History:   Procedure Laterality Date    ANKLE FRACTURE SURGERY      recontruction surgery    APPENDECTOMY      BREAST LUMPECTOMY Right     CARDIAC CATHETERIZATION      CARPAL TUNNEL RELEASE      x2     SECTION      CHOLECYSTECTOMY      COLONOSCOPY  12    COLONOSCOPY  2016    severe spasms    COLONOSCOPY  2019    DR GOLDY MCINTOSH    CT BONE MARROW BIOPSY  2022    CT BONE MARROW BIOPSY 2022 [facility] CT SCAN    GASTRIC BYPASS SURGERY      GASTRIC BYPASS SURGERY      HYSTERECTOMY (CERVIX STATUS UNKNOWN)      HYSTERECTOMY (CERVIX STATUS UNKNOWN)      IR PERC CATH PLEURAL DRAIN W/IMAG 10/21/2022    IR PERC CATH PLEURAL DRAIN W/IMAG 10/21/2022 [facility]    IR PERC CATH PLEURAL DRAIN W/IMAG  10/21/2022    IR PERC CATH PLEURAL DRAIN W/IMAG 10/21/2022 [facility]    IR PERC CATH PLEURAL DRAIN W/IMAG  10/24/2022    IR PERC CATH PLEURAL DRAIN W/IMAG 10/24/2022 [facility]    LAPAROSCOPY N/A 1/29/2022    LAPAROSCOPY EXPLORATORY CONVERTED TO OPEN EXPLORATORY LAPAROTOMY, LYSIS OF ADHESIONS, REDUCTION OF INTERNAL HERNIA performed by Paul Issa DO at [address]  14/03/1996    APPLICATION OF ARCH BARS,SIMPLE EXTRACTION OF #15 AND RT TMJ JOINT REPLACEMENT    SHOULDER ARTHROSCOPY Left 06/05/2019    DR ROBERT GREEN    SHOULDER SURGERY      TONSILLECTOMY AND ADENOIDECTOMY      TRANSESOPHAGEAL ECHOCARDIOGRAM N/A 10/19/2022    TRANSESOPHAGEAL ECHOCARDIOGRAM WITH BUBBLE STUDY performed by Frandy Royal DO at [address]  7-3-12    egd    UPPER GASTROINTESTINAL ENDOSCOPY  12/19/2016    status post gastrectomy with a small remnant of gastric pouch.     UPPER GASTROINTESTINAL ENDOSCOPY  05/2019    DR Henrique Birmingham       Medications:      predniSONE  60 mg Oral Daily    amLODIPine  10 mg Oral Daily    folic acid  1 mg Oral Daily    ceftaroline fosamil (TEFLARO) IVPB  400 mg IntraVENous Q12H    DULoxetine  60 mg Oral Daily    carvedilol  25 mg Oral BID WC    ARIPiprazole  7.5 mg Oral Daily    divalproex  1,000 mg Oral BID    ferrous sulfate  325 mg Oral BID    ipratropium-albuterol  3 mL Inhalation Q4H WA    Vitamin D  2,000 Units Oral Daily    sodium chloride flush  5-40 mL IntraVENous 2 times per day    [Held by provider] enoxaparin  30 mg SubCUTAneous Daily       Social History:     Social History     Socioeconomic History    Marital status:      Spouse name: Not on file    Number of children: Not on file    Years of education: Not on file    Highest education level: Not on file   Occupational History    Occupation: disability   Tobacco Use    Smoking status: Every Day     Packs/day: 0.50     Years: 33.00     Pack years: 16.50     Types: Cigarettes    Smokeless tobacco: Never    Tobacco comments:     quit  2000 started again  1/13    Substance and Sexual Activity    Alcohol use: Yes     Comment: occasional    Drug use: Not Currently    Sexual activity: Yes   Other Topics Concern    Not on file   Social History Narrative    Not on file     Social Determinants of Health     Financial Resource Strain: Not on file   Food Insecurity: Not on file   Transportation Needs: Not on file   Physical Activity: Not on file   Stress: Not on file   Social Connections: Not on file   Intimate Partner Violence: Not on file   Housing Stability: Not on file       Family History:     Family History   Problem Relation Age of Onset    Diabetes Mother     Cancer Mother     Coronary Art Dis Father     COPD Father     Depression Brother     Alcohol Abuse Brother     Cancer Other         lung and skin    Diabetes Maternal Grandmother     Cancer Paternal Grandmother       Medical Decision Making:   I have independently reviewed/ordered the following labs:    CBC with Differential:   Recent Labs     11/18/22  0536 11/19/22  0235   WBC 4.7 3.7   HGB 9.1* 9.0*   HCT 26.8* 27.6*   PLT 44* 42*   LYMPHOPCT 44 53*   MONOPCT 11* 11*       BMP:  Recent Labs     11/18/22  0536 11/19/22  0235    137   K 3.5* 3.2*    100   CO2 28 31   BUN 5* 6   CREATININE 0.95* 0.81   MG 1.6 1.7       Hepatic Function Panel:   Recent Labs     11/17/22  2045   PROT 6.6   BILIDIR 0.1   BILITOT 0.3       No results for input(s): RPR in the last 72 hours. No results for input(s): HIV in the last 72 hours. No results for input(s): BC in the last 72 hours. Lab Results   Component Value Date/Time    CREATININE 0.81 11/19/2022 02:35 AM    GLUCOSE 72 11/19/2022 02:35 AM       Detailed results:         Thank you for allowing us to participate in the care of this patient. Please call with questions. This note is created with the assistance of a speech recognition program.  While intending to generate adocument that actually reflects the content of the visit, the document can still have some errors including those of syntax and sound a like substitutions which may escape proof reading. It such instances, actual meaningcan be extrapolated by contextual diversion.     KELLY Baeza - CNP  Office: [phone]  Perfect serve / office [phone]

## 2022-11-21 ENCOUNTER — NURSE ONLY (OUTPATIENT)
Dept: LAB | Age: 52
End: 2022-11-21

## 2022-11-21 LAB
ABSOLUTE EOS #: 0 K/UL (ref 0–0.4)
ABSOLUTE LYMPH #: 1.66 K/UL (ref 1–4.8)
ABSOLUTE MONO #: 0.26 K/UL (ref 0.1–1.3)
ANION GAP SERPL CALCULATED.3IONS-SCNC: 6 MMOL/L (ref 9–17)
BASOPHILS # BLD: 0 % (ref 0–2)
BASOPHILS ABSOLUTE: 0 K/UL (ref 0–0.2)
BUN BLDV-MCNC: 11 MG/DL (ref 6–20)
CALCIUM SERPL-MCNC: 8.1 MG/DL (ref 8.6–10.4)
CHLORIDE BLD-SCNC: 101 MMOL/L (ref 98–107)
CO2: 29 MMOL/L (ref 20–31)
CREAT SERPL-MCNC: 0.81 MG/DL (ref 0.5–0.9)
EOSINOPHILS RELATIVE PERCENT: 0 % (ref 0–4)
GFR SERPL CREATININE-BSD FRML MDRD: >60 ML/MIN/1.73M2
GLUCOSE BLD-MCNC: 60 MG/DL (ref 70–99)
HCT VFR BLD CALC: 30.7 % (ref 36–46)
HEMOGLOBIN: 9.8 G/DL (ref 12–16)
LYMPHOCYTES # BLD: 58 % (ref 24–44)
MCH RBC QN AUTO: 28.2 PG (ref 26–34)
MCHC RBC AUTO-ENTMCNC: 32.1 G/DL (ref 31–37)
MCV RBC AUTO: 88 FL (ref 80–100)
MONOCYTES # BLD: 9 % (ref 1–7)
MORPHOLOGY: ABNORMAL
MORPHOLOGY: ABNORMAL
NUCLEATED RED BLOOD CELLS: 1 PER 100 WBC
PDW BLD-RTO: 24.5 % (ref 11.5–14.9)
PLATELET # BLD: 38 K/UL (ref 150–450)
PMV BLD AUTO: 6.7 FL (ref 6–12)
POTASSIUM SERPL-SCNC: 3.8 MMOL/L (ref 3.7–5.3)
RBC # BLD: 3.49 M/UL (ref 4–5.2)
SEG NEUTROPHILS: 33 % (ref 36–66)
SEGMENTED NEUTROPHILS ABSOLUTE COUNT: 0.95 K/UL (ref 1.3–9.1)
SODIUM BLD-SCNC: 136 MMOL/L (ref 135–144)
WBC # BLD: 2.9 K/UL (ref 3.5–11)

## 2022-11-21 PROCEDURE — 94761 N-INVAS EAR/PLS OXIMETRY MLT: CPT

## 2022-11-21 PROCEDURE — 99232 SBSQ HOSP IP/OBS MODERATE 35: CPT | Performed by: INTERNAL MEDICINE

## 2022-11-21 PROCEDURE — 80048 BASIC METABOLIC PNL TOTAL CA: CPT

## 2022-11-21 PROCEDURE — 6360000002 HC RX W HCPCS: Performed by: FAMILY MEDICINE

## 2022-11-21 PROCEDURE — 2580000003 HC RX 258: Performed by: FAMILY MEDICINE

## 2022-11-21 PROCEDURE — 6370000000 HC RX 637 (ALT 250 FOR IP): Performed by: FAMILY MEDICINE

## 2022-11-21 PROCEDURE — 6370000000 HC RX 637 (ALT 250 FOR IP): Performed by: INTERNAL MEDICINE

## 2022-11-21 PROCEDURE — 85025 COMPLETE CBC W/AUTO DIFF WBC: CPT

## 2022-11-21 PROCEDURE — 2060000000 HC ICU INTERMEDIATE R&B

## 2022-11-21 PROCEDURE — 36415 COLL VENOUS BLD VENIPUNCTURE: CPT

## 2022-11-21 PROCEDURE — 94640 AIRWAY INHALATION TREATMENT: CPT

## 2022-11-21 RX ADMIN — PREDNISONE 60 MG: 20 TABLET ORAL at 08:04

## 2022-11-21 RX ADMIN — CEFTAROLINE FOSAMIL 400 MG: 600 POWDER, FOR SOLUTION INTRAVENOUS at 00:10

## 2022-11-21 RX ADMIN — SODIUM CHLORIDE, PRESERVATIVE FREE 10 ML: 5 INJECTION INTRAVENOUS at 00:10

## 2022-11-21 RX ADMIN — DIVALPROEX SODIUM 1000 MG: 250 TABLET, DELAYED RELEASE ORAL at 00:11

## 2022-11-21 RX ADMIN — IPRATROPIUM BROMIDE AND ALBUTEROL SULFATE 3 ML: .5; 2.5 SOLUTION RESPIRATORY (INHALATION) at 11:17

## 2022-11-21 RX ADMIN — CARVEDILOL 25 MG: 25 TABLET ORAL at 08:07

## 2022-11-21 RX ADMIN — Medication 2000 UNITS: at 08:04

## 2022-11-21 RX ADMIN — SODIUM CHLORIDE, PRESERVATIVE FREE 10 ML: 5 INJECTION INTRAVENOUS at 08:04

## 2022-11-21 RX ADMIN — CEFTAROLINE FOSAMIL 400 MG: 600 POWDER, FOR SOLUTION INTRAVENOUS at 23:00

## 2022-11-21 RX ADMIN — CARVEDILOL 25 MG: 25 TABLET ORAL at 18:12

## 2022-11-21 RX ADMIN — ARIPIPRAZOLE 7.5 MG: 5 TABLET ORAL at 08:06

## 2022-11-21 RX ADMIN — IPRATROPIUM BROMIDE AND ALBUTEROL SULFATE 3 ML: .5; 2.5 SOLUTION RESPIRATORY (INHALATION) at 14:41

## 2022-11-21 RX ADMIN — IPRATROPIUM BROMIDE AND ALBUTEROL SULFATE 3 ML: .5; 2.5 SOLUTION RESPIRATORY (INHALATION) at 19:36

## 2022-11-21 RX ADMIN — FERROUS SULFATE TAB 325 MG (65 MG ELEMENTAL FE) 325 MG: 325 (65 FE) TAB at 08:04

## 2022-11-21 RX ADMIN — SODIUM CHLORIDE, PRESERVATIVE FREE 10 ML: 5 INJECTION INTRAVENOUS at 21:14

## 2022-11-21 RX ADMIN — HYDRALAZINE HYDROCHLORIDE 10 MG: 20 INJECTION INTRAMUSCULAR; INTRAVENOUS at 07:16

## 2022-11-21 RX ADMIN — FERROUS SULFATE TAB 325 MG (65 MG ELEMENTAL FE) 325 MG: 325 (65 FE) TAB at 00:11

## 2022-11-21 RX ADMIN — DIVALPROEX SODIUM 1000 MG: 250 TABLET, DELAYED RELEASE ORAL at 21:14

## 2022-11-21 RX ADMIN — AMLODIPINE BESYLATE 10 MG: 10 TABLET ORAL at 07:16

## 2022-11-21 RX ADMIN — CEFTAROLINE FOSAMIL 400 MG: 600 POWDER, FOR SOLUTION INTRAVENOUS at 12:23

## 2022-11-21 RX ADMIN — FOLIC ACID 1 MG: 1 TABLET ORAL at 08:04

## 2022-11-21 RX ADMIN — HYDRALAZINE HYDROCHLORIDE 10 MG: 20 INJECTION INTRAMUSCULAR; INTRAVENOUS at 01:28

## 2022-11-21 RX ADMIN — DULOXETIN HYDROCHLORIDE 60 MG: 30 CAPSULE, DELAYED RELEASE ORAL at 08:07

## 2022-11-21 RX ADMIN — FERROUS SULFATE TAB 325 MG (65 MG ELEMENTAL FE) 325 MG: 325 (65 FE) TAB at 21:14

## 2022-11-21 RX ADMIN — DIVALPROEX SODIUM 1000 MG: 250 TABLET, DELAYED RELEASE ORAL at 08:07

## 2022-11-21 RX ADMIN — IPRATROPIUM BROMIDE AND ALBUTEROL SULFATE 3 ML: .5; 2.5 SOLUTION RESPIRATORY (INHALATION) at 07:55

## 2022-11-21 RX ADMIN — HYDRALAZINE HYDROCHLORIDE 10 MG: 20 INJECTION INTRAMUSCULAR; INTRAVENOUS at 21:58

## 2022-11-21 ASSESSMENT — PAIN SCALES - GENERAL: PAINLEVEL_OUTOF10: 0

## 2022-11-21 NOTE — PLAN OF CARE
Problem: Discharge Planning  Goal: Discharge to home or other facility with appropriate resources  11/21/2022 0113 by Pascale Carlos RN  Outcome: Progressing  11/20/2022 1542 by Lula Mitchell RN  Outcome: Progressing     Problem: Safety - Adult  Goal: Free from fall injury  11/21/2022 0113 by Pascale Carlos RN  Outcome: Progressing  11/20/2022 1542 by Lula Mitchell RN  Outcome: Progressing     Problem: Skin/Tissue Integrity  Goal: Absence of new skin breakdown  Description: 1. Monitor for areas of redness and/or skin breakdown  2. Assess vascular access sites hourly  3. Every 4-6 hours minimum:  Change oxygen saturation probe site  4. Every 4-6 hours:  If on nasal continuous positive airway pressure, respiratory therapy assess nares and determine need for appliance change or resting period.   11/21/2022 0113 by Pascale Carlos RN  Outcome: Progressing  11/20/2022 1542 by Lula Mitchell RN  Outcome: Progressing     Problem: ABCDS Injury Assessment  Goal: Absence of physical injury  11/21/2022 0113 by Pasacle Carlos RN  Outcome: Progressing  11/20/2022 1542 by Lula Mitchell RN  Outcome: Progressing  Flowsheets (Taken 11/20/2022 0257 by Pascale Carlos RN)  Absence of Physical Injury: Implement safety measures based on patient assessment

## 2022-11-21 NOTE — PROGRESS NOTES
[facility] -   Infectious diseases evaluation  admission date 11/15/2022    reason for consultation:   MRSA infection    Impression :   Current:  Hypokalemia  Recent MRSA bacteremia  Recent empyema/pulmonary nodules with cavitation status post chest tube placement that was subsequently removed on 10/31/2022. Thrombocytopenia and anemia (thought to be related to vancomycin that was discontinued last admission). Renal insufficiency  History of cocaine drug abuse  Hypertension  Bipolar disorder  Allergy to Bactrim. Recommendations   Continue IV Teflaro 400 mg twice a day as planned through 12/3/2022  Chest x-ray showed extensive pleural parenchymal disease bilaterally with no change. CT chest without contrast on 11/18/2022 showed resolution of bilateral pleural effusion, nodules/masses bilaterally with resolution of cavitations. Follow CBC and renal function      Infection Control Recommendations   [place] Precautions  Contact Isolation       Antimicrobial Stewardship Recommendations   Simplification of therapy  Targeted therapy      History of Present Illness:   Initial history:  Mile Parker is a 46y.o.-year-old female was brought to the hospital from SNF for hypokalemia with reported potassium of 2.4. Patient is complaining of body ache, generalized weakness and abdominal discomfort for 2 days. Symptoms are moderate, no alleviating or aggravating factors. She denied cough or shortness of breath, no other complaints. Right arm PICC line in place  The patient had prolonged hospitalization recently for MRSA bacteremia and empyema status post chest tube placement but was subsequently removed. She was discharged to skilled nursing facility on 11/4/2024 on IV Teflaro through 12/3/2022  Interval changes  11/21/2022   She is afebrile, blood pressure on the high side, denied significant pain, no new complaints.   Status post bone marrow biopsy 11/18/2022  Patient Vitals for the past 8 hrs:   BP Temp Temp src Pulse Resp SpO2   11/21/22 0758 -- -- -- -- -- 100 %   11/21/22 0711 (!) 197/85 -- -- 62 -- --   11/21/22 0700 (!) 192/84 98 °F (36.7 °C) Oral 65 18 97 %   11/21/22 0305 (!) 141/64 -- -- 63 -- --   11/21/22 0127 (!) 198/89 -- -- 77 -- --               I have personally reviewed the past medical history, past surgical history, medications, social history, and family history, and I haveupdated the database accordingly.       Allergies:   Aspirin, Bactrim, and Codeine     Review of Systems:     Review of Systems  As per history of present illness, other than above 12 system review was negative  Physical Examination :       Physical Exam  Head: Normocephalic, atraumatic  Eye: Pupils equal round reactive to light, no conjunctivitis  ENT: No throat erythema or thrush  Neck: Supple, no JVD  Heart: Regular rate and rhythm no murmurs  Lungs: Clear to auscultation bilaterally, no respiratory distress  Abdomen: Soft, nontender, nondistended, with no peritoneal signs  MSK: No joint swelling or erythema  Neurologic: Patient is alert and oriented x3,, fluent speech  Extremities: No edema, no clubbing, no cyanosis  Past Medical History:     Past Medical History:   Diagnosis Date    Anemia     Arthritis     Asthma     Bipolar disorder, mixed ([facility] [address]     Carotid artery stenosis 2/13/2020    Cocaine abuse ([facility] [address] 11/4/2014    COPD (chronic obstructive pulmonary disease) (HCC)     Degeneration of cervical intervertebral disc     Depression with anxiety     Depression with anxiety     Fibromyalgia 1/5/2017    GERD (gastroesophageal reflux disease)     History of migraine headaches 6/10/2014    History of renal calculi 6/10/2014    History of seizure disorder 6/10/2014    Hoarseness of voice     HTN (hypertension) 6/10/2014    Hyperlipidemia 6/10/2014    IGT (impaired glucose tolerance) 6/10/2014    Kidney stones     Lactose intolerance 6/10/2014    Leukopenia 6/10/2014 Major depressive disorder, recurrent episode, severe, without mention of psychotic behavior 2014    MVP (mitral valve prolapse)     Seizures (HCC)     Sleep apnea 6/10/2014    Unspecified diseases of blood and blood-forming organs        Past Surgical  History:     Past Surgical History:   Procedure Laterality Date    ANKLE FRACTURE SURGERY      recontruction surgery    APPENDECTOMY      BREAST LUMPECTOMY Right     CARDIAC CATHETERIZATION      CARPAL TUNNEL RELEASE      x2     SECTION      CHOLECYSTECTOMY      COLONOSCOPY  12    COLONOSCOPY  2016    severe spasms    COLONOSCOPY  2019    DR GOLDY MCINTOSH    CT BONE MARROW BIOPSY  2022    CT BONE MARROW BIOPSY 2022 [facility] CT SCAN    GASTRIC BYPASS SURGERY      GASTRIC BYPASS SURGERY      HYSTERECTOMY (CERVIX STATUS UNKNOWN)      HYSTERECTOMY (CERVIX STATUS UNKNOWN)      IR PERC CATH PLEURAL DRAIN W/IMAG  10/21/2022    IR PERC CATH PLEURAL DRAIN W/IMAG 10/21/2022 [facility]    IR PERC CATH PLEURAL DRAIN W/IMAG  10/21/2022    IR PERC CATH PLEURAL DRAIN W/IMAG 10/21/2022 [facility]    IR PERC CATH PLEURAL DRAIN W/IMAG  10/24/2022    IR PERC CATH PLEURAL DRAIN W/IMAG 10/24/2022 [facility] PROCED[facility]    LAPAROSCOPY N/A 2022    LAPAROSCOPY EXPLORATORY CONVERTED TO OPEN EXPLORATORY LAPAROTOMY, LYSIS OF ADHESIONS, REDUCTION OF INTERNAL HERNIA performed by Betty Moralez DO at [facility]      APPLICATION OF ARCH BARS,SIMPLE EXTRACTION OF #15 AND RT TMJ JOINT REPLACEMENT    SHOULDER ARTHROSCOPY Left 2019    DR ROBERT GREEN    SHOULDER SURGERY      TONSILLECTOMY AND ADENOIDECTOMY      TRANSESOPHAGEAL ECHOCARDIOGRAM N/A 10/19/2022    TRANSESOPHAGEAL ECHOCARDIOGRAM WITH BUBBLE STUDY performed by Frandy Royal DO at [address]  7-3-12    egd    UPPER GASTROINTESTINAL ENDOSCOPY  2016    status post gastrectomy with a small remnant of gastric pouch.     UPPER GASTROINTESTINAL ENDOSCOPY  05/2019    DR Sarah Che       Medications:      predniSONE  60 mg Oral Daily    amLODIPine  10 mg Oral Daily    folic acid  1 mg Oral Daily    ceftaroline fosamil (TEFLARO) IVPB  400 mg IntraVENous Q12H    DULoxetine  60 mg Oral Daily    carvedilol  25 mg Oral BID WC    ARIPiprazole  7.5 mg Oral Daily    divalproex  1,000 mg Oral BID    ferrous sulfate  325 mg Oral BID    ipratropium-albuterol  3 mL Inhalation Q4H WA    Vitamin D  2,000 Units Oral Daily    sodium chloride flush  5-40 mL IntraVENous 2 times per day    [Held by provider] enoxaparin  30 mg SubCUTAneous Daily       Social History:     Social History     Socioeconomic History    Marital status:      Spouse name: Not on file    Number of children: Not on file    Years of education: Not on file    Highest education level: Not on file   Occupational History    Occupation: disability   Tobacco Use    Smoking status: Every Day     Packs/day: 0.50     Years: 33.00     Pack years: 16.50     Types: Cigarettes    Smokeless tobacco: Never    Tobacco comments:     quit  2000 started again  1/13    Substance and Sexual Activity    Alcohol use: Yes     Comment: occasional    Drug use: Not Currently    Sexual activity: Yes   Other Topics Concern    Not on file   Social History Narrative    Not on file     Social Determinants of Health     Financial Resource Strain: Not on file   Food Insecurity: Not on file   Transportation Needs: Not on file   Physical Activity: Not on file   Stress: Not on file   Social Connections: Not on file   Intimate Partner Violence: Not on file   Housing Stability: Not on file       Family History:     Family History   Problem Relation Age of Onset    Diabetes Mother     Cancer Mother     Coronary Art Dis Father     COPD Father     Depression Brother     Alcohol Abuse Brother     Cancer Other         lung and skin    Diabetes Maternal Grandmother     Cancer Paternal Grandmother       Medical Decision Making:   I have independently reviewed/ordered the following labs:    CBC with Differential:   Recent Labs     11/19/22  0235 11/21/22  0519   WBC 3.7 2.9*   HGB 9.0* 9.8*   HCT 27.6* 30.7*   PLT 42* 38*   LYMPHOPCT 53* 58*   MONOPCT 11* 9*       BMP:  Recent Labs     11/19/22  0235 11/21/22  0519    136   K 3.2* 3.8    101   CO2 31 29   BUN 6 11   CREATININE 0.81 0.81   MG 1.7  --        Hepatic Function Panel: No results for input(s): PROT, LABALBU, BILIDIR, IBILI, BILITOT, ALKPHOS, ALT, AST in the last 72 hours. No results for input(s): RPR in the last 72 hours. No results for input(s): HIV in the last 72 hours. No results for input(s): BC in the last 72 hours. Lab Results   Component Value Date/Time    CREATININE 0.81 11/21/2022 05:19 AM    GLUCOSE 60 11/21/2022 05:19 AM       Detailed results: Thank you for allowing us to participate in the care of this patient. Please call with questions. This note is created with the assistance of a speech recognition program.  While intending to generate adocument that actually reflects the content of the visit, the document can still have some errors including those of syntax and sound a like substitutions which may escape proof reading. It such instances, actual meaningcan be extrapolated by contextual diversion.     Eleazar Barry MD  Office: [phone]  Perfect serve / office [phone]

## 2022-11-21 NOTE — CARE COORDINATION
ONGOING DISCHARGE PLAN:    Patient is alert and oriented x4. Spoke with patient regarding discharge plan and patient confirms that plan is still to discharge to [address] started on 49/38    Will continue IV atb until 12/3    Need updated PT/OT notes  Informed pt/ot     Will continue to follow for additional discharge needs.     Electronically signed by Artur Emery RN on 11/21/2022 at 12:12 PM yes

## 2022-11-21 NOTE — PROGRESS NOTES
Progress Note  Date:2022       Room:[address] [place] [address]  Patient Name:Anu Bourne     YOB: 1970     Age:51 y.o. Subjective    Subjective:  Symptoms:  Stable. Diet:  Adequate intake. Activity level: Impaired due to weakness. Pain:  She reports no pain. Review of Systems  Objective         Vitals Last 24 Hours:  TEMPERATURE:  Temp  Av.1 °F (36.7 °C)  Min: 97.5 °F (36.4 °C)  Max: 98.7 °F (37.1 °C)  RESPIRATIONS RANGE: Resp  Av.4  Min: 16  Max: 18  PULSE OXIMETRY RANGE: SpO2  Av %  Min: 95 %  Max: 100 %  PULSE RANGE: Pulse  Av  Min: 56  Max: 78  BLOOD PRESSURE RANGE: Systolic (61DHE), YHM:200 , Min:141 , OBM:427   ; Diastolic (82HFL), ENM:36, Min:64, Max:89    I/O (24Hr): Intake/Output Summary (Last 24 hours) at 2022 2402  Last data filed at 2022 0129  Gross per 24 hour   Intake 100 ml   Output --   Net 100 ml     Objective:  General Appearance:  Comfortable. Vital signs: (most recent): Blood pressure (!) 197/85, pulse 62, temperature 98 °F (36.7 °C), temperature source Oral, resp. rate 18, height 4' 11.02\" (1.499 m), weight 93 lb 0.6 oz (42.2 kg), SpO2 100 %. Labs/Imaging/Diagnostics    Labs:  CBC:  Recent Labs     22   WBC 3.7 2.9*   RBC 3.27* 3.49*   HGB 9.0* 9.8*   HCT 27.6* 30.7*   MCV 84.3 88.0   RDW 21.5* 24.5*   PLT 42* 38*     CHEMISTRIES:  Recent Labs     22    136   K 3.2* 3.8    101   CO2 31 29   BUN 6 11   CREATININE 0.81 0.81   GLUCOSE 72 60*   MG 1.7  --      PT/INR:No results for input(s): PROTIME, INR in the last 72 hours. APTT:No results for input(s): APTT in the last 72 hours. LIVER PROFILE:No results for input(s): AST, ALT, BILIDIR, BILITOT, ALKPHOS in the last 72 hours. Imaging Last 24 Hours:  No results found.   Assessment//Plan           Hospital Problems             Last Modified POA    * (Principal) Hypokalemia 11/15/2022 Yes    Severe malnutrition ([facility] [address] (Chronic) 11/16/2022 Yes    Pulmonary nodules 11/16/2022 Yes    Cavitating mass of lung 11/16/2022 Yes    Severe thrombocytopenia ([facility] [address] 11/17/2022 Yes    Severe anemia 11/17/2022 Yes     Electronically signed by Corona Reid MD on 11/21/22 at 8:33 AM EST

## 2022-11-22 LAB
ABSOLUTE BANDS #: 0.03 K/UL (ref 0–1)
ABSOLUTE EOS #: 0 K/UL (ref 0–0.4)
ABSOLUTE LYMPH #: 1.29 K/UL (ref 1–4.8)
ABSOLUTE MONO #: 0.45 K/UL (ref 0.1–1.3)
ADAMTS13 ACTIVITY: 57 %
ANION GAP SERPL CALCULATED.3IONS-SCNC: 6 MMOL/L (ref 9–17)
BANDS: 1 % (ref 0–10)
BASOPHILS # BLD: 0 % (ref 0–2)
BASOPHILS ABSOLUTE: 0 K/UL (ref 0–0.2)
BONE MARROW REPORT: NORMAL
BUN BLDV-MCNC: 12 MG/DL (ref 6–20)
CALCIUM SERPL-MCNC: 7.8 MG/DL (ref 8.6–10.4)
CHLORIDE BLD-SCNC: 101 MMOL/L (ref 98–107)
CO2: 29 MMOL/L (ref 20–31)
CREAT SERPL-MCNC: 0.7 MG/DL (ref 0.5–0.9)
EOSINOPHILS RELATIVE PERCENT: 0 % (ref 0–4)
GFR SERPL CREATININE-BSD FRML MDRD: >60 ML/MIN/1.73M2
GLUCOSE BLD-MCNC: 78 MG/DL (ref 70–99)
HCT VFR BLD CALC: 26.2 % (ref 36–46)
HEMOGLOBIN: 8.6 G/DL (ref 12–16)
LYMPHOCYTES # BLD: 52 % (ref 24–44)
MCH RBC QN AUTO: 28.5 PG (ref 26–34)
MCHC RBC AUTO-ENTMCNC: 32.7 G/DL (ref 31–37)
MCV RBC AUTO: 87 FL (ref 80–100)
MONOCYTES # BLD: 18 % (ref 1–7)
MORPHOLOGY: ABNORMAL
PDW BLD-RTO: 26.2 % (ref 11.5–14.9)
PLATELET # BLD: 28 K/UL (ref 150–450)
PMV BLD AUTO: 6.5 FL (ref 6–12)
POTASSIUM SERPL-SCNC: 2.8 MMOL/L (ref 3.7–5.3)
POTASSIUM SERPL-SCNC: 4.3 MMOL/L (ref 3.7–5.3)
RBC # BLD: 3.02 M/UL (ref 4–5.2)
SEG NEUTROPHILS: 29 % (ref 36–66)
SEGMENTED NEUTROPHILS ABSOLUTE COUNT: 0.73 K/UL (ref 1.3–9.1)
SODIUM BLD-SCNC: 136 MMOL/L (ref 135–144)
WBC # BLD: 2.5 K/UL (ref 3.5–11)

## 2022-11-22 PROCEDURE — 2060000000 HC ICU INTERMEDIATE R&B

## 2022-11-22 PROCEDURE — 6370000000 HC RX 637 (ALT 250 FOR IP): Performed by: FAMILY MEDICINE

## 2022-11-22 PROCEDURE — 2580000003 HC RX 258: Performed by: FAMILY MEDICINE

## 2022-11-22 PROCEDURE — 84132 ASSAY OF SERUM POTASSIUM: CPT

## 2022-11-22 PROCEDURE — 99232 SBSQ HOSP IP/OBS MODERATE 35: CPT | Performed by: INTERNAL MEDICINE

## 2022-11-22 PROCEDURE — 80048 BASIC METABOLIC PNL TOTAL CA: CPT

## 2022-11-22 PROCEDURE — 94761 N-INVAS EAR/PLS OXIMETRY MLT: CPT

## 2022-11-22 PROCEDURE — 97530 THERAPEUTIC ACTIVITIES: CPT

## 2022-11-22 PROCEDURE — 97535 SELF CARE MNGMENT TRAINING: CPT

## 2022-11-22 PROCEDURE — 94640 AIRWAY INHALATION TREATMENT: CPT

## 2022-11-22 PROCEDURE — 6370000000 HC RX 637 (ALT 250 FOR IP): Performed by: INTERNAL MEDICINE

## 2022-11-22 PROCEDURE — 97116 GAIT TRAINING THERAPY: CPT

## 2022-11-22 PROCEDURE — 36415 COLL VENOUS BLD VENIPUNCTURE: CPT

## 2022-11-22 PROCEDURE — 97166 OT EVAL MOD COMPLEX 45 MIN: CPT

## 2022-11-22 PROCEDURE — 97162 PT EVAL MOD COMPLEX 30 MIN: CPT

## 2022-11-22 PROCEDURE — 85025 COMPLETE CBC W/AUTO DIFF WBC: CPT

## 2022-11-22 PROCEDURE — 6360000002 HC RX W HCPCS: Performed by: FAMILY MEDICINE

## 2022-11-22 RX ADMIN — POTASSIUM CHLORIDE 10 MEQ: 7.46 INJECTION, SOLUTION INTRAVENOUS at 18:01

## 2022-11-22 RX ADMIN — Medication 2000 UNITS: at 08:27

## 2022-11-22 RX ADMIN — CARVEDILOL 25 MG: 25 TABLET ORAL at 16:52

## 2022-11-22 RX ADMIN — AMLODIPINE BESYLATE 10 MG: 10 TABLET ORAL at 08:27

## 2022-11-22 RX ADMIN — POTASSIUM CHLORIDE 10 MEQ: 7.46 INJECTION, SOLUTION INTRAVENOUS at 16:50

## 2022-11-22 RX ADMIN — POTASSIUM CHLORIDE 10 MEQ: 7.46 INJECTION, SOLUTION INTRAVENOUS at 10:57

## 2022-11-22 RX ADMIN — IPRATROPIUM BROMIDE AND ALBUTEROL SULFATE 3 ML: .5; 2.5 SOLUTION RESPIRATORY (INHALATION) at 15:23

## 2022-11-22 RX ADMIN — DIVALPROEX SODIUM 1000 MG: 250 TABLET, DELAYED RELEASE ORAL at 08:31

## 2022-11-22 RX ADMIN — PREDNISONE 60 MG: 20 TABLET ORAL at 08:27

## 2022-11-22 RX ADMIN — POTASSIUM CHLORIDE 10 MEQ: 7.46 INJECTION, SOLUTION INTRAVENOUS at 08:35

## 2022-11-22 RX ADMIN — POTASSIUM CHLORIDE 10 MEQ: 7.46 INJECTION, SOLUTION INTRAVENOUS at 19:02

## 2022-11-22 RX ADMIN — CARVEDILOL 25 MG: 25 TABLET ORAL at 08:29

## 2022-11-22 RX ADMIN — FERROUS SULFATE TAB 325 MG (65 MG ELEMENTAL FE) 325 MG: 325 (65 FE) TAB at 20:55

## 2022-11-22 RX ADMIN — ARIPIPRAZOLE 7.5 MG: 5 TABLET ORAL at 08:30

## 2022-11-22 RX ADMIN — FERROUS SULFATE TAB 325 MG (65 MG ELEMENTAL FE) 325 MG: 325 (65 FE) TAB at 08:27

## 2022-11-22 RX ADMIN — DULOXETIN HYDROCHLORIDE 60 MG: 30 CAPSULE, DELAYED RELEASE ORAL at 08:30

## 2022-11-22 RX ADMIN — SODIUM CHLORIDE, PRESERVATIVE FREE 10 ML: 5 INJECTION INTRAVENOUS at 08:31

## 2022-11-22 RX ADMIN — POTASSIUM CHLORIDE 10 MEQ: 7.46 INJECTION, SOLUTION INTRAVENOUS at 14:13

## 2022-11-22 RX ADMIN — IPRATROPIUM BROMIDE AND ALBUTEROL SULFATE 3 ML: .5; 2.5 SOLUTION RESPIRATORY (INHALATION) at 08:13

## 2022-11-22 RX ADMIN — DIVALPROEX SODIUM 1000 MG: 250 TABLET, DELAYED RELEASE ORAL at 20:55

## 2022-11-22 RX ADMIN — IPRATROPIUM BROMIDE AND ALBUTEROL SULFATE 3 ML: .5; 2.5 SOLUTION RESPIRATORY (INHALATION) at 11:40

## 2022-11-22 RX ADMIN — IPRATROPIUM BROMIDE AND ALBUTEROL SULFATE 3 ML: .5; 2.5 SOLUTION RESPIRATORY (INHALATION) at 19:19

## 2022-11-22 RX ADMIN — FOLIC ACID 1 MG: 1 TABLET ORAL at 08:27

## 2022-11-22 RX ADMIN — CEFTAROLINE FOSAMIL 400 MG: 600 POWDER, FOR SOLUTION INTRAVENOUS at 12:28

## 2022-11-22 NOTE — PROGRESS NOTES
Today's Date: 2022  Patient Name: Praveena Raya  Date of admission: 11/15/2022  4:51 PM  Patient's age: 46 y. o., 1970  Admission Dx: Hypokalemia [E87.6]    Reason for Consult: management recommendations  Requesting Physician: Corona Reid MD    CHIEF COMPLAINT: Severe anemia    INTERVAL HISTORY:    Patient seen and examined  Hb 9.8  Plt 38 K stable   +fatigue    HISTORY OF PRESENT ILLNESS:    The patient is a 46 y.o.  female who is admitted to the hospital for hypokalemia with potassium of 2.4 patient recently hospitalized for MRSA bacteremia and empyema and had anemia and thrombocytopenia thought related to bacteremia and sepsis and antibiotic/vancomycin, patient had pulmonary nodules with cavitation, hemoglobin was 5.3 with WBC 4.0 and platelet count of 32    Past Medical History:   has a past medical history of Anemia, Arthritis, Asthma, Bipolar disorder, mixed ([address] Carotid artery stenosis, Cocaine abuse ([address] COPD (chronic obstructive pulmonary disease) ([address] Degeneration of cervical intervertebral disc, Depression with anxiety, Depression with anxiety, Fibromyalgia, GERD (gastroesophageal reflux disease), History of migraine headaches, History of renal calculi, History of seizure disorder, Hoarseness of voice, HTN (hypertension), Hyperlipidemia, IGT (impaired glucose tolerance), Kidney stones, Lactose intolerance, Leukopenia, Major depressive disorder, recurrent episode, severe, without mention of psychotic behavior, MVP (mitral valve prolapse), Seizures ([address] Sleep apnea, and Unspecified diseases of blood and blood-forming organs. Past Surgical History:   has a past surgical history that includes Gastric bypass surgery; Cholecystectomy; Hysterectomy; Appendectomy; Hysterectomy; Tonsillectomy and adenoidectomy; Ankle fracture surgery; Neck surgery; shoulder surgery;  section; Carpal tunnel release; Colonoscopy (12);  Upper gastrointestinal endoscopy (7-3-12); Gastric bypass surgery; Breast lumpectomy (Right); Cardiac catheterization; Upper gastrointestinal endoscopy (2016); Colonoscopy (2016); Upper gastrointestinal endoscopy (2019); Colonoscopy (2019); Shoulder arthroscopy (Left, 2019); other surgical history (10/24/2019); laparoscopy (N/A, 2022); transesophageal echocardiogram (N/A, 10/19/2022); IR GUIDED PERC PLEURAL DRAIN W CATH INSERT (10/21/2022); IR GUIDED PERC PLEURAL DRAIN W CATH INSERT (10/21/2022); IR GUIDED PERC PLEURAL DRAIN W CATH INSERT (10/24/2022); and CT BIOPSY BONE MARROW (2022). Medications:    Reviewed in Epic     Allergies:  Aspirin, Bactrim, and Codeine    Social History:   reports that she has been smoking cigarettes. She has a 16.50 pack-year smoking history. She has never used smokeless tobacco. She reports current alcohol use. She reports that she does not currently use drugs. Family History: family history includes Alcohol Abuse in her brother; COPD in her father; Cancer in her mother, paternal grandmother, and another family member; Coronary Art Dis in her father; Depression in her brother; Diabetes in her maternal grandmother and mother.     REVIEW OF SYSTEMS:    14 point review of system has been obtained unremarkable although it was mentioned above    PHYSICAL EXAM:      BP (!) 172/92   Pulse 66   Temp 98.4 °F (36.9 °C) (Axillary)   Resp 18   Ht 4' 11.02\" (1.499 m)   Wt 93 lb 0.6 oz (42.2 kg)   SpO2 96%   BMI 18.78 kg/m²    Temp (24hrs), Av °F (36.7 °C), Min:97.5 °F (36.4 °C), Max:98.4 °F (36.9 °C)    General appearance - well appearing, no in pain or distress   Mental status - alert and cooperative   Eyes - pupils equal and reactive, extraocular eye movements intact   Ears - bilateral TM's and external ear canals normal   Mouth - mucous membranes moist, pharynx normal without lesions   Neck - supple, no significant adenopathy   Lymphatics - no palpable lymphadenopathy, no hepatosplenomegaly   Chest - clear to auscultation, no wheezes, rales or rhonchi, symmetric air entry   Heart - normal rate, regular rhythm, normal S1, S2, no murmurs  Abdomen - soft, nontender, nondistended, no masses or organomegaly   Neurological - alert, oriented, normal speech, no focal findings or movement disorder noted   Musculoskeletal - no joint tenderness, deformity or swelling   Extremities - peripheral pulses normal, no pedal edema, no clubbing or cyanosis   Skin - normal coloration and turgor, no rashes, no suspicious skin lesions noted ,    DATA:    Labs:   CBC:   Recent Labs     11/19/22 0235 11/21/22 0519   WBC 3.7 2.9*   HGB 9.0* 9.8*   HCT 27.6* 30.7*   PLT 42* 38*       BMP:   Recent Labs     11/19/22 0235 11/21/22 0519    136   K 3.2* 3.8   CO2 31 29   BUN 6 11   CREATININE 0.81 0.81   LABGLOM >60 >60   GLUCOSE 72 60*       PT/INR: No results for input(s): PROTIME, INR in the last 72 hours. IMAGING DATA:  CT CHEST WO CONTRAST   Final Result   Resolving pleural effusions bilaterally. Numerous pulmonary      nodules/masses redemonstrated bilaterally with resolving cavities associated   with multiple lesions. Resolving bilateral pleural effusions         CT BIOPSY BONE MARROW   Final Result   Successful CT-guided right posterior ilium bone marrow biopsy, as above. XR CHEST (SINGLE VIEW FRONTAL)   Final Result   1. Right PICC tip terminates within the lower right atrium. Retraction by   5-6 cm and radiographic follow-up suggested. 2.  Extensive pleural/parenchymal disease bilaterally without significant   overall change from prior.            Primary Problem  Hypokalemia    Active Hospital Problems    Diagnosis Date Noted    Severe thrombocytopenia ([address] [D69.6] 11/17/2022     Priority: Medium    Severe anemia [D64.9] 11/17/2022     Priority: Medium    Cavitating mass of lung [J98.4] 10/23/2022     Priority: Medium    Pulmonary nodules [R91.8] 10/15/2022 Priority: Medium    Severe malnutrition ([facility] [address] [E43] 10/11/2022     Priority: Medium    Hypokalemia [E87.6] 12/18/2015         IMPRESSION:   Severe anemia and thrombocytopenia  Pulmonary nodules, numerous with cavities  Bilateral pleural effusions  MRSA bacteremia  Hypokalemia    RECOMMENDATIONS:  I reviewed the laboratory, imaging studies, discussed diagnosis and other treatment recommendations   Patient had bone marrow biopsy on 11/18/2022  Will follow results of bone marrow biopsy  Patient has low folic acid level and receiving folic acid and also on iron pill  Hemolysis panel anemia work-up and myeloma/DIC panel  Transfuse to keep hemoglobin above 7 and platelet above 10 unless there is a bleeding to keep platelet above 30  Patient had bilateral pulmonary nodules during her previous admission and has been evaluated by ID and pulmonary at that time and was thought to be suspected septic emboli. She would need follow-up CT scan/PET scan as outpatient  Other management as per primary team  Monitor counts closely  Discharge as per primary    Discussed with patient and Nurse. Thank you for asking us to see this patient. Fortunato Devine MD  Hematologist/Medical Oncologist                      This note is created with the assistance of a speech recognition program.  While intending to generate a document that actually reflects the content of the visit, the document can still have some errors including those of syntax and sound a like substitutions which may escape proof reading. It such instances, actual meaning can be extrapolated by contextual diversion.

## 2022-11-22 NOTE — PLAN OF CARE
Problem: Discharge Planning  Goal: Discharge to home or other facility with appropriate resources  Outcome: Progressing     Problem: Safety - Adult  Goal: Free from fall injury  Outcome: Progressing     Problem: Skin/Tissue Integrity  Goal: Absence of new skin breakdown  Outcome: Progressing     Problem: ABCDS Injury Assessment  Goal: Absence of physical injury  Outcome: Progressing

## 2022-11-22 NOTE — PLAN OF CARE
Problem: Skin/Tissue Integrity  Goal: Absence of new skin breakdown  Description: 1. Monitor for areas of redness and/or skin breakdown  2. Assess vascular access sites hourly  3. Every 4-6 hours minimum:  Change oxygen saturation probe site  4. Every 4-6 hours:  If on nasal continuous positive airway pressure, respiratory therapy assess nares and determine need for appliance change or resting period.   11/22/2022 0248 by Karlo Montoya RN  Outcome: Progressing    Problem: ABCDS Injury Assessment  Goal: Absence of physical injury  11/22/2022 0248 by Karlo Montoya RN  Outcome: Progressing     Problem: Safety - Adult  Goal: Free from fall injury  11/22/2022 0248 by Karlo Montoya RN  Outcome: Progressing

## 2022-11-22 NOTE — PROGRESS NOTES
[address]    Occupational Therapy Evaluation  Date: 22  Patient Name: Makenna Mckeon       Room:   MRN: [id number]  Account: [de-identified]   : 1970  (46 y.o.) Gender: female     Discharge Recommendations:  Further Occupational Therapy is recommended upon facility discharge. Patient is currently unsafe to return to prior living arrangements without 24 hour assistance. OT Equipment Recommendations  Other: TBD    Referring Practitioner: Elena Deleon MD  Diagnosis: Hypokalemia Additional Pertinent Hx: Copied from chart: 46 y.o. female presents with c/o hypokalemia. Pt sent from NH for evaluation of hypokalemia. Potassium reported as 2.3. Pt denies n/v. She does not take any diuretics. K+ on 11/3/22 was 3.3. Pt reports fatigue, body aches, and tremors. Treatment Diagnosis: impaired self care status    Past Medical History:  has a past medical history of Anemia, Arthritis, Asthma, Bipolar disorder, mixed ([address] Carotid artery stenosis, Cocaine abuse ([address] COPD (chronic obstructive pulmonary disease) ([address] Degeneration of cervical intervertebral disc, Depression with anxiety, Depression with anxiety, Fibromyalgia, GERD (gastroesophageal reflux disease), History of migraine headaches, History of renal calculi, History of seizure disorder, Hoarseness of voice, HTN (hypertension), Hyperlipidemia, IGT (impaired glucose tolerance), Kidney stones, Lactose intolerance, Leukopenia, Major depressive disorder, recurrent episode, severe, without mention of psychotic behavior, MVP (mitral valve prolapse), Seizures ([address] Sleep apnea, and Unspecified diseases of blood and blood-forming organs. Past Surgical History:   has a past surgical history that includes Gastric bypass surgery; Cholecystectomy; Hysterectomy; Appendectomy; Hysterectomy; Tonsillectomy and adenoidectomy; Ankle fracture surgery; Neck surgery; shoulder surgery;   section; Carpal tunnel release; Colonoscopy (07/03/12); Upper gastrointestinal endoscopy (7-3-12); Gastric bypass surgery; Breast lumpectomy (Right); Cardiac catheterization; Upper gastrointestinal endoscopy (12/19/2016); Colonoscopy (12/19/2016); Upper gastrointestinal endoscopy (05/2019); Colonoscopy (05/20/2019); Shoulder arthroscopy (Left, 06/05/2019); other surgical history (10/24/2019); laparoscopy (N/A, 1/29/2022); transesophageal echocardiogram (N/A, 10/19/2022); IR GUIDED PERC PLEURAL DRAIN W CATH INSERT (10/21/2022); IR GUIDED PERC PLEURAL DRAIN W CATH INSERT (10/21/2022); IR GUIDED PERC PLEURAL DRAIN W CATH INSERT (10/24/2022); and CT BIOPSY BONE MARROW (11/18/2022). Restrictions  Restrictions/Precautions  Restrictions/Precautions: Fall Risk;General Precautions;Contact Precautions; Up as Tolerated  Required Braces or Orthoses?: No  Implants present? :  (pt denies)      Vitals  Vitals  Heart Rate: 72  Heart Rate Source: Monitor  BP: (!) 162/74  MAP (Calculated): 103  Resp: 16  SpO2: 98 %  O2 Device: None (Room air)     Subjective  Subjective: \"I was up walking the hallways with my dad last night\" pt reports. Pt is motivated to participate in therapy this date. Comments: LUIS Perez ok'd pt for PT/OT eval and treat. Pt agreeable to participate and pleasant/cooperative throughout. Subjective  Pain: pt denies pain      Social/Functional History  Social/Functional History  Lives With: Alone  Type of Home: Apartment  Home Layout: One level  Home Access: Stairs to enter with rails  Entrance Stairs - Number of Steps: 1  BERTRAM into building and 2-3 steps to descend to basement level apt.   Entrance Stairs - Rails: Right  Bathroom Shower/Tub: Tub/Shower unit  Bathroom Toilet: Standard  Bathroom Equipment: Grab bars in shower  Bathroom Accessibility: Accessible  Home Equipment: Hospital bed  Has the patient had two or more falls in the past year or any fall with injury in the past year?: Unknown  Receives Help From: Family  ADL Assistance: Independent  Homemaking Assistance: Independent (Pt performs Laundry at [facility])  [employer] Responsibilities: Yes  Ambulation Assistance: Independent (no AD)  Transfer Assistance: Independent (no AD)  Active : Yes  Mode of Transportation: [address] [place] [address] Avenue: The patient may have 3 cats living at the apartment  IADL Comments: pt has a hospital bed at home  Additional Comments: Pt is questionable historian. Pt reports she has her Friend \"Grizzly\", her Brother (who's been staying with her), and her father who can all provide assistance prn. Pt anticpates she will need assistance with meal delivery and preparation. Pt's Brother will supposedly be staying with her and can provide up to 24/hr assist      Objective  Orientation  Overall Orientation Status: Impaired  Orientation Level: Disoriented to place, Disoriented to situation, Oriented to person, Oriented to time  Cognition  Overall Cognitive Status: Exceptions  Arousal/Alertness: Delayed responses to stimuli  Following Commands:  Follows multistep commands with increased time, Follows multistep commands with repitition  Attention Span: Attends with cues to redirect  Memory: Decreased recall of recent events, Decreased short term memory (pt unable to verbalize that she was at a facility prior to admission)  Safety Judgement: Decreased awareness of need for assistance, Decreased awareness of need for safety  Problem Solving: Decreased awareness of errors  Insights: Decreased awareness of deficits  Initiation: Requires cues for some  Sequencing: Requires cues for some   Sensation  Overall Sensation Status: Impaired (pt reports chronic Numbness and tingling in fingers of bilateral hands)    ADL  Feeding: Setup  Feeding Skilled Clinical Factors: pt required assistance to open pop container; pt able to bring food to mouth with setup assistance  Grooming: Setup  UE Bathing: Supervision  LE Bathing: Contact guard assistance  UE Dressing: Supervision  UE Dressing Skilled Clinical Factors: to don robe to simulate donning a jacket  LE Dressing: Contact guard assistance  LE Dressing Skilled Clinical Factors: pt utilized figure 4 technique to adjust B socks  Toileting: Contact guard assistance  Additional Comments: ADL scores based on skilled observation and clinical reasoning unless otherwise noted.  Pt is currently limited by cognition, balance, safety awareness, and endurance impacting performance in self care tasks         UE Function  LUE AROM (degrees)  LUE AROM : WFL  LUE General AROM: shoulder flexion 0-90 however still functional  Left Hand AROM (degrees)  Left Hand AROM: WFL  Tone LUE  LUE Tone: Normotonic  LUE Strength  Gross LUE Strength: WFL  L Hand General: 4-/5  LUE Strength Comment: grossly 4-/5    RUE AROM (degrees)  RUE AROM : WFL  RUE General AROM: shoulder flexion 0-90 however still functional  Right Hand AROM (degrees)  Right Hand AROM: WFL  Tone RUE  RUE Tone: Normotonic  RUE Strength  Gross RUE Strength: WFL  R Hand General: 4-/5  RUE Strength Comment: grossly 4-/5         Fine Motor Skills/Coordination  Coordination  Movements Are Fluid And Coordinated: No  Coordination and Movement Description: Fine motor impairments, Right UE, Left UE              Bed Mobility  Bed mobility  Supine to Sit: Stand by assistance  Sit to Supine: Stand by assistance  Scooting: Stand by assistance  Bed Mobility Comments: HOB partially elevated with minimal use of bedrails; SBA for safety and line management    Balance  Balance  Sitting Balance: Stand by assistance (dynamic and static sitting EOB for ~5 minutes no loss of balance)  Standing Balance: Contact guard assistance  Standing Balance  Time: 5-6 minutes  Activity: functional transfers, functional mobility  Comment: with BUE support on RW    Transfers  Transfers  Sit to stand: Stand by assistance  Stand to sit: Stand by assistance  Transfer Comments: verbal cues for proper hand placement prior to transfers with Fair carryover    Functional Mobility  Functional - Mobility Device: Rolling Walker  Activity: Other (in room/hallway to simulate household distances)  Assist Level: Contact guard assistance  Functional Mobility Comments: pt trialled mobility without RW and was more unsteady however demos no LOB    Assessment  Assessment  Performance deficits / Impairments: Decreased functional mobility , Decreased ADL status, Decreased safe awareness, Decreased cognition, Decreased endurance, Decreased balance, Decreased high-level IADLs, Decreased fine motor control, Decreased coordination  Treatment Diagnosis: impaired self care status  Prognosis: Good  Decision Making: Medium Complexity  Discharge Recommendations: Patient would benefit from continued therapy after discharge    Activity Tolerance  Activity Tolerance: Patient Tolerated treatment well    Safety Devices  Type of Devices: Nurse notified, Left in bed, Call light within reach, Bed alarm in place, All fall risk precautions in place    Patient Education  Patient Education  Education Given To: Patient  Education Provided: Role of Therapy, Plan of Care, Transfer Training  Education Provided Comments: safety awareness  Education Method: Verbal  Barriers to Learning: Cognition  Education Outcome: Continued education needed      Functional Outcome Measures  AM-PAC Daily Activity Inpatient   How much help for putting on and taking off regular lower body clothing?: A Little  How much help for Bathing?: A Little  How much help for Toileting?: A Little  How much help for putting on and taking off regular upper body clothing?: A Little  How much help for taking care of personal grooming?: A Little  How much help for eating meals?: A Little  AM-[facility] Inpatient Daily Activity Raw Score: 18  AM-PAC Inpatient ADL T-Scale Score : 38.66  ADL Inpatient CMS 0-100% Score: 46.65  ADL Inpatient CMS G-Code Modifier : CK       Goals     Short Term Goals  Time Frame for Short Term Goals: By discharge, pt will  Short Term Goal 1: perform functional transfers/mobility during self care tasks with Mod I, Good safety, and least restrictive device  Short Term Goal 2: verbalize/demonstrate Good understanding of home safety/fall prevention techs to maximize IND and safety during self care tasks  Short Term Goal 3: complete BADLs with Mod I  Short Term Goal 4: tolerate dynamic standing and reaching for 7+ minutes with SUP to increase balance/activity tolerance for ADLs  Short Term Goal 5: acitvely participate in 15+ minutes of therapeutic exercise/functional activity to increase overall strength/endurance needed for self care tasks    Plan  Occupational Therapy Plan  Times Per Week: 3-5  Current Treatment Recommendations: Strengthening, Balance training, Functional mobility training, Endurance training, Pain management, Safety education & training, Patient/Caregiver education & training, Equipment evaluation, education, & procurement, Positioning, Self-Care / ADL, Home management training, Coordination training, Cognitive/Perceptual training      OT Individual Minutes  OT Individual Minutes  Time In: 7486  Time Out: 0922  Minutes: 44  Time Code Minutes   Timed Code Treatment Minutes: 25 Minutes        Electronically signed by TYLOR Kraus on 11/22/22 at 9:49 AM EST

## 2022-11-22 NOTE — PROGRESS NOTES
Physical Therapy  Facility/Department: [address] [place] [address]  Physical Therapy Initial Assessment    Name: Andre Soto  : 1970  MRN: [id number]  Date of Service: 2022    Discharge Recommendations:  24 hour supervision or assist, Home with Home health PT, Patient would benefit from continued therapy after discharge          Patient Diagnosis(es): The encounter diagnosis was Hypokalemia. Past Medical History:  has a past medical history of Anemia, Arthritis, Asthma, Bipolar disorder, mixed ([address] Carotid artery stenosis, Cocaine abuse ([address] COPD (chronic obstructive pulmonary disease) ([address] Degeneration of cervical intervertebral disc, Depression with anxiety, Depression with anxiety, Fibromyalgia, GERD (gastroesophageal reflux disease), History of migraine headaches, History of renal calculi, History of seizure disorder, Hoarseness of voice, HTN (hypertension), Hyperlipidemia, IGT (impaired glucose tolerance), Kidney stones, Lactose intolerance, Leukopenia, Major depressive disorder, recurrent episode, severe, without mention of psychotic behavior, MVP (mitral valve prolapse), Seizures ([address] Sleep apnea, and Unspecified diseases of blood and blood-forming organs. Past Surgical History:  has a past surgical history that includes Gastric bypass surgery; Cholecystectomy; Hysterectomy; Appendectomy; Hysterectomy; Tonsillectomy and adenoidectomy; Ankle fracture surgery; Neck surgery; shoulder surgery;  section; Carpal tunnel release; Colonoscopy (12); Upper gastrointestinal endoscopy (7-3-12); Gastric bypass surgery; Breast lumpectomy (Right); Cardiac catheterization; Upper gastrointestinal endoscopy (2016); Colonoscopy (2016); Upper gastrointestinal endoscopy (2019); Colonoscopy (2019); Shoulder arthroscopy (Left, 2019); other surgical history (10/24/2019); laparoscopy (N/A, 2022); transesophageal echocardiogram (N/A, 10/19/2022);  IR GUIDED PERC PLEURAL [address] (10/21/2022); IR GUIDED PERC PLEURAL DRAIN W CATH INSERT (10/21/2022); IR GUIDED PERC PLEURAL DRAIN W CATH INSERT (10/24/2022); and CT BIOPSY BONE MARROW (11/18/2022). Assessment   Assessment: Pt presents with BLE weakness and balance impairments 2* hypokalemia and  requires SBA for bed mobility, SBA for transfers, and CGA for ambulation with improved performance with use of RW. Megan provided on stair steps x2. Pt reports having nearly 24/hr assistance at her apartment from her brother but uncertain of pt's accuracy in this statement. Pt would benefit from Continued PT services to maximize safety and independence with mobility  Treatment Diagnosis: Impaired safety and independence 2* weakness associated with hypokalemia  Therapy Prognosis: Good  Decision Making: Medium Complexity  Exam: ROM, MMT, Balance, and functional mobility assessments  Clinical Presentation: pt with mildly decreased alertness, mild confusion, and is pleasant and cooperative.  Decreased safety awareness  Barriers to Learning: Cognition barrier, with dec safety awareness  Requires PT Follow-Up: Yes  Activity Tolerance  Activity Tolerance: Treatment limited secondary to decreased cognition;Patient tolerated evaluation without incident  Activity Tolerance Comments: Pt requires inc time to answer questions and to follow instruction 2* cognition     Plan   Physcial Therapy Plan  General Plan: 5-7 times per week  Current Treatment Recommendations: Strengthening, Balance training, Functional mobility training, Transfer training, Gait training, Stair training, Home exercise program, Safety education & training, Cognitive reorientation, Therapeutic activities  Safety Devices  Type of Devices: Nurse notified, Left in bed, Call light within reach, Bed alarm in place, All fall risk precautions in place  Restraints  Restraints Initially in Place: No     Restrictions  Restrictions/Precautions  Restrictions/Precautions: Fall Risk, General Precautions, Contact Precautions, Up as Tolerated  Required Braces or Orthoses?: No  Implants present? :  (pt denies)     Subjective   Pain: pt denies pain  General  Chart Reviewed: Yes  Patient assessed for rehabilitation services?: Yes  Additional Pertinent Hx: The patient is a 46 y.o. female who presents to [facility] from SNF with hypokalemia. She had a recent lengthy hospital stay for MRSA Bacteremia and has been rehabilitating at SNF on IV Teflaro through 12/3; she had routine labs and K was 2.4. She was sent to [facility] for replacement. Status post bone marrow biopsy 11/18/2022  Response To Previous Treatment: Not applicable  Family / Caregiver Present: No  Referring Practitioner: Amaris De Los Santos MD  Referral Date : 11/21/22  Diagnosis: Hypokalemia  Follows Commands: Impaired (Requires inc time and repetition of cues)  General Comment  Comments: Ok Per Nurse Liza Marley to proceed with assessments  Subjective  Subjective: Pt is agreeable to therapy assessments and reports \"I'm able to get around just fine\" pt displays intermittent confusion at times and makes preculiar statements. Social/Functional History  Social/Functional History  Lives With: Alone  Type of Home: Apartment  Home Layout: One level  Home Access: Stairs to enter with rails  Entrance Stairs - Number of Steps: 1  BERTRAM into building and 2-3 steps to descend to basement level apt.   Entrance Stairs - Rails: Right  Bathroom Shower/Tub: Tub/Shower unit  Bathroom Toilet: Standard  Bathroom Equipment: Grab bars in shower  Bathroom Accessibility: Accessible  Home Equipment: Hospital bed  Has the patient had two or more falls in the past year or any fall with injury in the past year?: Unknown  Receives Help From: Family  ADL Assistance: Independent  Homemaking Assistance: Independent ([facility]  [employer] Responsibilities: Yes  Ambulation Assistance: Independent (no AD)  Transfer Assistance: Independent (no AD)  Active : Yes  Mode of Transportation: [address]: The patient may have 3 cats living at the apartment  IADL Comments: pt has a hospital bed at home  Additional Comments: Pt is questionable historian. Pt reports she has her Friend \"Tuanizzly\", her Brother (who's been staying with her), and her father who can all provide assistance prn. Pt anticpates she will need assistance with meal delivery and preparation. Pt's Brother will supposedly be staying with her and can provide up to 24/hr assist  Vision/Hearing  Vision  Vision: Impaired  Vision Exceptions: Wears glasses at all times  Hearing  Hearing: Within functional limits    Cognition   Orientation  Overall Orientation Status: Impaired  Orientation Level: Disoriented to place; Disoriented to situation;Oriented to time;Oriented to person  Cognition  Overall Cognitive Status: Exceptions  Arousal/Alertness: Delayed responses to stimuli  Following Commands: Follows multistep commands with increased time; Follows multistep commands with repitition  Attention Span: Attends with cues to redirect  Memory: Decreased recall of recent events;Decreased short term memory (pt unable to verbalize that she was at a facility prior to admission)  Safety Judgement: Decreased awareness of need for assistance;Decreased awareness of need for safety  Problem Solving: Decreased awareness of errors  Insights: Decreased awareness of deficits  Initiation: Requires cues for some  Sequencing: Requires cues for some     Objective   Heart Rate: 72  Heart Rate Source: Monitor  BP: (!) 162/74  MAP (Calculated): 103  Resp: 16  SpO2: 98 %  O2 Device: None (Room air)     Observation/Palpation  Observation: Peripheral IV R Antecubital; intermittent confusion and questionable historian.         AROM RLE (degrees)  RLE AROM: WFL  AROM LLE (degrees)  LLE AROM : WFL  AROM RUE (degrees)  RUE General AROM: See OT  AROM LUE (degrees)  LUE General AROM: See OT  Strength RLE  Strength RLE: WFL  Comment: Grossly 4-/5, EXCEPT hip flexion 3+/5  Strength LLE  Strength LLE: WFL  Comment: Grossly 4-/5, EXCEPT 3+/5 Knee flexion  Strength RUE  Comment: See OT  Strength LUE  Comment: See OT     Sensation  Overall Sensation Status: Impaired (pt reports Numbness and tingling in fingers of bilateral hands)     Bed mobility  Supine to Sit: Stand by assistance  Sit to Supine: Stand by assistance  Scooting: Stand by assistance  Bed Mobility Comments: HOB partially elevated with minimal use of bedrails; SBA for safety and line management  Transfers  Sit to Stand: Stand by assistance  Stand to Sit: Stand by assistance  Comment: Transfers performed with RW and Without, SBA for safety; VCs for hands placement with poor carry over. Ambulation  Surface: Level tile  Device: Rolling Walker  Assistance: Contact guard assistance  Quality of Gait: Slowed pace, mildly unsteady  Gait Deviations: Decreased step length;Decreased step height;Slow Sobia  Distance: 80'  More Ambulation?: Yes  Ambulation 2  Surface - 2: level tile  Device 2: No device  Assistance 2: Contact guard assistance (Increased steadying and directional assistance without device)  Quality of Gait 2: Increased unsteadiness and path deviations noted without device  Gait Deviations: Slow Sobia;Decreased step length;Decreased step height;Decreased arm swing;Decreased head and trunk rotation;Deviated path  Distance: 64'  Stairs/Curb  Stairs?: Yes  Stairs  # Steps : 2  Stairs Height: 6\"  Rails: Left ascending  Device: No Device  Assistance: Minimal assistance  Comment: upon initiating task and leading up with LLE, L knee lida and pt requiring Megan to recover. Balance  Posture: Fair  Sitting - Static: Fair;+  Sitting - Dynamic: Fair;+  Standing - Static: Fair (RW)  Standing - Dynamic: Fair (RW)  Comments: Pt has 1 LOB 2* near L buckling upon attempting box step negotiation.  pt would benefit from a device during ambulation  Exercise Treatment: Bed

## 2022-11-22 NOTE — PROGRESS NOTES
[facility] -   Infectious diseases evaluation  admission date 11/15/2022    reason for consultation:   MRSA infection    Impression :   Current:  Hypokalemia  Recent MRSA bacteremia  Recent empyema/pulmonary nodules with cavitation status post chest tube placement that was subsequently removed on 10/31/2022. Thrombocytopenia and anemia (thought to be related to vancomycin that was discontinued last admission). Renal insufficiency  History of cocaine drug abuse  Hypertension  Bipolar disorder  Allergy to Bactrim. Recommendations   Continue IV Teflaro 400 mg twice a day as planned through 12/3/2022  CT chest without contrast on 11/18/2022 showed resolution of bilateral pleural effusion, nodules/masses bilaterally with resolution of cavitations. Follow bone marrow biopsy results  Follow CBC and renal function      Infection Control Recommendations   Welcome Precautions  Contact Isolation       Antimicrobial Stewardship Recommendations   Simplification of therapy  Targeted therapy      History of Present Illness:   Initial history:  Mile Parker is a 46y.o.-year-old female was brought to the hospital from SNF for hypokalemia with reported potassium of 2.4. Patient is complaining of body ache, generalized weakness and abdominal discomfort for 2 days. Symptoms are moderate, no alleviating or aggravating factors. She denied cough or shortness of breath, no other complaints. Right arm PICC line in place  The patient had prolonged hospitalization recently for MRSA bacteremia and empyema status post chest tube placement but was subsequently removed. She was discharged to skilled nursing facility on 11/4/2024 on IV Teflaro through 12/3/2022  Interval changes  11/22/2022   She remains afebrile, denied increased cough or shortness of breath, denied fever or chills, no new complaints.   Status post bone marrow biopsy 11/18/2022  Patient Vitals for the past 8 hrs: BP Temp Pulse Resp SpO2   11/22/22 1523 -- -- 72 18 97 %   11/22/22 1359 (!) 157/83 98 °F (36.7 °C) 60 18 98 %   11/22/22 1140 -- -- 69 -- 96 %   11/22/22 0813 -- -- 72 16 98 %               I have personally reviewed the past medical history, past surgical history, medications, social history, and family history, and I haveupdated the database accordingly.       Allergies:   Aspirin, Bactrim, and Codeine     Review of Systems:     Review of Systems  As per history of present illness, other than above 12 system review was negative  Physical Examination :       Physical Exam  Head: Normocephalic, atraumatic  Eye: Pupils equal round reactive to light, no conjunctivitis  ENT: No throat erythema or thrush  Neck: Supple, no JVD  Heart: Regular rate and rhythm no murmurs  Lungs: Clear to auscultation bilaterally, no respiratory distress  Abdomen: Soft, nontender, nondistended, with no peritoneal signs  MSK: No joint swelling or erythema  Neurologic: Patient is alert and oriented x3,, fluent speech  Extremities: No edema, no clubbing, no cyanosis  Past Medical History:     Past Medical History:   Diagnosis Date    Anemia     Arthritis     Asthma     Bipolar disorder, mixed ([address]     Carotid artery stenosis 2/13/2020    Cocaine abuse ([facility] [address] 11/4/2014    COPD (chronic obstructive pulmonary disease) (HCC)     Degeneration of cervical intervertebral disc     Depression with anxiety     Depression with anxiety     Fibromyalgia 1/5/2017    GERD (gastroesophageal reflux disease)     History of migraine headaches 6/10/2014    History of renal calculi 6/10/2014    History of seizure disorder 6/10/2014    Hoarseness of voice     HTN (hypertension) 6/10/2014    Hyperlipidemia 6/10/2014    IGT (impaired glucose tolerance) 6/10/2014    Kidney stones     Lactose intolerance 6/10/2014    Leukopenia 6/10/2014    Major depressive disorder, recurrent episode, severe, without mention of psychotic behavior 11/6/2014    MVP (mitral valve prolapse)     Seizures (HCC)     Sleep apnea 6/10/2014    Unspecified diseases of blood and blood-forming organs        Past Surgical  History:     Past Surgical History:   Procedure Laterality Date    ANKLE FRACTURE SURGERY      recontruction surgery    APPENDECTOMY      BREAST LUMPECTOMY Right     CARDIAC CATHETERIZATION      CARPAL TUNNEL RELEASE      x2     SECTION      CHOLECYSTECTOMY      COLONOSCOPY  12    COLONOSCOPY  2016    severe spasms    COLONOSCOPY  2019    DR GOLDY MCINTOSH    CT BONE MARROW BIOPSY  2022    CT BONE MARROW BIOPSY 2022 [facility] CT SCAN    GASTRIC BYPASS SURGERY      GASTRIC BYPASS SURGERY      HYSTERECTOMY (CERVIX STATUS UNKNOWN)      HYSTERECTOMY (CERVIX STATUS UNKNOWN)      IR PERC CATH PLEURAL DRAIN W/IMAG  10/21/2022    IR PERC CATH PLEURAL DRAIN W/IMAG 10/21/2022 [facility] SPECIAL PROCEDURES    IR PERC CATH PLEURAL DRAIN W/IMAG  10/21/2022    IR PERC CATH PLEURAL DRAIN W/IMAG 10/21/2022 [facility] SPECIAL PROCEDURES    IR PERC CATH PLEURAL DRAIN W/IMAG  10/24/2022    IR PERC CATH PLEURAL DRAIN W/IMAG 10/24/2022 [facility] SPECIAL PROCEDURES    LAPAROSCOPY N/A 2022    LAPAROSCOPY EXPLORATORY CONVERTED TO OPEN EXPLORATORY LAPAROTOMY, LYSIS OF ADHESIONS, REDUCTION OF INTERNAL HERNIA performed by Yayo Briceño DO at [address] [place] [address]      APPLICATION OF ARCH BARS,SIMPLE EXTRACTION OF #15 AND RT TMJ JOINT REPLACEMENT    SHOULDER ARTHROSCOPY Left 2019    DR ROBERT GREEN    SHOULDER SURGERY      TONSILLECTOMY AND ADENOIDECTOMY      TRANSESOPHAGEAL ECHOCARDIOGRAM N/A 10/19/2022    TRANSESOPHAGEAL ECHOCARDIOGRAM WITH BUBBLE STUDY performed by Frandy Royal DO at [facility] [address]  7-3-12    egd    UPPER GASTROINTESTINAL ENDOSCOPY  2016    status post gastrectomy with a small remnant of gastric pouch.     UPPER GASTROINTESTINAL ENDOSCOPY  2019    DR Sarah Che       Medications: predniSONE  60 mg Oral Daily    amLODIPine  10 mg Oral Daily    folic acid  1 mg Oral Daily    ceftaroline fosamil (TEFLARO) IVPB  400 mg IntraVENous Q12H    DULoxetine  60 mg Oral Daily    carvedilol  25 mg Oral BID WC    ARIPiprazole  7.5 mg Oral Daily    divalproex  1,000 mg Oral BID    ferrous sulfate  325 mg Oral BID    ipratropium-albuterol  3 mL Inhalation Q4H WA    Vitamin D  2,000 Units Oral Daily    sodium chloride flush  5-40 mL IntraVENous 2 times per day    [Held by provider] enoxaparin  30 mg SubCUTAneous Daily       Social History:     Social History     Socioeconomic History    Marital status:      Spouse name: Not on file    Number of children: Not on file    Years of education: Not on file    Highest education level: Not on file   Occupational History    Occupation: disability   Tobacco Use    Smoking status: Every Day     Packs/day: 0.50     Years: 33.00     Pack years: 16.50     Types: Cigarettes    Smokeless tobacco: Never    Tobacco comments:     quit  2000 started again  1/13    Substance and Sexual Activity    Alcohol use: Yes     Comment: occasional    Drug use: Not Currently    Sexual activity: Yes   Other Topics Concern    Not on file   Social History Narrative    Not on file     Social Determinants of Health     Financial Resource Strain: Not on file   Food Insecurity: Not on file   Transportation Needs: Not on file   Physical Activity: Not on file   Stress: Not on file   Social Connections: Not on file   Intimate Partner Violence: Not on file   Housing Stability: Not on file       Family History:     Family History   Problem Relation Age of Onset    Diabetes Mother     Cancer Mother     Coronary Art Dis Father     COPD Father     Depression Brother     Alcohol Abuse Brother     Cancer Other         lung and skin    Diabetes Maternal Grandmother     Cancer Paternal Grandmother       Medical Decision Making:   I have independently reviewed/ordered the following labs:    CBC with Differential:   Recent Labs     11/21/22  0519 11/22/22  0535   WBC 2.9* 2.5*   HGB 9.8* 8.6*   HCT 30.7* 26.2*   PLT 38* 28*   LYMPHOPCT 58* 52*   MONOPCT 9* 18*       BMP:  Recent Labs     11/21/22  0519 11/22/22  0535    136   K 3.8 2.8*    101   CO2 29 29   BUN 11 12   CREATININE 0.81 0.70       Hepatic Function Panel: No results for input(s): PROT, LABALBU, BILIDIR, IBILI, BILITOT, ALKPHOS, ALT, AST in the last 72 hours. No results for input(s): RPR in the last 72 hours. No results for input(s): HIV in the last 72 hours. No results for input(s): BC in the last 72 hours. Lab Results   Component Value Date/Time    CREATININE 0.70 11/22/2022 05:35 AM    GLUCOSE 78 11/22/2022 05:35 AM       Detailed results: Thank you for allowing us to participate in the care of this patient. Please call with questions. This note is created with the assistance of a speech recognition program.  While intending to generate adocument that actually reflects the content of the visit, the document can still have some errors including those of syntax and sound a like substitutions which may escape proof reading. It such instances, actual meaningcan be extrapolated by contextual diversion.     Evelin Loja MD  Office: [phone]  Perfect serve / office [phone]

## 2022-11-23 LAB
ABSOLUTE EOS #: 0 K/UL (ref 0–0.4)
ABSOLUTE LYMPH #: 1.77 K/UL (ref 1–4.8)
ABSOLUTE MONO #: 0.78 K/UL (ref 0.1–1.3)
ANION GAP SERPL CALCULATED.3IONS-SCNC: 5 MMOL/L (ref 9–17)
BASOPHILS # BLD: 1 % (ref 0–2)
BASOPHILS ABSOLUTE: 0.03 K/UL (ref 0–0.2)
BUN BLDV-MCNC: 14 MG/DL (ref 6–20)
CALCIUM SERPL-MCNC: 7.7 MG/DL (ref 8.6–10.4)
CHLORIDE BLD-SCNC: 103 MMOL/L (ref 98–107)
CO2: 29 MMOL/L (ref 20–31)
CREAT SERPL-MCNC: 0.64 MG/DL (ref 0.5–0.9)
EOSINOPHILS RELATIVE PERCENT: 0 % (ref 0–4)
FLOW CYTOMETRY, BM: NORMAL
GFR SERPL CREATININE-BSD FRML MDRD: >60 ML/MIN/1.73M2
GLUCOSE BLD-MCNC: 62 MG/DL (ref 70–99)
GLUCOSE BLD-MCNC: 69 MG/DL (ref 65–105)
HCT VFR BLD CALC: 26.5 % (ref 36–46)
HEMOGLOBIN: 8.7 G/DL (ref 12–16)
LYMPHOCYTES # BLD: 52 % (ref 24–44)
MCH RBC QN AUTO: 28.8 PG (ref 26–34)
MCHC RBC AUTO-ENTMCNC: 32.8 G/DL (ref 31–37)
MCV RBC AUTO: 87.8 FL (ref 80–100)
MONOCYTES # BLD: 23 % (ref 1–7)
MORPHOLOGY: ABNORMAL
PDW BLD-RTO: 26.2 % (ref 11.5–14.9)
PLATELET # BLD: 35 K/UL (ref 150–450)
PMV BLD AUTO: 6.5 FL (ref 6–12)
POTASSIUM SERPL-SCNC: 4.1 MMOL/L (ref 3.7–5.3)
RBC # BLD: 3.02 M/UL (ref 4–5.2)
SEG NEUTROPHILS: 24 % (ref 36–66)
SEGMENTED NEUTROPHILS ABSOLUTE COUNT: 0.82 K/UL (ref 1.3–9.1)
SODIUM BLD-SCNC: 137 MMOL/L (ref 135–144)
WBC # BLD: 3.4 K/UL (ref 3.5–11)

## 2022-11-23 PROCEDURE — 6370000000 HC RX 637 (ALT 250 FOR IP): Performed by: FAMILY MEDICINE

## 2022-11-23 PROCEDURE — 82947 ASSAY GLUCOSE BLOOD QUANT: CPT

## 2022-11-23 PROCEDURE — 80048 BASIC METABOLIC PNL TOTAL CA: CPT

## 2022-11-23 PROCEDURE — 2060000000 HC ICU INTERMEDIATE R&B

## 2022-11-23 PROCEDURE — 36415 COLL VENOUS BLD VENIPUNCTURE: CPT

## 2022-11-23 PROCEDURE — 85025 COMPLETE CBC W/AUTO DIFF WBC: CPT

## 2022-11-23 PROCEDURE — 6370000000 HC RX 637 (ALT 250 FOR IP): Performed by: INTERNAL MEDICINE

## 2022-11-23 PROCEDURE — 94640 AIRWAY INHALATION TREATMENT: CPT

## 2022-11-23 PROCEDURE — 99231 SBSQ HOSP IP/OBS SF/LOW 25: CPT | Performed by: INTERNAL MEDICINE

## 2022-11-23 PROCEDURE — 94761 N-INVAS EAR/PLS OXIMETRY MLT: CPT

## 2022-11-23 PROCEDURE — 6360000002 HC RX W HCPCS: Performed by: FAMILY MEDICINE

## 2022-11-23 PROCEDURE — 99232 SBSQ HOSP IP/OBS MODERATE 35: CPT | Performed by: INTERNAL MEDICINE

## 2022-11-23 RX ADMIN — FERROUS SULFATE TAB 325 MG (65 MG ELEMENTAL FE) 325 MG: 325 (65 FE) TAB at 07:58

## 2022-11-23 RX ADMIN — ARIPIPRAZOLE 7.5 MG: 5 TABLET ORAL at 07:59

## 2022-11-23 RX ADMIN — IPRATROPIUM BROMIDE AND ALBUTEROL SULFATE 3 ML: .5; 2.5 SOLUTION RESPIRATORY (INHALATION) at 19:49

## 2022-11-23 RX ADMIN — IPRATROPIUM BROMIDE AND ALBUTEROL SULFATE 3 ML: .5; 2.5 SOLUTION RESPIRATORY (INHALATION) at 08:28

## 2022-11-23 RX ADMIN — PREDNISONE 60 MG: 20 TABLET ORAL at 07:58

## 2022-11-23 RX ADMIN — DIVALPROEX SODIUM 1000 MG: 250 TABLET, DELAYED RELEASE ORAL at 20:19

## 2022-11-23 RX ADMIN — Medication 2000 UNITS: at 07:59

## 2022-11-23 RX ADMIN — DULOXETIN HYDROCHLORIDE 60 MG: 30 CAPSULE, DELAYED RELEASE ORAL at 07:59

## 2022-11-23 RX ADMIN — IPRATROPIUM BROMIDE AND ALBUTEROL SULFATE 3 ML: .5; 2.5 SOLUTION RESPIRATORY (INHALATION) at 11:38

## 2022-11-23 RX ADMIN — AMLODIPINE BESYLATE 10 MG: 10 TABLET ORAL at 07:58

## 2022-11-23 RX ADMIN — FERROUS SULFATE TAB 325 MG (65 MG ELEMENTAL FE) 325 MG: 325 (65 FE) TAB at 20:19

## 2022-11-23 RX ADMIN — CARVEDILOL 25 MG: 25 TABLET ORAL at 07:59

## 2022-11-23 RX ADMIN — DIVALPROEX SODIUM 1000 MG: 250 TABLET, DELAYED RELEASE ORAL at 07:59

## 2022-11-23 RX ADMIN — FOLIC ACID 1 MG: 1 TABLET ORAL at 07:58

## 2022-11-23 RX ADMIN — HYDRALAZINE HYDROCHLORIDE 10 MG: 20 INJECTION INTRAMUSCULAR; INTRAVENOUS at 01:23

## 2022-11-23 NOTE — PLAN OF CARE
Problem: Discharge Planning  Goal: Discharge to home or other facility with appropriate resources  11/23/2022 1745 by Ty Gunter RN  Outcome: Progressing     Problem: Safety - Adult  Goal: Free from fall injury  11/23/2022 1745 by Ty Gunter RN  Outcome: Progressing     Problem: Skin/Tissue Integrity  Goal: Absence of new skin breakdown  11/23/2022 1745 by Ty Gunter RN  Outcome: Progressing     Problem: ABCDS Injury Assessment  Goal: Absence of physical injury  11/23/2022 1745 by Ty Gunter RN  Outcome: Progressing

## 2022-11-23 NOTE — CARE COORDINATION
ONGOING DISCHARGE PLAN:    LSW following for SNF placement. [facility] was denied. SNF is trying to apply for admit thru medicaid. Pt will need IV teflaro 400 mg twice a day as planned through 12/3/2022. Pt has PICC line placed 10/20/22. Will continue to follow for additional discharge needs.     Electronically signed by Albaro Smith RN on 11/23/2022 at 1:57 PM

## 2022-11-23 NOTE — PLAN OF CARE
Problem: Discharge Planning  Goal: Discharge to home or other facility with appropriate resources  11/23/2022 0420 by Bulmaro Brown RN  Outcome: Progressing     Problem: Safety - Adult  Goal: Free from fall injury  11/23/2022 0420 by Bulmaro Brown RN  Outcome: Progressing     Problem: Skin/Tissue Integrity  Goal: Absence of new skin breakdown  Description: 1. Monitor for areas of redness and/or skin breakdown  2. Assess vascular access sites hourly  3. Every 4-6 hours minimum:  Change oxygen saturation probe site  4. Every 4-6 hours:  If on nasal continuous positive airway pressure, respiratory therapy assess nares and determine need for appliance change or resting period.   11/23/2022 0420 by Bulmaro Brown RN  Outcome: Progressing

## 2022-11-23 NOTE — CARE COORDINATION
Dr Ryan Odom said that the insurance co has not gotten back to her with the results of the p2p Internal Medicine

## 2022-11-23 NOTE — PROGRESS NOTES
[facility] -   Infectious diseases evaluation  admission date 11/15/2022    reason for consultation:   MRSA infection    Impression :   Current:  Hypokalemia  Recent MRSA bacteremia  Recent empyema/pulmonary nodules with cavitation status post chest tube placement that was subsequently removed on 10/31/2022. Thrombocytopenia and anemia (thought to be related to vancomycin that was discontinued last admission). Renal insufficiency  History of cocaine drug abuse  Hypertension  Bipolar disorder  Allergy to Bactrim. Recommendations   Continue IV Teflaro 400 mg twice a day as planned through 12/3/2022  CT chest without contrast on 11/18/2022 showed resolution of bilateral pleural effusion, nodules/masses bilaterally with resolution of cavitations. Follow bone marrow biopsy results  Follow CBC and renal function      Infection Control Recommendations   [place] Precautions  Contact Isolation       Antimicrobial Stewardship Recommendations   Simplification of therapy  Targeted therapy      History of Present Illness:   Initial history:  Winsome Sanabria is a 46y.o.-year-old female was brought to the hospital from SNF for hypokalemia with reported potassium of 2.4. Patient is complaining of body ache, generalized weakness and abdominal discomfort for 2 days. Symptoms are moderate, no alleviating or aggravating factors. She denied cough or shortness of breath, no other complaints. Right arm PICC line in place  The patient had prolonged hospitalization recently for MRSA bacteremia and empyema status post chest tube placement but was subsequently removed. She was discharged to skilled nursing facility on 11/4/2024 on IV Teflaro through 12/3/2022  Interval changes  11/23/2022   She is afebrile, up to the chair, denied cough or shortness of breath, no new complaints.   WBC 3.4, platelet 35  Status post bone marrow biopsy 11/18/2022  Patient Vitals for the past 8 hrs: BP Temp Temp src Pulse Resp SpO2   11/23/22 0829 -- -- -- -- -- 96 %   11/23/22 0653 (!) 177/76 98.6 °F (37 °C) Oral 62 18 97 %               I have personally reviewed the past medical history, past surgical history, medications, social history, and family history, and I haveupdated the database accordingly.       Allergies:   Aspirin, Bactrim, and Codeine     Review of Systems:     Review of Systems  As per history of present illness, other than above 12 system review was negative  Physical Examination :       Physical Exam  Head: Normocephalic, atraumatic  Eye: Pupils equal round reactive to light, no conjunctivitis  ENT: No throat erythema or thrush  Neck: Supple, no JVD  Heart: Regular rate and rhythm no murmurs  Lungs: Clear to auscultation bilaterally, no respiratory distress  Abdomen: Soft, nontender, nondistended, with no peritoneal signs  MSK: No joint swelling or erythema  Neurologic: Patient is alert and oriented x3  Extremities: No edema, no clubbing, no cyanosis  Past Medical History:     Past Medical History:   Diagnosis Date    Anemia     Arthritis     Asthma     Bipolar disorder, mixed ([address]     Carotid artery stenosis 2/13/2020    Cocaine abuse ([address] 11/4/2014    COPD (chronic obstructive pulmonary disease) (HCC)     Degeneration of cervical intervertebral disc     Depression with anxiety     Depression with anxiety     Fibromyalgia 1/5/2017    GERD (gastroesophageal reflux disease)     History of migraine headaches 6/10/2014    History of renal calculi 6/10/2014    History of seizure disorder 6/10/2014    Hoarseness of voice     HTN (hypertension) 6/10/2014    Hyperlipidemia 6/10/2014    IGT (impaired glucose tolerance) 6/10/2014    Kidney stones     Lactose intolerance 6/10/2014    Leukopenia 6/10/2014    Major depressive disorder, recurrent episode, severe, without mention of psychotic behavior 11/6/2014    MVP (mitral valve prolapse)     Seizures ([address]     Sleep apnea 6/10/2014    Unspecified diseases of blood and blood-forming organs        Past Surgical  History:     Past Surgical History:   Procedure Laterality Date    ANKLE FRACTURE SURGERY      recontruction surgery    APPENDECTOMY      BREAST LUMPECTOMY Right     CARDIAC CATHETERIZATION      CARPAL TUNNEL RELEASE      x2     SECTION      CHOLECYSTECTOMY      COLONOSCOPY  12    COLONOSCOPY  2016    severe spasms    COLONOSCOPY  2019    DR GOLDY MCINTOSH    CT BONE MARROW BIOPSY  2022    CT BONE MARROW BIOPSY 2022 [facility] CT SCAN    GASTRIC BYPASS SURGERY      GASTRIC BYPASS SURGERY      HYSTERECTOMY (CERVIX STATUS UNKNOWN)      HYSTERECTOMY (CERVIX STATUS UNKNOWN)      IR PERC CATH PLEURAL DRAIN W/IMAG  10/21/2022    IR PERC CATH PLEURAL DRAIN W/IMAG 10/21/2022 [facility] SPECIAL PROCEDURES    IR PERC CATH PLEURAL DRAIN W/IMAG  10/21/2022    IR PERC CATH PLEURAL DRAIN W/IMAG 10/21/2022 [facility] SPECIAL PROCEDURES    IR PERC CATH PLEURAL DRAIN W/IMAG  10/24/2022    IR PERC CATH PLEURAL DRAIN W/IMAG 10/24/2022 [facility] SPECIAL PROCEDURES    LAPAROSCOPY N/A 2022    LAPAROSCOPY EXPLORATORY CONVERTED TO OPEN EXPLORATORY LAPAROTOMY, LYSIS OF ADHESIONS, REDUCTION OF INTERNAL HERNIA performed by Yayo Briceño DO at [address] [place] [address]      APPLICATION OF ARCH BARS,SIMPLE EXTRACTION OF #15 AND RT TMJ JOINT REPLACEMENT    [facility] 2019    [facility]    [facility]ER SURGERY      TONSILLECTOMY AND ADENOIDECTOMY      TRANSESOPHAGEAL ECHOCARDIOGRAM N/A 10/19/2022    TRANSESOPHAGEAL ECHOCARDIOGRAM WITH BUBBLE STUDY performed by Frandy Royal DO at [facility]  7-3-12    egd    UPPER GASTROINTESTINAL ENDOSCOPY  2016    status post gastrectomy with a small remnant of gastric pouch.     UPPER GASTROINTESTINAL ENDOSCOPY  2019    DR Sarah Che       Medications:      predniSONE  60 mg Oral Daily    amLODIPine  10 mg Oral Daily    folic acid  1 mg Oral Daily    DULoxetine  60 mg Oral Daily    carvedilol  25 mg Oral BID WC    ARIPiprazole  7.5 mg Oral Daily    divalproex  1,000 mg Oral BID    ferrous sulfate  325 mg Oral BID    ipratropium-albuterol  3 mL Inhalation Q4H WA    Vitamin D  2,000 Units Oral Daily    sodium chloride flush  5-40 mL IntraVENous 2 times per day    [Held by provider] enoxaparin  30 mg SubCUTAneous Daily       Social History:     Social History     Socioeconomic History    Marital status:      Spouse name: Not on file    Number of children: Not on file    Years of education: Not on file    Highest education level: Not on file   Occupational History    Occupation: disability   Tobacco Use    Smoking status: Every Day     Packs/day: 0.50     Years: 33.00     Pack years: 16.50     Types: Cigarettes    Smokeless tobacco: Never    Tobacco comments:     quit  2000 started again  1/13    Substance and Sexual Activity    Alcohol use: Yes     Comment: occasional    Drug use: Not Currently    Sexual activity: Yes   Other Topics Concern    Not on file   Social History Narrative    Not on file     Social Determinants of Health     Financial Resource Strain: Not on file   Food Insecurity: Not on file   Transportation Needs: Not on file   Physical Activity: Not on file   Stress: Not on file   Social Connections: Not on file   Intimate Partner Violence: Not on file   Housing Stability: Not on file       Family History:     Family History   Problem Relation Age of Onset    Diabetes Mother     Cancer Mother     Coronary Art Dis Father     COPD Father     Depression Brother     Alcohol Abuse Brother     Cancer Other         lung and skin    Diabetes Maternal Grandmother     Cancer Paternal Grandmother       Medical Decision Making:   I have independently reviewed/ordered the following labs:    CBC with Differential:   Recent Labs     11/22/22  0535 11/23/22  0537   WBC 2.5* 3.4*   HGB 8.6* 8.7*   HCT 26.2* 26.5*   PLT 28* 35* LYMPHOPCT 52* 52*   MONOPCT 18* 23*       BMP:  Recent Labs     11/22/22  0535 11/22/22  2044 11/23/22  0537     --  137   K 2.8* 4.3 4.1     --  103   CO2 29  --  29   BUN 12  --  14   CREATININE 0.70  --  0.64       Hepatic Function Panel: No results for input(s): PROT, LABALBU, BILIDIR, IBILI, BILITOT, ALKPHOS, ALT, AST in the last 72 hours. No results for input(s): RPR in the last 72 hours. No results for input(s): HIV in the last 72 hours. No results for input(s): BC in the last 72 hours. Lab Results   Component Value Date/Time    CREATININE 0.64 11/23/2022 05:37 AM    GLUCOSE 62 11/23/2022 05:37 AM       Detailed results: Thank you for allowing us to participate in the care of this patient. Please call with questions. This note is created with the assistance of a speech recognition program.  While intending to generate adocument that actually reflects the content of the visit, the document can still have some errors including those of syntax and sound a like substitutions which may escape proof reading. It such instances, actual meaningcan be extrapolated by contextual diversion.     Jack Kim MD  Office: [phone]  Perfect serve / office [phone]

## 2022-11-24 LAB
ABSOLUTE BANDS #: 0.04 K/UL (ref 0–1)
ABSOLUTE EOS #: 0 K/UL (ref 0–0.4)
ABSOLUTE LYMPH #: 1.78 K/UL (ref 1–4.8)
ABSOLUTE MONO #: 0.57 K/UL (ref 0.1–1.3)
ANION GAP SERPL CALCULATED.3IONS-SCNC: 6 MMOL/L (ref 9–17)
BANDS: 1 % (ref 0–10)
BASOPHILS # BLD: 0 % (ref 0–2)
BASOPHILS ABSOLUTE: 0 K/UL (ref 0–0.2)
BUN BLDV-MCNC: 11 MG/DL (ref 6–20)
CALCIUM SERPL-MCNC: 7.9 MG/DL (ref 8.6–10.4)
CHLORIDE BLD-SCNC: 102 MMOL/L (ref 98–107)
CO2: 28 MMOL/L (ref 20–31)
CREAT SERPL-MCNC: 0.63 MG/DL (ref 0.5–0.9)
EOSINOPHILS RELATIVE PERCENT: 0 % (ref 0–4)
GFR SERPL CREATININE-BSD FRML MDRD: >60 ML/MIN/1.73M2
GLUCOSE BLD-MCNC: 89 MG/DL (ref 70–99)
HCT VFR BLD CALC: 28.4 % (ref 36–46)
HEMOGLOBIN: 9.3 G/DL (ref 12–16)
LYMPHOCYTES # BLD: 47 % (ref 24–44)
MCH RBC QN AUTO: 29 PG (ref 26–34)
MCHC RBC AUTO-ENTMCNC: 32.8 G/DL (ref 31–37)
MCV RBC AUTO: 88.7 FL (ref 80–100)
MONOCYTES # BLD: 15 % (ref 1–7)
MORPHOLOGY: ABNORMAL
PDW BLD-RTO: 25.7 % (ref 11.5–14.9)
PLATELET # BLD: 41 K/UL (ref 150–450)
PMV BLD AUTO: 6.6 FL (ref 6–12)
POTASSIUM SERPL-SCNC: 3.4 MMOL/L (ref 3.7–5.3)
RBC # BLD: 3.2 M/UL (ref 4–5.2)
SEG NEUTROPHILS: 37 % (ref 36–66)
SEGMENTED NEUTROPHILS ABSOLUTE COUNT: 1.41 K/UL (ref 1.3–9.1)
SODIUM BLD-SCNC: 136 MMOL/L (ref 135–144)
WBC # BLD: 3.8 K/UL (ref 3.5–11)

## 2022-11-24 PROCEDURE — 85025 COMPLETE CBC W/AUTO DIFF WBC: CPT

## 2022-11-24 PROCEDURE — 6370000000 HC RX 637 (ALT 250 FOR IP): Performed by: INTERNAL MEDICINE

## 2022-11-24 PROCEDURE — 94640 AIRWAY INHALATION TREATMENT: CPT

## 2022-11-24 PROCEDURE — 6370000000 HC RX 637 (ALT 250 FOR IP): Performed by: FAMILY MEDICINE

## 2022-11-24 PROCEDURE — 80048 BASIC METABOLIC PNL TOTAL CA: CPT

## 2022-11-24 PROCEDURE — 6360000002 HC RX W HCPCS: Performed by: FAMILY MEDICINE

## 2022-11-24 PROCEDURE — 2580000003 HC RX 258: Performed by: FAMILY MEDICINE

## 2022-11-24 PROCEDURE — 1200000000 HC SEMI PRIVATE

## 2022-11-24 PROCEDURE — 36415 COLL VENOUS BLD VENIPUNCTURE: CPT

## 2022-11-24 PROCEDURE — 99232 SBSQ HOSP IP/OBS MODERATE 35: CPT | Performed by: NURSE PRACTITIONER

## 2022-11-24 PROCEDURE — 99232 SBSQ HOSP IP/OBS MODERATE 35: CPT | Performed by: FAMILY MEDICINE

## 2022-11-24 PROCEDURE — 94761 N-INVAS EAR/PLS OXIMETRY MLT: CPT

## 2022-11-24 RX ADMIN — SODIUM CHLORIDE, PRESERVATIVE FREE 10 ML: 5 INJECTION INTRAVENOUS at 20:23

## 2022-11-24 RX ADMIN — CARVEDILOL 25 MG: 25 TABLET ORAL at 17:12

## 2022-11-24 RX ADMIN — PREDNISONE 60 MG: 20 TABLET ORAL at 09:42

## 2022-11-24 RX ADMIN — AMLODIPINE BESYLATE 10 MG: 10 TABLET ORAL at 09:42

## 2022-11-24 RX ADMIN — IPRATROPIUM BROMIDE AND ALBUTEROL SULFATE 3 ML: .5; 2.5 SOLUTION RESPIRATORY (INHALATION) at 19:07

## 2022-11-24 RX ADMIN — DULOXETIN HYDROCHLORIDE 60 MG: 30 CAPSULE, DELAYED RELEASE ORAL at 09:44

## 2022-11-24 RX ADMIN — HYDRALAZINE HYDROCHLORIDE 10 MG: 20 INJECTION INTRAMUSCULAR; INTRAVENOUS at 00:45

## 2022-11-24 RX ADMIN — SODIUM CHLORIDE, PRESERVATIVE FREE 10 ML: 5 INJECTION INTRAVENOUS at 09:44

## 2022-11-24 RX ADMIN — HYDRALAZINE HYDROCHLORIDE 10 MG: 20 INJECTION INTRAMUSCULAR; INTRAVENOUS at 23:53

## 2022-11-24 RX ADMIN — Medication 2000 UNITS: at 09:42

## 2022-11-24 RX ADMIN — DIVALPROEX SODIUM 1000 MG: 250 TABLET, DELAYED RELEASE ORAL at 20:22

## 2022-11-24 RX ADMIN — IPRATROPIUM BROMIDE AND ALBUTEROL SULFATE 3 ML: .5; 2.5 SOLUTION RESPIRATORY (INHALATION) at 10:49

## 2022-11-24 RX ADMIN — FERROUS SULFATE TAB 325 MG (65 MG ELEMENTAL FE) 325 MG: 325 (65 FE) TAB at 20:22

## 2022-11-24 RX ADMIN — IPRATROPIUM BROMIDE AND ALBUTEROL SULFATE 3 ML: .5; 2.5 SOLUTION RESPIRATORY (INHALATION) at 14:45

## 2022-11-24 RX ADMIN — DIVALPROEX SODIUM 1000 MG: 250 TABLET, DELAYED RELEASE ORAL at 09:44

## 2022-11-24 RX ADMIN — FERROUS SULFATE TAB 325 MG (65 MG ELEMENTAL FE) 325 MG: 325 (65 FE) TAB at 09:42

## 2022-11-24 RX ADMIN — ARIPIPRAZOLE 7.5 MG: 5 TABLET ORAL at 09:44

## 2022-11-24 RX ADMIN — CARVEDILOL 25 MG: 25 TABLET ORAL at 09:44

## 2022-11-24 RX ADMIN — FOLIC ACID 1 MG: 1 TABLET ORAL at 09:42

## 2022-11-24 NOTE — PROGRESS NOTES
Today's Date: 2022  Patient Name: Scotty Gray  Date of admission: 11/15/2022  4:51 PM  Patient's age: 46 y. o., 1970  Admission Dx: Hypokalemia [E87.6]    Reason for Consult: management recommendations  Requesting Physician: Rebekah Wilhelm MD    CHIEF COMPLAINT: Severe anemia    INTERVAL HISTORY:    Patient seen and examined  Hb and plts stable  No bleeding   Awaiting placement    HISTORY OF PRESENT ILLNESS:    The patient is a 46 y.o.  female who is admitted to the hospital for hypokalemia with potassium of 2.4 patient recently hospitalized for MRSA bacteremia and empyema and had anemia and thrombocytopenia thought related to bacteremia and sepsis and antibiotic/vancomycin, patient had pulmonary nodules with cavitation, hemoglobin was 5.3 with WBC 4.0 and platelet count of 32    Past Medical History:   has a past medical history of Anemia, Arthritis, Asthma, Bipolar disorder, mixed ([address] Carotid artery stenosis, Cocaine abuse ([address] COPD (chronic obstructive pulmonary disease) ([address] Degeneration of cervical intervertebral disc, Depression with anxiety, Depression with anxiety, Fibromyalgia, GERD (gastroesophageal reflux disease), History of migraine headaches, History of renal calculi, History of seizure disorder, Hoarseness of voice, HTN (hypertension), Hyperlipidemia, IGT (impaired glucose tolerance), Kidney stones, Lactose intolerance, Leukopenia, Major depressive disorder, recurrent episode, severe, without mention of psychotic behavior, MVP (mitral valve prolapse), Seizures ([address] Sleep apnea, and Unspecified diseases of blood and blood-forming organs. Past Surgical History:   has a past surgical history that includes Gastric bypass surgery; Cholecystectomy; Hysterectomy; Appendectomy; Hysterectomy; Tonsillectomy and adenoidectomy; Ankle fracture surgery; Neck surgery; shoulder surgery;  section; Carpal tunnel release; Colonoscopy (12);  Upper gastrointestinal endoscopy (7-3-12); Gastric bypass surgery; Breast lumpectomy (Right); Cardiac catheterization; Upper gastrointestinal endoscopy (2016); Colonoscopy (2016); Upper gastrointestinal endoscopy (2019); Colonoscopy (2019); Shoulder arthroscopy (Left, 2019); other surgical history (10/24/2019); laparoscopy (N/A, 2022); transesophageal echocardiogram (N/A, 10/19/2022); IR GUIDED PERC PLEURAL DRAIN W CATH INSERT (10/21/2022); IR GUIDED PERC PLEURAL DRAIN W CATH INSERT (10/21/2022); IR GUIDED PERC PLEURAL DRAIN W CATH INSERT (10/24/2022); and CT BIOPSY BONE MARROW (2022). Medications:    Reviewed in Epic     Allergies:  Aspirin, Bactrim, and Codeine    Social History:   reports that she has been smoking cigarettes. She has a 16.50 pack-year smoking history. She has never used smokeless tobacco. She reports current alcohol use. She reports that she does not currently use drugs. Family History: family history includes Alcohol Abuse in her brother; COPD in her father; Cancer in her mother, paternal grandmother, and another family member; Coronary Art Dis in her father; Depression in her brother; Diabetes in her maternal grandmother and mother.     REVIEW OF SYSTEMS:    14 point review of system has been obtained unremarkable although it was mentioned above    PHYSICAL EXAM:      BP (!) 153/77   Pulse 72   Temp 98.2 °F (36.8 °C)   Resp 18   Ht 4' 11.02\" (1.499 m)   Wt 93 lb 4.1 oz (42.3 kg)   SpO2 95%   BMI 18.82 kg/m²    Temp (24hrs), Av.2 °F (36.8 °C), Min:97.6 °F (36.4 °C), Max:98.6 °F (37 °C)    General appearance - well appearing, no in pain or distress   Mental status - alert and cooperative   Eyes - pupils equal and reactive, extraocular eye movements intact   Ears - bilateral TM's and external ear canals normal   Mouth - mucous membranes moist, pharynx normal without lesions   Neck - supple, no significant adenopathy   Lymphatics - no palpable lymphadenopathy, no hepatosplenomegaly   Chest - clear to auscultation, no wheezes, rales or rhonchi, symmetric air entry   Heart - normal rate, regular rhythm, normal S1, S2, no murmurs  Abdomen - soft, nontender, nondistended, no masses or organomegaly   Neurological - alert, oriented, normal speech, no focal findings or movement disorder noted   Musculoskeletal - no joint tenderness, deformity or swelling   Extremities - peripheral pulses normal, no pedal edema, no clubbing or cyanosis   Skin - normal coloration and turgor, no rashes, no suspicious skin lesions noted ,    DATA:    Labs:   CBC:   Recent Labs     11/22/22  0535 11/23/22  0537   WBC 2.5* 3.4*   HGB 8.6* 8.7*   HCT 26.2* 26.5*   PLT 28* 35*       BMP:   Recent Labs     11/22/22 0535 11/22/22 2044 11/23/22  0537     --  137   K 2.8* 4.3 4.1   CO2 29  --  29   BUN 12  --  14   CREATININE 0.70  --  0.64   LABGLOM >60  --  >60   GLUCOSE 78  --  62*       PT/INR: No results for input(s): PROTIME, INR in the last 72 hours. IMAGING DATA:  CT CHEST WO CONTRAST   Final Result   Resolving pleural effusions bilaterally. Numerous pulmonary      nodules/masses redemonstrated bilaterally with resolving cavities associated   with multiple lesions. Resolving bilateral pleural effusions         CT BIOPSY BONE MARROW   Final Result   Successful CT-guided right posterior ilium bone marrow biopsy, as above. XR CHEST (SINGLE VIEW FRONTAL)   Final Result   1. Right PICC tip terminates within the lower right atrium. Retraction by   5-6 cm and radiographic follow-up suggested. 2.  Extensive pleural/parenchymal disease bilaterally without significant   overall change from prior.            Primary Problem  Hypokalemia    Active Hospital Problems    Diagnosis Date Noted    Severe thrombocytopenia ([facility] [D69.6] 11/17/2022     Priority: Medium    Severe anemia [D64.9] 11/17/2022     Priority: Medium    Cavitating mass of lung [J98.4] 10/23/2022     Priority: Medium Pulmonary nodules [R91.8] 10/15/2022     Priority: Medium    Severe malnutrition ([address] [E43] 10/11/2022     Priority: Medium    Hypokalemia [E87.6] 12/18/2015         IMPRESSION:   Severe anemia and thrombocytopenia  Pulmonary nodules, numerous with cavities  Bilateral pleural effusions  MRSA bacteremia  Hypokalemia    RECOMMENDATIONS:  I reviewed the laboratory, imaging studies, discussed diagnosis and other treatment recommendations   Patient had bone marrow biopsy on 11/18/2022  Will follow results of bone marrow biopsy  Patient has low folic acid level and receiving folic acid and also on iron pill  Transfuse to keep hemoglobin above 7 and platelet above 10 unless there is a bleeding to keep platelet above 30  Patient had bilateral pulmonary nodules during her previous admission and has been evaluated by ID and pulmonary at that time and was thought to be suspected septic emboli. She would need follow-up CT scan/PET scan as outpatient  Other management as per primary team  Monitor counts closely  Discharge as per primary    Discussed with patient and Nurse. Thank you for asking us to see this patient. Fortunato Toscano MD  Hematologist/Medical Oncologist                      This note is created with the assistance of a speech recognition program.  While intending to generate a document that actually reflects the content of the visit, the document can still have some errors including those of syntax and sound a like substitutions which may escape proof reading. It such instances, actual meaning can be extrapolated by contextual diversion.

## 2022-11-24 NOTE — PROGRESS NOTES
[facility] -   Infectious diseases evaluation  admission date 11/15/2022    reason for consultation:   MRSA infection    Impression :   Current:  Hypokalemia  Recent MRSA bacteremia  Recent empyema/pulmonary nodules with cavitation status post chest tube placement that was subsequently removed on 10/31/2022. Thrombocytopenia and anemia (thought to be related to vancomycin that was discontinued last admission). Renal insufficiency  History of cocaine drug abuse  Hypertension  Bipolar disorder  Allergy to Bactrim. Recommendations   Continue IV Teflaro 400 mg twice a day as planned through 12/3/2022  CT chest without contrast on 11/18/2022 showed resolution of bilateral pleural effusion, nodules/masses bilaterally with resolution of cavitations. Follow bone marrow biopsy results  Follow CBC and renal function      Infection Control Recommendations   [place] Precautions  Contact Isolation       Antimicrobial Stewardship Recommendations   Simplification of therapy  Targeted therapy      History of Present Illness:   Initial history:  Winsome Sanabria is a 46y.o.-year-old female was brought to the hospital from SNF for hypokalemia with reported potassium of 2.4. Patient is complaining of body ache, generalized weakness and abdominal discomfort for 2 days. Symptoms are moderate, no alleviating or aggravating factors. She denied cough or shortness of breath, no other complaints. Right arm PICC line in place  The patient had prolonged hospitalization recently for MRSA bacteremia and empyema status post chest tube placement but was subsequently removed. She was discharged to skilled nursing facility on 11/4/2024 on IV Teflaro through 12/3/2022    Interval changes  11/24/2022   Afebrile  SpO2 99% RA  WBC 3.8, platelet 41  Status post bone marrow biopsy 11/18/2022  Eating/drinking. Denies n/v/d.     Patient Vitals for the past 8 hrs:   BP Temp Temp src Pulse Resp SpO2 Weight   11/24/22 0656 (!) 189/90 97.9 °F (36.6 °C) Oral 68 18 100 % --   11/24/22 0600 -- -- -- -- -- -- 93 lb 11.1 oz (42.5 kg)   11/24/22 0235 (!) 151/72 -- -- 75 -- 99 % --               I have personally reviewed the past medical history, past surgical history, medications, social history, and family history, and I haveupdated the database accordingly. Allergies:   Aspirin, Bactrim, and Codeine     Review of Systems:     Review of Systems  As per history of present illness, other than above 12 system review was negative  Physical Examination :       Physical Exam  Vitals and nursing note reviewed. Constitutional:       General: She is not in acute distress. Appearance: Normal appearance. She is not ill-appearing. HENT:      Head: Normocephalic and atraumatic. Right Ear: External ear normal.      Left Ear: External ear normal.      Nose: Nose normal.      Mouth/Throat:      Mouth: Mucous membranes are moist.      Pharynx: Oropharynx is clear. Eyes:      Conjunctiva/sclera: Conjunctivae normal.   Cardiovascular:      Rate and Rhythm: Normal rate and regular rhythm. Heart sounds: Normal heart sounds. Pulmonary:      Effort: Pulmonary effort is normal. No respiratory distress. Abdominal:      General: There is no distension. Palpations: Abdomen is soft. Tenderness: There is no abdominal tenderness. Musculoskeletal:      Cervical back: Normal range of motion. No rigidity. Right lower leg: No edema. Left lower leg: No edema. Skin:     General: Skin is warm and dry. Neurological:      Mental Status: She is oriented to person, place, and time.    Psychiatric:         Behavior: Behavior normal.       Past Medical History:     Past Medical History:   Diagnosis Date    Anemia     Arthritis     Asthma     Bipolar disorder, mixed ([address]     Carotid artery stenosis 2/13/2020    Cocaine abuse ([address] 11/4/2014    COPD (chronic obstructive pulmonary disease) ([address] Degeneration of cervical intervertebral disc     Depression with anxiety     Depression with anxiety     Fibromyalgia 2017    GERD (gastroesophageal reflux disease)     History of migraine headaches 6/10/2014    History of renal calculi 6/10/2014    History of seizure disorder 6/10/2014    Hoarseness of voice     HTN (hypertension) 6/10/2014    Hyperlipidemia 6/10/2014    IGT (impaired glucose tolerance) 6/10/2014    Kidney stones     Lactose intolerance 6/10/2014    Leukopenia 6/10/2014    Major depressive disorder, recurrent episode, severe, without mention of psychotic behavior 2014    MVP (mitral valve prolapse)     Seizures ([address]     Sleep apnea 6/10/2014    Unspecified diseases of blood and blood-forming organs        Past Surgical  History:     Past Surgical History:   Procedure Laterality Date    ANKLE FRACTURE SURGERY      recontruction surgery    APPENDECTOMY      BREAST LUMPECTOMY Right     CARDIAC CATHETERIZATION      CARPAL TUNNEL RELEASE      x2     SECTION      CHOLECYSTECTOMY      COLONOSCOPY  12    COLONOSCOPY  2016    severe spasms    COLONOSCOPY  2019    DR GOLDY MCINTOSH    CT BONE MARROW BIOPSY  2022    CT BONE MARROW BIOPSY 2022 [facility] CT SCAN    GASTRIC BYPASS SURGERY      GASTRIC BYPASS SURGERY      HYSTERECTOMY (CERVIX STATUS UNKNOWN)      HYSTERECTOMY (CERVIX STATUS UNKNOWN)      IR PERC CATH PLEURAL DRAIN W/IMAG  10/21/2022    IR PERC CATH PLEURAL DRAIN W/IMAG 10/21/2022 [facility]    IR PERC CATH PLEURAL DRAIN W/IMAG  10/21/2022    IR PERC CATH PLEURAL DRAIN W/IMAG 10/21/2022 [facility]    IR PERC CATH PLEURAL DRAIN W/IMAG  10/24/2022    IR PERC CATH PLEURAL DRAIN W/IMAG 10/24/2022 [facility]    LAPAROSCOPY N/A 2022    LAPAROSCOPY EXPLORATORY CONVERTED TO OPEN EXPLORATORY LAPAROTOMY, LYSIS OF ADHESIONS, REDUCTION OF INTERNAL HERNIA performed by Jackie Barnard DO at [facility] Activity: Not on file   Stress: Not on file   Social Connections: Not on file   Intimate Partner Violence: Not on file   Housing Stability: Not on file       Family History:     Family History   Problem Relation Age of Onset    Diabetes Mother     Cancer Mother     Coronary Art Dis Father     COPD Father     Depression Brother     Alcohol Abuse Brother     Cancer Other         lung and skin    Diabetes Maternal Grandmother     Cancer Paternal Grandmother       Medical Decision Making:   I have independently reviewed/ordered the following labs:    CBC with Differential:   Recent Labs     11/23/22  0537 11/24/22  0504   WBC 3.4* 3.8   HGB 8.7* 9.3*   HCT 26.5* 28.4*   PLT 35* 41*   LYMPHOPCT 52* 47*   MONOPCT 23* 15*       BMP:  Recent Labs     11/23/22  0537 11/24/22  0504    136   K 4.1 3.4*    102   CO2 29 28   BUN 14 11   CREATININE 0.64 0.63       Hepatic Function Panel: No results for input(s): PROT, LABALBU, BILIDIR, IBILI, BILITOT, ALKPHOS, ALT, AST in the last 72 hours. No results for input(s): RPR in the last 72 hours. No results for input(s): HIV in the last 72 hours. No results for input(s): BC in the last 72 hours. Lab Results   Component Value Date/Time    CREATININE 0.63 11/24/2022 05:04 AM    GLUCOSE 89 11/24/2022 05:04 AM       Detailed results: Thank you for allowing us to participate in the care of this patient. Please call with questions. This note is created with the assistance of a speech recognition program.  While intending to generate adocument that actually reflects the content of the visit, the document can still have some errors including those of syntax and sound a like substitutions which may escape proof reading. It such instances, actual meaningcan be extrapolated by contextual diversion.     KELLY Jacobo - CNP  Office: [phone]  Perfect serve / office [phone]

## 2022-11-24 NOTE — PROGRESS NOTES
This nurse took over patient care at this time. Patient is resting comfortably in bed with eyes closed and call light within reach.

## 2022-11-24 NOTE — PLAN OF CARE
Problem: Discharge Planning  Goal: Discharge to home or other facility with appropriate resources  11/24/2022 0406 by Cesar Dubose RN  Outcome: Progressing     Problem: Safety - Adult  Goal: Free from fall injury  11/24/2022 0406 by Cesar Dubose RN  Outcome: Progressing     Problem: Skin/Tissue Integrity  Goal: Absence of new skin breakdown  Description: 1. Monitor for areas of redness and/or skin breakdown  2. Assess vascular access sites hourly  3. Every 4-6 hours minimum:  Change oxygen saturation probe site  4. Every 4-6 hours:  If on nasal continuous positive airway pressure, respiratory therapy assess nares and determine need for appliance change or resting period.   11/24/2022 0406 by Cesar Dubose RN  Outcome: Progressing Don't know, Unknown

## 2022-11-24 NOTE — PLAN OF CARE
Problem: Discharge Planning  Goal: Discharge to home or other facility with appropriate resources  11/24/2022 1754 by Eleazar Mena RN  Outcome: Progressing  Flowsheets (Taken 11/24/2022 0810 by Abner Bright RN)  Discharge to home or other facility with appropriate resources:   Identify barriers to discharge with patient and caregiver   Arrange for needed discharge resources and transportation as appropriate   Identify discharge learning needs (meds, wound care, etc)   Refer to discharge planning if patient needs post-hospital services based on physician order or complex needs related to functional status, cognitive ability or social support system     Problem: Safety - Adult  Goal: Free from fall injury  11/24/2022 1754 by Eleazar Mena RN  Outcome: Progressing     Problem: Skin/Tissue Integrity  Goal: Absence of new skin breakdown  Description: 1. Monitor for areas of redness and/or skin breakdown  2. Assess vascular access sites hourly  3. Every 4-6 hours minimum:  Change oxygen saturation probe site  4. Every 4-6 hours:  If on nasal continuous positive airway pressure, respiratory therapy assess nares and determine need for appliance change or resting period.   11/24/2022 1754 by Eleazar Mena RN  Outcome: Progressing     Problem: ABCDS Injury Assessment  Goal: Absence of physical injury  11/24/2022 1754 by Eleazar Mena RN  Outcome: Progressing

## 2022-11-24 NOTE — CARE COORDINATION
DISCHARGE PLANNING NOTE:    Plan remains for patient to be discharged to [facility]. They are trying to apply for admit through Medicaid. Pt needs IV Teflaro 400mg twice a day as planned through 12/3. Has PICC. Will continue to follow for additional discharge needs.     Electronically signed by Libertad Ahumada RN on 11/24/2022 at 11:27 AM

## 2022-11-24 NOTE — PROGRESS NOTES
Progress Note  Date:2022       Room:[address] [place] [address]  Patient Name:Anu Bourne     YOB: 1970     Age:51 y.o. Subjective    Subjective:  Symptoms:  Stable. Diet:  Adequate intake. Activity level: Impaired due to weakness. Pain:  She reports no pain. Review of Systems  Review of Systems - General ROS: positive for  - malnutrition  Hematological and Lymphatic ROS: positive for - pallor  Respiratory ROS: positive for - pulmonary nodules  Musculoskeletal ROS: positive for - muscular weakness    Objective         Vitals Last 24 Hours:  TEMPERATURE:  Temp  Av °F (36.7 °C)  Min: 97.6 °F (36.4 °C)  Max: 98.2 °F (36.8 °C)  RESPIRATIONS RANGE: Resp  Av  Min: 18  Max: 18  PULSE OXIMETRY RANGE: SpO2  Av.8 %  Min: 95 %  Max: 100 %  PULSE RANGE: Pulse  Av.3  Min: 67  Max: 75  BLOOD PRESSURE RANGE: Systolic (68JTF), YPE:453 , Min:126 , AMJ:923   ; Diastolic (82ABO), EKD:85, Min:71, Max:107    I/O (24Hr): Intake/Output Summary (Last 24 hours) at 2022 0829  Last data filed at 2022 2851  Gross per 24 hour   Intake 480 ml   Output 700 ml   Net -220 ml       Objective:  General Appearance:  Comfortable. Vital signs: (most recent): Blood pressure (!) 189/90, pulse 68, temperature 97.9 °F (36.6 °C), temperature source Oral, resp. rate 18, height 4' 11.02\" (1.499 m), weight 93 lb 11.1 oz (42.5 kg), SpO2 100 %.     Labs/Imaging/Diagnostics    Labs:  CBC:  Recent Labs     22  0535 22  0537 22  0504   WBC 2.5* 3.4* 3.8   RBC 3.02* 3.02* 3.20*   HGB 8.6* 8.7* 9.3*   HCT 26.2* 26.5* 28.4*   MCV 87.0 87.8 88.7   RDW 26.2* 26.2* 25.7*   PLT 28* 35* 41*       CHEMISTRIES:  Recent Labs     22  0535 22  2044 22  0537 22  0504     --  137 136   K 2.8* 4.3 4.1 3.4*     --  103 102   CO2 29  --  29 28   BUN 12  --  14 11   CREATININE 0.70  --  0.64 0.63   GLUCOSE 78  --  62* 89       PT/INR:No results for input(s): PROTIME, INR in the last 72 hours. APTT:No results for input(s): APTT in the last 72 hours. LIVER PROFILE:No results for input(s): AST, ALT, BILIDIR, BILITOT, ALKPHOS in the last 72 hours. Imaging Last 24 Hours:  No results found. Assessment//Plan           Hospital Problems             Last Modified POA    * (Principal) Hypokalemia 11/15/2022 Yes    Severe malnutrition (HCC) (Chronic) 11/16/2022 Yes    Pulmonary nodules 11/16/2022 Yes    Cavitating mass of lung 11/16/2022 Yes    Severe thrombocytopenia (HCC) 11/17/2022 Yes    Severe anemia 11/17/2022 Yes     Generalized weakness - awaiting acceptance to a SNF. Hypokalemia - level adequate. Anemia - stable with a hgb of 9.3 today. Continue current treatments.       Electronically signed by Nellie Fernandez MD on 11/24/2022 at 8:30 AM

## 2022-11-25 LAB
ABSOLUTE EOS #: 0 K/UL (ref 0–0.4)
ABSOLUTE LYMPH #: 1.81 K/UL (ref 1–4.8)
ABSOLUTE MONO #: 0.53 K/UL (ref 0.1–1.3)
ANION GAP SERPL CALCULATED.3IONS-SCNC: 12 MMOL/L (ref 9–17)
BASOPHILS # BLD: 0 % (ref 0–2)
BASOPHILS ABSOLUTE: 0 K/UL (ref 0–0.2)
BUN BLDV-MCNC: 15 MG/DL (ref 6–20)
CALCIUM SERPL-MCNC: 7.8 MG/DL (ref 8.6–10.4)
CHLORIDE BLD-SCNC: 101 MMOL/L (ref 98–107)
CO2: 24 MMOL/L (ref 20–31)
CREAT SERPL-MCNC: 0.69 MG/DL (ref 0.5–0.9)
EOSINOPHILS RELATIVE PERCENT: 0 % (ref 0–4)
GFR SERPL CREATININE-BSD FRML MDRD: >60 ML/MIN/1.73M2
GLUCOSE BLD-MCNC: 109 MG/DL (ref 70–99)
HCT VFR BLD CALC: 29.6 % (ref 36–46)
HEMOGLOBIN: 9.9 G/DL (ref 12–16)
LYMPHOCYTES # BLD: 44 % (ref 24–44)
MCH RBC QN AUTO: 29.2 PG (ref 26–34)
MCHC RBC AUTO-ENTMCNC: 33.3 G/DL (ref 31–37)
MCV RBC AUTO: 87.8 FL (ref 80–100)
MONOCYTES # BLD: 13 % (ref 1–7)
MORPHOLOGY: ABNORMAL
PDW BLD-RTO: 25.7 % (ref 11.5–14.9)
PLATELET # BLD: 75 K/UL (ref 150–450)
PMV BLD AUTO: 7.5 FL (ref 6–12)
POTASSIUM SERPL-SCNC: 3.5 MMOL/L (ref 3.7–5.3)
RBC # BLD: 3.37 M/UL (ref 4–5.2)
REASON FOR REJECTION: NORMAL
SEG NEUTROPHILS: 43 % (ref 36–66)
SEGMENTED NEUTROPHILS ABSOLUTE COUNT: 1.76 K/UL (ref 1.3–9.1)
SODIUM BLD-SCNC: 137 MMOL/L (ref 135–144)
WBC # BLD: 4.1 K/UL (ref 3.5–11)
ZZ NTE CLEAN UP: ORDERED TEST: NORMAL
ZZ NTE WITH NAME CLEAN UP: SPECIMEN SOURCE: NORMAL

## 2022-11-25 PROCEDURE — 6360000002 HC RX W HCPCS: Performed by: FAMILY MEDICINE

## 2022-11-25 PROCEDURE — 6360000002 HC RX W HCPCS: Performed by: INTERNAL MEDICINE

## 2022-11-25 PROCEDURE — 94640 AIRWAY INHALATION TREATMENT: CPT

## 2022-11-25 PROCEDURE — 99232 SBSQ HOSP IP/OBS MODERATE 35: CPT | Performed by: INTERNAL MEDICINE

## 2022-11-25 PROCEDURE — 6370000000 HC RX 637 (ALT 250 FOR IP): Performed by: FAMILY MEDICINE

## 2022-11-25 PROCEDURE — 2580000003 HC RX 258: Performed by: INTERNAL MEDICINE

## 2022-11-25 PROCEDURE — 36415 COLL VENOUS BLD VENIPUNCTURE: CPT

## 2022-11-25 PROCEDURE — 85025 COMPLETE CBC W/AUTO DIFF WBC: CPT

## 2022-11-25 PROCEDURE — 6370000000 HC RX 637 (ALT 250 FOR IP): Performed by: INTERNAL MEDICINE

## 2022-11-25 PROCEDURE — 80048 BASIC METABOLIC PNL TOTAL CA: CPT

## 2022-11-25 PROCEDURE — 99232 SBSQ HOSP IP/OBS MODERATE 35: CPT | Performed by: FAMILY MEDICINE

## 2022-11-25 PROCEDURE — 2580000003 HC RX 258: Performed by: FAMILY MEDICINE

## 2022-11-25 PROCEDURE — 1200000000 HC SEMI PRIVATE

## 2022-11-25 PROCEDURE — 94761 N-INVAS EAR/PLS OXIMETRY MLT: CPT

## 2022-11-25 RX ORDER — PREDNISONE 20 MG/1
60 TABLET ORAL DAILY
Qty: 30 TABLET | Refills: 0 | Status: SHIPPED | OUTPATIENT
Start: 2022-11-26 | End: 2022-12-06

## 2022-11-25 RX ORDER — FOLIC ACID 1 MG/1
1 TABLET ORAL DAILY
Qty: 30 TABLET | Refills: 3 | Status: SHIPPED | OUTPATIENT
Start: 2022-11-26

## 2022-11-25 RX ORDER — TRAZODONE HYDROCHLORIDE 50 MG/1
50 TABLET ORAL NIGHTLY PRN
Status: DISCONTINUED | OUTPATIENT
Start: 2022-11-25 | End: 2022-11-29 | Stop reason: HOSPADM

## 2022-11-25 RX ADMIN — FOLIC ACID 1 MG: 1 TABLET ORAL at 08:32

## 2022-11-25 RX ADMIN — FERROUS SULFATE TAB 325 MG (65 MG ELEMENTAL FE) 325 MG: 325 (65 FE) TAB at 08:32

## 2022-11-25 RX ADMIN — HYDRALAZINE HYDROCHLORIDE 10 MG: 20 INJECTION INTRAMUSCULAR; INTRAVENOUS at 22:03

## 2022-11-25 RX ADMIN — SODIUM CHLORIDE, PRESERVATIVE FREE 10 ML: 5 INJECTION INTRAVENOUS at 20:26

## 2022-11-25 RX ADMIN — IPRATROPIUM BROMIDE AND ALBUTEROL SULFATE 3 ML: .5; 2.5 SOLUTION RESPIRATORY (INHALATION) at 15:29

## 2022-11-25 RX ADMIN — ARIPIPRAZOLE 7.5 MG: 5 TABLET ORAL at 08:36

## 2022-11-25 RX ADMIN — IPRATROPIUM BROMIDE AND ALBUTEROL SULFATE 3 ML: .5; 2.5 SOLUTION RESPIRATORY (INHALATION) at 08:04

## 2022-11-25 RX ADMIN — AMLODIPINE BESYLATE 10 MG: 10 TABLET ORAL at 08:32

## 2022-11-25 RX ADMIN — IPRATROPIUM BROMIDE AND ALBUTEROL SULFATE 3 ML: .5; 2.5 SOLUTION RESPIRATORY (INHALATION) at 11:17

## 2022-11-25 RX ADMIN — CEFTAROLINE FOSAMIL 400 MG: 400 POWDER, FOR SOLUTION INTRAVENOUS at 14:03

## 2022-11-25 RX ADMIN — DIVALPROEX SODIUM 1000 MG: 250 TABLET, DELAYED RELEASE ORAL at 20:25

## 2022-11-25 RX ADMIN — Medication 2000 UNITS: at 08:32

## 2022-11-25 RX ADMIN — TRAZODONE HYDROCHLORIDE 50 MG: 50 TABLET ORAL at 20:24

## 2022-11-25 RX ADMIN — PREDNISONE 60 MG: 20 TABLET ORAL at 08:32

## 2022-11-25 RX ADMIN — CARVEDILOL 25 MG: 25 TABLET ORAL at 18:35

## 2022-11-25 RX ADMIN — IPRATROPIUM BROMIDE AND ALBUTEROL SULFATE 3 ML: .5; 2.5 SOLUTION RESPIRATORY (INHALATION) at 20:40

## 2022-11-25 RX ADMIN — FERROUS SULFATE TAB 325 MG (65 MG ELEMENTAL FE) 325 MG: 325 (65 FE) TAB at 20:24

## 2022-11-25 RX ADMIN — DULOXETIN HYDROCHLORIDE 60 MG: 30 CAPSULE, DELAYED RELEASE ORAL at 08:38

## 2022-11-25 RX ADMIN — DIVALPROEX SODIUM 1000 MG: 250 TABLET, DELAYED RELEASE ORAL at 08:40

## 2022-11-25 RX ADMIN — CARVEDILOL 25 MG: 25 TABLET ORAL at 08:37

## 2022-11-25 RX ADMIN — POTASSIUM CHLORIDE 40 MEQ: 1500 TABLET, EXTENDED RELEASE ORAL at 08:32

## 2022-11-25 RX ADMIN — SODIUM CHLORIDE, PRESERVATIVE FREE 10 ML: 5 INJECTION INTRAVENOUS at 08:40

## 2022-11-25 NOTE — PROGRESS NOTES
Comprehensive Nutrition Assessment    Type and Reason for Visit:  Initial    Nutrition Recommendations/Plan:   Continue diet as ordered. Malnutrition Assessment:  Malnutrition Status:  Insufficient data (per nurse pt has been hungry and wanting snacks etc) (11/25/22 6219)    Context:  Acute Illness     Findings of the 6 clinical characteristics of malnutrition:  Energy Intake:  Unable to assess  Weight Loss:  Unable to assess     Body Fat Loss:  Unable to assess     Muscle Mass Loss:  Unable to assess    Fluid Accumulation:  Unable to assess     Strength:  Not Performed    Nutrition Assessment:    Pt being seen for LOS. Nurse states pt is medically cleared to discharge but some insurance issues. Nurse reports pt has been eating pretty well this admission with family bringing her in snacks cause she gets hungry through the night. Nutrition Related Findings:    No edema. Wound Type: None       Current Nutrition Intake & Therapies:    Average Meal Intake: 26-50%, 51-75% (family brings food in also at times)     ADULT DIET; Regular    Anthropometric Measures:  Height: 4' 11.02\" (149.9 cm)  Ideal Body Weight (IBW): 95 lbs (43 kg)    Admission Body Weight: 99 lb (44.9 kg)  Current Body Weight: 88 lb (39.9 kg), 92.6 % IBW.  Weight Source: Bed Scale  Current BMI (kg/m2): 17.8                          BMI Categories: Underweight (BMI less than 18.5)    Estimated Daily Nutrient Needs:  Energy Requirements Based On: Kcal/kg  Weight Used for Energy Requirements: Current  Energy (kcal/day): 1400 kcals based on 35 kcals/kg  Weight Used for Protein Requirements: Current  Protein (g/day): 48-52 gm protein based on 1.2-1.3 gm/kg         Nutrition Diagnosis:   Underweight related to other (comment) (medical condition/hx drug use) as evidenced by poor intake prior to admission    Nutrition Interventions:   Food and/or Nutrient Delivery: Continue Current Diet  Nutrition Education/Counseling: No recommendation at this time  Coordination of Nutrition Care: Continue to monitor while inpatient       Goals:     Goals: PO intake 75% or greater       Nutrition Monitoring and Evaluation:   Behavioral-Environmental Outcomes: None Identified  Food/Nutrient Intake Outcomes: Food and Nutrient Intake  Physical Signs/Symptoms Outcomes: Biochemical Data, Fluid Status or Edema, Nutrition Focused Physical Findings, Skin, Weight    Discharge Planning:     Too soon to determine     Micheal Mcdowell, [address] [place] [address],   [phone]

## 2022-11-25 NOTE — PROGRESS NOTES
Progress Note  Date:2022       Room:[address] [place] [address]  Patient Name:Anu Bourne     YOB: 1970     Age:51 y.o. Subjective    Subjective:  Symptoms:  Stable. Diet:  Adequate intake. Activity level: Impaired due to weakness. Pain:  She reports no pain. Review of Systems  Review of Systems - General ROS: positive for  - malnutrition  Hematological and Lymphatic ROS: positive for - pallor  Respiratory ROS: positive for - pulmonary nodules  Musculoskeletal ROS: positive for - muscular weakness    Objective         Vitals Last 24 Hours:  TEMPERATURE:  Temp  Av.1 °F (36.7 °C)  Min: 97.7 °F (36.5 °C)  Max: 98.5 °F (36.9 °C)  RESPIRATIONS RANGE: Resp  Av.6  Min: 16  Max: 20  PULSE OXIMETRY RANGE: SpO2  Av.6 %  Min: 96 %  Max: 99 %  PULSE RANGE: Pulse  Av.8  Min: 63  Max: 83  BLOOD PRESSURE RANGE: Systolic (18QTH), UOO:958 , Min:122 , CQI:590   ; Diastolic (41KWS), IYS:45, Min:62, Max:83    I/O (24Hr): Intake/Output Summary (Last 24 hours) at 2022 1016  Last data filed at 2022 2017  Gross per 24 hour   Intake 240 ml   Output --   Net 240 ml       Objective:  General Appearance:  Comfortable. Vital signs: (most recent): Blood pressure (!) 187/76, pulse 83, temperature 98.5 °F (36.9 °C), temperature source Oral, resp. rate 20, height 4' 11.02\" (1.499 m), weight 88 lb 2.9 oz (40 kg), SpO2 98 %. Labs/Imaging/Diagnostics    Labs:  CBC:  Recent Labs     22  0537 22  0504 22  0502   WBC 3.4* 3.8 4.1   RBC 3.02* 3.20* 3.37*   HGB 8.7* 9.3* 9.9*   HCT 26.5* 28.4* 29.6*   MCV 87.8 88.7 87.8   RDW 26.2* 25.7* 25.7*   PLT 35* 41* 75*       CHEMISTRIES:  Recent Labs     22  0537 22  0504 22  0733    136 137   K 4.1 3.4* 3.5*    102 101   CO2 29 28 24   BUN 14 11 15   CREATININE 0.64 0.63 0.69   GLUCOSE 62* 89 109*       PT/INR:No results for input(s): PROTIME, INR in the last 72 hours.   APTT:No results for input(s): APTT in the last 72 hours. LIVER PROFILE:No results for input(s): AST, ALT, BILIDIR, BILITOT, ALKPHOS in the last 72 hours. Imaging Last 24 Hours:  No results found. Assessment//Plan           Hospital Problems             Last Modified POA    * (Principal) Hypokalemia 11/15/2022 Yes    Severe malnutrition ([address] (Chronic) 11/16/2022 Yes    Pulmonary nodules 11/16/2022 Yes    Cavitating mass of lung 11/16/2022 Yes    Severe thrombocytopenia ([address] 11/17/2022 Yes    Severe anemia 11/17/2022 Yes       MRSA bacteremia - to be on IV antibiotics through 12/3 per ID. Generalized weakness - awaiting acceptance to a SNF. Hesitant to D/C home with a PICC line due to hx of substance abuse. Hypokalemia - level adequate. Anemia - stable with a hgb of 9.3 today. Continue current treatments.       Electronically signed by Cecelia Plascencia MD on 11/25/2022 at 10:16 AM

## 2022-11-25 NOTE — PLAN OF CARE
Problem: Discharge Planning  Goal: Discharge to home or other facility with appropriate resources  11/25/2022 0305 by Meenakshi Perez RN  Outcome: Progressing     Problem: Safety - Adult  Goal: Free from fall injury  11/25/2022 0305 by Meenakshi Perez RN  Outcome: Progressing     Problem: Skin/Tissue Integrity  Goal: Absence of new skin breakdown  Description: 1. Monitor for areas of redness and/or skin breakdown  2. Assess vascular access sites hourly  3. Every 4-6 hours minimum:  Change oxygen saturation probe site  4. Every 4-6 hours:  If on nasal continuous positive airway pressure, respiratory therapy assess nares and determine need for appliance change or resting period.   11/25/2022 0305 by Meenakshi Perez RN  Outcome: Progressing     Problem: ABCDS Injury Assessment  Goal: Absence of physical injury  11/25/2022 0305 by Meenakshi Perez RN  Outcome: Progressing

## 2022-11-25 NOTE — PROGRESS NOTES
[facility] -   Infectious diseases evaluation  admission date 11/15/2022    reason for consultation:   MRSA infection    Impression :   Current:  Hypokalemia  Recent MRSA bacteremia  Recent empyema/pulmonary nodules with cavitation status post chest tube placement that was subsequently removed on 10/31/2022. Thrombocytopenia and anemia (thought to be related to vancomycin that was discontinued last admission). Renal insufficiency  History of cocaine drug abuse  Hypertension  Bipolar disorder  Allergy to Bactrim. Recommendations   Continue IV Teflaro 400 mg twice a day as planned through 12/3/2022  CT chest without contrast on 11/18/2022 showed resolution of bilateral pleural effusion, nodules/masses bilaterally with resolution of cavitations. Follow CBC and renal function      Infection Control Recommendations   [place] Precautions  Contact Isolation       Antimicrobial Stewardship Recommendations   Simplification of therapy  Targeted therapy      History of Present Illness:   Initial history:  Armando Gaspar is a 46y.o.-year-old female was brought to the hospital from SNF for hypokalemia with reported potassium of 2.4. Patient is complaining of body ache, generalized weakness and abdominal discomfort for 2 days. Symptoms are moderate, no alleviating or aggravating factors. She denied cough or shortness of breath, no other complaints. Right arm PICC line in place  The patient had prolonged hospitalization recently for MRSA bacteremia and empyema status post chest tube placement but was subsequently removed. She was discharged to skilled nursing facility on 11/4/2024 on IV Teflaro through 12/3/2022  Interval changes  11/25/2022   The patient remains afebrile, denied significant cough or shortness of breath, no fever or chills, no other complaints.   WBC 5.9, platelet 62  On prednisone  Status post bone marrow biopsy 11/18/2022  Patient Vitals for the past 8 hrs:   BP Temp Temp src Pulse Resp SpO2 Weight   11/25/22 0804 -- -- -- 83 20 98 % --   11/25/22 0800 -- -- -- -- -- -- 88 lb 2.9 oz (40 kg)   11/25/22 0701 (!) 187/76 98.5 °F (36.9 °C) Oral 81 16 99 % --               I have personally reviewed the past medical history, past surgical history, medications, social history, and family history, and I haveupdated the database accordingly.       Allergies:   Aspirin, Bactrim, and Codeine     Review of Systems:     Review of Systems  As per history of present illness, other than above 12 system review was negative  Physical Examination :       Physical Exam  Head: Normocephalic, atraumatic  Eye: Pupils equal round reactive to light, no conjunctivitis  ENT: No throat erythema or thrush  Neck: Supple, no JVD  Heart: Regular rate and rhythm no murmurs  Lungs: Clear to auscultation bilaterally, no respiratory distress  Abdomen: Soft, nontender, nondistended, with no peritoneal signs  MSK: No joint swelling or erythema  Neurologic: Patient is alert and oriented x3  Extremities: No edema, no clubbing, no cyanosis  Past Medical History:     Past Medical History:   Diagnosis Date    Anemia     Arthritis     Asthma     Bipolar disorder, mixed ([address]     Carotid artery stenosis 2/13/2020    Cocaine abuse ([facility] 11/4/2014    COPD (chronic obstructive pulmonary disease) (HCC)     Degeneration of cervical intervertebral disc     Depression with anxiety     Depression with anxiety     Fibromyalgia 1/5/2017    GERD (gastroesophageal reflux disease)     History of migraine headaches 6/10/2014    History of renal calculi 6/10/2014    History of seizure disorder 6/10/2014    Hoarseness of voice     HTN (hypertension) 6/10/2014    Hyperlipidemia 6/10/2014    IGT (impaired glucose tolerance) 6/10/2014    Kidney stones     Lactose intolerance 6/10/2014    Leukopenia 6/10/2014    Major depressive disorder, recurrent episode, severe, without mention of psychotic behavior 11/6/2014    MVP (mitral valve prolapse)     Seizures (HCC)     Sleep apnea 6/10/2014    Unspecified diseases of blood and blood-forming organs        Past Surgical  History:     Past Surgical History:   Procedure Laterality Date    ANKLE FRACTURE SURGERY      recontruction surgery    APPENDECTOMY      BREAST LUMPECTOMY Right     CARDIAC CATHETERIZATION      CARPAL TUNNEL RELEASE      x2     SECTION      CHOLECYSTECTOMY      COLONOSCOPY  12    COLONOSCOPY  2016    severe spasms    COLONOSCOPY  2019    DR GOLDY MCINTOSH    CT BONE MARROW BIOPSY  2022    CT BONE MARROW BIOPSY 2022 [facility] CT SCAN    GASTRIC BYPASS SURGERY      GASTRIC BYPASS SURGERY      HYSTERECTOMY (CERVIX STATUS UNKNOWN)      HYSTERECTOMY (CERVIX STATUS UNKNOWN)      IR PERC CATH PLEURAL DRAIN W/IMAG  10/21/2022    IR PERC CATH PLEURAL DRAIN W/IMAG 10/21/2022 [facility]    IR PERC CATH PLEURAL DRAIN W/IMAG  10/21/2022    IR PERC CATH PLEURAL DRAIN W/IMAG 10/21/2022 [facility] PROCED[facility]    IR PERC CATH PLEURAL DRAIN W/IMAG  10/24/2022    IR PERC CATH PLEURAL DRAIN W/IMAG 10/24/2022 [facility] PROCED[facility]    LAPAROSCOPY N/A 2022    LAPAROSCOPY EXPLORATORY CONVERTED TO OPEN EXPLORATORY LAPAROTOMY, LYSIS OF ADHESIONS, REDUCTION OF INTERNAL HERNIA [facility]      APPLICATION OF ARCH BARS,SIMPLE EXTRACTION OF #15 AND RT TMJ JOINT REPLACEMENT    SHOULDER ARTHROSCOPY Left 2019    DR ROBERT GREEN    SHOULDER SURGERY      TONSILLECTOMY AND ADENOIDECTOMY      TRANSESOPHAGEAL ECHOCARDIOGRAM N/A 10/19/2022    TRANSESOPHAGEAL ECHOCARDIOGRAM WITH BUBBLE STUDY performed by Frandy Royal DO at [address]  7-3-12    egd    UPPER GASTROINTESTINAL ENDOSCOPY  2016    status post gastrectomy with a small remnant of gastric pouch.     UPPER GASTROINTESTINAL ENDOSCOPY  2019    DR Adele Graham Medications:      predniSONE  60 mg Oral Daily    amLODIPine  10 mg Oral Daily    folic acid  1 mg Oral Daily    DULoxetine  60 mg Oral Daily    carvedilol  25 mg Oral BID WC    ARIPiprazole  7.5 mg Oral Daily    divalproex  1,000 mg Oral BID    ferrous sulfate  325 mg Oral BID    ipratropium-albuterol  3 mL Inhalation Q4H WA    Vitamin D  2,000 Units Oral Daily    sodium chloride flush  5-40 mL IntraVENous 2 times per day    [Held by provider] enoxaparin  30 mg SubCUTAneous Daily       Social History:     Social History     Socioeconomic History    Marital status:      Spouse name: Not on file    Number of children: Not on file    Years of education: Not on file    Highest education level: Not on file   Occupational History    Occupation: disability   Tobacco Use    Smoking status: Every Day     Packs/day: 0.50     Years: 33.00     Pack years: 16.50     Types: Cigarettes    Smokeless tobacco: Never    Tobacco comments:     quit  2000 started again  1/13    Substance and Sexual Activity    Alcohol use: Yes     Comment: occasional    Drug use: Not Currently    Sexual activity: Yes   Other Topics Concern    Not on file   Social History Narrative    Not on file     Social Determinants of Health     Financial Resource Strain: Not on file   Food Insecurity: Not on file   Transportation Needs: Not on file   Physical Activity: Not on file   Stress: Not on file   Social Connections: Not on file   Intimate Partner Violence: Not on file   Housing Stability: Not on file       Family History:     Family History   Problem Relation Age of Onset    Diabetes Mother     Cancer Mother     Coronary Art Dis Father     COPD Father     Depression Brother     Alcohol Abuse Brother     Cancer Other         lung and skin    Diabetes Maternal Grandmother     Cancer Paternal Grandmother       Medical Decision Making:   I have independently reviewed/ordered the following labs:    CBC with Differential:   Recent Labs 11/24/22  0504 11/25/22  0502   WBC 3.8 4.1   HGB 9.3* 9.9*   HCT 28.4* 29.6*   PLT 41* 75*   LYMPHOPCT 47* 44   MONOPCT 15* 13*       BMP:  Recent Labs     11/24/22  0504 11/25/22  0733    137   K 3.4* 3.5*    101   CO2 28 24   BUN 11 15   CREATININE 0.63 0.69       Hepatic Function Panel: No results for input(s): PROT, LABALBU, BILIDIR, IBILI, BILITOT, ALKPHOS, ALT, AST in the last 72 hours. No results for input(s): RPR in the last 72 hours. No results for input(s): HIV in the last 72 hours. No results for input(s): BC in the last 72 hours. Lab Results   Component Value Date/Time    CREATININE 0.69 11/25/2022 07:33 AM    GLUCOSE 109 11/25/2022 07:33 AM       Detailed results: Thank you for allowing us to participate in the care of this patient. Please call with questions. This note is created with the assistance of a speech recognition program.  While intending to generate adocument that actually reflects the content of the visit, the document can still have some errors including those of syntax and sound a like substitutions which may escape proof reading. It such instances, actual meaningcan be extrapolated by contextual diversion.     Chase Casanova MD  Office: [phone]  Perfect serve / office [phone]

## 2022-11-25 NOTE — PROGRESS NOTES
hema  Today's Date: 11/25/2022  Patient Name: Andre Soto  Date of admission: 11/15/2022  4:51 PM  Patient's age: 46 y. o., 1970  Admission Dx: Hypokalemia [E87.6]    Reason for Consult: management recommendations  Requesting Physician: Jennifer Leung MD    CHIEF COMPLAINT: Severe anemia    INTERVAL HISTORY:    Patient seen and examined  Denies any chest pain or shortness of breath  Hemoglobin stable at 9.9  Platelet count now improved to 75  No bleeding symptoms  I do want to continue IV antibiotics with PICC line until 12/2    HISTORY OF PRESENT ILLNESS:    The patient is a 46 y.o.  female who is admitted to the hospital for hypokalemia with potassium of 2.4 patient recently hospitalized for MRSA bacteremia and empyema and had anemia and thrombocytopenia thought related to bacteremia and sepsis and antibiotic/vancomycin, patient had pulmonary nodules with cavitation, hemoglobin was 5.3 with WBC 4.0 and platelet count of 32    Past Medical History:   has a past medical history of Anemia, Arthritis, Asthma, Bipolar disorder, mixed ([address] Carotid artery stenosis, Cocaine abuse ([address] COPD (chronic obstructive pulmonary disease) ([address] Degeneration of cervical intervertebral disc, Depression with anxiety, Depression with anxiety, Fibromyalgia, GERD (gastroesophageal reflux disease), History of migraine headaches, History of renal calculi, History of seizure disorder, Hoarseness of voice, HTN (hypertension), Hyperlipidemia, IGT (impaired glucose tolerance), Kidney stones, Lactose intolerance, Leukopenia, Major depressive disorder, recurrent episode, severe, without mention of psychotic behavior, MVP (mitral valve prolapse), Seizures ([address] Sleep apnea, and Unspecified diseases of blood and blood-forming organs. Past Surgical History:   has a past surgical history that includes Gastric bypass surgery; Cholecystectomy; Hysterectomy; Appendectomy; Hysterectomy;  Tonsillectomy and adenoidectomy; Ankle fracture surgery; Neck surgery; shoulder surgery;  section; Carpal tunnel release; Colonoscopy (12); Upper gastrointestinal endoscopy (7-3-12); Gastric bypass surgery; Breast lumpectomy (Right); Cardiac catheterization; Upper gastrointestinal endoscopy (2016); Colonoscopy (2016); Upper gastrointestinal endoscopy (2019); Colonoscopy (2019); Shoulder arthroscopy (Left, 2019); other surgical history (10/24/2019); laparoscopy (N/A, 2022); transesophageal echocardiogram (N/A, 10/19/2022); IR GUIDED PERC PLEURAL DRAIN W CATH INSERT (10/21/2022); IR GUIDED PERC PLEURAL DRAIN W CATH INSERT (10/21/2022); IR GUIDED PERC PLEURAL DRAIN W CATH INSERT (10/24/2022); and CT BIOPSY BONE MARROW (2022). Medications:    Reviewed in Epic     Allergies:  Aspirin, Bactrim, and Codeine    Social History:   reports that she has been smoking cigarettes. She has a 16.50 pack-year smoking history. She has never used smokeless tobacco. She reports current alcohol use. She reports that she does not currently use drugs. Family History: family history includes Alcohol Abuse in her brother; COPD in her father; Cancer in her mother, paternal grandmother, and another family member; Coronary Art Dis in her father; Depression in her brother; Diabetes in her maternal grandmother and mother.     REVIEW OF SYSTEMS:    14 point review of system has been obtained unremarkable although it was mentioned above    PHYSICAL EXAM:      /88   Pulse 72   Temp 98.5 °F (36.9 °C) (Axillary)   Resp 16   Ht 4' 11.02\" (1.499 m)   Wt 88 lb 2.9 oz (40 kg)   SpO2 98%   BMI 17.80 kg/m²    Temp (24hrs), Av.2 °F (36.8 °C), Min:97.7 °F (36.5 °C), Max:98.5 °F (36.9 °C)    General appearance - well appearing, no in pain or distress   Mental status - alert and cooperative   Eyes - pupils equal and reactive, extraocular eye movements intact   Ears - bilateral TM's and external ear canals normal Mouth - mucous membranes moist, pharynx normal without lesions   Neck - supple, no significant adenopathy   Lymphatics - no palpable lymphadenopathy, no hepatosplenomegaly   Chest - clear to auscultation, no wheezes, rales or rhonchi, symmetric air entry   Heart - normal rate, regular rhythm, normal S1, S2, no murmurs  Abdomen - soft, nontender, nondistended, no masses or organomegaly   Neurological - alert, oriented, normal speech, no focal findings or movement disorder noted   Musculoskeletal - no joint tenderness, deformity or swelling   Extremities - peripheral pulses normal, no pedal edema, no clubbing or cyanosis   Skin - normal coloration and turgor, no rashes, no suspicious skin lesions noted ,    DATA:    Labs:   CBC:   Recent Labs     11/24/22  0504 11/25/22  0502   WBC 3.8 4.1   HGB 9.3* 9.9*   HCT 28.4* 29.6*   PLT 41* 75*       BMP:   Recent Labs     11/24/22  0504 11/25/22  0733    137   K 3.4* 3.5*   CO2 28 24   BUN 11 15   CREATININE 0.63 0.69   LABGLOM >60 >60   GLUCOSE 89 109*       PT/INR: No results for input(s): PROTIME, INR in the last 72 hours. IMAGING DATA:  CT CHEST WO CONTRAST   Final Result   Resolving pleural effusions bilaterally. Numerous pulmonary      nodules/masses redemonstrated bilaterally with resolving cavities associated   with multiple lesions. Resolving bilateral pleural effusions         CT BIOPSY BONE MARROW   Final Result   Successful CT-guided right posterior ilium bone marrow biopsy, as above. XR CHEST (SINGLE VIEW FRONTAL)   Final Result   1. Right PICC tip terminates within the lower right atrium. Retraction by   5-6 cm and radiographic follow-up suggested. 2.  Extensive pleural/parenchymal disease bilaterally without significant   overall change from prior.            Primary Problem  Hypokalemia    Active Hospital Problems    Diagnosis Date Noted    Severe thrombocytopenia ([facility] [D69.6] 11/17/2022     Priority: Medium    Severe anemia [D64.9] 11/17/2022     Priority: Medium    Cavitating mass of lung [J98.4] 10/23/2022     Priority: Medium    Pulmonary nodules [R91.8] 10/15/2022     Priority: Medium    Severe malnutrition ([address] [E43] 10/11/2022     Priority: Medium    Hypokalemia [E87.6] 12/18/2015         IMPRESSION:   Severe anemia and thrombocytopenia  Pulmonary nodules, numerous with cavities  Bilateral pleural effusions  MRSA bacteremia  Hypokalemia    RECOMMENDATIONS:  I reviewed the laboratory, imaging studies, discussed diagnosis and other treatment recommendations   Patient had bone marrow biopsy on 11/18/2022  Will follow results of bone marrow biopsy  Patient has low folic acid level and receiving folic acid and also on iron pill  Transfuse to keep hemoglobin above 7 and platelet above 10 unless there is a bleeding to keep platelet above 30  Patient had bilateral pulmonary nodules during her previous admission and has been evaluated by ID and pulmonary at that time and was thought to be suspected septic emboli. She would need follow-up CT scan/PET scan as outpatient  Other management as per primary team  IV antibiotics as per ID  Discharge planning as per primary    Discussed with patient and Nurse. Thank you for asking us to see this patient. Fortunato Olea MD  Hematologist/Medical Oncologist                      This note is created with the assistance of a speech recognition program.  While intending to generate a document that actually reflects the content of the visit, the document can still have some errors including those of syntax and sound a like substitutions which may escape proof reading. It such instances, actual meaning can be extrapolated by contextual diversion.

## 2022-11-25 NOTE — CARE COORDINATION
Patient was denied skilled services under Medicare. [facility] is submitting authorization under patients Medicaid [id number]. Electronically signed by JOSE Bustillos on 11/25/2022 at 9:02 AM     SW spoke with patient regarding discharge plans. SW informed patient that we are waiting on medicaid authorization to come back. Patient wanted to go home. JAY explained that since she has a history of drug use, she can not go home with a port. Patient was understanding and corporative. SW will follow up on Monday.      Electronically signed by JOSE Bustillos on 11/25/2022 at 3:55 PM

## 2022-11-25 NOTE — PLAN OF CARE
Problem: Discharge Planning  Goal: Discharge to home or other facility with appropriate resources  Outcome: Progressing  Flowsheets (Taken 11/25/2022 1839)  Discharge to home or other facility with appropriate resources:   Identify barriers to discharge with patient and caregiver   Arrange for needed discharge resources and transportation as appropriate   Refer to discharge planning if patient needs post-hospital services based on physician order or complex needs related to functional status, cognitive ability or social support system     Problem: Safety - Adult  Goal: Free from fall injury  Outcome: Progressing  Flowsheets (Taken 11/25/2022 1839)  Free From Fall Injury:   Instruct family/caregiver on patient safety   Based on caregiver fall risk screen, instruct family/caregiver to ask for assistance with transferring infant if caregiver noted to have fall risk factors     Problem: Skin/Tissue Integrity  Goal: Absence of new skin breakdown  Description: 1. Monitor for areas of redness and/or skin breakdown  2. Assess vascular access sites hourly  3. Every 4-6 hours minimum:  Change oxygen saturation probe site  4. Every 4-6 hours:  If on nasal continuous positive airway pressure, respiratory therapy assess nares and determine need for appliance change or resting period.   Outcome: Progressing     Problem: Nutrition Deficit:  Goal: Optimize nutritional status  Outcome: Progressing  Flowsheets (Taken 11/25/2022 1839)  Nutrient intake appropriate for improving, restoring, or maintaining nutritional needs:   Assess nutritional status and recommend course of action   Monitor oral intake, labs, and treatment plans   Recommend appropriate diets, oral nutritional supplements, and vitamin/mineral supplements

## 2022-11-26 LAB
ABSOLUTE EOS #: 0 K/UL (ref 0–0.4)
ABSOLUTE LYMPH #: 1.95 K/UL (ref 1–4.8)
ABSOLUTE MONO #: 0.94 K/UL (ref 0.1–1.3)
ANION GAP SERPL CALCULATED.3IONS-SCNC: 8 MMOL/L (ref 9–17)
BASOPHILS # BLD: 0 % (ref 0–2)
BASOPHILS ABSOLUTE: 0 K/UL (ref 0–0.2)
BUN BLDV-MCNC: 19 MG/DL (ref 6–20)
CALCIUM SERPL-MCNC: 8.2 MG/DL (ref 8.6–10.4)
CHLORIDE BLD-SCNC: 104 MMOL/L (ref 98–107)
CO2: 26 MMOL/L (ref 20–31)
CREAT SERPL-MCNC: 0.86 MG/DL (ref 0.5–0.9)
EOSINOPHILS RELATIVE PERCENT: 0 % (ref 0–4)
GFR SERPL CREATININE-BSD FRML MDRD: >60 ML/MIN/1.73M2
GLUCOSE BLD-MCNC: 65 MG/DL (ref 70–99)
HCT VFR BLD CALC: 29 % (ref 36–46)
HEMOGLOBIN: 9.3 G/DL (ref 12–16)
LYMPHOCYTES # BLD: 33 % (ref 24–44)
MCH RBC QN AUTO: 29.2 PG (ref 26–34)
MCHC RBC AUTO-ENTMCNC: 32.1 G/DL (ref 31–37)
MCV RBC AUTO: 91 FL (ref 80–100)
MONOCYTES # BLD: 16 % (ref 1–7)
MORPHOLOGY: ABNORMAL
PDW BLD-RTO: 26 % (ref 11.5–14.9)
PLATELET # BLD: 62 K/UL (ref 150–450)
PMV BLD AUTO: 6.7 FL (ref 6–12)
POTASSIUM SERPL-SCNC: 3.6 MMOL/L (ref 3.7–5.3)
RBC # BLD: 3.18 M/UL (ref 4–5.2)
SEG NEUTROPHILS: 51 % (ref 36–66)
SEGMENTED NEUTROPHILS ABSOLUTE COUNT: 3.01 K/UL (ref 1.3–9.1)
SODIUM BLD-SCNC: 138 MMOL/L (ref 135–144)
WBC # BLD: 5.9 K/UL (ref 3.5–11)

## 2022-11-26 PROCEDURE — 36415 COLL VENOUS BLD VENIPUNCTURE: CPT

## 2022-11-26 PROCEDURE — 99232 SBSQ HOSP IP/OBS MODERATE 35: CPT | Performed by: INTERNAL MEDICINE

## 2022-11-26 PROCEDURE — 6360000002 HC RX W HCPCS: Performed by: INTERNAL MEDICINE

## 2022-11-26 PROCEDURE — 6370000000 HC RX 637 (ALT 250 FOR IP): Performed by: FAMILY MEDICINE

## 2022-11-26 PROCEDURE — 99232 SBSQ HOSP IP/OBS MODERATE 35: CPT | Performed by: FAMILY MEDICINE

## 2022-11-26 PROCEDURE — 1200000000 HC SEMI PRIVATE

## 2022-11-26 PROCEDURE — 2580000003 HC RX 258: Performed by: FAMILY MEDICINE

## 2022-11-26 PROCEDURE — 6370000000 HC RX 637 (ALT 250 FOR IP): Performed by: INTERNAL MEDICINE

## 2022-11-26 PROCEDURE — 85025 COMPLETE CBC W/AUTO DIFF WBC: CPT

## 2022-11-26 PROCEDURE — 80048 BASIC METABOLIC PNL TOTAL CA: CPT

## 2022-11-26 PROCEDURE — 94761 N-INVAS EAR/PLS OXIMETRY MLT: CPT

## 2022-11-26 PROCEDURE — 2580000003 HC RX 258: Performed by: INTERNAL MEDICINE

## 2022-11-26 PROCEDURE — 94640 AIRWAY INHALATION TREATMENT: CPT

## 2022-11-26 RX ADMIN — IPRATROPIUM BROMIDE AND ALBUTEROL SULFATE 3 ML: .5; 2.5 SOLUTION RESPIRATORY (INHALATION) at 14:45

## 2022-11-26 RX ADMIN — SODIUM CHLORIDE, PRESERVATIVE FREE 10 ML: 5 INJECTION INTRAVENOUS at 22:56

## 2022-11-26 RX ADMIN — ARIPIPRAZOLE 7.5 MG: 5 TABLET ORAL at 10:15

## 2022-11-26 RX ADMIN — IPRATROPIUM BROMIDE AND ALBUTEROL SULFATE 3 ML: .5; 2.5 SOLUTION RESPIRATORY (INHALATION) at 19:48

## 2022-11-26 RX ADMIN — FERROUS SULFATE TAB 325 MG (65 MG ELEMENTAL FE) 325 MG: 325 (65 FE) TAB at 10:19

## 2022-11-26 RX ADMIN — DULOXETIN HYDROCHLORIDE 60 MG: 30 CAPSULE, DELAYED RELEASE ORAL at 10:16

## 2022-11-26 RX ADMIN — CARVEDILOL 25 MG: 25 TABLET ORAL at 18:14

## 2022-11-26 RX ADMIN — CEFTAROLINE FOSAMIL 400 MG: 400 POWDER, FOR SOLUTION INTRAVENOUS at 01:43

## 2022-11-26 RX ADMIN — CEFTAROLINE FOSAMIL 400 MG: 400 POWDER, FOR SOLUTION INTRAVENOUS at 13:44

## 2022-11-26 RX ADMIN — FERROUS SULFATE TAB 325 MG (65 MG ELEMENTAL FE) 325 MG: 325 (65 FE) TAB at 22:55

## 2022-11-26 RX ADMIN — CARVEDILOL 25 MG: 25 TABLET ORAL at 10:14

## 2022-11-26 RX ADMIN — AMLODIPINE BESYLATE 10 MG: 10 TABLET ORAL at 10:18

## 2022-11-26 RX ADMIN — DIVALPROEX SODIUM 1000 MG: 250 TABLET, DELAYED RELEASE ORAL at 10:15

## 2022-11-26 RX ADMIN — SODIUM CHLORIDE: 9 INJECTION, SOLUTION INTRAVENOUS at 13:43

## 2022-11-26 RX ADMIN — DIVALPROEX SODIUM 1000 MG: 250 TABLET, DELAYED RELEASE ORAL at 22:56

## 2022-11-26 RX ADMIN — FOLIC ACID 1 MG: 1 TABLET ORAL at 10:19

## 2022-11-26 RX ADMIN — PREDNISONE 60 MG: 20 TABLET ORAL at 10:17

## 2022-11-26 RX ADMIN — IPRATROPIUM BROMIDE AND ALBUTEROL SULFATE 3 ML: .5; 2.5 SOLUTION RESPIRATORY (INHALATION) at 11:47

## 2022-11-26 RX ADMIN — Medication 2000 UNITS: at 10:19

## 2022-11-26 RX ADMIN — SODIUM CHLORIDE, PRESERVATIVE FREE 10 ML: 5 INJECTION INTRAVENOUS at 10:20

## 2022-11-26 NOTE — PROGRESS NOTES
Progress Note  Date:2022       Room:[address] [place] [address]  Patient Name:Anu Bourne     YOB: 1970     Age:51 y.o. Subjective    Subjective:  Symptoms:  Stable. Diet:  Adequate intake. Activity level: Impaired due to weakness. Pain:  She reports no pain. Review of Systems  Review of Systems - General ROS: positive for  - malnutrition  Hematological and Lymphatic ROS: positive for - pallor  Respiratory ROS: positive for - pulmonary nodules  Musculoskeletal ROS: positive for - muscular weakness    Objective         Vitals Last 24 Hours:  TEMPERATURE:  Temp  Av.5 °F (36.9 °C)  Min: 98.2 °F (36.8 °C)  Max: 98.7 °F (37.1 °C)  RESPIRATIONS RANGE: Resp  Av.7  Min: 16  Max: 20  PULSE OXIMETRY RANGE: SpO2  Av.7 %  Min: 95 %  Max: 99 %  PULSE RANGE: Pulse  Av.9  Min: 72  Max: 88  BLOOD PRESSURE RANGE: Systolic (67TFY), NIY:933 , Min:127 , DIGNA:501   ; Diastolic (48UYK), QXR:77, Min:70, Max:88    I/O (24Hr): Intake/Output Summary (Last 24 hours) at 2022 0831  Last data filed at 2022 0326  Gross per 24 hour   Intake 703 ml   Output --   Net 703 ml       Objective:  General Appearance:  Comfortable. Vital signs: (most recent): Blood pressure (!) 148/81, pulse 79, temperature 98.7 °F (37.1 °C), temperature source Oral, resp. rate 16, height 4' 11.02\" (1.499 m), weight 93 lb 11.1 oz (42.5 kg), SpO2 95 %. Labs/Imaging/Diagnostics    Labs:  CBC:  Recent Labs     22  0504 22  0502 22  0529   WBC 3.8 4.1 5.9   RBC 3.20* 3.37* 3.18*   HGB 9.3* 9.9* 9.3*   HCT 28.4* 29.6* 29.0*   MCV 88.7 87.8 91.0   RDW 25.7* 25.7* 26.0*   PLT 41* 75* 62*       CHEMISTRIES:  Recent Labs     22  0504 22  0733 22  0529    137 138   K 3.4* 3.5* 3.6*    101 104   CO2 28 24 26   BUN 11 15 19   CREATININE 0.63 0.69 0.86   GLUCOSE 89 109* 65*       PT/INR:No results for input(s): PROTIME, INR in the last 72 hours.   APTT:No results for input(s): APTT in the last 72 hours. LIVER PROFILE:No results for input(s): AST, ALT, BILIDIR, BILITOT, ALKPHOS in the last 72 hours. Imaging Last 24 Hours:  No results found. Assessment//Plan           Hospital Problems             Last Modified POA    * (Principal) Hypokalemia 11/15/2022 Yes    Severe malnutrition ([address] (Chronic) 11/16/2022 Yes    Pulmonary nodules 11/16/2022 Yes    Cavitating mass of lung 11/16/2022 Yes    Severe thrombocytopenia ([address] 11/17/2022 Yes    Severe anemia 11/17/2022 Yes     MRSA bacteremia - to be on IV antibiotics through 12/3 per ID. Generalized weakness - awaiting acceptance to a SNF. Hesitant to D/C home with a PICC line due to hx of substance abuse. Hypokalemia - level adequate. Anemia - stable with a hgb of 9.3 today. Continue current treatments.       Electronically signed by Nellie Fernandez MD on 11/26/2022 at 8:31 AM

## 2022-11-26 NOTE — FLOWSHEET NOTE
Physical Therapy Cancel Note      DATE: 2022    NAME: Konstantin Quiroz  MRN: [id number]   : 1970      Patient not seen this date for Physical Therapy due to:    Patient Declined: Patient politely declined PT this morning and requested to come back at a later time due to fatigue. Will follow up as time allows.        Electronically signed by Johnathon Cheadle, PTA on 2022 at 10:08 AM

## 2022-11-26 NOTE — CARE COORDINATION
Patient was denied skilled services under Medicare. [facility] is submitting authorization under patients Medicaid 11-22. LSW spoke with patient regarding discharge plans. SW informed patient that we are waiting on medicaid authorization to come back. Patient wanted to go home. JAY explained that since she has a history of drug use, she can not go home with a port. Patient was understanding and corporative. JAY will follow up on Monday.      Electronically signed by Kolby Sethi RN on 11/26/2022 at 2:01 PM

## 2022-11-26 NOTE — PLAN OF CARE
Problem: Discharge Planning  Goal: Discharge to home or other facility with appropriate resources  11/26/2022 0314 by Ryan Keller RN  Outcome: Progressing     Problem: Safety - Adult  Goal: Free from fall injury  11/26/2022 0314 by Ryan Keller RN  Outcome: Progressing     Problem: Skin/Tissue Integrity  Goal: Absence of new skin breakdown  Description: 1. Monitor for areas of redness and/or skin breakdown  2. Assess vascular access sites hourly  3. Every 4-6 hours minimum:  Change oxygen saturation probe site  4. Every 4-6 hours:  If on nasal continuous positive airway pressure, respiratory therapy assess nares and determine need for appliance change or resting period.   11/26/2022 0314 by Ryan Keller RN  Outcome: Progressing     Problem: Nutrition Deficit:  Goal: Optimize nutritional status  11/26/2022 0314 by Ryan Keller RN  Outcome: Progressing

## 2022-11-26 NOTE — PROGRESS NOTES
Pt arrived to floor via W/C from PCU and was transfered to bed. Vitals taken. Assessment complete. No distress noted. See doc flowsheet for details. POC reviewed with patient. Call light within reach, and pt educated on its use. Bed in lowest position, and locked. Side rails up x 2. Denied further questions or needs at this time. Will continue to monitor.

## 2022-11-26 NOTE — PROGRESS NOTES
[facility] -   Infectious diseases evaluation  admission date 11/15/2022    reason for consultation:   MRSA infection    Impression :   Current:  Hypokalemia  Recent MRSA bacteremia  Recent empyema/pulmonary nodules with cavitation status post chest tube placement that was subsequently removed on 10/31/2022. Thrombocytopenia and anemia (thought to be related to vancomycin that was discontinued last admission). Renal insufficiency  History of cocaine drug abuse  Hypertension  Bipolar disorder  Allergy to Bactrim. Recommendations   Continue IV Teflaro 400 mg twice a day as planned through 12/3/2022  CT chest without contrast on 11/18/2022 showed resolution of bilateral pleural effusion, nodules/masses bilaterally with resolution of cavitations. Follow CBC and renal function      Infection Control Recommendations   [place] Precautions  Contact Isolation       Antimicrobial Stewardship Recommendations   Simplification of therapy  Targeted therapy      History of Present Illness:   Initial history:  Scotty Gray is a 46y.o.-year-old female was brought to the hospital from SNF for hypokalemia with reported potassium of 2.4. Patient is complaining of body ache, generalized weakness and abdominal discomfort for 2 days. Symptoms are moderate, no alleviating or aggravating factors. She denied cough or shortness of breath, no other complaints. Right arm PICC line in place  The patient had prolonged hospitalization recently for MRSA bacteremia and empyema status post chest tube placement but was subsequently removed. She was discharged to skilled nursing facility on 11/4/2024 on IV Teflaro through 12/3/2022  Interval changes  11/26/2022   She is feeling about the same, afebrile, denied cough or shortness of breath, no new complaints.   WBC 5.9, platelet 62  On prednisone  Status post bone marrow biopsy 11/18/2022  Patient Vitals for the past 8 hrs:   BP Temp Temp src Pulse Resp SpO2   11/26/22 1147 -- -- -- 72 16 96 %   11/26/22 1108 (!) 147/69 98.4 °F (36.9 °C) -- 80 18 100 %   11/26/22 0645 (!) 148/81 98.7 °F (37.1 °C) Oral 79 16 95 %               I have personally reviewed the past medical history, past surgical history, medications, social history, and family history, and I haveupdated the database accordingly.       Allergies:   Aspirin, Bactrim, and Codeine     Review of Systems:     Review of Systems  As per history of present illness, other than above 12 system review was negative  Physical Examination :       Physical Exam  Head: Normocephalic, atraumatic  Eye: Pupils equal round reactive to light, no conjunctivitis  ENT: No throat erythema or thrush  Neck: Supple, no JVD  Heart: Regular rate and rhythm no murmurs  Lungs: Clear to auscultation bilaterally, no respiratory distress  Abdomen: Soft, nontender, nondistended, with no peritoneal signs  MSK: No joint swelling or erythema  Neurologic: Patient is alert and oriented x3  Extremities: No edema, no clubbing, no cyanosis  Past Medical History:     Past Medical History:   Diagnosis Date    Anemia     Arthritis     Asthma     Bipolar disorder, mixed ([address]     Carotid artery stenosis 2/13/2020    Cocaine abuse ([facility] [address] 11/4/2014    COPD (chronic obstructive pulmonary disease) ([facility])     Degeneration of cervical intervertebral disc     Depression with anxiety     Depression with anxiety     Fibromyalgia 1/5/2017    GERD (gastroesophageal reflux disease)     History of migraine headaches 6/10/2014    History of renal calculi 6/10/2014    History of seizure disorder 6/10/2014    Hoarseness of voice     HTN (hypertension) 6/10/2014    Hyperlipidemia 6/10/2014    IGT (impaired glucose tolerance) 6/10/2014    Kidney stones     Lactose intolerance 6/10/2014    Leukopenia 6/10/2014    Major depressive disorder, recurrent episode, severe, without mention of psychotic behavior 11/6/2014    MVP (mitral valve prolapse)     Seizures [facility])     Sleep apnea 6/10/2014    Unspecified diseases of blood and blood-forming organs        Past Surgical  History:     Past Surgical History:   Procedure Laterality Date    ANKLE FRACTURE SURGERY      recontruction surgery    APPENDECTOMY      BREAST LUMPECTOMY Right     CARDIAC CATHETERIZATION      CARPAL TUNNEL RELEASE      x2     SECTION      CHOLECYSTECTOMY      COLONOSCOPY  12    COLONOSCOPY  2016    severe spasms    COLONOSCOPY  2019    DR GOLDY MCINTOSH    CT BONE MARROW BIOPSY  2022    CT BONE MARROW BIOPSY 2022 [facility] CT SCAN    GASTRIC BYPASS SURGERY      GASTRIC BYPASS SURGERY      HYSTERECTOMY (CERVIX STATUS UNKNOWN)      HYSTERECTOMY (CERVIX STATUS UNKNOWN)      IR PERC CATH PLEURAL DRAIN W/IMAG  10/21/2022    IR PERC CATH PLEURAL DRAIN W/IMAG 10/21/2022 [facility]    IR PERC CATH PLEURAL DRAIN W/IMAG  10/21/2022    IR PERC CATH PLEURAL DRAIN W/IMAG 10/21/2022 [facility] PROC[facility]    IR PERC CATH PLEURAL DRAIN W/IMAG  10/24/2022    IR PERC CATH PLEURAL DRAIN W/IMAG 10/24/2022 [facility] PROC[facility]    LAPAROSCOPY N/A 2022    LAPAROSCOPY EXPLORATORY CONVERTED TO OPEN EXPLORATORY LAPAROTOMY, LYSIS OF ADHESIONS, REDUCTION OF INTERNAL HERNIA performed by Mag Warren DO at [facility]      APPLICATION OF ARCH BARS,SIMPLE EXTRACTION OF #15 AND RT TMJ JOINT REPLACEMENT    SHOULDER ARTHROSCOPY Left 2019    DR ROBERT GREEN    SHOULDER SURGERY      TONSILLECTOMY AND ADENOIDECTOMY      TRANSESOPHAGEAL ECHOCARDIOGRAM N/A 10/19/2022    TRANSESOPHAGEAL ECHOCARDIOGRAM WITH BUBBLE STUDY performed by Frandy Royal DO at [address]  7-3-12    egd    UPPER GASTROINTESTINAL ENDOSCOPY  2016    status post gastrectomy with a small remnant of gastric pouch.     UPPER GASTROINTESTINAL ENDOSCOPY  2019    DR Sabrina Tristan       Medications:      ceftaroline fosamil (TEFLARO) IVPB  400 mg IntraVENous Q12H    predniSONE  60 mg Oral Daily    amLODIPine  10 mg Oral Daily    folic acid  1 mg Oral Daily    DULoxetine  60 mg Oral Daily    carvedilol  25 mg Oral BID WC    ARIPiprazole  7.5 mg Oral Daily    divalproex  1,000 mg Oral BID    ferrous sulfate  325 mg Oral BID    ipratropium-albuterol  3 mL Inhalation Q4H WA    Vitamin D  2,000 Units Oral Daily    sodium chloride flush  5-40 mL IntraVENous 2 times per day    [Held by provider] enoxaparin  30 mg SubCUTAneous Daily       Social History:     Social History     Socioeconomic History    Marital status:      Spouse name: Not on file    Number of children: Not on file    Years of education: Not on file    Highest education level: Not on file   Occupational History    Occupation: disability   Tobacco Use    Smoking status: Every Day     Packs/day: 0.50     Years: 33.00     Pack years: 16.50     Types: Cigarettes    Smokeless tobacco: Never    Tobacco comments:     quit  2000 started again  1/13    Substance and Sexual Activity    Alcohol use: Yes     Comment: occasional    Drug use: Not Currently    Sexual activity: Yes   Other Topics Concern    Not on file   Social History Narrative    Not on file     Social Determinants of Health     Financial Resource Strain: Not on file   Food Insecurity: Not on file   Transportation Needs: Not on file   Physical Activity: Not on file   Stress: Not on file   Social Connections: Not on file   Intimate Partner Violence: Not on file   Housing Stability: Not on file       Family History:     Family History   Problem Relation Age of Onset    Diabetes Mother     Cancer Mother     Coronary Art Dis Father     COPD Father     Depression Brother     Alcohol Abuse Brother     Cancer Other         lung and skin    Diabetes Maternal Grandmother     Cancer Paternal Grandmother       Medical Decision Making:   I have independently reviewed/ordered the following labs:    CBC with Differential:   Recent Labs     11/25/22  0502 11/26/22  0529   WBC 4.1 5.9   HGB 9.9* 9.3*   HCT 29.6* 29.0*   PLT 75* 62*   LYMPHOPCT 44 33   MONOPCT 13* 16*       BMP:  Recent Labs     11/25/22  0733 11/26/22  0529    138   K 3.5* 3.6*    104   CO2 24 26   BUN 15 19   CREATININE 0.69 0.86       Hepatic Function Panel: No results for input(s): PROT, LABALBU, BILIDIR, IBILI, BILITOT, ALKPHOS, ALT, AST in the last 72 hours. No results for input(s): RPR in the last 72 hours. No results for input(s): HIV in the last 72 hours. No results for input(s): BC in the last 72 hours. Lab Results   Component Value Date/Time    CREATININE 0.86 11/26/2022 05:29 AM    GLUCOSE 65 11/26/2022 05:29 AM       Detailed results: Thank you for allowing us to participate in the care of this patient. Please call with questions. This note is created with the assistance of a speech recognition program.  While intending to generate adocument that actually reflects the content of the visit, the document can still have some errors including those of syntax and sound a like substitutions which may escape proof reading. It such instances, actual meaningcan be extrapolated by contextual diversion.     Elba Soliz MD  Office: [phone]  Perfect serve / office [phone]

## 2022-11-27 LAB
ABSOLUTE EOS #: 0 K/UL (ref 0–0.4)
ABSOLUTE LYMPH #: 2.2 K/UL (ref 1–4.8)
ABSOLUTE MONO #: 0.2 K/UL (ref 0.1–1.3)
ANION GAP SERPL CALCULATED.3IONS-SCNC: 13 MMOL/L (ref 9–17)
BASOPHILS # BLD: 0 % (ref 0–2)
BASOPHILS ABSOLUTE: 0 K/UL (ref 0–0.2)
BUN BLDV-MCNC: 18 MG/DL (ref 6–20)
CALCIUM SERPL-MCNC: 7.6 MG/DL (ref 8.6–10.4)
CHLORIDE BLD-SCNC: 100 MMOL/L (ref 98–107)
CO2: 21 MMOL/L (ref 20–31)
CREAT SERPL-MCNC: 0.69 MG/DL (ref 0.5–0.9)
EOSINOPHILS RELATIVE PERCENT: 0 % (ref 0–4)
GFR SERPL CREATININE-BSD FRML MDRD: >60 ML/MIN/1.73M2
GLUCOSE BLD-MCNC: 89 MG/DL (ref 70–99)
HCT VFR BLD CALC: 25.4 % (ref 36–46)
HEMOGLOBIN: 8.3 G/DL (ref 12–16)
LYMPHOCYTES # BLD: 55 % (ref 24–44)
MCH RBC QN AUTO: 29.3 PG (ref 26–34)
MCHC RBC AUTO-ENTMCNC: 32.5 G/DL (ref 31–37)
MCV RBC AUTO: 90.4 FL (ref 80–100)
MONOCYTES # BLD: 5 % (ref 1–7)
MORPHOLOGY: ABNORMAL
PDW BLD-RTO: 26.1 % (ref 11.5–14.9)
PLATELET # BLD: 71 K/UL (ref 150–450)
PMV BLD AUTO: 6.6 FL (ref 6–12)
POTASSIUM SERPL-SCNC: 2.5 MMOL/L (ref 3.7–5.3)
POTASSIUM SERPL-SCNC: 4.3 MMOL/L (ref 3.7–5.3)
RBC # BLD: 2.82 M/UL (ref 4–5.2)
SEG NEUTROPHILS: 40 % (ref 36–66)
SEGMENTED NEUTROPHILS ABSOLUTE COUNT: 1.6 K/UL (ref 1.3–9.1)
SODIUM BLD-SCNC: 134 MMOL/L (ref 135–144)
WBC # BLD: 4 K/UL (ref 3.5–11)

## 2022-11-27 PROCEDURE — 36415 COLL VENOUS BLD VENIPUNCTURE: CPT

## 2022-11-27 PROCEDURE — 85025 COMPLETE CBC W/AUTO DIFF WBC: CPT

## 2022-11-27 PROCEDURE — 6360000002 HC RX W HCPCS: Performed by: INTERNAL MEDICINE

## 2022-11-27 PROCEDURE — 94640 AIRWAY INHALATION TREATMENT: CPT

## 2022-11-27 PROCEDURE — 6360000002 HC RX W HCPCS: Performed by: FAMILY MEDICINE

## 2022-11-27 PROCEDURE — 2580000003 HC RX 258: Performed by: INTERNAL MEDICINE

## 2022-11-27 PROCEDURE — 6370000000 HC RX 637 (ALT 250 FOR IP): Performed by: INTERNAL MEDICINE

## 2022-11-27 PROCEDURE — 6370000000 HC RX 637 (ALT 250 FOR IP): Performed by: FAMILY MEDICINE

## 2022-11-27 PROCEDURE — 94761 N-INVAS EAR/PLS OXIMETRY MLT: CPT

## 2022-11-27 PROCEDURE — 80048 BASIC METABOLIC PNL TOTAL CA: CPT

## 2022-11-27 PROCEDURE — 99231 SBSQ HOSP IP/OBS SF/LOW 25: CPT | Performed by: INTERNAL MEDICINE

## 2022-11-27 PROCEDURE — 2580000003 HC RX 258: Performed by: FAMILY MEDICINE

## 2022-11-27 PROCEDURE — 99232 SBSQ HOSP IP/OBS MODERATE 35: CPT | Performed by: INTERNAL MEDICINE

## 2022-11-27 PROCEDURE — 84132 ASSAY OF SERUM POTASSIUM: CPT

## 2022-11-27 PROCEDURE — 1200000000 HC SEMI PRIVATE

## 2022-11-27 RX ORDER — KETOROLAC TROMETHAMINE 30 MG/ML
15 INJECTION, SOLUTION INTRAMUSCULAR; INTRAVENOUS EVERY 6 HOURS PRN
Status: DISCONTINUED | OUTPATIENT
Start: 2022-11-27 | End: 2022-11-29 | Stop reason: HOSPADM

## 2022-11-27 RX ADMIN — SODIUM CHLORIDE, PRESERVATIVE FREE 10 ML: 5 INJECTION INTRAVENOUS at 09:27

## 2022-11-27 RX ADMIN — DIVALPROEX SODIUM 1000 MG: 250 TABLET, DELAYED RELEASE ORAL at 09:26

## 2022-11-27 RX ADMIN — IPRATROPIUM BROMIDE AND ALBUTEROL SULFATE 3 ML: .5; 2.5 SOLUTION RESPIRATORY (INHALATION) at 14:53

## 2022-11-27 RX ADMIN — IPRATROPIUM BROMIDE AND ALBUTEROL SULFATE 3 ML: .5; 2.5 SOLUTION RESPIRATORY (INHALATION) at 07:38

## 2022-11-27 RX ADMIN — AMLODIPINE BESYLATE 10 MG: 10 TABLET ORAL at 09:22

## 2022-11-27 RX ADMIN — DIVALPROEX SODIUM 1000 MG: 250 TABLET, DELAYED RELEASE ORAL at 20:10

## 2022-11-27 RX ADMIN — KETOROLAC TROMETHAMINE 15 MG: 30 INJECTION, SOLUTION INTRAMUSCULAR; INTRAVENOUS at 15:30

## 2022-11-27 RX ADMIN — KETOROLAC TROMETHAMINE 15 MG: 30 INJECTION, SOLUTION INTRAMUSCULAR; INTRAVENOUS at 07:24

## 2022-11-27 RX ADMIN — SODIUM CHLORIDE: 9 INJECTION, SOLUTION INTRAVENOUS at 01:20

## 2022-11-27 RX ADMIN — CARVEDILOL 25 MG: 25 TABLET ORAL at 17:34

## 2022-11-27 RX ADMIN — Medication 2000 UNITS: at 09:22

## 2022-11-27 RX ADMIN — CEFTAROLINE FOSAMIL 400 MG: 400 POWDER, FOR SOLUTION INTRAVENOUS at 15:33

## 2022-11-27 RX ADMIN — IPRATROPIUM BROMIDE AND ALBUTEROL SULFATE 3 ML: .5; 2.5 SOLUTION RESPIRATORY (INHALATION) at 11:21

## 2022-11-27 RX ADMIN — CARVEDILOL 25 MG: 25 TABLET ORAL at 09:23

## 2022-11-27 RX ADMIN — IPRATROPIUM BROMIDE AND ALBUTEROL SULFATE 3 ML: .5; 2.5 SOLUTION RESPIRATORY (INHALATION) at 20:48

## 2022-11-27 RX ADMIN — DULOXETIN HYDROCHLORIDE 60 MG: 30 CAPSULE, DELAYED RELEASE ORAL at 09:27

## 2022-11-27 RX ADMIN — FERROUS SULFATE TAB 325 MG (65 MG ELEMENTAL FE) 325 MG: 325 (65 FE) TAB at 20:10

## 2022-11-27 RX ADMIN — ARIPIPRAZOLE 7.5 MG: 5 TABLET ORAL at 09:24

## 2022-11-27 RX ADMIN — SODIUM CHLORIDE, PRESERVATIVE FREE 10 ML: 5 INJECTION INTRAVENOUS at 20:10

## 2022-11-27 RX ADMIN — CEFTAROLINE FOSAMIL 400 MG: 400 POWDER, FOR SOLUTION INTRAVENOUS at 01:21

## 2022-11-27 RX ADMIN — PREDNISONE 60 MG: 20 TABLET ORAL at 09:22

## 2022-11-27 RX ADMIN — POTASSIUM CHLORIDE: 2 INJECTION, SOLUTION, CONCENTRATE INTRAVENOUS at 08:54

## 2022-11-27 RX ADMIN — FERROUS SULFATE TAB 325 MG (65 MG ELEMENTAL FE) 325 MG: 325 (65 FE) TAB at 09:23

## 2022-11-27 RX ADMIN — FOLIC ACID 1 MG: 1 TABLET ORAL at 09:22

## 2022-11-27 RX ADMIN — KETOROLAC TROMETHAMINE 15 MG: 30 INJECTION, SOLUTION INTRAMUSCULAR; INTRAVENOUS at 22:01

## 2022-11-27 ASSESSMENT — PAIN SCALES - GENERAL
PAINLEVEL_OUTOF10: 6

## 2022-11-27 ASSESSMENT — PAIN DESCRIPTION - DESCRIPTORS
DESCRIPTORS: ACHING
DESCRIPTORS: ACHING;TIGHTNESS
DESCRIPTORS: ACHING

## 2022-11-27 ASSESSMENT — PAIN DESCRIPTION - LOCATION
LOCATION: HIP
LOCATION: HIP
LOCATION: HIP;HEAD

## 2022-11-27 ASSESSMENT — PAIN DESCRIPTION - ORIENTATION
ORIENTATION: RIGHT
ORIENTATION: RIGHT;LEFT

## 2022-11-27 NOTE — PROGRESS NOTES
Progress Note  Date:2022       Room:[facility]  Patient Name:Anu Bourne     YOB: 1970     Age:51 y.o. Subjective    Subjective:  Symptoms:  Stable. Diet:  Adequate intake. Activity level: Impaired due to weakness. Pain:  She reports no pain. Review of Systems  Review of Systems - General ROS: positive for  - malnutrition  Hematological and Lymphatic ROS: positive for - pallor  Respiratory ROS: positive for - pulmonary nodules  Musculoskeletal ROS: positive for - muscular weakness    Objective         Vitals Last 24 Hours:  TEMPERATURE:  Temp  Av.1 °F (36.7 °C)  Min: 97.7 °F (36.5 °C)  Max: 98.4 °F (36.9 °C)  RESPIRATIONS RANGE: Resp  Av.1  Min: 16  Max: 18  PULSE OXIMETRY RANGE: SpO2  Av.6 %  Min: 95 %  Max: 100 %  PULSE RANGE: Pulse  Av.3  Min: 68  Max: 81  BLOOD PRESSURE RANGE: Systolic (35RXC), DPR:587 , Min:147 , PFF:408   ; Diastolic (96FWO), JRW:72, Min:69, Max:83    I/O (24Hr): Intake/Output Summary (Last 24 hours) at 2022 0741  Last data filed at 2022 0413  Gross per 24 hour   Intake 630 ml   Output --   Net 630 ml       Objective:  General Appearance:  Comfortable. Vital signs: (most recent): Blood pressure (!) 186/77, pulse 69, temperature 98.2 °F (36.8 °C), resp. rate 16, height 4' 11.02\" (1.499 m), weight 93 lb 11.1 oz (42.5 kg), SpO2 96 %. Labs/Imaging/Diagnostics    Labs:  CBC:  Recent Labs     22  0502 22  0529 22  0547   WBC 4.1 5.9 4.0   RBC 3.37* 3.18* 2.82*   HGB 9.9* 9.3* 8.3*   HCT 29.6* 29.0* 25.4*   MCV 87.8 91.0 90.4   RDW 25.7* 26.0* 26.1*   PLT 75* 62* 71*       CHEMISTRIES:  Recent Labs     22  0733 22  0529 22  0547    138 134*   K 3.5* 3.6* 2.5*    104 100   CO2 24 26 21   BUN 15 19 18   CREATININE 0.69 0.86 0.69   GLUCOSE 109* 65* 89       PT/INR:No results for input(s): PROTIME, INR in the last 72 hours. APTT:No results for input(s):  APTT in the last 72 hours. LIVER PROFILE:No results for input(s): AST, ALT, BILIDIR, BILITOT, ALKPHOS in the last 72 hours. Imaging Last 24 Hours:  No results found. Assessment//Plan           Hospital Problems             Last Modified POA    * (Principal) Hypokalemia 11/15/2022 Yes    Severe malnutrition ([address] (Chronic) 11/16/2022 Yes    Pulmonary nodules 11/16/2022 Yes    Cavitating mass of lung 11/16/2022 Yes    Severe thrombocytopenia ([address] 11/17/2022 Yes    Severe anemia 11/17/2022 Yes   MRSA bacteremia - to be on IV antibiotics through 12/3 per ID. Generalized weakness - awaiting acceptance to a SNF. Hesitant to D/C home with a PICC line due to hx of substance abuse. Hypokalemia - level continues to fluctuate. Anemia - stable with a hgb of 8.3 today. Continue current treatments.       Electronically signed by Cecelia Plascencia MD on 11/27/2022 at 7:41 AM

## 2022-11-27 NOTE — PROGRESS NOTES
[facility] -   Infectious diseases evaluation  admission date 11/15/2022    reason for consultation:   MRSA infection    Impression :   Current:  Hypokalemia  Recent MRSA bacteremia  Recent empyema/pulmonary nodules with cavitation status post chest tube placement that was subsequently removed on 10/31/2022. Thrombocytopenia and anemia (thought to be related to vancomycin that was discontinued last admission). Renal insufficiency  History of cocaine drug abuse  Hypertension  Bipolar disorder  Allergy to Bactrim. Recommendations   Continue IV Teflaro 400 mg twice a day as planned through 12/3/2022  CT chest without contrast on 11/18/2022 showed resolution of bilateral pleural effusion, nodules/masses bilaterally with resolution of cavitations. Follow CBC and renal function  Follow CT chest in 8 weeks      Infection Control Recommendations   [place] Precautions  Contact Isolation       Antimicrobial Stewardship Recommendations   Simplification of therapy  Targeted therapy      History of Present Illness:   Initial history:  Ira Viera is a 46y.o.-year-old female was brought to the hospital from SNF for hypokalemia with reported potassium of 2.4. Patient is complaining of body ache, generalized weakness and abdominal discomfort for 2 days. Symptoms are moderate, no alleviating or aggravating factors. She denied cough or shortness of breath, no other complaints. Right arm PICC line in place  The patient had prolonged hospitalization recently for MRSA bacteremia and empyema status post chest tube placement but was subsequently removed. She was discharged to skilled nursing facility on 11/4/2024 on IV Teflaro through 12/3/2022  Interval changes  11/27/2022   She is afebrile, denied significant cough or shortness of breath, no other complaints.   On prednisone  Status post bone marrow biopsy 11/18/2022  Patient Vitals for the past 8 hrs:   BP Temp Pulse Resp SpO2   11/27/22 0738 -- -- 69 16 96 %   11/27/22 0639 (!) 186/77 98.2 °F (36.8 °C) 73 18 96 %               I have personally reviewed the past medical history, past surgical history, medications, social history, and family history, and I haveupdated the database accordingly.       Allergies:   Aspirin, Bactrim, and Codeine     Review of Systems:     Review of Systems  As per history of present illness, other than above 12 system review was negative  Physical Examination :       Physical Exam  Head: Normocephalic, atraumatic  Eye: Pupils equal round reactive to light, no conjunctivitis  ENT: No throat erythema or thrush  Neck: Supple, no JVD  Heart: Regular rate and rhythm no murmurs  Lungs: Clear to auscultation bilaterally, no respiratory distress  Abdomen: Soft, nontender, nondistended, with no peritoneal signs  MSK: No joint swelling or erythema  Neurologic: Patient is alert and oriented x3  Extremities: No edema, no clubbing, no cyanosis  Past Medical History:     Past Medical History:   Diagnosis Date    Anemia     Arthritis     Asthma     Bipolar disorder, mixed ([address]     Carotid artery stenosis 2/13/2020    Cocaine abuse ([address] 11/4/2014    COPD (chronic obstructive pulmonary disease) (HCC)     Degeneration of cervical intervertebral disc     Depression with anxiety     Depression with anxiety     Fibromyalgia 1/5/2017    GERD (gastroesophageal reflux disease)     History of migraine headaches 6/10/2014    History of renal calculi 6/10/2014    History of seizure disorder 6/10/2014    Hoarseness of voice     HTN (hypertension) 6/10/2014    Hyperlipidemia 6/10/2014    IGT (impaired glucose tolerance) 6/10/2014    Kidney stones     Lactose intolerance 6/10/2014    Leukopenia 6/10/2014    Major depressive disorder, recurrent episode, severe, without mention of psychotic behavior 11/6/2014    MVP (mitral valve prolapse)     Seizures ([address]     Sleep apnea 6/10/2014    Unspecified diseases of blood and blood-forming organs        Past Surgical  History:     Past Surgical History:   Procedure Laterality Date    ANKLE FRACTURE SURGERY      recontruction surgery    APPENDECTOMY      BREAST LUMPECTOMY Right     CARDIAC CATHETERIZATION      CARPAL TUNNEL RELEASE      x2     SECTION      CHOLECYSTECTOMY      COLONOSCOPY  12    COLONOSCOPY  2016    severe spasms    COLONOSCOPY  2019    DR GOLDY MCINTOSH    CT BONE MARROW BIOPSY  2022    CT BONE MARROW BIOPSY 2022 [facility] CT SCAN    GASTRIC BYPASS SURGERY      GASTRIC BYPASS SURGERY      HYSTERECTOMY (CERVIX STATUS UNKNOWN)      HYSTERECTOMY (CERVIX STATUS UNKNOWN)      IR PERC CATH PLEURAL DRAIN W/IMAG  10/21/2022    IR PERC CATH PLEURAL DRAIN W/IMAG 10/21/2022 [facility]    IR PERC CATH PLEURAL DRAIN W/IMAG  10/21/2022    IR PERC CATH PLEURAL DRAIN W/IMAG 10/21/2022 [facility]    IR PERC CATH PLEURAL DRAIN W/IMAG  10/24/2022    IR PERC CATH PLEURAL DRAIN W/IMAG 10/24/2022 [facility]    LAPAROSCOPY N/A 2022    LAPAROSCOPY EXPLORATORY CONVERTED TO OPEN EXPLORATORY LAPAROTOMY, LYSIS OF ADHESIONS, REDUCTION OF INTERNAL HERNIA performed by Allison Vargas DO at [address] [place] [address]      APPLICATION OF ARCH BARS,SIMPLE EXTRACTION OF #15 AND RT TMJ JOINT REPLACEMENT    SHOULDER ARTHROSCOPY Left 2019    DR ROBERT GREEN    SHOULDER SURGERY      TONSILLECTOMY AND ADENOIDECTOMY      TRANSESOPHAGEAL ECHOCARDIOGRAM N/A 10/19/2022    TRANSESOPHAGEAL ECHOCARDIOGRAM WITH BUBBLE STUDY performed by Frandy Royal DO at [address] [place] [address]  7-3-12    egd    UPPER GASTROINTESTINAL ENDOSCOPY  2016    status post gastrectomy with a small remnant of gastric pouch.     UPPER GASTROINTESTINAL ENDOSCOPY  2019    DR Karlee Marquis       Medications:      IVPB builder   IntraVENous Once    ceftaroline fosamil (TEFLARO) IVPB  400 mg IntraVENous Q12H predniSONE  60 mg Oral Daily    amLODIPine  10 mg Oral Daily    folic acid  1 mg Oral Daily    DULoxetine  60 mg Oral Daily    carvedilol  25 mg Oral BID WC    ARIPiprazole  7.5 mg Oral Daily    divalproex  1,000 mg Oral BID    ferrous sulfate  325 mg Oral BID    ipratropium-albuterol  3 mL Inhalation Q4H WA    Vitamin D  2,000 Units Oral Daily    sodium chloride flush  5-40 mL IntraVENous 2 times per day    [Held by provider] enoxaparin  30 mg SubCUTAneous Daily       Social History:     Social History     Socioeconomic History    Marital status:      Spouse name: Not on file    Number of children: Not on file    Years of education: Not on file    Highest education level: Not on file   Occupational History    Occupation: disability   Tobacco Use    Smoking status: Every Day     Packs/day: 0.50     Years: 33.00     Pack years: 16.50     Types: Cigarettes    Smokeless tobacco: Never    Tobacco comments:     quit  2000 started again  1/13    Substance and Sexual Activity    Alcohol use: Yes     Comment: occasional    Drug use: Not Currently    Sexual activity: Yes   Other Topics Concern    Not on file   Social History Narrative    Not on file     Social Determinants of Health     Financial Resource Strain: Not on file   Food Insecurity: Not on file   Transportation Needs: Not on file   Physical Activity: Not on file   Stress: Not on file   Social Connections: Not on file   Intimate Partner Violence: Not on file   Housing Stability: Not on file       Family History:     Family History   Problem Relation Age of Onset    Diabetes Mother     Cancer Mother     Coronary Art Dis Father     COPD Father     Depression Brother     Alcohol Abuse Brother     Cancer Other         lung and skin    Diabetes Maternal Grandmother     Cancer Paternal Grandmother       Medical Decision Making:   I have independently reviewed/ordered the following labs:    CBC with Differential:   Recent Labs     11/26/22  0529 11/27/22  0540 WBC 5.9 4.0   HGB 9.3* 8.3*   HCT 29.0* 25.4*   PLT 62* 71*   LYMPHOPCT 33 55*   MONOPCT 16* 5       BMP:  Recent Labs     11/26/22  0529 11/27/22  0547    134*   K 3.6* 2.5*    100   CO2 26 21   BUN 19 18   CREATININE 0.86 0.69       Hepatic Function Panel: No results for input(s): PROT, LABALBU, BILIDIR, IBILI, BILITOT, ALKPHOS, ALT, AST in the last 72 hours. No results for input(s): RPR in the last 72 hours. No results for input(s): HIV in the last 72 hours. No results for input(s): BC in the last 72 hours. Lab Results   Component Value Date/Time    CREATININE 0.69 11/27/2022 05:47 AM    GLUCOSE 89 11/27/2022 05:47 AM       Detailed results: Thank you for allowing us to participate in the care of this patient. Please call with questions. This note is created with the assistance of a speech recognition program.  While intending to generate adocument that actually reflects the content of the visit, the document can still have some errors including those of syntax and sound a like substitutions which may escape proof reading. It such instances, actual meaningcan be extrapolated by contextual diversion.     Everett Flores MD  Office: [phone]  Perfect serve / office [phone]

## 2022-11-27 NOTE — CARE COORDINATION
Patient was denied skilled services under Medicare. [facility] is submitting authorization under patients Medicaid [id number]. LSW spoke with patient regarding discharge plans. SW informed patient that we are waiting on medicaid authorization to come back. Patient wanted to go home. SW explained that since she has a history of drug use, she can not go home with a port. Patient was understanding and corporative. SW will follow up on Monday.    Electronically signed by Ambrose Gomes RN on 11/27/2022 at 2:32 PM

## 2022-11-27 NOTE — PLAN OF CARE
Problem: Safety - Adult  Goal: Free from fall injury  Outcome: Progressing  Flowsheets (Taken 11/27/2022 0411)  Free From Fall Injury:   Instruct family/caregiver on patient safety   Based on caregiver fall risk screen, instruct family/caregiver to ask for assistance with transferring infant if caregiver noted to have fall risk factors  Note: Made sure call light was in reach, a clear pathway and adequate lighting was provided. Also made sure that the pt. Was wearing non-slip socks. Problem: Nutrition Deficit:  Goal: Optimize nutritional status  Outcome: Progressing  Flowsheets (Taken 11/27/2022 0411)  Nutrient intake appropriate for improving, restoring, or maintaining nutritional needs:   Assess nutritional status and recommend course of action   Recommend appropriate diets, oral nutritional supplements, and vitamin/mineral supplements   Order, calculate, and assess calorie counts as needed   Provide specific nutrition education to patient or family as appropriate   Monitor oral intake, labs, and treatment plans  Note: Monitor pt. Intake and output through the shift      Problem: Discharge Planning  Goal: Discharge to home or other facility with appropriate resources  Flowsheets (Taken 11/27/2022 0411)  Discharge to home or other facility with appropriate resources:   Identify barriers to discharge with patient and caregiver   Arrange for needed discharge resources and transportation as appropriate   Identify discharge learning needs (meds, wound care, etc)   Refer to discharge planning if patient needs post-hospital services based on physician order or complex needs related to functional status, cognitive ability or social support system  Note: Inform pt. Of discharge teaching and planned. Instructed pt. To inform me if further teaching need to be done.

## 2022-11-27 NOTE — PROGRESS NOTES
hema  Today's Date: 2022  Patient Name: Armando Gaspar  Date of admission: 11/15/2022  4:51 PM  Patient's age: 46 y. o., 1970  Admission Dx: Hypokalemia [E87.6]    Reason for Consult: management recommendations  Requesting Physician: Artem Lama MD    CHIEF COMPLAINT: Severe anemia    INTERVAL HISTORY:    Patient seen and examined  Denies any chest pain or shortness of breath  Hemoglobin stable at 9.3  Platelet stable  No bleeding symptoms    HISTORY OF PRESENT ILLNESS:    The patient is a 46 y.o.  female who is admitted to the hospital for hypokalemia with potassium of 2.4 patient recently hospitalized for MRSA bacteremia and empyema and had anemia and thrombocytopenia thought related to bacteremia and sepsis and antibiotic/vancomycin, patient had pulmonary nodules with cavitation, hemoglobin was 5.3 with WBC 4.0 and platelet count of 32    Past Medical History:   has a past medical history of Anemia, Arthritis, Asthma, Bipolar disorder, mixed ([address] Carotid artery stenosis, Cocaine abuse ([address] COPD (chronic obstructive pulmonary disease) ([address] Degeneration of cervical intervertebral disc, Depression with anxiety, Depression with anxiety, Fibromyalgia, GERD (gastroesophageal reflux disease), History of migraine headaches, History of renal calculi, History of seizure disorder, Hoarseness of voice, HTN (hypertension), Hyperlipidemia, IGT (impaired glucose tolerance), Kidney stones, Lactose intolerance, Leukopenia, Major depressive disorder, recurrent episode, severe, without mention of psychotic behavior, MVP (mitral valve prolapse), Seizures ([address] Sleep apnea, and Unspecified diseases of blood and blood-forming organs. Past Surgical History:   has a past surgical history that includes Gastric bypass surgery; Cholecystectomy; Hysterectomy; Appendectomy; Hysterectomy; Tonsillectomy and adenoidectomy; Ankle fracture surgery; Neck surgery; shoulder surgery;   section; Carpal tunnel release; Colonoscopy (12); Upper gastrointestinal endoscopy (7-3-12); Gastric bypass surgery; Breast lumpectomy (Right); Cardiac catheterization; Upper gastrointestinal endoscopy (2016); Colonoscopy (2016); Upper gastrointestinal endoscopy (2019); Colonoscopy (2019); Shoulder arthroscopy (Left, 2019); other surgical history (10/24/2019); laparoscopy (N/A, 2022); transesophageal echocardiogram (N/A, 10/19/2022); IR GUIDED PERC PLEURAL DRAIN W CATH INSERT (10/21/2022); IR GUIDED PERC PLEURAL DRAIN W CATH INSERT (10/21/2022); IR GUIDED PERC PLEURAL DRAIN W CATH INSERT (10/24/2022); and CT BIOPSY BONE MARROW (2022). Medications:    Reviewed in Epic     Allergies:  Aspirin, Bactrim, and Codeine    Social History:   reports that she has been smoking cigarettes. She has a 16.50 pack-year smoking history. She has never used smokeless tobacco. She reports current alcohol use. She reports that she does not currently use drugs. Family History: family history includes Alcohol Abuse in her brother; COPD in her father; Cancer in her mother, paternal grandmother, and another family member; Coronary Art Dis in her father; Depression in her brother; Diabetes in her maternal grandmother and mother.     REVIEW OF SYSTEMS:    14 point review of system has been obtained unremarkable although it was mentioned above    PHYSICAL EXAM:      BP (!) 153/83   Pulse 68   Temp 97.7 °F (36.5 °C) (Oral)   Resp 18   Ht 4' 11.02\" (1.499 m)   Wt 93 lb 11.1 oz (42.5 kg)   SpO2 96%   BMI 18.91 kg/m²    Temp (24hrs), Av.3 °F (36.8 °C), Min:97.7 °F (36.5 °C), Max:98.7 °F (37.1 °C)    General appearance - well appearing, no in pain or distress   Mental status - alert and cooperative   Eyes - pupils equal and reactive, extraocular eye movements intact   Ears - bilateral TM's and external ear canals normal   Mouth - mucous membranes moist, pharynx normal without lesions   Neck - supple, no significant adenopathy   Lymphatics - no palpable lymphadenopathy, no hepatosplenomegaly   Chest - clear to auscultation, no wheezes, rales or rhonchi, symmetric air entry   Heart - normal rate, regular rhythm, normal S1, S2, no murmurs  Abdomen - soft, nontender, nondistended, no masses or organomegaly   Neurological - alert, oriented, normal speech, no focal findings or movement disorder noted   Musculoskeletal - no joint tenderness, deformity or swelling   Extremities - peripheral pulses normal, no pedal edema, no clubbing or cyanosis   Skin - normal coloration and turgor, no rashes, no suspicious skin lesions noted ,    DATA:    Labs:   CBC:   Recent Labs     11/25/22  0502 11/26/22  0529   WBC 4.1 5.9   HGB 9.9* 9.3*   HCT 29.6* 29.0*   PLT 75* 62*       BMP:   Recent Labs     11/25/22  0733 11/26/22  0529    138   K 3.5* 3.6*   CO2 24 26   BUN 15 19   CREATININE 0.69 0.86   LABGLOM >60 >60   GLUCOSE 109* 65*       PT/INR: No results for input(s): PROTIME, INR in the last 72 hours. IMAGING DATA:  CT CHEST WO CONTRAST   Final Result   Resolving pleural effusions bilaterally. Numerous pulmonary      nodules/masses redemonstrated bilaterally with resolving cavities associated   with multiple lesions. Resolving bilateral pleural effusions         CT BIOPSY BONE MARROW   Final Result   Successful CT-guided right posterior ilium bone marrow biopsy, as above. XR CHEST (SINGLE VIEW FRONTAL)   Final Result   1. Right PICC tip terminates within the lower right atrium. Retraction by   5-6 cm and radiographic follow-up suggested. 2.  Extensive pleural/parenchymal disease bilaterally without significant   overall change from prior.            Primary Problem  Hypokalemia    Active Hospital Problems    Diagnosis Date Noted    Severe thrombocytopenia ([address] [D69.6] 11/17/2022     Priority: Medium    Severe anemia [D64.9] 11/17/2022     Priority: Medium    Cavitating mass of lung [J98.4] 10/23/2022 Priority: Medium    Pulmonary nodules [R91.8] 10/15/2022     Priority: Medium    Severe malnutrition ([address] [E43] 10/11/2022     Priority: Medium    Hypokalemia [E87.6] 12/18/2015         IMPRESSION:   Severe anemia and thrombocytopenia  Pulmonary nodules, numerous with cavities  Bilateral pleural effusions  MRSA bacteremia  Hypokalemia    RECOMMENDATIONS:  I reviewed the laboratory, imaging studies, discussed diagnosis and other treatment recommendations   Patient had bone marrow biopsy on 11/18/2022  Will follow results of bone marrow biopsy  Patient has low folic acid level and receiving folic acid and also on iron pill  Transfuse to keep hemoglobin above 7 and platelet above 10 unless there is a bleeding to keep platelet above 30  Patient had bilateral pulmonary nodules during her previous admission and has been evaluated by ID and pulmonary at that time and was thought to be suspected septic emboli. She would need follow-up CT scan/PET scan as outpatient  Other management as per primary team  IV antibiotics as per ID  Discharge planning as per primary  Awaiting placement    Discussed with patient and Nurse. Thank you for asking us to see this patient. Fortunato Toscano MD  Hematologist/Medical Oncologist                      This note is created with the assistance of a speech recognition program.  While intending to generate a document that actually reflects the content of the visit, the document can still have some errors including those of syntax and sound a like substitutions which may escape proof reading. It such instances, actual meaning can be extrapolated by contextual diversion.

## 2022-11-27 NOTE — PROGRESS NOTES
Pt. Complaining of right hip joint pain. Offer to give pt. Tylenol, pt. Stated it doesn't help her. Message Dr. Benito Mcghee to see if we can get anything order for pain.   Waiting for response

## 2022-11-27 NOTE — PROGRESS NOTES
Pt. Potassium came back 2.5. Contacted provider about order to see if we could do a build a bag and put all the dosage ( 6 x 10 mEq) in one bag. Waiting for response.

## 2022-11-28 LAB
ALBUMIN SERPL-MCNC: 2.5 G/DL (ref 3.5–5.2)
ALP BLD-CCNC: 64 U/L (ref 35–104)
ALT SERPL-CCNC: <5 U/L (ref 5–33)
ANION GAP SERPL CALCULATED.3IONS-SCNC: 15 MMOL/L (ref 9–17)
AST SERPL-CCNC: 9 U/L
BILIRUB SERPL-MCNC: <0.2 MG/DL (ref 0.3–1.2)
BUN BLDV-MCNC: 20 MG/DL (ref 6–20)
CALCIUM SERPL-MCNC: 7.8 MG/DL (ref 8.6–10.4)
CHLORIDE BLD-SCNC: 102 MMOL/L (ref 98–107)
CO2: 21 MMOL/L (ref 20–31)
CREAT SERPL-MCNC: 0.76 MG/DL (ref 0.5–0.9)
GFR SERPL CREATININE-BSD FRML MDRD: >60 ML/MIN/1.73M2
GLUCOSE BLD-MCNC: 98 MG/DL (ref 70–99)
MAGNESIUM: 1.4 MG/DL (ref 1.6–2.6)
POTASSIUM SERPL-SCNC: 2.5 MMOL/L (ref 3.7–5.3)
POTASSIUM SERPL-SCNC: 4.3 MMOL/L (ref 3.7–5.3)
SODIUM BLD-SCNC: 138 MMOL/L (ref 135–144)
TOTAL PROTEIN: 6.3 G/DL (ref 6.4–8.3)

## 2022-11-28 PROCEDURE — 94640 AIRWAY INHALATION TREATMENT: CPT

## 2022-11-28 PROCEDURE — 6370000000 HC RX 637 (ALT 250 FOR IP): Performed by: INTERNAL MEDICINE

## 2022-11-28 PROCEDURE — 6370000000 HC RX 637 (ALT 250 FOR IP): Performed by: FAMILY MEDICINE

## 2022-11-28 PROCEDURE — 6360000002 HC RX W HCPCS: Performed by: FAMILY MEDICINE

## 2022-11-28 PROCEDURE — 97116 GAIT TRAINING THERAPY: CPT

## 2022-11-28 PROCEDURE — 2580000003 HC RX 258: Performed by: INTERNAL MEDICINE

## 2022-11-28 PROCEDURE — 97110 THERAPEUTIC EXERCISES: CPT

## 2022-11-28 PROCEDURE — 84132 ASSAY OF SERUM POTASSIUM: CPT

## 2022-11-28 PROCEDURE — 94761 N-INVAS EAR/PLS OXIMETRY MLT: CPT

## 2022-11-28 PROCEDURE — 2500000003 HC RX 250 WO HCPCS: Performed by: FAMILY MEDICINE

## 2022-11-28 PROCEDURE — 80053 COMPREHEN METABOLIC PANEL: CPT

## 2022-11-28 PROCEDURE — 6360000002 HC RX W HCPCS: Performed by: INTERNAL MEDICINE

## 2022-11-28 PROCEDURE — 83735 ASSAY OF MAGNESIUM: CPT

## 2022-11-28 PROCEDURE — 1200000000 HC SEMI PRIVATE

## 2022-11-28 PROCEDURE — 2580000003 HC RX 258: Performed by: FAMILY MEDICINE

## 2022-11-28 PROCEDURE — 99232 SBSQ HOSP IP/OBS MODERATE 35: CPT | Performed by: INTERNAL MEDICINE

## 2022-11-28 RX ORDER — POTASSIUM CHLORIDE 20 MEQ/1
20 TABLET, EXTENDED RELEASE ORAL 2 TIMES DAILY WITH MEALS
Status: DISCONTINUED | OUTPATIENT
Start: 2022-11-28 | End: 2022-11-29 | Stop reason: HOSPADM

## 2022-11-28 RX ADMIN — IPRATROPIUM BROMIDE AND ALBUTEROL SULFATE 3 ML: .5; 2.5 SOLUTION RESPIRATORY (INHALATION) at 08:40

## 2022-11-28 RX ADMIN — CARVEDILOL 25 MG: 25 TABLET ORAL at 16:50

## 2022-11-28 RX ADMIN — DIVALPROEX SODIUM 1000 MG: 250 TABLET, DELAYED RELEASE ORAL at 21:18

## 2022-11-28 RX ADMIN — POTASSIUM CHLORIDE 125 ML/HR: 2 INJECTION, SOLUTION, CONCENTRATE INTRAVENOUS at 10:19

## 2022-11-28 RX ADMIN — POTASSIUM CHLORIDE 20 MEQ: 1500 TABLET, EXTENDED RELEASE ORAL at 09:18

## 2022-11-28 RX ADMIN — KETOROLAC TROMETHAMINE 15 MG: 30 INJECTION, SOLUTION INTRAMUSCULAR; INTRAVENOUS at 22:14

## 2022-11-28 RX ADMIN — FERROUS SULFATE TAB 325 MG (65 MG ELEMENTAL FE) 325 MG: 325 (65 FE) TAB at 21:18

## 2022-11-28 RX ADMIN — FERROUS SULFATE TAB 325 MG (65 MG ELEMENTAL FE) 325 MG: 325 (65 FE) TAB at 07:58

## 2022-11-28 RX ADMIN — SODIUM CHLORIDE, PRESERVATIVE FREE 10 ML: 5 INJECTION INTRAVENOUS at 07:18

## 2022-11-28 RX ADMIN — CEFTAROLINE FOSAMIL 400 MG: 400 POWDER, FOR SOLUTION INTRAVENOUS at 13:18

## 2022-11-28 RX ADMIN — CEFTAROLINE FOSAMIL 400 MG: 400 POWDER, FOR SOLUTION INTRAVENOUS at 01:47

## 2022-11-28 RX ADMIN — PREDNISONE 60 MG: 20 TABLET ORAL at 07:57

## 2022-11-28 RX ADMIN — DIVALPROEX SODIUM 1000 MG: 250 TABLET, DELAYED RELEASE ORAL at 08:00

## 2022-11-28 RX ADMIN — IPRATROPIUM BROMIDE AND ALBUTEROL SULFATE 3 ML: .5; 2.5 SOLUTION RESPIRATORY (INHALATION) at 11:25

## 2022-11-28 RX ADMIN — ARIPIPRAZOLE 7.5 MG: 5 TABLET ORAL at 08:05

## 2022-11-28 RX ADMIN — IPRATROPIUM BROMIDE AND ALBUTEROL SULFATE 3 ML: .5; 2.5 SOLUTION RESPIRATORY (INHALATION) at 19:24

## 2022-11-28 RX ADMIN — IPRATROPIUM BROMIDE AND ALBUTEROL SULFATE 3 ML: .5; 2.5 SOLUTION RESPIRATORY (INHALATION) at 15:18

## 2022-11-28 RX ADMIN — Medication 2000 UNITS: at 07:58

## 2022-11-28 RX ADMIN — DULOXETIN HYDROCHLORIDE 60 MG: 30 CAPSULE, DELAYED RELEASE ORAL at 07:58

## 2022-11-28 RX ADMIN — POTASSIUM CHLORIDE 20 MEQ: 1500 TABLET, EXTENDED RELEASE ORAL at 16:49

## 2022-11-28 RX ADMIN — MAGNESIUM SULFATE HEPTAHYDRATE 2000 MG: 40 INJECTION, SOLUTION INTRAVENOUS at 08:11

## 2022-11-28 RX ADMIN — CARVEDILOL 25 MG: 25 TABLET ORAL at 07:58

## 2022-11-28 RX ADMIN — FOLIC ACID 1 MG: 1 TABLET ORAL at 07:58

## 2022-11-28 RX ADMIN — SODIUM CHLORIDE, PRESERVATIVE FREE 10 ML: 5 INJECTION INTRAVENOUS at 21:19

## 2022-11-28 RX ADMIN — AMLODIPINE BESYLATE 10 MG: 10 TABLET ORAL at 07:58

## 2022-11-28 RX ADMIN — KETOROLAC TROMETHAMINE 15 MG: 30 INJECTION, SOLUTION INTRAMUSCULAR; INTRAVENOUS at 07:09

## 2022-11-28 ASSESSMENT — PAIN SCALES - GENERAL
PAINLEVEL_OUTOF10: 6

## 2022-11-28 ASSESSMENT — PAIN DESCRIPTION - LOCATION
LOCATION: HIP
LOCATION: HIP
LOCATION: HEAD;HIP

## 2022-11-28 ASSESSMENT — PAIN DESCRIPTION - DESCRIPTORS
DESCRIPTORS: ACHING
DESCRIPTORS: ACHING

## 2022-11-28 ASSESSMENT — PAIN DESCRIPTION - ORIENTATION
ORIENTATION: RIGHT;LEFT

## 2022-11-28 NOTE — PROGRESS NOTES
Physical Therapy  [address]    Date: 22  Patient Name: Kin Gamble       Room:   MRN: [id number]   Account: [de-identified]   : 1970  (46 y.o.) Gender: female     Referring Practitioner: Ivan To MD  Diagnosis: Hypokalemia  Past Medical History:  has a past medical history of Anemia, Arthritis, Asthma, Bipolar disorder, mixed ([address] Carotid artery stenosis, Cocaine abuse ([address] COPD (chronic obstructive pulmonary disease) ([address] Degeneration of cervical intervertebral disc, Depression with anxiety, Depression with anxiety, Fibromyalgia, GERD (gastroesophageal reflux disease), History of migraine headaches, History of renal calculi, History of seizure disorder, Hoarseness of voice, HTN (hypertension), Hyperlipidemia, IGT (impaired glucose tolerance), Kidney stones, Lactose intolerance, Leukopenia, Major depressive disorder, recurrent episode, severe, without mention of psychotic behavior, MVP (mitral valve prolapse), Seizures ([address] Sleep apnea, and Unspecified diseases of blood and blood-forming organs. Past Surgical History:   has a past surgical history that includes Gastric bypass surgery; Cholecystectomy; Hysterectomy; Appendectomy; Hysterectomy; Tonsillectomy and adenoidectomy; Ankle fracture surgery; Neck surgery; shoulder surgery;  section; Carpal tunnel release; Colonoscopy (12); Upper gastrointestinal endoscopy (7-3-12); Gastric bypass surgery; Breast lumpectomy (Right); Cardiac catheterization; Upper gastrointestinal endoscopy (2016); Colonoscopy (2016); Upper gastrointestinal endoscopy (2019); Colonoscopy (2019); Shoulder arthroscopy (Left, 2019); other surgical history (10/24/2019); laparoscopy (N/A, 2022); transesophageal echocardiogram (N/A, 10/19/2022); IR GUIDED PERC PLEURAL DRAIN W CATH INSERT (10/21/2022); IR GUIDED PERC PLEURAL DRAIN W CATH INSERT (10/21/2022);  IR GUIDED PERC PLEURAL DRAIN W CATH INSERT (10/24/2022); and CT BIOPSY BONE MARROW (11/18/2022). Additional Pertinent Hx: The patient is a 46 y.o. female who presents to [facility] from [facility] with hypokalemia. She had a recent lengthy hospital stay for MRSA Bacteremia and has been rehabilitating at [facility] on IV Teflaro through 12/3; she had routine labs and K was 2.4. She was sent to [facility] for replacement. Status post bone marrow biopsy 11/18/2022    Overall Orientation Status: Impaired  Restrictions/Precautions  Restrictions/Precautions: Fall Risk;General Precautions;Contact Precautions; Up as Tolerated  Required Braces or Orthoses?: No  Implants present? :  (pt denies)    Subjective: Pt laying in bed upon arrival, RN in to pass meds. Pt agreeable to therapy. Comments: LUIS Conroy approved therapy       Pain Assessment: 0-10  Pain Level: 6  Pain Location: Hip  Pain Orientation: Right;Left    Bed Mobility  Rolling: Stand by assistance  Supine to Sit: Stand by assistance  Sit to Supine: Stand by assistance  Scooting: Stand by assistance  Bed mobility  Scooting: Stand by assistance    Transfers:  Sit to Stand: Stand by assistance  Stand to Sit: Stand by assistance                 Ambulation  Surface: Level tile  Device: No Device  Assistance: Contact guard assistance  Quality of Gait: Slowed pace, mildly unsteady  Gait Deviations: Decreased step length;Decreased step height;Slow Sobia  Distance: 100ft x2 (pt requiring seated rest break in between distances)        Stairs/Curb  Stairs?: Yes  Stairs  # Steps : 4  Stairs Height: 8\"  Rails: Left ascending  Device: No Device  Assistance: Contact guard assistance  Comment: upon initiating task and leading up with LLE, L knee lida and pt requiring CGA to recover.  Educated in leading with R LE for stair climbing       EXERCISES    Other exercises?: Yes  Other exercises 1: STS x2 from bed; x1 from toilet  Other exercises 2: toilet transfer SBA  Other exercises 3: seated B LE exercises x15 reps Activity Tolerance: Treatment limited secondary to decreased cognition, Patient tolerated treatment well    Current Treatment Recommendations: Strengthening, Balance training, Functional mobility training, Transfer training, Gait training, Stair training, Home exercise program, Safety education & training, Cognitive reorientation, Therapeutic activities    Conditions Requiring Skilled Therapeutic Intervention  Treatment Diagnosis: Impaired safety and independence 2* weakness associated with hypokalemia  Discharge Recommendations: 24 hour supervision or assist;Home with Home health PT;Patient would benefit from continued therapy after discharge    AM-[facility] Mobility Inpatient   How much difficulty turning over in bed?: A Little  How much difficulty sitting down on / standing up from a chair with arms?: A Little  How much difficulty moving from lying on back to sitting on side of bed?: A Little  How much help from another person moving to and from a bed to a chair?: A Little  How much help from another person needed to walk in hospital room?: A Little  How much help from another person for climbing 3-5 steps with a railing?: A Little  [facility] : 18  AM-PAC Inpatient T-Scale Score : 43.63  Mobility Inpatient CMS 0-100% Score: 46.58  Mobility Inpatient CMS G-Code Modifier : CK      Goals  Short Term Goals  Time Frame for Short Term Goals: 6 visits  Short Term Goal 1: pt to demo mod I bed mobility  Short Term Goal 2: pt to perform all transfers with safe technique and device Mod I  Short Term Goal 3: pt to ambulate 54-65' with device and supervision  Short Term Goal 4: pt to negotiate 5 stair steps with single rail and CGA to allow for safe access into home  Short Term Goal 5: pt to improve standing balance to fair + to dec fall risk       11/28/22 1000   PT Individual Minutes   Time In 5   Time Out 0941   Minutes 23       Electronically signed by Anjana Sorensen PTA on 11/28/22 at 10:10 AM EST

## 2022-11-28 NOTE — PROGRESS NOTES
Progress Note  Date:2022       JOFR:[id number]  Patient Name:Anu Bourne     YOB: 1970     Age:51 y.o. Subjective    Subjective:  Symptoms:  Stable. Diet:  Adequate intake. Activity level: Impaired due to weakness. Pain:  She reports no pain. Review of Systems  Objective         Vitals Last 24 Hours:  TEMPERATURE:  Temp  Av.4 °F (36.9 °C)  Min: 98.4 °F (36.9 °C)  Max: 98.4 °F (36.9 °C)  RESPIRATIONS RANGE: Resp  Av.8  Min: 17  Max: 18  PULSE OXIMETRY RANGE: SpO2  Av.2 %  Min: 96 %  Max: 100 %  PULSE RANGE: Pulse  Av  Min: 70  Max: 92  BLOOD PRESSURE RANGE: Systolic (30ZYT), FEV:345 , Min:146 , LZH:433   ; Diastolic (53KRZ), TSK:34, Min:85, Max:93    I/O (24Hr): Intake/Output Summary (Last 24 hours) at 2022 0836  Last data filed at 2022  Gross per 24 hour   Intake 494.5 ml   Output --   Net 494.5 ml     Objective:  General Appearance:  Comfortable. Vital signs: (most recent): Blood pressure 129/61, pulse 73, temperature 98.4 °F (36.9 °C), temperature source Oral, resp. rate 18, height 4' 11.02\" (1.499 m), weight 93 lb 11.1 oz (42.5 kg), SpO2 98 %. Vital signs are normal.    Labs/Imaging/Diagnostics    Labs:  CBC:  Recent Labs     22  0522  0547   WBC 5.9 4.0   RBC 3.18* 2.82*   HGB 9.3* 8.3*   HCT 29.0* 25.4*   MCV 91.0 90.4   RDW 26.0* 26.1*   PLT 62* 71*     CHEMISTRIES:  Recent Labs     22  0529 22  0547 22  1555 22  0714    134*  --  138   K 3.6* 2.5* 4.3 2.5*    100  --  102   CO2 26 21  --  21   BUN 19 18  --  20   CREATININE 0.86 0.69  --  0.76   GLUCOSE 65* 89  --  98   MG  --   --   --  1.4*     PT/INR:No results for input(s): PROTIME, INR in the last 72 hours. APTT:No results for input(s): APTT in the last 72 hours.   LIVER PROFILE:  Recent Labs     22  0714   AST 9   ALT <5*   BILITOT <0.2*   ALKPHOS 64       Imaging Last 24 Hours:  No results found.  Assessment//Plan           Hospital Problems             Last Modified POA    * (Principal) Hypokalemia 11/15/2022 Yes    Severe malnutrition ([address] (Chronic) 11/16/2022 Yes    Pulmonary nodules 11/16/2022 Yes    Cavitating mass of lung 11/16/2022 Yes    Severe thrombocytopenia ([address] 11/17/2022 Yes    Severe anemia 11/17/2022 Yes     Assessment & Plan  Awaiting precert for SNF      Electronically signed by Jennifer Leung MD on 11/28/22 at 8:36 AM EST

## 2022-11-28 NOTE — PLAN OF CARE
Problem: Discharge Planning  Goal: Discharge to home or other facility with appropriate resources  Outcome: Progressing     Problem: Safety - Adult  Goal: Free from fall injury  Outcome: Progressing     Problem: Skin/Tissue Integrity  Goal: Absence of new skin breakdown  Description: 1. Monitor for areas of redness and/or skin breakdown  2. Assess vascular access sites hourly  3. Every 4-6 hours minimum:  Change oxygen saturation probe site  4. Every 4-6 hours:  If on nasal continuous positive airway pressure, respiratory therapy assess nares and determine need for appliance change or resting period.   Outcome: Progressing     Problem: ABCDS Injury Assessment  Goal: Absence of physical injury  Outcome: Progressing     Problem: Nutrition Deficit:  Goal: Optimize nutritional status  Outcome: Progressing     Problem: Pain  Goal: Verbalizes/displays adequate comfort level or baseline comfort level  Outcome: Progressing

## 2022-11-28 NOTE — CARE COORDINATION
Medicaid authorization was started 11-22.  SW waiting on approval or denial.     Electronically signed by JOSE Alva on 11/28/2022 at 11:47 AM

## 2022-11-28 NOTE — PROGRESS NOTES
hema  Today's Date: 11/27/2022  Patient Name: Konstantin Quiroz  Date of admission: 11/15/2022  4:51 PM  Patient's age: 46 y. o., 1970  Admission Dx: Hypokalemia [E87.6]    Reason for Consult: management recommendations  Requesting Physician: Jimmy Looney MD    CHIEF COMPLAINT: Severe anemia    INTERVAL HISTORY:    Patient seen and examined  Labs and vitals reviewed  Patient up and around. Was out of the room for a walk when I came to see her earlier  Denies any new complaints  Platelet count is stable. HISTORY OF PRESENT ILLNESS:    The patient is a 46 y.o.  female who is admitted to the hospital for hypokalemia with potassium of 2.4 patient recently hospitalized for MRSA bacteremia and empyema and had anemia and thrombocytopenia thought related to bacteremia and sepsis and antibiotic/vancomycin, patient had pulmonary nodules with cavitation, hemoglobin was 5.3 with WBC 4.0 and platelet count of 32    Past Medical History:   has a past medical history of Anemia, Arthritis, Asthma, Bipolar disorder, mixed ([address] Carotid artery stenosis, Cocaine abuse ([address] COPD (chronic obstructive pulmonary disease) ([address] Degeneration of cervical intervertebral disc, Depression with anxiety, Depression with anxiety, Fibromyalgia, GERD (gastroesophageal reflux disease), History of migraine headaches, History of renal calculi, History of seizure disorder, Hoarseness of voice, HTN (hypertension), Hyperlipidemia, IGT (impaired glucose tolerance), Kidney stones, Lactose intolerance, Leukopenia, Major depressive disorder, recurrent episode, severe, without mention of psychotic behavior, MVP (mitral valve prolapse), Seizures ([address] Sleep apnea, and Unspecified diseases of blood and blood-forming organs. Past Surgical History:   has a past surgical history that includes Gastric bypass surgery; Cholecystectomy; Hysterectomy; Appendectomy; Hysterectomy; Tonsillectomy and adenoidectomy;  Ankle fracture surgery; Neck surgery; shoulder surgery;  section; Carpal tunnel release; Colonoscopy (12); Upper gastrointestinal endoscopy (7-3-12); Gastric bypass surgery; Breast lumpectomy (Right); Cardiac catheterization; Upper gastrointestinal endoscopy (2016); Colonoscopy (2016); Upper gastrointestinal endoscopy (2019); Colonoscopy (2019); Shoulder arthroscopy (Left, 2019); other surgical history (10/24/2019); laparoscopy (N/A, 2022); transesophageal echocardiogram (N/A, 10/19/2022); IR GUIDED PERC PLEURAL DRAIN W CATH INSERT (10/21/2022); IR GUIDED PERC PLEURAL DRAIN W CATH INSERT (10/21/2022); IR GUIDED PERC PLEURAL DRAIN W CATH INSERT (10/24/2022); and CT BIOPSY BONE MARROW (2022). Medications:    Reviewed in Epic     Allergies:  Aspirin, Bactrim, and Codeine    Social History:   reports that she has been smoking cigarettes. She has a 16.50 pack-year smoking history. She has never used smokeless tobacco. She reports current alcohol use. She reports that she does not currently use drugs. Family History: family history includes Alcohol Abuse in her brother; COPD in her father; Cancer in her mother, paternal grandmother, and another family member; Coronary Art Dis in her father; Depression in her brother; Diabetes in her maternal grandmother and mother.     REVIEW OF SYSTEMS:    14 point review of system has been obtained unremarkable although it was mentioned above    PHYSICAL EXAM:      /67   Pulse 80   Temp 98.6 °F (37 °C)   Resp 19   Ht 4' 11.02\" (1.499 m)   Wt 93 lb 11.1 oz (42.5 kg)   SpO2 99%   BMI 18.91 kg/m²    Temp (24hrs), Av.5 °F (36.9 °C), Min:98.4 °F (36.9 °C), Max:98.6 °F (37 °C)    General appearance - well appearing, no in pain or distress   Mental status - alert and cooperative   Eyes - pupils equal and reactive, extraocular eye movements intact   Ears - bilateral TM's and external ear canals normal   Mouth - mucous membranes moist, pharynx normal without lesions   Neck - supple, no significant adenopathy   Lymphatics - no palpable lymphadenopathy, no hepatosplenomegaly   Chest - clear to auscultation, no wheezes, rales or rhonchi, symmetric air entry   Heart - normal rate, regular rhythm, normal S1, S2, no murmurs  Abdomen - soft, nontender, nondistended, no masses or organomegaly   Neurological - alert, oriented, normal speech, no focal findings or movement disorder noted   Musculoskeletal - no joint tenderness, deformity or swelling   Extremities - peripheral pulses normal, no pedal edema, no clubbing or cyanosis   Skin - normal coloration and turgor, no rashes, no suspicious skin lesions noted ,    DATA:    Labs:   CBC:   Recent Labs     11/26/22  0529 11/27/22  0547   WBC 5.9 4.0   HGB 9.3* 8.3*   HCT 29.0* 25.4*   PLT 62* 71*     BMP:   Recent Labs     11/27/22  0547 11/27/22  1555   *  --    K 2.5* 4.3   CO2 21  --    BUN 18  --    CREATININE 0.69  --    LABGLOM >60  --    GLUCOSE 89  --        PT/INR: No results for input(s): PROTIME, INR in the last 72 hours. IMAGING DATA:  CT CHEST WO CONTRAST   Final Result   Resolving pleural effusions bilaterally. Numerous pulmonary      nodules/masses redemonstrated bilaterally with resolving cavities associated   with multiple lesions. Resolving bilateral pleural effusions         CT BIOPSY BONE MARROW   Final Result   Successful CT-guided right posterior ilium bone marrow biopsy, as above. XR CHEST (SINGLE VIEW FRONTAL)   Final Result   1. Right PICC tip terminates within the lower right atrium. Retraction by   5-6 cm and radiographic follow-up suggested. 2.  Extensive pleural/parenchymal disease bilaterally without significant   overall change from prior.            Primary Problem  Hypokalemia    Active Hospital Problems    Diagnosis Date Noted    Severe thrombocytopenia ([facility] [address] [D69.6] 11/17/2022     Priority: Medium    Severe anemia [D64.9] 11/17/2022     Priority: Medium Cavitating mass of lung [J98.4] 10/23/2022     Priority: Medium    Pulmonary nodules [R91.8] 10/15/2022     Priority: Medium    Severe malnutrition ([address] [E43] 10/11/2022     Priority: Medium    Hypokalemia [E87.6] 12/18/2015         IMPRESSION:   Severe anemia and thrombocytopenia  Pulmonary nodules, numerous with cavities  Bilateral pleural effusions  MRSA bacteremia  Hypokalemia    RECOMMENDATIONS:  I reviewed the laboratory, imaging studies, discussed diagnosis and other treatment recommendations   Patient had bone marrow biopsy on 11/18/2022. Bone marrow biopsy concerning for MDS however patient folic acid level is low which can lead to dysplastic picture. We will follow-up on MDS cytogenetic and FISH testing. Patient will need outpatient follow-up  Continue folic acid supplementation  Transfuse to keep hemoglobin above 7 and platelet above 10 unless there is a bleeding to keep platelet above 30  Patient had bilateral pulmonary nodules during her previous admission and has been evaluated by ID and pulmonary at that time and was thought to be suspected septic emboli. She would need follow-up CT scan/PET scan as outpatient  Other management as per primary team  IV antibiotics as per ID  Discharge planning as per primary  Follow with oncology in 2-3 weks after discharge  Awaiting placement    Discussed with patient and Nurse. Brenden Ontiveros MD      This note is created with the assistance of a speech recognition program.  While intending to generate a document that actually reflects the content of the visit, the document can still have some errors including those of syntax and sound a like substitutions which may escape proof reading. It such instances, actual meaning can be extrapolated by contextual diversion.

## 2022-11-28 NOTE — PROGRESS NOTES
Noticed that pt's lab order has . Reached out to Dr. Jodie Fitzgerald and new order received for CMP.

## 2022-11-28 NOTE — CARE COORDINATION
[facility] informed SW that authorization has come back. Authorization for patient does not . Tentative transport has been arranged for patient to go [facility] 22 @2:00pm. Facility informed. Bedside nurse informed. Number for Report: [phone]    Attempted to notify patient but out of room.     Electronically signed by JOSE Mcgrath on 2022 at 3:29 PM

## 2022-11-28 NOTE — PLAN OF CARE
Problem: Discharge Planning  Goal: Discharge to home or other facility with appropriate resources  11/28/2022 1508 by Kristie Villalta RN  Outcome: Progressing  Flowsheets (Taken 11/28/2022 0935)  Discharge to home or other facility with appropriate resources: Identify barriers to discharge with patient and caregiver  11/28/2022 0405 by Debi Goldstein RN  Outcome: Progressing     Problem: Safety - Adult  Goal: Free from fall injury  11/28/2022 1508 by Kristie Villalta RN  Outcome: Progressing  11/28/2022 0405 by Debi Goldstein RN  Outcome: Progressing     Problem: Skin/Tissue Integrity  Goal: Absence of new skin breakdown  Description: 1. Monitor for areas of redness and/or skin breakdown  2. Assess vascular access sites hourly  3. Every 4-6 hours minimum:  Change oxygen saturation probe site  4. Every 4-6 hours:  If on nasal continuous positive airway pressure, respiratory therapy assess nares and determine need for appliance change or resting period.   11/28/2022 1508 by Kristie Villalta RN  Outcome: Progressing  11/28/2022 0405 by Debi Goldstein RN  Outcome: Progressing     Problem: ABCDS Injury Assessment  Goal: Absence of physical injury  11/28/2022 1508 by Kristie Villalta RN  Outcome: Progressing  11/28/2022 0405 by Debi Goldstein RN  Outcome: Progressing     Problem: Nutrition Deficit:  Goal: Optimize nutritional status  11/28/2022 1508 by Kristie Villalta RN  Outcome: Progressing  11/28/2022 0405 by Debi Goldstein RN  Outcome: Progressing     Problem: Pain  Goal: Verbalizes/displays adequate comfort level or baseline comfort level  11/28/2022 1508 by Kristie Villalta RN  Outcome: Progressing  11/28/2022 0405 by Debi Goldstein RN  Outcome: Progressing

## 2022-11-28 NOTE — PROGRESS NOTES
[facility] -   Infectious diseases evaluation  admission date 11/15/2022    reason for consultation:   MRSA infection    Impression :   Current:  Hypokalemia  Recent MRSA bacteremia  Recent empyema/pulmonary nodules with cavitation status post chest tube placement that was subsequently removed on 10/31/2022. Thrombocytopenia and anemia (thought to be related to vancomycin that was discontinued last admission). Renal insufficiency  History of cocaine drug abuse  Hypertension  Bipolar disorder  Allergy to Bactrim. Recommendations   Continue IV Teflaro 400 mg twice a day as planned through 12/3/2022  CT chest without contrast on 11/18/2022 showed resolution of bilateral pleural effusion, nodules/masses bilaterally with resolution of cavitations. Follow CBC and renal function  Follow CT chest in 8 weeks      Infection Control Recommendations   [place] Precautions  Contact Isolation       Antimicrobial Stewardship Recommendations   Simplification of therapy  Targeted therapy      History of Present Illness:   Initial history:  Otilia Conrad is a 46y.o.-year-old female was brought to the hospital from SNF for hypokalemia with reported potassium of 2.4. Patient is complaining of body ache, generalized weakness and abdominal discomfort for 2 days. Symptoms are moderate, no alleviating or aggravating factors. She denied cough or shortness of breath, no other complaints. Right arm PICC line in place  The patient had prolonged hospitalization recently for MRSA bacteremia and empyema status post chest tube placement but was subsequently removed. She was discharged to skilled nursing facility on 11/4/2024 on IV Teflaro through 12/3/2022  Interval changes  11/28/2022   She is afebrile, denied cough or shortness of breath, no new complaints.   Platelet was 71 yesterday  On prednisone  Status post bone marrow biopsy 11/18/2022  Patient Vitals for the past 8 hrs:   BP Temp Pulse Resp SpO2   11/28/22 1127 -- -- -- -- 97 %   11/28/22 0843 -- -- -- -- 97 %   11/28/22 0648 (!) 186/93 98.4 °F (36.9 °C) 78 17 99 %               I have personally reviewed the past medical history, past surgical history, medications, social history, and family history, and I haveupdated the database accordingly.       Allergies:   Aspirin, Bactrim, and Codeine     Review of Systems:     Review of Systems  As per history of present illness, other than above 12 system review was negative  Physical Examination :       Physical Exam  Head: Normocephalic, atraumatic  Eye: Pupils equal round reactive to light, no conjunctivitis  ENT: No throat erythema or thrush  Neck: Supple, no JVD  Heart: Regular rate and rhythm no murmurs  Lungs: Clear to auscultation bilaterally, no respiratory distress  Abdomen: Soft, nontender, nondistended, with no peritoneal signs  MSK: No joint swelling or erythema  Neurologic: Patient is alert and oriented x3  Extremities: No edema, no clubbing, no cyanosis  Past Medical History:     Past Medical History:   Diagnosis Date    Anemia     Arthritis     Asthma     Bipolar disorder, mixed ([address]     Carotid artery stenosis 2/13/2020    Cocaine abuse ([address] 11/4/2014    COPD (chronic obstructive pulmonary disease) (HCC)     Degeneration of cervical intervertebral disc     Depression with anxiety     Depression with anxiety     Fibromyalgia 1/5/2017    GERD (gastroesophageal reflux disease)     History of migraine headaches 6/10/2014    History of renal calculi 6/10/2014    History of seizure disorder 6/10/2014    Hoarseness of voice     HTN (hypertension) 6/10/2014    Hyperlipidemia 6/10/2014    IGT (impaired glucose tolerance) 6/10/2014    Kidney stones     Lactose intolerance 6/10/2014    Leukopenia 6/10/2014    Major depressive disorder, recurrent episode, severe, without mention of psychotic behavior 11/6/2014    MVP (mitral valve prolapse)     Seizures ([address]     Sleep apnea 6/10/2014 Unspecified diseases of blood and blood-forming organs        Past Surgical  History:     Past Surgical History:   Procedure Laterality Date    ANKLE FRACTURE SURGERY      recontruction surgery    APPENDECTOMY      BREAST LUMPECTOMY Right     CARDIAC CATHETERIZATION      CARPAL TUNNEL RELEASE      x2     SECTION      CHOLECYSTECTOMY      COLONOSCOPY  12    COLONOSCOPY  2016    severe spasms    COLONOSCOPY  2019    DR GOLDY MCINTOSH    CT BONE MARROW BIOPSY  2022    CT BONE MARROW BIOPSY 2022 [facility] CT SCAN    GASTRIC BYPASS SURGERY      GASTRIC BYPASS SURGERY      HYSTERECTOMY (CERVIX STATUS UNKNOWN)      HYSTERECTOMY (CERVIX STATUS UNKNOWN)      IR PERC CATH PLEURAL DRAIN W/IMAG  10/21/2022    IR PERC CATH PLEURAL DRAIN W/IMAG 10/21/2022 [facility]    IR PERC CATH PLEURAL DRAIN W/IMAG  10/21/2022    IR PERC CATH PLEURAL DRAIN W/IMAG 10/21/2022 [facility]    IR PERC CATH PLEURAL DRAIN W/IMAG  10/24/2022    IR PERC CATH PLEURAL DRAIN W/IMAG 10/24/2022 [facility]    LAPAROSCOPY N/A 2022    LAPAROSCOPY EXPLORATORY CONVERTED TO OPEN EXPLORATORY LAPAROTOMY, LYSIS OF ADHESIONS, REDUCTION OF INTERNAL HERNIA performed by Jackie Barnard DO at [address]      APPLICATION OF ARCH BARS,SIMPLE EXTRACTION OF #15 AND RT TMJ JOINT REPLACEMENT    SHOULDER ARTHROSCOPY Left 2019    DR ROBERT GREEN    SHOULDER SURGERY      TONSILLECTOMY AND ADENOIDECTOMY      TRANSESOPHAGEAL ECHOCARDIOGRAM N/A 10/19/2022    TRANSESOPHAGEAL ECHOCARDIOGRAM WITH BUBBLE STUDY performed by Frandy Royal DO at [address]  7-3-12    egd    UPPER GASTROINTESTINAL ENDOSCOPY  2016    status post gastrectomy with a small remnant of gastric pouch.     UPPER GASTROINTESTINAL ENDOSCOPY  2019    DR Gabino Parrish       Medications:      potassium chloride  20 mEq Oral BID WC    ceftaroline fosamil (TEFLARO) IVPB  400 mg IntraVENous Q12H    predniSONE  60 mg Oral Daily    amLODIPine  10 mg Oral Daily    folic acid  1 mg Oral Daily    DULoxetine  60 mg Oral Daily    carvedilol  25 mg Oral BID WC    ARIPiprazole  7.5 mg Oral Daily    divalproex  1,000 mg Oral BID    ferrous sulfate  325 mg Oral BID    ipratropium-albuterol  3 mL Inhalation Q4H WA    Vitamin D  2,000 Units Oral Daily    sodium chloride flush  5-40 mL IntraVENous 2 times per day    [Held by provider] enoxaparin  30 mg SubCUTAneous Daily       Social History:     Social History     Socioeconomic History    Marital status:      Spouse name: Not on file    Number of children: Not on file    Years of education: Not on file    Highest education level: Not on file   Occupational History    Occupation: disability   Tobacco Use    Smoking status: Every Day     Packs/day: 0.50     Years: 33.00     Pack years: 16.50     Types: Cigarettes    Smokeless tobacco: Never    Tobacco comments:     quit  2000 started again  1/13    Substance and Sexual Activity    Alcohol use: Yes     Comment: occasional    Drug use: Not Currently    Sexual activity: Yes   Other Topics Concern    Not on file   Social History Narrative    Not on file     Social Determinants of Health     Financial Resource Strain: Not on file   Food Insecurity: Not on file   Transportation Needs: Not on file   Physical Activity: Not on file   Stress: Not on file   Social Connections: Not on file   Intimate Partner Violence: Not on file   Housing Stability: Not on file       Family History:     Family History   Problem Relation Age of Onset    Diabetes Mother     Cancer Mother     Coronary Art Dis Father     COPD Father     Depression Brother     Alcohol Abuse Brother     Cancer Other         lung and skin    Diabetes Maternal Grandmother     Cancer Paternal Grandmother       Medical Decision Making:   I have independently reviewed/ordered the following labs:    CBC with Differential:   Recent Labs     11/26/22  0529 11/27/22  0547   WBC 5.9 4.0   HGB 9.3* 8.3*   HCT 29.0* 25.4*   PLT 62* 71*   LYMPHOPCT 33 55*   MONOPCT 16* 5       BMP:  Recent Labs     11/27/22  0547 11/27/22  1555 11/28/22  0714   *  --  138   K 2.5* 4.3 2.5*     --  102   CO2 21  --  21   BUN 18  --  20   CREATININE 0.69  --  0.76   MG  --   --  1.4*       Hepatic Function Panel:   Recent Labs     11/28/22  0714   PROT 6.3*   LABALBU 2.5*   BILITOT <0.2*   ALKPHOS 64   ALT <5*   AST 9       No results for input(s): RPR in the last 72 hours. No results for input(s): HIV in the last 72 hours. No results for input(s): BC in the last 72 hours. Lab Results   Component Value Date/Time    CREATININE 0.76 11/28/2022 07:14 AM    GLUCOSE 98 11/28/2022 07:14 AM       Detailed results: Thank you for allowing us to participate in the care of this patient. Please call with questions. This note is created with the assistance of a speech recognition program.  While intending to generate adocument that actually reflects the content of the visit, the document can still have some errors including those of syntax and sound a like substitutions which may escape proof reading. It such instances, actual meaningcan be extrapolated by contextual diversion.     Evelin Loja MD  Office: [phone]  Perfect serve / office [phone]

## 2022-11-28 NOTE — PROGRESS NOTES
hema  Today's Date: 2022  Patient Name: Mary Molina  Date of admission: 11/15/2022  4:51 PM  Patient's age: 46 y. o., 1970  Admission Dx: Hypokalemia [E87.6]    Reason for Consult: management recommendations  Requesting Physician: Lizbet Marquez MD    CHIEF COMPLAINT: Severe anemia    INTERVAL HISTORY:    Patient seen and examined  Denies any chest pain or shortness of breath  Counts stable  No bleeding symptoms    HISTORY OF PRESENT ILLNESS:    The patient is a 46 y.o.  female who is admitted to the hospital for hypokalemia with potassium of 2.4 patient recently hospitalized for MRSA bacteremia and empyema and had anemia and thrombocytopenia thought related to bacteremia and sepsis and antibiotic/vancomycin, patient had pulmonary nodules with cavitation, hemoglobin was 5.3 with WBC 4.0 and platelet count of 32    Past Medical History:   has a past medical history of Anemia, Arthritis, Asthma, Bipolar disorder, mixed ([address] Carotid artery stenosis, Cocaine abuse ([address] COPD (chronic obstructive pulmonary disease) ([address] Degeneration of cervical intervertebral disc, Depression with anxiety, Depression with anxiety, Fibromyalgia, GERD (gastroesophageal reflux disease), History of migraine headaches, History of renal calculi, History of seizure disorder, Hoarseness of voice, HTN (hypertension), Hyperlipidemia, IGT (impaired glucose tolerance), Kidney stones, Lactose intolerance, Leukopenia, Major depressive disorder, recurrent episode, severe, without mention of psychotic behavior, MVP (mitral valve prolapse), Seizures ([address] Sleep apnea, and Unspecified diseases of blood and blood-forming organs. Past Surgical History:   has a past surgical history that includes Gastric bypass surgery; Cholecystectomy; Hysterectomy; Appendectomy; Hysterectomy; Tonsillectomy and adenoidectomy; Ankle fracture surgery; Neck surgery; shoulder surgery;   section; Carpal tunnel release; Colonoscopy (12); Upper gastrointestinal endoscopy (7-3-12); Gastric bypass surgery; Breast lumpectomy (Right); Cardiac catheterization; Upper gastrointestinal endoscopy (2016); Colonoscopy (2016); Upper gastrointestinal endoscopy (2019); Colonoscopy (2019); Shoulder arthroscopy (Left, 2019); other surgical history (10/24/2019); laparoscopy (N/A, 2022); transesophageal echocardiogram (N/A, 10/19/2022); IR GUIDED PERC PLEURAL DRAIN W CATH INSERT (10/21/2022); IR GUIDED PERC PLEURAL DRAIN W CATH INSERT (10/21/2022); IR GUIDED PERC PLEURAL DRAIN W CATH INSERT (10/24/2022); and CT BIOPSY BONE MARROW (2022). Medications:    Reviewed in Epic     Allergies:  Aspirin, Bactrim, and Codeine    Social History:   reports that she has been smoking cigarettes. She has a 16.50 pack-year smoking history. She has never used smokeless tobacco. She reports current alcohol use. She reports that she does not currently use drugs. Family History: family history includes Alcohol Abuse in her brother; COPD in her father; Cancer in her mother, paternal grandmother, and another family member; Coronary Art Dis in her father; Depression in her brother; Diabetes in her maternal grandmother and mother.     REVIEW OF SYSTEMS:    14 point review of system has been obtained unremarkable although it was mentioned above    PHYSICAL EXAM:      BP (!) 186/93   Pulse 78   Temp 98.4 °F (36.9 °C)   Resp 17   Ht 4' 11.02\" (1.499 m)   Wt 93 lb 11.1 oz (42.5 kg)   SpO2 97%   BMI 18.91 kg/m²    Temp (24hrs), Av.4 °F (36.9 °C), Min:98.4 °F (36.9 °C), Max:98.4 °F (36.9 °C)    General appearance - well appearing, no in pain or distress   Mental status - alert and cooperative   Eyes - pupils equal and reactive, extraocular eye movements intact   Ears - bilateral TM's and external ear canals normal   Mouth - mucous membranes moist, pharynx normal without lesions   Neck - supple, no significant adenopathy Lymphatics - no palpable lymphadenopathy, no hepatosplenomegaly   Chest - clear to auscultation, no wheezes, rales or rhonchi, symmetric air entry   Heart - normal rate, regular rhythm, normal S1, S2, no murmurs  Abdomen - soft, nontender, nondistended, no masses or organomegaly   Neurological - alert, oriented, normal speech, no focal findings or movement disorder noted   Musculoskeletal - no joint tenderness, deformity or swelling   Extremities - peripheral pulses normal, no pedal edema, no clubbing or cyanosis   Skin - normal coloration and turgor, no rashes, no suspicious skin lesions noted ,    DATA:    Labs:   CBC:   Recent Labs     11/26/22  0529 11/27/22  0547   WBC 5.9 4.0   HGB 9.3* 8.3*   HCT 29.0* 25.4*   PLT 62* 71*       BMP:   Recent Labs     11/27/22  0547 11/27/22  1555   *  --    K 2.5* 4.3   CO2 21  --    BUN 18  --    CREATININE 0.69  --    LABGLOM >60  --    GLUCOSE 89  --        PT/INR: No results for input(s): PROTIME, INR in the last 72 hours. IMAGING DATA:  CT CHEST WO CONTRAST   Final Result   Resolving pleural effusions bilaterally. Numerous pulmonary      nodules/masses redemonstrated bilaterally with resolving cavities associated   with multiple lesions. Resolving bilateral pleural effusions         CT BIOPSY BONE MARROW   Final Result   Successful CT-guided right posterior ilium bone marrow biopsy, as above. XR CHEST (SINGLE VIEW FRONTAL)   Final Result   1. Right PICC tip terminates within the lower right atrium. Retraction by   5-6 cm and radiographic follow-up suggested. 2.  Extensive pleural/parenchymal disease bilaterally without significant   overall change from prior.            Primary Problem  Hypokalemia    Active Hospital Problems    Diagnosis Date Noted    Severe thrombocytopenia ([address] [D69.6] 11/17/2022     Priority: Medium    Severe anemia [D64.9] 11/17/2022     Priority: Medium    Cavitating mass of lung [J98.4] 10/23/2022     Priority: Medium    Pulmonary nodules [R91.8] 10/15/2022     Priority: Medium    Severe malnutrition ([address] [E43] 10/11/2022     Priority: Medium    Hypokalemia [E87.6] 12/18/2015         IMPRESSION:   Severe anemia and thrombocytopenia  Pulmonary nodules, numerous with cavities  Bilateral pleural effusions  MRSA bacteremia  Hypokalemia    RECOMMENDATIONS:  I reviewed the laboratory, imaging studies, discussed diagnosis and other treatment recommendations   Patient had bone marrow biopsy on 11/18/2022  Will follow results of bone marrow biopsy  Patient has low folic acid level and receiving folic acid and also on iron pill  Transfuse to keep hemoglobin above 7 and platelet above 10 unless there is a bleeding to keep platelet above 30  Patient had bilateral pulmonary nodules during her previous admission and has been evaluated by ID and pulmonary at that time and was thought to be suspected septic emboli. She would need follow-up CT scan/PET scan as outpatient  Other management as per primary team  IV antibiotics as per ID  Discharge planning as per primary  Follow with oncology in 2-3 weks after discharge  Awaiting placement    Discussed with patient and Nurse. Thank you for asking us to see this patient. Fortunato Baker MD  Hematologist/Medical Oncologist                      This note is created with the assistance of a speech recognition program.  While intending to generate a document that actually reflects the content of the visit, the document can still have some errors including those of syntax and sound a like substitutions which may escape proof reading. It such instances, actual meaning can be extrapolated by contextual diversion.

## 2022-11-29 VITALS
BODY MASS INDEX: 18.89 KG/M2 | SYSTOLIC BLOOD PRESSURE: 129 MMHG | OXYGEN SATURATION: 98 % | TEMPERATURE: 98.4 F | HEIGHT: 59 IN | HEART RATE: 73 BPM | DIASTOLIC BLOOD PRESSURE: 61 MMHG | RESPIRATION RATE: 18 BRPM | WEIGHT: 93.7 LBS

## 2022-11-29 LAB
ALBUMIN SERPL-MCNC: 2.6 G/DL (ref 3.5–5.2)
ALP BLD-CCNC: 61 U/L (ref 35–104)
ALT SERPL-CCNC: <5 U/L (ref 5–33)
ANION GAP SERPL CALCULATED.3IONS-SCNC: 8 MMOL/L (ref 9–17)
AST SERPL-CCNC: 8 U/L
BILIRUB SERPL-MCNC: <0.2 MG/DL (ref 0.3–1.2)
BUN BLDV-MCNC: 22 MG/DL (ref 6–20)
CALCIUM SERPL-MCNC: 7.8 MG/DL (ref 8.6–10.4)
CHLORIDE BLD-SCNC: 105 MMOL/L (ref 98–107)
CO2: 26 MMOL/L (ref 20–31)
CREAT SERPL-MCNC: 0.71 MG/DL (ref 0.5–0.9)
GFR SERPL CREATININE-BSD FRML MDRD: >60 ML/MIN/1.73M2
GLUCOSE BLD-MCNC: 60 MG/DL (ref 70–99)
POTASSIUM SERPL-SCNC: 3.8 MMOL/L (ref 3.7–5.3)
SODIUM BLD-SCNC: 139 MMOL/L (ref 135–144)
TOTAL PROTEIN: 6 G/DL (ref 6.4–8.3)

## 2022-11-29 PROCEDURE — 6360000002 HC RX W HCPCS: Performed by: INTERNAL MEDICINE

## 2022-11-29 PROCEDURE — 6360000002 HC RX W HCPCS: Performed by: FAMILY MEDICINE

## 2022-11-29 PROCEDURE — 6370000000 HC RX 637 (ALT 250 FOR IP): Performed by: FAMILY MEDICINE

## 2022-11-29 PROCEDURE — 36592 COLLECT BLOOD FROM PICC: CPT

## 2022-11-29 PROCEDURE — 94640 AIRWAY INHALATION TREATMENT: CPT

## 2022-11-29 PROCEDURE — 99231 SBSQ HOSP IP/OBS SF/LOW 25: CPT | Performed by: INTERNAL MEDICINE

## 2022-11-29 PROCEDURE — 6370000000 HC RX 637 (ALT 250 FOR IP): Performed by: INTERNAL MEDICINE

## 2022-11-29 PROCEDURE — 2580000003 HC RX 258: Performed by: INTERNAL MEDICINE

## 2022-11-29 PROCEDURE — 94761 N-INVAS EAR/PLS OXIMETRY MLT: CPT

## 2022-11-29 PROCEDURE — 2580000003 HC RX 258: Performed by: FAMILY MEDICINE

## 2022-11-29 PROCEDURE — 80053 COMPREHEN METABOLIC PANEL: CPT

## 2022-11-29 RX ORDER — POTASSIUM CHLORIDE 20 MEQ/1
20 TABLET, EXTENDED RELEASE ORAL 2 TIMES DAILY WITH MEALS
Qty: 60 TABLET | Refills: 3
Start: 2022-11-29

## 2022-11-29 RX ADMIN — CARVEDILOL 25 MG: 25 TABLET ORAL at 08:21

## 2022-11-29 RX ADMIN — PREDNISONE 60 MG: 20 TABLET ORAL at 08:17

## 2022-11-29 RX ADMIN — CEFTAROLINE FOSAMIL 400 MG: 400 POWDER, FOR SOLUTION INTRAVENOUS at 12:07

## 2022-11-29 RX ADMIN — Medication 2000 UNITS: at 08:17

## 2022-11-29 RX ADMIN — FERROUS SULFATE TAB 325 MG (65 MG ELEMENTAL FE) 325 MG: 325 (65 FE) TAB at 08:18

## 2022-11-29 RX ADMIN — KETOROLAC TROMETHAMINE 15 MG: 30 INJECTION, SOLUTION INTRAMUSCULAR; INTRAVENOUS at 08:40

## 2022-11-29 RX ADMIN — IPRATROPIUM BROMIDE AND ALBUTEROL SULFATE 3 ML: .5; 2.5 SOLUTION RESPIRATORY (INHALATION) at 07:29

## 2022-11-29 RX ADMIN — DIVALPROEX SODIUM 1000 MG: 250 TABLET, DELAYED RELEASE ORAL at 08:22

## 2022-11-29 RX ADMIN — SODIUM CHLORIDE 30 ML: 9 INJECTION, SOLUTION INTRAVENOUS at 12:06

## 2022-11-29 RX ADMIN — FOLIC ACID 1 MG: 1 TABLET ORAL at 08:17

## 2022-11-29 RX ADMIN — DULOXETIN HYDROCHLORIDE 60 MG: 30 CAPSULE, DELAYED RELEASE ORAL at 08:21

## 2022-11-29 RX ADMIN — AMLODIPINE BESYLATE 10 MG: 10 TABLET ORAL at 08:17

## 2022-11-29 RX ADMIN — CEFTAROLINE FOSAMIL 400 MG: 400 POWDER, FOR SOLUTION INTRAVENOUS at 01:29

## 2022-11-29 RX ADMIN — ARIPIPRAZOLE 7.5 MG: 5 TABLET ORAL at 08:21

## 2022-11-29 RX ADMIN — SODIUM CHLORIDE, PRESERVATIVE FREE 10 ML: 5 INJECTION INTRAVENOUS at 08:18

## 2022-11-29 RX ADMIN — POTASSIUM CHLORIDE 20 MEQ: 1500 TABLET, EXTENDED RELEASE ORAL at 08:17

## 2022-11-29 ASSESSMENT — PAIN SCALES - GENERAL
PAINLEVEL_OUTOF10: 3
PAINLEVEL_OUTOF10: 8

## 2022-11-29 ASSESSMENT — PAIN DESCRIPTION - ORIENTATION: ORIENTATION: RIGHT

## 2022-11-29 ASSESSMENT — PAIN DESCRIPTION - DESCRIPTORS: DESCRIPTORS: ACHING

## 2022-11-29 ASSESSMENT — PAIN DESCRIPTION - LOCATION: LOCATION: HEAD;HIP

## 2022-11-29 ASSESSMENT — PAIN - FUNCTIONAL ASSESSMENT: PAIN_FUNCTIONAL_ASSESSMENT: PREVENTS OR INTERFERES SOME ACTIVE ACTIVITIES AND ADLS

## 2022-11-29 NOTE — PROGRESS NOTES
hema  Today's Date: 2022  Patient Name: Ren Newberry  Date of admission: 11/15/2022  4:51 PM  Patient's age: 46 y. o., 1970  Admission Dx: Hypokalemia [E87.6]    Reason for Consult: management recommendations  Requesting Physician: Shruti Briscoe MD    CHIEF COMPLAINT: Severe anemia    INTERVAL HISTORY:    Patient seen and examined  Labs and vitals reviewed  Potassium improved to 3.8 today. Platelet count 72,955  ANC 1.6 today. Awaiting placement        HISTORY OF PRESENT ILLNESS:    The patient is a 46 y.o.  female who is admitted to the hospital for hypokalemia with potassium of 2.4 patient recently hospitalized for MRSA bacteremia and empyema and had anemia and thrombocytopenia thought related to bacteremia and sepsis and antibiotic/vancomycin, patient had pulmonary nodules with cavitation, hemoglobin was 5.3 with WBC 4.0 and platelet count of 32    Past Medical History:   has a past medical history of Anemia, Arthritis, Asthma, Bipolar disorder, mixed ([address] Carotid artery stenosis, Cocaine abuse ([address] COPD (chronic obstructive pulmonary disease) ([address] Degeneration of cervical intervertebral disc, Depression with anxiety, Depression with anxiety, Fibromyalgia, GERD (gastroesophageal reflux disease), History of migraine headaches, History of renal calculi, History of seizure disorder, Hoarseness of voice, HTN (hypertension), Hyperlipidemia, IGT (impaired glucose tolerance), Kidney stones, Lactose intolerance, Leukopenia, Major depressive disorder, recurrent episode, severe, without mention of psychotic behavior, MVP (mitral valve prolapse), Seizures ([address] Sleep apnea, and Unspecified diseases of blood and blood-forming organs. Past Surgical History:   has a past surgical history that includes Gastric bypass surgery; Cholecystectomy; Hysterectomy; Appendectomy; Hysterectomy; Tonsillectomy and adenoidectomy; Ankle fracture surgery; Neck surgery; shoulder surgery;   section; Carpal tunnel release; Colonoscopy (12); Upper gastrointestinal endoscopy (7-3-12); Gastric bypass surgery; Breast lumpectomy (Right); Cardiac catheterization; Upper gastrointestinal endoscopy (2016); Colonoscopy (2016); Upper gastrointestinal endoscopy (2019); Colonoscopy (2019); Shoulder arthroscopy (Left, 2019); other surgical history (10/24/2019); laparoscopy (N/A, 2022); transesophageal echocardiogram (N/A, 10/19/2022); IR GUIDED PERC PLEURAL DRAIN W CATH INSERT (10/21/2022); IR GUIDED PERC PLEURAL DRAIN W CATH INSERT (10/21/2022); IR GUIDED PERC PLEURAL DRAIN W CATH INSERT (10/24/2022); and CT BIOPSY BONE MARROW (2022). Medications:    Reviewed in Epic     Allergies:  Aspirin, Bactrim, and Codeine    Social History:   reports that she has been smoking cigarettes. She has a 16.50 pack-year smoking history. She has never used smokeless tobacco. She reports current alcohol use. She reports that she does not currently use drugs. Family History: family history includes Alcohol Abuse in her brother; COPD in her father; Cancer in her mother, paternal grandmother, and another family member; Coronary Art Dis in her father; Depression in her brother; Diabetes in her maternal grandmother and mother.     REVIEW OF SYSTEMS:    14 point review of system has been obtained unremarkable although it was mentioned above    PHYSICAL EXAM:      /61   Pulse 73   Temp 98.4 °F (36.9 °C) (Oral)   Resp 18   Ht 4' 11.02\" (1.499 m)   Wt 93 lb 11.1 oz (42.5 kg)   SpO2 98%   BMI 18.91 kg/m²    Temp (24hrs), Av.5 °F (36.9 °C), Min:98.4 °F (36.9 °C), Max:98.6 °F (37 °C)    General appearance - well appearing, no in pain or distress   Mental status - alert and cooperative   Eyes - pupils equal and reactive, extraocular eye movements intact   Ears - bilateral TM's and external ear canals normal   Mouth - mucous membranes moist, pharynx normal without lesions   Neck - supple, no significant adenopathy   Lymphatics - no palpable lymphadenopathy, no hepatosplenomegaly   Chest - clear to auscultation, no wheezes, rales or rhonchi, symmetric air entry   Heart - normal rate, regular rhythm, normal S1, S2, no murmurs  Abdomen - soft, nontender, nondistended, no masses or organomegaly   Neurological - alert, oriented, normal speech, no focal findings or movement disorder noted   Musculoskeletal - no joint tenderness, deformity or swelling   Extremities - peripheral pulses normal, no pedal edema, no clubbing or cyanosis   Skin - normal coloration and turgor, no rashes, no suspicious skin lesions noted ,    DATA:    Labs:   CBC:   Recent Labs     11/27/22  0547   WBC 4.0   HGB 8.3*   HCT 25.4*   PLT 71*     BMP:   Recent Labs     11/27/22 0547 11/27/22  1555   *  --    K 2.5* 4.3   CO2 21  --    BUN 18  --    CREATININE 0.69  --    LABGLOM >60  --    GLUCOSE 89  --        PT/INR: No results for input(s): PROTIME, INR in the last 72 hours. IMAGING DATA:  CT CHEST WO CONTRAST   Final Result   Resolving pleural effusions bilaterally. Numerous pulmonary      nodules/masses redemonstrated bilaterally with resolving cavities associated   with multiple lesions. Resolving bilateral pleural effusions         CT BIOPSY BONE MARROW   Final Result   Successful CT-guided right posterior ilium bone marrow biopsy, as above. XR CHEST (SINGLE VIEW FRONTAL)   Final Result   1. Right PICC tip terminates within the lower right atrium. Retraction by   5-6 cm and radiographic follow-up suggested. 2.  Extensive pleural/parenchymal disease bilaterally without significant   overall change from prior.            Primary Problem  Hypokalemia    Active Hospital Problems    Diagnosis Date Noted    Severe thrombocytopenia ([address] [D69.6] 11/17/2022     Priority: Medium    Severe anemia [D64.9] 11/17/2022     Priority: Medium    Cavitating mass of lung [J98.4] 10/23/2022     Priority: Medium    Pulmonary nodules [R91.8] 10/15/2022     Priority: Medium    Severe malnutrition ([address] [E43] 10/11/2022     Priority: Medium    Hypokalemia [E87.6] 12/18/2015         IMPRESSION:   Severe anemia and thrombocytopenia  Pulmonary nodules, numerous with cavities  Bilateral pleural effusions  MRSA bacteremia  Hypokalemia    RECOMMENDATIONS:  I reviewed the laboratory, imaging studies, discussed diagnosis and other treatment recommendations   Patient had bone marrow biopsy on 11/18/2022. Bone marrow biopsy concerning for MDS however patient folic acid level is low which can lead to dysplastic picture. We will follow-up on MDS cytogenetic and FISH testing. Will need ou tpt follow up   Continue folic acid supplementation  Transfuse to keep hemoglobin above 7 and platelet above 10 unless there is a bleeding to keep platelet above 30  IV antibiotics as per ID  Discharge planning as per primary  Follow with oncology in 2-3 weks after discharge  Awaiting placement    Discussed with patient and Nurse. Eric Rhodes MD      This note is created with the assistance of a speech recognition program.  While intending to generate a document that actually reflects the content of the visit, the document can still have some errors including those of syntax and sound a like substitutions which may escape proof reading. It such instances, actual meaning can be extrapolated by contextual diversion.

## 2022-11-29 NOTE — PLAN OF CARE
Problem: Discharge Planning  Goal: Discharge to home or other facility with appropriate resources  11/29/2022 1406 by Tessa Mckinley RN  Outcome: Completed     Problem: Safety - Adult  Goal: Free from fall injury  11/29/2022 1406 by Tessa Mckinley RN  Outcome: Completed  Flowsheets (Taken 11/29/2022 0983)  Free From Fall Injury: Instruct family/caregiver on patient safety     Problem: Skin/Tissue Integrity  Goal: Absence of new skin breakdown  Description: 1. Monitor for areas of redness and/or skin breakdown  2. Assess vascular access sites hourly  3. Every 4-6 hours minimum:  Change oxygen saturation probe site  4. Every 4-6 hours:  If on nasal continuous positive airway pressure, respiratory therapy assess nares and determine need for appliance change or resting period.   11/29/2022 1406 by Tessa Mckinley RN  Outcome: Completed

## 2022-11-29 NOTE — PROGRESS NOTES
Report called to nurse Jamel Neal, all questions answered. [FreeTextEntry1] : Long discussion regarding all options and risks\par To finis PO antibiotics\par Return in three days\par All lab values and imaging findings reviewed\par Discussed with medicine

## 2022-11-29 NOTE — PROGRESS NOTES
[facility] -   Infectious diseases evaluation  admission date 11/15/2022    reason for consultation:   MRSA infection    Impression :   Current:  Hypokalemia  Recent MRSA bacteremia  Recent empyema/pulmonary nodules with cavitation status post chest tube placement that was subsequently removed on 10/31/2022. Thrombocytopenia and anemia/ possible MDS  Renal insufficiency  History of cocaine drug abuse  Hypertension  Bipolar disorder  Allergy to Bactrim. Recommendations    IV Teflaro through 12/3/2022  CT chest without contrast on 11/18/2022 showed resolution of bilateral pleural effusion, nodules/masses bilaterally with resolution of cavitations. Follow CBC and renal function  Follow  up CT chest   PET scan planned as outpatient per hematology oncology      Infection Control Recommendations   Parnell Precautions  Contact Isolation       Antimicrobial Stewardship Recommendations   Simplification of therapy  Targeted therapy      History of Present Illness:   Initial history:  Winsome Sanabria is a 46y.o.-year-old female was brought to the hospital from SNF for hypokalemia with reported potassium of 2.4. Patient is complaining of body ache, generalized weakness and abdominal discomfort for 2 days. Symptoms are moderate, no alleviating or aggravating factors. She denied cough or shortness of breath, no other complaints. Right arm PICC line in place  The patient had prolonged hospitalization recently for MRSA bacteremia and empyema status post chest tube placement but was subsequently removed. She was discharged to skilled nursing facility on 11/4/2024 on IV Teflaro through 12/3/2022  Interval changes  11/29/2022   She remains afebrile, denied cough or shortness of breath, no nausea or vomiting, no other complaints.   On prednisone  Status post bone marrow biopsy 11/18/2022  Patient Vitals for the past 8 hrs:   BP Temp Temp src Pulse Resp SpO2   11/29/22 0747 129/61 98.4 °F (36.9 °C) Oral 73 18 98 %   11/29/22 0731 -- -- -- -- -- 99 %               I have personally reviewed the past medical history, past surgical history, medications, social history, and family history, and I haveupdated the database accordingly.       Allergies:   Aspirin, Bactrim, and Codeine     Review of Systems:     Review of Systems  As per history of present illness, other than above 12 system review was negative  Physical Examination :       Physical Exam  Head: Normocephalic, atraumatic  Eye: Pupils equal round reactive to light, no conjunctivitis  ENT: No throat erythema or thrush  Neck: Supple, no JVD  Heart: Regular rate and rhythm no murmurs  Lungs: Clear to auscultation bilaterally, no respiratory distress  Abdomen: Soft, nontender, nondistended, with no peritoneal signs  MSK: No joint swelling or erythema  Neurologic: Patient is alert and oriented x3  Extremities: No edema, no clubbing, no cyanosis  Past Medical History:     Past Medical History:   Diagnosis Date    Anemia     Arthritis     Asthma     Bipolar disorder, mixed ([address]     Carotid artery stenosis 2/13/2020    Cocaine abuse ([facility] [address] 11/4/2014    COPD (chronic obstructive pulmonary disease) (HCC)     Degeneration of cervical intervertebral disc     Depression with anxiety     Depression with anxiety     Fibromyalgia 1/5/2017    GERD (gastroesophageal reflux disease)     History of migraine headaches 6/10/2014    History of renal calculi 6/10/2014    History of seizure disorder 6/10/2014    Hoarseness of voice     HTN (hypertension) 6/10/2014    Hyperlipidemia 6/10/2014    IGT (impaired glucose tolerance) 6/10/2014    Kidney stones     Lactose intolerance 6/10/2014    Leukopenia 6/10/2014    Major depressive disorder, recurrent episode, severe, without mention of psychotic behavior 11/6/2014    MVP (mitral valve prolapse)     Seizures (HCC)     Sleep apnea 6/10/2014    Unspecified diseases of blood and blood-forming organs 60 mg Oral Daily    amLODIPine  10 mg Oral Daily    folic acid  1 mg Oral Daily    DULoxetine  60 mg Oral Daily    carvedilol  25 mg Oral BID WC    ARIPiprazole  7.5 mg Oral Daily    divalproex  1,000 mg Oral BID    ferrous sulfate  325 mg Oral BID    ipratropium-albuterol  3 mL Inhalation Q4H WA    Vitamin D  2,000 Units Oral Daily    sodium chloride flush  5-40 mL IntraVENous 2 times per day    [Held by provider] enoxaparin  30 mg SubCUTAneous Daily       Social History:     Social History     Socioeconomic History    Marital status:      Spouse name: Not on file    Number of children: Not on file    Years of education: Not on file    Highest education level: Not on file   Occupational History    Occupation: disability   Tobacco Use    Smoking status: Every Day     Packs/day: 0.50     Years: 33.00     Pack years: 16.50     Types: Cigarettes    Smokeless tobacco: Never    Tobacco comments:     quit  2000 started again  1/13    Substance and Sexual Activity    Alcohol use: Yes     Comment: occasional    Drug use: Not Currently    Sexual activity: Yes   Other Topics Concern    Not on file   Social History Narrative    Not on file     Social Determinants of Health     Financial Resource Strain: Not on file   Food Insecurity: Not on file   Transportation Needs: Not on file   Physical Activity: Not on file   Stress: Not on file   Social Connections: Not on file   Intimate Partner Violence: Not on file   Housing Stability: Not on file       Family History:     Family History   Problem Relation Age of Onset    Diabetes Mother     Cancer Mother     Coronary Art Dis Father     COPD Father     Depression Brother     Alcohol Abuse Brother     Cancer Other         lung and skin    Diabetes Maternal Grandmother     Cancer Paternal Grandmother       Medical Decision Making:   I have independently reviewed/ordered the following labs:    CBC with Differential:   Recent Labs     11/27/22  0547   WBC 4.0   HGB 8.3*   HCT 25.4*   PLT 71*   LYMPHOPCT 55*   MONOPCT 5       BMP:  Recent Labs     11/28/22  0714 11/28/22  1630 11/29/22  0633     --  139   K 2.5* 4.3 3.8     --  105   CO2 21  --  26   BUN 20  --  22*   CREATININE 0.76  --  0.71   MG 1.4*  --   --        Hepatic Function Panel:   Recent Labs     11/28/22  0714 11/29/22  0633   PROT 6.3* 6.0*   LABALBU 2.5* 2.6*   BILITOT <0.2* <0.2*   ALKPHOS 64 61   ALT <5* <5*   AST 9 8       No results for input(s): RPR in the last 72 hours. No results for input(s): HIV in the last 72 hours. No results for input(s): BC in the last 72 hours. Lab Results   Component Value Date/Time    CREATININE 0.71 11/29/2022 06:33 AM    GLUCOSE 60 11/29/2022 06:33 AM       Detailed results: Thank you for allowing us to participate in the care of this patient. Please call with questions. This note is created with the assistance of a speech recognition program.  While intending to generate adocument that actually reflects the content of the visit, the document can still have some errors including those of syntax and sound a like substitutions which may escape proof reading. It such instances, actual meaningcan be extrapolated by contextual diversion.     Kaylynn Guerra MD  Office: [phone]  Perfect serve / office [phone]

## 2022-11-29 NOTE — PLAN OF CARE
Problem: Discharge Planning  Goal: Discharge to home or other facility with appropriate resources  11/29/2022 0453 by Adam Augustine RN  Outcome: Progressing     Problem: Safety - Adult  Goal: Free from fall injury  11/29/2022 0453 by Adam Augustine RN  Outcome: Progressing     Problem: Skin/Tissue Integrity  Goal: Absence of new skin breakdown  Description: 1. Monitor for areas of redness and/or skin breakdown  2. Assess vascular access sites hourly  3. Every 4-6 hours minimum:  Change oxygen saturation probe site  4. Every 4-6 hours:  If on nasal continuous positive airway pressure, respiratory therapy assess nares and determine need for appliance change or resting period.   11/29/2022 0453 by Adam Augustine RN  Outcome: Progressing     Problem: ABCDS Injury Assessment  Goal: Absence of physical injury  11/29/2022 0453 by Adam Augustine RN  Outcome: Progressing     Problem: Nutrition Deficit:  Goal: Optimize nutritional status  11/29/2022 0453 by Adam Augustine RN  Outcome: Progressing     Problem: Pain  Goal: Verbalizes/displays adequate comfort level or baseline comfort level  11/29/2022 0453 by Adam Augustine RN  Outcome: Progressing

## 2022-11-29 NOTE — PLAN OF CARE
Problem: Discharge Planning  Goal: Discharge to home or other facility with appropriate resources  11/29/2022 0940 by Manuela Gomez RN  Outcome: Progressing     Problem: Safety - Adult  Goal: Free from fall injury  Recent Flowsheet Documentation  Taken 11/29/2022 0938 by Manuela Gomez RN  Free From Fall Injury: Instruct family/caregiver on patient safety

## 2022-11-30 LAB — CHROMOSOME STUDY: NORMAL

## 2022-12-05 ENCOUNTER — APPOINTMENT (OUTPATIENT)
Dept: GENERAL RADIOLOGY | Age: 52
DRG: 293 | End: 2022-12-05
Payer: COMMERCIAL

## 2022-12-05 ENCOUNTER — HOSPITAL ENCOUNTER (INPATIENT)
Age: 52
LOS: 5 days | Discharge: HOME | DRG: 293 | End: 2022-12-10
Attending: EMERGENCY MEDICINE | Admitting: FAMILY MEDICINE
Payer: COMMERCIAL

## 2022-12-05 DIAGNOSIS — R09.02 HYPOXIA: Primary | ICD-10-CM

## 2022-12-05 DIAGNOSIS — J44.9 CHRONIC OBSTRUCTIVE PULMONARY DISEASE, UNSPECIFIED COPD TYPE (HCC): ICD-10-CM

## 2022-12-05 PROBLEM — I50.9 ACUTE CONGESTIVE HEART FAILURE, UNSPECIFIED HEART FAILURE TYPE (HCC): Status: ACTIVE | Noted: 2022-12-05

## 2022-12-05 LAB
ABSOLUTE EOS #: 0 K/UL (ref 0–0.4)
ABSOLUTE LYMPH #: 1.09 K/UL (ref 1–4.8)
ABSOLUTE MONO #: 0.24 K/UL (ref 0.1–1.3)
ALLEN TEST: ABNORMAL
ANION GAP SERPL CALCULATED.3IONS-SCNC: 10 MMOL/L (ref 9–17)
BASOPHILS # BLD: 1 % (ref 0–2)
BASOPHILS ABSOLUTE: 0.12 K/UL (ref 0–0.2)
BUN BLDV-MCNC: 12 MG/DL (ref 6–20)
CALCIUM SERPL-MCNC: 8.1 MG/DL (ref 8.6–10.4)
CARBOXYHEMOGLOBIN: 3.4 % (ref 0–5)
CHLORIDE BLD-SCNC: 98 MMOL/L (ref 98–107)
CO2: 27 MMOL/L (ref 20–31)
CREAT SERPL-MCNC: 0.69 MG/DL (ref 0.5–0.9)
D-DIMER QUANTITATIVE: 0.77 MG/L FEU (ref 0–0.59)
EOSINOPHILS RELATIVE PERCENT: 0 % (ref 0–4)
GFR SERPL CREATININE-BSD FRML MDRD: >60 ML/MIN/1.73M2
GLUCOSE BLD-MCNC: 108 MG/DL (ref 70–99)
HCO3 VENOUS: 28.8 MMOL/L (ref 24–30)
HCT VFR BLD CALC: 24 % (ref 36–46)
HEMOGLOBIN: 7.7 G/DL (ref 12–16)
INR BLD: 1
LYMPHOCYTES # BLD: 9 % (ref 24–44)
MCH RBC QN AUTO: 30.2 PG (ref 26–34)
MCHC RBC AUTO-ENTMCNC: 31.9 G/DL (ref 31–37)
MCV RBC AUTO: 94.7 FL (ref 80–100)
METHEMOGLOBIN: 0.7 % (ref 0–1.9)
MONOCYTES # BLD: 2 % (ref 1–7)
MORPHOLOGY: ABNORMAL
MYOGLOBIN: <21 NG/ML (ref 25–58)
MYOGLOBIN: <21 NG/ML (ref 25–58)
O2 SAT, VEN: 83.6 % (ref 60–85)
PARTIAL THROMBOPLASTIN TIME: 27.5 SEC (ref 24–36)
PATIENT TEMP: 37
PCO2, VEN: 44.5 MM HG (ref 39–55)
PDW BLD-RTO: 28.9 % (ref 11.5–14.9)
PH VENOUS: 7.42 (ref 7.32–7.42)
PLATELET # BLD: 90 K/UL (ref 150–450)
PMV BLD AUTO: 7.1 FL (ref 6–12)
PO2, VEN: 54.6 MM HG (ref 30–50)
POSITIVE BASE EXCESS, VEN: 4.3 MMOL/L (ref 0–2)
POTASSIUM SERPL-SCNC: 3.8 MMOL/L (ref 3.7–5.3)
PRO-BNP: 4369 PG/ML
PROTHROMBIN TIME: 13.2 SEC (ref 11.8–14.6)
PT. POSITION: ABNORMAL
RBC # BLD: 2.53 M/UL (ref 4–5.2)
SAMPLE SITE: ABNORMAL
SEG NEUTROPHILS: 88 % (ref 36–66)
SEGMENTED NEUTROPHILS ABSOLUTE COUNT: 10.65 K/UL (ref 1.3–9.1)
SODIUM BLD-SCNC: 135 MMOL/L (ref 135–144)
THYROXINE, FREE: 1 NG/DL (ref 0.93–1.7)
TROPONIN, HIGH SENSITIVITY: 11 NG/L (ref 0–14)
TROPONIN, HIGH SENSITIVITY: 13 NG/L (ref 0–14)
TSH SERPL DL<=0.05 MIU/L-ACNC: 1.04 UIU/ML (ref 0.3–5)
TSH SERPL DL<=0.05 MIU/L-ACNC: 1.27 UIU/ML (ref 0.3–5)
WBC # BLD: 12.1 K/UL (ref 3.5–11)

## 2022-12-05 PROCEDURE — 84443 ASSAY THYROID STIM HORMONE: CPT

## 2022-12-05 PROCEDURE — 85379 FIBRIN DEGRADATION QUANT: CPT

## 2022-12-05 PROCEDURE — 82800 BLOOD PH: CPT

## 2022-12-05 PROCEDURE — 85025 COMPLETE CBC W/AUTO DIFF WBC: CPT

## 2022-12-05 PROCEDURE — 85610 PROTHROMBIN TIME: CPT

## 2022-12-05 PROCEDURE — 83880 ASSAY OF NATRIURETIC PEPTIDE: CPT

## 2022-12-05 PROCEDURE — 36415 COLL VENOUS BLD VENIPUNCTURE: CPT

## 2022-12-05 PROCEDURE — 94761 N-INVAS EAR/PLS OXIMETRY MLT: CPT

## 2022-12-05 PROCEDURE — 82805 BLOOD GASES W/O2 SATURATION: CPT

## 2022-12-05 PROCEDURE — 83874 ASSAY OF MYOGLOBIN: CPT

## 2022-12-05 PROCEDURE — 84484 ASSAY OF TROPONIN QUANT: CPT

## 2022-12-05 PROCEDURE — 94640 AIRWAY INHALATION TREATMENT: CPT

## 2022-12-05 PROCEDURE — 2580000003 HC RX 258: Performed by: FAMILY MEDICINE

## 2022-12-05 PROCEDURE — 93005 ELECTROCARDIOGRAM TRACING: CPT | Performed by: EMERGENCY MEDICINE

## 2022-12-05 PROCEDURE — 2700000000 HC OXYGEN THERAPY PER DAY

## 2022-12-05 PROCEDURE — 83540 ASSAY OF IRON: CPT

## 2022-12-05 PROCEDURE — 6370000000 HC RX 637 (ALT 250 FOR IP): Performed by: FAMILY MEDICINE

## 2022-12-05 PROCEDURE — 80048 BASIC METABOLIC PNL TOTAL CA: CPT

## 2022-12-05 PROCEDURE — 83550 IRON BINDING TEST: CPT

## 2022-12-05 PROCEDURE — 2060000000 HC ICU INTERMEDIATE R&B

## 2022-12-05 PROCEDURE — 99285 EMERGENCY DEPT VISIT HI MDM: CPT

## 2022-12-05 PROCEDURE — 84439 ASSAY OF FREE THYROXINE: CPT

## 2022-12-05 PROCEDURE — 85730 THROMBOPLASTIN TIME PARTIAL: CPT

## 2022-12-05 PROCEDURE — 6360000002 HC RX W HCPCS: Performed by: FAMILY MEDICINE

## 2022-12-05 PROCEDURE — 94664 DEMO&/EVAL PT USE INHALER: CPT

## 2022-12-05 PROCEDURE — 71045 X-RAY EXAM CHEST 1 VIEW: CPT

## 2022-12-05 RX ORDER — ENOXAPARIN SODIUM 100 MG/ML
40 INJECTION SUBCUTANEOUS DAILY
Status: DISCONTINUED | OUTPATIENT
Start: 2022-12-05 | End: 2022-12-05

## 2022-12-05 RX ORDER — FUROSEMIDE 10 MG/ML
40 INJECTION INTRAMUSCULAR; INTRAVENOUS DAILY
Status: DISCONTINUED | OUTPATIENT
Start: 2022-12-05 | End: 2022-12-06

## 2022-12-05 RX ORDER — ONDANSETRON 4 MG/1
4 TABLET, ORALLY DISINTEGRATING ORAL EVERY 8 HOURS PRN
Status: DISCONTINUED | OUTPATIENT
Start: 2022-12-05 | End: 2022-12-10 | Stop reason: HOSPADM

## 2022-12-05 RX ORDER — POTASSIUM CHLORIDE 20 MEQ/1
40 TABLET, EXTENDED RELEASE ORAL DAILY
Status: DISCONTINUED | OUTPATIENT
Start: 2022-12-05 | End: 2022-12-10 | Stop reason: HOSPADM

## 2022-12-05 RX ORDER — POLYETHYLENE GLYCOL 3350 17 G/17G
17 POWDER, FOR SOLUTION ORAL DAILY PRN
Status: DISCONTINUED | OUTPATIENT
Start: 2022-12-05 | End: 2022-12-10 | Stop reason: HOSPADM

## 2022-12-05 RX ORDER — AMLODIPINE BESYLATE 10 MG/1
10 TABLET ORAL DAILY
Status: DISCONTINUED | OUTPATIENT
Start: 2022-12-06 | End: 2022-12-10 | Stop reason: HOSPADM

## 2022-12-05 RX ORDER — DIVALPROEX SODIUM 250 MG/1
1000 TABLET, DELAYED RELEASE ORAL 2 TIMES DAILY
Status: DISCONTINUED | OUTPATIENT
Start: 2022-12-05 | End: 2022-12-10 | Stop reason: HOSPADM

## 2022-12-05 RX ORDER — PREDNISONE 20 MG/1
60 TABLET ORAL DAILY
Status: COMPLETED | OUTPATIENT
Start: 2022-12-06 | End: 2022-12-10

## 2022-12-05 RX ORDER — ACETAMINOPHEN 650 MG/1
650 SUPPOSITORY RECTAL EVERY 6 HOURS PRN
Status: DISCONTINUED | OUTPATIENT
Start: 2022-12-05 | End: 2022-12-10 | Stop reason: HOSPADM

## 2022-12-05 RX ORDER — IPRATROPIUM BROMIDE AND ALBUTEROL SULFATE 2.5; .5 MG/3ML; MG/3ML
3 SOLUTION RESPIRATORY (INHALATION)
Status: DISCONTINUED | OUTPATIENT
Start: 2022-12-05 | End: 2022-12-10 | Stop reason: HOSPADM

## 2022-12-05 RX ORDER — SODIUM CHLORIDE 0.9 % (FLUSH) 0.9 %
5-40 SYRINGE (ML) INJECTION EVERY 12 HOURS SCHEDULED
Status: DISCONTINUED | OUTPATIENT
Start: 2022-12-05 | End: 2022-12-10 | Stop reason: HOSPADM

## 2022-12-05 RX ORDER — ACETAMINOPHEN 325 MG/1
650 TABLET ORAL EVERY 6 HOURS PRN
Status: DISCONTINUED | OUTPATIENT
Start: 2022-12-05 | End: 2022-12-10 | Stop reason: HOSPADM

## 2022-12-05 RX ORDER — CARVEDILOL 25 MG/1
25 TABLET ORAL 2 TIMES DAILY WITH MEALS
Status: DISCONTINUED | OUTPATIENT
Start: 2022-12-05 | End: 2022-12-10 | Stop reason: HOSPADM

## 2022-12-05 RX ORDER — SODIUM CHLORIDE 9 MG/ML
INJECTION, SOLUTION INTRAVENOUS PRN
Status: DISCONTINUED | OUTPATIENT
Start: 2022-12-05 | End: 2022-12-10 | Stop reason: HOSPADM

## 2022-12-05 RX ORDER — ONDANSETRON 2 MG/ML
4 INJECTION INTRAMUSCULAR; INTRAVENOUS EVERY 6 HOURS PRN
Status: DISCONTINUED | OUTPATIENT
Start: 2022-12-05 | End: 2022-12-10 | Stop reason: HOSPADM

## 2022-12-05 RX ORDER — FERROUS SULFATE 325(65) MG
325 TABLET ORAL 2 TIMES DAILY
Status: DISCONTINUED | OUTPATIENT
Start: 2022-12-05 | End: 2022-12-10 | Stop reason: HOSPADM

## 2022-12-05 RX ORDER — SODIUM CHLORIDE 0.9 % (FLUSH) 0.9 %
5-40 SYRINGE (ML) INJECTION PRN
Status: DISCONTINUED | OUTPATIENT
Start: 2022-12-05 | End: 2022-12-10 | Stop reason: HOSPADM

## 2022-12-05 RX ORDER — VITAMIN B COMPLEX
2000 TABLET ORAL DAILY
Status: DISCONTINUED | OUTPATIENT
Start: 2022-12-05 | End: 2022-12-10 | Stop reason: HOSPADM

## 2022-12-05 RX ORDER — DULOXETIN HYDROCHLORIDE 60 MG/1
60 CAPSULE, DELAYED RELEASE ORAL DAILY
Status: DISCONTINUED | OUTPATIENT
Start: 2022-12-05 | End: 2022-12-10 | Stop reason: HOSPADM

## 2022-12-05 RX ORDER — ARIPIPRAZOLE 15 MG/1
7.5 TABLET ORAL DAILY
Status: DISCONTINUED | OUTPATIENT
Start: 2022-12-05 | End: 2022-12-10 | Stop reason: HOSPADM

## 2022-12-05 RX ORDER — FOLIC ACID 1 MG/1
1 TABLET ORAL DAILY
Status: DISCONTINUED | OUTPATIENT
Start: 2022-12-05 | End: 2022-12-10 | Stop reason: HOSPADM

## 2022-12-05 RX ADMIN — FUROSEMIDE 40 MG: 10 INJECTION, SOLUTION INTRAMUSCULAR; INTRAVENOUS at 18:14

## 2022-12-05 RX ADMIN — SODIUM CHLORIDE, PRESERVATIVE FREE 10 ML: 5 INJECTION INTRAVENOUS at 20:23

## 2022-12-05 RX ADMIN — CARVEDILOL 25 MG: 25 TABLET, FILM COATED ORAL at 18:14

## 2022-12-05 RX ADMIN — IPRATROPIUM BROMIDE AND ALBUTEROL SULFATE 3 ML: 2.5; .5 SOLUTION RESPIRATORY (INHALATION) at 20:19

## 2022-12-05 RX ADMIN — FERROUS SULFATE TAB 325 MG (65 MG ELEMENTAL FE) 325 MG: 325 (65 FE) TAB at 20:17

## 2022-12-05 RX ADMIN — DIVALPROEX SODIUM 1000 MG: 250 TABLET, DELAYED RELEASE ORAL at 20:17

## 2022-12-05 ASSESSMENT — ENCOUNTER SYMPTOMS
SHORTNESS OF BREATH: 1
CONSTIPATION: 0
EYE PAIN: 0
RHINORRHEA: 0
FACIAL SWELLING: 0
NAUSEA: 0
WHEEZING: 0
COUGH: 0
DIARRHEA: 0
CHEST TIGHTNESS: 0
TROUBLE SWALLOWING: 0
BACK PAIN: 0
BLOOD IN STOOL: 0
EYE DISCHARGE: 0
SINUS PRESSURE: 0
VOMITING: 0
ABDOMINAL PAIN: 0
EYE REDNESS: 0
COLOR CHANGE: 0
SORE THROAT: 0

## 2022-12-05 ASSESSMENT — PAIN - FUNCTIONAL ASSESSMENT: PAIN_FUNCTIONAL_ASSESSMENT: NONE - DENIES PAIN

## 2022-12-05 NOTE — PROGRESS NOTES
Medication History completed:    New medications: none    Medications discontinued: none    Medications flagged for removal:   Prednisone - final dose was taken 12/5/22  Potassium chloride ER 20 mEq take 2 tabs daily - order changed to take 1 tab twice daily    Changes to dosing: none    Stated allergies: As listed    Other pertinent information: Medications confirmed with [address]      Thank you,  Everett Mcginnis, PharmD, [facility]  [phone]

## 2022-12-05 NOTE — ED PROVIDER NOTES
[address] ED  eMERGENCY dEPARTMENT eNCOUnter      Pt Name: Valda Epley  MRN: [id number]  [place] 1970  Date of evaluation: 12/5/22      CHIEF COMPLAINT       Chief Complaint   Patient presents with    Shortness of Breath     Pt to ED for SOB, hx of COPD  Recetly released from hospital for sepsis and currently in rehab  Went to PCP today for f/u and pt noted to have low oxygen readings and sent to ED  Pt appears in no acute resp distress but does appear ill, pulse ox reading on RA 79-84%  Pt placed on supplemental O2         HISTORY OF PRESENT ILLNESS    Valda Epley is a 46 y.o. female who presents complaining of hypoxia. Patient was sent over by the pulmonologist office where she was being seen for hospital follow-up and they noted that her oxygen level was in the 80s. Patient was hospitalized about 2 months ago with kidney failure and possible sepsis. Patient has a PICC line and has been getting antibiotics totally up until 2 days ago when she got her last dose. Patient states that over the last couple days she has been feeling kind of dragging. Patient states she just does not feel as much energy is normal.  Patient denies having fevers no cough. Patient states last night she drank a cup of hot tea and with her first sip she started having some chest pain but the nursing home staff gave her some antacids and it went away. Patient states that she does feel little short of breath and she is also noticed some swelling in her legs. Patient states she is not had this issue before. REVIEW OF SYSTEMS       Review of Systems   Constitutional:  Positive for fatigue. Negative for activity change, appetite change, chills, diaphoresis and fever. HENT:  Negative for congestion, ear pain, facial swelling, nosebleeds, rhinorrhea, sinus pressure, sore throat and trouble swallowing. Eyes:  Negative for pain, discharge and redness. Respiratory:  Positive for shortness of breath.  Negative for cough, chest tightness and wheezing. Cardiovascular:  Positive for leg swelling. Negative for chest pain and palpitations. Gastrointestinal:  Negative for abdominal pain, blood in stool, constipation, diarrhea, nausea and vomiting. Genitourinary:  Negative for difficulty urinating, dysuria, flank pain, frequency, genital sores and hematuria. Musculoskeletal:  Negative for arthralgias, back pain, gait problem, joint swelling, myalgias and neck pain. Skin:  Negative for color change, pallor, rash and wound. Neurological:  Negative for dizziness, tremors, seizures, syncope, speech difficulty, weakness, numbness and headaches. Psychiatric/Behavioral:  Negative for confusion, decreased concentration, hallucinations, self-injury, sleep disturbance and suicidal ideas.       PAST MEDICAL HISTORY     Past Medical History:   Diagnosis Date    Anemia     Arthritis     Asthma     Bipolar disorder, mixed ([address]     Carotid artery stenosis 2/13/2020    Cocaine abuse ([address] 11/4/2014    COPD (chronic obstructive pulmonary disease) ([facility])     Degeneration of cervical intervertebral disc     Depression with anxiety     Depression with anxiety     Fibromyalgia 1/5/2017    GERD (gastroesophageal reflux disease)     History of migraine headaches 6/10/2014    History of renal calculi 6/10/2014    History of seizure disorder 6/10/2014    Hoarseness of voice     HTN (hypertension) 6/10/2014    Hyperlipidemia 6/10/2014    IGT (impaired glucose tolerance) 6/10/2014    Kidney stones     Lactose intolerance 6/10/2014    Leukopenia 6/10/2014    Major depressive disorder, recurrent episode, severe, without mention of psychotic behavior 11/6/2014    MVP (mitral valve prolapse)     Seizures ([address]     Sleep apnea 6/10/2014    Unspecified diseases of blood and blood-forming organs        SURGICAL HISTORY       Past Surgical History:   Procedure Laterality Date    ANKLE FRACTURE SURGERY      recontruction surgery    APPENDECTOMY BREAST LUMPECTOMY Right     CARDIAC CATHETERIZATION      CARPAL TUNNEL RELEASE      x2     SECTION      CHOLECYSTECTOMY      COLONOSCOPY  10    COLONOSCOPY  2016    severe spasms    COLONOSCOPY  2019    DR GOLDY MCINTOSH    CT BONE MARROW BIOPSY  2022    CT BONE MARROW BIOPSY 2022 [facility] CT SCAN    GASTRIC BYPASS SURGERY      GASTRIC BYPASS SURGERY      HYSTERECTOMY (CERVIX STATUS UNKNOWN)      HYSTERECTOMY (CERVIX STATUS UNKNOWN)      IR PERC CATH PLEURAL DRAIN W/IMAG  10/21/2022    IR PERC CATH PLEURAL DRAIN W/IMAG 10/21/2022 [facility] SPECIAL PROCEDURES    IR PERC CATH PLEURAL DRAIN W/IMAG  10/21/2022    IR PERC CATH PLEURAL DRAIN W/IMAG 10/21/2022 [facility] PROCEDURES    IR PERC CATH PLEURAL DRAIN W/IMAG  10/24/2022    IR PERC CATH PLEURAL DRAIN W/IMAG 10/24/2022 [facility] SPECIAL PROCEDURES    LAPAROSCOPY N/A 2022    LAPAROSCOPY EXPLORATORY CONVERTED TO OPEN EXPLORATORY LAPAROTOMY, LYSIS OF ADHESIONS, REDUCTION OF INTERNAL HERNIA performed [facility] at  [facility]  [address]    APPLICATION OF ARCH BARS,SIMPLE EXTRACTION OF #15 AND RT TMJ JOINT REPLACEMENT    SHOULDER ARTHROSCOPY Left 2019    DR ROBERT GREEN    SHOULDER SURGERY      TONSILLECTOMY AND ADENOIDECTOMY      TRANSESOPHAGEAL ECHOCARDIOGRAM N/A 10/19/2022    TRANSESOPHAGEAL ECHOCARDIOGRAM WITH BUBBLE STUDY performed by Frandy Royal DO at [facility]. 106  7-3-12    egd    UPPER GASTROINTESTINAL ENDOSCOPY  2016    status post gastrectomy with a small remnant of gastric pouch.     UPPER GASTROINTESTINAL ENDOSCOPY  2019    DR Ramirez       CURRENT MEDICATIONS       Previous Medications    AMLODIPINE (NORVASC) 10 MG TABLET    Take 1 tablet by mouth daily    ARIPIPRAZOLE (ABILIFY) 15 MG TABLET    Take 0.5 tablets by mouth daily    CARVEDILOL (COREG) 25 MG TABLET    TAKE 1 TABLET BY MOUTH IN THE MORNING AND 1 IN THE EVENING WITH MEALS    DIVALPROEX (DEPAKOTE) 500 MG DR TABLET    Take 2 tablets by mouth 2 times daily Indications: Level 41 as of 7/22/16 (Pt takes two 500 mg DR tabs = 1,000mg PO BID)    DULOXETINE (CYMBALTA) 60 MG EXTENDED RELEASE CAPSULE    Take 1 capsule by mouth daily    FERROUS SULFATE (FEROSUL) 325 (65 FE) MG TABLET    Take 1 tablet by mouth in the morning and at bedtime    FOLIC ACID (FOLVITE) 1 MG TABLET    Take 1 tablet by mouth daily    IPRATROPIUM-ALBUTEROL (DUONEB) 0.5-2.5 (3) MG/3ML SOLN NEBULIZER SOLUTION    Inhale 3 mLs into the lungs every 4 hours (while awake)    POTASSIUM CHLORIDE (KLOR-CON M) 20 MEQ EXTENDED RELEASE TABLET    Take 2 tablets by mouth daily    POTASSIUM CHLORIDE (KLOR-CON M) 20 MEQ EXTENDED RELEASE TABLET    Take 1 tablet by mouth 2 times daily (with meals)    PREDNISONE (DELTASONE) 20 MG TABLET    Take 3 tablets by mouth daily for 10 days    VITAMIN D (CHOLECALCIFEROL) 50 MCG (2000 UT) TABS TABLET    Take 1 tablet by mouth daily       ALLERGIES     is allergic to aspirin, bactrim, and codeine. FAMILY HISTORY     [unfilled]     SOCIAL HISTORY      reports that she has been smoking cigarettes. She has a 16.50 pack-year smoking history. She has never used smokeless tobacco. She reports current alcohol use. She reports that she does not currently use drugs. PHYSICAL EXAM     INITIAL VITALS: BP (!) 159/86   Pulse 76   Temp 98.6 °F (37 °C) (Oral)   Resp 27   Ht 4' 11\" (1.499 m)   Wt 102 lb (46.3 kg)   SpO2 96%   BMI 20.60 kg/m²      Physical Exam  Vitals and nursing note reviewed. Constitutional:       General: She is not in acute distress. Appearance: She is well-developed. She is not diaphoretic. HENT:      Head: Normocephalic and atraumatic. Eyes:      General: No scleral icterus. Right eye: No discharge. Left eye: No discharge. Conjunctiva/sclera: Conjunctivae normal.      Pupils: Pupils are equal, round, and reactive to light.    Cardiovascular:      Rate and Rhythm: Normal rate and regular rhythm. Heart sounds: Normal heart sounds. No murmur heard. No friction rub. No gallop. Pulmonary:      Effort: Pulmonary effort is normal. No respiratory distress. Breath sounds: Examination of the right-upper field reveals rales. Examination of the right-middle field reveals rales. Examination of the right-lower field reveals rales. Rales present. No wheezing. Chest:      Chest wall: No tenderness. Abdominal:      General: Bowel sounds are normal. There is no distension. Palpations: Abdomen is soft. There is no mass. Tenderness: There is no abdominal tenderness. There is no guarding or rebound. Musculoskeletal:         General: Normal range of motion. Right lower leg: No tenderness. Edema present. Left lower leg: Tenderness present. Edema present. Skin:     General: Skin is warm and dry. Coloration: Skin is not pale. Findings: No erythema or rash. Neurological:      Mental Status: She is alert and oriented to person, place, and time. Cranial Nerves: No cranial nerve deficit. Sensory: No sensory deficit. Motor: No abnormal muscle tone. Coordination: Coordination normal.      Deep Tendon Reflexes: Reflexes normal.   Psychiatric:         Behavior: Behavior normal.         Thought Content: Thought content normal.         Judgment: Judgment normal.       MEDICAL DECISION MAKING:     Patient symptoms could be related to congestive heart failure she has been getting a lot of fluids and antibiotics. Patient's not showing any other signs of infection but I will get a chest x-ray both to rule out fluid versus pneumonia. We will check some basic labs including a urine and a D-dimer.     DIAGNOSTIC RESULTS     EKG: All EKG's are interpreted by the Emergency Department Physician who either signs or Co-signs this chart in the absence of a cardiologist.    EKG Interpretation    Interpreted by emergency department physician    Rhythm: normal sinus   Rate: normal  Axis: normal  Ectopy: none  Conduction: normal  ST Segments: nonspecific changes  T Waves: non specific changes  Q Waves: none    Clinical Impression: EKG: normal sinus rhythm, nonspecific ST and T waves changes. Ritika Jernigan MD      RADIOLOGY:All plain film, CT, MRI, and formal ultrasound images (except ED bedside ultrasound)are read by the radiologist and interpretations are directly viewed by the emergency physician. XR CHEST PORTABLE    Result Date: 12/5/2022  EXAMINATION: ONE XRAY VIEW OF THE CHEST 12/5/2022 2:05 pm COMPARISON: Chest radiograph performed 11/17/2022. HISTORY: ORDERING SYSTEM PROVIDED HISTORY: Chest Pain TECHNOLOGIST PROVIDED HISTORY: Chest Pain Reason for Exam: fatigue, chest pain, sob, bilateral lower leg swelling FINDINGS: There are diffuse pulmonary infiltrates. There is no effusion. There is no pneumothorax. The heart is prominent. The upper abdomen is unremarkable. The extrathoracic soft tissues are unremarkable. There is a right-sided PICC line with the tip near the cavoatrial junction. Cardiomegaly with diffuse infiltrates. LABS: All lab results were reviewed bybrenda, and all abnormals are listed below.   Labs Reviewed   BASIC METABOLIC PANEL - Abnormal; Notable for the following components:       Result Value    Glucose 108 (*)     Calcium 8.1 (*)     All other components within normal limits   BRAIN NATRIURETIC PEPTIDE - Abnormal; Notable for the following components:    Pro-BNP 4,369 (*)     All other components within normal limits   CBC WITH AUTO DIFFERENTIAL - Abnormal; Notable for the following components:    WBC 12.1 (*)     RBC 2.53 (*)     Hemoglobin 7.7 (*)     Hematocrit 24.0 (*)     RDW 28.9 (*)     Platelets 90 (*)     Seg Neutrophils 88 (*)     Lymphocytes 9 (*)     Segs Absolute 10.65 (*)     All other components within normal limits   D-DIMER, QUANTITATIVE - Abnormal; Notable for the following components:    D-Dimer, Quant 0.77 (*)     All other components within normal limits   TROP/MYOGLOBIN - Abnormal; Notable for the following components:    Myoglobin <21 (*)     All other components within normal limits   BLOOD GAS, VENOUS - Abnormal; Notable for the following components:    pO2, Andrea 54.6 (*)     Positive Base Excess, Andrea 4.3 (*)     All other components within normal limits   TSH   T4, FREE   APTT   PROTIME-INR   TROP/MYOGLOBIN   URINE DRUG SCREEN         EMERGENCY DEPARTMENT COURSE:   Vitals:    Vitals:    12/05/22 1447 12/05/22 1514 12/05/22 1545 12/05/22 1615   BP: 130/72 (!) 144/77 (!) 143/81 (!) 159/86   Pulse: 78 76 74 76   Resp: 28 28 24 27   Temp:       TempSrc:       SpO2:  97% 96% 96%   Weight:       Height:           The patient was given the following medications while in the emergency department:   No orders of the defined types were placed in this encounter. -------------------------  4:26 PM EST  Patient was updated on all the results. Because of the hypoxia we will go ahead and get her admitted again. I spoke with Dr. Néstor Flores the PCP who agrees to the admission and will start diuresing. CRITICAL CARE:   None    CONSULTS:  IP CONSULT TO PRIMARY CARE PROVIDER    PROCEDURES:  None    FINAL IMPRESSION      1. Hypoxia          DISPOSITION/PLAN   DISPOSITION Decision To Admit 12/05/2022 03:33:18 PM      PATIENT REFERRED TO:  No follow-up provider specified.     DISCHARGE MEDICATIONS:  New Prescriptions    No medications on file       (Please note that portions of this note were completed with a voice recognition program.  Efforts were made to edit the dictations but occasionally words are mis-transcribed.)    Jose David Diana MD  Attending Melly Ashley MD  12/05/22 5673

## 2022-12-05 NOTE — PROGRESS NOTES
Pt arrived to floor from ED to room 2122. Vitals taken and telemetry applied. No distress noted. Call light within reach, and pt educated on its use. Bed in lowest position, and locked. Side rails up x 2. Denied further questions or needs at this time. Will continue to monitor.

## 2022-12-05 NOTE — ED NOTES
Per Dr. Sathish Mortensen.  Author is able to access PICC line for lab work      Marbin Varela RN  12/05/22 1697

## 2022-12-06 LAB
ABSOLUTE EOS #: 0.09 K/UL (ref 0–0.4)
ABSOLUTE LYMPH #: 1.48 K/UL (ref 1–4.8)
ABSOLUTE MONO #: 0.17 K/UL (ref 0.1–1.3)
AMPHETAMINE SCREEN URINE: NEGATIVE
ANION GAP SERPL CALCULATED.3IONS-SCNC: 14 MMOL/L (ref 9–17)
BARBITURATE SCREEN URINE: NEGATIVE
BASOPHILS # BLD: 0 % (ref 0–2)
BASOPHILS ABSOLUTE: 0 K/UL (ref 0–0.2)
BENZODIAZEPINE SCREEN, URINE: NEGATIVE
BUN BLDV-MCNC: 19 MG/DL (ref 6–20)
CALCIUM SERPL-MCNC: 8 MG/DL (ref 8.6–10.4)
CANNABINOID SCREEN URINE: NEGATIVE
CHLORIDE BLD-SCNC: 99 MMOL/L (ref 98–107)
CHOLESTEROL/HDL RATIO: 2.4
CHOLESTEROL: 173 MG/DL
CO2: 26 MMOL/L (ref 20–31)
COCAINE METABOLITE, URINE: NEGATIVE
CREAT SERPL-MCNC: 0.91 MG/DL (ref 0.5–0.9)
EKG ATRIAL RATE: 76 BPM
EKG ATRIAL RATE: 80 BPM
EKG P AXIS: 18 DEGREES
EKG P AXIS: 22 DEGREES
EKG P-R INTERVAL: 138 MS
EKG P-R INTERVAL: 150 MS
EKG Q-T INTERVAL: 384 MS
EKG Q-T INTERVAL: 416 MS
EKG QRS DURATION: 88 MS
EKG QRS DURATION: 96 MS
EKG QTC CALCULATION (BAZETT): 442 MS
EKG QTC CALCULATION (BAZETT): 468 MS
EKG R AXIS: 19 DEGREES
EKG R AXIS: 37 DEGREES
EKG T AXIS: 0 DEGREES
EKG T AXIS: 61 DEGREES
EKG VENTRICULAR RATE: 76 BPM
EKG VENTRICULAR RATE: 80 BPM
EOSINOPHILS RELATIVE PERCENT: 1 % (ref 0–4)
FENTANYL URINE: NEGATIVE
GFR SERPL CREATININE-BSD FRML MDRD: >60 ML/MIN/1.73M2
GLUCOSE BLD-MCNC: 88 MG/DL (ref 70–99)
HCT VFR BLD CALC: 25.1 % (ref 36–46)
HDLC SERPL-MCNC: 72 MG/DL
HEMOGLOBIN: 8 G/DL (ref 12–16)
IRON SATURATION: 10 % (ref 20–55)
IRON: 31 UG/DL (ref 37–145)
LDL CHOLESTEROL: 79 MG/DL (ref 0–130)
LYMPHOCYTES # BLD: 17 % (ref 24–44)
MAGNESIUM: 1.6 MG/DL (ref 1.6–2.6)
MCH RBC QN AUTO: 30.6 PG (ref 26–34)
MCHC RBC AUTO-ENTMCNC: 32 G/DL (ref 31–37)
MCV RBC AUTO: 95.7 FL (ref 80–100)
METHADONE SCREEN, URINE: NEGATIVE
MONOCYTES # BLD: 2 % (ref 1–7)
MORPHOLOGY: ABNORMAL
OPIATES, URINE: NEGATIVE
OXYCODONE SCREEN URINE: NEGATIVE
PDW BLD-RTO: 29.2 % (ref 11.5–14.9)
PHENCYCLIDINE, URINE: NEGATIVE
PLATELET # BLD: 82 K/UL (ref 150–450)
PMV BLD AUTO: 7.1 FL (ref 6–12)
POTASSIUM SERPL-SCNC: 3.1 MMOL/L (ref 3.7–5.3)
RBC # BLD: 2.62 M/UL (ref 4–5.2)
SEG NEUTROPHILS: 80 % (ref 36–66)
SEGMENTED NEUTROPHILS ABSOLUTE COUNT: 6.96 K/UL (ref 1.3–9.1)
SODIUM BLD-SCNC: 139 MMOL/L (ref 135–144)
TEST INFORMATION: NORMAL
TOTAL IRON BINDING CAPACITY: 321 UG/DL (ref 250–450)
TRIGL SERPL-MCNC: 111 MG/DL
UNSATURATED IRON BINDING CAPACITY: 290 UG/DL (ref 112–347)
WBC # BLD: 8.7 K/UL (ref 3.5–11)

## 2022-12-06 PROCEDURE — 80048 BASIC METABOLIC PNL TOTAL CA: CPT

## 2022-12-06 PROCEDURE — 2700000000 HC OXYGEN THERAPY PER DAY

## 2022-12-06 PROCEDURE — 2580000003 HC RX 258: Performed by: FAMILY MEDICINE

## 2022-12-06 PROCEDURE — 94761 N-INVAS EAR/PLS OXIMETRY MLT: CPT

## 2022-12-06 PROCEDURE — 80307 DRUG TEST PRSMV CHEM ANLYZR: CPT

## 2022-12-06 PROCEDURE — 83735 ASSAY OF MAGNESIUM: CPT

## 2022-12-06 PROCEDURE — 85025 COMPLETE CBC W/AUTO DIFF WBC: CPT

## 2022-12-06 PROCEDURE — 36415 COLL VENOUS BLD VENIPUNCTURE: CPT

## 2022-12-06 PROCEDURE — 6370000000 HC RX 637 (ALT 250 FOR IP): Performed by: FAMILY MEDICINE

## 2022-12-06 PROCEDURE — 93010 ELECTROCARDIOGRAM REPORT: CPT | Performed by: INTERNAL MEDICINE

## 2022-12-06 PROCEDURE — 2060000000 HC ICU INTERMEDIATE R&B

## 2022-12-06 PROCEDURE — 6360000002 HC RX W HCPCS: Performed by: FAMILY MEDICINE

## 2022-12-06 PROCEDURE — 94640 AIRWAY INHALATION TREATMENT: CPT

## 2022-12-06 PROCEDURE — 80061 LIPID PANEL: CPT

## 2022-12-06 RX ORDER — FUROSEMIDE 20 MG/1
20 TABLET ORAL DAILY
Status: DISCONTINUED | OUTPATIENT
Start: 2022-12-07 | End: 2022-12-10 | Stop reason: HOSPADM

## 2022-12-06 RX ORDER — MAGNESIUM SULFATE 1 G/100ML
1000 INJECTION INTRAVENOUS PRN
Status: DISCONTINUED | OUTPATIENT
Start: 2022-12-06 | End: 2022-12-10 | Stop reason: HOSPADM

## 2022-12-06 RX ADMIN — Medication 2000 UNITS: at 09:15

## 2022-12-06 RX ADMIN — IPRATROPIUM BROMIDE AND ALBUTEROL SULFATE 3 ML: 2.5; .5 SOLUTION RESPIRATORY (INHALATION) at 16:01

## 2022-12-06 RX ADMIN — SODIUM CHLORIDE, PRESERVATIVE FREE 10 ML: 5 INJECTION INTRAVENOUS at 21:20

## 2022-12-06 RX ADMIN — FERROUS SULFATE TAB 325 MG (65 MG ELEMENTAL FE) 325 MG: 325 (65 FE) TAB at 21:19

## 2022-12-06 RX ADMIN — SODIUM CHLORIDE, PRESERVATIVE FREE 10 ML: 5 INJECTION INTRAVENOUS at 21:21

## 2022-12-06 RX ADMIN — POTASSIUM CHLORIDE 40 MEQ: 1500 TABLET, EXTENDED RELEASE ORAL at 09:15

## 2022-12-06 RX ADMIN — DIVALPROEX SODIUM 1000 MG: 250 TABLET, DELAYED RELEASE ORAL at 12:20

## 2022-12-06 RX ADMIN — FERROUS SULFATE TAB 325 MG (65 MG ELEMENTAL FE) 325 MG: 325 (65 FE) TAB at 09:15

## 2022-12-06 RX ADMIN — IPRATROPIUM BROMIDE AND ALBUTEROL SULFATE 3 ML: 2.5; .5 SOLUTION RESPIRATORY (INHALATION) at 11:08

## 2022-12-06 RX ADMIN — CARVEDILOL 25 MG: 25 TABLET, FILM COATED ORAL at 09:14

## 2022-12-06 RX ADMIN — SODIUM CHLORIDE, PRESERVATIVE FREE 10 ML: 5 INJECTION INTRAVENOUS at 09:16

## 2022-12-06 RX ADMIN — CARVEDILOL 25 MG: 25 TABLET, FILM COATED ORAL at 18:28

## 2022-12-06 RX ADMIN — FUROSEMIDE 40 MG: 10 INJECTION, SOLUTION INTRAMUSCULAR; INTRAVENOUS at 09:16

## 2022-12-06 RX ADMIN — DULOXETINE 60 MG: 60 CAPSULE, DELAYED RELEASE ORAL at 09:15

## 2022-12-06 RX ADMIN — DIVALPROEX SODIUM 1000 MG: 250 TABLET, DELAYED RELEASE ORAL at 21:19

## 2022-12-06 RX ADMIN — AMLODIPINE BESYLATE 10 MG: 10 TABLET ORAL at 09:15

## 2022-12-06 RX ADMIN — IPRATROPIUM BROMIDE AND ALBUTEROL SULFATE 3 ML: 2.5; .5 SOLUTION RESPIRATORY (INHALATION) at 20:47

## 2022-12-06 RX ADMIN — PREDNISONE 60 MG: 20 TABLET ORAL at 09:15

## 2022-12-06 RX ADMIN — ARIPIPRAZOLE 7.5 MG: 15 TABLET ORAL at 09:20

## 2022-12-06 RX ADMIN — IPRATROPIUM BROMIDE AND ALBUTEROL SULFATE 3 ML: 2.5; .5 SOLUTION RESPIRATORY (INHALATION) at 07:40

## 2022-12-06 RX ADMIN — FOLIC ACID 1 MG: 1 TABLET ORAL at 09:15

## 2022-12-06 ASSESSMENT — PAIN SCALES - GENERAL: PAINLEVEL_OUTOF10: 4

## 2022-12-06 ASSESSMENT — PAIN DESCRIPTION - LOCATION: LOCATION: BACK;HIP;KNEE

## 2022-12-06 NOTE — PROGRESS NOTES
Nutrition Education    Pt with hx of severe malnutrition. States she has been working on increasing Caloric intake and gaining wt. States her brother sometimes cooks for her, but they often have fast foods. When home, she eats ham and cheese sandwiches nearly every day and is reluctant to modify this. Possibly willing to utilize reduced sodium ham, although it was explained that these products still have a significant amount of sodium. It is important for pt to maintain adequate Caloric intake as well as limiting sodium intake. Encouraged avoidance of excessive fluid intake.     Briefly Educated on 2 gm Na, Fluid Restriction diet  Learners: Patient  Readiness: Non-acceptance (may make slight adjustments)  Method: Explanation  Response: Verbalizes Understanding    Sheyla Vaca, SCOOBY, LD  Contact Number: [phone]

## 2022-12-06 NOTE — CARE COORDINATION
CASE MANAGEMENT NOTE:    Admission Date:  12/5/2022 Otilia Conrad is a 46 y.o.  female    Admitted for : Hypoxia [R09.02]  Acute congestive heart failure, unspecified heart failure type ([facility] [I50.9]    Met with:  Patient    PCP:  Marques Aguilera                                Insurance:  Kaley Willson      Is patient alert and oriented at time of discussion:  Yes    Current Residence/ Living Arrangements:  in nursing home , [facility]            Current Services PTA:  No    Does patient go to outpatient dialysis: No  If yes, location and chair time: n/a  Who is their nephrologist? N/a    Is patient agreeable to VNS: Yes    Freedom of choice provided:  Yes    List of [address] provided: Yes    VNS chosen:  Yes, sent referral to [facility] per patient choice    DME:  none    Home Oxygen: No    Nebulizer: No    CPAP/BIPAP: No    Supplier: N/A    Potential Assistance Needed: Yes, needs home oxygen eval, DME equipment,     SNF needed: Yes, only if she cant go home, current at [facility], but wants to go home with her DAD with VNS    Freedom of choice and list provided: Yes    Pharmacy:  [facility]       Is patient currently receiving oral anticoagulation therapy? No    Is the Patient an [facility] with Readmission Risk Score greater than 14%? No  If yes, pt needs a follow up appointment made within 7 days. Family Members/Caregivers that pt would like involved in their care:    Yes    If yes, list name here:  father Guerrero    Transportation Provider:  Family             Discharge Plan:  12/6/22 - [facility] Zuleyka corley completed -Patient is from [facility]  and doesn't want to return unless she absolutely has too. She prefers to go home with her DAd and [email or web address], Referral Faxed as well as [facility] incase she returns.   She also wants meals delivered to her home, will need Area office on aging  contacted tomorrow, Too late today,   hx of crack cocaine, Last use was in October, Current smoker. RYANN needs signed and completed, Plan A home with [facility], Plan B back to [facility]. Need pt/ot orders.  .//pf            Electronically signed by: Murali Pearl RN on 12/6/2022 at 5:08 PM

## 2022-12-06 NOTE — CONSULTS
[facility]    Consult                        Today's Date: 2022  Patient Name: Kin Gamble  Date of admission: 2022  1:52 PM  Patient's age: 46 y. o., 1970  Admission Dx: Hypoxia [R09.02]  Acute congestive heart failure, unspecified heart failure type ([address] [I50.9]    Reason for Consult:  Cardiac evaluation    Requesting Physician: Ivan To MD    CHIEF COMPLAINT:  Hypoxia    History Obtained From:  patient, electronic medical record    HISTORY OF PRESENT ILLNESS:      The patient is a 46 y.o.  female who is admitted to the hospital for hypoxia. Past Medical History:   has a past medical history of Anemia, Arthritis, Asthma, Bipolar disorder, mixed ([address] Carotid artery stenosis, Cocaine abuse ([address] COPD (chronic obstructive pulmonary disease) ([address] Degeneration of cervical intervertebral disc, Depression with anxiety, Depression with anxiety, Fibromyalgia, GERD (gastroesophageal reflux disease), History of migraine headaches, History of renal calculi, History of seizure disorder, Hoarseness of voice, HTN (hypertension), Hyperlipidemia, IGT (impaired glucose tolerance), Kidney stones, Lactose intolerance, Leukopenia, Major depressive disorder, recurrent episode, severe, without mention of psychotic behavior, MVP (mitral valve prolapse), Seizures ([address] Sleep apnea, and Unspecified diseases of blood and blood-forming organs. Past Surgical History:   has a past surgical history that includes Gastric bypass surgery; Cholecystectomy; Hysterectomy; Appendectomy; Hysterectomy; Tonsillectomy and adenoidectomy; Ankle fracture surgery; Neck surgery; shoulder surgery;  section; Carpal tunnel release; Colonoscopy (12); Upper gastrointestinal endoscopy (7-3-12); Gastric bypass surgery; Breast lumpectomy (Right); Cardiac catheterization; Upper gastrointestinal endoscopy (2016); Colonoscopy (2016);  Upper gastrointestinal endoscopy (05/2019); Colonoscopy (05/20/2019); Shoulder arthroscopy (Left, 06/05/2019); other surgical history (10/24/2019); laparoscopy (N/A, 1/29/2022); transesophageal echocardiogram (N/A, 10/19/2022); IR GUIDED PERC PLEURAL DRAIN W CATH INSERT (10/21/2022); IR GUIDED PERC PLEURAL DRAIN W CATH INSERT (10/21/2022); IR GUIDED PERC PLEURAL DRAIN W CATH INSERT (10/24/2022); and CT BIOPSY BONE MARROW (11/18/2022). Home Medications:    Prior to Admission medications    Medication Sig Start Date End Date Taking?  Authorizing Provider   potassium chloride (KLOR-CON M) 20 MEQ extended release tablet Take 1 tablet by mouth 2 times daily (with meals) 11/29/22   Tiffany Torres MD   predniSONE (DELTASONE) 20 MG tablet Take 3 tablets by mouth daily for 10 days  Patient not taking: Reported on 12/5/2022 11/26/22 12/6/22  Shruti Flowers MD   folic acid (FOLVITE) 1 MG tablet Take 1 tablet by mouth daily 11/26/22   Shruti Flowers MD   amLODIPine (NORVASC) 10 MG tablet Take 1 tablet by mouth daily 11/18/22   Tiffany Torres MD   potassium chloride (KLOR-CON M) 20 MEQ extended release tablet Take 2 tablets by mouth daily  Patient not taking: Reported on 12/5/2022 11/17/22   Tiffany Torres MD   ARIPiprazole (ABILIFY) 15 MG tablet Take 0.5 tablets by mouth daily 11/4/22   Tiffany Torres MD   divalproex (DEPAKOTE) 500 MG DR tablet Take 2 tablets by mouth 2 times daily Indications: Level 41 as of 7/22/16 (Pt takes two 500 mg DR tabs = 1,000mg PO BID) 11/4/22   Tiffany Torres MD   DULoxetine (CYMBALTA) 60 MG extended release capsule Take 1 capsule by mouth daily 11/4/22   Tiffany Torres MD   ferrous sulfate (FEROSUL) 325 (65 Fe) MG tablet Take 1 tablet by mouth in the morning and at bedtime 11/4/22   Tiffany Torres MD   ipratropium-albuterol (DUONEB) 0.5-2.5 (3) MG/3ML SOLN nebulizer solution Inhale 3 mLs into the lungs every 4 hours (while awake) 11/4/22   Tiffany Torres MD   Vitamin D (CHOLECALCIFEROL) 50 MCG (2000 UT) TABS tablet Take 1 tablet by mouth daily 11/4/22   Amaris De Los Santos MD   carvedilol (COREG) 25 MG tablet TAKE 1 TABLET BY MOUTH IN THE MORNING AND 1 IN THE EVENING WITH MEALS 9/19/22   Amaris De Los Santos MD       Allergies:  Aspirin, Bactrim, and Codeine    Social History:   reports that she has been smoking cigarettes. She has a 16.50 pack-year smoking history. She has never used smokeless tobacco. She reports current alcohol use. She reports that she does not currently use drugs. Family History: family history includes Alcohol Abuse in her brother; COPD in her father; Cancer in her mother, paternal grandmother, and another family member; Coronary Art Dis in her father; Depression in her brother; Diabetes in her maternal grandmother and mother. No h/o sudden cardiac death. No for premature CAD    REVIEW OF SYSTEMS:    Constitutional: there has been no unanticipated weight loss. There's been No change in energy level, No change in activity level. Eyes: No visual changes or diplopia. No scleral icterus. ENT: No Headaches, hearing loss or vertigo. No mouth sores or sore throat. Cardiovascular: see above  Respiratory: see above  Gastrointestinal: No abdominal pain, appetite loss, blood in stools. Genitourinary: No dysuria, trouble voiding, or hematuria. Musculoskeletal:  No gait disturbance, No weakness or joint complaints. Integumentary: No rash or pruritis. Neurological: No headache or diplopia. No tingling  Psychiatric: No anxiety, or depression. Endocrine: No temperature intolerance. Hematologic/Lymphatic: No abnormal bruising or bleeding, blood clots or swollen lymph nodes. Allergic/Immunologic: No nasal congestion or hives.       PHYSICAL EXAM:      BP (!) 143/68   Pulse 99   Temp 99.3 °F (37.4 °C) (Oral)   Resp 20   Ht 4' 11\" (1.499 m)   Wt 102 lb (46.3 kg)   SpO2 91%   BMI 20.60 kg/m²    Constitutional and General Appearance: alert, cooperative, no distress and appears stated age  HEENT: PERRL, no cervical lymphadenopathy. No masses palpable. Normal oral mucosa  Respiratory:  Normal excursion and expansion without use of accessory muscles  Resp Auscultation: Good respiratory effort. No for increased work of breathing at present. On auscultation: diminished to auscultation bilaterally, no audible rales or wheezing presently  On NC oxygen without distress  Cardiovascular:  Heart tones are crisp and normal. regular S1 and S2. Soft systolic murmur noted  Jugular venous pulsation Normal  The carotid upstroke is normal in amplitude and contour without delay or bruit   Abdomen:   soft  Bowel sounds present  Extremities:   No edema  Neurological:  Alert and oriented. DATA:    Diagnostics:    EKG: normal sinus rhythm, unchanged from previous tracings. ECHO: previously taken 10/2022 and show as below. Ejection fraction: 55%  Stress Test: previously taken 2015 and show as below. Cardiac Angiography: not obtained. 2D ECHO ( 10/11/22)  Global left ventricular systolic function is normal. Estimated ejection  fraction is 55-60%. No obvious wall motion abnormality seen. Left and right atrium is normal in size. No significant valvular regurgitation or stenosis seen. Estimated right ventricular systolic pressure is 19 mmHg. No pericardial effusion seen. Normal diastolic filling. ADRIANNE 10/19/22  Summary:      1. A ADRIANNE was performed without complications. 2. LVEF 55%  3. No thrombus or valvular vegetation identified  4. No blood on probe post procedure. 5. Trivial MR. Labs:     CBC:   Recent Labs     12/05/22  1420 12/06/22  0519   WBC 12.1* 8.7   HGB 7.7* 8.0*   HCT 24.0* 25.1*   PLT 90* 82*     BMP:   Recent Labs     12/05/22  1420 12/06/22  0519    139   K 3.8 3.1*   CO2 27 26   BUN 12 19   CREATININE 0.69 0.91*   LABGLOM >60 >60   GLUCOSE 108* 88     BNP: No results for input(s): BNP in the last 72 hours.   PT/INR:   Recent Labs 12/05/22  1420   PROTIME 13.2   INR 1.0     APTT:  Recent Labs     12/05/22  1420   APTT 27.5     CARDIAC ENZYMES:No results for input(s): CKTOTAL, CKMB, CKMBINDEX, TROPONINI in the last 72 hours. FASTING LIPID PANEL:  Lab Results   Component Value Date/Time    HDL 72 12/06/2022 05:19 AM    LDLCALC 157 09/28/2020 12:00 AM    TRIG 111 12/06/2022 05:19 AM     LIVER PROFILE:No results for input(s): AST, ALT, LABALBU in the last 72 hours.     IMPRESSION:    Patient Active Problem List   Diagnosis    GERD (gastroesophageal reflux disease)    Hoarseness of voice    MVP (mitral valve prolapse)    Depression with suicidal ideation    Lymphocytic colitis    History of migraine headaches    Hyperlipidemia    Sleep apnea    IGT (impaired glucose tolerance)    History of renal calculi    Lactose intolerance    History of seizure disorder    Leukopenia    Bipolar disorder, mixed ([address]    Illicit drug use    Postlaminectomy syndrome, cervical region    Degeneration of cervical intervertebral disc    Encounter for long-term (current) use of other medications    Bradycardia    Cellulitis    Hypokalemia    Primary hypertension    Elevated lipase    Right lower quadrant abdominal pain    Nausea    Diarrhea    Acute encephalopathy    Polysubstance abuse (HCC)    Fibromyalgia    Cocaine addiction (HCC)    Shoulder arthritis    Osteoarthritis of left glenohumeral joint    Abnormal EKG    Anemia    Anxiety    Carotid artery stenosis    COPD (chronic obstructive pulmonary disease) ([address]    Dental disease    HUTCHINSON (dyspnea on exertion)    Fatigue    Fractures    H/O gastric bypass    History of crack cocaine use    History of UTI    Injury of back    Intractable chronic migraine without aura and without status migrainosus    Intractable migraine with aura with status migrainosus    Kidney stones    Dizziness    Memory loss    Mild pulmonary hypertension (HCC)    Mild tricuspid regurgitation    Primary osteoarthritis of left shoulder Seizure-like activity (HCC)    Stress at home    TMJ (dislocation of temporomandibular joint)    Tobacco abuse    Visual impairment    Internal hernia    Vitamin D deficiency    Iron deficiency    SIRS (systemic inflammatory response syndrome) (HCC)    NICKI (acute kidney injury) (HCC)    Severe malnutrition (HCC)    Elevated d-dimer    Leukocytosis    Pulmonary nodules    Delirium due to another medical condition    Acute metabolic encephalopathy    MRSA bacteremia    Acute septic pulmonary embolism (HCC)    Cavitating mass of lung    Allergy to antibiotic    Thrombocytopenia (HCC)    Severe thrombocytopenia (HCC)    Severe anemia    Acute congestive heart failure, unspecified heart failure type (HCC)     Hypoxia  Pulmonary infiltrates on CXR  Mild volume overload with elevated pBNP  Mild Hypokalemia & Hypomagnesium  Preserved LVEF on echo 10/2022  Hx of polysubstance abuse    RECOMMENDATIONS:  Stable. Improved After IV Lasix. Recent echo from Oct reviewed as above. Will replace K+ and Mg+. Recommend to switch to oral tomorrow. Trop negative. No plans for further ischemic work-up at this time  Hypoxia per primary. Follows with Dr. Duke Rivera as OP. CXR showing diffuse pulmonary infiltrates  Continue Norvasc & Coreg for BP, stable. Discussed with patient and Nurse    Danielle Mccall, APRN - [facility] Cardiology Consult           [phone]    I reviewed the patient history personally, and examined by me during the visit. I have reviewed the H&P/Consult / Progress note as completed, and made appropriate changes to the patient exam and treatment plans.       I have reviewed the case in details including physical exam and treatment plan with resident / fellow / NP    Patient treatment plan was explained to patient, correction in notes was made as appropriate, and discussed final arrangement based on  my evaluation and exam.    Additional Recommendations:      Johnny Ghosh MD  [facility]

## 2022-12-06 NOTE — PLAN OF CARE
Problem: Discharge Planning  Goal: Discharge to home or other facility with appropriate resources  12/6/2022 0559 by Barry Corcoran RN  Outcome: Progressing     Problem: ABCDS Injury Assessment  Goal: Absence of physical injury  12/6/2022 0559 by Barry Corcoran, RN  Outcome: Progressing

## 2022-12-06 NOTE — H&P
Family Medicine Admit Note    PCP: Jona Gill MD    Date of Admission: 2022    Date of Service: Pt seen/examined on 2022 and Admitted to Inpatient     Chief Complaint:  SOB      History Of Present Illness: The patient is a 46 y.o. female who presents to [facility] with shortness of breath. She was noted to have low oxygen saturations on pulse ox at her pulmonologist's office and sent to [facility]. On  exam, patient is feeling better.      Past Medical History:        Diagnosis Date    Anemia     Arthritis     Asthma     Bipolar disorder, mixed ([address]     Carotid artery stenosis 2020    Cocaine abuse ([address] 2014    COPD (chronic obstructive pulmonary disease) (HCC)     Degeneration of cervical intervertebral disc     Depression with anxiety     Depression with anxiety     Fibromyalgia 2017    GERD (gastroesophageal reflux disease)     History of migraine headaches 6/10/2014    History of renal calculi 6/10/2014    History of seizure disorder 6/10/2014    Hoarseness of voice     HTN (hypertension) 6/10/2014    Hyperlipidemia 6/10/2014    IGT (impaired glucose tolerance) 6/10/2014    Kidney stones     Lactose intolerance 6/10/2014    Leukopenia 6/10/2014    Major depressive disorder, recurrent episode, severe, without mention of psychotic behavior 2014    MVP (mitral valve prolapse)     Seizures ([address]     Sleep apnea 6/10/2014    Unspecified diseases of blood and blood-forming organs        Past Surgical History:        Procedure Laterality Date    ANKLE FRACTURE SURGERY      recontruction surgery    APPENDECTOMY      BREAST LUMPECTOMY Right     CARDIAC CATHETERIZATION      CARPAL TUNNEL RELEASE      x2     SECTION      CHOLECYSTECTOMY      COLONOSCOPY  12    COLONOSCOPY  2016    severe spasms    COLONOSCOPY  2019    DR GOLDY MCINTOSH    CT BONE MARROW BIOPSY  2022    CT BONE MARROW BIOPSY 2022 [facility] CT SCAN    GASTRIC BYPASS SURGERY GASTRIC BYPASS SURGERY      HYSTERECTOMY (CERVIX STATUS UNKNOWN)      HYSTERECTOMY (CERVIX STATUS UNKNOWN)      IR PERC CATH PLEURAL DRAIN W/IMAG  10/21/2022    IR PERC CATH PLEURAL DRAIN W/IMAG 10/21/2022 [facility]    IR PERC CATH PLEURAL DRAIN W/IMAG  10/21/2022    IR PERC CATH PLEURAL DRAIN W/IMAG 10/21/2022 [facility]    IR PERC CATH PLEURAL DRAIN W/IMAG  10/24/2022    IR PERC CATH PLEURAL DRAIN W/IMAG 10/24/2022 [facility]    LAPAROSCOPY N/A 1/29/2022    LAPAROSCOPY EXPLORATORY CONVERTED TO OPEN EXPLORATORY LAPAROTOMY, LYSIS OF ADHESIONS, REDUCTION OF INTERNAL HERNIA performed by Tayla Bardales DO at [address] [place] [address]  57/21/6822    APPLICATION OF ARCH BARS,SIMPLE EXTRACTION OF #15 AND RT TMJ JOINT REPLACEMENT    SHOULDER ARTHROSCOPY Left 06/05/2019    DR ROBERT GREEN    SHOULDER SURGERY      TONSILLECTOMY AND ADENOIDECTOMY      TRANSESOPHAGEAL ECHOCARDIOGRAM N/A 10/19/2022    TRANSESOPHAGEAL ECHOCARDIOGRAM WITH BUBBLE STUDY performed by Frandy Royal DO at [address] [place] [address]  [facility]    egd    UPPER GASTROINTESTINAL ENDOSCOPY  12/19/2016    status post gastrectomy with a small remnant of gastric pouch. UPPER GASTROINTESTINAL ENDOSCOPY  05/2019    DR Edwige Mabry       Medications Prior to Admission:    Prior to Admission medications    Medication Sig Start Date End Date Taking?  Authorizing Provider   potassium chloride (KLOR-CON M) 20 MEQ extended release tablet Take 1 tablet by mouth 2 times daily (with meals) 11/29/22   Jimmy Looney MD   predniSONE (DELTASONE) 20 MG tablet Take 3 tablets by mouth daily for 10 days  Patient not taking: Reported on 12/5/2022 11/26/22 12/6/22  Tamara Gutierrez MD   folic acid (FOLVITE) 1 MG tablet Take 1 tablet by mouth daily 11/26/22   Tamara Gutierrez MD   amLODIPine (NORVASC) 10 MG tablet Take 1 tablet by mouth daily 11/18/22   Jimmy Looney MD   potassium chloride (KLOR-CON M) 20 MEQ extended release tablet Take 2 tablets by mouth daily  Patient not taking: Reported on 12/5/2022 11/17/22   Jimmy Looney MD   ARIPiprazole (ABILIFY) 15 MG tablet Take 0.5 tablets by mouth daily 11/4/22   Jimmy Looney MD   divalproex (DEPAKOTE) 500 MG DR tablet Take 2 tablets by mouth 2 times daily Indications: Level 41 as of 7/22/16 (Pt takes two 500 mg DR tabs = 1,000mg PO BID) 11/4/22   Jimmy Looney MD   DULoxetine (CYMBALTA) 60 MG extended release capsule Take 1 capsule by mouth daily 11/4/22   Jimmy Looney MD   ferrous sulfate (FEROSUL) 325 (65 Fe) MG tablet Take 1 tablet by mouth in the morning and at bedtime 11/4/22   Jimmy Looney MD   ipratropium-albuterol (DUONEB) 0.5-2.5 (3) MG/3ML SOLN nebulizer solution Inhale 3 mLs into the lungs every 4 hours (while awake) 11/4/22   Jimmy Looney MD   Vitamin D (CHOLECALCIFEROL) 50 MCG (2000 UT) TABS tablet Take 1 tablet by mouth daily 11/4/22   Jimmy Looney MD   carvedilol (COREG) 25 MG tablet TAKE 1 TABLET BY MOUTH IN THE MORNING AND 1 IN THE EVENING WITH MEALS 9/19/22   Jimmy Looney MD       Allergies:  Aspirin, Bactrim, and Codeine    Social History:  The patient currently lives at home but is in SNF for rehab. TOBACCO:   reports that she has been smoking cigarettes. She has a 16.50 pack-year smoking history. She has never used smokeless tobacco.  ETOH:   reports current alcohol use.       Family History:          Problem Relation Age of Onset    Diabetes Mother     Cancer Mother     Coronary Art Dis Father     COPD Father     Depression Brother     Alcohol Abuse Brother     Cancer Other         lung and skin    Diabetes Maternal Grandmother     Cancer Paternal Grandmother        PHYSICAL EXAM:    BP (!) 143/68   Pulse 99   Temp 99.3 °F (37.4 °C) (Oral)   Resp 20   Ht 4' 11\" (1.499 m)   Wt 102 lb (46.3 kg)   SpO2 91%   BMI 20.60 kg/m²     General appearance: No apparent distress appears stated age and cooperative. HEENT Normal cephalic, atraumatic without obvious deformity. Neck: Supple, No jugular venous distention/bruits. Lungs: diminished bases  Heart: Regular rate and rhythm with Normal S1/S2 without murmurs, rubs or gallops  A  Mental status: Alert, oriented, thought content appropriate. CXR:  I have reviewed the CXR with the following interpretation: Cardiomegaly with diffuse infiltrates. EKG:  I have reviewed the EKG with the following interpretation: NSR    CBC   Recent Labs     12/05/22  1420 12/06/22 0519   WBC 12.1* 8.7   HGB 7.7* 8.0*   HCT 24.0* 25.1*   PLT 90* 82*      RENAL  Recent Labs     12/05/22  1420 12/06/22 0519    139   K 3.8 3.1*   CL 98 99   CO2 27 26   BUN 12 19   CREATININE 0.69 0.91*     LFT'S  No results for input(s): AST, ALT, ALB, BILIDIR, BILITOT, ALKPHOS in the last 72 hours. COAG  Recent Labs     12/05/22  1420   INR 1.0     CARDIAC ENZYMES  No results for input(s): CKTOTAL, CKMB, CKMBINDEX, TROPONINI in the last 72 hours.     U/A:    Lab Results   Component Value Date/Time    NITRITE neg 08/09/2022 01:35 PM    COLORU Yellow 10/22/2022 06:29 PM    WBCUA 10 TO 20 10/22/2022 06:29 PM    RBCUA 10 TO 20 10/22/2022 06:29 PM    MUCUS 1+ 10/22/2022 06:29 PM    BACTERIA FEW 10/22/2022 06:29 PM    CLARITYU clear 08/09/2022 01:35 PM    SPECGRAV 1.016 10/22/2022 06:29 PM    LEUKOCYTESUR TRACE 10/22/2022 06:29 PM    BLOODU neg 08/09/2022 01:35 PM    GLUCOSEU NEGATIVE 10/22/2022 06:29 PM    AMORPHOUS 1+ 10/22/2022 06:29 PM       ABG    Lab Results   Component Value Date/Time    TFK1DWM 29.8 10/22/2022 03:24 PM    Q1AEYKFF 94.3 10/22/2022 03:24 PM    PHART 7.452 10/22/2022 03:24 PM    HRB3TIZ 42.7 10/22/2022 03:24 PM    PO2ART 86.7 10/22/2022 03:24 PM           Active Hospital Problems    Diagnosis Date Noted    Acute congestive heart failure, unspecified heart failure type ([facility] [I50.9] 12/05/2022     Priority: Medium ASSESSMENT/PLAN:    Acute chf - to be further characterized by cardiology. Diuresis with IV lasix. Consult cardiology. DVT Prophylaxis: enoxaparin  Diet: ADULT DIET; Regular;  Low Sodium (2 gm)  Code Status: Full Code      Dispo - admitted to progressive    Late entry on patient seen at 0830 on 12/6/2022      Vinny Parkinson MD, FAAFP  12/6/2022, 8:45 AM

## 2022-12-07 PROBLEM — R09.02 HYPOXIA: Status: ACTIVE | Noted: 2022-12-07

## 2022-12-07 LAB
ANION GAP SERPL CALCULATED.3IONS-SCNC: 10 MMOL/L (ref 9–17)
BUN BLDV-MCNC: 18 MG/DL (ref 6–20)
CALCIUM SERPL-MCNC: 8.2 MG/DL (ref 8.6–10.4)
CHLORIDE BLD-SCNC: 99 MMOL/L (ref 98–107)
CO2: 29 MMOL/L (ref 20–31)
CREAT SERPL-MCNC: 0.65 MG/DL (ref 0.5–0.9)
GFR SERPL CREATININE-BSD FRML MDRD: >60 ML/MIN/1.73M2
GLUCOSE BLD-MCNC: 118 MG/DL (ref 70–99)
MAGNESIUM: 2 MG/DL (ref 1.6–2.6)
POTASSIUM SERPL-SCNC: 3.2 MMOL/L (ref 3.7–5.3)
SODIUM BLD-SCNC: 138 MMOL/L (ref 135–144)

## 2022-12-07 PROCEDURE — 36415 COLL VENOUS BLD VENIPUNCTURE: CPT

## 2022-12-07 PROCEDURE — 83735 ASSAY OF MAGNESIUM: CPT

## 2022-12-07 PROCEDURE — 6370000000 HC RX 637 (ALT 250 FOR IP): Performed by: FAMILY MEDICINE

## 2022-12-07 PROCEDURE — 94640 AIRWAY INHALATION TREATMENT: CPT

## 2022-12-07 PROCEDURE — 2060000000 HC ICU INTERMEDIATE R&B

## 2022-12-07 PROCEDURE — 80048 BASIC METABOLIC PNL TOTAL CA: CPT

## 2022-12-07 PROCEDURE — 6370000000 HC RX 637 (ALT 250 FOR IP): Performed by: NURSE PRACTITIONER

## 2022-12-07 PROCEDURE — 94761 N-INVAS EAR/PLS OXIMETRY MLT: CPT

## 2022-12-07 PROCEDURE — 2700000000 HC OXYGEN THERAPY PER DAY

## 2022-12-07 PROCEDURE — 2580000003 HC RX 258: Performed by: FAMILY MEDICINE

## 2022-12-07 RX ORDER — CLOTRIMAZOLE 1 %
CREAM (GRAM) TOPICAL 2 TIMES DAILY
Status: DISCONTINUED | OUTPATIENT
Start: 2022-12-07 | End: 2022-12-10 | Stop reason: HOSPADM

## 2022-12-07 RX ORDER — POTASSIUM CHLORIDE 20 MEQ/1
40 TABLET, EXTENDED RELEASE ORAL PRN
Status: DISCONTINUED | OUTPATIENT
Start: 2022-12-07 | End: 2022-12-10 | Stop reason: HOSPADM

## 2022-12-07 RX ORDER — POTASSIUM CHLORIDE 7.45 MG/ML
10 INJECTION INTRAVENOUS PRN
Status: DISCONTINUED | OUTPATIENT
Start: 2022-12-07 | End: 2022-12-10 | Stop reason: HOSPADM

## 2022-12-07 RX ADMIN — FOLIC ACID 1 MG: 1 TABLET ORAL at 08:50

## 2022-12-07 RX ADMIN — CLOTRIMAZOLE: 10 CREAM TOPICAL at 11:55

## 2022-12-07 RX ADMIN — FUROSEMIDE 20 MG: 20 TABLET ORAL at 08:50

## 2022-12-07 RX ADMIN — SODIUM CHLORIDE, PRESERVATIVE FREE 10 ML: 5 INJECTION INTRAVENOUS at 08:48

## 2022-12-07 RX ADMIN — DULOXETINE 60 MG: 60 CAPSULE, DELAYED RELEASE ORAL at 08:51

## 2022-12-07 RX ADMIN — POTASSIUM CHLORIDE 40 MEQ: 1500 TABLET, EXTENDED RELEASE ORAL at 11:56

## 2022-12-07 RX ADMIN — POTASSIUM CHLORIDE 40 MEQ: 1500 TABLET, EXTENDED RELEASE ORAL at 08:49

## 2022-12-07 RX ADMIN — DIVALPROEX SODIUM 1000 MG: 250 TABLET, DELAYED RELEASE ORAL at 08:56

## 2022-12-07 RX ADMIN — PREDNISONE 60 MG: 20 TABLET ORAL at 08:50

## 2022-12-07 RX ADMIN — AMLODIPINE BESYLATE 10 MG: 10 TABLET ORAL at 08:49

## 2022-12-07 RX ADMIN — FERROUS SULFATE TAB 325 MG (65 MG ELEMENTAL FE) 325 MG: 325 (65 FE) TAB at 08:51

## 2022-12-07 RX ADMIN — ACETAMINOPHEN 650 MG: 325 TABLET ORAL at 18:32

## 2022-12-07 RX ADMIN — SODIUM CHLORIDE, PRESERVATIVE FREE 10 ML: 5 INJECTION INTRAVENOUS at 19:58

## 2022-12-07 RX ADMIN — DIVALPROEX SODIUM 1000 MG: 250 TABLET, DELAYED RELEASE ORAL at 19:58

## 2022-12-07 RX ADMIN — CARVEDILOL 25 MG: 25 TABLET, FILM COATED ORAL at 18:31

## 2022-12-07 RX ADMIN — FERROUS SULFATE TAB 325 MG (65 MG ELEMENTAL FE) 325 MG: 325 (65 FE) TAB at 19:58

## 2022-12-07 RX ADMIN — ARIPIPRAZOLE 7.5 MG: 15 TABLET ORAL at 08:48

## 2022-12-07 RX ADMIN — Medication 2000 UNITS: at 08:50

## 2022-12-07 RX ADMIN — IPRATROPIUM BROMIDE AND ALBUTEROL SULFATE 3 ML: 2.5; .5 SOLUTION RESPIRATORY (INHALATION) at 20:13

## 2022-12-07 RX ADMIN — IPRATROPIUM BROMIDE AND ALBUTEROL SULFATE 3 ML: 2.5; .5 SOLUTION RESPIRATORY (INHALATION) at 08:25

## 2022-12-07 RX ADMIN — IPRATROPIUM BROMIDE AND ALBUTEROL SULFATE 3 ML: 2.5; .5 SOLUTION RESPIRATORY (INHALATION) at 15:53

## 2022-12-07 RX ADMIN — CARVEDILOL 25 MG: 25 TABLET, FILM COATED ORAL at 08:49

## 2022-12-07 RX ADMIN — CLOTRIMAZOLE: 10 CREAM TOPICAL at 19:58

## 2022-12-07 ASSESSMENT — PAIN SCALES - GENERAL: PAINLEVEL_OUTOF10: 5

## 2022-12-07 NOTE — PLAN OF CARE
Problem: Discharge Planning  Goal: Discharge to home or other facility with appropriate resources  Outcome: Progressing     Problem: ABCDS Injury Assessment  Goal: Absence of physical injury  12/7/2022 1645 by Yuly Marlow RN  Outcome: Progressing     Problem: Chronic Conditions and Co-morbidities  Goal: Patient's chronic conditions and co-morbidity symptoms are monitored and maintained or improved  Outcome: Progressing     Problem: Pain  Goal: Verbalizes/displays adequate comfort level or baseline comfort level  12/7/2022 1645 by Yuly Marlow RN  Outcome: Progressing

## 2022-12-07 NOTE — PLAN OF CARE
Problem: Discharge Planning  Goal: Discharge to home or other facility with appropriate resources  12/6/2022 1942 by Bety Posadas RN  Outcome: Progressing     Problem: ABCDS Injury Assessment  Goal: Absence of physical injury  12/6/2022 1942 by Bety Posadas RN  Outcome: Progressing

## 2022-12-07 NOTE — CARE COORDINATION
SW spoke with patient about housing and meals on wheels. Patient reported that she is working with her insurance and has coordinated \"20 meals\" after she discharges from the hospital.     SW explained to patient that she can not assist with giving her a one story home.  SW provided patient with information for [facility]

## 2022-12-07 NOTE — PLAN OF CARE
Problem: ABCDS Injury Assessment  Goal: Absence of physical injury  12/7/2022 0401 by Rita Cruz RN  Outcome: Progressing  Note: Skin assessment complete. Turned and repositioned every two hours per self. Area kept free from moisture. Proper nourishment and fluids encouraged, as appropriate. Will continue to monitor for additional needs and changes in skin breakdown. Problem: Pain  Goal: Verbalizes/displays adequate comfort level or baseline comfort level  12/7/2022 0401 by Rita Cruz RN  Outcome: Adequate for Discharge  Note: Pt satisfied with pain at this time. 4/10 pain scale.

## 2022-12-07 NOTE — PROGRESS NOTES
RN rounded on patient and she stated she just woke up from a nap and was getting herself into the bathroom when she stood up she just slid to the floor and did bump her left hip. No LOC, no bruising noted. Dr. Néstor Flores notified and no further orders at this time.

## 2022-12-07 NOTE — PROGRESS NOTES
Progress Note  Date:2022       HBRE:[id number]  Patient Name:Anu Bourne     YOB: 1970     Age:51 y.o. Subjective    Subjective:  Symptoms:  Stable. Diet:  Adequate intake. Activity level: Returning to normal.    Pain:  She reports no pain. Review of Systems  Objective         Vitals Last 24 Hours:  TEMPERATURE:  Temp  Av.9 °F (36.6 °C)  Min: 97.7 °F (36.5 °C)  Max: 98.3 °F (36.8 °C)  RESPIRATIONS RANGE: Resp  Av.4  Min: 16  Max: 30  PULSE OXIMETRY RANGE: SpO2  Av %  Min: 91 %  Max: 96 %  PULSE RANGE: Pulse  Av  Min: 73  Max: 90  BLOOD PRESSURE RANGE: Systolic (09GMZ), LGB:669 , Min:126 , EU   ; Diastolic (56JTP), EFH:73, Min:67, Max:79    I/O (24Hr): Intake/Output Summary (Last 24 hours) at 2022 1455  Last data filed at 2022 1219  Gross per 24 hour   Intake 980 ml   Output --   Net 980 ml     Objective:  General Appearance:  Comfortable. Vital signs: (most recent): Blood pressure (!) 144/73, pulse 80, temperature 97.8 °F (36.6 °C), temperature source Oral, resp. rate 18, height 4' 11\" (1.499 m), weight 103 lb 6.4 oz (46.9 kg), SpO2 96 %. Vital signs are normal.    Output: Producing urine. Lungs:  Normal effort and normal respiratory rate. There are decreased breath sounds. Heart: Normal rate. Regular rhythm.   S1 normal and S2 normal.    Labs/Imaging/Diagnostics    Labs:  CBC:  Recent Labs     22  1420 22  0519   WBC 12.1* 8.7   RBC 2.53* 2.62*   HGB 7.7* 8.0*   HCT 24.0* 25.1*   MCV 94.7 95.7   RDW 28.9* 29.2*   PLT 90* 82*     CHEMISTRIES:  Recent Labs     22  1420 22  0519 22  0510    139 138   K 3.8 3.1* 3.2*   CL 98 99 99   CO2 27 26 29   BUN 12 19 18   CREATININE 0.69 0.91* 0.65   GLUCOSE 108* 88 118*   MG  --  1.6 2.0     PT/INR:  Recent Labs     22  1420   PROTIME 13.2   INR 1.0     APTT:  Recent Labs     22  1420   APTT 27.5     LIVER PROFILE:No results for input(s): AST, ALT, BILIDIR, BILITOT, ALKPHOS in the last 72 hours. Imaging Last 24 Hours:  No results found. Assessment//Plan           Hospital Problems             Last Modified POA    * (Principal) Acute congestive heart failure, unspecified heart failure type ([facility] [address] 12/5/2022 Yes     Assessment & Plan  Acute CHF - diuresis continues. Patient requests pulmonology consult    Discharge planning.        Electronically signed by Carlos Aguirre MD on 12/7/22 at 2:55 PM EST

## 2022-12-07 NOTE — CARE COORDINATION
ONGOING DISCHARGE PLAN:    Patient is alert and oriented x4. Spoke with patient regarding discharge plan and patient confirms that plan is still to DC to home w/ her Dad. Pt. Is coming from [place], does NOT want to return. Wants to go home w/ Dad & [address] Pt. Has been accepted. Pt. States, Arta Schilder has already checked into Meal delivery service for home\". Currently sating 96% on 3.5 LNC. Does NOT wear at home, will need to follow. Pt. Also wants a Nebulizer for home. Remains on Oral Lasix & Prednisone. Cardio/Pulmonary on board. Will continue to follow for additional discharge needs.     Electronically signed by Valentino Oconnor RN on 12/7/2022 at 3:30 PM

## 2022-12-08 ENCOUNTER — APPOINTMENT (OUTPATIENT)
Dept: CT IMAGING | Age: 52
DRG: 293 | End: 2022-12-08
Payer: COMMERCIAL

## 2022-12-08 LAB
ANION GAP SERPL CALCULATED.3IONS-SCNC: 6 MMOL/L (ref 9–17)
BUN BLDV-MCNC: 19 MG/DL (ref 6–20)
CALCIUM SERPL-MCNC: 8.2 MG/DL (ref 8.6–10.4)
CHLORIDE BLD-SCNC: 103 MMOL/L (ref 98–107)
CO2: 28 MMOL/L (ref 20–31)
CREAT SERPL-MCNC: 0.63 MG/DL (ref 0.5–0.9)
GFR SERPL CREATININE-BSD FRML MDRD: >60 ML/MIN/1.73M2
GLUCOSE BLD-MCNC: 63 MG/DL (ref 70–99)
MAGNESIUM: 1.9 MG/DL (ref 1.6–2.6)
POTASSIUM SERPL-SCNC: 4.1 MMOL/L (ref 3.7–5.3)
PRO-BNP: 1653 PG/ML
PROCALCITONIN: 0.13 NG/ML
SODIUM BLD-SCNC: 137 MMOL/L (ref 135–144)

## 2022-12-08 PROCEDURE — 80048 BASIC METABOLIC PNL TOTAL CA: CPT

## 2022-12-08 PROCEDURE — 2700000000 HC OXYGEN THERAPY PER DAY

## 2022-12-08 PROCEDURE — 71260 CT THORAX DX C+: CPT | Performed by: INTERNAL MEDICINE

## 2022-12-08 PROCEDURE — 83735 ASSAY OF MAGNESIUM: CPT

## 2022-12-08 PROCEDURE — 6370000000 HC RX 637 (ALT 250 FOR IP): Performed by: NURSE PRACTITIONER

## 2022-12-08 PROCEDURE — 6370000000 HC RX 637 (ALT 250 FOR IP): Performed by: FAMILY MEDICINE

## 2022-12-08 PROCEDURE — 84145 PROCALCITONIN (PCT): CPT

## 2022-12-08 PROCEDURE — 94640 AIRWAY INHALATION TREATMENT: CPT

## 2022-12-08 PROCEDURE — 94761 N-INVAS EAR/PLS OXIMETRY MLT: CPT

## 2022-12-08 PROCEDURE — 2060000000 HC ICU INTERMEDIATE R&B

## 2022-12-08 PROCEDURE — 6360000004 HC RX CONTRAST MEDICATION: Performed by: FAMILY MEDICINE

## 2022-12-08 PROCEDURE — 83880 ASSAY OF NATRIURETIC PEPTIDE: CPT

## 2022-12-08 PROCEDURE — 2580000003 HC RX 258: Performed by: FAMILY MEDICINE

## 2022-12-08 PROCEDURE — 36415 COLL VENOUS BLD VENIPUNCTURE: CPT

## 2022-12-08 RX ORDER — 0.9 % SODIUM CHLORIDE 0.9 %
100 INTRAVENOUS SOLUTION INTRAVENOUS ONCE
Status: COMPLETED | OUTPATIENT
Start: 2022-12-08 | End: 2022-12-08

## 2022-12-08 RX ORDER — SODIUM CHLORIDE 0.9 % (FLUSH) 0.9 %
10 SYRINGE (ML) INJECTION PRN
Status: DISCONTINUED | OUTPATIENT
Start: 2022-12-08 | End: 2022-12-10 | Stop reason: HOSPADM

## 2022-12-08 RX ADMIN — SODIUM CHLORIDE, PRESERVATIVE FREE 10 ML: 5 INJECTION INTRAVENOUS at 22:19

## 2022-12-08 RX ADMIN — DIVALPROEX SODIUM 1000 MG: 250 TABLET, DELAYED RELEASE ORAL at 10:26

## 2022-12-08 RX ADMIN — SODIUM CHLORIDE, PRESERVATIVE FREE 10 ML: 5 INJECTION INTRAVENOUS at 10:28

## 2022-12-08 RX ADMIN — CLOTRIMAZOLE: 10 CREAM TOPICAL at 21:58

## 2022-12-08 RX ADMIN — IPRATROPIUM BROMIDE AND ALBUTEROL SULFATE 3 ML: 2.5; .5 SOLUTION RESPIRATORY (INHALATION) at 19:11

## 2022-12-08 RX ADMIN — IPRATROPIUM BROMIDE AND ALBUTEROL SULFATE 3 ML: 2.5; .5 SOLUTION RESPIRATORY (INHALATION) at 11:36

## 2022-12-08 RX ADMIN — FUROSEMIDE 20 MG: 20 TABLET ORAL at 10:26

## 2022-12-08 RX ADMIN — CARVEDILOL 25 MG: 25 TABLET, FILM COATED ORAL at 10:25

## 2022-12-08 RX ADMIN — FERROUS SULFATE TAB 325 MG (65 MG ELEMENTAL FE) 325 MG: 325 (65 FE) TAB at 10:25

## 2022-12-08 RX ADMIN — SODIUM CHLORIDE, PRESERVATIVE FREE 10 ML: 5 INJECTION INTRAVENOUS at 15:12

## 2022-12-08 RX ADMIN — POTASSIUM CHLORIDE 40 MEQ: 1500 TABLET, EXTENDED RELEASE ORAL at 10:26

## 2022-12-08 RX ADMIN — DULOXETINE 60 MG: 60 CAPSULE, DELAYED RELEASE ORAL at 10:26

## 2022-12-08 RX ADMIN — PREDNISONE 60 MG: 20 TABLET ORAL at 10:31

## 2022-12-08 RX ADMIN — IOPAMIDOL 75 ML: 755 INJECTION, SOLUTION INTRAVENOUS at 15:13

## 2022-12-08 RX ADMIN — DIVALPROEX SODIUM 1000 MG: 250 TABLET, DELAYED RELEASE ORAL at 22:18

## 2022-12-08 RX ADMIN — SODIUM CHLORIDE 100 ML: 9 INJECTION, SOLUTION INTRAVENOUS at 15:13

## 2022-12-08 RX ADMIN — Medication 2000 UNITS: at 10:26

## 2022-12-08 RX ADMIN — IPRATROPIUM BROMIDE AND ALBUTEROL SULFATE 3 ML: 2.5; .5 SOLUTION RESPIRATORY (INHALATION) at 08:15

## 2022-12-08 RX ADMIN — IPRATROPIUM BROMIDE AND ALBUTEROL SULFATE 3 ML: 2.5; .5 SOLUTION RESPIRATORY (INHALATION) at 15:48

## 2022-12-08 RX ADMIN — ARIPIPRAZOLE 7.5 MG: 15 TABLET ORAL at 10:31

## 2022-12-08 RX ADMIN — CLOTRIMAZOLE: 10 CREAM TOPICAL at 10:29

## 2022-12-08 RX ADMIN — CARVEDILOL 25 MG: 25 TABLET, FILM COATED ORAL at 18:24

## 2022-12-08 RX ADMIN — FOLIC ACID 1 MG: 1 TABLET ORAL at 10:25

## 2022-12-08 RX ADMIN — AMLODIPINE BESYLATE 10 MG: 10 TABLET ORAL at 10:26

## 2022-12-08 RX ADMIN — FERROUS SULFATE TAB 325 MG (65 MG ELEMENTAL FE) 325 MG: 325 (65 FE) TAB at 21:58

## 2022-12-08 NOTE — PLAN OF CARE
Problem: Discharge Planning  Goal: Discharge to home or other facility with appropriate resources  12/8/2022 0357 by Sherree Nageotte, RN  Outcome: Progressing     Problem: Chronic Conditions and Co-morbidities  Goal: Patient's chronic conditions and co-morbidity symptoms are monitored and maintained or improved  12/8/2022 0357 by Sherree Nageotte, RN  Outcome: Progressing     Problem: Pain  Goal: Verbalizes/displays adequate comfort level or baseline comfort level  12/8/2022 0357 by Sherree Nageotte, RN  Outcome: Progressing  Note: Pt denied pain needs this shift.

## 2022-12-08 NOTE — PROGRESS NOTES
[facility]  Progress Note                   Date:   12/8/2022  Patient name: Praveena Raya  Date of admission:  12/5/2022  1:52 PM  MRN:   [id number]  YOB: 1970  PCP: Corona Reid MD    Reason for Admission: Hypoxia [R09.02]  Acute congestive heart failure, unspecified heart failure type ([address] [I50.9]    Subjective:       Clinical Changes /Abnormalities:Seen & examined in room. No acute CV issues/concerns overnight. Labs, vitals, & tele reviewed. Breathing improved. On 2L NC without distress. -1.2L since admission (unsure if accurate UO)    Review of Systems    Medications:   Scheduled Meds:   clotrimazole   Topical BID    furosemide  20 mg Oral Daily    amLODIPine  10 mg Oral Daily    ARIPiprazole  7.5 mg Oral Daily    carvedilol  25 mg Oral BID WC    divalproex  1,000 mg Oral BID    DULoxetine  60 mg Oral Daily    ferrous sulfate  325 mg Oral BID    folic acid  1 mg Oral Daily    ipratropium-albuterol  3 mL Inhalation Q4H WA    potassium chloride  40 mEq Oral Daily    predniSONE  60 mg Oral Daily    Vitamin D  2,000 Units Oral Daily    sodium chloride flush  5-40 mL IntraVENous 2 times per day     Continuous Infusions:   sodium chloride       CBC:   Recent Labs     12/05/22  1420 12/06/22  0519   WBC 12.1* 8.7   HGB 7.7* 8.0*   PLT 90* 82*     BMP:    Recent Labs     12/06/22  0519 12/07/22  0510 12/08/22  0434    138 137   K 3.1* 3.2* 4.1   CL 99 99 103   CO2 26 29 28   BUN 19 18 19   CREATININE 0.91* 0.65 0.63   GLUCOSE 88 118* 63*     Hepatic:No results for input(s): AST, ALT, ALB, BILITOT, ALKPHOS in the last 72 hours. Troponin:   Recent Labs     12/05/22  1420 12/05/22  1622   TROPHS 13 11     BNP: No results for input(s): BNP in the last 72 hours.   Lipids:   Recent Labs     12/06/22  0519   CHOL 173   HDL 72     INR:   Recent Labs     12/05/22  1420   INR 1.0       Objective:   Vitals: /82   Pulse 90   Temp 97.9 °F (36.6 °C) (Oral)   Resp 16   Ht 4' 11\" (1.499 m)   Wt 103 lb 6.3 oz (46.9 kg)   SpO2 (!) 86%   BMI 20.88 kg/m²   General appearance: alert and cooperative with exam  HEENT: Head: Normocephalic, no lesions, without obvious abnormality. Neck:no JVD, trachea midline, no adenopathy  Lungs: Clear to auscultation, dim bases. No distress  Heart: Regular rate and rhythm, s1/s2 auscultated, no murmurs, tele SR 74  Abdomen: soft, non-tender, bowel sounds active  Extremities: trace ankle edema  Neurologic: not done    2D ECHO ( 10/11/22)  Global left ventricular systolic function is normal. Estimated ejection  fraction is 55-60%. No obvious wall motion abnormality seen. Left and right atrium is normal in size. No significant valvular regurgitation or stenosis seen. Estimated right ventricular systolic pressure is 19 mmHg. No pericardial effusion seen. Normal diastolic filling. ADRIANNE 10/19/22  Summary:      1. A ADRIANNE was performed without complications. 2. LVEF 55%  3. No thrombus or valvular vegetation identified  4. No blood on probe post procedure. 5. Trivial MR.      Assessment / Acute Cardiac Problems:   Hypoxia  Pulmonary infiltrates on CXR  Mild volume overload with elevated pBNP  Mild Hypokalemia & Hypomagnesium  Preserved LVEF on echo 10/2022  Hx of polysubstance abuse    Patient Active Problem List:     GERD (gastroesophageal reflux disease)     Hoarseness of voice     MVP (mitral valve prolapse)     Depression with suicidal ideation     Lymphocytic colitis     History of migraine headaches     Hyperlipidemia     Sleep apnea     IGT (impaired glucose tolerance)     History of renal calculi     Lactose intolerance     History of seizure disorder     Leukopenia     Bipolar disorder, mixed ([address]     Illicit drug use     Postlaminectomy syndrome, cervical region     Degeneration of cervical intervertebral disc     Encounter for long-term (current) use of other medications     Bradycardia     Cellulitis     Hypokalemia     Primary hypertension     Elevated lipase     Right lower quadrant abdominal pain     Nausea     Diarrhea     Acute encephalopathy     Polysubstance abuse (HCC)     Fibromyalgia     Cocaine addiction (HCC)     Shoulder arthritis     Osteoarthritis of left glenohumeral joint     Abnormal EKG     Anemia     Anxiety     Carotid artery stenosis     COPD (chronic obstructive pulmonary disease) (HCC)     Dental disease     HUTCHINSON (dyspnea on exertion)     Fatigue     Fractures     H/O gastric bypass     History of crack cocaine use     History of UTI     Injury of back     Intractable chronic migraine without aura and without status migrainosus     Intractable migraine with aura with status migrainosus     Kidney stones     Dizziness     Memory loss     Mild pulmonary hypertension (HCC)     Mild tricuspid regurgitation     Primary osteoarthritis of left shoulder     Seizure-like activity (HCC)     Stress at home     TMJ (dislocation of temporomandibular joint)     Tobacco abuse     Visual impairment     Internal hernia     Vitamin D deficiency     Iron deficiency     SIRS (systemic inflammatory response syndrome) (HCC)     NICKI (acute kidney injury) (HCC)     Severe malnutrition (HCC)     Elevated d-dimer     Leukocytosis     Pulmonary nodules     Delirium due to another medical condition     Acute metabolic encephalopathy     MRSA bacteremia     Acute septic pulmonary embolism (HCC)     Cavitating mass of lung     Allergy to antibiotic     Thrombocytopenia (HCC)     Severe thrombocytopenia (HCC)     Severe anemia     Acute congestive heart failure, unspecified heart failure type ([address]     Hypoxia      Plan of Treatment:   Stable and improved. Continue PO Lasix along with BB. Echo reviewed as above. Discussed importance of diet, exercise, daily weight, monitoring PO fluid intake & urine output along with med compliance. BP stable. Continue Coreg & Norvasc. Ambulatory BP monitoring recommended  OK for discharge from CV standpoint.  F/U in clinic in 3-4wk after discharge. Reiterated importance of compliance along with continue drug abuse cessation.     Electronically signed by KELLY Machado CNP on 12/8/2022 at 8:46 AM  [address]  [phone]

## 2022-12-08 NOTE — PLAN OF CARE
Problem: Discharge Planning  Goal: Discharge to home or other facility with appropriate resources  12/8/2022 1729 by Anirudh Burnett RN  Outcome: Progressing     Problem: ABCDS Injury Assessment  Goal: Absence of physical injury  Outcome: Progressing     Problem: Pain  Goal: Verbalizes/displays adequate comfort level or baseline comfort level  12/8/2022 1729 by Anirudh Burnett RN  Outcome: Progressing

## 2022-12-08 NOTE — CARE COORDINATION
ONGOING DISCHARGE PLAN:    Patient is alert and oriented x4. Spoke with patient regarding discharge plan and patient confirms that plan is still home with his father and [facility] - [address] Refuses SNF, states she was only there for IV atb's. Pulm consulted. Following for home O2. Currently on 4L NC. Pt states she would like a nebulizer. Active order for PO steroids. Will continue to follow for additional discharge needs.     Electronically signed by Pablo Knight RN on 12/8/2022 at 3:49 PM

## 2022-12-08 NOTE — PROGRESS NOTES
Progress Note  Date:2022       Room:[address] [place] [address]  Patient Name:Anu Bourne     YOB: 1970     Age:51 y.o. Subjective    Subjective:  Symptoms:  Stable. Diet:  Adequate intake. Activity level: Returning to normal.    Pain:  She reports no pain. Review of Systems  Objective         Vitals Last 24 Hours:  TEMPERATURE:  Temp  Av.1 °F (36.7 °C)  Min: 97.9 °F (36.6 °C)  Max: 98.4 °F (36.9 °C)  RESPIRATIONS RANGE: Resp  Av.3  Min: 16  Max: 18  PULSE OXIMETRY RANGE: SpO2  Av %  Min: 86 %  Max: 96 %  PULSE RANGE: Pulse  Av.6  Min: 67  Max: 90  BLOOD PRESSURE RANGE: Systolic (74PNY), DAF:666 , Min:138 , WMW:365   ; Diastolic (95OPQ), QLJ:03, Min:71, Max:82    I/O (24Hr): Intake/Output Summary (Last 24 hours) at 2022 1739  Last data filed at 2022 1250  Gross per 24 hour   Intake 355 ml   Output 900 ml   Net -545 ml     Objective:  General Appearance:  Comfortable. Vital signs: (most recent): Blood pressure (!) 149/75, pulse 80, temperature 97.9 °F (36.6 °C), temperature source Oral, resp. rate 16, height 4' 11\" (1.499 m), weight 103 lb 6.3 oz (46.9 kg), SpO2 96 %. Vital signs are normal.    Labs/Imaging/Diagnostics    Labs:  CBC:  Recent Labs     22   WBC 8.7   RBC 2.62*   HGB 8.0*   HCT 25.1*   MCV 95.7   RDW 29.2*   PLT 82*     CHEMISTRIES:  Recent Labs     22  0519 22  0510 22  0434    138 137   K 3.1* 3.2* 4.1   CL 99 99 103   CO2 26 29 28   BUN 19 18 19   CREATININE 0.91* 0.65 0.63   GLUCOSE 88 118* 63*   MG 1.6 2.0 1.9     PT/INR:No results for input(s): PROTIME, INR in the last 72 hours. APTT:No results for input(s): APTT in the last 72 hours. LIVER PROFILE:No results for input(s): AST, ALT, BILIDIR, BILITOT, ALKPHOS in the last 72 hours.     Imaging Last 24 Hours:  CT CHEST PULMONARY EMBOLISM W CONTRAST    Result Date: 2022  EXAMINATION: CTA OF THE CHEST 2022 2:59 pm TECHNIQUE: CTA of the chest was performed after the administration of intravenous contrast.  Multiplanar reformatted images are provided for review. MIP images are provided for review. Automated exposure control, iterative reconstruction, and/or weight based adjustment of the mA/kV was utilized to reduce the radiation dose to as low as reasonably achievable. COMPARISON: None. HISTORY: ORDERING SYSTEM PROVIDED HISTORY: Evaluate for pulmonary embolism. Also reevaluate on lung lesions. History of septic emboli and MRSA TECHNOLOGIST PROVIDED HISTORY: Evaluate for pulmonary embolism. Also reevaluate on lung lesions. History of septic emboli and MRSA Reason for Exam: Acute congestive heart failure, unspecified heart failure type Additional signs and symptoms: INCREASED SOB LOW O2 SAT FINDINGS: Pulmonary Arteries: Pulmonary arteries are adequately opacified for evaluation. No evidence of intraluminal filling defect to suggest pulmonary embolism. Main pulmonary artery is normal in caliber. Mediastinum: No evidence of mediastinal lymphadenopathy. The heart and pericardium demonstrate no acute abnormality. There is no acute abnormality of the thoracic aorta. Lungs/pleura: Most of the previously noted nodular lesions in the lungs have improved or resolved. There is a residual 14 mm nodule noted in the the left upper lobe, series 4, image 24. It previously measured 17 mm. The large nodule noted in the superior segment of the right lower lobe has decreased from 29 mm to 25 mm in maximum dimension. There are however new a bilateral patchy ground-glass opacities, with thickened interlobular septa. These involve all lobes, but are more prominent in the upper lung zones. There is mild bilateral hilar adenopathy. There are no pleural effusions. Upper Abdomen: Limited images of the upper abdomen are unremarkable. Soft Tissues/Bones: No acute bone or soft tissue abnormality. There is no evidence of pulmonary emboli.  There has been resolution or improvement in the previously noted bilateral pulmonary nodules. There is however new patchy bilateral ground-glass opacities with thickened interlobular septa. This involves all lobes, but more prominent noted in the the upper lung zones. There is mild bilateral hilar adenopathy. Differential diagnosis includes infection and hemorrhage. Clinical correlation required     Assessment//Plan           Hospital Problems             Last Modified POA    * (Principal) Acute congestive heart failure, unspecified heart failure type ([facility] [address] 12/5/2022 Yes    Hypoxia 12/7/2022 Yes     Assessment & Plan    Acute CHF - diuresis good. Management per cardiology    Pulmonology consulted.    Electronically signed by Sangeetha Gustafson MD on 12/8/22 at 5:39 PM EST

## 2022-12-09 LAB
ANION GAP SERPL CALCULATED.3IONS-SCNC: 6 MMOL/L (ref 9–17)
BUN BLDV-MCNC: 19 MG/DL (ref 6–20)
CALCIUM SERPL-MCNC: 8.5 MG/DL (ref 8.6–10.4)
CHLORIDE BLD-SCNC: 104 MMOL/L (ref 98–107)
CO2: 32 MMOL/L (ref 20–31)
CREAT SERPL-MCNC: 0.7 MG/DL (ref 0.5–0.9)
GFR SERPL CREATININE-BSD FRML MDRD: >60 ML/MIN/1.73M2
GLUCOSE BLD-MCNC: 78 MG/DL (ref 70–99)
MAGNESIUM: 2.1 MG/DL (ref 1.6–2.6)
POTASSIUM SERPL-SCNC: 5.1 MMOL/L (ref 3.7–5.3)
SODIUM BLD-SCNC: 142 MMOL/L (ref 135–144)

## 2022-12-09 PROCEDURE — 94762 N-INVAS EAR/PLS OXIMTRY CONT: CPT

## 2022-12-09 PROCEDURE — 6360000002 HC RX W HCPCS: Performed by: INTERNAL MEDICINE

## 2022-12-09 PROCEDURE — 6370000000 HC RX 637 (ALT 250 FOR IP): Performed by: FAMILY MEDICINE

## 2022-12-09 PROCEDURE — 94761 N-INVAS EAR/PLS OXIMETRY MLT: CPT

## 2022-12-09 PROCEDURE — 2700000000 HC OXYGEN THERAPY PER DAY

## 2022-12-09 PROCEDURE — 94640 AIRWAY INHALATION TREATMENT: CPT

## 2022-12-09 PROCEDURE — 6370000000 HC RX 637 (ALT 250 FOR IP): Performed by: NURSE PRACTITIONER

## 2022-12-09 PROCEDURE — 2060000000 HC ICU INTERMEDIATE R&B

## 2022-12-09 PROCEDURE — 80048 BASIC METABOLIC PNL TOTAL CA: CPT

## 2022-12-09 PROCEDURE — 83735 ASSAY OF MAGNESIUM: CPT

## 2022-12-09 PROCEDURE — 36415 COLL VENOUS BLD VENIPUNCTURE: CPT

## 2022-12-09 PROCEDURE — 2580000003 HC RX 258: Performed by: FAMILY MEDICINE

## 2022-12-09 RX ORDER — TRAZODONE HYDROCHLORIDE 100 MG/1
100 TABLET ORAL NIGHTLY
Status: DISCONTINUED | OUTPATIENT
Start: 2022-12-09 | End: 2022-12-10 | Stop reason: HOSPADM

## 2022-12-09 RX ORDER — POTASSIUM CHLORIDE 20 MEQ/1
40 TABLET, EXTENDED RELEASE ORAL 2 TIMES DAILY
Qty: 60 TABLET | Refills: 11 | Status: SHIPPED | OUTPATIENT
Start: 2022-12-09

## 2022-12-09 RX ORDER — CLOTRIMAZOLE 1 %
CREAM (GRAM) TOPICAL
Qty: 60 G | Refills: 1 | Status: SHIPPED | OUTPATIENT
Start: 2022-12-09 | End: 2022-12-16

## 2022-12-09 RX ORDER — FUROSEMIDE 20 MG/1
20 TABLET ORAL DAILY
Qty: 30 TABLET | Refills: 11 | Status: SHIPPED | OUTPATIENT
Start: 2022-12-09

## 2022-12-09 RX ORDER — FUROSEMIDE 10 MG/ML
40 INJECTION INTRAMUSCULAR; INTRAVENOUS ONCE
Status: COMPLETED | OUTPATIENT
Start: 2022-12-09 | End: 2022-12-09

## 2022-12-09 RX ADMIN — IPRATROPIUM BROMIDE AND ALBUTEROL SULFATE 3 ML: 2.5; .5 SOLUTION RESPIRATORY (INHALATION) at 08:30

## 2022-12-09 RX ADMIN — CARVEDILOL 25 MG: 25 TABLET, FILM COATED ORAL at 17:17

## 2022-12-09 RX ADMIN — ARIPIPRAZOLE 7.5 MG: 15 TABLET ORAL at 08:51

## 2022-12-09 RX ADMIN — IPRATROPIUM BROMIDE AND ALBUTEROL SULFATE 3 ML: 2.5; .5 SOLUTION RESPIRATORY (INHALATION) at 18:47

## 2022-12-09 RX ADMIN — CLOTRIMAZOLE: 10 CREAM TOPICAL at 20:18

## 2022-12-09 RX ADMIN — CLOTRIMAZOLE: 10 CREAM TOPICAL at 08:55

## 2022-12-09 RX ADMIN — TRAZODONE HYDROCHLORIDE 100 MG: 100 TABLET ORAL at 20:13

## 2022-12-09 RX ADMIN — FOLIC ACID 1 MG: 1 TABLET ORAL at 08:52

## 2022-12-09 RX ADMIN — ACETAMINOPHEN 650 MG: 325 TABLET ORAL at 09:06

## 2022-12-09 RX ADMIN — IPRATROPIUM BROMIDE AND ALBUTEROL SULFATE 3 ML: 2.5; .5 SOLUTION RESPIRATORY (INHALATION) at 11:42

## 2022-12-09 RX ADMIN — FERROUS SULFATE TAB 325 MG (65 MG ELEMENTAL FE) 325 MG: 325 (65 FE) TAB at 08:52

## 2022-12-09 RX ADMIN — CARVEDILOL 25 MG: 25 TABLET, FILM COATED ORAL at 08:51

## 2022-12-09 RX ADMIN — SODIUM CHLORIDE, PRESERVATIVE FREE 10 ML: 5 INJECTION INTRAVENOUS at 08:54

## 2022-12-09 RX ADMIN — PREDNISONE 60 MG: 20 TABLET ORAL at 08:52

## 2022-12-09 RX ADMIN — DULOXETINE 60 MG: 60 CAPSULE, DELAYED RELEASE ORAL at 08:51

## 2022-12-09 RX ADMIN — FERROUS SULFATE TAB 325 MG (65 MG ELEMENTAL FE) 325 MG: 325 (65 FE) TAB at 20:13

## 2022-12-09 RX ADMIN — FUROSEMIDE 40 MG: 10 INJECTION, SOLUTION INTRAMUSCULAR; INTRAVENOUS at 17:17

## 2022-12-09 RX ADMIN — SODIUM CHLORIDE, PRESERVATIVE FREE 10 ML: 5 INJECTION INTRAVENOUS at 20:19

## 2022-12-09 RX ADMIN — DIVALPROEX SODIUM 1000 MG: 250 TABLET, DELAYED RELEASE ORAL at 20:13

## 2022-12-09 RX ADMIN — POTASSIUM CHLORIDE 40 MEQ: 1500 TABLET, EXTENDED RELEASE ORAL at 08:51

## 2022-12-09 RX ADMIN — AMLODIPINE BESYLATE 10 MG: 10 TABLET ORAL at 08:51

## 2022-12-09 RX ADMIN — FUROSEMIDE 20 MG: 20 TABLET ORAL at 08:51

## 2022-12-09 RX ADMIN — Medication 2000 UNITS: at 08:51

## 2022-12-09 RX ADMIN — IPRATROPIUM BROMIDE AND ALBUTEROL SULFATE 3 ML: 2.5; .5 SOLUTION RESPIRATORY (INHALATION) at 15:22

## 2022-12-09 RX ADMIN — DIVALPROEX SODIUM 1000 MG: 250 TABLET, DELAYED RELEASE ORAL at 09:06

## 2022-12-09 ASSESSMENT — PAIN DESCRIPTION - LOCATION: LOCATION: HIP

## 2022-12-09 ASSESSMENT — PAIN SCALES - GENERAL: PAINLEVEL_OUTOF10: 3

## 2022-12-09 ASSESSMENT — PAIN DESCRIPTION - DESCRIPTORS: DESCRIPTORS: ACHING

## 2022-12-09 ASSESSMENT — PAIN DESCRIPTION - ORIENTATION: ORIENTATION: LEFT;RIGHT

## 2022-12-09 NOTE — PROGRESS NOTES
Nutrition Assessment     Type and Reason for Visit: Reassess      Nutrition Assessment:  Pt has been eating % of meals with family bringing food at times. Discharge later today or tomorrow. 12-14         Current Nutrition Therapies:    ADULT DIET; Regular;  Low Sodium (2 gm)          [facility], [address] [place] [address],   [phone]

## 2022-12-09 NOTE — CONSULTS
[address] [facility]                 [address] [facility], [address]                                  CONSULTATION    PATIENT NAME: Claudia Caldwell                    :        1970  MED REC NO:   [id number]                              ROOM:       2122  ACCOUNT NO:   [de-identified]                           ADMIT DATE: 2022  PROVIDER:     Rhiannon Gudino    CONSULT DATE:  2022    REASON FOR CONSULTATION:  Hypoxia and history of MRSA bacteremia. HISTORY OF PRESENT ILLNESS:  The patient is a 47-year-old female who  presented to the hospital on 2022 after being seen by our office. The patient has a history of body aches and CT scan revealed evidence of  septic emboli and MRSA bacteremia. She also had E. coli UTI. She had  bilateral pigtail catheters, as she had bilateral loculated effusions and the pleural  space infections were positive for Staph. The patient has had a history  of tobacco abuse as well. The patient was in the skilled nursing  facility at [facility]. The patient's O2 saturations were 83% with a good  waveform and the patient was sent to the ER. Repeat chest x-ray reveals  bilateral infiltrative findings. When I saw the patient, she claims that she certainly looks better than  when she had her MRSA bacteremia. She still has her PICC line. She  denied any fevers, chills, sweats, no sputum production at this time. PAST MEDICAL HISTORY:  Recent hospitalization for MRSA bacteremia,  septic emboli, history of bilateral pigtail catheter placements for MRSA  pleural space infections. Also a history of bipolar affective disorder,  history of COPD, history of cocaine abuse as well. The patient also had  a history of obesity.     PAST SURGICAL HISTORY:  Includes a gastric bypass surgery by Dr. Chester Grate years ago, history of tonsillectomy and adenoidectomy, history of  shoulder arthroscopy in 2019, TMJ joint replacement in 2019, history of  laparoscopy by Dr. Blair Sanchez and lysis of adhesions on 01/29/2022, history  of ADRIANNE on 10/19/2022, history of appendectomy as well. ALLERGIES:  ASPIRIN; BACTRIM, severe chills; CODEINE, caused reportedly  had chest pain. SOCIAL HISTORY:  History of smoking. REVIEW OF SYSTEMS:  As per HPI, otherwise systems reviewed negative. PHYSICAL EXAMINATION:  GENERAL:  The patient is a 68-year-old female, resting quietly. VITAL SIGNS:  No fever appreciated. Blood pressure 149/75, heart rates  71 to 90, respirations 16, O2 saturation 93% on 4 liters. HEENT:  No supraclavicular lymph node enlargement. LUNGS:  Coarse throughout. Some congestion. CARDIOVASCULAR:  Regular rhythm. ABDOMEN:  Soft. EXTREMITIES:  No edema. IMPRESSION:  1. Hypoxemia. 2.  Recent history in 10/2022 of septic emboli, MRSA bacteremia, pleural  space infections requiring pigtail catheters. 3.  History of tobacco use. 4.  History of COPD.  5.  History of E. colic urinary tract infection. 6.  Debilitative state. The patient currently is in a nursing home at  [facility]. PLAN:  1. Continue with surveillance. 2.  Repeat CT scan per PE protocol. 3.  Continue with DuoNeb treatments. 4.  On Deltasone. 5.  Check procalcitonin level. 6.  Further recommendations pending. Thank you Dr. Layne Christianson for the consult.         Nisha Morrow    D: 12/08/2022 14:51:39       T: 12/08/2022 19:19:12     [id number]  Job#: [id number]     Doc#: [id number]    CC:

## 2022-12-09 NOTE — DISCHARGE INSTR - COC
Continuity of Care Form    Patient Name: Alida Brown   :  1970  MRN:  [id number]    Admit date:  2022  Discharge date:  12/10/22    Code Status Order: Full Code   Advance Directives:     Admitting Physician:  Becky Horton MD  PCP: Becky Horton MD    Discharging Nurse: [facility] Unit/Room#: 2122/2122-01  Discharging Unit Phone Number: [phone]    Emergency Contact:   Extended Emergency Contact Information  Primary Emergency Contact: Sary Hood  Address: [address], [address] [place] [address]  Home Phone: [phone]  Relation: Parent  Secondary Emergency Contact: [address] Phone: [phone]  Relation: Child    Past Surgical History:  Past Surgical History:   Procedure Laterality Date    ANKLE FRACTURE SURGERY      recontruction surgery    APPENDECTOMY      BREAST LUMPECTOMY Right     CARDIAC CATHETERIZATION      CARPAL TUNNEL RELEASE      x2     SECTION      CHOLECYSTECTOMY      COLONOSCOPY  12    COLONOSCOPY  2016    severe spasms    COLONOSCOPY  2019    DR GOLDY MCINTOSH    CT BONE MARROW BIOPSY  2022    CT BONE MARROW BIOPSY 2022 [facility] CT SCAN    GASTRIC BYPASS SURGERY      GASTRIC BYPASS SURGERY      HYSTERECTOMY (CERVIX STATUS UNKNOWN)      HYSTERECTOMY (CERVIX STATUS UNKNOWN)      IR PERC CATH PLEURAL DRAIN W/IMAG  10/21/2022    IR PERC CATH PLEURAL DRAIN W/IMAG 10/21/2022 [facility] PROCED[facility]    IR PERC CATH PLEURAL DRAIN W/IMAG  10/21/2022    IR PERC CATH PLEURAL DRAIN W/IMAG 10/21/2022 [facility] PROCED[facility]    IR PERC CATH PLEURAL DRAIN W/IMAG  10/24/2022    IR PERC CATH PLEURAL DRAIN W/IMAG 10/24/2022 [facility] PROCED[facility]    LAPAROSCOPY N/A 2022    LAPAROSCOPY EXPLORATORY CONVERTED TO OPEN EXPLORATORY LAPAROTOMY, LYSIS OF ADHESIONS, REDUCTION OF INTERNAL HERNIA performed by Nabil Mercado DO at [address] [place] [address]  2282    APPLICATION OF ARCH BARS,SIMPLE EXTRACTION OF #15 AND RT TMJ JOINT REPLACEMENT    SHOULDER ARTHROSCOPY Left 06/05/2019    DR ROBERT GREEN    SHOULDER SURGERY      TONSILLECTOMY AND ADENOIDECTOMY      TRANSESOPHAGEAL ECHOCARDIOGRAM N/A 10/19/2022    TRANSESOPHAGEAL ECHOCARDIOGRAM WITH BUBBLE STUDY performed by Frandy Royal DO at [address] [facility]  7-3-12    egd    UPPER GASTROINTESTINAL ENDOSCOPY  12/19/2016    status post gastrectomy with a small remnant of gastric pouch. UPPER GASTROINTESTINAL ENDOSCOPY  05/2019    DR Marialuisa Sevilla       Immunization History:   Immunization History   Administered Date(s) Administered    COVID-19, PFIZER PURPLE top, DILUTE for use, (age 15 y+), 30mcg/0.3mL 03/15/2021, 04/05/2021    Influenza Virus Vaccine 11/07/2006, 10/24/2008, 09/16/2014, 10/23/2015, 10/12/2016, 01/11/2019    Influenza, AFLURIA (age 1 yrs+), FLUZONE, (age 10 mo+), MDV, 0.5mL 10/12/2016, 10/15/2019    Pneumococcal Polysaccharide ([id number]) 03/23/2007, 10/23/2015       Active Problems:  Patient Active Problem List   Diagnosis Code    GERD (gastroesophageal reflux disease) K21.9    Hoarseness of voice R49.0    MVP (mitral valve prolapse) I34.1    Depression with suicidal ideation F32. A, R45.851    Lymphocytic colitis K52.832    History of migraine headaches Z86.69    Hyperlipidemia E78.5    Sleep apnea G47.30    IGT (impaired glucose tolerance) R73.02    History of renal calculi Z87.442    Lactose intolerance E73.9    History of seizure disorder Z86.69    Leukopenia D72.819    Bipolar disorder, mixed (HCC) V38.69    Illicit drug use L76.96    Postlaminectomy syndrome, cervical region M96.1    Degeneration of cervical intervertebral disc M50.30    Encounter for long-term (current) use of other medications Z79.899    Bradycardia R00.1    Cellulitis L03.90    Hypokalemia E87.6    Primary hypertension I10    Elevated lipase R74.8    Right lower quadrant abdominal pain R10.31    Nausea R11.0    Diarrhea R19.7 Acute encephalopathy G93.40    Polysubstance abuse ([facility]) F19.10    Fibromyalgia M79.7    Cocaine addiction ([facility]) F14.20    Shoulder arthritis M19.019    Osteoarthritis of left glenohumeral joint M19.012    Abnormal EKG R94.31    Anemia D64.9    Anxiety F41.9    Carotid artery stenosis I65.29    COPD (chronic obstructive pulmonary disease) ([facility]) J44.9    Dental disease K08.9    HUTCHINSON (dyspnea on exertion) R06.09    Fatigue R53.83    Fractures T07. XXXA    H/O gastric bypass Z98.84    History of crack cocaine use Z87.898    History of UTI Z87.440    Injury of back S39. 92XA    Intractable chronic migraine without aura and without status migrainosus G43.719    Intractable migraine with aura with status migrainosus G43. 111    Kidney stones N20.0    Dizziness R42    Memory loss R41.3    Mild pulmonary hypertension ([facility]) I27.20    Mild tricuspid regurgitation I07.1    Primary osteoarthritis of left shoulder M19.012    Seizure-like activity ([facility]) R56.9    Stress at home F43.9    TMJ (dislocation of temporomandibular joint) S03. 00XA    Tobacco abuse Z72.0    Visual impairment H54.7    Internal hernia K45.8    Vitamin D deficiency E55.9    Iron deficiency E61.1    SIRS (systemic inflammatory response syndrome) ([facility]) R65.10    NICKI (acute kidney injury) ([facility] N17.9    Severe malnutrition ([facility]) E43    Elevated d-dimer R79.89    Leukocytosis D72.829    Pulmonary nodules R91.8    Delirium due to another medical condition E88    Acute metabolic encephalopathy D15.67    MRSA bacteremia R78.81, B95.62    Acute septic pulmonary embolism ([facility]) I26.90    Cavitating mass of lung J98.4    Allergy to antibiotic Z88. 1    Thrombocytopenia ([facility]) D69.6    Severe thrombocytopenia ([facility]) D69.6    Severe anemia D64.9    Acute congestive heart failure, unspecified heart failure type ([facility] I50.9    Hypoxia R09.02       Isolation/Infection:   Isolation            Contact          Patient Infection Status       Infection Onset Added Last Indicated Last Indicated By Review Planned Expiration Resolved Resolved By    MRSA 10/10/22 10/12/22 10/21/22 Culture, Anaerobic and Aerobic        Resolved    COVID-19 (Rule Out) 10/10/22 10/10/22 10/10/22 COVID-19 & Influenza Combo (Ordered)   10/10/22 Rule-Out Test Resulted            Nurse Assessment:  Last Vital Signs: BP (!) 164/88   Pulse 66   Temp 98.1 °F (36.7 °C) (Oral)   Resp 18   Ht 4' 11\" (1.499 m)   Wt 103 lb 6.3 oz (46.9 kg)   SpO2 92%   BMI 20.88 kg/m²     Last documented pain score (0-10 scale): Pain Level: 5  Last Weight:   Wt Readings from Last 1 Encounters:   12/08/22 103 lb 6.3 oz (46.9 kg)     Mental Status:  oriented, alert, thought processes intact, and able to concentrate and follow conversation    IV Access:  - None    Nursing Mobility/ADLs:  Walking   Independent  Transfer  Independent  Bathing  Independent  Dressing  Independent  [address] Delivery   whole    Wound Care Documentation and Therapy:        Elimination:  Continence: Bowel: Yes  Bladder: Yes  Urinary Catheter: None   Colostomy/Ileostomy/Ileal Conduit: No       Date of Last BM: 12/10/22    Intake/Output Summary (Last 24 hours) at 12/9/2022 0817  Last data filed at 12/8/2022 2152  Gross per 24 hour   Intake 355 ml   Output 850 ml   Net -495 ml     I/O last 3 completed shifts: In: 36 [P.O.:355]  Out: [address] [Urine:1350]    Safety Concerns: At Risk for Falls    Impairments/Disabilities:      Vision    Nutrition Therapy:  Current Nutrition Therapy:   - Oral Diet:  General and Low Sodium (2gm)    Routes of Feeding: Oral  Liquids: No Restrictions  Daily Fluid Restriction: no  Last Modified Barium Swallow with Video (Video Swallowing Test): not done    Treatments at the Time of Hospital Discharge:   Respiratory Treatments: NEBLIZERS  Oxygen Therapy:  is on oxygen at 2 L/min per nasal cannula.   Ventilator:    - No ventilator support    Rehab Therapies: Physical Therapy and Occupational Therapy  Weight Bearing Status/Restrictions: No weight bearing restrictions  Other Medical Equipment (for information only, NOT a DME order):  New Oxygen. Other Treatments: skilled nursing assessment, medication education and monitoring. Patient's personal belongings (please select all that are sent with patient):  Glasses    RN SIGNATURE:  Electronically signed by Slim Ford RN on 12/10/22 at 2:18 PM EST    CASE MANAGEMENT/SOCIAL WORK SECTION    Inpatient Status Date:     Readmission Risk Assessment Score:  Readmission Risk              Risk of Unplanned Readmission:  38           Discharging to [facility] [address], [address]  P: [phone]   F: [phone]    / signature: Electronically signed by Shelbi Moreira RN on 12/9/22 at 3:59 PM EST    PHYSICIAN SECTION    Prognosis: Fair    Condition at Discharge: Stable    Rehab Potential (if transferring to Rehab): Fair    Recommended Labs or Other Treatments After Discharge:     Physician Certification: I certify the above information and transfer of Aiyana Gage  is necessary for the continuing treatment of the diagnosis listed and that she requires Home Care for greater 30 days.      Update Admission H&P: No change in H&P    PHYSICIAN SIGNATURE:  Electronically signed by Cy Dubose MD on 12/9/22 at 8:18 AM EST

## 2022-12-09 NOTE — CARE COORDINATION
ONGOING DISCHARGE PLAN:    Patient is alert and oriented x4. Spoke with patient regarding discharge plan and patient confirms that plan is still home with [facility]. Possible d/c home today. Following for home O2 and nebulizer. Need home O2 eval ordered. Active order for PO steroids. Will continue to follow for additional discharge needs.     Electronically signed by Faviola Gutierrez RN on 12/9/2022 at 3:58 PM

## 2022-12-09 NOTE — PROGRESS NOTES
12/09/22 1643   Encounter Summary   Encounter Overview/Reason  Attempted Encounter   Service Provided For: Patient not available  (patient with Dr. Jose Lr)

## 2022-12-09 NOTE — PROGRESS NOTES
Pulmonary Progress Note  [facility]      Patient - Aiyana Gage,  Age - 45 y.o.    - 1970      Room Number - 2122/2122-01   N -  [id number]   Acct # - [de-identified]  Date of Admission -  2022  1:52 PM    Consulting Service/Physician   Consulting - Cy Dubose MD  Primary Care Physician - Cy Dubose MD     SUBJECTIVE   Patient is resting quietly on oxygen  To saturations 93 to 99% on 2 L though she was down to 85% earlier this morning. OBJECTIVE   VITALS    height is 4' 11\" (1.499 m) and weight is 103 lb 6.3 oz (46.9 kg). Her oral temperature is 98.2 °F (36.8 °C). Her blood pressure is 147/85 (abnormal) and her pulse is 74. Her respiration is 20 and oxygen saturation is 99%. Body mass index is 20.88 kg/m². Temperature Range: Temp: 98.2 °F (36.8 °C) Temp  Av.2 °F (36.8 °C)  Min: 98 °F (36.7 °C)  Max: 98.4 °F (36.9 °C)  BP Range:  Systolic (44UFF), JTT:145 , Min:138 , BTT:181     Diastolic (91PWP), YDI:50, Min:70, Max:88    Pulse Range: Pulse  Av.9  Min: 66  Max: 84  Respiration Range: Resp  Av.3  Min: 16  Max: 20  Current Pulse Ox[de-identified]  SpO2: 99 %  24HR Pulse Ox Range:  SpO2  Av %  Min: 85 %  Max: 100 %  Oxygen Amount and Delivery: O2 Flow Rate (L/min): 2 L/min    Wt Readings from Last 3 Encounters:   22 103 lb 6.3 oz (46.9 kg)   22 93 lb 11.1 oz (42.5 kg)   22 111 lb 8.8 oz (50.6 kg)       I/O (24 Hours)    Intake/Output Summary (Last 24 hours) at 2022 1646  Last data filed at 2022 1302  Gross per 24 hour   Intake 455 ml   Output 450 ml   Net 5 ml       EXAM     General Appearance  Awake, alert, oriented, in no acute distress  HEENT - normocephalic, atraumatic. Neck - Supple,  trachea midline   Lungs -coarse breath sounds no crackles rales or wheezes  Heart Exam:PMI normal. No lifts, heaves, or thrills. RRR. No murmurs, clicks, gallops, or rubs  Abdomen Exam: Abdomen soft, non-tender.   Extremity Exam: No signs of cyanosis    MEDS      clotrimazole   Topical BID    furosemide  20 mg Oral Daily    amLODIPine  10 mg Oral Daily    ARIPiprazole  7.5 mg Oral Daily    carvedilol  25 mg Oral BID     divalproex  1,000 mg Oral BID    DULoxetine  60 mg Oral Daily    ferrous sulfate  325 mg Oral BID    folic acid  1 mg Oral Daily    ipratropium-albuterol  3 mL Inhalation Q4H WA    potassium chloride  40 mEq Oral Daily    predniSONE  60 mg Oral Daily    Vitamin D  2,000 Units Oral Daily    sodium chloride flush  5-40 mL IntraVENous 2 times per day      sodium chloride       sodium chloride flush, potassium chloride **OR** potassium alternative oral replacement **OR** potassium chloride, magnesium sulfate, sodium chloride flush, sodium chloride, ondansetron **OR** ondansetron, polyethylene glycol, acetaminophen **OR** acetaminophen    LABS   CBC No results for input(s): WBC, HGB, HCT, MCV, PLT in the last 72 hours. BMP:   Lab Results   Component Value Date/Time     12/09/2022 05:05 AM    K 5.1 12/09/2022 05:05 AM     12/09/2022 05:05 AM    CO2 32 12/09/2022 05:05 AM    BUN 19 12/09/2022 05:05 AM    LABALBU 2.6 11/29/2022 06:33 AM    CREATININE 0.70 12/09/2022 05:05 AM    CALCIUM 8.5 12/09/2022 05:05 AM    GFRAA >60 04/08/2022 07:17 AM    LABGLOM >60 12/09/2022 05:05 AM     ABGs:  Lab Results   Component Value Date/Time    PHART 7.452 10/22/2022 03:24 PM    PO2ART 86.7 10/22/2022 03:24 PM    IDX4YSO 42.7 10/22/2022 03:24 PM      Lab Results   Component Value Date/Time    MODE PRVC 12/28/2016 04:36 AM     Ionized Calcium:  No results found for: IONCA  Magnesium:    Lab Results   Component Value Date/Time    MG 2.1 12/09/2022 05:05 AM     Phosphorus:    Lab Results   Component Value Date/Time    PHOS 3.4 11/15/2022 05:19 PM        LIVER PROFILE No results for input(s): AST, ALT, LIPASE, BILIDIR, BILITOT, ALKPHOS in the last 72 hours. Invalid input(s):   AMYLASE,  ALB  INR No results for input(s): INR in the last 72 hours.  PTT   Lab Results   Component Value Date    APTT 27.5 12/05/2022         RADIOLOGY     (See actual reports for details)    ASSESSMENT/PLAN     Patient Active Problem List   Diagnosis    GERD (gastroesophageal reflux disease)    Hoarseness of voice    MVP (mitral valve prolapse)    Depression with suicidal ideation    Lymphocytic colitis    History of migraine headaches    Hyperlipidemia    Sleep apnea    IGT (impaired glucose tolerance)    History of renal calculi    Lactose intolerance    History of seizure disorder    Leukopenia    Bipolar disorder, mixed ([facility]    Illicit drug use    Postlaminectomy syndrome, cervical region    Degeneration of cervical intervertebral disc    Encounter for long-term (current) use of other medications    Bradycardia    Cellulitis    Hypokalemia    Primary hypertension    Elevated lipase    Right lower quadrant abdominal pain    Nausea    Diarrhea    Acute encephalopathy    Polysubstance abuse (HCC)    Fibromyalgia    Cocaine addiction (HCC)    Shoulder arthritis    Osteoarthritis of left glenohumeral joint    Abnormal EKG    Anemia    Anxiety    Carotid artery stenosis    COPD (chronic obstructive pulmonary disease) ([facility]    Dental disease    HUTCHINSON (dyspnea on exertion)    Fatigue    Fractures    H/O gastric bypass    History of crack cocaine use    History of UTI    Injury of back    Intractable chronic migraine without aura and without status migrainosus    Intractable migraine with aura with status migrainosus    Kidney stones    Dizziness    Memory loss    Mild pulmonary hypertension (HCC)    Mild tricuspid regurgitation    Primary osteoarthritis of left shoulder    Seizure-like activity (HCC)    Stress at home    TMJ (dislocation of temporomandibular joint)    Tobacco abuse    Visual impairment    Internal hernia    Vitamin D deficiency    Iron deficiency    SIRS (systemic inflammatory response syndrome) (HCC)    NICKI (acute kidney injury) ([facility]    Se[facility]    Elevated d-dimer    Leukocytosis    Pulmonary nodules    Delirium due to another medical condition    Acute metabolic encephalopathy    MRSA bacteremia    Acute septic pulmonary embolism (HCC)    Cavitating mass of lung    Allergy to antibiotic    Thrombocytopenia (HCC)    Severe thrombocytopenia (HCC)    Severe anemia    Acute congestive heart failure, unspecified heart failure type ([facility]    Hypoxia     IMPRESSION:  1. Hypoxemia. 2.  Recent history in 10/2022 of septic emboli, MRSA bacteremia, pleural  space infections requiring pigtail catheters. ADRIANNE October 19, 2022 no vegetation seen, no interatrial shunt seen  3. History of tobacco use. 4.  History of COPD.  5.  History of E. colic urinary tract infection. 6.  Debilitative state. The patient currently is in a nursing home at  [facility]--- plans are to go live with her father upon discharge  7. Elevated BNP---1653  8. Full code    With blood pressure 147/85, normal creatinine, will give 40 of IV Lasix x1  Nocturnal oximetry study tonight  Home O2 evaluation a.m.     Possible discharge Saturday, December 10    Electronically signed by Sonja Ball MD on 12/9/2022 at 4:46 PM

## 2022-12-09 NOTE — PROGRESS NOTES
Progress Note  Date:2022       PKKO:[id number]  Patient Name:Anu Bourne     YOB: 1970     Age:52 y.o. Subjective    Subjective:  Symptoms:  Resolved. Diet:  Adequate intake. Activity level: Returning to normal.    Pain:  She reports no pain. Review of Systems  Objective         Vitals Last 24 Hours:  TEMPERATURE:  Temp  Av.2 °F (36.8 °C)  Min: 98 °F (36.7 °C)  Max: 98.4 °F (36.9 °C)  RESPIRATIONS RANGE: Resp  Av  Min: 16  Max: 18  PULSE OXIMETRY RANGE: SpO2  Av.9 %  Min: 86 %  Max: 100 %  PULSE RANGE: Pulse  Av.4  Min: 66  Max: 90  BLOOD PRESSURE RANGE: Systolic (24ZQP), MILTON:715 , Min:138 , IQP:823   ; Diastolic (34HQR), OOK:61, Min:70, Max:88    I/O (24Hr): Intake/Output Summary (Last 24 hours) at 2022 0811  Last data filed at 2022 2152  Gross per 24 hour   Intake 355 ml   Output 850 ml   Net -495 ml     Objective:  General Appearance:  Comfortable. Vital signs: (most recent): Blood pressure (!) 164/88, pulse 66, temperature 98.1 °F (36.7 °C), temperature source Oral, resp. rate 18, height 4' 11\" (1.499 m), weight 103 lb 6.3 oz (46.9 kg), SpO2 92 %. (BP elevated). Labs/Imaging/Diagnostics    Labs:  CBC:No results for input(s): WBC, RBC, HGB, HCT, MCV, RDW, PLT in the last 72 hours. CHEMISTRIES:  Recent Labs     22  0510 22  0434 22  0505    137 142   K 3.2* 4.1 5.1   CL 99 103 104   CO2 29 28 32*   BUN 18 19 19   CREATININE 0.65 0.63 0.70   GLUCOSE 118* 63* 78   MG 2.0 1.9 2.1     PT/INR:No results for input(s): PROTIME, INR in the last 72 hours. APTT:No results for input(s): APTT in the last 72 hours. LIVER PROFILE:No results for input(s): AST, ALT, BILIDIR, BILITOT, ALKPHOS in the last 72 hours.     Imaging Last 24 Hours:  CT CHEST PULMONARY EMBOLISM W CONTRAST    Result Date: 2022  EXAMINATION: CTA OF THE CHEST 2022 2:59 pm TECHNIQUE: CTA of the chest was performed after the administration of intravenous contrast.  Multiplanar reformatted images are provided for review. MIP images are provided for review. Automated exposure control, iterative reconstruction, and/or weight based adjustment of the mA/kV was utilized to reduce the radiation dose to as low as reasonably achievable. COMPARISON: None. HISTORY: ORDERING SYSTEM PROVIDED HISTORY: Evaluate for pulmonary embolism. Also reevaluate on lung lesions. History of septic emboli and MRSA TECHNOLOGIST PROVIDED HISTORY: Evaluate for pulmonary embolism. Also reevaluate on lung lesions. History of septic emboli and MRSA Reason for Exam: Acute congestive heart failure, unspecified heart failure type Additional signs and symptoms: INCREASED SOB LOW O2 SAT FINDINGS: Pulmonary Arteries: Pulmonary arteries are adequately opacified for evaluation. No evidence of intraluminal filling defect to suggest pulmonary embolism. Main pulmonary artery is normal in caliber. Mediastinum: No evidence of mediastinal lymphadenopathy. The heart and pericardium demonstrate no acute abnormality. There is no acute abnormality of the thoracic aorta. Lungs/pleura: Most of the previously noted nodular lesions in the lungs have improved or resolved. There is a residual 14 mm nodule noted in the the left upper lobe, series 4, image 24. It previously measured 17 mm. The large nodule noted in the superior segment of the right lower lobe has decreased from 29 mm to 25 mm in maximum dimension. There are however new a bilateral patchy ground-glass opacities, with thickened interlobular septa. These involve all lobes, but are more prominent in the upper lung zones. There is mild bilateral hilar adenopathy. There are no pleural effusions. Upper Abdomen: Limited images of the upper abdomen are unremarkable. Soft Tissues/Bones: No acute bone or soft tissue abnormality. There is no evidence of pulmonary emboli.  There has been resolution or improvement in the previously noted bilateral pulmonary nodules. There is however new patchy bilateral ground-glass opacities with thickened interlobular septa. This involves all lobes, but more prominent noted in the the upper lung zones. There is mild bilateral hilar adenopathy. Differential diagnosis includes infection and hemorrhage.   Clinical correlation required     Assessment//Plan           Hospital Problems             Last Modified POA    * (Principal) Acute congestive heart failure, unspecified heart failure type ([facility] 12/5/2022 Yes    Hypoxia 12/7/2022 Yes     Assessment & Plan    Acute congestive heart failure - normal function on echo 2 months ago - ok for discharge per cardiology    New abnormality on CT chest - management per pulmonology    Discharge planning and final decision by pulmonology    Electronically signed by Truman Mendieta MD on 12/9/22 at 8:11 AM EST

## 2022-12-10 VITALS
RESPIRATION RATE: 18 BRPM | BODY MASS INDEX: 20.84 KG/M2 | TEMPERATURE: 98.4 F | DIASTOLIC BLOOD PRESSURE: 57 MMHG | HEIGHT: 59 IN | OXYGEN SATURATION: 95 % | HEART RATE: 74 BPM | SYSTOLIC BLOOD PRESSURE: 101 MMHG | WEIGHT: 103.4 LBS

## 2022-12-10 LAB
ANION GAP SERPL CALCULATED.3IONS-SCNC: 13 MMOL/L (ref 9–17)
BUN BLDV-MCNC: 29 MG/DL (ref 6–20)
CALCIUM SERPL-MCNC: 8.1 MG/DL (ref 8.6–10.4)
CHLORIDE BLD-SCNC: 100 MMOL/L (ref 98–107)
CO2: 27 MMOL/L (ref 20–31)
CREAT SERPL-MCNC: 0.84 MG/DL (ref 0.5–0.9)
GFR SERPL CREATININE-BSD FRML MDRD: >60 ML/MIN/1.73M2
GLUCOSE BLD-MCNC: 70 MG/DL (ref 70–99)
MAGNESIUM: 1.9 MG/DL (ref 1.6–2.6)
POTASSIUM SERPL-SCNC: 3.7 MMOL/L (ref 3.7–5.3)
SODIUM BLD-SCNC: 140 MMOL/L (ref 135–144)

## 2022-12-10 PROCEDURE — 94640 AIRWAY INHALATION TREATMENT: CPT

## 2022-12-10 PROCEDURE — 6370000000 HC RX 637 (ALT 250 FOR IP): Performed by: FAMILY MEDICINE

## 2022-12-10 PROCEDURE — 6370000000 HC RX 637 (ALT 250 FOR IP): Performed by: INTERNAL MEDICINE

## 2022-12-10 PROCEDURE — 99239 HOSP IP/OBS DSCHRG MGMT >30: CPT | Performed by: FAMILY MEDICINE

## 2022-12-10 PROCEDURE — 80048 BASIC METABOLIC PNL TOTAL CA: CPT

## 2022-12-10 PROCEDURE — 83735 ASSAY OF MAGNESIUM: CPT

## 2022-12-10 PROCEDURE — 36415 COLL VENOUS BLD VENIPUNCTURE: CPT

## 2022-12-10 PROCEDURE — 6370000000 HC RX 637 (ALT 250 FOR IP): Performed by: NURSE PRACTITIONER

## 2022-12-10 PROCEDURE — 94761 N-INVAS EAR/PLS OXIMETRY MLT: CPT

## 2022-12-10 PROCEDURE — 2700000000 HC OXYGEN THERAPY PER DAY

## 2022-12-10 PROCEDURE — 2580000003 HC RX 258: Performed by: FAMILY MEDICINE

## 2022-12-10 RX ORDER — DOXYCYCLINE 100 MG/1
100 CAPSULE ORAL EVERY 12 HOURS SCHEDULED
Status: DISCONTINUED | OUTPATIENT
Start: 2022-12-10 | End: 2022-12-10 | Stop reason: HOSPADM

## 2022-12-10 RX ORDER — IPRATROPIUM BROMIDE AND ALBUTEROL SULFATE 2.5; .5 MG/3ML; MG/3ML
3 SOLUTION RESPIRATORY (INHALATION) 4 TIMES DAILY
Qty: 360 ML | Refills: 2 | Status: SHIPPED | OUTPATIENT
Start: 2022-12-10

## 2022-12-10 RX ORDER — PREDNISONE 10 MG/1
TABLET ORAL
Qty: 31 TABLET | Refills: 0 | Status: SHIPPED | OUTPATIENT
Start: 2022-12-10

## 2022-12-10 RX ORDER — DOXYCYCLINE 100 MG/1
100 CAPSULE ORAL EVERY 12 HOURS SCHEDULED
Qty: 14 CAPSULE | Refills: 0 | Status: SHIPPED | OUTPATIENT
Start: 2022-12-10 | End: 2022-12-17

## 2022-12-10 RX ADMIN — Medication 2000 UNITS: at 09:13

## 2022-12-10 RX ADMIN — FUROSEMIDE 20 MG: 20 TABLET ORAL at 09:13

## 2022-12-10 RX ADMIN — ACETAMINOPHEN 650 MG: 325 TABLET ORAL at 05:20

## 2022-12-10 RX ADMIN — DULOXETINE 60 MG: 60 CAPSULE, DELAYED RELEASE ORAL at 09:14

## 2022-12-10 RX ADMIN — POTASSIUM CHLORIDE 40 MEQ: 1500 TABLET, EXTENDED RELEASE ORAL at 09:13

## 2022-12-10 RX ADMIN — DOXYCYCLINE 100 MG: 100 CAPSULE ORAL at 14:07

## 2022-12-10 RX ADMIN — PREDNISONE 60 MG: 20 TABLET ORAL at 09:13

## 2022-12-10 RX ADMIN — DIVALPROEX SODIUM 1000 MG: 250 TABLET, DELAYED RELEASE ORAL at 09:13

## 2022-12-10 RX ADMIN — FERROUS SULFATE TAB 325 MG (65 MG ELEMENTAL FE) 325 MG: 325 (65 FE) TAB at 09:13

## 2022-12-10 RX ADMIN — FOLIC ACID 1 MG: 1 TABLET ORAL at 09:13

## 2022-12-10 RX ADMIN — SODIUM CHLORIDE, PRESERVATIVE FREE 10 ML: 5 INJECTION INTRAVENOUS at 09:17

## 2022-12-10 RX ADMIN — ARIPIPRAZOLE 7.5 MG: 15 TABLET ORAL at 09:14

## 2022-12-10 RX ADMIN — CARVEDILOL 25 MG: 25 TABLET, FILM COATED ORAL at 09:14

## 2022-12-10 RX ADMIN — CLOTRIMAZOLE: 10 CREAM TOPICAL at 09:17

## 2022-12-10 RX ADMIN — AMLODIPINE BESYLATE 10 MG: 10 TABLET ORAL at 09:13

## 2022-12-10 RX ADMIN — IPRATROPIUM BROMIDE AND ALBUTEROL SULFATE 3 ML: 2.5; .5 SOLUTION RESPIRATORY (INHALATION) at 07:15

## 2022-12-10 RX ADMIN — IPRATROPIUM BROMIDE AND ALBUTEROL SULFATE 3 ML: 2.5; .5 SOLUTION RESPIRATORY (INHALATION) at 10:20

## 2022-12-10 ASSESSMENT — PAIN DESCRIPTION - LOCATION: LOCATION: HEAD

## 2022-12-10 ASSESSMENT — PAIN SCALES - GENERAL: PAINLEVEL_OUTOF10: 5

## 2022-12-10 ASSESSMENT — PAIN DESCRIPTION - ORIENTATION: ORIENTATION: RIGHT

## 2022-12-10 ASSESSMENT — PAIN DESCRIPTION - DESCRIPTORS: DESCRIPTORS: ACHING

## 2022-12-10 NOTE — PROGRESS NOTES
12/09/22 2114   Oxygen Therapy/Pulse Ox   O2 Device None (Room air)   O2 Flow Rate (L/min) 0 L/min   SpO2 96 %   Pulse Oximeter Device Mode Continuous   Pulse Oximeter Device Location Finger   $Pulse Oximeter $Overnight     Noctural POX initiated on room air. Contact respiratory if oxygen is needed for consistent desaturations below 85%.

## 2022-12-10 NOTE — PROGRESS NOTES
Home Oxygen Evaluation    Room air SpO2 at Rest = 86%    Room air with exercise/exertion = n/a    SpO2 on prescribed O2 level at  2 LPM  at rest = 92%     with exercise/exertion =n/a    Pt does qualify for home oxygen at this time

## 2022-12-10 NOTE — PROGRESS NOTES
Patient educated on discharge instructions which include: medication schedule, reasons for new medications and some side effects and need to follow-up. Patient verbalizes understanding of discharge and states readiness for discharge. All belongings were gathered by patient and in patient's possession. No distress noted at this time.      Current vital signs:  BP (!) 101/57   Pulse 74   Temp 98.4 °F (36.9 °C) (Oral)   Resp 18   Ht 4' 11\" (1.499 m)   Wt 103 lb 6.3 oz (46.9 kg)   SpO2 95%   BMI 20.88 kg/m²                MEWS Score: 2

## 2022-12-10 NOTE — PROGRESS NOTES
Progress Note  Date:12/10/2022       SL:[id number]/[id number]  Patient Name:Anu Bourne     YOB: 1970     Age:52 y.o. Subjective    Subjective:  Symptoms:  Resolved. Diet:  Adequate intake. Activity level: Returning to normal.    Pain:  She reports no pain. Review of Systems  Review of Systems - General ROS: positive for  - fatigue  Respiratory ROS: positive for - COPD  Cardiovascular ROS: positive for - bradycardia & hypotension  Gastrointestinal ROS: positive for - heartburn    Objective         Vitals Last 24 Hours:  TEMPERATURE:  Temp  Av.9 °F (36.6 °C)  Min: 97.5 °F (36.4 °C)  Max: 98.2 °F (36.8 °C)  RESPIRATIONS RANGE: Resp  Av.1  Min: 16  Max: 20  PULSE OXIMETRY RANGE: SpO2  Av.3 %  Min: 85 %  Max: 100 %  PULSE RANGE: Pulse  Av.9  Min: 65  Max: 84  BLOOD PRESSURE RANGE: Systolic (88LVD), FILIPE:435 , Min:122 , WTW:952   ; Diastolic (72TTF), OOD:87, Min:63, Max:85    I/O (24Hr): Intake/Output Summary (Last 24 hours) at 12/10/2022 0758  Last data filed at 12/10/2022 0510  Gross per 24 hour   Intake 955 ml   Output 600 ml   Net 355 ml       Objective:  General Appearance:  Comfortable. Vital signs: (most recent): Blood pressure 130/82, pulse 66, temperature 98.2 °F (36.8 °C), temperature source Oral, resp. rate 18, height 4' 11\" (1.499 m), weight 103 lb 6.3 oz (46.9 kg), SpO2 99 %. (BP elevated). Labs/Imaging/Diagnostics    Labs:  CBC:No results for input(s): WBC, RBC, HGB, HCT, MCV, RDW, PLT in the last 72 hours. CHEMISTRIES:  Recent Labs     22  0434 22  0505 12/10/22  0535    142 140   K 4.1 5.1 3.7    104 100   CO2 28 32* 27   BUN 19 19 29*   CREATININE 0.63 0.70 0.84   GLUCOSE 63* 78 70   MG 1.9 2.1 1.9       PT/INR:No results for input(s): PROTIME, INR in the last 72 hours. APTT:No results for input(s): APTT in the last 72 hours.   LIVER PROFILE:No results for input(s): AST, ALT, BILIDIR, BILITOT, ALKPHOS in the last 72 hours.    Imaging Last 24 Hours:  CT CHEST PULMONARY EMBOLISM W CONTRAST    Result Date: 12/8/2022  EXAMINATION: CTA OF THE CHEST 12/8/2022 2:59 pm TECHNIQUE: CTA of the chest was performed after the administration of intravenous contrast.  Multiplanar reformatted images are provided for review. MIP images are provided for review. Automated exposure control, iterative reconstruction, and/or weight based adjustment of the mA/kV was utilized to reduce the radiation dose to as low as reasonably achievable. COMPARISON: None. HISTORY: ORDERING SYSTEM PROVIDED HISTORY: Evaluate for pulmonary embolism. Also reevaluate on lung lesions. History of septic emboli and MRSA TECHNOLOGIST PROVIDED HISTORY: Evaluate for pulmonary embolism. Also reevaluate on lung lesions. History of septic emboli and MRSA Reason for Exam: Acute congestive heart failure, unspecified heart failure type Additional signs and symptoms: INCREASED SOB LOW O2 SAT FINDINGS: Pulmonary Arteries: Pulmonary arteries are adequately opacified for evaluation. No evidence of intraluminal filling defect to suggest pulmonary embolism. Main pulmonary artery is normal in caliber. Mediastinum: No evidence of mediastinal lymphadenopathy. The heart and pericardium demonstrate no acute abnormality. There is no acute abnormality of the thoracic aorta. Lungs/pleura: Most of the previously noted nodular lesions in the lungs have improved or resolved. There is a residual 14 mm nodule noted in the the left upper lobe, series 4, image 24. It previously measured 17 mm. The large nodule noted in the superior segment of the right lower lobe has decreased from 29 mm to 25 mm in maximum dimension. There are however new a bilateral patchy ground-glass opacities, with thickened interlobular septa. These involve all lobes, but are more prominent in the upper lung zones. There is mild bilateral hilar adenopathy. There are no pleural effusions.  Upper Abdomen: Limited images of the upper abdomen are unremarkable. Soft Tissues/Bones: No acute bone or soft tissue abnormality. There is no evidence of pulmonary emboli. There has been resolution or improvement in the previously noted bilateral pulmonary nodules. There is however new patchy bilateral ground-glass opacities with thickened interlobular septa. This involves all lobes, but more prominent noted in the the upper lung zones. There is mild bilateral hilar adenopathy. Differential diagnosis includes infection and hemorrhage. Clinical correlation required     Assessment//Plan           Hospital Problems             Last Modified POA    * (Principal) Acute congestive heart failure, unspecified heart failure type ([facility] [address] 12/5/2022 Yes    Hypoxia 12/7/2022 Yes   Assessment & Plan    Acute congestive heart failure - normal function on echo 2 months ago - ok for discharge per cardiology    New abnormality on CT chest - management per pulmonology    Pulmonology okay with discharge - okay to D/C home with Father. Follow up in the office next week with Dr. Layne Christianson.     Electronically signed by Demetrio Antunez MD on 12/10/2022 at 7:58 AM

## 2022-12-10 NOTE — CARE COORDINATION
Continuity of Care Form    Patient Name: Makenna Mckeon   :  1970  MRN:  [id number]    Admit date:  2022  Discharge date:  12/10/22    Code Status Order: Full Code   Advance Directives:     Admitting Physician:  Elena Deleon MD  PCP: Elena Deleon MD    Discharging Nurse: [facility] Unit/Room#: 2122/2122-01  Discharging Unit Phone Number: [phone]    Emergency Contact:   Extended Emergency Contact Information  Primary Emergency Contact: Nicolas Felipe  Address: [address], [address] [place] [address]  Home Phone: [phone]  Relation: Parent  Secondary Emergency Contact: [address] Phone: [phone]  Relation: Child    Past Surgical History:  Past Surgical History:   Procedure Laterality Date    ANKLE FRACTURE SURGERY      recontruction surgery    APPENDECTOMY      BREAST LUMPECTOMY Right     CARDIAC CATHETERIZATION      CARPAL TUNNEL RELEASE      x2     SECTION      CHOLECYSTECTOMY      COLONOSCOPY  10    COLONOSCOPY  2016    severe spasms    COLONOSCOPY  2019    DR GOLDY MCINTOSH    CT BONE MARROW BIOPSY  2022    CT BONE MARROW BIOPSY 2022 [facility] CT SCAN    GASTRIC BYPASS SURGERY      GASTRIC BYPASS SURGERY      HYSTERECTOMY (CERVIX STATUS UNKNOWN)      HYSTERECTOMY (CERVIX STATUS UNKNOWN)      IR PERC CATH PLEURAL DRAIN W/IMAG  10/21/2022    IR PERC CATH PLEURAL DRAIN W/IMAG 10/21/2022 [facility] PROC[facility]    IR PERC CATH PLEURAL DRAIN W/IMAG  10/21/2022    IR PERC CATH PLEURAL DRAIN W/IMAG 10/21/2022 [facility]    IR PERC CATH PLEURAL DRAIN W/IMAG  10/24/2022    IR PERC CATH PLEURAL DRAIN W/IMAG 10/24/2022 [facility] PROC[facility]    LAPAROSCOPY N/A 2022    LAPAROSCOPY EXPLORATORY CONVERTED TO OPEN EXPLORATORY LAPAROTOMY, LYSIS OF ADHESIONS, REDUCTION OF INTERNAL HERNIA performed by Mil Eden DO at [address] [place] [address]      APPLICATION OF ARCH BARS,SIMPLE EXTRACTION OF #15 AND RT TMJ JOINT REPLACEMENT    SHOULDER ARTHROSCOPY Left 06/05/2019    DR ROBERT GREEN    SHOULDER SURGERY      TONSILLECTOMY AND ADENOIDECTOMY      TRANSESOPHAGEAL ECHOCARDIOGRAM N/A 10/19/2022    TRANSESOPHAGEAL ECHOCARDIOGRAM WITH BUBBLE STUDY performed by Frandy Royal DO at [address]  7-3-12    egd    UPPER GASTROINTESTINAL ENDOSCOPY  12/19/2016    status post gastrectomy with a small remnant of gastric pouch. UPPER GASTROINTESTINAL ENDOSCOPY  05/2019    DR Moncho Ochoa       Immunization History:   Immunization History   Administered Date(s) Administered    COVID-19, PFIZER PURPLE top, DILUTE for use, (age 15 y+), 30mcg/0.3mL 03/15/2021, 04/05/2021    Influenza Virus Vaccine 11/07/2006, 10/24/2008, 09/16/2014, 10/23/2015, 10/12/2016, 01/11/2019    Influenza, AFLURIA (age 1 yrs+), FLUZONE, (age 10 mo+), MDV, 0.5mL 10/12/2016, 10/15/2019    Pneumococcal Polysaccharide (Utdfidres35) 03/23/2007, 10/23/2015       Active Problems:  Patient Active Problem List   Diagnosis Code    GERD (gastroesophageal reflux disease) K21.9    Hoarseness of voice R49.0    MVP (mitral valve prolapse) I34.1    Depression with suicidal ideation F32. A, R45.851    Lymphocytic colitis K52.832    History of migraine headaches Z86.69    Hyperlipidemia E78.5    Sleep apnea G47.30    IGT (impaired glucose tolerance) R73.02    History of renal calculi Z87.442    Lactose intolerance E73.9    History of seizure disorder Z86.69    Leukopenia D72.819    Bipolar disorder, mixed (HCC) B20.94    Illicit drug use S19.29    Postlaminectomy syndrome, cervical region M96.1    Degeneration of cervical intervertebral disc M50.30    Encounter for long-term (current) use of other medications Z79.899    Bradycardia R00.1    Cellulitis L03.90    Hypokalemia E87.6    Primary hypertension I10    Elevated lipase R74.8    Right lower quadrant abdominal pain R10.31    Nausea R11.0    Diarrhea R19.7 Acute encephalopathy G93.40    Polysubstance abuse ([facility]) F19.10    Fibromyalgia M79.7    Cocaine addiction ([facility]) F14.20    Shoulder arthritis M19.019    Osteoarthritis of left glenohumeral joint M19.012    Abnormal EKG R94.31    Anemia D64.9    Anxiety F41.9    Carotid artery stenosis I65.29    COPD (chronic obstructive pulmonary disease) ([facility]) J44.9    Dental disease K08.9    HUTCHINSON (dyspnea on exertion) R06.09    Fatigue R53.83    Fractures T07. XXXA    H/O gastric bypass Z98.84    History of crack cocaine use Z87.898    History of UTI Z87.440    Injury of back S39. 92XA    Intractable chronic migraine without aura and without status migrainosus G43.719    Intractable migraine with aura with status migrainosus G43. 111    Kidney stones N20.0    Dizziness R42    Memory loss R41.3    Mild pulmonary hypertension ([facility]) I27.20    Mild tricuspid regurgitation I07.1    Primary osteoarthritis of left shoulder M19.012    Seizure-like activity ([facility]) R56.9    Stress at home F43.9    TMJ (dislocation of temporomandibular joint) S03. 00XA    Tobacco abuse Z72.0    Visual impairment H54.7    Internal hernia K45.8    Vitamin D deficiency E55.9    Iron deficiency E61.1    SIRS (systemic inflammatory response syndrome) ([facility]) R65.10    NICKI (acute kidney injury) ([facility] [address] N17.9    Severe malnutrition ([facility]) E43    Elevated d-dimer R79.89    Leukocytosis D72.829    Pulmonary nodules R91.8    Delirium due to another medical condition D28    Acute metabolic encephalopathy M74.69    MRSA bacteremia R78.81, B95.62    Acute septic pulmonary embolism ([facility]) I26.90    Cavitating mass of lung J98.4    Allergy to antibiotic Z88. 1    Thrombocytopenia ([facility]) D69.6    Severe thrombocytopenia ([facility]) D69.6    Severe anemia D64.9    Acute congestive heart failure, unspecified heart failure type ([facility] [address] I50.9    Hypoxia R09.02       Isolation/Infection:   Isolation            Contact          Patient Infection Status       Infection Onset Added Last Indicated Last Indicated By Review Planned Expiration Resolved Resolved By    MRSA 10/10/22 10/12/22 10/21/22 Culture, Anaerobic and Aerobic        Resolved    COVID-19 (Rule Out) 10/10/22 10/10/22 10/10/22 COVID-19 & Influenza Combo (Ordered)   10/10/22 Rule-Out Test Resulted            Nurse Assessment:  Last Vital Signs: BP (!) 164/88   Pulse 66   Temp 98.1 °F (36.7 °C) (Oral)   Resp 18   Ht 4' 11\" (1.499 m)   Wt 103 lb 6.3 oz (46.9 kg)   SpO2 92%   BMI 20.88 kg/m²     Last documented pain score (0-10 scale): Pain Level: 5  Last Weight:   Wt Readings from Last 1 Encounters:   12/08/22 103 lb 6.3 oz (46.9 kg)     Mental Status:  oriented, alert, thought processes intact, and able to concentrate and follow conversation    IV Access:  - None    Nursing Mobility/ADLs:  Walking   Independent  Transfer  Independent  Bathing  Independent  Dressing  Independent  [address] Delivery   whole    Wound Care Documentation and Therapy:        Elimination:  Continence: Bowel: Yes  Bladder: Yes  Urinary Catheter: None   Colostomy/Ileostomy/Ileal Conduit: No       Date of Last BM: 12/10/22    Intake/Output Summary (Last 24 hours) at 12/9/2022 0817  Last data filed at 12/8/2022 2152  Gross per 24 hour   Intake 355 ml   Output 850 ml   Net -495 ml     I/O last 3 completed shifts: In: 36 [P.O.:355]  Out: [address] [Urine:1350]    Safety Concerns: At Risk for Falls    Impairments/Disabilities:      Vision    Nutrition Therapy:  Current Nutrition Therapy:   - Oral Diet:  General and Low Sodium (2gm)    Routes of Feeding: Oral  Liquids: No Restrictions  Daily Fluid Restriction: no  Last Modified Barium Swallow with Video (Video Swallowing Test): not done    Treatments at the Time of Hospital Discharge:   Respiratory Treatments: NEBLIZERS  Oxygen Therapy:  is on oxygen at 2 L/min per nasal cannula.   Ventilator:    - No ventilator support    Rehab Therapies: Physical Therapy and Occupational Therapy  Weight Bearing Status/Restrictions: No weight bearing restrictions  Other Medical Equipment (for information only, NOT a DME order):  New Oxygen. Other Treatments: skilled nursing assessment, medication education and monitoring. Patient's personal belongings (please select all that are sent with patient):  Glasses    RN SIGNATURE:  Electronically signed by Isabelle Dominguez RN on 12/10/22 at 2:18 PM EST    CASE MANAGEMENT/SOCIAL WORK SECTION    Inpatient Status Date:     Readmission Risk Assessment Score:  Readmission Risk              Risk of Unplanned Readmission:  38           Discharging to [address]  P: [phone]   F: [phone]    / signature: Electronically signed by Mikey Samayoa RN on 12/9/22 at 3:59 PM EST    PHYSICIAN SECTION    Prognosis: Fair    Condition at Discharge: Stable    Rehab Potential (if transferring to Rehab): Fair    Recommended Labs or Other Treatments After Discharge:     Physician Certification: I certify the above information and transfer of Amie Hill  is necessary for the continuing treatment of the diagnosis listed and that she requires Home Care for greater 30 days. Update Admission H&P: No change in H&P    PHYSICIAN SIGNATURE:  Electronically signed by Travis Richardson MD on 12/9/22 at 8:18 AM EST    Medication List    START taking these medications    START taking these medications   clotrimazole 1 % cream  Commonly known as: LOTRIMIN  Apply topically 2 times daily.    doxycycline monohydrate 100 MG capsule  Commonly known as: MONODOX  Take 1 capsule by mouth every 12 hours for 14 doses   furosemide 20 MG tablet  Commonly known as: LASIX  Take 1 tablet by mouth daily     CHANGE how you take these medications    CHANGE how you take these medications   ipratropium-albuterol 0.5-2.5 (3) MG/3ML Soln nebulizer solution  Commonly known as: DUONEB  Inhale 3 mLs into the lungs 4 times daily  What changed: when to take this   * potassium chloride 20 MEQ extended release tablet  Commonly known as: KLOR-CON M  Take 2 tablets by mouth daily  What changed: Another medication with the same name was changed. Make sure you understand how and when to take each. * potassium chloride 20 MEQ extended release tablet  Commonly known as: KLOR-CON M  Take 2 tablets by mouth 2 times daily  What changed:   how much to take  when to take this   predniSONE 10 MG tablet  Commonly known as: DELTASONE  Take 40 mg for 3 days, then 30 mg for 3 days, then 20 mg for 3 days, then 10 mg for 4 days then stop. What changed:   medication strength  how much to take  how to take this  when to take this  additional instructions    very important  * This list has 2 medication(s) that are the same as other medications prescribed for you. Read the directions carefully, and ask your doctor or other care provider to review them with you.        CONTINUE taking these medications    CONTINUE taking these medications   amLODIPine 10 MG tablet  Commonly known as: NORVASC  Take 1 tablet by mouth daily   ARIPiprazole 15 MG tablet  Commonly known as: ABILIFY  Take 0.5 tablets by mouth daily   carvedilol 25 MG tablet  Commonly known as: COREG  TAKE 1 TABLET BY MOUTH IN THE MORNING AND 1 IN THE EVENING WITH MEALS   divalproex 500 MG DR tablet  Commonly known as: DEPAKOTE  Take 2 tablets by mouth 2 times daily Indications: Level 41 as of 7/22/16 (Pt takes two 500 mg DR tabs = 1,000mg PO BID)   DULoxetine 60 MG extended release capsule  Commonly known as: CYMBALTA  Take 1 capsule by mouth daily   ferrous sulfate 325 (65 Fe) MG tablet  Commonly known as: FeroSul  Take 1 tablet by mouth in the morning and at bedtime   folic acid 1 MG tablet  Commonly known as: FOLVITE  Take 1 tablet by mouth daily   vitamin D 50 MCG (2000 UT) Tabs tablet  Commonly known as: CHOLECALCIFEROL  Take 1 tablet by mouth daily   Where to Get Your Medications      These medications were sent to [facility], [address] [place] [address]  Phone: [phone]       clotrimazole 1 % cream        furosemide 20 MG tablet        potassium chloride 20 MEQ extended release tablet      You can get these medications from any pharmacy    Bring a paper prescription for each of these medications      doxycycline monohydrate 100 MG capsule        ipratropium-albuterol 0.5-2.5 (3) MG/3ML Soln nebulizer solution        predniSONE 10 MG tablet             Printing Report    Report Name Print   Discharge Meds Print

## 2022-12-10 NOTE — PROGRESS NOTES
Pulmonary Progress Note  [facility]      Patient - Olayinka Srivastava,  Age - 45 y.o.    - 1970      Room Number - 2122/2122-01   MRN -  [id number]   Acct # - [de-identified]  Date of Admission -  2022  1:52 PM        Consulting Ember Arambula MD  Primary Care Physician - Myla Casper MD     SUBJECTIVE   She looks comfortable, did qualify for oxygen with oxygen desaturation down to 86% room air    OBJECTIVE   VITALS    height is 4' 11\" (1.499 m) and weight is 103 lb 6.3 oz (46.9 kg). Her oral temperature is 98.2 °F (36.8 °C). Her blood pressure is 130/82 and her pulse is 69. Her respiration is 18 and oxygen saturation is 94%. Body mass index is 20.88 kg/m². Temperature Range: Temp: 98.2 °F (36.8 °C) Temp  Av.8 °F (36.6 °C)  Min: 97.5 °F (36.4 °C)  Max: 98.2 °F (36.8 °C)  BP Range:  Systolic (97AKZ), FLS:786 , Min:122 , TYZ:342     Diastolic (67OUO), FTV:40, Min:63, Max:82    Pulse Range: Pulse  Av.5  Min: 65  Max: 75  Respiration Range: Resp  Av.1  Min: 17  Max: 20  Current Pulse Ox[de-identified]  SpO2: 94 %  24HR Pulse Ox Range:  SpO2  Av.8 %  Min: 90 %  Max: 100 %  Oxygen Amount and Delivery: O2 Flow Rate (L/min): 2 L/min    Wt Readings from Last 3 Encounters:   22 103 lb 6.3 oz (46.9 kg)   22 93 lb 11.1 oz (42.5 kg)   22 111 lb 8.8 oz (50.6 kg)       I/O (24 Hours)    Intake/Output Summary (Last 24 hours) at 12/10/2022 1207  Last data filed at 12/10/2022 0900  Gross per 24 hour   Intake 1095 ml   Output 600 ml   Net 495 ml       EXAM     General Appearance  Awake, alert, oriented, in no acute distress  HEENT - normocephalic, atraumatic.  []  Mallampati  [] Crowded airway   [] Macroglossia  []  Retrognathia  [] Micrognathia  []  Normal tongue size []  Normal Bite  [] Alise sign positive    Neck - Supple,  trachea midline   Lungs -diminished occasional crackle  Cardiovascular - Heart sounds are normal.  Regular rate and rhythm   Abdomen - Soft, nontender, nondistended, no masses or organomegaly  Neurologic - There are no focal motor or sensory deficits  Skin - No bruising or bleeding  Extremities - No clubbing, cyanosis, edema    MEDS      traZODone  100 mg Oral Nightly    clotrimazole   Topical BID    furosemide  20 mg Oral Daily    amLODIPine  10 mg Oral Daily    ARIPiprazole  7.5 mg Oral Daily    carvedilol  25 mg Oral BID WC    divalproex  1,000 mg Oral BID    DULoxetine  60 mg Oral Daily    ferrous sulfate  325 mg Oral BID    folic acid  1 mg Oral Daily    ipratropium-albuterol  3 mL Inhalation Q4H WA    potassium chloride  40 mEq Oral Daily    Vitamin D  2,000 Units Oral Daily    sodium chloride flush  5-40 mL IntraVENous 2 times per day      sodium chloride       sodium chloride flush, potassium chloride **OR** potassium alternative oral replacement **OR** potassium chloride, magnesium sulfate, sodium chloride flush, sodium chloride, ondansetron **OR** ondansetron, polyethylene glycol, acetaminophen **OR** acetaminophen    LABS   CBC No results for input(s): WBC, HGB, HCT, MCV, PLT in the last 72 hours.   BMP:   Lab Results   Component Value Date/Time     12/10/2022 05:35 AM    K 3.7 12/10/2022 05:35 AM     12/10/2022 05:35 AM    CO2 27 12/10/2022 05:35 AM    BUN 29 12/10/2022 05:35 AM    LABALBU 2.6 11/29/2022 06:33 AM    CREATININE 0.84 12/10/2022 05:35 AM    CALCIUM 8.1 12/10/2022 05:35 AM    GFRAA >60 04/08/2022 07:17 AM    LABGLOM >60 12/10/2022 05:35 AM     ABGs:  Lab Results   Component Value Date/Time    PHART 7.452 10/22/2022 03:24 PM    PO2ART 86.7 10/22/2022 03:24 PM    KYP9LMS 42.7 10/22/2022 03:24 PM      Lab Results   Component Value Date/Time    MODE PRVC 12/28/2016 04:36 AM     Ionized Calcium:  No results found for: IONCA  Magnesium:    Lab Results   Component Value Date/Time    MG 1.9 12/10/2022 05:35 AM     Phosphorus:    Lab Results   Component Value Date/Time    PHOS 3.4 11/15/2022 05:19 PM        LIVER PROFILE No results for input(s): AST, ALT, LIPASE, BILIDIR, BILITOT, ALKPHOS in the last 72 hours. Invalid input(s): AMYLASE,  ALB  INR No results for input(s): INR in the last 72 hours. PTT   Lab Results   Component Value Date    APTT 27.5 12/05/2022         RADIOLOGY     (See actual reports for details)    ASSESSMENT/PLAN       Hypoxemia-qualifies for oxygen therapy  CHF with normal ejection fraction  COPD-by history PFTs in 2019 were normal  History of polysubstance abuse including crack cocaine  History of recent MRSA bacteremia and septic emboli  History of tobacco use  History of COVID infection at the end of September    She does qualify for oxygen therapy and she will be sent home, face-to-face discussion was done on the benefits of oxygen therapy  Does qualify for nebulizer therapy and a face-to-face question was done.   She does not have a nebulizer machine at home  Will need outpatient pulmonary function testing  Will need a repeat CT of the chest in approximately 6 weeks  I will send her home on doxycycline for 7 days  We will also send her home on a prednisone taper  Continue oral Lasix as an outpatient, probably has a component of diastolic dysfunction  Smoking cessation advised  Told her if she feels worse she should just come back to the hospital      Electronically signed by Charis Sibley MD on 12/10/2022 at 12:07 PM

## 2022-12-10 NOTE — CARE COORDINATION
ONGOING DISCHARGE PLAN:    Patient is alert and oriented x4. Spoke with patient regarding discharge plan and patient confirms that plan is still to DC to home w/ [facility], [address] Pt. Did qualify for Home Oxygen. Writer faxed Home O2 Eval, Nocturnal study, Face to Face Notes, Med List, DME orders for Oxygen & Nebulizer to [facility]. Amrita Cooper, notified, to follow. Unsure if pt. Will DC today. Per Nursing, Pt. Having Low BP. 89/46. Will follow. Will continue to follow for additional discharge needs.     Electronically signed by Steve Bains RN on 12/10/2022 at 1:15 PM

## 2022-12-10 NOTE — PROGRESS NOTES
Pt requesting Trazodone 100mg at night for sleep, Rn notified Dr. Johnathon harrell to start trazodone 100mg nightly.

## 2022-12-10 NOTE — CARE COORDINATION
HOME OXYGEN:    Portable 02 tank delivered per [facility]. 02 tank turned on and noted to be full. Patient understands to call [phone] or If any problems, call Azael Trinidad. immediately upon arrival home to have 02 concentrator delivered.     Electronically signed by Steve Bains RN on 12/10/2022 at 2:13 PM

## 2022-12-10 NOTE — PROGRESS NOTES
12/10/22 1148   Encounter Summary   Encounter Overview/Reason  Spiritual/Emotional Needs   Service Provided For: Patient   Referral/Consult From: Rounding   Complexity of Encounter Low   Spiritual/Emotional needs   Type Spiritual Support   Assessment/Intervention/Outcome   Assessment Unable to assess   Intervention Prayer (assurance of)/[place]

## 2022-12-12 ENCOUNTER — CARE COORDINATION (OUTPATIENT)
Dept: CARE COORDINATION | Age: 52
End: 2022-12-12

## 2022-12-12 LAB
EKG ATRIAL RATE: 81 BPM
EKG P AXIS: 13 DEGREES
EKG P-R INTERVAL: 142 MS
EKG Q-T INTERVAL: 388 MS
EKG QRS DURATION: 84 MS
EKG QTC CALCULATION (BAZETT): 450 MS
EKG R AXIS: 29 DEGREES
EKG T AXIS: 46 DEGREES
EKG VENTRICULAR RATE: 81 BPM

## 2022-12-12 PROCEDURE — 93010 ELECTROCARDIOGRAM REPORT: CPT | Performed by: INTERNAL MEDICINE

## 2022-12-12 NOTE — LETTER
12/16/2022    Bassam Mills [address]15.  David June 2022  [address] [place] [id number]    Dear Bassam Varela,    I enjoyed speaking with you and wanted to send some additional information. Kerri Barrera MD and I will work together to ensure your needs are met and help you achieve your health goals. We are committed to walk with you on this journey and look forward to working with you. Please feel free to contact me with any questions or concerns. I am available by phone. You can reach me at [phone].       In good health,     Yuly Loving RN

## 2022-12-14 NOTE — DISCHARGE SUMMARY
Family Medicine Discharge Summary    Konstantin Quiroz  :  1970  MRN:  [id number]    Admit date:  2022  Discharge date:  12/10/2022    Admitting Physician:  Jimmy Looney MD    Discharge Diagnoses:    Patient Active Problem List   Diagnosis    GERD (gastroesophageal reflux disease)    Hoarseness of voice    MVP (mitral valve prolapse)    Depression with suicidal ideation    Lymphocytic colitis    History of migraine headaches    Hyperlipidemia    Sleep apnea    IGT (impaired glucose tolerance)    History of renal calculi    Lactose intolerance    History of seizure disorder    Leukopenia    Bipolar disorder, mixed ([facility]    Illicit drug use    Postlaminectomy syndrome, cervical region    Degeneration of cervical intervertebral disc    Encounter for long-term (current) use of other medications    Bradycardia    Cellulitis    Hypokalemia    Primary hypertension    Elevated lipase    Right lower quadrant abdominal pain    Nausea    Diarrhea    Acute encephalopathy    Polysubstance abuse (HCC)    Fibromyalgia    Cocaine addiction (HCC)    Shoulder arthritis    Osteoarthritis of left glenohumeral joint    Abnormal EKG    Anemia    Anxiety    Carotid artery stenosis    COPD (chronic obstructive pulmonary disease) ([facility]    Dental disease    HUTCHINSON (dyspnea on exertion)    Fatigue    Fractures    H/O gastric bypass    History of crack cocaine use    History of UTI    Injury of back    Intractable chronic migraine without aura and without status migrainosus    Intractable migraine with aura with status migrainosus    Kidney stones    Dizziness    Memory loss    Mild pulmonary hypertension (HCC)    Mild tricuspid regurgitation    Primary osteoarthritis of left shoulder    Seizure-like activity (HCC)    Stress at home    TMJ (dislocation of temporomandibular joint)    Tobacco abuse    Visual impairment    Internal hernia    Vitamin D deficiency    Iron deficiency    SIRS (systemic inflammatory response syndrome) ([facility]    NICKI (acute kidney injury) ([facility]    Severe malnutrition ([facility]    Elevated d-dimer    Leukocytosis    Pulmonary nodules    Delirium due to another medical condition    Acute metabolic encephalopathy    MRSA bacteremia    Acute septic pulmonary embolism (HCC)    Cavitating mass of lung    Allergy to antibiotic    Thrombocytopenia (HCC)    Severe thrombocytopenia (HCC)    Severe anemia    Acute congestive heart failure, unspecified heart failure type ([facility]    Hypoxia       Admission Condition:  guarded  Discharged Condition:  fair    Hospital Course:   44 yo admitted with shortness of breath. She was found to be in acute CHF. Diuresis relieved her symptoms. Hypoxemia persisted thorughout her admission, however, and she was sent home with oxygen. Discharge Medications:         Medication List        START taking these medications      clotrimazole 1 % cream  Commonly known as: LOTRIMIN  Apply topically 2 times daily. doxycycline monohydrate 100 MG capsule  Commonly known as: MONODOX  Take 1 capsule by mouth every 12 hours for 14 doses     furosemide 20 MG tablet  Commonly known as: LASIX  Take 1 tablet by mouth daily            CHANGE how you take these medications      ipratropium-albuterol 0.5-2.5 (3) MG/3ML Soln nebulizer solution  Commonly known as: DUONEB  Inhale 3 mLs into the lungs 4 times daily  What changed: when to take this     * potassium chloride 20 MEQ extended release tablet  Commonly known as: KLOR-CON M  Take 2 tablets by mouth daily  What changed: Another medication with the same name was changed. Make sure you understand how and when to take each.      * potassium chloride 20 MEQ extended release tablet  Commonly known as: KLOR-CON M  Take 2 tablets by mouth 2 times daily  What changed:   how much to take  when to take this     predniSONE 10 MG tablet  Commonly known as: DELTASONE  Take 40 mg for 3 days, then 30 mg for 3 days, then 20 mg for 3 days, then 10 mg for 4 days then stop.  What changed:   medication strength  how much to take  how to take this  when to take this  additional instructions           * This list has 2 medication(s) that are the same as other medications prescribed for you. Read the directions carefully, and ask your doctor or other care provider to review them with you.                 CONTINUE taking these medications      amLODIPine 10 MG tablet  Commonly known as: NORVASC  Take 1 tablet by mouth daily     ARIPiprazole 15 MG tablet  Commonly known as: ABILIFY  Take 0.5 tablets by mouth daily     carvedilol 25 MG tablet  Commonly known as: COREG  TAKE 1 TABLET BY MOUTH IN THE MORNING AND 1 IN THE EVENING WITH MEALS     divalproex 500 MG DR tablet  Commonly known as: DEPAKOTE  Take 2 tablets by mouth 2 times daily Indications: Level 41 as of 7/22/16 (Pt takes two 500 mg DR tabs = 1,000mg PO BID)     DULoxetine 60 MG extended release capsule  Commonly known as: CYMBALTA  Take 1 capsule by mouth daily     ferrous sulfate 325 (65 Fe) MG tablet  Commonly known as: FeroSul  Take 1 tablet by mouth in the morning and at bedtime     folic acid 1 MG tablet  Commonly known as: FOLVITE  Take 1 tablet by mouth daily     vitamin D 50 MCG (2000 UT) Tabs tablet  Commonly known as: CHOLECALCIFEROL  Take 1 tablet by mouth daily               Where to Get Your Medications        These medications were sent to [facility], [address] [place] [address]      Phone: [phone]   clotrimazole 1 % cream  furosemide 20 MG tablet  potassium chloride 20 MEQ extended release tablet       You can get these medications from any pharmacy    Bring a paper prescription for each of these medications  doxycycline monohydrate 100 MG capsule  ipratropium-albuterol 0.5-2.5 (3) MG/3ML Soln nebulizer solution  predniSONE 10 MG tablet         Consults:  cardiology, pulmonology    Significant Diagnostic Studies:  labs, radiology    Treatments:   supportive therapies    Disposition:   home  Follow up with Rg Cunningham MD in 1-2 weeks. Signed:   Luis Sams MD, FAAFP  12/14/2022, 4:50 PM

## 2022-12-14 NOTE — DISCHARGE SUMMARY
Family Medicine Discharge Summary    Noble Cowart  :  1970  MRN:  [id number]    Admit date:  11/15/2022  Discharge date:  2022    Admitting Physician:  Henrik Issa MD    Discharge Diagnoses:    Patient Active Problem List   Diagnosis    GERD (gastroesophageal reflux disease)    Hoarseness of voice    MVP (mitral valve prolapse)    Depression with suicidal ideation    Lymphocytic colitis    History of migraine headaches    Hyperlipidemia    Sleep apnea    IGT (impaired glucose tolerance)    History of renal calculi    Lactose intolerance    History of seizure disorder    Leukopenia    Bipolar disorder, mixed ([facility]    Illicit drug use    Postlaminectomy syndrome, cervical region    Degeneration of cervical intervertebral disc    Encounter for long-term (current) use of other medications    Bradycardia    Cellulitis    Hypokalemia    Primary hypertension    Elevated lipase    Right lower quadrant abdominal pain    Nausea    Diarrhea    Acute encephalopathy    Polysubstance abuse (HCC)    Fibromyalgia    Cocaine addiction (HCC)    Shoulder arthritis    Osteoarthritis of left glenohumeral joint    Abnormal EKG    Anemia    Anxiety    Carotid artery stenosis    COPD (chronic obstructive pulmonary disease) ([facility]    Dental disease    HUTCHINSON (dyspnea on exertion)    Fatigue    Fractures    H/O gastric bypass    History of crack cocaine use    History of UTI    Injury of back    Intractable chronic migraine without aura and without status migrainosus    Intractable migraine with aura with status migrainosus    Kidney stones    Dizziness    Memory loss    Mild pulmonary hypertension (HCC)    Mild tricuspid regurgitation    Primary osteoarthritis of left shoulder    Seizure-like activity (HCC)    Stress at home    TMJ (dislocation of temporomandibular joint)    Tobacco abuse    Visual impairment    Internal hernia    Vitamin D deficiency    Iron deficiency    SIRS (systemic inflammatory response syndrome) ([facility]    NICKI (acute kidney injury) ([facility]    Severe malnutrition ([facility]    Elevated d-dimer    Leukocytosis    Pulmonary nodules    Delirium due to another medical condition    Acute metabolic encephalopathy    MRSA bacteremia    Acute septic pulmonary embolism (HCC)    Cavitating mass of lung    Allergy to antibiotic    Thrombocytopenia (HCC)    Severe thrombocytopenia (HCC)    Severe anemia    Acute congestive heart failure, unspecified heart failure type ([facility]    Hypoxia       Admission Condition:  fair  Discharged Condition:  fair    Hospital Course:   44 yo admitted with hypokalemia. This was corrected and the patient was discharged back to SNF.      Discharge Medications:         Medication List        START taking these medications      folic acid 1 MG tablet  Commonly known as: FOLVITE  Take 1 tablet by mouth daily     potassium chloride 20 MEQ extended release tablet  Commonly known as: KLOR-CON M  Take 2 tablets by mouth daily            CHANGE how you take these medications      amLODIPine 10 MG tablet  Commonly known as: NORVASC  Take 1 tablet by mouth daily  What changed:   medication strength  how much to take            CONTINUE taking these medications      ARIPiprazole 15 MG tablet  Commonly known as: ABILIFY  Take 0.5 tablets by mouth daily     carvedilol 25 MG tablet  Commonly known as: COREG  TAKE 1 TABLET BY MOUTH IN THE MORNING AND 1 IN THE EVENING WITH MEALS     divalproex 500 MG DR tablet  Commonly known as: DEPAKOTE  Take 2 tablets by mouth 2 times daily Indications: Level 41 as of 7/22/16 (Pt takes two 500 mg DR tabs = 1,000mg PO BID)     DULoxetine 60 MG extended release capsule  Commonly known as: CYMBALTA  Take 1 capsule by mouth daily     ferrous sulfate 325 (65 Fe) MG tablet  Commonly known as: FeroSul  Take 1 tablet by mouth in the morning and at bedtime     vitamin D 50 MCG (2000 UT) Tabs tablet  Commonly known as: CHOLECALCIFEROL  Take 1 tablet by mouth daily               Where to Get Your Medications        These medications were sent to [facility] [address] [place] [id number]      Phone: [phone]   folic acid 1 MG tablet       Information about where to get these medications is not yet available    Ask your nurse or doctor about these medications  amLODIPine 10 MG tablet  potassium chloride 20 MEQ extended release tablet         Consults:  infectious diseases, hematology    Significant Diagnostic Studies:  labs    Treatments:   supportive care    Disposition:   SNF  Follow up with Kaye Myles MD in 1-2 weeks. Signed:   Michelle Mendenhall MD, FAAFP  12/14/2022, 4:43 PM

## 2022-12-15 ENCOUNTER — TELEPHONE (OUTPATIENT)
Dept: ONCOLOGY | Age: 52
End: 2022-12-15

## 2022-12-16 ASSESSMENT — ENCOUNTER SYMPTOMS: DYSPNEA ASSOCIATED WITH: EXERTION

## 2022-12-16 NOTE — CARE COORDINATION
Ambulatory Care Coordination Note  12/16/2022    ACC: Sunil Adams, RN    Summary  Date Care Coordination Episode Started:  December 12, 2022    Reason for Call Today:     CC Enrollment    Reason patient is in Care Coordination:     PCP Referral  KASANDRA  87%  COPD  CHF    Topics Discussed Today:     General- Patient referred to [facility] after recent hospitalization. Has questions about medications and follow up appts. Called Bernard Rubalcava and introduced self and reason for call. She stated that she would   call back because she is working with OT for another half hour. Leilani He later in the day and she was out shopping with her dad. She said she would call tomorrow morning at 9 am.   Received phone call from Bernard Rubalcava. She states she is very tired, weak. She is going to be moving from her apartment to live with her dad. She is currently getting home care with [address], for SN, PT, and OT. The PT aide comes at 11 am today. She got the oxygen concentrator set up with [address]. She also has portable tanks. Asked if she also got a nebulizer and she confirmed that she had one. She is getting 20 meals delivered by [employer], through her insurance company. Discussed medications. She said the home care nurse went through them with her. She has everything except her Depakote. She is going to call her pharmacy for a refill. She is interested in bubble packing. Explained process and gave her a couple pharmacies to call. She has had 3 hospitalizations since October and has not had any follow up with anyone. Reviewed discharge summaries and notes. Discussed follow up needed with the following:  PCP- Dr. Ashanti Meraz- 1-2 weeks  Cardiology-Dr. Maia Tan or Domitila Noel CNP- 3-4 weeks; [phone]  Hem/ONC- Dr. Milena English- 2-3 weeks; [phone]  Pulmonology- Dr. Nico Winter weeks; [phone]  Infectious Disease- Dr. Elba Soliz- [phone]    Bernard Rubalcava wrote down the providers names and numbers to call for appts.  Started to ask about transportation and she wasn't answering. ACM could hear her talking to someone and background noise. Kept calling out. After a couple minutes, ACM ended call. Interventions completed today:    Assessments completed today:     Initial assessment- started   Medication reconciliation  COPD, CHF    Care Coordinator plan of care: Take medications as prescribed  Use oxygen as ordered  Continue with PT, OT, SN with [facility]  Call and schedule f/u appts  ACM will call next week to follow up on appts, medications, and symptoms      Offered patient enrollment in the Remote Patient Monitoring (RPM) program for in-home monitoring: Patient is not eligible for RPM program.    Ambulatory Care Coordination Assessment    Care Coordination Protocol  Referral from Primary Care Provider: Yes  Week 1 - Initial Assessment     Do you have all of your prescriptions and are they filled?: No  Barriers to medication adherence: Complexity of regimen  Are you able to afford your medications?: Yes     Do you have Home O2 Therapy?: Yes   Oxygen Regimen: Continuous Flow - Enter rate/FIO2: 2   Method of Delivery: Nasal Cannula, Concentrater, Has cylinders in home      Ability to seek help/take action for Emergent Urgent situations i.e. fire, crime, inclement weather or health crisis. : Independent  Ability to ambulate to restroom: Independent  Ability handle personal hygeine needs (bathing/dressing/grooming): Independent  Is patient able to live independently?: Yes     Current Housing: Apartment                 Suggested Interventions and Community Resources  Behavioral Health: Not Started       Home Health Services: Completed (Comment: [facility])   Home Care Waiver: Not Started   Meals on Wheels: Completed   Medi Set or Pill Pack: Not Started   Occupational Therapy: In Process   Pharmacist: Not Started   Physical Therapy: In Process   Zone Management Tools:  In Process                  Prior to Admission medications Medication Sig Start Date End Date Taking? Authorizing Provider   ipratropium-albuterol (DUONEB) 0.5-2.5 (3) MG/3ML SOLN nebulizer solution Inhale 3 mLs into the lungs 4 times daily 12/10/22   Jose Weir MD   predniSONE (DELTASONE) 10 MG tablet Take 40 mg for 3 days, then 30 mg for 3 days, then 20 mg for 3 days, then 10 mg for 4 days then stop. 12/10/22   Jose Weir MD   doxycycline monohydrate (MONODOX) 100 MG capsule Take 1 capsule by mouth every 12 hours for 14 doses 12/10/22 12/17/22  Jose Weir MD   potassium chloride (KLOR-CON M) 20 MEQ extended release tablet Take 2 tablets by mouth 2 times daily 12/9/22   Jess Sullivan MD   clotrimazole (LOTRIMIN) 1 % cream Apply topically 2 times daily.  12/9/22 12/16/22  Jess Sullivan MD   furosemide (LASIX) 20 MG tablet Take 1 tablet by mouth daily 12/9/22   Jess Sullivan MD   folic acid (FOLVITE) 1 MG tablet Take 1 tablet by mouth daily 11/26/22   Marcia Beckford MD   amLODIPine (NORVASC) 10 MG tablet Take 1 tablet by mouth daily 11/18/22   Jess Sullivan MD   potassium chloride (KLOR-CON M) 20 MEQ extended release tablet Take 2 tablets by mouth daily 11/17/22   Jess Sullivan MD   ARIPiprazole (ABILIFY) 15 MG tablet Take 0.5 tablets by mouth daily 11/4/22   Jess Sullivan MD   divalproex (DEPAKOTE) 500 MG DR tablet Take 2 tablets by mouth 2 times daily Indications: Level 41 as of 7/22/16 (Pt takes two 500 mg DR tabs = 1,000mg PO BID) 11/4/22   Jess Sullivan MD   DULoxetine (CYMBALTA) 60 MG extended release capsule Take 1 capsule by mouth daily 11/4/22   Jess Sullivan MD   ferrous sulfate (FEROSUL) 325 (65 Fe) MG tablet Take 1 tablet by mouth in the morning and at bedtime 11/4/22   Jess Sullivan MD   Vitamin D (CHOLECALCIFEROL) 50 MCG (2000 UT) TABS tablet Take 1 tablet by mouth daily 11/4/22   Jess Sullivan MD   carvedilol (COREG) 25 MG tablet TAKE 1 TABLET BY MOUTH IN THE MORNING AND 1 IN THE EVENING WITH MEALS 9/19/22   Tiffany Torres MD       No future appointments.   ,   Congestive Heart Failure Assessment           Symptoms:  CHF associated dyspnea on exertion: Pos, CHF associated fatigue: Pos      Symptom course: stable  Weight trend: stable  Salt intake watch compared to last visit: stable     , and   COPD Assessment    Does the patient have a nebulizer?: Yes     No patient-reported symptoms         Symptoms:  COPD associated increased fatigue: Pos      Symptom course: no change  Breathlessness: exertion  Increase use of rapid acting/rescue inhaled medications?: No  Change in chronic cough?: No/At Baseline  Change in sputum?: No/At Baseline  Sputum characteristics: Unable to [employer]

## 2023-01-04 ENCOUNTER — HOSPITAL ENCOUNTER (OUTPATIENT)
Age: 53
Discharge: HOME | End: 2023-01-04
Payer: COMMERCIAL

## 2023-01-04 ENCOUNTER — OFFICE VISIT (OUTPATIENT)
Dept: ONCOLOGY | Age: 53
End: 2023-01-04
Payer: COMMERCIAL

## 2023-01-04 VITALS
WEIGHT: 98.2 LBS | DIASTOLIC BLOOD PRESSURE: 97 MMHG | TEMPERATURE: 96.8 F | SYSTOLIC BLOOD PRESSURE: 162 MMHG | HEART RATE: 75 BPM | BODY MASS INDEX: 19.83 KG/M2

## 2023-01-04 DIAGNOSIS — D69.6 THROMBOCYTOPENIA (HCC): Primary | ICD-10-CM

## 2023-01-04 DIAGNOSIS — E53.8 FOLIC ACID DEFICIENCY: ICD-10-CM

## 2023-01-04 DIAGNOSIS — D72.829 LEUKOCYTOSIS, UNSPECIFIED TYPE: ICD-10-CM

## 2023-01-04 DIAGNOSIS — D64.9 ANEMIA, UNSPECIFIED TYPE: Primary | ICD-10-CM

## 2023-01-04 DIAGNOSIS — D64.9 ANEMIA, UNSPECIFIED TYPE: ICD-10-CM

## 2023-01-04 DIAGNOSIS — D72.829 LEUKOCYTOSIS, UNSPECIFIED TYPE: Primary | ICD-10-CM

## 2023-01-04 LAB
ABSOLUTE EOS #: 0.1 K/UL (ref 0–0.4)
ABSOLUTE LYMPH #: 1.9 K/UL (ref 1–4.8)
ABSOLUTE MONO #: 0.6 K/UL (ref 0.1–1.2)
ALBUMIN SERPL-MCNC: 3.5 G/DL (ref 3.5–5.2)
ALBUMIN/GLOBULIN RATIO: 1.1 (ref 1–2.5)
ALP BLD-CCNC: 102 U/L (ref 35–104)
ALT SERPL-CCNC: 8 U/L (ref 5–33)
ANION GAP SERPL CALCULATED.3IONS-SCNC: 10 MMOL/L (ref 9–17)
AST SERPL-CCNC: 16 U/L
BASOPHILS # BLD: 0 % (ref 0–2)
BASOPHILS ABSOLUTE: 0 K/UL (ref 0–0.2)
BILIRUB SERPL-MCNC: 0.2 MG/DL (ref 0.3–1.2)
BUN BLDV-MCNC: 18 MG/DL (ref 6–20)
CALCIUM SERPL-MCNC: 9.5 MG/DL (ref 8.6–10.4)
CHLORIDE BLD-SCNC: 106 MMOL/L (ref 98–107)
CO2: 23 MMOL/L (ref 20–31)
CREAT SERPL-MCNC: 0.67 MG/DL (ref 0.5–0.9)
D-DIMER QUANTITATIVE: 0.43 MG/L FEU
EOSINOPHILS RELATIVE PERCENT: 2 % (ref 1–4)
FERRITIN: 117 NG/ML (ref 13–150)
FOLATE: 14 NG/ML
GFR SERPL CREATININE-BSD FRML MDRD: >60 ML/MIN/1.73M2
GLUCOSE BLD-MCNC: 109 MG/DL (ref 70–99)
HCT VFR BLD CALC: 38.3 % (ref 36–46)
HEMOGLOBIN: 12.5 G/DL (ref 12–16)
IRON SATURATION: 26 % (ref 20–55)
IRON: 69 UG/DL (ref 37–145)
LYMPHOCYTES # BLD: 31 % (ref 24–44)
MAGNESIUM: 2.2 MG/DL (ref 1.6–2.6)
MCH RBC QN AUTO: 31.8 PG (ref 26–34)
MCHC RBC AUTO-ENTMCNC: 32.7 G/DL (ref 31–37)
MCV RBC AUTO: 97.3 FL (ref 80–100)
MONOCYTES # BLD: 10 % (ref 2–11)
PDW BLD-RTO: 17.4 % (ref 12.5–15.4)
PLATELET # BLD: 256 K/UL (ref 140–450)
PMV BLD AUTO: 7.8 FL (ref 6–12)
POTASSIUM SERPL-SCNC: 4.4 MMOL/L (ref 3.7–5.3)
RBC # BLD: 3.93 M/UL (ref 4–5.2)
SEG NEUTROPHILS: 57 % (ref 36–66)
SEGMENTED NEUTROPHILS ABSOLUTE COUNT: 3.6 K/UL (ref 1.8–7.7)
SODIUM BLD-SCNC: 139 MMOL/L (ref 135–144)
TOTAL IRON BINDING CAPACITY: 269 UG/DL (ref 250–450)
TOTAL PROTEIN: 6.8 G/DL (ref 6.4–8.3)
UNSATURATED IRON BINDING CAPACITY: 200 UG/DL (ref 112–347)
VITAMIN B-12: 244 PG/ML (ref 232–1245)
WBC # BLD: 6.3 K/UL (ref 3.5–11)

## 2023-01-04 PROCEDURE — 80053 COMPREHEN METABOLIC PANEL: CPT

## 2023-01-04 PROCEDURE — 83540 ASSAY OF IRON: CPT

## 2023-01-04 PROCEDURE — 3074F SYST BP LT 130 MM HG: CPT | Performed by: INTERNAL MEDICINE

## 2023-01-04 PROCEDURE — 82607 VITAMIN B-12: CPT

## 2023-01-04 PROCEDURE — 82746 ASSAY OF FOLIC ACID SERUM: CPT

## 2023-01-04 PROCEDURE — 99214 OFFICE O/P EST MOD 30 MIN: CPT | Performed by: INTERNAL MEDICINE

## 2023-01-04 PROCEDURE — 3078F DIAST BP <80 MM HG: CPT | Performed by: INTERNAL MEDICINE

## 2023-01-04 PROCEDURE — 83550 IRON BINDING TEST: CPT

## 2023-01-04 PROCEDURE — 85025 COMPLETE CBC W/AUTO DIFF WBC: CPT

## 2023-01-04 PROCEDURE — 83735 ASSAY OF MAGNESIUM: CPT

## 2023-01-04 PROCEDURE — 85379 FIBRIN DEGRADATION QUANT: CPT

## 2023-01-04 PROCEDURE — 82728 ASSAY OF FERRITIN: CPT

## 2023-01-04 PROCEDURE — 36415 COLL VENOUS BLD VENIPUNCTURE: CPT

## 2023-01-04 PROCEDURE — 99211 OFF/OP EST MAY X REQ PHY/QHP: CPT | Performed by: INTERNAL MEDICINE

## 2023-01-04 NOTE — PROGRESS NOTES
Olayinka Srivastava                                                                                                                  1/4/2023  MRN:   [id number]  YOB: 1970  PCP:                           Myla Casper MD  Referring Physician: No ref. provider found  Treating Physician Name: Usha Denis MD      Reason for visit:  Chief Complaint   Patient presents with    Follow-Up from Hospital     Patient was [facility] with Anemia    Fatigue     Patient is still feeling weak and tired          INTERVAL HISTORY:      Patient presents to the clinic for a follow-up visit after the hospital discharge. Patient overall is feeling better but still feels weak. Patient cell counts have improved. Patient bone marrow biopsy cytogenetic testing was unremarkable in regards to MDS. HISTORY OF PRESENT ILLNESS:    The patient is a 45 y.o.   female who is admitted to the hospital for hypokalemia with potassium of 2.4 patient recently hospitalized for MRSA bacteremia and empyema and had anemia and thrombocytopenia thought related to bacteremia and sepsis and antibiotic/vancomycin, patient had pulmonary nodules with cavitation, hemoglobin was 5.3 with WBC 4.0 and platelet count of 32    Past Medical History:   has a past medical history of Anemia, Arthritis, Asthma, Bipolar disorder, mixed ([address] Carotid artery stenosis, Cocaine abuse ([address] COPD (chronic obstructive pulmonary disease) ([address] Degeneration of cervical intervertebral disc, Depression with anxiety, Depression with anxiety, Fibromyalgia, GERD (gastroesophageal reflux disease), History of migraine headaches, History of renal calculi, History of seizure disorder, Hoarseness of voice, HTN (hypertension), Hyperlipidemia, IGT (impaired glucose tolerance), Kidney stones, Lactose intolerance, Leukopenia, Major depressive disorder, recurrent episode, severe, without mention of psychotic behavior, MVP (mitral valve prolapse), Seizures ([facility] Sleep apnea, and Unspecified diseases of blood and blood-forming organs. Past Surgical History:   has a past surgical history that includes Gastric bypass surgery; Cholecystectomy; Hysterectomy; Appendectomy; Hysterectomy; Tonsillectomy and adenoidectomy; Ankle fracture surgery; Neck surgery; shoulder surgery;  section; Carpal tunnel release; Colonoscopy (12); Upper gastrointestinal endoscopy (7-3-12); Gastric bypass surgery; Breast lumpectomy (Right); Cardiac catheterization; Upper gastrointestinal endoscopy (2016); Colonoscopy (2016); Upper gastrointestinal endoscopy (2019); Colonoscopy (2019); Shoulder arthroscopy (Left, 2019); other surgical history (10/24/2019); laparoscopy (N/A, 2022); transesophageal echocardiogram (N/A, 10/19/2022); IR GUIDED PERC PLEURAL DRAIN W CATH INSERT (10/21/2022); IR GUIDED PERC PLEURAL DRAIN W CATH INSERT (10/21/2022); IR GUIDED PERC PLEURAL DRAIN W CATH INSERT (10/24/2022); and CT BIOPSY BONE MARROW (2022). Medications:    Reviewed in Epic     Allergies:  Aspirin, Bactrim, and Codeine    Social History:   reports that she has been smoking cigarettes. She has a 16.50 pack-year smoking history. She has never used smokeless tobacco. She reports current alcohol use. She reports that she does not currently use drugs. Family History: family history includes Alcohol Abuse in her brother; COPD in her father; Cancer in her mother, paternal grandmother, and another family member; Coronary Art Dis in her father; Depression in her brother; Diabetes in her maternal grandmother and mother. REVIEW OF SYSTEMS:    General: no fever or night sweats, Weight is stable. ENT: No double or blurred vision, no hearing problem, no dysphagia or sore throat   Respiratory: No chest pain, no shortness of breath, no cough or hemoptysis. Cardiovascular: Denies chest pain, PND or orthopnea.  No L E swelling or palpitations. Gastrointestinal:    No nausea or vomiting, abdominal pain, diarrhea or constipation. Genitourinary: Denies dysuria, hematuria, frequency, urgency or incontinence. Neurological: Denies headaches, decreased LOC, no sensory or motor focal deficits. Musculoskeletal:  No arthralgia no back pain or joint swelling. Skin: There are no rashes or bleeding. Psych: Denies hallucinations or intentions to harm self      PHYSICAL EXAM:      BP (!) 162/97   Pulse 75   Temp 96.8 °F (36 °C) (Temporal)   Wt 98 lb 3.2 oz (44.5 kg)   BMI 19.83 kg/m²    Temp (24hrs), Av.5 °F (36.9 °C), Min:98.4 °F (36.9 °C), Max:98.6 °F (37 °C)    General appearance - well appearing, no in pain or distress   Mental status - alert and cooperative   Eyes - pupils equal and reactive, extraocular eye movements intact   Ears - bilateral TM's and external ear canals normal   Mouth - mucous membranes moist, pharynx normal without lesions   Neck - supple, no significant adenopathy   Lymphatics - no palpable lymphadenopathy, no hepatosplenomegaly   Chest - clear to auscultation, no wheezes, rales or rhonchi, symmetric air entry   Heart - normal rate, regular rhythm, normal S1, S2, no murmurs  Abdomen - soft, nontender, nondistended, no masses or organomegaly   Neurological - alert, oriented, normal speech, no focal findings or movement disorder noted   Musculoskeletal - no joint tenderness, deformity or swelling   Extremities - peripheral pulses normal, no pedal edema, no clubbing or cyanosis   Skin - normal coloration and turgor, no rashes, no suspicious skin lesions noted ,    DATA:    Labs:   CBC:   No results for input(s): WBC, HGB, HCT, PLT in the last 72 hours. BMP:   Recent Labs     22  0547 22  1555   *  --    K 2.5* 4.3   CO2 21  --    BUN 18  --    CREATININE 0.69  --    LABGLOM >60  --    GLUCOSE 89  --        PT/INR: No results for input(s): PROTIME, INR in the last 72 hours.       IMAGING DATA:  No orders to display     Primary Problem  Hypokalemia    There are no active hospital problems to display for this patient. IMPRESSION:   Severe anemia and thrombocytopenia  Pulmonary nodules, numerous with cavities  Bilateral pleural effusions  MRSA bacteremia  Hypokalemia    RECOMMENDATIONS:  I reviewed the laboratory, imaging studies, discussed diagnosis and other treatment recommendations   Patient had bone marrow biopsy on 11/18/2022. MDS could not be ruled out however MDS FISH panel was negative. Motion patient cell counts have corrected with repletion of nutritional deficiencies which also goes against MDS. I believe abnormalities noted in the bone marrow are due to folic acid deficiency and not due to MDS. Patient counseled on healthy lifestyle and healthy eating  Recommend continued surveillance with serial CBC which can be done to patient's primary care physician. We will see patient back in office on as-needed basis. Discussed with patient and Nurse. Wade Herrera MD      This note is created with the assistance of a speech recognition program.  While intending to generate a document that actually reflects the content of the visit, the document can still have some errors including those of syntax and sound a like substitutions which may escape proof reading. It such instances, actual meaning can be extrapolated by contextual diversion.

## 2023-01-10 NOTE — PROGRESS NOTES
Physician Progress Note      PATIENT:               Jose Dewey  CSN #:                  [id number]  :                       1970  ADMIT DATE:       2022 1:52 PM  Scott Leon DATE:        12/10/2022 3:59 PM  RESPONDING  PROVIDER #:        Rosemarie Barrow CNP          QUERY TEXT:    Pt admitted for shortness of breath and has with \"acute CHF\" documented. If   possible, please document in progress notes and discharge summary further   specificity regarding the type of CHF:    The medical record reflects the following:  Risk Factors: PMH of cocaine abuse, HTN, CAD, and HLD. Clinical Indicators: In the setting of above risk factors, H&P : presents   to [facility] with shortness of breath. Acute chf - to be further   characterized by cardiology. Diuresis with IV Lasix. Consult cardiology. Cardio Consult : Pulmonary infiltrates on CXR. Mild volume overload with   elevated proBNP. Preserved LVEF on echo 10/2022. Medicine PN : Acute CHF -   diuresis continues. Cardio PN : Continue PO Lasix along with BB. Medicine   PN : Acute CHF - diuresis good. Management per cardiology. Pro-BNP 4,369   (), 1,653 (). 2D Echo 10/19/22: Normal  Treatment: Cardio Consult : Lasix 40 mg IV x 2 doses, then 20 mg PO daily. Options provided:  -- Acute Diastolic CHF/HFpEF  -- Other - I will add my own diagnosis  -- Disagree - Not applicable / Not valid  -- Disagree - Clinically unable to determine / Unknown  -- Refer to Clinical Documentation Reviewer    PROVIDER RESPONSE TEXT:    This patient is in acute diastolic CHF/HFpEF.     Query created by: Juliana Mcintyre on 2022 12:38 PM      Electronically signed by:  Rosemarie Barrow CNP 1/10/2023 3:17 PM

## 2023-03-07 ENCOUNTER — CARE COORDINATION (OUTPATIENT)
Dept: CARE COORDINATION | Age: 53
End: 2023-03-07

## 2023-03-07 NOTE — CARE COORDINATION
Attempted to reach patient for follow up. Left a message on her voicemail with call back number. Will try to reach her on Friday.

## 2023-03-21 ENCOUNTER — APPOINTMENT (OUTPATIENT)
Dept: CT IMAGING | Age: 53
End: 2023-03-21
Payer: COMMERCIAL

## 2023-03-21 ENCOUNTER — HOSPITAL ENCOUNTER (OUTPATIENT)
Age: 53
Setting detail: OBSERVATION
Discharge: HOME | End: 2023-03-23
Attending: EMERGENCY MEDICINE | Admitting: FAMILY MEDICINE
Payer: COMMERCIAL

## 2023-03-21 DIAGNOSIS — R29.810 FACIAL DROOP: Primary | ICD-10-CM

## 2023-03-21 LAB
ABSOLUTE EOS #: 0.1 K/UL (ref 0–0.4)
ABSOLUTE LYMPH #: 1.9 K/UL (ref 1–4.8)
ABSOLUTE MONO #: 0.3 K/UL (ref 0.1–1.3)
ANION GAP SERPL CALCULATED.3IONS-SCNC: 9 MMOL/L (ref 9–17)
BASOPHILS # BLD: 1 % (ref 0–2)
BASOPHILS ABSOLUTE: 0 K/UL (ref 0–0.2)
BUN SERPL-MCNC: 16 MG/DL (ref 6–20)
CALCIUM SERPL-MCNC: 9.3 MG/DL (ref 8.6–10.4)
CHLORIDE SERPL-SCNC: 106 MMOL/L (ref 98–107)
CO2 SERPL-SCNC: 27 MMOL/L (ref 20–31)
CREAT SERPL-MCNC: 0.82 MG/DL (ref 0.5–0.9)
EOSINOPHILS RELATIVE PERCENT: 2 % (ref 0–4)
GFR SERPL CREATININE-BSD FRML MDRD: >60 ML/MIN/1.73M2
GLUCOSE SERPL-MCNC: 91 MG/DL (ref 70–99)
HCT VFR BLD AUTO: 44.4 % (ref 36–46)
HGB BLD-MCNC: 14.6 G/DL (ref 12–16)
LYMPHOCYTES # BLD: 36 % (ref 24–44)
MCH RBC QN AUTO: 28.4 PG (ref 26–34)
MCHC RBC AUTO-ENTMCNC: 32.9 G/DL (ref 31–37)
MCV RBC AUTO: 86.3 FL (ref 80–100)
MONOCYTES # BLD: 6 % (ref 1–7)
PDW BLD-RTO: 13.1 % (ref 11.5–14.9)
PLATELET # BLD AUTO: 217 K/UL (ref 150–450)
PMV BLD AUTO: 9.2 FL (ref 6–12)
POTASSIUM SERPL-SCNC: 4.2 MMOL/L (ref 3.7–5.3)
RBC # BLD: 5.14 M/UL (ref 4–5.2)
SEG NEUTROPHILS: 55 % (ref 36–66)
SEGMENTED NEUTROPHILS ABSOLUTE COUNT: 3 K/UL (ref 1.3–9.1)
SODIUM SERPL-SCNC: 142 MMOL/L (ref 135–144)
TROPONIN I SERPL DL<=0.01 NG/ML-MCNC: 14 NG/L (ref 0–14)
WBC # BLD AUTO: 5.3 K/UL (ref 3.5–11)

## 2023-03-21 PROCEDURE — 70450 CT HEAD/BRAIN W/O DYE: CPT

## 2023-03-21 PROCEDURE — 99285 EMERGENCY DEPT VISIT HI MDM: CPT

## 2023-03-21 PROCEDURE — 6370000000 HC RX 637 (ALT 250 FOR IP): Performed by: FAMILY MEDICINE

## 2023-03-21 PROCEDURE — 82947 ASSAY GLUCOSE BLOOD QUANT: CPT

## 2023-03-21 PROCEDURE — 6360000004 HC RX CONTRAST MEDICATION: Performed by: STUDENT IN AN ORGANIZED HEALTH CARE EDUCATION/TRAINING PROGRAM

## 2023-03-21 PROCEDURE — 80048 BASIC METABOLIC PNL TOTAL CA: CPT

## 2023-03-21 PROCEDURE — 2580000003 HC RX 258: Performed by: STUDENT IN AN ORGANIZED HEALTH CARE EDUCATION/TRAINING PROGRAM

## 2023-03-21 PROCEDURE — G0378 HOSPITAL OBSERVATION PER HR: HCPCS

## 2023-03-21 PROCEDURE — 2580000003 HC RX 258: Performed by: FAMILY MEDICINE

## 2023-03-21 PROCEDURE — 94761 N-INVAS EAR/PLS OXIMETRY MLT: CPT

## 2023-03-21 PROCEDURE — 94664 DEMO&/EVAL PT USE INHALER: CPT

## 2023-03-21 PROCEDURE — 70498 CT ANGIOGRAPHY NECK: CPT

## 2023-03-21 PROCEDURE — 85025 COMPLETE CBC W/AUTO DIFF WBC: CPT

## 2023-03-21 PROCEDURE — 94640 AIRWAY INHALATION TREATMENT: CPT

## 2023-03-21 PROCEDURE — 6370000000 HC RX 637 (ALT 250 FOR IP): Performed by: STUDENT IN AN ORGANIZED HEALTH CARE EDUCATION/TRAINING PROGRAM

## 2023-03-21 PROCEDURE — 84484 ASSAY OF TROPONIN QUANT: CPT

## 2023-03-21 PROCEDURE — 36415 COLL VENOUS BLD VENIPUNCTURE: CPT

## 2023-03-21 RX ORDER — CARVEDILOL 25 MG/1
25 TABLET ORAL 2 TIMES DAILY WITH MEALS
Status: DISCONTINUED | OUTPATIENT
Start: 2023-03-22 | End: 2023-03-23 | Stop reason: HOSPADM

## 2023-03-21 RX ORDER — IPRATROPIUM BROMIDE AND ALBUTEROL SULFATE 2.5; .5 MG/3ML; MG/3ML
3 SOLUTION RESPIRATORY (INHALATION) 4 TIMES DAILY
Status: DISCONTINUED | OUTPATIENT
Start: 2023-03-21 | End: 2023-03-23 | Stop reason: HOSPADM

## 2023-03-21 RX ORDER — 0.9 % SODIUM CHLORIDE 0.9 %
100 INTRAVENOUS SOLUTION INTRAVENOUS ONCE
Status: COMPLETED | OUTPATIENT
Start: 2023-03-21 | End: 2023-03-21

## 2023-03-21 RX ORDER — ACETAMINOPHEN 650 MG/1
650 SUPPOSITORY RECTAL EVERY 6 HOURS PRN
Status: DISCONTINUED | OUTPATIENT
Start: 2023-03-21 | End: 2023-03-23 | Stop reason: HOSPADM

## 2023-03-21 RX ORDER — LOSARTAN POTASSIUM 50 MG/1
100 TABLET ORAL DAILY
Status: DISCONTINUED | OUTPATIENT
Start: 2023-03-22 | End: 2023-03-23 | Stop reason: HOSPADM

## 2023-03-21 RX ORDER — FUROSEMIDE 20 MG/1
20 TABLET ORAL DAILY
Status: DISCONTINUED | OUTPATIENT
Start: 2023-03-22 | End: 2023-03-23 | Stop reason: HOSPADM

## 2023-03-21 RX ORDER — ATORVASTATIN CALCIUM 80 MG/1
80 TABLET, FILM COATED ORAL ONCE
Status: COMPLETED | OUTPATIENT
Start: 2023-03-21 | End: 2023-03-21

## 2023-03-21 RX ORDER — POTASSIUM CHLORIDE 20 MEQ/1
40 TABLET, EXTENDED RELEASE ORAL DAILY
Status: DISCONTINUED | OUTPATIENT
Start: 2023-03-22 | End: 2023-03-23 | Stop reason: HOSPADM

## 2023-03-21 RX ORDER — FERROUS SULFATE 325(65) MG
325 TABLET ORAL 2 TIMES DAILY
Status: DISCONTINUED | OUTPATIENT
Start: 2023-03-21 | End: 2023-03-23 | Stop reason: HOSPADM

## 2023-03-21 RX ORDER — TOPIRAMATE 100 MG/1
100 TABLET, FILM COATED ORAL NIGHTLY
Status: DISCONTINUED | OUTPATIENT
Start: 2023-03-21 | End: 2023-03-22

## 2023-03-21 RX ORDER — AMLODIPINE BESYLATE 10 MG/1
10 TABLET ORAL DAILY
Status: DISCONTINUED | OUTPATIENT
Start: 2023-03-22 | End: 2023-03-23 | Stop reason: HOSPADM

## 2023-03-21 RX ORDER — ARIPIPRAZOLE 15 MG/1
7.5 TABLET ORAL DAILY
Status: DISCONTINUED | OUTPATIENT
Start: 2023-03-22 | End: 2023-03-23 | Stop reason: HOSPADM

## 2023-03-21 RX ORDER — ACETAMINOPHEN 325 MG/1
650 TABLET ORAL EVERY 6 HOURS PRN
Status: DISCONTINUED | OUTPATIENT
Start: 2023-03-21 | End: 2023-03-23 | Stop reason: HOSPADM

## 2023-03-21 RX ORDER — SODIUM CHLORIDE 0.9 % (FLUSH) 0.9 %
10 SYRINGE (ML) INJECTION PRN
Status: DISCONTINUED | OUTPATIENT
Start: 2023-03-21 | End: 2023-03-23 | Stop reason: HOSPADM

## 2023-03-21 RX ORDER — SODIUM CHLORIDE 0.9 % (FLUSH) 0.9 %
5-40 SYRINGE (ML) INJECTION PRN
Status: DISCONTINUED | OUTPATIENT
Start: 2023-03-21 | End: 2023-03-23 | Stop reason: HOSPADM

## 2023-03-21 RX ORDER — ASPIRIN 325 MG
325 TABLET ORAL DAILY
Status: DISCONTINUED | OUTPATIENT
Start: 2023-03-21 | End: 2023-03-22

## 2023-03-21 RX ORDER — VITAMIN B COMPLEX
2000 TABLET ORAL DAILY
Status: DISCONTINUED | OUTPATIENT
Start: 2023-03-22 | End: 2023-03-23 | Stop reason: HOSPADM

## 2023-03-21 RX ORDER — DULOXETIN HYDROCHLORIDE 60 MG/1
60 CAPSULE, DELAYED RELEASE ORAL DAILY
Status: DISCONTINUED | OUTPATIENT
Start: 2023-03-22 | End: 2023-03-23 | Stop reason: HOSPADM

## 2023-03-21 RX ORDER — POLYETHYLENE GLYCOL 3350 17 G/17G
17 POWDER, FOR SOLUTION ORAL DAILY PRN
Status: DISCONTINUED | OUTPATIENT
Start: 2023-03-21 | End: 2023-03-23 | Stop reason: HOSPADM

## 2023-03-21 RX ORDER — FOLIC ACID 1 MG/1
1 TABLET ORAL DAILY
Status: DISCONTINUED | OUTPATIENT
Start: 2023-03-22 | End: 2023-03-23 | Stop reason: HOSPADM

## 2023-03-21 RX ORDER — CLONAZEPAM 1 MG/1
0.5 TABLET ORAL NIGHTLY
Status: DISCONTINUED | OUTPATIENT
Start: 2023-03-21 | End: 2023-03-23 | Stop reason: HOSPADM

## 2023-03-21 RX ORDER — ONDANSETRON 2 MG/ML
4 INJECTION INTRAMUSCULAR; INTRAVENOUS EVERY 6 HOURS PRN
Status: DISCONTINUED | OUTPATIENT
Start: 2023-03-21 | End: 2023-03-23 | Stop reason: HOSPADM

## 2023-03-21 RX ORDER — TRAZODONE HYDROCHLORIDE 50 MG/1
50 TABLET ORAL NIGHTLY PRN
COMMUNITY

## 2023-03-21 RX ORDER — ONDANSETRON 4 MG/1
4 TABLET, ORALLY DISINTEGRATING ORAL EVERY 8 HOURS PRN
Status: DISCONTINUED | OUTPATIENT
Start: 2023-03-21 | End: 2023-03-23 | Stop reason: HOSPADM

## 2023-03-21 RX ORDER — DIVALPROEX SODIUM 250 MG/1
1000 TABLET, DELAYED RELEASE ORAL 2 TIMES DAILY
Status: DISCONTINUED | OUTPATIENT
Start: 2023-03-21 | End: 2023-03-23 | Stop reason: HOSPADM

## 2023-03-21 RX ORDER — SODIUM CHLORIDE 0.9 % (FLUSH) 0.9 %
5-40 SYRINGE (ML) INJECTION EVERY 12 HOURS SCHEDULED
Status: DISCONTINUED | OUTPATIENT
Start: 2023-03-21 | End: 2023-03-23 | Stop reason: HOSPADM

## 2023-03-21 RX ORDER — SODIUM CHLORIDE 9 MG/ML
INJECTION, SOLUTION INTRAVENOUS PRN
Status: DISCONTINUED | OUTPATIENT
Start: 2023-03-21 | End: 2023-03-23 | Stop reason: HOSPADM

## 2023-03-21 RX ORDER — ENOXAPARIN SODIUM 100 MG/ML
30 INJECTION SUBCUTANEOUS DAILY
Status: DISCONTINUED | OUTPATIENT
Start: 2023-03-22 | End: 2023-03-23 | Stop reason: HOSPADM

## 2023-03-21 RX ORDER — CLOPIDOGREL BISULFATE 75 MG/1
75 TABLET ORAL ONCE
Status: COMPLETED | OUTPATIENT
Start: 2023-03-21 | End: 2023-03-21

## 2023-03-21 RX ADMIN — FERROUS SULFATE TAB 325 MG (65 MG ELEMENTAL FE) 325 MG: 325 (65 FE) TAB at 21:34

## 2023-03-21 RX ADMIN — SODIUM CHLORIDE, PRESERVATIVE FREE 10 ML: 5 INJECTION INTRAVENOUS at 21:34

## 2023-03-21 RX ADMIN — ATORVASTATIN CALCIUM 80 MG: 80 TABLET, FILM COATED ORAL at 19:10

## 2023-03-21 RX ADMIN — CLOPIDOGREL BISULFATE 75 MG: 75 TABLET ORAL at 19:10

## 2023-03-21 RX ADMIN — IPRATROPIUM BROMIDE AND ALBUTEROL SULFATE 3 ML: .5; 2.5 SOLUTION RESPIRATORY (INHALATION) at 21:14

## 2023-03-21 RX ADMIN — ASPIRIN 325 MG: 325 TABLET ORAL at 19:10

## 2023-03-21 RX ADMIN — SODIUM CHLORIDE, PRESERVATIVE FREE 10 ML: 5 INJECTION INTRAVENOUS at 18:33

## 2023-03-21 RX ADMIN — CLONAZEPAM 0.5 MG: 1 TABLET ORAL at 21:34

## 2023-03-21 RX ADMIN — DIVALPROEX SODIUM 1000 MG: 250 TABLET, DELAYED RELEASE ORAL at 21:33

## 2023-03-21 RX ADMIN — IOPAMIDOL 75 ML: 755 INJECTION, SOLUTION INTRAVENOUS at 18:33

## 2023-03-21 RX ADMIN — SODIUM CHLORIDE 100 ML: 9 INJECTION, SOLUTION INTRAVENOUS at 18:33

## 2023-03-21 ASSESSMENT — PAIN - FUNCTIONAL ASSESSMENT: PAIN_FUNCTIONAL_ASSESSMENT: NONE - DENIES PAIN

## 2023-03-21 ASSESSMENT — ENCOUNTER SYMPTOMS: ABDOMINAL PAIN: 0

## 2023-03-21 NOTE — VIRTUAL HEALTH
NIH Stroke Scale  Level of Consciousness (1a): Alert  LOC Questions (1b): Answers both correctly  LOC Commands (1c): Performs both tasks correctly  Best Gaze (2): Normal  Visual (3): No visual loss  Facial Palsy (4): (!) Complete paralysis of one or both sides  Motor Arm, Left (5a): No drift  Motor Arm, Right (5b): No drift  Motor Leg, Left (6a): No drift  Motor Leg, Right (6b): No drift  Limb Ataxia (7): Absent  Sensory (8): (!) Mild to Moderate  Best Language (9): No aphasia  Dysarthria (10): Mild to moderate, slurs some words  Extinction and Inattention (11): No abnormality  Total: 5  Pre-Morbid mRS: unk    Imaging:  Images were personally reviewed with LUIS HERRON used to review images including:  CT brain without contrast: nothing acute  CTA imaging: no stenosis, or LVO    Assessment    45year old with new onset left arm face weakness since yesterday afternoon      Recommendations:  NIH 5  Recommend Inpatient Neurology Consult for further assessment and evaluation    consider . BSMHNOIVALON  Not a TNK candidate as she is out of window  Load with aspirin 325mg x 1   Load with plavix 300mg x 1  Lipitor 80mg daily  MRI Brain w/o contrast tomorrow  If there is stroke, con't aspirin 81mg daily lifelong and stroke workup per neurology team        Discussed with ED Physician Dr Ernestina Witt        This is a Phone Consult, I have not seen the patient face to face, there is no telemedicine service available at the consulting hospital    Abigail Mendez MD  Stroke, Neurocritical Care And/or [address] [place] [address] Stroke [address]  Electronically signed 3/21/2023 at 6:45 PM

## 2023-03-21 NOTE — ED NOTES
[place] alert called @3422 last known well 2300 on 3/20/23. Face droop left arm weakness. No score at this time.       Alejandraera Boys  03/21/23 [id number]

## 2023-03-22 LAB
BACTERIA: ABNORMAL
BILIRUBIN URINE: NEGATIVE
CASTS UA: ABNORMAL /LPF
COLOR: YELLOW
EPITHELIAL CELLS UA: ABNORMAL /HPF
GLUCOSE BLD-MCNC: 84 MG/DL (ref 65–105)
GLUCOSE UR STRIP.AUTO-MCNC: NEGATIVE MG/DL
KETONES UR STRIP.AUTO-MCNC: ABNORMAL MG/DL
LEUKOCYTE ESTERASE UR QL STRIP.AUTO: ABNORMAL
NITRITE UR QL STRIP.AUTO: POSITIVE
PROT UR STRIP.AUTO-MCNC: 7 MG/DL (ref 5–8)
PROT UR STRIP.AUTO-MCNC: NEGATIVE MG/DL
RBC CLUMPS #/AREA URNS AUTO: ABNORMAL /HPF
SPECIFIC GRAVITY UA: 1.03 (ref 1–1.03)
TURBIDITY: ABNORMAL
URINE HGB: NEGATIVE
UROBILINOGEN, URINE: NORMAL
WBC UA: ABNORMAL /HPF

## 2023-03-22 PROCEDURE — 2580000003 HC RX 258: Performed by: FAMILY MEDICINE

## 2023-03-22 PROCEDURE — 6370000000 HC RX 637 (ALT 250 FOR IP): Performed by: FAMILY MEDICINE

## 2023-03-22 PROCEDURE — 97162 PT EVAL MOD COMPLEX 30 MIN: CPT

## 2023-03-22 PROCEDURE — 96372 THER/PROPH/DIAG INJ SC/IM: CPT

## 2023-03-22 PROCEDURE — G0378 HOSPITAL OBSERVATION PER HR: HCPCS

## 2023-03-22 PROCEDURE — 94761 N-INVAS EAR/PLS OXIMETRY MLT: CPT

## 2023-03-22 PROCEDURE — 94640 AIRWAY INHALATION TREATMENT: CPT

## 2023-03-22 PROCEDURE — 96365 THER/PROPH/DIAG IV INF INIT: CPT

## 2023-03-22 PROCEDURE — 81001 URINALYSIS AUTO W/SCOPE: CPT

## 2023-03-22 PROCEDURE — 97166 OT EVAL MOD COMPLEX 45 MIN: CPT

## 2023-03-22 PROCEDURE — 99223 1ST HOSP IP/OBS HIGH 75: CPT | Performed by: PSYCHIATRY & NEUROLOGY

## 2023-03-22 PROCEDURE — 6370000000 HC RX 637 (ALT 250 FOR IP): Performed by: PSYCHIATRY & NEUROLOGY

## 2023-03-22 PROCEDURE — 6360000002 HC RX W HCPCS: Performed by: FAMILY MEDICINE

## 2023-03-22 RX ORDER — PREDNISONE 10 MG/1
10 TABLET ORAL DAILY
Status: DISCONTINUED | OUTPATIENT
Start: 2023-04-02 | End: 2023-03-23 | Stop reason: HOSPADM

## 2023-03-22 RX ORDER — PREDNISONE 20 MG/1
60 TABLET ORAL DAILY
Status: DISCONTINUED | OUTPATIENT
Start: 2023-03-22 | End: 2023-03-23 | Stop reason: HOSPADM

## 2023-03-22 RX ORDER — PREDNISONE 10 MG/1
5 TABLET ORAL DAILY
Status: DISCONTINUED | OUTPATIENT
Start: 2023-04-04 | End: 2023-03-23 | Stop reason: HOSPADM

## 2023-03-22 RX ORDER — TRAZODONE HYDROCHLORIDE 100 MG/1
100 TABLET ORAL NIGHTLY
Status: DISCONTINUED | OUTPATIENT
Start: 2023-03-22 | End: 2023-03-23 | Stop reason: HOSPADM

## 2023-03-22 RX ORDER — PREDNISONE 20 MG/1
60 TABLET ORAL ONCE
Status: COMPLETED | OUTPATIENT
Start: 2023-03-22 | End: 2023-03-22

## 2023-03-22 RX ORDER — PREDNISONE 20 MG/1
40 TABLET ORAL DAILY
Status: DISCONTINUED | OUTPATIENT
Start: 2023-03-29 | End: 2023-03-23 | Stop reason: HOSPADM

## 2023-03-22 RX ORDER — VALACYCLOVIR HYDROCHLORIDE 500 MG/1
500 TABLET, FILM COATED ORAL 2 TIMES DAILY
Status: DISCONTINUED | OUTPATIENT
Start: 2023-03-22 | End: 2023-03-23 | Stop reason: HOSPADM

## 2023-03-22 RX ORDER — PREDNISONE 20 MG/1
20 TABLET ORAL DAILY
Status: DISCONTINUED | OUTPATIENT
Start: 2023-03-31 | End: 2023-03-23 | Stop reason: HOSPADM

## 2023-03-22 RX ADMIN — Medication 2000 UNITS: at 09:09

## 2023-03-22 RX ADMIN — FOLIC ACID 1 MG: 1 TABLET ORAL at 09:10

## 2023-03-22 RX ADMIN — AMLODIPINE BESYLATE 10 MG: 10 TABLET ORAL at 09:08

## 2023-03-22 RX ADMIN — PREDNISONE 60 MG: 20 TABLET ORAL at 17:00

## 2023-03-22 RX ADMIN — CARVEDILOL 25 MG: 25 TABLET, FILM COATED ORAL at 09:08

## 2023-03-22 RX ADMIN — DIVALPROEX SODIUM 1000 MG: 250 TABLET, DELAYED RELEASE ORAL at 09:09

## 2023-03-22 RX ADMIN — SODIUM CHLORIDE, PRESERVATIVE FREE 10 ML: 5 INJECTION INTRAVENOUS at 21:37

## 2023-03-22 RX ADMIN — DULOXETINE HYDROCHLORIDE 60 MG: 60 CAPSULE, DELAYED RELEASE ORAL at 09:10

## 2023-03-22 RX ADMIN — ENOXAPARIN SODIUM 30 MG: 100 INJECTION SUBCUTANEOUS at 09:11

## 2023-03-22 RX ADMIN — FERROUS SULFATE TAB 325 MG (65 MG ELEMENTAL FE) 325 MG: 325 (65 FE) TAB at 09:10

## 2023-03-22 RX ADMIN — LOSARTAN POTASSIUM 100 MG: 50 TABLET, FILM COATED ORAL at 09:10

## 2023-03-22 RX ADMIN — CLONAZEPAM 0.5 MG: 1 TABLET ORAL at 21:36

## 2023-03-22 RX ADMIN — DIVALPROEX SODIUM 1000 MG: 250 TABLET, DELAYED RELEASE ORAL at 21:36

## 2023-03-22 RX ADMIN — VALACYCLOVIR HYDROCHLORIDE 500 MG: 500 TABLET, FILM COATED ORAL at 21:42

## 2023-03-22 RX ADMIN — IPRATROPIUM BROMIDE AND ALBUTEROL SULFATE 3 ML: .5; 2.5 SOLUTION RESPIRATORY (INHALATION) at 15:34

## 2023-03-22 RX ADMIN — FERROUS SULFATE TAB 325 MG (65 MG ELEMENTAL FE) 325 MG: 325 (65 FE) TAB at 21:37

## 2023-03-22 RX ADMIN — CARVEDILOL 25 MG: 25 TABLET, FILM COATED ORAL at 16:58

## 2023-03-22 RX ADMIN — ARIPIPRAZOLE 7.5 MG: 15 TABLET ORAL at 12:22

## 2023-03-22 RX ADMIN — ASPIRIN 325 MG: 325 TABLET ORAL at 09:10

## 2023-03-22 RX ADMIN — POTASSIUM CHLORIDE 40 MEQ: 1500 TABLET, EXTENDED RELEASE ORAL at 09:09

## 2023-03-22 RX ADMIN — PREDNISONE 60 MG: 20 TABLET ORAL at 16:57

## 2023-03-22 RX ADMIN — CEFTRIAXONE SODIUM 1000 MG: 1 INJECTION, POWDER, FOR SOLUTION INTRAMUSCULAR; INTRAVENOUS at 12:28

## 2023-03-22 RX ADMIN — TRAZODONE HYDROCHLORIDE 100 MG: 100 TABLET ORAL at 21:36

## 2023-03-22 RX ADMIN — IPRATROPIUM BROMIDE AND ALBUTEROL SULFATE 3 ML: .5; 2.5 SOLUTION RESPIRATORY (INHALATION) at 07:36

## 2023-03-22 RX ADMIN — IPRATROPIUM BROMIDE AND ALBUTEROL SULFATE 3 ML: .5; 2.5 SOLUTION RESPIRATORY (INHALATION) at 10:59

## 2023-03-22 RX ADMIN — FUROSEMIDE 20 MG: 20 TABLET ORAL at 09:10

## 2023-03-22 RX ADMIN — SODIUM CHLORIDE, PRESERVATIVE FREE 10 ML: 5 INJECTION INTRAVENOUS at 09:11

## 2023-03-22 NOTE — ED NOTES
Admission Dx: FACIAL DROOP    Pts Chief Complaints on Arrival: FACIAL DROOP    ADL's - Partial assistance    Pending Diagnostics:  N/A    Residence PTA: single story home    Special Considerations/Circumstances:  N/A    Vitals: Current vital signs:  BP (!) 173/97   Pulse 83   Temp 98.2 °F (36.8 °C) (Oral)   Resp 22   Ht 4' 11\" (1.499 m)   Wt 102 lb (46.3 kg)   SpO2 96%   BMI 20.60 kg/m²                MEWS Score: 2          Irene Mathew RN  03/21/23 2037

## 2023-03-22 NOTE — H&P
Grandmother        PHYSICAL EXAM:    BP (!) 130/55   Pulse 74   Temp 97.2 °F (36.2 °C)   Resp 16   Ht 4' 11\" (1.499 m)   Wt 102 lb (46.3 kg)   SpO2 98%   BMI 20.60 kg/m²     General appearance: No apparent distress appears stated age and cooperative. HEENT Normal cephalic, atraumatic without obvious deformity. Neck: Supple, No jugular venous distention/bruits. Lungs: Clear to auscultation, bilaterally without rales/wheezes/rhonchi with good respiratory effort. Heart: Regular rate and rhythm with Normal S1/S2 without murmurs, rubs or gallops  Abdomen: Soft, non-tender or non-distended without rigidity or guarding and positive bowel sounds all four quadrants. Neurologic: left mouth droop, cannot close left eye  Mental status: Alert, oriented, thought content appropriate. CT head:   No acute intracranial abnormality. Mild nonspecific, most consistent with mild chronic small vessel ischemic   disease. CTA head/neck:   Unremarkable CTA of the head and neck. Stable mild dilatation of the ascending thoracic aorta measuring 3.5 cm. Mild residual nodular density seen in the visualized lungs with near complete   interval resolution of the previously visualized infiltrates on the CTA chest   exam of 12/08/2022. Short-term follow-up CT chest is recommended to confirm   complete resolution. CBC   Recent Labs     03/21/23  1820   WBC 5.3   HGB 14.6   HCT 44.4         RENAL  Recent Labs     03/21/23  1820      K 4.2      CO2 27   BUN 16   CREATININE 0.82     LFT'S  No results for input(s): AST, ALT, ALB, BILIDIR, BILITOT, ALKPHOS in the last 72 hours. COAG  No results for input(s): INR in the last 72 hours. CARDIAC ENZYMES  No results for input(s): CKTOTAL, CKMB, CKMBINDEX, TROPONINI in the last 72 hours.     U/A:    Lab Results   Component Value Date/Time    NITRITE neg 02/14/2023 02:37 PM    COLORU Yellow 03/22/2023 08:41 AM    WBCUA 6 TO 9 03/22/2023 08:41 AM

## 2023-03-22 NOTE — PLAN OF CARE
Problem: Discharge Planning  Goal: Discharge to home or other facility with appropriate resources  Outcome: Progressing     Problem: Safety - Adult  Goal: Free from fall injury  Outcome: Progressing  Note: The patient remained free from falls this shift, call light within reach, bed in locked and lowest position. Side rails up x2. Continue to monitor closely.       Problem: Chronic Conditions and Co-morbidities  Goal: Patient's chronic conditions and co-morbidity symptoms are monitored and maintained or improved  Outcome: Progressing

## 2023-03-22 NOTE — ED PROVIDER NOTES
Transportation Needs: Not on file   Physical Activity: Not on file   Stress: Not on file   Social Connections: Not on file   Intimate Partner Violence: Not on file   Housing Stability: Not on file       Family History   Problem Relation Age of Onset    Diabetes Mother     Cancer Mother     Coronary Art Dis Father     COPD Father     Depression Brother     Alcohol Abuse Brother     Cancer Other         lung and skin    Diabetes Maternal Grandmother     Cancer Paternal Grandmother        Allergies:  Aspirin, Bactrim, and Codeine    Home Medications:  Prior to Admission medications    Medication Sig Start Date End Date Taking?  Authorizing Provider   Collagen-Vitamin C-Biotin 500-50-0.8 MG CAPS Take 1 capsule by mouth daily   Yes Historical Provider, MD   traZODone (DESYREL) 50 MG tablet Take 50 mg by mouth nightly as needed for Sleep   Yes Historical Provider, MD   clonazePAM (KLONOPIN) 0.5 MG tablet 1 tablet at bedtime Orally Once a day  Patient not taking: Reported on 3/21/2023    Historical Provider, MD   amLODIPine (NORVASC) 5 MG tablet Take 1 tablet by mouth daily 2/14/23   Joselito Valdez MD   losartan (COZAAR) 100 MG tablet Take 1 tablet by mouth daily 2/14/23   Joselito Valdez MD   divalproex (DEPAKOTE) 500 MG DR tablet Take 2 tablets by mouth 2 times daily Indications: Level 41 as of 7/22/16 (Pt takes two 500 mg DR tabs = 1,000mg PO BID) 2/14/23   Joselito Valdez MD   ferrous sulfate (FEROSUL) 325 (65 Fe) MG tablet Take 1 tablet by mouth in the morning and at bedtime  Patient not taking: Reported on 3/21/2023 2/14/23   Joselito Valdez MD   vitamin D (CHOLECALCIFEROL) 50 MCG (2000 UT) TABS tablet Take 1 tablet by mouth daily 2/14/23   Joselito Valdez MD   Handicap Placard MISC by Does not apply route 5 years, cannot walk over 200 ft. 2/14/23   Joselito Valdez MD   ipratropium-albuterol (DUONEB) 0.5-2.5 (3) MG/3ML SOLN nebulizer solution Inhale 3 mLs into the lungs 4 times daily

## 2023-03-22 NOTE — CONSULTS
1 MG tablet Take 1 tablet by mouth daily 11/26/22   Carmelo Velázquez MD   potassium chloride (KLOR-CON M) 20 MEQ extended release tablet Take 2 tablets by mouth daily 11/17/22   Teri Lambert MD   ARIPiprazole (ABILIFY) 15 MG tablet Take 0.5 tablets by mouth daily 11/4/22   Trei Lambert MD   DULoxetine (CYMBALTA) 60 MG extended release capsule Take 1 capsule by mouth daily 11/4/22   Teri Lambert MD   carvedilol (COREG) 25 MG tablet TAKE 1 TABLET BY MOUTH IN THE MORNING AND 1 IN THE EVENING WITH MEALS 9/19/22   Teri Lambert MD        Allergies:     Aspirin, Bactrim, and Codeine    Social History:     Tobacco:    reports that she has been smoking cigarettes. She has a 16.50 pack-year smoking history. She has never used smokeless tobacco.  Alcohol:      reports current alcohol use. Drug Use:  reports that she does not currently use drugs. Family History:     Family History   Problem Relation Age of Onset    Diabetes Mother     Cancer Mother     Coronary Art Dis Father     COPD Father     Depression Brother     Alcohol Abuse Brother     Cancer Other         lung and skin    Diabetes Maternal Grandmother     Cancer Paternal Grandmother        Review of Systems:       Constitutional Negative for fever and chills   HEENT Negative for ear discharge, ear pain, nosebleed. Negative for photophobia, headache. Musculoskeletal Negative for joint pain, negative for myalgia   Respiratory Negative for cough, dyspnea. Negative for hemoptysis and sputum. Cardiovascular Negative for palpitations, chest pain. Negative for orthopnea, claudication. Gastrointestinal Negative for nausea, vomiting. Negative for abdominal pain, diarrhea, blood in stool   Genitourinary  Negative for dysuria, hematuria. Negative for suprapubic pain. Negative for bladder incontinence. Skin Negative for rash or itching   Hematology Negative for ecchymosis, anemia   Psychiatric Negative for anxiety, depression.

## 2023-03-23 VITALS
HEIGHT: 59 IN | HEART RATE: 82 BPM | BODY MASS INDEX: 20.56 KG/M2 | WEIGHT: 102 LBS | TEMPERATURE: 97.8 F | DIASTOLIC BLOOD PRESSURE: 79 MMHG | OXYGEN SATURATION: 94 % | SYSTOLIC BLOOD PRESSURE: 130 MMHG | RESPIRATION RATE: 16 BRPM

## 2023-03-23 PROCEDURE — 94640 AIRWAY INHALATION TREATMENT: CPT

## 2023-03-23 PROCEDURE — 6370000000 HC RX 637 (ALT 250 FOR IP): Performed by: FAMILY MEDICINE

## 2023-03-23 PROCEDURE — 6370000000 HC RX 637 (ALT 250 FOR IP): Performed by: PSYCHIATRY & NEUROLOGY

## 2023-03-23 PROCEDURE — G0378 HOSPITAL OBSERVATION PER HR: HCPCS

## 2023-03-23 PROCEDURE — 94761 N-INVAS EAR/PLS OXIMETRY MLT: CPT

## 2023-03-23 PROCEDURE — 6360000002 HC RX W HCPCS: Performed by: FAMILY MEDICINE

## 2023-03-23 PROCEDURE — 2580000003 HC RX 258: Performed by: FAMILY MEDICINE

## 2023-03-23 PROCEDURE — 96372 THER/PROPH/DIAG INJ SC/IM: CPT

## 2023-03-23 RX ORDER — PREDNISONE 1 MG/1
5 TABLET ORAL DAILY
Qty: 2 TABLET | Refills: 0 | Status: SHIPPED | OUTPATIENT
Start: 2023-04-04 | End: 2023-04-06

## 2023-03-23 RX ORDER — CEPHALEXIN 500 MG/1
500 CAPSULE ORAL 2 TIMES DAILY
Qty: 14 CAPSULE | Refills: 0 | Status: SHIPPED | OUTPATIENT
Start: 2023-03-23 | End: 2023-03-30

## 2023-03-23 RX ORDER — PREDNISONE 20 MG/1
20 TABLET ORAL DAILY
Qty: 2 TABLET | Refills: 0 | Status: SHIPPED | OUTPATIENT
Start: 2023-03-31 | End: 2023-04-02

## 2023-03-23 RX ORDER — PREDNISONE 10 MG/1
10 TABLET ORAL DAILY
Qty: 2 TABLET | Refills: 0 | Status: SHIPPED | OUTPATIENT
Start: 2023-04-02 | End: 2023-04-04

## 2023-03-23 RX ORDER — PREDNISONE 20 MG/1
40 TABLET ORAL DAILY
Qty: 4 TABLET | Refills: 0 | Status: SHIPPED | OUTPATIENT
Start: 2023-03-29 | End: 2023-03-31

## 2023-03-23 RX ORDER — VALACYCLOVIR HYDROCHLORIDE 500 MG/1
500 TABLET, FILM COATED ORAL 2 TIMES DAILY
Qty: 9 TABLET | Refills: 0 | Status: SHIPPED | OUTPATIENT
Start: 2023-03-23 | End: 2023-03-28

## 2023-03-23 RX ORDER — PREDNISONE 20 MG/1
60 TABLET ORAL DAILY
Qty: 18 TABLET | Refills: 0 | Status: SHIPPED | OUTPATIENT
Start: 2023-03-23 | End: 2023-03-29

## 2023-03-23 RX ADMIN — DIVALPROEX SODIUM 1000 MG: 250 TABLET, DELAYED RELEASE ORAL at 09:37

## 2023-03-23 RX ADMIN — CARVEDILOL 25 MG: 25 TABLET, FILM COATED ORAL at 09:50

## 2023-03-23 RX ADMIN — IPRATROPIUM BROMIDE AND ALBUTEROL SULFATE 3 ML: .5; 2.5 SOLUTION RESPIRATORY (INHALATION) at 10:54

## 2023-03-23 RX ADMIN — Medication 2000 UNITS: at 09:39

## 2023-03-23 RX ADMIN — IPRATROPIUM BROMIDE AND ALBUTEROL SULFATE 3 ML: .5; 2.5 SOLUTION RESPIRATORY (INHALATION) at 07:01

## 2023-03-23 RX ADMIN — FUROSEMIDE 20 MG: 20 TABLET ORAL at 09:38

## 2023-03-23 RX ADMIN — FOLIC ACID 1 MG: 1 TABLET ORAL at 09:38

## 2023-03-23 RX ADMIN — VALACYCLOVIR HYDROCHLORIDE 500 MG: 500 TABLET, FILM COATED ORAL at 09:42

## 2023-03-23 RX ADMIN — DULOXETINE HYDROCHLORIDE 60 MG: 60 CAPSULE, DELAYED RELEASE ORAL at 09:38

## 2023-03-23 RX ADMIN — AMLODIPINE BESYLATE 10 MG: 10 TABLET ORAL at 09:38

## 2023-03-23 RX ADMIN — SODIUM CHLORIDE, PRESERVATIVE FREE 10 ML: 5 INJECTION INTRAVENOUS at 09:39

## 2023-03-23 RX ADMIN — LOSARTAN POTASSIUM 100 MG: 50 TABLET, FILM COATED ORAL at 09:38

## 2023-03-23 RX ADMIN — ARIPIPRAZOLE 7.5 MG: 15 TABLET ORAL at 09:42

## 2023-03-23 RX ADMIN — IPRATROPIUM BROMIDE AND ALBUTEROL SULFATE 3 ML: .5; 2.5 SOLUTION RESPIRATORY (INHALATION) at 14:52

## 2023-03-23 RX ADMIN — ENOXAPARIN SODIUM 30 MG: 100 INJECTION SUBCUTANEOUS at 09:50

## 2023-03-23 RX ADMIN — FERROUS SULFATE TAB 325 MG (65 MG ELEMENTAL FE) 325 MG: 325 (65 FE) TAB at 09:38

## 2023-03-23 RX ADMIN — PREDNISONE 60 MG: 20 TABLET ORAL at 09:38

## 2023-03-23 RX ADMIN — POTASSIUM CHLORIDE 40 MEQ: 1500 TABLET, EXTENDED RELEASE ORAL at 09:38

## 2023-03-23 ASSESSMENT — PAIN SCALES - GENERAL: PAINLEVEL_OUTOF10: 0

## 2023-03-23 NOTE — PLAN OF CARE
Problem: Discharge Planning  Goal: Discharge to home or other facility with appropriate resources  3/23/2023 0544 by Jeremiah Miller RN  Outcome: Progressing     Problem: Safety - Adult  Goal: Free from fall injury  3/23/2023 0544 by Jeremiah Miller RN  Outcome: Progressing     Problem: Chronic Conditions and Co-morbidities  Goal: Patient's chronic conditions and co-morbidity symptoms are monitored and maintained or improved  3/23/2023 0544 by Jeremiah Miller RN  Outcome: Progressing

## 2023-03-23 NOTE — CARE COORDINATION
DISCHARGE PLANNING NOTE:     has attempted to see this patient x3. Unable to complete assessment at this time. Will continue to see patient today.   Electronically signed by Damian Reese RN on 3/22/2023 at 2:32 PM
ONGOING DISCHARGE PLAN:    Patient is alert and oriented x4. Spoke with patient regarding discharge plan and patient confirms that plan is still to go home with father. Pt states father will transport to home. Neuro following for [place] Palsy . On prednisone taper and Valtrex PO . Follow up in 3-4 weeks with Neuro. April 26 @10:40 am .    Will continue to follow for additional discharge needs. If patient is discharged prior to next notation, then this note serves as note for discharge by case management.     Electronically signed by Sarah Estevez RN on 3/23/2023 at 10:06 AM
discharge to: House  Plan for transportation at discharge:      Financial    Payor: Eulogio Beverly / Plan: Nonda Gisella / Product Type: *No Product type* /     Does insurance require precert for SNF: Yes    Potential assistance Purchasing Medications: No  Meds-to-Beds request: Yes      [place], [facility]  [address] [address]  Phone: [phone] Fax: [address], [place] [facility] [address] [phone] [place] [phone]  [facility] [address] [place] [address]  Phone: [phone] Fax: [phone]      Notes:    Factors facilitating achievement of predicted outcomes: Family support    Barriers to discharge: Medical complications    Additional Case Management Notes: 3/22/23 Nonda Gisella from home with father DME: quad cane VNS:none, pt has had [address] in the past. Pt was stroke alert on admit. IV rocephin , PO predisone taper, consult to Neuro, PT/OT eval. Following for needs//JF              The Plan for Transition of Care is related to the following treatment goals of Facial droop [U69.024]    IF APPLICABLE: The Patient and/or patient representative Christopher Arana and her family were provided with a choice of provider and agrees with the discharge plan. Freedom of choice list with basic dialogue that supports the patient's individualized plan of care/goals and shares the quality data associated with the providers was provided to:     Patient Representative Name:       The Patient and/or Patient Representative Agree with the Discharge Plan?       Kesha Parra RN  Case Management Department  Ph: [phone]

## 2023-03-23 NOTE — PLAN OF CARE
Problem: Discharge Planning  Goal: Discharge to home or other facility with appropriate resources  3/23/2023 1434 by Julio Wright RN  Outcome: Completed  3/23/2023 0544 by Lisa Cavazos RN  Outcome: Progressing     Problem: Safety - Adult  Goal: Free from fall injury  3/23/2023 1434 by Julio Wright RN  Outcome: Completed  3/23/2023 0544 by Lisa Cavazos RN  Outcome: Progressing     Problem: Chronic Conditions and Co-morbidities  Goal: Patient's chronic conditions and co-morbidity symptoms are monitored and maintained or improved  3/23/2023 1434 by Julio Wright RN  Outcome: Completed  3/23/2023 0544 by Lisa Cavazos RN  Outcome: Progressing     Problem: Pain  Goal: Verbalizes/displays adequate comfort level or baseline comfort level  Outcome: Completed

## 2023-03-23 NOTE — PROGRESS NOTES
BRONCHOSPASM/BRONCHOCONSTRICTION     [x]         IMPROVE AERATION/BREATH SOUNDS  [x]   ADMINISTER BRONCHODILATOR THERAPY AS APPROPRIATE  [x]   ASSESS BREATH SOUNDS  []   IMPLEMENT AEROSOL/MDI PROTOCOL  [x]   PATIENT EDUCATION AS NEEDED
Medication History completed:    New medications: trazodone, collagen supplement    Medications discontinued:   Teflaro - prescription ended in December 2022  Doxycycline - prescription ended in December 2022    Medications flagged for review:   Ferrous sulfate - patient reports she does not take this due to upset stomach  Clonazepam - patient is no longer taking and has not been filled in over 2 years per [place]    Changes to dosing:   Amlodipine changed to 5 mg daily (decrease from 10 mg daily)    Stated allergies: As listed    Other pertinent information: Medications confirmed with [facility]. The patient has not filled carvedilol since November 2022 for a 30 day supply, but the patient reports that she is only taking the medication once daily in the morning as she often forgets her evening doses.      Thank you,  Yasmine Combs, PharmD, BCPS  [phone]
Neurology  ---    Continues to have lower motor neuron left-sided facial weakness  Continue prednisone for total of 14 days, Valtrex for 5 days  Eye patch at night to avoid irritation  Facial muscle exercises  Follow-up with neurology outpatient in 3 to 4 weeks  Stable for discharge neurologically at this time    Nikkie Ruiz, [address]  Neurology
Patient arrived to unit. Patient bed in lowest position, wheels locked and call light within reach. Patient oriented to room.
Patient dc'd via WC per CTA Isabel,  A&O x's 4, VS WNL, all belongings with patient.
Personalized Stroke Risk Factor Education done and resolved.  See Education for details
Progress Note  Date:3/23/2023       Room:[address] [place] [address]  Patient Name:Anu Bourne     YOB: 1970     Age:52 y.o. Subjective    Subjective:  Symptoms:  Stable. Diet:  Adequate intake. Activity level: Returning to normal.    Pain:  She reports no pain. Review of Systems  Objective         Vitals Last 24 Hours:  TEMPERATURE:  Temp  Av.6 °F (36.4 °C)  Min: 97.2 °F (36.2 °C)  Max: 98.1 °F (36.7 °C)  RESPIRATIONS RANGE: Resp  Av.5  Min: 16  Max: 18  PULSE OXIMETRY RANGE: SpO2  Av.9 %  Min: 95 %  Max: 98 %  PULSE RANGE: Pulse  Av.7  Min: 65  Max: 74  BLOOD PRESSURE RANGE: Systolic (50RTY), GRQ:335 , Min:96 , UTQ:321   ; Diastolic (99ODM), FJR:29, Min:57, Max:82    I/O (24Hr): Intake/Output Summary (Last 24 hours) at 3/23/2023 0856  Last data filed at 3/22/2023 1315  Gross per 24 hour   Intake 1430 ml   Output 300 ml   Net 1130 ml     Objective:  General Appearance:  Comfortable. Vital signs: (most recent): Blood pressure (!) 107/57, pulse 65, temperature 97.2 °F (36.2 °C), resp. rate 16, height 4' 11\" (1.499 m), weight 102 lb (46.3 kg), SpO2 95 %. Vital signs are normal.    HEENT: (Left facial droop persists. )    Labs/Imaging/Diagnostics    Labs:  CBC:  Recent Labs     23  1820   WBC 5.3   RBC 5.14   HGB 14.6   HCT 44.4   MCV 86.3   RDW 13.1        CHEMISTRIES:  Recent Labs     23  1820      K 4.2      CO2 27   BUN 16   CREATININE 0.82   GLUCOSE 91     PT/INR:No results for input(s): PROTIME, INR in the last 72 hours. APTT:No results for input(s): APTT in the last 72 hours. LIVER PROFILE:No results for input(s): AST, ALT, BILIDIR, BILITOT, ALKPHOS in the last 72 hours. Imaging Last 24 Hours:  CT HEAD WO CONTRAST    Addendum Date: 3/21/2023    ADDENDUM: Results conveyed to Dr. Rakel Noonan at 6:46 p.m. on 2023.      Result Date: 3/21/2023  EXAMINATION: CT OF THE HEAD WITHOUT CONTRAST  3/21/2023 6:29 pm TECHNIQUE: CT of the head was
Writer informed Dr. Arturo Rosenbaum that patient states she takes Topamax 100mg nightly. Orders received.
Writer responded to Stroke Alert; SC visit with patient and her father; patient and her father known to writer from previous admissions; medical update provided; patient talked about her medical issues and symptoms; patient anxious but hopeful; listening presence and support; welcomed prayer;        03/21/23 1930   Encounter Summary   Encounter Overview/Reason  Spiritual/Emotional Needs   Service Provided For: Patient and family together   Referral/Consult From: Multi-disciplinary team   Support System Parent   Last Encounter  03/21/23   Complexity of Encounter Moderate   Crisis   Type Code Stroke   Spiritual/Emotional needs   Type Spiritual Support   Assessment/Intervention/Outcome   Assessment Coping; Anxious; Hopeful;Powerlessness   Intervention Active listening;Discussed illness injury and its impact; Explored/Affirmed feelings, thoughts, concerns;Prayer (assurance of)/[place];Sustaining Presence/Ministry of presence   Outcome Coping;Engaged in conversation;Expressed feelings, needs, and concerns;Expressed Gratitude;Receptive
hips are bad)  Transfer Assistance: Independent  Active : Yes  Mode of Transportation: Car  Occupation: On disability  IADL Comments: sleeps in a flat bed  Additional Comments: states that her father or brother can assist if needed at D/C    Objective  Orientation  Overall Orientation Status: Within Functional Limits  Orientation Level: Oriented to place, Oriented to time, Oriented to situation, Oriented to person, Oriented X4  Cognition  Overall Cognitive Status: WFL     ADL  Feeding: Setup  Grooming: Setup  UE Bathing: Supervision  LE Bathing: Stand by assistance  UE Dressing: Supervision  LE Dressing: Stand by assistance  Toileting: Stand by assistance  Additional Comments: ADL scores are based on skilled observation and clinical reasoning unless otherwise noted. Patient is currently limited by decreased strength, endurance, activity tolerance, and visual impairment which impacts the patient's ability to safely and independently complete self-care    UE Function  LUE AROM (degrees)  LUE AROM : WFL  Left Hand AROM (degrees)  Left Hand AROM: WFL  Tone LUE  LUE Tone: Normotonic  LUE Strength  Gross LUE Strength: WFL  L Hand General: 4-/5  LUE Strength Comment: Grossly 4-/5    RUE AROM (degrees)  RUE AROM : WFL  Right Hand AROM (degrees)  Right Hand AROM: WFL  Tone RUE  RUE Tone: Normotonic  RUE Strength  Gross RUE Strength: WFL  R Hand General: 4/5  RUE Strength Comment: Grossly 4/5    Fine Motor Skills/Coordination  Coordination  Movements Are Fluid And Coordinated: Yes     Bed Mobility  Bed mobility  Rolling to Right: Independent  Supine to Sit: Independent  Sit to Supine: Independent  Bed Mobility Comments: pt dangled at the EOB w/ supervision    Balance  Balance  Sitting Balance: Independent  Standing Balance: Stand by assistance  Standing Balance  Time: 2-3 minutes  Activity: Functional transfers, functional mobility  Comment: SBA for safety, no device.  Good hand placement noted throughout
neck)  Position Activity Restriction  Other position/activity restrictions: OT/PT eval and treat     Subjective   Pain: denies  General  Patient assessed for rehabilitation services?: Yes  Additional Pertinent Hx: Ivonne Carlos is a 45 y.o. female who presents with left-sided facial droop and numbness that has been ongoing since 11 PM last night. She has been unable to close her left eye and has been having tearing. Symptoms have been constant. Does report some left arm weakness, however thinks this may be residual from her shoulder surgery. She reports that yesterday both hands she was unable to hold onto objects and had dropped a card from the gas station multiple times. She reports drooling significantly  Response To Previous Treatment: Not applicable  Family / Caregiver Present: No  Referring Practitioner: Dr. Claudell Lank  Referral Date : 03/22/23  Diagnosis: left facial droop  Follows Commands: Within Functional Limits  Other (Comment): OK per nurse Huyen Guzman to proceed w/ PT evaluation  General Comment  Comments: CT scan of the head showed no acute intracranial abnormalities. Pt states that  she is scheduled for an MRI of the brain later today. Subjective  Subjective: pt reports that she developed sudden onset of blurred and double vision last night while attempting to smoke  a cigarette. Pt having difficulty drinking fluids due to drooling.          Social/Functional History  Social/Functional History  Lives With: Other (comment) (father)  Type of Home: House  Home Layout: One level  Home Access: Stairs to enter without rails  Entrance Stairs - Number of Steps: 1 platform step w/o rails  Entrance Stairs - Rails: None  Bathroom Shower/Tub: Tub/Shower unit, Shower chair with back, Curtain  Bathroom Toilet: Standard (has a stand next to it)  [employer]: Shower chair, Grab bars in shower, Hand-held shower  Home Equipment: Cindy Sake, quad  Has the patient had two or more falls in the past year or any fall

## 2023-03-24 ENCOUNTER — CARE COORDINATION (OUTPATIENT)
Dept: CARE COORDINATION | Age: 53
End: 2023-03-24

## 2023-03-24 NOTE — CARE COORDINATION
Attempted to reach patient for follow up. Was discharged from hospital yesterday. Left a message on her voicemail with call back number. Will try to reach her next week.

## 2023-03-30 ENCOUNTER — TELEPHONE (OUTPATIENT)
Dept: GASTROENTEROLOGY | Age: 53
End: 2023-03-30

## 2023-03-30 NOTE — TELEPHONE ENCOUNTER
Patient is due for a colonoscopy. She had a emergency  bowel obstruction last year and emergency syrgery. She calls today as she is having a lump in her right side and her gallbladder is gone and dark tarry stool recently with pain. Writer told her she needed to go be checked in the ER due to the black stool. She agrees to go and we will keep an eye on her about that.

## 2023-04-06 ENCOUNTER — OFFICE VISIT (OUTPATIENT)
Dept: INTERNAL MEDICINE CLINIC | Age: 53
End: 2023-04-06

## 2023-04-06 VITALS
DIASTOLIC BLOOD PRESSURE: 74 MMHG | BODY MASS INDEX: 19.88 KG/M2 | WEIGHT: 98.6 LBS | HEART RATE: 105 BPM | SYSTOLIC BLOOD PRESSURE: 136 MMHG | HEIGHT: 59 IN | OXYGEN SATURATION: 98 %

## 2023-04-06 DIAGNOSIS — Z12.39 SCREENING BREAST EXAMINATION: ICD-10-CM

## 2023-04-06 DIAGNOSIS — R91.8 PULMONARY NODULES: ICD-10-CM

## 2023-04-06 DIAGNOSIS — R10.11 RIGHT UPPER QUADRANT ABDOMINAL PAIN: ICD-10-CM

## 2023-04-06 DIAGNOSIS — R56.9 SEIZURES (HCC): ICD-10-CM

## 2023-04-06 DIAGNOSIS — Z12.31 ENCOUNTER FOR SCREENING MAMMOGRAM FOR MALIGNANT NEOPLASM OF BREAST: ICD-10-CM

## 2023-04-06 DIAGNOSIS — E78.2 MIXED HYPERLIPIDEMIA: ICD-10-CM

## 2023-04-06 DIAGNOSIS — E61.1 IRON DEFICIENCY: ICD-10-CM

## 2023-04-06 DIAGNOSIS — F41.9 ANXIETY: ICD-10-CM

## 2023-04-06 DIAGNOSIS — J44.9 CHRONIC OBSTRUCTIVE PULMONARY DISEASE, UNSPECIFIED COPD TYPE (HCC): Primary | ICD-10-CM

## 2023-04-06 DIAGNOSIS — F18.10 ABUSE OF SMOKED SUBSTANCE (HCC): ICD-10-CM

## 2023-04-06 DIAGNOSIS — Z98.84 BARIATRIC SURGERY STATUS: ICD-10-CM

## 2023-04-06 SDOH — ECONOMIC STABILITY: FOOD INSECURITY: WITHIN THE PAST 12 MONTHS, THE FOOD YOU BOUGHT JUST DIDN'T LAST AND YOU DIDN'T HAVE MONEY TO GET MORE.: NEVER TRUE

## 2023-04-06 SDOH — ECONOMIC STABILITY: INCOME INSECURITY: HOW HARD IS IT FOR YOU TO PAY FOR THE VERY BASICS LIKE FOOD, HOUSING, MEDICAL CARE, AND HEATING?: NOT HARD AT ALL

## 2023-04-06 SDOH — ECONOMIC STABILITY: HOUSING INSECURITY
IN THE LAST 12 MONTHS, WAS THERE A TIME WHEN YOU DID NOT HAVE A STEADY PLACE TO SLEEP OR SLEPT IN A SHELTER (INCLUDING NOW)?: NO

## 2023-04-06 SDOH — ECONOMIC STABILITY: FOOD INSECURITY: WITHIN THE PAST 12 MONTHS, YOU WORRIED THAT YOUR FOOD WOULD RUN OUT BEFORE YOU GOT MONEY TO BUY MORE.: NEVER TRUE

## 2023-04-06 ASSESSMENT — PATIENT HEALTH QUESTIONNAIRE - PHQ9
4. FEELING TIRED OR HAVING LITTLE ENERGY: 3
SUM OF ALL RESPONSES TO PHQ QUESTIONS 1-9: 16
5. POOR APPETITE OR OVEREATING: 0
SUM OF ALL RESPONSES TO PHQ QUESTIONS 1-9: 16
7. TROUBLE CONCENTRATING ON THINGS, SUCH AS READING THE NEWSPAPER OR WATCHING TELEVISION: 3
2. FEELING DOWN, DEPRESSED OR HOPELESS: 3
9. THOUGHTS THAT YOU WOULD BE BETTER OFF DEAD, OR OF HURTING YOURSELF: 0
3. TROUBLE FALLING OR STAYING ASLEEP: 3
6. FEELING BAD ABOUT YOURSELF - OR THAT YOU ARE A FAILURE OR HAVE LET YOURSELF OR YOUR FAMILY DOWN: 1
10. IF YOU CHECKED OFF ANY PROBLEMS, HOW DIFFICULT HAVE THESE PROBLEMS MADE IT FOR YOU TO DO YOUR WORK, TAKE CARE OF THINGS AT HOME, OR GET ALONG WITH OTHER PEOPLE: 2
SUM OF ALL RESPONSES TO PHQ QUESTIONS 1-9: 16
8. MOVING OR SPEAKING SO SLOWLY THAT OTHER PEOPLE COULD HAVE NOTICED. OR THE OPPOSITE, BEING SO FIGETY OR RESTLESS THAT YOU HAVE BEEN MOVING AROUND A LOT MORE THAN USUAL: 0
1. LITTLE INTEREST OR PLEASURE IN DOING THINGS: 3
SUM OF ALL RESPONSES TO PHQ QUESTIONS 1-9: 16
SUM OF ALL RESPONSES TO PHQ9 QUESTIONS 1 & 2: 6

## 2023-04-19 ENCOUNTER — OFFICE VISIT (OUTPATIENT)
Dept: BARIATRICS/WEIGHT MGMT | Age: 53
End: 2023-04-19

## 2023-04-19 VITALS
RESPIRATION RATE: 16 BRPM | BODY MASS INDEX: 19.56 KG/M2 | DIASTOLIC BLOOD PRESSURE: 82 MMHG | HEART RATE: 78 BPM | SYSTOLIC BLOOD PRESSURE: 158 MMHG | HEIGHT: 59 IN | WEIGHT: 97 LBS

## 2023-04-19 DIAGNOSIS — K43.2 INCISIONAL HERNIA, WITHOUT OBSTRUCTION OR GANGRENE: Primary | ICD-10-CM

## 2023-04-19 RX ORDER — PANTOPRAZOLE SODIUM 40 MG/1
40 TABLET, DELAYED RELEASE ORAL DAILY
Qty: 30 TABLET | Refills: 3 | Status: SHIPPED | OUTPATIENT
Start: 2023-04-19

## 2023-04-19 NOTE — PROGRESS NOTES
release tablet Take 2 tablets by mouth daily 60 tablet 5    ARIPiprazole (ABILIFY) 15 MG tablet Take 0.5 tablets by mouth daily 30 tablet 3    DULoxetine (CYMBALTA) 60 MG extended release capsule Take 1 capsule by mouth daily 30 capsule 3     No current facility-administered medications for this visit. SOCIAL:      This patient is alone for the evaluation today. [] HIV Risk Factors (i.e.) intravenous drug abuser; at risk sexual behavior; received blood products    [] TB Risk Factors (i.e.) Medically underserved, institutional care, foreign born, endemic area; exposure to active case    [] Hepatitis B&C Risk Factors (i.e.) Received blood transfusion prior to 1992; recreational drug use; high risk sexual behaviors; tattoos or body piercings; contact with blood or needle sticks in the workplace    Comprehension    Ability to grasp concepts and respond to questions:   [x] High   [] Medium   [] Low    Motivation    [x] Asks Questions; eager to learn   [] Needs education   [] Extreme anxiety    [] uncooperative   [] Denies need for education    English Speaking Ability    [x] Speaks English well   [x] Reads English well   [x] Understands spoken english    [x] Understands written English   [] No need for interpretive support      [] Might benefit from interpretive support   []  required for all services     REVIEW OF SYSTEMS: (Negative unless marked otherwise)     See review of Systems scanned into media    PRESENT ILLNESS:     Weight Parameters  Weight 97 lb (44 kg)   Height 4' 11\" (1.499 m)   BMI Body mass index is 19.59 kg/m².    IBW     EBW               IMMUNIZATION STATUS  Immunization History   Administered Date(s) Administered    COVID-19, PFIZER PURPLE top, DILUTE for use, (age 15 y+), 30mcg/0.3mL 03/15/2021, 04/05/2021    Influenza Virus Vaccine 11/07/2006, 10/24/2008, 09/16/2014, 10/23/2015, 10/12/2016, 01/11/2019    Influenza, AFLURIA (age 1 yrs+), FLUZONE, (age 10 mo+), MDV, 0.5mL

## 2023-05-06 PROBLEM — Z12.31 ENCOUNTER FOR SCREENING MAMMOGRAM FOR MALIGNANT NEOPLASM OF BREAST: Status: RESOLVED | Noted: 2023-04-06 | Resolved: 2023-05-06

## 2023-05-06 PROBLEM — Z12.39 SCREENING BREAST EXAMINATION: Status: RESOLVED | Noted: 2023-04-06 | Resolved: 2023-05-06

## 2023-05-18 ENCOUNTER — CARE COORDINATION (OUTPATIENT)
Dept: CARE COORDINATION | Age: 53
End: 2023-05-18

## 2023-05-18 NOTE — CARE COORDINATION
Attempted to reach Pat Spain for follow up. Left a message on her voicemail with call back number. Have not been able to reach Pat Spain the last 3 attempts. Last contact was 12/12/2022. Noted she has established with a new PCP. Will remove from panel.

## 2023-05-26 ENCOUNTER — TELEPHONE (OUTPATIENT)
Dept: BARIATRICS/WEIGHT MGMT | Age: 53
End: 2023-05-26

## 2023-05-26 NOTE — TELEPHONE ENCOUNTER
Left patient detailed phone message , patient has an egd on 6-2-23 at [facility] and needs to arrive by 8:10 am and left a mychart message also

## 2023-06-19 NOTE — PROGRESS NOTES
Visit Information    Have you changed or started any medications since your last visit including any over-the-counter medicines, vitamins, or herbal medicines? no   Are you having any side effects from any of your medications? -  no  Have you stopped taking any of your medications? Is so, why? -  no    Have you seen any other physician or provider since your last visit? No  Have you had any other diagnostic tests since your last visit? No  Have you been seen in the emergency room and/or had an admission to a hospital since we last saw you? No  Have you had your routine dental cleaning in the past 6 months? no    Have you activated your [email or web address] account? If not, what are your barriers?  Yes     Patient Care Team:  Amaris De Los Santos MD as PCP - General (Family Medicine)  Amaris De Los Santos MD as PCP - Empaneled Provider  Ravindra Herrera MD as Consulting Physician (Gastroenterology)  Marissa Leon MD as Surgeon (Cardiology)  Real Tellez DO as Consulting Physician (Hematology and Oncology)  Adolfo Agustin MD (Psychiatry)  Michael Snider RN as Ambulatory Care Manager    Medical History Review  Past Medical, Family, and Social History reviewed and does contribute to the patient presenting condition    Health Maintenance   Topic Date Due    Depression Monitoring  Never done    DTaP/Tdap/Td vaccine (1 - Tdap) Never done    Pneumococcal 0-64 years Vaccine (2 - PCV) 10/23/2016    Shingles vaccine (1 of 2) Never done    Breast cancer screen  05/13/2021    COVID-19 Vaccine (3 - Booster for Aguirre Peter series) 05/31/2021    Annual Wellness Visit (AWV)  Never done    Colorectal Cancer Screen  11/19/2022    A1C test (Diabetic or Prediabetic)  01/29/2023    Flu vaccine (Season Ended) 08/01/2023    Lipids  12/06/2027    HIV screen  10/26/2032    Hepatitis C screen  Completed    Hepatitis A vaccine  Aged Out    Hib vaccine  Aged Out    Meningococcal (ACWY) vaccine  Aged Out    Diabetes screen  Discontinued    Cervical
LAPAROSCOPY N/A 1/29/2022    LAPAROSCOPY EXPLORATORY CONVERTED TO OPEN EXPLORATORY LAPAROTOMY, LYSIS OF ADHESIONS, REDUCTION OF INTERNAL HERNIA performed by Allison Vargas DO [facility] [address] [facility]  07/43/9852    APPLICATION OF ARCH BARS,SIMPLE EXTRACTION OF #15 AND RT TMJ JOINT REPLACEMENT    SHOULDER ARTHROSCOPY Left 06/05/2019    DR ROBERT GREEN    SHOULDER SURGERY      TONSILLECTOMY AND ADENOIDECTOMY      TRANSESOPHAGEAL ECHOCARDIOGRAM N/A 10/19/2022    TRANSESOPHAGEAL ECHOCARDIOGRAM WITH BUBBLE STUDY performed by Frandy Royal DO at [address]  7-3-12    egd    UPPER GASTROINTESTINAL ENDOSCOPY  12/19/2016    status post gastrectomy with a small remnant of gastric pouch.     UPPER GASTROINTESTINAL ENDOSCOPY  05/2019    DR Karlee Marquis        Medications:      Current Outpatient Medications:     predniSONE (DELTASONE) 5 MG tablet, Take 1 tablet by mouth daily for 2 doses, Disp: 2 tablet, Rfl: 0    Collagen-Vitamin C-Biotin 500-50-0.8 MG CAPS, Take 1 capsule by mouth daily, Disp: , Rfl:     traZODone (DESYREL) 50 MG tablet, Take 1 tablet by mouth nightly as needed for Sleep, Disp: , Rfl:     amLODIPine (NORVASC) 5 MG tablet, Take 1 tablet by mouth daily, Disp: 90 tablet, Rfl: 3    losartan (COZAAR) 100 MG tablet, Take 1 tablet by mouth daily, Disp: 90 tablet, Rfl: 3    divalproex (DEPAKOTE) 500 MG DR tablet, Take 2 tablets by mouth 2 times daily Indications: Level 41 as of 7/22/16 (Pt takes two 500 mg DR tabs = 1,000mg PO BID), Disp: 360 tablet, Rfl: 3    ferrous sulfate (FEROSUL) 325 (65 Fe) MG tablet, Take 1 tablet by mouth in the morning and at bedtime, Disp: 180 tablet, Rfl: 3    vitamin D (CHOLECALCIFEROL) 50 MCG (2000 UT) TABS tablet, Take 1 tablet by mouth daily, Disp: 90 tablet, Rfl: 3    Handicap Placard MISC, by Does not apply route 5 years, cannot walk over 200 ft., Disp: 1 each, Rfl: 0    ipratropium-albuterol (DUONEB) 0.5-2.5 (3)
Male

## 2023-07-11 ENCOUNTER — TELEPHONE (OUTPATIENT)
Dept: INTERNAL MEDICINE CLINIC | Age: 53
End: 2023-07-11

## 2023-07-11 NOTE — TELEPHONE ENCOUNTER
Patient returned call and was reminded of her testing that needed to be done. She voiced understanding and stated she would call to get those tests rescheduled.

## 2023-07-11 NOTE — TELEPHONE ENCOUNTER
Letter was mailed to patient in June, Attempted to call patient today.  Patients answered the phone and then hung up

## 2023-11-20 ENCOUNTER — TELEPHONE (OUTPATIENT)
Dept: INTERNAL MEDICINE CLINIC | Age: 53
End: 2023-11-20

## 2023-11-20 NOTE — TELEPHONE ENCOUNTER
Patient called stating she is having some chest congestion and cough. Patient states she is also throwing up blood. I advised patient to go to the urgent care or er as we have no available appts. Patient states that if she gets any worse she will go.

## 2024-02-16 ENCOUNTER — TELEPHONE (OUTPATIENT)
Dept: INTERNAL MEDICINE CLINIC | Age: 54
End: 2024-02-16

## 2024-02-19 ENCOUNTER — APPOINTMENT (OUTPATIENT)
Dept: CT IMAGING | Age: 54
DRG: 392 | End: 2024-02-19
Payer: COMMERCIAL

## 2024-02-19 ENCOUNTER — APPOINTMENT (OUTPATIENT)
Dept: GENERAL RADIOLOGY | Age: 54
DRG: 392 | End: 2024-02-19
Payer: COMMERCIAL

## 2024-02-19 ENCOUNTER — HOSPITAL ENCOUNTER (INPATIENT)
Age: 54
Discharge: HOME | DRG: 392 | End: 2024-02-21
Payer: COMMERCIAL

## 2024-02-19 ENCOUNTER — HOSPITAL ENCOUNTER (INPATIENT)
Age: 54
LOS: 1 days | Discharge: HOME | DRG: 392 | End: 2024-02-19
Attending: STUDENT IN AN ORGANIZED HEALTH CARE EDUCATION/TRAINING PROGRAM | Admitting: INTERNAL MEDICINE
Payer: COMMERCIAL

## 2024-02-19 ENCOUNTER — APPOINTMENT (OUTPATIENT)
Dept: NUCLEAR MEDICINE | Age: 54
DRG: 392 | End: 2024-02-19
Payer: COMMERCIAL

## 2024-02-19 VITALS
DIASTOLIC BLOOD PRESSURE: 100 MMHG | WEIGHT: 101.85 LBS | HEIGHT: 58 IN | RESPIRATION RATE: 18 BRPM | BODY MASS INDEX: 21.38 KG/M2 | SYSTOLIC BLOOD PRESSURE: 155 MMHG | OXYGEN SATURATION: 100 % | HEART RATE: 73 BPM | TEMPERATURE: 97.7 F

## 2024-02-19 DIAGNOSIS — N30.00 ACUTE CYSTITIS WITHOUT HEMATURIA: ICD-10-CM

## 2024-02-19 DIAGNOSIS — R07.89 CHEST PRESSURE: Primary | ICD-10-CM

## 2024-02-19 DIAGNOSIS — R10.84 GENERALIZED ABDOMINAL PAIN: ICD-10-CM

## 2024-02-19 PROBLEM — I20.89 STABLE ANGINA PECTORIS: Status: ACTIVE | Noted: 2024-02-19

## 2024-02-19 LAB
ALBUMIN SERPL-MCNC: 3.9 G/DL (ref 3.5–5.2)
ALP SERPL-CCNC: 116 U/L (ref 35–104)
ALT SERPL-CCNC: 9 U/L (ref 5–33)
AMMONIA PLAS-SCNC: 31 UMOL/L (ref 11–51)
AMPHET UR QL SCN: NEGATIVE
ANION GAP SERPL CALCULATED.3IONS-SCNC: 13 MMOL/L (ref 9–17)
AST SERPL-CCNC: 21 U/L
BACTERIA URNS QL MICRO: ABNORMAL
BARBITURATES UR QL SCN: NEGATIVE
BASOPHILS # BLD: 0.1 K/UL (ref 0–0.2)
BASOPHILS NFR BLD: 1 % (ref 0–2)
BENZODIAZ UR QL: NEGATIVE
BILIRUB DIRECT SERPL-MCNC: 0.1 MG/DL
BILIRUB INDIRECT SERPL-MCNC: 0.1 MG/DL (ref 0–1)
BILIRUB SERPL-MCNC: 0.2 MG/DL (ref 0.3–1.2)
BILIRUB UR QL STRIP: NEGATIVE
BNP SERPL-MCNC: 1406 PG/ML
BUN SERPL-MCNC: 21 MG/DL (ref 6–20)
CALCIUM SERPL-MCNC: 9.5 MG/DL (ref 8.6–10.4)
CANNABINOIDS UR QL SCN: NEGATIVE
CASTS #/AREA URNS LPF: ABNORMAL /LPF
CHLORIDE SERPL-SCNC: 100 MMOL/L (ref 98–107)
CHOLEST SERPL-MCNC: 176 MG/DL
CHOLESTEROL/HDL RATIO: 3.7
CLARITY UR: CLEAR
CO2 SERPL-SCNC: 23 MMOL/L (ref 20–31)
COCAINE UR QL SCN: POSITIVE
COLOR UR: YELLOW
CREAT SERPL-MCNC: 0.8 MG/DL (ref 0.5–0.9)
DATE, STOOL #1: NORMAL
ECHO BSA: 1.35 M2
EKG ATRIAL RATE: 75 BPM
EKG ATRIAL RATE: 76 BPM
EKG P AXIS: 29 DEGREES
EKG P AXIS: 29 DEGREES
EKG P-R INTERVAL: 136 MS
EKG P-R INTERVAL: 138 MS
EKG Q-T INTERVAL: 420 MS
EKG Q-T INTERVAL: 420 MS
EKG QRS DURATION: 94 MS
EKG QRS DURATION: 98 MS
EKG QTC CALCULATION (BAZETT): 469 MS
EKG QTC CALCULATION (BAZETT): 472 MS
EKG R AXIS: 56 DEGREES
EKG R AXIS: 56 DEGREES
EKG T AXIS: 37 DEGREES
EKG T AXIS: 72 DEGREES
EKG VENTRICULAR RATE: 75 BPM
EKG VENTRICULAR RATE: 76 BPM
EOSINOPHIL # BLD: 0.3 K/UL (ref 0–0.4)
EOSINOPHILS RELATIVE PERCENT: 4 % (ref 0–4)
EPI CELLS #/AREA URNS HPF: ABNORMAL /HPF
ERYTHROCYTE [DISTWIDTH] IN BLOOD BY AUTOMATED COUNT: 15.4 % (ref 11.5–14.9)
EST. AVERAGE GLUCOSE BLD GHB EST-MCNC: 117 MG/DL
ETHANOL PERCENT: <0.01 %
ETHANOLAMINE SERPL-MCNC: <10 MG/DL
FENTANYL UR QL: NEGATIVE
GFR SERPL CREATININE-BSD FRML MDRD: >60 ML/MIN/1.73M2
GLUCOSE SERPL-MCNC: 119 MG/DL (ref 70–99)
GLUCOSE UR STRIP-MCNC: NEGATIVE MG/DL
HBA1C MFR BLD: 5.7 % (ref 4–6)
HCG SERPL QL: NEGATIVE
HCT VFR BLD AUTO: 39.5 % (ref 36–46)
HDLC SERPL-MCNC: 48 MG/DL
HEMOCCULT SP1 STL QL: NEGATIVE
HGB BLD-MCNC: 12.7 G/DL (ref 12–16)
HGB UR QL STRIP.AUTO: NEGATIVE
INR PPP: 1
KETONES UR STRIP-MCNC: NEGATIVE MG/DL
LACTATE BLDV-SCNC: 0.9 MMOL/L (ref 0.5–2.2)
LDLC SERPL CALC-MCNC: 115 MG/DL (ref 0–130)
LEUKOCYTE ESTERASE UR QL STRIP: NEGATIVE
LIPASE SERPL-CCNC: 37 U/L (ref 13–60)
LYMPHOCYTES NFR BLD: 1.3 K/UL (ref 1–4.8)
LYMPHOCYTES RELATIVE PERCENT: 15 % (ref 24–44)
MAGNESIUM SERPL-MCNC: 2 MG/DL (ref 1.6–2.6)
MAGNESIUM SERPL-MCNC: 2.2 MG/DL (ref 1.6–2.6)
MCH RBC QN AUTO: 26.1 PG (ref 26–34)
MCHC RBC AUTO-ENTMCNC: 32.1 G/DL (ref 31–37)
MCV RBC AUTO: 81.1 FL (ref 80–100)
METHADONE UR QL: NEGATIVE
MONOCYTES NFR BLD: 0.6 K/UL (ref 0.1–1.3)
MONOCYTES NFR BLD: 7 % (ref 1–7)
NEUTROPHILS NFR BLD: 73 % (ref 36–66)
NEUTS SEG NFR BLD: 6.1 K/UL (ref 1.3–9.1)
NITRITE UR QL STRIP: POSITIVE
NUC STRESS EJECTION FRACTION: 51 %
OPIATES UR QL SCN: NEGATIVE
OXYCODONE UR QL SCN: NEGATIVE
PCP UR QL SCN: NEGATIVE
PH UR STRIP: 5.5 [PH] (ref 5–8)
PLATELET # BLD AUTO: 195 K/UL (ref 150–450)
PMV BLD AUTO: 9.2 FL (ref 6–12)
POTASSIUM SERPL-SCNC: 3.9 MMOL/L (ref 3.7–5.3)
PROT SERPL-MCNC: 6.6 G/DL (ref 6.4–8.3)
PROT UR STRIP-MCNC: NEGATIVE MG/DL
PROTHROMBIN TIME: 13.1 SEC (ref 11.8–14.6)
RBC # BLD AUTO: 4.87 M/UL (ref 4–5.2)
RBC #/AREA URNS HPF: ABNORMAL /HPF
SODIUM SERPL-SCNC: 136 MMOL/L (ref 135–144)
SP GR UR STRIP: 1.02 (ref 1–1.03)
STRESS BASELINE DIAS BP: 78 MMHG
STRESS BASELINE HR: 59 BPM
STRESS BASELINE SYS BP: 161 MMHG
STRESS ESTIMATED WORKLOAD: 1 METS
STRESS PEAK DIAS BP: 90 MMHG
STRESS PEAK SYS BP: 175 MMHG
STRESS PERCENT HR ACHIEVED: 58 %
STRESS POST PEAK HR: 96 BPM
STRESS RATE PRESSURE PRODUCT: NORMAL BPM*MMHG
STRESS STAGE RECOVERY 1 BP: NORMAL MMHG
STRESS STAGE RECOVERY 1 DURATION: 1 MIN:SEC
STRESS STAGE RECOVERY 1 HR: 93 BPM
STRESS STAGE RECOVERY 2 BP: NORMAL MMHG
STRESS STAGE RECOVERY 2 DURATION: 7 MIN:SEC
STRESS STAGE RECOVERY 2 HR: 85 BPM
STRESS TARGET HR: 165 BPM
TEST INFORMATION: ABNORMAL
TID: 0.91
TIME, STOOL #1: 234
TRIGL SERPL-MCNC: 63 MG/DL
TROPONIN I SERPL HS-MCNC: 22 NG/L (ref 0–14)
TROPONIN I SERPL HS-MCNC: 28 NG/L (ref 0–14)
TSH SERPL DL<=0.05 MIU/L-ACNC: 1.4 UIU/ML (ref 0.3–5)
UROBILINOGEN UR STRIP-ACNC: NORMAL EU/DL (ref 0–1)
WBC #/AREA URNS HPF: ABNORMAL /HPF
WBC OTHER # BLD: 8.4 K/UL (ref 3.5–11)

## 2024-02-19 PROCEDURE — 83880 ASSAY OF NATRIURETIC PEPTIDE: CPT

## 2024-02-19 PROCEDURE — 2580000003 HC RX 258: Performed by: STUDENT IN AN ORGANIZED HEALTH CARE EDUCATION/TRAINING PROGRAM

## 2024-02-19 PROCEDURE — 82140 ASSAY OF AMMONIA: CPT

## 2024-02-19 PROCEDURE — 74177 CT ABD & PELVIS W/CONTRAST: CPT

## 2024-02-19 PROCEDURE — 3430000000 HC RX DIAGNOSTIC RADIOPHARMACEUTICAL

## 2024-02-19 PROCEDURE — A9500 TC99M SESTAMIBI: HCPCS

## 2024-02-19 PROCEDURE — 96374 THER/PROPH/DIAG INJ IV PUSH: CPT

## 2024-02-19 PROCEDURE — 84484 ASSAY OF TROPONIN QUANT: CPT

## 2024-02-19 PROCEDURE — 80307 DRUG TEST PRSMV CHEM ANLYZR: CPT

## 2024-02-19 PROCEDURE — 85025 COMPLETE CBC W/AUTO DIFF WBC: CPT

## 2024-02-19 PROCEDURE — 93005 ELECTROCARDIOGRAM TRACING: CPT

## 2024-02-19 PROCEDURE — 94664 DEMO&/EVAL PT USE INHALER: CPT

## 2024-02-19 PROCEDURE — 94761 N-INVAS EAR/PLS OXIMETRY MLT: CPT

## 2024-02-19 PROCEDURE — 84443 ASSAY THYROID STIM HORMONE: CPT

## 2024-02-19 PROCEDURE — 71045 X-RAY EXAM CHEST 1 VIEW: CPT

## 2024-02-19 PROCEDURE — 6360000002 HC RX W HCPCS: Performed by: INTERNAL MEDICINE

## 2024-02-19 PROCEDURE — 80061 LIPID PANEL: CPT

## 2024-02-19 PROCEDURE — 70450 CT HEAD/BRAIN W/O DYE: CPT

## 2024-02-19 PROCEDURE — 84703 CHORIONIC GONADOTROPIN ASSAY: CPT

## 2024-02-19 PROCEDURE — 6360000002 HC RX W HCPCS: Performed by: STUDENT IN AN ORGANIZED HEALTH CARE EDUCATION/TRAINING PROGRAM

## 2024-02-19 PROCEDURE — 96361 HYDRATE IV INFUSION ADD-ON: CPT

## 2024-02-19 PROCEDURE — 85610 PROTHROMBIN TIME: CPT

## 2024-02-19 PROCEDURE — 6370000000 HC RX 637 (ALT 250 FOR IP): Performed by: INTERNAL MEDICINE

## 2024-02-19 PROCEDURE — 81001 URINALYSIS AUTO W/SCOPE: CPT

## 2024-02-19 PROCEDURE — G0480 DRUG TEST DEF 1-7 CLASSES: HCPCS

## 2024-02-19 PROCEDURE — 78452 HT MUSCLE IMAGE SPECT MULT: CPT

## 2024-02-19 PROCEDURE — 94640 AIRWAY INHALATION TREATMENT: CPT

## 2024-02-19 PROCEDURE — 96375 TX/PRO/DX INJ NEW DRUG ADDON: CPT

## 2024-02-19 PROCEDURE — 93010 ELECTROCARDIOGRAM REPORT: CPT | Performed by: INTERNAL MEDICINE

## 2024-02-19 PROCEDURE — 99285 EMERGENCY DEPT VISIT HI MDM: CPT

## 2024-02-19 PROCEDURE — 83690 ASSAY OF LIPASE: CPT

## 2024-02-19 PROCEDURE — 6370000000 HC RX 637 (ALT 250 FOR IP): Performed by: STUDENT IN AN ORGANIZED HEALTH CARE EDUCATION/TRAINING PROGRAM

## 2024-02-19 PROCEDURE — 36415 COLL VENOUS BLD VENIPUNCTURE: CPT

## 2024-02-19 PROCEDURE — 80048 BASIC METABOLIC PNL TOTAL CA: CPT

## 2024-02-19 PROCEDURE — 83735 ASSAY OF MAGNESIUM: CPT

## 2024-02-19 PROCEDURE — 93016 CV STRESS TEST SUPVJ ONLY: CPT | Performed by: RADIOLOGY

## 2024-02-19 PROCEDURE — 83605 ASSAY OF LACTIC ACID: CPT

## 2024-02-19 PROCEDURE — 83036 HEMOGLOBIN GLYCOSYLATED A1C: CPT

## 2024-02-19 PROCEDURE — 6360000004 HC RX CONTRAST MEDICATION: Performed by: STUDENT IN AN ORGANIZED HEALTH CARE EDUCATION/TRAINING PROGRAM

## 2024-02-19 PROCEDURE — 6370000000 HC RX 637 (ALT 250 FOR IP)

## 2024-02-19 PROCEDURE — 80076 HEPATIC FUNCTION PANEL: CPT

## 2024-02-19 PROCEDURE — 2580000003 HC RX 258

## 2024-02-19 PROCEDURE — 2060000000 HC ICU INTERMEDIATE R&B

## 2024-02-19 PROCEDURE — 82270 OCCULT BLOOD FECES: CPT

## 2024-02-19 PROCEDURE — 99223 1ST HOSP IP/OBS HIGH 75: CPT | Performed by: INTERNAL MEDICINE

## 2024-02-19 PROCEDURE — 93017 CV STRESS TEST TRACING ONLY: CPT

## 2024-02-19 RX ORDER — ATROPINE SULFATE 0.1 MG/ML
0.5 INJECTION INTRAVENOUS EVERY 5 MIN PRN
Status: ACTIVE | OUTPATIENT
Start: 2024-02-19 | End: 2024-02-19

## 2024-02-19 RX ORDER — NITROGLYCERIN 0.4 MG/1
0.4 TABLET SUBLINGUAL EVERY 5 MIN PRN
Status: ACTIVE | OUTPATIENT
Start: 2024-02-19 | End: 2024-02-19

## 2024-02-19 RX ORDER — VITAMIN B COMPLEX
2000 TABLET ORAL DAILY
Status: DISCONTINUED | OUTPATIENT
Start: 2024-02-19 | End: 2024-02-19 | Stop reason: HOSPADM

## 2024-02-19 RX ORDER — MORPHINE SULFATE 4 MG/ML
4 INJECTION, SOLUTION INTRAMUSCULAR; INTRAVENOUS
Status: DISCONTINUED | OUTPATIENT
Start: 2024-02-19 | End: 2024-02-19

## 2024-02-19 RX ORDER — DIVALPROEX SODIUM 250 MG/1
1000 TABLET, DELAYED RELEASE ORAL 2 TIMES DAILY
Status: DISCONTINUED | OUTPATIENT
Start: 2024-02-19 | End: 2024-02-19 | Stop reason: HOSPADM

## 2024-02-19 RX ORDER — AMLODIPINE BESYLATE 5 MG/1
5 TABLET ORAL DAILY
Status: DISCONTINUED | OUTPATIENT
Start: 2024-02-19 | End: 2024-02-19 | Stop reason: HOSPADM

## 2024-02-19 RX ORDER — FERROUS SULFATE 325(65) MG
325 TABLET ORAL 2 TIMES DAILY WITH MEALS
Status: DISCONTINUED | OUTPATIENT
Start: 2024-02-19 | End: 2024-02-19 | Stop reason: HOSPADM

## 2024-02-19 RX ORDER — SODIUM CHLORIDE 9 MG/ML
500 INJECTION, SOLUTION INTRAVENOUS CONTINUOUS PRN
Status: ACTIVE | OUTPATIENT
Start: 2024-02-19 | End: 2024-02-19

## 2024-02-19 RX ORDER — METOPROLOL TARTRATE 1 MG/ML
5 INJECTION, SOLUTION INTRAVENOUS EVERY 5 MIN PRN
Status: ACTIVE | OUTPATIENT
Start: 2024-02-19 | End: 2024-02-19

## 2024-02-19 RX ORDER — ASPIRIN 81 MG/1
324 TABLET, CHEWABLE ORAL ONCE
Status: COMPLETED | OUTPATIENT
Start: 2024-02-19 | End: 2024-02-19

## 2024-02-19 RX ORDER — ARIPIPRAZOLE 15 MG/1
7.5 TABLET ORAL DAILY
Status: DISCONTINUED | OUTPATIENT
Start: 2024-02-19 | End: 2024-02-19 | Stop reason: HOSPADM

## 2024-02-19 RX ORDER — POTASSIUM CHLORIDE 20 MEQ/1
40 TABLET, EXTENDED RELEASE ORAL PRN
Status: DISCONTINUED | OUTPATIENT
Start: 2024-02-19 | End: 2024-02-19 | Stop reason: HOSPADM

## 2024-02-19 RX ORDER — ALBUTEROL SULFATE 90 UG/1
2 AEROSOL, METERED RESPIRATORY (INHALATION) PRN
Status: ACTIVE | OUTPATIENT
Start: 2024-02-19 | End: 2024-02-19

## 2024-02-19 RX ORDER — NICOTINE 21 MG/24HR
1 PATCH, TRANSDERMAL 24 HOURS TRANSDERMAL DAILY
Status: DISCONTINUED | OUTPATIENT
Start: 2024-02-19 | End: 2024-02-19 | Stop reason: HOSPADM

## 2024-02-19 RX ORDER — ASPIRIN 81 MG/1
81 TABLET, CHEWABLE ORAL DAILY
Status: DISCONTINUED | OUTPATIENT
Start: 2024-02-20 | End: 2024-02-19 | Stop reason: HOSPADM

## 2024-02-19 RX ORDER — 0.9 % SODIUM CHLORIDE 0.9 %
100 INTRAVENOUS SOLUTION INTRAVENOUS ONCE
Status: COMPLETED | OUTPATIENT
Start: 2024-02-19 | End: 2024-02-19

## 2024-02-19 RX ORDER — SODIUM CHLORIDE 0.9 % (FLUSH) 0.9 %
5-40 SYRINGE (ML) INJECTION PRN
Status: ACTIVE | OUTPATIENT
Start: 2024-02-19 | End: 2024-02-19

## 2024-02-19 RX ORDER — SODIUM CHLORIDE 9 MG/ML
INJECTION, SOLUTION INTRAVENOUS PRN
Status: DISCONTINUED | OUTPATIENT
Start: 2024-02-19 | End: 2024-02-19 | Stop reason: HOSPADM

## 2024-02-19 RX ORDER — ACETAMINOPHEN 650 MG/1
650 SUPPOSITORY RECTAL EVERY 6 HOURS PRN
Status: DISCONTINUED | OUTPATIENT
Start: 2024-02-19 | End: 2024-02-19 | Stop reason: HOSPADM

## 2024-02-19 RX ORDER — POTASSIUM CHLORIDE 7.45 MG/ML
10 INJECTION INTRAVENOUS PRN
Status: DISCONTINUED | OUTPATIENT
Start: 2024-02-19 | End: 2024-02-19 | Stop reason: HOSPADM

## 2024-02-19 RX ORDER — POTASSIUM CHLORIDE 20 MEQ/1
40 TABLET, EXTENDED RELEASE ORAL DAILY
Status: DISCONTINUED | OUTPATIENT
Start: 2024-02-19 | End: 2024-02-19 | Stop reason: HOSPADM

## 2024-02-19 RX ORDER — TRAZODONE HYDROCHLORIDE 50 MG/1
50 TABLET ORAL NIGHTLY PRN
Status: DISCONTINUED | OUTPATIENT
Start: 2024-02-19 | End: 2024-02-19 | Stop reason: HOSPADM

## 2024-02-19 RX ORDER — IPRATROPIUM BROMIDE AND ALBUTEROL SULFATE 2.5; .5 MG/3ML; MG/3ML
1 SOLUTION RESPIRATORY (INHALATION) 4 TIMES DAILY
Status: DISCONTINUED | OUTPATIENT
Start: 2024-02-19 | End: 2024-02-19 | Stop reason: HOSPADM

## 2024-02-19 RX ORDER — TETRAKIS(2-METHOXYISOBUTYLISOCYANIDE)COPPER(I) TETRAFLUOROBORATE 1 MG/ML
35 INJECTION, POWDER, LYOPHILIZED, FOR SOLUTION INTRAVENOUS
Status: COMPLETED | OUTPATIENT
Start: 2024-02-19 | End: 2024-02-19

## 2024-02-19 RX ORDER — FUROSEMIDE 20 MG/1
20 TABLET ORAL DAILY
Status: DISCONTINUED | OUTPATIENT
Start: 2024-02-19 | End: 2024-02-19 | Stop reason: HOSPADM

## 2024-02-19 RX ORDER — ONDANSETRON 2 MG/ML
4 INJECTION INTRAMUSCULAR; INTRAVENOUS ONCE
Status: COMPLETED | OUTPATIENT
Start: 2024-02-19 | End: 2024-02-19

## 2024-02-19 RX ORDER — DULOXETIN HYDROCHLORIDE 60 MG/1
60 CAPSULE, DELAYED RELEASE ORAL DAILY
Status: DISCONTINUED | OUTPATIENT
Start: 2024-02-19 | End: 2024-02-19 | Stop reason: HOSPADM

## 2024-02-19 RX ORDER — SODIUM CHLORIDE 0.9 % (FLUSH) 0.9 %
5-40 SYRINGE (ML) INJECTION EVERY 12 HOURS SCHEDULED
Status: DISCONTINUED | OUTPATIENT
Start: 2024-02-19 | End: 2024-02-19 | Stop reason: HOSPADM

## 2024-02-19 RX ORDER — FOLIC ACID 1 MG/1
1 TABLET ORAL DAILY
Status: DISCONTINUED | OUTPATIENT
Start: 2024-02-19 | End: 2024-02-19 | Stop reason: HOSPADM

## 2024-02-19 RX ORDER — ATORVASTATIN CALCIUM 40 MG/1
40 TABLET, FILM COATED ORAL NIGHTLY
Status: DISCONTINUED | OUTPATIENT
Start: 2024-02-19 | End: 2024-02-19 | Stop reason: HOSPADM

## 2024-02-19 RX ORDER — 0.9 % SODIUM CHLORIDE 0.9 %
1000 INTRAVENOUS SOLUTION INTRAVENOUS ONCE
Status: COMPLETED | OUTPATIENT
Start: 2024-02-19 | End: 2024-02-19

## 2024-02-19 RX ORDER — NITROGLYCERIN 0.4 MG/1
0.4 TABLET SUBLINGUAL EVERY 5 MIN PRN
Status: DISCONTINUED | OUTPATIENT
Start: 2024-02-19 | End: 2024-02-19 | Stop reason: HOSPADM

## 2024-02-19 RX ORDER — PANTOPRAZOLE SODIUM 40 MG/1
40 TABLET, DELAYED RELEASE ORAL DAILY
Status: DISCONTINUED | OUTPATIENT
Start: 2024-02-19 | End: 2024-02-19 | Stop reason: HOSPADM

## 2024-02-19 RX ORDER — LOSARTAN POTASSIUM 50 MG/1
100 TABLET ORAL DAILY
Status: DISCONTINUED | OUTPATIENT
Start: 2024-02-19 | End: 2024-02-19 | Stop reason: HOSPADM

## 2024-02-19 RX ORDER — SODIUM CHLORIDE 0.9 % (FLUSH) 0.9 %
10 SYRINGE (ML) INJECTION PRN
Status: DISCONTINUED | OUTPATIENT
Start: 2024-02-19 | End: 2024-02-19 | Stop reason: HOSPADM

## 2024-02-19 RX ORDER — MAGNESIUM SULFATE 1 G/100ML
1000 INJECTION INTRAVENOUS PRN
Status: DISCONTINUED | OUTPATIENT
Start: 2024-02-19 | End: 2024-02-19 | Stop reason: HOSPADM

## 2024-02-19 RX ORDER — ACETAMINOPHEN 325 MG/1
650 TABLET ORAL EVERY 6 HOURS PRN
Status: DISCONTINUED | OUTPATIENT
Start: 2024-02-19 | End: 2024-02-19 | Stop reason: HOSPADM

## 2024-02-19 RX ORDER — ONDANSETRON 2 MG/ML
4 INJECTION INTRAMUSCULAR; INTRAVENOUS EVERY 6 HOURS PRN
Status: DISCONTINUED | OUTPATIENT
Start: 2024-02-19 | End: 2024-02-19 | Stop reason: HOSPADM

## 2024-02-19 RX ORDER — ONDANSETRON 4 MG/1
4 TABLET, ORALLY DISINTEGRATING ORAL EVERY 8 HOURS PRN
Status: DISCONTINUED | OUTPATIENT
Start: 2024-02-19 | End: 2024-02-19 | Stop reason: HOSPADM

## 2024-02-19 RX ORDER — REGADENOSON 0.08 MG/ML
0.4 INJECTION, SOLUTION INTRAVENOUS
Status: COMPLETED | OUTPATIENT
Start: 2024-02-19 | End: 2024-02-19

## 2024-02-19 RX ORDER — TETRAKIS(2-METHOXYISOBUTYLISOCYANIDE)COPPER(I) TETRAFLUOROBORATE 1 MG/ML
15 INJECTION, POWDER, LYOPHILIZED, FOR SOLUTION INTRAVENOUS
Status: COMPLETED | OUTPATIENT
Start: 2024-02-19 | End: 2024-02-19

## 2024-02-19 RX ORDER — SODIUM CHLORIDE 0.9 % (FLUSH) 0.9 %
10 SYRINGE (ML) INJECTION ONCE
Status: COMPLETED | OUTPATIENT
Start: 2024-02-19 | End: 2024-02-19

## 2024-02-19 RX ADMIN — REGADENOSON 0.4 MG: 0.08 INJECTION, SOLUTION INTRAVENOUS at 10:44

## 2024-02-19 RX ADMIN — Medication 10 ML: at 12:06

## 2024-02-19 RX ADMIN — Medication 17.8 MILLICURIE: at 10:46

## 2024-02-19 RX ADMIN — IPRATROPIUM BROMIDE AND ALBUTEROL SULFATE 1 DOSE: 2.5; .5 SOLUTION RESPIRATORY (INHALATION) at 15:12

## 2024-02-19 RX ADMIN — IOPAMIDOL 75 ML: 755 INJECTION, SOLUTION INTRAVENOUS at 04:14

## 2024-02-19 RX ADMIN — SODIUM CHLORIDE 100 ML: 9 INJECTION, SOLUTION INTRAVENOUS at 04:14

## 2024-02-19 RX ADMIN — Medication 34.4 MILLICURIE: at 13:54

## 2024-02-19 RX ADMIN — ASPIRIN 324 MG: 81 TABLET, CHEWABLE ORAL at 05:32

## 2024-02-19 RX ADMIN — CEFTRIAXONE SODIUM 1000 MG: 1 INJECTION, POWDER, FOR SOLUTION INTRAMUSCULAR; INTRAVENOUS at 05:24

## 2024-02-19 RX ADMIN — IPRATROPIUM BROMIDE AND ALBUTEROL SULFATE 1 DOSE: 2.5; .5 SOLUTION RESPIRATORY (INHALATION) at 07:09

## 2024-02-19 RX ADMIN — SODIUM CHLORIDE 1000 ML: 9 INJECTION, SOLUTION INTRAVENOUS at 03:02

## 2024-02-19 RX ADMIN — SODIUM CHLORIDE, PRESERVATIVE FREE 10 ML: 5 INJECTION INTRAVENOUS at 04:14

## 2024-02-19 RX ADMIN — ONDANSETRON 4 MG: 2 INJECTION INTRAMUSCULAR; INTRAVENOUS at 03:11

## 2024-02-19 RX ADMIN — ACETAMINOPHEN 650 MG: 325 TABLET ORAL at 16:27

## 2024-02-19 ASSESSMENT — ENCOUNTER SYMPTOMS
SORE THROAT: 0
RHINORRHEA: 0
ABDOMINAL PAIN: 1
VOMITING: 0
SHORTNESS OF BREATH: 0
EYE DISCHARGE: 0
ABDOMINAL DISTENTION: 1
DIARRHEA: 1
EYE REDNESS: 0
NAUSEA: 1

## 2024-02-19 ASSESSMENT — LIFESTYLE VARIABLES
HOW MANY STANDARD DRINKS CONTAINING ALCOHOL DO YOU HAVE ON A TYPICAL DAY: 1 OR 2
HOW OFTEN DO YOU HAVE A DRINK CONTAINING ALCOHOL: MONTHLY OR LESS

## 2024-02-19 ASSESSMENT — PAIN SCALES - GENERAL
PAINLEVEL_OUTOF10: 3
PAINLEVEL_OUTOF10: 8

## 2024-02-19 ASSESSMENT — PAIN - FUNCTIONAL ASSESSMENT: PAIN_FUNCTIONAL_ASSESSMENT: 0-10

## 2024-02-19 ASSESSMENT — PAIN DESCRIPTION - DESCRIPTORS
DESCRIPTORS: SHOOTING
DESCRIPTORS: HEAVINESS

## 2024-02-19 ASSESSMENT — PAIN DESCRIPTION - LOCATION
LOCATION: ABDOMEN
LOCATION: ABDOMEN

## 2024-02-19 ASSESSMENT — PAIN DESCRIPTION - FREQUENCY: FREQUENCY: INTERMITTENT

## 2024-02-19 ASSESSMENT — HEART SCORE: ECG: 1

## 2024-02-19 NOTE — ED PROVIDER NOTES
MANY(!)  Many bacteria, positive nitrit, 3-5 wbc [THERESA]      ED Course User Index  [THERESA] Vineet Coffey MD       Patient repeat assessment: Resting comfortably    Disposition discussion with patient/family, Shared Decision Making: Discussed with patient at this time she is having some chest discomfort    She has not had any sort of recent stress testing having elevated blood pressures with minimally elevated troponins we will bring her in for stress testing    Her abdominal workup was largely unremarkable      PROCEDURES:    EKG 12 Lead    Date/Time: 2/19/2024 3:33 AM    Performed by: Vineet Coffey MD  Authorized by: Vineet Coffey MD    ECG interpreted by ED Physician in the absence of a cardiologist: yes    Interpretation:     Interpretation: non-specific    Rate:     ECG rate:  76    ECG rate assessment: normal    Rhythm:     Rhythm: sinus rhythm    Ectopy:     Ectopy: none    QRS:     QRS axis:  Normal    QRS intervals:  Normal    QRS conduction: normal    ST segments:     ST segments:  Normal  T waves:     T waves: normal    Q waves:     Abnormal Q-waves: not present          DATA FOR LAB AND RADIOLOGY TESTS ORDERED BELOW ARE REVIEWED BY THE ED CLINICIAN:    RADIOLOGY: All x-rays, CT, MRI, and formal ultrasound images (except ED bedside ultrasound) are read by the radiologist, see reports below, unless otherwise noted in MDM or here.  Reports below are reviewed by myself.  CT ABDOMEN PELVIS W IV CONTRAST Additional Contrast? None   Preliminary Result   1. No acute intra-abdominal abnormality.   2. Status post Go-en-Y gastric bypass, hysterectomy, and cholecystectomy.   3. Small fat containing ventral hernia without obvious complication.   4. Nonobstructing right nephrolithiasis.   5. Severe atherosclerosis of the abdominal aorta.         CT Head W/O Contrast   Final Result   No acute intracranial abnormality.         XR CHEST PORTABLE    (Results Pending)       LABS: Lab orders shown below, the results

## 2024-02-19 NOTE — ED NOTES
Patient is drowsy and falls to sleep while talking to her and Dr. Coffey informed and Morphine discontinued and more labs including Ammonia level ordered.

## 2024-02-19 NOTE — PROGRESS NOTES
02/19/24 1539   Encounter Summary   Encounter Overview/Reason  Attempted Encounter   Service Provided For: Patient not available  (Staff with PT)       
BRONCHOSPASM/BRONCHOCONSTRICTION     [x]         IMPROVE AERATION/BREATH SOUNDS  [x]   ADMINISTER BRONCHODILATOR THERAPY AS APPROPRIATE  [x]   ASSESS BREATH SOUNDS  []   IMPLEMENT AEROSOL/MDI PROTOCOL  [x]   PATIENT EDUCATION AS NEEDED    
Collagen-Vitamin C -Biotin  is considered an herbal/dietary supplement and will not be administered during admission per P[employer] Herbal/Supplement Policy. Please have patient continue it at home if desired. Feel free to contact the pharmacy for alternative therapies.  Thank you,  Justa Mina PharmD, BCPS 2/19/2024 7:10 AM    
OK to resume diet per Dr. Houston (Cardiology)  
Patient requesting nicotine patch and states she smokes a pack over 3 days. RN reached out to Dr. Morillo and OK to place order for Nicotine patch 14 mg.    Order placed.  
Pt arrived to floor via wheelchair from ED and was transfered to bed.  Vitals taken, telemetry applied. Assessment complete. No distress noted. Call light within reach, and pt educated on its use. Bed in lowest position, and locked. Side rails up x 2. Denied further questions or needs at this time. Will continue to monitor.  
RN perfect served Dr. Morillo to let him know that he patient states that she has not taken her home mediations in months, RN did not give any mornign medications. RN let him know patient is requesting something for abd pain.  gave the okay to hold meds, nothing new added for pain due to UA + cocaine.     RN called and talked with Dr. Casillas and julio cesar to resume diet, Dr. Casillas to look at any cardiac meds.        RN noticed Dr. Elena group was on, not Dr. Casillas. RN sent message to Dr. Houston.   
RN updated Dr. Houston with Stress test results. Per Dr. Houston patient OK to discharge.  
100 fL    MCH 26.1 26 - 34 pg    MCHC 32.1 31 - 37 g/dL    RDW 15.4 (H) 11.5 - 14.9 %    Platelets 195 150 - 450 k/uL    MPV 9.2 6.0 - 12.0 fL    Neutrophils % 73 (H) 36 - 66 %    Lymphocytes % 15 (L) 24 - 44 %    Monocytes % 7 1 - 7 %    Eosinophils % 4 0 - 4 %    Basophils % 1 0 - 2 %    Neutrophils Absolute 6.10 1.3 - 9.1 k/uL    Lymphocytes Absolute 1.30 1.0 - 4.8 k/uL    Monocytes Absolute 0.60 0.1 - 1.3 k/uL    Eosinophils Absolute 0.30 0.0 - 0.4 k/uL    Basophils Absolute 0.10 0.0 - 0.2 k/uL   BMP    Collection Time: 02/19/24  2:50 AM   Result Value Ref Range    Sodium 136 135 - 144 mmol/L    Potassium 3.9 3.7 - 5.3 mmol/L    Chloride 100 98 - 107 mmol/L    CO2 23 20 - 31 mmol/L    Anion Gap 13 9 - 17 mmol/L    Glucose 119 (H) 70 - 99 mg/dL    BUN 21 (H) 6 - 20 mg/dL    Creatinine 0.8 0.5 - 0.9 mg/dL    Est, Glom Filt Rate >60 >60 mL/min/1.73m2    Calcium 9.5 8.6 - 10.4 mg/dL   Magnesium    Collection Time: 02/19/24  2:50 AM   Result Value Ref Range    Magnesium 2.2 1.6 - 2.6 mg/dL   Lipase    Collection Time: 02/19/24  2:50 AM   Result Value Ref Range    Lipase 37 13 - 60 U/L   Lactic Acid    Collection Time: 02/19/24  2:50 AM   Result Value Ref Range    Lactic Acid 0.9 0.5 - 2.2 mmol/L   Hepatic Function Panel    Collection Time: 02/19/24  2:50 AM   Result Value Ref Range    Albumin 3.9 3.5 - 5.2 g/dL    Alkaline Phosphatase 116 (H) 35 - 104 U/L    ALT 9 5 - 33 U/L    AST 21 <32 U/L    Total Bilirubin 0.2 (L) 0.3 - 1.2 mg/dL    Bilirubin, Direct 0.1 <0.3 mg/dL    Bilirubin, Indirect 0.1 0.0 - 1.0 mg/dL    Total Protein 6.6 6.4 - 8.3 g/dL   Ethanol    Collection Time: 02/19/24  2:50 AM   Result Value Ref Range    Ethanol <10 <10 mg/dL    Ethanol percent <0.010 %   Troponin    Collection Time: 02/19/24  2:50 AM   Result Value Ref Range    Troponin, High Sensitivity 28 (H) 0 - 14 ng/L   TSH with Reflex    Collection Time: 02/19/24  2:50 AM   Result Value Ref Range    TSH 1.40 0.30 - 5.00 uIU/mL   EKG 12 Lead

## 2024-02-19 NOTE — ED NOTES
C= \"Have you ever felt that you should Cut down on your drinking?\"  No  A= \"Have people Annoyed you by criticizing your drinking?\"  No  G= \"Have you ever felt bad or Guilty about your drinking?\"  No  E= \"Have you ever had a drink as an Eye-opener first thing in the morning to steady your nerves or to help a hangover?\"  No      Deferred []      Reason for deferring: N/A    *If yes to two or more: probable alcohol abuse.*

## 2024-02-19 NOTE — DISCHARGE INSTR - COC
Continuity of Care Form    Patient Name: Anu Bourne   :  1970  MRN:  [id number]    Admit date:  2024  Discharge date:  ***    Code Status Order: Full Code   Advance Directives:     Admitting Physician:  Brandin Morillo MD  PCP: Prateek Torres MD    Discharging Nurse: ***  Discharging Hospital Unit/Room#: 2093/2093-01  Discharging Unit Phone Number: ***    Emergency Contact:   Extended Emergency Contact Information  Primary Emergency Contact: Guerrero Pedro  Address: [address] [place] [address]  Home Phone: [phone]  Work Phone: [phone]  Mobile Phone: [phone]  Relation: Parent  Secondary Emergency Contact: KAE PALACIOS  Home Phone: [phone]  Work Phone: [phone]  Mobile Phone: [phone]  Relation: Ex-Spouse  Preferred language: English   needed? No    Past Surgical History:  Past Surgical History:   Procedure Laterality Date    ANKLE FRACTURE SURGERY      recontruction surgery    APPENDECTOMY      BREAST LUMPECTOMY Right     CARDIAC CATHETERIZATION      CARPAL TUNNEL RELEASE      x2     SECTION      CHOLECYSTECTOMY      COLONOSCOPY  10    COLONOSCOPY  2016    severe spasms    COLONOSCOPY  2019    DR GOLDY MCINTOSH    CT BONE MARROW BIOPSY  2022    CT BONE MARROW BIOPSY 2022 [facility] CT SCAN    GASTRIC BYPASS SURGERY      GASTRIC BYPASS SURGERY      HYSTERECTOMY (CERVIX STATUS UNKNOWN)      HYSTERECTOMY (CERVIX STATUS UNKNOWN)      IR PERC CATH PLEURAL DRAIN W/IMAG  10/21/2022    IR PERC CATH PLEURAL DRAIN W/IMAG 10/21/2022 [facility] PROC[facility]    IR PERC CATH PLEURAL DRAIN W/IMAG  10/21/2022    IR PERC CATH PLEURAL DRAIN W/IMAG 10/21/2022 [facility] PROCED[facility]    IR PERC CATH PLEURAL DRAIN W/IMAG  10/24/2022    IR PERC CATH PLEURAL DRAIN W/IMAG 10/24/2022 [facility] PROCED[facility]    LAPAROSCOPY N/A 2022    LAPAROSCOPY EXPLORATORY CONVERTED TO OPEN EXPLORATORY LAPAROTOMY, LYSIS OF ADHESIONS, REDUCTION OF INTERNAL HERNIA

## 2024-02-19 NOTE — PLAN OF CARE
Problem: Discharge Planning  Goal: Discharge to home or other facility with appropriate resources  Outcome: Adequate for Discharge     Problem: Pain  Goal: Verbalizes/displays adequate comfort level or baseline comfort level  Outcome: Adequate for Discharge     Problem: Chronic Conditions and Co-morbidities  Goal: Patient's chronic conditions and co-morbidity symptoms are monitored and maintained or improved  Outcome: Adequate for Discharge     Problem: Safety - Adult  Goal: Free from fall injury  Outcome: Adequate for Discharge

## 2024-02-19 NOTE — H&P
Result Value Ref Range    Ethanol <10 <10 mg/dL    Ethanol percent <0.010 %   Troponin    Collection Time: 02/19/24  2:50 AM   Result Value Ref Range    Troponin, High Sensitivity 28 (H) 0 - 14 ng/L   TSH with Reflex    Collection Time: 02/19/24  2:50 AM   Result Value Ref Range    TSH 1.40 0.30 - 5.00 uIU/mL   EKG 12 Lead    Collection Time: 02/19/24  2:53 AM   Result Value Ref Range    Ventricular Rate 76 BPM    Atrial Rate 76 BPM    P-R Interval 136 ms    QRS Duration 94 ms    Q-T Interval 420 ms    QTc Calculation (Bazett) 472 ms    P Axis 29 degrees    R Axis 56 degrees    T Axis 37 degrees   EKG 12 lead - Serial    Collection Time: 02/19/24  2:54 AM   Result Value Ref Range    Ventricular Rate 75 BPM    Atrial Rate 75 BPM    P-R Interval 138 ms    QRS Duration 98 ms    Q-T Interval 420 ms    QTc Calculation (Bazett) 469 ms    P Axis 29 degrees    R Axis 56 degrees    T Axis 72 degrees   Protime-INR    Collection Time: 02/19/24  2:55 AM   Result Value Ref Range    Protime 13.1 11.8 - 14.6 sec    INR 1.0    Ammonia    Collection Time: 02/19/24  3:17 AM   Result Value Ref Range    Ammonia 31 11 - 51 umol/L   Troponin Now and Q 1 Hour    Collection Time: 02/19/24  4:26 AM   Result Value Ref Range    Troponin, High Sensitivity 22 (H) 0 - 14 ng/L   Urinalysis with Reflex to Culture    Collection Time: 02/19/24  5:00 AM    Specimen: Urine, clean catch   Result Value Ref Range    Color, UA Yellow Yellow    Turbidity UA Clear Clear    Glucose, Ur NEGATIVE NEGATIVE mg/dL    Bilirubin Urine NEGATIVE NEGATIVE    Ketones, Urine NEGATIVE NEGATIVE mg/dL    Specific Gravity, UA 1.024 1.000 - 1.030    Urine Hgb NEGATIVE NEGATIVE    pH, UA 5.5 5.0 - 8.0    Protein, UA NEGATIVE NEGATIVE mg/dL    Urobilinogen, Urine Normal 0.0 - 1.0 EU/dL    Nitrite, Urine POSITIVE (A) NEGATIVE    Leukocyte Esterase, Urine NEGATIVE NEGATIVE   Microscopic Urinalysis    Collection Time: 02/19/24  5:00 AM   Result Value Ref Range    WBC, UA 3 to 5

## 2024-02-19 NOTE — ED NOTES
Admission Dx: chest pressure    Pts Chief Complaints on Arrival: abdominal pain    ADL's - Partial assistance    Pending Diagnostics:  n/a    Residence PTA: single story home    Special Considerations/Circumstances:  n/a    Vitals: Current vital signs:  BP (!) 149/85   Pulse 80   Temp 98.1 °F (36.7 °C) (Oral)   Resp 17   Ht 1.473 m (4' 10\")   Wt 44.5 kg (98 lb)   SpO2 98%   BMI 20.48 kg/m²                MEWS Score: 1

## 2024-02-19 NOTE — DISCHARGE INSTR - DIET

## 2024-02-20 ENCOUNTER — TELEPHONE (OUTPATIENT)
Dept: INTERNAL MEDICINE CLINIC | Age: 54
End: 2024-02-20

## 2024-02-20 NOTE — TELEPHONE ENCOUNTER
Care Transitions Initial Follow Up Call    Outreach made within 2 business days of discharge: Yes    Patient: Anu Bourne Patient : 1970   MRN: [id number]  Reason for Admission: There are no discharge diagnoses documented for the most recent discharge.  Discharge Date: 24       Spoke with: GALA    Discharge department/facility: [place]    TCM Interactive Patient Contact:  Was patient able to fill all prescriptions:   Was patient instructed to bring all medications to the follow-up visit:   Is patient taking all medications as directed in the discharge summary?   Does patient understand their discharge instructions:   Does patient have questions or concerns that need addressed prior to 7-14 day follow up office visit:   UNABLE TO REACH PATIENT, LVM TO CALL OFFICE BACK AND RESCHEDULE.   Follow Up  No future appointments.    Nyasia Campos MA

## 2024-03-04 ENCOUNTER — TELEPHONE (OUTPATIENT)
Dept: INTERNAL MEDICINE CLINIC | Age: 54
End: 2024-03-04

## 2024-03-13 ENCOUNTER — OFFICE VISIT (OUTPATIENT)
Dept: INTERNAL MEDICINE CLINIC | Age: 54
End: 2024-03-13

## 2024-03-13 VITALS
BODY MASS INDEX: 20.07 KG/M2 | WEIGHT: 95.6 LBS | DIASTOLIC BLOOD PRESSURE: 80 MMHG | SYSTOLIC BLOOD PRESSURE: 138 MMHG | HEIGHT: 58 IN

## 2024-03-13 DIAGNOSIS — M25.551 PAIN OF RIGHT HIP: ICD-10-CM

## 2024-03-13 DIAGNOSIS — R15.9 INCONTINENCE OF FECES, UNSPECIFIED FECAL INCONTINENCE TYPE: Primary | ICD-10-CM

## 2024-03-13 DIAGNOSIS — Z09 HOSPITAL DISCHARGE FOLLOW-UP: ICD-10-CM

## 2024-03-13 DIAGNOSIS — R56.9 SEIZURES (HCC): ICD-10-CM

## 2024-03-13 DIAGNOSIS — I10 ESSENTIAL HYPERTENSION: ICD-10-CM

## 2024-03-13 DIAGNOSIS — M79.7 FIBROMYALGIA: ICD-10-CM

## 2024-03-13 DIAGNOSIS — G47.00 INSOMNIA, UNSPECIFIED TYPE: ICD-10-CM

## 2024-03-13 DIAGNOSIS — J44.9 CHRONIC OBSTRUCTIVE PULMONARY DISEASE, UNSPECIFIED COPD TYPE (HCC): ICD-10-CM

## 2024-03-13 DIAGNOSIS — Z98.84 BARIATRIC SURGERY STATUS: ICD-10-CM

## 2024-03-13 DIAGNOSIS — I10 PRIMARY HYPERTENSION: ICD-10-CM

## 2024-03-13 RX ORDER — LOSARTAN POTASSIUM 100 MG/1
100 TABLET ORAL DAILY
Qty: 90 TABLET | Refills: 3 | Status: SHIPPED | OUTPATIENT
Start: 2024-03-13

## 2024-03-13 RX ORDER — AMLODIPINE BESYLATE 5 MG/1
5 TABLET ORAL DAILY
Qty: 90 TABLET | Refills: 3 | Status: SHIPPED | OUTPATIENT
Start: 2024-03-13

## 2024-03-13 RX ORDER — DIVALPROEX SODIUM 500 MG/1
1000 TABLET, DELAYED RELEASE ORAL 2 TIMES DAILY
Qty: 360 TABLET | Refills: 3 | Status: SHIPPED | OUTPATIENT
Start: 2024-03-13

## 2024-03-13 RX ORDER — TRAZODONE HYDROCHLORIDE 50 MG/1
50 TABLET ORAL NIGHTLY PRN
Qty: 90 TABLET | Refills: 0 | Status: SHIPPED | OUTPATIENT
Start: 2024-03-13

## 2024-03-13 RX ORDER — DULOXETIN HYDROCHLORIDE 60 MG/1
60 CAPSULE, DELAYED RELEASE ORAL DAILY
Qty: 30 CAPSULE | Refills: 3 | Status: SHIPPED | OUTPATIENT
Start: 2024-03-13

## 2024-03-13 ASSESSMENT — PATIENT HEALTH QUESTIONNAIRE - PHQ9
2. FEELING DOWN, DEPRESSED OR HOPELESS: 3
6. FEELING BAD ABOUT YOURSELF - OR THAT YOU ARE A FAILURE OR HAVE LET YOURSELF OR YOUR FAMILY DOWN: 0
SUM OF ALL RESPONSES TO PHQ QUESTIONS 1-9: 6
7. TROUBLE CONCENTRATING ON THINGS, SUCH AS READING THE NEWSPAPER OR WATCHING TELEVISION: 0
4. FEELING TIRED OR HAVING LITTLE ENERGY: 0
10. IF YOU CHECKED OFF ANY PROBLEMS, HOW DIFFICULT HAVE THESE PROBLEMS MADE IT FOR YOU TO DO YOUR WORK, TAKE CARE OF THINGS AT HOME, OR GET ALONG WITH OTHER PEOPLE: 0
1. LITTLE INTEREST OR PLEASURE IN DOING THINGS: 3
9. THOUGHTS THAT YOU WOULD BE BETTER OFF DEAD, OR OF HURTING YOURSELF: 0
3. TROUBLE FALLING OR STAYING ASLEEP: 0
8. MOVING OR SPEAKING SO SLOWLY THAT OTHER PEOPLE COULD HAVE NOTICED. OR THE OPPOSITE, BEING SO FIGETY OR RESTLESS THAT YOU HAVE BEEN MOVING AROUND A LOT MORE THAN USUAL: 0
SUM OF ALL RESPONSES TO PHQ QUESTIONS 1-9: 6
SUM OF ALL RESPONSES TO PHQ9 QUESTIONS 1 & 2: 6
5. POOR APPETITE OR OVEREATING: 0

## 2024-03-13 NOTE — PROGRESS NOTES
pantoprazole 40 MG tablet  Commonly known as: Protonix  Take 1 tablet by mouth daily     potassium chloride 20 MEQ extended release tablet  Commonly known as: KLOR-CON M  Take 2 tablets by mouth daily     traZODone 50 MG tablet  Commonly known as: DESYREL  Take 1 tablet by mouth nightly as needed for Sleep     vitamin D 50 MCG (2000 UT) Tabs tablet  Commonly known as: CHOLECALCIFEROL  Take 1 tablet by mouth daily               Where to Get Your Medications        These medications were sent to [facility] [address] [place] [address] [address] [facility] -  [phone] - F [phone]  [facility] [place], [place] [address]      Phone: [phone]   amLODIPine 5 MG tablet  divalproex 500 MG DR tablet  DULoxetine 60 MG extended release capsule  losartan 100 MG tablet  traZODone 50 MG tablet           Medications marked \"taking\" at this time  Outpatient Medications Marked as Taking for the 3/13/24 encounter (Office Visit) with Brandin Morillo MD   Medication Sig Dispense Refill    amLODIPine (NORVASC) 5 MG tablet Take 1 tablet by mouth daily 90 tablet 3    divalproex (DEPAKOTE) 500 MG DR tablet Take 2 tablets by mouth 2 times daily Indications: Level 41 as of 7/22/16 (Pt takes two 500 mg DR tabs = 1,000mg PO BID) 360 tablet 3    DULoxetine (CYMBALTA) 60 MG extended release capsule Take 1 capsule by mouth daily 30 capsule 3    losartan (COZAAR) 100 MG tablet Take 1 tablet by mouth daily 90 tablet 3    traZODone (DESYREL) 50 MG tablet Take 1 tablet by mouth nightly as needed for Sleep 90 tablet 0        Medications patient taking as of now reconciled against medications ordered at time of hospital discharge: Yes    A comprehensive review of systems was negative except for what was noted in the HPI.    Objective:    /80 (Site: Right Upper Arm)   Ht 1.473 m (4' 10\")   Wt 43.4 kg (95 lb 9.6 oz)   BMI 19.98 kg/m²   General Appearance: alert and oriented to person, place and time, well developed and well- nourished,

## 2024-03-14 ASSESSMENT — ENCOUNTER SYMPTOMS
CHOKING: 0
CHEST TIGHTNESS: 0
ABDOMINAL DISTENTION: 0
EYE DISCHARGE: 0
DIARRHEA: 0
CONSTIPATION: 0
BLOOD IN STOOL: 0
COLOR CHANGE: 0
BACK PAIN: 0
APNEA: 0
EYE PAIN: 0
ABDOMINAL PAIN: 0
COUGH: 0
EYE REDNESS: 0
EYE ITCHING: 0
SHORTNESS OF BREATH: 0

## 2024-03-19 ENCOUNTER — TELEPHONE (OUTPATIENT)
Dept: INTERNAL MEDICINE CLINIC | Age: 54
End: 2024-03-19

## 2024-03-20 ENCOUNTER — TELEPHONE (OUTPATIENT)
Dept: GASTROENTEROLOGY | Age: 54
End: 2024-03-20

## 2024-03-20 ENCOUNTER — OFFICE VISIT (OUTPATIENT)
Dept: GASTROENTEROLOGY | Age: 54
End: 2024-03-20
Payer: MEDICARE

## 2024-03-20 VITALS
WEIGHT: 96.6 LBS | HEIGHT: 58 IN | DIASTOLIC BLOOD PRESSURE: 91 MMHG | HEART RATE: 84 BPM | SYSTOLIC BLOOD PRESSURE: 168 MMHG | BODY MASS INDEX: 20.28 KG/M2

## 2024-03-20 DIAGNOSIS — Z01.818 PREOPERATIVE CLEARANCE: Primary | ICD-10-CM

## 2024-03-20 DIAGNOSIS — R10.9 ABDOMINAL DISCOMFORT: ICD-10-CM

## 2024-03-20 DIAGNOSIS — R19.4 BOWEL HABIT CHANGES: Primary | ICD-10-CM

## 2024-03-20 DIAGNOSIS — K21.9 CHRONIC GERD: ICD-10-CM

## 2024-03-20 PROCEDURE — 3077F SYST BP >= 140 MM HG: CPT | Performed by: INTERNAL MEDICINE

## 2024-03-20 PROCEDURE — 1111F DSCHRG MED/CURRENT MED MERGE: CPT | Performed by: INTERNAL MEDICINE

## 2024-03-20 PROCEDURE — 3017F COLORECTAL CA SCREEN DOC REV: CPT | Performed by: INTERNAL MEDICINE

## 2024-03-20 PROCEDURE — G8420 CALC BMI NORM PARAMETERS: HCPCS | Performed by: INTERNAL MEDICINE

## 2024-03-20 PROCEDURE — G8484 FLU IMMUNIZE NO ADMIN: HCPCS | Performed by: INTERNAL MEDICINE

## 2024-03-20 PROCEDURE — 99204 OFFICE O/P NEW MOD 45 MIN: CPT | Performed by: INTERNAL MEDICINE

## 2024-03-20 PROCEDURE — 4004F PT TOBACCO SCREEN RCVD TLK: CPT | Performed by: INTERNAL MEDICINE

## 2024-03-20 PROCEDURE — G8427 DOCREV CUR MEDS BY ELIG CLIN: HCPCS | Performed by: INTERNAL MEDICINE

## 2024-03-20 PROCEDURE — 3080F DIAST BP >= 90 MM HG: CPT | Performed by: INTERNAL MEDICINE

## 2024-03-20 ASSESSMENT — ENCOUNTER SYMPTOMS
BLOOD IN STOOL: 1
SINUS PRESSURE: 0
BACK PAIN: 0
TROUBLE SWALLOWING: 0
NAUSEA: 1
CHOKING: 0
WHEEZING: 0
ABDOMINAL PAIN: 1
ANAL BLEEDING: 0
SORE THROAT: 0
CONSTIPATION: 1
RECTAL PAIN: 1
VOMITING: 0
DIARRHEA: 1
COUGH: 0

## 2024-03-20 NOTE — TELEPHONE ENCOUNTER
Patient calling to get procedures scheduled, please call patient at [phone] [facility]/sc

## 2024-03-20 NOTE — TELEPHONE ENCOUNTER
Have placed a cardiology referral and please schedule appointment in clinic as well for pre operative clearance.     I reviewed her chart, she recently had a stress test performed which was negative.  I have referred her back to cardiology but likely we may be able to provide clearance at clinic.

## 2024-03-20 NOTE — PROGRESS NOTES
Ht 1.473 m (4' 10\")   Wt 43.8 kg (96 lb 9.6 oz)   BMI 20.19 kg/m²   Body mass index is 20.19 kg/m².   Physical Exam  Vitals and nursing note reviewed.   Constitutional:       Appearance: Normal appearance. She is well-developed.      Comments: Thinly built patient.   HENT:      Head: Normocephalic and atraumatic.   Eyes:      Extraocular Movements: Extraocular movements intact.      Conjunctiva/sclera: Conjunctivae normal.   Neck:      Thyroid: No thyromegaly.      Vascular: No hepatojugular reflux or JVD.      Trachea: No tracheal deviation.   Cardiovascular:      Rate and Rhythm: Normal rate and regular rhythm.      Heart sounds: Normal heart sounds.   Pulmonary:      Effort: Pulmonary effort is normal. No respiratory distress.      Breath sounds: Normal breath sounds. No wheezing or rales.   Abdominal:      General: Bowel sounds are normal. There is no distension.      Palpations: Abdomen is soft. There is no hepatomegaly or mass.      Tenderness: There is no abdominal tenderness. There is no rebound.      Hernia: No hernia is present.   Musculoskeletal:         General: No tenderness.   Lymphadenopathy:      Cervical: No cervical adenopathy.   Skin:     General: Skin is warm and dry.      Findings: No bruising, ecchymosis, erythema or rash.   Neurological:      Mental Status: She is alert and oriented to person, place, and time.      Cranial Nerves: No cranial nerve deficit.   Psychiatric:         Mood and Affect: Mood normal.         Behavior: Behavior normal.         Thought Content: Thought content normal.           LABORATORY DATA: Reviewed  Lab Results   Component Value Date    WBC 8.4 02/19/2024    HGB 12.7 02/19/2024    HCT 39.5 02/19/2024    MCV 81.1 02/19/2024     02/19/2024     02/19/2024    K 3.9 02/19/2024     02/19/2024    CO2 23 02/19/2024    BUN 21 (H) 02/19/2024    CREATININE 0.8 02/19/2024    LABALBU 3.9 02/19/2024    BILITOT 0.2 (L) 02/19/2024    ALKPHOS 116 (H) 02/19/2024

## 2024-03-22 NOTE — TELEPHONE ENCOUNTER
Procedure scheduled/Dr Martínez  Procedure:Colon/Egd  Date:04/12/2024  Time:10:00 am  Hospital:[facility]  Bowel Prep instructions given:Miralax/Dulc  In office/via phone: via phone  Clearance needed:no

## 2024-03-25 ENCOUNTER — OFFICE VISIT (OUTPATIENT)
Dept: ORTHOPEDIC SURGERY | Age: 54
End: 2024-03-25
Payer: MEDICARE

## 2024-03-25 DIAGNOSIS — M25.551 RIGHT HIP PAIN: Primary | ICD-10-CM

## 2024-03-25 PROCEDURE — G8428 CUR MEDS NOT DOCUMENT: HCPCS | Performed by: ORTHOPAEDIC SURGERY

## 2024-03-25 PROCEDURE — 3017F COLORECTAL CA SCREEN DOC REV: CPT | Performed by: ORTHOPAEDIC SURGERY

## 2024-03-25 PROCEDURE — G8484 FLU IMMUNIZE NO ADMIN: HCPCS | Performed by: ORTHOPAEDIC SURGERY

## 2024-03-25 PROCEDURE — 4004F PT TOBACCO SCREEN RCVD TLK: CPT | Performed by: ORTHOPAEDIC SURGERY

## 2024-03-25 PROCEDURE — 99203 OFFICE O/P NEW LOW 30 MIN: CPT | Performed by: ORTHOPAEDIC SURGERY

## 2024-03-25 PROCEDURE — G8420 CALC BMI NORM PARAMETERS: HCPCS | Performed by: ORTHOPAEDIC SURGERY

## 2024-03-25 NOTE — PROGRESS NOTES
[facility]                   Jesus Moreno M.D.            [address], [address]               [place], [place] [address]           Dept Phone: [phone]           Dept Fax:  [phone] [address]                       [address]           [place], [place] [address]          Dept Phone: [phone]           Dept Fax:  [phone]      Chief Compliant:  Chief Complaint   Patient presents with    Pain     Rt hip        History of Present Illness:  This is a 53 y.o. female who presents to the clinic today for evaluation / follow up of right \"hip \"pain.  Patient states that she has had this for quite some time but she notes that she has pain mostly in the back and she sleeps on notes heating pad to help her out.  She indicates the area right over her right SI joint where she hurts she denies any radicular symptoms she denies any groin pain she denies any trochanter tenderness pain.       Review of Systems   Constitutional: Negative for fever, chills, sweats.   Eyes: Negative for changes in vision, or pain.   HENT: Negative for ear ache, epistaxis, or sore throat.  Respiratory/Cardio: Negative for Chest pain, palpitations, SOB, or cough.  Gastrointestinal: Negative for abdominal pain, N/V/D.   Genitourinary: Negative for dysuria, frequency, urgency, or hematuria.   Neurological: Negative for headache, numbness, or weakness.   Integumentary: Negative for rash, itching, laceration, or abrasion.   Musculoskeletal: Positive for Pain (Rt hip)       Physical Exam:  Constitutional: Patient is oriented to person, place, and time. Patient appears well-developed and well nourished.   HENT: Negative otherwise noted  Head: Normocephalic and Atraumatic  Nose: Normal  Eyes: Conjunctivae and EOM are normal  Neck: Normal range of motion Neck supple.    Respiratory/Cardio: Effort normal. No respiratory distress.  Musculoskeletal: Physical examination of the patient's

## 2024-03-27 RX ORDER — POLYETHYLENE GLYCOL 3350 17 G/17G
POWDER, FOR SOLUTION ORAL
Qty: 238 G | Refills: 0 | Status: SHIPPED | OUTPATIENT
Start: 2024-03-27

## 2024-03-27 RX ORDER — BISACODYL 5 MG/1
TABLET, DELAYED RELEASE ORAL
Qty: 4 TABLET | Refills: 0 | Status: SHIPPED | OUTPATIENT
Start: 2024-03-27

## 2024-04-01 ENCOUNTER — OFFICE VISIT (OUTPATIENT)
Dept: ORTHOPEDIC SURGERY | Age: 54
End: 2024-04-01
Payer: MEDICARE

## 2024-04-01 VITALS — WEIGHT: 96 LBS | BODY MASS INDEX: 20.15 KG/M2 | HEIGHT: 58 IN

## 2024-04-01 DIAGNOSIS — M25.512 LEFT SHOULDER PAIN, UNSPECIFIED CHRONICITY: Primary | ICD-10-CM

## 2024-04-01 PROCEDURE — 99204 OFFICE O/P NEW MOD 45 MIN: CPT | Performed by: ORTHOPAEDIC SURGERY

## 2024-04-01 PROCEDURE — 3017F COLORECTAL CA SCREEN DOC REV: CPT | Performed by: ORTHOPAEDIC SURGERY

## 2024-04-01 PROCEDURE — 4004F PT TOBACCO SCREEN RCVD TLK: CPT | Performed by: ORTHOPAEDIC SURGERY

## 2024-04-01 PROCEDURE — G8428 CUR MEDS NOT DOCUMENT: HCPCS | Performed by: ORTHOPAEDIC SURGERY

## 2024-04-01 PROCEDURE — G8420 CALC BMI NORM PARAMETERS: HCPCS | Performed by: ORTHOPAEDIC SURGERY

## 2024-04-01 NOTE — PROGRESS NOTES
proof reading.  It such instances, actual meaningcan be extrapolated by contextual diversion.    NA = Not assessed  RTC = Rotator cuff  RCT = Rotator cuff tear  ER = External rotation  IR = Internal rotation  AC = Acromioclavicular  GH = Glenohumeral  n = No  y = Yes

## 2024-04-04 ENCOUNTER — OFFICE VISIT (OUTPATIENT)
Dept: INTERNAL MEDICINE CLINIC | Age: 54
End: 2024-04-04
Payer: MEDICARE

## 2024-04-04 VITALS
OXYGEN SATURATION: 97 % | HEIGHT: 58 IN | WEIGHT: 96 LBS | HEART RATE: 103 BPM | SYSTOLIC BLOOD PRESSURE: 146 MMHG | DIASTOLIC BLOOD PRESSURE: 88 MMHG | BODY MASS INDEX: 20.15 KG/M2

## 2024-04-04 DIAGNOSIS — I27.20 MILD PULMONARY HYPERTENSION (HCC): ICD-10-CM

## 2024-04-04 DIAGNOSIS — H60.392 INFECTION OF LEFT EARLOBE: ICD-10-CM

## 2024-04-04 DIAGNOSIS — I10 PRIMARY HYPERTENSION: Primary | ICD-10-CM

## 2024-04-04 DIAGNOSIS — I50.9 ACUTE CONGESTIVE HEART FAILURE, UNSPECIFIED HEART FAILURE TYPE (HCC): ICD-10-CM

## 2024-04-04 DIAGNOSIS — D69.6 THROMBOCYTOPENIA (HCC): ICD-10-CM

## 2024-04-04 DIAGNOSIS — J44.9 CHRONIC OBSTRUCTIVE PULMONARY DISEASE, UNSPECIFIED COPD TYPE (HCC): ICD-10-CM

## 2024-04-04 DIAGNOSIS — Z12.31 ENCOUNTER FOR SCREENING MAMMOGRAM FOR BREAST CANCER: ICD-10-CM

## 2024-04-04 DIAGNOSIS — K21.9 GASTROESOPHAGEAL REFLUX DISEASE, UNSPECIFIED WHETHER ESOPHAGITIS PRESENT: ICD-10-CM

## 2024-04-04 DIAGNOSIS — E43 SEVERE MALNUTRITION (HCC): Chronic | ICD-10-CM

## 2024-04-04 PROCEDURE — 3077F SYST BP >= 140 MM HG: CPT | Performed by: INTERNAL MEDICINE

## 2024-04-04 PROCEDURE — 3017F COLORECTAL CA SCREEN DOC REV: CPT | Performed by: INTERNAL MEDICINE

## 2024-04-04 PROCEDURE — 3023F SPIROM DOC REV: CPT | Performed by: INTERNAL MEDICINE

## 2024-04-04 PROCEDURE — G8420 CALC BMI NORM PARAMETERS: HCPCS | Performed by: INTERNAL MEDICINE

## 2024-04-04 PROCEDURE — G8427 DOCREV CUR MEDS BY ELIG CLIN: HCPCS | Performed by: INTERNAL MEDICINE

## 2024-04-04 PROCEDURE — 3079F DIAST BP 80-89 MM HG: CPT | Performed by: INTERNAL MEDICINE

## 2024-04-04 PROCEDURE — 4004F PT TOBACCO SCREEN RCVD TLK: CPT | Performed by: INTERNAL MEDICINE

## 2024-04-04 PROCEDURE — 4130F TOPICAL PREP RX AOE: CPT | Performed by: INTERNAL MEDICINE

## 2024-04-04 PROCEDURE — 99214 OFFICE O/P EST MOD 30 MIN: CPT | Performed by: INTERNAL MEDICINE

## 2024-04-04 RX ORDER — GABAPENTIN 100 MG/1
100 CAPSULE ORAL 3 TIMES DAILY
Qty: 90 CAPSULE | Refills: 1 | Status: SHIPPED | OUTPATIENT
Start: 2024-04-04 | End: 2024-10-01

## 2024-04-04 RX ORDER — DOXYCYCLINE HYCLATE 100 MG
100 TABLET ORAL 2 TIMES DAILY
Qty: 14 TABLET | Refills: 0 | Status: SHIPPED | OUTPATIENT
Start: 2024-04-04 | End: 2024-04-11

## 2024-04-04 NOTE — PROGRESS NOTES
Visit Information    Have you changed or started any medications since your last visit including any over-the-counter medicines, vitamins, or herbal medicines? no   Are you having any side effects from any of your medications? -  no  Have you stopped taking any of your medications? Is so, why? -  no    Have you seen any other physician or provider since your last visit? No  Have you had any other diagnostic tests since your last visit? No  Have you been seen in the emergency room and/or had an admission to a hospital since we last saw you? No  Have you had your routine dental cleaning in the past 6 months? no    Have you activated your [employer] account? If not, what are your barriers? Yes     Patient Care Team:  Prateek Torres MD as PCP - General (Internal Medicine)  Prateek Torres MD as PCP - Empaneled Provider  Riley Martínez MD as Consulting Physician (Gastroenterology)  Chuck Casillas MD as Surgeon (Cardiology)  Jesus Fallon DO as Consulting Physician (Hematology and Oncology)  Jhoana Nagy MD (Psychiatry)    Medical History Review  Past Medical, Family, and Social History reviewed and does contribute to the patient presenting condition    Health Maintenance   Topic Date Due    Hepatitis B vaccine (1 of 3 - 3-dose series) Never done    DTaP/Tdap/Td vaccine (1 - Tdap) Never done    Pneumococcal 0-64 years Vaccine (2 of 2 - PCV) 10/23/2016    Shingles vaccine (1 of 2) Never done    Breast cancer screen  05/13/2021    Colorectal Cancer Screen  11/19/2022    COVID-19 Vaccine (3 - 2023-24 season) 09/01/2023    Annual Wellness Visit (Medicare Advantage)  Never done    Flu vaccine (Season Ended) 08/01/2024    A1C test (Diabetic or Prediabetic)  02/19/2025    Depression Monitoring  03/13/2025    Lipids  02/19/2029    HIV screen  10/26/2032    Hepatitis C screen  Completed    Hepatitis A vaccine  Aged Out    Hib vaccine  Aged Out    Polio vaccine  Aged Out    Meningococcal (ACWY) vaccine  Aged Out    
reflux disease, unspecified whether esophagitis present  7. Severe malnutrition (HCC)  8. Thrombocytopenia (HCC)     HTN:    Discussed in detail about the BP, lab results, importance of diet, salt intake, exercise, and med compliance.  Medication side effects discussed in detail and has none today. Patient verbalized understanding.     BP Readings from Last 3 Encounters:   04/04/24 (!) 146/88   03/20/24 (!) 168/91   03/13/24 138/80        Creatinine   Date Value Ref Range Status   02/19/2024 0.8 0.5 - 0.9 mg/dL Final   10/16/2023 0.87 (0.52  - 1.04) MG/DL Final   03/21/2023 0.82 0.50 - 0.90 mg/dL Final          Ear lobe infection   Doxy x 7 days     EGD/Colonoscopy   Cleared with mod risk     Smoking   Pt still smokes >10 pack yr   Strongly advised to quit,  pt will try /help offered  Counseling done for >10 mins     labs reviewed     COPD stable     CHF stable     HM discussed     Mammogram ordered   EGD/colonoscopy   PAP , have total hysterectomy , she doesn't want t get it done               Completed Refills   Requested Prescriptions     Signed Prescriptions Disp Refills    gabapentin (NEURONTIN) 100 MG capsule 90 capsule 1     Sig: Take 1 capsule by mouth 3 times daily for 180 days. Intended supply: 30 days     No follow-ups on file.  Orders Placed This Encounter   Medications    gabapentin (NEURONTIN) 100 MG capsule     Sig: Take 1 capsule by mouth 3 times daily for 180 days. Intended supply: 30 days     Dispense:  90 capsule     Refill:  1     Orders Placed This Encounter   Procedures    ALBERT Digital Screen Bilateral     Standing Status:   Future     Standing Expiration Date:   3/28/2025       Electronically signed by Prateek Trores MD on 4/4/2024 at 2:32 PM

## 2024-04-08 NOTE — PROGRESS NOTES
Unna Boot Application   Below Knee    NAME:  Ramin Brown Jr.  YOB: 1946  MEDICAL RECORD NUMBER:  [id number]  DATE:  4/8/2024    Unna boot: Appied primary and secondary dressing as ordered.  Applied Unna roll from toes to knee overlapping each time.   Applied ace wrap or coban from toes to below the knee.   Secured with tape and/or metal clips covered with tape.   Instructed patient/caregiver to keep dressing dry and intact. DO NOT REMOVE DRESSING.   Instructed pt/family/caregiver to report excessive draining, loose bandage, wet dressing, severe pain or tingling in toes.  Applied Unna Boot dressing below the knee to right lower leg.  Applied Unna Boot dressing below the knee to left lower leg.    Unna Boot(s) were applied per  Guidelines.     Electronically signed by Siobhan Perez RN on 4/8/2024 at 11:39 AM     Writer notified Dr. Theresa Tellez of patient increased lethargy. Dr. Theresa Tellez ordered labs, patient potassium was 3.2, still waiting on CBC. Writer notified Dr. Theresa Tellez a second time that patient is not responding to any commands other than her name in which she grunts. Dr. Theresa Tellez states this is what the patient did in ICU and there is no other work up she can do at this point due to they did all the work up in ICU and there was nothing found at that time. Writer will notify on coming RN.

## 2024-04-10 ENCOUNTER — HOSPITAL ENCOUNTER (OUTPATIENT)
Dept: PREADMISSION TESTING | Age: 54
Setting detail: OUTPATIENT SURGERY
End: 2024-04-10
Payer: COMMERCIAL

## 2024-04-10 NOTE — PRE-PROCEDURE INSTRUCTIONS
Have you received your Prep? Picking up today Any questions with prep instructions?no questions  Only Clear Liquid Diet day before. yes  Nothing to eat after midnight day before procedure.yes  Are you taking any blood thinners? No If so, you need to Stop.  Remove any jewelry and body piercings.yes  Do you wear glasses? If so, please bring a case to store them in.  Are you having any Covid symptoms?no  Do you have any new rashes, infections, etc. that we should be aware of?no  Do you have a ride home the day of surgery? yes It cannot be a cab or medical transportation.  Verify surgery time/date and what time to arrive at hospital. 0800/1000

## 2024-04-10 NOTE — PRE-PROCEDURE INSTRUCTIONS
No answer, left message ?     yes                        Unable to leave message ?    When were you told to arrive at hospital ?  0800    Do you have a  ?    Are you on any blood thinners ?                     If yes when did you stop taking ?    Do you have your prep Rx filled and instruction ?      Nothing to eat the day before , only clear liquids.    Are you experiencing any covid symptoms ?     Do you have any infections or rash we should be aware of ?      Do you have the Hibiclens soap to use the night before and the morning of surgery ?    Nothing to eat or drink after midnight, only a sip of water to take any medication instructed to take the night before.  Wear comfortable clothing, leave any valuables at home, remove any jewelry and body piercing .

## 2024-04-11 ENCOUNTER — ANESTHESIA EVENT (OUTPATIENT)
Dept: ENDOSCOPY | Age: 54
End: 2024-04-11
Payer: COMMERCIAL

## 2024-04-11 VITALS — HEIGHT: 58 IN | WEIGHT: 96 LBS | BODY MASS INDEX: 20.15 KG/M2

## 2024-04-11 NOTE — PROGRESS NOTES
Pre-op Instructions For Out-Patient Endoscopy Surgery    Medication Instructions:  Please stop herbs and any supplements now (includes vitamins and minerals). N/A    Please contact your surgeon and prescribing physician for pre-op instructions for any blood thinners. N/A    If you have inhalers/aerosol treatments at home, please use them the morning of your surgery and bring the inhalers with you to the hospital.    Please take the following medications the morning of your surgery with a sip of water:  Norvasc    Surgery Instructions:  After midnight before surgery:  Do not eat or drink anything, including water, mints, gum, and hard candy.  You may brush your teeth without swallowing.  No smoking, chewing tobacco, or street drugs.    Please shower or bathe before surgery.       Please do not wear any cologne, lotion, powder, jewelry, piercings, perfume, makeup, nail polish, hair accessories, or hair spray on the day of surgery.  Wear loose comfortable clothing.    Leave your valuables at home but bring a payment source for any after-surgery prescriptions you plan to fill at [facility].  Bring a storage case for any glasses/contacts.    An adult who is responsible for you MUST drive you home and should be with you for the first 24 hours after surgery.     The Day of Surgery:  Arrive at [facility] Surgery Entrance at the time directed by your surgeon and check in at the desk.     If you have a living will or healthcare power of , please bring a copy.    You will be taken to the pre-op holding area where you will be prepared for surgery.  A physical assessment will be performed by a nurse practitioner or house officer.  Your IV will be started and you will meet your anesthesiologist.    When you go to surgery, your family will be directed to the surgical waiting room, where the doctor should speak with them after your surgery.    After surgery, you will be taken to the recovery area.

## 2024-04-12 ENCOUNTER — TELEPHONE (OUTPATIENT)
Dept: GASTROENTEROLOGY | Age: 54
End: 2024-04-12

## 2024-04-12 ENCOUNTER — HOSPITAL ENCOUNTER (OUTPATIENT)
Age: 54
Setting detail: OUTPATIENT SURGERY
Discharge: HOME | End: 2024-04-12
Attending: INTERNAL MEDICINE | Admitting: INTERNAL MEDICINE
Payer: COMMERCIAL

## 2024-04-12 ENCOUNTER — ANESTHESIA (OUTPATIENT)
Dept: ENDOSCOPY | Age: 54
End: 2024-04-12
Payer: COMMERCIAL

## 2024-04-12 VITALS
HEART RATE: 69 BPM | WEIGHT: 96 LBS | TEMPERATURE: 97.2 F | OXYGEN SATURATION: 99 % | HEIGHT: 58 IN | DIASTOLIC BLOOD PRESSURE: 84 MMHG | RESPIRATION RATE: 19 BRPM | SYSTOLIC BLOOD PRESSURE: 146 MMHG | BODY MASS INDEX: 20.15 KG/M2

## 2024-04-12 DIAGNOSIS — R10.84 GENERALIZED ABDOMINAL PAIN: ICD-10-CM

## 2024-04-12 DIAGNOSIS — R19.4 CHANGE IN BOWEL HABITS: ICD-10-CM

## 2024-04-12 PROCEDURE — 88305 TISSUE EXAM BY PATHOLOGIST: CPT

## 2024-04-12 PROCEDURE — 3609017100 HC EGD: Performed by: INTERNAL MEDICINE

## 2024-04-12 PROCEDURE — 7100000010 HC PHASE II RECOVERY - FIRST 15 MIN: Performed by: INTERNAL MEDICINE

## 2024-04-12 PROCEDURE — 2580000003 HC RX 258: Performed by: ANESTHESIOLOGY

## 2024-04-12 PROCEDURE — 7100000011 HC PHASE II RECOVERY - ADDTL 15 MIN: Performed by: INTERNAL MEDICINE

## 2024-04-12 PROCEDURE — 3700000000 HC ANESTHESIA ATTENDED CARE: Performed by: INTERNAL MEDICINE

## 2024-04-12 PROCEDURE — 6360000002 HC RX W HCPCS: Performed by: NURSE ANESTHETIST, CERTIFIED REGISTERED

## 2024-04-12 PROCEDURE — 43235 EGD DIAGNOSTIC BRUSH WASH: CPT | Performed by: INTERNAL MEDICINE

## 2024-04-12 PROCEDURE — 6360000002 HC RX W HCPCS: Performed by: ANESTHESIOLOGY

## 2024-04-12 PROCEDURE — 2709999900 HC NON-CHARGEABLE SUPPLY: Performed by: INTERNAL MEDICINE

## 2024-04-12 PROCEDURE — 45380 COLONOSCOPY AND BIOPSY: CPT | Performed by: INTERNAL MEDICINE

## 2024-04-12 PROCEDURE — 3609010300 HC COLONOSCOPY W/BIOPSY SINGLE/MULTIPLE: Performed by: INTERNAL MEDICINE

## 2024-04-12 PROCEDURE — 2500000003 HC RX 250 WO HCPCS: Performed by: NURSE ANESTHETIST, CERTIFIED REGISTERED

## 2024-04-12 PROCEDURE — 3700000001 HC ADD 15 MINUTES (ANESTHESIA): Performed by: INTERNAL MEDICINE

## 2024-04-12 PROCEDURE — 2500000003 HC RX 250 WO HCPCS: Performed by: ANESTHESIOLOGY

## 2024-04-12 RX ORDER — SODIUM CHLORIDE 9 MG/ML
INJECTION, SOLUTION INTRAVENOUS PRN
Status: DISCONTINUED | OUTPATIENT
Start: 2024-04-12 | End: 2024-04-12 | Stop reason: HOSPADM

## 2024-04-12 RX ORDER — LIDOCAINE HYDROCHLORIDE 10 MG/ML
INJECTION, SOLUTION EPIDURAL; INFILTRATION; INTRACAUDAL; PERINEURAL PRN
Status: DISCONTINUED | OUTPATIENT
Start: 2024-04-12 | End: 2024-04-12 | Stop reason: SDUPTHER

## 2024-04-12 RX ORDER — PROPOFOL 10 MG/ML
INJECTION, EMULSION INTRAVENOUS PRN
Status: DISCONTINUED | OUTPATIENT
Start: 2024-04-12 | End: 2024-04-12 | Stop reason: SDUPTHER

## 2024-04-12 RX ORDER — SODIUM CHLORIDE 0.9 % (FLUSH) 0.9 %
5-40 SYRINGE (ML) INJECTION EVERY 12 HOURS SCHEDULED
Status: DISCONTINUED | OUTPATIENT
Start: 2024-04-12 | End: 2024-04-12 | Stop reason: HOSPADM

## 2024-04-12 RX ORDER — LIDOCAINE HYDROCHLORIDE 10 MG/ML
1 INJECTION, SOLUTION EPIDURAL; INFILTRATION; INTRACAUDAL; PERINEURAL
Status: COMPLETED | OUTPATIENT
Start: 2024-04-12 | End: 2024-04-12

## 2024-04-12 RX ORDER — SODIUM CHLORIDE 0.9 % (FLUSH) 0.9 %
5-40 SYRINGE (ML) INJECTION PRN
Status: DISCONTINUED | OUTPATIENT
Start: 2024-04-12 | End: 2024-04-12 | Stop reason: HOSPADM

## 2024-04-12 RX ORDER — SODIUM CHLORIDE, SODIUM LACTATE, POTASSIUM CHLORIDE, CALCIUM CHLORIDE 600; 310; 30; 20 MG/100ML; MG/100ML; MG/100ML; MG/100ML
INJECTION, SOLUTION INTRAVENOUS CONTINUOUS
Status: DISCONTINUED | OUTPATIENT
Start: 2024-04-12 | End: 2024-04-12 | Stop reason: HOSPADM

## 2024-04-12 RX ORDER — HYDRALAZINE HYDROCHLORIDE 20 MG/ML
5 INJECTION INTRAMUSCULAR; INTRAVENOUS ONCE
Status: COMPLETED | OUTPATIENT
Start: 2024-04-12 | End: 2024-04-12

## 2024-04-12 RX ADMIN — PROPOFOL 100 MG: 10 INJECTION, EMULSION INTRAVENOUS at 10:07

## 2024-04-12 RX ADMIN — PROPOFOL 30 MG: 10 INJECTION, EMULSION INTRAVENOUS at 10:09

## 2024-04-12 RX ADMIN — SODIUM CHLORIDE, POTASSIUM CHLORIDE, SODIUM LACTATE AND CALCIUM CHLORIDE: 600; 310; 30; 20 INJECTION, SOLUTION INTRAVENOUS at 09:35

## 2024-04-12 RX ADMIN — PROPOFOL 30 MG: 10 INJECTION, EMULSION INTRAVENOUS at 10:12

## 2024-04-12 RX ADMIN — LIDOCAINE HYDROCHLORIDE 40 MG: 10 INJECTION, SOLUTION EPIDURAL; INFILTRATION; INTRACAUDAL; PERINEURAL at 10:07

## 2024-04-12 RX ADMIN — PROPOFOL 30 MG: 10 INJECTION, EMULSION INTRAVENOUS at 10:10

## 2024-04-12 RX ADMIN — HYDRALAZINE HYDROCHLORIDE 5 MG: 20 INJECTION, SOLUTION INTRAMUSCULAR; INTRAVENOUS at 09:45

## 2024-04-12 RX ADMIN — PROPOFOL 200 MCG/KG/MIN: 10 INJECTION, EMULSION INTRAVENOUS at 10:08

## 2024-04-12 RX ADMIN — LIDOCAINE HYDROCHLORIDE 1 ML: 10 INJECTION, SOLUTION EPIDURAL; INFILTRATION; INTRACAUDAL; PERINEURAL at 09:35

## 2024-04-12 ASSESSMENT — PAIN DESCRIPTION - PAIN TYPE: TYPE: CHRONIC PAIN;SURGICAL PAIN

## 2024-04-12 ASSESSMENT — PAIN DESCRIPTION - DESCRIPTORS: DESCRIPTORS: CRAMPING;TENDER

## 2024-04-12 ASSESSMENT — ENCOUNTER SYMPTOMS
SORE THROAT: 0
SHORTNESS OF BREATH: 1
BACK PAIN: 1
SHORTNESS OF BREATH: 1
COUGH: 1

## 2024-04-12 ASSESSMENT — PAIN - FUNCTIONAL ASSESSMENT: PAIN_FUNCTIONAL_ASSESSMENT: 0-10

## 2024-04-12 ASSESSMENT — PAIN DESCRIPTION - LOCATION: LOCATION: ABDOMEN

## 2024-04-12 NOTE — ANESTHESIA PRE PROCEDURE
glucose tolerance) R73.02   • History of renal calculi Z87.442   • Lactose intolerance E73.9   • History of seizure disorder Z86.69   • Leukopenia D72.819   • Bipolar disorder, mixed ([facility]) F31.60   • Illicit drug use F19.90   • Postlaminectomy syndrome, cervical region M96.1   • Degeneration of cervical intervertebral disc M50.30   • Encounter for long-term (current) use of other medications Z79.899   • Bradycardia R00.1   • Cellulitis L03.90   • Hypokalemia E87.6   • Primary hypertension I10   • Elevated lipase R74.8   • Right lower quadrant abdominal pain R10.31   • Nausea R11.0   • Diarrhea R19.7   • Acute encephalopathy G93.40   • Polysubstance abuse ([facility]) F19.10   • Fibromyalgia M79.7   • Cocaine addiction ([facility]) F14.20   • Shoulder arthritis M19.019   • Osteoarthritis of left glenohumeral joint M19.012   • Abnormal EKG R94.31   • Anemia D64.9   • Anxiety F41.9   • Carotid artery stenosis I65.29   • COPD (chronic obstructive pulmonary disease) ([facility]) J44.9   • Dental disease K08.9   • HUTCHINSON (dyspnea on exertion) R06.09   • Fatigue R53.83   • Fractures T07.XXXA   • Bariatric surgery status Z98.84   • History of crack cocaine use Z87.898   • History of UTI Z87.440   • Injury of back S39.92XA   • Intractable chronic migraine without aura and without status migrainosus G43.719   • Intractable migraine with aura with status migrainosus G43.111   • Kidney stones N20.0   • Dizziness R42   • Memory loss R41.3   • Mild pulmonary hypertension ([facility]) I27.20   • Mild tricuspid regurgitation I07.1   • Primary osteoarthritis of left shoulder M19.012   • Seizures ([facility]) R56.9   • Stress at home F43.9   • TMJ (dislocation of temporomandibular joint) S03.00XA   • Tobacco abuse Z72.0   • Visual impairment H54.7   • Internal hernia K45.8   • Vitamin D deficiency E55.9   • Iron deficiency E61.1   • SIRS (systemic inflammatory response syndrome) ([facility]) R65.10   • NICKI (acute kidney injury) (HCC) N17.9   • Severe malnutrition (HCC) E43   •

## 2024-04-12 NOTE — OP NOTE
COLONOSCOPY    DATE OF PROCEDURE: 4/12/2024    SURGEON: Riley Martínez MD    ASSISTANT: None    PREOPERATIVE DIAGNOSIS: Patient has vague abdominal pain, bowel habit changes.  Procedure performed evaluate colonic pathology    POSTOPERATIVE DIAGNOSIS: Significant spasms, less than adequate preparation, redundant colon.    OPERATION: Total colonoscopy, random biopsies from the colon.    ANESTHESIA: MAC    ESTIMATED BLOOD LOSS: None    COMPLICATIONS: None     SPECIMENS:  Was Obtained: Random biopsies from the right transverse and left colon to check for microscopic colitis    HISTORY: The patient is a 53 y.o. year old female with history of above preop diagnosis.  I recommended colonoscopy with possible biopsy or polypectomy and I explained the risk, benefits, expected outcome, and alternatives to the procedure.  Risks included but are not limited to bleeding, infection, respiratory distress, hypotension, and perforation of the colon and possibility of missing a lesion.  The patient understands and is in agreement.      PROCEDURE:  The patient's SPO2 remained above 90% throughout the procedure. Digital rectal exam was normal.  The colonoscope was inserted through the anus into the rectum and advanced under direct vision to the cecum with difficulty. .  The prep was poor.      Findings:      From the anus up to the cecum examined.  Some areas of the colon has soft stool could not be evaluated satisfactorily.  Has a severe spasms and redundant colon and difficult to reach the cecum.  Cecal base normal.  Ileocecal valve normal.  No endoscopic evidence of colitis seen.  No obvious large polyps, apple core lesions seen.  However flat lesions hidden in the stool and in the sharp angles can be missed    Random biopsies taken to check for microscopic colitis    Loose anal sphincter    Withdrawal Time was (minutes): 20          The colon was decompressed and the scope was removed.  The patient tolerated the procedure  without unusual events.     During the procedure, the patient's blood pressure, pulse and oxygen saturation remained stable and documented. No unusual events occurred during the procedure. Patient was transferred to recovery room and will be discharged when criteria is met.     Recommendations/Plan:   F/U Biopsies  F/U In Office as instructed  Discussed with the family  High fiber diet   Precautions to avoid constipation         Electronically signed by Riley Martínez MD  on 4/12/2024 at 10:58 AM

## 2024-04-12 NOTE — ANESTHESIA POSTPROCEDURE EVALUATION
Department of Anesthesiology  Postprocedure Note    Patient: Anu Bourne  MRN: [id number]  YOB: 1970  Date of evaluation: 4/12/2024    Procedure Summary       Date: 04/12/24 Room / Location: [place] [address] / [facility]    Anesthesia Start: 1002 Anesthesia Stop: 1102    Procedures:       ESOPHAGOGASTRODUODENOSCOPY (Esophagus)      COLONOSCOPY RANDOM COLON  BIOPSIES (Rectum) Diagnosis:       Generalized abdominal pain      Change in bowel habits      (Generalized abdominal pain [R10.84])      (Change in bowel habits [R19.4])    Surgeons: Riley Martínez MD Responsible Provider: Refugio Nagy MD    Anesthesia Type: TIVA, MAC ASA Status: 3            Anesthesia Type: TIVA, MAC    Abdi Phase I:      Abdi Phase II: Abdi Score: 10    Anesthesia Post Evaluation    Comments: POST- ANESTHESIA EVALUATION       Pt Name: Anu Bourne  MRN: [id number]  YOB: 1970  Date of evaluation: 4/12/2024  Time:  2:26 PM      BP (!) 146/84   Pulse 69   Temp 97.2 °F (36.2 °C)   Resp 19   Ht 1.473 m (4' 10\")   Wt 43.5 kg (96 lb)   SpO2 99%   BMI 20.06 kg/m²      Consciousness Level  Awake  Cardiopulmonary Status  Stable  Pain Adequately Treated YES  Nausea / Vomiting  NO  Adequate Hydration  YES  Anesthesia Related Complications NONE      Electronically signed by Refugio Nagy MD on 4/12/2024 at 2:26 PM           No notable events documented.

## 2024-04-12 NOTE — OP NOTE
EGD PROCEDURE NOTE    DATE OF PROCEDURE: 4/12/2024     SURGEON: Riley Martínez MD  ASSISTANT: None  Anesthesia: MAC  PREOPERATIVE DIAGNOSIS: chronic GERD.  Procedure performed to evaluate upper GI lesions  POSTOPERATIVE DIAGNOSIS: No esophageal pathology seen.  Gastric anatomy suggestive of previous bariatric surgery     OPERATION: Upper GI endoscopy     ANESTHESIA: Moderate Sedation     ESTIMATED BLOOD LOSS: Less than 50 ml    COMPLICATIONS: None.     SPECIMENS:  Was Not Obtained    HISTORY: The patient is a 53 y.o. year old female with history of above preop diagnosis.  I recommended esophagogastroduodenoscopy with possible biopsy and I explained the risk, benefits, expected outcome, and alternatives to the procedure.  Risks included but are not limited to bleeding, infection, respiratory distress, hypotension, and perforation of the esophagus, stomach, or duodenum.  Patient understands and is in agreement.      The patient was counseled at length about the risks of serenity Covid-19 during their perioperative period and any recovery window from their procedure.  The patient was made aware that serenity Covid-19  may worsen their prognosis for recovering from their procedure  and lend to a higher morbidity and/or mortality risk.  All material risks, benefits, and reasonable alternatives including postponing the procedure were discussed. The patient does wish to proceed with the procedure at this time.         PROCEDURE: The patient was given IV conscious sedation.  The patient's SPO2 remained above 90% throughout the procedure.The gastroscope was inserted orally and advanced under direct vision through the esophagus, through the stomach, through the pylorus, and into the descending duodenum.      Post sedation note :The patient's SPO2 remained above 90% throughout the procedure.the vital signs remained stable , and no immediate complication form the procedure noted, patient will be ready for d/c when  criteria is met .      Findings:    Retropharyngeal area was grossly normal appearing    Esophagus: normal.  No signs of peptic esophagitis, Lu's mucosa, or stricture seen.  No hiatal hernia.  Squamocolumnar junction is at about 35 cm from incisor teeth.    Stomach:  Patient has only about 2 cm long gastric mucosa.  Anastomosis unremarkable.  No anastomotic stricture.  Small bowel was examined for 4 to 5 inches, mucosa unremarkable.    The scope was removed and the patient tolerated the procedure well.     Recommendations/Plan:     F/U In Office in 8-12 weeks   Discussed with the family    Electronically signed by Riley Martínez MD  on 4/12/2024 at 10:15 AM

## 2024-04-12 NOTE — H&P
HISTORY and PHYSICAL  [facility]       NAME:  Anu Bourne  MRN: [id number]   YOB: 1970   Date: 4/12/2024   Age: 53 y.o.  Gender: female       COMPLAINT AND PRESENT HISTORY:       Anu Bourne is 53 y.o.  female, here for     Procedure(s):  ESOPHAGOGASTRODUODENOSCOPY BIOPSY  COLONOSCOPY DIAGNOSTIC    Pre-Op Diagnosis Codes:     * Generalized abdominal pain [R10.84]     * Change in bowel habits [R19.4]    Below italics a portion of GI office visit note by Dr. Griffith dated 03/20/24: Reviewed   HISTORY OF PRESENT ILLNESS:   Patient seen with the multiple GI medical issues including abdominal bloating, cramps, bowel habit change, fecal incontinence, heartburns, intermittent swallowing problems and epigastric pain.        Patient is very vague in describing her symptoms.  Last time patient was seen by me was in June 2019 and records reviewed.  Since then patient stated that she had a bowel obstruction and had surgery done.  Also hernia had hernia repair done twice and the last hernia surgery was done was last year.  She also has history of gastric bypass surgery in the past.     At present she has intermittent loose bowels alternating with constipation.  She has weight loss.  Also noticing intermittent hematochezia.  She has poor appetite.  Has GERD symptoms.  GERD epigastric pain and intermittent dysphagia.  No symptoms of complete obstruction of esophagus.     On reviewing the chart it appears that this patient had a colonoscopy in 2016 and at that time was found to have severe spasms.  EGD in the past was nonspecific.    UPDATE  Pt continues to have abdominal pain specifically in epigastric area. Pain is constant.   Pt has intermittent dysphagia with breads.   Has occasional heartburn. Takes protonix.   Pt has frequent nausea without associated vomiting.  Alternating diarrhea, constipation.  Pt reports eating frequently but continues to lose weight.   Has family history of colon

## 2024-04-12 NOTE — DISCHARGE INSTRUCTIONS
abdominal or chest pain not relieved with passing gas  -Fever of 100 degrees or more  -Redness or swelling at the IV site  -Severe sore throat or neck painHigh-Fiber Diet     What Is Fiber?   Dietary fiber is a form of carbohydrate found in plants that cannot be digested by humans. All plants contain fiber, including fruits, vegetables, grains, and legumes. Fiber is often classified into two categories: soluble and insoluble.   Soluble fiber draws water into the bowel and can help slow digestion. Examples of foods that are high in soluble fiber include oatmeal, oat bran, barley, legumes (eg, beans and peas), apples, and strawberries.   Insoluble fiber speeds digestion and can add bulk to the stool. Examples of foods that are high in insoluble fiber include whole-wheat products, wheat bran, cauliflower, green beans, and potatoes.   Why Follow a High-Fiber Diet?   A high-fiber diet is often recommended to prevent and treat constipation , hemorrhoids , diverticulitis , and irritable bowel syndrome . Eating a high-fiber diet can also help improve your cholesterol levels, lower your risk of coronary heart disease , reduce your risk of type 2 diabetes , and lower your weight. For people with type 1 or 2 diabetes, a high-fiber diet can also help stabilize blood sugar levels.   How Much Fiber Should I Eat?   A high-fiber diet should contain  20-35 grams  of fiber a day. This is actually the amount recommended for the general adult population; however, most Americans eat only 15 grams of fiber per day.   Digestion of Fiber   Eating a higher fiber diet than usual can take some getting used to by your body's digestive system. To avoid the side effects of sudden increases in dietary fiber (eg, gas, cramping, bloating, and diarrhea), increase fiber gradually and be sure to drink plenty of fluids every day.   Tips for Increasing Fiber Intake   Whenever possible, choose whole grains over refined grains (eg, brown rice instead of  topping your yogurt or cottage cheese with fresh fruit, whole grain or bran cereals, nuts, or seeds.   Meats and Beans   All beans and peas, especially Garbanzo beans, kidney beans, lentils, lima beans, split peas, and white beans All nuts and seeds, especially almonds, peanuts, Brazil nuts, cashews, peanut butter, walnuts, sesame and sunflower seeds All meat, poultry, fish, and eggs   Increase fiber in meat dishes by adding white beans, kidney beans, black-eyed peas, bran, or oatmeal. If you are following a low-fat diet, use nuts and seeds only in moderation.   Fats and Oils   All in moderation   Fats and oils do not provide fiber   Snacks, Sweets, and Condiments   Fruit Nuts Popcorn, whole-wheat pretzels, or trail mix made with dried fruits, nuts, and seeds Cakes, breads, and cookies made with oatmeal or whole-wheat flour   Most snack foods do not provide much fiber. Choose snacks with at least 2 grams of fiber per serving.     Last Reviewed: March 2011 Bruna Banks MS, MPH, RD   Updated: 3/29/2011     Sedation or General Anesthesia, Adult  Care After  Refer to this sheet in the next 24 hours. These instructions provide you with information on caring for yourself after your procedure. Your caregiver may also give you more specific instructions. Your treatment has been planned according to current medical practices, but problems sometimes occur. Call your caregiver if you have any problems or questions after your procedure.   HOME CARE INSTRUCTIONS   Do not participate in any activities that require you to be alert or coordinated. Do not:  Drive.  Swim.  Ride a bicycle.  Operate heavy machinery.  Cook.  Use power tools.  Climb ladders.  Work at heights.  Take a bath.  Do not drink alcohol.  Do not make any important decisions or sign legal documents.  Stay with an adult.  The first meal following your procedure should be light and small. Avoid solid foods if you feel sick to your stomach (nauseous) or if you throw

## 2024-04-12 NOTE — TELEPHONE ENCOUNTER
Writer called and left patient a message to call and make a office visit for a follow up appt for dr moore in 2 to 3 weeks from procedure.

## 2024-04-15 LAB — SURGICAL PATHOLOGY REPORT: NORMAL

## 2024-04-24 DIAGNOSIS — R19.4 BOWEL HABIT CHANGES: ICD-10-CM

## 2024-04-24 DIAGNOSIS — R10.9 ABDOMINAL DISCOMFORT: ICD-10-CM

## 2024-04-29 ENCOUNTER — HOSPITAL ENCOUNTER (OUTPATIENT)
Dept: CT IMAGING | Age: 54
Discharge: HOME | End: 2024-05-01
Payer: MEDICARE

## 2024-04-29 DIAGNOSIS — M25.551 RIGHT HIP PAIN: ICD-10-CM

## 2024-04-29 PROCEDURE — 6360000002 HC RX W HCPCS: Performed by: ORTHOPAEDIC SURGERY

## 2024-04-29 PROCEDURE — 2709999900 CT GUIDED NEEDLE PLACEMENT

## 2024-04-29 PROCEDURE — 2500000003 HC RX 250 WO HCPCS: Performed by: ORTHOPAEDIC SURGERY

## 2024-04-29 RX ORDER — BUPIVACAINE HYDROCHLORIDE 5 MG/ML
4 INJECTION, SOLUTION EPIDURAL; INTRACAUDAL ONCE
Status: COMPLETED | OUTPATIENT
Start: 2024-04-29 | End: 2024-04-29

## 2024-04-29 RX ORDER — LIDOCAINE HYDROCHLORIDE 10 MG/ML
4 INJECTION, SOLUTION EPIDURAL; INFILTRATION; INTRACAUDAL; PERINEURAL ONCE
Status: COMPLETED | OUTPATIENT
Start: 2024-04-29 | End: 2024-04-29

## 2024-04-29 RX ORDER — METHYLPREDNISOLONE ACETATE 80 MG/ML
80 INJECTION, SUSPENSION INTRA-ARTICULAR; INTRALESIONAL; INTRAMUSCULAR; SOFT TISSUE ONCE
Status: COMPLETED | OUTPATIENT
Start: 2024-04-29 | End: 2024-04-29

## 2024-04-29 RX ADMIN — METHYLPREDNISOLONE ACETATE 80 MG: 80 INJECTION, SUSPENSION INTRA-ARTICULAR; INTRALESIONAL; INTRAMUSCULAR; SOFT TISSUE at 15:28

## 2024-04-29 RX ADMIN — LIDOCAINE HYDROCHLORIDE 4 ML: 10 INJECTION, SOLUTION EPIDURAL; INFILTRATION; INTRACAUDAL; PERINEURAL at 15:29

## 2024-04-29 RX ADMIN — BUPIVACAINE HYDROCHLORIDE 4 ML: 5 INJECTION, SOLUTION EPIDURAL; INTRACAUDAL; PERINEURAL at 15:28

## 2024-04-29 ASSESSMENT — PAIN DESCRIPTION - ORIENTATION: ORIENTATION: RIGHT

## 2024-04-29 ASSESSMENT — PAIN DESCRIPTION - LOCATION: LOCATION: HIP

## 2024-04-29 NOTE — OR NURSING
Patient brought to the IR suite via cart, after consent obtained, Patient then taken down to CT suite, patient placed on the procedure table.  Patient then positioned/prepped/draped.

## 2024-04-29 NOTE — BRIEF OP NOTE
Brief Postoperative Note    Anu Bourne  YOB: 1970  [id number]    Pre-operative Diagnosis: Rt SI joint pain    Post-operative Diagnosis: Same    Procedure: CT guided joint injection    Medications Given: none    Anesthesia: Local    Surgeons/Assistants: GET BARROW MD    Estimated Blood Loss: minimal    Complications: none    Specimens: were not obtained    Findings: Combo anesthetics an d steroid inj into rt SI joint under CT without incident. Pt tolerated well.      Electronically signed by GET BARROW MD on 4/29/2024 at 3:45 PM     (M6) obeys commands

## 2024-04-30 ENCOUNTER — OFFICE VISIT (OUTPATIENT)
Dept: GASTROENTEROLOGY | Age: 54
End: 2024-04-30
Payer: COMMERCIAL

## 2024-04-30 VITALS
WEIGHT: 98.4 LBS | DIASTOLIC BLOOD PRESSURE: 86 MMHG | HEART RATE: 67 BPM | BODY MASS INDEX: 20.65 KG/M2 | HEIGHT: 58 IN | SYSTOLIC BLOOD PRESSURE: 138 MMHG

## 2024-04-30 DIAGNOSIS — K21.9 CHRONIC GERD: Primary | ICD-10-CM

## 2024-04-30 DIAGNOSIS — R19.4 BOWEL HABIT CHANGES: ICD-10-CM

## 2024-04-30 PROCEDURE — 99213 OFFICE O/P EST LOW 20 MIN: CPT | Performed by: INTERNAL MEDICINE

## 2024-04-30 PROCEDURE — 3079F DIAST BP 80-89 MM HG: CPT | Performed by: INTERNAL MEDICINE

## 2024-04-30 PROCEDURE — 3075F SYST BP GE 130 - 139MM HG: CPT | Performed by: INTERNAL MEDICINE

## 2024-04-30 ASSESSMENT — ENCOUNTER SYMPTOMS
SINUS PRESSURE: 0
NAUSEA: 0
ABDOMINAL PAIN: 0
BLOOD IN STOOL: 0
CONSTIPATION: 1
SORE THROAT: 0
CHOKING: 0
ABDOMINAL DISTENTION: 0
COUGH: 0
VOMITING: 0
DIARRHEA: 0
ANAL BLEEDING: 0
TROUBLE SWALLOWING: 0
WHEEZING: 0
RECTAL PAIN: 0
VOICE CHANGE: 0
BACK PAIN: 0

## 2024-04-30 NOTE — PROGRESS NOTES
02/19/2024    HGB 12.7 02/19/2024    MCV 81.1 02/19/2024    MCH 26.1 02/19/2024    MCHC 32.1 02/19/2024    RDW 15.4 (H) 02/19/2024    MPV 9.2 02/19/2024    BASOPCT 1 02/19/2024    LYMPHSABS 1.30 02/19/2024    MONOSABS 0.60 02/19/2024    NEUTROABS 6.10 02/19/2024    EOSABS 0.30 02/19/2024    BASOSABS 0.10 02/19/2024         DIAGNOSTIC TESTING:     CT GUIDED NEEDLE PLACEMENT    Result Date: 4/29/2024  PROCEDURE: CT GUIDED NDL PLACEMENT 4/29/2024 HISTORY: ORDERING SYSTEM PROVIDED HISTORY: Right hip pain TECHNOLOGIST PROVIDED HISTORY: INTRA ARTICULAR INJECTION PREFERRED PROTOCOL FOR  ___RT SI joint_______   INJECTION: 4 cc Xylocaine, 1% plain 4 cc Marcaine, 0.5% 1 cc Depomedrol 80 mgm/cc OR TECHNIQUE: Automated exposure control, iterative reconstruction, and/or weight based adjustment of the mA/kV was utilized to reduce the radiation dose to as low as reasonably achievable. CONTRAST: None SEDATION: Local anesthesia FLUOROSCOPY DOSE AND TYPE: DLP 99.79 DESCRIPTION OF PROCEDURE: Informed consent was obtained after a detailed explanation of the procedure including risks, benefits, and alternatives.  Universal protocol was observed. Sterile gowns, masks, hats and gloves utilized for maximal sterile barrier.  A 22 gauge needle was inserted into the right SI joint and combination  4 cc Xylocaine, 1%, 4 cc Marcaine, 0.5%, and 1 cc Depomedrol 80 mgm/cc was injected. FINDINGS: Good needle position within the joint space demonstrated.  Some leakage of material into the right sacral foramina noted.     CT guided right SI joint injection, as above.     XR SHOULDER LEFT (MIN 2 VIEWS)    Result Date: 4/25/2024  Xrays: 4 views of the left shoulder obtained on 4/1/2024 were independently reviewed Indications: Left shoulder pain Findings: Uncemented reverse shoulder arthroplasty in acceptable alignment noted.  No obvious loosening.  Scapular notching present.  There does appear to be an inferior tilt of the acromion suggestive of a

## 2024-05-28 ENCOUNTER — TELEPHONE (OUTPATIENT)
Dept: INTERNAL MEDICINE CLINIC | Age: 54
End: 2024-05-28

## 2024-06-03 RX ORDER — GABAPENTIN 100 MG/1
100 CAPSULE ORAL 3 TIMES DAILY
Qty: 90 CAPSULE | Refills: 0 | Status: SHIPPED | OUTPATIENT
Start: 2024-06-03 | End: 2024-07-03

## 2024-06-17 ENCOUNTER — TELEPHONE (OUTPATIENT)
Dept: INTERNAL MEDICINE CLINIC | Age: 54
End: 2024-06-17

## 2024-07-02 RX ORDER — GABAPENTIN 100 MG/1
CAPSULE ORAL
Qty: 90 CAPSULE | Refills: 0 | Status: SHIPPED | OUTPATIENT
Start: 2024-07-02 | End: 2024-08-01

## 2024-07-23 ENCOUNTER — APPOINTMENT (OUTPATIENT)
Dept: GENERAL RADIOLOGY | Age: 54
End: 2024-07-23
Payer: COMMERCIAL

## 2024-07-23 ENCOUNTER — HOSPITAL ENCOUNTER (EMERGENCY)
Age: 54
Discharge: HOME | End: 2024-07-23
Attending: EMERGENCY MEDICINE
Payer: COMMERCIAL

## 2024-07-23 ENCOUNTER — APPOINTMENT (OUTPATIENT)
Dept: CT IMAGING | Age: 54
End: 2024-07-23
Payer: COMMERCIAL

## 2024-07-23 VITALS
HEIGHT: 58 IN | WEIGHT: 98 LBS | DIASTOLIC BLOOD PRESSURE: 96 MMHG | OXYGEN SATURATION: 100 % | TEMPERATURE: 98.7 F | HEART RATE: 104 BPM | BODY MASS INDEX: 20.57 KG/M2 | SYSTOLIC BLOOD PRESSURE: 221 MMHG | RESPIRATION RATE: 20 BRPM

## 2024-07-23 DIAGNOSIS — I25.10 CORONARY ARTERY DISEASE INVOLVING NATIVE HEART WITHOUT ANGINA PECTORIS, UNSPECIFIED VESSEL OR LESION TYPE: ICD-10-CM

## 2024-07-23 DIAGNOSIS — R05.9 COUGH, UNSPECIFIED TYPE: Primary | ICD-10-CM

## 2024-07-23 DIAGNOSIS — I16.0 HYPERTENSIVE URGENCY: ICD-10-CM

## 2024-07-23 DIAGNOSIS — R91.8 PULMONARY NODULES: ICD-10-CM

## 2024-07-23 DIAGNOSIS — I51.7 CARDIOMEGALY: ICD-10-CM

## 2024-07-23 LAB
ALBUMIN SERPL-MCNC: 3.8 G/DL (ref 3.5–5.2)
ALP SERPL-CCNC: 116 U/L (ref 35–104)
ALT SERPL-CCNC: 7 U/L (ref 5–33)
ANION GAP SERPL CALCULATED.3IONS-SCNC: 10 MMOL/L (ref 9–17)
AST SERPL-CCNC: 17 U/L
BASOPHILS # BLD: 0.06 K/UL (ref 0–0.2)
BASOPHILS NFR BLD: 1 % (ref 0–2)
BILIRUB SERPL-MCNC: 0.2 MG/DL (ref 0.3–1.2)
BILIRUB UR QL STRIP: NEGATIVE
BUN SERPL-MCNC: 22 MG/DL (ref 6–20)
CALCIUM SERPL-MCNC: 9.4 MG/DL (ref 8.6–10.4)
CHLORIDE SERPL-SCNC: 107 MMOL/L (ref 98–107)
CLARITY UR: CLEAR
CO2 SERPL-SCNC: 21 MMOL/L (ref 20–31)
COLOR UR: YELLOW
COMMENT: ABNORMAL
CREAT SERPL-MCNC: 0.6 MG/DL (ref 0.5–0.9)
EOSINOPHIL # BLD: 0.06 K/UL (ref 0–0.4)
EOSINOPHILS RELATIVE PERCENT: 1 % (ref 0–4)
ERYTHROCYTE [DISTWIDTH] IN BLOOD BY AUTOMATED COUNT: 16.2 % (ref 11.5–14.9)
FLUAV RNA RESP QL NAA+PROBE: NOT DETECTED
FLUBV RNA RESP QL NAA+PROBE: NOT DETECTED
GFR, ESTIMATED: >90 ML/MIN/1.73M2
GLUCOSE SERPL-MCNC: 133 MG/DL (ref 70–99)
GLUCOSE UR STRIP-MCNC: NEGATIVE MG/DL
HCT VFR BLD AUTO: 41.7 % (ref 36–46)
HGB BLD-MCNC: 12.9 G/DL (ref 12–16)
HGB UR QL STRIP.AUTO: NEGATIVE
KETONES UR STRIP-MCNC: NEGATIVE MG/DL
LACTATE BLDV-SCNC: 1.5 MMOL/L (ref 0.5–2.2)
LEUKOCYTE ESTERASE UR QL STRIP: NEGATIVE
LYMPHOCYTES NFR BLD: 0.77 K/UL (ref 1–4.8)
LYMPHOCYTES RELATIVE PERCENT: 13 % (ref 24–44)
MCH RBC QN AUTO: 24.1 PG (ref 26–34)
MCHC RBC AUTO-ENTMCNC: 31 G/DL (ref 31–37)
MCV RBC AUTO: 77.7 FL (ref 80–100)
MONOCYTES NFR BLD: 0.47 K/UL (ref 0.1–1.3)
MONOCYTES NFR BLD: 8 % (ref 1–7)
MORPHOLOGY: ABNORMAL
NEUTROPHILS NFR BLD: 77 % (ref 36–66)
NEUTS SEG NFR BLD: 4.54 K/UL (ref 1.3–9.1)
NITRITE UR QL STRIP: NEGATIVE
PH UR STRIP: 5 [PH] (ref 5–8)
PLATELET # BLD AUTO: 218 K/UL (ref 150–450)
PMV BLD AUTO: 8.9 FL (ref 6–12)
POTASSIUM SERPL-SCNC: 3.6 MMOL/L (ref 3.7–5.3)
PROT SERPL-MCNC: 6.8 G/DL (ref 6.4–8.3)
PROT UR STRIP-MCNC: NEGATIVE MG/DL
RBC # BLD AUTO: 5.37 M/UL (ref 4–5.2)
SARS-COV-2 RNA RESP QL NAA+PROBE: NOT DETECTED
SODIUM SERPL-SCNC: 138 MMOL/L (ref 135–144)
SOURCE: NORMAL
SP GR UR STRIP: 1.08 (ref 1–1.03)
SPECIMEN DESCRIPTION: NORMAL
TROPONIN I SERPL HS-MCNC: 9 NG/L (ref 0–14)
UROBILINOGEN UR STRIP-ACNC: NORMAL EU/DL (ref 0–1)
WBC OTHER # BLD: 5.9 K/UL (ref 3.5–11)

## 2024-07-23 PROCEDURE — 99285 EMERGENCY DEPT VISIT HI MDM: CPT

## 2024-07-23 PROCEDURE — 6370000000 HC RX 637 (ALT 250 FOR IP): Performed by: EMERGENCY MEDICINE

## 2024-07-23 PROCEDURE — 36415 COLL VENOUS BLD VENIPUNCTURE: CPT

## 2024-07-23 PROCEDURE — 81003 URINALYSIS AUTO W/O SCOPE: CPT

## 2024-07-23 PROCEDURE — 80053 COMPREHEN METABOLIC PANEL: CPT

## 2024-07-23 PROCEDURE — 85025 COMPLETE CBC W/AUTO DIFF WBC: CPT

## 2024-07-23 PROCEDURE — 6360000004 HC RX CONTRAST MEDICATION: Performed by: EMERGENCY MEDICINE

## 2024-07-23 PROCEDURE — 71260 CT THORAX DX C+: CPT

## 2024-07-23 PROCEDURE — 71046 X-RAY EXAM CHEST 2 VIEWS: CPT

## 2024-07-23 PROCEDURE — 93005 ELECTROCARDIOGRAM TRACING: CPT | Performed by: EMERGENCY MEDICINE

## 2024-07-23 PROCEDURE — 84484 ASSAY OF TROPONIN QUANT: CPT

## 2024-07-23 PROCEDURE — 2580000003 HC RX 258: Performed by: EMERGENCY MEDICINE

## 2024-07-23 PROCEDURE — 83605 ASSAY OF LACTIC ACID: CPT

## 2024-07-23 PROCEDURE — 87636 SARSCOV2 & INF A&B AMP PRB: CPT

## 2024-07-23 RX ORDER — ACETAMINOPHEN 325 MG/1
650 TABLET ORAL ONCE
Status: COMPLETED | OUTPATIENT
Start: 2024-07-23 | End: 2024-07-23

## 2024-07-23 RX ORDER — ALBUTEROL SULFATE 90 UG/1
2 AEROSOL, METERED RESPIRATORY (INHALATION) 4 TIMES DAILY PRN
Qty: 18 G | Refills: 0 | Status: SHIPPED | OUTPATIENT
Start: 2024-07-23

## 2024-07-23 RX ORDER — 0.9 % SODIUM CHLORIDE 0.9 %
1000 INTRAVENOUS SOLUTION INTRAVENOUS ONCE
Status: COMPLETED | OUTPATIENT
Start: 2024-07-23 | End: 2024-07-23

## 2024-07-23 RX ORDER — GUAIFENESIN/DEXTROMETHORPHAN 100-10MG/5
5 SYRUP ORAL 3 TIMES DAILY PRN
Qty: 120 ML | Refills: 0 | Status: SHIPPED | OUTPATIENT
Start: 2024-07-23 | End: 2024-08-02

## 2024-07-23 RX ORDER — SODIUM CHLORIDE 0.9 % (FLUSH) 0.9 %
10 SYRINGE (ML) INJECTION PRN
Status: DISCONTINUED | OUTPATIENT
Start: 2024-07-23 | End: 2024-07-23 | Stop reason: HOSPADM

## 2024-07-23 RX ORDER — GABAPENTIN 100 MG/1
CAPSULE ORAL 3 TIMES DAILY
Status: CANCELLED | OUTPATIENT
Start: 2024-07-23

## 2024-07-23 RX ORDER — 0.9 % SODIUM CHLORIDE 0.9 %
80 INTRAVENOUS SOLUTION INTRAVENOUS ONCE
Status: COMPLETED | OUTPATIENT
Start: 2024-07-23 | End: 2024-07-23

## 2024-07-23 RX ADMIN — IOPAMIDOL 75 ML: 755 INJECTION, SOLUTION INTRAVENOUS at 15:49

## 2024-07-23 RX ADMIN — SODIUM CHLORIDE 80 ML: 9 INJECTION, SOLUTION INTRAVENOUS at 15:49

## 2024-07-23 RX ADMIN — SODIUM CHLORIDE 1000 ML: 9 INJECTION, SOLUTION INTRAVENOUS at 14:50

## 2024-07-23 RX ADMIN — SODIUM CHLORIDE, PRESERVATIVE FREE 10 ML: 5 INJECTION INTRAVENOUS at 15:49

## 2024-07-23 RX ADMIN — ACETAMINOPHEN 650 MG: 325 TABLET ORAL at 14:51

## 2024-07-23 ASSESSMENT — ENCOUNTER SYMPTOMS
RHINORRHEA: 0
SHORTNESS OF BREATH: 1
COUGH: 1
DIARRHEA: 0
VOMITING: 0

## 2024-07-23 NOTE — ED NOTES
Report received from LUIS Aguila.   Report method in person.     Any medication or safety alerts were reviewed. Any pending diagnostics and notifications were also reviewed, as well as any safety concerns or issues, abnormal labs, abnormal imaging, and abnormal assessment findings. Questions were answered.

## 2024-07-23 NOTE — ED PROVIDER NOTES
[facility] ED  EMERGENCY DEPARTMENT ENCOUNTER      Pt Name: Anu Bourne  MRN: [id number]  Birthdate 1970  Date of evaluation: 7/23/24      CHIEF COMPLAINT       Chief Complaint   Patient presents with    Cough     With yellow sputum x4 days    Shortness of Breath     Hx of COPD    Dysuria     \"Fluorescent yellow and very strong\"     Leg Pain     Left, began yesterday. Denies injury         HISTORY OF PRESENT ILLNESS   HPI 53 y.o. female presents with c/o cough and shortness of breath and dysuria.  Patient reports that she has had a productive cough with yellow sputum over the last 4 days.  And she is currently feeling like she did at that time.  She also reports that she has had some chest tightness, she also reports that she has been having some burning with urination.  Patient history of cocaine use, she says she last used about 5 days ago.    REVIEW OF SYSTEMS       Review of Systems   Constitutional:  Positive for activity change, appetite change, chills, fatigue and fever.   HENT:  Positive for nosebleeds. Negative for rhinorrhea.    Eyes:  Negative for visual disturbance.   Respiratory:  Positive for cough and shortness of breath.    Gastrointestinal:  Negative for diarrhea and vomiting.   Genitourinary:  Positive for dysuria and urgency.   Musculoskeletal:  Positive for myalgias (left calf pain).   Skin:  Negative for rash.   Neurological:  Positive for weakness (generalized).       PAST MEDICAL HISTORY     Past Medical History:   Diagnosis Date    Anemia     Arthritis     Asthma     Bipolar disorder, mixed ([facility])     Carotid artery stenosis 2/13/2020    Cocaine abuse ([facility]) 11/4/2014    COPD (chronic obstructive pulmonary disease) ([facility])     Degeneration of cervical intervertebral disc     Depression with anxiety     Depression with anxiety     Fibromyalgia 1/5/2017    GERD (gastroesophageal reflux disease)     History of migraine headaches 6/10/2014    History of renal calculi 6/10/2014    History

## 2024-07-24 LAB
EKG ATRIAL RATE: 77 BPM
EKG P AXIS: 25 DEGREES
EKG P-R INTERVAL: 138 MS
EKG Q-T INTERVAL: 416 MS
EKG QRS DURATION: 94 MS
EKG QTC CALCULATION (BAZETT): 470 MS
EKG R AXIS: 42 DEGREES
EKG T AXIS: 55 DEGREES
EKG VENTRICULAR RATE: 77 BPM

## 2024-08-05 NOTE — ED NOTES
Mode of arrival (squad #, walk in, police, etc) : EMS        Chief complaint(s): SOB, covid+        Arrival Note (brief scenario, treatment PTA, etc). : Pt states testing postive for covid 1 week ago at urgent care. Pt states increasing SOB and body aches. Pt hypertensive upon arrival. Pt o2 at 99%. Pt has no conversational dyspnea. C= \"Have you ever felt that you should Cut down on your drinking? \"  No  A= \"Have people Annoyed you by criticizing your drinking? \"  No  G= \"Have you ever felt bad or Guilty about your drinking? \"  No  E= \"Have you ever had a drink as an Eye-opener first thing in the morning to steady your nerves or to help a hangover? \"  No      Deferred []      Reason for deferring: N/A    *If yes to two or more: probable alcohol abuse. Ximena Maharaj  10/10/22 7175 Detail Level: Detailed General Sunscreen Counseling: I recommended a broad spectrum sunscreen with a SPF of 30 daily and 50+ during outdoor activities. Sunscreens should be applied at least 15 minutes prior to expected sun exposure and then every 2 hours after that as long as sun exposure continues. If swimming or exercising sunscreen should be reapplied every 45 minutes to an hour after getting wet or sweating. Sun protective clothing can be used in lieu of sunscreen but must be worn the entire time you are exposed to the sun's rays.

## 2024-10-04 ENCOUNTER — TELEPHONE (OUTPATIENT)
Dept: INTERNAL MEDICINE CLINIC | Age: 54
End: 2024-10-04

## 2024-10-04 NOTE — TELEPHONE ENCOUNTER
Patient called for an appt, I told patient we had no available appts and patient hung up the phone.

## 2024-10-09 RX ORDER — GABAPENTIN 100 MG/1
100 CAPSULE ORAL 3 TIMES DAILY
Qty: 90 CAPSULE | Refills: 0 | Status: SHIPPED | OUTPATIENT
Start: 2024-10-09 | End: 2024-11-08

## 2024-10-10 ENCOUNTER — APPOINTMENT (OUTPATIENT)
Dept: CT IMAGING | Age: 54
End: 2024-10-10
Payer: COMMERCIAL

## 2024-10-10 ENCOUNTER — APPOINTMENT (OUTPATIENT)
Dept: GENERAL RADIOLOGY | Age: 54
End: 2024-10-10
Payer: COMMERCIAL

## 2024-10-10 ENCOUNTER — HOSPITAL ENCOUNTER (EMERGENCY)
Age: 54
Discharge: HOME | End: 2024-10-10
Attending: EMERGENCY MEDICINE
Payer: COMMERCIAL

## 2024-10-10 VITALS
DIASTOLIC BLOOD PRESSURE: 90 MMHG | WEIGHT: 100 LBS | OXYGEN SATURATION: 98 % | TEMPERATURE: 98.3 F | BODY MASS INDEX: 20.9 KG/M2 | SYSTOLIC BLOOD PRESSURE: 187 MMHG | RESPIRATION RATE: 19 BRPM | HEART RATE: 80 BPM

## 2024-10-10 DIAGNOSIS — Z91.148 NON COMPLIANCE W MEDICATION REGIMEN: ICD-10-CM

## 2024-10-10 DIAGNOSIS — R56.9 SEIZURE (HCC): Primary | ICD-10-CM

## 2024-10-10 LAB
ANION GAP SERPL CALCULATED.3IONS-SCNC: 12 MMOL/L (ref 9–16)
BASOPHILS # BLD: 0.04 K/UL (ref 0–0.2)
BASOPHILS NFR BLD: 1 % (ref 0–2)
BUN SERPL-MCNC: 16 MG/DL (ref 6–20)
CALCIUM SERPL-MCNC: 9.2 MG/DL (ref 8.6–10.4)
CHLORIDE SERPL-SCNC: 103 MMOL/L (ref 98–107)
CO2 SERPL-SCNC: 23 MMOL/L (ref 20–31)
CREAT SERPL-MCNC: 0.7 MG/DL (ref 0.5–0.9)
EOSINOPHIL # BLD: 0.09 K/UL (ref 0–0.44)
EOSINOPHILS RELATIVE PERCENT: 1 % (ref 1–4)
ERYTHROCYTE [DISTWIDTH] IN BLOOD BY AUTOMATED COUNT: 15.9 % (ref 11.8–14.4)
GFR, ESTIMATED: >90 ML/MIN/1.73M2
GLUCOSE BLD-MCNC: 171 MG/DL (ref 65–105)
GLUCOSE BLD-MCNC: 70 MG/DL (ref 65–105)
GLUCOSE SERPL-MCNC: 62 MG/DL (ref 74–99)
HCT VFR BLD AUTO: 41.6 % (ref 36.3–47.1)
HGB BLD-MCNC: 12.6 G/DL (ref 11.9–15.1)
IMM GRANULOCYTES # BLD AUTO: <0.03 K/UL (ref 0–0.3)
IMM GRANULOCYTES NFR BLD: 0 %
LYMPHOCYTES NFR BLD: 2.09 K/UL (ref 1.1–3.7)
LYMPHOCYTES RELATIVE PERCENT: 27 % (ref 24–43)
MCH RBC QN AUTO: 24.5 PG (ref 25.2–33.5)
MCHC RBC AUTO-ENTMCNC: 30.3 G/DL (ref 28.4–34.8)
MCV RBC AUTO: 80.9 FL (ref 82.6–102.9)
MONOCYTES NFR BLD: 0.59 K/UL (ref 0.1–1.2)
MONOCYTES NFR BLD: 8 % (ref 3–12)
NEUTROPHILS NFR BLD: 63 % (ref 36–65)
NEUTS SEG NFR BLD: 5.08 K/UL (ref 1.5–8.1)
NRBC BLD-RTO: 0 PER 100 WBC
PLATELET # BLD AUTO: 266 K/UL (ref 138–453)
PMV BLD AUTO: 11.6 FL (ref 8.1–13.5)
POTASSIUM SERPL-SCNC: 3.8 MMOL/L (ref 3.7–5.3)
RBC # BLD AUTO: 5.14 M/UL (ref 3.95–5.11)
RBC # BLD: ABNORMAL 10*6/UL
SODIUM SERPL-SCNC: 138 MMOL/L (ref 136–145)
TROPONIN I SERPL HS-MCNC: 7 NG/L (ref 0–14)
TROPONIN I SERPL HS-MCNC: <6 NG/L (ref 0–14)
VALPROATE SERPL-MCNC: <3 UG/ML (ref 50–125)
WBC OTHER # BLD: 7.9 K/UL (ref 3.5–11.3)

## 2024-10-10 PROCEDURE — 82947 ASSAY GLUCOSE BLOOD QUANT: CPT

## 2024-10-10 PROCEDURE — 73030 X-RAY EXAM OF SHOULDER: CPT

## 2024-10-10 PROCEDURE — 6370000000 HC RX 637 (ALT 250 FOR IP)

## 2024-10-10 PROCEDURE — 70450 CT HEAD/BRAIN W/O DYE: CPT

## 2024-10-10 PROCEDURE — 6370000000 HC RX 637 (ALT 250 FOR IP): Performed by: EMERGENCY MEDICINE

## 2024-10-10 PROCEDURE — 99285 EMERGENCY DEPT VISIT HI MDM: CPT

## 2024-10-10 PROCEDURE — 71045 X-RAY EXAM CHEST 1 VIEW: CPT

## 2024-10-10 PROCEDURE — 80048 BASIC METABOLIC PNL TOTAL CA: CPT

## 2024-10-10 PROCEDURE — 93005 ELECTROCARDIOGRAM TRACING: CPT

## 2024-10-10 PROCEDURE — 84484 ASSAY OF TROPONIN QUANT: CPT

## 2024-10-10 PROCEDURE — 80164 ASSAY DIPROPYLACETIC ACD TOT: CPT

## 2024-10-10 PROCEDURE — 85025 COMPLETE CBC W/AUTO DIFF WBC: CPT

## 2024-10-10 PROCEDURE — 72125 CT NECK SPINE W/O DYE: CPT

## 2024-10-10 RX ORDER — DIVALPROEX SODIUM 125 MG/1
1000 CAPSULE, COATED PELLETS ORAL ONCE
Status: COMPLETED | OUTPATIENT
Start: 2024-10-10 | End: 2024-10-10

## 2024-10-10 RX ORDER — OXYCODONE HYDROCHLORIDE 5 MG/1
5 TABLET ORAL ONCE
Status: COMPLETED | OUTPATIENT
Start: 2024-10-10 | End: 2024-10-10

## 2024-10-10 RX ORDER — ACETAMINOPHEN 500 MG
1000 TABLET ORAL ONCE
Status: COMPLETED | OUTPATIENT
Start: 2024-10-10 | End: 2024-10-10

## 2024-10-10 RX ORDER — AMLODIPINE BESYLATE 10 MG/1
5 TABLET ORAL ONCE
Status: COMPLETED | OUTPATIENT
Start: 2024-10-10 | End: 2024-10-10

## 2024-10-10 RX ORDER — LEVETIRACETAM 10 MG/ML
2000 INJECTION INTRAVASCULAR ONCE
Status: DISCONTINUED | OUTPATIENT
Start: 2024-10-10 | End: 2024-10-10

## 2024-10-10 RX ORDER — LIDOCAINE 50 MG/G
1 PATCH TOPICAL DAILY
Qty: 10 PATCH | Refills: 0 | Status: SHIPPED | OUTPATIENT
Start: 2024-10-10 | End: 2024-10-20

## 2024-10-10 RX ORDER — LIDOCAINE 4 G/G
1 PATCH TOPICAL ONCE
Status: DISCONTINUED | OUTPATIENT
Start: 2024-10-10 | End: 2024-10-10 | Stop reason: HOSPADM

## 2024-10-10 RX ORDER — LOSARTAN POTASSIUM 50 MG/1
100 TABLET ORAL ONCE
Status: COMPLETED | OUTPATIENT
Start: 2024-10-10 | End: 2024-10-10

## 2024-10-10 RX ADMIN — DIVALPROEX SODIUM 1000 MG: 125 CAPSULE, COATED PELLETS ORAL at 14:29

## 2024-10-10 RX ADMIN — ACETAMINOPHEN 1000 MG: 500 TABLET ORAL at 13:31

## 2024-10-10 RX ADMIN — OXYCODONE HYDROCHLORIDE 5 MG: 5 TABLET ORAL at 14:59

## 2024-10-10 RX ADMIN — AMLODIPINE BESYLATE 5 MG: 10 TABLET ORAL at 13:12

## 2024-10-10 RX ADMIN — LOSARTAN POTASSIUM 100 MG: 50 TABLET, FILM COATED ORAL at 13:12

## 2024-10-10 ASSESSMENT — PAIN DESCRIPTION - FREQUENCY: FREQUENCY: CONTINUOUS

## 2024-10-10 ASSESSMENT — ENCOUNTER SYMPTOMS
SHORTNESS OF BREATH: 0
ABDOMINAL PAIN: 0

## 2024-10-10 ASSESSMENT — PAIN DESCRIPTION - PAIN TYPE: TYPE: ACUTE PAIN

## 2024-10-10 ASSESSMENT — PAIN - FUNCTIONAL ASSESSMENT: PAIN_FUNCTIONAL_ASSESSMENT: 0-10

## 2024-10-10 ASSESSMENT — PAIN DESCRIPTION - ORIENTATION: ORIENTATION: LEFT;RIGHT

## 2024-10-10 ASSESSMENT — PAIN DESCRIPTION - LOCATION: LOCATION: SHOULDER;HEAD

## 2024-10-10 ASSESSMENT — PAIN DESCRIPTION - DESCRIPTORS: DESCRIPTORS: DISCOMFORT

## 2024-10-10 ASSESSMENT — PAIN SCALES - GENERAL: PAINLEVEL_OUTOF10: 6

## 2024-10-10 NOTE — DISCHARGE INSTRUCTIONS
You are seen in the emergency room after having a seizure.  CT of the head and your neck were negative at this time however you do have a large hematoma on the back right of your head.  You may use Tylenol and Motrin for symptomatic management of headache.  X-rays of your shoulder were negative at this time for any fractures or dislocations.  You may use lidocaine patch Tylenol Motrin for symptomatic management of these aches.  Your Depakote level was almost nondetectable.  You were loaded with Depakote in the emergency room but please ensure you take this medication as prescribed twice a day to prevent further seizures from occurring.  Please follow-up with your primary care for further recommendations and reevaluation.  You are also noted to have significantly high blood pressure and were given your home medications of losartan and amlodipine.  Please continue to take these medications as is at this significantly elevated blood pressure can cause strokes and heart attacks.  Please return to the emergency room if you experience any chest pain, shortness of breath fevers chills or any new symptoms of concern.

## 2024-10-10 NOTE — ED NOTES
Pt to ED via EMS for seizure. EMS report pt was at the [facility] when she had a witnessed seizure by bystanders. Pt arrives to ED alert and oriented X3, confused by time/date. EMS report pt blood pressure over 200 systolic, 60 blood glucose, and treats for seizure. Pt reports she takes BP meds and Depakote stating last doses were yesterday of both. Pt reports head and bilateral shoulder pain. Pt arrives ccollar- EMS reports hematoma to posterior head.

## 2024-10-10 NOTE — ED PROVIDER NOTES
[facility] ED  Emergency Department Encounter  Emergency Medicine Resident     Pt Name:Anu Bourne  MRN: [id number]  Birthdate 1970  Date of evaluation: 10/10/24  PCP:  Prateek Torres MD  Note Started: 12:04 PM EDT      CHIEF COMPLAINT       Chief Complaint   Patient presents with    Seizures       HISTORY OF PRESENT ILLNESS  (Location/Symptom, Timing/Onset, Context/Setting, Quality, Duration, Modifying Factors, Severity.)      nAu Bourne is a 53 y.o. female who presents with seizure.  Currently complaining of headache with the posterior hematoma and neck pain.  Also states that she has bilateral shoulder pain and chest pain.  Patient states she has a history of seizures is on Depakote.  Did not take her medication today as she was late getting out of the house.  Unknown how long her seizure was.  Was brought in with EMS.  No medications given.  Denies any abdominal pain nausea or vomiting.  No back pain.  Patient oriented to person and place but not to time currently.  History of hypertension but unclear if she took her medications this morning.    PAST MEDICAL / SURGICAL / SOCIAL / FAMILY HISTORY      has a past medical history of Anemia, Arthritis, Asthma, Bipolar disorder, mixed (HCC), Carotid artery stenosis, Cocaine abuse (HCC), COPD (chronic obstructive pulmonary disease) (HCC), Degeneration of cervical intervertebral disc, Depression with anxiety, Depression with anxiety, Fibromyalgia, GERD (gastroesophageal reflux disease), History of migraine headaches, History of renal calculi, History of seizure disorder, Hoarseness of voice, HTN (hypertension), Hyperlipidemia, IGT (impaired glucose tolerance), Kidney stones, Lactose intolerance, Leukopenia, Major depressive disorder, recurrent episode, severe, without mention of psychotic behavior, MVP (mitral valve prolapse), Seizures (HCC), Sleep apnea, and Unspecified diseases of blood and blood-forming organs.     has a past surgical      DISPOSITION Decision To Discharge 10/10/2024 03:44:24 PM  Condition at Disposition: Data Unavailable      PATIENT REFERRED TO:  Prateek Torres MD  [address]  [facility] [address]  [phone]            DISCHARGE MEDICATIONS:  Discharge Medication List as of 10/10/2024  3:44 PM        START taking these medications    Details   lidocaine (LIDODERM) 5 % Place 1 patch onto the skin daily for 10 days 12 hours on, 12 hours off., Disp-10 patch, R-0Print             Brandee Preciado MD  Emergency Medicine Resident    (Please note that portions of thisnote were completed with a voice recognition program.  Efforts were made to edit the dictations but occasionally words are mis-transcribed.)

## 2024-10-10 NOTE — ED PROVIDER NOTES
EKG Interpretation    Interpreted by me  Normal sinus rhythm ventricular of 82, normal ID interval, normal QRS duration, possible of a left atrial enlargement, left ventricular hypertrophy, normal axis,  Compared to EKG of July 23, 2024, no significant change was noted        Medical Decision Making  Problems Addressed:  Non compliance w medication regimen: acute illness or injury  Seizure (HCC): acute illness or injury    Amount and/or Complexity of Data Reviewed  Labs: ordered. Decision-making details documented in ED Course.  Radiology: ordered. Decision-making details documented in ED Course.  ECG/medicine tests: ordered and independent interpretation performed. Decision-making details documented in ED Course.    Risk  OTC drugs.  Prescription drug management.            (Please note that portions of this note were completed with a voice recognition program.  Efforts were made to edit the dictations but occasionally words are mis-transcribed.)    Kash Quintanilla MD, FACEP  Attending Emergency Medicine Physician        Kash Quintanilla MD  10/10/24 [id number]

## 2024-10-14 LAB
EKG ATRIAL RATE: 82 BPM
EKG P AXIS: 37 DEGREES
EKG P-R INTERVAL: 136 MS
EKG Q-T INTERVAL: 402 MS
EKG QRS DURATION: 98 MS
EKG QTC CALCULATION (BAZETT): 469 MS
EKG R AXIS: 53 DEGREES
EKG T AXIS: 44 DEGREES
EKG VENTRICULAR RATE: 82 BPM

## 2024-10-16 ENCOUNTER — OFFICE VISIT (OUTPATIENT)
Dept: INTERNAL MEDICINE CLINIC | Age: 54
End: 2024-10-16
Payer: COMMERCIAL

## 2024-10-16 VITALS
HEIGHT: 58 IN | DIASTOLIC BLOOD PRESSURE: 78 MMHG | BODY MASS INDEX: 19.94 KG/M2 | WEIGHT: 95 LBS | SYSTOLIC BLOOD PRESSURE: 130 MMHG | OXYGEN SATURATION: 99 % | HEART RATE: 102 BPM

## 2024-10-16 DIAGNOSIS — N30.00 ACUTE CYSTITIS WITHOUT HEMATURIA: Primary | ICD-10-CM

## 2024-10-16 DIAGNOSIS — T16.1XXA FOREIGN BODY OF RIGHT EAR, INITIAL ENCOUNTER: ICD-10-CM

## 2024-10-16 DIAGNOSIS — R56.9 SEIZURES (HCC): ICD-10-CM

## 2024-10-16 PROCEDURE — 99214 OFFICE O/P EST MOD 30 MIN: CPT | Performed by: INTERNAL MEDICINE

## 2024-10-16 PROCEDURE — 3078F DIAST BP <80 MM HG: CPT | Performed by: INTERNAL MEDICINE

## 2024-10-16 PROCEDURE — 3075F SYST BP GE 130 - 139MM HG: CPT | Performed by: INTERNAL MEDICINE

## 2024-10-16 RX ORDER — CEPHALEXIN 500 MG/1
500 CAPSULE ORAL 3 TIMES DAILY
Qty: 15 CAPSULE | Refills: 0 | Status: SHIPPED | OUTPATIENT
Start: 2024-10-16 | End: 2024-10-21

## 2024-10-16 SDOH — ECONOMIC STABILITY: FOOD INSECURITY: WITHIN THE PAST 12 MONTHS, THE FOOD YOU BOUGHT JUST DIDN'T LAST AND YOU DIDN'T HAVE MONEY TO GET MORE.: PATIENT DECLINED

## 2024-10-16 SDOH — ECONOMIC STABILITY: FOOD INSECURITY: WITHIN THE PAST 12 MONTHS, YOU WORRIED THAT YOUR FOOD WOULD RUN OUT BEFORE YOU GOT MONEY TO BUY MORE.: PATIENT DECLINED

## 2024-10-16 SDOH — ECONOMIC STABILITY: INCOME INSECURITY: HOW HARD IS IT FOR YOU TO PAY FOR THE VERY BASICS LIKE FOOD, HOUSING, MEDICAL CARE, AND HEATING?: PATIENT DECLINED

## 2024-10-16 NOTE — PROGRESS NOTES
\"Have you been to the ER, urgent care clinic since your last visit?  Hospitalized since your last visit?\"    No  [facility] 10/10/24 Seizure  
congestion  Cardiovascular:  negative for  chest pain, palpitations  Gastrointestinal:  negative for nausea, vomiting, diarrhea, constipation, abdominal pain  Genitourinary:  negative for frequency, dysuria  Integument/Breast:  negative for rash, skin lesions  Musculoskeletal:  negative for muscle aches or joint pain  Neurological:  negative for headaches, dizziness, lightheadedness, numbness, pain and tingling extrimities  Behavior/Psych:  negative for depression and anxiety      Physical Exam:    Vitals:  /78 (Site: Left Upper Arm)   Pulse (!) 102   Ht 1.473 m (4' 10\")   Wt 43.1 kg (95 lb)   SpO2 99%   BMI 19.86 kg/m²     General appearance - alert, well appearing, and in no acute distress  Mental status - oriented to person, place, and time with normal affect  Head - normocephalic and atraumatic  Eyes - pupils equal and reactive, extraocular eye movements intact, conjunctiva clear  Ears - hearing appears to be intact  Nose - no drainage noted  Mouth - mucous membranes moist  Neck - supple, no carotid bruits, thyroid not palpable  Chest - clear to auscultation, normal effort  Heart - normal rate, regular rhythm, no murmurs  Abdomen - soft, nontender, nondistended, bowel sounds present all four quadrants, no masses, hepatomegaly or splenomegaly  Neurological - normal speech, no focal findings or movement disorder noted, cranial nerves II through XII grossly intact  Extremities - peripheral pulses palpable, no pedal edema or calf pain with palpation  Skin - no gross lesions, rashes, or induration noted      Data:    Lab Results   Component Value Date/Time     10/10/2024 12:34 PM    K 3.8 10/10/2024 12:34 PM     10/10/2024 12:34 PM    CO2 23 10/10/2024 12:34 PM    BUN 16 10/10/2024 12:34 PM    CREATININE 0.7 10/10/2024 12:34 PM    GLUCOSE 62 10/10/2024 12:34 PM    GLUCOSE 79 10/16/2023 02:54 PM    BILITOT 0.2 07/23/2024 02:27 PM    ALKPHOS 116 07/23/2024 02:27 PM    AST 17 07/23/2024 02:27 PM

## 2024-10-24 ENCOUNTER — TELEPHONE (OUTPATIENT)
Dept: INTERNAL MEDICINE CLINIC | Age: 54
End: 2024-10-24

## 2024-10-24 DIAGNOSIS — R56.9 SEIZURES (HCC): Primary | ICD-10-CM

## 2024-11-14 ENCOUNTER — TELEPHONE (OUTPATIENT)
Dept: INTERNAL MEDICINE CLINIC | Age: 54
End: 2024-11-14

## 2024-11-20 RX ORDER — GABAPENTIN 100 MG/1
100 CAPSULE ORAL 3 TIMES DAILY
Qty: 90 CAPSULE | Refills: 0 | Status: SHIPPED | OUTPATIENT
Start: 2024-11-20 | End: 2024-12-20

## 2025-01-07 PROBLEM — Z71.6 TOBACCO ABUSE COUNSELING: Status: ACTIVE | Noted: 2025-01-07

## 2025-01-07 PROBLEM — G40.919 EPILEPSY UNDETERMINED AS TO FOCAL OR GENERALIZED, INTRACTABLE (HCC): Status: ACTIVE | Noted: 2019-03-19

## 2025-01-07 PROBLEM — F31.60 BIPOLAR DISORDER, CURRENT EPISODE MIXED, UNSPECIFIED (HCC): Status: ACTIVE | Noted: 2022-11-04

## 2025-01-07 PROBLEM — R63.4 WEIGHT LOSS, ABNORMAL: Status: ACTIVE | Noted: 2025-01-07

## 2025-01-07 PROBLEM — M79.604 RIGHT LEG PAIN: Status: ACTIVE | Noted: 2025-01-07

## 2025-01-07 PROBLEM — B95.62 METHICILLIN RESISTANT STAPHYLOCOCCUS AUREUS INFECTION AS THE CAUSE OF DISEASES CLASSIFIED ELSEWHERE: Status: ACTIVE | Noted: 2022-11-04

## 2025-01-07 PROBLEM — L30.9 DERMATITIS: Status: ACTIVE | Noted: 2025-01-07

## 2025-01-07 PROBLEM — R59.0 LYMPHADENOPATHY, INGUINAL: Status: ACTIVE | Noted: 2025-01-07

## 2025-01-07 PROBLEM — I35.9 AORTIC VALVE DISEASE: Status: ACTIVE | Noted: 2025-01-07

## 2025-01-07 PROBLEM — R39.198 DIFFICULTY URINATING: Status: ACTIVE | Noted: 2025-01-07

## 2025-01-07 PROBLEM — L08.9 SKIN INFECTION: Status: ACTIVE | Noted: 2025-01-07

## 2025-01-07 PROBLEM — R65.21 SEPTIC SHOCK (HCC): Status: ACTIVE | Noted: 2020-02-22

## 2025-01-07 PROBLEM — M79.602 LEFT ARM PAIN: Status: ACTIVE | Noted: 2025-01-07

## 2025-01-07 PROBLEM — G47.00 INSOMNIA: Status: ACTIVE | Noted: 2025-01-07

## 2025-01-07 PROBLEM — A41.9 SEPTIC SHOCK (HCC): Status: ACTIVE | Noted: 2020-02-22

## 2025-01-07 PROBLEM — F17.210 CIGARETTE SMOKER: Status: ACTIVE | Noted: 2025-01-07

## 2025-01-07 PROBLEM — K91.1 POSTGASTRIC SURGERY SYNDROMES: Status: ACTIVE | Noted: 2022-11-29

## 2025-01-07 PROBLEM — Z98.84 HISTORY OF GASTRIC BYPASS: Status: ACTIVE | Noted: 2025-01-07

## 2025-01-07 PROBLEM — B35.1 NAIL FUNGUS: Status: ACTIVE | Noted: 2025-01-07

## 2025-01-07 PROBLEM — I21.4 NON-ST ELEVATION (NSTEMI) MYOCARDIAL INFARCTION (HCC): Status: ACTIVE | Noted: 2022-11-04
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
## 2025-01-13 ENCOUNTER — OFFICE VISIT (OUTPATIENT)
Dept: FAMILY MEDICINE CLINIC | Age: 55
End: 2025-01-13
Payer: MEDICARE

## 2025-01-13 VITALS
HEART RATE: 80 BPM | OXYGEN SATURATION: 98 % | TEMPERATURE: 98.1 F | DIASTOLIC BLOOD PRESSURE: 110 MMHG | WEIGHT: 97.6 LBS | BODY MASS INDEX: 20.4 KG/M2 | SYSTOLIC BLOOD PRESSURE: 210 MMHG | RESPIRATION RATE: 18 BRPM

## 2025-01-13 DIAGNOSIS — M79.7 FIBROMYALGIA: ICD-10-CM

## 2025-01-13 DIAGNOSIS — J44.1 CHRONIC OBSTRUCTIVE PULMONARY DISEASE WITH ACUTE EXACERBATION (HCC): ICD-10-CM

## 2025-01-13 DIAGNOSIS — G60.9 IDIOPATHIC PERIPHERAL NEUROPATHY: ICD-10-CM

## 2025-01-13 DIAGNOSIS — Z12.31 SCREENING MAMMOGRAM FOR BREAST CANCER: ICD-10-CM

## 2025-01-13 DIAGNOSIS — Z78.0 POST-MENOPAUSAL: ICD-10-CM

## 2025-01-13 DIAGNOSIS — I10 ESSENTIAL HYPERTENSION: Primary | ICD-10-CM

## 2025-01-13 PROCEDURE — 3077F SYST BP >= 140 MM HG: CPT | Performed by: NURSE PRACTITIONER

## 2025-01-13 PROCEDURE — 99204 OFFICE O/P NEW MOD 45 MIN: CPT | Performed by: NURSE PRACTITIONER

## 2025-01-13 PROCEDURE — 3080F DIAST BP >= 90 MM HG: CPT | Performed by: NURSE PRACTITIONER

## 2025-01-13 RX ORDER — POTASSIUM CHLORIDE 1500 MG/1
40 TABLET, EXTENDED RELEASE ORAL DAILY
Qty: 90 TABLET | Refills: 0 | Status: SHIPPED | OUTPATIENT
Start: 2025-01-13

## 2025-01-13 RX ORDER — IPRATROPIUM BROMIDE AND ALBUTEROL SULFATE 2.5; .5 MG/3ML; MG/3ML
3 SOLUTION RESPIRATORY (INHALATION) EVERY 6 HOURS PRN
Qty: 360 ML | Refills: 0 | Status: SHIPPED | OUTPATIENT
Start: 2025-01-13

## 2025-01-13 RX ORDER — LOSARTAN POTASSIUM 50 MG/1
50 TABLET ORAL DAILY
Qty: 90 TABLET | Refills: 0 | Status: SHIPPED | OUTPATIENT
Start: 2025-01-13

## 2025-01-13 RX ORDER — ALBUTEROL SULFATE 90 UG/1
2 INHALANT RESPIRATORY (INHALATION) 4 TIMES DAILY PRN
Qty: 18 G | Refills: 1 | Status: SHIPPED | OUTPATIENT
Start: 2025-01-13

## 2025-01-13 RX ORDER — CEFUROXIME AXETIL 500 MG/1
500 TABLET ORAL 2 TIMES DAILY
Qty: 20 TABLET | Refills: 0 | Status: SHIPPED | OUTPATIENT
Start: 2025-01-13 | End: 2025-01-23

## 2025-01-13 RX ORDER — BENZONATATE 100 MG/1
100-200 CAPSULE ORAL 3 TIMES DAILY PRN
Qty: 30 CAPSULE | Refills: 0 | Status: SHIPPED | OUTPATIENT
Start: 2025-01-13 | End: 2025-01-23

## 2025-01-13 RX ORDER — FUROSEMIDE 20 MG/1
20 TABLET ORAL DAILY
Qty: 90 TABLET | Refills: 0 | Status: SHIPPED | OUTPATIENT
Start: 2025-01-13

## 2025-01-13 RX ORDER — GABAPENTIN 100 MG/1
100 CAPSULE ORAL 3 TIMES DAILY
Qty: 90 CAPSULE | Refills: 0 | Status: SHIPPED | OUTPATIENT
Start: 2025-01-13 | End: 2025-02-12

## 2025-01-13 RX ORDER — PREDNISONE 10 MG/1
TABLET ORAL
Qty: 18 TABLET | Refills: 0 | Status: SHIPPED | OUTPATIENT
Start: 2025-01-13 | End: 2025-01-23

## 2025-01-13 RX ORDER — DULOXETIN HYDROCHLORIDE 60 MG/1
60 CAPSULE, DELAYED RELEASE ORAL DAILY
Qty: 90 CAPSULE | Refills: 0 | Status: SHIPPED | OUTPATIENT
Start: 2025-01-13

## 2025-01-13 SDOH — ECONOMIC STABILITY: FOOD INSECURITY: WITHIN THE PAST 12 MONTHS, YOU WORRIED THAT YOUR FOOD WOULD RUN OUT BEFORE YOU GOT MONEY TO BUY MORE.: OFTEN TRUE

## 2025-01-13 SDOH — ECONOMIC STABILITY: FOOD INSECURITY: WITHIN THE PAST 12 MONTHS, THE FOOD YOU BOUGHT JUST DIDN'T LAST AND YOU DIDN'T HAVE MONEY TO GET MORE.: OFTEN TRUE

## 2025-01-13 ASSESSMENT — PATIENT HEALTH QUESTIONNAIRE - PHQ9
SUM OF ALL RESPONSES TO PHQ QUESTIONS 1-9: 7
8. MOVING OR SPEAKING SO SLOWLY THAT OTHER PEOPLE COULD HAVE NOTICED. OR THE OPPOSITE, BEING SO FIGETY OR RESTLESS THAT YOU HAVE BEEN MOVING AROUND A LOT MORE THAN USUAL: NOT AT ALL
3. TROUBLE FALLING OR STAYING ASLEEP: NOT AT ALL
4. FEELING TIRED OR HAVING LITTLE ENERGY: NEARLY EVERY DAY
SUM OF ALL RESPONSES TO PHQ QUESTIONS 1-9: 7
5. POOR APPETITE OR OVEREATING: NOT AT ALL
10. IF YOU CHECKED OFF ANY PROBLEMS, HOW DIFFICULT HAVE THESE PROBLEMS MADE IT FOR YOU TO DO YOUR WORK, TAKE CARE OF THINGS AT HOME, OR GET ALONG WITH OTHER PEOPLE: NOT DIFFICULT AT ALL
1. LITTLE INTEREST OR PLEASURE IN DOING THINGS: SEVERAL DAYS
SUM OF ALL RESPONSES TO PHQ9 QUESTIONS 1 & 2: 4
2. FEELING DOWN, DEPRESSED OR HOPELESS: NEARLY EVERY DAY
7. TROUBLE CONCENTRATING ON THINGS, SUCH AS READING THE NEWSPAPER OR WATCHING TELEVISION: NOT AT ALL
SUM OF ALL RESPONSES TO PHQ QUESTIONS 1-9: 7
6. FEELING BAD ABOUT YOURSELF - OR THAT YOU ARE A FAILURE OR HAVE LET YOURSELF OR YOUR FAMILY DOWN: NOT AT ALL
SUM OF ALL RESPONSES TO PHQ QUESTIONS 1-9: 7
9. THOUGHTS THAT YOU WOULD BE BETTER OFF DEAD, OR OF HURTING YOURSELF: NOT AT ALL

## 2025-01-13 ASSESSMENT — ENCOUNTER SYMPTOMS
NAUSEA: 0
VOMITING: 0
SHORTNESS OF BREATH: 1
ABDOMINAL PAIN: 0
COUGH: 1

## 2025-01-13 NOTE — ASSESSMENT & PLAN NOTE
- unstable. Start ceftin medrol dosepack, duoneb solution /rescue inhaler and tessalon perles  - continue home oxygen and follow up with pulmonary as scheduled   Orders:    ipratropium 0.5 mg-albuterol 2.5 mg (DUONEB) 0.5-2.5 (3) MG/3ML SOLN nebulizer solution; Inhale 3 mLs into the lungs every 6 hours as needed for Shortness of Breath    albuterol sulfate HFA (VENTOLIN HFA) 108 (90 Base) MCG/ACT inhaler; Inhale 2 puffs into the lungs 4 times daily as needed for Wheezing    predniSONE (DELTASONE) 10 MG tablet; Take 3 tablets together daily for 3 days, then 2 tablets daily for 3 days, then 1 tablet daily for 3 days.    benzonatate (TESSALON) 100 MG capsule; Take 1-2 capsules by mouth 3 times daily as needed for Cough    cefUROXime (CEFTIN) 500 MG tablet; Take 1 tablet by mouth 2 times daily for 10 days    XR CHEST (2 VW); Future

## 2025-01-13 NOTE — PATIENT INSTRUCTIONS
[facility] Food Resources*  (Call [facility] for more resources)    Ángel Timothy Counts include 234 beds at the [facility] [employer]  What they offer: Food pantry second and fourth Tuesday 4:30-6pm  Phone Number and Address: [phone] Toll Free: [employer], between [employer] and [employer]; [address] [facility] [address] [facility] [address]  [facility] Office on Aging of [facility]  What they offer: “Meals & Nutrition” search option on website  Phone Number and Website: 881.953.2488; https://[employer]/  [facility]  What they offer: Dinner is open to all (must be registered and in good standing) and three hot meals a day for shelter residents. Breakfast 7-8am, Lunch 12-1pm, and Dinner 5-6pm daily.  Phone and Address: [phone]; [address] [facility] [address]  [employer] Delivery program  What they offer: Food Box Delivery for those within [facility] who are homebound or without transportation. Applications done by phone. Qualifying individuals are eligible for 3 deliveries for the lifetime of the program.  Phone number: Call for eligibility check at 2-1-1 or 0-048-253DerbyJackpot (2933)  [facility]  What they offer: Food Pantry second and fourth Saturday 1-5pm  Phone and Address: [phone]; [address] [facility] [address] [facility] [address]  [employer] Food Pantries   What they offer: Mobile pantries throughout the [facility]. Anyone in need may visit one pantry per month.  Phone Number and Website: For locations and schedule call 2-1-1 or 7-209-743-HELP (2495) or check the website www.feedtoledo.org  [employer] [place] [employer]  What they offer: Food Pantry, call for schedule, open to All  Phone Number: [phone]  [facility]  What they offer: Hot meals, Breakfast: Monday, Wednesday, Friday 8-10am, Lunch: Monday-Friday 10am-12:30pm. Food pantry (serves [address] or east of [facility]) Tuesday

## 2025-01-13 NOTE — ASSESSMENT & PLAN NOTE
- unstable. Resume cymbalta     Orders:    DULoxetine (CYMBALTA) 60 MG extended release capsule; Take 1 capsule by mouth daily

## 2025-01-13 NOTE — PROGRESS NOTES
Never done    Breast cancer screen  05/13/2021    Flu vaccine (1) 08/01/2024    COVID-19 Vaccine (3 - 2023-24 season) 09/01/2024    Annual Wellness Visit (Medicare)  Never done    A1C test (Diabetic or Prediabetic)  02/19/2025    Depression Monitoring  01/13/2026    Colorectal Cancer Screen  04/12/2027    Lipids  02/19/2029    HIV screen  10/26/2032    Hepatitis C screen  Completed    Hepatitis A vaccine  Aged Out    Hib vaccine  Aged Out    Polio vaccine  Aged Out    Meningococcal (ACWY) vaccine  Aged Out    Diabetes screen  Discontinued    Cervical cancer screen  Discontinued       HPI used to follow up at [facility]     54 year old female presents for management of following:  Uncontrolled HTN, peripheral neuropathy and fibromyalgia with medication refills. Was on amlodipine losartan and lasix for bp but has been off for a while. Bp is elevated today. Was on neurontin and cymbalta for paresthesias in hands and feet and fibromyalgia but reports she stopped all her medications. Denies headache, vision changes weakness chest pain  or nausea vomiting abd pain.  COPD exacerbation - nasal congestion/pressure post nasal drainage cough sob and wheezing for 1 week. She says cough is productive with yellow greenish sputum. Home covid testing was negative.  denies fever chills body ache malaise or nv abd pain. has nebulizer machine, rescue inhaler and oxygen at home. Pt is a heavy smoker but has no desire to quit .    Review of Systems   Constitutional:  Positive for fatigue. Negative for chills and fever.   HENT:  Positive for congestion, postnasal drip, rhinorrhea and sinus pressure.    Eyes:  Negative for visual disturbance.   Respiratory:  Positive for cough, shortness of breath and wheezing.    Cardiovascular:  Negative for chest pain and leg swelling.   Gastrointestinal:  Negative for abdominal pain, nausea and vomiting.   Endocrine: Negative for polydipsia and polyuria.   Genitourinary:  Positive for hematuria.

## 2025-01-14 ASSESSMENT — ENCOUNTER SYMPTOMS
WHEEZING: 1
RHINORRHEA: 1
SINUS PRESSURE: 1

## 2025-02-19 DIAGNOSIS — G60.9 IDIOPATHIC PERIPHERAL NEUROPATHY: ICD-10-CM

## 2025-02-19 RX ORDER — GABAPENTIN 100 MG/1
100 CAPSULE ORAL 3 TIMES DAILY
Qty: 90 CAPSULE | Refills: 0 | Status: SHIPPED | OUTPATIENT
Start: 2025-02-19 | End: 2025-04-11 | Stop reason: SDUPTHER

## 2025-04-11 DIAGNOSIS — G60.9 IDIOPATHIC PERIPHERAL NEUROPATHY: ICD-10-CM

## 2025-04-14 RX ORDER — GABAPENTIN 100 MG/1
100 CAPSULE ORAL 3 TIMES DAILY
Qty: 90 CAPSULE | Refills: 0 | Status: SHIPPED | OUTPATIENT
Start: 2025-04-14 | End: 2025-05-14

## 2025-05-01 ENCOUNTER — TELEPHONE (OUTPATIENT)
Dept: FAMILY MEDICINE CLINIC | Age: 55
End: 2025-05-01

## 2025-05-23 DIAGNOSIS — G60.9 IDIOPATHIC PERIPHERAL NEUROPATHY: ICD-10-CM

## 2025-05-23 RX ORDER — GABAPENTIN 100 MG/1
100 CAPSULE ORAL 3 TIMES DAILY
Qty: 90 CAPSULE | Refills: 0 | Status: SHIPPED | OUTPATIENT
Start: 2025-05-23 | End: 2025-06-23 | Stop reason: SDUPTHER

## 2025-06-19 ENCOUNTER — TELEPHONE (OUTPATIENT)
Dept: FAMILY MEDICINE CLINIC | Age: 55
End: 2025-06-19

## 2025-06-19 NOTE — TELEPHONE ENCOUNTER
I called and spoke with the patient's father regarding a dual appt for AWV and F/U. He stated that the patient will return the call later.

## 2025-06-23 ENCOUNTER — OFFICE VISIT (OUTPATIENT)
Dept: FAMILY MEDICINE CLINIC | Age: 55
End: 2025-06-23

## 2025-06-23 VITALS
HEART RATE: 84 BPM | SYSTOLIC BLOOD PRESSURE: 152 MMHG | DIASTOLIC BLOOD PRESSURE: 84 MMHG | RESPIRATION RATE: 16 BRPM | WEIGHT: 97.8 LBS | TEMPERATURE: 98.2 F | BODY MASS INDEX: 20.44 KG/M2 | OXYGEN SATURATION: 98 %

## 2025-06-23 DIAGNOSIS — E78.2 MIXED HYPERLIPIDEMIA: ICD-10-CM

## 2025-06-23 DIAGNOSIS — G60.9 IDIOPATHIC PERIPHERAL NEUROPATHY: ICD-10-CM

## 2025-06-23 DIAGNOSIS — I10 ESSENTIAL HYPERTENSION: ICD-10-CM

## 2025-06-23 DIAGNOSIS — R73.03 PREDIABETES: Primary | ICD-10-CM

## 2025-06-23 DIAGNOSIS — M79.7 FIBROMYALGIA: ICD-10-CM

## 2025-06-23 DIAGNOSIS — L98.9 SKIN LESION: ICD-10-CM

## 2025-06-23 RX ORDER — FUROSEMIDE 20 MG/1
20 TABLET ORAL DAILY
Qty: 90 TABLET | Refills: 0 | Status: SHIPPED | OUTPATIENT
Start: 2025-06-23

## 2025-06-23 RX ORDER — POTASSIUM CHLORIDE 1500 MG/1
20 TABLET, EXTENDED RELEASE ORAL DAILY
Qty: 90 TABLET | Refills: 0 | Status: SHIPPED | OUTPATIENT
Start: 2025-06-23

## 2025-06-23 RX ORDER — MUPIROCIN 2 %
OINTMENT (GRAM) TOPICAL
Qty: 22 G | Refills: 0 | Status: SHIPPED | OUTPATIENT
Start: 2025-06-23 | End: 2025-06-30

## 2025-06-23 RX ORDER — DULOXETIN HYDROCHLORIDE 60 MG/1
60 CAPSULE, DELAYED RELEASE ORAL DAILY
Qty: 90 CAPSULE | Refills: 0 | Status: SHIPPED | OUTPATIENT
Start: 2025-06-23

## 2025-06-23 RX ORDER — GABAPENTIN 300 MG/1
300 CAPSULE ORAL 3 TIMES DAILY
Qty: 90 CAPSULE | Refills: 0 | Status: SHIPPED | OUTPATIENT
Start: 2025-06-23 | End: 2025-07-23

## 2025-06-23 RX ORDER — LOSARTAN POTASSIUM 50 MG/1
50 TABLET ORAL DAILY
Qty: 90 TABLET | Refills: 0 | Status: SHIPPED | OUTPATIENT
Start: 2025-06-23

## 2025-06-23 ASSESSMENT — PATIENT HEALTH QUESTIONNAIRE - PHQ9
4. FEELING TIRED OR HAVING LITTLE ENERGY: NEARLY EVERY DAY
5. POOR APPETITE OR OVEREATING: NOT AT ALL
SUM OF ALL RESPONSES TO PHQ QUESTIONS 1-9: 18
9. THOUGHTS THAT YOU WOULD BE BETTER OFF DEAD, OR OF HURTING YOURSELF: NOT AT ALL
SUM OF ALL RESPONSES TO PHQ QUESTIONS 1-9: 18
7. TROUBLE CONCENTRATING ON THINGS, SUCH AS READING THE NEWSPAPER OR WATCHING TELEVISION: NEARLY EVERY DAY
SUM OF ALL RESPONSES TO PHQ QUESTIONS 1-9: 18
2. FEELING DOWN, DEPRESSED OR HOPELESS: MORE THAN HALF THE DAYS
SUM OF ALL RESPONSES TO PHQ QUESTIONS 1-9: 18
10. IF YOU CHECKED OFF ANY PROBLEMS, HOW DIFFICULT HAVE THESE PROBLEMS MADE IT FOR YOU TO DO YOUR WORK, TAKE CARE OF THINGS AT HOME, OR GET ALONG WITH OTHER PEOPLE: VERY DIFFICULT
3. TROUBLE FALLING OR STAYING ASLEEP: NEARLY EVERY DAY
1. LITTLE INTEREST OR PLEASURE IN DOING THINGS: MORE THAN HALF THE DAYS
6. FEELING BAD ABOUT YOURSELF - OR THAT YOU ARE A FAILURE OR HAVE LET YOURSELF OR YOUR FAMILY DOWN: MORE THAN HALF THE DAYS
8. MOVING OR SPEAKING SO SLOWLY THAT OTHER PEOPLE COULD HAVE NOTICED. OR THE OPPOSITE, BEING SO FIGETY OR RESTLESS THAT YOU HAVE BEEN MOVING AROUND A LOT MORE THAN USUAL: NEARLY EVERY DAY

## 2025-06-23 NOTE — PROGRESS NOTES
Appearance: Normal appearance.   HENT:      Head: Normocephalic and atraumatic.   Eyes:      Extraocular Movements: Extraocular movements intact.      Conjunctiva/sclera: Conjunctivae normal.   Cardiovascular:      Rate and Rhythm: Regular rhythm.      Heart sounds: Normal heart sounds.   Pulmonary:      Effort: Pulmonary effort is normal. No respiratory distress.      Breath sounds: No wheezing.   Abdominal:      Palpations: Abdomen is soft.      Tenderness: There is no abdominal tenderness.   Musculoskeletal:         General: Tenderness (generalized pain in muscles and joints) present. Normal range of motion.      Cervical back: Neck supple.   Lymphadenopathy:      Cervical: No cervical adenopathy.   Skin:            Comments: Scattered papules with scabs in bilateral arms and legs, pruritic.    Neurological:      Mental Status: She is oriented to person, place, and time.      Cranial Nerves: No cranial nerve deficit.   Psychiatric:         Behavior: Behavior normal.      Comments: Appears anxious with dysphoric mood          ASSESSMENT/PLAN:   1. Prediabetes  - stable. Continue diet measures. pt received counseling on low carb diet, exercise and  weight management and pt verbalized understanding.   - Hemoglobin A1C; Future    2. Uncontrolled hypertension  - uncontrolled. Advised to take bp medications as prescribed and will resume losartan  - furosemide (LASIX) 20 MG tablet; Take 1 tablet by mouth daily  Dispense: 90 tablet; Refill: 0  - losartan (COZAAR) 50 MG tablet; Take 1 tablet by mouth daily  Dispense: 90 tablet; Refill: 0  - potassium chloride (KLOR-CON M) 20 MEQ extended release tablet; Take 1 tablet by mouth daily  Dispense: 90 tablet; Refill: 0  - Comprehensive Metabolic Panel; Future  - advised bp log and follow up in a month for bp check    3. Mixed hyperlipidemia  - stable. Continue diet measures and recheck lipid panel  - Lipid Panel; Future    4. Idiopathic peripheral neuropathy  - uncontrolled.

## 2025-06-25 ASSESSMENT — ENCOUNTER SYMPTOMS
SHORTNESS OF BREATH: 0
CHEST TIGHTNESS: 0
BLOOD IN STOOL: 0
ABDOMINAL PAIN: 0

## 2025-07-08 ENCOUNTER — TRANSCRIBE ORDERS (OUTPATIENT)
Dept: ADMINISTRATIVE | Age: 55
End: 2025-07-08

## 2025-07-08 DIAGNOSIS — J44.1 CHRONIC OBSTRUCTIVE PULMONARY DISEASE WITH ACUTE EXACERBATION (HCC): Primary | ICD-10-CM

## 2025-07-21 ENCOUNTER — OFFICE VISIT (OUTPATIENT)
Dept: FAMILY MEDICINE CLINIC | Age: 55
End: 2025-07-21
Payer: MEDICARE

## 2025-07-21 ENCOUNTER — HOSPITAL ENCOUNTER (OUTPATIENT)
Age: 55
Setting detail: SPECIMEN
Discharge: HOME | End: 2025-07-21

## 2025-07-21 VITALS
SYSTOLIC BLOOD PRESSURE: 190 MMHG | RESPIRATION RATE: 16 BRPM | HEART RATE: 88 BPM | OXYGEN SATURATION: 98 % | WEIGHT: 96.4 LBS | DIASTOLIC BLOOD PRESSURE: 110 MMHG | BODY MASS INDEX: 20.15 KG/M2 | TEMPERATURE: 98.2 F

## 2025-07-21 DIAGNOSIS — Z91.199 NONCOMPLIANCE: ICD-10-CM

## 2025-07-21 DIAGNOSIS — I10 ESSENTIAL HYPERTENSION: Primary | ICD-10-CM

## 2025-07-21 DIAGNOSIS — N39.41 URGENCY INCONTINENCE: ICD-10-CM

## 2025-07-21 LAB
BILIRUBIN, POC: ABNORMAL
BLOOD URINE, POC: ABNORMAL
CLARITY, POC: ABNORMAL
COLOR, POC: YELLOW
GLUCOSE URINE, POC: ABNORMAL MG/DL
KETONES, POC: ABNORMAL MG/DL
LEUKOCYTE EST, POC: ABNORMAL
NITRITE, POC: ABNORMAL
PH, POC: 5
PROTEIN, POC: ABNORMAL MG/DL
SPECIFIC GRAVITY, POC: 1.01
UROBILINOGEN, POC: ABNORMAL MG/DL

## 2025-07-21 PROCEDURE — 3077F SYST BP >= 140 MM HG: CPT | Performed by: NURSE PRACTITIONER

## 2025-07-21 PROCEDURE — 99213 OFFICE O/P EST LOW 20 MIN: CPT | Performed by: NURSE PRACTITIONER

## 2025-07-21 PROCEDURE — 3080F DIAST BP >= 90 MM HG: CPT | Performed by: NURSE PRACTITIONER

## 2025-07-21 PROCEDURE — 81002 URINALYSIS NONAUTO W/O SCOPE: CPT | Performed by: NURSE PRACTITIONER

## 2025-07-21 ASSESSMENT — ENCOUNTER SYMPTOMS
NAUSEA: 0
VOMITING: 0
SHORTNESS OF BREATH: 0
ABDOMINAL PAIN: 1
WHEEZING: 0

## 2025-07-21 ASSESSMENT — PATIENT HEALTH QUESTIONNAIRE - PHQ9
1. LITTLE INTEREST OR PLEASURE IN DOING THINGS: NOT AT ALL
10. IF YOU CHECKED OFF ANY PROBLEMS, HOW DIFFICULT HAVE THESE PROBLEMS MADE IT FOR YOU TO DO YOUR WORK, TAKE CARE OF THINGS AT HOME, OR GET ALONG WITH OTHER PEOPLE: NOT DIFFICULT AT ALL
4. FEELING TIRED OR HAVING LITTLE ENERGY: NOT AT ALL
SUM OF ALL RESPONSES TO PHQ QUESTIONS 1-9: 0
6. FEELING BAD ABOUT YOURSELF - OR THAT YOU ARE A FAILURE OR HAVE LET YOURSELF OR YOUR FAMILY DOWN: NOT AT ALL
5. POOR APPETITE OR OVEREATING: NOT AT ALL
2. FEELING DOWN, DEPRESSED OR HOPELESS: NOT AT ALL
SUM OF ALL RESPONSES TO PHQ QUESTIONS 1-9: 0
SUM OF ALL RESPONSES TO PHQ QUESTIONS 1-9: 0
9. THOUGHTS THAT YOU WOULD BE BETTER OFF DEAD, OR OF HURTING YOURSELF: NOT AT ALL
3. TROUBLE FALLING OR STAYING ASLEEP: NOT AT ALL
7. TROUBLE CONCENTRATING ON THINGS, SUCH AS READING THE NEWSPAPER OR WATCHING TELEVISION: NOT AT ALL
8. MOVING OR SPEAKING SO SLOWLY THAT OTHER PEOPLE COULD HAVE NOTICED. OR THE OPPOSITE, BEING SO FIGETY OR RESTLESS THAT YOU HAVE BEEN MOVING AROUND A LOT MORE THAN USUAL: NOT AT ALL
SUM OF ALL RESPONSES TO PHQ QUESTIONS 1-9: 0

## 2025-07-21 NOTE — PROGRESS NOTES
[facility]     Date of Visit:  2025  Patient Name: Anu Bourne   Patient :  1970     CHIEF COMPLAINT:     Anu Bourne is a 54 y.o. female who presents today for an general visit to be evaluated for the following condition(s):  Chief Complaint   Patient presents with    Urinary Urgency     With Urinary incontinence. The patient woke up with a wet bed on Friday and the urine continued to flow. She is currently wearing Depends    Eating ice     The patient is constantly eating ice. She states that she an eat a 10# bag in 2 days       HISTORY OF PRESENT ILLNESS:     Visit Information    Have you changed or started any medications since your last visit including any over-the-counter medicines, vitamins, or herbal medicines? no   Are you having any side effects from any of your medications? -  no  Have you stopped taking any of your medications? Is so, why? -  yes - the patient is not taking any medication due to nausea from the gastric bypass surgery    Have you seen any other physician or provider since your last visit? No  Have you had any other diagnostic tests since your last visit? No  Have you been seen in the emergency room and/or had an admission to a hospital since we last saw you? No  Have you had your routine dental cleaning in the past 6 months? no    Have you activated your [employer] account? If not, what are your barriers? Yes     Patient Care Team:  Katy Hopper APRN - NP as PCP - General (Family Nurse Practitioner)  Katy Hopper APRN - NP as PCP - Empaneled Provider  Riley Martínez MD as Consulting Physician (Gastroenterology)  Chuck Casillas MD as Surgeon (Cardiology)  Jesus Fallon DO as Consulting Physician (Hematology and Oncology)  Jhoana Nagy MD (Psychiatry)    Medical History Review  Past Medical, Family, and Social History reviewed and does contribute to the patient presenting condition    Health Maintenance   Topic Date Due    Hepatitis B vaccine (1 of 3 -

## 2025-07-22 LAB
MICROORGANISM SPEC CULT: NORMAL
SERVICE CMNT-IMP: NORMAL
SPECIMEN DESCRIPTION: NORMAL

## 2025-07-23 ENCOUNTER — HOSPITAL ENCOUNTER (OUTPATIENT)
Age: 55
Discharge: HOME | End: 2025-07-23
Payer: MEDICARE

## 2025-07-23 ENCOUNTER — HOSPITAL ENCOUNTER (OUTPATIENT)
Dept: PULMONOLOGY | Age: 55
Discharge: HOME | End: 2025-07-23
Payer: MEDICARE

## 2025-07-23 ENCOUNTER — HOSPITAL ENCOUNTER (OUTPATIENT)
Dept: WOMENS IMAGING | Age: 55
Discharge: HOME | End: 2025-07-25
Payer: MEDICARE

## 2025-07-23 VITALS — BODY MASS INDEX: 20.15 KG/M2 | WEIGHT: 96 LBS | HEIGHT: 58 IN

## 2025-07-23 DIAGNOSIS — Z12.31 SCREENING MAMMOGRAM FOR BREAST CANCER: ICD-10-CM

## 2025-07-23 DIAGNOSIS — E87.6 HYPOKALEMIA: Primary | ICD-10-CM

## 2025-07-23 DIAGNOSIS — I10 ESSENTIAL HYPERTENSION: ICD-10-CM

## 2025-07-23 DIAGNOSIS — Z78.0 POST-MENOPAUSAL: ICD-10-CM

## 2025-07-23 DIAGNOSIS — J44.1 CHRONIC OBSTRUCTIVE PULMONARY DISEASE WITH ACUTE EXACERBATION (HCC): ICD-10-CM

## 2025-07-23 DIAGNOSIS — R73.03 PREDIABETES: ICD-10-CM

## 2025-07-23 DIAGNOSIS — E78.2 MIXED HYPERLIPIDEMIA: ICD-10-CM

## 2025-07-23 DIAGNOSIS — N39.41 URGENCY INCONTINENCE: ICD-10-CM

## 2025-07-23 LAB
ALBUMIN SERPL-MCNC: 4.6 G/DL (ref 3.5–5.2)
ALP SERPL-CCNC: 96 U/L (ref 35–104)
ALT SERPL-CCNC: 16 U/L (ref 10–35)
ANION GAP SERPL CALCULATED.3IONS-SCNC: 13 MMOL/L (ref 9–16)
AST SERPL-CCNC: 30 U/L (ref 10–35)
BILIRUB SERPL-MCNC: 0.3 MG/DL (ref 0–1.2)
BUN SERPL-MCNC: 26 MG/DL (ref 6–20)
CALCIUM SERPL-MCNC: 9.9 MG/DL (ref 8.6–10.4)
CHLORIDE SERPL-SCNC: 103 MMOL/L (ref 98–107)
CHOLEST SERPL-MCNC: 214 MG/DL (ref 0–199)
CHOLESTEROL/HDL RATIO: 3.7
CO2 SERPL-SCNC: 24 MMOL/L (ref 20–31)
CREAT SERPL-MCNC: 0.9 MG/DL (ref 0.7–1.2)
DLCO %PRED: NORMAL
DLCO PRED: NORMAL
DLCO/VA %PRED: NORMAL
DLCO/VA PRED: NORMAL
DLCO/VA: NORMAL
DLCO: NORMAL
ERYTHROCYTE [DISTWIDTH] IN BLOOD BY AUTOMATED COUNT: 17.5 % (ref 11.5–14.9)
EST. AVERAGE GLUCOSE BLD GHB EST-MCNC: 114 MG/DL
EXPIRATORY TIME: NORMAL
FEF 25-75% %PRED-PRE: NORMAL
FEF 25-75% PRED: NORMAL
FEF 25-75-PRE: NORMAL
FEV1 %PRED-PRE: NORMAL
FEV1 PRED: NORMAL
FEV1/FVC %PRED-PRE: NORMAL
FEV1/FVC PRED: NORMAL
FEV1/FVC: NORMAL
FEV1: NORMAL
FVC %PRED-PRE: NORMAL
FVC PRED: NORMAL
FVC: NORMAL
GAW %PRED: NORMAL
GAW PRED: NORMAL
GAW: NORMAL
GFR, ESTIMATED: 75 ML/MIN/1.73M2
GLUCOSE SERPL-MCNC: 94 MG/DL (ref 74–99)
HBA1C MFR BLD: 5.6 % (ref 4–6)
HCT VFR BLD AUTO: 41.3 % (ref 36–46)
HDLC SERPL-MCNC: 58 MG/DL
HGB BLD-MCNC: 12.1 G/DL (ref 12–16)
IC PRE %PRED: NORMAL
IC PRED: NORMAL
IC: NORMAL
LDLC SERPL CALC-MCNC: 145 MG/DL (ref 0–100)
MCH RBC QN AUTO: 22.7 PG (ref 26–34)
MCHC RBC AUTO-ENTMCNC: 29.3 G/DL (ref 31–37)
MCV RBC AUTO: 77.5 FL (ref 80–100)
MVV %PRED-PRE: NORMAL
MVV PRED: NORMAL
MVV-PRE: NORMAL
NRBC BLD-RTO: 0 PER 100 WBC
PEF %PRED-PRE: NORMAL
PEF PRED: NORMAL
PEF-PRE: NORMAL
PLATELET # BLD AUTO: 286 K/UL (ref 150–450)
PMV BLD AUTO: 10.2 FL (ref 8–13.5)
POTASSIUM SERPL-SCNC: 3.5 MMOL/L (ref 3.7–5.3)
PROT SERPL-MCNC: 7.4 G/DL (ref 6.6–8.7)
RAW %PRED: NORMAL
RAW PRED: NORMAL
RAW: NORMAL
RBC # BLD AUTO: 5.33 M/UL (ref 3.95–5.11)
RV PRE %PRED: NORMAL
RV PRED: NORMAL
RV: NORMAL
SODIUM SERPL-SCNC: 140 MMOL/L (ref 136–145)
SVC %PRED: NORMAL
SVC PRED: NORMAL
SVC: NORMAL
TLC PRE %PRED: NORMAL
TLC PRED: NORMAL
TLC: NORMAL
TRIGL SERPL-MCNC: 54 MG/DL (ref 0–149)
VA %PRED: NORMAL
VA PRED: NORMAL
VA: NORMAL
VTG %PRED: NORMAL
VTG PRED: NORMAL
VTG: NORMAL
WBC OTHER # BLD: 6.3 K/UL (ref 3.5–11)

## 2025-07-23 PROCEDURE — 77080 DXA BONE DENSITY AXIAL: CPT

## 2025-07-23 PROCEDURE — 6370000000 HC RX 637 (ALT 250 FOR IP): Performed by: NURSE PRACTITIONER

## 2025-07-23 PROCEDURE — 77063 BREAST TOMOSYNTHESIS BI: CPT

## 2025-07-23 PROCEDURE — 80053 COMPREHEN METABOLIC PANEL: CPT

## 2025-07-23 PROCEDURE — 94729 DIFFUSING CAPACITY: CPT

## 2025-07-23 PROCEDURE — 94060 EVALUATION OF WHEEZING: CPT

## 2025-07-23 PROCEDURE — 80061 LIPID PANEL: CPT

## 2025-07-23 PROCEDURE — 94726 PLETHYSMOGRAPHY LUNG VOLUMES: CPT

## 2025-07-23 PROCEDURE — 85027 COMPLETE CBC AUTOMATED: CPT

## 2025-07-23 PROCEDURE — 83036 HEMOGLOBIN GLYCOSYLATED A1C: CPT

## 2025-07-23 PROCEDURE — 36415 COLL VENOUS BLD VENIPUNCTURE: CPT

## 2025-07-23 PROCEDURE — 94664 DEMO&/EVAL PT USE INHALER: CPT

## 2025-07-23 RX ORDER — ALBUTEROL SULFATE 90 UG/1
2 INHALANT RESPIRATORY (INHALATION) ONCE
Status: COMPLETED | OUTPATIENT
Start: 2025-07-23 | End: 2025-07-23

## 2025-07-23 RX ADMIN — ALBUTEROL SULFATE 2 PUFF: 90 AEROSOL, METERED RESPIRATORY (INHALATION) at 12:14

## 2025-07-23 NOTE — PROCEDURES
Interpretation:  Spirometry shows modest airflow obstruction    Lung volumes show airtrapping    DLCO normal    Overall stage I COPD with airtrapping consistent with chronic bronchitis.    Wiley Negron MD  Pulmonary and Critical Care  [phone] Perfect Serve  [phone] Cell

## 2025-07-24 DIAGNOSIS — N39.41 URGENCY INCONTINENCE: Primary | ICD-10-CM

## 2025-07-24 RX ORDER — IBUPROFEN 200 MG
1 CAPSULE ORAL 2 TIMES DAILY
Qty: 180 TABLET | Refills: 1 | Status: SHIPPED | OUTPATIENT
Start: 2025-07-24

## 2025-07-25 DIAGNOSIS — G60.9 IDIOPATHIC PERIPHERAL NEUROPATHY: ICD-10-CM

## 2025-07-25 RX ORDER — GABAPENTIN 300 MG/1
300 CAPSULE ORAL 3 TIMES DAILY
Qty: 90 CAPSULE | Refills: 0 | Status: SHIPPED | OUTPATIENT
Start: 2025-07-25 | End: 2025-08-24

## 2025-08-05 ENCOUNTER — TELEPHONE (OUTPATIENT)
Dept: FAMILY MEDICINE CLINIC | Age: 55
End: 2025-08-05

## 2025-08-05 ENCOUNTER — HOSPITAL ENCOUNTER (INPATIENT)
Age: 55
LOS: 3 days | Discharge: HOME | End: 2025-08-08
Attending: EMERGENCY MEDICINE
Payer: MEDICARE

## 2025-08-05 ENCOUNTER — APPOINTMENT (OUTPATIENT)
Dept: CT IMAGING | Age: 55
End: 2025-08-05
Payer: MEDICARE

## 2025-08-05 DIAGNOSIS — R10.84 GENERALIZED ABDOMINAL PAIN: Primary | ICD-10-CM

## 2025-08-05 DIAGNOSIS — R93.5 ABNORMAL CT OF THE ABDOMEN: ICD-10-CM

## 2025-08-05 DIAGNOSIS — E87.6 HYPOKALEMIA: ICD-10-CM

## 2025-08-05 DIAGNOSIS — R19.7 DIARRHEA, UNSPECIFIED TYPE: ICD-10-CM

## 2025-08-05 PROBLEM — K43.9 SUPRAUMBILICAL HERNIA: Status: ACTIVE | Noted: 2025-08-05

## 2025-08-05 LAB
ALBUMIN SERPL-MCNC: 3.1 G/DL (ref 3.5–5.2)
ALP SERPL-CCNC: 85 U/L (ref 35–104)
ALT SERPL-CCNC: 6 U/L (ref 10–35)
ANION GAP SERPL CALCULATED.3IONS-SCNC: 13 MMOL/L (ref 9–16)
AST SERPL-CCNC: 15 U/L (ref 10–35)
BASOPHILS # BLD: <0.03 K/UL (ref 0–0.2)
BASOPHILS NFR BLD: 0 % (ref 0–2)
BILIRUB DIRECT SERPL-MCNC: <0.1 MG/DL (ref 0–0.3)
BILIRUB INDIRECT SERPL-MCNC: ABNORMAL MG/DL (ref 0–1)
BILIRUB SERPL-MCNC: <0.2 MG/DL (ref 0–1.2)
BUN SERPL-MCNC: 18 MG/DL (ref 6–20)
C DIFF GDH + TOXINS A+B STL QL IA.RAPID: NEGATIVE
CA-I BLD-SCNC: 1.3 MMOL/L (ref 1.1–1.33)
CALCIUM SERPL-MCNC: 7.8 MG/DL (ref 8.6–10.4)
CHLORIDE SERPL-SCNC: 118 MMOL/L (ref 98–107)
CO2 SERPL-SCNC: 13 MMOL/L (ref 20–31)
CREAT SERPL-MCNC: 1.3 MG/DL (ref 0.7–1.2)
EOSINOPHIL # BLD: 0.17 K/UL (ref 0–0.44)
EOSINOPHILS RELATIVE PERCENT: 2 % (ref 0–4)
ERYTHROCYTE [DISTWIDTH] IN BLOOD BY AUTOMATED COUNT: 17.9 % (ref 11.5–14.9)
GFR, ESTIMATED: 48 ML/MIN/1.73M2
GLUCOSE SERPL-MCNC: 116 MG/DL (ref 74–99)
HCT VFR BLD AUTO: 36.3 % (ref 36–46)
HGB BLD-MCNC: 10.6 G/DL (ref 12–16)
IMM GRANULOCYTES # BLD AUTO: 0.03 K/UL (ref 0–0.3)
IMM GRANULOCYTES NFR BLD: 0 %
INR PPP: 1.2
LIPASE SERPL-CCNC: 16 U/L (ref 13–60)
LYMPHOCYTES NFR BLD: 1.05 K/UL (ref 1.1–3.7)
LYMPHOCYTES RELATIVE PERCENT: 11 % (ref 24–44)
MAGNESIUM SERPL-MCNC: 1.6 MG/DL (ref 1.6–2.6)
MCH RBC QN AUTO: 23.1 PG (ref 26–34)
MCHC RBC AUTO-ENTMCNC: 29.2 G/DL (ref 31–37)
MCV RBC AUTO: 79.3 FL (ref 80–100)
MONOCYTES NFR BLD: 0.72 K/UL (ref 0.1–1.2)
MONOCYTES NFR BLD: 7 % (ref 3–12)
NEUTROPHILS NFR BLD: 80 % (ref 36–66)
NEUTS SEG NFR BLD: 7.7 K/UL (ref 1.5–8.1)
NRBC BLD-RTO: 0 PER 100 WBC
PLATELET # BLD AUTO: 236 K/UL (ref 150–450)
PMV BLD AUTO: 10.6 FL (ref 8–13.5)
POTASSIUM SERPL-SCNC: 2 MMOL/L (ref 3.7–5.3)
POTASSIUM SERPL-SCNC: 3.1 MMOL/L (ref 3.7–5.3)
PROT SERPL-MCNC: 5.2 G/DL (ref 6.6–8.7)
PROTHROMBIN TIME: 16.1 SEC (ref 11.8–14.6)
RBC # BLD AUTO: 4.58 M/UL (ref 3.95–5.11)
SODIUM SERPL-SCNC: 144 MMOL/L (ref 136–145)
SPECIMEN DESCRIPTION: NORMAL
TSH SERPL DL<=0.05 MIU/L-ACNC: 1.62 UIU/ML (ref 0.27–4.2)
WBC OTHER # BLD: 9.7 K/UL (ref 3.5–11)

## 2025-08-05 PROCEDURE — 84132 ASSAY OF SERUM POTASSIUM: CPT

## 2025-08-05 PROCEDURE — 6360000002 HC RX W HCPCS

## 2025-08-05 PROCEDURE — 2580000003 HC RX 258: Performed by: EMERGENCY MEDICINE

## 2025-08-05 PROCEDURE — 87324 CLOSTRIDIUM AG IA: CPT

## 2025-08-05 PROCEDURE — 80076 HEPATIC FUNCTION PANEL: CPT

## 2025-08-05 PROCEDURE — 36415 COLL VENOUS BLD VENIPUNCTURE: CPT

## 2025-08-05 PROCEDURE — 83690 ASSAY OF LIPASE: CPT

## 2025-08-05 PROCEDURE — 99222 1ST HOSP IP/OBS MODERATE 55: CPT | Performed by: NURSE PRACTITIONER

## 2025-08-05 PROCEDURE — 87329 GIARDIA AG IA: CPT

## 2025-08-05 PROCEDURE — 99223 1ST HOSP IP/OBS HIGH 75: CPT

## 2025-08-05 PROCEDURE — 84443 ASSAY THYROID STIM HORMONE: CPT

## 2025-08-05 PROCEDURE — 87328 CRYPTOSPORIDIUM AG IA: CPT

## 2025-08-05 PROCEDURE — 87493 C DIFF AMPLIFIED PROBE: CPT

## 2025-08-05 PROCEDURE — 87449 NOS EACH ORGANISM AG IA: CPT

## 2025-08-05 PROCEDURE — 82330 ASSAY OF CALCIUM: CPT

## 2025-08-05 PROCEDURE — 85025 COMPLETE CBC W/AUTO DIFF WBC: CPT

## 2025-08-05 PROCEDURE — 96374 THER/PROPH/DIAG INJ IV PUSH: CPT

## 2025-08-05 PROCEDURE — 2500000003 HC RX 250 WO HCPCS

## 2025-08-05 PROCEDURE — 6370000000 HC RX 637 (ALT 250 FOR IP): Performed by: EMERGENCY MEDICINE

## 2025-08-05 PROCEDURE — 87425 ROTAVIRUS AG IA: CPT

## 2025-08-05 PROCEDURE — 96376 TX/PRO/DX INJ SAME DRUG ADON: CPT

## 2025-08-05 PROCEDURE — 2500000003 HC RX 250 WO HCPCS: Performed by: EMERGENCY MEDICINE

## 2025-08-05 PROCEDURE — 80048 BASIC METABOLIC PNL TOTAL CA: CPT

## 2025-08-05 PROCEDURE — 74177 CT ABD & PELVIS W/CONTRAST: CPT

## 2025-08-05 PROCEDURE — 70450 CT HEAD/BRAIN W/O DYE: CPT

## 2025-08-05 PROCEDURE — 2060000000 HC ICU INTERMEDIATE R&B

## 2025-08-05 PROCEDURE — 6360000002 HC RX W HCPCS: Performed by: EMERGENCY MEDICINE

## 2025-08-05 PROCEDURE — 99285 EMERGENCY DEPT VISIT HI MDM: CPT

## 2025-08-05 PROCEDURE — 85610 PROTHROMBIN TIME: CPT

## 2025-08-05 PROCEDURE — 93005 ELECTROCARDIOGRAM TRACING: CPT | Performed by: EMERGENCY MEDICINE

## 2025-08-05 PROCEDURE — 87506 IADNA-DNA/RNA PROBE TQ 6-11: CPT

## 2025-08-05 PROCEDURE — 6360000004 HC RX CONTRAST MEDICATION: Performed by: EMERGENCY MEDICINE

## 2025-08-05 PROCEDURE — 83735 ASSAY OF MAGNESIUM: CPT

## 2025-08-05 PROCEDURE — 96375 TX/PRO/DX INJ NEW DRUG ADDON: CPT

## 2025-08-05 PROCEDURE — 6370000000 HC RX 637 (ALT 250 FOR IP)

## 2025-08-05 RX ORDER — SODIUM CHLORIDE 0.9 % (FLUSH) 0.9 %
5-40 SYRINGE (ML) INJECTION PRN
Status: DISCONTINUED | OUTPATIENT
Start: 2025-08-05 | End: 2025-08-08 | Stop reason: HOSPADM

## 2025-08-05 RX ORDER — FOLIC ACID 1 MG/1
1000 TABLET ORAL DAILY
Status: DISCONTINUED | OUTPATIENT
Start: 2025-08-05 | End: 2025-08-05

## 2025-08-05 RX ORDER — MAGNESIUM SULFATE HEPTAHYDRATE 40 MG/ML
2000 INJECTION, SOLUTION INTRAVENOUS PRN
Status: DISCONTINUED | OUTPATIENT
Start: 2025-08-05 | End: 2025-08-08 | Stop reason: HOSPADM

## 2025-08-05 RX ORDER — CIPROFLOXACIN 2 MG/ML
400 INJECTION, SOLUTION INTRAVENOUS EVERY 12 HOURS
Status: DISCONTINUED | OUTPATIENT
Start: 2025-08-05 | End: 2025-08-08 | Stop reason: HOSPADM

## 2025-08-05 RX ORDER — METRONIDAZOLE 500 MG/100ML
500 INJECTION, SOLUTION INTRAVENOUS EVERY 8 HOURS
Status: DISCONTINUED | OUTPATIENT
Start: 2025-08-05 | End: 2025-08-08 | Stop reason: HOSPADM

## 2025-08-05 RX ORDER — SODIUM CHLORIDE 9 MG/ML
INJECTION, SOLUTION INTRAVENOUS PRN
Status: DISCONTINUED | OUTPATIENT
Start: 2025-08-05 | End: 2025-08-08 | Stop reason: HOSPADM

## 2025-08-05 RX ORDER — CARVEDILOL 6.25 MG/1
6.25 TABLET ORAL 2 TIMES DAILY WITH MEALS
Status: DISCONTINUED | OUTPATIENT
Start: 2025-08-05 | End: 2025-08-07

## 2025-08-05 RX ORDER — DULOXETIN HYDROCHLORIDE 60 MG/1
60 CAPSULE, DELAYED RELEASE ORAL DAILY
Status: DISCONTINUED | OUTPATIENT
Start: 2025-08-05 | End: 2025-08-08 | Stop reason: HOSPADM

## 2025-08-05 RX ORDER — SODIUM CHLORIDE 0.9 % (FLUSH) 0.9 %
10 SYRINGE (ML) INJECTION PRN
Status: DISCONTINUED | OUTPATIENT
Start: 2025-08-05 | End: 2025-08-08 | Stop reason: HOSPADM

## 2025-08-05 RX ORDER — FENTANYL CITRATE 0.05 MG/ML
25 INJECTION, SOLUTION INTRAMUSCULAR; INTRAVENOUS ONCE
Refills: 0 | Status: COMPLETED | OUTPATIENT
Start: 2025-08-05 | End: 2025-08-05

## 2025-08-05 RX ORDER — LABETALOL HYDROCHLORIDE 5 MG/ML
10 INJECTION, SOLUTION INTRAVENOUS ONCE
Status: COMPLETED | OUTPATIENT
Start: 2025-08-05 | End: 2025-08-05

## 2025-08-05 RX ORDER — LABETALOL HYDROCHLORIDE 5 MG/ML
10 INJECTION, SOLUTION INTRAVENOUS EVERY 4 HOURS PRN
Status: DISCONTINUED | OUTPATIENT
Start: 2025-08-05 | End: 2025-08-08 | Stop reason: HOSPADM

## 2025-08-05 RX ORDER — POTASSIUM CHLORIDE 1500 MG/1
40 TABLET, EXTENDED RELEASE ORAL PRN
Status: DISCONTINUED | OUTPATIENT
Start: 2025-08-05 | End: 2025-08-08 | Stop reason: HOSPADM

## 2025-08-05 RX ORDER — GABAPENTIN 300 MG/1
300 CAPSULE ORAL 3 TIMES DAILY
Status: DISCONTINUED | OUTPATIENT
Start: 2025-08-05 | End: 2025-08-08 | Stop reason: HOSPADM

## 2025-08-05 RX ORDER — POTASSIUM CHLORIDE 1500 MG/1
40 TABLET, EXTENDED RELEASE ORAL ONCE
Status: COMPLETED | OUTPATIENT
Start: 2025-08-05 | End: 2025-08-05

## 2025-08-05 RX ORDER — LOSARTAN POTASSIUM 50 MG/1
50 TABLET ORAL DAILY
Status: DISCONTINUED | OUTPATIENT
Start: 2025-08-06 | End: 2025-08-07

## 2025-08-05 RX ORDER — POTASSIUM CHLORIDE 7.45 MG/ML
10 INJECTION INTRAVENOUS PRN
Status: DISCONTINUED | OUTPATIENT
Start: 2025-08-05 | End: 2025-08-08 | Stop reason: HOSPADM

## 2025-08-05 RX ORDER — ONDANSETRON 2 MG/ML
4 INJECTION INTRAMUSCULAR; INTRAVENOUS EVERY 6 HOURS PRN
Status: DISCONTINUED | OUTPATIENT
Start: 2025-08-05 | End: 2025-08-08 | Stop reason: HOSPADM

## 2025-08-05 RX ORDER — SODIUM CHLORIDE 0.9 % (FLUSH) 0.9 %
5-40 SYRINGE (ML) INJECTION EVERY 12 HOURS SCHEDULED
Status: DISCONTINUED | OUTPATIENT
Start: 2025-08-05 | End: 2025-08-08 | Stop reason: HOSPADM

## 2025-08-05 RX ORDER — FENTANYL CITRATE 0.05 MG/ML
50 INJECTION, SOLUTION INTRAMUSCULAR; INTRAVENOUS EVERY 4 HOURS PRN
Status: DISCONTINUED | OUTPATIENT
Start: 2025-08-05 | End: 2025-08-08 | Stop reason: HOSPADM

## 2025-08-05 RX ORDER — SODIUM CHLORIDE AND POTASSIUM CHLORIDE 300; 900 MG/100ML; MG/100ML
INJECTION, SOLUTION INTRAVENOUS CONTINUOUS
Status: DISCONTINUED | OUTPATIENT
Start: 2025-08-05 | End: 2025-08-06

## 2025-08-05 RX ORDER — 0.9 % SODIUM CHLORIDE 0.9 %
100 INTRAVENOUS SOLUTION INTRAVENOUS ONCE
Status: COMPLETED | OUTPATIENT
Start: 2025-08-05 | End: 2025-08-05

## 2025-08-05 RX ORDER — 0.9 % SODIUM CHLORIDE 0.9 %
1000 INTRAVENOUS SOLUTION INTRAVENOUS ONCE
Status: COMPLETED | OUTPATIENT
Start: 2025-08-05 | End: 2025-08-05

## 2025-08-05 RX ORDER — ACETAMINOPHEN 325 MG/1
650 TABLET ORAL EVERY 6 HOURS PRN
Status: DISCONTINUED | OUTPATIENT
Start: 2025-08-05 | End: 2025-08-08 | Stop reason: HOSPADM

## 2025-08-05 RX ORDER — POTASSIUM CHLORIDE 7.45 MG/ML
10 INJECTION INTRAVENOUS
Status: DISPENSED | OUTPATIENT
Start: 2025-08-05 | End: 2025-08-05

## 2025-08-05 RX ORDER — IOPAMIDOL 755 MG/ML
75 INJECTION, SOLUTION INTRAVASCULAR
Status: COMPLETED | OUTPATIENT
Start: 2025-08-05 | End: 2025-08-05

## 2025-08-05 RX ORDER — ENOXAPARIN SODIUM 100 MG/ML
30 INJECTION SUBCUTANEOUS DAILY
Status: DISCONTINUED | OUTPATIENT
Start: 2025-08-06 | End: 2025-08-08 | Stop reason: HOSPADM

## 2025-08-05 RX ORDER — ONDANSETRON 4 MG/1
4 TABLET, ORALLY DISINTEGRATING ORAL EVERY 8 HOURS PRN
Status: DISCONTINUED | OUTPATIENT
Start: 2025-08-05 | End: 2025-08-08 | Stop reason: HOSPADM

## 2025-08-05 RX ORDER — ACETAMINOPHEN 650 MG/1
650 SUPPOSITORY RECTAL EVERY 6 HOURS PRN
Status: DISCONTINUED | OUTPATIENT
Start: 2025-08-05 | End: 2025-08-08 | Stop reason: HOSPADM

## 2025-08-05 RX ORDER — LOSARTAN POTASSIUM 50 MG/1
50 TABLET ORAL ONCE
Status: COMPLETED | OUTPATIENT
Start: 2025-08-05 | End: 2025-08-05

## 2025-08-05 RX ORDER — ALBUTEROL SULFATE 90 UG/1
2 INHALANT RESPIRATORY (INHALATION) 4 TIMES DAILY PRN
Status: DISCONTINUED | OUTPATIENT
Start: 2025-08-05 | End: 2025-08-08 | Stop reason: HOSPADM

## 2025-08-05 RX ORDER — POLYETHYLENE GLYCOL 3350 17 G/17G
17 POWDER, FOR SOLUTION ORAL DAILY PRN
Status: DISCONTINUED | OUTPATIENT
Start: 2025-08-05 | End: 2025-08-08 | Stop reason: HOSPADM

## 2025-08-05 RX ADMIN — METRONIDAZOLE 500 MG: 500 INJECTION, SOLUTION INTRAVENOUS at 18:54

## 2025-08-05 RX ADMIN — IOPAMIDOL 75 ML: 755 INJECTION, SOLUTION INTRAVENOUS at 15:10

## 2025-08-05 RX ADMIN — FENTANYL CITRATE 50 MCG: 0.05 INJECTION, SOLUTION INTRAMUSCULAR; INTRAVENOUS at 23:14

## 2025-08-05 RX ADMIN — LABETALOL HYDROCHLORIDE 10 MG: 5 INJECTION, SOLUTION INTRAVENOUS at 22:20

## 2025-08-05 RX ADMIN — FENTANYL CITRATE 25 MCG: 0.05 INJECTION, SOLUTION INTRAMUSCULAR; INTRAVENOUS at 16:38

## 2025-08-05 RX ADMIN — SODIUM CHLORIDE, PRESERVATIVE FREE 10 ML: 5 INJECTION INTRAVENOUS at 21:13

## 2025-08-05 RX ADMIN — LOSARTAN POTASSIUM 50 MG: 50 TABLET, FILM COATED ORAL at 14:42

## 2025-08-05 RX ADMIN — POTASSIUM CHLORIDE 40 MEQ: 1500 TABLET, EXTENDED RELEASE ORAL at 15:20

## 2025-08-05 RX ADMIN — LABETALOL HYDROCHLORIDE 10 MG: 5 INJECTION, SOLUTION INTRAVENOUS at 16:38

## 2025-08-05 RX ADMIN — SODIUM CHLORIDE 100 ML: 9 INJECTION, SOLUTION INTRAVENOUS at 15:10

## 2025-08-05 RX ADMIN — CIPROFLOXACIN 400 MG: 400 INJECTION, SOLUTION INTRAVENOUS at 21:13

## 2025-08-05 RX ADMIN — POTASSIUM CHLORIDE 10 MEQ: 7.46 INJECTION, SOLUTION INTRAVENOUS at 15:23

## 2025-08-05 RX ADMIN — POTASSIUM CHLORIDE AND SODIUM CHLORIDE: 900; 300 INJECTION, SOLUTION INTRAVENOUS at 17:12

## 2025-08-05 RX ADMIN — FENTANYL CITRATE 25 MCG: 0.05 INJECTION, SOLUTION INTRAMUSCULAR; INTRAVENOUS at 14:42

## 2025-08-05 RX ADMIN — CARVEDILOL 6.25 MG: 6.25 TABLET, FILM COATED ORAL at 19:24

## 2025-08-05 RX ADMIN — DULOXETINE 60 MG: 60 CAPSULE, DELAYED RELEASE ORAL at 18:46

## 2025-08-05 RX ADMIN — GABAPENTIN 300 MG: 300 CAPSULE ORAL at 21:07

## 2025-08-05 RX ADMIN — SODIUM CHLORIDE, PRESERVATIVE FREE 10 ML: 5 INJECTION INTRAVENOUS at 15:10

## 2025-08-05 RX ADMIN — SODIUM CHLORIDE 1000 ML: 0.9 INJECTION, SOLUTION INTRAVENOUS at 14:24

## 2025-08-05 RX ADMIN — POTASSIUM CHLORIDE 40 MEQ: 1500 TABLET, EXTENDED RELEASE ORAL at 21:07

## 2025-08-05 ASSESSMENT — PAIN DESCRIPTION - LOCATION
LOCATION: ABDOMEN

## 2025-08-05 ASSESSMENT — ENCOUNTER SYMPTOMS
SORE THROAT: 0
FACIAL SWELLING: 0
NAUSEA: 1
RHINORRHEA: 0
CHEST TIGHTNESS: 0
COUGH: 0
EYE PAIN: 0
VOICE CHANGE: 0
PHOTOPHOBIA: 0
BACK PAIN: 0
ABDOMINAL PAIN: 1
SHORTNESS OF BREATH: 0
VOMITING: 0
COLOR CHANGE: 0
DIARRHEA: 1
TROUBLE SWALLOWING: 0

## 2025-08-05 ASSESSMENT — LIFESTYLE VARIABLES
HOW MANY STANDARD DRINKS CONTAINING ALCOHOL DO YOU HAVE ON A TYPICAL DAY: PATIENT DOES NOT DRINK
HOW OFTEN DO YOU HAVE A DRINK CONTAINING ALCOHOL: NEVER

## 2025-08-05 ASSESSMENT — PAIN DESCRIPTION - ORIENTATION
ORIENTATION: LOWER

## 2025-08-05 ASSESSMENT — PAIN SCALES - GENERAL
PAINLEVEL_OUTOF10: 4
PAINLEVEL_OUTOF10: 2
PAINLEVEL_OUTOF10: 4
PAINLEVEL_OUTOF10: 7
PAINLEVEL_OUTOF10: 7
PAINLEVEL_OUTOF10: 8

## 2025-08-05 ASSESSMENT — PAIN - FUNCTIONAL ASSESSMENT: PAIN_FUNCTIONAL_ASSESSMENT: 0-10

## 2025-08-05 ASSESSMENT — PAIN DESCRIPTION - DESCRIPTORS
DESCRIPTORS: CRAMPING
DESCRIPTORS: CRAMPING

## 2025-08-06 LAB
ANION GAP SERPL CALCULATED.3IONS-SCNC: 12 MMOL/L (ref 9–16)
ANION GAP SERPL CALCULATED.3IONS-SCNC: 13 MMOL/L (ref 9–16)
ANION GAP SERPL CALCULATED.3IONS-SCNC: 8 MMOL/L (ref 9–16)
ANION GAP SERPL CALCULATED.3IONS-SCNC: 9 MMOL/L (ref 9–16)
BACTERIA URNS QL MICRO: ABNORMAL
BASOPHILS # BLD: 0.09 K/UL (ref 0–0.2)
BASOPHILS NFR BLD: 1 % (ref 0–2)
BILIRUB UR QL STRIP: NEGATIVE
BUN SERPL-MCNC: 11 MG/DL (ref 6–20)
C DIFFICILE TOXINS, PCR: NORMAL
C PARVUM AG STL QL IA: NEGATIVE
CALCIUM SERPL-MCNC: 8.3 MG/DL (ref 8.6–10.4)
CAMPYLOBACTER DNA SPEC NAA+PROBE: NORMAL
CASTS #/AREA URNS LPF: ABNORMAL /LPF
CHLORIDE SERPL-SCNC: 116 MMOL/L (ref 98–107)
CHLORIDE SERPL-SCNC: 120 MMOL/L (ref 98–107)
CHLORIDE SERPL-SCNC: 122 MMOL/L (ref 98–107)
CHLORIDE SERPL-SCNC: 123 MMOL/L (ref 98–107)
CLARITY UR: CLEAR
CO2 SERPL-SCNC: 10 MMOL/L (ref 20–31)
CO2 SERPL-SCNC: 11 MMOL/L (ref 20–31)
CO2 SERPL-SCNC: 13 MMOL/L (ref 20–31)
CO2 SERPL-SCNC: 13 MMOL/L (ref 20–31)
COLOR UR: YELLOW
CREAT SERPL-MCNC: 1 MG/DL (ref 0.7–1.2)
EKG ATRIAL RATE: 72 BPM
EKG P AXIS: 46 DEGREES
EKG P-R INTERVAL: 166 MS
EKG Q-T INTERVAL: 446 MS
EKG QRS DURATION: 98 MS
EKG QTC CALCULATION (BAZETT): 488 MS
EKG R AXIS: 22 DEGREES
EKG T AXIS: 113 DEGREES
EKG VENTRICULAR RATE: 72 BPM
EOSINOPHIL # BLD: 0.09 K/UL (ref 0–0.44)
EOSINOPHILS RELATIVE PERCENT: 1 % (ref 0–4)
EPI CELLS #/AREA URNS HPF: ABNORMAL /HPF
ERYTHROCYTE [DISTWIDTH] IN BLOOD BY AUTOMATED COUNT: 18.4 % (ref 11.5–14.9)
ETEC ELTA+ESTB GENES STL QL NAA+PROBE: NORMAL
G LAMBLIA AG STL QL IA: NEGATIVE
GFR, ESTIMATED: 66 ML/MIN/1.73M2
GLUCOSE SERPL-MCNC: 86 MG/DL (ref 74–99)
GLUCOSE UR STRIP-MCNC: NEGATIVE MG/DL
HCT VFR BLD AUTO: 37.5 % (ref 36–46)
HGB BLD-MCNC: 10.2 G/DL (ref 12–16)
HGB UR QL STRIP.AUTO: NEGATIVE
IMM GRANULOCYTES # BLD AUTO: 0 K/UL (ref 0–0.3)
IMM GRANULOCYTES NFR BLD: 0 %
KETONES UR STRIP-MCNC: NEGATIVE MG/DL
LEUKOCYTE ESTERASE UR QL STRIP: ABNORMAL
LYMPHOCYTES NFR BLD: 1.74 K/UL (ref 1.1–3.7)
LYMPHOCYTES RELATIVE PERCENT: 20 % (ref 24–44)
MCH RBC QN AUTO: 22.5 PG (ref 26–34)
MCHC RBC AUTO-ENTMCNC: 27.2 G/DL (ref 31–37)
MCV RBC AUTO: 82.8 FL (ref 80–100)
MONOCYTES NFR BLD: 0.96 K/UL (ref 0.1–1.2)
MONOCYTES NFR BLD: 11 % (ref 3–12)
MORPHOLOGY: ABNORMAL
NEUTROPHILS NFR BLD: 67 % (ref 36–66)
NEUTS SEG NFR BLD: 5.82 K/UL (ref 1.5–8.1)
NITRITE UR QL STRIP: NEGATIVE
NRBC BLD-RTO: 0 PER 100 WBC
P SHIGELLOIDES DNA STL QL NAA+PROBE: NORMAL
PH UR STRIP: 5 [PH] (ref 5–8)
PLATELET # BLD AUTO: 222 K/UL (ref 150–450)
PMV BLD AUTO: 11 FL (ref 8–13.5)
POTASSIUM SERPL-SCNC: 3.2 MMOL/L (ref 3.7–5.3)
POTASSIUM SERPL-SCNC: 3.5 MMOL/L (ref 3.7–5.3)
POTASSIUM SERPL-SCNC: 4.1 MMOL/L (ref 3.7–5.3)
POTASSIUM SERPL-SCNC: 4.1 MMOL/L (ref 3.7–5.3)
PROT UR STRIP-MCNC: ABNORMAL MG/DL
RBC # BLD AUTO: 4.53 M/UL (ref 3.95–5.11)
RBC #/AREA URNS HPF: ABNORMAL /HPF
ROTAVIRUS ANTIGEN: NEGATIVE
SALMONELLA DNA SPEC QL NAA+PROBE: NORMAL
SHIGA TOXIN STX GENE SPEC NAA+PROBE: NORMAL
SHIGELLA DNA SPEC QL NAA+PROBE: NORMAL
SODIUM SERPL-SCNC: 142 MMOL/L (ref 136–145)
SODIUM SERPL-SCNC: 142 MMOL/L (ref 136–145)
SODIUM SERPL-SCNC: 143 MMOL/L (ref 136–145)
SODIUM SERPL-SCNC: 143 MMOL/L (ref 136–145)
SOURCE, 60200063: NORMAL
SOURCE: NORMAL
SP GR UR STRIP: 1.02 (ref 1–1.03)
SPECIMEN DESCRIPTION: NORMAL
UROBILINOGEN UR STRIP-ACNC: NORMAL EU/DL (ref 0–1)
V CHOL+PARA RFBL+TRKH+TNAA STL QL NAA+PR: NORMAL
WBC #/AREA URNS HPF: ABNORMAL /HPF
WBC OTHER # BLD: 8.7 K/UL (ref 3.5–11)
Y ENTERO RECN STL QL NAA+PROBE: NORMAL

## 2025-08-06 PROCEDURE — 80048 BASIC METABOLIC PNL TOTAL CA: CPT

## 2025-08-06 PROCEDURE — 99233 SBSQ HOSP IP/OBS HIGH 50: CPT

## 2025-08-06 PROCEDURE — APPNB30 APP NON BILLABLE TIME 0-30 MINS: Performed by: NURSE PRACTITIONER

## 2025-08-06 PROCEDURE — 6370000000 HC RX 637 (ALT 250 FOR IP)

## 2025-08-06 PROCEDURE — 97166 OT EVAL MOD COMPLEX 45 MIN: CPT

## 2025-08-06 PROCEDURE — 81001 URINALYSIS AUTO W/SCOPE: CPT

## 2025-08-06 PROCEDURE — 2580000003 HC RX 258

## 2025-08-06 PROCEDURE — 2060000000 HC ICU INTERMEDIATE R&B

## 2025-08-06 PROCEDURE — 93010 ELECTROCARDIOGRAM REPORT: CPT | Performed by: INTERNAL MEDICINE

## 2025-08-06 PROCEDURE — 6370000000 HC RX 637 (ALT 250 FOR IP): Performed by: NURSE PRACTITIONER

## 2025-08-06 PROCEDURE — 97530 THERAPEUTIC ACTIVITIES: CPT

## 2025-08-06 PROCEDURE — 85025 COMPLETE CBC W/AUTO DIFF WBC: CPT

## 2025-08-06 PROCEDURE — 99223 1ST HOSP IP/OBS HIGH 75: CPT | Performed by: INTERNAL MEDICINE

## 2025-08-06 PROCEDURE — 36415 COLL VENOUS BLD VENIPUNCTURE: CPT

## 2025-08-06 PROCEDURE — 6360000002 HC RX W HCPCS

## 2025-08-06 PROCEDURE — 2500000003 HC RX 250 WO HCPCS

## 2025-08-06 PROCEDURE — 80051 ELECTROLYTE PANEL: CPT

## 2025-08-06 PROCEDURE — 99232 SBSQ HOSP IP/OBS MODERATE 35: CPT | Performed by: SURGERY

## 2025-08-06 RX ORDER — MAGNESIUM SULFATE HEPTAHYDRATE 40 MG/ML
2000 INJECTION, SOLUTION INTRAVENOUS ONCE
Status: COMPLETED | OUTPATIENT
Start: 2025-08-06 | End: 2025-08-07

## 2025-08-06 RX ORDER — CHOLESTYRAMINE LIGHT 4 G/5.7G
4 POWDER, FOR SUSPENSION ORAL 2 TIMES DAILY
Status: DISCONTINUED | OUTPATIENT
Start: 2025-08-06 | End: 2025-08-08 | Stop reason: HOSPADM

## 2025-08-06 RX ADMIN — CHOLESTYRAMINE 4 G: 4 POWDER, FOR SUSPENSION ORAL at 21:01

## 2025-08-06 RX ADMIN — SODIUM BICARBONATE: 84 INJECTION, SOLUTION INTRAVENOUS at 11:31

## 2025-08-06 RX ADMIN — GABAPENTIN 300 MG: 300 CAPSULE ORAL at 21:01

## 2025-08-06 RX ADMIN — ENOXAPARIN SODIUM 30 MG: 100 INJECTION SUBCUTANEOUS at 09:35

## 2025-08-06 RX ADMIN — CARVEDILOL 6.25 MG: 6.25 TABLET, FILM COATED ORAL at 09:32

## 2025-08-06 RX ADMIN — SODIUM CHLORIDE, PRESERVATIVE FREE 10 ML: 5 INJECTION INTRAVENOUS at 09:42

## 2025-08-06 RX ADMIN — CARVEDILOL 6.25 MG: 6.25 TABLET, FILM COATED ORAL at 18:07

## 2025-08-06 RX ADMIN — POTASSIUM CHLORIDE 40 MEQ: 1500 TABLET, EXTENDED RELEASE ORAL at 02:01

## 2025-08-06 RX ADMIN — CIPROFLOXACIN 400 MG: 400 INJECTION, SOLUTION INTRAVENOUS at 05:48

## 2025-08-06 RX ADMIN — POTASSIUM CHLORIDE AND SODIUM CHLORIDE: 900; 300 INJECTION, SOLUTION INTRAVENOUS at 09:30

## 2025-08-06 RX ADMIN — FENTANYL CITRATE 50 MCG: 0.05 INJECTION, SOLUTION INTRAMUSCULAR; INTRAVENOUS at 06:37

## 2025-08-06 RX ADMIN — METRONIDAZOLE 500 MG: 500 INJECTION, SOLUTION INTRAVENOUS at 10:08

## 2025-08-06 RX ADMIN — LOSARTAN POTASSIUM 50 MG: 50 TABLET, FILM COATED ORAL at 09:32

## 2025-08-06 RX ADMIN — FENTANYL CITRATE 50 MCG: 0.05 INJECTION, SOLUTION INTRAMUSCULAR; INTRAVENOUS at 09:38

## 2025-08-06 RX ADMIN — POTASSIUM CHLORIDE AND SODIUM CHLORIDE: 900; 300 INJECTION, SOLUTION INTRAVENOUS at 01:53

## 2025-08-06 RX ADMIN — GABAPENTIN 300 MG: 300 CAPSULE ORAL at 09:33

## 2025-08-06 RX ADMIN — GABAPENTIN 300 MG: 300 CAPSULE ORAL at 13:59

## 2025-08-06 RX ADMIN — CIPROFLOXACIN 400 MG: 400 INJECTION, SOLUTION INTRAVENOUS at 18:06

## 2025-08-06 RX ADMIN — DULOXETINE 60 MG: 60 CAPSULE, DELAYED RELEASE ORAL at 09:34

## 2025-08-06 RX ADMIN — METRONIDAZOLE 500 MG: 500 INJECTION, SOLUTION INTRAVENOUS at 01:08

## 2025-08-06 ASSESSMENT — PAIN SCALES - GENERAL
PAINLEVEL_OUTOF10: 7
PAINLEVEL_OUTOF10: 7
PAINLEVEL_OUTOF10: 4
PAINLEVEL_OUTOF10: 4
PAINLEVEL_OUTOF10: 0

## 2025-08-06 ASSESSMENT — ENCOUNTER SYMPTOMS
SORE THROAT: 0
RHINORRHEA: 0
SHORTNESS OF BREATH: 0
COUGH: 0

## 2025-08-06 ASSESSMENT — PAIN DESCRIPTION - LOCATION
LOCATION: ABDOMEN;BACK

## 2025-08-06 ASSESSMENT — PAIN DESCRIPTION - ORIENTATION: ORIENTATION: LOWER

## 2025-08-06 ASSESSMENT — PAIN DESCRIPTION - DESCRIPTORS: DESCRIPTORS: STABBING

## 2025-08-07 LAB
ANION GAP SERPL CALCULATED.3IONS-SCNC: 11 MMOL/L (ref 9–16)
ANION GAP SERPL CALCULATED.3IONS-SCNC: 11 MMOL/L (ref 9–16)
BASOPHILS # BLD: 0 K/UL (ref 0–0.2)
BASOPHILS NFR BLD: 0 % (ref 0–2)
BUN SERPL-MCNC: 6 MG/DL (ref 6–20)
CALCIUM SERPL-MCNC: 8.2 MG/DL (ref 8.6–10.4)
CHLORIDE SERPL-SCNC: 117 MMOL/L (ref 98–107)
CHLORIDE SERPL-SCNC: 118 MMOL/L (ref 98–107)
CO2 SERPL-SCNC: 13 MMOL/L (ref 20–31)
CO2 SERPL-SCNC: 15 MMOL/L (ref 20–31)
CREAT SERPL-MCNC: 1 MG/DL (ref 0.7–1.2)
EOSINOPHIL # BLD: 0.14 K/UL (ref 0–0.4)
EOSINOPHILS RELATIVE PERCENT: 2 % (ref 0–4)
ERYTHROCYTE [DISTWIDTH] IN BLOOD BY AUTOMATED COUNT: 18.8 % (ref 11.5–14.9)
GFR, ESTIMATED: 66 ML/MIN/1.73M2
GLUCOSE SERPL-MCNC: 103 MG/DL (ref 74–99)
HCT VFR BLD AUTO: 37.5 % (ref 36–46)
HGB BLD-MCNC: 10.8 G/DL (ref 12–16)
IMM GRANULOCYTES # BLD AUTO: 0 K/UL (ref 0–0.3)
IMM GRANULOCYTES NFR BLD: 0 %
LYMPHOCYTES NFR BLD: 1.56 K/UL (ref 1–4.8)
LYMPHOCYTES RELATIVE PERCENT: 23 % (ref 24–44)
MAGNESIUM SERPL-MCNC: 2.3 MG/DL (ref 1.6–2.6)
MCH RBC QN AUTO: 23 PG (ref 26–34)
MCHC RBC AUTO-ENTMCNC: 28.8 G/DL (ref 31–37)
MCV RBC AUTO: 79.8 FL (ref 80–100)
MONOCYTES NFR BLD: 0.41 K/UL (ref 0.1–1.3)
MONOCYTES NFR BLD: 6 % (ref 1–7)
MORPHOLOGY: ABNORMAL
MORPHOLOGY: ABNORMAL
NEUTROPHILS NFR BLD: 69 % (ref 36–66)
NEUTS SEG NFR BLD: 4.69 K/UL (ref 1.3–9.1)
NRBC BLD-RTO: 0 PER 100 WBC
PLATELET # BLD AUTO: 241 K/UL (ref 150–450)
PMV BLD AUTO: 11.5 FL (ref 8–13.5)
POTASSIUM SERPL-SCNC: 3.2 MMOL/L (ref 3.7–5.3)
POTASSIUM SERPL-SCNC: 3.5 MMOL/L (ref 3.7–5.3)
RBC # BLD AUTO: 4.7 M/UL (ref 3.95–5.11)
SODIUM SERPL-SCNC: 142 MMOL/L (ref 136–145)
SODIUM SERPL-SCNC: 143 MMOL/L (ref 136–145)
WBC OTHER # BLD: 6.8 K/UL (ref 3.5–11)

## 2025-08-07 PROCEDURE — 76937 US GUIDE VASCULAR ACCESS: CPT

## 2025-08-07 PROCEDURE — 2500000003 HC RX 250 WO HCPCS

## 2025-08-07 PROCEDURE — 85025 COMPLETE CBC W/AUTO DIFF WBC: CPT

## 2025-08-07 PROCEDURE — 36415 COLL VENOUS BLD VENIPUNCTURE: CPT

## 2025-08-07 PROCEDURE — 6370000000 HC RX 637 (ALT 250 FOR IP): Performed by: NURSE PRACTITIONER

## 2025-08-07 PROCEDURE — 6370000000 HC RX 637 (ALT 250 FOR IP)

## 2025-08-07 PROCEDURE — 05HC33Z INSERTION OF INFUSION DEVICE INTO LEFT BASILIC VEIN, PERCUTANEOUS APPROACH: ICD-10-PCS

## 2025-08-07 PROCEDURE — 83735 ASSAY OF MAGNESIUM: CPT

## 2025-08-07 PROCEDURE — 2060000000 HC ICU INTERMEDIATE R&B

## 2025-08-07 PROCEDURE — 80048 BASIC METABOLIC PNL TOTAL CA: CPT

## 2025-08-07 PROCEDURE — 97530 THERAPEUTIC ACTIVITIES: CPT

## 2025-08-07 PROCEDURE — 2580000003 HC RX 258

## 2025-08-07 PROCEDURE — 6370000000 HC RX 637 (ALT 250 FOR IP): Performed by: SPECIALIST

## 2025-08-07 PROCEDURE — 36410 VNPNXR 3YR/> PHY/QHP DX/THER: CPT

## 2025-08-07 PROCEDURE — 97161 PT EVAL LOW COMPLEX 20 MIN: CPT

## 2025-08-07 PROCEDURE — 6360000002 HC RX W HCPCS

## 2025-08-07 PROCEDURE — C1751 CATH, INF, PER/CENT/MIDLINE: HCPCS

## 2025-08-07 PROCEDURE — 99233 SBSQ HOSP IP/OBS HIGH 50: CPT

## 2025-08-07 PROCEDURE — 6360000002 HC RX W HCPCS: Performed by: SPECIALIST

## 2025-08-07 RX ORDER — SODIUM CHLORIDE 9 MG/ML
INJECTION, SOLUTION INTRAVENOUS PRN
Status: DISCONTINUED | OUTPATIENT
Start: 2025-08-07 | End: 2025-08-08 | Stop reason: HOSPADM

## 2025-08-07 RX ORDER — SODIUM CHLORIDE 0.9 % (FLUSH) 0.9 %
5-40 SYRINGE (ML) INJECTION PRN
Status: DISCONTINUED | OUTPATIENT
Start: 2025-08-07 | End: 2025-08-08 | Stop reason: HOSPADM

## 2025-08-07 RX ORDER — HYDRALAZINE HYDROCHLORIDE 20 MG/ML
10 INJECTION INTRAMUSCULAR; INTRAVENOUS EVERY 4 HOURS PRN
Status: DISCONTINUED | OUTPATIENT
Start: 2025-08-07 | End: 2025-08-08 | Stop reason: HOSPADM

## 2025-08-07 RX ORDER — SODIUM BICARBONATE 650 MG/1
650 TABLET ORAL 3 TIMES DAILY
Status: DISCONTINUED | OUTPATIENT
Start: 2025-08-07 | End: 2025-08-08 | Stop reason: HOSPADM

## 2025-08-07 RX ORDER — LOSARTAN POTASSIUM 50 MG/1
50 TABLET ORAL ONCE
Status: COMPLETED | OUTPATIENT
Start: 2025-08-07 | End: 2025-08-07

## 2025-08-07 RX ORDER — LOSARTAN POTASSIUM 100 MG/1
100 TABLET ORAL DAILY
Status: DISCONTINUED | OUTPATIENT
Start: 2025-08-08 | End: 2025-08-08 | Stop reason: HOSPADM

## 2025-08-07 RX ORDER — LIDOCAINE HYDROCHLORIDE 10 MG/ML
50 INJECTION, SOLUTION EPIDURAL; INFILTRATION; INTRACAUDAL; PERINEURAL ONCE
Status: DISCONTINUED | OUTPATIENT
Start: 2025-08-07 | End: 2025-08-08 | Stop reason: HOSPADM

## 2025-08-07 RX ORDER — SODIUM CHLORIDE 0.9 % (FLUSH) 0.9 %
5-40 SYRINGE (ML) INJECTION EVERY 12 HOURS SCHEDULED
Status: DISCONTINUED | OUTPATIENT
Start: 2025-08-07 | End: 2025-08-08 | Stop reason: HOSPADM

## 2025-08-07 RX ORDER — LOSARTAN POTASSIUM 50 MG/1
50 TABLET ORAL DAILY
Status: DISCONTINUED | OUTPATIENT
Start: 2025-08-07 | End: 2025-08-07

## 2025-08-07 RX ORDER — AMLODIPINE BESYLATE 5 MG/1
5 TABLET ORAL DAILY
Status: DISCONTINUED | OUTPATIENT
Start: 2025-08-07 | End: 2025-08-08 | Stop reason: HOSPADM

## 2025-08-07 RX ORDER — CARVEDILOL 12.5 MG/1
12.5 TABLET ORAL 2 TIMES DAILY WITH MEALS
Status: DISCONTINUED | OUTPATIENT
Start: 2025-08-07 | End: 2025-08-08 | Stop reason: HOSPADM

## 2025-08-07 RX ADMIN — SODIUM BICARBONATE 650 MG: 650 TABLET ORAL at 13:38

## 2025-08-07 RX ADMIN — LABETALOL HYDROCHLORIDE 10 MG: 5 INJECTION, SOLUTION INTRAVENOUS at 01:05

## 2025-08-07 RX ADMIN — GABAPENTIN 300 MG: 300 CAPSULE ORAL at 07:26

## 2025-08-07 RX ADMIN — GABAPENTIN 300 MG: 300 CAPSULE ORAL at 13:38

## 2025-08-07 RX ADMIN — METRONIDAZOLE 500 MG: 500 INJECTION, SOLUTION INTRAVENOUS at 10:30

## 2025-08-07 RX ADMIN — AMLODIPINE BESYLATE 5 MG: 5 TABLET ORAL at 11:43

## 2025-08-07 RX ADMIN — POTASSIUM BICARBONATE 25 MEQ: 978 TABLET, EFFERVESCENT ORAL at 15:16

## 2025-08-07 RX ADMIN — ONDANSETRON 4 MG: 4 TABLET, ORALLY DISINTEGRATING ORAL at 00:46

## 2025-08-07 RX ADMIN — CARVEDILOL 6.25 MG: 6.25 TABLET, FILM COATED ORAL at 07:27

## 2025-08-07 RX ADMIN — FENTANYL CITRATE 50 MCG: 0.05 INJECTION, SOLUTION INTRAMUSCULAR; INTRAVENOUS at 19:57

## 2025-08-07 RX ADMIN — METRONIDAZOLE 500 MG: 500 INJECTION, SOLUTION INTRAVENOUS at 17:58

## 2025-08-07 RX ADMIN — GABAPENTIN 300 MG: 300 CAPSULE ORAL at 21:27

## 2025-08-07 RX ADMIN — FENTANYL CITRATE 50 MCG: 0.05 INJECTION, SOLUTION INTRAMUSCULAR; INTRAVENOUS at 01:06

## 2025-08-07 RX ADMIN — FENTANYL CITRATE 50 MCG: 0.05 INJECTION, SOLUTION INTRAMUSCULAR; INTRAVENOUS at 12:40

## 2025-08-07 RX ADMIN — SODIUM BICARBONATE 650 MG: 650 TABLET ORAL at 21:27

## 2025-08-07 RX ADMIN — POTASSIUM BICARBONATE 25 MEQ: 978 TABLET, EFFERVESCENT ORAL at 21:27

## 2025-08-07 RX ADMIN — HYDRALAZINE HYDROCHLORIDE 10 MG: 20 INJECTION INTRAMUSCULAR; INTRAVENOUS at 16:22

## 2025-08-07 RX ADMIN — POTASSIUM CHLORIDE 40 MEQ: 1500 TABLET, EXTENDED RELEASE ORAL at 01:05

## 2025-08-07 RX ADMIN — PSYLLIUM HUSK 1 PACKET: 3.4 POWDER ORAL at 09:54

## 2025-08-07 RX ADMIN — DULOXETINE 60 MG: 60 CAPSULE, DELAYED RELEASE ORAL at 07:27

## 2025-08-07 RX ADMIN — LOSARTAN POTASSIUM 50 MG: 50 TABLET, FILM COATED ORAL at 07:27

## 2025-08-07 RX ADMIN — CIPROFLOXACIN 400 MG: 400 INJECTION, SOLUTION INTRAVENOUS at 21:28

## 2025-08-07 RX ADMIN — POTASSIUM CHLORIDE 40 MEQ: 1500 TABLET, EXTENDED RELEASE ORAL at 10:30

## 2025-08-07 RX ADMIN — HYDRALAZINE HYDROCHLORIDE 10 MG: 20 INJECTION INTRAMUSCULAR; INTRAVENOUS at 09:17

## 2025-08-07 RX ADMIN — CIPROFLOXACIN 400 MG: 400 INJECTION, SOLUTION INTRAVENOUS at 09:20

## 2025-08-07 RX ADMIN — CHOLESTYRAMINE 4 G: 4 POWDER, FOR SUSPENSION ORAL at 19:57

## 2025-08-07 RX ADMIN — SODIUM BICARBONATE: 84 INJECTION, SOLUTION INTRAVENOUS at 10:37

## 2025-08-07 RX ADMIN — HYDRALAZINE HYDROCHLORIDE 10 MG: 20 INJECTION INTRAMUSCULAR; INTRAVENOUS at 23:28

## 2025-08-07 RX ADMIN — CARVEDILOL 12.5 MG: 12.5 TABLET, FILM COATED ORAL at 16:22

## 2025-08-07 RX ADMIN — CHOLESTYRAMINE 4 G: 4 POWDER, FOR SUSPENSION ORAL at 07:27

## 2025-08-07 RX ADMIN — LOSARTAN POTASSIUM 50 MG: 50 TABLET, FILM COATED ORAL at 09:17

## 2025-08-07 RX ADMIN — METRONIDAZOLE 500 MG: 500 INJECTION, SOLUTION INTRAVENOUS at 02:11

## 2025-08-07 RX ADMIN — POTASSIUM BICARBONATE 25 MEQ: 978 TABLET, EFFERVESCENT ORAL at 11:46

## 2025-08-07 RX ADMIN — FENTANYL CITRATE 50 MCG: 0.05 INJECTION, SOLUTION INTRAMUSCULAR; INTRAVENOUS at 06:15

## 2025-08-07 RX ADMIN — LABETALOL HYDROCHLORIDE 10 MG: 5 INJECTION, SOLUTION INTRAVENOUS at 07:25

## 2025-08-07 RX ADMIN — MAGNESIUM SULFATE HEPTAHYDRATE 2000 MG: 40 INJECTION, SOLUTION INTRAVENOUS at 02:21

## 2025-08-07 RX ADMIN — SODIUM CHLORIDE, PRESERVATIVE FREE 10 ML: 5 INJECTION INTRAVENOUS at 07:26

## 2025-08-07 RX ADMIN — ENOXAPARIN SODIUM 30 MG: 100 INJECTION SUBCUTANEOUS at 07:25

## 2025-08-07 ASSESSMENT — PAIN SCALES - WONG BAKER: WONGBAKER_NUMERICALRESPONSE: NO HURT

## 2025-08-07 ASSESSMENT — PAIN SCALES - GENERAL
PAINLEVEL_OUTOF10: 7
PAINLEVEL_OUTOF10: 7
PAINLEVEL_OUTOF10: 4
PAINLEVEL_OUTOF10: 7
PAINLEVEL_OUTOF10: 5
PAINLEVEL_OUTOF10: 7

## 2025-08-07 ASSESSMENT — PAIN DESCRIPTION - FREQUENCY: FREQUENCY: CONTINUOUS

## 2025-08-07 ASSESSMENT — PAIN DESCRIPTION - LOCATION
LOCATION: ABDOMEN;BACK
LOCATION: ABDOMEN;BACK
LOCATION: OTHER (COMMENT)

## 2025-08-07 ASSESSMENT — PAIN DESCRIPTION - ONSET: ONSET: ON-GOING

## 2025-08-07 ASSESSMENT — PAIN DESCRIPTION - PAIN TYPE: TYPE: CHRONIC PAIN

## 2025-08-07 ASSESSMENT — PAIN DESCRIPTION - DESCRIPTORS: DESCRIPTORS: ACHING

## 2025-08-07 ASSESSMENT — PAIN - FUNCTIONAL ASSESSMENT: PAIN_FUNCTIONAL_ASSESSMENT: ACTIVITIES ARE NOT PREVENTED

## 2025-08-08 VITALS
TEMPERATURE: 98.6 F | BODY MASS INDEX: 19.99 KG/M2 | HEIGHT: 58 IN | WEIGHT: 95.24 LBS | OXYGEN SATURATION: 95 % | RESPIRATION RATE: 16 BRPM | SYSTOLIC BLOOD PRESSURE: 150 MMHG | DIASTOLIC BLOOD PRESSURE: 74 MMHG | HEART RATE: 78 BPM

## 2025-08-08 LAB
ANION GAP SERPL CALCULATED.3IONS-SCNC: 11 MMOL/L (ref 9–16)
BASOPHILS # BLD: <0.03 K/UL (ref 0–0.2)
BASOPHILS NFR BLD: 0 % (ref 0–2)
BUN SERPL-MCNC: 7 MG/DL (ref 6–20)
CALCIUM SERPL-MCNC: 8.1 MG/DL (ref 8.6–10.4)
CHLORIDE SERPL-SCNC: 107 MMOL/L (ref 98–107)
CO2 SERPL-SCNC: 20 MMOL/L (ref 20–31)
CREAT SERPL-MCNC: 0.8 MG/DL (ref 0.7–1.2)
EOSINOPHIL # BLD: 0.11 K/UL (ref 0–0.44)
EOSINOPHILS RELATIVE PERCENT: 1 % (ref 0–4)
ERYTHROCYTE [DISTWIDTH] IN BLOOD BY AUTOMATED COUNT: 19.2 % (ref 11.5–14.9)
GFR, ESTIMATED: 86 ML/MIN/1.73M2
GLUCOSE SERPL-MCNC: 84 MG/DL (ref 74–99)
HCT VFR BLD AUTO: 38.3 % (ref 36–46)
HGB BLD-MCNC: 11.3 G/DL (ref 12–16)
IMM GRANULOCYTES # BLD AUTO: 0.04 K/UL (ref 0–0.3)
IMM GRANULOCYTES NFR BLD: 1 %
LYMPHOCYTES NFR BLD: 1.34 K/UL (ref 1.1–3.7)
LYMPHOCYTES RELATIVE PERCENT: 17 % (ref 24–44)
MCH RBC QN AUTO: 22.6 PG (ref 26–34)
MCHC RBC AUTO-ENTMCNC: 29.5 G/DL (ref 31–37)
MCV RBC AUTO: 76.6 FL (ref 80–100)
MONOCYTES NFR BLD: 0.66 K/UL (ref 0.1–1.2)
MONOCYTES NFR BLD: 8 % (ref 3–12)
NEUTROPHILS NFR BLD: 73 % (ref 36–66)
NEUTS SEG NFR BLD: 5.96 K/UL (ref 1.5–8.1)
NRBC BLD-RTO: 0 PER 100 WBC
PLATELET # BLD AUTO: 251 K/UL (ref 150–450)
PMV BLD AUTO: 11.1 FL (ref 8–13.5)
POTASSIUM SERPL-SCNC: 3.6 MMOL/L (ref 3.7–5.3)
RBC # BLD AUTO: 5 M/UL (ref 3.95–5.11)
SODIUM SERPL-SCNC: 138 MMOL/L (ref 136–145)
WBC OTHER # BLD: 8.1 K/UL (ref 3.5–11)

## 2025-08-08 PROCEDURE — 6370000000 HC RX 637 (ALT 250 FOR IP): Performed by: NURSE PRACTITIONER

## 2025-08-08 PROCEDURE — 6360000002 HC RX W HCPCS

## 2025-08-08 PROCEDURE — 97535 SELF CARE MNGMENT TRAINING: CPT

## 2025-08-08 PROCEDURE — 2500000003 HC RX 250 WO HCPCS: Performed by: SPECIALIST

## 2025-08-08 PROCEDURE — 36415 COLL VENOUS BLD VENIPUNCTURE: CPT

## 2025-08-08 PROCEDURE — 6370000000 HC RX 637 (ALT 250 FOR IP)

## 2025-08-08 PROCEDURE — 2500000003 HC RX 250 WO HCPCS

## 2025-08-08 PROCEDURE — 85025 COMPLETE CBC W/AUTO DIFF WBC: CPT

## 2025-08-08 PROCEDURE — 80048 BASIC METABOLIC PNL TOTAL CA: CPT

## 2025-08-08 PROCEDURE — 6370000000 HC RX 637 (ALT 250 FOR IP): Performed by: SPECIALIST

## 2025-08-08 PROCEDURE — 2580000003 HC RX 258: Performed by: SPECIALIST

## 2025-08-08 PROCEDURE — 97530 THERAPEUTIC ACTIVITIES: CPT

## 2025-08-08 RX ORDER — METRONIDAZOLE 500 MG/1
500 TABLET ORAL 3 TIMES DAILY
Qty: 15 TABLET | Refills: 0 | Status: SHIPPED | OUTPATIENT
Start: 2025-08-08 | End: 2025-08-14

## 2025-08-08 RX ORDER — CARVEDILOL 12.5 MG/1
12.5 TABLET ORAL 2 TIMES DAILY WITH MEALS
Qty: 60 TABLET | Refills: 3 | Status: SHIPPED | OUTPATIENT
Start: 2025-08-08

## 2025-08-08 RX ORDER — CIPROFLOXACIN 500 MG/1
500 TABLET, FILM COATED ORAL 2 TIMES DAILY
Qty: 10 TABLET | Refills: 0 | Status: SHIPPED | OUTPATIENT
Start: 2025-08-08 | End: 2025-08-14

## 2025-08-08 RX ORDER — CHOLESTYRAMINE LIGHT 4 G/5.7G
4 POWDER, FOR SUSPENSION ORAL 2 TIMES DAILY
Qty: 60 PACKET | Refills: 3 | Status: SHIPPED | OUTPATIENT
Start: 2025-08-08

## 2025-08-08 RX ORDER — LOSARTAN POTASSIUM 100 MG/1
100 TABLET ORAL DAILY
Qty: 30 TABLET | Refills: 3 | Status: SHIPPED | OUTPATIENT
Start: 2025-08-09

## 2025-08-08 RX ADMIN — CIPROFLOXACIN 400 MG: 400 INJECTION, SOLUTION INTRAVENOUS at 12:29

## 2025-08-08 RX ADMIN — SODIUM BICARBONATE: 84 INJECTION, SOLUTION INTRAVENOUS at 02:05

## 2025-08-08 RX ADMIN — SODIUM CHLORIDE, PRESERVATIVE FREE 10 ML: 5 INJECTION INTRAVENOUS at 07:52

## 2025-08-08 RX ADMIN — PSYLLIUM HUSK 1 PACKET: 3.4 POWDER ORAL at 07:50

## 2025-08-08 RX ADMIN — HYDRALAZINE HYDROCHLORIDE 10 MG: 20 INJECTION INTRAMUSCULAR; INTRAVENOUS at 04:19

## 2025-08-08 RX ADMIN — AMLODIPINE BESYLATE 5 MG: 5 TABLET ORAL at 07:51

## 2025-08-08 RX ADMIN — DULOXETINE 60 MG: 60 CAPSULE, DELAYED RELEASE ORAL at 07:51

## 2025-08-08 RX ADMIN — LABETALOL HYDROCHLORIDE 10 MG: 5 INJECTION, SOLUTION INTRAVENOUS at 01:08

## 2025-08-08 RX ADMIN — LOSARTAN POTASSIUM 100 MG: 100 TABLET, FILM COATED ORAL at 07:51

## 2025-08-08 RX ADMIN — FENTANYL CITRATE 50 MCG: 0.05 INJECTION, SOLUTION INTRAMUSCULAR; INTRAVENOUS at 12:24

## 2025-08-08 RX ADMIN — GABAPENTIN 300 MG: 300 CAPSULE ORAL at 07:51

## 2025-08-08 RX ADMIN — ENOXAPARIN SODIUM 30 MG: 100 INJECTION SUBCUTANEOUS at 07:50

## 2025-08-08 RX ADMIN — FENTANYL CITRATE 50 MCG: 0.05 INJECTION, SOLUTION INTRAMUSCULAR; INTRAVENOUS at 00:38

## 2025-08-08 RX ADMIN — FENTANYL CITRATE 50 MCG: 0.05 INJECTION, SOLUTION INTRAMUSCULAR; INTRAVENOUS at 06:11

## 2025-08-08 RX ADMIN — HYDRALAZINE HYDROCHLORIDE 10 MG: 20 INJECTION INTRAMUSCULAR; INTRAVENOUS at 07:51

## 2025-08-08 RX ADMIN — POTASSIUM BICARBONATE 25 MEQ: 978 TABLET, EFFERVESCENT ORAL at 12:30

## 2025-08-08 RX ADMIN — SODIUM CHLORIDE, PRESERVATIVE FREE 10 ML: 5 INJECTION INTRAVENOUS at 07:53

## 2025-08-08 RX ADMIN — GABAPENTIN 300 MG: 300 CAPSULE ORAL at 12:24

## 2025-08-08 RX ADMIN — CHOLESTYRAMINE 4 G: 4 POWDER, FOR SUSPENSION ORAL at 07:50

## 2025-08-08 RX ADMIN — SODIUM BICARBONATE 650 MG: 650 TABLET ORAL at 07:51

## 2025-08-08 RX ADMIN — SODIUM BICARBONATE 650 MG: 650 TABLET ORAL at 12:24

## 2025-08-08 RX ADMIN — CARVEDILOL 12.5 MG: 12.5 TABLET, FILM COATED ORAL at 07:51

## 2025-08-08 RX ADMIN — METRONIDAZOLE 500 MG: 500 INJECTION, SOLUTION INTRAVENOUS at 02:04

## 2025-08-08 ASSESSMENT — PAIN SCALES - WONG BAKER: WONGBAKER_NUMERICALRESPONSE: NO HURT

## 2025-08-08 ASSESSMENT — PAIN SCALES - GENERAL
PAINLEVEL_OUTOF10: 5
PAINLEVEL_OUTOF10: 7
PAINLEVEL_OUTOF10: 7
PAINLEVEL_OUTOF10: 8

## 2025-08-08 ASSESSMENT — PAIN DESCRIPTION - ONSET
ONSET: ON-GOING
ONSET: ON-GOING

## 2025-08-08 ASSESSMENT — PAIN DESCRIPTION - DESCRIPTORS
DESCRIPTORS: ACHING
DESCRIPTORS: ACHING

## 2025-08-08 ASSESSMENT — PAIN DESCRIPTION - ORIENTATION
ORIENTATION: LOWER
ORIENTATION: LOWER

## 2025-08-08 ASSESSMENT — PAIN - FUNCTIONAL ASSESSMENT
PAIN_FUNCTIONAL_ASSESSMENT: ACTIVITIES ARE NOT PREVENTED
PAIN_FUNCTIONAL_ASSESSMENT: ACTIVITIES ARE NOT PREVENTED

## 2025-08-08 ASSESSMENT — PAIN DESCRIPTION - LOCATION
LOCATION: BACK
LOCATION: BACK

## 2025-08-08 ASSESSMENT — PAIN DESCRIPTION - PAIN TYPE
TYPE: CHRONIC PAIN
TYPE: CHRONIC PAIN

## 2025-08-08 ASSESSMENT — PAIN DESCRIPTION - FREQUENCY
FREQUENCY: CONTINUOUS
FREQUENCY: CONTINUOUS

## 2025-08-11 ENCOUNTER — TELEPHONE (OUTPATIENT)
Dept: FAMILY MEDICINE CLINIC | Age: 55
End: 2025-08-11

## 2025-08-14 ENCOUNTER — OFFICE VISIT (OUTPATIENT)
Dept: FAMILY MEDICINE CLINIC | Age: 55
End: 2025-08-14

## 2025-08-14 VITALS
OXYGEN SATURATION: 96 % | BODY MASS INDEX: 20.36 KG/M2 | TEMPERATURE: 98.1 F | RESPIRATION RATE: 16 BRPM | HEART RATE: 76 BPM | DIASTOLIC BLOOD PRESSURE: 80 MMHG | WEIGHT: 97.4 LBS | SYSTOLIC BLOOD PRESSURE: 136 MMHG

## 2025-08-14 DIAGNOSIS — Z09 HOSPITAL DISCHARGE FOLLOW-UP: ICD-10-CM

## 2025-08-14 DIAGNOSIS — R19.7 DIARRHEA, UNSPECIFIED TYPE: ICD-10-CM

## 2025-08-14 DIAGNOSIS — K42.9 UMBILICAL HERNIA WITHOUT OBSTRUCTION AND WITHOUT GANGRENE: ICD-10-CM

## 2025-08-14 DIAGNOSIS — N17.9 ACUTE KIDNEY INJURY: ICD-10-CM

## 2025-08-14 DIAGNOSIS — D64.9 ANEMIA, UNSPECIFIED TYPE: ICD-10-CM

## 2025-08-14 DIAGNOSIS — E87.6 HYPOKALEMIA: ICD-10-CM

## 2025-08-14 DIAGNOSIS — R10.30 LOWER ABDOMINAL PAIN: Primary | ICD-10-CM

## 2025-08-14 ASSESSMENT — PATIENT HEALTH QUESTIONNAIRE - PHQ9: DEPRESSION UNABLE TO ASSESS: PT REFUSES

## 2025-08-27 ENCOUNTER — OFFICE VISIT (OUTPATIENT)
Dept: BARIATRICS/WEIGHT MGMT | Age: 55
End: 2025-08-27
Payer: MEDICARE

## 2025-08-27 VITALS
DIASTOLIC BLOOD PRESSURE: 86 MMHG | WEIGHT: 96.4 LBS | HEART RATE: 71 BPM | SYSTOLIC BLOOD PRESSURE: 156 MMHG | BODY MASS INDEX: 20.24 KG/M2 | OXYGEN SATURATION: 97 % | HEIGHT: 58 IN

## 2025-08-27 DIAGNOSIS — K43.2 INCISIONAL HERNIA, WITHOUT OBSTRUCTION OR GANGRENE: Primary | ICD-10-CM

## 2025-08-27 PROCEDURE — 3079F DIAST BP 80-89 MM HG: CPT | Performed by: SURGERY

## 2025-08-27 PROCEDURE — 3077F SYST BP >= 140 MM HG: CPT | Performed by: SURGERY

## 2025-08-27 PROCEDURE — 99213 OFFICE O/P EST LOW 20 MIN: CPT | Performed by: SURGERY

## 2025-09-04 ENCOUNTER — TELEPHONE (OUTPATIENT)
Dept: GASTROENTEROLOGY | Age: 55
End: 2025-09-04

## (undated) DEVICE — ADHESIVE SKIN CLOSURE TOP 36 CC HI VISC DERMBND MINI

## (undated) DEVICE — TUBING, SUCTION, 3/16" X 10', STRAIGHT: Brand: MEDLINE

## (undated) DEVICE — SCISSOR SURG METZ CRV TIP

## (undated) DEVICE — SUTURE MCRYL SZ 4-0 L18IN ABSRB UD L16MM PC-3 3/8 CIR PRIM Y845G

## (undated) DEVICE — GLOVE ORANGE PI 7   MSG9070

## (undated) DEVICE — TROCAR: Brand: KII® SLEEVE

## (undated) DEVICE — BITEBLOCK 54FR W/ DENT RIM BLOX

## (undated) DEVICE — YANKAUER,BULB TIP,W/O VENT,RIGID,STERILE: Brand: MEDLINE

## (undated) DEVICE — SOLUTION ANTIFOG VIS SYS CLEARIFY LAPSCP

## (undated) DEVICE — DRESSING BORDERED ADH GZ UNIV GEN USE 8INX4IN AND 6INX2IN

## (undated) DEVICE — APPLICATOR MEDICATED 26 CC SOLUTION HI LT ORNG CHLORAPREP

## (undated) DEVICE — SUTURE PDS II SZ 0 L60IN ABSRB VLT L65MM TP-1 1/2 CIR Z991G

## (undated) DEVICE — LUER-LOK 360°: Brand: CONNECTA, LUER-LOK

## (undated) DEVICE — FORCEPS BX L240CM WRK CHN 2.8MM STD CAP W/ NDL MIC MESH

## (undated) DEVICE — PLUMEPORT SEO LAPAROSCOPIC SMOKE FILTRATION DEVICE: Brand: PLUMEPORT

## (undated) DEVICE — SVMMC CONV PK

## (undated) DEVICE — DRAPE, SLUSH XL, 44X66, STERILE: Brand: MEDLINE

## (undated) DEVICE — SOLUTION IV 500ML 0.9% SOD CHL PH 5 INJ USP VIAFLX PLAS

## (undated) DEVICE — SUTURE VCRL SZ 3-0 L27IN ABSRB UD L26MM SH 1/2 CIR J416H

## (undated) DEVICE — TROCAR: Brand: KII FIOS FIRST ENTRY

## (undated) DEVICE — PACK LAP BASIC

## (undated) DEVICE — INTENDED FOR TISSUE SEPARATION, AND OTHER PROCEDURES THAT REQUIRE A SHARP SURGICAL BLADE TO PUNCTURE OR CUT.: Brand: BARD-PARKER ® CARBON RIB-BACK BLADES

## (undated) DEVICE — GARMENT,MEDLINE,DVT,INT,CALF,MED, GEN2: Brand: MEDLINE

## (undated) DEVICE — SUTURE VCRL + SZ 0 L27IN ABSRB VLT L26MM UR-6 5/8 CIR VCP603H

## (undated) DEVICE — Z DISCONTINUED USE 2272124 DRAPE SURG XL N INVASIVE 2 LAYR DISP

## (undated) DEVICE — GLOVE SURG SZ 65 THK91MIL LTX FREE SYN POLYISOPRENE

## (undated) DEVICE — GOWN,SIRUS,NONRNF,SETINSLV,XL,20/CS: Brand: MEDLINE

## (undated) DEVICE — DEFENDO AIR WATER SUCTION AND BIOPSY VALVE KIT FOR  OLYMPUS: Brand: DEFENDO AIR/WATER/SUCTION AND BIOPSY VALVE

## (undated) DEVICE — CATHETER ETER IV 24GA L075IN OD0699 0724MM ID0445 0521MM YEL

## (undated) DEVICE — CANNULA NSL AD TBNG L7FT PVC STR NONFLARED PRNG O2 DEL W STD

## (undated) DEVICE — ENDO KIT W/SYRINGE: Brand: MEDLINE INDUSTRIES, INC.

## (undated) DEVICE — TOWEL,OR,DSP,ST,NATURAL,DLX,4/PK,20PK/CS: Brand: MEDLINE

## (undated) DEVICE — GLOVE ORANGE PI 7 1/2   MSG9075